# Patient Record
Sex: MALE | Race: WHITE | NOT HISPANIC OR LATINO | Employment: OTHER | URBAN - METROPOLITAN AREA
[De-identification: names, ages, dates, MRNs, and addresses within clinical notes are randomized per-mention and may not be internally consistent; named-entity substitution may affect disease eponyms.]

---

## 2017-02-27 ENCOUNTER — TRANSCRIBE ORDERS (OUTPATIENT)
Dept: LAB | Facility: CLINIC | Age: 77
End: 2017-02-27

## 2017-02-27 ENCOUNTER — LAB (OUTPATIENT)
Dept: LAB | Facility: CLINIC | Age: 77
End: 2017-02-27
Payer: MEDICARE

## 2017-02-27 DIAGNOSIS — N40.1 ENLARGED PROSTATE WITH URINARY OBSTRUCTION: ICD-10-CM

## 2017-02-27 DIAGNOSIS — N40.1 ENLARGED PROSTATE WITH URINARY OBSTRUCTION: Primary | ICD-10-CM

## 2017-02-27 DIAGNOSIS — N13.8 ENLARGED PROSTATE WITH URINARY OBSTRUCTION: ICD-10-CM

## 2017-02-27 DIAGNOSIS — N13.8 ENLARGED PROSTATE WITH URINARY OBSTRUCTION: Primary | ICD-10-CM

## 2017-02-27 LAB — PSA SERPL-MCNC: 3.2 NG/ML (ref 0–4)

## 2017-02-27 PROCEDURE — 84153 ASSAY OF PSA TOTAL: CPT

## 2017-03-07 ENCOUNTER — GENERIC CONVERSION - ENCOUNTER (OUTPATIENT)
Dept: OTHER | Facility: OTHER | Age: 77
End: 2017-03-07

## 2017-03-20 ENCOUNTER — TRANSCRIBE ORDERS (OUTPATIENT)
Dept: ADMINISTRATIVE | Facility: HOSPITAL | Age: 77
End: 2017-03-20

## 2017-03-20 DIAGNOSIS — I71.2 THORACIC ANEURYSM WITHOUT MENTION OF RUPTURE: Primary | ICD-10-CM

## 2017-05-01 ENCOUNTER — HOSPITAL ENCOUNTER (OUTPATIENT)
Dept: RADIOLOGY | Age: 77
Discharge: HOME | End: 2017-05-01
Payer: MEDICARE

## 2017-05-01 DIAGNOSIS — I71.2 ANEURYSM OF THORACIC AORTA (HCC): ICD-10-CM

## 2017-05-01 PROCEDURE — 71250 CT THORAX DX C-: CPT

## 2017-05-03 ENCOUNTER — LAB (OUTPATIENT)
Dept: LAB | Facility: CLINIC | Age: 77
End: 2017-05-03
Payer: MEDICARE

## 2017-05-03 DIAGNOSIS — R73.01 IMPAIRED FASTING GLUCOSE: ICD-10-CM

## 2017-05-03 DIAGNOSIS — I10 ESSENTIAL (PRIMARY) HYPERTENSION: ICD-10-CM

## 2017-05-03 DIAGNOSIS — E78.5 HYPERLIPIDEMIA: ICD-10-CM

## 2017-05-03 LAB
ALBUMIN SERPL BCP-MCNC: 3.6 G/DL (ref 3.5–5)
ALP SERPL-CCNC: 66 U/L (ref 46–116)
ALT SERPL W P-5'-P-CCNC: 28 U/L (ref 12–78)
ANION GAP SERPL CALCULATED.3IONS-SCNC: 5 MMOL/L (ref 4–13)
AST SERPL W P-5'-P-CCNC: 19 U/L (ref 5–45)
BACTERIA UR QL AUTO: ABNORMAL /HPF
BASOPHILS # BLD AUTO: 0.04 THOUSANDS/ΜL (ref 0–0.1)
BASOPHILS NFR BLD AUTO: 1 % (ref 0–1)
BILIRUB SERPL-MCNC: 0.63 MG/DL (ref 0.2–1)
BILIRUB UR QL STRIP: NEGATIVE
BUN SERPL-MCNC: 15 MG/DL (ref 5–25)
CALCIUM SERPL-MCNC: 8.8 MG/DL (ref 8.3–10.1)
CHLORIDE SERPL-SCNC: 106 MMOL/L (ref 100–108)
CHOLEST SERPL-MCNC: 133 MG/DL (ref 50–200)
CLARITY UR: ABNORMAL
CO2 SERPL-SCNC: 31 MMOL/L (ref 21–32)
COLOR UR: YELLOW
CREAT SERPL-MCNC: 1.09 MG/DL (ref 0.6–1.3)
EOSINOPHIL # BLD AUTO: 0.32 THOUSAND/ΜL (ref 0–0.61)
EOSINOPHIL NFR BLD AUTO: 6 % (ref 0–6)
ERYTHROCYTE [DISTWIDTH] IN BLOOD BY AUTOMATED COUNT: 12.9 % (ref 11.6–15.1)
EST. AVERAGE GLUCOSE BLD GHB EST-MCNC: 126 MG/DL
GFR SERPL CREATININE-BSD FRML MDRD: >60 ML/MIN/1.73SQ M
GLUCOSE P FAST SERPL-MCNC: 100 MG/DL (ref 65–99)
GLUCOSE UR STRIP-MCNC: NEGATIVE MG/DL
HBA1C MFR BLD: 6 % (ref 4.2–6.3)
HCT VFR BLD AUTO: 43.9 % (ref 36.5–49.3)
HDLC SERPL-MCNC: 58 MG/DL (ref 40–60)
HGB BLD-MCNC: 14.3 G/DL (ref 12–17)
HGB UR QL STRIP.AUTO: ABNORMAL
HYALINE CASTS #/AREA URNS LPF: ABNORMAL /LPF
KETONES UR STRIP-MCNC: NEGATIVE MG/DL
LDLC SERPL CALC-MCNC: 65 MG/DL (ref 0–100)
LEUKOCYTE ESTERASE UR QL STRIP: NEGATIVE
LYMPHOCYTES # BLD AUTO: 1.31 THOUSANDS/ΜL (ref 0.6–4.47)
LYMPHOCYTES NFR BLD AUTO: 24 % (ref 14–44)
MCH RBC QN AUTO: 32.6 PG (ref 26.8–34.3)
MCHC RBC AUTO-ENTMCNC: 32.6 G/DL (ref 31.4–37.4)
MCV RBC AUTO: 100 FL (ref 82–98)
MONOCYTES # BLD AUTO: 0.53 THOUSAND/ΜL (ref 0.17–1.22)
MONOCYTES NFR BLD AUTO: 10 % (ref 4–12)
NEUTROPHILS # BLD AUTO: 3.35 THOUSANDS/ΜL (ref 1.85–7.62)
NEUTS SEG NFR BLD AUTO: 59 % (ref 43–75)
NITRITE UR QL STRIP: NEGATIVE
NON-SQ EPI CELLS URNS QL MICRO: ABNORMAL /HPF
NRBC BLD AUTO-RTO: 0 /100 WBCS
PH UR STRIP.AUTO: 6 [PH] (ref 4.5–8)
PLATELET # BLD AUTO: 165 THOUSANDS/UL (ref 149–390)
PMV BLD AUTO: 10.1 FL (ref 8.9–12.7)
POTASSIUM SERPL-SCNC: 4.8 MMOL/L (ref 3.5–5.3)
PROT SERPL-MCNC: 7.1 G/DL (ref 6.4–8.2)
PROT UR STRIP-MCNC: NEGATIVE MG/DL
RBC # BLD AUTO: 4.39 MILLION/UL (ref 3.88–5.62)
RBC #/AREA URNS AUTO: ABNORMAL /HPF
SODIUM SERPL-SCNC: 142 MMOL/L (ref 136–145)
SP GR UR STRIP.AUTO: 1.03 (ref 1–1.03)
TRIGL SERPL-MCNC: 51 MG/DL
TSH SERPL DL<=0.05 MIU/L-ACNC: 2.49 UIU/ML (ref 0.36–3.74)
UROBILINOGEN UR QL STRIP.AUTO: 0.2 E.U./DL
WBC # BLD AUTO: 5.56 THOUSAND/UL (ref 4.31–10.16)
WBC #/AREA URNS AUTO: ABNORMAL /HPF

## 2017-05-03 PROCEDURE — 85025 COMPLETE CBC W/AUTO DIFF WBC: CPT

## 2017-05-03 PROCEDURE — 87086 URINE CULTURE/COLONY COUNT: CPT

## 2017-05-03 PROCEDURE — 81001 URINALYSIS AUTO W/SCOPE: CPT

## 2017-05-03 PROCEDURE — 80061 LIPID PANEL: CPT

## 2017-05-03 PROCEDURE — 80053 COMPREHEN METABOLIC PANEL: CPT

## 2017-05-03 PROCEDURE — 84443 ASSAY THYROID STIM HORMONE: CPT

## 2017-05-03 PROCEDURE — 83036 HEMOGLOBIN GLYCOSYLATED A1C: CPT

## 2017-05-03 PROCEDURE — 36415 COLL VENOUS BLD VENIPUNCTURE: CPT

## 2017-05-04 LAB — BACTERIA UR CULT: NORMAL

## 2017-05-16 ENCOUNTER — HOSPITAL ENCOUNTER (OUTPATIENT)
Dept: NON INVASIVE DIAGNOSTICS | Facility: CLINIC | Age: 77
Discharge: HOME | End: 2017-05-16
Payer: MEDICARE

## 2017-05-16 VITALS — SYSTOLIC BLOOD PRESSURE: 134 MMHG | DIASTOLIC BLOOD PRESSURE: 80 MMHG

## 2017-05-16 DIAGNOSIS — E78.5 HYPERLIPIDEMIA: ICD-10-CM

## 2017-05-16 LAB
CHEST PAIN STATEMENT: NORMAL
MAX DIASTOLIC BP: 82 MMHG
MAX HEART RATE: 142 BPM
MAX PREDICTED HEART RATE: 145 BPM
MAX. SYSTOLIC BP: 148 MMHG
PROTOCOL NAME: NORMAL
TARGET HR FORMULA: NORMAL
TEST INDICATION: NORMAL
TIME IN EXERCISE PHASE: 301 S

## 2017-05-16 PROCEDURE — 93350 STRESS TTE ONLY: CPT

## 2017-05-19 ENCOUNTER — ALLSCRIPTS OFFICE VISIT (OUTPATIENT)
Dept: OTHER | Facility: OTHER | Age: 77
End: 2017-05-19

## 2017-05-22 ENCOUNTER — ALLSCRIPTS OFFICE VISIT (OUTPATIENT)
Dept: OTHER | Facility: OTHER | Age: 77
End: 2017-05-22

## 2017-06-30 ENCOUNTER — ALLSCRIPTS OFFICE VISIT (OUTPATIENT)
Dept: OTHER | Facility: OTHER | Age: 77
End: 2017-06-30

## 2017-09-25 ENCOUNTER — GENERIC CONVERSION - ENCOUNTER (OUTPATIENT)
Dept: OTHER | Facility: OTHER | Age: 77
End: 2017-09-25

## 2017-10-31 ENCOUNTER — GENERIC CONVERSION - ENCOUNTER (OUTPATIENT)
Dept: OTHER | Facility: OTHER | Age: 77
End: 2017-10-31

## 2017-11-01 DIAGNOSIS — I10 ESSENTIAL (PRIMARY) HYPERTENSION: ICD-10-CM

## 2017-11-01 DIAGNOSIS — E78.5 HYPERLIPIDEMIA: ICD-10-CM

## 2017-11-01 DIAGNOSIS — R73.01 IMPAIRED FASTING GLUCOSE: ICD-10-CM

## 2017-11-07 ENCOUNTER — LAB (OUTPATIENT)
Dept: LAB | Facility: CLINIC | Age: 77
End: 2017-11-07
Payer: MEDICARE

## 2017-11-07 DIAGNOSIS — E78.5 HYPERLIPIDEMIA: ICD-10-CM

## 2017-11-07 DIAGNOSIS — R73.01 IMPAIRED FASTING GLUCOSE: ICD-10-CM

## 2017-11-07 DIAGNOSIS — I10 ESSENTIAL (PRIMARY) HYPERTENSION: ICD-10-CM

## 2017-11-07 LAB
ALBUMIN SERPL BCP-MCNC: 4.1 G/DL (ref 3.5–5)
ALP SERPL-CCNC: 66 U/L (ref 46–116)
ALT SERPL W P-5'-P-CCNC: 21 U/L (ref 12–78)
ANION GAP SERPL CALCULATED.3IONS-SCNC: 5 MMOL/L (ref 4–13)
AST SERPL W P-5'-P-CCNC: 19 U/L (ref 5–45)
BILIRUB SERPL-MCNC: 0.95 MG/DL (ref 0.2–1)
BUN SERPL-MCNC: 13 MG/DL (ref 5–25)
CALCIUM SERPL-MCNC: 9 MG/DL (ref 8.3–10.1)
CHLORIDE SERPL-SCNC: 104 MMOL/L (ref 100–108)
CHOLEST SERPL-MCNC: 136 MG/DL (ref 50–200)
CO2 SERPL-SCNC: 29 MMOL/L (ref 21–32)
CREAT SERPL-MCNC: 0.96 MG/DL (ref 0.6–1.3)
EST. AVERAGE GLUCOSE BLD GHB EST-MCNC: 120 MG/DL
GFR SERPL CREATININE-BSD FRML MDRD: 76 ML/MIN/1.73SQ M
GLUCOSE P FAST SERPL-MCNC: 101 MG/DL (ref 65–99)
HBA1C MFR BLD: 5.8 % (ref 4.2–6.3)
HDLC SERPL-MCNC: 88 MG/DL (ref 40–60)
LDLC SERPL CALC-MCNC: 40 MG/DL (ref 0–100)
POTASSIUM SERPL-SCNC: 4.9 MMOL/L (ref 3.5–5.3)
PROT SERPL-MCNC: 7.3 G/DL (ref 6.4–8.2)
SODIUM SERPL-SCNC: 138 MMOL/L (ref 136–145)
TRIGL SERPL-MCNC: 40 MG/DL

## 2017-11-07 PROCEDURE — 80061 LIPID PANEL: CPT

## 2017-11-07 PROCEDURE — 36415 COLL VENOUS BLD VENIPUNCTURE: CPT

## 2017-11-07 PROCEDURE — 83036 HEMOGLOBIN GLYCOSYLATED A1C: CPT

## 2017-11-07 PROCEDURE — 80053 COMPREHEN METABOLIC PANEL: CPT

## 2017-11-27 ENCOUNTER — ALLSCRIPTS OFFICE VISIT (OUTPATIENT)
Dept: OTHER | Facility: OTHER | Age: 77
End: 2017-11-27

## 2018-01-12 VITALS
BODY MASS INDEX: 35.59 KG/M2 | HEIGHT: 69 IN | HEART RATE: 72 BPM | DIASTOLIC BLOOD PRESSURE: 72 MMHG | SYSTOLIC BLOOD PRESSURE: 138 MMHG | WEIGHT: 240.25 LBS | OXYGEN SATURATION: 95 %

## 2018-01-13 VITALS
OXYGEN SATURATION: 98 % | WEIGHT: 242.19 LBS | SYSTOLIC BLOOD PRESSURE: 124 MMHG | DIASTOLIC BLOOD PRESSURE: 90 MMHG | BODY MASS INDEX: 35.87 KG/M2 | HEART RATE: 59 BPM | HEIGHT: 69 IN

## 2018-01-14 VITALS
BODY MASS INDEX: 35.55 KG/M2 | SYSTOLIC BLOOD PRESSURE: 130 MMHG | HEART RATE: 68 BPM | HEIGHT: 69 IN | OXYGEN SATURATION: 94 % | DIASTOLIC BLOOD PRESSURE: 82 MMHG | RESPIRATION RATE: 14 BRPM | WEIGHT: 240 LBS

## 2018-01-14 VITALS
RESPIRATION RATE: 16 BRPM | SYSTOLIC BLOOD PRESSURE: 124 MMHG | TEMPERATURE: 97.8 F | WEIGHT: 241.05 LBS | BODY MASS INDEX: 35.7 KG/M2 | DIASTOLIC BLOOD PRESSURE: 70 MMHG | HEIGHT: 69 IN | HEART RATE: 65 BPM | OXYGEN SATURATION: 97 %

## 2018-01-15 NOTE — RESULT NOTES
Message   #1  Please call the patient with the results of his laboratory testing  #2  His blood sugar is a little high, but stable  #3  His other laboratory test results looks well  #4  I recommend that he continue with his current medications, until his office visit later this month  #5  You may leave a message, if his communication consent allows for it  Verified Results  (1) COMPREHENSIVE METABOLIC PANEL [id number] 96:46MX Auryrhina MichelClaude Order Number: [id number]     Test Name Result Flag Reference   GLUCOSE,RANDM 104 mg/dL     If the patient is fasting, the ADA then defines impaired fasting glucose as > 100 mg/dL and diabetes as > or equal to 123 mg/dL  SODIUM 139 mmol/L  136-145   POTASSIUM 4 5 mmol/L  3 5-5 3   CHLORIDE 103 mmol/L  100-108   CARBON DIOXIDE 30 mmol/L  21-32   ANION GAP (CALC) 6 mmol/L  4-13   BLOOD UREA NITROGEN 13 mg/dL  5-25   CREATININE 1 00 mg/dL  0 60-1 30   Standardized to IDMS reference method   CALCIUM 8 8 mg/dL  8 3-10 1   BILI, TOTAL 0 82 mg/dL  0 20-1 00   ALK PHOSPHATAS 65 U/L     ALT (SGPT) 27 U/L  12-78   AST(SGOT) 13 U/L  5-45   ALBUMIN 3 8 g/dL  3 5-5 0   TOTAL PROTEIN 7 1 g/dL  6 4-8 2   eGFR Non-African American      >60 0 ml/min/1 73sq m   Performing Comments: Please mail a copy of the test results to   [facility]  - Patient Instructions: Please fast for 8-10 hours prior to doing the blood test     Performing Comments: Please mail a copy of the test results to   [facility]  - Patient Instructions: Please fast for 8-10 hours prior to doing the blood test   National Kidney Disease Education Program recommendations are as follows:  GFR calculation is accurate only with a steady state creatinine  Chronic Kidney disease less than 60 ml/min/1 73 sq  meters  Kidney failure less than 15 ml/min/1 73 sq  meters       (1) HEMOGLOBIN A1C [id number] 07:35AM Aury Arce Order Number: [id number]     Test Name Result Flag Reference HEMOGLOBIN A1C 5 8 %  4 2-6 3   EST  AVG  GLUCOSE 120 mg/dl       (1) LIPID PANEL FASTING W DIRECT LDL REFLEX [id number] 07:35AM Dayo Newsomes Order Number: [id number]     Test Name Result Flag Reference   CHOLESTEROL 133 mg/dL     LDL CHOLESTEROL CALCULATED 59 mg/dL  0-100   Performing Comments: Please mail a copy of the test results to Dr Ambrosio Camara  - Patient Instructions: Please fast for 8-10 hours prior to doing the blood test   Triglyceride:         Normal              <150 mg/dl       Borderline High    150-199 mg/dl       High               200-499 mg/dl       Very High          >499 mg/dl  Cholesterol:         Desirable        <200 mg/dl      Borderline High  200-239 mg/dl      High             >239 mg/dl  HDL Cholesterol:        High    >59 mg/dL      Low     <41 mg/dL  LDL Cholesterol:        Optimal          <100 mg/dl        Near Optimal     100-129 mg/dl        Above Optimal          Borderline High   130-159 mg/dl          High              160-189 mg/dl          Very High        >189 mg/dl  LDL CALCULATED:    This screening LDL is a calculated result  It does not have the accuracy of the Direct Measured LDL in the monitoring of patients with hyperlipidemia and/or statin therapy  Direct Measure LDL (KQG147) must be ordered separately in these patients  TRIGLYCERIDES 42 mg/dL  <=150   Specimen collection should occur prior to N-Acetylcysteine or Metamizole administration due to the potential for falsely depressed results  HDL,DIRECT 66 mg/dL H 40-60   Specimen collection should occur prior to Metamizole administration due to the potential for falsely depressed results  (1) TSH WITH FT4 REFLEX [id number] 07:35AM [employer] Order Number: [id number]     Test Name Result Flag Reference   TSH 2 920 uIU/mL  0 358-3 740   Performing Comments: Please mail a copy of the test results to Dr Ambrosio Camara     - Patient Instructions: Please fast for 8-10 hours prior to doing the blood test   Patients undergoing fluorescein dye angiography may retain small amounts of fluorescein in the body for 48-72 hours post procedure  Samples containing fluorescein can produce falsely depressed TSH values  If the patient had this procedure,a specimen should be resubmitted post fluorescein clearance

## 2018-01-15 NOTE — PROGRESS NOTES
Assessment    1  Encounter for preventive health examination (V70 0) (Z00 00)   2  Screen for colon cancer (V76 51) (Z12 11)    Plan  Benign essential hypertension    · Labetalol HCl - 100 MG Oral Tablet; TAKE 1 TABLET TWICE DAILY  Screen for colon cancer    · (1) COLOGUARD; Status:Active; Requested for:27Nov2017;   cont  : I authorize [address] to obtain reimbursement for      Cologuard and to directly contact and collect a second sample from the paient      if reportable results are not obtained from the initial sample   cont  : I accept responsibility for maintaining the privacy of test results and      related information as required by HIPAA   : By ordering Cologuard, I certify that I am a licensed medical professional      authorized to order Cologuard  I acknowledge that the test is medically necessary and      that the patient is eligible to use Cologuard  Discussion/Summary  Impression: Subsequent Annual Wellness Visit, with preventive exam as well as age and risk appropriate counseling completed  Cardiovascular screening and counseling: screening is current  Diabetes screening and counseling: screening is current  Colorectal cancer screening and counseling: screening is current  Prostate cancer screening and counseling: screening is current  Osteoporosis screening and counseling: screening not indicated  Abdominal aortic aneurysm screening and counseling: screening is current  Glaucoma screening and counseling: screening is current and Dr Gordo Maldonado  Immunizations: influenza vaccine is up to date this year, the lifetime pneumococcal vaccine has been completed, the risks and benefits of the Zostavax vaccine were discussed with the patient and Consider Shingrix, already got Zostavax  Advance Directive Planning: paperwork and instructions were given to the patient  Patient Discussion: plan discussed with the patient, follow-up visit needed in 6months     Possible side effects of new medications were reviewed with the patient/guardian today  The treatment plan was reviewed with the patient/guardian  The patient/guardian understands and agrees with the treatment plan      Chief Complaint  Patient presents to office today for a wellness visit  History of Present Illness  Welcome to Medicare and Wellness Visits: The patient is being seen for the subsequent annual wellness visit  Medicare Screening and Risk Factors   Hospitalizations: no previous hospitalizations  Once per lifetime medicare screening tests: ECG has not been done  Medicare Screening Tests Risk Questions   Abdominal aortic aneurysm risk assessment: tobacco use, over 72years of age and family history of AAA  Osteoporosis risk assessment:  and over 48years of age  HIV risk assessment: none indicated  Drug and Alcohol Use: The patient is a former cigarette smoker  The patient reports frequent alcohol use and drinking 3-4 drinks per day  Alcohol concern:   The patient has no concerns about alcohol abuse  He has never used illicit drugs  Diet and Physical Activity: Current diet includes well balanced meals and 3 cups of coffee per day  The patient does not exercise  Mood Disorder and Cognitive Impairment Screening:   Depression screening  negative for symptoms  He denies feeling down, depressed, or hopeless over the past two weeks  He denies feeling little interest or pleasure in doing things over the past two weeks  Cognitive impairment screening: denies difficulty learning/retaining new information, denies difficulty handling complex tasks, denies difficulty with reasoning, denies difficulty with spatial ability and orientation, denies difficulty with language and denies difficulty with behavior  Functional Ability/Level of Safety: Hearing is normal bilaterally  He denies hearing difficulties   The patient is currently able to do activities of daily living without limitations, able to do instrumental activities of daily living without limitations, able to participate in social activities without limitations and able to drive without limitations  Activities of daily living details: does not need help using the phone, no transportation help needed, does not need help shopping, no meal preparation help needed, does not need help doing housework, does not need help doing laundry, does not need help managing medications and does not need help managing money  Fall risk factors: The patient fell 0 times in the past 12 months  Injury History: no polypharmacy, alcohol use, no mobility impairment, no antidepressant use, no deconditioning, no postural hypotension, no sedative use, no visual impairment, no urinary incontinence, antihypertensive use, no cognitive impairment, up and go test was normal and no previous fall  Home safety risk factors:  no grab bars in the bathroom, but no unfamiliar surroundings, no loose rugs, no poor household lighting, no uneven floors, no household clutter and handrails on the stairs  Advance Directives: Advance directives: no living will, no durable power of  for health care directives and no advance directives  Co-Managers and Medical Equipment/Suppliers: See Patient Care Team   Preventive Quality Program 65 and Older: Falls Risk: The patient fell times in the past 12 months  The patient currently has no urinary incontinence symptoms  Patient Care Team    Care Team Member Role Specialty Office Number   Loreta Winter MD Specialist Gastroenterology Adult [phone]   Naren Olivares MD Specialist Urology [phone]   Yola Liriano MD Specialist Cardiology [phone]   Orion FUNEZ  Specialist Cardiac Surgery [phone]   Villa Cowden MD Specialist Pain Management [phone]   Aide Marlow MD  Dermatology [phone]     Review of Systems    Constitutional: no fever and no chills     Cardiovascular: no chest pain and no palpitations  Respiratory: no shortness of breath and no cough  Gastrointestinal: no abdominal pain, no diarrhea, no constipation, no bright red blood per rectum and no melena  Genitourinary: nocturia  Psychiatric: no anxiety and no depression  Active Problems    1  3-vessel CAD (414 00) (I25 10)   2  Advance directive discussed with patient (V65 49) (Z71 89)   3  Allergic rhinitis (477 9) (J30 9)   4  Aneurysm of abdominal aorta (441 4) (I71 4)   5  Aneurysm of ascending aorta (441 2) (I71 2)   6  Aortic valve disorder (424 1) (I35 9)   7  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 2,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 9) (I25 10)   8  Benign essential hypertension (401 1) (I10)   9  Disc degeneration, lumbar (722 52) (M51 36)   10  Edema (782 3) (R60 9)   11  Herniated lumbar intervertebral disc (722 10) (M51 26)   12  Hyperlipidemia (272 4) (E78 5)   13  Impaired fasting glucose (790 21) (R73 01)   14  Left inguinal hernia (550 90) (K40 90)   15  Limb pain (729 5) (M79 609)   16  Long term use of drug (V58 69) (Z79 899)   17  Lumbar radiculopathy (724 4) (M54 16)   18  Microscopic hematuria (599 72) (R31 29)   19  Need for prophylactic vaccination against single diseases (V05 9) (Z23)   20  Need for prophylactic vaccination and inoculation against influenza (V04 81) (Z23)   21  Obesity (278 00) (E66 9)   22  Obstructive sleep apnea (327 23) (G47 33)   23  Pain in joint of left hip (719 45) (M25 552)   24  Screening for genitourinary condition (V81 6) (Z13 89)   25  Subclinical hypothyroidism (244 8) (E03 9)    Past Medical History    · History of Coronary Artery Disease (V12 59)   · History of abdominal aortic aneurysm (V12 59) (Z86 79)   · History of hyperlipidemia (V12 29) (Z86 39)   · History of hypertension (V12 59) (Z86 79)   · History of Subconjunctival hemorrhage, unspecified laterality (372 72) (H11 30)    The active problems and past medical history were reviewed and updated today        Surgical History    · History of Abd Aorta Aneurysm Repair 2 Dock Limbs W/ Visc Extens Prosth   · History of CABG   · History of Surgery For Abdominal Aortic Aneurysm    The surgical history was reviewed and updated today  Family History  Mother    · Family history of Heart Disease (V17 49)  Father    · Family history of Stroke Syndrome (V17 1)  Son    · Family history of Glioblastoma multiforme  Brother    · Family history of Abdominal aneurysm   · Family history of Ascending aortic aneurysm  Family History    · Family history of Coronary Artery Disease (V17 49)   · Family history of Hypertension (V17 49)    The family history was reviewed and updated today  Social History    · Denied: History of Drug Use   · Former smoker (V15 82) (S54 193)   · pt stated smoked 1 pack per day for 40 yrs   · Marital History - Currently    · Occupation: Retired   · Regular alcohol consumption (V69 8) (Z78 9)  The social history was reviewed and updated today  Current Meds   1  Aspirin EC 81 MG Oral Tablet Delayed Release; TAKE 1 TABLET DAILY; Therapy: 46XJO8725 to Recorded   2  Atorvastatin Calcium 40 MG Oral Tablet; Take 1 tablet daily; Therapy: 09BIV8978 to (Evaluate:70Elf8574)  Requested for: 60VCB2798; Last   Rx:25Nxi7820 Ordered   3  Labetalol HCl - 100 MG Oral Tablet; TAKE 1 TABLET TWICE DAILY; Therapy: 55WJB1555 to (Evaluate:99Mhk8198)  Requested for: 88Msj0619; Last   Rx:20Xrq1563 Ordered   4  Lisinopril 5 MG Oral Tablet; Take 1 tablet daily; Therapy: 38VNX1988 to (Evaluate:28Yxp3538)  Requested for: 17Gpe4820; Last   AV:11PDM1372 Ordered   5  Tamsulosin HCl - 0 4 MG Oral Capsule; take 1 capsule daily; Therapy: (Recorded:31Zta0478) to Recorded    The medication list was reviewed and updated today  Allergies    1  meloxicam    2  No Known Environmental Allergies   3  No Known Food Allergies    Immunizations   ** Printed in Appendix #1 below       Vitals  Signs    Heart Rate: 72  Systolic: 033  Diastolic: 72  Height: 5 ft 9 in  Weight: 240 lb 4 oz  BMI Calculated: 35 48  BSA Calculated: 2 23  O2 Saturation: 95    Physical Exam    Constitutional   General appearance: No acute distress, well appearing and well nourished  obese  Head and Face   Head and face: Normal     Eyes   Conjunctiva and lids: No erythema, swelling or discharge  Ears, Nose, Mouth, and Throat   Otoscopic examination: Tympanic membranes translucent with normal light reflex  Canals patent without erythema  Oropharynx: Normal with no erythema, edema, exudate or lesions  Neck   Neck: Supple, symmetric, trachea midline, no masses  Thyroid: Normal, no thyromegaly  Pulmonary   Respiratory effort: No increased work of breathing or signs of respiratory distress  Auscultation of lungs: Clear to auscultation  Cardiovascular   Auscultation of heart: Normal rate and rhythm, normal S1 and S2, no murmurs  Examination of extremities for edema and/or varicosities: Normal     Abdomen soft movable nontender mass subcutaneous right side of abdomen  Lymphatic   Palpation of lymph nodes in neck: No lymphadenopathy  Musculoskeletal   Gait and station: Normal     Psychiatric   Orientation to person, place and time: Normal     Mood and affect: Normal        Results/Data  PHQ-2 Adult Depression Screening 27Nov2017 09:34AM User, [employer]     Test Name Result Flag Reference   PHQ-2 Adult Depression Score 0     Over the last two weeks, how often have you been bothered by any of the following problems?   Little interest or pleasure in doing things: Not at all - 0  Feeling down, depressed, or hopeless: Not at all - 0   PHQ-2 Adult Depression Screening Negative       Falls Risk Assessment (Dx Z13 89 Screen for Neurologic Disorder) 27QSC3137 09:34AM User, [employer]     Test Name Result Flag Reference   Falls Risk      No falls in the past year       Signatures   Electronically signed by : HANS Yoon ; Nov 27 2017 10:08AM EST (Author)    Appendix #1     Patient: Rene Haider ; : 1940; MRN: [id number]      1 2 3 4 5 6    Influenza  20-Oct-2012  (72y) 02-Oct-2013  (73y) 02-Oct-2013  (73y) 22-Oct-2014  (74y) 27-Oct-2015  (75y) 08-Oct-2016  (76y)    PCV  2015  (74y)         PPSV  07-Sep-2012  (72y)

## 2018-02-28 DIAGNOSIS — E78.5 HYPERLIPIDEMIA: ICD-10-CM

## 2018-03-01 ENCOUNTER — TRANSCRIBE ORDERS (OUTPATIENT)
Dept: LAB | Facility: CLINIC | Age: 78
End: 2018-03-01
Payer: MEDICARE

## 2018-03-01 ENCOUNTER — LAB (OUTPATIENT)
Dept: LAB | Facility: CLINIC | Age: 78
End: 2018-03-01
Payer: MEDICARE

## 2018-03-01 DIAGNOSIS — R31.9 HEMATURIA SYNDROME: ICD-10-CM

## 2018-03-01 DIAGNOSIS — N40.1 ENLARGED PROSTATE WITH URINARY OBSTRUCTION: Primary | ICD-10-CM

## 2018-03-01 DIAGNOSIS — E78.5 HYPERLIPIDEMIA: ICD-10-CM

## 2018-03-01 DIAGNOSIS — N13.8 ENLARGED PROSTATE WITH URINARY OBSTRUCTION: Primary | ICD-10-CM

## 2018-03-01 LAB
ALBUMIN SERPL BCP-MCNC: 3.7 G/DL (ref 3.5–5)
ALP SERPL-CCNC: 65 U/L (ref 46–116)
ALT SERPL W P-5'-P-CCNC: 24 U/L (ref 12–78)
ANION GAP SERPL CALCULATED.3IONS-SCNC: 6 MMOL/L (ref 4–13)
AST SERPL W P-5'-P-CCNC: 18 U/L (ref 5–45)
BILIRUB SERPL-MCNC: 0.94 MG/DL (ref 0.2–1)
BUN SERPL-MCNC: 12 MG/DL (ref 5–25)
CALCIUM SERPL-MCNC: 8.7 MG/DL (ref 8.3–10.1)
CHLORIDE SERPL-SCNC: 106 MMOL/L (ref 100–108)
CO2 SERPL-SCNC: 29 MMOL/L (ref 21–32)
CREAT SERPL-MCNC: 1.07 MG/DL (ref 0.6–1.3)
ERYTHROCYTE [DISTWIDTH] IN BLOOD BY AUTOMATED COUNT: 13 % (ref 11.6–15.1)
EST. AVERAGE GLUCOSE BLD GHB EST-MCNC: 120 MG/DL
GFR SERPL CREATININE-BSD FRML MDRD: 67 ML/MIN/1.73SQ M
GLUCOSE P FAST SERPL-MCNC: 95 MG/DL (ref 65–99)
HBA1C MFR BLD: 5.8 % (ref 4.2–6.3)
HCT VFR BLD AUTO: 42.3 % (ref 36.5–49.3)
HGB BLD-MCNC: 14.1 G/DL (ref 12–17)
LDLC SERPL DIRECT ASSAY-MCNC: 55 MG/DL (ref 0–100)
MCH RBC QN AUTO: 33 PG (ref 26.8–34.3)
MCHC RBC AUTO-ENTMCNC: 33.3 G/DL (ref 31.4–37.4)
MCV RBC AUTO: 99 FL (ref 82–98)
PLATELET # BLD AUTO: 159 THOUSANDS/UL (ref 149–390)
PMV BLD AUTO: 10.6 FL (ref 8.9–12.7)
POTASSIUM SERPL-SCNC: 4.8 MMOL/L (ref 3.5–5.3)
PROT SERPL-MCNC: 7 G/DL (ref 6.4–8.2)
PSA SERPL-MCNC: 2.8 NG/ML (ref 0–4)
RBC # BLD AUTO: 4.27 MILLION/UL (ref 3.88–5.62)
SODIUM SERPL-SCNC: 141 MMOL/L (ref 136–145)
WBC # BLD AUTO: 6.05 THOUSAND/UL (ref 4.31–10.16)

## 2018-03-01 PROCEDURE — 83721 ASSAY OF BLOOD LIPOPROTEIN: CPT

## 2018-03-01 PROCEDURE — 88112 CYTOPATH CELL ENHANCE TECH: CPT

## 2018-03-01 PROCEDURE — 83036 HEMOGLOBIN GLYCOSYLATED A1C: CPT

## 2018-03-01 PROCEDURE — 85027 COMPLETE CBC AUTOMATED: CPT

## 2018-03-01 PROCEDURE — 88112 CYTOPATH CELL ENHANCE TECH: CPT | Performed by: PATHOLOGY

## 2018-03-01 PROCEDURE — 36415 COLL VENOUS BLD VENIPUNCTURE: CPT

## 2018-03-01 PROCEDURE — 80053 COMPREHEN METABOLIC PANEL: CPT

## 2018-03-01 PROCEDURE — G0103 PSA SCREENING: HCPCS

## 2018-03-02 ENCOUNTER — TELEPHONE (OUTPATIENT)
Dept: CARDIOLOGY CLINIC | Facility: CLINIC | Age: 78
End: 2018-03-02

## 2018-03-02 NOTE — TELEPHONE ENCOUNTER
----- Message from Rojelio Stewart MD sent at 3/2/2018 10:58 AM EST -----  Please tell him blood tests are good

## 2018-03-19 ENCOUNTER — TRANSCRIBE ORDERS (OUTPATIENT)
Dept: ADMINISTRATIVE | Facility: HOSPITAL | Age: 78
End: 2018-03-19

## 2018-03-19 DIAGNOSIS — N40.1 ENLARGED PROSTATE WITH URINARY OBSTRUCTION: Primary | ICD-10-CM

## 2018-03-19 DIAGNOSIS — N13.8 ENLARGED PROSTATE WITH URINARY OBSTRUCTION: Primary | ICD-10-CM

## 2018-03-26 ENCOUNTER — HOSPITAL ENCOUNTER (OUTPATIENT)
Dept: RADIOLOGY | Age: 78
Discharge: HOME | End: 2018-03-26
Payer: MEDICARE

## 2018-03-26 DIAGNOSIS — N40.1 ENLARGED PROSTATE WITH URINARY OBSTRUCTION: ICD-10-CM

## 2018-03-26 DIAGNOSIS — R31.9 HEMATURIA SYNDROME: ICD-10-CM

## 2018-03-26 DIAGNOSIS — N13.8 ENLARGED PROSTATE WITH URINARY OBSTRUCTION: ICD-10-CM

## 2018-03-26 PROCEDURE — 51798 US URINE CAPACITY MEASURE: CPT

## 2018-04-04 DIAGNOSIS — R19.5 POSITIVE COLORECTAL CANCER SCREENING USING COLOGUARD TEST: Primary | ICD-10-CM

## 2018-04-09 PROBLEM — R19.5 POSITIVE COLORECTAL CANCER SCREENING USING COLOGUARD TEST: Status: ACTIVE | Noted: 2018-04-09

## 2018-05-14 ENCOUNTER — OFFICE VISIT (OUTPATIENT)
Dept: INTERNAL MEDICINE CLINIC | Facility: CLINIC | Age: 78
End: 2018-05-14
Payer: MEDICARE

## 2018-05-14 VITALS
DIASTOLIC BLOOD PRESSURE: 82 MMHG | WEIGHT: 240.4 LBS | HEIGHT: 71 IN | SYSTOLIC BLOOD PRESSURE: 136 MMHG | BODY MASS INDEX: 33.65 KG/M2 | HEART RATE: 66 BPM

## 2018-05-14 DIAGNOSIS — R73.01 IMPAIRED FASTING GLUCOSE: ICD-10-CM

## 2018-05-14 DIAGNOSIS — I10 BENIGN ESSENTIAL HYPERTENSION: Primary | ICD-10-CM

## 2018-05-14 DIAGNOSIS — E78.2 MIXED HYPERLIPIDEMIA: ICD-10-CM

## 2018-05-14 DIAGNOSIS — I25.10 ARTERIOSCLEROTIC CARDIOVASCULAR DISEASE: ICD-10-CM

## 2018-05-14 DIAGNOSIS — K40.90 LEFT INGUINAL HERNIA: ICD-10-CM

## 2018-05-14 DIAGNOSIS — E03.8 SUBCLINICAL HYPOTHYROIDISM: ICD-10-CM

## 2018-05-14 DIAGNOSIS — G47.33 OBSTRUCTIVE SLEEP APNEA: ICD-10-CM

## 2018-05-14 DIAGNOSIS — R19.5 POSITIVE COLORECTAL CANCER SCREENING USING COLOGUARD TEST: ICD-10-CM

## 2018-05-14 DIAGNOSIS — E66.09 CLASS 1 OBESITY DUE TO EXCESS CALORIES WITH SERIOUS COMORBIDITY AND BODY MASS INDEX (BMI) OF 30.0 TO 30.9 IN ADULT: ICD-10-CM

## 2018-05-14 DIAGNOSIS — R31.29 MICROSCOPIC HEMATURIA: ICD-10-CM

## 2018-05-14 PROCEDURE — 99214 OFFICE O/P EST MOD 30 MIN: CPT | Performed by: INTERNAL MEDICINE

## 2018-05-14 RX ORDER — ASPIRIN 81 MG/1
1 TABLET ORAL DAILY
COMMUNITY
Start: 2012-09-07

## 2018-05-14 RX ORDER — LISINOPRIL 5 MG/1
1 TABLET ORAL DAILY
COMMUNITY
Start: 2016-12-02 | End: 2018-08-21 | Stop reason: SDUPTHER

## 2018-05-14 RX ORDER — LABETALOL 100 MG/1
1 TABLET, FILM COATED ORAL 2 TIMES DAILY
COMMUNITY
Start: 2012-09-07 | End: 2018-08-16 | Stop reason: SDUPTHER

## 2018-05-14 RX ORDER — ATORVASTATIN CALCIUM 40 MG/1
1 TABLET, FILM COATED ORAL DAILY
COMMUNITY
Start: 2012-10-23 | End: 2018-05-14 | Stop reason: SDUPTHER

## 2018-05-14 RX ORDER — ATORVASTATIN CALCIUM 40 MG/1
40 TABLET, FILM COATED ORAL DAILY
Qty: 90 TABLET | Refills: 3 | Status: SHIPPED | OUTPATIENT
Start: 2018-05-14 | End: 2019-07-24 | Stop reason: SDUPTHER

## 2018-05-14 RX ORDER — FINASTERIDE 5 MG/1
5 TABLET, FILM COATED ORAL DAILY
COMMUNITY
Start: 2018-03-30 | End: 2019-07-19 | Stop reason: ALTCHOICE

## 2018-05-14 RX ORDER — TAMSULOSIN HYDROCHLORIDE 0.4 MG/1
1 CAPSULE ORAL DAILY
COMMUNITY

## 2018-05-14 NOTE — PROGRESS NOTES
Assessment/Plan:       Problem List Items Addressed This Visit     Positive colorectal cancer screening using Cologuard test     Appt with Dr Mya De La Cruz coming up         Benign essential hypertension - Primary     Acceptable  readings         Relevant Medications    lisinopril (ZESTRIL) 5 mg tablet    labetalol (NORMODYNE) 100 mg tablet    Hyperlipidemia     Controlled         Relevant Medications    atorvastatin (LIPITOR) 40 mg tablet    Impaired fasting glucose    Microscopic hematuria    Obesity     Lose weight         Obstructive sleep apnea     He was unable to tolerate the CPAP in the past  Declines to see Dr Nancy Najera again at this time         Subclinical hypothyroidism    Relevant Medications    labetalol (NORMODYNE) 100 mg tablet    Arteriosclerotic cardiovascular disease    Left inguinal hernia     Declines to see surgery at this time                 Subjective:      Patient ID: Yasmani Walker is a 69 y o  male  HPI  +Cologuard  Appt with Dr Mya De La Cruz in a few weeks  Cystoscopy a few months ago bec of blood in the urine  [facility] Proscar [facility]  Bps at home have been 140s/70-80s  Son  a few months ago after a long jacinto with GBM      The following portions of the patient's history were reviewed and updated as appropriate: allergies, current medications, past family history, past medical history, past social history, past surgical history and problem list     Review of Systems   Constitutional: Negative for fatigue, fever and unexpected weight change  HENT: Negative for ear pain, hearing loss, sinus pain, sinus pressure and sore throat  Respiratory: Negative for cough, shortness of breath and wheezing  Cardiovascular: Negative for chest pain, palpitations and leg swelling  Gastrointestinal: Positive for abdominal pain (known L inguinal hernia , occ discomfort)  Negative for constipation, diarrhea, nausea and vomiting     Musculoskeletal: Positive for back pain (Known bulging discs and saw pain mgt in the past; aggravated by walking  to the left hip relieved by Aleve)  Negative for arthralgias and myalgias  Neurological: Negative for dizziness and headaches  Psychiatric/Behavioral:        Depression over son's recent death         Objective:      /82   Pulse 66   Ht 5' 11" (1 803 m)   Wt 109 kg (240 lb 6 4 oz)   BMI 33 53 kg/m²          Physical Exam   Constitutional: He is oriented to person, place, and time  He appears well-developed and well-nourished  HENT:   Head: Normocephalic and atraumatic  Right Ear: External ear normal    Left Ear: External ear normal    Mouth/Throat: Oropharynx is clear and moist    Eyes: Conjunctivae are normal    Neck: Neck supple  Cardiovascular: Normal rate, regular rhythm and normal heart sounds  No murmur heard  Pulmonary/Chest: Effort normal and breath sounds normal  No respiratory distress  He has no wheezes  He has no rales  Abdominal: Soft  Bowel sounds are normal  He exhibits no distension and no mass  There is no tenderness  There is no rebound and no guarding  Musculoskeletal: Normal range of motion  Neurological: He is alert and oriented to person, place, and time  Skin: Skin is warm and dry  Psychiatric: He has a normal mood and affect   His behavior is normal  Judgment and thought content normal

## 2018-05-14 NOTE — ASSESSMENT & PLAN NOTE
He was unable to tolerate the CPAP in the past  Declines to see Dr Stoddard Bound again at this time

## 2018-06-28 ENCOUNTER — OFFICE VISIT (OUTPATIENT)
Dept: CARDIOLOGY CLINIC | Facility: CLINIC | Age: 78
End: 2018-06-28
Payer: MEDICARE

## 2018-06-28 VITALS
WEIGHT: 242 LBS | HEIGHT: 71 IN | HEART RATE: 63 BPM | SYSTOLIC BLOOD PRESSURE: 120 MMHG | DIASTOLIC BLOOD PRESSURE: 60 MMHG | BODY MASS INDEX: 33.88 KG/M2

## 2018-06-28 DIAGNOSIS — I35.9 AORTIC VALVE DISORDER: ICD-10-CM

## 2018-06-28 DIAGNOSIS — I25.10 3-VESSEL CAD: Primary | ICD-10-CM

## 2018-06-28 PROCEDURE — 99214 OFFICE O/P EST MOD 30 MIN: CPT | Performed by: INTERNAL MEDICINE

## 2018-06-28 PROCEDURE — 93000 ELECTROCARDIOGRAM COMPLETE: CPT | Performed by: INTERNAL MEDICINE

## 2018-06-28 NOTE — PROGRESS NOTES
Cardiology Follow Up    Louann Tucker  1940  [id number]  HEART & VASCULAR Aparna Staley  [facility] [address]    One year follow-up of coronary disease  He is depressed, his son  of glioblastoma earlier this year  Some days he drinks 4-6 beers  He is not having angina or dyspnea  He is able to mow his lawn with a self-propelled mower  Half an hour he gets low back pain  No cardiopulmonary symptoms  Coronary disease, he had 4 vessel coronary bypass   GAB to the LAD  Vein graft to the OM 1, OM 2, sequential , separate vein to the PDA  SPECT  revealed EF 58% from possible small inferior scar normal wall motion  Stress echo , exercise for 5 minutes achieved a heart rate 90 7% of age predicted max  EKGs were negative for ischemia  Echo at baseline revealed hypokinesis of the inferior base peak stress this was unchanged    Lipids, he is compliant with atorvastatin 40 a day well-tolerated  21472, total cholesterol 136 triglycerides 40 HDL 88    In his 2 brothers all have aneurysm disease  He had a AAA surgical resection   CT angio of the aorta is checked every several years by CT surgery  He was a heavy smoker till , did very well with the Chantix  He developed the pigmented edema with meloxicam that still bothers him in his right leg  EKG takes Aleve without adverse event      1  3-vessel CAD  POCT ECG    ECG 12 lead   2  Aortic valve disorder  POCT ECG       Interval History:  No events    He is depressed    Patient Active Problem List   Diagnosis    Positive colorectal cancer screening using Cologuard test    Benign essential hypertension    Hyperlipidemia    Impaired fasting glucose    Microscopic hematuria    Obesity    Obstructive sleep apnea    Subclinical hypothyroidism    3-vessel CAD    Aneurysm of abdominal aorta (HCC)    Aneurysm of ascending aorta ([address] )    Aortic valve disorder    Arteriosclerotic cardiovascular disease    Disc degeneration, lumbar    Left inguinal hernia     Past Medical History:   Diagnosis Date    Abdominal aortic aneurysm (HCC)     Coronary artery disease     Hyperlipidemia     Hypertension     Subconjunctival hemorrhage      Social History     Social History    Marital status: /Civil Union     Spouse name: N/A    Number of children: N/A    Years of education: N/A     Occupational History    retired      Social History Main Topics    Smoking status: Former Smoker     Packs/day: 1 00     Years: 40 00     Quit date: 08/2012    Smokeless tobacco: Never Used    Alcohol use Yes      Comment: rare    Drug use: No    Sexual activity: Not on file     Other Topics Concern    Not on file     Social History Narrative    No narrative on file      Family History   Problem Relation Age of Onset    Heart disease Mother     Stroke Father         stroke syndrome    Aneurysm Brother         abdominal    Aortic aneurysm Brother         ascending    Coronary artery disease Family     Hypertension Family     Other Son         gioblastoma multiforme     Past Surgical History:   Procedure Laterality Date    ABDOMINAL AORTIC ANEURYSM REPAIR  08/09/2007    2 dock limbs with visc extens prosth    CORONARY ARTERY BYPASS GRAFT      GAB-LAD, sequential vein graft to OM1 and OM2, single VG-posterior descending   Onset: 2003       Current Outpatient Prescriptions:     aspirin (ECOTRIN LOW STRENGTH) 81 mg EC tablet, Take 1 tablet by mouth daily, Disp: , Rfl:     atorvastatin (LIPITOR) 40 mg tablet, Take 1 tablet (40 mg total) by mouth daily, Disp: 90 tablet, Rfl: 3    finasteride (PROSCAR) 5 mg tablet, , Disp: , Rfl:     labetalol (NORMODYNE) 100 mg tablet, Take 1 tablet by mouth 2 (two) times a day, Disp: , Rfl:     lisinopril (ZESTRIL) 5 mg tablet, Take 1 tablet by mouth daily, Disp: , Rfl:     tamsulosin (FLOMAX) 0 4 mg, Take 1 capsule by mouth daily, Disp: , Rfl:   Allergies   Allergen Reactions    Meloxicam Edema       Labs:  No visits with results within 2 Month(s) from this visit  Latest known visit with results is:   Transcribe Orders on 03/01/2018   Component Date Value    WBC 03/01/2018 6 05     RBC 03/01/2018 4 27     Hemoglobin 03/01/2018 14 1     Hematocrit 03/01/2018 42 3     MCV 03/01/2018 99*    MCH 03/01/2018 33 0     MCHC 03/01/2018 33 3     RDW 03/01/2018 13 0     Platelets 90/09/9864 159     MPV 03/01/2018 10 6     Sodium 03/01/2018 141     Potassium 03/01/2018 4 8     Chloride 03/01/2018 106     CO2 03/01/2018 29     Anion Gap 03/01/2018 6     BUN 03/01/2018 12     Creatinine 03/01/2018 1 07     Glucose, Fasting 03/01/2018 95     Calcium 03/01/2018 8 7     AST 03/01/2018 18     ALT 03/01/2018 24     Alkaline Phosphatase 03/01/2018 65     Total Protein 03/01/2018 7 0     Albumin 03/01/2018 3 7     Total Bilirubin 03/01/2018 0 94     eGFR 03/01/2018 67     LDL Direct 03/01/2018 55     Hemoglobin A1C 03/01/2018 5 8     EAG 03/01/2018 120     Case Report 03/01/2018                      Value:Non-gynecologic Cytology                          Case: [id number]                                  Authorizing Provider:  Jose Ann MD        Collected:           03/01/2018 0959              Ordering Location:     [place] [address] [facility]      Received:            03/01/2018 [address] [facility]                                                                             [address]                                                                      Pathologist:           John Cihn MD                                                        Specimen:    Urine, Voided                                                                              Final Diagnosis 03/01/2018                      Value: This result contains rich text formatting which cannot be displayed here  Tisha Keyes Gross Description 03/01/2018                      Value: This result contains rich text formatting which cannot be displayed here   Additional Information 03/01/2018                      Value: This result contains rich text formatting which cannot be displayed here   PSA 03/01/2018 2 8      Imaging: No results found  Review of Systems:  Review of Systems   Respiratory: Negative  Cardiovascular: Negative  Psychiatric/Behavioral: Negative for suicidal ideas  Physical Exam:  Physical Exam   Constitutional: He appears well-nourished  Neck: Normal carotid pulses, no hepatojugular reflux and no JVD present  Carotid bruit is not present  No thyromegaly present  Cardiovascular: Normal rate and regular rhythm  No murmur heard  Pulses:       Dorsalis pedis pulses are 2+ on the right side, and 3+ on the left side  Posterior tibial pulses are 1+ on the right side, and 3+ on the left side  Pulmonary/Chest: No respiratory distress  He has no wheezes  He has no rales  He exhibits no tenderness  Abdominal: Soft  He exhibits no distension  There is no hepatosplenomegaly  Musculoskeletal: He exhibits edema (Trace right the almost not on the left  Pigmentation changes noted)         Discussion/Summary:  He was asked to have routine cardiology follow-up in 1 year

## 2018-06-28 NOTE — PATIENT INSTRUCTIONS
If you find that her depression is not clearing please talk your doctor there are antidepressant medications which can be very helpful    I asked that she return in 1 year

## 2018-07-13 ENCOUNTER — TELEPHONE (OUTPATIENT)
Dept: PAIN MEDICINE | Facility: MEDICAL CENTER | Age: 78
End: 2018-07-13

## 2018-07-13 NOTE — TELEPHONE ENCOUNTER
Explained to pt's wife that since we haven't seen him in > 4yrs an ov is required  Pt having LB and left leg pain again  Pt given ov for 7/16 at 8:45 w/ NM

## 2018-07-13 NOTE — TELEPHONE ENCOUNTER
Pt's wife called stating that her  was last seen in the office in 2014  He had a series of injections and at that point, Dr Kelle Michelle had nothing else to offer him  States that he has been doing well since that time, but the pain has come back in the past few weeks and he would like to know if Dr Kelle Michelle can repeat the injections or if there would be any other options available to him now  Marilee Up states that the pain is in his lower back and radiating down into his L leg  Pt can be reached at [phone]

## 2018-07-16 ENCOUNTER — OFFICE VISIT (OUTPATIENT)
Dept: PAIN MEDICINE | Facility: CLINIC | Age: 78
End: 2018-07-16
Payer: MEDICARE

## 2018-07-16 VITALS
SYSTOLIC BLOOD PRESSURE: 132 MMHG | HEART RATE: 66 BPM | HEIGHT: 71 IN | BODY MASS INDEX: 33.6 KG/M2 | TEMPERATURE: 97.8 F | WEIGHT: 240 LBS | DIASTOLIC BLOOD PRESSURE: 72 MMHG

## 2018-07-16 DIAGNOSIS — M54.16 LUMBAR RADICULOPATHY: ICD-10-CM

## 2018-07-16 DIAGNOSIS — G89.4 CHRONIC PAIN SYNDROME: Primary | ICD-10-CM

## 2018-07-16 DIAGNOSIS — M51.26 LUMBAR DISC HERNIATION: ICD-10-CM

## 2018-07-16 PROCEDURE — 99214 OFFICE O/P EST MOD 30 MIN: CPT | Performed by: NURSE PRACTITIONER

## 2018-07-16 NOTE — PROGRESS NOTES
Assessment:  1  Chronic pain syndrome    2  Lumbar disc herniation    3  Lumbar radiculopathy        Plan:  The patient is a 69 y o  male last seen on 2014 who presents for a follow up office visit in regards to chronic pain secondary to lumbar disc herniation and lumbar radiculopathy  The patient is experiencing increased low back pain from a disc herniation that is causing moderate spinal canal stenosis at L4-L5  To help decrease inflammation and nerve irritation, a left L4-L5 transforaminal epidural steroid injection can be performed  The patient was made aware he does not need to stop his aspirin for this procedure due to the transforaminal approach that will be done  Complete risks and benefits including bleeding, infection, tissue reaction, nerve injury and allergic reaction were discussed  The approach was demonstrated using models and literature was provided  Verbal and written consent was obtained  The patient was advised to contact the office should their symptoms worsen in the interim  The patient was agreeable and verbalized an understanding  History of Present Illness: The patient is a 69 y o  male last seen on 2014 who presents for a follow up office visit in regards to chronic pain secondary to lumbar disc herniation and lumbar radiculopathy  The patient currently reports increased low back pain, which started about 3 weeks ago  He denies trauma or precipitating event which caused the pain to occur  The pain is located in the low back, and radiates into the left hip and down the anterior lateral aspect of the left thigh stopping just below his knee  He states the pain had been tolerable since having epidural steroid injections in 2014  He feels this is the same pain, and has been gradually worsening over the past 3 weeks  The pain is now constant and described as sharp and shooting    He is currently rating his pain a 9/10 on the numeric rating scale    I have personally reviewed and/or updated the patient's past medical history, past surgical history, family history, social history, current medications, allergies, and vital signs today  Review of Systems:    Review of Systems   Musculoskeletal: Positive for gait problem  Past Medical History:   Diagnosis Date    Abdominal aortic aneurysm ([address] )     Coronary artery disease     Hyperlipidemia     Hypertension     Subconjunctival hemorrhage        Past Surgical History:   Procedure Laterality Date    ABDOMINAL AORTIC ANEURYSM REPAIR  08/09/2007    2 dock limbs with visc extens prosth    CORONARY ARTERY BYPASS GRAFT      GAB-LAD, sequential vein graft to OM1 and OM2, single VG-posterior descending   Onset: 2003       Family History   Problem Relation Age of Onset    Heart disease Mother     Stroke Father         stroke syndrome    Aneurysm Brother         abdominal    Aortic aneurysm Brother         ascending    Coronary artery disease Family     Hypertension Family     Other Son         gioblastoma multiforme       Social History     Occupational History    retired      Social History Main Topics    Smoking status: Former Smoker     Packs/day: 1 00     Years: 40 00     Quit date: 08/2012    Smokeless tobacco: Never Used    Alcohol use Yes      Comment: rare    Drug use: No    Sexual activity: Not on file         Current Outpatient Prescriptions:     aspirin (ECOTRIN LOW STRENGTH) 81 mg EC tablet, Take 1 tablet by mouth daily, Disp: , Rfl:     atorvastatin (LIPITOR) 40 mg tablet, Take 1 tablet (40 mg total) by mouth daily, Disp: 90 tablet, Rfl: 3    finasteride (PROSCAR) 5 mg tablet, , Disp: , Rfl:     labetalol (NORMODYNE) 100 mg tablet, Take 1 tablet by mouth 2 (two) times a day, Disp: , Rfl:     lisinopril (ZESTRIL) 5 mg tablet, Take 1 tablet by mouth daily, Disp: , Rfl:     tamsulosin (FLOMAX) 0 4 mg, Take 1 capsule by mouth daily, Disp: , Rfl:     Allergies   Allergen Reactions    Meloxicam Edema Physical Exam:    /72   Pulse 66   Temp 97 8 °F (36 6 °C) (Oral)   Ht 5' 11" (1 803 m)   Wt 109 kg (240 lb)   BMI 33 47 kg/m²     Constitutional:normal, well developed, well nourished, alert, in no distress and non-toxic and no overt pain behavior  Eyes:anicteric  HEENT:grossly intact  Neck:supple, symmetric, trachea midline and no masses   Pulmonary:even and unlabored  Cardiovascular:No edema or pitting edema present  Skin:Normal without rashes or lesions and well hydrated  Psychiatric:Mood and affect appropriate  Neurologic:Cranial Nerves II-XII grossly intact  Musculoskeletal:normal     Lumbar Spine Exam    Appearance:  Normal lordosis  Palpation/Tenderness:  left lumbar paraspinal tenderness    Range of Motion:  Flexion:  Minimally limited  without pain  Extension:  Minimally limited  without pain  Motor Strength:  Left hip flexion:  5/5  Right hip flexion:  5/5  Right hip extension:  5/5  Left knee flexion:  5/5  Left knee extension:  5/5  Right knee flexion:  5/5  Right knee extension:  5/5  Left foot dorsiflexion:  5/5  Left foot plantar flexion:  5/5  Right foot dorsiflexion:  5/5  Right foot plantar flexion:  5/5      Imaging  MRI LUMBAR SPINE WITHOUT CONTRAST 9/9/13     INDICATION-  Low back pain with left leg radiculopathy      COMPARISON-  X-rays dated 8/9/2013      TECHNIQUE-  Sagittal T1, sagittal T2, sagittal inversion recovery, axial T1 and  axial T2, coronal T2        IMAGE QUALITY-    Diagnostic     FINDINGS-     ALIGNMENT-  Appropriate alignment with the exception of minimal  anterolisthesis of L2 upon L3  No compression fracture  No scoliosis      MARROW SIGNAL-  Endplate marrow degenerative changes noted within the  lower lumbar spine, L4-L5 and L5-S1, Modic type I and Type II  Moderate  sized hemangioma within the L3 vertebral body      DISTAL CORD AND CONUS-  Normal signal within the distal cord and conus    The conus ends at the L1-L2 level      PARASPINAL SOFT TISSUES- Multiple left renal cysts are noted, largest  of which is located within the lower pole measuring proximally 6 3 cm      SACRUM-  Normal signal within the sacrum  No evidence of insufficiency  or stress fracture      LOWER THORACIC DISC SPACES-  Normal disc height and signal   No disc  herniation, canal stenosis or foraminal narrowing      LUMBAR DISC SPACES-       L1-L2-  Normal      L2-L3-  Disc desiccation  Mild diffuse annular bulging  Mild endplate  and facet degenerative change  Mild to moderate canal stenosis  Mild  left foraminal narrowing      L3-L4-  Diffuse annular bulging  Small central annular tear with a tiny  central disc protrusion  Small right foraminal disc protrusion  Mild  facet hypertrophic degenerative change  Mild canal stenosis  Mild right  greater than left foraminal narrowing      L4-L5-  Diffuse annular bulging  Small central and left paracentral  disc protrusion extending into the left neural foramen  Mild facet  hypertrophic degenerative change  Moderate canal stenosis especially  within the anterolateral aspect of the canal with distortion of the  thecal sac in this region  Moderate left and mild right foraminal  narrowing      L5-S1-  Disc desiccation and loss of disc height  Diffuse annular  bulging with endplate osteophyte formation  Small central disc  protrusion  Mild facet arthropathy  Mild canal stenosis  Moderate  bilateral foraminal narrowing      IMPRESSION-     1  L3-L4 degenerative disc disease with right foraminal disc extrusion  resulting in right greater than left foraminal narrowing  2  Diffuse annular bulging with small central/ left paracentral disc  protrusion extending into the neural foramen resulting in moderate left  foraminal narrowing  Moderate canal stenosis with distortion of the  anterolateral aspect of the thecal sac  3  Mild L5-S1 degenerative disc disease with facet hypertrophic  degenerative change  Moderate bilateral foraminal narrowing    4  Multiple right renal cysts        FL spine and pain procedure    (Results Pending)         Orders Placed This Encounter   Procedures    FL spine and pain procedure

## 2018-07-17 ENCOUNTER — TELEPHONE (OUTPATIENT)
Dept: PAIN MEDICINE | Facility: CLINIC | Age: 78
End: 2018-07-17

## 2018-07-17 ENCOUNTER — TELEPHONE (OUTPATIENT)
Dept: INTERNAL MEDICINE CLINIC | Facility: CLINIC | Age: 78
End: 2018-07-17

## 2018-07-17 DIAGNOSIS — K40.90 INGUINAL HERNIA WITHOUT OBSTRUCTION OR GANGRENE, RECURRENCE NOT SPECIFIED, UNSPECIFIED LATERALITY: Primary | ICD-10-CM

## 2018-07-17 NOTE — TELEPHONE ENCOUNTER
Patients wife, Zee Cheung, called, states she was supposed to receive a call yesterday in regard to scheduling the injection for pt  She did not want to leave a voice message  Requests a call back before 2pm      She is aware that the return call could be later today or tomorrow, pt's spouse hung up phone

## 2018-07-19 ENCOUNTER — HOSPITAL ENCOUNTER (OUTPATIENT)
Dept: RADIOLOGY | Facility: CLINIC | Age: 78
Discharge: HOME | End: 2018-07-19
Payer: MEDICARE

## 2018-07-19 VITALS
RESPIRATION RATE: 20 BRPM | TEMPERATURE: 98.1 F | DIASTOLIC BLOOD PRESSURE: 92 MMHG | HEART RATE: 64 BPM | SYSTOLIC BLOOD PRESSURE: 153 MMHG | OXYGEN SATURATION: 96 %

## 2018-07-19 DIAGNOSIS — M51.26 LUMBAR DISC HERNIATION: ICD-10-CM

## 2018-07-19 DIAGNOSIS — M54.16 LUMBAR RADICULOPATHY: ICD-10-CM

## 2018-07-19 PROCEDURE — 64484 NJX AA&/STRD TFRM EPI L/S EA: CPT | Performed by: ANESTHESIOLOGY

## 2018-07-19 PROCEDURE — 64483 NJX AA&/STRD TFRM EPI L/S 1: CPT | Performed by: ANESTHESIOLOGY

## 2018-07-19 RX ORDER — BUPIVACAINE HCL/PF 2.5 MG/ML
10 VIAL (ML) INJECTION ONCE
Status: COMPLETED | OUTPATIENT
Start: 2018-07-19 | End: 2018-07-19

## 2018-07-19 RX ORDER — LIDOCAINE HYDROCHLORIDE 10 MG/ML
10 INJECTION, SOLUTION EPIDURAL; INFILTRATION; INTRACAUDAL; PERINEURAL ONCE
Status: COMPLETED | OUTPATIENT
Start: 2018-07-19 | End: 2018-07-19

## 2018-07-19 RX ORDER — METHYLPREDNISOLONE ACETATE 80 MG/ML
80 INJECTION, SUSPENSION INTRA-ARTICULAR; INTRALESIONAL; INTRAMUSCULAR; PARENTERAL; SOFT TISSUE ONCE
Status: COMPLETED | OUTPATIENT
Start: 2018-07-19 | End: 2018-07-19

## 2018-07-19 RX ADMIN — IOHEXOL 1 ML: 300 INJECTION, SOLUTION INTRAVENOUS at 10:43

## 2018-07-19 RX ADMIN — LIDOCAINE HYDROCHLORIDE 10 ML: 10 INJECTION, SOLUTION EPIDURAL; INFILTRATION; INTRACAUDAL; PERINEURAL at 10:41

## 2018-07-19 RX ADMIN — Medication 2 ML: at 10:44

## 2018-07-19 RX ADMIN — METHYLPREDNISOLONE ACETATE 80 MG: 80 INJECTION, SUSPENSION INTRA-ARTICULAR; INTRALESIONAL; INTRAMUSCULAR; PARENTERAL; SOFT TISSUE at 10:44

## 2018-07-19 NOTE — H&P
History of Present Illness: The patient is a 69 y o  male who presents with complaints of left lower back and leg pain secondary to lumbar stenosis and is here today for left L4-5 transforaminal epidural steroid injection  Patient Active Problem List   Diagnosis    Positive colorectal cancer screening using Cologuard test    Benign essential hypertension    Hyperlipidemia    Impaired fasting glucose    Microscopic hematuria    Obesity    Obstructive sleep apnea    Subclinical hypothyroidism    3-vessel CAD    Aneurysm of abdominal aorta (HCC)    Aneurysm of ascending aorta (HCC)    Aortic valve disorder    Arteriosclerotic cardiovascular disease    Disc degeneration, lumbar    Left inguinal hernia    Herniated lumbar intervertebral disc    Lumbar radiculopathy       Past Medical History:   Diagnosis Date    Abdominal aortic aneurysm ([address] )     Coronary artery disease     Hyperlipidemia     Hypertension     Subconjunctival hemorrhage        Past Surgical History:   Procedure Laterality Date    ABDOMINAL AORTIC ANEURYSM REPAIR  08/09/2007    2 dock limbs with visc extens prosth    CORONARY ARTERY BYPASS GRAFT      GAB-LAD, sequential vein graft to OM1 and OM2, single VG-posterior descending   Onset: 2003         Current Outpatient Prescriptions:     aspirin (ECOTRIN LOW STRENGTH) 81 mg EC tablet, Take 1 tablet by mouth daily, Disp: , Rfl:     atorvastatin (LIPITOR) 40 mg tablet, Take 1 tablet (40 mg total) by mouth daily, Disp: 90 tablet, Rfl: 3    finasteride (PROSCAR) 5 mg tablet, , Disp: , Rfl:     labetalol (NORMODYNE) 100 mg tablet, Take 1 tablet by mouth 2 (two) times a day, Disp: , Rfl:     lisinopril (ZESTRIL) 5 mg tablet, Take 1 tablet by mouth daily, Disp: , Rfl:     tamsulosin (FLOMAX) 0 4 mg, Take 1 capsule by mouth daily, Disp: , Rfl:     Allergies   Allergen Reactions    Meloxicam Edema       Physical Exam:   Vitals:    07/19/18 1021   BP: 156/94   Pulse: 70   Resp: 20 Temp: 98 1 °F (36 7 °C)   SpO2: 96%     General: Awake, Alert, Oriented x 3, Mood and affect appropriate  Respiratory: Respirations even and unlabored  Cardiovascular: Peripheral pulses intact; no edema  Musculoskeletal Exam:  Left lower back tenderness    ASA Score: 2    Patient/Chart Verification  Patient ID Verified: Verbal  ID Band Applied: No  Consents Confirmed: Procedural, To be obtained in the Pre-Procedure area  H&P( within 30 days) Verified: To be obtained in the Pre-Procedure area  Interval H&P(within 24 hr) Complete (required for Outpatients and Surgery Admit only): To be obtained in the Pre-Procedure area  Allergies Reviewed: Yes  Anticoag/NSAID held?: NA  Currently on antibiotics?: No    Assessment:   1  Lumbar disc herniation    2   Lumbar radiculopathy        Plan: left L4-L5 TFESI

## 2018-07-19 NOTE — DISCHARGE INSTR - LAB
Epidural Steroid Injection   WHAT YOU NEED TO KNOW:   An epidural steroid injection (KEARA) is a procedure to inject steroid medicine into the epidural space  The epidural space is between your spinal cord and vertebrae  Steroids reduce inflammation and fluid buildup in your spine that may be causing pain  You may be given pain medicine along with the steroids  ACTIVITY  · Do not drive or operate machinery today  · No strenuous activity today - bending, lifting, etc   · You may resume normal activites starting tomorrow - start slowly and as tolerated  · You may shower today, but no tub baths or hot tubs  · You may have numbness for several hours from the local anesthetic  Please use caution and common sense, especially with weight-bearing activities  CARE OF THE INJECTION SITE  · If you have soreness or pain, apply ice to the area today (20 minutes on/20 minutes off)  · Starting tomorrow, you may use warm, moist heat or ice if needed  · You may have an increase or change in your discomfort for 36-48 hours after your treatment  · Apply ice and continue with any pain medication you have been prescribed  · Notify [facility] if you have any of the following: redness, drainage, swelling, headache, stiff neck or fever above 100°F     SPECIAL INSTRUCTIONS  · Our office will contact you in approximately 7 days for a progress report  MEDICATIONS  · Continue to take all routine medications  · Our office may have instructed you to hold some medications  If you have a problem specifically related to your procedure, please call our office at [phone]  Problems not related to your procedure should be directed to your primary care physician

## 2018-08-09 ENCOUNTER — TELEPHONE (OUTPATIENT)
Dept: PAIN MEDICINE | Facility: CLINIC | Age: 78
End: 2018-08-09

## 2018-08-09 NOTE — TELEPHONE ENCOUNTER
S/w and his wife, apologized that the patient never received a post procedure call from our office  Pt said that he only got about 10% relief the second week after his L L4-5 TFESI on 7/19 and now the pain has returned and worsened  The pain is in his L leg from his thigh to the ankle  Pt said he has been taking Aleve but has had no relief  Pt describes the pain as sharp that worsens with walking and bending  Any suggestions?

## 2018-08-09 NOTE — TELEPHONE ENCOUNTER
Call from Marilee Up, Wife   Call back # [phone]  Darryle Penna would like to speak to a nurse to discuss patients current pain  Lilyascencion Jeovanny stated no one attempted to reach patient after his injection on 07/19/18  Patient had a 10% relief during the 2nd week, current pain level from hip to foot is at a 9  Please contact patient at [phone]

## 2018-08-14 ENCOUNTER — APPOINTMENT (OUTPATIENT)
Dept: RADIOLOGY | Facility: CLINIC | Age: 78
End: 2018-08-14
Payer: MEDICARE

## 2018-08-14 ENCOUNTER — OFFICE VISIT (OUTPATIENT)
Dept: PAIN MEDICINE | Facility: CLINIC | Age: 78
End: 2018-08-14
Payer: MEDICARE

## 2018-08-14 VITALS
SYSTOLIC BLOOD PRESSURE: 120 MMHG | RESPIRATION RATE: 20 BRPM | DIASTOLIC BLOOD PRESSURE: 84 MMHG | BODY MASS INDEX: 33.6 KG/M2 | HEIGHT: 71 IN | WEIGHT: 240 LBS | HEART RATE: 61 BPM | TEMPERATURE: 98.4 F

## 2018-08-14 DIAGNOSIS — G89.4 CHRONIC PAIN SYNDROME: ICD-10-CM

## 2018-08-14 DIAGNOSIS — M54.16 LUMBAR RADICULOPATHY: ICD-10-CM

## 2018-08-14 DIAGNOSIS — M25.552 LEFT HIP PAIN: ICD-10-CM

## 2018-08-14 DIAGNOSIS — K40.90 LEFT INGUINAL HERNIA: Primary | ICD-10-CM

## 2018-08-14 DIAGNOSIS — M51.26 LUMBAR DISC HERNIATION: ICD-10-CM

## 2018-08-14 PROCEDURE — 99214 OFFICE O/P EST MOD 30 MIN: CPT | Performed by: NURSE PRACTITIONER

## 2018-08-14 PROCEDURE — 73502 X-RAY EXAM HIP UNI 2-3 VIEWS: CPT

## 2018-08-14 RX ORDER — TRAMADOL HYDROCHLORIDE 50 MG/1
50 TABLET ORAL DAILY PRN
Qty: 14 TABLET | Refills: 0 | Status: CANCELLED | OUTPATIENT
Start: 2018-08-14 | End: 2018-08-28

## 2018-08-14 RX ORDER — GABAPENTIN 100 MG/1
100 CAPSULE ORAL
Qty: 30 CAPSULE | Refills: 0 | Status: SHIPPED | OUTPATIENT
Start: 2018-08-14 | End: 2018-08-21

## 2018-08-14 NOTE — PROGRESS NOTES
Pt c/o lower back pain that radiates to the left leg    Assessment:  1  Left inguinal hernia    2  Left hip pain    3  Chronic pain syndrome    4  Lumbar disc herniation    5  Lumbar radiculopathy        Plan:  Lashonda Mcgregor is a 69 y o  male with a history of chronic pain syndrome secondary to lumbar disc herniation, lumbar radiculopathy  The patient presents today with left sided groin pain and left hip pain  The patient was noted to have pain with eversion and inversion of his left hip joint  Therefore at this time I have ordered the patient an x-ray of his left hip for further evaluation  The patient also has a history of a left inguinal hernia and reports that his pain starts in the crease of his groin  Therefore, I have also ordered the patient an ultrasound of his left groin for evaluation of his left inguinal hernia  The patient was educated on red flag signs of possible obstruction which include increased pain, warmth/ redness and color change of skin in inguinal area  The patient was instructed to report to the emergency room with any of these symptoms  The patient was instructed that our office with call with the results of the studies once they are completed  In the meantime to help with the neuropathic component of the patient's pain the patient was started on gabapentin 100 mg at bedtime  The patient was educated on the medications most common side effects which include dizziness and drowsiness  He was instructed to call the office in 1 week to update the office on the effectiveness of this medication or sooner with adverse medication side effects  The patient will follow up after the completion of his left hip x-ray and left inguinal Ultrasound, or sooner with adverse medication side effects  My impressions and treatment recommendations were discussed in detail with the patient who verbalized understanding and had no further questions  Discharge instructions were provided   I personally saw and examined the patient and I agree with the above discussed plan of care  Orders Placed This Encounter   Procedures    XR hip/pelv 2-3 vws left if performed     Standing Status:   Future     Number of Occurrences:   1     Standing Expiration Date:   8/14/2022     Scheduling Instructions:      Bring along any outside films relating to this procedure  Order Specific Question:   Reason for Exam:     Answer:   Left hip pain    US groin/inguinal area     Standing Status:   Future     Standing Expiration Date:   8/14/2022     Scheduling Instructions:      No prep required  Please bring your insurance cards, a form of photo ID and a list of your medications with you  Arrive 15 minutes prior to your appointment time in order to register  To schedule this appointment, please contact Central Scheduling at [phone]  Order Specific Question:   Reason for Exam:     Answer:   history of inguinal hernia     New Medications Ordered This Visit   Medications    gabapentin (NEURONTIN) 100 mg capsule     Sig: Take 1 capsule (100 mg total) by mouth daily at bedtime     Dispense:  30 capsule     Refill:  0       History of Present Illness:  Xin Pennington is a 69 y o  male with a history of chronic pain syndrome secondary to lumbar disc herniation, lumbar radiculopathy  The patient was last seen office on 7/19/2018 where he underwent a left L4-L5 transforaminal epidural steroid injection  The patient reports that this injection provided him no relief of symptoms  The patient  presents for a follow up office visit in regards to groin pain (radiates to the left leg to the ankle) and Leg Pain  The patients current symptoms include left-groin pain that radiates down the medial aspect of his left leg to his ankle  He denies any back pain  He reports that he has difficulty sitting on his left side, due to pain  He denies bowel or bladder issues    He reports increased pain in his left hip with flexion of this left hip  He describes his pain as sharp constant pain that occurs throughout the day  The patient reports that his pain is symptoms have worsened since last visit  The patient currently rates his pain a 10/10 numeric pain scale  I have personally reviewed and/or updated the patient's past medical history, past surgical history, family history, social history, current medications, allergies, and vital signs today  Review of Systems   Respiratory: Negative for shortness of breath  Cardiovascular: Negative for chest pain  Gastrointestinal: Negative for constipation, diarrhea, nausea and vomiting  Musculoskeletal: Positive for gait problem  Negative for arthralgias, joint swelling and myalgias  Decreased ROM   Skin: Negative for rash  Neurological: Negative for dizziness, seizures and weakness  All other systems reviewed and are negative  Patient Active Problem List   Diagnosis    Positive colorectal cancer screening using Cologuard test    Benign essential hypertension    Hyperlipidemia    Impaired fasting glucose    Microscopic hematuria    Obesity    Obstructive sleep apnea    Subclinical hypothyroidism    3-vessel CAD    Aneurysm of abdominal aorta (HCC)    Aneurysm of ascending aorta (HCC)    Aortic valve disorder    Arteriosclerotic cardiovascular disease    Disc degeneration, lumbar    Left inguinal hernia    Herniated lumbar intervertebral disc    Lumbar radiculopathy       Past Medical History:   Diagnosis Date    Abdominal aortic aneurysm ([address] )     Coronary artery disease     Hyperlipidemia     Hypertension     Subconjunctival hemorrhage        Past Surgical History:   Procedure Laterality Date    ABDOMINAL AORTIC ANEURYSM REPAIR  08/09/2007    2 dock limbs with visc extens prosth    CORONARY ARTERY BYPASS GRAFT      GAB-LAD, sequential vein graft to OM1 and OM2, single VG-posterior descending   Onset: 2003       Family History Problem Relation Age of Onset    Heart disease Mother     Stroke Father         stroke syndrome    Aneurysm Brother         abdominal    Aortic aneurysm Brother         ascending    Coronary artery disease Family     Hypertension Family     Other Son         gioblastoma multiforme       Social History     Occupational History    retired      Social History Main Topics    Smoking status: Former Smoker     Packs/day: 1 00     Years: 40 00     Quit date: 08/2012    Smokeless tobacco: Never Used    Alcohol use Yes      Comment: rare    Drug use: No    Sexual activity: Not on file       Current Outpatient Prescriptions on File Prior to Visit   Medication Sig    aspirin (ECOTRIN LOW STRENGTH) 81 mg EC tablet Take 1 tablet by mouth daily    atorvastatin (LIPITOR) 40 mg tablet Take 1 tablet (40 mg total) by mouth daily    finasteride (PROSCAR) 5 mg tablet     labetalol (NORMODYNE) 100 mg tablet Take 1 tablet by mouth 2 (two) times a day    lisinopril (ZESTRIL) 5 mg tablet Take 1 tablet by mouth daily    tamsulosin (FLOMAX) 0 4 mg Take 1 capsule by mouth daily     No current facility-administered medications on file prior to visit  Allergies   Allergen Reactions    Meloxicam Edema       Physical Exam:    /84   Pulse 61   Temp 98 4 °F (36 9 °C)   Resp 20   Ht 5' 11" (1 803 m)   Wt 109 kg (240 lb)   BMI 33 47 kg/m²     Constitutional:normal, well developed, well nourished, alert, in no distress and non-toxic and no overt pain behavior   and obese  Eyes:anicteric  HEENT:grossly intact  Neck:supple, symmetric, trachea midline and no masses   Pulmonary:even and unlabored  Cardiovascular:No edema or pitting edema present  Skin:Normal without rashes or lesions and well hydrated  Psychiatric:Mood and affect appropriate  Neurologic:Cranial Nerves II-XII grossly intact  Musculoskeletal:antalgic     Lumbar Spine Exam    Appearance:  Normal lordosis  Palpation/Tenderness:  Left hip joint tenderness    Left groin tenderness   Sensory:  no sensory deficits noted  Range of Motion:  Flexion:  No limitation  without pain  Extension:  Minimally limited  with pain  Lateral Flexion - Left:  Minimally limited  with pain  Lateral Flexion - Right:  Minimally limited  with pain  Rotation - Left:  Minimally limited  with pain  Rotation - Right:  Minimally limited  with pain  Motor Strength:  Left hip flexion:  3/5  Right hip flexion:  5/5  Left knee extension:  3/5  Right knee extension:  5/5  Left foot dorsiflexion:  5/5  Left foot plantar flexion:  5/5  Right foot dorsiflexion:  5/5  Right foot plantar flexion:  5/5  Special Tests:  Left Straight Leg Test:  negative  Right Straight Leg Test:  negative  Left Bryn's Maneuver:  negative  Right Bryn's Maneuver:  negative  Pain noted with eversion and inversion of the left hip  Imaging  MRI LUMBAR SPINE WITHOUT CONTRAST     INDICATION-  Low back pain with left leg radiculopathy      COMPARISON-  X-rays dated 8/9/2013      TECHNIQUE-  Sagittal T1, sagittal T2, sagittal inversion recovery, axial T1 and  axial T2, coronal T2        IMAGE QUALITY-    Diagnostic     FINDINGS-     ALIGNMENT-  Appropriate alignment with the exception of minimal  anterolisthesis of L2 upon L3  No compression fracture  No scoliosis      MARROW SIGNAL-  Endplate marrow degenerative changes noted within the  lower lumbar spine, L4-L5 and L5-S1, Modic type I and Type II  Moderate  sized hemangioma within the L3 vertebral body      DISTAL CORD AND CONUS-  Normal signal within the distal cord and conus  The conus ends at the L1-L2 level      PARASPINAL SOFT TISSUES-  Multiple left renal cysts are noted, largest  of which is located within the lower pole measuring proximally 6 3 cm      SACRUM-  Normal signal within the sacrum   No evidence of insufficiency  or stress fracture      LOWER THORACIC DISC SPACES-  Normal disc height and signal   No disc  herniation, canal stenosis or foraminal narrowing      LUMBAR DISC SPACES-       L1-L2-  Normal      L2-L3-  Disc desiccation  Mild diffuse annular bulging  Mild endplate  and facet degenerative change  Mild to moderate canal stenosis  Mild  left foraminal narrowing      L3-L4-  Diffuse annular bulging  Small central annular tear with a tiny  central disc protrusion  Small right foraminal disc protrusion  Mild  facet hypertrophic degenerative change  Mild canal stenosis  Mild right  greater than left foraminal narrowing      L4-L5-  Diffuse annular bulging  Small central and left paracentral  disc protrusion extending into the left neural foramen  Mild facet  hypertrophic degenerative change  Moderate canal stenosis especially  within the anterolateral aspect of the canal with distortion of the  thecal sac in this region  Moderate left and mild right foraminal  narrowing      L5-S1-  Disc desiccation and loss of disc height  Diffuse annular  bulging with endplate osteophyte formation  Small central disc  protrusion  Mild facet arthropathy  Mild canal stenosis  Moderate  bilateral foraminal narrowing      IMPRESSION-     1  L3-L4 degenerative disc disease with right foraminal disc extrusion  resulting in right greater than left foraminal narrowing  2  Diffuse annular bulging with small central/ left paracentral disc  protrusion extending into the neural foramen resulting in moderate left  foraminal narrowing  Moderate canal stenosis with distortion of the  anterolateral aspect of the thecal sac  3  Mild L5-S1 degenerative disc disease with facet hypertrophic  degenerative change  Moderate bilateral foraminal narrowing    4  Multiple right renal cysts

## 2018-08-15 ENCOUNTER — TELEPHONE (OUTPATIENT)
Dept: PAIN MEDICINE | Facility: CLINIC | Age: 78
End: 2018-08-15

## 2018-08-15 NOTE — TELEPHONE ENCOUNTER
S/w wife Tiffany Toney who reports he took the gabapentin last night and then took another one this morning b/c of the pain  She said he has had this pain for 3 wks, and he took Aleve but didn't help  Pt is the same as yest ov w/ HA  I asked if they were any change in the skin color, redness or warmth and she asked Oscar and told her no  I explained that gabapentin takes time to build up in the body and can take 1-2 wks to see significant relief  Wife said he can tolerate this pain and he's afrain he won't even be able to lay for the ultrasound that is this Friday  Told Tiffany Toney I would make Tomer Murrieta aware and we will c/b with her rec what to take for the pain

## 2018-08-15 NOTE — TELEPHONE ENCOUNTER
Pts wife chad is calling stating that he is not getting any relief from the gabapentin that he is taking   He is scheduled for an us on Friday and shes sating she doesn't know how he will lay on table being that he is in so much pain  Cb# [phone]   She would like to know what he can do for the pain

## 2018-08-16 DIAGNOSIS — I10 ESSENTIAL HYPERTENSION: Primary | ICD-10-CM

## 2018-08-16 RX ORDER — LABETALOL 100 MG/1
TABLET, FILM COATED ORAL
Qty: 180 TABLET | Refills: 0 | Status: SHIPPED | OUTPATIENT
Start: 2018-08-16 | End: 2019-01-31 | Stop reason: SDUPTHER

## 2018-08-16 RX ORDER — LISINOPRIL 5 MG/1
1 TABLET ORAL DAILY
Qty: 90 TABLET | Status: CANCELLED | OUTPATIENT
Start: 2018-08-16

## 2018-08-16 NOTE — TELEPHONE ENCOUNTER
I called this patient back last night at 7 pm  He was on gabapentin 300 mg at bedtime in the past without side effects  Therefore, I increased his gabapentin to 300 mg at bedtime  The patient also reported that he was not taking any Tylenol to help his pain  I encouraged him to try Tylenol,but not to exceed 3000 mgs in a 24 hours  He was instructed to call back if his pain was not relieved  He was instructed that increasing his dose of gabapentin can increase his risks of medication side effects such as dizziness and drowsiness  He was instructed call the office with any adverse medication side effects

## 2018-08-17 ENCOUNTER — HOSPITAL ENCOUNTER (OUTPATIENT)
Dept: RADIOLOGY | Age: 78
Discharge: HOME | End: 2018-08-17
Payer: MEDICARE

## 2018-08-17 DIAGNOSIS — K40.90 LEFT INGUINAL HERNIA: ICD-10-CM

## 2018-08-17 PROCEDURE — 76705 ECHO EXAM OF ABDOMEN: CPT

## 2018-08-21 ENCOUNTER — HOSPITAL ENCOUNTER (EMERGENCY)
Facility: HOSPITAL | Age: 78
Discharge: HOME | End: 2018-08-21
Attending: EMERGENCY MEDICINE | Admitting: EMERGENCY MEDICINE
Payer: MEDICARE

## 2018-08-21 ENCOUNTER — OFFICE VISIT (OUTPATIENT)
Dept: PAIN MEDICINE | Facility: CLINIC | Age: 78
End: 2018-08-21
Payer: MEDICARE

## 2018-08-21 ENCOUNTER — TELEPHONE (OUTPATIENT)
Dept: PAIN MEDICINE | Facility: CLINIC | Age: 78
End: 2018-08-21

## 2018-08-21 VITALS
HEIGHT: 71 IN | TEMPERATURE: 97.7 F | SYSTOLIC BLOOD PRESSURE: 130 MMHG | BODY MASS INDEX: 33.6 KG/M2 | WEIGHT: 240 LBS | DIASTOLIC BLOOD PRESSURE: 80 MMHG | HEART RATE: 70 BPM

## 2018-08-21 VITALS
RESPIRATION RATE: 18 BRPM | SYSTOLIC BLOOD PRESSURE: 129 MMHG | HEART RATE: 70 BPM | OXYGEN SATURATION: 97 % | DIASTOLIC BLOOD PRESSURE: 73 MMHG | TEMPERATURE: 97.5 F

## 2018-08-21 DIAGNOSIS — I10 HYPERTENSION, UNSPECIFIED TYPE: Primary | ICD-10-CM

## 2018-08-21 DIAGNOSIS — G89.4 CHRONIC PAIN SYNDROME: ICD-10-CM

## 2018-08-21 DIAGNOSIS — M54.16 LUMBAR RADICULOPATHY: Primary | ICD-10-CM

## 2018-08-21 DIAGNOSIS — M51.26 LUMBAR DISC HERNIATION: ICD-10-CM

## 2018-08-21 DIAGNOSIS — R55 NEAR SYNCOPE: Primary | ICD-10-CM

## 2018-08-21 DIAGNOSIS — I95.9 HYPOTENSION, UNSPECIFIED HYPOTENSION TYPE: ICD-10-CM

## 2018-08-21 LAB
ATRIAL RATE: 69 BPM
GLUCOSE SERPL-MCNC: 118 MG/DL (ref 65–140)
P AXIS: 26 DEGREES
PR INTERVAL: 140 MS
QRS AXIS: 44 DEGREES
QRSD INTERVAL: 84 MS
QT INTERVAL: 390 MS
QTC INTERVAL: 409 MS
T WAVE AXIS: -26 DEGREES
VENTRICULAR RATE: 66 BPM

## 2018-08-21 PROCEDURE — 93010 ELECTROCARDIOGRAM REPORT: CPT | Performed by: INTERNAL MEDICINE

## 2018-08-21 PROCEDURE — 99284 EMERGENCY DEPT VISIT MOD MDM: CPT

## 2018-08-21 PROCEDURE — 82948 REAGENT STRIP/BLOOD GLUCOSE: CPT

## 2018-08-21 PROCEDURE — 93005 ELECTROCARDIOGRAM TRACING: CPT

## 2018-08-21 PROCEDURE — 99214 OFFICE O/P EST MOD 30 MIN: CPT | Performed by: NURSE PRACTITIONER

## 2018-08-21 RX ORDER — LISINOPRIL 5 MG/1
5 TABLET ORAL DAILY
Qty: 90 TABLET | Refills: 3 | Status: SHIPPED | OUTPATIENT
Start: 2018-08-21 | End: 2019-07-24 | Stop reason: SDUPTHER

## 2018-08-21 RX ORDER — GABAPENTIN 300 MG/1
300 CAPSULE ORAL
Qty: 30 CAPSULE | Refills: 0 | Status: SHIPPED | OUTPATIENT
Start: 2018-08-21 | End: 2018-09-14 | Stop reason: SDUPTHER

## 2018-08-21 NOTE — TELEPHONE ENCOUNTER
Patient and wife presents to the waiting room regarding there visit  Patient's wife was concerned that her  medication of Gabapentin and Hydrocodone needs a refill and that he will run out of them  Patient also asking about injection that wife wants scheduled soon  Informed them that you will call   Patient wife asking that you call the home first

## 2018-08-21 NOTE — ED PROVIDER NOTES
History  Chief Complaint   Patient presents with    Syncope     Pt  was at Pain management appt  Pt  sitting in chair and pt  became very diaphoretic and almost passed out  Pt  has back pain and was discussing injections with the NP  BP on arrival to medical emergency was 70 58       History provided by:  Patient and spouse (Nursing staff will accompanied the patient down from medical office building as he was a in hospital medical emergency)  Syncope   Episode history:  Single  Most recent episode: Today  Duration: Patient never fully syncopized  , states he felt weak, tired, dizzy, diaphoretic these symptoms lasted around 2 minutes  But never fully lost consciousness/syncopized  Timing:  Constant  Progression:  Resolved  Chronicity:  New  Context comment:  Patient with increasing lower back pain, at his pain management appointment this morning was discussing epidural injections, shortly after this discussion developed weakness dizziness diaphoresis nausea and near syncope  Witnessed: yes    Relieved by:  None tried  Worsened by:  Nothing  Ineffective treatments:  None tried  Associated symptoms: diaphoresis and dizziness    Associated symptoms: no chest pain, no fever, no headaches, no nausea, no palpitations, no seizures, no shortness of breath and no vomiting    Associated symptoms comment:  Last night and this morning prior to this event patient states he was in his complete normal state of health  Prior to Admission Medications   Prescriptions Last Dose Informant Patient Reported?  Taking?   aspirin (ECOTRIN LOW STRENGTH) 81 mg EC tablet  Self Yes No   Sig: Take 1 tablet by mouth daily   atorvastatin (LIPITOR) 40 mg tablet  Self No No   Sig: Take 1 tablet (40 mg total) by mouth daily   finasteride (PROSCAR) 5 mg tablet  Self Yes No   gabapentin (NEURONTIN) 100 mg capsule   No No   Sig: Take 1 capsule (100 mg total) by mouth daily at bedtime   labetalol (NORMODYNE) 100 mg tablet   No No   Sig: TAKE 1 TABLET BY MOUTH  TWICE A DAY   lisinopril (ZESTRIL) 5 mg tablet  Self Yes No   Sig: Take 1 tablet by mouth daily   tamsulosin (FLOMAX) 0 4 mg  Self Yes No   Sig: Take 1 capsule by mouth daily      Facility-Administered Medications: None       Past Medical History:   Diagnosis Date    Abdominal aortic aneurysm (HCC)     Chronic pain     back since July    Coronary artery disease     Hyperlipidemia     Hypertension     Subconjunctival hemorrhage        Past Surgical History:   Procedure Laterality Date    ABDOMINAL AORTIC ANEURYSM REPAIR  08/09/2007    2 dock limbs with visc extens prosth    CORONARY ARTERY BYPASS GRAFT      GAB-LAD, sequential vein graft to OM1 and OM2, single VG-posterior descending  Onset: 2003       Family History   Problem Relation Age of Onset    Heart disease Mother     Stroke Father         stroke syndrome    Aneurysm Brother         abdominal    Aortic aneurysm Brother         ascending    Coronary artery disease Family     Hypertension Family     Other Son         gioblastoma multiforme     I have reviewed and agree with the history as documented  Social History   Substance Use Topics    Smoking status: Former Smoker     Packs/day: 1 00     Years: 40 00     Quit date: 08/2012    Smokeless tobacco: Never Used    Alcohol use Yes      Comment: rare        Review of Systems   Constitutional: Positive for diaphoresis  Negative for activity change, chills and fever  HENT: Negative for congestion, sinus pressure and sore throat  Eyes: Negative for pain and visual disturbance  Respiratory: Negative for cough, chest tightness, shortness of breath, wheezing and stridor  Cardiovascular: Positive for syncope  Negative for chest pain and palpitations  Gastrointestinal: Negative for abdominal distention, abdominal pain, constipation, diarrhea, nausea and vomiting  Genitourinary: Negative for dysuria and frequency     Musculoskeletal: Negative for neck pain and neck stiffness  Skin: Negative for rash  Neurological: Positive for dizziness and light-headedness  Negative for seizures, syncope, speech difficulty, numbness and headaches  Physical Exam  Physical Exam   Constitutional: He is oriented to person, place, and time  He appears well-developed  No distress  At my time of examination Ed patient completely alert awake in no distress, resting comfortably in stretcher, laughing about his near syncopal episode and feeling embarrassed  HENT:   Head: Normocephalic and atraumatic  Eyes: Pupils are equal, round, and reactive to light  Neck: Normal range of motion  Neck supple  No tracheal deviation present  Cardiovascular: Normal rate, regular rhythm, normal heart sounds and intact distal pulses  No murmur heard  Pulmonary/Chest: Effort normal and breath sounds normal  No stridor  No respiratory distress  Abdominal: Soft  He exhibits no distension  There is no tenderness  There is no rebound and no guarding  Musculoskeletal: Normal range of motion  Neurological: He is alert and oriented to person, place, and time  He has normal strength  No cranial nerve deficit or sensory deficit  He displays a negative Romberg sign  GCS eye subscore is 4  GCS verbal subscore is 5  GCS motor subscore is 6  Skin: Skin is warm and dry  He is not diaphoretic  No erythema  No pallor  Psychiatric: He has a normal mood and affect  Vitals reviewed        Vital Signs  ED Triage Vitals   Temperature Pulse Respirations Blood Pressure SpO2   08/21/18 0922 08/21/18 0920 08/21/18 0920 08/21/18 0920 08/21/18 0920   97 5 °F (36 4 °C) 67 18 137/73 98 %      Temp Source Heart Rate Source Patient Position - Orthostatic VS BP Location FiO2 (%)   08/21/18 0922 08/21/18 0920 08/21/18 0920 08/21/18 0920 --   Oral Monitor Lying Right arm       Pain Score       08/21/18 1000       No Pain           Vitals:    08/21/18 0920 08/21/18 1000   BP: 137/73 129/73   Pulse: 67 66   Patient Position - Orthostatic VS: Lying Lying       Visual Acuity      ED Medications  Medications - No data to display    Diagnostic Studies  Results Reviewed     None                 No orders to display              Procedures  ECG 12 Lead Documentation  Date/Time: 8/21/2018 9:39 AM  Performed by: Juan Morris  Authorized by: Juan Morris     ECG reviewed by me, the ED Provider: yes    Patient location:  ED  Previous ECG:     Previous ECG:  Compared to current    Comparison ECG info:  6 14 2012  Interpretation:     Interpretation: non-specific    Rate:     ECG rate:  66    ECG rate assessment: normal    Rhythm:     Rhythm: sinus rhythm      Rhythm comment:  Sinus arrhythmia  Ectopy:     Ectopy: none    QRS:     QRS axis:  Normal    QRS intervals:  Normal  Conduction:     Conduction: normal    ST segments:     ST segments:  Non-specific  T waves:     T waves: non-specific             Phone Contacts  ED Phone Contact    ED Course  ED Course as of Aug 21 1004   Tue Aug 21, 2018   1001 A repeat evaluation  unchanged, patient still feels back to baseline and states he has felt perfectly fine since he has been here  Patient with vasovagal near syncopal episode  Will discharge                                MDM  Number of Diagnoses or Management Options  Near syncope: new and requires workup  Diagnosis management comments: 16-year-old male presents after having a near syncopal episode while discussing epidural injections  Patient currently states he is pain free, completely back to his normal self , pt more concerned about finishing conversation with the pain management doctors about his epidural injections due to his chronic back pain  , EKG showing some T-wave inversions inferolaterally but this is unchanged from old EKG from 6 years ago  Does have a sinus arrhythmia    Will continue to monitor on cardiac monitor if unremarkable will discharge       Amount and/or Complexity of Data Reviewed  Decide to obtain previous medical records or to obtain history from someone other than the patient: yes  Obtain history from someone other than the patient: yes  Review and summarize past medical records: yes  Independent visualization of images, tracings, or specimens: yes      CritCare Time    Disposition  Final diagnoses:   Near syncope     Time reflects when diagnosis was documented in both MDM as applicable and the Disposition within this note     Time User Action Codes Description Comment    8/21/2018  9:40 AM Ermelinda Simon Add [R55] Near syncope       ED Disposition     ED Disposition Condition Comment    Discharge  Diann Pond discharge to home/self care  Condition at discharge: Good        Follow-up Information    None         Patient's Medications   Discharge Prescriptions    No medications on file     No discharge procedures on file      ED Provider  Electronically Signed by           Carlyle Hernandez DO  08/21/18 9610

## 2018-08-21 NOTE — DISCHARGE INSTRUCTIONS
Syncope   WHAT YOU NEED TO KNOW:   Syncope is also called fainting or passing out  Syncope is a sudden, temporary loss of consciousness, followed by a fall from a standing or sitting position  Syncope ranges from not serious to a sign of a more serious condition that needs to be treated  You can control some health conditions that cause syncope  Your healthcare providers can help you create a plan to manage syncope and prevent episodes  DISCHARGE INSTRUCTIONS:   Seek care immediately if:   · You are bleeding because you hit your head when you fainted  · You suddenly have double vision, difficulty speaking, numbness, and cannot move your arms or legs  · You have chest pain and trouble breathing  · You vomit blood or material that looks like coffee grounds  · You see blood in your bowel movement  Contact your healthcare provider if:   · You have new or worsening symptoms  · You have another syncope episode  · You have a headache, fast heartbeat, or feel too dizzy to stand up  · You have questions or concerns about your condition or care  Follow up with your healthcare provider as directed:  Write down your questions so you remember to ask them during your visits  Manage syncope:   · Keep a record of your syncope episodes  Include your symptoms and your activity before and after the episode  The record can help your healthcare provider find the cause of your syncope and help you manage episodes  · Sit or lie down when needed  This includes when you feel dizzy, your throat is getting tight, and your vision changes  Raise your legs above the level of your heart  · Take slow, deep breaths if you start to breathe faster with anxiety or fear  This can help decrease dizziness and the feeling that you might faint  · Check your blood pressure often  This is important if you take medicine to lower your blood pressure   Check your blood pressure when you are lying down and when you are standing  Ask how often to check during the day  Keep a record of your blood pressure numbers  Your healthcare provider may use the record to help plan your treatment  Prevent a syncope episode:   · Move slowly and let yourself get used to one position before you move to another position  This is very important when you change from a lying or sitting position to a standing position  Take some deep breaths before you stand up from a lying position  Stand up slowly  Sudden movements may cause a fainting spell  Sit on the side of the bed or couch for a few minutes before you stand up  If you are on bedrest, try to be upright for about 2 hours each day, or as directed  Do not lock your legs if you are standing for a long period of time  Move your legs and bend your knees to keep blood flowing  · Follow your healthcare provider's recommendations  Your provider may  recommend that you drink more liquids to prevent dehydration  You may also need to have more salt to keep your blood pressure from dropping too low and causing syncope  Your provider will tell you how much liquid and sodium to have each day  · Watch for signs of low blood sugar  These include hunger, nervousness, sweating, and fast or fluttery heartbeats  Talk with your healthcare provider about ways to keep your blood sugar level steady  · Do not strain if you are constipated  You may faint if you strain to have a bowel movement  Walking is the best way to get your bowels moving  Eat foods high in fiber to make it easier to have a bowel movement  Good examples are high-fiber cereals, beans, vegetables, and whole-grain breads  Prune juice may help make bowel movements softer  · Be careful in hot weather  Heat can cause a syncope episode  Limit activity done outside on hot days  Physical activity in hot weather can lead to dehydration  This can cause an episode    © 2017 [employer] Information is for End User's use only and may not be sold, redistributed or otherwise used for commercial purposes  All illustrations and images included in CareNotes® are the copyrighted property of A D A M , Inc  or [employer]  The above information is an  only  It is not intended as medical advice for individual conditions or treatments  Talk to your doctor, nurse or pharmacist before following any medical regimen to see if it is safe and effective for you

## 2018-08-21 NOTE — TELEPHONE ENCOUNTER
I called the patient's wife back  She reports that her  is doing well at home,and reports that his pain is relieved when he lays flat in his reclining chair  His wife contributes his episode today in the office due to the patient not eating any breakfast prior to coming to the office visit  He denies side effects from gabapentin  He was increased to 300 mg at bedtime on the 16th  His wife would like a prescription for hydrocodone/acetaminophen 5/325 mg sent the patient's pharmacy on file  However, I discussed with her that I would advise waiting on adding Hydrocodone at this time due to the patient's episode in the office  Hydrocodone can cause dizziness and drowsiness and can place the patient at an increased risk for a fall  I encouraged that the patient should give the gabapentin a little more time to be effective  I discussed with her additional interventions to relieve pain such as using Tylenol, not exceeding more than 3000 mg in a 24 hour  I also discussed the use of heat in 20 minutes increments, and also the use of over-the-counter creams such as lidocaine and lidocaine patches  The patient would like to move forward with a bilateral L3 TFESI as he had in the past when he experienced the same pain  Is it possible to fit this patient in for a bilateral L3 TFESI soon?

## 2018-08-21 NOTE — PROGRESS NOTES
Assessment:  1  Lumbar radiculopathy    2  Chronic pain syndrome    3  Lumbar disc herniation    4  Hypotension, unspecified hypotension type        Plan:  The patient is a 69 y o  male with a history of chronic pain syndrome secondary to lumbar disc herniation, lumbar radiculopathy  The patient continues with ongoing low back pain, left groin pain and left hip pain  He reports ongoing pain in his low back, left hip and left groin pain  Results of his most recent left hip and left groin ultrasound were reviewed with the patient this office visit  The patient reports that he is scheduled to see a surgeon on 08/22/2018 for further evaluation of his left small hernia  He reports that his back pain is the same back pain that he felt in 2014 when he underwent a bilateral L3 transforaminal epidural steroid injection  He is requesting to move forward with a Bilateral L3 transforaminal epidural steroid injection at this time  The patient is currently taking gabapentin 300 mg at bedtime, without medication side effects  The patient was increase to 4-5 days ago  I discussed with the patient that it can take some time for gabapentin to help with the neuropathic component of his pain  I briefly discussed starting low dose opioid therapy to help with the patient's pain  However,   during the patient's office visit the patient became diaphoretic, pale and reported that he felt weak and did not feel well  A medical emergency was called  The patient was hypotensive with a blood pressure of 70/50 with a HR of 60  The patient's feet where elevated, Fio2 via nasal cannula was started  A IV was inserted by Dr Chandler and NSS IV fluids were administered prior to rapid response team arriving  Rapid response team arrived and transported the patient to emergency room for further evaluation  We will have the patient follow up after evaluation and treatment for his hypotensive event  History of Present Illness:     The patient is a 69 y o  male with a history of chronic pain syndrome secondary to lumbar disc herniation, lumbar radiculopathy  The patient was last seen office on 8/14/2018 where he was ordered an x-ray of his left hip, and an ultrasound of his left groin  He presents for a follow up office visit in regards to chronic pain secondary to low back pain  The patient currently reports low back pain that radiates down the lateral aspect of his left leg to his ankle  He denies He describes his pain as burning, sharp, constant pain that occurs throughout the day  He denies bilateral leg weakness or bowel or bladder issues  The patient reports that his pain is symptoms have worsened since last visit  He currently rates his pain at 10 out 10 numeric pain scale  Current pain medications includes: Gabapentin 300 mg at bedtime  The patient reports that this regimen is providing minimal pain relief  The patient is reporting no side effects from this pain medication regimen  I have personally reviewed and/or updated the patient's past medical history, past surgical history, family history, social history, current medications, allergies, and vital signs today  Review of Systems:    Review of Systems   Respiratory: Negative for shortness of breath  Cardiovascular: Negative for chest pain  Gastrointestinal: Negative for constipation, diarrhea, nausea and vomiting  Musculoskeletal: Negative for arthralgias, gait problem, joint swelling and myalgias  Skin: Negative for rash  Neurological: Negative for dizziness, seizures and weakness  All other systems reviewed and are negative          Past Medical History:   Diagnosis Date    Abdominal aortic aneurysm (HCC)     Chronic pain     back since July    Coronary artery disease     Hyperlipidemia     Hypertension     Subconjunctival hemorrhage        Past Surgical History:   Procedure Laterality Date    ABDOMINAL AORTIC ANEURYSM REPAIR  08/09/2007    2 dock limbs with visc extens prosth    CORONARY ARTERY BYPASS GRAFT      GAB-LAD, sequential vein graft to OM1 and OM2, single VG-posterior descending  Onset: 2003       Family History   Problem Relation Age of Onset    Heart disease Mother     Stroke Father         stroke syndrome    Aneurysm Brother         abdominal    Aortic aneurysm Brother         ascending    Coronary artery disease Family     Hypertension Family     Other Son         gioblastoma multiforme       Social History     Occupational History    retired      Social History Main Topics    Smoking status: Former Smoker     Packs/day: 1 00     Years: 40 00     Quit date: 08/2012    Smokeless tobacco: Never Used    Alcohol use Yes      Comment: rare    Drug use: No    Sexual activity: Not on file         Current Outpatient Prescriptions:     aspirin (ECOTRIN LOW STRENGTH) 81 mg EC tablet, Take 1 tablet by mouth daily, Disp: , Rfl:     atorvastatin (LIPITOR) 40 mg tablet, Take 1 tablet (40 mg total) by mouth daily, Disp: 90 tablet, Rfl: 3    finasteride (PROSCAR) 5 mg tablet, , Disp: , Rfl:     gabapentin (NEURONTIN) 300 mg capsule, Take 1 capsule (300 mg total) by mouth daily at bedtime, Disp: 30 capsule, Rfl: 0    labetalol (NORMODYNE) 100 mg tablet, TAKE 1 TABLET BY MOUTH  TWICE A DAY, Disp: 180 tablet, Rfl: 0    lisinopril (ZESTRIL) 5 mg tablet, Take 1 tablet (5 mg total) by mouth daily, Disp: 90 tablet, Rfl: 3    tamsulosin (FLOMAX) 0 4 mg, Take 1 capsule by mouth daily, Disp: , Rfl:     Allergies   Allergen Reactions    Meloxicam Edema       Physical Exam:    /80   Pulse 70   Temp 97 7 °F (36 5 °C) (Oral)   Ht 5' 11" (1 803 m)   Wt 109 kg (240 lb)   BMI 33 47 kg/m²     Constitutional:normal, well developed, well nourished, alert, in no distress and non-toxic and no overt pain behavior   and obese  Eyes:anicteric  HEENT:grossly intact  Neck:supple, symmetric, trachea midline and no masses   Pulmonary:even and unlabored  Cardiovascular:No edema or pitting edema present  Skin:Normal without rashes or lesions and well hydrated  Psychiatric:Mood and affect appropriate  Neurologic:Cranial Nerves II-XII grossly intact  Musculoskeletal:antalgic             Imaging  No orders to display         No orders of the defined types were placed in this encounter

## 2018-08-22 ENCOUNTER — TELEPHONE (OUTPATIENT)
Dept: RADIOLOGY | Facility: CLINIC | Age: 78
End: 2018-08-22

## 2018-08-22 NOTE — TELEPHONE ENCOUNTER
----- Message from Mario Noyola sent at 8/22/2018  8:05 AM EDT -----  Regarding: Medication   I talked to the patient's wife yesterday and told her that I would send in a prescription for gabapentin  The patient was taking gabapentin 100 mg 3 capsules at bedtime  Therefore, yesterday I sent in a prescription for gabapentin 300 mg 1 capsule at bedtime, so the patient does not have to (3) 100 mg tablets at bedtime  Can you please make the patient aware

## 2018-08-22 NOTE — TELEPHONE ENCOUNTER
Pt's wife informed that the new Rx for gabapentin that Emilia sent to their [place] is now written as a 300 mg capsule  So he will only need to take 1 capsule at bedtime  She said she will use of his remaining 100 mg capsules first      Wife asking if Madison Drake was going to be calling her back today, OLIVA told her yest she would be discussing things with FQ about a possible inj  Told wife I would forward her question to Madison Drake

## 2018-08-22 NOTE — TELEPHONE ENCOUNTER
I discussed this patient's case with Dr Blanca Leon and he ordered the patient to have Bilateral L3 TFESI that is scheduled for Tuesday  Kailee called and scheduled the patient for this procedure

## 2018-08-23 ENCOUNTER — TELEPHONE (OUTPATIENT)
Dept: PAIN MEDICINE | Facility: CLINIC | Age: 78
End: 2018-08-23

## 2018-08-23 DIAGNOSIS — N43.3 HYDROCELE IN ADULT: Primary | ICD-10-CM

## 2018-08-23 NOTE — TELEPHONE ENCOUNTER
S/W patient, made aware of results  Patient ask for referral to be mailed to him  Referral sent  Patient appreciative of call back  ----- Message from Kristal Cosme sent at 8/23/2018 10:29 AM EDT -----  Please call the patient and let him know that I would like him to follow up with urology for right testicular hydrocele( which is a fluid filled sac around the testicle) I have placed an order in [facility] for referral to Urology  He can either  the referral at our office or we can mail the referral to his home

## 2018-08-28 ENCOUNTER — TELEPHONE (OUTPATIENT)
Dept: RADIOLOGY | Facility: CLINIC | Age: 78
End: 2018-08-28

## 2018-08-28 DIAGNOSIS — M51.16 INTERVERTEBRAL DISC DISORDERS WITH RADICULOPATHY, LUMBAR REGION: Primary | ICD-10-CM

## 2018-08-28 RX ORDER — HYDROCODONE BITARTRATE AND ACETAMINOPHEN 5; 325 MG/1; MG/1
1 TABLET ORAL EVERY 12 HOURS PRN
Qty: 34 TABLET | Refills: 0 | Status: SHIPPED | OUTPATIENT
Start: 2018-08-28 | End: 2018-09-14 | Stop reason: SDUPTHER

## 2018-08-28 NOTE — TELEPHONE ENCOUNTER
Patient was scheduled for a B/L L3 TFESI with FQ  Procedure was cancelled due to the C-arm not working  Patient states he needs something for pain  Advised will make HA aware

## 2018-08-28 NOTE — TELEPHONE ENCOUNTER
I called the patient and talked to his wife  A prescription for  Hydrocodone/acetaminophen 5/325 mg 1 tablet q 12 hours was sent to the patient's pharmacy on file, for 17 days, 34 pills, no refills  The patient is scheduled to undergo a bilateral L3 transforaminal epidural steroid injection on 9/13/2018  His wife was instructed that the patient is not to take his opioid medications concurrently with alcohol or other sedating medications due to increased risk for respiratory depression possible death  She was instructed that the patient should only take the medication as prescribed  She was instructed that the patient should call the office in 1 week to update the office on the effectiveness of this medication, or sooner with adverse medication side effects  [place] drug monitoring report was appropriate

## 2018-09-04 ENCOUNTER — TELEPHONE (OUTPATIENT)
Dept: PAIN MEDICINE | Facility: CLINIC | Age: 78
End: 2018-09-04

## 2018-09-04 NOTE — TELEPHONE ENCOUNTER
S/W patient, states that he was suppose to have a procedure and our C-arm broke and HA prescribed him Hydrocodone/Acetaminophen 5/325 mg 1 tablet every 12 hrs  Patient said the medication is not helping at all  Patient is having pain 10/10 in his lower back, groin area, left hip and leg  Patient is scheduled on 9/13/18 for a B/L L3 TFESI  Patient asking if there is something else he can take or do until he has the procedure  Patient said he is basically sitting in a chair all day because of the pain  Advised will make OLIVA aware and c/b with recommendations

## 2018-09-04 NOTE — TELEPHONE ENCOUNTER
Patient was put on [place] on 08/28  He was told to call and let the office know how it is working for him  He wanted to let the doctor know that the Miguel Alvarado is not working at all  He is not having any relief  His pain level is still a 10/10

## 2018-09-04 NOTE — TELEPHONE ENCOUNTER
Called pt, spoke to him and his wife- confirmed appt is moved to this Thurs 09/06 at 13:00  Went over pre procedure instructions, NPO 1 hr prior, if sick or on abx needs to call to rs, wear loose, comf clothing- no buttons/zippers, needs   Pt verbalized understanding

## 2018-09-05 ENCOUNTER — TELEPHONE (OUTPATIENT)
Dept: RADIOLOGY | Facility: CLINIC | Age: 78
End: 2018-09-05

## 2018-09-05 NOTE — TELEPHONE ENCOUNTER
----- Message from Mario Noyola sent at 9/5/2018  1:21 PM EDT -----  Regarding: Patient call back   The patient called yesterday and I was returning his call  I left a message on his voicemail to call the office back

## 2018-09-06 ENCOUNTER — TRANSCRIBE ORDERS (OUTPATIENT)
Dept: LAB | Facility: CLINIC | Age: 78
End: 2018-09-06

## 2018-09-06 ENCOUNTER — LAB (OUTPATIENT)
Dept: LAB | Facility: CLINIC | Age: 78
End: 2018-09-06
Payer: MEDICARE

## 2018-09-06 ENCOUNTER — HOSPITAL ENCOUNTER (OUTPATIENT)
Dept: RADIOLOGY | Facility: CLINIC | Age: 78
Discharge: HOME | End: 2018-09-06
Attending: ANESTHESIOLOGY | Admitting: ANESTHESIOLOGY
Payer: MEDICARE

## 2018-09-06 VITALS
OXYGEN SATURATION: 97 % | HEART RATE: 70 BPM | DIASTOLIC BLOOD PRESSURE: 86 MMHG | TEMPERATURE: 98.2 F | SYSTOLIC BLOOD PRESSURE: 133 MMHG | RESPIRATION RATE: 18 BRPM

## 2018-09-06 DIAGNOSIS — N13.8 ENLARGED PROSTATE WITH URINARY OBSTRUCTION: Primary | ICD-10-CM

## 2018-09-06 DIAGNOSIS — M51.16 INTERVERTEBRAL DISC DISORDER WITH RADICULOPATHY OF LUMBAR REGION: ICD-10-CM

## 2018-09-06 DIAGNOSIS — N40.1 ENLARGED PROSTATE WITH URINARY OBSTRUCTION: Primary | ICD-10-CM

## 2018-09-06 DIAGNOSIS — R31.9 HEMATURIA SYNDROME: ICD-10-CM

## 2018-09-06 DIAGNOSIS — N40.1 ENLARGED PROSTATE WITH URINARY OBSTRUCTION: ICD-10-CM

## 2018-09-06 DIAGNOSIS — N13.8 ENLARGED PROSTATE WITH URINARY OBSTRUCTION: ICD-10-CM

## 2018-09-06 PROCEDURE — 88112 CYTOPATH CELL ENHANCE TECH: CPT | Performed by: PATHOLOGY

## 2018-09-06 PROCEDURE — 64483 NJX AA&/STRD TFRM EPI L/S 1: CPT | Performed by: ANESTHESIOLOGY

## 2018-09-06 RX ORDER — LIDOCAINE HYDROCHLORIDE 10 MG/ML
10 INJECTION, SOLUTION EPIDURAL; INFILTRATION; INTRACAUDAL; PERINEURAL ONCE
Status: COMPLETED | OUTPATIENT
Start: 2018-09-06 | End: 2018-09-06

## 2018-09-06 RX ORDER — BUPIVACAINE HCL/PF 2.5 MG/ML
10 VIAL (ML) INJECTION ONCE
Status: COMPLETED | OUTPATIENT
Start: 2018-09-06 | End: 2018-09-06

## 2018-09-06 RX ORDER — METHYLPREDNISOLONE ACETATE 80 MG/ML
80 INJECTION, SUSPENSION INTRA-ARTICULAR; INTRALESIONAL; INTRAMUSCULAR; PARENTERAL; SOFT TISSUE ONCE
Status: COMPLETED | OUTPATIENT
Start: 2018-09-06 | End: 2018-09-06

## 2018-09-06 RX ADMIN — LIDOCAINE HYDROCHLORIDE 7 ML: 10 INJECTION, SOLUTION EPIDURAL; INFILTRATION; INTRACAUDAL; PERINEURAL at 13:17

## 2018-09-06 RX ADMIN — Medication 2 ML: at 13:18

## 2018-09-06 RX ADMIN — IOHEXOL 1 ML: 300 INJECTION, SOLUTION INTRAVENOUS at 13:18

## 2018-09-06 RX ADMIN — METHYLPREDNISOLONE ACETATE 80 MG: 80 INJECTION, SUSPENSION INTRA-ARTICULAR; INTRALESIONAL; INTRAMUSCULAR; PARENTERAL; SOFT TISSUE at 13:18

## 2018-09-06 NOTE — H&P
History of Present Illness: The patient is a 69 y o  male who presents with complaints of lower back and leg pain secondary to lumbar spinal stenosis and is here today for bilateral L3 transforaminal epidural steroid injection  Patient Active Problem List   Diagnosis    Positive colorectal cancer screening using Cologuard test    Benign essential hypertension    Hyperlipidemia    Impaired fasting glucose    Microscopic hematuria    Obesity    Obstructive sleep apnea    Subclinical hypothyroidism    3-vessel CAD    Aneurysm of abdominal aorta (HCC)    Aneurysm of ascending aorta (HCC)    Aortic valve disorder    Arteriosclerotic cardiovascular disease    Disc degeneration, lumbar    Left inguinal hernia    Herniated lumbar intervertebral disc    Lumbar radiculopathy       Past Medical History:   Diagnosis Date    Abdominal aortic aneurysm (HCC)     Chronic pain     back since July    Coronary artery disease     Hyperlipidemia     Hypertension     Subconjunctival hemorrhage        Past Surgical History:   Procedure Laterality Date    ABDOMINAL AORTIC ANEURYSM REPAIR  08/09/2007    2 dock limbs with visc extens prosth    CORONARY ARTERY BYPASS GRAFT      GAB-LAD, sequential vein graft to OM1 and OM2, single VG-posterior descending   Onset: 2003         Current Outpatient Prescriptions:     aspirin (ECOTRIN LOW STRENGTH) 81 mg EC tablet, Take 1 tablet by mouth daily, Disp: , Rfl:     atorvastatin (LIPITOR) 40 mg tablet, Take 1 tablet (40 mg total) by mouth daily, Disp: 90 tablet, Rfl: 3    finasteride (PROSCAR) 5 mg tablet, , Disp: , Rfl:     gabapentin (NEURONTIN) 300 mg capsule, Take 1 capsule (300 mg total) by mouth daily at bedtime, Disp: 30 capsule, Rfl: 0    HYDROcodone-acetaminophen (NORCO) 5-325 mg per tablet, Take 1 tablet by mouth every 12 (twelve) hours as needed for pain for up to 17 days Max Daily Amount: 2 tablets, Disp: 34 tablet, Rfl: 0    labetalol (NORMODYNE) 100 mg tablet, TAKE 1 TABLET BY MOUTH  TWICE A DAY, Disp: 180 tablet, Rfl: 0    lisinopril (ZESTRIL) 5 mg tablet, Take 1 tablet (5 mg total) by mouth daily, Disp: 90 tablet, Rfl: 3    tamsulosin (FLOMAX) 0 4 mg, Take 1 capsule by mouth daily, Disp: , Rfl:     Current Facility-Administered Medications:     bupivacaine (PF) (MARCAINE) 0 25 % injection 10 mL, 10 mL, Epidural, Once, Su Pepper MD    iohexol (OMNIPAQUE) 300 mg/mL injection 50 mL, 50 mL, Epidural, Once, Su Pepper MD    lidocaine (PF) (XYLOCAINE-MPF) 1 % injection 10 mL, 10 mL, Infiltration, Once, Su Pepper MD    methylPREDNISolone acetate (DEPO-MEDROL) injection 80 mg, 80 mg, Epidural, Once, Su Pepper MD    Allergies   Allergen Reactions    Meloxicam Edema       Physical Exam:   Vitals:    09/06/18 1307   BP: 112/75   Pulse: 65   Resp: 18   Temp: 98 2 °F (36 8 °C)   SpO2: 95%     General: Awake, Alert, Oriented x 3, Mood and affect appropriate  Respiratory: Respirations even and unlabored  Cardiovascular: Peripheral pulses intact; no edema  Musculoskeletal Exam:   Lower back tenderness    ASA Score: 3    Patient/Chart Verification  Patient ID Verified: Verbal  Consents Confirmed: Procedural, To be obtained in the Pre-Procedure area  H&P( within 30 days) Verified: To be obtained in the Pre-Procedure area  Interval H&P(within 24 hr) Complete (required for Outpatients and Surgery Admit only): To be obtained in the Pre-Procedure area  Allergies Reviewed: Yes  Anticoag/NSAID held?: NA  Currently on antibiotics?: No    Assessment:   1   Intervertebral disc disorder with radiculopathy of lumbar region        Plan: B/L L3 TFESI

## 2018-09-06 NOTE — DISCHARGE INSTR - LAB
Epidural Steroid Injection   WHAT YOU NEED TO KNOW:   An epidural steroid injection (KEARA) is a procedure to inject steroid medicine into the epidural space  The epidural space is between your spinal cord and vertebrae  Steroids reduce inflammation and fluid buildup in your spine that may be causing pain  You may be given pain medicine along with the steroids  ACTIVITY  · Do not drive or operate machinery today  · No strenuous activity today - bending, lifting, etc   · You may resume normal activites starting tomorrow - start slowly and as tolerated  · You may shower today, but no tub baths or hot tubs  · You may have numbness for several hours from the local anesthetic  Please use caution and common sense, especially with weight-bearing activities  CARE OF THE INJECTION SITE  · If you have soreness or pain, apply ice to the area today (20 minutes on/20 minutes off)  · Starting tomorrow, you may use warm, moist heat or ice if needed  · You may have an increase or change in your discomfort for 36-48 hours after your treatment  · Apply ice and continue with any pain medication you have been prescribed  · Notify the [facility] if you have any of the following: redness, drainage, swelling, headache, stiff neck or fever above 100°F     SPECIAL INSTRUCTIONS  · Our office will contact you in approximately 7 days for a progress report  MEDICATIONS  · Continue to take all routine medications  · Our office may have instructed you to hold some medications  If you have a problem specifically related to your procedure, please call our office at [phone]  Problems not related to your procedure should be directed to your primary care physician

## 2018-09-14 ENCOUNTER — TELEPHONE (OUTPATIENT)
Dept: PAIN MEDICINE | Facility: CLINIC | Age: 78
End: 2018-09-14

## 2018-09-14 ENCOUNTER — TELEPHONE (OUTPATIENT)
Dept: RADIOLOGY | Facility: CLINIC | Age: 78
End: 2018-09-14

## 2018-09-14 DIAGNOSIS — M54.16 LUMBAR RADICULOPATHY: ICD-10-CM

## 2018-09-14 DIAGNOSIS — M51.16 INTERVERTEBRAL DISC DISORDERS WITH RADICULOPATHY, LUMBAR REGION: ICD-10-CM

## 2018-09-14 RX ORDER — GABAPENTIN 300 MG/1
300 CAPSULE ORAL
Qty: 30 CAPSULE | Refills: 0 | Status: SHIPPED | OUTPATIENT
Start: 2018-09-14 | End: 2018-10-19 | Stop reason: SDUPTHER

## 2018-09-14 RX ORDER — HYDROCODONE BITARTRATE AND ACETAMINOPHEN 5; 325 MG/1; MG/1
1 TABLET ORAL EVERY 12 HOURS PRN
Qty: 14 TABLET | Refills: 0 | Status: SHIPPED | OUTPATIENT
Start: 2018-09-14 | End: 2018-09-21 | Stop reason: SDUPTHER

## 2018-09-14 NOTE — TELEPHONE ENCOUNTER
Patient is calling to request a refill on the Norco 5-325mg and Gabapentin 300mg  He has one more Norco left and nine more Gabapentin  He uses the [facility] on file

## 2018-09-14 NOTE — TELEPHONE ENCOUNTER
Prescription sent for 1 week supply of Norco 5/325 mg 1 tablet q 12 hours, 14 tablets, no refills  The patient recently had a bilateral L3 transforaminal epidural steroid injection on 09/06/2018  If he does not have improvement in his symptoms by next week  I want to see him in the office for further evaluation  Please let him know that it can take up to 2 weeks to see improvement in pain and symptoms after his injection  Please ask how much improvement he has had so far? Refill for gabapentin 300 mg at bedtime was sent to the patient's pharmacy on file, 30 day supply, no refills  [place] prescription monitoring drug program report was reviewed and was appropriate

## 2018-09-14 NOTE — TELEPHONE ENCOUNTER
Pt reports he is able to sit for longer periods now but once he's on his feet more than 12-15 minutes the pain in the left hip and leg returns  Pain goes up to 8/10  Pt is taking 2 norco per day and has 1 left  Pt had his B/L L3 TFESI on 9/6/18  Norco 5/325 Q 12 Hrs # 34 was LP on 8/28/18  Gabapentin 300 mg QHS # 30 was LP on 8/21/18  Pt asking for RF for the Norco and gabapentin 300 mg QHSBriana on file  Pt has no pending ov

## 2018-09-14 NOTE — TELEPHONE ENCOUNTER
Pt and wife informed of the same as stated in Emilia's previous task  Pt reports 15% improvement since the inj, better ROM, able to sit longer but get pain if he stands longer than 12-15 mins  Pain with standing 8/10  Pt & wife understand if pain is better by Wed 9/19 then he should c/b and schedule ov w/ HA

## 2018-09-21 ENCOUNTER — OFFICE VISIT (OUTPATIENT)
Dept: PAIN MEDICINE | Facility: CLINIC | Age: 78
End: 2018-09-21
Payer: MEDICARE

## 2018-09-21 VITALS
RESPIRATION RATE: 18 BRPM | HEIGHT: 71 IN | SYSTOLIC BLOOD PRESSURE: 100 MMHG | WEIGHT: 236 LBS | BODY MASS INDEX: 33.04 KG/M2 | TEMPERATURE: 98.1 F | DIASTOLIC BLOOD PRESSURE: 66 MMHG | HEART RATE: 67 BPM

## 2018-09-21 DIAGNOSIS — G89.4 CHRONIC PAIN SYNDROME: ICD-10-CM

## 2018-09-21 DIAGNOSIS — Z79.891 LONG-TERM CURRENT USE OF OPIATE ANALGESIC: ICD-10-CM

## 2018-09-21 DIAGNOSIS — M51.36 DEGENERATIVE DISC DISEASE, LUMBAR: ICD-10-CM

## 2018-09-21 DIAGNOSIS — F11.20 UNCOMPLICATED OPIOID DEPENDENCE (HCC): ICD-10-CM

## 2018-09-21 DIAGNOSIS — M51.16 INTERVERTEBRAL DISC DISORDERS WITH RADICULOPATHY, LUMBAR REGION: Primary | ICD-10-CM

## 2018-09-21 PROCEDURE — 99214 OFFICE O/P EST MOD 30 MIN: CPT | Performed by: NURSE PRACTITIONER

## 2018-09-21 PROCEDURE — 80305 DRUG TEST PRSMV DIR OPT OBS: CPT | Performed by: NURSE PRACTITIONER

## 2018-09-21 RX ORDER — HYDROCODONE BITARTRATE AND ACETAMINOPHEN 5; 325 MG/1; MG/1
1 TABLET ORAL EVERY 12 HOURS PRN
Qty: 60 TABLET | Refills: 0 | Status: SHIPPED | OUTPATIENT
Start: 2018-09-21 | End: 2018-10-19 | Stop reason: SDUPTHER

## 2018-09-21 RX ORDER — GABAPENTIN 100 MG/1
CAPSULE ORAL
Qty: 30 CAPSULE | Refills: 0 | Status: SHIPPED | OUTPATIENT
Start: 2018-09-21 | End: 2018-10-19 | Stop reason: SDUPTHER

## 2018-09-21 NOTE — PROGRESS NOTES
Assessment:  1  Intervertebral disc disorders with radiculopathy, lumbar region    2  Chronic pain syndrome    3  Uncomplicated opioid dependence ([address] )    4  Long-term current use of opiate analgesic    5  Degenerative disc disease, lumbar        Plan:  Luz Maria Corona is a 69 y o  male with a history of chronic pain syndrome secondary to lumbar disc herniation, lumbar radiculopathy  The patient presents with ongoing low back pain, which slightly improved after undergoing a bilateral L3 transforaminal epidural steroid injection on 9/6/2018  Since the patient reported only a small amount of improvement after his injection  I will order the patient an updated MRI of his lumbar spine for further evaluation  The patient was instructed that our office will call with results of this study once is completed  The patient will be continued on hydrocodone/acetaminophen 5/325 milligrams 1 tablet 2 times daily until he can have his MRI completed and possibly undergo an additional injection in his lumbar spine  Therefore, I will obtain a baseline urine drug screen at this office visit and have the patient sign an opioid contract  I have also sent a 1 month prescription for hydrocodone/acetaminophen 5/325 milligrams 1 tablet 2 times daily to the patient's pharmacy on file  He will follow up in 4 weeks for further refills  The patient will also continue on gabapentin to help with the neuropathic component of his pain  However, I will increase the patient's gabapentin to gabapentin 300 milligrams at bedtime, and I will add gabapentin 100 milligrams 1 tablet in the a m  as well  The patient was instructed that increasing his gabapentin dose can increase his risk for medication side effects such as dizziness and drowsiness  The patient was instructed to call the office in 1-2 weeks to update office on the effectiveness of increasing this medication, or sooner with adverse medication side effects      There are risks associated with opioid medications, including dependence, addiction and tolerance  The patient understands and agrees to use these medications only as prescribed  Potential side effects of the medications include, but are not limited to, constipation, drowsiness, addiction, impaired judgment and risk of fatal overdose if not taken as prescribed  The patient was warned against driving while taking sedation medications  Sharing medications is a felony  At this point in time, the patient is showing no signs of addiction, abuse, diversion or suicidal ideation  A urine drug screen was collected at today's office visit as part of our medication management protocol  The point of care testing results were appropriate for what was being prescribed  The specimen will be sent for confirmatory testing  The drug screen is medically necessary because the patient is either dependent on opioid medication or is being considered for opioid medication therapy and the results could impact ongoing or future treatment  The drug screen is to evaluate for the presences or absence of prescribed, non-prescribed, and/or illicit drugs/substances  An opioid contract was reviewed with the patient  The patient was made aware they are only to receive opioid medication from our office, and must take the medication as prescribed  If the medication is lost or stolen, it will not be replaced  We also do not condone the use of illegal substances as well as the use of alcohol with opioid medication  Random urine drug screens will also be performed at office visits  Lastly, he was informed that office visits are needed for refills  Patient was agreeable and signed the contract  [place] Prescription Drug Monitoring Program report was reviewed and was appropriate     The patient will follow up in 4 weeks or sooner with the worsening of symptoms       My impressions and treatment recommendations were discussed in detail with the patient who verbalized understanding and had no further questions  Discharge instructions were provided  I personally saw and examined the patient and I agree with the above discussed plan of care  Orders Placed This Encounter   Procedures    MRI lumbar spine wo contrast     Standing Status:   Future     Standing Expiration Date:   9/21/2022     Scheduling Instructions: There is no preparation for this test  Please leave your jewelry and valuables at home, wedding rings are the exception  Please bring your insurance cards, a form of photo ID and a list of your medications with you  Arrive 15 minutes prior to your appointment time in order to register  Please bring any prior CT or MRI studies of this area that were not performed at a [facility]  To schedule this appointment, please contact Central Scheduling at [phone]  Order Specific Question:   What is the patient's sedation requirement? Answer:   No Sedation     New Medications Ordered This Visit   Medications    gabapentin (NEURONTIN) 100 mg capsule     Sig: Take 1 capsule in the am      Dispense:  30 capsule     Refill:  0    HYDROcodone-acetaminophen (NORCO) 5-325 mg per tablet     Sig: Take 1 tablet by mouth every 12 (twelve) hours as needed for pain for up to 30 days Max Daily Amount: 2 tablets     Dispense:  60 tablet     Refill:  0       History of Present Illness:  Jalen Sinclair is a 69 y o  male with a history of chronic pain syndrome secondary to lumbar disc herniation, lumbar radiculopathy  The patient was last seen office on 9/6/2018 where he underwent a bilateral L3 transforaminal epidural steroid injection  The patient reports 20% ongoing relief of symptoms after the injection  He presents for a follow up office visit in regards to Hip Pain and Leg Pain     The patients current symptoms include left-sided back pain that starts in his back and radiates into his groin and down the anterior and lateral aspect of his left leg to his calf  He denies bilateral leg weakness, or bowel or bladder issues  He describes his pain as sharp, constant pain  The patient reports that his pain is symptoms have improved since last office visit  He currently rates his pain an 8/10 numeric pain scale  Current pain medications includes:  Hydrocodone/acetaminophen 5/325 mg 1 tablet 2 times daily   The patient reports that this regimen is providing minimal to moderate % pain relief  The patient is reporting no side effects from this pain medication regimen  Pain Contract Signed: 09/21/2018, Last Urine Drug Screen: 09/21/2018    I have personally reviewed and/or updated the patient's past medical history, past surgical history, family history, social history, current medications, allergies, and vital signs today  Review of Systems   Respiratory: Negative for shortness of breath  Cardiovascular: Negative for chest pain  Gastrointestinal: Negative for constipation, diarrhea, nausea and vomiting  Musculoskeletal: Positive for gait problem (difficulty walking/ decreased range of motion)  Negative for arthralgias, joint swelling and myalgias  Skin: Negative for rash  Neurological: Negative for dizziness, seizures and weakness  All other systems reviewed and are negative        Patient Active Problem List   Diagnosis    Positive colorectal cancer screening using Cologuard test    Benign essential hypertension    Hyperlipidemia    Impaired fasting glucose    Microscopic hematuria    Obesity    Obstructive sleep apnea    Subclinical hypothyroidism    3-vessel CAD    Aneurysm of abdominal aorta (HCC)    Aneurysm of ascending aorta (HCC)    Aortic valve disorder    Arteriosclerotic cardiovascular disease    Disc degeneration, lumbar    Left inguinal hernia    Herniated lumbar intervertebral disc    Lumbar radiculopathy    Intervertebral disc disorder with radiculopathy of lumbar region       Past Medical History:   Diagnosis Date    Abdominal aortic aneurysm (HCC)     Chronic pain     back since July    Coronary artery disease     Hyperlipidemia     Hypertension     Subconjunctival hemorrhage        Past Surgical History:   Procedure Laterality Date    ABDOMINAL AORTIC ANEURYSM REPAIR  08/09/2007    2 dock limbs with visc extens prosth    CORONARY ARTERY BYPASS GRAFT      GAB-LAD, sequential vein graft to OM1 and OM2, single VG-posterior descending  Onset: 2003       Family History   Problem Relation Age of Onset    Heart disease Mother     Stroke Father         stroke syndrome    Aneurysm Brother         abdominal    Aortic aneurysm Brother         ascending    Coronary artery disease Family     Hypertension Family     Other Son         gioblastoma multiforme       Social History     Occupational History    retired      Social History Main Topics    Smoking status: Former Smoker     Packs/day: 1 00     Years: 40 00     Quit date: 08/2012    Smokeless tobacco: Never Used    Alcohol use Yes      Comment: rare    Drug use: No    Sexual activity: Not on file       Current Outpatient Prescriptions on File Prior to Visit   Medication Sig    aspirin (ECOTRIN LOW STRENGTH) 81 mg EC tablet Take 1 tablet by mouth daily    atorvastatin (LIPITOR) 40 mg tablet Take 1 tablet (40 mg total) by mouth daily    finasteride (PROSCAR) 5 mg tablet     gabapentin (NEURONTIN) 300 mg capsule Take 1 capsule (300 mg total) by mouth daily at bedtime    labetalol (NORMODYNE) 100 mg tablet TAKE 1 TABLET BY MOUTH  TWICE A DAY    lisinopril (ZESTRIL) 5 mg tablet Take 1 tablet (5 mg total) by mouth daily    tamsulosin (FLOMAX) 0 4 mg Take 1 capsule by mouth daily     No current facility-administered medications on file prior to visit          Allergies   Allergen Reactions    Meloxicam Edema       Physical Exam:    /66 (BP Location: Left arm, Patient Position: Sitting, Cuff Size: Standard)   Pulse 67 Temp 98 1 °F (36 7 °C) (Oral)   Resp 18   Ht 5' 11" (1 803 m)   Wt 107 kg (236 lb)   BMI 32 92 kg/m²     Constitutional:Pain Contract Signed: 09/21/2018, Last Urine Drug Screen: 09/21/2018  Eyes:anicteric  HEENT:grossly intact  Neck:supple, symmetric, trachea midline and no masses   Pulmonary:even and unlabored  Cardiovascular:No edema or pitting edema present  Skin:Normal without rashes or lesions and well hydrated  Psychiatric:Mood and affect appropriate  Neurologic:Cranial Nerves II-XII grossly intact  Musculoskeletal:normal     Lumbar Spine Exam    Appearance:  Normal lordosis  Palpation/Tenderness:  left lumbar paraspinal tenderness  Sensory:  no sensory deficits noted  Range of Motion:  Flexion:  Minimally limited  without pain  Extension:  Minimally limited  with pain  Lateral Flexion - Left:  Minimally limited  with pain  Lateral Flexion - Right:  Minimally limited  with pain  Rotation - Left:  Minimally limited  with pain  Rotation - Right:  Minimally limited  with pain  Motor Strength:  Left hip flexion:  5/5  Right hip flexion:  5/5  Left knee extension:  5/5  Right knee extension:  5/5  Left foot dorsiflexion:  5/5  Left foot plantar flexion:  5/5  Right foot dorsiflexion:  5/5  Right foot plantar flexion:  5/5       Imaging  MRI LUMBAR SPINE WITHOUT CONTRAST     INDICATION-  Low back pain with left leg radiculopathy      COMPARISON-  X-rays dated 8/9/2013      TECHNIQUE-  Sagittal T1, sagittal T2, sagittal inversion recovery, axial T1 and  axial T2, coronal T2        IMAGE QUALITY-    Diagnostic     FINDINGS-     ALIGNMENT-  Appropriate alignment with the exception of minimal  anterolisthesis of L2 upon L3  No compression fracture  No scoliosis      MARROW SIGNAL-  Endplate marrow degenerative changes noted within the  lower lumbar spine, L4-L5 and L5-S1, Modic type I and Type II   Moderate  sized hemangioma within the L3 vertebral body      DISTAL CORD AND CONUS-  Normal signal within the distal cord and conus  The conus ends at the L1-L2 level      PARASPINAL SOFT TISSUES-  Multiple left renal cysts are noted, largest  of which is located within the lower pole measuring proximally 6 3 cm      SACRUM-  Normal signal within the sacrum  No evidence of insufficiency  or stress fracture      LOWER THORACIC DISC SPACES-  Normal disc height and signal   No disc  herniation, canal stenosis or foraminal narrowing      LUMBAR DISC SPACES-       L1-L2-  Normal      L2-L3-  Disc desiccation  Mild diffuse annular bulging  Mild endplate  and facet degenerative change  Mild to moderate canal stenosis  Mild  left foraminal narrowing      L3-L4-  Diffuse annular bulging  Small central annular tear with a tiny  central disc protrusion  Small right foraminal disc protrusion  Mild  facet hypertrophic degenerative change  Mild canal stenosis  Mild right  greater than left foraminal narrowing      L4-L5-  Diffuse annular bulging  Small central and left paracentral  disc protrusion extending into the left neural foramen  Mild facet  hypertrophic degenerative change  Moderate canal stenosis especially  within the anterolateral aspect of the canal with distortion of the  thecal sac in this region  Moderate left and mild right foraminal  narrowing      L5-S1-  Disc desiccation and loss of disc height  Diffuse annular  bulging with endplate osteophyte formation  Small central disc  protrusion  Mild facet arthropathy  Mild canal stenosis  Moderate  bilateral foraminal narrowing      IMPRESSION-     1  L3-L4 degenerative disc disease with right foraminal disc extrusion  resulting in right greater than left foraminal narrowing  2  Diffuse annular bulging with small central/ left paracentral disc  protrusion extending into the neural foramen resulting in moderate left  foraminal narrowing  Moderate canal stenosis with distortion of the  anterolateral aspect of the thecal sac    3  Mild L5-S1 degenerative disc disease with facet hypertrophic  degenerative change  Moderate bilateral foraminal narrowing    4  Multiple right renal cysts

## 2018-09-28 ENCOUNTER — HOSPITAL ENCOUNTER (OUTPATIENT)
Dept: RADIOLOGY | Age: 78
Discharge: HOME | End: 2018-09-28
Payer: MEDICARE

## 2018-09-28 DIAGNOSIS — M51.16 INTERVERTEBRAL DISC DISORDERS WITH RADICULOPATHY, LUMBAR REGION: ICD-10-CM

## 2018-09-28 PROCEDURE — 72148 MRI LUMBAR SPINE W/O DYE: CPT

## 2018-10-02 ENCOUNTER — TELEPHONE (OUTPATIENT)
Dept: PAIN MEDICINE | Facility: MEDICAL CENTER | Age: 78
End: 2018-10-02

## 2018-10-05 ENCOUNTER — TRANSCRIBE ORDERS (OUTPATIENT)
Dept: ADMINISTRATIVE | Age: 78
End: 2018-10-05

## 2018-10-05 ENCOUNTER — APPOINTMENT (OUTPATIENT)
Dept: RADIOLOGY | Age: 78
End: 2018-10-05
Payer: MEDICARE

## 2018-10-05 ENCOUNTER — OFFICE VISIT (OUTPATIENT)
Dept: LAB | Age: 78
End: 2018-10-05
Payer: MEDICARE

## 2018-10-05 ENCOUNTER — APPOINTMENT (OUTPATIENT)
Dept: LAB | Age: 78
End: 2018-10-05
Payer: MEDICARE

## 2018-10-05 DIAGNOSIS — Z01.818 PRE-OP EVALUATION: ICD-10-CM

## 2018-10-05 DIAGNOSIS — Z01.818 PRE-OP EVALUATION: Primary | ICD-10-CM

## 2018-10-05 LAB
ALBUMIN SERPL BCP-MCNC: 3.5 G/DL (ref 3.5–5)
ALP SERPL-CCNC: 65 U/L (ref 46–116)
ALT SERPL W P-5'-P-CCNC: 27 U/L (ref 12–78)
ANION GAP SERPL CALCULATED.3IONS-SCNC: 6 MMOL/L (ref 4–13)
APTT PPP: 28 SECONDS (ref 24–36)
AST SERPL W P-5'-P-CCNC: 15 U/L (ref 5–45)
ATRIAL RATE: 70 BPM
BASOPHILS # BLD AUTO: 0.05 THOUSANDS/ΜL (ref 0–0.1)
BASOPHILS NFR BLD AUTO: 1 % (ref 0–1)
BILIRUB SERPL-MCNC: 0.73 MG/DL (ref 0.2–1)
BUN SERPL-MCNC: 13 MG/DL (ref 5–25)
CALCIUM SERPL-MCNC: 8.9 MG/DL (ref 8.3–10.1)
CHLORIDE SERPL-SCNC: 107 MMOL/L (ref 100–108)
CO2 SERPL-SCNC: 29 MMOL/L (ref 21–32)
CREAT SERPL-MCNC: 0.9 MG/DL (ref 0.6–1.3)
EOSINOPHIL # BLD AUTO: 0.26 THOUSAND/ΜL (ref 0–0.61)
EOSINOPHIL NFR BLD AUTO: 4 % (ref 0–6)
ERYTHROCYTE [DISTWIDTH] IN BLOOD BY AUTOMATED COUNT: 12.9 % (ref 11.6–15.1)
GFR SERPL CREATININE-BSD FRML MDRD: 82 ML/MIN/1.73SQ M
GLUCOSE P FAST SERPL-MCNC: 98 MG/DL (ref 65–99)
HCT VFR BLD AUTO: 39.7 % (ref 36.5–49.3)
HGB BLD-MCNC: 12.9 G/DL (ref 12–17)
IMM GRANULOCYTES # BLD AUTO: 0.02 THOUSAND/UL (ref 0–0.2)
IMM GRANULOCYTES NFR BLD AUTO: 0 % (ref 0–2)
INR PPP: 0.97 (ref 0.86–1.17)
LYMPHOCYTES # BLD AUTO: 1.33 THOUSANDS/ΜL (ref 0.6–4.47)
LYMPHOCYTES NFR BLD AUTO: 21 % (ref 14–44)
MCH RBC QN AUTO: 33.5 PG (ref 26.8–34.3)
MCHC RBC AUTO-ENTMCNC: 32.5 G/DL (ref 31.4–37.4)
MCV RBC AUTO: 103 FL (ref 82–98)
MONOCYTES # BLD AUTO: 0.55 THOUSAND/ΜL (ref 0.17–1.22)
MONOCYTES NFR BLD AUTO: 9 % (ref 4–12)
NEUTROPHILS # BLD AUTO: 4.01 THOUSANDS/ΜL (ref 1.85–7.62)
NEUTS SEG NFR BLD AUTO: 65 % (ref 43–75)
NRBC BLD AUTO-RTO: 0 /100 WBCS
PLATELET # BLD AUTO: 92 THOUSANDS/UL (ref 149–390)
PMV BLD AUTO: 10.6 FL (ref 8.9–12.7)
POTASSIUM SERPL-SCNC: 4.1 MMOL/L (ref 3.5–5.3)
PR INTERVAL: 152 MS
PROT SERPL-MCNC: 7 G/DL (ref 6.4–8.2)
PROTHROMBIN TIME: 13 SECONDS (ref 11.8–14.2)
QRS AXIS: 11 DEGREES
QRSD INTERVAL: 90 MS
QT INTERVAL: 416 MS
QTC INTERVAL: 449 MS
RBC # BLD AUTO: 3.85 MILLION/UL (ref 3.88–5.62)
SODIUM SERPL-SCNC: 142 MMOL/L (ref 136–145)
T WAVE AXIS: 1 DEGREES
VENTRICULAR RATE: 70 BPM
WBC # BLD AUTO: 6.22 THOUSAND/UL (ref 4.31–10.16)

## 2018-10-05 PROCEDURE — 85610 PROTHROMBIN TIME: CPT

## 2018-10-05 PROCEDURE — 71046 X-RAY EXAM CHEST 2 VIEWS: CPT

## 2018-10-05 PROCEDURE — 93005 ELECTROCARDIOGRAM TRACING: CPT

## 2018-10-05 PROCEDURE — 36415 COLL VENOUS BLD VENIPUNCTURE: CPT

## 2018-10-05 PROCEDURE — 93010 ELECTROCARDIOGRAM REPORT: CPT | Performed by: INTERNAL MEDICINE

## 2018-10-05 PROCEDURE — 80053 COMPREHEN METABOLIC PANEL: CPT

## 2018-10-05 PROCEDURE — 85025 COMPLETE CBC W/AUTO DIFF WBC: CPT

## 2018-10-05 PROCEDURE — 85730 THROMBOPLASTIN TIME PARTIAL: CPT

## 2018-10-08 DIAGNOSIS — M51.16 INTERVERTEBRAL DISC DISORDERS WITH RADICULOPATHY, LUMBAR REGION: Primary | ICD-10-CM

## 2018-10-12 ENCOUNTER — TELEPHONE (OUTPATIENT)
Dept: INTERNAL MEDICINE CLINIC | Facility: CLINIC | Age: 78
End: 2018-10-12

## 2018-10-14 DIAGNOSIS — D69.6 THROMBOCYTOPENIA (HCC): Primary | ICD-10-CM

## 2018-10-16 ENCOUNTER — LAB (OUTPATIENT)
Dept: LAB | Facility: CLINIC | Age: 78
End: 2018-10-16
Payer: MEDICARE

## 2018-10-16 LAB
ERYTHROCYTE [DISTWIDTH] IN BLOOD BY AUTOMATED COUNT: 12.7 % (ref 11.6–15.1)
HCT VFR BLD AUTO: 39 % (ref 36.5–49.3)
HGB BLD-MCNC: 13 G/DL (ref 12–17)
MCH RBC QN AUTO: 33.3 PG (ref 26.8–34.3)
MCHC RBC AUTO-ENTMCNC: 33.3 G/DL (ref 31.4–37.4)
MCV RBC AUTO: 100 FL (ref 82–98)
PLATELET # BLD AUTO: 146 THOUSANDS/UL (ref 149–390)
PMV BLD AUTO: 10.4 FL (ref 8.9–12.7)
RBC # BLD AUTO: 3.9 MILLION/UL (ref 3.88–5.62)
WBC # BLD AUTO: 7.2 THOUSAND/UL (ref 4.31–10.16)

## 2018-10-16 PROCEDURE — 85027 COMPLETE CBC AUTOMATED: CPT | Performed by: INTERNAL MEDICINE

## 2018-10-16 PROCEDURE — 36415 COLL VENOUS BLD VENIPUNCTURE: CPT | Performed by: INTERNAL MEDICINE

## 2018-10-19 ENCOUNTER — OFFICE VISIT (OUTPATIENT)
Dept: PAIN MEDICINE | Facility: CLINIC | Age: 78
End: 2018-10-19
Payer: MEDICARE

## 2018-10-19 VITALS
DIASTOLIC BLOOD PRESSURE: 82 MMHG | WEIGHT: 242 LBS | TEMPERATURE: 97.4 F | HEART RATE: 67 BPM | SYSTOLIC BLOOD PRESSURE: 124 MMHG | RESPIRATION RATE: 18 BRPM | HEIGHT: 71 IN | BODY MASS INDEX: 33.88 KG/M2

## 2018-10-19 DIAGNOSIS — M51.16 INTERVERTEBRAL DISC DISORDERS WITH RADICULOPATHY, LUMBAR REGION: ICD-10-CM

## 2018-10-19 DIAGNOSIS — G89.4 CHRONIC PAIN SYNDROME: Primary | ICD-10-CM

## 2018-10-19 DIAGNOSIS — M54.16 LUMBAR RADICULOPATHY: ICD-10-CM

## 2018-10-19 PROCEDURE — 99214 OFFICE O/P EST MOD 30 MIN: CPT | Performed by: NURSE PRACTITIONER

## 2018-10-19 RX ORDER — GABAPENTIN 100 MG/1
CAPSULE ORAL
Qty: 30 CAPSULE | Refills: 2 | Status: SHIPPED | OUTPATIENT
Start: 2018-10-19 | End: 2019-01-17 | Stop reason: SDUPTHER

## 2018-10-19 RX ORDER — HYDROCODONE BITARTRATE AND ACETAMINOPHEN 5; 325 MG/1; MG/1
1 TABLET ORAL EVERY 12 HOURS PRN
Qty: 60 TABLET | Refills: 0 | Status: SHIPPED | OUTPATIENT
Start: 2018-10-19 | End: 2018-10-19 | Stop reason: SDUPTHER

## 2018-10-19 RX ORDER — GABAPENTIN 300 MG/1
300 CAPSULE ORAL
Qty: 30 CAPSULE | Refills: 2 | Status: SHIPPED | OUTPATIENT
Start: 2018-10-19 | End: 2019-01-17 | Stop reason: SDUPTHER

## 2018-10-19 RX ORDER — HYDROCODONE BITARTRATE AND ACETAMINOPHEN 5; 325 MG/1; MG/1
1 TABLET ORAL EVERY 12 HOURS PRN
Qty: 60 TABLET | Refills: 0 | Status: SHIPPED | OUTPATIENT
Start: 2018-11-17 | End: 2018-12-06 | Stop reason: SDUPTHER

## 2018-10-19 NOTE — PROGRESS NOTES
Pt c/o lower back pain that radiates to the left hip and leg    Assessment:  1  Chronic pain syndrome    2  Intervertebral disc disorders with radiculopathy, lumbar region    3  Lumbar radiculopathy        Plan:  Uriel Turk is a 69 y o  male  with a history of chronic pain syndrome secondary to lumbar disc herniation, lumbar radiculopathy  The patient continues with ongoing low back pain, which is stable since last office visit  The patient is scheduled to undergo a left L3-L4 transforaminal epidural steroid injection on 10/30/2018  Per Dr Reyes and Adria Lucas it is ok for the patient to move forward with this injection, despite the patient recently having issues with a low platelet count  The patient reports he recently had a 55-year-old family member who underwent back surgery and is pain free  Therefore at this time the patient would like to proceed with his upcoming injection and also be referred to Neurosurgery for surgical consultation  Therefore referral to Dr Sara Curiel was placed today for surgical consultation  The patient reports he is currently well managed on his current medication regimen for his ongoing pain  He reports that his pain medication allows him to function on a daily basis with decreased pain  Therefore the patient be continued on hydrocodone/acetaminophen 5/325 mg 1 tablet 2 times daily  A prescription for this medication was sent electronically to the patient's pharmacy on file for this month and a prescription for the following month with do not fill date of 11/17/2018  The patient will continue on gabapentin as previously prescribed  Refills for Gabapentin were sent to the patient's pharmacy on file  There are risks associated with opioid medications, including dependence, addiction and tolerance  The patient understands and agrees to use these medications only as prescribed   Potential side effects of the medications include, but are not limited to, constipation, drowsiness, addiction, impaired judgment and risk of fatal overdose if not taken as prescribed  The patient was warned against driving while taking sedation medications  Sharing medications is a felony  At this point in time, the patient is showing no signs of addiction, abuse, diversion or suicidal ideation  [place] Prescription Drug Monitoring Program report was reviewed and was appropriate     Complete risks and benefits including bleeding, infection, tissue reaction, nerve injury and allergic reaction were discussed  The approach was demonstrated using models and literature was provided  The patient will follow up in 8 weeks, or sooner with the worsening of symptoms  My impressions and treatment recommendations were discussed in detail with the patient who verbalized understanding and had no further questions  Discharge instructions were provided  I personally saw and examined the patient and I agree with the above discussed plan of care      Orders Placed This Encounter   Procedures    Ambulatory referral to Neurosurgery     Standing Status:   Future     Standing Expiration Date:   4/19/2019     Referral Priority:   Routine     Referral Type:   Consult - AMB     Referral Reason:   Specialty Services Required     Referred to Provider:   Karis Saldivar MD     Requested Specialty:   Neurosurgery     Number of Visits Requested:   1     Expiration Date:   10/19/2019     New Medications Ordered This Visit   Medications    gabapentin (NEURONTIN) 300 mg capsule     Sig: Take 1 capsule (300 mg total) by mouth daily at bedtime     Dispense:  30 capsule     Refill:  2    gabapentin (NEURONTIN) 100 mg capsule     Sig: Take 1 capsule in the am      Dispense:  30 capsule     Refill:  2    HYDROcodone-acetaminophen (NORCO) 5-325 mg per tablet     Sig: Take 1 tablet by mouth every 12 (twelve) hours as needed for pain for up to 30 days Max Daily Amount: 2 tablets     Dispense:  60 tablet     Refill:  0     Do Not Fill Until 11/17/2018       History of Present Illness:  Rah Issa is a 69 y o  male  with a history of chronic pain syndrome secondary to lumbar disc herniation, lumbar radiculopathy  He was last seen in the office on 09/21/2018, where he was ordered an MRI of his lumbar spine for further evaluation  Since last office visit the patient was to undergo a bladder biopsy by Dr Sabina May  However, He was found to have low platelets and his procedure was cancelled  He  presents for a follow up office visit in regards to Back Pain (radiates to the left hip and leg); Hip Pain; and Leg Pain  The patients current symptoms includes left sided low back pain that radiates down the left side of his thigh to his calf  He denies bilateral leg weakness, or bowel or bladder issues  He describes his pain as sharp, pressure-like, constant pain  He reports that his pain is symptoms are unchanged since last office visit  Current pain medications includes:  Hydrocodone/acetaminophen 5/325 mg 1 tablet 2 times daily     The patient reports that this regimen is providing 20% pain relief  The patient is reporting no side effects from this pain medication regimen  Pain Contract Signed: 9/21/2018, Last Urine Drug Screen: 9/21/2018    I have personally reviewed and/or updated the patient's past medical history, past surgical history, family history, social history, current medications, allergies, and vital signs today  Review of Systems   Respiratory: Negative for shortness of breath  Cardiovascular: Negative for chest pain  Gastrointestinal: Positive for constipation  Negative for diarrhea, nausea and vomiting  Musculoskeletal: Positive for gait problem  Negative for arthralgias, joint swelling and myalgias  Skin: Negative for rash  Neurological: Negative for dizziness, seizures and weakness  All other systems reviewed and are negative        Patient Active Problem List   Diagnosis    Positive colorectal cancer screening using Cologuard test    Benign essential hypertension    Hyperlipidemia    Impaired fasting glucose    Microscopic hematuria    Obesity    Obstructive sleep apnea    Subclinical hypothyroidism    3-vessel CAD    Aneurysm of abdominal aorta (HCC)    Aneurysm of ascending aorta (HCC)    Aortic valve disorder    Arteriosclerotic cardiovascular disease    Disc degeneration, lumbar    Left inguinal hernia    Herniated lumbar intervertebral disc    Lumbar radiculopathy    Intervertebral disc disorder with radiculopathy of lumbar region       Past Medical History:   Diagnosis Date    Abdominal aortic aneurysm (HCC)     Chronic pain     back since July    Chronic pain disorder     lumbar    Coronary artery disease     History of transfusion     Hyperlipidemia     Hypertension     Subconjunctival hemorrhage        Past Surgical History:   Procedure Laterality Date    ABDOMINAL AORTIC ANEURYSM REPAIR  08/09/2007    2 dock limbs with visc extens prosth    COLONOSCOPY      CORONARY ARTERY BYPASS GRAFT      GAB-LAD, sequential vein graft to OM1 and OM2, single VG-posterior descending   Onset: 2003       Family History   Problem Relation Age of Onset    Heart disease Mother     Stroke Father         stroke syndrome    Aneurysm Brother         abdominal    Aortic aneurysm Brother         ascending    Coronary artery disease Family     Hypertension Family     Other Son         gioblastoma multiforme       Social History     Occupational History    retired      Social History Main Topics    Smoking status: Former Smoker     Packs/day: 1 00     Years: 40 00     Quit date: 08/2012    Smokeless tobacco: Never Used    Alcohol use Yes      Comment: rare    Drug use: No    Sexual activity: No       Current Outpatient Prescriptions on File Prior to Visit   Medication Sig    aspirin (ECOTRIN LOW STRENGTH) 81 mg EC tablet Take 1 tablet by mouth daily    atorvastatin (LIPITOR) 40 mg tablet Take 1 tablet (40 mg total) by mouth daily    finasteride (PROSCAR) 5 mg tablet     labetalol (NORMODYNE) 100 mg tablet TAKE 1 TABLET BY MOUTH  TWICE A DAY    lisinopril (ZESTRIL) 5 mg tablet Take 1 tablet (5 mg total) by mouth daily    senna (SENOKOT) 8 6 MG tablet Take 1 tablet by mouth daily    tamsulosin (FLOMAX) 0 4 mg Take 1 capsule by mouth daily    [DISCONTINUED] gabapentin (NEURONTIN) 100 mg capsule Take 1 capsule in the am     [DISCONTINUED] gabapentin (NEURONTIN) 300 mg capsule Take 1 capsule (300 mg total) by mouth daily at bedtime    [DISCONTINUED] HYDROcodone-acetaminophen (NORCO) 5-325 mg per tablet Take 1 tablet by mouth every 12 (twelve) hours as needed for pain for up to 30 days Max Daily Amount: 2 tablets     No current facility-administered medications on file prior to visit  Allergies   Allergen Reactions    Meloxicam Edema       Physical Exam:    /82   Pulse 67   Temp (!) 97 4 °F (36 3 °C)   Resp 18   Ht 5' 11" (1 803 m)   Wt 110 kg (242 lb)   BMI 33 75 kg/m²      Constitutional:normal, well developed, well nourished, alert, in no distress and non-toxic and no overt pain behavior  and obese  Eyes:anicteric  HEENT:grossly intact  Neck:supple, symmetric, trachea midline and no masses   Pulmonary:even and unlabored  Cardiovascular:No edema or pitting edema present  Skin:Normal without rashes or lesions and well hydrated  Psychiatric:Mood and affect appropriate  Neurologic:Cranial Nerves II-XII grossly intact  Musculoskeletal:antalgic    Imaging    MRI LUMBAR SPINE WITHOUT CONTRAST     INDICATION: [id number]: Intervertebral disc disorders with radiculopathy, lumbar region      COMPARISON:  None      TECHNIQUE:  Sagittal T1, sagittal T2, sagittal inversion recovery, axial T1 and axial T2, coronal T2        IMAGE QUALITY:  Diagnostic     FINDINGS:     ALIGNMENT:  Left convex L3-4 apex scoliosis with minor rightward translation of L2    Degenerative grade 1 L2 anterolisthesis  Degenerative grade 1 L4-5 and 66  These have progressed minimally since prior study      MARROW SIGNAL:  Normal marrow signal is identified within the visualized bony structures  No discrete marrow lesion      DISTAL CORD AND CONUS:  Normal size and signal within the distal cord and conus  The conus ends at the L1 level      PARASPINAL SOFT TISSUES:  Paraspinal soft tissues are unremarkable  Multiple right renal masses consistent with cysts      SACRUM:  Normal signal within the sacrum  No evidence of insufficiency or stress fracture      LOWER THORACIC DISC SPACES:  Normal disc height and signal   No disc herniation, canal stenosis or foraminal narrowing  Minor loss of disc height at T11-T12 and T12-L1  Minor bulge T12-L1        LUMBAR DISC SPACES:     L1-L2:  Minor facet arthrosis     L2-L3,:  Right greater than left facet arthrosis, grade 1 anterolisthesis, potentially significant narrowing of the right lateral recess, correlate for right 3 radiculitis  This has progressed slightly  Sac reduced to 7-8 mm      L3-L4: Circumferential bulge, moderate facet arthrosis  Mild lateral recess stenosis      L4-L5:  Degenerative grade 1 anterolisthesis  Progressive facet arthrosis, trefoil shape canal reduced to approximately 4 mm  Severe bilateral lateral recess stenosis  Distortion of the foramen with mild and progressive stenosis  This is more severe   to the left  Correlate for left L4 radiculitis      L5-S1:  Decreased disc height, advanced bilateral facet arthrosis, circumferential bulge tiny central protrusion, marginal osteophytes without critical stenosis      IMPRESSION:     Progression of degenerative changes of lumbar spine with potentially significant stenosis of the right lateral recess at L2-3, and left foramen and bilateral lateral recesses at L4-5    Correlate for right L3, left L4, and bilateral L5 radiculitis   respectively         Workstation performed: [id number]

## 2018-10-30 ENCOUNTER — HOSPITAL ENCOUNTER (OUTPATIENT)
Dept: RADIOLOGY | Facility: CLINIC | Age: 78
Discharge: HOME | End: 2018-10-30
Attending: ANESTHESIOLOGY | Admitting: ANESTHESIOLOGY
Payer: MEDICARE

## 2018-10-30 VITALS
TEMPERATURE: 98 F | DIASTOLIC BLOOD PRESSURE: 77 MMHG | SYSTOLIC BLOOD PRESSURE: 138 MMHG | OXYGEN SATURATION: 97 % | HEART RATE: 70 BPM | RESPIRATION RATE: 20 BRPM

## 2018-10-30 DIAGNOSIS — M51.16 INTERVERTEBRAL DISC DISORDERS WITH RADICULOPATHY, LUMBAR REGION: ICD-10-CM

## 2018-10-30 PROCEDURE — 64484 NJX AA&/STRD TFRM EPI L/S EA: CPT | Performed by: ANESTHESIOLOGY

## 2018-10-30 PROCEDURE — 64483 NJX AA&/STRD TFRM EPI L/S 1: CPT | Performed by: ANESTHESIOLOGY

## 2018-10-30 RX ORDER — METHYLPREDNISOLONE ACETATE 80 MG/ML
80 INJECTION, SUSPENSION INTRA-ARTICULAR; INTRALESIONAL; INTRAMUSCULAR; PARENTERAL; SOFT TISSUE ONCE
Status: COMPLETED | OUTPATIENT
Start: 2018-10-30 | End: 2018-10-30

## 2018-10-30 RX ORDER — BUPIVACAINE HCL/PF 2.5 MG/ML
30 VIAL (ML) INJECTION ONCE
Status: COMPLETED | OUTPATIENT
Start: 2018-10-30 | End: 2018-10-30

## 2018-10-30 RX ORDER — 0.9 % SODIUM CHLORIDE 0.9 %
10 VIAL (ML) INJECTION ONCE
Status: COMPLETED | OUTPATIENT
Start: 2018-10-30 | End: 2018-10-30

## 2018-10-30 RX ADMIN — Medication 2 ML: at 10:46

## 2018-10-30 RX ADMIN — IOHEXOL 1 ML: 300 INJECTION, SOLUTION INTRAVENOUS at 10:46

## 2018-10-30 RX ADMIN — SODIUM CHLORIDE 4 ML: 9 INJECTION, SOLUTION INTRAMUSCULAR; INTRAVENOUS; SUBCUTANEOUS at 10:40

## 2018-10-30 RX ADMIN — METHYLPREDNISOLONE ACETATE 80 MG: 80 INJECTION, SUSPENSION INTRA-ARTICULAR; INTRALESIONAL; INTRAMUSCULAR; PARENTERAL; SOFT TISSUE at 10:46

## 2018-10-30 RX ADMIN — Medication 4 ML: at 10:40

## 2018-10-30 NOTE — DISCHARGE INSTR - LAB
Epidural Steroid Injection   WHAT YOU NEED TO KNOW:   An epidural steroid injection (KEARA) is a procedure to inject steroid medicine into the epidural space  The epidural space is between your spinal cord and vertebrae  Steroids reduce inflammation and fluid buildup in your spine that may be causing pain  You may be given pain medicine along with the steroids  ACTIVITY  · Do not drive or operate machinery today  · No strenuous activity today - bending, lifting, etc   · You may resume normal activites starting tomorrow - start slowly and as tolerated  · You may shower today, but no tub baths or hot tubs  · You may have numbness for several hours from the local anesthetic  Please use caution and common sense, especially with weight-bearing activities  CARE OF THE INJECTION SITE  · If you have soreness or pain, apply ice to the area today (20 minutes on/20 minutes off)  · Starting tomorrow, you may use warm, moist heat or ice if needed  · You may have an increase or change in your discomfort for 36-48 hours after your treatment  · Apply ice and continue with any pain medication you have been prescribed  · Notify the [facility] if you have any of the following: redness, drainage, swelling, headache, stiff neck or fever above 100°F     SPECIAL INSTRUCTIONS  · Our office will contact you in approximately 7 days for a progress report  MEDICATIONS  · Continue to take all routine medications  · Our office may have instructed you to hold some medications  If you have a problem specifically related to your procedure, please call our office at [phone]  Problems not related to your procedure should be directed to your primary care physician

## 2018-10-30 NOTE — H&P
History of Present Illness: The patient is a 69 y o  male who presents with complaints of left low back and leg pain secondary to spinal stenosis and is here today for left L3-4 transforaminal epidural steroid injection  Patient Active Problem List   Diagnosis    Positive colorectal cancer screening using Cologuard test    Benign essential hypertension    Hyperlipidemia    Impaired fasting glucose    Microscopic hematuria    Obesity    Obstructive sleep apnea    Subclinical hypothyroidism    3-vessel CAD    Aneurysm of abdominal aorta (HCC)    Aneurysm of ascending aorta (HCC)    Aortic valve disorder    Arteriosclerotic cardiovascular disease    Disc degeneration, lumbar    Left inguinal hernia    Herniated lumbar intervertebral disc    Lumbar radiculopathy    Intervertebral disc disorder with radiculopathy of lumbar region       Past Medical History:   Diagnosis Date    Abdominal aortic aneurysm (HCC)     Chronic pain     back since July    Chronic pain disorder     lumbar    Coronary artery disease     History of transfusion     Hyperlipidemia     Hypertension     Subconjunctival hemorrhage        Past Surgical History:   Procedure Laterality Date    ABDOMINAL AORTIC ANEURYSM REPAIR  08/09/2007    2 dock limbs with visc extens prosth    COLONOSCOPY      CORONARY ARTERY BYPASS GRAFT      GAB-LAD, sequential vein graft to OM1 and OM2, single VG-posterior descending   Onset: 2003         Current Outpatient Prescriptions:     aspirin (ECOTRIN LOW STRENGTH) 81 mg EC tablet, Take 1 tablet by mouth daily, Disp: , Rfl:     atorvastatin (LIPITOR) 40 mg tablet, Take 1 tablet (40 mg total) by mouth daily, Disp: 90 tablet, Rfl: 3    finasteride (PROSCAR) 5 mg tablet, , Disp: , Rfl:     gabapentin (NEURONTIN) 100 mg capsule, Take 1 capsule in the am , Disp: 30 capsule, Rfl: 2    gabapentin (NEURONTIN) 300 mg capsule, Take 1 capsule (300 mg total) by mouth daily at bedtime, Disp: 30 capsule, Rfl: 2    [START ON 11/17/2018] HYDROcodone-acetaminophen (NORCO) 5-325 mg per tablet, Take 1 tablet by mouth every 12 (twelve) hours as needed for pain for up to 30 days Max Daily Amount: 2 tablets, Disp: 60 tablet, Rfl: 0    labetalol (NORMODYNE) 100 mg tablet, TAKE 1 TABLET BY MOUTH  TWICE A DAY, Disp: 180 tablet, Rfl: 0    lisinopril (ZESTRIL) 5 mg tablet, Take 1 tablet (5 mg total) by mouth daily, Disp: 90 tablet, Rfl: 3    senna (SENOKOT) 8 6 MG tablet, Take 1 tablet by mouth daily, Disp: , Rfl:     tamsulosin (FLOMAX) 0 4 mg, Take 1 capsule by mouth daily, Disp: , Rfl:     Allergies   Allergen Reactions    Meloxicam Edema       Physical Exam:   Vitals:    10/30/18 1025   BP: 122/76   Pulse: 62   Resp: 20   Temp: 98 °F (36 7 °C)   SpO2: 94%     General: Awake, Alert, Oriented x 3, Mood and affect appropriate  Respiratory: Respirations even and unlabored  Cardiovascular: Peripheral pulses intact; no edema  Musculoskeletal Exam:   Left lower back tenderness    ASA Score: 2    Patient/Chart Verification  Patient ID Verified: Verbal  Consents Confirmed: Procedural, To be obtained in the Pre-Procedure area  H&P( within 30 days) Verified: To be obtained in the Pre-Procedure area  Allergies Reviewed: Yes  Anticoag/NSAID held?: NA  Currently on antibiotics?: No    Assessment:   1   Intervertebral disc disorders with radiculopathy, lumbar region        Plan: Left L3-L4 TFESI

## 2018-11-07 ENCOUNTER — TELEPHONE (OUTPATIENT)
Dept: RADIOLOGY | Facility: CLINIC | Age: 78
End: 2018-11-07

## 2018-11-13 ENCOUNTER — OFFICE VISIT (OUTPATIENT)
Dept: NEUROSURGERY | Facility: CLINIC | Age: 78
End: 2018-11-13
Payer: MEDICARE

## 2018-11-13 VITALS
BODY MASS INDEX: 34.06 KG/M2 | TEMPERATURE: 97.8 F | WEIGHT: 243.3 LBS | HEART RATE: 68 BPM | SYSTOLIC BLOOD PRESSURE: 122 MMHG | DIASTOLIC BLOOD PRESSURE: 65 MMHG | HEIGHT: 71 IN | RESPIRATION RATE: 18 BRPM

## 2018-11-13 DIAGNOSIS — M51.16 INTERVERTEBRAL DISC DISORDERS WITH RADICULOPATHY, LUMBAR REGION: ICD-10-CM

## 2018-11-13 DIAGNOSIS — M43.16 SPONDYLOLISTHESIS AT L4-L5 LEVEL: ICD-10-CM

## 2018-11-13 DIAGNOSIS — M51.16 INTERVERTEBRAL DISC DISORDER WITH RADICULOPATHY OF LUMBAR REGION: Primary | ICD-10-CM

## 2018-11-13 PROCEDURE — 99204 OFFICE O/P NEW MOD 45 MIN: CPT | Performed by: NEUROLOGICAL SURGERY

## 2018-11-13 NOTE — PROGRESS NOTES
Office Note - Neurosurgery   Amna Levin 69 y o  male MRN: [id number]      Assessment:    Patient is stable  78-year-old gentleman with lower back pain and intermittent left lumbar radiculopathy  While MRI does demonstrate some stenosis, I explained the results of surgery for back pain are generally less predictable  With respect to surgical intervention 1 option would be lumbar decompression with possible fusion  Flexion-extension films lumbar spine will be undertaken  However, he may wish to consider neuromodulation in the form of spinal cord stimulator trial given his lower back and intermittent left leg pain in the absence of numbness or weakness  He will follow up with his pain specialist to discuss this further  I will see him again in 6 weeks time to assess his progress after he attempts a course of physical therapy under the supervision of his pain specialist and PCP  History, physical examination and diagnostic tests were reviewed and questions answered  Diagnosis, care plan and treatment options were discussed  The patient and spouse/SO understand instructions and will follow up as directed  Plan:    Follow-up: in 6 week(s)    Problem List Items Addressed This Visit        Musculoskeletal and Integument    Intervertebral disc disorder with radiculopathy of lumbar region - Primary    Relevant Orders    X-ray lumbar spine flexion and extension only 4+ views    Spondylolisthesis at L4-L5 level      Other Visit Diagnoses     Intervertebral disc disorders with radiculopathy, lumbar region        Relevant Orders    X-ray lumbar spine flexion and extension only 4+ views          Subjective/Objective     Chief Complaint    Low back pain  HPI    Pleasant 78-year-old gentleman accompanied by his wife today  He developed lower back and left-sided leg pain approximately 5 years ago which improved with physical therapy  His symptoms recurred in July of 2018    He underwent epidural steroid injection and has no pain, weakness or numbness in his legs or difficulties with bowel bladder function or changing perineal sensation  However if he stands and walks for more than 30 minutes he will developed pressure sensation across his lower back  He denies any clumsiness in his legs  If he leans forward his back pain improved  Current pain medications are listed below  Epidural steroid injection has been quite helpful with the symptoms in his legs  He has not tried any physical therapy  He presents today to discuss the surgical options  ROS    Review of Systems   Constitutional: Negative  HENT: Negative  Eyes: Negative  Respiratory: Negative  Cardiovascular: Negative  Gastrointestinal: Positive for constipation  Endocrine: Negative  Genitourinary: Negative  Musculoskeletal: Positive for back pain (centered of lower back radiates to the left side radiates into left hip, left groin, down left leg )  Skin: Negative  Allergic/Immunologic: Negative  Neurological: Negative  Hematological: Does not bruise/bleed easily (patient on ASA 81)  Psychiatric/Behavioral: Negative          Family History    Family History   Problem Relation Age of Onset    Heart disease Mother     Stroke Father         stroke syndrome    Aneurysm Brother         abdominal    Aortic aneurysm Brother         ascending    Coronary artery disease Family     Hypertension Family     Other Son         gioblastoma multiforme       Social History    Social History     Social History    Marital status: /Civil Union     Spouse name: N/A    Number of children: N/A    Years of education: N/A     Occupational History    retired      Social History Main Topics    Smoking status: Former Smoker     Packs/day: 1 00     Years: 40 00     Quit date: 08/2012    Smokeless tobacco: Never Used    Alcohol use Yes      Comment: rare    Drug use: No    Sexual activity: No Other Topics Concern    Not on file     Social History Narrative    No narrative on file       Past Medical History    Past Medical History:   Diagnosis Date    Abdominal aortic aneurysm ([address] )     Chronic pain     back since July    Chronic pain disorder     lumbar    Coronary artery disease     History of transfusion     Hyperlipidemia     Hypertension     Sleep apnea     Subconjunctival hemorrhage        Surgical History    Past Surgical History:   Procedure Laterality Date    ABDOMINAL AORTIC ANEURYSM REPAIR  08/09/2007    2 dock limbs with visc extens prosth    COLONOSCOPY      CORONARY ARTERY BYPASS GRAFT      GAB-LAD, sequential vein graft to OM1 and OM2, single VG-posterior descending   Onset: 2003       Medications      Current Outpatient Prescriptions:     aspirin (ECOTRIN LOW STRENGTH) 81 mg EC tablet, Take 1 tablet by mouth daily, Disp: , Rfl:     atorvastatin (LIPITOR) 40 mg tablet, Take 1 tablet (40 mg total) by mouth daily, Disp: 90 tablet, Rfl: 3    finasteride (PROSCAR) 5 mg tablet, , Disp: , Rfl:     gabapentin (NEURONTIN) 100 mg capsule, Take 1 capsule in the am , Disp: 30 capsule, Rfl: 2    gabapentin (NEURONTIN) 300 mg capsule, Take 1 capsule (300 mg total) by mouth daily at bedtime, Disp: 30 capsule, Rfl: 2    [START ON 11/17/2018] HYDROcodone-acetaminophen (NORCO) 5-325 mg per tablet, Take 1 tablet by mouth every 12 (twelve) hours as needed for pain for up to 30 days Max Daily Amount: 2 tablets, Disp: 60 tablet, Rfl: 0    labetalol (NORMODYNE) 100 mg tablet, TAKE 1 TABLET BY MOUTH  TWICE A DAY, Disp: 180 tablet, Rfl: 0    lisinopril (ZESTRIL) 5 mg tablet, Take 1 tablet (5 mg total) by mouth daily, Disp: 90 tablet, Rfl: 3    senna (SENOKOT) 8 6 MG tablet, Take 1 tablet by mouth daily, Disp: , Rfl:     tamsulosin (FLOMAX) 0 4 mg, Take 1 capsule by mouth daily, Disp: , Rfl:     Allergies    Allergies   Allergen Reactions    Meloxicam Edema       The following portions of the patient's history were reviewed and updated as appropriate: allergies, current medications, past family history, past medical history, past social history, past surgical history and problem list     Investigations    I personally reviewed the MRI results with the patient:    MRI of the lumbar spine without contrast dated September 21st, 2018  Grade 1 degenerative anterolisthesis at L4-5  Multiple levels of degenerative disc disease and facet arthropathy  Right foraminal stenosis at L2-3 secondary to degenerative changes  No significant stenosis at L3-4  At L4-5 there is moderate central and bilateral lateral recess stenosis with left greater than right foraminal stenosis secondary to degenerative changes and anterolisthesis  At L5-S1 there is no significant stenosis  Intrathecal contents unremarkable  Physical Exam    Vitals:  Blood pressure 122/65, pulse 68, temperature 97 8 °F (36 6 °C), resp  rate 18, height 5' 11" (1 803 m), weight 110 kg (243 lb 4 8 oz)  ,Body mass index is 33 93 kg/m²  Physical Exam   Constitutional: He is oriented to person, place, and time  He appears well-developed and well-nourished  No distress  Musculoskeletal:   Some restriction range of motion on flexion more so than extension secondary to stiffness  Neurological: He is alert and oriented to person, place, and time  5/5 power in lower extremities  Walks with a normal gait but stooped forward posture  Normal light touch and pinprick sensation lower extremities  Deep tendon reflexes rated 2+ at both patella and Achilles  No clonus  Skin: Skin is warm and dry  Psychiatric: He has a normal mood and affect  His behavior is normal    Vitals reviewed  Neurologic Exam     Mental Status   Oriented to person, place, and time

## 2018-11-13 NOTE — LETTER
November 13, 2018     [place] Moya, [address]  [address]  [facility]    Patient: Michael Reyes   YOB: 1940   Date of Visit: 11/13/2018       Dear Dr Mario Benavides: Thank you for referring Margaret Puente to me for evaluation  Below are my notes for this consultation  If you have questions, please do not hesitate to call me  I look forward to following your patient along with you  Sincerely,        Becca Lizarraga MD        CC: MD Becca Dailey MD  11/13/2018  4:58 PM  Sign at close encounter  Office Note - Neurosurgery   Michael Reyes 69 y o  male MRN: [id number]      Assessment:    Patient is stable  81-year-old gentleman with lower back pain and intermittent left lumbar radiculopathy  While MRI does demonstrate some stenosis, I explained the results of surgery for back pain are generally less predictable  With respect to surgical intervention 1 option would be lumbar decompression with possible fusion  Flexion-extension films lumbar spine will be undertaken  However, he may wish to consider neuromodulation in the form of spinal cord stimulator trial given his lower back and intermittent left leg pain in the absence of numbness or weakness  He will follow up with his pain specialist to discuss this further  I will see him again in 6 weeks time to assess his progress after he attempts a course of physical therapy under the supervision of his pain specialist and PCP  History, physical examination and diagnostic tests were reviewed and questions answered  Diagnosis, care plan and treatment options were discussed  The patient and spouse/SO understand instructions and will follow up as directed      Plan:    Follow-up: in 6 week(s)    Problem List Items Addressed This Visit        Musculoskeletal and Integument    Intervertebral disc disorder with radiculopathy of lumbar region - Primary    Relevant Orders X-ray lumbar spine flexion and extension only 4+ views    Spondylolisthesis at L4-L5 level      Other Visit Diagnoses     Intervertebral disc disorders with radiculopathy, lumbar region        Relevant Orders    X-ray lumbar spine flexion and extension only 4+ views          Subjective/Objective     Chief Complaint    Low back pain  HPI    Pleasant 67-year-old gentleman accompanied by his wife today  He developed lower back and left-sided leg pain approximately 5 years ago which improved with physical therapy  His symptoms recurred in July of 2018  He underwent epidural steroid injection and has no pain, weakness or numbness in his legs or difficulties with bowel bladder function or changing perineal sensation  However if he stands and walks for more than 30 minutes he will developed pressure sensation across his lower back  He denies any clumsiness in his legs  If he leans forward his back pain improved  Current pain medications are listed below  Epidural steroid injection has been quite helpful with the symptoms in his legs  He has not tried any physical therapy  He presents today to discuss the surgical options  ROS    Review of Systems   Constitutional: Negative  HENT: Negative  Eyes: Negative  Respiratory: Negative  Cardiovascular: Negative  Gastrointestinal: Positive for constipation  Endocrine: Negative  Genitourinary: Negative  Musculoskeletal: Positive for back pain (centered of lower back radiates to the left side radiates into left hip, left groin, down left leg )  Skin: Negative  Allergic/Immunologic: Negative  Neurological: Negative  Hematological: Does not bruise/bleed easily (patient on ASA 81)  Psychiatric/Behavioral: Negative          Family History    Family History   Problem Relation Age of Onset    Heart disease Mother     Stroke Father         stroke syndrome    Aneurysm Brother         abdominal    Aortic aneurysm Brother ascending    Coronary artery disease Family     Hypertension Family     Other Son         gioblastoma multiforme       Social History    Social History     Social History    Marital status: /Civil Union     Spouse name: N/A    Number of children: N/A    Years of education: N/A     Occupational History    retired      Social History Main Topics    Smoking status: Former Smoker     Packs/day: 1 00     Years: 40 00     Quit date: 08/2012    Smokeless tobacco: Never Used    Alcohol use Yes      Comment: rare    Drug use: No    Sexual activity: No     Other Topics Concern    Not on file     Social History Narrative    No narrative on file       Past Medical History    Past Medical History:   Diagnosis Date    Abdominal aortic aneurysm ([address] )     Chronic pain     back since July    Chronic pain disorder     lumbar    Coronary artery disease     History of transfusion     Hyperlipidemia     Hypertension     Sleep apnea     Subconjunctival hemorrhage        Surgical History    Past Surgical History:   Procedure Laterality Date    ABDOMINAL AORTIC ANEURYSM REPAIR  08/09/2007    2 dock limbs with visc extens prosth    COLONOSCOPY      CORONARY ARTERY BYPASS GRAFT      GAB-LAD, sequential vein graft to OM1 and OM2, single VG-posterior descending   Onset: 2003       Medications      Current Outpatient Prescriptions:     aspirin (ECOTRIN LOW STRENGTH) 81 mg EC tablet, Take 1 tablet by mouth daily, Disp: , Rfl:     atorvastatin (LIPITOR) 40 mg tablet, Take 1 tablet (40 mg total) by mouth daily, Disp: 90 tablet, Rfl: 3    finasteride (PROSCAR) 5 mg tablet, , Disp: , Rfl:     gabapentin (NEURONTIN) 100 mg capsule, Take 1 capsule in the am , Disp: 30 capsule, Rfl: 2    gabapentin (NEURONTIN) 300 mg capsule, Take 1 capsule (300 mg total) by mouth daily at bedtime, Disp: 30 capsule, Rfl: 2    [START ON 11/17/2018] HYDROcodone-acetaminophen (NORCO) 5-325 mg per tablet, Take 1 tablet by mouth every 12 (twelve) hours as needed for pain for up to 30 days Max Daily Amount: 2 tablets, Disp: 60 tablet, Rfl: 0    labetalol (NORMODYNE) 100 mg tablet, TAKE 1 TABLET BY MOUTH  TWICE A DAY, Disp: 180 tablet, Rfl: 0    lisinopril (ZESTRIL) 5 mg tablet, Take 1 tablet (5 mg total) by mouth daily, Disp: 90 tablet, Rfl: 3    senna (SENOKOT) 8 6 MG tablet, Take 1 tablet by mouth daily, Disp: , Rfl:     tamsulosin (FLOMAX) 0 4 mg, Take 1 capsule by mouth daily, Disp: , Rfl:     Allergies    Allergies   Allergen Reactions    Meloxicam Edema       The following portions of the patient's history were reviewed and updated as appropriate: allergies, current medications, past family history, past medical history, past social history, past surgical history and problem list     Investigations    I personally reviewed the MRI results with the patient:    MRI of the lumbar spine without contrast dated September 21st, 2018  Grade 1 degenerative anterolisthesis at L4-5  Multiple levels of degenerative disc disease and facet arthropathy  Right foraminal stenosis at L2-3 secondary to degenerative changes  No significant stenosis at L3-4  At L4-5 there is moderate central and bilateral lateral recess stenosis with left greater than right foraminal stenosis secondary to degenerative changes and anterolisthesis  At L5-S1 there is no significant stenosis  Intrathecal contents unremarkable  Physical Exam    Vitals:  Blood pressure 122/65, pulse 68, temperature 97 8 °F (36 6 °C), resp  rate 18, height 5' 11" (1 803 m), weight 110 kg (243 lb 4 8 oz)  ,Body mass index is 33 93 kg/m²  Physical Exam   Constitutional: He is oriented to person, place, and time  He appears well-developed and well-nourished  No distress  Musculoskeletal:   Some restriction range of motion on flexion more so than extension secondary to stiffness  Neurological: He is alert and oriented to person, place, and time  5/5 power in lower extremities    Walks with a normal gait but stooped forward posture  Normal light touch and pinprick sensation lower extremities  Deep tendon reflexes rated 2+ at both patella and Achilles  No clonus  Skin: Skin is warm and dry  Psychiatric: He has a normal mood and affect  His behavior is normal    Vitals reviewed  Neurologic Exam     Mental Status   Oriented to person, place, and time

## 2018-11-19 ENCOUNTER — TELEPHONE (OUTPATIENT)
Dept: PAIN MEDICINE | Facility: MEDICAL CENTER | Age: 78
End: 2018-11-19

## 2018-11-19 NOTE — TELEPHONE ENCOUNTER
Pt's wife called stating that he saw Dr Eladia Levy on 11/13  He wants him to have an xray before his next appt on 01/04, but they wanted him to discuss with Dr Arelis Galaviz about starting PT prior to his next appt as well  Also wanted pt to ask if he would be a candidate for the stim trial  Also asking if he would be able to get another injection this year, as he already had three  Pt would like a call back to advise  Please call Vivien Gavin at [phone]

## 2018-11-19 NOTE — TELEPHONE ENCOUNTER
S/W pt and wife and she confirmed the same questions: can we order PT per Dr Jabari Nguyen rec, is Irma Christiansen a candidate for SCS Trial and when may he have another inj? Pt currently only has pain of the center of the LB, he denies any leg pain  Pt has next ov w/ HA on 12/20 and with Dr Sara Curiel on 1/4/19  Told pt and wife I will forward message to [facility] and will c/b with his rec  INJ this year: 7/19 left L4-L5 TFESI, 9/6 B/L L3 TFESI and 10/30 Left L3-L4 TFESI

## 2018-12-04 ENCOUNTER — OFFICE VISIT (OUTPATIENT)
Dept: INTERNAL MEDICINE CLINIC | Facility: CLINIC | Age: 78
End: 2018-12-04
Payer: MEDICARE

## 2018-12-04 VITALS
HEIGHT: 71 IN | HEART RATE: 69 BPM | WEIGHT: 242.6 LBS | OXYGEN SATURATION: 96 % | BODY MASS INDEX: 33.96 KG/M2 | DIASTOLIC BLOOD PRESSURE: 76 MMHG | SYSTOLIC BLOOD PRESSURE: 128 MMHG

## 2018-12-04 DIAGNOSIS — I10 BENIGN ESSENTIAL HYPERTENSION: ICD-10-CM

## 2018-12-04 DIAGNOSIS — I25.10 3-VESSEL CAD: ICD-10-CM

## 2018-12-04 DIAGNOSIS — R73.01 IMPAIRED FASTING GLUCOSE: ICD-10-CM

## 2018-12-04 DIAGNOSIS — E03.8 SUBCLINICAL HYPOTHYROIDISM: ICD-10-CM

## 2018-12-04 DIAGNOSIS — R31.29 MICROSCOPIC HEMATURIA: ICD-10-CM

## 2018-12-04 DIAGNOSIS — E78.2 MIXED HYPERLIPIDEMIA: ICD-10-CM

## 2018-12-04 DIAGNOSIS — R19.5 POSITIVE COLORECTAL CANCER SCREENING USING COLOGUARD TEST: ICD-10-CM

## 2018-12-04 DIAGNOSIS — Z00.00 MEDICARE ANNUAL WELLNESS VISIT, SUBSEQUENT: ICD-10-CM

## 2018-12-04 DIAGNOSIS — E66.09 CLASS 1 OBESITY DUE TO EXCESS CALORIES WITH SERIOUS COMORBIDITY AND BODY MASS INDEX (BMI) OF 30.0 TO 30.9 IN ADULT: ICD-10-CM

## 2018-12-04 DIAGNOSIS — I71.4 ABDOMINAL AORTIC ANEURYSM (AAA) WITHOUT RUPTURE (HCC): ICD-10-CM

## 2018-12-04 DIAGNOSIS — M51.36 DISC DEGENERATION, LUMBAR: ICD-10-CM

## 2018-12-04 DIAGNOSIS — Z23 NEED FOR INFLUENZA VACCINATION: Primary | ICD-10-CM

## 2018-12-04 DIAGNOSIS — K40.90 LEFT INGUINAL HERNIA: ICD-10-CM

## 2018-12-04 PROCEDURE — G0439 PPPS, SUBSEQ VISIT: HCPCS | Performed by: INTERNAL MEDICINE

## 2018-12-04 PROCEDURE — 99214 OFFICE O/P EST MOD 30 MIN: CPT | Performed by: INTERNAL MEDICINE

## 2018-12-04 NOTE — PROGRESS NOTES
Assessment/Plan:    Continue current meds  Labs before next visit       Problem List Items Addressed This Visit        Endocrine    Impaired fasting glucose    Subclinical hypothyroidism    Relevant Orders    TSH, 3rd generation with Free T4 reflex       Cardiovascular and Mediastinum    3-vessel CAD    Benign essential hypertension     controlled         Relevant Orders    CBC and differential    Comprehensive metabolic panel    Aneurysm of abdominal aorta (HCC)     Repair in 2007            Musculoskeletal and Integument    Disc degeneration, lumbar     F/u with pain mgt  I recommend he start PT            Genitourinary    Microscopic hematuria       Other    Hyperlipidemia    Relevant Orders    Lipid panel    Obesity    Positive colorectal cancer screening using Cologuard test     Colonoscopy this year  +tubular adenoma, repeat in 27 year         Left inguinal hernia     Saw Dr Galvin Friends hernia, no surgery recommended           Other Visit Diagnoses     Need for influenza vaccination    -  Primary    Medicare annual wellness visit, subsequent                Subjective:      Patient ID: Uriel Turk is a 69 y o  male  HPI  Ongoing low back pain, initially traveling down the left leg and received KEARA x 3  Currently, pain is localized in the  low back  Dmitri after standing and walking for 30 mins  Nervous about getting the stimulator bec of what he has read online  Pain at 8/10, tight sensation  He takes Norco BID  Also taking Senna for the constipation  +Cologuard this year and he underwent a colonoscopy by Dr Radha Garcia- +tubular adenoma  , 3 year repeat planned  His cystoscopy was cancelled bec of a low platelet count   This was close to normal once repeated but procedure was not rescheduled    The following portions of the patient's history were reviewed and updated as appropriate: allergies, current medications, past family history, past medical history, past surgical history and problem list     Review of Systems   Constitutional: Negative for fatigue, fever and unexpected weight change  HENT: Negative for ear pain, hearing loss, sinus pain, sinus pressure and sore throat  Respiratory: Negative for cough, shortness of breath and wheezing  Cardiovascular: Negative for chest pain, palpitations and leg swelling  Gastrointestinal: Negative for abdominal pain, bowel incontinence, constipation, diarrhea, nausea and vomiting  Musculoskeletal: Positive for back pain  Negative for arthralgias and myalgias  Skin:        ?precancer lesion on the nose, sees Dr Jamaica Perez   Neurological: Negative for dizziness and headaches  Psychiatric/Behavioral: The patient is not nervous/anxious            Objective:      /76   Pulse 69   Ht 5' 11" (1 803 m)   Wt 110 kg (242 lb 9 6 oz)   SpO2 96%   BMI 33 84 kg/m²          Physical Exam    Assessment and Plan:    Problem List Items Addressed This Visit        Endocrine    Impaired fasting glucose    Subclinical hypothyroidism    Relevant Orders    TSH, 3rd generation with Free T4 reflex       Cardiovascular and Mediastinum    3-vessel CAD    Benign essential hypertension     controlled         Relevant Orders    CBC and differential    Comprehensive metabolic panel    Aneurysm of abdominal aorta (HCC)     Repair in 2007            Musculoskeletal and Integument    Disc degeneration, lumbar     F/u with pain mgt  I recommend he start PT            Genitourinary    Microscopic hematuria       Other    Hyperlipidemia    Relevant Orders    Lipid panel    Obesity    Positive colorectal cancer screening using Cologuard test     Colonoscopy this year  +tubular adenoma, repeat in 27 year         Left inguinal hernia     Saw Dr Taylor Milks hernia, no surgery recommended           Other Visit Diagnoses     Need for influenza vaccination    -  Primary    Medicare annual wellness visit, subsequent            Health Maintenance Due   Topic Date Due    DTaP,Tdap,and Td Vaccines (1 - Tdap) 08/06/1961    Lung Cancer Screening  05/01/2018    INFLUENZA VACCINE  07/01/2018         HPI:  Kellee Helms is a 69 y o  male here for his Subsequent Wellness Visit  Patient Active Problem List   Diagnosis    Positive colorectal cancer screening using Cologuard test    Benign essential hypertension    Hyperlipidemia    Impaired fasting glucose    Microscopic hematuria    Obesity    Obstructive sleep apnea    Subclinical hypothyroidism    3-vessel CAD    Aneurysm of abdominal aorta (HCC)    Aneurysm of ascending aorta (HCC)    Aortic valve disorder    Arteriosclerotic cardiovascular disease    Disc degeneration, lumbar    Left inguinal hernia    Herniated lumbar intervertebral disc    Lumbar radiculopathy    Intervertebral disc disorder with radiculopathy of lumbar region    Spondylolisthesis at L4-L5 level     Past Medical History:   Diagnosis Date    Abdominal aortic aneurysm (HCC)     Chronic pain     back since July    Chronic pain disorder     lumbar    Coronary artery disease     History of transfusion     Hyperlipidemia     Hypertension     Sleep apnea     Subconjunctival hemorrhage      Past Surgical History:   Procedure Laterality Date    ABDOMINAL AORTIC ANEURYSM REPAIR  08/09/2007    2 dock limbs with visc extens prosth    COLONOSCOPY      CORONARY ARTERY BYPASS GRAFT      GAB-LAD, sequential vein graft to OM1 and OM2, single VG-posterior descending   Onset: 2003     Family History   Problem Relation Age of Onset    Heart disease Mother     Stroke Father         stroke syndrome    Aneurysm Brother         abdominal    Aortic aneurysm Brother         ascending    Coronary artery disease Family     Hypertension Family     Other Son         gioblastoma multiforme     History   Smoking Status    Former Smoker    Packs/day: 1 00    Years: 40 00    Quit date: 08/2012   Smokeless Tobacco    Never Used     History   Alcohol Use    Yes     Comment: rare History   Drug Use No       Current Outpatient Prescriptions   Medication Sig Dispense Refill    aspirin (ECOTRIN LOW STRENGTH) 81 mg EC tablet Take 1 tablet by mouth daily      atorvastatin (LIPITOR) 40 mg tablet Take 1 tablet (40 mg total) by mouth daily 90 tablet 3    finasteride (PROSCAR) 5 mg tablet       gabapentin (NEURONTIN) 100 mg capsule Take 1 capsule in the am  30 capsule 2    gabapentin (NEURONTIN) 300 mg capsule Take 1 capsule (300 mg total) by mouth daily at bedtime 30 capsule 2    HYDROcodone-acetaminophen (NORCO) 5-325 mg per tablet Take 1 tablet by mouth every 12 (twelve) hours as needed for pain for up to 30 days Max Daily Amount: 2 tablets 60 tablet 0    labetalol (NORMODYNE) 100 mg tablet TAKE 1 TABLET BY MOUTH  TWICE A  tablet 0    lisinopril (ZESTRIL) 5 mg tablet Take 1 tablet (5 mg total) by mouth daily 90 tablet 3    senna (SENOKOT) 8 6 MG tablet Take 1 tablet by mouth daily      tamsulosin (FLOMAX) 0 4 mg Take 1 capsule by mouth daily       No current facility-administered medications for this visit  Allergies   Allergen Reactions    Meloxicam Edema     Immunization History   Administered Date(s) Administered    Influenza 10/23/2007, 10/02/2013, 10/08/2016, 10/25/2017    Influenza Split High Dose Preservative Free IM 10/02/2013, 10/02/2013, 10/22/2014, 10/27/2015, 10/08/2016    Influenza TIV (IM) 10/20/2012    Pneumococcal Conjugate 13-Valent 04/24/2015    Pneumococcal Polysaccharide PPV23 09/07/2012       Patient Care Team:  Lissy Genao MD as PCP - General  MD Carmina Khan MD Izell Abbey, MD    Medicare Screening Tests and Risk Assessments:  Sada Raines is here for his Subsequent Wellness visit  Health Risk Assessment:  Patient rates overall health as good  Patient feels that their physical health rating is Slightly worse  Eyesight was rated as Same  Hearing was rated as Same   Patient feels that their emotional and mental health rating is Same  Pain experienced by patient in the last 7 days has been A lot  Patient's pain rating has been 8/10  Patient states that he has experienced no weight loss or gain in last 6 months  Emotional/Mental Health:  Patient has been feeling nervous/anxious  PHQ-9 Depression Screening:    Frequency of the following problems over the past two weeks:      1  Little interest or pleasure in doing things: 0 - not at all      2  Feeling down, depressed, or hopeless: 1 - several days      3  Trouble falling or staying asleep, or sleeping too much: 0 - not at all      4  Feeling tired or having little energy: 1 - several days      5  Poor appetite or overeatin - not at all      6  Feeling bad about yourself - or that you are a failure or have let yourself or your family down: 0 - not at all      7  Trouble concentrating on things, such as reading the newspaper or watching television: 0 - not at all      8  Moving or speaking so slowly that other people could have noticed  Or the opposite - being so fidgety or restless that you have been moving around a lot more than usual: 0 - not at all      9  Thoughts that you would be better off dead, or of hurting yourself in some way: 0 - not at all  PHQ-2 Score: 1  PHQ-9 Score: 2    Broken Bones/Falls: Fall Risk Assessment:    In the past year, patient has experienced: No history of falling in past year          Bladder/Bowel:  Patient has not leaked urine accidently in the last six months  Patient reports no loss of bowel control  Immunizations:  Patient has had a flu vaccination within the last year  Patient has received a pneumonia shot  Patient has not received a shingles shot  Home Safety:  Patient does not have trouble with stairs inside or outside of their home     Patient currently reports that there are safety hazards present in home ,     Preventative Screenings:   prostate cancer screen performed, colon cancer screen completed, cholesterol screen completed, glaucoma eye exam completed,     Nutrition:  Current diet: Regular with servings of the following:    Medications:  Patient is currently taking over-the-counter supplements  List of OTC medications includes: mvi  Patient is able to manage medications  Lifestyle Choices:  Patient reports no tobacco use  Patient has smoked or used tobacco in the past   Patient reports no alcohol use  Patient drives a vehicle  Activities of Daily Living:  Can get out of bed by his or her self, able to dress self, able to make own meals, able to do own shopping, able to bathe self, can do own laundry/housekeeping, can manage own money, pay bills and track expenses    Previous Hospitalizations:  Hospitalization or ED visit in past 12 months  Number of hospitalizations within the last year: 1-2  Additional Comments: Presyncope in August prior to getting KEARA    Advanced Directives:  Patient has decided on a power of   Patient has completed advanced directive          Preventative Screening/Counseling:      Cardiovascular:      General: Screening Current          Diabetes:      General: Screening Current          Colorectal Cancer:      General: Screening Current      Comments: 2018 Dr Ilsa Jacobson, repeat in 3 years        Prostate Cancer:      General: Screening Current          Osteoporosis:      General: Screening Not Indicated          AAA:      General: Screening Current          Glaucoma:      General: Screening Current          HIV:      General: Screening Not Indicated          Hepatitis C:      General: Screening Not Indicated        Advanced Directives:   Patient has living will for healthcare,     Immunizations:      Influenza: Influenza UTD This Year      Pneumococcal: Lifetime Vaccine Completed      Shingrix: Risks & Benefits Discussed

## 2018-12-06 ENCOUNTER — OFFICE VISIT (OUTPATIENT)
Dept: PAIN MEDICINE | Facility: CLINIC | Age: 78
End: 2018-12-06
Payer: MEDICARE

## 2018-12-06 VITALS
DIASTOLIC BLOOD PRESSURE: 78 MMHG | BODY MASS INDEX: 34.44 KG/M2 | SYSTOLIC BLOOD PRESSURE: 120 MMHG | HEIGHT: 71 IN | WEIGHT: 246 LBS | TEMPERATURE: 97.7 F

## 2018-12-06 DIAGNOSIS — F11.20 UNCOMPLICATED OPIOID DEPENDENCE (HCC): ICD-10-CM

## 2018-12-06 DIAGNOSIS — G89.4 CHRONIC PAIN SYNDROME: Primary | ICD-10-CM

## 2018-12-06 DIAGNOSIS — M51.26 HERNIATED LUMBAR INTERVERTEBRAL DISC: ICD-10-CM

## 2018-12-06 DIAGNOSIS — M51.16 INTERVERTEBRAL DISC DISORDERS WITH RADICULOPATHY, LUMBAR REGION: ICD-10-CM

## 2018-12-06 DIAGNOSIS — Z79.891 LONG-TERM CURRENT USE OF OPIATE ANALGESIC: ICD-10-CM

## 2018-12-06 PROCEDURE — 99214 OFFICE O/P EST MOD 30 MIN: CPT | Performed by: NURSE PRACTITIONER

## 2018-12-06 PROCEDURE — 80305 DRUG TEST PRSMV DIR OPT OBS: CPT | Performed by: NURSE PRACTITIONER

## 2018-12-06 RX ORDER — HYDROCODONE BITARTRATE AND ACETAMINOPHEN 5; 325 MG/1; MG/1
1 TABLET ORAL EVERY 12 HOURS PRN
Qty: 60 TABLET | Refills: 0 | Status: SHIPPED | OUTPATIENT
Start: 2018-12-17 | End: 2019-01-16

## 2018-12-06 NOTE — PROGRESS NOTES
Pt c/o lower back pain    Last dose hydrocodone 12/06 AM  Assessment:  1  Chronic pain syndrome    2  Intervertebral disc disorders with radiculopathy, lumbar region    3  Herniated lumbar intervertebral disc    4  Uncomplicated opioid dependence ([address] )    5  Long-term current use of opiate analgesic        Plan:  Yao Bennett is a 69 y o  male with a history of chronic pain syndrome secondary to lumbar disc herniation, lumbar radiculopathy  The patient continues with ongoing mild residual low back pain, after undergoing a left L3-L4 transforaminal epidural steroid injection where he has 60-70 % relief of symptoms  However, the patient stated that he would like to move forward with a spinal cord stimulator trial   He was educated on this procedure in the office today with the use of models, verbal education and brochures  He verbalized understanding would like to proceed with the procedure  Therefore, he was given a referral for a required psychological evaluation  He was also ordered thoracic MRI to evaluate for lead placement  He was instructed that our office will call with results of the thoracic MRI once is completed  He was given a brochure for a Abbott spinal cord stimulator trial and will be provided information on attending an informational class on the procedure  The patient is currently taking hydrocodone/acetaminophen 5/325 mg 1 tablet 2 times daily  He reports that he would eventually like to wean off this medication  He stated that he would try to cut down to 1 tablet daily and eventually maybe stop taking this medication  Therefore, I will only sent in 1 prescription for this month for this medication with do not fill date of 12/17/2018  He will follow up in 8 weeks for re-evaluation  He also continue on gabapentin as prescribed  There are risks associated with opioid medications, including dependence, addiction and tolerance   The patient understands and agrees to use these medications only as prescribed  Potential side effects of the medications include, but are not limited to, constipation, drowsiness, addiction, impaired judgment and risk of fatal overdose if not taken as prescribed  The patient was warned against driving while taking sedation medications  Sharing medications is a felony  At this point in time, the patient is showing no signs of addiction, abuse, diversion or suicidal ideation  A urine drug screen was collected at today's office visit as part of our medication management protocol  The point of care testing results were appropriate for what was being prescribed  The specimen will be sent for confirmatory testing  The drug screen is medically necessary because the patient is either dependent on opioid medication or is being considered for opioid medication therapy and the results could impact ongoing or future treatment  The drug screen is to evaluate for the presences or absence of prescribed, non-prescribed, and/or illicit drugs/substances  [place] Prescription Drug Monitoring Program report was reviewed and was appropriate     The patient will follow up in 8 weeks or sooner with the worsening or symptoms  My impressions and treatment recommendations were discussed in detail with the patient who verbalized understanding and had no further questions  Discharge instructions were provided  I personally saw and examined the patient and I agree with the above discussed plan of care  Orders Placed This Encounter   Procedures    MRI thoracic spine wo contrast     Standing Status:   Future     Standing Expiration Date:   12/6/2022     Scheduling Instructions: There is no preparation for this test  Please leave your jewelry and valuables at home, wedding rings are the exception  Please bring your physician order, insurance cards, a form of photo ID and a list of your medications with you   Arrive 15 minutes prior to your appointment time in order to register  Please bring any prior CT or MRI studies of this area that were not performed at a [facility] facility  Order Specific Question:   What is the patient's sedation requirement? Answer:   No Sedation    Ambulatory referral to Psychiatry     Standing Status:   Future     Standing Expiration Date:   6/6/2019     Referral Priority:   Routine     Referral Type:   Consult - AMB     Referral Reason:   Specialty Services Required     Requested Specialty:   Psychiatry     Number of Visits Requested:   1     Expiration Date:   12/6/2019     New Medications Ordered This Visit   Medications    HYDROcodone-acetaminophen (NORCO) 5-325 mg per tablet     Sig: Take 1 tablet by mouth every 12 (twelve) hours as needed for pain for up to 30 days Max Daily Amount: 2 tablets     Dispense:  60 tablet     Refill:  0     Do Not Fill Until 12/17/2018       History of Present Illness:  Diann Collado is a 69 y o  male with a history of chronic pain syndrome secondary to lumbar disc herniation, lumbar radiculopathy  The patient was last seen office on 10/30/2018 where he underwent a left L3-L4 transforaminal epidural steroid injection  He reports 60-70 % ongoing relief of symptoms after this procedure  He presents for a follow up office visit in regards to Back Pain  The patients current symptoms include localized  low back pain  He reports that the radiation of symptoms down his legs result after his last injection  He denies bilateral leg weakness, or bowel or bladder issues  She describes her pain as pressure-like intermittent pain  He reports that his pain is symptoms have improved since last office visit  Current pain medications includes:  Hydrocodone/acetaminophen 5/325 mg 1 tablet 2 times daily     The patient reports that this regimen is providing 60-70% pain relief  The patient is reporting no side effects from this pain medication regimen      Pain Contract Signed: 9/21/2018, Last Urine Drug Screen: 9/21/2018    I have personally reviewed and/or updated the patient's past medical history, past surgical history, family history, social history, current medications, allergies, and vital signs today  Review of Systems   Respiratory: Negative for shortness of breath  Cardiovascular: Negative for chest pain  Gastrointestinal: Positive for constipation  Negative for diarrhea, nausea and vomiting  Musculoskeletal: Positive for gait problem  Negative for arthralgias, joint swelling and myalgias  Skin: Negative for rash  Neurological: Negative for dizziness, seizures and weakness  All other systems reviewed and are negative  Patient Active Problem List   Diagnosis    Positive colorectal cancer screening using Cologuard test    Benign essential hypertension    Hyperlipidemia    Impaired fasting glucose    Microscopic hematuria    Obesity    Obstructive sleep apnea    Subclinical hypothyroidism    3-vessel CAD    Aneurysm of abdominal aorta (HCC)    Aneurysm of ascending aorta (HCC)    Aortic valve disorder    Arteriosclerotic cardiovascular disease    Disc degeneration, lumbar    Left inguinal hernia    Herniated lumbar intervertebral disc    Lumbar radiculopathy    Intervertebral disc disorder with radiculopathy of lumbar region    Spondylolisthesis at L4-L5 level       Past Medical History:   Diagnosis Date    Abdominal aortic aneurysm (HCC)     Chronic pain     back since July    Chronic pain disorder     lumbar    Coronary artery disease     History of transfusion     Hyperlipidemia     Hypertension     Sleep apnea     Subconjunctival hemorrhage        Past Surgical History:   Procedure Laterality Date    ABDOMINAL AORTIC ANEURYSM REPAIR  08/09/2007    2 dock limbs with visc extens prosth    COLONOSCOPY      CORONARY ARTERY BYPASS GRAFT      GAB-LAD, sequential vein graft to OM1 and OM2, single VG-posterior descending   Onset: 2003       Family History Problem Relation Age of Onset    Heart disease Mother     Stroke Father         stroke syndrome    Aneurysm Brother         abdominal    Aortic aneurysm Brother         ascending    Coronary artery disease Family     Hypertension Family     Other Son         gioblastoma multiforme       Social History     Occupational History    retired      Social History Main Topics    Smoking status: Former Smoker     Packs/day: 1 00     Years: 40 00     Quit date: 08/2012    Smokeless tobacco: Never Used    Alcohol use Yes      Comment: rare    Drug use: No    Sexual activity: No       Current Outpatient Prescriptions on File Prior to Visit   Medication Sig    aspirin (ECOTRIN LOW STRENGTH) 81 mg EC tablet Take 1 tablet by mouth daily    atorvastatin (LIPITOR) 40 mg tablet Take 1 tablet (40 mg total) by mouth daily    finasteride (PROSCAR) 5 mg tablet     gabapentin (NEURONTIN) 100 mg capsule Take 1 capsule in the am     gabapentin (NEURONTIN) 300 mg capsule Take 1 capsule (300 mg total) by mouth daily at bedtime    labetalol (NORMODYNE) 100 mg tablet TAKE 1 TABLET BY MOUTH  TWICE A DAY    lisinopril (ZESTRIL) 5 mg tablet Take 1 tablet (5 mg total) by mouth daily    senna (SENOKOT) 8 6 MG tablet Take 1 tablet by mouth daily    tamsulosin (FLOMAX) 0 4 mg Take 1 capsule by mouth daily    [DISCONTINUED] HYDROcodone-acetaminophen (NORCO) 5-325 mg per tablet Take 1 tablet by mouth every 12 (twelve) hours as needed for pain for up to 30 days Max Daily Amount: 2 tablets     No current facility-administered medications on file prior to visit  Allergies   Allergen Reactions    Meloxicam Edema       Physical Exam:    /78   Temp 97 7 °F (36 5 °C)   Ht 5' 11" (1 803 m)   Wt 112 kg (246 lb)   BMI 34 31 kg/m²     Constitutional:normal, well developed, well nourished, alert, in no distress and non-toxic and no overt pain behavior   and obese  Eyes:anicteric  HEENT:grossly intact  Neck:supple, symmetric, trachea midline and no masses   Pulmonary:even and unlabored  Cardiovascular:No edema or pitting edema present  Skin:Normal without rashes or lesions and well hydrated  Psychiatric:Mood and affect appropriate  Neurologic:Cranial Nerves II-XII grossly intact  Musculoskeletal:normal    Imaging  MRI LUMBAR SPINE WITHOUT CONTRAST     INDICATION: [id number]: Intervertebral disc disorders with radiculopathy, lumbar region      COMPARISON:  None      TECHNIQUE:  Sagittal T1, sagittal T2, sagittal inversion recovery, axial T1 and axial T2, coronal T2        IMAGE QUALITY:  Diagnostic     FINDINGS:     ALIGNMENT:  Left convex L3-4 apex scoliosis with minor rightward translation of L2  Degenerative grade 1 L2 anterolisthesis  Degenerative grade 1 L4-5 and 66  These have progressed minimally since prior study      MARROW SIGNAL:  Normal marrow signal is identified within the visualized bony structures  No discrete marrow lesion      DISTAL CORD AND CONUS:  Normal size and signal within the distal cord and conus  The conus ends at the L1 level      PARASPINAL SOFT TISSUES:  Paraspinal soft tissues are unremarkable  Multiple right renal masses consistent with cysts      SACRUM:  Normal signal within the sacrum  No evidence of insufficiency or stress fracture      LOWER THORACIC DISC SPACES:  Normal disc height and signal   No disc herniation, canal stenosis or foraminal narrowing  Minor loss of disc height at T11-T12 and T12-L1  Minor bulge T12-L1        LUMBAR DISC SPACES:     L1-L2:  Minor facet arthrosis     L2-L3,:  Right greater than left facet arthrosis, grade 1 anterolisthesis, potentially significant narrowing of the right lateral recess, correlate for right 3 radiculitis  This has progressed slightly  Sac reduced to 7-8 mm      L3-L4: Circumferential bulge, moderate facet arthrosis  Mild lateral recess stenosis      L4-L5:  Degenerative grade 1 anterolisthesis    Progressive facet arthrosis, trefoil shape canal reduced to approximately 4 mm  Severe bilateral lateral recess stenosis  Distortion of the foramen with mild and progressive stenosis  This is more severe   to the left  Correlate for left L4 radiculitis      L5-S1:  Decreased disc height, advanced bilateral facet arthrosis, circumferential bulge tiny central protrusion, marginal osteophytes without critical stenosis      IMPRESSION:     Progression of degenerative changes of lumbar spine with potentially significant stenosis of the right lateral recess at L2-3, and left foramen and bilateral lateral recesses at L4-5    Correlate for right L3, left L4, and bilateral L5 radiculitis   respectively

## 2018-12-06 NOTE — PATIENT INSTRUCTIONS
Spinal Cord and Peripheral Nerve Stimulation      What is this procedure and why is it helpful? Neurostimulation works by intercepting pain signals before they reach the brain  To do this, a small system is implanted within the body  A trial is performed before permenant implantation to determine if this is the right system for the patient  This system, similar to a cardiac pacemaker, is used to replace pain with a different feeling  Some people describe this feeling as a gentle massaging sensation or, in some cases, simply the absence of pain  What steps are needed for the implanted device? Prior to the permanent placement of this device, there is a trial period during which you assess your response to the stimulator leads which are placed through a needle into the epidural space in the spine or along the nerves lying just below the skin  If you obtain significant pain relief during the trial with improved function, the next step would involve permanent implantation of the lead, a minor outpatient surgical procedure

## 2018-12-19 ENCOUNTER — HOSPITAL ENCOUNTER (OUTPATIENT)
Dept: MRI IMAGING | Facility: HOSPITAL | Age: 78
Discharge: HOME | End: 2018-12-19
Payer: MEDICARE

## 2018-12-19 DIAGNOSIS — M51.16 INTERVERTEBRAL DISC DISORDERS WITH RADICULOPATHY, LUMBAR REGION: ICD-10-CM

## 2018-12-19 PROCEDURE — 72146 MRI CHEST SPINE W/O DYE: CPT

## 2018-12-27 ENCOUNTER — APPOINTMENT (OUTPATIENT)
Dept: RADIOLOGY | Age: 78
End: 2018-12-27
Payer: MEDICARE

## 2018-12-27 DIAGNOSIS — M51.16 INTERVERTEBRAL DISC DISORDER WITH RADICULOPATHY OF LUMBAR REGION: ICD-10-CM

## 2018-12-27 DIAGNOSIS — M51.16 INTERVERTEBRAL DISC DISORDERS WITH RADICULOPATHY, LUMBAR REGION: ICD-10-CM

## 2018-12-27 PROCEDURE — 72120 X-RAY BEND ONLY L-S SPINE: CPT

## 2019-01-03 ENCOUNTER — TELEPHONE (OUTPATIENT)
Dept: PAIN MEDICINE | Facility: CLINIC | Age: 79
End: 2019-01-03

## 2019-01-03 NOTE — TELEPHONE ENCOUNTER
Pt to be an [employer] SCS Trial with Dr Mauro Stafford  Pt signed release of info  Pt received script for psych eval and thoracic MRI  Pt scheduled for education on 1/9/19, 1500 at [facility] office  Clinical info excluding psych eval faxed to [facility]

## 2019-01-04 ENCOUNTER — OFFICE VISIT (OUTPATIENT)
Dept: NEUROSURGERY | Facility: CLINIC | Age: 79
End: 2019-01-04
Payer: MEDICARE

## 2019-01-04 VITALS
SYSTOLIC BLOOD PRESSURE: 130 MMHG | RESPIRATION RATE: 20 BRPM | HEIGHT: 71 IN | BODY MASS INDEX: 34.3 KG/M2 | TEMPERATURE: 99.1 F | DIASTOLIC BLOOD PRESSURE: 79 MMHG | WEIGHT: 245 LBS | HEART RATE: 73 BPM

## 2019-01-04 DIAGNOSIS — M51.16 INTERVERTEBRAL DISC DISORDER WITH RADICULOPATHY OF LUMBAR REGION: Primary | ICD-10-CM

## 2019-01-04 PROCEDURE — 99212 OFFICE O/P EST SF 10 MIN: CPT | Performed by: NEUROLOGICAL SURGERY

## 2019-01-04 NOTE — LETTER
January 4, 2019     Ellis Freeman MD  [address]  [address] [place] [address]    Patient: Rodo Nunez   YOB: 1940   Date of Visit: 1/4/2019       Dear Dr Diann Bello: Thank you for referring Ramya Andrews to me for evaluation  Below are my notes for this consultation  If you have questions, please do not hesitate to call me  I look forward to following your patient along with you           Sincerely,        Amina Abad MD        CC: Theda Cheadle, MD

## 2019-01-04 NOTE — PROGRESS NOTES
Office Note - Neurosurgery   Regina Galicia 69 y o  male MRN: [id number]      Assessment:    Patient is gradually improving  67-year-old gentleman with lower back pain and intermittent left lumbar radiculopathy  Since his last epidural steroid injection his back pain is improved significantly  He can stand and walk for longer and is quite pleased with his level of function quality of life  While flexion-extension films of the lumbar spine do not demonstrate any gross instability at L4-5 there is an anterolisthesis  We briefly discussed a minimally invasive decompression, the results for back pain would be very variable  On the balance, proceeding with a spinal cord stimulator trial should his lower back pain recur may be his best option  If he develops more clear radicular symptoms into his legs or symptoms of neurogenic claudication than surgical decompression would be an option  At present he is doing quite well and will follow up through this office on a p r n  basis  History, physical examination and diagnostic tests were reviewed and questions answered  Diagnosis, care plan and treatment options were discussed  The patient and spouse/SO understand instructions and will follow up as directed  Plan:    Follow-up: prn    Problem List Items Addressed This Visit        Musculoskeletal and Integument    Intervertebral disc disorder with radiculopathy of lumbar region - Primary          Subjective/Objective     Chief Complaint    None  HPI    Reports that since his last visit he underwent a further epidural steroid injection is had significant improvement in his lower back pain  He can now stand and walk for over 2 hr relatively comfortably  He continues to deny any pain, weakness or numbness in his legs or difficulties with bowel bladder function or changing perineal sensation  TRUE BISWAS personally reviewed      Review of Systems   Constitutional: Negative  HENT: Negative  Eyes: Negative  Respiratory: Negative  Cardiovascular: Negative  Gastrointestinal: Positive for constipation  Endocrine: Negative  Genitourinary: Negative  Musculoskeletal: Positive for back pain (centered of lower back )  Skin: Negative  Allergic/Immunologic: Negative  Neurological: Negative  Hematological: Bruises/bleeds easily (patient on ASA 81)  Psychiatric/Behavioral: Negative          Family History    Family History   Problem Relation Age of Onset    Heart disease Mother     Stroke Father         stroke syndrome    Aneurysm Brother         abdominal    Aortic aneurysm Brother         ascending    Coronary artery disease Family     Hypertension Family     Other Son         gioblastoma multiforme       Social History    Social History     Social History    Marital status: /Civil Union     Spouse name: N/A    Number of children: N/A    Years of education: N/A     Occupational History    retired      Social History Main Topics    Smoking status: Former Smoker     Packs/day: 1 00     Years: 40 00     Quit date: 08/2012    Smokeless tobacco: Never Used    Alcohol use Yes      Comment: rare    Drug use: No    Sexual activity: No     Other Topics Concern    Not on file     Social History Narrative    No narrative on file       Past Medical History    Past Medical History:   Diagnosis Date    Abdominal aortic aneurysm ([address] )     Chronic pain     back since July    Chronic pain disorder     lumbar    Coronary artery disease     History of transfusion     Hyperlipidemia     Hypertension     Sleep apnea     Subconjunctival hemorrhage        Surgical History    Past Surgical History:   Procedure Laterality Date    ABDOMINAL AORTIC ANEURYSM REPAIR  08/09/2007    2 dock limbs with visc extens prosth    COLONOSCOPY      CORONARY ARTERY BYPASS GRAFT      GAB-LAD, sequential vein graft to OM1 and OM2, single VG-posterior descending  Onset: 2003       Medications      Current Outpatient Prescriptions:     aspirin (ECOTRIN LOW STRENGTH) 81 mg EC tablet, Take 1 tablet by mouth daily, Disp: , Rfl:     atorvastatin (LIPITOR) 40 mg tablet, Take 1 tablet (40 mg total) by mouth daily, Disp: 90 tablet, Rfl: 3    finasteride (PROSCAR) 5 mg tablet, , Disp: , Rfl:     gabapentin (NEURONTIN) 100 mg capsule, Take 1 capsule in the am , Disp: 30 capsule, Rfl: 2    gabapentin (NEURONTIN) 300 mg capsule, Take 1 capsule (300 mg total) by mouth daily at bedtime, Disp: 30 capsule, Rfl: 2    HYDROcodone-acetaminophen (NORCO) 5-325 mg per tablet, Take 1 tablet by mouth every 12 (twelve) hours as needed for pain for up to 30 days Max Daily Amount: 2 tablets, Disp: 60 tablet, Rfl: 0    labetalol (NORMODYNE) 100 mg tablet, TAKE 1 TABLET BY MOUTH  TWICE A DAY, Disp: 180 tablet, Rfl: 0    lisinopril (ZESTRIL) 5 mg tablet, Take 1 tablet (5 mg total) by mouth daily, Disp: 90 tablet, Rfl: 3    senna (SENOKOT) 8 6 MG tablet, Take 1 tablet by mouth as needed  , Disp: , Rfl:     tamsulosin (FLOMAX) 0 4 mg, Take 1 capsule by mouth daily, Disp: , Rfl:     Allergies    Allergies   Allergen Reactions    Meloxicam Edema       The following portions of the patient's history were reviewed and updated as appropriate: allergies, current medications, past family history, past medical history, past social history, past surgical history and problem list     Investigations    I personally reviewed the XRAY results with the patient:    Upright flexion-extension film lumbar spine dated December 27th, 2018  Grade 1 anterolisthesis at L4-5  No gross instability on flexion-extension  Physical Exam    Vitals:  Blood pressure 130/79, pulse 73, temperature 99 1 °F (37 3 °C), resp  rate 20, height 5' 11" (1 803 m), weight 111 kg (245 lb)  ,Body mass index is 34 17 kg/m²  Physical Exam   Constitutional: He appears well-developed and well-nourished  No distress  Neurological:   Walks with a normal gait  Skin: Skin is warm and dry  Psychiatric: He has a normal mood and affect   His behavior is normal      Neurologic Exam

## 2019-01-17 ENCOUNTER — OFFICE VISIT (OUTPATIENT)
Dept: PAIN MEDICINE | Facility: CLINIC | Age: 79
End: 2019-01-17
Payer: MEDICARE

## 2019-01-17 VITALS
WEIGHT: 245 LBS | DIASTOLIC BLOOD PRESSURE: 64 MMHG | HEIGHT: 71 IN | TEMPERATURE: 98.4 F | RESPIRATION RATE: 18 BRPM | BODY MASS INDEX: 34.3 KG/M2 | SYSTOLIC BLOOD PRESSURE: 120 MMHG

## 2019-01-17 DIAGNOSIS — M51.16 INTERVERTEBRAL DISC DISORDERS WITH RADICULOPATHY, LUMBAR REGION: ICD-10-CM

## 2019-01-17 DIAGNOSIS — M54.16 LUMBAR RADICULOPATHY: ICD-10-CM

## 2019-01-17 DIAGNOSIS — G89.4 CHRONIC PAIN SYNDROME: Primary | ICD-10-CM

## 2019-01-17 PROCEDURE — 99213 OFFICE O/P EST LOW 20 MIN: CPT | Performed by: NURSE PRACTITIONER

## 2019-01-17 RX ORDER — GABAPENTIN 100 MG/1
CAPSULE ORAL
Qty: 90 CAPSULE | Refills: 0 | Status: SHIPPED | OUTPATIENT
Start: 2019-01-17 | End: 2019-03-20

## 2019-01-17 RX ORDER — GABAPENTIN 300 MG/1
300 CAPSULE ORAL
Qty: 90 CAPSULE | Refills: 0 | Status: SHIPPED | OUTPATIENT
Start: 2019-01-17 | End: 2019-03-20 | Stop reason: SDUPTHER

## 2019-01-17 NOTE — PROGRESS NOTES
P c/o back pain    Assessment:  1  Chronic pain syndrome    2  Lumbar radiculopathy    3  Intervertebral disc disorders with radiculopathy, lumbar region        Plan:  Rochelle Lopez is a 69 y o  male with a history of chronic pain syndrome secondary to lumbar disc herniation and  lumbar radiculopathy  The patient continues with ongoing mild low back pain, which is approved since last office visit  He reports that his symptoms have greatly improved and he has stopped taking hydrocodone/acetaminophen  He reports that he would eventually like to wean down on his gabapentin  However, would like to continue with gabapentin in the meantime  He also stated that he would still like to continue to move forward with the spinal cord stimulator trial as recommended by Wilmar Elaine  However, I dicussed with the patient that it would be hard to tell if the stimulator would be effective,due to he currently has resolution of his radiating leg pain after his last injection  He verbalized understanding and reported that we would like to still proceed with the stimulator, but if he continues with ongoing pain relief he will cancel the procedure  I instructed him that the spinal cord stimulator coordinator will contact him with the next step  He verbalized understanding  The patient would not like to continue on hydrocodone/acetaminophen  Therefore he was not given any additional prescriptions this office visit  However he will be continued on gabapentin as prescribed refills for these medications were sent electronically to the patient's pharmacy on file  The patient will follow up on as-needed basis, or sooner with the worsening of symptoms  My impressions and treatment recommendations were discussed in detail with the patient who verbalized understanding and had no further questions  Discharge instructions were provided   I personally saw and examined the patient and I agree with the above discussed plan of care     No orders of the defined types were placed in this encounter  New Medications Ordered This Visit   Medications    gabapentin (NEURONTIN) 300 mg capsule     Sig: Take 1 capsule (300 mg total) by mouth daily at bedtime     Dispense:  90 capsule     Refill:  0    gabapentin (NEURONTIN) 100 mg capsule     Sig: Take 1 capsule in the am      Dispense:  90 capsule     Refill:  0       History of Present Illness:  Chester Gayle is a 69 y o  male with a history of chronic pain syndrome secondary to lumbar disc herniation and  lumbar radiculopathy  Was last seen office on 12/6/2018 where he was continued on hydrocodone/acetaminophen 5/325 mg who presents for a follow up office visit in regards to Back Pain  The patient was last seen in the office on 12/06/2018 where he was continued on hydrocodone/acetaminophen 5/325 mg 1 tablet 2 times daily     The patients current symptoms include low back pain that is only present with bending  He report no radiation of symptoms into his bilateral legs after undergoing a left L3-L4 transforaminal epidural steroid injection on 10/30/2018  He denies bilateral leg weakness, or bowel or bladder issues  He describes pain as dull aching intermittent pain  He reports that his pain is symptoms have improved since last office visit  He currently rates his pain as 0/10 numeric pain scale  Current pain medications includes:  Gabapentin 100 mg in the a m  And 300 mg at bedtime   The patient reports that this regimen is providing 50% pain relief  The patient is reporting no side effects from this pain medication regimen  I have personally reviewed and/or updated the patient's past medical history, past surgical history, family history, social history, current medications, allergies, and vital signs today  Review of Systems   Respiratory: Negative for shortness of breath  Cardiovascular: Negative for chest pain     Gastrointestinal: Negative for constipation, diarrhea, nausea and vomiting  Musculoskeletal: Positive for gait problem  Negative for arthralgias, joint swelling and myalgias  Skin: Negative for rash  Neurological: Negative for dizziness, seizures and weakness  All other systems reviewed and are negative  Patient Active Problem List   Diagnosis    Positive colorectal cancer screening using Cologuard test    Benign essential hypertension    Hyperlipidemia    Impaired fasting glucose    Microscopic hematuria    Obesity    Obstructive sleep apnea    Subclinical hypothyroidism    3-vessel CAD    Aneurysm of abdominal aorta (HCC)    Aneurysm of ascending aorta (HCC)    Aortic valve disorder    Arteriosclerotic cardiovascular disease    Disc degeneration, lumbar    Left inguinal hernia    Herniated lumbar intervertebral disc    Lumbar radiculopathy    Intervertebral disc disorder with radiculopathy of lumbar region    Spondylolisthesis at L4-L5 level       Past Medical History:   Diagnosis Date    Abdominal aortic aneurysm (HCC)     Chronic pain     back since July    Chronic pain disorder     lumbar    Coronary artery disease     History of transfusion     Hyperlipidemia     Hypertension     Sleep apnea     Subconjunctival hemorrhage        Past Surgical History:   Procedure Laterality Date    ABDOMINAL AORTIC ANEURYSM REPAIR  08/09/2007    2 dock limbs with visc extens prosth    COLONOSCOPY      CORONARY ARTERY BYPASS GRAFT      GAB-LAD, sequential vein graft to OM1 and OM2, single VG-posterior descending   Onset: 2003       Family History   Problem Relation Age of Onset    Heart disease Mother     Stroke Father         stroke syndrome    Aneurysm Brother         abdominal    Aortic aneurysm Brother         ascending    Coronary artery disease Family     Hypertension Family     Other Son         gioblastoma multiforme       Social History     Occupational History    retired      Social History Main Topics    Smoking status: Former Smoker     Packs/day: 1 00     Years: 40 00     Quit date: 2012    Smokeless tobacco: Never Used    Alcohol use Yes      Comment: rare    Drug use: No    Sexual activity: No       Current Outpatient Prescriptions on File Prior to Visit   Medication Sig    aspirin (ECOTRIN LOW STRENGTH) 81 mg EC tablet Take 1 tablet by mouth daily    atorvastatin (LIPITOR) 40 mg tablet Take 1 tablet (40 mg total) by mouth daily    finasteride (PROSCAR) 5 mg tablet     labetalol (NORMODYNE) 100 mg tablet TAKE 1 TABLET BY MOUTH  TWICE A DAY    lisinopril (ZESTRIL) 5 mg tablet Take 1 tablet (5 mg total) by mouth daily    senna (SENOKOT) 8 6 MG tablet Take 1 tablet by mouth as needed      tamsulosin (FLOMAX) 0 4 mg Take 1 capsule by mouth daily    [DISCONTINUED] gabapentin (NEURONTIN) 100 mg capsule Take 1 capsule in the am     [DISCONTINUED] gabapentin (NEURONTIN) 300 mg capsule Take 1 capsule (300 mg total) by mouth daily at bedtime    [] HYDROcodone-acetaminophen (NORCO) 5-325 mg per tablet Take 1 tablet by mouth every 12 (twelve) hours as needed for pain for up to 30 days Max Daily Amount: 2 tablets     No current facility-administered medications on file prior to visit  Allergies   Allergen Reactions    Meloxicam Edema       Physical Exam:    /64   Temp 98 4 °F (36 9 °C)   Resp 18   Ht 5' 11" (1 803 m)   Wt 111 kg (245 lb)   BMI 34 17 kg/m²     Constitutional:normal, well developed, well nourished, alert, in no distress and non-toxic and no overt pain behavior   and obese  Eyes:anicteric  HEENT:grossly intact  Neck:supple, symmetric, trachea midline and no masses   Pulmonary:even and unlabored  Cardiovascular:No edema or pitting edema present  Skin:Normal without rashes or lesions and well hydrated  Psychiatric:Mood and affect appropriate  Neurologic:Cranial Nerves II-XII grossly intact  Musculoskeletal:normal       Imaging    MRI THORACIC SPINE WITHOUT CONTRAST     INDICATION: 60-year-old male, progressive back pain     COMPARISON:  None      TECHNIQUE:  Sagittal T1, sagittal T2, sagittal inversion recovery, axial T2,  axial 2D MERGE      IMAGE QUALITY: Diagnostic      FINDINGS:     ALIGNMENT:    No compression fracture  Grade 1 degenerative anterolisthesis T1-T2, T2-3   No scoliosis      MARROW SIGNAL:  Normal marrow signal is identified within the visualized bony structures  No discrete marrow lesion      THORACIC CORD: Normal signal within the thoracic cord      PARAVERTEBRAL SOFT TISSUES: Partially visualized bilateral renal cysts, consistent with 3/26/2018 ultrasound      THORACIC DEGENERATIVE CHANGE: Degenerative spondylosis is present throughout the thoracic segments  Small central disc protrusion T6-7  Small to moderate left-sided disc protrusion T8-9 with possible left T9 nerve root encroachment  Tiny central disc   protrusion T11-12      IMPRESSION:  Multilevel degenerative thoracic spondylosis     Grade 1 subluxations T1-T2, T2-3     Small central disc protrusion T6-7  Small to moderate left-sided disc protrusion T8-9    Tiny central disc protrusion T11-12            Workstation performed: [id number]

## 2019-01-17 NOTE — TELEPHONE ENCOUNTER
Spoke with patient and he had his psych eval done on 1/10/19 at [address] , [address]  Advised patient it generally takes 10 business for them to fax eval to me and that once I received eval I would reach out to pt  Pt is going to be away for on vacation for approx one month and would like me to reach out to them on cell ([phone])

## 2019-01-24 NOTE — TELEPHONE ENCOUNTER
Received benefit confirmation, no PA required     LM asking pt to return my call to get him scheduled for trial

## 2019-01-28 RX ORDER — CEFAZOLIN SODIUM 2 G/50ML
2000 SOLUTION INTRAVENOUS ONCE
Status: CANCELLED | OUTPATIENT
Start: 2019-01-28 | End: 2019-01-28

## 2019-01-28 NOTE — TELEPHONE ENCOUNTER
Spoke with patient's wife and he is scheduled as follows:  3/4/19 @ 1300 w/ Lalo Capone for H&P  3/14/19 arriving @ 1000 for 1120 trial  3/20/19 @ 1330 w/ Butch Amador for lead removal  No PCN allergy  Pt takes 81mg by PCP and PRN Aleve   Email sent to [employer] to inform them of trial dates

## 2019-01-31 DIAGNOSIS — I10 ESSENTIAL HYPERTENSION: ICD-10-CM

## 2019-02-01 RX ORDER — LABETALOL 100 MG/1
TABLET, FILM COATED ORAL
Qty: 180 TABLET | Refills: 0 | Status: SHIPPED | OUTPATIENT
Start: 2019-02-01 | End: 2019-07-08 | Stop reason: SDUPTHER

## 2019-02-13 DIAGNOSIS — Z79.01 CHRONIC ANTICOAGULATION: ICD-10-CM

## 2019-02-13 DIAGNOSIS — G89.4 CHRONIC PAIN SYNDROME: Primary | ICD-10-CM

## 2019-02-13 DIAGNOSIS — M51.16 INTERVERTEBRAL DISC DISORDER WITH RADICULOPATHY OF LUMBAR REGION: ICD-10-CM

## 2019-02-18 ENCOUNTER — TELEPHONE (OUTPATIENT)
Dept: PAIN MEDICINE | Facility: CLINIC | Age: 79
End: 2019-02-18

## 2019-02-18 NOTE — TELEPHONE ENCOUNTER
Dr Harjeet Salguero,  Patient is scheduled for a spinal cord stimulator trial with Dr Celine Louis and I am requesting to hold ASA as follows:  6 days prior and 7 days during trial for total of 13 days  Please reply giving permission or not     Thank you,  Haydee Lozada

## 2019-03-04 ENCOUNTER — OFFICE VISIT (OUTPATIENT)
Dept: PAIN MEDICINE | Facility: CLINIC | Age: 79
End: 2019-03-04
Payer: MEDICARE

## 2019-03-04 VITALS
TEMPERATURE: 98.6 F | RESPIRATION RATE: 16 BRPM | BODY MASS INDEX: 34.86 KG/M2 | DIASTOLIC BLOOD PRESSURE: 84 MMHG | SYSTOLIC BLOOD PRESSURE: 126 MMHG | HEIGHT: 71 IN | HEART RATE: 66 BPM | WEIGHT: 249 LBS

## 2019-03-04 DIAGNOSIS — M43.16 SPONDYLOLISTHESIS AT L4-L5 LEVEL: ICD-10-CM

## 2019-03-04 DIAGNOSIS — M54.16 LUMBAR RADICULOPATHY: ICD-10-CM

## 2019-03-04 DIAGNOSIS — M51.26 HERNIATED LUMBAR INTERVERTEBRAL DISC: ICD-10-CM

## 2019-03-04 DIAGNOSIS — G89.4 CHRONIC PAIN SYNDROME: Primary | ICD-10-CM

## 2019-03-04 DIAGNOSIS — M51.36 DISC DEGENERATION, LUMBAR: ICD-10-CM

## 2019-03-04 PROCEDURE — 99214 OFFICE O/P EST MOD 30 MIN: CPT | Performed by: NURSE PRACTITIONER

## 2019-03-04 NOTE — PROGRESS NOTES
Pt c/o lower back pain    Assessment:  1  Chronic pain syndrome     2  Disc degeneration, lumbar     3  Herniated lumbar intervertebral disc     4  Lumbar radiculopathy     5  Spondylolisthesis at L4-L5 level         Plan:  The spinal cord stimulator trial was discussed in depth with the patient  The patient was advised that a stimulator lead will be placed percutaneously through an epidural needle, with intra-op stimulation done to confirm appropriate lead placement  The lead will then be connected to an external generator and secured to the skin  The device representative will then program the stimulator and explain how to use it  During the trial, the patient was instructed to perform their activities of daily living, but limit twisting and bending motions, and no lifting greater than 10 pounds  The patient was advised to refrain from tub baths, showers, swimming, and hot tubs during the trial  He was instructed to try and walk over 45 minutes to see if the pain is controlled, as this tends to aggravate his symptoms  The patient will return to the office for trial evaluation and lead removal on 3/20/19  At that time, if the trial is successful, the patient will be referred to Dr Juan Stein for permanent spinal cord stimulator placement  Pre-procedure instructions were reviewed with the patient  The patient was given a prescription for blood work to be done by 3/7/19  Complete risks and benefits including bleeding, infection, headache, tissue reaction, nerve injury and allergic reaction were discussed  The approach was demonstrated using models and literature was provided  Verbal and written consent was obtained  The patient was advised to hold aspirin starting on 3/8/19 and the aleve on 3/10/19 and for the duration of the trial    The patient was given a written schedule on how to wean the gabapentin    He was instructed to stop the 100 mg during the day, and take 1 300 mg tablet at bedtime for 2 weeks, then stop and take a 100 mg tablet at bedtime for 2 weeks then stop  If his symptoms would increase, he was instructed to resume the previous dosing  No refill was sent to the pharmacy today because of the patient potentially stopping the medication  If a refill will be needed, he will contact the office before running out of the medication  Patient and his wife verbalized understanding  History of Present Illness: The patient is a 69 y o  male scheduled for an Abbott spinal cord stimulator trial on 3/14/19 to treat chronic pain from lumbar intervertebral disc disorder with radiculopathy, lumbar degenerative disc disease, and lumbar spondylolisthesis  He presents today with ongoing low back pain which occurs on occasion  He states the pain is worse when walking over 45 minutes, or with repetitive bending  He states the left leg pain has significantly improved since undergoing a left L3-L4 transforaminal epidural steroid injection  He describes the pain as pressure-like, and is rating it a 2/10 on the numeric rating scale  He is taking gabapentin 100 mg in the morning, and 300 mg at night  He is asking to be weaned off this medication    The patient is currently on aspirin; an anti-coagulation hold was approved by Dr Ita Askew    I have personally reviewed and/or updated the patient's past medical history, past surgical history, family history, social history, current medications, allergies, and vital signs today       Review of Systems  Review of Systems       Past Medical History:   Diagnosis Date    Abdominal aortic aneurysm (HCC)     Chronic pain     back since July    Chronic pain disorder     lumbar    Coronary artery disease     History of transfusion     Hyperlipidemia     Hypertension     Sleep apnea     Subconjunctival hemorrhage        Past Surgical History:   Procedure Laterality Date    ABDOMINAL AORTIC ANEURYSM REPAIR  08/09/2007    2 dock limbs with visc extens prosth    COLONOSCOPY      CORONARY ARTERY BYPASS GRAFT      GAB-LAD, sequential vein graft to OM1 and OM2, single VG-posterior descending   Onset:        Family History   Problem Relation Age of Onset    Heart disease Mother     Stroke Father         stroke syndrome    Aneurysm Brother         abdominal    Aortic aneurysm Brother         ascending    Coronary artery disease Family     Hypertension Family     Other Son         gioblastoma multiforme       Social History     Occupational History    Occupation: retired   Tobacco Use    Smoking status: Former Smoker     Packs/day: 1 00     Years: 40 00     Pack years: 40 00     Last attempt to quit: 2012     Years since quittin 5    Smokeless tobacco: Never Used   Substance and Sexual Activity    Alcohol use: Yes     Comment: rare    Drug use: No    Sexual activity: Never         Current Outpatient Medications:     aspirin (ECOTRIN LOW STRENGTH) 81 mg EC tablet, Take 1 tablet by mouth daily, Disp: , Rfl:     atorvastatin (LIPITOR) 40 mg tablet, Take 1 tablet (40 mg total) by mouth daily, Disp: 90 tablet, Rfl: 3    finasteride (PROSCAR) 5 mg tablet, , Disp: , Rfl:     gabapentin (NEURONTIN) 100 mg capsule, Take 1 capsule in the am , Disp: 90 capsule, Rfl: 0    gabapentin (NEURONTIN) 300 mg capsule, Take 1 capsule (300 mg total) by mouth daily at bedtime, Disp: 90 capsule, Rfl: 0    labetalol (NORMODYNE) 100 mg tablet, TAKE 1 TABLET BY MOUTH  TWICE A DAY, Disp: 180 tablet, Rfl: 0    lisinopril (ZESTRIL) 5 mg tablet, Take 1 tablet (5 mg total) by mouth daily, Disp: 90 tablet, Rfl: 3    senna (SENOKOT) 8 6 MG tablet, Take 1 tablet by mouth as needed  , Disp: , Rfl:     tamsulosin (FLOMAX) 0 4 mg, Take 1 capsule by mouth daily, Disp: , Rfl:     Allergies   Allergen Reactions    Meloxicam Edema       Physical Exam:    /84 (BP Location: Right arm, Patient Position: Sitting, Cuff Size: Standard)   Pulse 66   Temp 98 6 °F (37 °C)   Resp 16   Ht 5' 11" (1 803 m)   Wt 113 kg (249 lb)   BMI 34 73 kg/m²     Constitutional:normal, well developed, well nourished, alert, in no distress and non-toxic and no overt pain behavior  Eyes:anicteric  HEENT:grossly intact  Neck:supple, symmetric, trachea midline and no masses   Pulmonary:even and unlabored and lungs clear to auscultation  Cardiovascular:No edema or pitting edema present and regular rate and rhythm  Skin:Normal without rashes or lesions and well hydrated  Psychiatric:Mood and affect appropriate  Neurologic:Cranial Nerves II-XII grossly intact  Musculoskeletal:normal   Abdomen: bowel sounds in 4 quadrants on auscultation    Lumbar Spine Exam    Appearance:  Normal lordosis  Palpation/Tenderness:  left lumbar paraspinal tenderness  right lumbar paraspinal tenderness  Motor Strength:  Left hip flexion:  5/5  Right hip flexion:  5/5  Left knee flexion:  5/5  Left knee extension:  5/5  Right knee flexion:  5/5  Right knee extension:  5/5  Left foot dorsiflexion:  5/5  Left foot plantar flexion:  5/5  Right foot dorsiflexion:  5/5  Right foot plantar flexion:  5/5    Imaging  MRI THORACIC SPINE WITHOUT CONTRAST 12/20/18     INDICATION: 78-year-old male, progressive back pain     COMPARISON:  None      TECHNIQUE:  Sagittal T1, sagittal T2, sagittal inversion recovery, axial T2,  axial 2D MERGE      IMAGE QUALITY: Diagnostic      FINDINGS:     ALIGNMENT:    No compression fracture  Grade 1 degenerative anterolisthesis T1-T2, T2-3   No scoliosis      MARROW SIGNAL:  Normal marrow signal is identified within the visualized bony structures  No discrete marrow lesion      THORACIC CORD: Normal signal within the thoracic cord      PARAVERTEBRAL SOFT TISSUES: Partially visualized bilateral renal cysts, consistent with 3/26/2018 ultrasound      THORACIC DEGENERATIVE CHANGE: Degenerative spondylosis is present throughout the thoracic segments  Small central disc protrusion T6-7    Small to moderate left-sided disc protrusion T8-9 with possible left T9 nerve root encroachment  Tiny central disc   protrusion T11-12      IMPRESSION:  Multilevel degenerative thoracic spondylosis     Grade 1 subluxations T1-T2, T2-3     Small central disc protrusion T6-7  Small to moderate left-sided disc protrusion T8-9  Tiny central disc protrusion T11-12              MRI LUMBAR SPINE WITHOUT CONTRAST 9/28/18     INDICATION: M51 16: Intervertebral disc disorders with radiculopathy, lumbar region      COMPARISON:  None      TECHNIQUE:  Sagittal T1, sagittal T2, sagittal inversion recovery, axial T1 and axial T2, coronal T2        IMAGE QUALITY:  Diagnostic     FINDINGS:     ALIGNMENT:  Left convex L3-4 apex scoliosis with minor rightward translation of L2  Degenerative grade 1 L2 anterolisthesis  Degenerative grade 1 L4-5 and 66  These have progressed minimally since prior study      MARROW SIGNAL:  Normal marrow signal is identified within the visualized bony structures  No discrete marrow lesion      DISTAL CORD AND CONUS:  Normal size and signal within the distal cord and conus  The conus ends at the L1 level      PARASPINAL SOFT TISSUES:  Paraspinal soft tissues are unremarkable  Multiple right renal masses consistent with cysts      SACRUM:  Normal signal within the sacrum  No evidence of insufficiency or stress fracture      LOWER THORACIC DISC SPACES:  Normal disc height and signal   No disc herniation, canal stenosis or foraminal narrowing  Minor loss of disc height at T11-T12 and T12-L1  Minor bulge T12-L1        LUMBAR DISC SPACES:     L1-L2:  Minor facet arthrosis     L2-L3,:  Right greater than left facet arthrosis, grade 1 anterolisthesis, potentially significant narrowing of the right lateral recess, correlate for right 3 radiculitis  This has progressed slightly  Sac reduced to 7-8 mm      L3-L4: Circumferential bulge, moderate facet arthrosis    Mild lateral recess stenosis      L4-L5:  Degenerative grade 1 anterolisthesis  Progressive facet arthrosis, trefoil shape canal reduced to approximately 4 mm  Severe bilateral lateral recess stenosis  Distortion of the foramen with mild and progressive stenosis  This is more severe   to the left  Correlate for left L4 radiculitis      L5-S1:  Decreased disc height, advanced bilateral facet arthrosis, circumferential bulge tiny central protrusion, marginal osteophytes without critical stenosis      IMPRESSION:     Progression of degenerative changes of lumbar spine with potentially significant stenosis of the right lateral recess at L2-3, and left foramen and bilateral lateral recesses at L4-5    Correlate for right L3, left L4, and bilateral L5 radiculitis   respectively

## 2019-03-04 NOTE — H&P (VIEW-ONLY)
Pt c/o lower back pain    Assessment:  1  Chronic pain syndrome     2  Disc degeneration, lumbar     3  Herniated lumbar intervertebral disc     4  Lumbar radiculopathy     5  Spondylolisthesis at L4-L5 level         Plan:  The spinal cord stimulator trial was discussed in depth with the patient  The patient was advised that a stimulator lead will be placed percutaneously through an epidural needle, with intra-op stimulation done to confirm appropriate lead placement  The lead will then be connected to an external generator and secured to the skin  The device representative will then program the stimulator and explain how to use it  During the trial, the patient was instructed to perform their activities of daily living, but limit twisting and bending motions, and no lifting greater than 10 pounds  The patient was advised to refrain from tub baths, showers, swimming, and hot tubs during the trial  He was instructed to try and walk over 45 minutes to see if the pain is controlled, as this tends to aggravate his symptoms  The patient will return to the office for trial evaluation and lead removal on 3/20/19  At that time, if the trial is successful, the patient will be referred to Dr Enrique Chacko for permanent spinal cord stimulator placement  Pre-procedure instructions were reviewed with the patient  The patient was given a prescription for blood work to be done by 3/7/19  Complete risks and benefits including bleeding, infection, headache, tissue reaction, nerve injury and allergic reaction were discussed  The approach was demonstrated using models and literature was provided  Verbal and written consent was obtained  The patient was advised to hold aspirin starting on 3/8/19 and the aleve on 3/10/19 and for the duration of the trial    The patient was given a written schedule on how to wean the gabapentin    He was instructed to stop the 100 mg during the day, and take 1 300 mg tablet at bedtime for 2 weeks, then stop and take a 100 mg tablet at bedtime for 2 weeks then stop  If his symptoms would increase, he was instructed to resume the previous dosing  No refill was sent to the pharmacy today because of the patient potentially stopping the medication  If a refill will be needed, he will contact the office before running out of the medication  Patient and his wife verbalized understanding  History of Present Illness: The patient is a 69 y o  male scheduled for an Abbott spinal cord stimulator trial on 3/14/19 to treat chronic pain from lumbar intervertebral disc disorder with radiculopathy, lumbar degenerative disc disease, and lumbar spondylolisthesis  He presents today with ongoing low back pain which occurs on occasion  He states the pain is worse when walking over 45 minutes, or with repetitive bending  He states the left leg pain has significantly improved since undergoing a left L3-L4 transforaminal epidural steroid injection  He describes the pain as pressure-like, and is rating it a 2/10 on the numeric rating scale  He is taking gabapentin 100 mg in the morning, and 300 mg at night  He is asking to be weaned off this medication    The patient is currently on aspirin; an anti-coagulation hold was approved by Dr Vee Dean    I have personally reviewed and/or updated the patient's past medical history, past surgical history, family history, social history, current medications, allergies, and vital signs today       Review of Systems  Review of Systems       Past Medical History:   Diagnosis Date    Abdominal aortic aneurysm (HCC)     Chronic pain     back since July    Chronic pain disorder     lumbar    Coronary artery disease     History of transfusion     Hyperlipidemia     Hypertension     Sleep apnea     Subconjunctival hemorrhage        Past Surgical History:   Procedure Laterality Date    ABDOMINAL AORTIC ANEURYSM REPAIR  08/09/2007    2 dock limbs with visc extens prosth    COLONOSCOPY      CORONARY ARTERY BYPASS GRAFT      GAB-LAD, sequential vein graft to OM1 and OM2, single VG-posterior descending   Onset:        Family History   Problem Relation Age of Onset    Heart disease Mother     Stroke Father         stroke syndrome    Aneurysm Brother         abdominal    Aortic aneurysm Brother         ascending    Coronary artery disease Family     Hypertension Family     Other Son         gioblastoma multiforme       Social History     Occupational History    Occupation: retired   Tobacco Use    Smoking status: Former Smoker     Packs/day: 1 00     Years: 40 00     Pack years: 40 00     Last attempt to quit: 2012     Years since quittin 5    Smokeless tobacco: Never Used   Substance and Sexual Activity    Alcohol use: Yes     Comment: rare    Drug use: No    Sexual activity: Never         Current Outpatient Medications:     aspirin (ECOTRIN LOW STRENGTH) 81 mg EC tablet, Take 1 tablet by mouth daily, Disp: , Rfl:     atorvastatin (LIPITOR) 40 mg tablet, Take 1 tablet (40 mg total) by mouth daily, Disp: 90 tablet, Rfl: 3    finasteride (PROSCAR) 5 mg tablet, , Disp: , Rfl:     gabapentin (NEURONTIN) 100 mg capsule, Take 1 capsule in the am , Disp: 90 capsule, Rfl: 0    gabapentin (NEURONTIN) 300 mg capsule, Take 1 capsule (300 mg total) by mouth daily at bedtime, Disp: 90 capsule, Rfl: 0    labetalol (NORMODYNE) 100 mg tablet, TAKE 1 TABLET BY MOUTH  TWICE A DAY, Disp: 180 tablet, Rfl: 0    lisinopril (ZESTRIL) 5 mg tablet, Take 1 tablet (5 mg total) by mouth daily, Disp: 90 tablet, Rfl: 3    senna (SENOKOT) 8 6 MG tablet, Take 1 tablet by mouth as needed  , Disp: , Rfl:     tamsulosin (FLOMAX) 0 4 mg, Take 1 capsule by mouth daily, Disp: , Rfl:     Allergies   Allergen Reactions    Meloxicam Edema       Physical Exam:    /84 (BP Location: Right arm, Patient Position: Sitting, Cuff Size: Standard)   Pulse 66   Temp 98 6 °F (37 °C)   Resp 16   Ht 5' 11" (1 803 m)   Wt 113 kg (249 lb)   BMI 34 73 kg/m²     Constitutional:normal, well developed, well nourished, alert, in no distress and non-toxic and no overt pain behavior  Eyes:anicteric  HEENT:grossly intact  Neck:supple, symmetric, trachea midline and no masses   Pulmonary:even and unlabored and lungs clear to auscultation  Cardiovascular:No edema or pitting edema present and regular rate and rhythm  Skin:Normal without rashes or lesions and well hydrated  Psychiatric:Mood and affect appropriate  Neurologic:Cranial Nerves II-XII grossly intact  Musculoskeletal:normal   Abdomen: bowel sounds in 4 quadrants on auscultation    Lumbar Spine Exam    Appearance:  Normal lordosis  Palpation/Tenderness:  left lumbar paraspinal tenderness  right lumbar paraspinal tenderness  Motor Strength:  Left hip flexion:  5/5  Right hip flexion:  5/5  Left knee flexion:  5/5  Left knee extension:  5/5  Right knee flexion:  5/5  Right knee extension:  5/5  Left foot dorsiflexion:  5/5  Left foot plantar flexion:  5/5  Right foot dorsiflexion:  5/5  Right foot plantar flexion:  5/5    Imaging  MRI THORACIC SPINE WITHOUT CONTRAST 12/20/18     INDICATION: 52-year-old male, progressive back pain     COMPARISON:  None      TECHNIQUE:  Sagittal T1, sagittal T2, sagittal inversion recovery, axial T2,  axial 2D MERGE      IMAGE QUALITY: Diagnostic      FINDINGS:     ALIGNMENT:    No compression fracture  Grade 1 degenerative anterolisthesis T1-T2, T2-3   No scoliosis      MARROW SIGNAL:  Normal marrow signal is identified within the visualized bony structures  No discrete marrow lesion      THORACIC CORD: Normal signal within the thoracic cord      PARAVERTEBRAL SOFT TISSUES: Partially visualized bilateral renal cysts, consistent with 3/26/2018 ultrasound      THORACIC DEGENERATIVE CHANGE: Degenerative spondylosis is present throughout the thoracic segments  Small central disc protrusion T6-7    Small to moderate left-sided disc protrusion T8-9 with possible left T9 nerve root encroachment  Tiny central disc   protrusion T11-12      IMPRESSION:  Multilevel degenerative thoracic spondylosis     Grade 1 subluxations T1-T2, T2-3     Small central disc protrusion T6-7  Small to moderate left-sided disc protrusion T8-9  Tiny central disc protrusion T11-12              MRI LUMBAR SPINE WITHOUT CONTRAST 9/28/18     INDICATION: M51 16: Intervertebral disc disorders with radiculopathy, lumbar region      COMPARISON:  None      TECHNIQUE:  Sagittal T1, sagittal T2, sagittal inversion recovery, axial T1 and axial T2, coronal T2        IMAGE QUALITY:  Diagnostic     FINDINGS:     ALIGNMENT:  Left convex L3-4 apex scoliosis with minor rightward translation of L2  Degenerative grade 1 L2 anterolisthesis  Degenerative grade 1 L4-5 and 66  These have progressed minimally since prior study      MARROW SIGNAL:  Normal marrow signal is identified within the visualized bony structures  No discrete marrow lesion      DISTAL CORD AND CONUS:  Normal size and signal within the distal cord and conus  The conus ends at the L1 level      PARASPINAL SOFT TISSUES:  Paraspinal soft tissues are unremarkable  Multiple right renal masses consistent with cysts      SACRUM:  Normal signal within the sacrum  No evidence of insufficiency or stress fracture      LOWER THORACIC DISC SPACES:  Normal disc height and signal   No disc herniation, canal stenosis or foraminal narrowing  Minor loss of disc height at T11-T12 and T12-L1  Minor bulge T12-L1        LUMBAR DISC SPACES:     L1-L2:  Minor facet arthrosis     L2-L3,:  Right greater than left facet arthrosis, grade 1 anterolisthesis, potentially significant narrowing of the right lateral recess, correlate for right 3 radiculitis  This has progressed slightly  Sac reduced to 7-8 mm      L3-L4: Circumferential bulge, moderate facet arthrosis    Mild lateral recess stenosis      L4-L5:  Degenerative grade 1 anterolisthesis  Progressive facet arthrosis, trefoil shape canal reduced to approximately 4 mm  Severe bilateral lateral recess stenosis  Distortion of the foramen with mild and progressive stenosis  This is more severe   to the left  Correlate for left L4 radiculitis      L5-S1:  Decreased disc height, advanced bilateral facet arthrosis, circumferential bulge tiny central protrusion, marginal osteophytes without critical stenosis      IMPRESSION:     Progression of degenerative changes of lumbar spine with potentially significant stenosis of the right lateral recess at L2-3, and left foramen and bilateral lateral recesses at L4-5    Correlate for right L3, left L4, and bilateral L5 radiculitis   respectively

## 2019-03-05 ENCOUNTER — LAB (OUTPATIENT)
Dept: LAB | Facility: CLINIC | Age: 79
End: 2019-03-05
Payer: MEDICARE

## 2019-03-05 DIAGNOSIS — Z79.01 CHRONIC ANTICOAGULATION: ICD-10-CM

## 2019-03-05 DIAGNOSIS — G89.4 CHRONIC PAIN SYNDROME: ICD-10-CM

## 2019-03-05 DIAGNOSIS — M51.16 INTERVERTEBRAL DISC DISORDER WITH RADICULOPATHY OF LUMBAR REGION: ICD-10-CM

## 2019-03-05 LAB
ALBUMIN SERPL BCP-MCNC: 3.6 G/DL (ref 3.5–5)
ALP SERPL-CCNC: 69 U/L (ref 46–116)
ALT SERPL W P-5'-P-CCNC: 19 U/L (ref 12–78)
ANION GAP SERPL CALCULATED.3IONS-SCNC: 4 MMOL/L (ref 4–13)
APTT PPP: 28 SECONDS (ref 26–38)
AST SERPL W P-5'-P-CCNC: 20 U/L (ref 5–45)
BILIRUB SERPL-MCNC: 0.64 MG/DL (ref 0.2–1)
BUN SERPL-MCNC: 15 MG/DL (ref 5–25)
CALCIUM SERPL-MCNC: 8.7 MG/DL (ref 8.3–10.1)
CHLORIDE SERPL-SCNC: 105 MMOL/L (ref 100–108)
CO2 SERPL-SCNC: 29 MMOL/L (ref 21–32)
CREAT SERPL-MCNC: 1.05 MG/DL (ref 0.6–1.3)
ERYTHROCYTE [DISTWIDTH] IN BLOOD BY AUTOMATED COUNT: 13.2 % (ref 11.6–15.1)
GFR SERPL CREATININE-BSD FRML MDRD: 68 ML/MIN/1.73SQ M
GLUCOSE SERPL-MCNC: 95 MG/DL (ref 65–140)
HCT VFR BLD AUTO: 42.5 % (ref 36.5–49.3)
HGB BLD-MCNC: 13.3 G/DL (ref 12–17)
INR PPP: 0.99 (ref 0.86–1.17)
MCH RBC QN AUTO: 32.1 PG (ref 26.8–34.3)
MCHC RBC AUTO-ENTMCNC: 31.3 G/DL (ref 31.4–37.4)
MCV RBC AUTO: 103 FL (ref 82–98)
PLATELET # BLD AUTO: 163 THOUSANDS/UL (ref 149–390)
PMV BLD AUTO: 10.9 FL (ref 8.9–12.7)
POTASSIUM SERPL-SCNC: 4.6 MMOL/L (ref 3.5–5.3)
PROT SERPL-MCNC: 7 G/DL (ref 6.4–8.2)
PROTHROMBIN TIME: 13.2 SECONDS (ref 11.8–14.2)
RBC # BLD AUTO: 4.14 MILLION/UL (ref 3.88–5.62)
SODIUM SERPL-SCNC: 138 MMOL/L (ref 136–145)
WBC # BLD AUTO: 6.62 THOUSAND/UL (ref 4.31–10.16)

## 2019-03-05 PROCEDURE — 80053 COMPREHEN METABOLIC PANEL: CPT

## 2019-03-05 PROCEDURE — 85730 THROMBOPLASTIN TIME PARTIAL: CPT

## 2019-03-05 PROCEDURE — 85610 PROTHROMBIN TIME: CPT

## 2019-03-05 PROCEDURE — 85027 COMPLETE CBC AUTOMATED: CPT

## 2019-03-05 PROCEDURE — 36415 COLL VENOUS BLD VENIPUNCTURE: CPT

## 2019-03-14 ENCOUNTER — HOSPITAL ENCOUNTER (OUTPATIENT)
Dept: RADIOLOGY | Facility: CLINIC | Age: 79
Discharge: HOME | End: 2019-03-14
Admitting: ANESTHESIOLOGY
Payer: MEDICARE

## 2019-03-14 ENCOUNTER — TELEPHONE (OUTPATIENT)
Dept: RADIOLOGY | Facility: CLINIC | Age: 79
End: 2019-03-14

## 2019-03-14 VITALS
HEART RATE: 67 BPM | SYSTOLIC BLOOD PRESSURE: 136 MMHG | TEMPERATURE: 97.8 F | RESPIRATION RATE: 20 BRPM | DIASTOLIC BLOOD PRESSURE: 84 MMHG | OXYGEN SATURATION: 95 %

## 2019-03-14 DIAGNOSIS — M51.16 LUMBAR DISC HERNIATION WITH RADICULOPATHY: ICD-10-CM

## 2019-03-14 DIAGNOSIS — G89.4 CHRONIC PAIN SYNDROME: ICD-10-CM

## 2019-03-14 PROCEDURE — C1897 LEAD, NEUROSTIM TEST KIT: HCPCS

## 2019-03-14 PROCEDURE — 95972 ALYS CPLX SP/PN NPGT W/PRGRM: CPT | Performed by: ANESTHESIOLOGY

## 2019-03-14 PROCEDURE — 63650 IMPLANT NEUROELECTRODES: CPT | Performed by: ANESTHESIOLOGY

## 2019-03-14 PROCEDURE — C1787 PATIENT PROGR, NEUROSTIM: HCPCS

## 2019-03-14 PROCEDURE — 96374 THER/PROPH/DIAG INJ IV PUSH: CPT

## 2019-03-14 RX ORDER — CEPHALEXIN 500 MG/1
500 CAPSULE ORAL EVERY 6 HOURS SCHEDULED
Qty: 28 CAPSULE | Refills: 0 | Status: SHIPPED | OUTPATIENT
Start: 2019-03-14 | End: 2019-03-21

## 2019-03-14 RX ORDER — CEFAZOLIN SODIUM 2 G/50ML
2000 SOLUTION INTRAVENOUS ONCE
Status: COMPLETED | OUTPATIENT
Start: 2019-03-14 | End: 2019-03-14

## 2019-03-14 RX ADMIN — CEFAZOLIN SODIUM 2000 MG: 2 SOLUTION INTRAVENOUS at 10:49

## 2019-03-14 NOTE — INTERVAL H&P NOTE
Update:    H&P reviewed  After examining the patient, I find no changed to the H&P since it had been written  Patient re-evaluated   Accept as history and physical     Desmond Gonzales MD/March 14, 2019/11:09 AM

## 2019-03-14 NOTE — DISCHARGE INSTR - LAB
[facility]: SPINAL CORD STIMULATION/PERIPHERAL NERVE STIMULATION TRIAL/ PERMANENT IMPLANT DISCHARGE INSTRUCTIONS    ACTIVITY RESTRICTIONS DURING TRIAL PERIOD    · Do not bend or twist at the waist   · Do not lift anything that weighs over 5 pounds  · When you lie down, "log roll" (keep spine aligned) to change positions  Do not sleep on your stomach  · Limit stair climbing and strenuous activity  CARE OF THE INJECTION SITE  · CHECK THE DRESSING DAILY  It should intact  · Notify [facility] if you have any of the following: redness, drainage, swelling, chills or fever over 100°F   · Do not change dressing, only reinforce edges if necessary  · Keep the dressing dry  Do not shower, (sponge baths only) until evaluated in office  SPECIAL INSTRUCTIONS  · Take your temperature daily  · Follow-up for lead removal scheduled for ***  · The  box is expensive  DO NOT drop or get wet  · Do not pull on the cable or leads  Do not disconnect the cable and lead  · Do activities that normally cause you to have pain and try different  settings  MEDICATIONS  · Continue to take all routine medications  · Our office may have instructed you to hold some medications  Do not take any aspirin, aleve, advil, motrin or Ibuprofen during trial   · Take antiobiotic as prescribed:_Keflex 4 times a day with food for 7 days  ____________________________________  If you have any problems specifically related to your procedure, please call our office at [phone]  Problems not related to your procedure should be directed at your primary care physician  Contact the company representative with any questions regarding settings or operation of the external  box

## 2019-03-20 ENCOUNTER — OFFICE VISIT (OUTPATIENT)
Dept: PAIN MEDICINE | Facility: CLINIC | Age: 79
End: 2019-03-20

## 2019-03-20 VITALS
HEART RATE: 63 BPM | DIASTOLIC BLOOD PRESSURE: 84 MMHG | RESPIRATION RATE: 18 BRPM | BODY MASS INDEX: 34.86 KG/M2 | SYSTOLIC BLOOD PRESSURE: 132 MMHG | TEMPERATURE: 97.2 F | HEIGHT: 71 IN | WEIGHT: 249 LBS

## 2019-03-20 DIAGNOSIS — M54.16 LUMBAR RADICULOPATHY: ICD-10-CM

## 2019-03-20 DIAGNOSIS — M51.16 INTERVERTEBRAL DISC DISORDER WITH RADICULOPATHY OF LUMBAR REGION: ICD-10-CM

## 2019-03-20 DIAGNOSIS — M43.16 SPONDYLOLISTHESIS AT L4-L5 LEVEL: ICD-10-CM

## 2019-03-20 DIAGNOSIS — G89.4 CHRONIC PAIN SYNDROME: Primary | ICD-10-CM

## 2019-03-20 PROCEDURE — 99024 POSTOP FOLLOW-UP VISIT: CPT | Performed by: NURSE PRACTITIONER

## 2019-03-20 RX ORDER — GABAPENTIN 300 MG/1
300 CAPSULE ORAL
Qty: 90 CAPSULE | Refills: 5 | Status: SHIPPED | OUTPATIENT
Start: 2019-03-20 | End: 2020-06-09 | Stop reason: SDUPTHER

## 2019-03-20 NOTE — PROGRESS NOTES
Pt c/o back pain that radiates to the hips and legs    Assessment    1  Chronic pain syndrome     2  Lumbar radiculopathy  gabapentin (NEURONTIN) 300 mg capsule   3  Intervertebral disc disorder with radiculopathy of lumbar region     4  Spondylolisthesis at L4-L5 level          Plan    The patient had a unsuccessful Abbott spinal cord stimulator trial      Patient was instructed to continue with his antibiotics as prescribed  he was instructed to call the office with any signs of infection such as fever, chills or fatigue  Patient was also instructed to monitor the insertion site for any signs infection such as drainage, redness, or warmth and pain at the site  Patient was instructed to call the office with any of these findings  Patient was instructed that he may shower  he was instructed to keep a band aid over the insertion site during showering, and replace with clean, dry band aid after showering  Patient was instructed to avoid submerging in any hot tubs, bath tubs or swimming pools until insertion site is fully healed  The patient reports that he is well managed for his pain with the use gabapentin  Therefore he will be continued on gabapentin  But we will discontinued the am dose of 100 mg gabapentin  The patient will follow up in 6 weeks or sooner with the worsening of symptoms  No orders of the defined types were placed in this encounter  History of Present Illness    The patient is a 69 y o  male with a history of chronic pain syndrome secondary to lumbar degenerative disc disease, lumbar intervertebral disc disorder with radiculopathy and spondylolisthesis at L4-L5 He is  status post Abbott spinal cord stimulator percutaneous lead placement on 03/14/2019    The patient's reported an overall reduction in pain of 0 % during the trial   The patient reported no functional improvement with the stimulator   He continues with ongoing localized low back pain that is only present with walking  He describes his pain as pressure-like  He reports that his pain is unchanged since last office visit  He rates his pain a 4 out of 10 on a numeric pain scale  Patient reports that he is not experiencing any signs of infection such as fever, chills or fatigue  Patient reports that he is taking her antibiotics as prescribed  he denies any drainage, redness, or increased pain at the insertion site  Review of Systems    Patient Active Problem List   Diagnosis    Positive colorectal cancer screening using Cologuard test    Benign essential hypertension    Hyperlipidemia    Impaired fasting glucose    Microscopic hematuria    Obesity    Obstructive sleep apnea    Subclinical hypothyroidism    3-vessel CAD    Aneurysm of abdominal aorta (HCC)    Aneurysm of ascending aorta (HCC)    Aortic valve disorder    Arteriosclerotic cardiovascular disease    Disc degeneration, lumbar    Left inguinal hernia    Herniated lumbar intervertebral disc    Lumbar radiculopathy    Intervertebral disc disorder with radiculopathy of lumbar region    Spondylolisthesis at L4-L5 level    Chronic pain syndrome        Past Medical History:   Diagnosis Date    Abdominal aortic aneurysm (HCC)     Chronic pain     back since July    Chronic pain disorder     lumbar    Coronary artery disease     History of transfusion     Hyperlipidemia     Hypertension     Sleep apnea     Subconjunctival hemorrhage        Past Surgical History:   Procedure Laterality Date    ABDOMINAL AORTIC ANEURYSM REPAIR  08/09/2007    2 dock limbs with visc extens prosth    COLONOSCOPY      CORONARY ARTERY BYPASS GRAFT      GAB-LAD, sequential vein graft to OM1 and OM2, single VG-posterior descending   Onset: 2003       Family History   Problem Relation Age of Onset    Heart disease Mother     Stroke Father         stroke syndrome    Aneurysm Brother         abdominal    Aortic aneurysm Brother         ascending    Coronary artery disease Family     Hypertension Family     Other Son         gioblastoma multiforme       Social History     Occupational History    Occupation: retired   Tobacco Use    Smoking status: Former Smoker     Packs/day: 1 00     Years: 40 00     Pack years: 40 00     Last attempt to quit: 2012     Years since quittin 6    Smokeless tobacco: Never Used   Substance and Sexual Activity    Alcohol use: Yes     Comment: rare    Drug use: No    Sexual activity: Never         Current Outpatient Medications:     aspirin (ECOTRIN LOW STRENGTH) 81 mg EC tablet, Take 1 tablet by mouth daily, Disp: , Rfl:     atorvastatin (LIPITOR) 40 mg tablet, Take 1 tablet (40 mg total) by mouth daily, Disp: 90 tablet, Rfl: 3    cephalexin (KEFLEX) 500 mg capsule, Take 1 capsule (500 mg total) by mouth every 6 (six) hours for 7 days, Disp: 28 capsule, Rfl: 0    finasteride (PROSCAR) 5 mg tablet, , Disp: , Rfl:     gabapentin (NEURONTIN) 300 mg capsule, Take 1 capsule (300 mg total) by mouth daily at bedtime, Disp: 90 capsule, Rfl: 5    labetalol (NORMODYNE) 100 mg tablet, TAKE 1 TABLET BY MOUTH  TWICE A DAY, Disp: 180 tablet, Rfl: 0    lisinopril (ZESTRIL) 5 mg tablet, Take 1 tablet (5 mg total) by mouth daily, Disp: 90 tablet, Rfl: 3    senna (SENOKOT) 8 6 MG tablet, Take 1 tablet by mouth as needed  , Disp: , Rfl:     tamsulosin (FLOMAX) 0 4 mg, Take 1 capsule by mouth daily, Disp: , Rfl:     Allergies   Allergen Reactions    Meloxicam Edema         Physical Exam    /84 (BP Location: Right arm, Patient Position: Sitting, Cuff Size: Standard)   Pulse 63   Temp (!) 97 2 °F (36 2 °C) (Oral)   Resp 18   Ht 5' 11" (1 803 m)   Wt 113 kg (249 lb)   BMI 34 73 kg/m²     Thoracic Spine:  Spinal cord stimulator lead removed with tip intact; no erythema, tenderness or signs of infection; area cleansed with alcohol and bandage placed

## 2019-05-06 ENCOUNTER — HOSPITAL ENCOUNTER (OUTPATIENT)
Dept: RADIOLOGY | Age: 79
Discharge: HOME | End: 2019-05-06
Payer: MEDICARE

## 2019-05-06 DIAGNOSIS — I71.2 THORACIC AORTIC ANEURYSM WITHOUT RUPTURE (HCC): ICD-10-CM

## 2019-05-06 PROCEDURE — 71250 CT THORAX DX C-: CPT

## 2019-05-17 ENCOUNTER — OFFICE VISIT (OUTPATIENT)
Dept: CARDIAC SURGERY | Facility: CLINIC | Age: 79
End: 2019-05-17
Payer: MEDICARE

## 2019-05-17 VITALS
HEART RATE: 75 BPM | OXYGEN SATURATION: 94 % | TEMPERATURE: 97.7 F | HEIGHT: 71 IN | WEIGHT: 250 LBS | RESPIRATION RATE: 14 BRPM | BODY MASS INDEX: 35 KG/M2 | SYSTOLIC BLOOD PRESSURE: 138 MMHG | DIASTOLIC BLOOD PRESSURE: 62 MMHG

## 2019-05-17 DIAGNOSIS — I71.2 ANEURYSM OF ASCENDING AORTA (HCC): Primary | ICD-10-CM

## 2019-05-17 PROCEDURE — 99213 OFFICE O/P EST LOW 20 MIN: CPT | Performed by: NURSE PRACTITIONER

## 2019-05-20 DIAGNOSIS — I71.2 THORACIC AORTIC ANEURYSM WITHOUT RUPTURE (HCC): ICD-10-CM

## 2019-07-08 ENCOUNTER — LAB (OUTPATIENT)
Dept: LAB | Facility: CLINIC | Age: 79
End: 2019-07-08
Payer: MEDICARE

## 2019-07-08 ENCOUNTER — TELEPHONE (OUTPATIENT)
Dept: CARDIOLOGY CLINIC | Facility: CLINIC | Age: 79
End: 2019-07-08

## 2019-07-08 DIAGNOSIS — I10 ESSENTIAL HYPERTENSION: ICD-10-CM

## 2019-07-08 DIAGNOSIS — I10 BENIGN ESSENTIAL HYPERTENSION: ICD-10-CM

## 2019-07-08 DIAGNOSIS — E78.2 MIXED HYPERLIPIDEMIA: ICD-10-CM

## 2019-07-08 DIAGNOSIS — E03.8 SUBCLINICAL HYPOTHYROIDISM: ICD-10-CM

## 2019-07-08 LAB
ALBUMIN SERPL BCP-MCNC: 3.9 G/DL (ref 3.5–5)
ALP SERPL-CCNC: 73 U/L (ref 46–116)
ALT SERPL W P-5'-P-CCNC: 25 U/L (ref 12–78)
ANION GAP SERPL CALCULATED.3IONS-SCNC: 4 MMOL/L (ref 4–13)
AST SERPL W P-5'-P-CCNC: 22 U/L (ref 5–45)
BASOPHILS # BLD AUTO: 0.07 THOUSANDS/ΜL (ref 0–0.1)
BASOPHILS NFR BLD AUTO: 1 % (ref 0–1)
BILIRUB SERPL-MCNC: 1.16 MG/DL (ref 0.2–1)
BUN SERPL-MCNC: 12 MG/DL (ref 5–25)
CALCIUM SERPL-MCNC: 9.5 MG/DL (ref 8.3–10.1)
CHLORIDE SERPL-SCNC: 101 MMOL/L (ref 100–108)
CHOLEST SERPL-MCNC: 152 MG/DL (ref 50–200)
CO2 SERPL-SCNC: 31 MMOL/L (ref 21–32)
CREAT SERPL-MCNC: 1.2 MG/DL (ref 0.6–1.3)
EOSINOPHIL # BLD AUTO: 0.23 THOUSAND/ΜL (ref 0–0.61)
EOSINOPHIL NFR BLD AUTO: 4 % (ref 0–6)
ERYTHROCYTE [DISTWIDTH] IN BLOOD BY AUTOMATED COUNT: 13.2 % (ref 11.6–15.1)
GFR SERPL CREATININE-BSD FRML MDRD: 58 ML/MIN/1.73SQ M
GLUCOSE P FAST SERPL-MCNC: 93 MG/DL (ref 65–99)
HCT VFR BLD AUTO: 46.7 % (ref 36.5–49.3)
HDLC SERPL-MCNC: 73 MG/DL (ref 40–60)
HGB BLD-MCNC: 15.4 G/DL (ref 12–17)
IMM GRANULOCYTES # BLD AUTO: 0.02 THOUSAND/UL (ref 0–0.2)
IMM GRANULOCYTES NFR BLD AUTO: 0 % (ref 0–2)
LDLC SERPL CALC-MCNC: 69 MG/DL (ref 0–100)
LYMPHOCYTES # BLD AUTO: 1.38 THOUSANDS/ΜL (ref 0.6–4.47)
LYMPHOCYTES NFR BLD AUTO: 23 % (ref 14–44)
MCH RBC QN AUTO: 33.2 PG (ref 26.8–34.3)
MCHC RBC AUTO-ENTMCNC: 33 G/DL (ref 31.4–37.4)
MCV RBC AUTO: 101 FL (ref 82–98)
MONOCYTES # BLD AUTO: 0.65 THOUSAND/ΜL (ref 0.17–1.22)
MONOCYTES NFR BLD AUTO: 11 % (ref 4–12)
NEUTROPHILS # BLD AUTO: 3.64 THOUSANDS/ΜL (ref 1.85–7.62)
NEUTS SEG NFR BLD AUTO: 61 % (ref 43–75)
NONHDLC SERPL-MCNC: 79 MG/DL
NRBC BLD AUTO-RTO: 0 /100 WBCS
PLATELET # BLD AUTO: 146 THOUSANDS/UL (ref 149–390)
PMV BLD AUTO: 10.2 FL (ref 8.9–12.7)
POTASSIUM SERPL-SCNC: 5.7 MMOL/L (ref 3.5–5.3)
PROT SERPL-MCNC: 7.5 G/DL (ref 6.4–8.2)
RBC # BLD AUTO: 4.64 MILLION/UL (ref 3.88–5.62)
SODIUM SERPL-SCNC: 136 MMOL/L (ref 136–145)
TRIGL SERPL-MCNC: 50 MG/DL
TSH SERPL DL<=0.05 MIU/L-ACNC: 3.65 UIU/ML (ref 0.36–3.74)
WBC # BLD AUTO: 5.99 THOUSAND/UL (ref 4.31–10.16)

## 2019-07-08 PROCEDURE — 36415 COLL VENOUS BLD VENIPUNCTURE: CPT

## 2019-07-08 PROCEDURE — 80053 COMPREHEN METABOLIC PANEL: CPT

## 2019-07-08 PROCEDURE — 85025 COMPLETE CBC W/AUTO DIFF WBC: CPT

## 2019-07-08 PROCEDURE — 84443 ASSAY THYROID STIM HORMONE: CPT

## 2019-07-08 PROCEDURE — 80061 LIPID PANEL: CPT

## 2019-07-08 RX ORDER — LABETALOL 100 MG/1
100 TABLET, FILM COATED ORAL 2 TIMES DAILY
Qty: 180 TABLET | Refills: 0 | Status: SHIPPED | OUTPATIENT
Start: 2019-07-08 | End: 2019-07-10 | Stop reason: SDUPTHER

## 2019-07-08 NOTE — TELEPHONE ENCOUNTER
I believe this message was sent to me by mistake  Livan Enriquez Please re-assign to appropriate inbox    Thank you

## 2019-07-10 ENCOUNTER — HOSPITAL ENCOUNTER (OUTPATIENT)
Facility: HOSPITAL | Age: 79
Setting detail: OBSERVATION
Discharge: HOME | End: 2019-07-11
Attending: EMERGENCY MEDICINE | Admitting: INTERNAL MEDICINE
Payer: MEDICARE

## 2019-07-10 ENCOUNTER — APPOINTMENT (EMERGENCY)
Dept: CT IMAGING | Facility: HOSPITAL | Age: 79
End: 2019-07-10
Payer: MEDICARE

## 2019-07-10 ENCOUNTER — APPOINTMENT (EMERGENCY)
Dept: RADIOLOGY | Facility: HOSPITAL | Age: 79
End: 2019-07-10
Payer: MEDICARE

## 2019-07-10 DIAGNOSIS — I10 ESSENTIAL HYPERTENSION: ICD-10-CM

## 2019-07-10 DIAGNOSIS — R06.02 SOB (SHORTNESS OF BREATH): Primary | ICD-10-CM

## 2019-07-10 DIAGNOSIS — R06.00 DOE (DYSPNEA ON EXERTION): ICD-10-CM

## 2019-07-10 LAB
ALBUMIN SERPL BCP-MCNC: 3.8 G/DL (ref 3.5–5)
ALP SERPL-CCNC: 79 U/L (ref 46–116)
ALT SERPL W P-5'-P-CCNC: 26 U/L (ref 12–78)
ANION GAP SERPL CALCULATED.3IONS-SCNC: 7 MMOL/L (ref 4–13)
AST SERPL W P-5'-P-CCNC: 25 U/L (ref 5–45)
ATRIAL RATE: 64 BPM
BASOPHILS # BLD AUTO: 0.04 THOUSANDS/ΜL (ref 0–0.1)
BASOPHILS NFR BLD AUTO: 1 % (ref 0–1)
BILIRUB SERPL-MCNC: 1 MG/DL (ref 0.2–1)
BUN SERPL-MCNC: 11 MG/DL (ref 5–25)
CALCIUM SERPL-MCNC: 8.8 MG/DL (ref 8.3–10.1)
CHLORIDE SERPL-SCNC: 100 MMOL/L (ref 100–108)
CO2 SERPL-SCNC: 28 MMOL/L (ref 21–32)
CREAT SERPL-MCNC: 1 MG/DL (ref 0.6–1.3)
DEPRECATED D DIMER PPP: 1378 NG/ML (FEU)
EOSINOPHIL # BLD AUTO: 0.14 THOUSAND/ΜL (ref 0–0.61)
EOSINOPHIL NFR BLD AUTO: 3 % (ref 0–6)
ERYTHROCYTE [DISTWIDTH] IN BLOOD BY AUTOMATED COUNT: 13 % (ref 11.6–15.1)
GFR SERPL CREATININE-BSD FRML MDRD: 72 ML/MIN/1.73SQ M
GLUCOSE SERPL-MCNC: 108 MG/DL (ref 65–140)
HCT VFR BLD AUTO: 42.8 % (ref 36.5–49.3)
HGB BLD-MCNC: 14.3 G/DL (ref 12–17)
IMM GRANULOCYTES # BLD AUTO: 0.02 THOUSAND/UL (ref 0–0.2)
IMM GRANULOCYTES NFR BLD AUTO: 0 % (ref 0–2)
LYMPHOCYTES # BLD AUTO: 0.66 THOUSANDS/ΜL (ref 0.6–4.47)
LYMPHOCYTES NFR BLD AUTO: 13 % (ref 14–44)
MCH RBC QN AUTO: 33.3 PG (ref 26.8–34.3)
MCHC RBC AUTO-ENTMCNC: 33.4 G/DL (ref 31.4–37.4)
MCV RBC AUTO: 100 FL (ref 82–98)
MONOCYTES # BLD AUTO: 0.55 THOUSAND/ΜL (ref 0.17–1.22)
MONOCYTES NFR BLD AUTO: 11 % (ref 4–12)
NEUTROPHILS # BLD AUTO: 3.61 THOUSANDS/ΜL (ref 1.85–7.62)
NEUTS SEG NFR BLD AUTO: 72 % (ref 43–75)
NRBC BLD AUTO-RTO: 0 /100 WBCS
NT-PROBNP SERPL-MCNC: 141 PG/ML
P AXIS: 84 DEGREES
PLATELET # BLD AUTO: 146 THOUSANDS/UL (ref 149–390)
PMV BLD AUTO: 9.7 FL (ref 8.9–12.7)
POTASSIUM SERPL-SCNC: 5.3 MMOL/L (ref 3.5–5.3)
PR INTERVAL: 208 MS
PROT SERPL-MCNC: 7.1 G/DL (ref 6.4–8.2)
QRS AXIS: 26 DEGREES
QRSD INTERVAL: 88 MS
QT INTERVAL: 410 MS
QTC INTERVAL: 417 MS
RBC # BLD AUTO: 4.3 MILLION/UL (ref 3.88–5.62)
SODIUM SERPL-SCNC: 135 MMOL/L (ref 136–145)
T WAVE AXIS: 30 DEGREES
TROPONIN I SERPL-MCNC: <0.02 NG/ML
VENTRICULAR RATE: 62 BPM
WBC # BLD AUTO: 5.02 THOUSAND/UL (ref 4.31–10.16)

## 2019-07-10 PROCEDURE — 83880 ASSAY OF NATRIURETIC PEPTIDE: CPT | Performed by: EMERGENCY MEDICINE

## 2019-07-10 PROCEDURE — 99285 EMERGENCY DEPT VISIT HI MDM: CPT

## 2019-07-10 PROCEDURE — 99284 EMERGENCY DEPT VISIT MOD MDM: CPT | Performed by: EMERGENCY MEDICINE

## 2019-07-10 PROCEDURE — 84484 ASSAY OF TROPONIN QUANT: CPT | Performed by: EMERGENCY MEDICINE

## 2019-07-10 PROCEDURE — 93005 ELECTROCARDIOGRAM TRACING: CPT

## 2019-07-10 PROCEDURE — 36415 COLL VENOUS BLD VENIPUNCTURE: CPT | Performed by: EMERGENCY MEDICINE

## 2019-07-10 PROCEDURE — 71045 X-RAY EXAM CHEST 1 VIEW: CPT

## 2019-07-10 PROCEDURE — 85025 COMPLETE CBC W/AUTO DIFF WBC: CPT | Performed by: EMERGENCY MEDICINE

## 2019-07-10 PROCEDURE — 93010 ELECTROCARDIOGRAM REPORT: CPT | Performed by: INTERNAL MEDICINE

## 2019-07-10 PROCEDURE — 99220 PR INITIAL OBSERVATION CARE/DAY 70 MINUTES: CPT | Performed by: INTERNAL MEDICINE

## 2019-07-10 PROCEDURE — 85379 FIBRIN DEGRADATION QUANT: CPT | Performed by: EMERGENCY MEDICINE

## 2019-07-10 PROCEDURE — 84484 ASSAY OF TROPONIN QUANT: CPT | Performed by: INTERNAL MEDICINE

## 2019-07-10 PROCEDURE — 96360 HYDRATION IV INFUSION INIT: CPT

## 2019-07-10 PROCEDURE — 80053 COMPREHEN METABOLIC PANEL: CPT | Performed by: EMERGENCY MEDICINE

## 2019-07-10 PROCEDURE — 96361 HYDRATE IV INFUSION ADD-ON: CPT

## 2019-07-10 PROCEDURE — 71275 CT ANGIOGRAPHY CHEST: CPT

## 2019-07-10 RX ORDER — TAMSULOSIN HYDROCHLORIDE 0.4 MG/1
0.4 CAPSULE ORAL DAILY
Status: DISCONTINUED | OUTPATIENT
Start: 2019-07-11 | End: 2019-07-11 | Stop reason: HOSPADM

## 2019-07-10 RX ORDER — LISINOPRIL 5 MG/1
5 TABLET ORAL DAILY
Status: DISCONTINUED | OUTPATIENT
Start: 2019-07-11 | End: 2019-07-11 | Stop reason: HOSPADM

## 2019-07-10 RX ORDER — LABETALOL 100 MG/1
100 TABLET, FILM COATED ORAL 2 TIMES DAILY
Qty: 180 TABLET | Refills: 0 | Status: SHIPPED | OUTPATIENT
Start: 2019-07-10 | End: 2019-07-24 | Stop reason: SDUPTHER

## 2019-07-10 RX ORDER — GABAPENTIN 300 MG/1
300 CAPSULE ORAL
Status: DISCONTINUED | OUTPATIENT
Start: 2019-07-10 | End: 2019-07-11 | Stop reason: HOSPADM

## 2019-07-10 RX ORDER — ACETAMINOPHEN 325 MG/1
650 TABLET ORAL EVERY 4 HOURS PRN
Status: DISCONTINUED | OUTPATIENT
Start: 2019-07-10 | End: 2019-07-11 | Stop reason: HOSPADM

## 2019-07-10 RX ORDER — SIMETHICONE 80 MG
80 TABLET,CHEWABLE ORAL 4 TIMES DAILY PRN
Status: DISCONTINUED | OUTPATIENT
Start: 2019-07-10 | End: 2019-07-11 | Stop reason: HOSPADM

## 2019-07-10 RX ORDER — LABETALOL 100 MG/1
100 TABLET, FILM COATED ORAL 2 TIMES DAILY
Status: DISCONTINUED | OUTPATIENT
Start: 2019-07-10 | End: 2019-07-11 | Stop reason: HOSPADM

## 2019-07-10 RX ORDER — FUROSEMIDE 20 MG/1
20 TABLET ORAL
Status: DISCONTINUED | OUTPATIENT
Start: 2019-07-10 | End: 2019-07-11 | Stop reason: HOSPADM

## 2019-07-10 RX ORDER — ASPIRIN 81 MG/1
81 TABLET ORAL DAILY
Status: DISCONTINUED | OUTPATIENT
Start: 2019-07-11 | End: 2019-07-11 | Stop reason: HOSPADM

## 2019-07-10 RX ORDER — SODIUM CHLORIDE, SODIUM LACTATE, POTASSIUM CHLORIDE, CALCIUM CHLORIDE 600; 310; 30; 20 MG/100ML; MG/100ML; MG/100ML; MG/100ML
500 INJECTION, SOLUTION INTRAVENOUS CONTINUOUS
Status: DISCONTINUED | OUTPATIENT
Start: 2019-07-10 | End: 2019-07-10

## 2019-07-10 RX ORDER — SENNOSIDES 8.6 MG
2 TABLET ORAL
Status: DISCONTINUED | OUTPATIENT
Start: 2019-07-10 | End: 2019-07-11 | Stop reason: HOSPADM

## 2019-07-10 RX ORDER — ATORVASTATIN CALCIUM 40 MG/1
40 TABLET, FILM COATED ORAL DAILY
Status: DISCONTINUED | OUTPATIENT
Start: 2019-07-11 | End: 2019-07-11 | Stop reason: HOSPADM

## 2019-07-10 RX ADMIN — LABETALOL HYDROCHLORIDE 100 MG: 100 TABLET, FILM COATED ORAL at 17:40

## 2019-07-10 RX ADMIN — GABAPENTIN 300 MG: 300 CAPSULE ORAL at 21:57

## 2019-07-10 RX ADMIN — IOHEXOL 85 ML: 350 INJECTION, SOLUTION INTRAVENOUS at 13:24

## 2019-07-10 RX ADMIN — FUROSEMIDE 20 MG: 20 TABLET ORAL at 17:40

## 2019-07-10 RX ADMIN — SODIUM CHLORIDE, SODIUM LACTATE, POTASSIUM CHLORIDE, AND CALCIUM CHLORIDE 500 ML: .6; .31; .03; .02 INJECTION, SOLUTION INTRAVENOUS at 13:11

## 2019-07-10 RX ADMIN — SENNOSIDES 17.2 MG: 8.6 TABLET, FILM COATED ORAL at 21:57

## 2019-07-10 NOTE — ED NOTES
Pt seen at bedside, no distress seen   Will continue to monitor       Ramon Howell RN  07/10/19 7446

## 2019-07-10 NOTE — H&P
H&P- Yasmani Walker 1940, 69 y o  male MRN: [id number]    Unit/Bed#: -01 Encounter: [id number]    Primary Care Provider: Milagro Kendall MD   Date and time admitted to hospital: 7/10/2019 11:21 AM      * SOB (shortness of breath)  Assessment & Plan  · Patient presents with 3-4 days of increasing shortness of breath and dyspnea on exertion  He also noted mild lower extremity edema  BNP normal   EKG shows no acute findings  Chest x-ray shows no acute cardiopulmonary disease  CTA negative for PE  · Unclear etiology at this time  · Check stress test  · Check troponins x3  · Trial of Lasix 20 mg b i d   · Monitor I&Os  · If stress test is negative need to consider, pulmonary testing and outpatient referral for spirometry  Likely also need sleep study     3-vessel CAD  Assessment & Plan  · Status post CABG  · Continue medical management including aspirin, BB and statin    Aneurysm of ascending aorta (HCC)  Assessment & Plan  · Follows with CT surgery    Benign essential hypertension  Assessment & Plan  · On lisinopril/BB  · Monitor for now    Intervertebral disc disorder with radiculopathy of lumbar region  Assessment & Plan  · Follows with pain management  Getting KEARA intermittently  VTE Prophylaxis: Enoxaparin (Lovenox)   Code Status: full code  POLST: There is no POLST form on file for this patient (pre-hospital)  Discussion with family: wife    Anticipated Length of Stay:  Patient will be admitted on an Observation basis with an anticipated length of stay of  > 2 midnights  Justification for Hospital Stay: IV lasix    Total Time for Visit, including Counseling / Coordination of Care: 70 min  Greater than 50% of this total time spent on direct patient counseling and coordination of care      Chief Complaint:   Shortness of breath    History of Present Illness:    Yasmani Walker is a 69 y o  male with past medical significant for coronary artery disease status post CABG, AAA repair, hypertension, hyperlipidemia, obstructive sleep apnea and benign prostatic hypertrophy who presents with shortness of breath  Patient has been experiencing shortness of breath even at rest, and worse with ambulation  Denies any palpitations cough or fevers  Reports some swelling of the lower extremities as well as his abdomen  Reports weight gain of 30 lb  Recent travel to [place]  Last stress test was in 2017, was reportedly normal   He does follow with Cardiology out patient's  Review of Systems:    Review of Systems  Twelve point review systems negative except noted above  Past Medical and Surgical History:     Past Medical History:   Diagnosis Date    Abdominal aortic aneurysm (HCC)     Chronic pain     back since July    Chronic pain disorder     lumbar    Coronary artery disease     History of transfusion     Hyperlipidemia     Hypertension     Sleep apnea     Subconjunctival hemorrhage        Past Surgical History:   Procedure Laterality Date    ABDOMINAL AORTIC ANEURYSM REPAIR  08/09/2007    2 dock limbs with visc extens prosth    COLONOSCOPY      CORONARY ARTERY BYPASS GRAFT      GAB-LAD, sequential vein graft to OM1 and OM2, single VG-posterior descending  Onset: 2003       Meds/Allergies:    Prior to Admission medications    Medication Sig Start Date End Date Taking?  Authorizing Provider   aspirin (ECOTRIN LOW STRENGTH) 81 mg EC tablet Take 1 tablet by mouth daily 9/7/12   Historical Provider, MD   atorvastatin (LIPITOR) 40 mg tablet Take 1 tablet (40 mg total) by mouth daily 5/14/18   Shantal Dupree MD   finasteride (PROSCAR) 5 mg tablet Take 5 mg by mouth daily  3/30/18   Historical Provider, MD   gabapentin (NEURONTIN) 300 mg capsule Take 1 capsule (300 mg total) by mouth daily at bedtime 3/20/19   SOSA Lau   labetalol (NORMODYNE) 100 mg tablet Take 1 tablet (100 mg total) by mouth 2 (two) times a day 7/10/19   Latha Van MD   lisinopril (ZESTRIL) 5 mg tablet Take 1 tablet (5 mg total) by mouth daily 18   Joleen Candelaria MD   senna (SENOKOT) 8 6 MG tablet Take 1 tablet by mouth as needed      Historical Provider, MD   tamsulosin (FLOMAX) 0 4 mg Take 1 capsule by mouth daily    Historical Provider, MD   labetalol (NORMODYNE) 100 mg tablet Take 1 tablet (100 mg total) by mouth 2 (two) times a day 7/8/19 7/10/19  Mariposa Vera DO     I have reviewed home medications with patient personally  Allergies: Allergies   Allergen Reactions    Meloxicam Edema       Social History:     Marital Status: /Civil Union   Occupation:  Retired  Patient Pre-hospital none Living Situation:  Lives with wife  Patient Pre-hospital Level of Mobility:  Limited mobility  Patient Pre-hospital Diet Restrictions: none  Substance Use History:   Social History     Substance and Sexual Activity   Alcohol Use Yes    Comment: rare     Social History     Tobacco Use   Smoking Status Former Smoker    Packs/day: 1 00    Years: 40 00    Pack years: 40 00    Last attempt to quit: 2012    Years since quittin 9   Smokeless Tobacco Never Used     Social History     Substance and Sexual Activity   Drug Use No       Family History:    non-contributory    Physical Exam:     Vitals:   Blood Pressure: 165/86 (07/10/19 1534)  Pulse: 66 (07/10/19 1534)  Temperature: 98 °F (36 7 °C) (07/10/19 1125)  Temp Source: Oral (07/10/19 1125)  Respirations: 16 (07/10/19 1534)  Weight - Scale: 113 kg (249 lb 1 9 oz) (07/10/19 1123)  SpO2: 96 % (07/10/19 1534)    Physical Exam     Gen -Patient comfortable at rest  Neck- Supple  No thyromegaly or lymphadenopathy  Lungs-Clear bilaterally without any wheeze or rales   Heart S1-S2, regular rate and rhythm, no murmurs  Abdomen-soft nontender, no organomegaly  Bowel sounds present  Extremities-no cyanosi,  clubbing ; 1+ pitting edema of extremities  Skin- no rash  Neuro-nonfocal     Additional Data:     Lab Results: I have personally reviewed pertinent reports  Results from last 7 days   Lab Units 07/10/19  1154   WBC Thousand/uL 5 02   HEMOGLOBIN g/dL 14 3   HEMATOCRIT % 42 8   PLATELETS Thousands/uL 146*   NEUTROS PCT % 72   LYMPHS PCT % 13*   MONOS PCT % 11   EOS PCT % 3     Results from last 7 days   Lab Units 07/10/19  1154   SODIUM mmol/L 135*   POTASSIUM mmol/L 5 3   CHLORIDE mmol/L 100   CO2 mmol/L 28   BUN mg/dL 11   CREATININE mg/dL 1 00   ANION GAP mmol/L 7   CALCIUM mg/dL 8 8   ALBUMIN g/dL 3 8   TOTAL BILIRUBIN mg/dL 1 00   ALK PHOS U/L 79   ALT U/L 26   AST U/L 25   GLUCOSE RANDOM mg/dL 108                       Imaging: I have personally reviewed pertinent reports  CTA ED chest PE study   Final Result by Hay Ennis MD (07/10 1403)      No evidence of pulmonary embolus  4 7 cm ascending thoracic aneurysm  Workstation performed: [id number]         XR chest 1 view portable   Final Result by Cici Ramos MD (07/10 1257)      No acute cardiopulmonary disease  Workstation performed: [id number]             EKG, Pathology, and Other Studies Reviewed on Admission:   · EKG:  Normal sinus rhythm    Allscripts / Epic Records Reviewed: Yes     ** Please Note: This note has been constructed using a voice recognition system   **

## 2019-07-10 NOTE — ASSESSMENT & PLAN NOTE
· Patient presents with 3-4 days of increasing shortness of breath and dyspnea on exertion  He also noted mild lower extremity edema  BNP normal   EKG shows no acute findings  Chest x-ray shows no acute cardiopulmonary disease  CTA negative for PE  · Unclear etiology at this time  · Check stress test  · Check troponins x3  · Trial of Lasix 20 mg b i d   · Monitor I&Os  · If stress test is negative need to consider, pulmonary testing and outpatient referral for spirometry    Likely also need sleep study

## 2019-07-10 NOTE — ED PROCEDURE NOTE
PROCEDURE  ECG 12 Lead Documentation Only  Date/Time: 7/10/2019 1:19 PM  Performed by: Carlita Napoles MD  Authorized by: Carlita Napoles MD     Indications / Diagnosis:  SOB  ECG reviewed by me, the ED Provider: yes    Patient location:  ED and bedside  Previous ECG:     Previous ECG:  Compared to current    Comparison ECG info:  10/5/18    Similarity:  No change    Comparison to cardiac monitor: Yes    Interpretation:     Interpretation: non-specific    Rate:     ECG rate:  62    ECG rate assessment: normal    Rhythm:     Rhythm: sinus rhythm    Ectopy:     Ectopy: none    QRS:     QRS axis:  Normal    QRS intervals:  Normal  Conduction:     Conduction: abnormal      Abnormal conduction: 1st degree    ST segments:     ST segments:  Normal  T waves:     T waves: flattening      Flattening:  III, aVF and V1  Other findings:     Other findings: poor R wave progression and U wave    Comments:      NO ECG SIGNS OF ISCHEMIA/ INJURY / [address] / Sofi Wang MD  07/10/19 0217

## 2019-07-11 ENCOUNTER — APPOINTMENT (OUTPATIENT)
Dept: NUCLEAR MEDICINE | Facility: HOSPITAL | Age: 79
End: 2019-07-11
Payer: MEDICARE

## 2019-07-11 ENCOUNTER — TRANSITIONAL CARE MANAGEMENT (OUTPATIENT)
Dept: INTERNAL MEDICINE CLINIC | Facility: CLINIC | Age: 79
End: 2019-07-11

## 2019-07-11 VITALS
OXYGEN SATURATION: 95 % | HEART RATE: 70 BPM | SYSTOLIC BLOOD PRESSURE: 132 MMHG | BODY MASS INDEX: 34.23 KG/M2 | TEMPERATURE: 97.5 F | HEIGHT: 71 IN | WEIGHT: 244.49 LBS | RESPIRATION RATE: 18 BRPM | DIASTOLIC BLOOD PRESSURE: 84 MMHG

## 2019-07-11 LAB
ALBUMIN SERPL BCP-MCNC: 3.6 G/DL (ref 3.5–5)
ALP SERPL-CCNC: 66 U/L (ref 46–116)
ALT SERPL W P-5'-P-CCNC: 23 U/L (ref 12–78)
ANION GAP SERPL CALCULATED.3IONS-SCNC: 6 MMOL/L (ref 4–13)
AST SERPL W P-5'-P-CCNC: 22 U/L (ref 5–45)
BILIRUB SERPL-MCNC: 1.1 MG/DL (ref 0.2–1)
BUN SERPL-MCNC: 11 MG/DL (ref 5–25)
CALCIUM SERPL-MCNC: 9.1 MG/DL (ref 8.3–10.1)
CHEST PAIN STATEMENT: NORMAL
CHLORIDE SERPL-SCNC: 102 MMOL/L (ref 100–108)
CO2 SERPL-SCNC: 30 MMOL/L (ref 21–32)
CREAT SERPL-MCNC: 1.04 MG/DL (ref 0.6–1.3)
GFR SERPL CREATININE-BSD FRML MDRD: 69 ML/MIN/1.73SQ M
GLUCOSE P FAST SERPL-MCNC: 95 MG/DL (ref 65–99)
GLUCOSE SERPL-MCNC: 95 MG/DL (ref 65–140)
MAGNESIUM SERPL-MCNC: 2 MG/DL (ref 1.6–2.6)
MAX DIASTOLIC BP: 88 MMHG
MAX HEART RATE: 105 BPM
MAX PREDICTED HEART RATE: 143 BPM
MAX. SYSTOLIC BP: 156 MMHG
PLATELET # BLD AUTO: 144 THOUSANDS/UL (ref 149–390)
PMV BLD AUTO: 10 FL (ref 8.9–12.7)
POTASSIUM SERPL-SCNC: 4.3 MMOL/L (ref 3.5–5.3)
PROT SERPL-MCNC: 6.9 G/DL (ref 6.4–8.2)
PROTOCOL NAME: NORMAL
REASON FOR TERMINATION: NORMAL
SODIUM SERPL-SCNC: 138 MMOL/L (ref 136–145)
TARGET HR FORMULA: NORMAL
TEST INDICATION: NORMAL
TIME IN EXERCISE PHASE: NORMAL

## 2019-07-11 PROCEDURE — 83735 ASSAY OF MAGNESIUM: CPT | Performed by: INTERNAL MEDICINE

## 2019-07-11 PROCEDURE — 78452 HT MUSCLE IMAGE SPECT MULT: CPT | Performed by: INTERNAL MEDICINE

## 2019-07-11 PROCEDURE — 85049 AUTOMATED PLATELET COUNT: CPT | Performed by: INTERNAL MEDICINE

## 2019-07-11 PROCEDURE — 93016 CV STRESS TEST SUPVJ ONLY: CPT | Performed by: INTERNAL MEDICINE

## 2019-07-11 PROCEDURE — 99217 PR OBSERVATION CARE DISCHARGE MANAGEMENT: CPT | Performed by: INTERNAL MEDICINE

## 2019-07-11 PROCEDURE — 93018 CV STRESS TEST I&R ONLY: CPT | Performed by: INTERNAL MEDICINE

## 2019-07-11 PROCEDURE — 93017 CV STRESS TEST TRACING ONLY: CPT

## 2019-07-11 PROCEDURE — A9502 TC99M TETROFOSMIN: HCPCS

## 2019-07-11 PROCEDURE — 78452 HT MUSCLE IMAGE SPECT MULT: CPT

## 2019-07-11 PROCEDURE — 80053 COMPREHEN METABOLIC PANEL: CPT | Performed by: INTERNAL MEDICINE

## 2019-07-11 RX ORDER — FUROSEMIDE 20 MG/1
20 TABLET ORAL AS NEEDED
Qty: 10 TABLET | Refills: 0 | Status: SHIPPED | OUTPATIENT
Start: 2019-07-11 | End: 2019-09-11 | Stop reason: SDUPTHER

## 2019-07-11 RX ADMIN — LISINOPRIL 5 MG: 5 TABLET ORAL at 08:10

## 2019-07-11 RX ADMIN — LABETALOL HYDROCHLORIDE 100 MG: 100 TABLET, FILM COATED ORAL at 08:10

## 2019-07-11 RX ADMIN — ENOXAPARIN SODIUM 40 MG: 40 INJECTION SUBCUTANEOUS at 08:10

## 2019-07-11 RX ADMIN — ASPIRIN 81 MG: 81 TABLET, COATED ORAL at 08:10

## 2019-07-11 RX ADMIN — FUROSEMIDE 20 MG: 20 TABLET ORAL at 08:10

## 2019-07-11 RX ADMIN — ATORVASTATIN CALCIUM 40 MG: 40 TABLET, FILM COATED ORAL at 08:10

## 2019-07-11 RX ADMIN — REGADENOSON 0.4 MG: 0.08 INJECTION, SOLUTION INTRAVENOUS at 10:01

## 2019-07-11 RX ADMIN — TAMSULOSIN HYDROCHLORIDE 0.4 MG: 0.4 CAPSULE ORAL at 08:10

## 2019-07-11 NOTE — PLAN OF CARE
Problem: Nutrition/Hydration-ADULT  Goal: Nutrient/Hydration intake appropriate for improving, restoring or maintaining nutritional needs  Description  Monitor and assess patient's nutrition/hydration status for malnutrition (ex- brittle hair, bruises, dry skin, pale skin and conjunctiva, muscle wasting, smooth red tongue, and disorientation)  Collaborate with interdisciplinary team and initiate plan and interventions as ordered  Monitor patient's weight and dietary intake as ordered or per policy  Utilize nutrition screening tool and intervene per policy  Determine patient's food preferences and provide high-protein, high-caloric foods as appropriate  INTERVENTIONS:  - Monitor oral intake, urinary output, labs, and treatment plans  - Assess nutrition and hydration status and recommend course of action  - Evaluate amount of meals eaten  - Assist patient with eating if necessary   - Allow adequate time for meals  - Recommend/ encourage appropriate diets, oral nutritional supplements, and vitamin/mineral supplements  - Order, calculate, and assess calorie counts as needed  - Recommend, monitor, and adjust tube feedings and TPN/PPN based on assessed needs  - Assess need for intravenous fluids  - Provide specific nutrition/hydration education as appropriate  - Include patient/family/caregiver in decisions related to nutrition  Outcome: Progressing     Problem: CARDIOVASCULAR - ADULT  Goal: Maintains optimal cardiac output and hemodynamic stability  Description  INTERVENTIONS:  - Monitor I/O, vital signs and rhythm  - Monitor for S/S and trends of decreased cardiac output i e  bleeding, hypotension  - Administer and titrate ordered vasoactive medications to optimize hemodynamic stability  - Assess quality of pulses, skin color and temperature  - Assess for signs of decreased coronary artery perfusion - ex   Angina  - Instruct patient to report change in severity of symptoms  Outcome: Progressing  Goal: Absence of cardiac dysrhythmias or at baseline rhythm  Description  INTERVENTIONS:  - Continuous cardiac monitoring, monitor vital signs, obtain 12 lead EKG if indicated  - Administer antiarrhythmic and heart rate control medications as ordered  - Monitor electrolytes and administer replacement therapy as ordered  Outcome: Progressing     Problem: RESPIRATORY - ADULT  Goal: Achieves optimal ventilation and oxygenation  Description  INTERVENTIONS:  - Assess for changes in respiratory status  - Assess for changes in mentation and behavior  - Position to facilitate oxygenation and minimize respiratory effort  - Oxygen administration by appropriate delivery method based on oxygen saturation (per order) or ABGs  - Initiate smoking cessation education as indicated  - Encourage broncho-pulmonary hygiene including cough, deep breathe, Incentive Spirometry  - Assess the need for suctioning and aspirate as needed  - Assess and instruct to report SOB or any respiratory difficulty  - Respiratory Therapy support as indicated  Outcome: Progressing     Problem: METABOLIC, FLUID AND ELECTROLYTES - ADULT  Goal: Fluid balance maintained  Description  INTERVENTIONS:  - Monitor labs and assess for signs and symptoms of volume excess or deficit  - Monitor I/O and WT  - Instruct patient on fluid and nutrition as appropriate  Outcome: Progressing

## 2019-07-11 NOTE — PHYSICIAN ADVISOR
Current patient class: Observation  The patient is currently on Hospital Day: 2 at [facility]        The patient was admitted to the hospital  on N/A at N/A for the following diagnosis:  SOB (shortness of breath) [R06 02]     After review of the relevant documentation, labs, vital signs and test results, the patient is most appropriate for OBSERVATION STATUS  Rationale is as follows: The patient is hospitalized under observation status for dyspnea/intermittent shortness of breath of unclear etiology  He had an episode of shortness of breath earlier this morning which resolved  He had some initial hypertension but now his vital signs are normal and he is not hypoxic  Stress test today was performed  The stress ECG was negative for ischemia how ever there was a small severe partially reversible myocardial perfusion defect of the basal inferior and inferolateral wall  Calculated ejection fraction was 46%  This was an abnormal study due to the small amount of ischemia how ever compared to reports of prior stress test there appears to be no interval change  For now I would maintain observation class  I will follow up later today to see if the patient requires ongoing hospitalization for further evaluation and management      The patients vitals on arrival were ED Triage Vitals   Temperature Pulse Respirations Blood Pressure SpO2   07/10/19 1125 07/10/19 1123 07/10/19 1123 07/10/19 1123 07/10/19 1123   98 °F (36 7 °C) 70 20 (!) 180/88 95 %      Temp Source Heart Rate Source Patient Position - Orthostatic VS BP Location FiO2 (%)   07/10/19 1125 07/10/19 1123 07/10/19 1123 07/10/19 1123 --   Oral Monitor Sitting Right arm       Pain Score       07/10/19 1534       No Pain           Past Medical History:   Diagnosis Date    Abdominal aortic aneurysm (HCC)     Chronic pain     back since July    Chronic pain disorder     lumbar    Coronary artery disease     History of transfusion     Hyperlipidemia     Hypertension     Sleep apnea     Subconjunctival hemorrhage      Past Surgical History:   Procedure Laterality Date    ABDOMINAL AORTIC ANEURYSM REPAIR  08/09/2007    2 dock limbs with visc extens prosth    COLONOSCOPY      CORONARY ARTERY BYPASS GRAFT      GAB-LAD, sequential vein graft to OM1 and OM2, single VG-posterior descending  Onset: 2003           Consults have been placed to:   None    Vitals:    07/10/19 1642 07/10/19 2157 07/10/19 2200 07/11/19 0700   BP: (!) 176/89  118/75 132/84   BP Location: Right arm  Right arm Right arm   Pulse: 68  65 70   Resp: 18  18 18   Temp: 97 8 °F (36 6 °C)  97 5 °F (36 4 °C) 97 5 °F (36 4 °C)   TempSrc: Oral  Oral Oral   SpO2: 95% 96% 94% 95%   Weight: 111 kg (244 lb 7 8 oz)      Height: 5' 11" (1 803 m)          Most recent labs:    Recent Labs     07/10/19  1154  07/10/19  2320 07/11/19  0501   WBC 5 02  --   --   --    HGB 14 3  --   --   --    HCT 42 8  --   --   --    *  --   --  144*   K 5 3  --   --  4 3   CALCIUM 8 8  --   --  9 1   BUN 11  --   --  11   CREATININE 1 00  --   --  1 04   TROPONINI <0 02   < > <0 02  --    AST 25  --   --  22   ALT 26  --   --  23   ALKPHOS 79  --   --  66    < > = values in this interval not displayed         Scheduled Meds:  Current Facility-Administered Medications:  acetaminophen 650 mg Oral Q4H PRN Yady Gale MD   aspirin 81 mg Oral Daily Yady Gale MD   atorvastatin 40 mg Oral Daily Yady Gale MD   enoxaparin 40 mg Subcutaneous Daily Yady Gale MD   furosemide 20 mg Oral BID (diuretic) Yady Gale MD   gabapentin 300 mg Oral HS Yady Gale MD   labetalol 100 mg Oral BID Yady Gale MD   lisinopril 5 mg Oral Daily Yady Gale MD   senna 2 tablet Oral HS Yady Gale MD   simethicone 80 mg Oral 4x Daily PRN Yady Gale MD   tamsulosin 0 4 mg Oral Daily Yady Gale MD     Continuous Infusions:   PRN Meds:   acetaminophen    simethicone

## 2019-07-11 NOTE — UTILIZATION REVIEW
Initial Clinical Review    Admission: Date/Time/Statement: OBS   7/10  1510    Orders Placed This Encounter   Procedures    Place in Observation     Standing Status:   Standing     Number of Occurrences:   1     Order Specific Question:   Admitting Physician     Answer:   García Gonzalez     Order Specific Question:   Level of Care     Answer:   Med Surg [16]     ED Arrival Information     Expected Arrival Acuity Means of Arrival Escorted By Service Admission Type    - 7/10/2019 11:10 Urgent Walk-In Spouse General Medicine Urgent    Arrival Complaint    -        Chief Complaint   Patient presents with    Shortness of Breath     pt c/o intermit  increased PANDA 5-7days  pt also states at rest he is becoming sob  Assessment/Plan: 69 y o  male with past medical significant for coronary artery disease status post CABG, AAA repair, hypertension, hyperlipidemia, obstructive sleep apnea and benign prostatic hypertrophy who presents with shortness of breath  Patient has been experiencing shortness of breath even at rest, and worse with ambulation  Denies any palpitations cough or fevers  Reports some swelling of the lower extremities as well as his abdomen  Reports weight gain of 30 lb  Recent travel to [place]  Last stress test was in 2017, was reportedly normal   He does follow with Cardiology out patient's         SOB (shortness of breath)  Assessment & Plan  · Patient presents with 3-4 days of increasing shortness of breath and dyspnea on exertion  He also noted mild lower extremity edema  BNP normal   EKG shows no acute findings  Chest x-ray shows no acute cardiopulmonary disease  CTA negative for PE  ? Unclear etiology at this time  ? Check stress test  ? Check troponins x3  ? Trial of Lasix 20 mg b i d   ? Monitor I&Os  ? If stress test is negative need to consider, pulmonary testing and outpatient referral for spirometry    Likely also need sleep study      3-vessel CAD  Assessment & Plan  · Status post CABG  ? Continue medical management including aspirin, BB and statin     Aneurysm of ascending aorta (HCC)  Assessment & Plan  · Follows with CT surgery     Benign essential hypertension  Assessment & Plan  · On lisinopril/BB  · Monitor for now     Intervertebral disc disorder with radiculopathy of lumbar region  Assessment & Plan  · Follows with pain management  Getting KEARA intermittently       VTE Prophylaxis: Enoxaparin (Lovenox)   Code Status: full code  POLST: There is no POLST form on file for this patient (pre-hospital)  Discussion with family: wife     Anticipated Length of Stay:  Patient will be admitted on an Observation basis with an anticipated length of stay of  > 2 midnights  Justification for Hospital Stay: IV lasix    IM note   7/11  * SOB (shortness of breath)  Assessment & Plan  · Initial patient complaints of shortness of breath and dyspnea on exertion w/ an onset 3-4 prior to admission  Also, he noted mild lower extremity edema  BNP unremarkable  No acute findings on EKG  XR Chest demonstrated no acute cardiopulmonary disease  CTA negative for PE   ? Unclear etiology presently  ? Stress Test this AM    ? Negative troponin x3    ? Monitor vitals, ST segment elevation and I&Os  ? Consider pulmonary testing and outpatient referral for spirometry if stress test is negative  ? Likely referral for sleep study     3-vessel CAD  Assessment & Plan  · S/P CABG (2013)  ? Continue medical management w/ Aspirin, Labetalol and Atorvastatin     Aneurysm of ascending aorta (HCC)  Assessment & Plan  · S/P AAA repair (2007)  ? Follows w/ Dr Marilou Harris (CT surgery)      Benign essential hypertension  Assessment & Plan  · Continue Lisinopril and Labetalol  · Continue monitoring vitals     Intervertebral disc disorder with radiculopathy of lumbar region  Assessment & Plan  · Follows w/ pain management  · Receives KEARA intermittently     · Continue Neurontin     VTE Pharmacologic Prophylaxis:   Pharmacologic: Enoxaparin (Lovenox)  Mechanical VTE Prophylaxis in Place: Yes   Patient Centered Rounds: I have performed bedside rounds with nursing staff today    Discussions with Specialists or Other Care Team Provider: Case Management    Education and Discussions with Family / Patient: Patent  Time Spent for Care: 30 minutes    More than 50% of total time spent on counseling and coordination of care as described above    Current Length of Stay: 0 day(s)   Current Patient Status: Observation   Certification Statement: The patient will continue to require additional inpatient hospital stay due to observation for intermittent SOB - unclear etiology   Discharge Plan: Possible patient discharge in the next 24-48 hours       ED Triage Vitals   Temperature Pulse Respirations Blood Pressure SpO2   07/10/19 1125 07/10/19 1123 07/10/19 1123 07/10/19 1123 07/10/19 1123   98 °F (36 7 °C) 70 20 (!) 180/88 95 %      Temp Source Heart Rate Source Patient Position - Orthostatic VS BP Location FiO2 (%)   07/10/19 1125 07/10/19 1123 07/10/19 1123 07/10/19 1123 --   Oral Monitor Sitting Right arm       Pain Score       07/10/19 1534       No Pain        Wt Readings from Last 1 Encounters:   07/10/19 111 kg (244 lb 7 8 oz)     Additional Vital Signs:   07/11/19 0700  97 5 °F (36 4 °C)  70  18  132/84  103  95 %  None (Room air)  Sitting   07/10/19 2200  97 5 °F (36 4 °C)  65  18  118/75  90  94 %  None (Room air)  Sitting   07/10/19 2157            96 %  None (Room air)     07/10/19 1645              None (Room air)     07/10/19 1642  97 8 °F (36 6 °C)  68  18  176/89Abnormal   124  95 %  None (Room air)  Sitting   07/10/19 1534    66  16  165/86    96 %  None (Room air)  Lying   07/10/19 1238    65  17  148/93    95 %       07/10/19 1136              None (Room air)         Pertinent Labs/Diagnostic Test Results:   7/10 CXR - wnl   CTA chest - wnl   7/11 EKG NSR   Results from last 7 days Lab Units 07/11/19  0501 07/10/19  1154 07/08/19  0743   WBC Thousand/uL  --  5 02 5 99   HEMOGLOBIN g/dL  --  14 3 15 4   HEMATOCRIT %  --  42 8 46 7   PLATELETS Thousands/uL 144* 146* 146*   NEUTROS ABS Thousands/µL  --  3 61 3 64       Results from last 7 days   Lab Units 07/11/19  0501 07/10/19  1154 07/08/19  0743   SODIUM mmol/L 138 135* 136   POTASSIUM mmol/L 4 3 5 3 5 7*   CHLORIDE mmol/L 102 100 101   CO2 mmol/L 30 28 31   ANION GAP mmol/L 6 7 4   BUN mg/dL 11 11 12   CREATININE mg/dL 1 04 1 00 1 20   EGFR ml/min/1 73sq m 69 72 58   CALCIUM mg/dL 9 1 8 8 9 5   MAGNESIUM mg/dL 2 0  --   --      Results from last 7 days   Lab Units 07/11/19  0501 07/10/19  1154 07/08/19  0743   AST U/L 22 25 22   ALT U/L 23 26 25   ALK PHOS U/L 66 79 73   TOTAL PROTEIN g/dL 6 9 7 1 7 5   ALBUMIN g/dL 3 6 3 8 3 9   TOTAL BILIRUBIN mg/dL 1 10* 1 00 1 16*     Results from last 7 days   Lab Units 07/11/19  0501 07/10/19  1154   GLUCOSE RANDOM mg/dL 95 108     Results from last 7 days   Lab Units 07/10/19  2320 07/10/19  2017 07/10/19  1658 07/10/19  1154   TROPONIN I ng/mL <0 02 <0 02 <0 02 <0 02     Results from last 7 days   Lab Units 07/10/19  1228   D DIMER QUANT ng/ml (FEU) 1,378*     Results from last 7 days   Lab Units 07/10/19  1154   NT-PRO BNP pg/mL 141       ED Treatment:   Medication Administration from 07/10/2019 1110 to 07/10/2019 1554       Date/Time Order Dose Route Action Action by Comments     07/10/2019 1311 lactated ringers infusion 500 mL 500 mL Intravenous New Bag Gabrielle Garibay, RN         Past Medical History:   Diagnosis Date    Abdominal aortic aneurysm ([address] )     Chronic pain     back since July    Chronic pain disorder     lumbar    Coronary artery disease     History of transfusion     Hyperlipidemia     Hypertension     Sleep apnea     Subconjunctival hemorrhage      Present on Admission:   3-vessel CAD   Aneurysm of ascending aorta (HCC)   Benign essential hypertension   Intervertebral disc disorder with radiculopathy of lumbar region      Admitting Diagnosis: SOB (shortness of breath) [R06 02]  Age/Sex: 69 y o  male  Admission Orders:    Current Facility-Administered Medications:  acetaminophen 650 mg Oral Q4H PRN    aspirin 81 mg Oral Daily    atorvastatin 40 mg Oral Daily    enoxaparin 40 mg Subcutaneous Daily    furosemide 20 mg Oral BID (diuretic)    gabapentin 300 mg Oral HS    labetalol 100 mg Oral BID    lisinopril 5 mg Oral Daily    senna 2 tablet Oral HS    simethicone 80 mg Oral 4x Daily PRN    tamsulosin 0 4 mg Oral Daily      amb pt   ekg prn cp   Cardiac diet   Stress test       Network Utilization Review Department  Phone: [phone]; Fax [phone]  [email or web address]  ATTENTION: Please call with any questions or concerns to [phone]  and carefully listen to the prompts so that you are directed to the right person  Send all requests for admission clinical reviews, approved or denied determinations and any other requests to fax [phone]   All voicemails are confidential

## 2019-07-11 NOTE — PROGRESS NOTES
Progress Note - Wai Woo 1940, 69 y o  male MRN: [id number]    Unit/Bed#: -01 Encounter: [id number]    Primary Care Provider: Ryan Muro MD   Date and time admitted to hospital: 7/10/2019 11:21 AM    * SOB (shortness of breath)  Assessment & Plan  · Initial patient complaints of shortness of breath and dyspnea on exertion w/ an onset 3-4 prior to admission  Also, he noted mild lower extremity edema  BNP unremarkable  No acute findings on EKG  XR Chest demonstrated no acute cardiopulmonary disease  CTA negative for PE   · Unclear etiology presently  · Stress Test this AM    · Negative troponin x3    · Monitor vitals, ST segment elevation and I&Os  · Consider pulmonary testing and outpatient referral for spirometry if stress test is negative  · Likely referral for sleep study  3-vessel CAD  Assessment & Plan  · S/P CABG (2013)  · Continue medical management w/ Aspirin, Labetalol and Atorvastatin  Aneurysm of ascending aorta [facility])  Assessment & Plan  · S/P AAA repair (2007)  · Follows w/ Dr Danielle Echevarria (CT surgery)  Benign essential hypertension  Assessment & Plan  · Continue Lisinopril and Labetalol  · Continue monitoring vitals  Intervertebral disc disorder with radiculopathy of lumbar region  Assessment & Plan  · Follows w/ pain management  · Receives KEARA intermittently  · Continue Neurontin  VTE Pharmacologic Prophylaxis:   Pharmacologic: Enoxaparin (Lovenox)  Mechanical VTE Prophylaxis in Place: Yes    Patient Centered Rounds: I have performed bedside rounds with nursing staff today  Discussions with Specialists or Other Care Team Provider: Case Management  Education and Discussions with Family / Patient: Patent  Time Spent for Care: 30 minutes  More than 50% of total time spent on counseling and coordination of care as described above  Current Length of Stay: 0 day(s)    Current Patient Status: Observation   Certification Statement:  The patient will continue to require additional inpatient hospital stay due to observation for intermittent SOB - unclear etiology    Discharge Plan: Possible patient discharge in the next 24-48 hours  Code Status: Level 1 - Full Code      Subjective:   No complaints stated by patient at time of evaluation  He stated he encountered an episode of SOB earlier this morning that eventually resolved  He denied SOB and chest pain/discomfort  Discussed w/ pt stress test later this AM      Objective:     Vitals:   Temp (24hrs), Av 7 °F (36 5 °C), Min:97 5 °F (36 4 °C), Max:98 °F (36 7 °C)    Temp:  [97 5 °F (36 4 °C)-98 °F (36 7 °C)] 97 5 °F (36 4 °C)  HR:  [65-70] 70  Resp:  [16-20] 18  BP: (118-180)/(75-93) 132/84  SpO2:  [94 %-96 %] 95 %  Body mass index is 34 1 kg/m²  Input and Output Summary (last 24 hours): Intake/Output Summary (Last 24 hours) at 2019 0854  Last data filed at 2019 0700  Gross per 24 hour   Intake 480 ml   Output 1150 ml   Net -670 ml       Physical Exam:     Physical Exam   Constitutional: He is oriented to person, place, and time  Vital signs are normal  He appears well-developed and well-nourished  Non-toxic appearance  HENT:   Head: Normocephalic and atraumatic  Eyes: Pupils are equal, round, and reactive to light  EOM are normal    Neck: Normal range of motion  Neck supple  Cardiovascular: Normal rate, regular rhythm, normal heart sounds and intact distal pulses  Exam reveals no friction rub  No murmur heard  Pulmonary/Chest: Effort normal and breath sounds normal  No stridor  No respiratory distress  He has no wheezes  He has no rales  Abdominal: Soft  Bowel sounds are normal  He exhibits no distension  There is no tenderness  There is no guarding  Musculoskeletal: Normal range of motion  He exhibits no edema  Neurological: He is alert and oriented to person, place, and time  Skin: Skin is warm and dry  Capillary refill takes less than 2 seconds   He is not diaphoretic  Psychiatric: He has a normal mood and affect  His behavior is normal  Judgment and thought content normal        Additional Data:     Labs:    Results from last 7 days   Lab Units 07/11/19  0501 07/10/19  1154   WBC Thousand/uL  --  5 02   HEMOGLOBIN g/dL  --  14 3   HEMATOCRIT %  --  42 8   PLATELETS Thousands/uL 144* 146*   NEUTROS PCT %  --  72   LYMPHS PCT %  --  13*   MONOS PCT %  --  11   EOS PCT %  --  3     Results from last 7 days   Lab Units 07/11/19  0501   SODIUM mmol/L 138   POTASSIUM mmol/L 4 3   CHLORIDE mmol/L 102   CO2 mmol/L 30   BUN mg/dL 11   CREATININE mg/dL 1 04   ANION GAP mmol/L 6   CALCIUM mg/dL 9 1   ALBUMIN g/dL 3 6   TOTAL BILIRUBIN mg/dL 1 10*   ALK PHOS U/L 66   ALT U/L 23   AST U/L 22   GLUCOSE RANDOM mg/dL 95                           * I Have Reviewed All Lab Data Listed Above  * Additional Pertinent Lab Tests Reviewed: All Labs For Current Hospital Admission Reviewed    Imaging:    Imaging Reports Reviewed Today Include: None  Imaging Personally Reviewed by Myself Includes: None  Recent Cultures (last 7 days):           Last 24 Hours Medication List:     Current Facility-Administered Medications:  acetaminophen 650 mg Oral Q4H PRN Yady Gale MD   aspirin 81 mg Oral Daily Yady Gale MD   atorvastatin 40 mg Oral Daily Yady Gale MD   enoxaparin 40 mg Subcutaneous Daily Yady Gale MD   furosemide 20 mg Oral BID (diuretic) Yady Gale MD   gabapentin 300 mg Oral HS Yady Gale MD   labetalol 100 mg Oral BID Yady Gale MD   lisinopril 5 mg Oral Daily Yady Gale MD   senna 2 tablet Oral HS Yady Gale MD   simethicone 80 mg Oral 4x Daily PRN Yady Gale MD   tamsulosin 0 4 mg Oral Daily Yady Gale MD        Today, Patient Was Seen By: Selena Clunes Holter, DO    ** Please Note: Dictation voice to text software may have been used in the creation of this document  **

## 2019-07-11 NOTE — ASSESSMENT & PLAN NOTE
· Initial patient complaints of shortness of breath and dyspnea on exertion w/ an onset 3-4 prior to admission  Also, he noted mild lower extremity edema  BNP unremarkable  No acute findings on EKG  XR Chest demonstrated no acute cardiopulmonary disease  CTA negative for PE   · Unclear etiology presently  · Stress Test results pending-last stress test (2017) unremarkable  · Negative troponin x3    · Monitor vitals, ST segment elevation and I&Os  · Monitor ambulatory pulse oximetry  · Consider pulmonary testing and outpatient referral for spirometry if stress test is negative  · Consider outpatient referral for echocardiogram   · Likely referral for sleep study

## 2019-07-11 NOTE — DISCHARGE SUMMARY
Discharge- Louann Tucker 1940, 69 y o  male MRN: [id number]    Unit/Bed#: -01 Encounter: [id number]    Primary Care Provider: Elisha Cristobal MD   Date and time admitted to hospital: 7/10/2019 11:21 AM        * SOB (shortness of breath)  Assessment & Plan  · Initial patient complaints of shortness of breath and dyspnea on exertion w/ an onset 3-4 prior to admission  Also, he noted mild lower extremity edema  BNP unremarkable  No acute findings on EKG  XR Chest demonstrated no acute cardiopulmonary disease  CTA negative for PE   · Unclear etiology presently  · Stress Test results pending-last stress test (2017) unremarkable  · Negative troponin x3    · Monitor vitals, ST segment elevation and I&Os  · Monitor ambulatory pulse oximetry  · Referral to outpatient pulmonology for spirometry and sleep study for obstructive sleep apnea  · Discussed with the patient follow up with outpatient cardiologist (Dr Nael Bates)  3-vessel CAD  Assessment & Plan  · S/P CABG (2013)  · Continue medical management w/ Aspirin, Labetalol and Atorvastatin  Aneurysm of ascending aorta [facility])  Assessment & Plan  · S/P AAA repair (2007)  · Follows w/ Dr Hanna Murray (CT surgery)  Benign essential hypertension  Assessment & Plan  · Continue Lisinopril and Labetalol  · Continue monitoring vitals  Intervertebral disc disorder with radiculopathy of lumbar region  Assessment & Plan  · Follows w/ pain management  · Receives KEARA intermittently  · Continue Neurontin  Discharging Physician / Practitioner: Ruben Delgado DO  PCP: Elisha Cristobal MD  Admission Date:   Admission Orders (From admission, onward)    Ordered        07/10/19 1510  Place in Observation  Once             Discharge Date: 07/11/19    Resolved Problems  Date Reviewed: 7/11/2019    None          Consultations During Hospital Stay:  · Cardiology    Procedures Performed:   · None      Significant Findings / Test Results:   · CTA with IV contrast:  See below  · XR chest 1 view:  See below  · NM myocardial perfusion SPECT:  Abnormal study after pharmacologic stress  Small amount of ischemia in the inferior lateral region appreciated  This study was compared to previous reports of stress testing  There is no interval change of myocardial perfusion in comparison to prior stress studies  Incidental Findings:   · None  Test Results Pending at Discharge (will require follow up): · None  Outpatient Tests Requested:  · Referral to outpatient pulmonology for pulmonary function testing and testing for obstructive sleep apnea  Complications:  None  Reason for Admission:  Shortness of breath  Hospital Course:     Ck Akers is a 69 y o  male patient who originally presented [facility] on 7/10/2019 due to shortness of breath and dyspnea on exertion beginning 3-4 days prior to admission  Additionally, patient complained of mild bilateral lower extremity edema  EKG demonstrated no signs of ischemia/injury/right heart strain  Troponins were negative x3  A trial of Lasix 20 mg b i d  was ordered  XR chest demonstrated no acute cardiopulmonary disease  CT AA with IV contrast demonstrated no evidence of pulmonary emboli and a 4 7 cm ascending thoracic aneurysm (patient followed by Dr David Merchant)  Patient was admitted to [address] [place] [address]  Patient was status post CABG and was medically managed using aspirin, atorvastatin and labetalol  Additionally, patient's hypertension was managed using lisinopril and labetalol  Patient's radicular pain was managed using gabapentin  Cardiology was consulted and a pharmacologic stress test was administered  Stress test demonstrated an abnormal study with a small amount of ischemia in the inferior lateral region  However, there is no interval change of myocardial perfusion comparison to previous stress study in 2017   Inpatient medicine discussed with patient and his wife recommended follow-up with outpatient cardiologist-patient has appointment within the next 10 days  Also, outpatient pulmonology referral ordered for pulmonary function testing and evaluation of obstructive sleep apnea  The patient, initially admitted to the hospital as inpatient, was discharged earlier than expected given the following: No change of myocardial perfusion in comparison to previous stress study       Please see above list of diagnoses and related plan for additional information  Condition at Discharge: good     Discharge Day Visit / Exam:     Subjective:  No complaints stated by patient at time of evaluation  Patient denies shortness of breath and chest pain/discomfort  Vitals: Blood Pressure: 132/84 (07/11/19 0700)  Pulse: 70 (07/11/19 0700)  Temperature: 97 5 °F (36 4 °C) (07/11/19 0700)  Temp Source: Oral (07/11/19 0700)  Respirations: 18 (07/11/19 0700)  Height: 5' 11" (180 3 cm) (07/10/19 1642)  Weight - Scale: 111 kg (244 lb 7 8 oz) (07/10/19 1642)  SpO2: 95 % (07/11/19 0700)  Exam:   Physical Exam   Constitutional: He is oriented to person, place, and time  He appears well-developed and well-nourished  No distress  HENT:   Head: Normocephalic and atraumatic  Eyes: Pupils are equal, round, and reactive to light  EOM are normal    Neck: Normal range of motion  Cardiovascular: Normal rate, regular rhythm, normal heart sounds and intact distal pulses  Exam reveals no friction rub  No murmur heard  Pulmonary/Chest: Effort normal and breath sounds normal  No stridor  No respiratory distress  He has no wheezes  He has no rales  Abdominal: Soft  Bowel sounds are normal  He exhibits no distension  There is no tenderness  There is no guarding  Musculoskeletal: Normal range of motion  He exhibits edema  Trace edema noted bilaterally  Neurological: He is alert and oriented to person, place, and time  Skin: Skin is warm and dry   Capillary refill takes less than 2 seconds  He is not diaphoretic  Psychiatric: He has a normal mood and affect  His behavior is normal  Judgment and thought content normal        Discussion with Family:  Discussed with patient's wife discharge instructions and outpatient follow-up  Discharge instructions/Information to patient and family:   See after visit summary for information provided to patient and family  Provisions for Follow-Up Care:  See after visit summary for information related to follow-up care and any pertinent home health orders  Disposition:     Home    For Discharges to [facility] SNF:   · Not Applicable to this Patient - Not Applicable to this Patient    Planned Readmission:  None  Discharge Statement:  I spent 45 minutes discharging the patient  This time was spent on the day of discharge  I had direct contact with the patient on the day of discharge  Greater than 50% of the total time was spent examining patient, answering all patient questions, arranging and discussing plan of care with patient as well as directly providing post-discharge instructions  Additional time then spent on discharge activities  Discharge Medications:  See after visit summary for reconciled discharge medications provided to patient and family        ** Please Note: This note has been constructed using a voice recognition system **

## 2019-07-11 NOTE — ASSESSMENT & PLAN NOTE
· Initial patient complaints of shortness of breath and dyspnea on exertion w/ an onset 3-4 prior to admission  Also, he noted mild lower extremity edema  BNP unremarkable  No acute findings on EKG  XR Chest demonstrated no acute cardiopulmonary disease  CTA negative for PE   · Unclear etiology presently  · Stress Test results pending-last stress test (2017) unremarkable  · Negative troponin x3    · Monitor vitals, ST segment elevation and I&Os  · Monitor ambulatory pulse oximetry  · Referral to outpatient pulmonology for spirometry and sleep study for obstructive sleep apnea  · Discussed with the patient follow up with outpatient cardiologist (Dr Rica Oliva)

## 2019-07-19 ENCOUNTER — OFFICE VISIT (OUTPATIENT)
Dept: INTERNAL MEDICINE CLINIC | Facility: CLINIC | Age: 79
End: 2019-07-19
Payer: MEDICARE

## 2019-07-19 VITALS
OXYGEN SATURATION: 97 % | SYSTOLIC BLOOD PRESSURE: 130 MMHG | WEIGHT: 244 LBS | HEIGHT: 71 IN | RESPIRATION RATE: 16 BRPM | BODY MASS INDEX: 34.16 KG/M2 | DIASTOLIC BLOOD PRESSURE: 82 MMHG | HEART RATE: 61 BPM

## 2019-07-19 DIAGNOSIS — R06.02 SOB (SHORTNESS OF BREATH): Primary | ICD-10-CM

## 2019-07-19 DIAGNOSIS — I71.2 ANEURYSM OF ASCENDING AORTA (HCC): ICD-10-CM

## 2019-07-19 DIAGNOSIS — G47.33 OBSTRUCTIVE SLEEP APNEA: ICD-10-CM

## 2019-07-19 DIAGNOSIS — I10 BENIGN ESSENTIAL HYPERTENSION: ICD-10-CM

## 2019-07-19 DIAGNOSIS — I25.10 ARTERIOSCLEROTIC CARDIOVASCULAR DISEASE: ICD-10-CM

## 2019-07-19 PROCEDURE — 1111F DSCHRG MED/CURRENT MED MERGE: CPT | Performed by: INTERNAL MEDICINE

## 2019-07-19 PROCEDURE — 99495 TRANSJ CARE MGMT MOD F2F 14D: CPT | Performed by: INTERNAL MEDICINE

## 2019-07-19 RX ORDER — ATORVASTATIN CALCIUM 40 MG/1
40 TABLET, FILM COATED ORAL DAILY
Qty: 90 TABLET | Refills: 3 | Status: CANCELLED | OUTPATIENT
Start: 2019-07-19

## 2019-07-19 NOTE — PROGRESS NOTES
Assessment/Plan:     Obstructive sleep apnea  He has been diagnosed with this  He cannot tolerate the CPAP    Benign essential hypertension  Controlled    Aneurysm of ascending aorta (HCC)  Stable    SOB (shortness of breath)  PFTs and pulmonology consultation         Problem List Items Addressed This Visit        Respiratory    Obstructive sleep apnea     He has been diagnosed with this  He cannot tolerate the CPAP            Cardiovascular and Mediastinum    Arteriosclerotic cardiovascular disease    Benign essential hypertension     Controlled         Aneurysm of ascending aorta (HCC)     Stable            Other    SOB (shortness of breath) - Primary     PFTs and pulmonology consultation         Relevant Orders    Complete pulmonary function test    Ambulatory referral to Pulmonology           Subjective:     Patient ID: Luz Maria Corona is a 69 y o  male  HPI  Admitted for SOB of unclear etiology  Quit smoking at 81 y/o and smoked 1 ppd since his 25s  3 weeks ago, started with random episodes of SOB at rest  Sensation of not getting a deep enough breath and resolves on its own after half an hour or so  CTA of chest -no PE  Nuclear stress test abnormal with a small amount of ischemia in the inferolateral region  This appears stable, no interval change compared to previous tests  He did have some LE edema which improved with diuresis  BNP was normal  SOB did not change  He has not needed to take the diuretic since discharge  PFTs and pulm consultation recommended    Few episodes since he has been home that resolves on its own  Review of Systems   Constitutional: Negative for fatigue, fever and unexpected weight change  HENT: Negative for ear pain, hearing loss, sinus pressure, sinus pain and sore throat  Respiratory: Positive for shortness of breath  Negative for cough and wheezing  Cardiovascular: Negative for chest pain, palpitations and leg swelling     Gastrointestinal: Negative for abdominal pain, constipation, diarrhea, nausea and vomiting  Musculoskeletal: Negative for arthralgias and myalgias  Neurological: Negative for dizziness and headaches  Objective:     Physical Exam   Constitutional: He is oriented to person, place, and time  He appears well-developed and well-nourished  HENT:   Head: Normocephalic and atraumatic  Right Ear: External ear normal    Left Ear: External ear normal    Mouth/Throat: Oropharynx is clear and moist    Eyes: Conjunctivae are normal    Neck: Neck supple  Cardiovascular: Normal rate, regular rhythm and normal heart sounds  No murmur heard  Pulmonary/Chest: Effort normal and breath sounds normal  No respiratory distress  He has no wheezes  He has no rales  Abdominal: Soft  Bowel sounds are normal  He exhibits no distension and no mass  There is no tenderness  There is no rebound and no guarding  Musculoskeletal: Normal range of motion  Neurological: He is alert and oriented to person, place, and time  Skin: Skin is warm and dry  Psychiatric: He has a normal mood and affect  His behavior is normal  Judgment and thought content normal          Vitals:    07/19/19 1032   BP: 130/82   Pulse: 61   Resp: 16   SpO2: 97%   Weight: 111 kg (244 lb)   Height: 5' 11" (1 803 m)       Transitional Care Management Review:  Wai Woo is a 69 y o  male here for TCM follow up  During the TCM phone call patient stated:    TCM Call (since 6/18/2019)     Date and time call was made  7/11/2019  3:30 PM    Hospital care reviewed  Records reviewed    Patient was hospitialized at  [address] [place] [address]    Date of Admission  07/10/19    Date of discharge  07/11/19    Diagnosis  SOB    Disposition  Home    Were the patients medications reviewed and updated  No    Current Symptoms  None      TCM Call (since 6/18/2019)     Post hospital issues  None    Should patient be enrolled in anticoag monitoring? No    Scheduled for follow up?   Yes    Did you obtain your prescribed medications  Yes    Do you need help managing your prescriptions or medications  No    I have advised the patient to call PCP with any new or worsening symptoms  MR Dawood    Are you recieving any outpatient services  No    Are you recieving home care services  No    Are you using any community resources  No    Have you fallen in the last 12 months  No    Interperter language line needed  No    Counseling  Patient          Cici Hightower MD

## 2019-07-19 NOTE — ED PROVIDER NOTES
History  Chief Complaint   Patient presents with    Shortness of Breath     pt c/o intermit  increased PANDA 5-7days  pt also states at rest he is becoming sob  69 yr male with remote hx fo cabg-- c/o 1 week of panda-- no cp upon exertion -- no fevers-uri/cough -- no orthopnea/pnd- normal urien output- no melena/brpbr      History provided by:  Patient and spouse   used: No    Shortness of Breath   Associated symptoms: no cough and no wheezing        Prior to Admission Medications   Prescriptions Last Dose Informant Patient Reported? Taking?   aspirin (ECOTRIN LOW STRENGTH) 81 mg EC tablet  Self Yes No   Sig: Take 1 tablet by mouth daily   atorvastatin (LIPITOR) 40 mg tablet  Self No No   Sig: Take 1 tablet (40 mg total) by mouth daily   gabapentin (NEURONTIN) 300 mg capsule  Self No No   Sig: Take 1 capsule (300 mg total) by mouth daily at bedtime   labetalol (NORMODYNE) 100 mg tablet   No No   Sig: Take 1 tablet (100 mg total) by mouth 2 (two) times a day   lisinopril (ZESTRIL) 5 mg tablet  Self No No   Sig: Take 1 tablet (5 mg total) by mouth daily   senna (SENOKOT) 8 6 MG tablet  Self Yes No   Sig: Take 1 tablet by mouth as needed     tamsulosin (FLOMAX) 0 4 mg  Self Yes No   Sig: Take 1 capsule by mouth daily      Facility-Administered Medications: None       Past Medical History:   Diagnosis Date    Abdominal aortic aneurysm (HCC)     Chronic pain     back since July    Chronic pain disorder     lumbar    Coronary artery disease     History of transfusion     Hyperlipidemia     Hypertension     Sleep apnea     Subconjunctival hemorrhage        Past Surgical History:   Procedure Laterality Date    ABDOMINAL AORTIC ANEURYSM REPAIR  08/09/2007    2 dock limbs with visc extens prosth    COLONOSCOPY      CORONARY ARTERY BYPASS GRAFT      GAB-LAD, sequential vein graft to OM1 and OM2, single VG-posterior descending   Onset: 2003       Family History   Problem Relation Age of Onset    Heart disease Mother     Stroke Father         stroke syndrome    Aneurysm Brother         abdominal    Aortic aneurysm Brother         ascending    Coronary artery disease Family     Hypertension Family     Other Son         gioblastoma multiforme     I have reviewed and agree with the history as documented  Social History     Tobacco Use    Smoking status: Former Smoker     Packs/day: 1 00     Years: 40 00     Pack years: 40 00     Last attempt to quit: 2012     Years since quittin 9    Smokeless tobacco: Never Used   Substance Use Topics    Alcohol use: Yes     Comment: rare    Drug use: No        Review of Systems   Constitutional: Negative  HENT: Negative  Eyes: Negative  Respiratory: Positive for shortness of breath  Negative for apnea, cough, choking, chest tightness, wheezing and stridor  Wang   Cardiovascular: Negative  Gastrointestinal: Negative  Endocrine: Negative  Genitourinary: Negative  Musculoskeletal: Negative  Skin: Negative  Allergic/Immunologic: Negative  Neurological: Negative  Hematological: Negative  Psychiatric/Behavioral: Negative  Physical Exam  Physical Exam   Constitutional: He is oriented to person, place, and time  He appears well-developed and well-nourished  Non-toxic appearance  He does not appear ill  No distress  He is not intubated  avss--pulse ox 97 % on ra- interpretation is normal- no intervention- well appearing- in nad   HENT:   Head: Normocephalic and atraumatic  Mouth/Throat: Oropharynx is clear and moist  No oropharyngeal exudate or posterior oropharyngeal edema  Eyes: Pupils are equal, round, and reactive to light  EOM are normal    Mm pink   Neck: Normal range of motion  Neck supple  No hepatojugular reflux and no JVD present  No tracheal deviation present  No thyromegaly present  Cardiovascular: Normal rate, regular rhythm, normal heart sounds and intact distal pulses    No extrasystoles are present  Exam reveals no gallop, no friction rub and no decreased pulses  No murmur heard  Pulmonary/Chest: Effort normal and breath sounds normal  No accessory muscle usage or stridor  No apnea, no tachypnea and no bradypnea  He is not intubated  No respiratory distress  He has no decreased breath sounds  He has no wheezes  He has no rhonchi  He has no rales  Chest wall is not dull to percussion  He exhibits no mass, no tenderness, no bony tenderness, no laceration, no crepitus, no edema, no deformity, no swelling and no retraction  Abdominal: Soft  He exhibits no distension, no ascites and no mass  There is no splenomegaly or hepatomegaly  There is no tenderness  There is no rebound and no guarding  Musculoskeletal: Normal range of motion  Right lower leg: Normal  He exhibits edema  He exhibits no tenderness  Left lower leg: Normal  He exhibits edema  He exhibits no tenderness  Trace ble pretibial edema- nt- no asym/ erythema- equal bilateral radial/dp pulses   Lymphadenopathy:     He has no cervical adenopathy  Neurological: He is alert and oriented to person, place, and time  He is not disoriented  No cranial nerve deficit  Skin: Skin is warm and dry  Capillary refill takes less than 2 seconds  No ecchymosis and no rash noted  He is not diaphoretic  No cyanosis or erythema  No pallor  Nails show no clubbing  Psychiatric: He has a normal mood and affect  His behavior is normal  His mood appears not anxious  He is not agitated  Nursing note and vitals reviewed        Vital Signs  ED Triage Vitals   Temperature Pulse Respirations Blood Pressure SpO2   07/10/19 1125 07/10/19 1123 07/10/19 1123 07/10/19 1123 07/10/19 1123   98 °F (36 7 °C) 70 20 (!) 180/88 95 %      Temp Source Heart Rate Source Patient Position - Orthostatic VS BP Location FiO2 (%)   07/10/19 1125 07/10/19 1123 07/10/19 1123 07/10/19 1123 --   Oral Monitor Sitting Right arm       Pain Score       07/10/19 1534       No Pain           Vitals:    07/10/19 1534 07/10/19 1642 07/10/19 2200 07/11/19 0700   BP: 165/86 (!) 176/89 118/75 132/84   Pulse: 66 68 65 70   Patient Position - Orthostatic VS: Lying Sitting Sitting Sitting         Visual Acuity      ED Medications  Medications   iohexol (OMNIPAQUE) 350 MG/ML injection (MULTI-DOSE) 85 mL (85 mL Intravenous Given 7/10/19 1324)   regadenoson (LEXISCAN) injection 0 4 mg (0 4 mg Intravenous Given 7/11/19 1001)       Diagnostic Studies  Results Reviewed     Procedure Component Value Units Date/Time    NT-BNP PRO [[id number]]  (Normal) Collected:  07/10/19 1154    Lab Status:  Final result Specimen:  Blood from Arm, Right Updated:  07/10/19 1245     NT-proBNP 141 pg/mL     Troponin I [[id number]]  (Normal) Collected:  07/10/19 1154    Lab Status:  Final result Specimen:  Blood from Arm, Right Updated:  07/10/19 1241     Troponin I <0 02 ng/mL     D-Dimer [[id number]]  (Abnormal) Resulted:  07/10/19 1228    Lab Status:  Final result Specimen:  Blood Updated:  07/10/19 1228     D-Dimer, Quant 1,378 ng/ml (FEU)     Comprehensive metabolic panel [[id number]]  (Abnormal) Collected:  07/10/19 1154    Lab Status:  Final result Specimen:  Blood from Arm, Right Updated:  07/10/19 1225     Sodium 135 mmol/L      Potassium 5 3 mmol/L      Chloride 100 mmol/L      CO2 28 mmol/L      ANION GAP 7 mmol/L      BUN 11 mg/dL      Creatinine 1 00 mg/dL      Glucose 108 mg/dL      Calcium 8 8 mg/dL      AST 25 U/L      ALT 26 U/L      Alkaline Phosphatase 79 U/L      Total Protein 7 1 g/dL      Albumin 3 8 g/dL      Total Bilirubin 1 00 mg/dL      eGFR 72 ml/min/1 73sq m     Narrative:       Taylor guidelines for Chronic Kidney Disease (CKD):     Stage 1 with normal or high GFR (GFR > 90 mL/min/1 73 square meters)    Stage 2 Mild CKD (GFR = 60-89 mL/min/1 73 square meters)    Stage 3A Moderate CKD (GFR = 45-59 mL/min/1 73 square meters)    Stage 3B Moderate CKD (GFR = 30-44 mL/min/1 73 square meters)    Stage 4 Severe CKD (GFR = 15-29 mL/min/1 73 square meters)    Stage 5 End Stage CKD (GFR <15 mL/min/1 73 square meters)  Note: GFR calculation is accurate only with a steady state creatinine    CBC and differential [[id number]]  (Abnormal) Collected:  07/10/19 1154    Lab Status:  Final result Specimen:  Blood from Arm, Right Updated:  07/10/19 1201     WBC 5 02 Thousand/uL      RBC 4 30 Million/uL      Hemoglobin 14 3 g/dL      Hematocrit 42 8 %       fL      MCH 33 3 pg      MCHC 33 4 g/dL      RDW 13 0 %      MPV 9 7 fL      Platelets 639 Thousands/uL      nRBC 0 /100 WBCs      Neutrophils Relative 72 %      Immat GRANS % 0 %      Lymphocytes Relative 13 %      Monocytes Relative 11 %      Eosinophils Relative 3 %      Basophils Relative 1 %      Neutrophils Absolute 3 61 Thousands/µL      Immature Grans Absolute 0 02 Thousand/uL      Lymphocytes Absolute 0 66 Thousands/µL      Monocytes Absolute 0 55 Thousand/µL      Eosinophils Absolute 0 14 Thousand/µL      Basophils Absolute 0 04 Thousands/µL                  CTA ED chest PE study   Final Result by Lila Moss MD (07/10 1403)      No evidence of pulmonary embolus  4 7 cm ascending thoracic aneurysm  Workstation performed: [id number]         XR chest 1 view portable   Final Result by Blanche Wallace MD (07/10 1257)      No acute cardiopulmonary disease              Workstation performed: [id number]                    Procedures  Procedures       ED Course  ED Course as of Jul 19 1256   Wed Jul 10, 2019   1159 Er md note- pt took all of his medications that he should have up into now      1202 Er md note--  2014 echo- 2017 echo stress test- 5//19 qand 2017 ct of chest  alll reviewed by er md     - labs from 7/8/19 reviewed by er med      1234 Er md note- note- cbc/ cmp first nursed      26 Cxr portable compared to previous from  10/5/18-- sternotomy- - cardiomegaly is new-- no free/sq air/ no infiltrate/ ptx/ pulm edema/ PLEURAL EFFUSIONS      1345 ER MD MEDICAL DECISION MAKING NOTE- PT IS LOW RISK FOR  BY WELLS SCORE - SCORE OF 0- FAILS PERC BY AGE- WILL CHECK D DIMER AND USE 10 X AGE AS CUTOFF      1439 Er md note- disposition d/w pt and wife- pt offered hosp admit for progressive zapata over 1 month to now at rest- for possible cardiac cause-- pt wants to discussed this with wife  - will check back                                  MDM    Disposition  Final diagnoses:   SOB (shortness of breath)     Time reflects when diagnosis was documented in both MDM as applicable and the Disposition within this note     Time User Action Codes Description Comment    7/11/2019  1:52 PM Holter, [place] C Add [R06 02] SOB (shortness of breath)     7/11/2019  1:52 PM Holter, [place] C Modify [R06 02] SOB (shortness of breath)       ED Disposition     ED Disposition Condition Date/Time Comment    Admit Stable Wed Jul 10, 2019  3:09 PM Case was discussed withdr manjarrez and the patient's admission status was agreed to be Admission Status: observation status to the service of Dr Marshel Frankel           Follow-up Information    None         Discharge Medication List as of 7/11/2019  2:25 PM      START taking these medications    Details   furosemide (LASIX) 20 mg tablet Take 1 tablet (20 mg total) by mouth as needed (SOB, LE edema), Starting Thu 7/11/2019, Normal         CONTINUE these medications which have NOT CHANGED    Details   aspirin (ECOTRIN LOW STRENGTH) 81 mg EC tablet Take 1 tablet by mouth daily, Starting Fri 9/7/2012, Historical Med      atorvastatin (LIPITOR) 40 mg tablet Take 1 tablet (40 mg total) by mouth daily, Starting Mon 5/14/2018, Normal      gabapentin (NEURONTIN) 300 mg capsule Take 1 capsule (300 mg total) by mouth daily at bedtime, Starting Wed 3/20/2019, Normal      labetalol (NORMODYNE) 100 mg tablet Take 1 tablet (100 mg total) by mouth 2 (two) times a day, Starting Wed 7/10/2019, Normal lisinopril (ZESTRIL) 5 mg tablet Take 1 tablet (5 mg total) by mouth daily, Starting Tue 8/21/2018, Normal      senna (SENOKOT) 8 6 MG tablet Take 1 tablet by mouth as needed  , Historical Med      tamsulosin (FLOMAX) 0 4 mg Take 1 capsule by mouth daily, Historical Med      finasteride (PROSCAR) 5 mg tablet Take 5 mg by mouth daily , Starting Fri 3/30/2018, Historical Med           Outpatient Discharge Orders   Ambulatory referral to Pulmonology   Standing Status: Future Standing Exp   Date: 01/11/20      Discharge Diet     Activity as tolerated       ED Provider  Electronically Signed by           Andie Larios MD  07/19/19 8816

## 2019-07-24 ENCOUNTER — OFFICE VISIT (OUTPATIENT)
Dept: CARDIOLOGY CLINIC | Facility: CLINIC | Age: 79
End: 2019-07-24
Payer: MEDICARE

## 2019-07-24 ENCOUNTER — OFFICE VISIT (OUTPATIENT)
Dept: PULMONOLOGY | Facility: CLINIC | Age: 79
End: 2019-07-24
Payer: MEDICARE

## 2019-07-24 VITALS
DIASTOLIC BLOOD PRESSURE: 80 MMHG | HEIGHT: 71 IN | HEART RATE: 63 BPM | OXYGEN SATURATION: 97 % | SYSTOLIC BLOOD PRESSURE: 124 MMHG | BODY MASS INDEX: 33.88 KG/M2 | WEIGHT: 242 LBS

## 2019-07-24 VITALS
RESPIRATION RATE: 14 BRPM | OXYGEN SATURATION: 94 % | BODY MASS INDEX: 33.88 KG/M2 | HEIGHT: 71 IN | WEIGHT: 242 LBS | SYSTOLIC BLOOD PRESSURE: 118 MMHG | HEART RATE: 66 BPM | TEMPERATURE: 97.8 F | DIASTOLIC BLOOD PRESSURE: 70 MMHG

## 2019-07-24 DIAGNOSIS — R06.02 SOB (SHORTNESS OF BREATH): Primary | ICD-10-CM

## 2019-07-24 DIAGNOSIS — I25.10 3-VESSEL CAD: Primary | ICD-10-CM

## 2019-07-24 DIAGNOSIS — I10 ESSENTIAL HYPERTENSION: ICD-10-CM

## 2019-07-24 DIAGNOSIS — E78.2 MIXED HYPERLIPIDEMIA: ICD-10-CM

## 2019-07-24 DIAGNOSIS — I10 HYPERTENSION, UNSPECIFIED TYPE: ICD-10-CM

## 2019-07-24 PROCEDURE — 99214 OFFICE O/P EST MOD 30 MIN: CPT | Performed by: INTERNAL MEDICINE

## 2019-07-24 PROCEDURE — 94618 PULMONARY STRESS TESTING: CPT | Performed by: INTERNAL MEDICINE

## 2019-07-24 PROCEDURE — 99204 OFFICE O/P NEW MOD 45 MIN: CPT | Performed by: INTERNAL MEDICINE

## 2019-07-24 RX ORDER — LISINOPRIL 5 MG/1
5 TABLET ORAL DAILY
Qty: 90 TABLET | Refills: 3 | Status: SHIPPED | OUTPATIENT
Start: 2019-07-24 | End: 2020-12-07 | Stop reason: SDUPTHER

## 2019-07-24 RX ORDER — ATORVASTATIN CALCIUM 40 MG/1
40 TABLET, FILM COATED ORAL DAILY
Qty: 90 TABLET | Refills: 3 | Status: SHIPPED | OUTPATIENT
Start: 2019-07-24 | End: 2020-07-31

## 2019-07-24 RX ORDER — ATORVASTATIN CALCIUM 40 MG/1
40 TABLET, FILM COATED ORAL DAILY
Qty: 90 TABLET | Refills: 3 | Status: SHIPPED | OUTPATIENT
Start: 2019-07-24 | End: 2019-07-24

## 2019-07-24 RX ORDER — LABETALOL 100 MG/1
100 TABLET, FILM COATED ORAL 2 TIMES DAILY
Qty: 180 TABLET | Refills: 0 | Status: SHIPPED | OUTPATIENT
Start: 2019-07-24 | End: 2019-07-24

## 2019-07-24 RX ORDER — LISINOPRIL 5 MG/1
5 TABLET ORAL DAILY
Qty: 90 TABLET | Refills: 3 | Status: SHIPPED | OUTPATIENT
Start: 2019-07-24 | End: 2019-07-24

## 2019-07-24 RX ORDER — LABETALOL 100 MG/1
100 TABLET, FILM COATED ORAL 2 TIMES DAILY
Qty: 180 TABLET | Refills: 0 | Status: SHIPPED | OUTPATIENT
Start: 2019-07-24 | End: 2019-09-25 | Stop reason: SDUPTHER

## 2019-07-24 NOTE — PROGRESS NOTES
Cardiology Follow Up    Analisa Low  1940  [id number]  [facility] [address] [facility] [facility] [address]  [address] [facility] [phone]    This 69-year-old gentleman is seen in 1 year follow-up of his coronary disease, recent hospitalization and reorder of current medications, regimen not changed  He has noted worsening dyspnea, central chest tension which is relieved with deep inspiration  He feels that he cannot get enough air and his lungs  He was seen in [address] [place] [address] emergency room and hospitalized overnight 2 weeks ago  Cardiac testing revealed stable abnormality, old scar mildly reduced EF in the 46% range and no ischemia  These results of been present ever since coronary bypass surgery    He has not developed heart failure  He has no symptoms to suggest a arrhythmia  He probably has severe sleep apnea but was unable to be compliant with CPAP after testing 6 years ago  He was a heavy smoker but quit about 10 years ago  He is still drinking some beer on occasion    He has aneurysm disease, CT surgery said that his thoracic aneurysm is very stable and he could be discharged  He had AAA resection 2017  Recently he has noted edema more on the right than left  He has moderately severe varicose veins on the right, mild on the left he has p r n  Furosemide which he was encouraged to use only on an as-needed basis      1  3-vessel CAD     2  Essential hypertension  labetalol (NORMODYNE) 100 mg tablet    DISCONTINUED: labetalol (NORMODYNE) 100 mg tablet   3  Mixed hyperlipidemia  atorvastatin (LIPITOR) 40 mg tablet    DISCONTINUED: atorvastatin (LIPITOR) 40 mg tablet   4   Hypertension, unspecified type  lisinopril (ZESTRIL) 5 mg tablet    DISCONTINUED: lisinopril (ZESTRIL) 5 mg tablet       Interval History:  Recent hospitalization, atypical chest pain    Patient Active Problem List   Diagnosis    Positive colorectal cancer screening using Cologuard test    Benign essential hypertension    Hyperlipidemia    Impaired fasting glucose    Microscopic hematuria    Obesity    Sleep apnea    Subclinical hypothyroidism    3-vessel CAD    Aneurysm of abdominal aorta (HCC)    Aneurysm of ascending aorta (HCC)    Aortic valve disorder    Arteriosclerotic cardiovascular disease    Disc degeneration, lumbar    Herniated lumbar intervertebral disc    Lumbar radiculopathy    Intervertebral disc disorder with radiculopathy of lumbar region    Spondylolisthesis at L4-L5 level    Chronic pain syndrome    SOB (shortness of breath)    Hypertension     Past Medical History:   Diagnosis Date    Abdominal aortic aneurysm (HCC)     Chronic pain     back since July    Chronic pain disorder     lumbar    Coronary artery disease     History of transfusion     Hyperlipidemia     Hypertension     Sleep apnea     Subconjunctival hemorrhage      Social History     Socioeconomic History    Marital status: /Civil Union     Spouse name: Not on file    Number of children: Not on file    Years of education: Not on file    Highest education level: Not on file   Occupational History    Occupation: retired   Social Needs    Financial resource strain: Not on file    Food insecurity:     Worry: Not on file     Inability: Not on file   tic needs:     Medical: Not on file     Non-medical: Not on file   Tobacco Use    Smoking status: Former Smoker     Packs/day: 1 00     Years: 40 00     Pack years: 40 00     Last attempt to quit: 2012     Years since quittin 9    Smokeless tobacco: Never Used   Substance and Sexual Activity    Alcohol use: Yes     Comment: rare    Drug use: No    Sexual activity: Never   Lifestyle    Physical activity:     Days per week: Not on file     Minutes per session: Not on file    Stress: Not on file   Relationships    Social connections:     Talks on phone: Not on file     Gets together: Not on file     Attends Sabianist service: Not on file     Active member of club or organization: Not on file     Attends meetings of clubs or organizations: Not on file     Relationship status: Not on file    Intimate partner violence:     Fear of current or ex partner: Not on file     Emotionally abused: Not on file     Physically abused: Not on file     Forced sexual activity: Not on file   Other Topics Concern    Not on file   Social History Narrative    Not on file      Family History   Problem Relation Age of Onset    Heart disease Mother     Stroke Father         stroke syndrome    Aneurysm Brother         abdominal    Aortic aneurysm Brother         ascending    Coronary artery disease Family     Hypertension Family     Other Son         gioblastoma multiforme     Past Surgical History:   Procedure Laterality Date    ABDOMINAL AORTIC ANEURYSM REPAIR  08/09/2007    2 dock limbs with visc extens prosth    COLONOSCOPY      CORONARY ARTERY BYPASS GRAFT      GAB-LAD, sequential vein graft to OM1 and OM2, single VG-posterior descending   Onset: 2003       Current Outpatient Medications:     aspirin (ECOTRIN LOW STRENGTH) 81 mg EC tablet, Take 1 tablet by mouth daily, Disp: , Rfl:     atorvastatin (LIPITOR) 40 mg tablet, Take 1 tablet (40 mg total) by mouth daily, Disp: 90 tablet, Rfl: 3    furosemide (LASIX) 20 mg tablet, Take 1 tablet (20 mg total) by mouth as needed (SOB, LE edema), Disp: 10 tablet, Rfl: 0    gabapentin (NEURONTIN) 300 mg capsule, Take 1 capsule (300 mg total) by mouth daily at bedtime, Disp: 90 capsule, Rfl: 5    labetalol (NORMODYNE) 100 mg tablet, Take 1 tablet (100 mg total) by mouth 2 (two) times a day, Disp: 180 tablet, Rfl: 0    lisinopril (ZESTRIL) 5 mg tablet, Take 1 tablet (5 mg total) by mouth daily, Disp: 90 tablet, Rfl: 3    senna (SENOKOT) 8 6 MG tablet, Take 1 tablet by mouth as needed  , Disp: , Rfl:     tamsulosin (FLOMAX) 0 4 mg, Take 1 capsule by mouth daily, Disp: , Rfl:   Allergies   Allergen Reactions    Meloxicam Edema       Labs:  Admission on 07/10/2019, Discharged on 07/11/2019   Component Date Value    WBC 07/10/2019 5 02     RBC 07/10/2019 4 30     Hemoglobin 07/10/2019 14 3     Hematocrit 07/10/2019 42 8     MCV 07/10/2019 100*    MCH 07/10/2019 33 3     MCHC 07/10/2019 33 4     RDW 07/10/2019 13 0     MPV 07/10/2019 9 7     Platelets 15/82/6947 146*    nRBC 07/10/2019 0     Neutrophils Relative 07/10/2019 72     Immat GRANS % 07/10/2019 0     Lymphocytes Relative 07/10/2019 13*    Monocytes Relative 07/10/2019 11     Eosinophils Relative 07/10/2019 3     Basophils Relative 07/10/2019 1     Neutrophils Absolute 07/10/2019 3 61     Immature Grans Absolute 07/10/2019 0 02     Lymphocytes Absolute 07/10/2019 0 66     Monocytes Absolute 07/10/2019 0 55     Eosinophils Absolute 07/10/2019 0 14     Basophils Absolute 07/10/2019 0 04     Sodium 07/10/2019 135*    Potassium 07/10/2019 5 3     Chloride 07/10/2019 100     CO2 07/10/2019 28     ANION GAP 07/10/2019 7     BUN 07/10/2019 11     Creatinine 07/10/2019 1 00     Glucose 07/10/2019 108     Calcium 07/10/2019 8 8     AST 07/10/2019 25     ALT 07/10/2019 26     Alkaline Phosphatase 07/10/2019 79     Total Protein 07/10/2019 7 1     Albumin 07/10/2019 3 8     Total Bilirubin 07/10/2019 1 00     eGFR 07/10/2019 72     D-Dimer, Quant 07/10/2019 1,378*    Troponin I 07/10/2019 <0 02     NT-proBNP 07/10/2019 141     Ventricular Rate 07/10/2019 62     Atrial Rate 07/10/2019 64     AK Interval 07/10/2019 208     QRSD Interval 07/10/2019 88     QT Interval 07/10/2019 410     QTC Interval 07/10/2019 417     P Axis 07/10/2019 84     QRS Axis 07/10/2019 26     T Wave Axis 07/10/2019 30     Troponin I 07/10/2019 <0 02     Troponin I 07/10/2019 <0 02     Platelets 14/03/2843 144*    MPV 07/11/2019 10 0     Troponin I 07/10/2019 <0 02     Sodium 07/11/2019 138     Potassium 07/11/2019 4 3     Chloride 07/11/2019 102     CO2 07/11/2019 30     ANION GAP 07/11/2019 6     BUN 07/11/2019 11     Creatinine 07/11/2019 1 04     Glucose 07/11/2019 95     Glucose, Fasting 07/11/2019 95     Calcium 07/11/2019 9 1     AST 07/11/2019 22     ALT 07/11/2019 23     Alkaline Phosphatase 07/11/2019 66     Total Protein 07/11/2019 6 9     Albumin 07/11/2019 3 6     Total Bilirubin 07/11/2019 1 10*    eGFR 07/11/2019 69     Magnesium 07/11/2019 2 0     Protocol Name 07/11/2019 IVANA WALK     Time In Exercise Phase 07/11/2019 00:03:00     MAX   SYSTOLIC BP 18/83/4045 119     Max Diastolic Bp 34/05/3809 88     Max Heart Rate 07/11/2019 105     Max Predicted Heart Rate 07/11/2019 143     Reason for Termination 07/11/2019 complete     Test Indication 07/11/2019 SOB     Target Hr Formular 07/11/2019 (220 - Age)*100%     Chest Pain Statement 07/11/2019 none    Lab on 07/08/2019   Component Date Value    Cholesterol 07/08/2019 152     Triglycerides 07/08/2019 50     HDL, Direct 07/08/2019 73*    LDL Calculated 07/08/2019 69     Non-HDL-Chol (CHOL-HDL) 07/08/2019 79     WBC 07/08/2019 5 99     RBC 07/08/2019 4 64     Hemoglobin 07/08/2019 15 4     Hematocrit 07/08/2019 46 7     MCV 07/08/2019 101*    MCH 07/08/2019 33 2     MCHC 07/08/2019 33 0     RDW 07/08/2019 13 2     MPV 07/08/2019 10 2     Platelets 30/41/1814 146*    nRBC 07/08/2019 0     Neutrophils Relative 07/08/2019 61     Immat GRANS % 07/08/2019 0     Lymphocytes Relative 07/08/2019 23     Monocytes Relative 07/08/2019 11     Eosinophils Relative 07/08/2019 4     Basophils Relative 07/08/2019 1     Neutrophils Absolute 07/08/2019 3 64     Immature Grans Absolute 07/08/2019 0 02     Lymphocytes Absolute 07/08/2019 1 38     Monocytes Absolute 07/08/2019 0 65     Eosinophils Absolute 07/08/2019 0 23     Basophils Absolute 07/08/2019 0 07     Sodium 07/08/2019 136     Potassium 07/08/2019 5 7*    Chloride 07/08/2019 101     CO2 07/08/2019 31     ANION GAP 07/08/2019 4     BUN 07/08/2019 12     Creatinine 07/08/2019 1 20     Glucose, Fasting 07/08/2019 93     Calcium 07/08/2019 9 5     AST 07/08/2019 22     ALT 07/08/2019 25     Alkaline Phosphatase 07/08/2019 73     Total Protein 07/08/2019 7 5     Albumin 07/08/2019 3 9     Total Bilirubin 07/08/2019 1 16*    eGFR 07/08/2019 58     TSH 3RD GENERATON 07/08/2019 3 650      Imaging: Xr Chest 1 View Portable    Result Date: 7/10/2019  Narrative: CHEST INDICATION:   sob  COMPARISON:  10/5/2018  EXAM PERFORMED/VIEWS:  XR CHEST PORTABLE FINDINGS:  There are median sternotomy wires indicating prior cardiac surgery  Cardial mediastinal silhouette is stable  The lungs are clear  No pneumothorax or pleural effusion  Osseous structures appear within normal limits for patient age  Impression: No acute cardiopulmonary disease  Workstation performed: [id number]     Cta Ed Chest Pe Study    Result Date: 7/10/2019  Narrative: CTA - CHEST WITH IV CONTRAST - PULMONARY ANGIOGRAM INDICATION:   Chest pain, acute, PE suspected, intermed prob, positive D-dimer  COMPARISON: None  TECHNIQUE: CTA examination of the chest was performed using angiographic technique according to a protocol specifically tailored to evaluate for pulmonary embolism  Axial, sagittal, and coronal 2D reformatted images were created from the source data and  submitted for interpretation  In addition, coronal 3D MIP postprocessing was performed on the acquisition scanner  Radiation dose length product (DLP) for this visit:  659 mGy-cm   This examination, like all CT scans performed in the [facility], was performed utilizing techniques to minimize radiation dose exposure, including the use of iterative reconstruction and automated exposure control   IV Contrast:  85 mL of iohexol (OMNIPAQUE)  FINDINGS: PULMONARY ARTERIAL TREE:  No pulmonary embolus is seen  LUNGS:  Lungs are clear  There is no tracheal or endobronchial lesion  PLEURA:  Unremarkable  HEART/GREAT VESSELS:  Stable 4 7 cm ascending thoracic aortic aneurysm is present  No evidence of aortic dissection is noted  Patient is status post coronary artery bypass grafting  MEDIASTINUM AND MYLES:  Unremarkable  CHEST WALL AND LOWER NECK:   Unremarkable  VISUALIZED STRUCTURES IN THE UPPER ABDOMEN:  Right renal cysts are present  Stable hepatic hypodensities noted  OSSEOUS STRUCTURES:  No acute fracture or destructive osseous lesion  Impression: No evidence of pulmonary embolus  4 7 cm ascending thoracic aneurysm  Workstation performed: [id number]       Review of Systems:  Review of Systems   Respiratory: Positive for apnea (His wife complains that he snores  He may have rare awakenings ) and choking  Negative for cough, chest tightness and shortness of breath  Cardiovascular: Positive for leg swelling (Edema noted when he went to the hospital but has subsequently resolved and no further need for frozen might)  Negative for chest pain and palpitations  Neurological: Negative for dizziness, syncope, weakness and light-headedness  Physical Exam:  Physical Exam   Constitutional: He appears well-nourished  Neck: Normal carotid pulses, no hepatojugular reflux and no JVD present  Carotid bruit is not present  No thyromegaly present  Cardiovascular: Normal rate and regular rhythm  No murmur heard  Pulmonary/Chest: No respiratory distress  He has no wheezes  He has no rales  Musculoskeletal: He exhibits no edema or tenderness  Skin:            Discussion/Summary:  1  Coronary disease, ischemic cardiomyopathy, stable     2  Sleep apnea, may be severe and he was encouraged to get proper follow-up    3  Lipids, good results    4  Blood pressure good control meds well-tolerated    5   A long list of questions were all addressed and he will report to his wife

## 2019-07-24 NOTE — PROGRESS NOTES
Pulmonary Consultation   Gertrudis Callejas 69 y o  male MRN: [id number]  7/24/2019      Assessment:  1  SOB- unclear etiology, recent CT does show a ground glass patchy opacification in the RLL not present on prior imaging however he has no cough or symptoms of pneumonia  Differential here includes COPD from chronic smoking, idiopathic restrictive lung disease  2  Severe YEVGENIY- PSG in 2007 with AHI but patient refuses CPAP due to discomfort  3  Several subcentimeter nodules- largest measuring 3mm  No intervention needed at this time  4  Thoracic aortic aneurysm- measuring 4 7cm, follows up with Vascular    Plan:  1  PFTs scheduled for 07/30/2019  Follow up testing will be based on results of PFT  2  If PFT shows restriction or low DLCO, will need a HRCT  3  May need a cardiac echo in order to check PA pressure depending on PFT    History of Present Illness   HPI:  Gertrudis Callejas is a 69 y o  male with YEVGENIY diagnosed with PSG in 2007 that cannot tolerate CPAP, stable ascending aortic aneurysm with repair in 2007, CAD s/p quadruple vessel bypass in 2003, and a stable 4 7cm thoracic aortic aneurysm that presents with complaint of intermittent SOB  The SOB began about 1-2 weeks prior to his recent hospitalization that was two weeks ago  The SOB lasts about 30-45 min and occurs about 1-2x per day  It occurs at rest, and according to him is absent when he exerts himself  He does get slightly short of breath from going up and down his steps at home  He was recently hospitalized two weeks ago for SOB without associated chest pain and underwent cardiac work up including trops that were negative and a stress on 7/11/2019 w/ stable chronic CAD showing known slight inferolateral wall ischemia with an EF 46%  NT proBNP on 7/10/2019 was normal at 141 but patient is obese   He notes bilateral lower extremity edema that began the day prior to his hospitalization and during his hospitalization he had a CT/PE study that was negative for an embolism  The patient reports that he smoked since the age of 22, 1PPD and quit at the age of 80  He consumes 3-4 beers per day on most days for the past 6 years  He did work at [employer] for 35 years where he reports being exposed to asbestos without wearing any masks/protective equipment  Review of Systems   Constitutional: Negative for activity change, chills, fatigue, fever and unexpected weight change  HENT: Negative for congestion, postnasal drip, rhinorrhea, sinus pressure, sinus pain, sneezing, sore throat and trouble swallowing  Eyes: Negative for discharge and itching  Respiratory: Positive for shortness of breath  Negative for apnea, cough, chest tightness, wheezing and stridor  Cardiovascular: Positive for leg swelling  Negative for chest pain and palpitations  Gastrointestinal: Negative for abdominal distention, abdominal pain, blood in stool, constipation and diarrhea  Genitourinary: Negative for difficulty urinating, flank pain and hematuria  Musculoskeletal: Negative for arthralgias, joint swelling and myalgias  Skin: Negative  Neurological: Negative for dizziness, weakness, light-headedness and headaches  Hematological: Negative for adenopathy  Psychiatric/Behavioral: Negative  Historical Information   Past Medical History:   Diagnosis Date    Abdominal aortic aneurysm (HCC)     Chronic pain     back since July    Chronic pain disorder     lumbar    Coronary artery disease     History of transfusion     Hyperlipidemia     Hypertension     Sleep apnea     Subconjunctival hemorrhage      Past Surgical History:   Procedure Laterality Date    ABDOMINAL AORTIC ANEURYSM REPAIR  08/09/2007    2 dock limbs with visc extens prosth    COLONOSCOPY      CORONARY ARTERY BYPASS GRAFT      GAB-LAD, sequential vein graft to OM1 and OM2, single VG-posterior descending   Onset: 2003     Family History   Problem Relation Age of Onset    Heart disease Mother     Stroke Father         stroke syndrome    Aneurysm Brother         abdominal    Aortic aneurysm Brother         ascending    Coronary artery disease Family     Hypertension Family     Other Son         gioblastoma multiforme       Occupational History: Worked in [employer] for 35 years  Exposure to asbestos  Social History: smoked 1PPD for 50 years, 3-4 beers daily for 6 years, denies drug use, lives at home with wife, no pets  Meds/Allergies     Current Outpatient Medications:     aspirin (ECOTRIN LOW STRENGTH) 81 mg EC tablet, Take 1 tablet by mouth daily, Disp: , Rfl:     atorvastatin (LIPITOR) 40 mg tablet, Take 1 tablet (40 mg total) by mouth daily, Disp: 90 tablet, Rfl: 3    furosemide (LASIX) 20 mg tablet, Take 1 tablet (20 mg total) by mouth as needed (SOB, LE edema), Disp: 10 tablet, Rfl: 0    gabapentin (NEURONTIN) 300 mg capsule, Take 1 capsule (300 mg total) by mouth daily at bedtime, Disp: 90 capsule, Rfl: 5    labetalol (NORMODYNE) 100 mg tablet, Take 1 tablet (100 mg total) by mouth 2 (two) times a day, Disp: 180 tablet, Rfl: 0    lisinopril (ZESTRIL) 5 mg tablet, Take 1 tablet (5 mg total) by mouth daily, Disp: 90 tablet, Rfl: 3    senna (SENOKOT) 8 6 MG tablet, Take 1 tablet by mouth as needed  , Disp: , Rfl:     tamsulosin (FLOMAX) 0 4 mg, Take 1 capsule by mouth daily, Disp: , Rfl:   Allergies   Allergen Reactions    Meloxicam Edema       Vitals: Blood pressure 118/70, pulse 66, temperature 97 8 °F (36 6 °C), resp  rate 14, height 5' 11" (1 803 m), weight 110 kg (242 lb), SpO2 94 %  , Body mass index is 33 75 kg/m²  Oxygen Therapy  SpO2: 94 %    Physical Exam  Physical Exam   Constitutional: He is oriented to person, place, and time  He appears well-developed  No distress  HENT:   Head: Normocephalic and atraumatic  Nose: Nose normal    Mouth/Throat: Oropharynx is clear and moist  No oropharyngeal exudate     Eyes: Pupils are equal, round, and reactive to light  Conjunctivae and EOM are normal    Neck: No JVD present  No tracheal deviation present  Cardiovascular: Normal rate, regular rhythm and normal heart sounds  Exam reveals no gallop and no friction rub  No murmur heard  Pulmonary/Chest: Effort normal and breath sounds normal  No stridor  No respiratory distress  He has no wheezes  He has no rales  Abdominal: Soft  Bowel sounds are normal  He exhibits no distension and no mass  There is no tenderness  There is no guarding  Musculoskeletal: Normal range of motion  He exhibits edema  He exhibits no tenderness  Lymphadenopathy:     He has no cervical adenopathy  Neurological: He is alert and oriented to person, place, and time  No cranial nerve deficit  Skin: Skin is warm and dry  No rash noted  He is not diaphoretic  No erythema  Psychiatric: He has a normal mood and affect  His behavior is normal  Thought content normal          Lab Results   Component Value Date    WBC 5 02 07/10/2019    HGB 14 3 07/10/2019    HCT 42 8 07/10/2019     (H) 07/10/2019     (L) 07/11/2019     Lab Results   Component Value Date    GLUCOSE 98 10/15/2015    CALCIUM 9 1 07/11/2019     (L) 10/15/2015    K 4 3 07/11/2019    CO2 30 07/11/2019     07/11/2019    BUN 11 07/11/2019    CREATININE 1 04 07/11/2019     No results found for: IGE  Lab Results   Component Value Date    ALT 23 07/11/2019    AST 22 07/11/2019    ALKPHOS 66 07/11/2019    BILITOT 0 79 10/15/2015           Imaging and other studies:   - CT chest 5/2019- multiple subcentimeter nodules largest being 3mm  PFTs- ordered on 7/19/2019 and scheduled for the 30th    Pulmonary function testing: Scheduled for 07/30/2019    6 minute walk test: Patient was able to ambulate 378 meters with resting and ambulatory pulse ox both at 94% and HR 69 at rest with a max of 88  EKG, Pathology, and Other Studies: I have personally reviewed pertinent reports          Onur Barragan MD  Pulmonary and Critical Care Fellow- PGY 4  [facility]

## 2019-07-24 NOTE — PATIENT INSTRUCTIONS
The chest pain which you have is probably just stress and tension  The type of sighing respiration is not uncommon    Your heart tests in the hospital were satisfactory, scar from old heart damage, no new problems    Blood test results are good stay on current medications, blood pressure is under good control, [facility]    You do not need to return to this office for another year, they will contact you 1-2 months before visit to confirm date and time with new cardiologist    You probably have fairly severe sleep apnea I suggest to contact Dr Irene Barr again    New systems may be very helpful

## 2019-07-30 ENCOUNTER — HOSPITAL ENCOUNTER (OUTPATIENT)
Dept: PULMONOLOGY | Facility: HOSPITAL | Age: 79
Discharge: HOME | End: 2019-07-30
Payer: MEDICARE

## 2019-07-30 DIAGNOSIS — R06.02 SOB (SHORTNESS OF BREATH): ICD-10-CM

## 2019-07-30 PROCEDURE — 94729 DIFFUSING CAPACITY: CPT

## 2019-07-30 PROCEDURE — 94060 EVALUATION OF WHEEZING: CPT | Performed by: INTERNAL MEDICINE

## 2019-07-30 PROCEDURE — 94726 PLETHYSMOGRAPHY LUNG VOLUMES: CPT

## 2019-07-30 PROCEDURE — 94729 DIFFUSING CAPACITY: CPT | Performed by: INTERNAL MEDICINE

## 2019-07-30 PROCEDURE — 94060 EVALUATION OF WHEEZING: CPT

## 2019-07-30 PROCEDURE — 94760 N-INVAS EAR/PLS OXIMETRY 1: CPT

## 2019-07-30 PROCEDURE — 94726 PLETHYSMOGRAPHY LUNG VOLUMES: CPT | Performed by: INTERNAL MEDICINE

## 2019-07-30 RX ORDER — ALBUTEROL SULFATE 2.5 MG/3ML
2.5 SOLUTION RESPIRATORY (INHALATION) ONCE
Status: COMPLETED | OUTPATIENT
Start: 2019-07-30 | End: 2019-07-30

## 2019-07-30 RX ADMIN — ALBUTEROL SULFATE 2.5 MG: 2.5 SOLUTION RESPIRATORY (INHALATION) at 13:48

## 2019-08-15 DIAGNOSIS — J44.9 CHRONIC OBSTRUCTIVE PULMONARY DISEASE, UNSPECIFIED COPD TYPE (HCC): Primary | ICD-10-CM

## 2019-08-15 NOTE — PROGRESS NOTES
Called patient and discussed PFT results  He feels relatively well, baseline SOB  Discussed having him try spiriva and provided instructions for patient  He verbalized understanding and will  the inhaler from his pharmacy  I script was sent over  Instructed patient that if he has any questions or difficulty with the inhaler to call back and we will schedule him to come in for teaching of proper inhaler usage

## 2019-09-10 PROBLEM — R91.8 MULTIPLE PULMONARY NODULES: Status: ACTIVE | Noted: 2019-09-10

## 2019-09-11 ENCOUNTER — OFFICE VISIT (OUTPATIENT)
Dept: PULMONOLOGY | Facility: CLINIC | Age: 79
End: 2019-09-11
Payer: MEDICARE

## 2019-09-11 VITALS
OXYGEN SATURATION: 95 % | BODY MASS INDEX: 34.83 KG/M2 | WEIGHT: 248.8 LBS | HEIGHT: 71 IN | HEART RATE: 62 BPM | DIASTOLIC BLOOD PRESSURE: 82 MMHG | TEMPERATURE: 97.9 F | SYSTOLIC BLOOD PRESSURE: 120 MMHG

## 2019-09-11 DIAGNOSIS — R06.02 SOB (SHORTNESS OF BREATH): Primary | ICD-10-CM

## 2019-09-11 DIAGNOSIS — R91.8 MULTIPLE PULMONARY NODULES: ICD-10-CM

## 2019-09-11 DIAGNOSIS — I71.2 ANEURYSM OF ASCENDING AORTA (HCC): ICD-10-CM

## 2019-09-11 DIAGNOSIS — G47.33 OBSTRUCTIVE SLEEP APNEA: ICD-10-CM

## 2019-09-11 DIAGNOSIS — E66.09 CLASS 1 OBESITY DUE TO EXCESS CALORIES WITH SERIOUS COMORBIDITY AND BODY MASS INDEX (BMI) OF 30.0 TO 30.9 IN ADULT: ICD-10-CM

## 2019-09-11 DIAGNOSIS — J44.9 COPD, MILD (HCC): ICD-10-CM

## 2019-09-11 DIAGNOSIS — I50.22 CHRONIC SYSTOLIC HEART FAILURE (HCC): ICD-10-CM

## 2019-09-11 DIAGNOSIS — J44.9 CHRONIC OBSTRUCTIVE PULMONARY DISEASE, UNSPECIFIED COPD TYPE (HCC): ICD-10-CM

## 2019-09-11 PROBLEM — I50.20 HEART FAILURE, SYSTOLIC (HCC): Status: ACTIVE | Noted: 2019-09-11

## 2019-09-11 PROCEDURE — 99215 OFFICE O/P EST HI 40 MIN: CPT | Performed by: INTERNAL MEDICINE

## 2019-09-11 PROCEDURE — 1124F ACP DISCUSS-NO DSCNMKR DOCD: CPT | Performed by: INTERNAL MEDICINE

## 2019-09-11 RX ORDER — FUROSEMIDE 20 MG/1
20 TABLET ORAL AS NEEDED
Qty: 10 TABLET | Refills: 0 | Status: SHIPPED | OUTPATIENT
Start: 2019-09-11 | End: 2019-10-29 | Stop reason: SDUPTHER

## 2019-09-11 RX ORDER — ALBUTEROL SULFATE 90 UG/1
2 AEROSOL, METERED RESPIRATORY (INHALATION) EVERY 6 HOURS PRN
Status: SHIPPED | OUTPATIENT
Start: 2019-09-11

## 2019-09-11 NOTE — ASSESSMENT & PLAN NOTE
- FEV1/FVC of 69% although this is 95% of predicted  Does have evidence of air trapping with scooping of the expiratory phase  He has a 50 pack year smoking history  Has felt much better on spiriva   Is requiring a 90 day Rx since they use [facility]  Plan:  - Will give script for spiriva for 90 days  - Will give rescue inhaler with albuterol 2puffs q6hrs PRN  - continue to abstain from smoking

## 2019-09-11 NOTE — PROGRESS NOTES
Pulmonary Follow Up Note   Mary Puente 67 y o  male MRN: [id number]  9/11/2019      Assessment:    SOB (shortness of breath)  Assessment & Plan  - Multifactorial in the setting of mild COPD, mild systolic HF and obesity  Much improved from last appointment after starting spiriva  Significant peripheral edema today  Last appointment he had a 6 min walk test that was normal  Most recent CT with only several small subcentimeter pulm nodules  Ischemic workup in July was negative for significant ischemia but shows mild HF  Plan:  - Continue spiriva, will give script  - Patient instructed to take his lasix if gaining weight and instructed him to take one dose today  - Patient counseled on lifestyle modifications, in particular, for weight loss  COPD, mild ([address] )  Assessment & Plan  - FEV1/FVC of 69% although this is 95% of predicted  Does have evidence of air trapping with scooping of the expiratory phase  He has a 50 pack year smoking history  Has felt much better on spiriva  Is requiring a 90 day Rx since they use [facility]  Plan:  - Will give script for spiriva for 90 days  - Will give rescue inhaler with albuterol 2puffs q6hrs PRN  - continue to abstain from smoking    Heart failure, systolic (HCC)  Assessment & Plan  Wt Readings from Last 3 Encounters:   09/11/19 113 kg (248 lb 12 8 oz)   07/24/19 110 kg (242 lb)   07/24/19 110 kg (242 lb)         - EF of 46% during last NM stress test in 07/2019  Patient is on Lasix PRN  Today he has significant pitting edema bilaterally which is likely contributing to his SOB  He was previously on lisinopril/HCTZ combo but the HCTZ was stopped  Follows with cardiology  Needs a refill on lasix     Plan:  - Instructed him to take lasix today  - Will refill med  - Continue to follow with cardiology  - Patient instructed to weigh himself daily at the same time and take the lasix if there is an increase    Multiple pulmonary nodules  Assessment & Plan  - multiple subcentimeter pulmonary nodules greatest being 3 mm  Does not require any surveillance at this time  On my next no     Aneurysm of ascending aorta (HCC)  Assessment & Plan  -4 7 cm currently under surveillance    Obstructive sleep apnea  Assessment & Plan  - not currently using CPAP, reports he was unable to tolerate it in the past      Plan:    Diagnoses and all orders for this visit:    SOB (shortness of breath)  -     furosemide (LASIX) 20 mg tablet; Take 1 tablet (20 mg total) by mouth as needed (SOB, LE edema)  -     albuterol (PROVENTIL HFA,VENTOLIN HFA) inhaler 2 puff    COPD, mild (HCC)    Chronic systolic heart failure (HCC)    Multiple pulmonary nodules    Obstructive sleep apnea    Aneurysm of ascending aorta (HCC)    Class 1 obesity due to excess calories with serious comorbidity and body mass index (BMI) of 30 0 to 30 9 in adult    Chronic obstructive pulmonary disease, unspecified COPD type (HCC)  -     tiotropium (SPIRIVA RESPIMAT) 1 25 MCG/ACT AERS inhaler; Inhale 2 puffs daily        Return in about 6 months (around 3/11/2020)  History of Present Illness   HPI:  Joaquin Nixon is a 67 y o  male with a Hx of YEVGENIY diagnosed in 2007 unable to tolerate CPAP, stable ascending aortic aneurysm with repair in 2007, CAD w/ bypass x4 in 2003 that initially presented to me on July 24, 2019 with complaint of shortness of breaths per several weeks  The etiology of shortness of breath was unclear at that time  Prior hospitalization for shortness of breath resulted in cardiac workup including a negative stress test with chronic stable coronary artery disease and an ejection fraction of 46% with inferior wall hypokinesis  During that hospitalization he also had a CT 8 rule out PE which was negative for embolism  He has a 50 pack-year smoking history and worked at [employer] her 35 years and reports being exposed to asbestos  Since I last saw him, he reports no SOB except for one time yesterday   This occurred early in the morning after awakening  He suddenly felt as though breathing required more effort  He denies any cough, pleuritic chest pain, recent fevers or chills  This was his only episode  Review of Systems   Constitutional: Negative for chills, diaphoresis, fatigue and fever  HENT: Negative for congestion, ear pain, postnasal drip, rhinorrhea, sneezing, trouble swallowing and voice change  Eyes: Negative for redness and itching  Respiratory: Negative for apnea, cough, choking, chest tightness, shortness of breath, wheezing and stridor  Cardiovascular: Negative for chest pain, palpitations and leg swelling  Gastrointestinal: Negative for abdominal distention, abdominal pain, blood in stool, constipation, diarrhea, nausea and vomiting  Endocrine: Negative for polydipsia and polyuria  Genitourinary: Negative for dysuria and flank pain  Musculoskeletal: Negative for arthralgias, back pain and joint swelling  Skin: Negative for pallor and rash  Neurological: Negative for dizziness, syncope, weakness, light-headedness, numbness and headaches  Hematological: Negative for adenopathy  Psychiatric/Behavioral: Negative for agitation  Historical Information   Past Medical History:   Diagnosis Date    Abdominal aortic aneurysm (HCC)     Chronic pain     back since July    Chronic pain disorder     lumbar    Coronary artery disease     History of transfusion     Hyperlipidemia     Hypertension     Sleep apnea     Subconjunctival hemorrhage      Past Surgical History:   Procedure Laterality Date    ABDOMINAL AORTIC ANEURYSM REPAIR  08/09/2007    2 dock limbs with visc extens prosth    COLONOSCOPY      CORONARY ARTERY BYPASS GRAFT      GAB-LAD, sequential vein graft to OM1 and OM2, single VG-posterior descending   Onset: 2003     Family History   Problem Relation Age of Onset    Heart disease Mother     Stroke Father         stroke syndrome    Aneurysm Brother         abdominal  Aortic aneurysm Brother         ascending    Coronary artery disease Family     Hypertension Family     Other Son         gioblastoma multiforme         Meds/Allergies     Current Outpatient Medications:     aspirin (ECOTRIN LOW STRENGTH) 81 mg EC tablet, Take 1 tablet by mouth daily, Disp: , Rfl:     atorvastatin (LIPITOR) 40 mg tablet, Take 1 tablet (40 mg total) by mouth daily, Disp: 90 tablet, Rfl: 3    furosemide (LASIX) 20 mg tablet, Take 1 tablet (20 mg total) by mouth as needed (SOB, LE edema), Disp: 10 tablet, Rfl: 0    gabapentin (NEURONTIN) 300 mg capsule, Take 1 capsule (300 mg total) by mouth daily at bedtime, Disp: 90 capsule, Rfl: 5    labetalol (NORMODYNE) 100 mg tablet, Take 1 tablet (100 mg total) by mouth 2 (two) times a day, Disp: 180 tablet, Rfl: 0    lisinopril (ZESTRIL) 5 mg tablet, Take 1 tablet (5 mg total) by mouth daily, Disp: 90 tablet, Rfl: 3    senna (SENOKOT) 8 6 MG tablet, Take 1 tablet by mouth as needed  , Disp: , Rfl:     tamsulosin (FLOMAX) 0 4 mg, Take 1 capsule by mouth daily, Disp: , Rfl:     tiotropium (SPIRIVA RESPIMAT) 1 25 MCG/ACT AERS inhaler, Inhale 2 puffs daily, Disp: 3 Inhaler, Rfl: 3    Current Facility-Administered Medications:     albuterol (PROVENTIL HFA,VENTOLIN HFA) inhaler 2 puff, 2 puff, Inhalation, Q6H PRN, Katy Álvarez MD  Allergies   Allergen Reactions    Meloxicam Edema       Vitals: Blood pressure 120/82, pulse 62, temperature 97 9 °F (36 6 °C), temperature source Tympanic, height 5' 11" (1 803 m), weight 113 kg (248 lb 12 8 oz), SpO2 95 %  Body mass index is 34 7 kg/m²  Oxygen Therapy  SpO2: 95 %  Oxygen Therapy: None (Room air)      Physical Exam  Physical Exam   Constitutional: He is oriented to person, place, and time  No distress  HENT:   Head: Normocephalic and atraumatic  Nose: Nose normal    Mouth/Throat: Oropharynx is clear and moist  No oropharyngeal exudate  Eyes: Pupils are equal, round, and reactive to light   Conjunctivae and EOM are normal  No scleral icterus  Neck: Normal range of motion  Neck supple  No JVD present  No tracheal deviation present  No thyromegaly present  Cardiovascular: Normal rate, regular rhythm, normal heart sounds and intact distal pulses  Exam reveals no gallop and no friction rub  No murmur heard  Pulmonary/Chest: Effort normal and breath sounds normal  No stridor  No respiratory distress  He has no wheezes  He has no rales  Abdominal: Soft  Bowel sounds are normal  He exhibits no distension  There is no tenderness  There is no rebound and no guarding  Musculoskeletal: Normal range of motion  He exhibits edema and deformity  Lymphadenopathy:     He has no cervical adenopathy  Neurological: He is alert and oriented to person, place, and time  No cranial nerve deficit or sensory deficit  Skin: Skin is warm  No rash noted  He is not diaphoretic  No erythema  Psychiatric: He has a normal mood and affect  Labs: I have personally reviewed pertinent lab results  , ABG: No results found for: PHART, FIT0DYI, PO2ART, KJQ7STL, I0FBKGJC, BEART, SOURCE, BNP: No results found for: BNP, CBC: No results found for: WBC, HGB, HCT, MCV, PLT, ADJUSTEDWBC, MCH, MCHC, RDW, MPV, NRBC, CMP: No results found for: SODIUM, K, CL, CO2, ANIONGAP, BUN, CREATININE, GLUCOSE, CALCIUM, AST, ALT, ALKPHOS, PROT, BILITOT, EGFR, PT/INR: No results found for: PT, INR, Troponin: No results found for: TROPONINI  Lab Results   Component Value Date    WBC 5 02 07/10/2019    HGB 14 3 07/10/2019    HCT 42 8 07/10/2019     (H) 07/10/2019     (L) 07/11/2019     Lab Results   Component Value Date    GLUCOSE 98 10/15/2015    CALCIUM 9 1 07/11/2019     (L) 10/15/2015    K 4 3 07/11/2019    CO2 30 07/11/2019     07/11/2019    BUN 11 07/11/2019    CREATININE 1 04 07/11/2019     No results found for: IGE  Lab Results   Component Value Date    ALT 23 07/11/2019    AST 22 07/11/2019    ALKPHOS 66 07/11/2019 CHENITOT 0 79 10/15/2015       Imaging and other studies: I have personally reviewed pertinent reports  and I have personally reviewed pertinent films in PACS  CT chest May 2019-multiple subcentimeter pulmonary nodules largest being 3 mm    Pulmonary function testing:  2019  FEV1:  2 46 L-78% predicted  FVC:  3 55 L-82% predicted  FEV1/FVC:  69%  T% predicted   RV: 134% predicted  DLCO:  60%  Mild obstructive airflow limitation with mild decreased diffusion capacity  No significant response to bronchodilator    6 minutes walk test:  Performed on 2019  Patient was able to ambulate 370 m with resting and ambulatory pulse ox both at 94% and a heart rate of 69 at rest with a max of 80    EKG, Pathology, and Other Studies: I have personally reviewed pertinent reports          Dom Hall MD  Pulmonary and Critical Care Fellow- PGY 4  [facility]

## 2019-09-11 NOTE — ASSESSMENT & PLAN NOTE
- multiple subcentimeter pulmonary nodules greatest being 3 mm  Does not require any surveillance at this time    On my next no

## 2019-09-11 NOTE — ASSESSMENT & PLAN NOTE
Wt Readings from Last 3 Encounters:   09/11/19 113 kg (248 lb 12 8 oz)   07/24/19 110 kg (242 lb)   07/24/19 110 kg (242 lb)         - EF of 46% during last NM stress test in 07/2019  Patient is on Lasix PRN  Today he has significant pitting edema bilaterally which is likely contributing to his SOB  He was previously on lisinopril/HCTZ combo but the HCTZ was stopped  Follows with cardiology  Needs a refill on lasix     Plan:  - Instructed him to take lasix today  - Will refill med  - Continue to follow with cardiology  - Patient instructed to weigh himself daily at the same time and take the lasix if there is an increase

## 2019-09-11 NOTE — ASSESSMENT & PLAN NOTE
- Multifactorial in the setting of mild COPD, mild systolic HF and obesity  Much improved from last appointment after starting spiriva  Significant peripheral edema today  Last appointment he had a 6 min walk test that was normal  Most recent CT with only several small subcentimeter pulm nodules  Ischemic workup in July was negative for significant ischemia but shows mild HF  Plan:  - Continue spiriva, will give script  - Patient instructed to take his lasix if gaining weight and instructed him to take one dose today  - Patient counseled on lifestyle modifications, in particular, for weight loss

## 2019-09-13 DIAGNOSIS — J44.9 CHRONIC OBSTRUCTIVE PULMONARY DISEASE, UNSPECIFIED COPD TYPE (HCC): Primary | ICD-10-CM

## 2019-09-13 RX ORDER — ALBUTEROL SULFATE 90 UG/1
2 AEROSOL, METERED RESPIRATORY (INHALATION) EVERY 6 HOURS PRN
Qty: 18 G | Refills: 5 | Status: SHIPPED | OUTPATIENT
Start: 2019-09-13 | End: 2021-03-03 | Stop reason: SDUPTHER

## 2019-09-17 ENCOUNTER — TELEPHONE (OUTPATIENT)
Dept: INTERNAL MEDICINE CLINIC | Facility: CLINIC | Age: 79
End: 2019-09-17

## 2019-09-17 DIAGNOSIS — I10 BENIGN ESSENTIAL HYPERTENSION: ICD-10-CM

## 2019-09-17 DIAGNOSIS — E78.2 MIXED HYPERLIPIDEMIA: ICD-10-CM

## 2019-09-17 DIAGNOSIS — R73.01 IMPAIRED FASTING GLUCOSE: Primary | ICD-10-CM

## 2019-09-17 NOTE — TELEPHONE ENCOUNTER
Patient is requesting lab orders so he can have this done prior to his appointment 1/20/20? Please advise - patient would like orders mailed to his home once completed

## 2019-09-23 ENCOUNTER — TELEPHONE (OUTPATIENT)
Dept: CARDIOLOGY CLINIC | Facility: CLINIC | Age: 79
End: 2019-09-23

## 2019-09-23 NOTE — TELEPHONE ENCOUNTER
Patient  Labetalol 100 mg BID In your last note it says discontinue?   He is still taking this ok too refill

## 2019-09-25 ENCOUNTER — TRANSCRIBE ORDERS (OUTPATIENT)
Dept: LAB | Facility: CLINIC | Age: 79
End: 2019-09-25

## 2019-09-25 ENCOUNTER — LAB (OUTPATIENT)
Dept: LAB | Facility: CLINIC | Age: 79
End: 2019-09-25
Payer: MEDICARE

## 2019-09-25 DIAGNOSIS — N40.0 BENIGN PROSTATIC HYPERPLASIA WITHOUT LOWER URINARY TRACT SYMPTOMS: Primary | ICD-10-CM

## 2019-09-25 DIAGNOSIS — I10 ESSENTIAL HYPERTENSION: ICD-10-CM

## 2019-09-25 DIAGNOSIS — N40.0 BENIGN PROSTATIC HYPERPLASIA WITHOUT LOWER URINARY TRACT SYMPTOMS: ICD-10-CM

## 2019-09-25 LAB
BUN SERPL-MCNC: 12 MG/DL (ref 5–25)
CREAT SERPL-MCNC: 1.11 MG/DL (ref 0.6–1.3)
GFR SERPL CREATININE-BSD FRML MDRD: 63 ML/MIN/1.73SQ M
PSA SERPL-MCNC: 2.7 NG/ML (ref 0–4)

## 2019-09-25 PROCEDURE — 82565 ASSAY OF CREATININE: CPT

## 2019-09-25 PROCEDURE — 84520 ASSAY OF UREA NITROGEN: CPT

## 2019-09-25 PROCEDURE — 36415 COLL VENOUS BLD VENIPUNCTURE: CPT

## 2019-09-25 PROCEDURE — 84153 ASSAY OF PSA TOTAL: CPT

## 2019-09-26 RX ORDER — LABETALOL 100 MG/1
100 TABLET, FILM COATED ORAL 2 TIMES DAILY
Qty: 180 TABLET | Refills: 3 | Status: SHIPPED | OUTPATIENT
Start: 2019-09-26 | End: 2020-09-16

## 2019-09-30 ENCOUNTER — LAB REQUISITION (OUTPATIENT)
Dept: LAB | Facility: HOSPITAL | Age: 79
End: 2019-09-30
Payer: MEDICARE

## 2019-09-30 DIAGNOSIS — R31.9 HEMATURIA: ICD-10-CM

## 2019-09-30 DIAGNOSIS — N40.1 ENLARGED PROSTATE WITH LOWER URINARY TRACT SYMPTOMS (LUTS): ICD-10-CM

## 2019-09-30 PROCEDURE — 88112 CYTOPATH CELL ENHANCE TECH: CPT | Performed by: PATHOLOGY

## 2019-10-08 ENCOUNTER — TRANSCRIBE ORDERS (OUTPATIENT)
Dept: ADMINISTRATIVE | Facility: HOSPITAL | Age: 79
End: 2019-10-08

## 2019-10-08 DIAGNOSIS — R31.9 HEMATURIA SYNDROME: Primary | ICD-10-CM

## 2019-10-10 ENCOUNTER — HOSPITAL ENCOUNTER (OUTPATIENT)
Dept: RADIOLOGY | Age: 79
Discharge: HOME | End: 2019-10-10
Payer: MEDICARE

## 2019-10-10 DIAGNOSIS — R31.9 HEMATURIA SYNDROME: ICD-10-CM

## 2019-10-10 PROCEDURE — 51798 US URINE CAPACITY MEASURE: CPT

## 2019-10-29 DIAGNOSIS — R06.02 SOB (SHORTNESS OF BREATH): ICD-10-CM

## 2019-10-29 RX ORDER — FUROSEMIDE 20 MG/1
20 TABLET ORAL AS NEEDED
Qty: 10 TABLET | Refills: 0 | Status: SHIPPED | OUTPATIENT
Start: 2019-10-29 | End: 2020-01-30 | Stop reason: SDUPTHER

## 2020-01-06 ENCOUNTER — LAB (OUTPATIENT)
Dept: LAB | Facility: CLINIC | Age: 80
End: 2020-01-06
Payer: MEDICARE

## 2020-01-06 DIAGNOSIS — E78.2 MIXED HYPERLIPIDEMIA: ICD-10-CM

## 2020-01-06 DIAGNOSIS — I10 BENIGN ESSENTIAL HYPERTENSION: ICD-10-CM

## 2020-01-06 LAB
ALBUMIN SERPL BCP-MCNC: 3.8 G/DL (ref 3.5–5)
ALP SERPL-CCNC: 66 U/L (ref 46–116)
ALT SERPL W P-5'-P-CCNC: 28 U/L (ref 12–78)
ANION GAP SERPL CALCULATED.3IONS-SCNC: 0 MMOL/L (ref 4–13)
AST SERPL W P-5'-P-CCNC: 17 U/L (ref 5–45)
BASOPHILS # BLD AUTO: 0.07 THOUSANDS/ΜL (ref 0–0.1)
BASOPHILS NFR BLD AUTO: 1 % (ref 0–1)
BILIRUB SERPL-MCNC: 1.01 MG/DL (ref 0.2–1)
BUN SERPL-MCNC: 15 MG/DL (ref 5–25)
CALCIUM SERPL-MCNC: 9.4 MG/DL (ref 8.3–10.1)
CHLORIDE SERPL-SCNC: 108 MMOL/L (ref 100–108)
CHOLEST SERPL-MCNC: 149 MG/DL (ref 50–200)
CO2 SERPL-SCNC: 32 MMOL/L (ref 21–32)
CREAT SERPL-MCNC: 1.12 MG/DL (ref 0.6–1.3)
EOSINOPHIL # BLD AUTO: 0.28 THOUSAND/ΜL (ref 0–0.61)
EOSINOPHIL NFR BLD AUTO: 5 % (ref 0–6)
ERYTHROCYTE [DISTWIDTH] IN BLOOD BY AUTOMATED COUNT: 12.7 % (ref 11.6–15.1)
GFR SERPL CREATININE-BSD FRML MDRD: 63 ML/MIN/1.73SQ M
GLUCOSE P FAST SERPL-MCNC: 103 MG/DL (ref 65–99)
HCT VFR BLD AUTO: 46.4 % (ref 36.5–49.3)
HDLC SERPL-MCNC: 67 MG/DL
HGB BLD-MCNC: 15.2 G/DL (ref 12–17)
IMM GRANULOCYTES # BLD AUTO: 0.01 THOUSAND/UL (ref 0–0.2)
IMM GRANULOCYTES NFR BLD AUTO: 0 % (ref 0–2)
LDLC SERPL CALC-MCNC: 72 MG/DL (ref 0–100)
LYMPHOCYTES # BLD AUTO: 1.27 THOUSANDS/ΜL (ref 0.6–4.47)
LYMPHOCYTES NFR BLD AUTO: 21 % (ref 14–44)
MCH RBC QN AUTO: 33.6 PG (ref 26.8–34.3)
MCHC RBC AUTO-ENTMCNC: 32.8 G/DL (ref 31.4–37.4)
MCV RBC AUTO: 102 FL (ref 82–98)
MONOCYTES # BLD AUTO: 0.86 THOUSAND/ΜL (ref 0.17–1.22)
MONOCYTES NFR BLD AUTO: 14 % (ref 4–12)
NEUTROPHILS # BLD AUTO: 3.7 THOUSANDS/ΜL (ref 1.85–7.62)
NEUTS SEG NFR BLD AUTO: 59 % (ref 43–75)
NONHDLC SERPL-MCNC: 82 MG/DL
NRBC BLD AUTO-RTO: 0 /100 WBCS
PLATELET # BLD AUTO: 151 THOUSANDS/UL (ref 149–390)
PMV BLD AUTO: 9.9 FL (ref 8.9–12.7)
POTASSIUM SERPL-SCNC: 5 MMOL/L (ref 3.5–5.3)
PROT SERPL-MCNC: 7.6 G/DL (ref 6.4–8.2)
RBC # BLD AUTO: 4.53 MILLION/UL (ref 3.88–5.62)
SODIUM SERPL-SCNC: 140 MMOL/L (ref 136–145)
TRIGL SERPL-MCNC: 50 MG/DL
WBC # BLD AUTO: 6.19 THOUSAND/UL (ref 4.31–10.16)

## 2020-01-06 PROCEDURE — 36415 COLL VENOUS BLD VENIPUNCTURE: CPT

## 2020-01-06 PROCEDURE — 80061 LIPID PANEL: CPT

## 2020-01-06 PROCEDURE — 85025 COMPLETE CBC W/AUTO DIFF WBC: CPT

## 2020-01-06 PROCEDURE — 80053 COMPREHEN METABOLIC PANEL: CPT

## 2020-01-20 ENCOUNTER — OFFICE VISIT (OUTPATIENT)
Dept: INTERNAL MEDICINE CLINIC | Facility: CLINIC | Age: 80
End: 2020-01-20
Payer: MEDICARE

## 2020-01-20 VITALS
SYSTOLIC BLOOD PRESSURE: 124 MMHG | HEART RATE: 72 BPM | BODY MASS INDEX: 36.51 KG/M2 | TEMPERATURE: 97.5 F | RESPIRATION RATE: 16 BRPM | WEIGHT: 255 LBS | HEIGHT: 70 IN | DIASTOLIC BLOOD PRESSURE: 80 MMHG

## 2020-01-20 DIAGNOSIS — R19.5 POSITIVE COLORECTAL CANCER SCREENING USING COLOGUARD TEST: ICD-10-CM

## 2020-01-20 DIAGNOSIS — I71.2 ANEURYSM OF ASCENDING AORTA (HCC): ICD-10-CM

## 2020-01-20 DIAGNOSIS — I25.10 3-VESSEL CAD: ICD-10-CM

## 2020-01-20 DIAGNOSIS — I10 BENIGN ESSENTIAL HYPERTENSION: ICD-10-CM

## 2020-01-20 DIAGNOSIS — J44.9 COPD, MILD (HCC): ICD-10-CM

## 2020-01-20 DIAGNOSIS — I50.22 CHRONIC SYSTOLIC HEART FAILURE (HCC): ICD-10-CM

## 2020-01-20 DIAGNOSIS — I10 ESSENTIAL HYPERTENSION: ICD-10-CM

## 2020-01-20 DIAGNOSIS — Z00.00 MEDICARE ANNUAL WELLNESS VISIT, SUBSEQUENT: ICD-10-CM

## 2020-01-20 DIAGNOSIS — R73.01 IMPAIRED FASTING GLUCOSE: Primary | ICD-10-CM

## 2020-01-20 DIAGNOSIS — G47.33 OBSTRUCTIVE SLEEP APNEA: ICD-10-CM

## 2020-01-20 DIAGNOSIS — E78.2 MIXED HYPERLIPIDEMIA: ICD-10-CM

## 2020-01-20 PROCEDURE — 99214 OFFICE O/P EST MOD 30 MIN: CPT | Performed by: INTERNAL MEDICINE

## 2020-01-20 PROCEDURE — G0439 PPPS, SUBSEQ VISIT: HCPCS | Performed by: INTERNAL MEDICINE

## 2020-01-20 PROCEDURE — 1123F ACP DISCUSS/DSCN MKR DOCD: CPT | Performed by: INTERNAL MEDICINE

## 2020-01-20 NOTE — ASSESSMENT & PLAN NOTE
Wt Readings from Last 3 Encounters:   01/20/20 116 kg (255 lb)   09/11/19 113 kg (248 lb 12 8 oz)   07/24/19 110 kg (242 lb)     Weight gain, edema in both LE and rales on RLL  He has not taken the furosemide in a long time  I advised that he take it today and monitor the LE edema closely  He must cut down on his alcohol use

## 2020-01-20 NOTE — PROGRESS NOTES
Assessment/Plan:    Impaired fasting glucose  BMI Counseling: Body mass index is 36 38 kg/m²  The BMI is above normal  Nutrition recommendations include decreasing overall calorie intake and moderation in carbohydrate intake ]      Obstructive sleep apnea  Cannot tolerate CPAP    COPD, mild (HCC)  SOB less since taking Spiriva    Benign essential hypertension  Controlled on lisinopril , labetalol    3-vessel CAD  On ASA statin beta blocker ACE    Aneurysm of ascending aorta (HCC)  4 7 cm, stable on CT in 2019  He will see CT surgery next year    Heart failure, systolic ([address] )  Wt Readings from Last 3 Encounters:   01/20/20 116 kg (255 lb)   09/11/19 113 kg (248 lb 12 8 oz)   07/24/19 110 kg (242 lb)     Weight gain, edema in both LE and rales on RLL  He has not taken the furosemide in a long time  I advised that he take it today and monitor the LE edema closely  He must cut down on his alcohol use      Hyperlipidemia  Controlled    Positive colorectal cancer screening using Cologuard test  Colonoscopy due next year 2021         Problem List Items Addressed This Visit        Endocrine    Impaired fasting glucose - Primary     BMI Counseling: Body mass index is 36 38 kg/m²   The BMI is above normal  Nutrition recommendations include decreasing overall calorie intake and moderation in carbohydrate intake ]           Relevant Orders    Hemoglobin A1C       Respiratory    Obstructive sleep apnea     Cannot tolerate CPAP         COPD, mild (HCC)     SOB less since taking Spiriva            Cardiovascular and Mediastinum    Hypertension    Benign essential hypertension     Controlled on lisinopril , labetalol         Relevant Orders    CBC    Comprehensive metabolic panel    3-vessel CAD     On ASA statin beta blocker ACE         Aneurysm of ascending aorta (HCC)     4 7 cm, stable on CT in 2019  He will see CT surgery next year         Heart failure, systolic ([address] )     Wt Readings from Last 3 Encounters:   01/20/20 116 kg (255 lb)   09/11/19 113 kg (248 lb 12 8 oz)   07/24/19 110 kg (242 lb)     Weight gain, edema in both LE and rales on RLL  He has not taken the furosemide in a long time  I advised that he take it today and monitor the LE edema closely  He must cut down on his alcohol use              Other    Positive colorectal cancer screening using Cologuard test     Colonoscopy due next year 2021         Hyperlipidemia     Controlled         Relevant Orders    Lipid panel      Other Visit Diagnoses     Medicare annual wellness visit, subsequent                Subjective:      Patient ID: Mariia Vargas is a 67 y o  male  HPI  Her for a follow up  Recent labs reviewed-FBS slightly elevated 103  Lipids controlled  MCV chronically elevated  He has gained weight over the past few months  His diet has been poor  He drinks 3-4 beers a day  Chronic LE edema, left > right  SOB better since using Spiriva for mild COPD    The following portions of the patient's history were reviewed and updated as appropriate: allergies, current medications, past family history, past social history, past surgical history and problem list     Review of Systems   Constitutional: Positive for unexpected weight change (weight gain over the past few months)  Negative for fatigue and fever  HENT: Negative for congestion, sinus pressure, sinus pain and sore throat  Respiratory: Positive for shortness of breath (better with Spiriva)  Negative for cough and wheezing  Cardiovascular: Positive for leg swelling  Negative for chest pain and palpitations  Gastrointestinal: Positive for constipation (relieved by Senna S)  Negative for abdominal pain, diarrhea, nausea and vomiting  Genitourinary: Negative for difficulty urinating  Musculoskeletal: Positive for back pain (had a stimulator trial last year without relief,  currently with 4 weeks of acute on chronic low back pain, tightness when he bends down)  Negative for arthralgias and myalgias  Neurological: Negative for dizziness and headaches  Objective:      /80   Pulse 72   Temp 97 5 °F (36 4 °C)   Resp 16   Ht 5' 10 2" (1 783 m)   Wt 116 kg (255 lb)   BMI 36 38 kg/m²          Physical Exam   Constitutional: He is oriented to person, place, and time  He appears well-developed and well-nourished  HENT:   Head: Normocephalic and atraumatic  Right Ear: External ear normal    Left Ear: External ear normal    Mouth/Throat: Oropharynx is clear and moist    Eyes: Conjunctivae are normal    Neck: Neck supple  Cardiovascular: Normal rate, regular rhythm and normal heart sounds  No murmur heard  Varicose veins bilaterally right > left  Grade 1 pitting edema on the right, trace on the left   Pulmonary/Chest: Effort normal  No respiratory distress  He has no wheezes  He has rales in the right lower field  Abdominal: Soft  He exhibits no distension and no mass  There is no tenderness  There is no rebound and no guarding  Neurological: He is alert and oriented to person, place, and time  Skin: Skin is warm and dry  Psychiatric: He has a normal mood and affect   His behavior is normal  Judgment and thought content normal

## 2020-01-20 NOTE — ASSESSMENT & PLAN NOTE
BMI Counseling: Body mass index is 36 38 kg/m²   The BMI is above normal  Nutrition recommendations include decreasing overall calorie intake and moderation in carbohydrate intake ]

## 2020-01-20 NOTE — PROGRESS NOTES
Assessment and Plan:     Problem List Items Addressed This Visit        Endocrine    Impaired fasting glucose - Primary     BMI Counseling: Body mass index is 36 38 kg/m²  The BMI is above normal  Nutrition recommendations include decreasing overall calorie intake and moderation in carbohydrate intake ]           Relevant Orders    Hemoglobin A1C       Respiratory    Obstructive sleep apnea     Cannot tolerate CPAP         COPD, mild (HCC)     SOB less since taking Spiriva            Cardiovascular and Mediastinum    Hypertension    Benign essential hypertension     Controlled on lisinopril , labetalol         Relevant Orders    CBC    Comprehensive metabolic panel    3-vessel CAD     On ASA statin beta blocker ACE         Aneurysm of ascending aorta (HCC)     4 7 cm, stable on CT in 2019  He will see CT surgery next year         Heart failure, systolic ([facility] [address] )     Wt Readings from Last 3 Encounters:   01/20/20 116 kg (255 lb)   09/11/19 113 kg (248 lb 12 8 oz)   07/24/19 110 kg (242 lb)     Weight gain, edema in both LE and rales on RLL  He has not taken the furosemide in a long time  I advised that he take it today and monitor the LE edema closely  He must cut down on his alcohol use              Other    Positive colorectal cancer screening using Cologuard test     Colonoscopy due next year 2021         Hyperlipidemia     Controlled         Relevant Orders    Lipid panel      Other Visit Diagnoses     Medicare annual wellness visit, subsequent            BMI Counseling: Body mass index is 36 38 kg/m²  The BMI is above normal  Nutrition recommendations include moderation in carbohydrate intake  Preventive health issues were discussed with patient, and age appropriate screening tests were ordered as noted in patient's After Visit Summary  Personalized health advice and appropriate referrals for health education or preventive services given if needed, as noted in patient's After Visit Summary       History of Present Illness:     Patient presents for Medicare Annual Wellness visit    Patient Care Team:  Yasmany Kelley MD as PCP - MD Ever Littlejohn MD Arabella Cherry, MD     Problem List:     Patient Active Problem List   Diagnosis    Positive colorectal cancer screening using Cologuard test    Benign essential hypertension    Hyperlipidemia    Impaired fasting glucose    Microscopic hematuria    Obesity    Obstructive sleep apnea    Subclinical hypothyroidism    3-vessel CAD    Aneurysm of abdominal aorta ([address] )    Aneurysm of ascending aorta (HCC)    Aortic valve disorder    Arteriosclerotic cardiovascular disease    Disc degeneration, lumbar    Herniated lumbar intervertebral disc    Lumbar radiculopathy    Intervertebral disc disorder with radiculopathy of lumbar region    Spondylolisthesis at L4-L5 level    Chronic pain syndrome    SOB (shortness of breath)    Hypertension    Multiple pulmonary nodules    [facility] failure[facility])    COPD, mild ([address] )      Past Medical and Surgical History:     Past Medical History:   Diagnosis Date    Abdominal aortic aneurysm ([address] )     Chronic pain     back since July    Chronic pain disorder     lumbar    Coronary artery disease     History of transfusion     Hyperlipidemia     Hypertension     Sleep apnea     Subconjunctival hemorrhage      Past Surgical History:   Procedure Laterality Date    ABDOMINAL AORTIC ANEURYSM REPAIR  08/09/2007    2 dock limbs with visc extens prosth    COLONOSCOPY      CORONARY ARTERY BYPASS GRAFT      GAB-LAD, sequential vein graft to OM1 and OM2, single VG-posterior descending   Onset: 2003      Family History:     Family History   Problem Relation Age of Onset    Heart disease Mother     Stroke Father         stroke syndrome    Aneurysm Brother         abdominal    Aortic aneurysm Brother         ascending    Coronary artery disease Family     Hypertension Family     Other Son         gioblastoma multiforme      Social History:     Social History     Socioeconomic History    Marital status: /Civil Union     Spouse name: None    Number of children: None    Years of education: None    Highest education level: None   Occupational History    Occupation: retired   Social Needs    Financial resource strain: None    Food insecurity:     Worry: None     Inability: None    Transportation needs:     Medical: None     Non-medical: None   Tobacco Use    Smoking status: Former Smoker     Packs/day: 1 00     Years: 40 00     Pack years: 40 00     Types: Cigarettes     Last attempt to quit: 2012     Years since quittin 4    Smokeless tobacco: Never Used   Substance and Sexual Activity    Alcohol use: Yes     Frequency: 4 or more times a week     Drinks per session: 1 or 2     Binge frequency: Never     Comment: rare    Drug use: No    Sexual activity: Never   Lifestyle    Physical activity:     Days per week: None     Minutes per session: None    Stress: None   Relationships    Social connections:     Talks on phone: None     Gets together: None     Attends Restorationism service: None     Active member of club or organization: None     Attends meetings of clubs or organizations: None     Relationship status: None    Intimate partner violence:     Fear of current or ex partner: None     Emotionally abused: None     Physically abused: None     Forced sexual activity: None   Other Topics Concern    None   Social History Narrative    None       Medications and Allergies:     Current Outpatient Medications   Medication Sig Dispense Refill    albuterol (VENTOLIN HFA) 90 mcg/act inhaler Inhale 2 puffs every 6 (six) hours as needed for wheezing 18 g 5    aspirin (ECOTRIN LOW STRENGTH) 81 mg EC tablet Take 1 tablet by mouth daily      atorvastatin (LIPITOR) 40 mg tablet Take 1 tablet (40 mg total) by mouth daily 90 tablet 3    furosemide (LASIX) 20 mg tablet Take 1 tablet (20 mg total) by mouth as needed (SOB, LE edema) 10 tablet 0    gabapentin (NEURONTIN) 300 mg capsule Take 1 capsule (300 mg total) by mouth daily at bedtime 90 capsule 5    labetalol (NORMODYNE) 100 mg tablet Take 1 tablet (100 mg total) by mouth 2 (two) times a day 180 tablet 3    lisinopril (ZESTRIL) 5 mg tablet Take 1 tablet (5 mg total) by mouth daily 90 tablet 3    senna (SENOKOT) 8 6 MG tablet Take 1 tablet by mouth as needed        tamsulosin (FLOMAX) 0 4 mg Take 1 capsule by mouth daily      tiotropium (SPIRIVA RESPIMAT) 1 25 MCG/ACT AERS inhaler Inhale 2 puffs daily 3 Inhaler 3     Current Facility-Administered Medications   Medication Dose Route Frequency Provider Last Rate Last Dose    albuterol (PROVENTIL HFA,VENTOLIN HFA) inhaler 2 puff  2 puff Inhalation Q6H PRN Felicia Allen MD         Allergies   Allergen Reactions    Meloxicam Edema      Immunizations:     Immunization History   Administered Date(s) Administered    INFLUENZA 10/23/2007, 10/02/2013, 10/08/2016, 10/25/2017    Influenza Split High Dose Preservative Free IM 10/02/2013, 10/02/2013, 10/22/2014, 10/27/2015, 10/08/2016, 10/03/2019    Influenza TIV (IM) 10/20/2012, 10/30/2018    Pneumococcal Conjugate 13-Valent 04/24/2015    Pneumococcal Polysaccharide PPV23 09/07/2012    Zoster Vaccine Recombinant 10/03/2019, 12/11/2019      Health Maintenance:         Topic Date Due    CRC Screening: Colonoscopy  06/04/2021     There are no preventive care reminders to display for this patient  Medicare Health Risk Assessment:     /80   Pulse 72   Temp 97 5 °F (36 4 °C)   Resp 16   Ht 5' 10 2" (1 783 m)   Wt 116 kg (255 lb)   BMI 36 38 kg/m²      Lashonda Mathews is here for his Subsequent Wellness visit  Last Medicare Wellness visit information reviewed, patient interviewed and updates made to the record today  Health Risk Assessment:   Patient rates overall health as good   Patient feels that their physical health rating is same  Hearing was rated as same  Patient feels that their emotional and mental health rating is same  Pain experienced in the last 7 days has been none  Patient states that he has experienced no weight loss or gain in last 6 months  Depression Screening:   PHQ-2 Score: 0      Fall Risk Screening: In the past year, patient has experienced: no history of falling in past year      Home Safety:  Patient does not have trouble with stairs inside or outside of their home  Patient has working smoke alarms and has working carbon monoxide detector  Home safety hazards include: none  Nutrition:   Current diet is Regular, Low Cholesterol, Low Saturated Fat, Low Carb and Limited junk food  Medications:   Patient is currently taking over-the-counter supplements  OTC medications include: see medication list  Patient is able to manage medications  Activities of Daily Living (ADLs)/Instrumental Activities of Daily Living (IADLs):   Walk and transfer into and out of bed and chair?: Yes  Dress and groom yourself?: Yes    Bathe or shower yourself?: Yes    Feed yourself?  Yes  Do your laundry/housekeeping?: Yes  Manage your money, pay your bills and track your expenses?: Yes  Make your own meals?: Yes    Do your own shopping?: Yes    Previous Hospitalizations:   Any hospitalizations or ED visits within the last 12 months?: No      Advance Care Planning:   Living will: No      PREVENTIVE SCREENINGS      Cardiovascular Screening:    General: Screening Not Indicated and History Lipid Disorder      Diabetes Screening:     General: Screening Current      Colorectal Cancer Screening:     General: Screening Current      Prostate Cancer Screening:    General: Screening Not Indicated      Osteoporosis Screening:    General: Screening Not Indicated      Abdominal Aortic Aneurysm (AAA) Screening:    Risk factors include: tobacco use        General: Screening Not Indicated and History AAA      Lung Cancer Screening:     General: Screening Current      Hepatitis C Screening:    General: Screening Not Indicated      Janice Mckinley MD

## 2020-01-20 NOTE — PATIENT INSTRUCTIONS

## 2020-01-30 ENCOUNTER — TELEPHONE (OUTPATIENT)
Dept: INTERNAL MEDICINE CLINIC | Facility: CLINIC | Age: 80
End: 2020-01-30

## 2020-01-30 DIAGNOSIS — R06.02 SOB (SHORTNESS OF BREATH): ICD-10-CM

## 2020-01-30 RX ORDER — FUROSEMIDE 20 MG/1
TABLET ORAL
Qty: 90 TABLET | Refills: 0 | Status: SHIPPED | OUTPATIENT
Start: 2020-01-30 | End: 2021-01-04 | Stop reason: SDUPTHER

## 2020-01-30 RX ORDER — FUROSEMIDE 20 MG/1
20 TABLET ORAL DAILY PRN
Qty: 30 TABLET | Refills: 0 | Status: SHIPPED | OUTPATIENT
Start: 2020-01-30 | End: 2020-01-30

## 2020-01-30 NOTE — TELEPHONE ENCOUNTER
I left a message for the patient to call back and let us know if he is taking the medication daily or prn

## 2020-01-30 NOTE — TELEPHONE ENCOUNTER
Pt's wife called and said at the last visit you told them that if he needed a refill on his water pill to let you know  They are now requesting the Furosemide 20 mg  Please send a 30 day supply to [facility]  Please advise

## 2020-03-11 ENCOUNTER — OFFICE VISIT (OUTPATIENT)
Dept: PULMONOLOGY | Facility: CLINIC | Age: 80
End: 2020-03-11
Payer: MEDICARE

## 2020-03-11 VITALS
SYSTOLIC BLOOD PRESSURE: 112 MMHG | HEART RATE: 69 BPM | WEIGHT: 250 LBS | OXYGEN SATURATION: 94 % | BODY MASS INDEX: 35 KG/M2 | DIASTOLIC BLOOD PRESSURE: 70 MMHG | TEMPERATURE: 98.7 F | HEIGHT: 71 IN

## 2020-03-11 DIAGNOSIS — R91.8 MULTIPLE PULMONARY NODULES: Primary | ICD-10-CM

## 2020-03-11 DIAGNOSIS — E66.09 CLASS 1 OBESITY DUE TO EXCESS CALORIES WITH SERIOUS COMORBIDITY AND BODY MASS INDEX (BMI) OF 30.0 TO 30.9 IN ADULT: ICD-10-CM

## 2020-03-11 DIAGNOSIS — J44.9 COPD, MILD (HCC): ICD-10-CM

## 2020-03-11 DIAGNOSIS — G47.33 OBSTRUCTIVE SLEEP APNEA: ICD-10-CM

## 2020-03-11 PROCEDURE — 1036F TOBACCO NON-USER: CPT | Performed by: INTERNAL MEDICINE

## 2020-03-11 PROCEDURE — 3078F DIAST BP <80 MM HG: CPT | Performed by: INTERNAL MEDICINE

## 2020-03-11 PROCEDURE — 3008F BODY MASS INDEX DOCD: CPT | Performed by: INTERNAL MEDICINE

## 2020-03-11 PROCEDURE — 3074F SYST BP LT 130 MM HG: CPT | Performed by: INTERNAL MEDICINE

## 2020-03-11 PROCEDURE — 99215 OFFICE O/P EST HI 40 MIN: CPT | Performed by: INTERNAL MEDICINE

## 2020-03-11 PROCEDURE — 1160F RVW MEDS BY RX/DR IN RCRD: CPT | Performed by: INTERNAL MEDICINE

## 2020-03-11 PROCEDURE — 4040F PNEUMOC VAC/ADMIN/RCVD: CPT | Performed by: INTERNAL MEDICINE

## 2020-03-11 RX ORDER — DIPHENOXYLATE HYDROCHLORIDE AND ATROPINE SULFATE 2.5; .025 MG/1; MG/1
1 TABLET ORAL DAILY
COMMUNITY

## 2020-03-11 NOTE — PROGRESS NOTES
Pulmonary Follow Up Note   Murtaza Deal 67 y o  male MRN: [id number]  3/11/2020      Assessment:    Mild COPD- class A, no recent exacerbations  Well controlled on spiriva and PRN albuterol  Plan:  - Continue spiriva and PRN albuterol  Did not require refills today    Multiple bilateral subcentimeter pulm nodules- largest is 3mm  Stable for 2 years now  No longer requires follow up    HFrEF- 46% without valvular heart disease  On diuretics  Slightly volume overload    Ascending aortic aneurysm- 4 7cm  Stable  Follows with CT surgery  Will have repeat imaging next year  Obesity- counseled extensively on improving diet, increasing exercise and decreasing alcohol intake  YEVGENIY- declined CPAP at this time  Plan:    Diagnoses and all orders for this visit:    Multiple pulmonary nodules  -     Cancel: CT chest without contrast; Future    Other orders  -     FIBER ADULT GUMMIES PO; Take by mouth  -     multivitamin (THERAGRAN) TABS; Take 1 tablet by mouth daily        No follow-ups on file  History of Present Illness   HPI:  Murtaza Deal is a 67 y o  male w/ HFrEF of 46%, mild COPD, ascending aortic aneurysm of 4 7cm and multiple bilateral subcentimeter nodules and YEVGENIY not on CPAP that presents for follow up  Since his last visit, he has not required hospitalization or outpatient antibiotics/steroids  No sick contacts  Asymptomatic  Continues to use spiriva and has only required his albuterol once every few months  He continues to avoid smoking  He is attempting to lose weight  Continues to drink 3-4 beers daily  Review of Systems   Constitutional: Negative for chills, diaphoresis, fatigue and fever  HENT: Negative for congestion, ear pain, postnasal drip, rhinorrhea, sneezing, trouble swallowing and voice change  Eyes: Negative for redness and itching  Respiratory: Negative for apnea, cough, choking, chest tightness, shortness of breath, wheezing and stridor      Cardiovascular: Negative for chest pain, palpitations and leg swelling  Gastrointestinal: Negative for abdominal distention, abdominal pain, blood in stool, constipation, diarrhea, nausea and vomiting  Endocrine: Negative for polydipsia and polyuria  Genitourinary: Negative for dysuria and flank pain  Musculoskeletal: Negative for arthralgias, back pain and joint swelling  Skin: Negative for pallor and rash  Neurological: Negative for dizziness, syncope, weakness, light-headedness, numbness and headaches  Hematological: Negative for adenopathy  Psychiatric/Behavioral: Negative for agitation  Historical Information   Past Medical History:   Diagnosis Date    Abdominal aortic aneurysm (HCC)     Chronic pain     back since July    Chronic pain disorder     lumbar    Coronary artery disease     History of transfusion     Hyperlipidemia     Hypertension     Sleep apnea     Subconjunctival hemorrhage      Past Surgical History:   Procedure Laterality Date    ABDOMINAL AORTIC ANEURYSM REPAIR  08/09/2007    2 dock limbs with visc extens prosth    COLONOSCOPY      CORONARY ARTERY BYPASS GRAFT      GAB-LAD, sequential vein graft to OM1 and OM2, single VG-posterior descending   Onset: 2003     Family History   Problem Relation Age of Onset    Heart disease Mother     Stroke Father         stroke syndrome    Aneurysm Brother         abdominal    Aortic aneurysm Brother         ascending    Coronary artery disease Family     Hypertension Family     Other Son         gioblastoma multiforme         Meds/Allergies     Current Outpatient Medications:     albuterol (VENTOLIN HFA) 90 mcg/act inhaler, Inhale 2 puffs every 6 (six) hours as needed for wheezing, Disp: 18 g, Rfl: 5    atorvastatin (LIPITOR) 40 mg tablet, Take 1 tablet (40 mg total) by mouth daily, Disp: 90 tablet, Rfl: 3    FIBER ADULT GUMMIES PO, Take by mouth, Disp: , Rfl:     furosemide (LASIX) 20 mg tablet, TAKE 1 TABLET(20 MG) BY MOUTH DAILY AS NEEDED FOR SHORTNESS OF BREATH OR SWELLING, Disp: 90 tablet, Rfl: 0    gabapentin (NEURONTIN) 300 mg capsule, Take 1 capsule (300 mg total) by mouth daily at bedtime, Disp: 90 capsule, Rfl: 5    labetalol (NORMODYNE) 100 mg tablet, Take 1 tablet (100 mg total) by mouth 2 (two) times a day, Disp: 180 tablet, Rfl: 3    lisinopril (ZESTRIL) 5 mg tablet, Take 1 tablet (5 mg total) by mouth daily, Disp: 90 tablet, Rfl: 3    multivitamin (THERAGRAN) TABS, Take 1 tablet by mouth daily, Disp: , Rfl:     senna (SENOKOT) 8 6 MG tablet, Take 1 tablet by mouth as needed  , Disp: , Rfl:     tamsulosin (FLOMAX) 0 4 mg, Take 1 capsule by mouth daily, Disp: , Rfl:     tiotropium (SPIRIVA RESPIMAT) 1 25 MCG/ACT AERS inhaler, Inhale 2 puffs daily, Disp: 3 Inhaler, Rfl: 3    aspirin (ECOTRIN LOW STRENGTH) 81 mg EC tablet, Take 1 tablet by mouth daily, Disp: , Rfl:     Current Facility-Administered Medications:     albuterol (PROVENTIL HFA,VENTOLIN HFA) inhaler 2 puff, 2 puff, Inhalation, Q6H PRN, Lul Rutherford MD  Allergies   Allergen Reactions    Meloxicam Edema       Vitals: Blood pressure 112/70, pulse 69, temperature 98 7 °F (37 1 °C), height 5' 11" (1 803 m), weight 113 kg (250 lb), SpO2 94 %  Body mass index is 34 87 kg/m²  Oxygen Therapy  SpO2: 94 %  Oxygen Therapy: None (Room air)      Physical Exam  Physical Exam   Constitutional: He is oriented to person, place, and time  No distress  HENT:   Head: Normocephalic and atraumatic  Nose: Nose normal    Mouth/Throat: Oropharynx is clear and moist  No oropharyngeal exudate  Eyes: Pupils are equal, round, and reactive to light  Conjunctivae and EOM are normal  No scleral icterus  Neck: Normal range of motion  Neck supple  No JVD present  No tracheal deviation present  No thyromegaly present  Cardiovascular: Normal rate, regular rhythm, normal heart sounds and intact distal pulses  Exam reveals no gallop and no friction rub  No murmur heard    Pulmonary/Chest: Effort normal and breath sounds normal  No stridor  No respiratory distress  He has no wheezes  He has no rales  Abdominal: Soft  Bowel sounds are normal  He exhibits no distension  There is no tenderness  There is no rebound and no guarding  Musculoskeletal: Normal range of motion  He exhibits edema  He exhibits no deformity  1+ bilateral lower extremity pitting edema   Lymphadenopathy:     He has no cervical adenopathy  Neurological: He is alert and oriented to person, place, and time  No cranial nerve deficit or sensory deficit  Skin: Skin is warm  No rash noted  He is not diaphoretic  No erythema  Psychiatric: He has a normal mood and affect  Labs: I have personally reviewed pertinent lab results  Lab Results   Component Value Date    WBC 6 19 2020    HGB 15 2 2020    HCT 46 4 2020     (H) 2020     2020     Lab Results   Component Value Date    GLUCOSE 98 10/15/2015    CALCIUM 9 4 2020     (L) 10/15/2015    K 5 0 2020    CO2 32 2020     2020    BUN 15 2020    CREATININE 1 12 2020     No results found for: IGE  Lab Results   Component Value Date    ALT 28 2020    AST 17 2020    ALKPHOS 66 2020    BILITOT 0 79 10/15/2015       Imaging and other studies: I have personally reviewed pertinent reports  and I have personally reviewed pertinent films in PACS  CT chest- multiple subcentimeter pulmonary nodules largest being 3 mm    Pulmonary function testing:   2019  FEV1:  2 46 L-78% predicted  FVC:  3 55 L-82% predicted  FEV1/FVC:  69%  T% predicted   RV: 134% predicted  DLCO:  60%  Mild obstructive airflow limitation with mild decreased diffusion capacity  No significant response to bronchodilator    EKG, Pathology, and Other Studies: I have personally reviewed pertinent reports  6 minutes walk test:  Performed on 2019    Patient was able to ambulate 370 m with resting and ambulatory pulse ox both at 94% and a heart rate of 69 at rest with a max of 80    Sourav Johnson MD  Pulmonary and Critical Care Fellow- PGY 4  [facility]

## 2020-06-09 ENCOUNTER — OFFICE VISIT (OUTPATIENT)
Dept: PAIN MEDICINE | Facility: CLINIC | Age: 80
End: 2020-06-09
Payer: MEDICARE

## 2020-06-09 ENCOUNTER — TELEPHONE (OUTPATIENT)
Dept: PAIN MEDICINE | Facility: CLINIC | Age: 80
End: 2020-06-09

## 2020-06-09 VITALS
HEART RATE: 67 BPM | TEMPERATURE: 97.7 F | DIASTOLIC BLOOD PRESSURE: 77 MMHG | WEIGHT: 252 LBS | SYSTOLIC BLOOD PRESSURE: 131 MMHG | BODY MASS INDEX: 35.15 KG/M2

## 2020-06-09 DIAGNOSIS — G89.4 CHRONIC PAIN SYNDROME: Primary | ICD-10-CM

## 2020-06-09 DIAGNOSIS — M54.16 LUMBAR RADICULOPATHY: ICD-10-CM

## 2020-06-09 PROCEDURE — 3078F DIAST BP <80 MM HG: CPT | Performed by: NURSE PRACTITIONER

## 2020-06-09 PROCEDURE — 3075F SYST BP GE 130 - 139MM HG: CPT | Performed by: NURSE PRACTITIONER

## 2020-06-09 PROCEDURE — 1036F TOBACCO NON-USER: CPT | Performed by: NURSE PRACTITIONER

## 2020-06-09 PROCEDURE — 4040F PNEUMOC VAC/ADMIN/RCVD: CPT | Performed by: NURSE PRACTITIONER

## 2020-06-09 PROCEDURE — 1160F RVW MEDS BY RX/DR IN RCRD: CPT | Performed by: NURSE PRACTITIONER

## 2020-06-09 PROCEDURE — 99214 OFFICE O/P EST MOD 30 MIN: CPT | Performed by: NURSE PRACTITIONER

## 2020-06-09 RX ORDER — GABAPENTIN 300 MG/1
300 CAPSULE ORAL
Qty: 90 CAPSULE | Refills: 5 | Status: SHIPPED | OUTPATIENT
Start: 2020-06-09 | End: 2021-07-20 | Stop reason: SDUPTHER

## 2020-06-23 ENCOUNTER — HOSPITAL ENCOUNTER (OUTPATIENT)
Dept: RADIOLOGY | Facility: CLINIC | Age: 80
Discharge: HOME | End: 2020-06-23
Attending: ANESTHESIOLOGY | Admitting: ANESTHESIOLOGY
Payer: MEDICARE

## 2020-06-23 VITALS
RESPIRATION RATE: 18 BRPM | OXYGEN SATURATION: 93 % | SYSTOLIC BLOOD PRESSURE: 128 MMHG | DIASTOLIC BLOOD PRESSURE: 78 MMHG | HEART RATE: 66 BPM | TEMPERATURE: 98.7 F

## 2020-06-23 DIAGNOSIS — M54.16 LUMBAR RADICULOPATHY: ICD-10-CM

## 2020-06-23 PROCEDURE — 64483 NJX AA&/STRD TFRM EPI L/S 1: CPT | Performed by: ANESTHESIOLOGY

## 2020-06-23 PROCEDURE — 64484 NJX AA&/STRD TFRM EPI L/S EA: CPT | Performed by: ANESTHESIOLOGY

## 2020-06-23 RX ORDER — BUPIVACAINE HCL/PF 2.5 MG/ML
10 VIAL (ML) INJECTION ONCE
Status: COMPLETED | OUTPATIENT
Start: 2020-06-23 | End: 2020-06-23

## 2020-06-23 RX ORDER — METHYLPREDNISOLONE ACETATE 80 MG/ML
80 INJECTION, SUSPENSION INTRA-ARTICULAR; INTRALESIONAL; INTRAMUSCULAR; PARENTERAL; SOFT TISSUE ONCE
Status: COMPLETED | OUTPATIENT
Start: 2020-06-23 | End: 2020-06-23

## 2020-06-23 RX ORDER — 0.9 % SODIUM CHLORIDE 0.9 %
10 VIAL (ML) INJECTION ONCE
Status: COMPLETED | OUTPATIENT
Start: 2020-06-23 | End: 2020-06-23

## 2020-06-23 RX ADMIN — Medication 4 ML: at 10:58

## 2020-06-23 RX ADMIN — SODIUM CHLORIDE 4 ML: 9 INJECTION, SOLUTION INTRAMUSCULAR; INTRAVENOUS; SUBCUTANEOUS at 10:58

## 2020-06-23 RX ADMIN — METHYLPREDNISOLONE ACETATE 80 MG: 80 INJECTION, SUSPENSION INTRA-ARTICULAR; INTRALESIONAL; INTRAMUSCULAR; PARENTERAL; SOFT TISSUE at 11:01

## 2020-06-23 RX ADMIN — IOHEXOL 1 ML: 300 INJECTION, SOLUTION INTRAVENOUS at 11:00

## 2020-06-23 RX ADMIN — BUPIVACAINE HYDROCHLORIDE 2 ML: 2.5 INJECTION, SOLUTION EPIDURAL; INFILTRATION; INTRACAUDAL at 11:01

## 2020-06-30 ENCOUNTER — TELEPHONE (OUTPATIENT)
Dept: PAIN MEDICINE | Facility: CLINIC | Age: 80
End: 2020-06-30

## 2020-07-06 ENCOUNTER — APPOINTMENT (OUTPATIENT)
Dept: LAB | Facility: CLINIC | Age: 80
End: 2020-07-06
Payer: MEDICARE

## 2020-07-06 DIAGNOSIS — I10 BENIGN ESSENTIAL HYPERTENSION: ICD-10-CM

## 2020-07-06 DIAGNOSIS — E78.2 MIXED HYPERLIPIDEMIA: ICD-10-CM

## 2020-07-06 DIAGNOSIS — R73.01 IMPAIRED FASTING GLUCOSE: ICD-10-CM

## 2020-07-06 LAB
ALBUMIN SERPL BCP-MCNC: 3.8 G/DL (ref 3.5–5)
ALP SERPL-CCNC: 67 U/L (ref 46–116)
ALT SERPL W P-5'-P-CCNC: 29 U/L (ref 12–78)
ANION GAP SERPL CALCULATED.3IONS-SCNC: 5 MMOL/L (ref 4–13)
AST SERPL W P-5'-P-CCNC: 21 U/L (ref 5–45)
BILIRUB SERPL-MCNC: 0.89 MG/DL (ref 0.2–1)
BUN SERPL-MCNC: 15 MG/DL (ref 5–25)
CALCIUM SERPL-MCNC: 8.9 MG/DL (ref 8.3–10.1)
CHLORIDE SERPL-SCNC: 104 MMOL/L (ref 100–108)
CHOLEST SERPL-MCNC: 133 MG/DL (ref 50–200)
CO2 SERPL-SCNC: 28 MMOL/L (ref 21–32)
CREAT SERPL-MCNC: 0.93 MG/DL (ref 0.6–1.3)
ERYTHROCYTE [DISTWIDTH] IN BLOOD BY AUTOMATED COUNT: 13.1 % (ref 11.6–15.1)
EST. AVERAGE GLUCOSE BLD GHB EST-MCNC: 114 MG/DL
GFR SERPL CREATININE-BSD FRML MDRD: 78 ML/MIN/1.73SQ M
GLUCOSE P FAST SERPL-MCNC: 105 MG/DL (ref 65–99)
HBA1C MFR BLD: 5.6 %
HCT VFR BLD AUTO: 43.3 % (ref 36.5–49.3)
HDLC SERPL-MCNC: 70 MG/DL
HGB BLD-MCNC: 14.3 G/DL (ref 12–17)
LDLC SERPL CALC-MCNC: 56 MG/DL (ref 0–100)
MCH RBC QN AUTO: 33.9 PG (ref 26.8–34.3)
MCHC RBC AUTO-ENTMCNC: 33 G/DL (ref 31.4–37.4)
MCV RBC AUTO: 103 FL (ref 82–98)
NONHDLC SERPL-MCNC: 63 MG/DL
PLATELET # BLD AUTO: 148 THOUSANDS/UL (ref 149–390)
PMV BLD AUTO: 10.6 FL (ref 8.9–12.7)
POTASSIUM SERPL-SCNC: 4.6 MMOL/L (ref 3.5–5.3)
PROT SERPL-MCNC: 7.1 G/DL (ref 6.4–8.2)
RBC # BLD AUTO: 4.22 MILLION/UL (ref 3.88–5.62)
SODIUM SERPL-SCNC: 137 MMOL/L (ref 136–145)
TRIGL SERPL-MCNC: 37 MG/DL
WBC # BLD AUTO: 7.22 THOUSAND/UL (ref 4.31–10.16)

## 2020-07-06 PROCEDURE — 36415 COLL VENOUS BLD VENIPUNCTURE: CPT

## 2020-07-06 PROCEDURE — 85027 COMPLETE CBC AUTOMATED: CPT

## 2020-07-06 PROCEDURE — 83036 HEMOGLOBIN GLYCOSYLATED A1C: CPT

## 2020-07-06 PROCEDURE — 80061 LIPID PANEL: CPT

## 2020-07-06 PROCEDURE — 80053 COMPREHEN METABOLIC PANEL: CPT

## 2020-07-20 ENCOUNTER — OFFICE VISIT (OUTPATIENT)
Dept: INTERNAL MEDICINE CLINIC | Facility: CLINIC | Age: 80
End: 2020-07-20
Payer: MEDICARE

## 2020-07-20 VITALS
SYSTOLIC BLOOD PRESSURE: 142 MMHG | HEART RATE: 64 BPM | DIASTOLIC BLOOD PRESSURE: 80 MMHG | HEIGHT: 71 IN | OXYGEN SATURATION: 97 % | TEMPERATURE: 97.3 F | BODY MASS INDEX: 34.89 KG/M2 | WEIGHT: 249.2 LBS

## 2020-07-20 DIAGNOSIS — I71.2 ANEURYSM OF ASCENDING AORTA (HCC): ICD-10-CM

## 2020-07-20 DIAGNOSIS — I10 BENIGN ESSENTIAL HYPERTENSION: Primary | ICD-10-CM

## 2020-07-20 DIAGNOSIS — E66.09 CLASS 1 OBESITY DUE TO EXCESS CALORIES WITH SERIOUS COMORBIDITY AND BODY MASS INDEX (BMI) OF 30.0 TO 30.9 IN ADULT: ICD-10-CM

## 2020-07-20 DIAGNOSIS — R73.01 IMPAIRED FASTING GLUCOSE: ICD-10-CM

## 2020-07-20 DIAGNOSIS — I50.22 CHRONIC SYSTOLIC HEART FAILURE (HCC): ICD-10-CM

## 2020-07-20 DIAGNOSIS — E78.2 MIXED HYPERLIPIDEMIA: ICD-10-CM

## 2020-07-20 DIAGNOSIS — G47.33 OBSTRUCTIVE SLEEP APNEA: ICD-10-CM

## 2020-07-20 DIAGNOSIS — M54.16 LUMBAR RADICULOPATHY: ICD-10-CM

## 2020-07-20 PROCEDURE — 99214 OFFICE O/P EST MOD 30 MIN: CPT | Performed by: INTERNAL MEDICINE

## 2020-07-20 PROCEDURE — 3079F DIAST BP 80-89 MM HG: CPT | Performed by: INTERNAL MEDICINE

## 2020-07-20 PROCEDURE — 4040F PNEUMOC VAC/ADMIN/RCVD: CPT | Performed by: INTERNAL MEDICINE

## 2020-07-20 PROCEDURE — 1160F RVW MEDS BY RX/DR IN RCRD: CPT | Performed by: INTERNAL MEDICINE

## 2020-07-20 PROCEDURE — 1036F TOBACCO NON-USER: CPT | Performed by: INTERNAL MEDICINE

## 2020-07-20 PROCEDURE — 3008F BODY MASS INDEX DOCD: CPT | Performed by: INTERNAL MEDICINE

## 2020-07-20 PROCEDURE — 3077F SYST BP >= 140 MM HG: CPT | Performed by: INTERNAL MEDICINE

## 2020-07-20 NOTE — ASSESSMENT & PLAN NOTE
Stable A1C  BMI Counseling: Body mass index is 34 76 kg/m²  The BMI is above normal  Nutrition recommendations include decreasing overall calorie intake

## 2020-07-20 NOTE — ASSESSMENT & PLAN NOTE
Wt Readings from Last 3 Encounters:   07/20/20 113 kg (249 lb 3 2 oz)   06/09/20 114 kg (252 lb)   03/11/20 113 kg (250 lb)     Taking furosemide PRN  + ankle edema

## 2020-07-20 NOTE — PROGRESS NOTES
Assessment/Plan:    Impaired fasting glucose  Stable A1C  BMI Counseling: Body mass index is 34 76 kg/m²  The BMI is above normal  Nutrition recommendations include decreasing overall calorie intake  Obstructive sleep apnea  Non compliant with CPAP, cannot tolerate    Benign essential hypertension  High today  Continue current meds  Discussed limiting alcohol intake    Aneurysm of ascending aorta (HCC)  F/U with CT surgery next year (2 years)    Heart failure, systolic (HCC)  Wt Readings from Last 3 Encounters:   07/20/20 113 kg (249 lb 3 2 oz)   06/09/20 114 kg (252 lb)   03/11/20 113 kg (250 lb)     Taking furosemide PRN  + ankle edema          Lumbar radiculopathy  F/U with pain mgt    Hyperlipidemia  Continue atorvastatin         Problem List Items Addressed This Visit        Endocrine    Impaired fasting glucose     Stable A1C  BMI Counseling: Body mass index is 34 76 kg/m²  The BMI is above normal  Nutrition recommendations include decreasing overall calorie intake             Relevant Orders    Hemoglobin A1C    Comprehensive metabolic panel       Respiratory    Obstructive sleep apnea     Non compliant with CPAP, cannot tolerate            Cardiovascular and Mediastinum    Benign essential hypertension - Primary     High today  Continue current meds  Discussed limiting alcohol intake         Relevant Orders    CBC and differential    Comprehensive metabolic panel    Aneurysm of ascending aorta (HCC)     F/U with CT surgery next year (2 years)         Heart failure, systolic (HCC)     Wt Readings from Last 3 Encounters:   07/20/20 113 kg (249 lb 3 2 oz)   06/09/20 114 kg (252 lb)   03/11/20 113 kg (250 lb)     Taking furosemide PRN  + ankle edema                  Nervous and Auditory    Lumbar radiculopathy     F/U with pain mgt            Other    Obesity    Hyperlipidemia     Continue atorvastatin         Relevant Orders    Lipid panel    Comprehensive metabolic panel            Subjective:      Patient ID: Barby Martinez is a 67 y o  male  HPI  Here for a follow up  Recent labs reviewed- A1C 5 6, lipids controlled  No new issues  Known COPD, YEVGENIY, CAD  SOB with exertion, chronic mild cough  Compliant with Spiriva and uses albuterol infrequently  Ankle edema and takes furosemide PRN  Poor diet and no exercise   Drinking 3-4 alcohol daily  Chronic low back pain without relief from recent KEARA  He is on gabapentin and not taking any PRN meds  Constipation recently- due for colonoscopy in a year    The following portions of the patient's history were reviewed and updated as appropriate: allergies, current medications, past family history, past medical history, past social history, past surgical history and problem list     Review of Systems   Constitutional: Negative for chills and fever  HENT: Positive for postnasal drip  Negative for congestion  Respiratory: Positive for cough (morning) and shortness of breath  Cardiovascular: Positive for leg swelling  Negative for chest pain and palpitations  Gastrointestinal: Positive for constipation  Negative for blood in stool and diarrhea  Genitourinary: Negative for difficulty urinating  Musculoskeletal: Positive for back pain  Neurological: Negative for dizziness and headaches  Objective:      /80   Pulse 64   Temp (!) 97 3 °F (36 3 °C)   Ht 5' 11" (1 803 m)   Wt 113 kg (249 lb 3 2 oz)   SpO2 97%   BMI 34 76 kg/m²          Physical Exam   Constitutional: He is oriented to person, place, and time  He appears well-developed and well-nourished  HENT:   Head: Normocephalic and atraumatic  Right Ear: External ear normal    Left Ear: External ear normal    Eyes: Conjunctivae are normal    Neck: Neck supple  Cardiovascular: Normal rate, regular rhythm and normal heart sounds  No murmur heard  Pulmonary/Chest: Effort normal  No respiratory distress  He has no wheezes  He has no rales  Abdominal: Soft   He exhibits no distension and no mass  There is no tenderness  There is no rebound and no guarding  Musculoskeletal: He exhibits edema (trace around ankles)  Neurological: He is alert and oriented to person, place, and time  Skin: Skin is warm and dry  Psychiatric: He has a normal mood and affect  His behavior is normal  Judgment and thought content normal    Vitals reviewed

## 2020-07-27 ENCOUNTER — OFFICE VISIT (OUTPATIENT)
Dept: CARDIOLOGY CLINIC | Facility: CLINIC | Age: 80
End: 2020-07-27
Payer: MEDICARE

## 2020-07-27 VITALS
HEIGHT: 71 IN | TEMPERATURE: 98 F | BODY MASS INDEX: 35.6 KG/M2 | HEART RATE: 63 BPM | SYSTOLIC BLOOD PRESSURE: 134 MMHG | DIASTOLIC BLOOD PRESSURE: 76 MMHG | WEIGHT: 254.3 LBS

## 2020-07-27 DIAGNOSIS — I25.10 3-VESSEL CAD: ICD-10-CM

## 2020-07-27 DIAGNOSIS — I10 ESSENTIAL HYPERTENSION: Primary | ICD-10-CM

## 2020-07-27 DIAGNOSIS — I25.10 ARTERIOSCLEROTIC CARDIOVASCULAR DISEASE: ICD-10-CM

## 2020-07-27 DIAGNOSIS — I71.4 ABDOMINAL AORTIC ANEURYSM (AAA) WITHOUT RUPTURE (HCC): ICD-10-CM

## 2020-07-27 DIAGNOSIS — I50.22 CHRONIC SYSTOLIC HEART FAILURE (HCC): ICD-10-CM

## 2020-07-27 DIAGNOSIS — G47.33 OBSTRUCTIVE SLEEP APNEA: ICD-10-CM

## 2020-07-27 DIAGNOSIS — I10 BENIGN ESSENTIAL HYPERTENSION: ICD-10-CM

## 2020-07-27 DIAGNOSIS — I71.2 ANEURYSM OF ASCENDING AORTA (HCC): ICD-10-CM

## 2020-07-27 DIAGNOSIS — E78.2 MIXED HYPERLIPIDEMIA: ICD-10-CM

## 2020-07-27 PROCEDURE — 3078F DIAST BP <80 MM HG: CPT | Performed by: INTERNAL MEDICINE

## 2020-07-27 PROCEDURE — 99214 OFFICE O/P EST MOD 30 MIN: CPT | Performed by: INTERNAL MEDICINE

## 2020-07-27 PROCEDURE — 1160F RVW MEDS BY RX/DR IN RCRD: CPT | Performed by: INTERNAL MEDICINE

## 2020-07-27 PROCEDURE — 3008F BODY MASS INDEX DOCD: CPT | Performed by: INTERNAL MEDICINE

## 2020-07-27 PROCEDURE — 93000 ELECTROCARDIOGRAM COMPLETE: CPT | Performed by: INTERNAL MEDICINE

## 2020-07-27 PROCEDURE — 4040F PNEUMOC VAC/ADMIN/RCVD: CPT | Performed by: INTERNAL MEDICINE

## 2020-07-27 PROCEDURE — 1036F TOBACCO NON-USER: CPT | Performed by: INTERNAL MEDICINE

## 2020-07-27 PROCEDURE — 3075F SYST BP GE 130 - 139MM HG: CPT | Performed by: INTERNAL MEDICINE

## 2020-07-27 NOTE — PROGRESS NOTES
Cardiology Follow Up    Can Francis  1940  [id number]  [facility] [place]  [address] [address]  [address] [facility]  [phone]    1  Essential hypertension  POCT ECG   2  3-vessel CAD  POCT ECG    Echo complete with contrast if indicated   3  Aneurysm of ascending aorta (HCC)     4  Obstructive sleep apnea     5  Arteriosclerotic cardiovascular disease  Echo complete with contrast if indicated   6  Benign essential hypertension  Echo complete with contrast if indicated   7  Chronic systolic heart failure (HCC)  Echo complete with contrast if indicated   8  Mixed hyperlipidemia     9  Abdominal aortic aneurysm (AAA) without rupture (HCC)  VAS abdominal aorta/iliacs; complete study       Discussion/Summary:  Overall he has been doing well  He is transitioning to me from 1 of my partners  He is due for an echocardiogram   He also has some complaints of low back pain and history of abdominal aortic aneurysm repair I have ordered a Doppler to make sure there is no endoleaks or recurrence stenosis  Blood pressures been well controlled lipids have been doing well  Continue treatment for sleep apnea  No further testing I will see him back in 6-8 months  Interval History:  80-year-old gentleman with multivessel coronary disease, history of CABG, hypertension, hyperlipidemia, ascending aortic aneurysm, abdominal aorta aneurysm status post repair, sleep apnea presents to establish care with me in the office  He has been seeing 1 of my partners for several years  Functionally he has been doing great  Denies any exertional chest pain, shortness of breath, palpitations, lightheadedness, dizziness, or syncope  He has put on some weight during the pandemic  He has been trying to make some dietary changes start some low-level exercise  There has been no lower extremity edema, PND, orthopnea    He does get some cramping low back pain with radiation into the hips  This does not sound classic for claudication but concerning  Problem List     Positive colorectal cancer screening using Cologuard test    Benign essential hypertension    Hyperlipidemia    Impaired fasting glucose    Microscopic hematuria    Overview Signed 5/14/2018  9:17 AM by Gerri Kaur MD     Annotation - [id number]: Dr Susan Coy         Obesity    Obstructive sleep apnea    Overview Signed 5/14/2018  9:17 AM by Gerri Kaur MD     Annotation - [id number]: Dr Rajinder Hernandeshal           Subclinical hypothyroidism    Overview Signed 5/14/2018  9:17 AM by Gerri Kaur MD     Transitioned From: Hypothyroidism         3-vessel CAD    Aneurysm of abdominal aorta (HCC)    Aneurysm of ascending aorta (HCC)    Aortic valve disorder    Arteriosclerotic cardiovascular disease    Disc degeneration, lumbar    Herniated lumbar intervertebral disc    Lumbar radiculopathy    Intervertebral disc disorder with radiculopathy of lumbar region    Spondylolisthesis at L4-L5 level    Chronic pain syndrome    SOB (shortness of breath)    Multiple pulmonary nodules    Heart failure, systolic (HCC)    Wt Readings from Last 3 Encounters:   07/27/20 115 kg (254 lb 4 8 oz)   07/20/20 113 kg (249 lb 3 2 oz)   06/09/20 114 kg (252 lb)                 COPD, mild ([address] )    Overview Deleted 9/11/2019  3:22 PM by Jodi Zhou MD                Past Medical History:   Diagnosis Date    Abdominal aortic aneurysm ([address] )     Chronic pain     back since July    Chronic pain disorder     lumbar    Coronary artery disease     History of transfusion     Hyperlipidemia     Hypertension     Hypertension 7/24/2019    Sleep apnea     Subconjunctival hemorrhage      Social History     Socioeconomic History    Marital status: /Civil Union     Spouse name: Not on file    Number of children: Not on file    Years of education: Not on file    Highest education level: Not on file   Occupational History    Occupation: retired   Social Needs    Financial resource strain: Not on file    Food insecurity:     Worry: Not on file     Inability: Not on file   Hexago needs:     Medical: Not on file     Non-medical: Not on file   Tobacco Use    Smoking status: Former Smoker     Packs/day: 1 00     Years: 40 00     Pack years: 40 00     Types: Cigarettes     Start date:      Last attempt to quit: 2012     Years since quittin 9    Smokeless tobacco: Never Used   Substance and Sexual Activity    Alcohol use: Yes     Frequency: 4 or more times a week     Drinks per session: 1 or 2     Binge frequency: Never     Comment: rare    Drug use: No    Sexual activity: Never   Lifestyle    Physical activity:     Days per week: Not on file     Minutes per session: Not on file    Stress: Not on file   Relationships    Social connections:     Talks on phone: Not on file     Gets together: Not on file     Attends Sikh service: Not on file     Active member of club or organization: Not on file     Attends meetings of clubs or organizations: Not on file     Relationship status: Not on file    Intimate partner violence:     Fear of current or ex partner: Not on file     Emotionally abused: Not on file     Physically abused: Not on file     Forced sexual activity: Not on file   Other Topics Concern    Not on file   Social History Narrative    Not on file      Family History   Problem Relation Age of Onset    Heart disease Mother     Stroke Father         stroke syndrome    Aneurysm Brother         abdominal    Aortic aneurysm Brother         ascending    Coronary artery disease Family     Hypertension Family     Other Son         gioblastoma multiforme     Past Surgical History:   Procedure Laterality Date    ABDOMINAL AORTIC ANEURYSM REPAIR  2007    2 dock limbs with visc extens prosth    COLONOSCOPY      CORONARY ARTERY BYPASS GRAFT      GAB-LAD, sequential vein graft to OM1 and OM2, single VG-posterior descending   Onset: 2003       Current Outpatient Medications:     albuterol (VENTOLIN HFA) 90 mcg/act inhaler, Inhale 2 puffs every 6 (six) hours as needed for wheezing, Disp: 18 g, Rfl: 5    aspirin (ECOTRIN LOW STRENGTH) 81 mg EC tablet, Take 1 tablet by mouth daily, Disp: , Rfl:     atorvastatin (LIPITOR) 40 mg tablet, Take 1 tablet (40 mg total) by mouth daily, Disp: 90 tablet, Rfl: 3    FIBER ADULT GUMMIES PO, Take 1 caplet by mouth daily , Disp: , Rfl:     furosemide (LASIX) 20 mg tablet, TAKE 1 TABLET(20 MG) BY MOUTH DAILY AS NEEDED FOR SHORTNESS OF BREATH OR SWELLING, Disp: 90 tablet, Rfl: 0    gabapentin (NEURONTIN) 300 mg capsule, Take 1 capsule (300 mg total) by mouth daily at bedtime, Disp: 90 capsule, Rfl: 5    labetalol (NORMODYNE) 100 mg tablet, Take 1 tablet (100 mg total) by mouth 2 (two) times a day, Disp: 180 tablet, Rfl: 3    lisinopril (ZESTRIL) 5 mg tablet, Take 1 tablet (5 mg total) by mouth daily, Disp: 90 tablet, Rfl: 3    multivitamin (THERAGRAN) TABS, Take 1 tablet by mouth daily, Disp: , Rfl:     senna (SENOKOT) 8 6 MG tablet, Take 1 tablet by mouth as needed  , Disp: , Rfl:     tamsulosin (FLOMAX) 0 4 mg, Take 1 capsule by mouth daily, Disp: , Rfl:     tiotropium (SPIRIVA RESPIMAT) 1 25 MCG/ACT AERS inhaler, Inhale 2 puffs daily, Disp: 3 Inhaler, Rfl: 3    Current Facility-Administered Medications:     albuterol (PROVENTIL HFA,VENTOLIN HFA) inhaler 2 puff, 2 puff, Inhalation, Q6H PRN, [place], MD  Allergies   Allergen Reactions    Meloxicam Edema       Labs:     Chemistry        Component Value Date/Time     (L) 10/15/2015 1042    K 4 6 07/06/2020 0937    K 4 7 10/15/2015 1042     07/06/2020 0937     10/15/2015 1042    CO2 28 07/06/2020 0937    CO2 30 10/15/2015 1042    BUN 15 07/06/2020 0937    BUN 15 10/15/2015 1042    CREATININE 0 93 07/06/2020 0937    CREATININE 1 03 10/15/2015 1042        Component Value Date/Time    CALCIUM 8 9 07/06/2020 0937 CALCIUM 9 1 10/15/2015 1042    ALKPHOS 67 07/06/2020 0937    ALKPHOS 71 10/15/2015 1042    AST 21 07/06/2020 0937    AST 14 10/15/2015 1042    ALT 29 07/06/2020 0937    ALT 23 10/15/2015 1042    BILITOT 0 79 10/15/2015 1042            Lab Results   Component Value Date    CHOL 124 10/15/2015    CHOL 159 04/01/2015    CHOL 155 09/30/2013     Lab Results   Component Value Date    HDL 70 07/06/2020    HDL 67 01/06/2020    HDL 73 (H) 07/08/2019     Lab Results   Component Value Date    LDLCALC 56 07/06/2020    LDLCALC 72 01/06/2020    LDLCALC 69 07/08/2019     Lab Results   Component Value Date    TRIG 37 07/06/2020    TRIG 50 01/06/2020    TRIG 50 07/08/2019     No results found for: CHOLHDL    Imaging: No results found  ECG:  Sinus rhythm with PACs      Review of Systems   Constitution: Negative  HENT: Negative  Eyes: Negative  Cardiovascular: Negative  Respiratory: Negative  Endocrine: Negative  Hematologic/Lymphatic: Negative  Skin: Negative  Musculoskeletal: Negative  Gastrointestinal: Negative  Genitourinary: Negative  Neurological: Negative  Psychiatric/Behavioral: Negative  All other systems reviewed and are negative  Vitals:    07/27/20 0931   BP: 134/76   Pulse: 63   Temp: 98 °F (36 7 °C)     Vitals:    07/27/20 0931   Weight: 115 kg (254 lb 4 8 oz)     Height: 5' 11" (180 3 cm)   Body mass index is 35 47 kg/m²  Physical Exam:  Vital signs reviewed  General:  Alert and cooperative, appears stated age, no acute distress  HEENT:  PERRLA, EOMI, no scleral icterus, no conjunctival pallor  Neck:  No lymphadenopathy, no thyromegaly, no carotid bruits, no elevated JVP  Heart:  Regular rate and rhythm, normal S1/S2, no S3/S4, no murmur, rubs or gallops    PMI nondisplaced  Lungs:  Clear to auscultation bilaterally, no wheezes rales or rhonchi  Abdomen:  Soft, non-tender, positive bowel sounds, no rebound or guarding,   no organomegaly   Extremities:  Normal range of motion    No clubbing, cyanosis or edema   Vascular:  2+ pedal pulses  Skin:  No rashes or lesions on exposed skin  Neurologic:  Cranial nerves II-XII grossly intact without focal deficits

## 2020-07-31 DIAGNOSIS — E78.2 MIXED HYPERLIPIDEMIA: ICD-10-CM

## 2020-07-31 RX ORDER — ATORVASTATIN CALCIUM 40 MG/1
40 TABLET, FILM COATED ORAL DAILY
Qty: 90 TABLET | Refills: 3 | Status: SHIPPED | OUTPATIENT
Start: 2020-07-31 | End: 2021-04-13

## 2020-08-05 DIAGNOSIS — M51.16 INTERVERTEBRAL DISC DISORDER WITH RADICULOPATHY OF LUMBAR REGION: Primary | ICD-10-CM

## 2020-08-05 NOTE — TELEPHONE ENCOUNTER
Kendell [place] I placed order for bilateral L4 TF KEARA after reviewing MRI, but if symptoms are unilateral I will change it

## 2020-08-28 ENCOUNTER — HOSPITAL ENCOUNTER (OUTPATIENT)
Dept: RADIOLOGY | Facility: CLINIC | Age: 80
Discharge: HOME | End: 2020-08-28
Attending: ANESTHESIOLOGY | Admitting: ANESTHESIOLOGY
Payer: MEDICARE

## 2020-08-28 VITALS
HEART RATE: 64 BPM | TEMPERATURE: 98.6 F | RESPIRATION RATE: 18 BRPM | DIASTOLIC BLOOD PRESSURE: 70 MMHG | OXYGEN SATURATION: 94 % | SYSTOLIC BLOOD PRESSURE: 151 MMHG

## 2020-08-28 DIAGNOSIS — M51.16 INTERVERTEBRAL DISC DISORDER WITH RADICULOPATHY OF LUMBAR REGION: ICD-10-CM

## 2020-08-28 PROCEDURE — 64483 NJX AA&/STRD TFRM EPI L/S 1: CPT | Performed by: ANESTHESIOLOGY

## 2020-08-28 RX ORDER — BUPIVACAINE HCL/PF 2.5 MG/ML
10 VIAL (ML) INJECTION ONCE
Status: COMPLETED | OUTPATIENT
Start: 2020-08-28 | End: 2020-08-28

## 2020-08-28 RX ORDER — 0.9 % SODIUM CHLORIDE 0.9 %
10 VIAL (ML) INJECTION ONCE
Status: COMPLETED | OUTPATIENT
Start: 2020-08-28 | End: 2020-08-28

## 2020-08-28 RX ORDER — METHYLPREDNISOLONE ACETATE 80 MG/ML
80 INJECTION, SUSPENSION INTRA-ARTICULAR; INTRALESIONAL; INTRAMUSCULAR; PARENTERAL; SOFT TISSUE ONCE
Status: COMPLETED | OUTPATIENT
Start: 2020-08-28 | End: 2020-08-28

## 2020-08-28 RX ADMIN — IOHEXOL 1 ML: 300 INJECTION, SOLUTION INTRAVENOUS at 11:19

## 2020-08-28 RX ADMIN — SODIUM CHLORIDE 5 ML: 9 INJECTION, SOLUTION INTRAMUSCULAR; INTRAVENOUS; SUBCUTANEOUS at 11:18

## 2020-08-28 RX ADMIN — METHYLPREDNISOLONE ACETATE 80 MG: 80 INJECTION, SUSPENSION INTRA-ARTICULAR; INTRALESIONAL; INTRAMUSCULAR; PARENTERAL; SOFT TISSUE at 11:20

## 2020-08-28 RX ADMIN — Medication 5 ML: at 11:18

## 2020-08-28 RX ADMIN — BUPIVACAINE HYDROCHLORIDE 2 ML: 2.5 INJECTION, SOLUTION EPIDURAL; INFILTRATION; INTRACAUDAL at 11:20

## 2020-08-28 NOTE — DISCHARGE INSTR - LAB
Epidural Steroid Injection   WHAT YOU NEED TO KNOW:   An epidural steroid injection (KEARA) is a procedure to inject steroid medicine into the epidural space  The epidural space is between your spinal cord and vertebrae  Steroids reduce inflammation and fluid buildup in your spine that may be causing pain  You may be given pain medicine along with the steroids  ACTIVITY  · Do not drive or operate machinery today  · No strenuous activity today  bending, lifting, etc   · You may resume normal activites starting tomorrow  start slowly and as tolerated  · You may shower today, but no tub baths or hot tubs  · You may have numbness for several hours from the local anesthetic  Please use caution and common sense, especially with weight-bearing activities  CARE OF THE INJECTION SITE  · If you have soreness or pain, apply ice to the area today (20 minutes on/20 minutes off)  · Starting tomorrow, you may use warm, moist heat or ice if needed  · You may have an increase or change in your discomfort for 36-48 hours after your treatment  · Apply ice and continue with any pain medication you have been prescribed  · Notify the [facility] if you have any of the following: redness, drainage, swelling, headache, stiff neck or fever above 100°F     SPECIAL INSTRUCTIONS  · Our office will contact you in approximately 7 days for a progress report  MEDICATIONS  · Continue to take all routine medications  · Our office may have instructed you to hold some medications  If you have a problem specifically related to your procedure, please call our office at [phone]  Problems not related to your procedure should be directed to your primary care physician

## 2020-09-04 ENCOUNTER — HOSPITAL ENCOUNTER (OUTPATIENT)
Dept: NON INVASIVE DIAGNOSTICS | Facility: CLINIC | Age: 80
Discharge: HOME | End: 2020-09-04
Payer: MEDICARE

## 2020-09-04 ENCOUNTER — TELEPHONE (OUTPATIENT)
Dept: PAIN MEDICINE | Facility: CLINIC | Age: 80
End: 2020-09-04

## 2020-09-04 DIAGNOSIS — I10 BENIGN ESSENTIAL HYPERTENSION: ICD-10-CM

## 2020-09-04 DIAGNOSIS — I25.10 3-VESSEL CAD: ICD-10-CM

## 2020-09-04 DIAGNOSIS — I50.22 CHRONIC SYSTOLIC HEART FAILURE (HCC): ICD-10-CM

## 2020-09-04 DIAGNOSIS — I71.4 ABDOMINAL AORTIC ANEURYSM (AAA) WITHOUT RUPTURE (HCC): ICD-10-CM

## 2020-09-04 DIAGNOSIS — I25.10 ARTERIOSCLEROTIC CARDIOVASCULAR DISEASE: ICD-10-CM

## 2020-09-04 PROCEDURE — 93978 VASCULAR STUDY: CPT

## 2020-09-04 PROCEDURE — 93306 TTE W/DOPPLER COMPLETE: CPT

## 2020-09-04 PROCEDURE — 93306 TTE W/DOPPLER COMPLETE: CPT | Performed by: INTERNAL MEDICINE

## 2020-09-05 PROCEDURE — 93978 VASCULAR STUDY: CPT | Performed by: SURGERY

## 2020-09-16 DIAGNOSIS — I10 ESSENTIAL HYPERTENSION: ICD-10-CM

## 2020-09-16 RX ORDER — LABETALOL 100 MG/1
TABLET, FILM COATED ORAL
Qty: 180 TABLET | Refills: 3 | Status: SHIPPED | OUTPATIENT
Start: 2020-09-16 | End: 2020-09-29 | Stop reason: SDUPTHER

## 2020-09-29 DIAGNOSIS — I10 ESSENTIAL HYPERTENSION: ICD-10-CM

## 2020-09-29 RX ORDER — LABETALOL 100 MG/1
100 TABLET, FILM COATED ORAL 2 TIMES DAILY
Qty: 180 TABLET | Refills: 3 | Status: SHIPPED | OUTPATIENT
Start: 2020-09-29 | End: 2021-06-15

## 2020-10-06 ENCOUNTER — LAB (OUTPATIENT)
Dept: LAB | Facility: CLINIC | Age: 80
End: 2020-10-06
Payer: MEDICARE

## 2020-10-06 ENCOUNTER — TRANSCRIBE ORDERS (OUTPATIENT)
Dept: LAB | Facility: CLINIC | Age: 80
End: 2020-10-06

## 2020-10-06 ENCOUNTER — TELEPHONE (OUTPATIENT)
Dept: INTERNAL MEDICINE CLINIC | Facility: CLINIC | Age: 80
End: 2020-10-06

## 2020-10-06 DIAGNOSIS — E78.2 MIXED HYPERLIPIDEMIA: ICD-10-CM

## 2020-10-06 DIAGNOSIS — R31.9 HEMATURIA SYNDROME: Primary | ICD-10-CM

## 2020-10-06 DIAGNOSIS — N40.1 ENLARGED PROSTATE WITH URINARY OBSTRUCTION: ICD-10-CM

## 2020-10-06 DIAGNOSIS — R31.9 HEMATURIA SYNDROME: ICD-10-CM

## 2020-10-06 DIAGNOSIS — N13.8 ENLARGED PROSTATE WITH URINARY OBSTRUCTION: ICD-10-CM

## 2020-10-06 DIAGNOSIS — I10 BENIGN ESSENTIAL HYPERTENSION: ICD-10-CM

## 2020-10-06 DIAGNOSIS — R73.01 IMPAIRED FASTING GLUCOSE: ICD-10-CM

## 2020-10-06 LAB
ALBUMIN SERPL BCP-MCNC: 3.8 G/DL (ref 3.5–5)
ALP SERPL-CCNC: 71 U/L (ref 46–116)
ALT SERPL W P-5'-P-CCNC: 27 U/L (ref 12–78)
ANION GAP SERPL CALCULATED.3IONS-SCNC: 3 MMOL/L (ref 4–13)
AST SERPL W P-5'-P-CCNC: 19 U/L (ref 5–45)
BASOPHILS # BLD AUTO: 0.06 THOUSANDS/ΜL (ref 0–0.1)
BASOPHILS NFR BLD AUTO: 1 % (ref 0–1)
BILIRUB SERPL-MCNC: 0.77 MG/DL (ref 0.2–1)
BUN SERPL-MCNC: 13 MG/DL (ref 5–25)
CALCIUM SERPL-MCNC: 9.2 MG/DL (ref 8.3–10.1)
CHLORIDE SERPL-SCNC: 102 MMOL/L (ref 100–108)
CHOLEST SERPL-MCNC: 141 MG/DL (ref 50–200)
CO2 SERPL-SCNC: 32 MMOL/L (ref 21–32)
CREAT SERPL-MCNC: 0.88 MG/DL (ref 0.6–1.3)
EOSINOPHIL # BLD AUTO: 0.24 THOUSAND/ΜL (ref 0–0.61)
EOSINOPHIL NFR BLD AUTO: 4 % (ref 0–6)
ERYTHROCYTE [DISTWIDTH] IN BLOOD BY AUTOMATED COUNT: 12.9 % (ref 11.6–15.1)
EST. AVERAGE GLUCOSE BLD GHB EST-MCNC: 120 MG/DL
GFR SERPL CREATININE-BSD FRML MDRD: 82 ML/MIN/1.73SQ M
GLUCOSE P FAST SERPL-MCNC: 106 MG/DL (ref 65–99)
HBA1C MFR BLD: 5.8 %
HCT VFR BLD AUTO: 42.6 % (ref 36.5–49.3)
HDLC SERPL-MCNC: 77 MG/DL
HGB BLD-MCNC: 14.3 G/DL (ref 12–17)
IMM GRANULOCYTES # BLD AUTO: 0.02 THOUSAND/UL (ref 0–0.2)
IMM GRANULOCYTES NFR BLD AUTO: 0 % (ref 0–2)
LDLC SERPL CALC-MCNC: 56 MG/DL (ref 0–100)
LYMPHOCYTES # BLD AUTO: 1.23 THOUSANDS/ΜL (ref 0.6–4.47)
LYMPHOCYTES NFR BLD AUTO: 20 % (ref 14–44)
MCH RBC QN AUTO: 34.7 PG (ref 26.8–34.3)
MCHC RBC AUTO-ENTMCNC: 33.6 G/DL (ref 31.4–37.4)
MCV RBC AUTO: 103 FL (ref 82–98)
MONOCYTES # BLD AUTO: 0.67 THOUSAND/ΜL (ref 0.17–1.22)
MONOCYTES NFR BLD AUTO: 11 % (ref 4–12)
NEUTROPHILS # BLD AUTO: 3.92 THOUSANDS/ΜL (ref 1.85–7.62)
NEUTS SEG NFR BLD AUTO: 64 % (ref 43–75)
NONHDLC SERPL-MCNC: 64 MG/DL
NRBC BLD AUTO-RTO: 0 /100 WBCS
PLATELET # BLD AUTO: 157 THOUSANDS/UL (ref 149–390)
PMV BLD AUTO: 10.3 FL (ref 8.9–12.7)
POTASSIUM SERPL-SCNC: 4.4 MMOL/L (ref 3.5–5.3)
PROT SERPL-MCNC: 7.4 G/DL (ref 6.4–8.2)
PSA SERPL-MCNC: 2.7 NG/ML (ref 0–4)
RBC # BLD AUTO: 4.12 MILLION/UL (ref 3.88–5.62)
SODIUM SERPL-SCNC: 137 MMOL/L (ref 136–145)
TRIGL SERPL-MCNC: 42 MG/DL
WBC # BLD AUTO: 6.14 THOUSAND/UL (ref 4.31–10.16)

## 2020-10-06 PROCEDURE — 83036 HEMOGLOBIN GLYCOSYLATED A1C: CPT

## 2020-10-06 PROCEDURE — 84153 ASSAY OF PSA TOTAL: CPT

## 2020-10-06 PROCEDURE — 80053 COMPREHEN METABOLIC PANEL: CPT

## 2020-10-06 PROCEDURE — 85025 COMPLETE CBC W/AUTO DIFF WBC: CPT

## 2020-10-06 PROCEDURE — 36415 COLL VENOUS BLD VENIPUNCTURE: CPT

## 2020-10-06 PROCEDURE — 80061 LIPID PANEL: CPT

## 2020-10-18 DIAGNOSIS — J44.9 CHRONIC OBSTRUCTIVE PULMONARY DISEASE, UNSPECIFIED COPD TYPE (HCC): ICD-10-CM

## 2020-10-18 RX ORDER — TIOTROPIUM BROMIDE INHALATION SPRAY 1.56 UG/1
SPRAY, METERED RESPIRATORY (INHALATION)
Qty: 12 G | Refills: 3 | Status: SHIPPED | OUTPATIENT
Start: 2020-10-18 | End: 2021-01-20 | Stop reason: ALTCHOICE

## 2020-10-21 ENCOUNTER — OFFICE VISIT (OUTPATIENT)
Dept: PAIN MEDICINE | Facility: CLINIC | Age: 80
End: 2020-10-21
Payer: MEDICARE

## 2020-10-21 ENCOUNTER — TELEPHONE (OUTPATIENT)
Dept: PAIN MEDICINE | Facility: MEDICAL CENTER | Age: 80
End: 2020-10-21

## 2020-10-21 VITALS
BODY MASS INDEX: 35.43 KG/M2 | SYSTOLIC BLOOD PRESSURE: 116 MMHG | WEIGHT: 254 LBS | DIASTOLIC BLOOD PRESSURE: 62 MMHG | HEART RATE: 76 BPM | TEMPERATURE: 97.6 F

## 2020-10-21 DIAGNOSIS — M43.16 SPONDYLOLISTHESIS OF LUMBAR REGION: ICD-10-CM

## 2020-10-21 DIAGNOSIS — M79.18 MYOFASCIAL PAIN SYNDROME: Primary | ICD-10-CM

## 2020-10-21 DIAGNOSIS — M48.062 LUMBAR STENOSIS WITH NEUROGENIC CLAUDICATION: Primary | ICD-10-CM

## 2020-10-21 DIAGNOSIS — M79.18 MYOFASCIAL PAIN SYNDROME: ICD-10-CM

## 2020-10-21 DIAGNOSIS — M47.816 LUMBAR SPONDYLOSIS: ICD-10-CM

## 2020-10-21 PROCEDURE — 99214 OFFICE O/P EST MOD 30 MIN: CPT | Performed by: ANESTHESIOLOGY

## 2020-10-21 RX ORDER — TIZANIDINE HYDROCHLORIDE 2 MG/1
2 CAPSULE, GELATIN COATED ORAL 2 TIMES DAILY PRN
Qty: 60 CAPSULE | Refills: 0 | Status: SHIPPED | OUTPATIENT
Start: 2020-10-21 | End: 2020-10-21

## 2020-10-21 RX ORDER — TIZANIDINE 2 MG/1
2 TABLET ORAL 2 TIMES DAILY PRN
Qty: 60 TABLET | Refills: 1 | Status: SHIPPED | OUTPATIENT
Start: 2020-10-21 | End: 2022-01-20

## 2020-11-06 ENCOUNTER — HOSPITAL ENCOUNTER (OUTPATIENT)
Dept: MRI IMAGING | Facility: HOSPITAL | Age: 80
Discharge: HOME | End: 2020-11-06
Attending: ANESTHESIOLOGY
Payer: MEDICARE

## 2020-11-06 DIAGNOSIS — M48.062 LUMBAR STENOSIS WITH NEUROGENIC CLAUDICATION: ICD-10-CM

## 2020-11-06 PROCEDURE — 72148 MRI LUMBAR SPINE W/O DYE: CPT

## 2020-11-06 PROCEDURE — G1004 CDSM NDSC: HCPCS

## 2020-11-13 NOTE — TELEPHONE ENCOUNTER
I called the patient on 10/2/2018 with the results of his lumbar spine  He would like to move forward with a left L3-L4 TFESI 
I discussed the results of the patient's lumbar MRI with the patient he would like to move forward with a Left L3-L4 TFESI  He is not interested in moving forward with a surgical consultation at this time  He is scheduled to have a procedure on 10/15/2018 with urology  Can you please schedule him for 2 weeks after his procedure on 10/15/2018  I have sent a message to [facility] urologist to make sure he is ok with the patient to move forward with the injection 2 weeks after his procedure  Can you please call and schedule him 
Pt's spouse returning your call about MRI results 
Spoke w/ spouse of patient, Biju Garzon, states she received a call from  yesterday to review results of MRI which was done 9/28/18 (no notes in EHR from [facility])   Please call patient on [phone]
58 yo M with back pain x numerous years worsening. Pain radiates down b/l legs. Presents for elective L5-S1 ALIF, L5-S1 PSF.

## 2020-11-16 ENCOUNTER — TELEPHONE (OUTPATIENT)
Dept: PAIN MEDICINE | Facility: CLINIC | Age: 80
End: 2020-11-16

## 2020-12-07 ENCOUNTER — TELEPHONE (OUTPATIENT)
Dept: PAIN MEDICINE | Facility: CLINIC | Age: 80
End: 2020-12-07

## 2020-12-07 DIAGNOSIS — I10 HYPERTENSION, UNSPECIFIED TYPE: ICD-10-CM

## 2020-12-07 DIAGNOSIS — M51.16 INTERVERTEBRAL DISC DISORDER WITH RADICULOPATHY OF LUMBAR REGION: Primary | ICD-10-CM

## 2020-12-07 RX ORDER — LISINOPRIL 5 MG/1
5 TABLET ORAL DAILY
Qty: 90 TABLET | Refills: 3 | Status: SHIPPED | OUTPATIENT
Start: 2020-12-07 | End: 2021-08-17

## 2020-12-22 ENCOUNTER — HOSPITAL ENCOUNTER (OUTPATIENT)
Dept: RADIOLOGY | Facility: CLINIC | Age: 80
Discharge: HOME | End: 2020-12-22
Attending: ANESTHESIOLOGY | Admitting: ANESTHESIOLOGY
Payer: MEDICARE

## 2020-12-22 VITALS
HEART RATE: 66 BPM | SYSTOLIC BLOOD PRESSURE: 123 MMHG | TEMPERATURE: 97.3 F | OXYGEN SATURATION: 91 % | RESPIRATION RATE: 20 BRPM | DIASTOLIC BLOOD PRESSURE: 78 MMHG

## 2020-12-22 DIAGNOSIS — M51.16 INTERVERTEBRAL DISC DISORDER WITH RADICULOPATHY OF LUMBAR REGION: ICD-10-CM

## 2020-12-22 PROCEDURE — 62323 NJX INTERLAMINAR LMBR/SAC: CPT | Performed by: ANESTHESIOLOGY

## 2020-12-22 RX ORDER — LIDOCAINE HYDROCHLORIDE 10 MG/ML
5 INJECTION, SOLUTION EPIDURAL; INFILTRATION; INTRACAUDAL; PERINEURAL ONCE
Status: COMPLETED | OUTPATIENT
Start: 2020-12-22 | End: 2020-12-22

## 2020-12-22 RX ORDER — BUPIVACAINE HCL/PF 2.5 MG/ML
10 VIAL (ML) INJECTION ONCE
Status: COMPLETED | OUTPATIENT
Start: 2020-12-22 | End: 2020-12-22

## 2020-12-22 RX ORDER — METHYLPREDNISOLONE ACETATE 80 MG/ML
80 INJECTION, SUSPENSION INTRA-ARTICULAR; INTRALESIONAL; INTRAMUSCULAR; PARENTERAL; SOFT TISSUE ONCE
Status: COMPLETED | OUTPATIENT
Start: 2020-12-22 | End: 2020-12-22

## 2020-12-22 RX ADMIN — METHYLPREDNISOLONE ACETATE 80 MG: 80 INJECTION, SUSPENSION INTRA-ARTICULAR; INTRALESIONAL; INTRAMUSCULAR; PARENTERAL; SOFT TISSUE at 09:04

## 2020-12-22 RX ADMIN — IOHEXOL 1 ML: 300 INJECTION, SOLUTION INTRAVENOUS at 09:04

## 2020-12-22 RX ADMIN — LIDOCAINE HYDROCHLORIDE 4 ML: 10 INJECTION, SOLUTION EPIDURAL; INFILTRATION; INTRACAUDAL; PERINEURAL at 09:01

## 2020-12-22 RX ADMIN — BUPIVACAINE HYDROCHLORIDE 2 ML: 2.5 INJECTION, SOLUTION EPIDURAL; INFILTRATION; INTRACAUDAL at 09:04

## 2020-12-29 ENCOUNTER — TELEPHONE (OUTPATIENT)
Dept: PAIN MEDICINE | Facility: CLINIC | Age: 80
End: 2020-12-29

## 2020-12-29 NOTE — TELEPHONE ENCOUNTER
Pt reports no improvement post inj   Pain level 5-6/10   Pt aware I will call next week for an update

## 2021-01-04 DIAGNOSIS — R06.02 SOB (SHORTNESS OF BREATH): ICD-10-CM

## 2021-01-04 RX ORDER — FUROSEMIDE 20 MG/1
20 TABLET ORAL DAILY
Qty: 90 TABLET | Refills: 3 | Status: SHIPPED | OUTPATIENT
Start: 2021-01-04 | End: 2021-07-20

## 2021-01-05 NOTE — TELEPHONE ENCOUNTER
ALEXA  S/w pt, agreeable to referral back to neurosurgery  Pt states he will reach out to Dr Abraham Shafer  Pt appreciative of call

## 2021-01-05 NOTE — TELEPHONE ENCOUNTER
Spoke with patient he stated that this did not help him at all 0% improvement and when he is moving and bending 6-7/10 when he is standing still or sitting he has no pain

## 2021-01-06 ENCOUNTER — OFFICE VISIT (OUTPATIENT)
Dept: PULMONOLOGY | Facility: CLINIC | Age: 81
End: 2021-01-06
Payer: MEDICARE

## 2021-01-06 VITALS
OXYGEN SATURATION: 94 % | WEIGHT: 259 LBS | HEIGHT: 71 IN | SYSTOLIC BLOOD PRESSURE: 140 MMHG | DIASTOLIC BLOOD PRESSURE: 80 MMHG | HEART RATE: 74 BPM | TEMPERATURE: 96.8 F | BODY MASS INDEX: 36.26 KG/M2

## 2021-01-06 DIAGNOSIS — I27.20 MODERATE TO SEVERE PULMONARY HYPERTENSION (HCC): ICD-10-CM

## 2021-01-06 DIAGNOSIS — J44.9 COPD, MILD (HCC): ICD-10-CM

## 2021-01-06 DIAGNOSIS — R06.02 SOB (SHORTNESS OF BREATH): Primary | ICD-10-CM

## 2021-01-06 DIAGNOSIS — G47.33 OBSTRUCTIVE SLEEP APNEA: ICD-10-CM

## 2021-01-06 PROCEDURE — 99215 OFFICE O/P EST HI 40 MIN: CPT | Performed by: INTERNAL MEDICINE

## 2021-01-06 NOTE — PROGRESS NOTES
Pulmonary Follow Up Note   Alejandrina Armstrong 79 y o  male MRN: [id number]  1/6/2021      Assessment:    1  Shortness of breath on exertion-likely related to mild COPD, severe pulmonary hypertension and underlying obstructive sleep apnea in addition to obesity and deconditioning    2  Mild COPD- without acute exacerbation  Parameters on cardiac echo showing degree of pulmonary hypertension out of proportion to mild reduction in diffusion capacity on PFT captured in 2019  Plan:  -will send for repeat complete pulmonary function testing with a 6 minutes walk  -patient given samples of Stiolto to replace Spiriva, continue rescue inhaler    3  Moderate to severe pulmonary hypertension-PASP on echo obtained on September 2020 of 50 mmHg  Plan:  -will send for repeat complete pulmonary function testing  -if PFTs are consistent with prior testing, will re-evaluate obstructive sleep apnea and cardiac function    4  Severe obstructive sleep apnea-tried CPAP in the past but could not tolerate  Does have significant pulmonary hypertension  Plan:  -will revisit at his next appointment in addition to repeat pulmonary function testing    1  SOB (shortness of breath)    2  COPD, mild ([address] )  -     Complete PFT with post Bronchodilator and Six Minute walk; Future    3  Obstructive sleep apnea    4  Moderate to severe pulmonary hypertension ([address] )      Plan:    Diagnoses and all orders for this visit:    SOB (shortness of breath)    COPD, mild (HCC)  -     Complete PFT with post Bronchodilator and Six Minute walk; Future    Obstructive sleep apnea    Moderate to severe pulmonary hypertension (HCC)        No follow-ups on file  History of Present Illness   HPI:  Alejandrina Armstrong is a 79 y o  male who presents for follow up  The patient reports that his wheezing is well controlled with spiriva and he requires using his rescue inhaler once every 3-4 days    He does feel as though his physical activity is limited significantly by shortness of breath however he also mentions that his limitation is also due to his severe back pain  He does have a history of lumbar radiculopathy and recently had an injection without improvement with plans to follow-up with Neurosurgery  He denies cough, wheezing, fevers, chills  Review of Systems   Constitutional: Negative for chills and fever  HENT: Negative for congestion, postnasal drip and rhinorrhea  Eyes: Negative for itching  Respiratory: Positive for shortness of breath  Negative for cough, wheezing and stridor  Cardiovascular: Negative for chest pain, palpitations and leg swelling  Gastrointestinal: Negative for abdominal distention, abdominal pain, nausea and vomiting  Genitourinary: Negative for dysuria and flank pain  Musculoskeletal: Positive for back pain  Negative for arthralgias and myalgias  Skin: Negative for color change  Neurological: Negative for dizziness, light-headedness and headaches  Psychiatric/Behavioral: Negative  Historical Information   Past Medical History:   Diagnosis Date    Abdominal aortic aneurysm (HCC)     Chronic pain     back since July    Chronic pain disorder     lumbar    Coronary artery disease     History of transfusion     Hyperlipidemia     Hypertension     Hypertension 7/24/2019    Sleep apnea     Subconjunctival hemorrhage      Past Surgical History:   Procedure Laterality Date    ABDOMINAL AORTIC ANEURYSM REPAIR  08/09/2007    2 dock limbs with visc extens prosth    COLONOSCOPY      CORONARY ARTERY BYPASS GRAFT      GAB-LAD, sequential vein graft to OM1 and OM2, single VG-posterior descending   Onset: 2003     Family History   Problem Relation Age of Onset    Heart disease Mother     Stroke Father         stroke syndrome    Aneurysm Brother         abdominal    Aortic aneurysm Brother         ascending    Coronary artery disease Family     Hypertension Family     Other Son         gioblastoma multiforme Meds/Allergies     Current Outpatient Medications:     albuterol (VENTOLIN HFA) 90 mcg/act inhaler, Inhale 2 puffs every 6 (six) hours as needed for wheezing, Disp: 18 g, Rfl: 5    aspirin (ECOTRIN LOW STRENGTH) 81 mg EC tablet, Take 1 tablet by mouth daily, Disp: , Rfl:     atorvastatin (LIPITOR) 40 mg tablet, Take 1 tablet (40 mg total) by mouth daily, Disp: 90 tablet, Rfl: 3    FIBER ADULT GUMMIES PO, Take 1 caplet by mouth daily , Disp: , Rfl:     furosemide (LASIX) 20 mg tablet, Take 1 tablet (20 mg total) by mouth daily, Disp: 90 tablet, Rfl: 3    gabapentin (NEURONTIN) 300 mg capsule, Take 1 capsule (300 mg total) by mouth daily at bedtime, Disp: 90 capsule, Rfl: 5    labetalol (NORMODYNE) 100 mg tablet, Take 1 tablet (100 mg total) by mouth 2 (two) times a day, Disp: 180 tablet, Rfl: 3    lisinopril (ZESTRIL) 5 mg tablet, Take 1 tablet (5 mg total) by mouth daily, Disp: 90 tablet, Rfl: 3    multivitamin (THERAGRAN) TABS, Take 1 tablet by mouth daily, Disp: , Rfl:     senna (SENOKOT) 8 6 MG tablet, Take 1 tablet by mouth as needed  , Disp: , Rfl:     Spiriva Respimat 1 25 MCG/ACT AERS inhaler, INHALE 2 SPRAYS BY MOUTH  DAILY, Disp: 12 g, Rfl: 3    tamsulosin (FLOMAX) 0 4 mg, Take 1 capsule by mouth daily, Disp: , Rfl:     tiZANidine (ZANAFLEX) 2 mg tablet, Take 1 tablet (2 mg total) by mouth 2 (two) times a day as needed for muscle spasms, Disp: 60 tablet, Rfl: 1    Current Facility-Administered Medications:     albuterol (PROVENTIL HFA,VENTOLIN HFA) inhaler 2 puff, 2 puff, Inhalation, Q6H PRN, Sourav Johnson MD  Allergies   Allergen Reactions    Meloxicam Edema       Vitals: Blood pressure 140/80, pulse 74, temperature (!) 96 8 °F (36 °C), height 5' 11" (1 803 m), weight 117 kg (259 lb), SpO2 94 %  Body mass index is 36 12 kg/m²  Oxygen Therapy  SpO2: 94 %  Oxygen Therapy: None (Room air)      Physical Exam  Physical Exam  Constitutional:       General: He is not in acute distress  Appearance: He is obese  He is not diaphoretic  HENT:      Head: Normocephalic and atraumatic  Nose: Nose normal       Mouth/Throat:      Pharynx: No oropharyngeal exudate  Eyes:      General: No scleral icterus  Conjunctiva/sclera: Conjunctivae normal       Pupils: Pupils are equal, round, and reactive to light  Neck:      Musculoskeletal: Normal range of motion and neck supple  Thyroid: No thyromegaly  Vascular: No JVD  Trachea: No tracheal deviation  Cardiovascular:      Rate and Rhythm: Normal rate and regular rhythm  Heart sounds: Normal heart sounds  No murmur  No friction rub  No gallop  Pulmonary:      Effort: Pulmonary effort is normal  No respiratory distress  Breath sounds: Normal breath sounds  No stridor  No wheezing or rales  Abdominal:      General: Bowel sounds are normal  There is no distension  Palpations: Abdomen is soft  Tenderness: There is no abdominal tenderness  There is no guarding or rebound  Musculoskeletal: Normal range of motion  General: No deformity  Right lower leg: Edema present  Left lower leg: Edema present  Comments: 2+   Lymphadenopathy:      Cervical: No cervical adenopathy  Skin:     General: Skin is warm  Findings: No erythema or rash  Neurological:      Mental Status: He is alert and oriented to person, place, and time  Cranial Nerves: No cranial nerve deficit  Sensory: No sensory deficit  Labs: I have personally reviewed pertinent lab results    Lab Results   Component Value Date    WBC 6 14 10/06/2020    HGB 14 3 10/06/2020    HCT 42 6 10/06/2020     (H) 10/06/2020     10/06/2020     Lab Results   Component Value Date    GLUCOSE 98 10/15/2015    CALCIUM 9 2 10/06/2020     (L) 10/15/2015    K 4 4 10/06/2020    CO2 32 10/06/2020     10/06/2020    BUN 13 10/06/2020    CREATININE 0 88 10/06/2020     No results found for: IGE  Lab Results Component Value Date    ALT 27 10/06/2020    AST 19 10/06/2020    ALKPHOS 71 10/06/2020    BILITOT 0 79 10/15/2015       Imaging and other studies: I have personally reviewed pertinent reports  and I have personally reviewed pertinent films in PACS  CT chest- multiple subcentimeter pulmonary nodules largest being 3 mm    Pulmonary function testing:   2019  FEV1:  2 46 L-78% predicted  FVC:  3 55 L-82% predicted  FEV1/FVC:  69%  T% predicted   RV: 134% predicted  DLCO:  60%  Mild obstructive airflow limitation with mild decreased diffusion capacity   No significant response to bronchodilator    EKG, Pathology, and Other Studies: I have personally reviewed pertinent reports     and I have personally reviewed pertinent films in PACS  Echo 2020- EF 79%, grade 1 diastolic dysfunction, biatrial dilation, PASP 50mmHg    Ana Corado MD  Pulmonary and Critical Care Fellow- PGY 5  [facility]

## 2021-01-20 ENCOUNTER — OFFICE VISIT (OUTPATIENT)
Dept: INTERNAL MEDICINE CLINIC | Facility: CLINIC | Age: 81
End: 2021-01-20
Payer: MEDICARE

## 2021-01-20 VITALS
BODY MASS INDEX: 35.76 KG/M2 | OXYGEN SATURATION: 93 % | WEIGHT: 255.4 LBS | SYSTOLIC BLOOD PRESSURE: 134 MMHG | DIASTOLIC BLOOD PRESSURE: 80 MMHG | TEMPERATURE: 97.4 F | HEART RATE: 68 BPM | HEIGHT: 71 IN

## 2021-01-20 DIAGNOSIS — R73.01 IMPAIRED FASTING GLUCOSE: ICD-10-CM

## 2021-01-20 DIAGNOSIS — M51.16 INTERVERTEBRAL DISC DISORDER WITH RADICULOPATHY OF LUMBAR REGION: ICD-10-CM

## 2021-01-20 DIAGNOSIS — I71.4 ABDOMINAL AORTIC ANEURYSM, WITHOUT RUPTURE (HCC): ICD-10-CM

## 2021-01-20 DIAGNOSIS — G47.33 OBSTRUCTIVE SLEEP APNEA: ICD-10-CM

## 2021-01-20 DIAGNOSIS — I27.20 MODERATE TO SEVERE PULMONARY HYPERTENSION (HCC): Primary | ICD-10-CM

## 2021-01-20 DIAGNOSIS — E78.2 MIXED HYPERLIPIDEMIA: ICD-10-CM

## 2021-01-20 DIAGNOSIS — I10 BENIGN ESSENTIAL HYPERTENSION: ICD-10-CM

## 2021-01-20 DIAGNOSIS — Z00.00 MEDICARE ANNUAL WELLNESS VISIT, SUBSEQUENT: ICD-10-CM

## 2021-01-20 PROBLEM — R19.5 POSITIVE COLORECTAL CANCER SCREENING USING COLOGUARD TEST: Status: RESOLVED | Noted: 2018-04-09 | Resolved: 2021-01-20

## 2021-01-20 PROCEDURE — 99214 OFFICE O/P EST MOD 30 MIN: CPT | Performed by: INTERNAL MEDICINE

## 2021-01-20 PROCEDURE — G0438 PPPS, INITIAL VISIT: HCPCS | Performed by: INTERNAL MEDICINE

## 2021-01-20 PROCEDURE — 1123F ACP DISCUSS/DSCN MKR DOCD: CPT | Performed by: INTERNAL MEDICINE

## 2021-01-20 RX ORDER — TIOTROPIUM BROMIDE AND OLODATEROL 3.124; 2.736 UG/1; UG/1
2 SPRAY, METERED RESPIRATORY (INHALATION) DAILY
COMMUNITY
End: 2021-06-30

## 2021-01-20 RX ORDER — MELATONIN
2000 DAILY
COMMUNITY

## 2021-01-20 NOTE — PROGRESS NOTES
Assessment/Plan:    Abdominal aortic aneurysm, without rupture ([facility] [address] )  S/p repair 2007    Obstructive sleep apnea  Did not tolerate CPAP  Needs this addressed again since it has been so long since his sleep study  He would like to defer at this time    Benign essential hypertension  Controlled on lisinopril labetalol    Moderate to severe pulmonary hypertension ([address] )  F/u with pulm and cardiology    Intervertebral disc disorder with radiculopathy of lumbar region  F/U with pain mgt/neurosurgery    Hyperlipidemia  Controlled on atorvastatin    Due for colonoscopy this year  F/U with [facility]       Problem List Items Addressed This Visit        Endocrine    Impaired fasting glucose    Relevant Orders    Comprehensive metabolic panel    HEMOGLOBIN A1C W/ EAG ESTIMATION       Respiratory    Obstructive sleep apnea     Did not tolerate CPAP  Needs this addressed again since it has been so long since his sleep study  He would like to defer at this time            Cardiovascular and Mediastinum    Benign essential hypertension     Controlled on lisinopril labetalol         Relevant Orders    CBC and differential    Comprehensive metabolic panel    Abdominal aortic aneurysm, without rupture ([address] )     S/p repair 2007         Moderate to severe pulmonary hypertension ([facility] [address] ) - Primary     F/u with pulm and cardiology            Nervous and Auditory    Intervertebral disc disorder with radiculopathy of lumbar region     F/U with pain mgt/neurosurgery            Other    Hyperlipidemia     Controlled on atorvastatin         Relevant Orders    Comprehensive metabolic panel    Lipid Panel with Direct LDL reflex      Other Visit Diagnoses     Medicare annual wellness visit, subsequent                Subjective:      Patient ID: Maria C Anderson is a 79 y o  male      HPI  Here for a follow up  Recent labs A1C 5 8, lipids well controlled LDL 56  +macrocytosis  Continues to drink alcohol and diet is poor  +weight gain in the past 6months  Wife is on Nutrisystem which helped him lose weight before  COPD  And scheduled for PFTs in Feb  Pulmonary HTN on echo  Switched to [id number] from [address] recently  Chronic low back pain without relief from [facility] in December  Considering seeing the surgeon again    The following portions of the patient's history were reviewed and updated as appropriate: allergies, current medications, past family history, past medical history, past social history, past surgical history and problem list     Review of Systems   Constitutional: Positive for unexpected weight change (weight gain)  Negative for fever  HENT: Positive for postnasal drip and rhinorrhea  Respiratory: Positive for cough and shortness of breath (with bending down and exertion)  Cardiovascular: Positive for leg swelling  Negative for chest pain and palpitations  Gastrointestinal: Positive for constipation  Genitourinary: Positive for difficulty urinating (slow stream)  Musculoskeletal: Positive for back pain  Neurological: Negative for dizziness and headaches  Objective:      /80   Pulse 68   Temp (!) 97 4 °F (36 3 °C)   Ht 5' 11" (1 803 m)   Wt 116 kg (255 lb 6 4 oz)   SpO2 93%   BMI 35 62 kg/m²          Physical Exam  Constitutional:       General: He is not in acute distress  Appearance: He is well-developed  He is obese  He is not ill-appearing, toxic-appearing or diaphoretic  HENT:      Head: Normocephalic and atraumatic  Eyes:      Conjunctiva/sclera: Conjunctivae normal    Neck:      Musculoskeletal: Neck supple  Cardiovascular:      Rate and Rhythm: Normal rate and regular rhythm  Heart sounds: Normal heart sounds  No murmur  Pulmonary:      Effort: Pulmonary effort is normal  No respiratory distress  Breath sounds: Normal breath sounds  No wheezing or rales  Abdominal:      General: There is distension  Palpations: Abdomen is soft  There is no mass  Tenderness:  There is no abdominal tenderness  There is no guarding or rebound  Musculoskeletal:      Right lower leg: Edema (pitting pretibial, ankle) present  Left lower leg: Edema (trace pretibial and ankle) present  Skin:     General: Skin is warm and dry  Neurological:      Mental Status: He is alert and oriented to person, place, and time  Psychiatric:         Mood and Affect: Mood normal          Behavior: Behavior normal          Thought Content:  Thought content normal          Judgment: Judgment normal

## 2021-01-20 NOTE — PROGRESS NOTES
Assessment and Plan:     Problem List Items Addressed This Visit     None        BMI Counseling: Body mass index is 35 62 kg/m²  The BMI is above normal  Nutrition recommendations include decreasing portion sizes, consuming healthier snacks and moderation in carbohydrate intake  Preventive health issues were discussed with patient, and age appropriate screening tests were ordered as noted in patient's After Visit Summary  Personalized health advice and appropriate referrals for health education or preventive services given if needed, as noted in patient's After Visit Summary  History of Present Illness:     [facility]       Patient Care Team:  Lai Wallace MD as PCP - MD Iva Major MD Weber Parson, MD     Review of Systems:     Review of Systems   Problem List:     Patient Active Problem List   Diagnosis    Positive colorectal cancer screening using Cologuard test    Benign essential hypertension    Hyperlipidemia    Impaired fasting glucose    Microscopic hematuria    Obesity    Obstructive sleep apnea    Subclinical hypothyroidism    3-vessel CAD    Aneurysm of abdominal aorta ([address] )    Aneurysm of ascending aorta (HCC)    Aortic valve disorder    Arteriosclerotic cardiovascular disease    Disc degeneration, lumbar    Herniated lumbar intervertebral disc    Lumbar radiculopathy    Intervertebral disc disorder with radiculopathy of lumbar region    Spondylolisthesis at L4-L5 level    Chronic pain syndrome    SOB (shortness of breath)    Multiple pulmonary nodules    Heart failure, systolic (HCC)    COPD, mild (HCC)    Moderate to severe pulmonary hypertension ([address] )      Past Medical and Surgical History:     Past Medical History:   Diagnosis Date    Abdominal aortic aneurysm ([address] )     Chronic pain     back since July    Chronic pain disorder     lumbar    Coronary artery disease     History of transfusion     Hyperlipidemia     Hypertension     Hypertension 2019    Sleep apnea     Subconjunctival hemorrhage      Past Surgical History:   Procedure Laterality Date    ABDOMINAL AORTIC ANEURYSM REPAIR  2007    2 dock limbs with visc extens prosth    COLONOSCOPY      CORONARY ARTERY BYPASS GRAFT      GAB-LAD, sequential vein graft to OM1 and OM2, single VG-posterior descending   Onset:       Family History:     Family History   Problem Relation Age of Onset    Heart disease Mother     Stroke Father         stroke syndrome    Aneurysm Brother         abdominal    Aortic aneurysm Brother         ascending    Coronary artery disease Family     Hypertension Family     Other Son         gioblastoma multiforme      Social History:        Social History     Socioeconomic History    Marital status: /Civil Union     Spouse name: Not on file    Number of children: Not on file    Years of education: Not on file    Highest education level: Not on file   Occupational History    Occupation: retired   Social Needs    Financial resource strain: Not on file    Food insecurity     Worry: Not on file     Inability: Not on file    Transportation needs     Medical: Not on file     Non-medical: Not on file   Tobacco Use    Smoking status: Former Smoker     Packs/day: 1 00     Years: 40 00     Pack years: 40 00     Types: Cigarettes     Start date: 37     Quit date: 2012     Years since quittin 4    Smokeless tobacco: Never Used   Substance and Sexual Activity    Alcohol use: Yes     Frequency: 4 or more times a week     Drinks per session: 1 or 2     Binge frequency: Never     Comment: rare    Drug use: No    Sexual activity: Never   Lifestyle    Physical activity     Days per week: Not on file     Minutes per session: Not on file    Stress: Not on file   Relationships    Social connections     Talks on phone: Not on file     Gets together: Not on file     Attends [employer] service: Not on file     Active member of club or organization: Not on file     Attends meetings of clubs or organizations: Not on file     Relationship status: Not on file    Intimate partner violence     Fear of current or ex partner: Not on file     Emotionally abused: Not on file     Physically abused: Not on file     Forced sexual activity: Not on file   Other Topics Concern    Not on file   Social History Narrative    Not on file      Medications and Allergies:     Current Outpatient Medications   Medication Sig Dispense Refill    albuterol (VENTOLIN HFA) 90 mcg/act inhaler Inhale 2 puffs every 6 (six) hours as needed for wheezing 18 g 5    aspirin (ECOTRIN LOW STRENGTH) 81 mg EC tablet Take 1 tablet by mouth daily      atorvastatin (LIPITOR) 40 mg tablet Take 1 tablet (40 mg total) by mouth daily 90 tablet 3    FIBER ADULT GUMMIES PO Take 1 caplet by mouth daily       furosemide (LASIX) 20 mg tablet Take 1 tablet (20 mg total) by mouth daily 90 tablet 3    gabapentin (NEURONTIN) 300 mg capsule Take 1 capsule (300 mg total) by mouth daily at bedtime 90 capsule 5    labetalol (NORMODYNE) 100 mg tablet Take 1 tablet (100 mg total) by mouth 2 (two) times a day 180 tablet 3    lisinopril (ZESTRIL) 5 mg tablet Take 1 tablet (5 mg total) by mouth daily 90 tablet 3    multivitamin (THERAGRAN) TABS Take 1 tablet by mouth daily      senna (SENOKOT) 8 6 MG tablet Take 1 tablet by mouth as needed        tamsulosin (FLOMAX) 0 4 mg Take 1 capsule by mouth daily      tiotropium-olodaterol (Stiolto Respimat) 2 5-2 5 MCG/ACT inhaler Inhale 2 puffs daily      tiZANidine (ZANAFLEX) 2 mg tablet Take 1 tablet (2 mg total) by mouth 2 (two) times a day as needed for muscle spasms 60 tablet 1    Spiriva Respimat 1 25 MCG/ACT AERS inhaler INHALE 2 SPRAYS BY MOUTH  DAILY (Patient not taking: Reported on 1/20/2021) 12 g 3     Current Facility-Administered Medications   Medication Dose Route Frequency Provider Last Rate Last Admin    albuterol (PROVENTIL HFA,VENTOLIN HFA) inhaler 2 puff  2 puff Inhalation Q6H PRN Leeanna Myers MD         Allergies   Allergen Reactions    Meloxicam Edema      Immunizations:     Immunization History   Administered Date(s) Administered    INFLUENZA 10/23/2007, 10/02/2013, 10/08/2016, 10/25/2017, 10/30/2020    Influenza Split High Dose Preservative Free IM 10/02/2013, 10/02/2013, 10/22/2014, 10/27/2015, 10/08/2016, 10/03/2019    Influenza, seasonal, injectable 10/20/2012, 10/30/2018    Pneumococcal Conjugate 13-Valent 04/24/2015    Pneumococcal Polysaccharide PPV23 09/07/2012    Zoster Vaccine Recombinant 10/03/2019, 12/11/2019      Health Maintenance:         Topic Date Due    Lung Cancer Screening  08/06/1996    Colonoscopy Surveillance  06/04/2021     There are no preventive care reminders to display for this patient  Medicare Screening Tests and Risk Assessments:     Jo Ann Wilson is here for his Subsequent Wellness visit  Health Risk Assessment:   Patient rates overall health as fair  Patient feels that their physical health rating is slightly worse  Eyesight was rated as same  Hearing was rated as same  Patient feels that their emotional and mental health rating is same  Pain experienced in the last 7 days has been some  Patient's pain rating has been 7/10  Patient states that he has experienced weight loss or gain in last 6 months  Fall Risk Screening: In the past year, patient has experienced: no history of falling in past year      Home Safety:  Patient does not have trouble with stairs inside or outside of their home  Patient has working smoke alarms and has working carbon monoxide detector  Home safety hazards include: none  Nutrition:   Current diet is Regular  Medications:   Patient is currently taking over-the-counter supplements  OTC medications include: see medication list  Patient is able to manage medications       Activities of Daily Living (ADLs)/Instrumental Activities of Daily Living (IADLs):   Walk and transfer into and out of bed and chair?: Yes  Dress and groom yourself?: Yes    Bathe or shower yourself?: Yes    Feed yourself?  Yes  Do your laundry/housekeeping?: Yes  Manage your money, pay your bills and track your expenses?: Yes  Make your own meals?: Yes    Do your own shopping?: Yes    Previous Hospitalizations:   Any hospitalizations or ED visits within the last 12 months?: No      Advance Care Planning:   Living will: No    Durable POA for healthcare: No    Advanced directive: Yes      Cognitive Screening:   Provider or family/friend/caregiver concerned regarding cognition?: No    PREVENTIVE SCREENINGS      Cardiovascular Screening:    General: Screening Not Indicated and History Lipid Disorder      Diabetes Screening:     General: Screening Current      Prostate Cancer Screening:    General: Screening Not Indicated      Osteoporosis Screening:    General: Screening Not Indicated      Abdominal Aortic Aneurysm (AAA) Screening:    Risk factors include: tobacco use        General: Screening Not Indicated and History AAA      Lung Cancer Screening:     General: Screening Not Indicated      Hepatitis C Screening:    General: Screening Not Indicated    No exam data present     Physical Exam:     /80   Pulse 68   Temp (!) 97 4 °F (36 3 °C)   Ht 5' 11" (1 803 m)   Wt 116 kg (255 lb 6 4 oz)   SpO2 93%   BMI 35 62 kg/m²     Physical Exam     Sixto Sevilla MD

## 2021-01-20 NOTE — ASSESSMENT & PLAN NOTE
Did not tolerate CPAP  Needs this addressed again since it has been so long since his sleep study  He would like to defer at this time

## 2021-02-24 ENCOUNTER — HOSPITAL ENCOUNTER (OUTPATIENT)
Dept: PULMONOLOGY | Facility: HOSPITAL | Age: 81
Discharge: HOME | End: 2021-02-24
Attending: INTERNAL MEDICINE
Payer: MEDICARE

## 2021-02-24 DIAGNOSIS — J44.9 COPD, MILD (HCC): ICD-10-CM

## 2021-02-24 PROCEDURE — 94010 BREATHING CAPACITY TEST: CPT | Performed by: INTERNAL MEDICINE

## 2021-02-24 PROCEDURE — 94729 DIFFUSING CAPACITY: CPT | Performed by: INTERNAL MEDICINE

## 2021-02-24 PROCEDURE — 94726 PLETHYSMOGRAPHY LUNG VOLUMES: CPT

## 2021-02-24 PROCEDURE — 94729 DIFFUSING CAPACITY: CPT

## 2021-02-24 PROCEDURE — 94010 BREATHING CAPACITY TEST: CPT

## 2021-02-24 PROCEDURE — 94761 N-INVAS EAR/PLS OXIMETRY MLT: CPT

## 2021-02-24 PROCEDURE — 94618 PULMONARY STRESS TESTING: CPT | Performed by: INTERNAL MEDICINE

## 2021-02-24 PROCEDURE — 94726 PLETHYSMOGRAPHY LUNG VOLUMES: CPT | Performed by: INTERNAL MEDICINE

## 2021-03-03 ENCOUNTER — OFFICE VISIT (OUTPATIENT)
Dept: PULMONOLOGY | Facility: CLINIC | Age: 81
End: 2021-03-03
Payer: MEDICARE

## 2021-03-03 VITALS
HEIGHT: 71 IN | TEMPERATURE: 97.7 F | HEART RATE: 72 BPM | DIASTOLIC BLOOD PRESSURE: 70 MMHG | WEIGHT: 255 LBS | BODY MASS INDEX: 35.7 KG/M2 | OXYGEN SATURATION: 94 % | SYSTOLIC BLOOD PRESSURE: 100 MMHG

## 2021-03-03 DIAGNOSIS — R06.02 SOB (SHORTNESS OF BREATH): Primary | ICD-10-CM

## 2021-03-03 DIAGNOSIS — I27.20 MODERATE TO SEVERE PULMONARY HYPERTENSION (HCC): ICD-10-CM

## 2021-03-03 DIAGNOSIS — J44.9 COPD, MILD (HCC): ICD-10-CM

## 2021-03-03 DIAGNOSIS — G47.19 EXCESSIVE DAYTIME SLEEPINESS: ICD-10-CM

## 2021-03-03 DIAGNOSIS — G47.33 OBSTRUCTIVE SLEEP APNEA: ICD-10-CM

## 2021-03-03 DIAGNOSIS — J44.9 CHRONIC OBSTRUCTIVE PULMONARY DISEASE, UNSPECIFIED COPD TYPE (HCC): ICD-10-CM

## 2021-03-03 DIAGNOSIS — E66.09 CLASS 1 OBESITY DUE TO EXCESS CALORIES WITH SERIOUS COMORBIDITY AND BODY MASS INDEX (BMI) OF 30.0 TO 30.9 IN ADULT: ICD-10-CM

## 2021-03-03 PROCEDURE — 99214 OFFICE O/P EST MOD 30 MIN: CPT | Performed by: INTERNAL MEDICINE

## 2021-03-03 RX ORDER — ALBUTEROL SULFATE 90 UG/1
2 AEROSOL, METERED RESPIRATORY (INHALATION) EVERY 6 HOURS PRN
Qty: 18 G | Refills: 5 | Status: SHIPPED | OUTPATIENT
Start: 2021-03-03 | End: 2022-07-27 | Stop reason: SDUPTHER

## 2021-03-03 NOTE — PROGRESS NOTES
Pulmonary Follow Up Note   Trisha Gonsalves 79 y o  male MRN: [id number]  3/3/2021      Assessment:    1  Shortness of breath-  Suspect this may be due to untreated obstructive sleep apnea that has resulted in significant pulmonary hypertension  Recent PFTs shows mild air trapping and he did not have desaturation on 6 minutes walk test that would require supplemental oxygen  ·  will repeat sleep study given high pretest probability for obstructive sleep apnea will pursue home sleep study  ·  if positive will initiate on APAP  ·  discussed weight loss as well  ·  albuterol 2 puffs q 6 hours p r n  ·  since patient is not benefiting from SH[facility] [address] patient to hold off on using inhaler  He can continue to use albuterol as needed  If he has increased shortness of breath he can resume his Spiriva  2  moderate pulmonary hypertension-   PASP of 50 mmHg on cardiac echo on September 4, 2020  Suspect secondary to obstructive sleep apnea  ·  will focus on diagnosis and management of obstructive sleep apnea for now  ·  if symptoms of shortness of breath denied improve with treatment of obstructive sleep apnea, will repeat cardiac echo and may require a V/Q scan and/or right heart catheterization in the future    3  obstructive sleep apnea-   Last sleep study was in 2007 patient used CPAP for only a very short period time but stopped due to discomfort  He is still having excessive daytime sleepiness and snoring  ·   Repeat sleep study with home sleep study  ·  once diagnostic will initiate on APAP    4  Obesity-  BMI of 35 57  ·  discussed weight loss in order to improve his breathing overall and possibly improve his obstructive sleep apnea    5  history of tobacco abuse-  40 pack year smoking history and quit 8 years ago  ·  no pulmonary nodules on his last CTA  ·  does not need serial yearly CT chest for lung cancer screening at this time    1  SOB (shortness of breath)    2   Chronic obstructive pulmonary disease, unspecified COPD type (HCC)  -     albuterol (Ventolin HFA) 90 mcg/act inhaler; Inhale 2 puffs every 6 (six) hours as needed for wheezing    3  Obstructive sleep apnea  -     Home Study; Future    4  Excessive daytime sleepiness  -     Home Study; Future    5  COPD, mild ([address] )    6  Moderate to severe pulmonary hypertension (HCC)    7  Class 1 obesity due to excess calories with serious comorbidity and body mass index (BMI) of 30 0 to 30 9 in adult      Plan:        Diagnoses and all orders for this visit:    SOB (shortness of breath)    Chronic obstructive pulmonary disease, unspecified COPD type (HCC)  -     albuterol (Ventolin HFA) 90 mcg/act inhaler; Inhale 2 puffs every 6 (six) hours as needed for wheezing    Obstructive sleep apnea  -     Home Study; Future    Excessive daytime sleepiness  -     Home Study; Future    COPD, mild (HCC)    Moderate to severe pulmonary hypertension (HCC)    Class 1 obesity due to excess calories with serious comorbidity and body mass index (BMI) of 30 0 to 30 9 in adult        Return in about 3 months (around 6/3/2021)  History of Present Illness   HPI:  Brian Pacheco is a 79 y o  male who  Presents for follow-up of shortness of breath  Patient reports that every now and then he has episodes of shortness of breath primarily with exertion  These episodes are usually resolved with albuterol which he only requires to use maybe once per week  He denies any wheezing, cough, chest pain  He does not think that the Stiolto or Spiriva have provided much benefit  He does continue to have excessive daytime sleepiness, active snoring and is sleeping in a recliner  He recalls undergoing a sleep study many years ago and only used CPAP for short duration due to discomfort  Review of Systems   Constitutional: Positive for fatigue  Negative for chills and fever  HENT: Negative for congestion, postnasal drip and rhinorrhea  Eyes: Negative for itching     Respiratory: Positive for shortness of breath  Negative for cough, wheezing and stridor  Cardiovascular: Negative for chest pain, palpitations and leg swelling  Gastrointestinal: Negative for abdominal distention, abdominal pain, nausea and vomiting  Genitourinary: Negative for dysuria and flank pain  Musculoskeletal: Negative for arthralgias and myalgias  Skin: Negative for color change  Neurological: Negative for dizziness, light-headedness and headaches  Psychiatric/Behavioral: Negative  Historical Information   Past Medical History:   Diagnosis Date    Abdominal aortic aneurysm ([facility] )     Aneurysm of abdominal aorta ([facility] ) 4/14/2014    Aneurysm of ascending aorta ([facility] ) 9/7/2012    Chronic pain     back since July    Chronic pain disorder     lumbar    Coronary artery disease     History of transfusion     Hyperlipidemia     Hypertension     Hypertension 7/24/2019    Positive colorectal cancer screening using Cologuard test 4/9/2018    Sleep apnea     Subconjunctival hemorrhage      Past Surgical History:   Procedure Laterality Date    ABDOMINAL AORTIC ANEURYSM REPAIR  08/09/2007    2 dock limbs with visc extens prosth    COLONOSCOPY      CORONARY ARTERY BYPASS GRAFT      GAB-LAD, sequential vein graft to OM1 and OM2, single VG-posterior descending   Onset: 2003     Family History   Problem Relation Age of Onset    Heart disease Mother     Stroke Father         stroke syndrome    Aneurysm Brother         abdominal    Aortic aneurysm Brother         ascending    Coronary artery disease Family     Hypertension Family     Other Son         gioblastoma multiforme         Meds/Allergies     Current Outpatient Medications:     albuterol (Ventolin HFA) 90 mcg/act inhaler, Inhale 2 puffs every 6 (six) hours as needed for wheezing, Disp: 18 g, Rfl: 5    aspirin (ECOTRIN LOW STRENGTH) 81 mg EC tablet, Take 1 tablet by mouth daily, Disp: , Rfl:     atorvastatin (LIPITOR) 40 mg tablet, Take 1 tablet (40 mg total) by mouth daily, Disp: 90 tablet, Rfl: 3    cholecalciferol (VITAMIN D3) 1,000 units tablet, Take 2,000 Units by mouth daily, Disp: , Rfl:     FIBER ADULT GUMMIES PO, Take 1 caplet by mouth daily , Disp: , Rfl:     furosemide (LASIX) 20 mg tablet, Take 1 tablet (20 mg total) by mouth daily, Disp: 90 tablet, Rfl: 3    gabapentin (NEURONTIN) 300 mg capsule, Take 1 capsule (300 mg total) by mouth daily at bedtime, Disp: 90 capsule, Rfl: 5    labetalol (NORMODYNE) 100 mg tablet, Take 1 tablet (100 mg total) by mouth 2 (two) times a day, Disp: 180 tablet, Rfl: 3    lisinopril (ZESTRIL) 5 mg tablet, Take 1 tablet (5 mg total) by mouth daily, Disp: 90 tablet, Rfl: 3    multivitamin (THERAGRAN) TABS, Take 1 tablet by mouth daily, Disp: , Rfl:     senna (SENOKOT) 8 6 MG tablet, Take 1 tablet by mouth as needed  , Disp: , Rfl:     tamsulosin (FLOMAX) 0 4 mg, Take 1 capsule by mouth daily, Disp: , Rfl:     tiotropium-olodaterol (Stiolto Respimat) 2 5-2 5 MCG/ACT inhaler, Inhale 2 puffs daily, Disp: , Rfl:     tiZANidine (ZANAFLEX) 2 mg tablet, Take 1 tablet (2 mg total) by mouth 2 (two) times a day as needed for muscle spasms, Disp: 60 tablet, Rfl: 1    Current Facility-Administered Medications:     albuterol (PROVENTIL HFA,VENTOLIN HFA) inhaler 2 puff, 2 puff, Inhalation, Q6H PRN, Margo Bass MD  Allergies   Allergen Reactions    Meloxicam Edema       Vitals: Blood pressure 100/70, pulse 72, temperature 97 7 °F (36 5 °C), height 5' 11" (1 803 m), weight 116 kg (255 lb), SpO2 94 %  Body mass index is 35 57 kg/m²  Oxygen Therapy  SpO2: 94 %  Oxygen Therapy: None (Room air)      Physical Exam  Physical Exam  Constitutional:       General: He is not in acute distress  Appearance: He is obese  He is not diaphoretic  HENT:      Head: Normocephalic and atraumatic  Nose: Nose normal       Mouth/Throat:      Pharynx: No oropharyngeal exudate  Eyes:      General: No scleral icterus  Conjunctiva/sclera: Conjunctivae normal       Pupils: Pupils are equal, round, and reactive to light  Neck:      Musculoskeletal: Normal range of motion and neck supple  Thyroid: No thyromegaly  Vascular: No JVD  Trachea: No tracheal deviation  Cardiovascular:      Rate and Rhythm: Normal rate and regular rhythm  Heart sounds: Normal heart sounds  No murmur  No friction rub  No gallop  Pulmonary:      Effort: Pulmonary effort is normal  No respiratory distress  Breath sounds: Normal breath sounds  No stridor  No wheezing or rales  Abdominal:      General: Bowel sounds are normal  There is no distension  Palpations: Abdomen is soft  Tenderness: There is no abdominal tenderness  There is no guarding or rebound  Musculoskeletal: Normal range of motion  General: No deformity  Lymphadenopathy:      Cervical: No cervical adenopathy  Skin:     General: Skin is warm  Findings: No erythema or rash  Neurological:      Mental Status: He is alert and oriented to person, place, and time  Cranial Nerves: No cranial nerve deficit  Sensory: No sensory deficit  Labs: I have personally reviewed pertinent lab results  Lab Results   Component Value Date    WBC 6 14 10/06/2020    HGB 14 3 10/06/2020    HCT 42 6 10/06/2020     (H) 10/06/2020     10/06/2020     Lab Results   Component Value Date    GLUCOSE 98 10/15/2015    CALCIUM 9 2 10/06/2020     (L) 10/15/2015    K 4 4 10/06/2020    CO2 32 10/06/2020     10/06/2020    BUN 13 10/06/2020    CREATININE 0 88 10/06/2020     No results found for: IGE  Lab Results   Component Value Date    ALT 27 10/06/2020    AST 19 10/06/2020    ALKPHOS 71 10/06/2020    BILITOT 0 79 10/15/2015         Imaging and other studies: I have personally reviewed pertinent reports     and I have personally reviewed pertinent films in PACS    CT a chest on July 10, 2019- normal appearing lung parenchyma, no PE, no pneumothorax or pleural effusions  4 7 cm ascending thoracic aortic aneurysm noted    Pulmonary function testin2021   FEV1/FVC 71 %   FEV1 74% predicted   FVC 74% predicted   % predicted   % predicted   DLCO 54% predicted   normal spirometry with air trapping and moderate diffusion impairment     6 minutes walk test  2021  performed in conjunction with PFT  resting O2 sat 94% with heart rate of 70 and a Raul dyspnea score of 0/10  Patient ambulated 306 m without stopping and pulse ox at completion was 92% on room air with a heart rate of 92  Raul dyspnea Scale at completion was 3/10  Patient does not require supplemental oxygen based on 6 minutes walk test      E/KG, Pathology, and Other Studies: I have personally reviewed pertinent reports  and I have personally reviewed pertinent films in PACS   echo-  2020: Left ventricular ejection fraction 60% with grade 1 diastolic dysfunction    PASP of 50 mmHg    Dennise Corrales MD  Pulmonary and Critical Care Fellow- PGY 5  [facility]

## 2021-03-04 ENCOUNTER — TELEPHONE (OUTPATIENT)
Dept: SLEEP CENTER | Facility: CLINIC | Age: 81
End: 2021-03-04

## 2021-03-04 NOTE — TELEPHONE ENCOUNTER
----- Message from Taiwo Hogan MD sent at 3/4/2021  3:44 PM EST -----   approved  ----- Message -----  From: Yoni Islas  Sent: 3/4/2021   9:25 AM EST  To: Sleep Medicine Dl Provider    This sleep study needs approval      If approved please sign and return to clerical pool  If denied please include reasons why  Also provide alternative testing if warranted  Please sign and return to clerical pool

## 2021-03-17 DIAGNOSIS — I71.2 THORACIC AORTIC ANEURYSM WITHOUT RUPTURE (HCC): Primary | ICD-10-CM

## 2021-04-02 ENCOUNTER — TELEPHONE (OUTPATIENT)
Dept: PAIN MEDICINE | Facility: CLINIC | Age: 81
End: 2021-04-02

## 2021-04-02 NOTE — TELEPHONE ENCOUNTER
RN s/w wife Eric Nuñez  She said that basically FQ told them that he can't do anything else for him, all injections haven't helped and surgery would be his next option  They are planning to hold off on the surgery for as long as he can  They weren't sure since FQ couldn't do anything further for him if Rufina Beards should continue on the gabapentin or come off of it  Eric Nuñez read that gabapentin should be weaned and not stopped abruptly  RN explained that he should stay on it to help with his pain if he's not going for sx any time soon  Pt could stop taking it but his pain may increase being off of it  If pt wants to stop taking he could just stop since he is only on 300 mg at HS, no weaning needed at this low dose  Wife asked about refills when he needed them if he is no longer seeing FQ? RN explained that they can ask the PCP to take over prescribing but if PCP unwilling then to contact our office for RF 2 days before needing  Last Rx from 6/9/20 was for #90 with 5 RF  Wife confirmed RF not needed at present  Eric Nuñez said Rufina Tses was concerned the pain would inc if he came off of it, so he prob will continue to take it  Eric Nuñez appreciated having all her questions answered

## 2021-04-02 NOTE — TELEPHONE ENCOUNTER
Patient's wife Moses stating if patient is to continue taking Gabapentin medication or wean off of it   Please advise, sheldon    Call back# [phone]

## 2021-04-09 ENCOUNTER — APPOINTMENT (OUTPATIENT)
Dept: LAB | Facility: CLINIC | Age: 81
End: 2021-04-09
Payer: MEDICARE

## 2021-04-09 DIAGNOSIS — R73.01 IMPAIRED FASTING GLUCOSE: ICD-10-CM

## 2021-04-09 DIAGNOSIS — E78.2 MIXED HYPERLIPIDEMIA: ICD-10-CM

## 2021-04-09 DIAGNOSIS — I10 BENIGN ESSENTIAL HYPERTENSION: ICD-10-CM

## 2021-04-09 LAB
ALBUMIN SERPL BCP-MCNC: 3.8 G/DL (ref 3.5–5)
ALP SERPL-CCNC: 62 U/L (ref 46–116)
ALT SERPL W P-5'-P-CCNC: 25 U/L (ref 12–78)
ANION GAP SERPL CALCULATED.3IONS-SCNC: 6 MMOL/L (ref 4–13)
AST SERPL W P-5'-P-CCNC: 27 U/L (ref 5–45)
BASOPHILS # BLD AUTO: 0.06 THOUSANDS/ΜL (ref 0–0.1)
BASOPHILS NFR BLD AUTO: 1 % (ref 0–1)
BILIRUB SERPL-MCNC: 0.98 MG/DL (ref 0.2–1)
BUN SERPL-MCNC: 9 MG/DL (ref 5–25)
CALCIUM SERPL-MCNC: 9.1 MG/DL (ref 8.3–10.1)
CHLORIDE SERPL-SCNC: 100 MMOL/L (ref 100–108)
CHOLEST SERPL-MCNC: 120 MG/DL (ref 50–200)
CO2 SERPL-SCNC: 28 MMOL/L (ref 21–32)
CREAT SERPL-MCNC: 1.14 MG/DL (ref 0.6–1.3)
EOSINOPHIL # BLD AUTO: 0.24 THOUSAND/ΜL (ref 0–0.61)
EOSINOPHIL NFR BLD AUTO: 4 % (ref 0–6)
ERYTHROCYTE [DISTWIDTH] IN BLOOD BY AUTOMATED COUNT: 13.1 % (ref 11.6–15.1)
EST. AVERAGE GLUCOSE BLD GHB EST-MCNC: 117 MG/DL
GFR SERPL CREATININE-BSD FRML MDRD: 61 ML/MIN/1.73SQ M
GLUCOSE P FAST SERPL-MCNC: 113 MG/DL (ref 65–99)
HBA1C MFR BLD: 5.7 %
HCT VFR BLD AUTO: 42.4 % (ref 36.5–49.3)
HDLC SERPL-MCNC: 67 MG/DL
HGB BLD-MCNC: 14.1 G/DL (ref 12–17)
IMM GRANULOCYTES # BLD AUTO: 0.02 THOUSAND/UL (ref 0–0.2)
IMM GRANULOCYTES NFR BLD AUTO: 0 % (ref 0–2)
LDLC SERPL CALC-MCNC: 47 MG/DL (ref 0–100)
LYMPHOCYTES # BLD AUTO: 1 THOUSANDS/ΜL (ref 0.6–4.47)
LYMPHOCYTES NFR BLD AUTO: 16 % (ref 14–44)
MCH RBC QN AUTO: 33.4 PG (ref 26.8–34.3)
MCHC RBC AUTO-ENTMCNC: 33.3 G/DL (ref 31.4–37.4)
MCV RBC AUTO: 101 FL (ref 82–98)
MONOCYTES # BLD AUTO: 0.75 THOUSAND/ΜL (ref 0.17–1.22)
MONOCYTES NFR BLD AUTO: 12 % (ref 4–12)
NEUTROPHILS # BLD AUTO: 4.36 THOUSANDS/ΜL (ref 1.85–7.62)
NEUTS SEG NFR BLD AUTO: 67 % (ref 43–75)
NRBC BLD AUTO-RTO: 0 /100 WBCS
PLATELET # BLD AUTO: 160 THOUSANDS/UL (ref 149–390)
PMV BLD AUTO: 10.5 FL (ref 8.9–12.7)
POTASSIUM SERPL-SCNC: 4.9 MMOL/L (ref 3.5–5.3)
PROT SERPL-MCNC: 7.2 G/DL (ref 6.4–8.2)
RBC # BLD AUTO: 4.22 MILLION/UL (ref 3.88–5.62)
SODIUM SERPL-SCNC: 134 MMOL/L (ref 136–145)
TRIGL SERPL-MCNC: 32 MG/DL
WBC # BLD AUTO: 6.43 THOUSAND/UL (ref 4.31–10.16)

## 2021-04-09 PROCEDURE — 85025 COMPLETE CBC W/AUTO DIFF WBC: CPT

## 2021-04-09 PROCEDURE — 36415 COLL VENOUS BLD VENIPUNCTURE: CPT

## 2021-04-09 PROCEDURE — 80053 COMPREHEN METABOLIC PANEL: CPT

## 2021-04-09 PROCEDURE — 80061 LIPID PANEL: CPT

## 2021-04-09 PROCEDURE — 83036 HEMOGLOBIN GLYCOSYLATED A1C: CPT

## 2021-04-13 DIAGNOSIS — E78.2 MIXED HYPERLIPIDEMIA: ICD-10-CM

## 2021-04-13 RX ORDER — ATORVASTATIN CALCIUM 40 MG/1
TABLET, FILM COATED ORAL
Qty: 90 TABLET | Refills: 3 | Status: SHIPPED | OUTPATIENT
Start: 2021-04-13 | End: 2021-07-13 | Stop reason: SDUPTHER

## 2021-04-28 ENCOUNTER — HOSPITAL ENCOUNTER (OUTPATIENT)
Dept: SLEEP CENTER | Facility: CLINIC | Age: 81
Discharge: HOME | End: 2021-04-28
Payer: MEDICARE

## 2021-04-28 DIAGNOSIS — G47.33 OBSTRUCTIVE SLEEP APNEA: ICD-10-CM

## 2021-04-28 DIAGNOSIS — G47.19 EXCESSIVE DAYTIME SLEEPINESS: ICD-10-CM

## 2021-04-28 PROCEDURE — G0399 HOME SLEEP TEST/TYPE 3 PORTA: HCPCS | Performed by: INTERNAL MEDICINE

## 2021-04-28 PROCEDURE — G0399 HOME SLEEP TEST/TYPE 3 PORTA: HCPCS

## 2021-04-28 NOTE — PROGRESS NOTES
Home Sleep Study Documentation    Pre-Sleep Home Study:    Set-up and instructions performed by: Vanessa Oscar    Technician performed demonstration for Patient: yes    Return demonstration performed by Patient: yes    Written instructions provided to Patient: yes    Patient signed consent form: yes        Post-Sleep Home Study:    Additional comments by Patient: none    Home Sleep Study Failed:no:    Failure reason: N/A    Reported or Detected: N/A    Scored by: LUC Pedroza RPSGT

## 2021-04-29 DIAGNOSIS — J44.9 COPD, MILD (HCC): ICD-10-CM

## 2021-04-29 DIAGNOSIS — G47.33 OBSTRUCTIVE SLEEP APNEA: Primary | ICD-10-CM

## 2021-04-29 DIAGNOSIS — G47.34 NOCTURNAL HYPOXEMIA: ICD-10-CM

## 2021-04-30 ENCOUNTER — TELEPHONE (OUTPATIENT)
Dept: PULMONOLOGY | Facility: CLINIC | Age: 81
End: 2021-04-30

## 2021-05-05 DIAGNOSIS — Z20.822 ENCOUNTER FOR LABORATORY TESTING FOR COVID-19 VIRUS: Primary | ICD-10-CM

## 2021-05-06 ENCOUNTER — TELEPHONE (OUTPATIENT)
Dept: SLEEP CENTER | Facility: CLINIC | Age: 81
End: 2021-05-06

## 2021-05-06 DIAGNOSIS — Z20.822 ENCOUNTER FOR PREPROCEDURE SCREENING LABORATORY TESTING FOR COVID-19: Primary | ICD-10-CM

## 2021-05-06 DIAGNOSIS — Z01.812 ENCOUNTER FOR PREPROCEDURE SCREENING LABORATORY TESTING FOR COVID-19: Primary | ICD-10-CM

## 2021-05-10 ENCOUNTER — HOSPITAL ENCOUNTER (OUTPATIENT)
Dept: RADIOLOGY | Age: 81
Discharge: HOME | End: 2021-05-10
Payer: MEDICARE

## 2021-05-10 DIAGNOSIS — I71.2 THORACIC AORTIC ANEURYSM WITHOUT RUPTURE (HCC): ICD-10-CM

## 2021-05-10 PROCEDURE — G1004 CDSM NDSC: HCPCS

## 2021-05-10 PROCEDURE — 71250 CT THORAX DX C-: CPT

## 2021-05-21 ENCOUNTER — OFFICE VISIT (OUTPATIENT)
Dept: CARDIAC SURGERY | Facility: CLINIC | Age: 81
End: 2021-05-21
Payer: MEDICARE

## 2021-05-21 VITALS
BODY MASS INDEX: 35.4 KG/M2 | TEMPERATURE: 97.8 F | RESPIRATION RATE: 20 BRPM | HEART RATE: 67 BPM | DIASTOLIC BLOOD PRESSURE: 80 MMHG | WEIGHT: 252.9 LBS | HEIGHT: 71 IN | SYSTOLIC BLOOD PRESSURE: 144 MMHG | OXYGEN SATURATION: 98 %

## 2021-05-21 DIAGNOSIS — I71.2 ASCENDING AORTIC ANEURYSM (HCC): Primary | ICD-10-CM

## 2021-05-21 DIAGNOSIS — Z95.1 S/P CABG (CORONARY ARTERY BYPASS GRAFT): ICD-10-CM

## 2021-05-21 PROBLEM — I71.21 ASCENDING AORTIC ANEURYSM: Status: ACTIVE | Noted: 2021-05-21

## 2021-05-21 PROCEDURE — 99213 OFFICE O/P EST LOW 20 MIN: CPT | Performed by: PHYSICIAN ASSISTANT

## 2021-05-21 NOTE — LETTER
May 21, 2021     [place]  [address] [address]  [address] [place] [address]    Patient: Chloe Alberts   YOB: 1940   Date of Visit: 5/21/2021       Dear Dr Mau Coyne: Thank you for referring Can Solis to me for evaluation  Below are my notes for this consultation  If you have questions, please do not hesitate to call me  I look forward to following your patient along with you  Sincerely,        Inocencia Loyd MD        CC: No Recipients  Leon Jean Baptiste PA-C  5/21/2021 10:27 AM  Attested  Aortic Surveillance - Cardiothoracic Surgery   Chloe Alberts 79 y o  male MRN: [id number]    History of Present Illness: Chloe Alberts is a 79y o  year old male who presents to the aortic clinic for a 2 year follow-up with scan  His last visit was on 5/17/2019 which the aneurysm was found to be 4 7-4 8mm & stable  Upon radiologic examination today, the aneurysm is found to be 4 7mm & stable with a trileaflet valve seen on TTE on 9/4/2020  TTE at that time revealed dilated aortic root & ascending aorta  He admits to abiding by his lifting & exertion restrictions since his last visit  He is active & reports walking daily  He denies chest pain, palpitations, SOB, PANDA, LE edema b/l, back pain, arm pain, numbness/tingling/paresthesias in UE b/l, HA, lightheadedness, dizziness, presyncopal symptoms, or syncopal events today or since his last visit  Changes to his medical history since his last visit consist of hospitalization & w/u for SOB (saw pulmonology, started on Spiriva, has had PFTs & sleep study), epidural injections x2 for LBP to which he still follows w/ pain services -- being w/u for possible spinal fusion  (+) FH of aortic aneurysms (brother & father in abdomen & thoracic)  He sees Dr Anne Ricks (Dr Dante Perez in the past) as his cardiologist who manages his cardiac medications including Lisinopril 5mg QDay  Denies tobacco use, ETOH use, or drug use of any kind      Past Medical History:  Past Medical History:   Diagnosis Date    Abdominal aortic aneurysm (HCC)     Aneurysm of abdominal aorta ([facility] [address] ) 2014    Aneurysm of ascending aorta (HCC) 2012    Chronic pain     back since July    Chronic pain disorder     lumbar    Coronary artery disease     History of transfusion     Hyperlipidemia     Hypertension     Hypertension 2019    Positive colorectal cancer screening using Cologuard test 2018    Sleep apnea     Subconjunctival hemorrhage      Past Surgical History:   Past Surgical History:   Procedure Laterality Date    ABDOMINAL AORTIC ANEURYSM REPAIR  2007    2 dock limbs with visc extens prosth    COLONOSCOPY      CORONARY ARTERY BYPASS GRAFT      GAB-LAD, sequential vein graft to OM1 and OM2, single VG-posterior descending  Onset:      Family History:  Family History   Problem Relation Age of Onset    Heart disease Mother     Stroke Father         stroke syndrome    Aneurysm Brother         abdominal    Aortic aneurysm Brother         ascending    Coronary artery disease Family     Hypertension Family     Other Son         gioblastoma multiforme     Social History:  Social History     Substance and Sexual Activity   Alcohol Use Yes    Frequency: 4 or more times a week    Drinks per session: 1 or 2    Binge frequency: Never    Comment: rare     Social History     Substance and Sexual Activity   Drug Use No     Social History     Tobacco Use   Smoking Status Former Smoker    Packs/day: 1 00    Years: 40 00    Pack years: 40 00    Types: Cigarettes    Start date: 36    Quit date: 2012    Years since quittin 8   Smokeless Tobacco Never Used       Home Medications:   Prior to Admission medications    Medication Sig Start Date End Date Taking?  Authorizing Provider   albuterol (Ventolin HFA) 90 mcg/act inhaler Inhale 2 puffs every 6 (six) hours as needed for wheezing 3/3/21   Rona Monroe MD   aspirin (ECOTRIN LOW STRENGTH) 81 mg EC tablet Take 1 tablet by mouth daily 9/7/12   Historical Provider, MD   atorvastatin (LIPITOR) 40 mg tablet TAKE 1 TABLET BY MOUTH  DAILY 4/13/21   Jose Luis Stevesn,    cholecalciferol (VITAMIN D3) 1,000 units tablet Take 2,000 Units by mouth daily    Historical Provider, MD   FIBER ADULT GUMMIES PO Take 1 caplet by mouth daily     Historical Provider, MD   furosemide (LASIX) 20 mg tablet Take 1 tablet (20 mg total) by mouth daily 1/4/21   Refugio Sanchez MD   gabapentin (NEURONTIN) 300 mg capsule Take 1 capsule (300 mg total) by mouth daily at bedtime 6/9/20   SOSA Aponte   labetalol (NORMODYNE) 100 mg tablet Take 1 tablet (100 mg total) by mouth 2 (two) times a day 9/29/20   Raza Alan DO   lisinopril (ZESTRIL) 5 mg tablet Take 1 tablet (5 mg total) by mouth daily 12/7/20   Jose Luis Stevens DO   multivitamin (THERAGRAN) TABS Take 1 tablet by mouth daily    Historical Provider, MD   senna (SENOKOT) 8 6 MG tablet Take 1 tablet by mouth as needed      Historical Provider, MD   tamsulosin (FLOMAX) 0 4 mg Take 1 capsule by mouth daily    Historical Provider, MD   tiotropium-olodaterol (Stiolto Respimat) 2 5-2 5 MCG/ACT inhaler Inhale 2 puffs daily    Historical Provider, MD   tiZANidine (ZANAFLEX) 2 mg tablet Take 1 tablet (2 mg total) by mouth 2 (two) times a day as needed for muscle spasms 10/21/20   Renetta Hernandez MD       Allergies: Allergies   Allergen Reactions    Meloxicam Edema       Review of Systems:     Review of Systems   Constitutional: Negative  Negative for activity change, diaphoresis, fatigue and unexpected weight change  Respiratory: Negative  Negative for chest tightness, shortness of breath and wheezing  Cardiovascular: Negative  Negative for chest pain, palpitations and leg swelling  Gastrointestinal: Negative  Negative for blood in stool, constipation, diarrhea, nausea and vomiting  Genitourinary: Negative  Musculoskeletal: Positive for back pain   Negative for arthralgias, gait problem and myalgias  Skin: Negative  Neurological: Negative  Negative for dizziness, syncope, weakness, light-headedness, numbness and headaches  Hematological: Negative  Does not bruise/bleed easily  All other systems reviewed and are negative  Vital Signs:     Vitals:    05/21/21 0951 05/21/21 0954   BP: 150/80 144/80   BP Location: Left arm Right arm   Patient Position: Sitting Sitting   Cuff Size: Standard    Pulse: 67    Resp: 20    Temp: 97 8 °F (36 6 °C)    TempSrc: Tympanic    SpO2: 98%    Weight: 115 kg (252 lb 14 4 oz)    Height: 5' 11" (1 803 m)        Physical Exam:     Physical Exam  Constitutional:       General: He is not in acute distress  Appearance: Normal appearance  He is well-developed  He is not ill-appearing or toxic-appearing  Comments: Laying in bed in NAD   HENT:      Head: Normocephalic and atraumatic  Mouth/Throat:      Dentition: Normal dentition  Does not have dentures  Pharynx: Uvula midline  Neck:      Musculoskeletal: Normal range of motion and neck supple  Vascular: No carotid bruit or JVD  Trachea: Trachea normal    Cardiovascular:      Rate and Rhythm: Normal rate and regular rhythm  Chest Wall: PMI is not displaced  Heart sounds: S1 normal and S2 normal  No murmur  No friction rub  No gallop  No S3 or S4 sounds  Comments: No heaves/lifts on palpation  Pulmonary:      Effort: Pulmonary effort is normal  No accessory muscle usage or respiratory distress  Breath sounds: Normal breath sounds  No wheezing, rhonchi or rales  Abdominal:      General: Bowel sounds are normal  There is no distension  Palpations: Abdomen is not rigid  There is no mass  Tenderness: There is no abdominal tenderness  There is no guarding or rebound  Skin:     General: Skin is warm and dry  Findings: No bruising, petechiae or rash  Nails: There is no clubbing          Comments: 1+ non-pitting RLE edema w/ CVS & gross varicosities appreciated, trace LLE edema noted; sternum stable w/ deep inspiration/coughing; sternal & GSV harvest incisions healed   Neurological:      Mental Status: He is alert and oriented to person, place, and time  Cranial Nerves: No cranial nerve deficit  Sensory: No sensory deficit  Comments: No focal deficit appreciated         Lab Results:               Invalid input(s): LABGLOM      Lab Results   Component Value Date    HGBA1C 5 7 (H) 04/09/2021     Lab Results   Component Value Date    TROPONINI <0 02 07/10/2019       Imaging Studies:     CT Chest w/o 5/10/2021: 47mm ascending aorta    Transthoracic Echocardiogram 9/4/2020: EF 60%, no RWMA, grade 1 DD, RV size ULN, LA/RA midly dilated, mild MR, trace AI (probable trileaflet -- seen as trileaflet in past on TTE), mild TR, PAP 50    I have personally reviewed pertinent reports     and I have personally reviewed pertinent films in PACS    Assessment:  Patient Active Problem List    Diagnosis Date Noted    Ascending aortic aneurysm ([address] ) 05/21/2021    Nocturnal hypoxemia     Moderate to severe pulmonary hypertension ([address] ) 01/06/2021    Heart failure, systolic ([address] ) 68/79/7550    COPD, mild ([address] ) 09/11/2019    Multiple pulmonary nodules 09/10/2019    SOB (shortness of breath) 07/10/2019    Chronic pain syndrome     Spondylolisthesis at L4-L5 level 11/13/2018    Intervertebral disc disorder with radiculopathy of lumbar region     Subclinical hypothyroidism 10/22/2014    Abdominal aortic aneurysm, without rupture ([address] ) 04/14/2014    Aortic valve disorder 04/14/2014    3-vessel CAD 02/28/2014    Herniated lumbar intervertebral disc 12/04/2013    Disc degeneration, lumbar 09/03/2013    Lumbar radiculopathy 09/03/2013    Benign essential hypertension 04/10/2013    Impaired fasting glucose 04/10/2013    Obesity 04/10/2013    Hyperlipidemia 09/07/2012    Microscopic hematuria 09/07/2012    Obstructive sleep apnea 09/07/2012    Arteriosclerotic cardiovascular disease 09/07/2012     Ascending aortic aneurysm; Ongoing ascending aortic surveillance  S/p [id number]    Plan:     CT chest, without contrast performed prior to the visit today was reviewed  Ascending aorta was measured at 47 mm at it's greatest diameter  These findings were confirmed and shared with the patient today  As they has been no interval enlargement since 2019,  follow-up monitoring is the treatment plan  Arrangements have been made for future surveillance to be completed with CT chest, without contrast in 2 Years  Juan Fluis alberto Mayra was comfortable with our recommendations, and their questions were answered to their satisfaction  Thank you for allowing us to participate in the care of this patient  Aortic Aneurysm Instructions were provided to the patient as follows:    1  No lifting more than 50 pounds  2  Maintain a controlled blood pressure with a goal of 120/80  3  Follow up in Aortic Clinic as recommended with radiology follow up as instructed  4  Report to the ER or call 911 immediately with the following signs / symptoms: sudden onset of back pain, chest pain or shortness of breath  Routine referral to gastroenterology for colonoscopy screening was not indicated, as the patient is over 77years old    SIGNATURE: Melecio King PA-C  DATE: May 21, 2021  TIME: 10:18 AM  Attestation signed by Maame Betancur MD at 5/21/2021 10:43 AM:  Pt seen and examined with PA  I agree with the above assessment and plan  Oscar's aorta remains stable at 4 7 cm  He has had no problems related to his heart in the last 2 years  He would like to continue with imaging surveillance so I'll see him again in 2 years  He is fully vaccinated against COVID        Sheri Renteria MD  DATE: May 21, 2021  TIME: 10:42 AM

## 2021-05-21 NOTE — PROGRESS NOTES
Aortic Surveillance - Cardiothoracic Surgery   Rochelle Lopez 79 y o  male MRN: [id number]    History of Present Illness: Rochelle Lopez is a 79y o  year old male who presents to the aortic clinic for a 2 year follow-up with scan  His last visit was on 5/17/2019 which the aneurysm was found to be 4 7-4 8mm & stable  Upon radiologic examination today, the aneurysm is found to be 4 7mm & stable with a trileaflet valve seen on TTE on 9/4/2020  TTE at that time revealed dilated aortic root & ascending aorta  He admits to abiding by his lifting & exertion restrictions since his last visit  He is active & reports walking daily  He denies chest pain, palpitations, SOB, PANDA, LE edema b/l, back pain, arm pain, numbness/tingling/paresthesias in UE b/l, HA, lightheadedness, dizziness, presyncopal symptoms, or syncopal events today or since his last visit  Changes to his medical history since his last visit consist of hospitalization & w/u for SOB (saw pulmonology, started on Spiriva, has had PFTs & sleep study), epidural injections x2 for LBP to which he still follows w/ pain services -- being w/u for possible spinal fusion  (+) FH of aortic aneurysms (brother & father in abdomen & thoracic)  He sees Dr Carmina Ward (  [facility] in the past) as his cardiologist who manages his cardiac medications including Lisinopril 5mg QDay  Denies tobacco use, ETOH use, or drug use of any kind      Past Medical History:  Past Medical History:   Diagnosis Date    Abdominal aortic aneurysm ([address] )     Aneurysm of abdominal aorta ([address] ) 4/14/2014    Aneurysm of ascending aorta ([address] ) 9/7/2012    Chronic pain     back since July    Chronic pain disorder     lumbar    Coronary artery disease     History of transfusion     Hyperlipidemia     Hypertension     Hypertension 7/24/2019    Positive colorectal cancer screening using Cologuard test 4/9/2018    Sleep apnea     Subconjunctival hemorrhage      Past Surgical History:   Past Surgical History:   Procedure Laterality Date    ABDOMINAL AORTIC ANEURYSM REPAIR  2007    2 dock limbs with visc extens prosth    COLONOSCOPY      CORONARY ARTERY BYPASS GRAFT      GAB-LAD, sequential vein graft to OM1 and OM2, single VG-posterior descending  Onset:      Family History:  Family History   Problem Relation Age of Onset    Heart disease Mother     Stroke Father         stroke syndrome    Aneurysm Brother         abdominal    Aortic aneurysm Brother         ascending    Coronary artery disease Family     Hypertension Family     Other Son         gioblastoma multiforme     Social History:  Social History     Substance and Sexual Activity   Alcohol Use Yes    Frequency: 4 or more times a week    Drinks per session: 1 or 2    Binge frequency: Never    Comment: rare     Social History     Substance and Sexual Activity   Drug Use No     Social History     Tobacco Use   Smoking Status Former Smoker    Packs/day: 1 00    Years: 40 00    Pack years: 40 00    Types: Cigarettes    Start date:     Quit date: 2012    Years since quittin 8   Smokeless Tobacco Never Used       Home Medications:   Prior to Admission medications    Medication Sig Start Date End Date Taking?  Authorizing Provider   albuterol (Ventolin HFA) 90 mcg/act inhaler Inhale 2 puffs every 6 (six) hours as needed for wheezing 3/3/21   Oly MD Obed   aspirin (ECOTRIN LOW STRENGTH) 81 mg EC tablet Take 1 tablet by mouth daily 12   Historical Provider, MD   atorvastatin (LIPITOR) 40 mg tablet TAKE 1 TABLET BY MOUTH  DAILY 21   Mayur Waddell DO   cholecalciferol (VITAMIN D3) 1,000 units tablet Take 2,000 Units by mouth daily    Historical Provider, MD   FIBER ADULT GUMMIES PO Take 1 caplet by mouth daily     Historical Provider, MD   furosemide (LASIX) 20 mg tablet Take 1 tablet (20 mg total) by mouth daily 21   Naveed Hull MD   gabapentin (NEURONTIN) 300 mg capsule Take 1 capsule (300 mg total) by mouth daily at bedtime 6/9/20   SOSA Marques   labetalol (NORMODYNE) 100 mg tablet Take 1 tablet (100 mg total) by mouth 2 (two) times a day 9/29/20   Tori Robles DO   lisinopril (ZESTRIL) 5 mg tablet Take 1 tablet (5 mg total) by mouth daily 12/7/20   Tori Robles DO   multivitamin (THERAGRAN) TABS Take 1 tablet by mouth daily    Historical Provider, MD   senna (SENOKOT) 8 6 MG tablet Take 1 tablet by mouth as needed      Historical Provider, MD   tamsulosin (FLOMAX) 0 4 mg Take 1 capsule by mouth daily    Historical Provider, MD   tiotropium-olodaterol (Stiolto Respimat) 2 5-2 5 MCG/ACT inhaler Inhale 2 puffs daily    Historical Provider, MD   tiZANidine (ZANAFLEX) 2 mg tablet Take 1 tablet (2 mg total) by mouth 2 (two) times a day as needed for muscle spasms 10/21/20   Izabel Boogie MD       Allergies: Allergies   Allergen Reactions    Meloxicam Edema       Review of Systems:     Review of Systems   Constitutional: Negative  Negative for activity change, diaphoresis, fatigue and unexpected weight change  Respiratory: Negative  Negative for chest tightness, shortness of breath and wheezing  Cardiovascular: Negative  Negative for chest pain, palpitations and leg swelling  Gastrointestinal: Negative  Negative for blood in stool, constipation, diarrhea, nausea and vomiting  Genitourinary: Negative  Musculoskeletal: Positive for back pain  Negative for arthralgias, gait problem and myalgias  Skin: Negative  Neurological: Negative  Negative for dizziness, syncope, weakness, light-headedness, numbness and headaches  Hematological: Negative  Does not bruise/bleed easily  All other systems reviewed and are negative        Vital Signs:     Vitals:    05/21/21 0951 05/21/21 0954   BP: 150/80 144/80   BP Location: Left arm Right arm   Patient Position: Sitting Sitting   Cuff Size: Standard    Pulse: 67    Resp: 20    Temp: 97 8 °F (36 6 °C)    TempSrc: Tympanic    SpO2: 98%    Weight: 115 kg (252 lb 14 4 oz)    Height: 5' 11" (1 803 m)        Physical Exam:     Physical Exam  Constitutional:       General: He is not in acute distress  Appearance: Normal appearance  He is well-developed  He is not ill-appearing or toxic-appearing  Comments: Laying in bed in NAD   HENT:      Head: Normocephalic and atraumatic  Mouth/Throat:      Dentition: Normal dentition  Does not have dentures  Pharynx: Uvula midline  Neck:      Musculoskeletal: Normal range of motion and neck supple  Vascular: No carotid bruit or JVD  Trachea: Trachea normal    Cardiovascular:      Rate and Rhythm: Normal rate and regular rhythm  Chest Wall: PMI is not displaced  Heart sounds: S1 normal and S2 normal  No murmur  No friction rub  No gallop  No S3 or S4 sounds  Comments: No heaves/lifts on palpation  Pulmonary:      Effort: Pulmonary effort is normal  No accessory muscle usage or respiratory distress  Breath sounds: Normal breath sounds  No wheezing, rhonchi or rales  Abdominal:      General: Bowel sounds are normal  There is no distension  Palpations: Abdomen is not rigid  There is no mass  Tenderness: There is no abdominal tenderness  There is no guarding or rebound  Skin:     General: Skin is warm and dry  Findings: No bruising, petechiae or rash  Nails: There is no clubbing  Comments: 1+ non-pitting RLE edema w/ CVS & gross varicosities appreciated, trace LLE edema noted; sternum stable w/ deep inspiration/coughing; sternal & GSV harvest incisions healed   Neurological:      Mental Status: He is alert and oriented to person, place, and time  Cranial Nerves: No cranial nerve deficit  Sensory: No sensory deficit        Comments: No focal deficit appreciated         Lab Results:               Invalid input(s): LABGLOM      Lab Results   Component Value Date    HGBA1C 5 7 (H) 04/09/2021     Lab Results   Component Value Date TROPONINI <0 02 07/10/2019       Imaging Studies:     CT Chest w/o 5/10/2021: 47mm ascending aorta    Transthoracic Echocardiogram 9/4/2020: EF 60%, no RWMA, grade 1 DD, RV size ULN, LA/RA midly dilated, mild MR, trace AI (probable trileaflet -- seen as trileaflet in past on TTE), mild TR, PAP 50    I have personally reviewed pertinent reports  and I have personally reviewed pertinent films in PACS    Assessment:  Patient Active Problem List    Diagnosis Date Noted    Ascending aortic aneurysm ([facility] [address] ) 05/21/2021    Nocturnal hypoxemia     Moderate to severe pulmonary hypertension ([address] ) 01/06/2021    Heart failure, systolic ([address] ) 38/83/3228    COPD, mild ([address] ) 09/11/2019    Multiple pulmonary nodules 09/10/2019    SOB (shortness of breath) 07/10/2019    Chronic pain syndrome     Spondylolisthesis at L4-L5 level 11/13/2018    Intervertebral disc disorder with radiculopathy of lumbar region     Subclinical hypothyroidism 10/22/2014    Abdominal aortic aneurysm, without rupture ([facility] [address] ) 04/14/2014    Aortic valve disorder 04/14/2014    3-vessel CAD 02/28/2014    Herniated lumbar intervertebral disc 12/04/2013    Disc degeneration, lumbar 09/03/2013    Lumbar radiculopathy 09/03/2013    Benign essential hypertension 04/10/2013    Impaired fasting glucose 04/10/2013    Obesity 04/10/2013    Hyperlipidemia 09/07/2012    Microscopic hematuria 09/07/2012    Obstructive sleep apnea 09/07/2012    Arteriosclerotic cardiovascular disease 09/07/2012     Ascending aortic aneurysm; Ongoing ascending aortic surveillance  S/p CABGx4    Plan:     CT chest, without contrast performed prior to the visit today was reviewed  Ascending aorta was measured at 47 mm at it's greatest diameter  These findings were confirmed and shared with the patient today  As they has been no interval enlargement since 2019,  follow-up monitoring is the treatment plan    Arrangements have been made for future surveillance to be completed with CT chest, without contrast in 2 Years  Dalila Silviojono was comfortable with our recommendations, and their questions were answered to their satisfaction  Thank you for allowing us to participate in the care of this patient  Aortic Aneurysm Instructions were provided to the patient as follows:    1  No lifting more than 50 pounds  2  Maintain a controlled blood pressure with a goal of 120/80  3  Follow up in Aortic Clinic as recommended with radiology follow up as instructed  4  Report to the ER or call 911 immediately with the following signs / symptoms: sudden onset of back pain, chest pain or shortness of breath      Routine referral to gastroenterology for colonoscopy screening was not indicated, as the patient is over 77years old    SIGNATURE: Sofiya Callaway PA-C  DATE: May 21, 2021  TIME: 10:18 AM

## 2021-06-15 DIAGNOSIS — I10 ESSENTIAL HYPERTENSION: ICD-10-CM

## 2021-06-15 RX ORDER — LABETALOL 100 MG/1
TABLET, FILM COATED ORAL
Qty: 180 TABLET | Refills: 3 | Status: SHIPPED | OUTPATIENT
Start: 2021-06-15 | End: 2022-06-29

## 2021-06-28 ENCOUNTER — TELEPHONE (OUTPATIENT)
Dept: CARDIOLOGY CLINIC | Facility: CLINIC | Age: 81
End: 2021-06-28

## 2021-06-28 NOTE — TELEPHONE ENCOUNTER
Spouse called upset she was not called for an annual f/u visit and upset he cannot be seen until September because pt is experiencing LE edema     Scheduled with Nicolas Mayes for Wednesday, and scheduled with Dr Lobito Tapia for July 13 because she would like to discuss test results no

## 2021-06-30 ENCOUNTER — OFFICE VISIT (OUTPATIENT)
Dept: CARDIOLOGY CLINIC | Facility: CLINIC | Age: 81
End: 2021-06-30
Payer: MEDICARE

## 2021-06-30 VITALS
SYSTOLIC BLOOD PRESSURE: 130 MMHG | BODY MASS INDEX: 35.25 KG/M2 | WEIGHT: 251.8 LBS | HEIGHT: 71 IN | DIASTOLIC BLOOD PRESSURE: 78 MMHG | HEART RATE: 71 BPM | OXYGEN SATURATION: 96 %

## 2021-06-30 DIAGNOSIS — I10 BENIGN ESSENTIAL HYPERTENSION: ICD-10-CM

## 2021-06-30 DIAGNOSIS — G47.33 OBSTRUCTIVE SLEEP APNEA: ICD-10-CM

## 2021-06-30 DIAGNOSIS — I71.2 ASCENDING AORTIC ANEURYSM (HCC): ICD-10-CM

## 2021-06-30 DIAGNOSIS — I50.32 CHRONIC DIASTOLIC HEART FAILURE (HCC): Primary | ICD-10-CM

## 2021-06-30 PROCEDURE — 99215 OFFICE O/P EST HI 40 MIN: CPT | Performed by: INTERNAL MEDICINE

## 2021-06-30 NOTE — PATIENT INSTRUCTIONS
2gm sodium low fat low cholesterol diet, eating fresh is best, fresh fruit, fresh vegetables, lean protein    Take and extra Lasix 20mg this evening, eat a banana today     Jaret LE compression stockings daily, remove at bedtime

## 2021-06-30 NOTE — PROGRESS NOTES
Cardiology Follow Up    Regina Galicia  1940  [id number]  [facility] [place] [facility]  [address]  [phone]    1  Chronic diastolic heart failure (HCC)  Compression Stocking   2  Benign essential hypertension     3  Obstructive sleep apnea     4  Ascending aortic aneurysm [facility])         Interval History:   On 6/28/21 Mr Smiley Manley called our office for a follow up visit  He has been experiencing LE edema  He was scheduled to be seen in our office  Mr Siddhartha Hull presents to our office for a follow up visit  He admits to slightly worsening shortness of breath with exertion, keith LE edema  He denies chest pain palpitations lightheadedness or dizziness Oscar admits to eating foods higher in sodium in adding salt to food       HPI:  CAD  CABG x 3 in 2013  AAA repair 2007 followed by Dr Ana Walker imaging every 2 years   5/10/21 CT of chest -ascending thoracic aorta 4 7 cm  Chronic HFpEF LVEF 60%  Hypertension  Mixed Hyperlipidemia  YEVGENIY not able to use CPAP, sleeps in recliner , follow up sleep test at the end of July 9/04/20 LVEF 60%, grade 1 DD, Mod MR, mild TR, aortic root 44mm   Patient Active Problem List   Diagnosis    Benign essential hypertension    Hyperlipidemia    Impaired fasting glucose    Microscopic hematuria    Obesity    Obstructive sleep apnea    Subclinical hypothyroidism    3-vessel CAD    Abdominal aortic aneurysm, without rupture (HCC)    Aortic valve disorder    Arteriosclerotic cardiovascular disease    Disc degeneration, lumbar    Herniated lumbar intervertebral disc    Lumbar radiculopathy    Intervertebral disc disorder with radiculopathy of lumbar region    Spondylolisthesis at L4-L5 level    Chronic pain syndrome    SOB (shortness of breath)    Multiple pulmonary nodules    Heart failure, systolic (HCC)    COPD, mild (HCC)    Moderate to severe pulmonary hypertension (HCC)    Nocturnal hypoxemia    Ascending aortic aneurysm [facility])     Past Medical History:   Diagnosis Date    Abdominal aortic aneurysm (HCC)     Aneurysm of abdominal aorta ([facility] [address] ) 2014    Aneurysm of ascending aorta (HCC) 2012    Chronic pain     back since July    Chronic pain disorder     lumbar    Coronary artery disease     History of transfusion     Hyperlipidemia     Hypertension     Hypertension 2019    Positive colorectal cancer screening using Cologuard test 2018    Sleep apnea     Subconjunctival hemorrhage      Social History     Socioeconomic History    Marital status: /Civil Union     Spouse name: Not on file    Number of children: Not on file    Years of education: Not on file    Highest education level: Not on file   Occupational History    Occupation: retired   Tobacco Use    Smoking status: Former Smoker     Packs/day: 1 00     Years: 40 00     Pack years: 40 00     Types: Cigarettes     Start date:      Quit date: 2012     Years since quittin 9    Smokeless tobacco: Never Used   Vaping Use    Vaping Use: Never used   Substance and Sexual Activity    Alcohol use: Yes     Comment: rare    Drug use: No    Sexual activity: Never   Other Topics Concern    Not on file   Social History Narrative    Not on file     Social Determinants of Health     Financial Resource Strain:     Difficulty of Paying Living Expenses:    Food Insecurity:     Worried About Running Out of Food in the Last Year:     [address] in the Last Year:    Transportation Needs:     Lack of Transportation (Medical):      Lack of Transportation (Non-Medical):    Physical Activity:     Days of Exercise per Week:     Minutes of Exercise per Session:    Stress:     Feeling of Stress :    Social Connections:     Frequency of Communication with Friends and Family:     Frequency of Social Gatherings with Friends and Family:     Attends Anabaptist Services:     Active Member of Clubs or Organizations:     Attends Club or Organization Meetings:     Marital Status:    Intimate Partner Violence:     Fear of Current or Ex-Partner:     Emotionally Abused:     Physically Abused:     Sexually Abused:       Family History   Problem Relation Age of Onset    Heart disease Mother     Stroke Father         stroke syndrome    Aneurysm Brother         abdominal    Aortic aneurysm Brother         ascending    Coronary artery disease Family     Hypertension Family     Other Son         gioblastoma multiforme     Past Surgical History:   Procedure Laterality Date    ABDOMINAL AORTIC ANEURYSM REPAIR  08/09/2007    2 dock limbs with visc extens prosth    COLONOSCOPY      CORONARY ARTERY BYPASS GRAFT      GAB-LAD, sequential vein graft to OM1 and OM2, single VG-posterior descending   Onset: 2003       Current Outpatient Medications:     albuterol (Ventolin HFA) 90 mcg/act inhaler, Inhale 2 puffs every 6 (six) hours as needed for wheezing, Disp: 18 g, Rfl: 5    aspirin (ECOTRIN LOW STRENGTH) 81 mg EC tablet, Take 1 tablet by mouth daily, Disp: , Rfl:     atorvastatin (LIPITOR) 40 mg tablet, TAKE 1 TABLET BY MOUTH  DAILY, Disp: 90 tablet, Rfl: 3    cholecalciferol (VITAMIN D3) 1,000 units tablet, Take 2,000 Units by mouth daily, Disp: , Rfl:     FIBER ADULT GUMMIES PO, Take 1 caplet by mouth daily , Disp: , Rfl:     furosemide (LASIX) 20 mg tablet, Take 1 tablet (20 mg total) by mouth daily, Disp: 90 tablet, Rfl: 3    gabapentin (NEURONTIN) 300 mg capsule, Take 1 capsule (300 mg total) by mouth daily at bedtime, Disp: 90 capsule, Rfl: 5    labetalol (NORMODYNE) 100 mg tablet, TAKE 1 TABLET BY MOUTH  TWICE DAILY, Disp: 180 tablet, Rfl: 3    lisinopril (ZESTRIL) 5 mg tablet, Take 1 tablet (5 mg total) by mouth daily, Disp: 90 tablet, Rfl: 3    multivitamin (THERAGRAN) TABS, Take 1 tablet by mouth daily, Disp: , Rfl:     senna (SENOKOT) 8 6 MG tablet, Take 1 tablet by mouth as needed  , Disp: , Rfl:     tamsulosin (FLOMAX) 0 4 mg, Take 1 capsule by mouth daily, Disp: , Rfl:     tiotropium-olodaterol (Stiolto Respimat) 2 5-2 5 MCG/ACT inhaler, Inhale 2 puffs daily, Disp: , Rfl:     tiZANidine (ZANAFLEX) 2 mg tablet, Take 1 tablet (2 mg total) by mouth 2 (two) times a day as needed for muscle spasms, Disp: 60 tablet, Rfl: 1    Current Facility-Administered Medications:     albuterol (PROVENTIL HFA,VENTOLIN HFA) inhaler 2 puff, 2 puff, Inhalation, Q6H PRN, Simi Amos MD  Allergies   Allergen Reactions    Meloxicam Edema       Labs:  No visits with results within 2 Month(s) from this visit     Latest known visit with results is:   Appointment on 04/09/2021   Component Date Value    WBC 04/09/2021 6 43     RBC 04/09/2021 4 22     Hemoglobin 04/09/2021 14 1     Hematocrit 04/09/2021 42 4     MCV 04/09/2021 101*    MCH 04/09/2021 33 4     MCHC 04/09/2021 33 3     RDW 04/09/2021 13 1     MPV 04/09/2021 10 5     Platelets 26/28/6519 160     nRBC 04/09/2021 0     Neutrophils Relative 04/09/2021 67     Immat GRANS % 04/09/2021 0     Lymphocytes Relative 04/09/2021 16     Monocytes Relative 04/09/2021 12     Eosinophils Relative 04/09/2021 4     Basophils Relative 04/09/2021 1     Neutrophils Absolute 04/09/2021 4 36     Immature Grans Absolute 04/09/2021 0 02     Lymphocytes Absolute 04/09/2021 1 00     Monocytes Absolute 04/09/2021 0 75     Eosinophils Absolute 04/09/2021 0 24     Basophils Absolute 04/09/2021 0 06     Sodium 04/09/2021 134*    Potassium 04/09/2021 4 9     Chloride 04/09/2021 100     CO2 04/09/2021 28     ANION GAP 04/09/2021 6     BUN 04/09/2021 9     Creatinine 04/09/2021 1 14     Glucose, Fasting 04/09/2021 113*    Calcium 04/09/2021 9 1     AST 04/09/2021 27     ALT 04/09/2021 25     Alkaline Phosphatase 04/09/2021 62     Total Protein 04/09/2021 7 2     Albumin 04/09/2021 3 8     Total Bilirubin 04/09/2021 0 98     eGFR 04/09/2021 61     Hemoglobin A1C 04/09/2021 5 7*    EAG 04/09/2021 117     Cholesterol 04/09/2021 120     Triglycerides 04/09/2021 32     HDL, Direct 04/09/2021 67     LDL Calculated 04/09/2021 47      Imaging: No results found  Review of Systems:  Review of Systems   Respiratory: Positive for shortness of breath  Cardiovascular: Positive for leg swelling  Musculoskeletal: Positive for arthralgias, back pain and myalgias  All other systems reviewed and are negative  Physical Exam:  Physical Exam  Vitals reviewed  Constitutional:       Appearance: He is obese  HENT:      Head: Normocephalic  Eyes:      Pupils: Pupils are equal, round, and reactive to light  Neck:      Comments: No JVD  Cardiovascular:      Rate and Rhythm: Normal rate and regular rhythm  Pulses: Normal pulses  Heart sounds: Normal heart sounds  Pulmonary:      Effort: Pulmonary effort is normal       Breath sounds: Normal breath sounds  Abdominal:      General: Bowel sounds are normal       Palpations: Abdomen is soft  Musculoskeletal:         General: Normal range of motion  Cervical back: Normal range of motion  Right lower leg: Edema present  Left lower leg: Edema present  Comments: Trace to 1+ RLE edema and trace LLE edema   Skin:     General: Skin is warm and dry  Capillary Refill: Capillary refill takes less than 2 seconds  Comments: RLE varicose veins    Neurological:      General: No focal deficit present  Mental Status: He is alert and oriented to person, place, and time  Psychiatric:         Mood and Affect: Mood normal          Behavior: Behavior normal          Discussion/Summary:  1  Chronic HFpEF LVEF 60%, grade 1DD-  On PE with slight keith LE edema due to dietary indiscretion with increased sodium intake    Continues on Lasix 20 mg daily, Instructed to take Lasix 20mg extra dose today, eat a banana,  HR and BP controlled, continue on Lisinopril 5 mg daily, labetalol 100 mg Q12 hours  Handout information provided on 2 g sodium diet reading food labels consuming foods with low sodium not adding salt to diet  Instructed to wear keith LE compression stockings daily   2  Hypertension RUE sitting 130/70, continue on Lisinopril 5 mg daily, labetalol 100 mg Q12, 2gm sodium diet   3  Mixed Hyperlipidemia- 4/09/21 , TG 32, HDL 67, LDL 47, controlled on  Lipitor 40 mg daily  4  ascending thoracic aorta 4 7 cm by CT of chest done 5/10/21 followed by Dr Lucho Cesar, imaging every 2 years  Continue with blood pressure control  5   YEVGENIY not able to use CPAP, sleeps in recliner , follow up sleep study at the end of July       RUE sitting 130/70

## 2021-07-01 ENCOUNTER — TELEPHONE (OUTPATIENT)
Dept: SLEEP CENTER | Facility: CLINIC | Age: 81
End: 2021-07-01

## 2021-07-01 DIAGNOSIS — G47.33 OBSTRUCTIVE SLEEP APNEA: Primary | ICD-10-CM

## 2021-07-01 NOTE — TELEPHONE ENCOUNTER
We had to cancel the patient's CPAP study due to the recall  Please advise how you would like to proceed  Would you like the patient to wait to have CPAP study, or will you be ordering auto PAP?   Thank you

## 2021-07-06 NOTE — TELEPHONE ENCOUNTER
Patient would like status on cpap order  Thomas Baxter not sure if you have started this, but wife chad would like a call  Scheduled date to be seen was rescheduled to September for compliance

## 2021-07-13 ENCOUNTER — OFFICE VISIT (OUTPATIENT)
Dept: CARDIOLOGY CLINIC | Facility: CLINIC | Age: 81
End: 2021-07-13
Payer: MEDICARE

## 2021-07-13 VITALS
HEIGHT: 71 IN | SYSTOLIC BLOOD PRESSURE: 110 MMHG | HEART RATE: 68 BPM | OXYGEN SATURATION: 94 % | BODY MASS INDEX: 35.21 KG/M2 | WEIGHT: 251.5 LBS | DIASTOLIC BLOOD PRESSURE: 66 MMHG

## 2021-07-13 DIAGNOSIS — G47.33 OBSTRUCTIVE SLEEP APNEA: ICD-10-CM

## 2021-07-13 DIAGNOSIS — I25.10 ARTERIOSCLEROTIC CARDIOVASCULAR DISEASE: ICD-10-CM

## 2021-07-13 DIAGNOSIS — I27.20 MODERATE TO SEVERE PULMONARY HYPERTENSION (HCC): ICD-10-CM

## 2021-07-13 DIAGNOSIS — I71.4 ABDOMINAL AORTIC ANEURYSM, WITHOUT RUPTURE (HCC): ICD-10-CM

## 2021-07-13 DIAGNOSIS — I71.2 ASCENDING AORTIC ANEURYSM (HCC): ICD-10-CM

## 2021-07-13 DIAGNOSIS — R06.02 SOB (SHORTNESS OF BREATH): ICD-10-CM

## 2021-07-13 DIAGNOSIS — I10 BENIGN ESSENTIAL HYPERTENSION: ICD-10-CM

## 2021-07-13 DIAGNOSIS — I25.10 3-VESSEL CAD: Primary | ICD-10-CM

## 2021-07-13 DIAGNOSIS — E78.2 MIXED HYPERLIPIDEMIA: ICD-10-CM

## 2021-07-13 PROCEDURE — 93000 ELECTROCARDIOGRAM COMPLETE: CPT | Performed by: INTERNAL MEDICINE

## 2021-07-13 PROCEDURE — 99214 OFFICE O/P EST MOD 30 MIN: CPT | Performed by: INTERNAL MEDICINE

## 2021-07-13 RX ORDER — ATORVASTATIN CALCIUM 40 MG/1
40 TABLET, FILM COATED ORAL DAILY
Qty: 90 TABLET | Refills: 3 | Status: SHIPPED | OUTPATIENT
Start: 2021-07-13 | End: 2022-04-18

## 2021-07-13 NOTE — PROGRESS NOTES
Cardiology Follow Up    Sihtal Diez  1940  [id number]  [facility]  [address] [phone]    1  3-vessel CAD     2  Abdominal aortic aneurysm, without rupture ([address] )     3  Arteriosclerotic cardiovascular disease  POCT ECG   4  Ascending aortic aneurysm ([facility] [address] )     5  Benign essential hypertension     6  Moderate to severe pulmonary hypertension ([facility] [address] )     7  Mixed hyperlipidemia  atorvastatin (LIPITOR) 40 mg tablet   8  SOB (shortness of breath)     9  Obstructive sleep apnea         Discussion/Summary:  Overall from a cardiac standpoint he has been stable  Coronary disease stable no complaints of angina functional capacity has been good  Abdominal aortic aneurysm was imaged but there was a lot of bowel gas no obvious endoleaks  Blood pressures been well controlled lipids have been stable  He does have moderate to severe pulmonary hypertension sleep study revealed significant apnea he has a CPAP study coming up shortly  He does have some mild lower extremity edema I have recommended an extra Lasix a few days a week  Also needs to elevate and use compression stockings  Interval History:  78-year-old gentleman with multivessel coronary disease, history of CABG, hypertension, hyperlipidemia, ascending aortic aneurysm, abdominal aorta aneurysm status post repair, sleep apnea presents to establish care with me in the office  He has been seeing 1 of my partners for several years  Functionally he has been doing great  Denies any exertional chest pain, shortness of breath, palpitations, lightheadedness, dizziness, or syncope  He has put on some weight during the pandemic  He has been trying to make some dietary changes start some low-level exercise  Following a last visit he has overall been well he has had some issues with  Lower extremity edema    This has been mild in /[employer]     Spouse name: Not on file    Number of children: Not on file    Years of education: Not on file    Highest education level: Not on file   Occupational History    Occupation: retired   Tobacco Use    Smoking status: Former Smoker     Packs/day: 1 00     Years: 40 00     Pack years: 40 00     Types: Cigarettes     Start date: 37     Quit date: 2012     Years since quittin 9    Smokeless tobacco: Never Used   Vaping Use    Vaping Use: Never used   Substance and Sexual Activity    Alcohol use: Yes     Comment: rare    Drug use: No    Sexual activity: Never   Other Topics Concern    Not on file   Social History Narrative    Not on file     Social Determinants of Health     Financial Resource Strain:     Difficulty of Paying Living Expenses:    Food Insecurity:     Worried About [address] in the Last Year:     [address] in the Last Year:    Transportation Needs:     Lack of Transportation (Medical):      Lack of Transportation (Non-Medical):    Physical Activity:     Days of Exercise per Week:     Minutes of Exercise per Session:    Stress:     Feeling of Stress :    Social Connections:     Frequency of Communication with Friends and Family:     Frequency of Social Gatherings with Friends and Family:     Attends Judaism Services:     Active Member of Clubs or Organizations:     Attends Club or Organization Meetings:     Marital Status:    Intimate Partner Violence:     Fear of Current or Ex-Partner:     Emotionally Abused:     Physically Abused:     Sexually Abused:       Family History   Problem Relation Age of Onset    Heart disease Mother     Stroke Father         stroke syndrome    Aneurysm Brother         abdominal    Aortic aneurysm Brother         ascending    Coronary artery disease Family     Hypertension Family     Other Son         gioblastoma multiforme     Past Surgical History:   Procedure Laterality Date    ABDOMINAL AORTIC ANEURYSM REPAIR  08/09/2007    2 dock limbs with visc extens prosth    COLONOSCOPY      CORONARY ARTERY BYPASS GRAFT      GAB-LAD, sequential vein graft to OM1 and OM2, single VG-posterior descending   Onset: 2003       Current Outpatient Medications:     albuterol (Ventolin HFA) 90 mcg/act inhaler, Inhale 2 puffs every 6 (six) hours as needed for wheezing, Disp: 18 g, Rfl: 5    aspirin (ECOTRIN LOW STRENGTH) 81 mg EC tablet, Take 1 tablet by mouth daily, Disp: , Rfl:     atorvastatin (LIPITOR) 40 mg tablet, Take 1 tablet (40 mg total) by mouth daily, Disp: 90 tablet, Rfl: 3    cholecalciferol (VITAMIN D3) 1,000 units tablet, Take 2,000 Units by mouth daily, Disp: , Rfl:     FIBER ADULT GUMMIES PO, Take 1 caplet by mouth daily , Disp: , Rfl:     furosemide (LASIX) 20 mg tablet, Take 1 tablet (20 mg total) by mouth daily (Patient taking differently: Take 40 mg by mouth daily with dinner ), Disp: 90 tablet, Rfl: 3    gabapentin (NEURONTIN) 300 mg capsule, Take 1 capsule (300 mg total) by mouth daily at bedtime, Disp: 90 capsule, Rfl: 5    labetalol (NORMODYNE) 100 mg tablet, TAKE 1 TABLET BY MOUTH  TWICE DAILY, Disp: 180 tablet, Rfl: 3    lisinopril (ZESTRIL) 5 mg tablet, Take 1 tablet (5 mg total) by mouth daily, Disp: 90 tablet, Rfl: 3    multivitamin (THERAGRAN) TABS, Take 1 tablet by mouth daily, Disp: , Rfl:     senna (SENOKOT) 8 6 MG tablet, Take 1 tablet by mouth as needed  , Disp: , Rfl:     tamsulosin (FLOMAX) 0 4 mg, Take 1 capsule by mouth daily, Disp: , Rfl:     tiZANidine (ZANAFLEX) 2 mg tablet, Take 1 tablet (2 mg total) by mouth 2 (two) times a day as needed for muscle spasms (Patient not taking: Reported on 6/30/2021), Disp: 60 tablet, Rfl: 1    Current Facility-Administered Medications:     albuterol (PROVENTIL HFA,VENTOLIN HFA) inhaler 2 puff, 2 puff, Inhalation, Q6H PRN, Jaylan Akins MD  Allergies   Allergen Reactions    Meloxicam Edema       Labs:     Chemistry        Component Value Date/Time     (L) 10/15/2015 1042    K 4 9 04/09/2021 1011    K 4 7 10/15/2015 1042     04/09/2021 1011     10/15/2015 1042    CO2 28 04/09/2021 1011    CO2 30 10/15/2015 1042    BUN 9 04/09/2021 1011    BUN 15 10/15/2015 1042    CREATININE 1 14 04/09/2021 1011    CREATININE 1 03 10/15/2015 1042        Component Value Date/Time    CALCIUM 9 1 04/09/2021 1011    CALCIUM 9 1 10/15/2015 1042    ALKPHOS 62 04/09/2021 1011    ALKPHOS 71 10/15/2015 1042    AST 27 04/09/2021 1011    AST 14 10/15/2015 1042    ALT 25 04/09/2021 1011    ALT 23 10/15/2015 1042    BILITOT 0 79 10/15/2015 1042            Lab Results   Component Value Date    CHOL 124 10/15/2015    CHOL 159 04/01/2015    CHOL 155 09/30/2013     Lab Results   Component Value Date    HDL 67 04/09/2021    HDL 77 10/06/2020    HDL 70 07/06/2020     Lab Results   Component Value Date    LDLCALC 47 04/09/2021    LDLCALC 56 10/06/2020    LDLCALC 56 07/06/2020     Lab Results   Component Value Date    TRIG 32 04/09/2021    TRIG 42 10/06/2020    TRIG 37 07/06/2020     No results found for: CHOLHDL    Imaging: No results found  ECG:  Sinus rhythm with PACs      Review of Systems   Constitutional: Negative  HENT: Negative  Eyes: Negative  Cardiovascular: Negative  Respiratory: Negative  Endocrine: Negative  Hematologic/Lymphatic: Negative  Skin: Negative  Musculoskeletal: Negative  Gastrointestinal: Negative  Genitourinary: Negative  Neurological: Negative  Psychiatric/Behavioral: Negative  All other systems reviewed and are negative  Vitals:    07/13/21 1256   BP: 110/66   Pulse: 68   SpO2: 94%     Vitals:    07/13/21 1256   Weight: 114 kg (251 lb 8 oz)     Height: 5' 11" (180 3 cm)   Body mass index is 35 08 kg/m²      Physical Exam:  Vital signs reviewed  General:  Alert and cooperative, appears stated age, no acute distress  HEENT:  PERRLA, EOMI, no scleral icterus, no conjunctival pallor  Neck:  No lymphadenopathy, no thyromegaly, no carotid bruits, no elevated JVP  Heart:  Regular rate and rhythm, normal S1/S2, no S3/S4, no murmur, rubs or gallops  PMI nondisplaced  Lungs:  Clear to auscultation bilaterally, no wheezes rales or rhonchi  Abdomen:  Soft, non-tender, positive bowel sounds, no rebound or guarding,   no organomegaly   Extremities:  Normal range of motion    No clubbing, cyanosis or edema   Vascular:  2+ pedal pulses  Skin:  No rashes or lesions on exposed skin  Neurologic:  Cranial nerves II-XII grossly intact without focal deficits

## 2021-07-20 ENCOUNTER — OFFICE VISIT (OUTPATIENT)
Dept: INTERNAL MEDICINE CLINIC | Facility: CLINIC | Age: 81
End: 2021-07-20
Payer: MEDICARE

## 2021-07-20 VITALS
SYSTOLIC BLOOD PRESSURE: 115 MMHG | WEIGHT: 249.8 LBS | OXYGEN SATURATION: 96 % | DIASTOLIC BLOOD PRESSURE: 70 MMHG | BODY MASS INDEX: 34.97 KG/M2 | HEART RATE: 73 BPM | HEIGHT: 71 IN | TEMPERATURE: 97.2 F

## 2021-07-20 DIAGNOSIS — R06.02 SOB (SHORTNESS OF BREATH): ICD-10-CM

## 2021-07-20 DIAGNOSIS — I10 BENIGN ESSENTIAL HYPERTENSION: ICD-10-CM

## 2021-07-20 DIAGNOSIS — M51.16 INTERVERTEBRAL DISC DISORDER WITH RADICULOPATHY OF LUMBAR REGION: ICD-10-CM

## 2021-07-20 DIAGNOSIS — E78.2 MIXED HYPERLIPIDEMIA: ICD-10-CM

## 2021-07-20 DIAGNOSIS — M54.16 LUMBAR RADICULOPATHY: Primary | ICD-10-CM

## 2021-07-20 DIAGNOSIS — R73.01 IMPAIRED FASTING GLUCOSE: ICD-10-CM

## 2021-07-20 PROCEDURE — 99214 OFFICE O/P EST MOD 30 MIN: CPT | Performed by: INTERNAL MEDICINE

## 2021-07-20 RX ORDER — FUROSEMIDE 20 MG/1
TABLET ORAL
Qty: 90 TABLET | Refills: 3
Start: 2021-07-20 | End: 2021-08-10 | Stop reason: SDUPTHER

## 2021-07-20 RX ORDER — GABAPENTIN 300 MG/1
600 CAPSULE ORAL
Qty: 180 CAPSULE | Refills: 3 | Status: SHIPPED | OUTPATIENT
Start: 2021-07-20 | End: 2022-01-20 | Stop reason: SDUPTHER

## 2021-07-20 NOTE — PROGRESS NOTES
Assessment/Plan:  Try gabapentin 600mg at bedtime  He can also try 300mg BID if the 600mg is too much at once  Obtain labs in 2-3 weeks  Continue rest of meds  F/U with neurosurgery  He is scheduled for a colonoscopy in September     Problem List Items Addressed This Visit        Endocrine    Impaired fasting glucose    Relevant Orders    HEMOGLOBIN A1C W/ EAG ESTIMATION       Cardiovascular and Mediastinum    Benign essential hypertension    Relevant Medications    furosemide (LASIX) 20 mg tablet    Other Relevant Orders    CBC and Platelet    Comprehensive metabolic panel       Nervous and Auditory    Lumbar radiculopathy - Primary    Relevant Medications    gabapentin (NEURONTIN) 300 mg capsule    Intervertebral disc disorder with radiculopathy of lumbar region    Relevant Medications    gabapentin (NEURONTIN) 300 mg capsule       Other    SOB (shortness of breath)    Relevant Medications    furosemide (LASIX) 20 mg tablet    Hyperlipidemia    Relevant Orders    Comprehensive metabolic panel    Lipid Panel with Direct LDL reflex            Subjective:      Patient ID: Fuentes Davidson is a 79 y o  male  HPI  Chronic lower back pain due to intervertebral disc disorder on gabapentin 300mg at night  He is unsure if it is helping  He tried taking 100mg in the morning which did not help  He has been seeing pain mgt and will now follow up with neurosurgery    Leg swelling and cardiology increased his diuretic to 40mg in the morning MWF  He always takes 40mg in the evening  The swelling is down as of today  He has chronic SOB with exertion and cannot tell if this has improved    CPAP study rescheduled due to recall of CPAP    The following portions of the patient's history were reviewed and updated as appropriate: allergies, current medications, past family history, past medical history, past social history, past surgical history and problem list     Review of Systems   Constitutional: Negative for chills and fever  HENT: Positive for postnasal drip  Respiratory: Positive for shortness of breath  Negative for cough  Cardiovascular: Positive for leg swelling  Negative for chest pain and palpitations  Gastrointestinal: Positive for constipation  Negative for abdominal pain and diarrhea  Genitourinary: Negative for difficulty urinating  Nocturia   Musculoskeletal: Positive for back pain  Objective:      /70   Pulse 73   Temp (!) 97 2 °F (36 2 °C) (Temporal)   Ht 5' 11" (1 803 m)   Wt 113 kg (249 lb 12 8 oz)   SpO2 96%   BMI 34 84 kg/m²          Physical Exam  Constitutional:       General: He is not in acute distress  Appearance: He is well-developed  He is obese  He is not ill-appearing, toxic-appearing or diaphoretic  HENT:      Head: Normocephalic and atraumatic  Eyes:      Conjunctiva/sclera: Conjunctivae normal    Cardiovascular:      Rate and Rhythm: Normal rate and regular rhythm  Heart sounds: Normal heart sounds  No murmur heard  Pulmonary:      Effort: Pulmonary effort is normal  No respiratory distress  Breath sounds: Normal breath sounds  No wheezing or rales  Abdominal:      General: There is no distension  Palpations: Abdomen is soft  There is no mass  Tenderness: There is no abdominal tenderness  There is no guarding or rebound  Musculoskeletal:      Cervical back: Neck supple  Right lower leg: Edema (trace pretibial) present  Left lower leg: Edema (trace pretibial) present  Skin:     General: Skin is warm and dry  Neurological:      Mental Status: He is alert and oriented to person, place, and time  Psychiatric:         Mood and Affect: Mood normal          Behavior: Behavior normal          Thought Content:  Thought content normal          Judgment: Judgment normal

## 2021-08-02 ENCOUNTER — APPOINTMENT (OUTPATIENT)
Dept: LAB | Facility: CLINIC | Age: 81
End: 2021-08-02
Payer: MEDICARE

## 2021-08-02 DIAGNOSIS — R73.01 IMPAIRED FASTING GLUCOSE: ICD-10-CM

## 2021-08-02 DIAGNOSIS — I10 BENIGN ESSENTIAL HYPERTENSION: ICD-10-CM

## 2021-08-02 DIAGNOSIS — E78.2 MIXED HYPERLIPIDEMIA: ICD-10-CM

## 2021-08-02 LAB
ALBUMIN SERPL BCP-MCNC: 3.8 G/DL (ref 3.5–5)
ALP SERPL-CCNC: 73 U/L (ref 46–116)
ALT SERPL W P-5'-P-CCNC: 30 U/L (ref 12–78)
ANION GAP SERPL CALCULATED.3IONS-SCNC: 4 MMOL/L (ref 4–13)
AST SERPL W P-5'-P-CCNC: 27 U/L (ref 5–45)
BILIRUB SERPL-MCNC: 0.74 MG/DL (ref 0.2–1)
BUN SERPL-MCNC: 12 MG/DL (ref 5–25)
CALCIUM SERPL-MCNC: 9.3 MG/DL (ref 8.3–10.1)
CHLORIDE SERPL-SCNC: 101 MMOL/L (ref 100–108)
CHOLEST SERPL-MCNC: 128 MG/DL (ref 50–200)
CO2 SERPL-SCNC: 30 MMOL/L (ref 21–32)
CREAT SERPL-MCNC: 0.93 MG/DL (ref 0.6–1.3)
ERYTHROCYTE [DISTWIDTH] IN BLOOD BY AUTOMATED COUNT: 12.9 % (ref 11.6–15.1)
EST. AVERAGE GLUCOSE BLD GHB EST-MCNC: 120 MG/DL
GFR SERPL CREATININE-BSD FRML MDRD: 78 ML/MIN/1.73SQ M
GLUCOSE P FAST SERPL-MCNC: 113 MG/DL (ref 65–99)
HBA1C MFR BLD: 5.8 %
HCT VFR BLD AUTO: 43.6 % (ref 36.5–49.3)
HDLC SERPL-MCNC: 72 MG/DL
HGB BLD-MCNC: 14.4 G/DL (ref 12–17)
LDLC SERPL CALC-MCNC: 48 MG/DL (ref 0–100)
MCH RBC QN AUTO: 33.8 PG (ref 26.8–34.3)
MCHC RBC AUTO-ENTMCNC: 33 G/DL (ref 31.4–37.4)
MCV RBC AUTO: 102 FL (ref 82–98)
PLATELET # BLD AUTO: 159 THOUSANDS/UL (ref 149–390)
PMV BLD AUTO: 10.1 FL (ref 8.9–12.7)
POTASSIUM SERPL-SCNC: 4.9 MMOL/L (ref 3.5–5.3)
PROT SERPL-MCNC: 7.2 G/DL (ref 6.4–8.2)
RBC # BLD AUTO: 4.26 MILLION/UL (ref 3.88–5.62)
SODIUM SERPL-SCNC: 135 MMOL/L (ref 136–145)
TRIGL SERPL-MCNC: 38 MG/DL
WBC # BLD AUTO: 5.57 THOUSAND/UL (ref 4.31–10.16)

## 2021-08-02 PROCEDURE — 85027 COMPLETE CBC AUTOMATED: CPT

## 2021-08-02 PROCEDURE — 80061 LIPID PANEL: CPT

## 2021-08-02 PROCEDURE — 36415 COLL VENOUS BLD VENIPUNCTURE: CPT

## 2021-08-02 PROCEDURE — 80053 COMPREHEN METABOLIC PANEL: CPT

## 2021-08-02 PROCEDURE — 83036 HEMOGLOBIN GLYCOSYLATED A1C: CPT

## 2021-08-10 DIAGNOSIS — R06.02 SOB (SHORTNESS OF BREATH): ICD-10-CM

## 2021-08-10 RX ORDER — FUROSEMIDE 20 MG/1
TABLET ORAL
Qty: 120 TABLET | Refills: 3 | Status: SHIPPED | OUTPATIENT
Start: 2021-08-10 | End: 2021-08-10

## 2021-08-10 RX ORDER — FUROSEMIDE 20 MG/1
TABLET ORAL
Qty: 257 TABLET | Refills: 3 | Status: SHIPPED | OUTPATIENT
Start: 2021-08-10

## 2021-08-17 DIAGNOSIS — I10 HYPERTENSION, UNSPECIFIED TYPE: ICD-10-CM

## 2021-08-17 RX ORDER — LISINOPRIL 5 MG/1
TABLET ORAL
Qty: 90 TABLET | Refills: 3 | Status: SHIPPED | OUTPATIENT
Start: 2021-08-17 | End: 2021-11-10 | Stop reason: SDUPTHER

## 2021-09-07 NOTE — PROGRESS NOTES
Pulmonary Follow Up Note   Luz Maria Corona [de-identified] y o  male MRN: [id number]  9/8/2021      Assessment/Plan:    1  Shortness of breath  - suspect secondary to pulmonary hypertension and untreated sleep apnea  - sleep study revealing severe sleep apnea with nocturnal desaturation /hypoxia  - PFTs revealed mild air trapping and diminished diffusion capacity  - failed CPAP  - ordered nocturnal oxygen; if insurance denies coverage will need overnight pulse ox study and reorder of nocturnal oxygen  - provide information for inspire surgery for sleep apnea; patient will need to be at a BMI of  Less than 32 (advised patient to lose approximately 30-35 lb)    2  Moderate pulmonary hypertension  - estimated peak PA pressure 50 mmHg on echocardiogram 9/4/20  - suspect secondary to sleep apnea  - plan as above and below  - consider V/Q scan  And /or right heart cardiac catheterization and/or repeat echocardiogram if shortness of breath continues to persist despite treatment of sleep apnea    3  Obstructive sleep apnea /nocturnal hypoxia  - AHI 40 2 with desaturation to 79% 56% of night  - plan as above    4  Obesity -  BMI 35 48  - advised patient to exercise minimum 150 minutes per week and watch caloric intake  - advised patient to set a goal to lose 30-35 lb to be eligible for inspire device    5  History of tobacco use  - 40 pack year smoking history and quit approximately 8 years ago  - no pulmonary nodules on last CTA  - does not need serial yearly CT lung cancer screening at this time as patient is age [de-identified]    No follow-ups on file  History of Present Illness   HPI:  Luz Maria Corona is a [de-identified] y o  male with obesity, history of nicotine dependence, essential hypertension, severe  Obstructive sleep apnea presenting for follow-up  Patient reports that he is noncompliant with CPAP  He could not tolerate the mask and the positive pressure  He tried it multiple times even during the day for adjustment    He is currently not interested in CPAP therapy anymore  He reports that his respiratory status is at baseline and somewhat improved  He has mild shortness of breath and dyspnea on exertion  He uses albuterol seldom which helps  Denies fever, chills, headaches, lightheadedness, dizziness, visual disturbances, sore throat, chest pain, palpitations, abdominal pain, nausea, vomiting, or trouble urinating/ defecating  Inhalers: Albuterol    Review of systems:  12 point review of systems was completed and was otherwise negative except as listed in HPI  Historical Information   Past Medical History:   Diagnosis Date    Abdominal aortic aneurysm ([facility] )     Aneurysm of abdominal aorta ([facility] ) 4/14/2014    Aneurysm of ascending aorta ([facility] ) 9/7/2012    Chronic pain     back since July    Chronic pain disorder     lumbar    Coronary artery disease     History of transfusion     Hyperlipidemia     Hypertension     Hypertension 7/24/2019    Positive colorectal cancer screening using Cologuard test 4/9/2018    Sleep apnea     Subconjunctival hemorrhage      Past Surgical History:   Procedure Laterality Date    ABDOMINAL AORTIC ANEURYSM REPAIR  08/09/2007    2 dock limbs with visc extens prosth    COLONOSCOPY      CORONARY ARTERY BYPASS GRAFT      GAB-LAD, sequential vein graft to OM1 and OM2, single VG-posterior descending   Onset: 2003     Family History   Problem Relation Age of Onset    Heart disease Mother     Stroke Father         stroke syndrome    Aneurysm Brother         abdominal    Aortic aneurysm Brother         ascending    Coronary artery disease Family     Hypertension Family     Other Son         gioblastoma multiforme         Meds/Allergies     Current Outpatient Medications:     albuterol (Ventolin HFA) 90 mcg/act inhaler, Inhale 2 puffs every 6 (six) hours as needed for wheezing, Disp: 18 g, Rfl: 5    aspirin (ECOTRIN LOW STRENGTH) 81 mg EC tablet, Take 1 tablet by mouth daily, Disp: , Rfl:    atorvastatin (LIPITOR) 40 mg tablet, Take 1 tablet (40 mg total) by mouth daily, Disp: 90 tablet, Rfl: 3    cholecalciferol (VITAMIN D3) 1,000 units tablet, Take 2,000 Units by mouth daily, Disp: , Rfl:     FIBER ADULT GUMMIES PO, Take 1 caplet by mouth daily , Disp: , Rfl:     furosemide (LASIX) 20 mg tablet, TAKE 2 TABLET(40 MG) BY MOUTH EVERY MORNING ON MONDAY, WEDNESDAY, FRIDAY  2 TABLET IN EVENING EVERY DAY, Disp: 257 tablet, Rfl: 3    gabapentin (NEURONTIN) 300 mg capsule, Take 2 capsules (600 mg total) by mouth daily at bedtime, Disp: 180 capsule, Rfl: 3    labetalol (NORMODYNE) 100 mg tablet, TAKE 1 TABLET BY MOUTH  TWICE DAILY, Disp: 180 tablet, Rfl: 3    lisinopril (ZESTRIL) 5 mg tablet, TAKE 1 TABLET BY MOUTH  DAILY, Disp: 90 tablet, Rfl: 3    multivitamin (THERAGRAN) TABS, Take 1 tablet by mouth daily, Disp: , Rfl:     senna (SENOKOT) 8 6 MG tablet, Take 1 tablet by mouth as needed  , Disp: , Rfl:     tamsulosin (FLOMAX) 0 4 mg, Take 1 capsule by mouth daily, Disp: , Rfl:     tiZANidine (ZANAFLEX) 2 mg tablet, Take 1 tablet (2 mg total) by mouth 2 (two) times a day as needed for muscle spasms, Disp: 60 tablet, Rfl: 1    Current Facility-Administered Medications:     albuterol (PROVENTIL HFA,VENTOLIN HFA) inhaler 2 puff, 2 puff, Inhalation, Q6H PRN, Loreta Borrego MD  Allergies   Allergen Reactions    Meloxicam Edema       Vitals: Blood pressure 118/76, pulse 65, temperature 98 9 °F (37 2 °C), temperature source Tympanic, resp  rate 18, height 5' 11" (1 803 m), weight 115 kg (254 lb 6 4 oz), SpO2 93 %  Body mass index is 35 48 kg/m²  Oxygen Therapy  SpO2: 93 %  Oxygen Therapy: None (Room air)      Physical Exam  Physical Exam  Vitals and nursing note reviewed  Constitutional:       General: He is not in acute distress  Appearance: He is obese  He is not ill-appearing, toxic-appearing or diaphoretic  HENT:      Head: Normocephalic and atraumatic        Right Ear: External ear normal  Left Ear: External ear normal       Nose: Nose normal    Eyes:      General: No scleral icterus  Right eye: No discharge  Left eye: No discharge  Extraocular Movements: Extraocular movements intact  Conjunctiva/sclera: Conjunctivae normal    Cardiovascular:      Rate and Rhythm: Normal rate and regular rhythm  Pulses: Normal pulses  Heart sounds: Normal heart sounds  No murmur heard  No friction rub  No gallop  Pulmonary:      Effort: Pulmonary effort is normal  No respiratory distress  Breath sounds: Normal breath sounds  No stridor  No wheezing, rhonchi or rales  Chest:      Chest wall: No tenderness  Abdominal:      General: Bowel sounds are normal  There is no distension  Palpations: Abdomen is soft  Tenderness: There is no abdominal tenderness  There is no guarding or rebound  Musculoskeletal:      Right lower leg: No edema  Left lower leg: No edema  Skin:     General: Skin is warm and dry  Neurological:      Mental Status: He is alert and oriented to person, place, and time  Labs: I have personally reviewed pertinent lab results  Lab Results   Component Value Date    WBC 5 57 08/02/2021    HGB 14 4 08/02/2021    HCT 43 6 08/02/2021     (H) 08/02/2021     08/02/2021     Lab Results   Component Value Date    GLUCOSE 98 10/15/2015    CALCIUM 9 3 08/02/2021     (L) 10/15/2015    K 4 9 08/02/2021    CO2 30 08/02/2021     08/02/2021    BUN 12 08/02/2021    CREATININE 0 93 08/02/2021     No results found for: IGE  Lab Results   Component Value Date    ALT 30 08/02/2021    AST 27 08/02/2021    ALKPHOS 73 08/02/2021    BILITOT 0 79 10/15/2015       Imaging and other studies: I have personally reviewed pertinent reports  and I have personally reviewed pertinent films in PACS    CT a chest on July 10, 2019- normal appearing lung parenchyma, no PE, no pneumothorax or pleural effusions    4 7 cm ascending thoracic aortic aneurysm noted     Pulmonary function testin2021   FEV1/FVC 71 %   FEV1 74% predicted   FVC 74% predicted   % predicted   % predicted   DLCO 54% predicted   normal spirometry with air trapping and moderate diffusion impairment      6 minutes walk test  2021  performed in conjunction with PFT  resting O2 sat 94% with heart rate of 70 and a Raul dyspnea score of 0/10  Patient ambulated 306 m without stopping and pulse ox at completion was 92% on room air with a heart rate of 92  Raul dyspnea Scale at completion was 3/10  Patient does not require supplemental oxygen based on 6 minutes walk test        E/KG, Pathology, and Other Studies: I have personally reviewed pertinent reports  and I have personally reviewed pertinent films in PACS    Echo-  2020: Left ventricular ejection fraction 60% with grade 1 diastolic dysfunction  PASP of 50 mmHg    Home sleep study 21:  The patient had increased number of sleep disorder breathing events with an average apnea-hypopnea index of 40 2 events per hour, these events were associated with significant oxygen desaturations with an oxygen bird 79% and 56% of total sleep time with an oxygen saturation below 90%  Thus, the patient meets the criteria for diagnosis of severe obstructive sleep apnea associated with possible nocturnal hypoxemia and/or alveolar hypoventilation      Lynsey Dong DO - PGY5  [facility]

## 2021-09-08 ENCOUNTER — OFFICE VISIT (OUTPATIENT)
Dept: PULMONOLOGY | Facility: CLINIC | Age: 81
End: 2021-09-08
Payer: MEDICARE

## 2021-09-08 VITALS
HEIGHT: 71 IN | DIASTOLIC BLOOD PRESSURE: 76 MMHG | HEART RATE: 65 BPM | RESPIRATION RATE: 18 BRPM | BODY MASS INDEX: 35.61 KG/M2 | TEMPERATURE: 98.9 F | SYSTOLIC BLOOD PRESSURE: 118 MMHG | OXYGEN SATURATION: 93 % | WEIGHT: 254.4 LBS

## 2021-09-08 DIAGNOSIS — G47.33 OBSTRUCTIVE SLEEP APNEA: ICD-10-CM

## 2021-09-08 DIAGNOSIS — E66.01 CLASS 2 SEVERE OBESITY DUE TO EXCESS CALORIES WITH SERIOUS COMORBIDITY AND BODY MASS INDEX (BMI) OF 35.0 TO 35.9 IN ADULT (HCC): ICD-10-CM

## 2021-09-08 DIAGNOSIS — Z87.891 HISTORY OF NICOTINE DEPENDENCE: ICD-10-CM

## 2021-09-08 DIAGNOSIS — R06.02 SOB (SHORTNESS OF BREATH): Primary | ICD-10-CM

## 2021-09-08 DIAGNOSIS — I27.20 MODERATE TO SEVERE PULMONARY HYPERTENSION (HCC): ICD-10-CM

## 2021-09-08 DIAGNOSIS — G47.34 NOCTURNAL HYPOXEMIA: ICD-10-CM

## 2021-09-08 PROCEDURE — 99214 OFFICE O/P EST MOD 30 MIN: CPT | Performed by: INTERNAL MEDICINE

## 2021-10-13 ENCOUNTER — TRANSCRIBE ORDERS (OUTPATIENT)
Dept: LAB | Facility: CLINIC | Age: 81
End: 2021-10-13

## 2021-10-13 ENCOUNTER — APPOINTMENT (OUTPATIENT)
Dept: LAB | Facility: CLINIC | Age: 81
End: 2021-10-13
Payer: MEDICARE

## 2021-10-13 DIAGNOSIS — N40.1 ENLARGED PROSTATE WITH URINARY OBSTRUCTION: ICD-10-CM

## 2021-10-13 DIAGNOSIS — R31.9 HEMATURIA SYNDROME: ICD-10-CM

## 2021-10-13 DIAGNOSIS — N13.8 ENLARGED PROSTATE WITH URINARY OBSTRUCTION: ICD-10-CM

## 2021-10-13 DIAGNOSIS — N13.8 ENLARGED PROSTATE WITH URINARY OBSTRUCTION: Primary | ICD-10-CM

## 2021-10-13 DIAGNOSIS — N40.1 ENLARGED PROSTATE WITH URINARY OBSTRUCTION: Primary | ICD-10-CM

## 2021-10-13 LAB
BUN SERPL-MCNC: 13 MG/DL (ref 5–25)
CREAT SERPL-MCNC: 1.08 MG/DL (ref 0.6–1.3)
GFR SERPL CREATININE-BSD FRML MDRD: 64 ML/MIN/1.73SQ M
PSA SERPL-MCNC: 2.9 NG/ML (ref 0–4)

## 2021-10-13 PROCEDURE — 84153 ASSAY OF PSA TOTAL: CPT

## 2021-10-13 PROCEDURE — 84520 ASSAY OF UREA NITROGEN: CPT

## 2021-10-13 PROCEDURE — 36415 COLL VENOUS BLD VENIPUNCTURE: CPT

## 2021-10-13 PROCEDURE — 82565 ASSAY OF CREATININE: CPT

## 2021-11-10 DIAGNOSIS — I10 HYPERTENSION, UNSPECIFIED TYPE: ICD-10-CM

## 2021-11-10 RX ORDER — LISINOPRIL 5 MG/1
5 TABLET ORAL DAILY
Qty: 90 TABLET | Refills: 3 | Status: SHIPPED | OUTPATIENT
Start: 2021-11-10 | End: 2021-11-15 | Stop reason: SDUPTHER

## 2021-11-15 DIAGNOSIS — I10 HYPERTENSION, UNSPECIFIED TYPE: ICD-10-CM

## 2021-11-16 RX ORDER — LISINOPRIL 5 MG/1
5 TABLET ORAL DAILY
Qty: 90 TABLET | Refills: 3 | Status: SHIPPED | OUTPATIENT
Start: 2021-11-16

## 2021-12-29 ENCOUNTER — OFFICE VISIT (OUTPATIENT)
Dept: PULMONOLOGY | Facility: CLINIC | Age: 81
End: 2021-12-29
Payer: MEDICARE

## 2021-12-29 VITALS
OXYGEN SATURATION: 94 % | TEMPERATURE: 97.8 F | BODY MASS INDEX: 35.98 KG/M2 | DIASTOLIC BLOOD PRESSURE: 74 MMHG | SYSTOLIC BLOOD PRESSURE: 124 MMHG | HEART RATE: 65 BPM | HEIGHT: 71 IN | WEIGHT: 257 LBS

## 2021-12-29 DIAGNOSIS — J44.9 MODERATE COPD (CHRONIC OBSTRUCTIVE PULMONARY DISEASE) (HCC): ICD-10-CM

## 2021-12-29 DIAGNOSIS — R91.8 MULTIPLE PULMONARY NODULES: ICD-10-CM

## 2021-12-29 DIAGNOSIS — R06.02 SOB (SHORTNESS OF BREATH): Primary | ICD-10-CM

## 2021-12-29 DIAGNOSIS — E66.01 CLASS 2 SEVERE OBESITY DUE TO EXCESS CALORIES WITH SERIOUS COMORBIDITY AND BODY MASS INDEX (BMI) OF 35.0 TO 35.9 IN ADULT (HCC): ICD-10-CM

## 2021-12-29 DIAGNOSIS — G47.33 SEVERE OBSTRUCTIVE SLEEP APNEA: ICD-10-CM

## 2021-12-29 DIAGNOSIS — I27.20 MODERATE TO SEVERE PULMONARY HYPERTENSION (HCC): ICD-10-CM

## 2021-12-29 PROCEDURE — 99214 OFFICE O/P EST MOD 30 MIN: CPT | Performed by: INTERNAL MEDICINE

## 2022-01-13 ENCOUNTER — RA CDI HCC (OUTPATIENT)
Dept: OTHER | Facility: HOSPITAL | Age: 82
End: 2022-01-13

## 2022-01-13 NOTE — PROGRESS NOTES
[address]  coding opportunities          Number of diagnosis code(s) already on the problem list added to FYI flag: 3     Chart Reviewed * (Number of) Inbasket suggestions sent to Provider: 2     Problem listed updated  Provider Accepted, (number of) suggestions accepted: 2         Number of suggestions used: 4      Number of suggestions NOT actually used: 1     Patients insurance company: Medicare     Visit status: Patient arrived for their scheduled appointment        [address]  coding opportunities          Number of diagnosis code(s) already on the problem list added to FYI flag: 3     Chart Reviewed * (Number of) Inbasket suggestions sent to Provider: 2     Problem listed updated   Provider Accepted, (number of) suggestions accepted: 2               Patients insurance company: Medicare           [address]  coding opportunities          Number of diagnosis code(s) already on the problem list added to FYI flag: 3   This is a reminder for the new year to please address all [address]  (risk adjustment) codes for 2022 as patient scores reset to zero SALVATORE      J44 9 COPD, mild ([address] )     I71 2 Ascending aortic aneurysm (HCC)     I27 20 Moderate to severe pulmonary hypertension ([address] )     Chart Reviewed * (Number of) Inbasket suggestions sent to Provider: 2     I11 0 I50 32 Hypertensive heart disease with heart failure   *HTN with CHF     E66 01 Z68 35 Class 2 severe obesity due to excess calories with serious comorbidity and body mass index (BMI) of 35 0 to 35 9 in [facility])   *noted 12/29/2021     If this is correct, please document and assess at your next visit,1/20/2022               Patients insurance company: [employer]

## 2022-01-14 PROBLEM — I50.32 CHRONIC DIASTOLIC CONGESTIVE HEART FAILURE (HCC): Status: ACTIVE | Noted: 2019-09-11

## 2022-01-14 PROBLEM — I11.0 HYPERTENSIVE HEART DISEASE WITH HEART FAILURE (HCC): Status: ACTIVE | Noted: 2022-01-14

## 2022-01-20 ENCOUNTER — TELEPHONE (OUTPATIENT)
Dept: ADMINISTRATIVE | Facility: OTHER | Age: 82
End: 2022-01-20

## 2022-01-20 ENCOUNTER — OFFICE VISIT (OUTPATIENT)
Dept: INTERNAL MEDICINE CLINIC | Facility: CLINIC | Age: 82
End: 2022-01-20
Payer: MEDICARE

## 2022-01-20 VITALS
DIASTOLIC BLOOD PRESSURE: 76 MMHG | BODY MASS INDEX: 36.2 KG/M2 | WEIGHT: 258.6 LBS | HEART RATE: 90 BPM | SYSTOLIC BLOOD PRESSURE: 128 MMHG | RESPIRATION RATE: 18 BRPM | HEIGHT: 71 IN | TEMPERATURE: 97.3 F | OXYGEN SATURATION: 96 %

## 2022-01-20 DIAGNOSIS — M51.16 INTERVERTEBRAL DISC DISORDER WITH RADICULOPATHY OF LUMBAR REGION: ICD-10-CM

## 2022-01-20 DIAGNOSIS — I71.2 ASCENDING AORTIC ANEURYSM (HCC): ICD-10-CM

## 2022-01-20 DIAGNOSIS — G47.33 SEVERE OBSTRUCTIVE SLEEP APNEA: ICD-10-CM

## 2022-01-20 DIAGNOSIS — I10 BENIGN ESSENTIAL HYPERTENSION: ICD-10-CM

## 2022-01-20 DIAGNOSIS — J44.9 MODERATE COPD (CHRONIC OBSTRUCTIVE PULMONARY DISEASE) (HCC): ICD-10-CM

## 2022-01-20 DIAGNOSIS — E66.01 CLASS 2 SEVERE OBESITY DUE TO EXCESS CALORIES WITH SERIOUS COMORBIDITY AND BODY MASS INDEX (BMI) OF 35.0 TO 35.9 IN ADULT (HCC): ICD-10-CM

## 2022-01-20 DIAGNOSIS — Z00.00 MEDICARE ANNUAL WELLNESS VISIT, SUBSEQUENT: Primary | ICD-10-CM

## 2022-01-20 DIAGNOSIS — E78.2 MIXED HYPERLIPIDEMIA: ICD-10-CM

## 2022-01-20 DIAGNOSIS — M54.16 LUMBAR RADICULOPATHY: ICD-10-CM

## 2022-01-20 DIAGNOSIS — R73.01 IMPAIRED FASTING GLUCOSE: ICD-10-CM

## 2022-01-20 DIAGNOSIS — I27.20 MODERATE TO SEVERE PULMONARY HYPERTENSION (HCC): ICD-10-CM

## 2022-01-20 PROCEDURE — 99214 OFFICE O/P EST MOD 30 MIN: CPT | Performed by: INTERNAL MEDICINE

## 2022-01-20 PROCEDURE — G0439 PPPS, SUBSEQ VISIT: HCPCS | Performed by: INTERNAL MEDICINE

## 2022-01-20 RX ORDER — GABAPENTIN 300 MG/1
CAPSULE ORAL
Qty: 270 CAPSULE | Refills: 3
Start: 2022-01-20 | End: 2022-02-15 | Stop reason: SDUPTHER

## 2022-01-20 NOTE — PATIENT INSTRUCTIONS

## 2022-01-20 NOTE — TELEPHONE ENCOUNTER
----- Message from Lendia Cheadle, MD sent at 1/20/2022 10:23 AM EST -----  01/20/22 10:24 AM    Hello, our patient Amrit Denney has had CRC: Colonoscopy completed/performed  Please assist in updating the patient chart by pulling the Care Everywhere (CE) document  The date of service is Sept 2021       Thank you,  Lendia Cheadle, MD  [facility]

## 2022-01-20 NOTE — PROGRESS NOTES
Assessment and Plan:     Problem List Items Addressed This Visit        Endocrine    Impaired fasting glucose    Relevant Orders    Comprehensive metabolic panel    HEMOGLOBIN A1C W/ EAG ESTIMATION    Comprehensive metabolic panel    HEMOGLOBIN A1C W/ EAG ESTIMATION       Respiratory    Severe obstructive sleep apnea     Unfortunately, he cannot tolerate the CPAP and is not interested in [employer]sp[employer]            Cardiovascular and Mediastinum    Moderate to severe pulmonary hypertension (HCC)    Benign essential hypertension     Controlled on labetalol lisinopril         Relevant Orders    CBC and differential    Comprehensive metabolic panel    CBC and differential    Comprehensive metabolic panel    Ascending aortic aneurysm (HCC)     4 7cm, stable and monitored by CT surgery            Nervous and Auditory    Intervertebral disc disorder with radiculopathy of lumbar region    Relevant Medications    gabapentin (NEURONTIN) 300 mg capsule    Lumbar radiculopathy     Increase gabapentin to 300mg in the morning, 600mg at night         Relevant Medications    gabapentin (NEURONTIN) 300 mg capsule       Other    Hyperlipidemia    Relevant Orders    Comprehensive metabolic panel    Lipid Panel with Direct LDL reflex    Comprehensive metabolic panel    Lipid Panel with Direct LDL reflex      Other Visit Diagnoses     Medicare annual wellness visit, subsequent    -  Primary    Moderate COPD (chronic obstructive pulmonary disease) (HCC)        Class 2 severe obesity due to excess calories with serious comorbidity and body mass index (BMI) of 35 0 to 35 9 in adult [facility])              Depression Screening and Follow-up Plan: Patient was screened for depression during today's encounter  They screened negative with a PHQ-2 score of 0  Preventive health issues were discussed with patient, and age appropriate screening tests were ordered as noted in patient's After Visit Summary    Personalized health advice and appropriate referrals for health education or preventive services given if needed, as noted in patient's After Visit Summary       History of Present Illness:     Patient presents for Medicare Annual Wellness visit    Patient Care Team:  Jacki Marrero MD as PCP - MD Pramod Hansen MD Refugia Scarpa, MD     Problem List:     Patient Active Problem List   Diagnosis    Benign essential hypertension    Hyperlipidemia    Impaired fasting glucose    Microscopic hematuria    Severe obesity (BMI 35 0-35 9 with comorbidity) [facility]    Severe obstructive sleep apnea    Subclinical hypothyroidism    3-vessel CAD    Abdominal aortic aneurysm, without rupture ([facility] )    Aortic valve disorder    Arteriosclerotic cardiovascular disease    Disc degeneration, lumbar    Herniated lumbar intervertebral disc    Lumbar radiculopathy    Intervertebral disc disorder with radiculopathy of lumbar region    Spondylolisthesis at L4-L5 level    Chronic pain syndrome    SOB (shortness of breath)    Multiple pulmonary nodules    Chronic diastolic congestive heart failure (HCC)    COPD, mild (HCC)    Moderate to severe pulmonary hypertension ([facility] )    Nocturnal hypoxemia    [facility]    Hypertensive heart disease with heart failure [facility])      Past Medical and Surgical History:     Past Medical History:   Diagnosis Date    [facility]     Aneurysm of abdominal aorta ([facility] ) 4/14/2014    Aneurysm of ascending aorta ([facility] ) 9/7/2012    Chronic pain     back since July    Chronic pain disorder     lumbar    Coronary artery disease     History of transfusion     Hyperlipidemia     Hypertension     Hypertension 7/24/2019    Positive colorectal cancer screening using Cologuard test 4/9/2018    Sleep apnea     Subconjunctival hemorrhage      Past Surgical History:   Procedure Laterality Date    ABDOMINAL AORTIC ANEURYSM REPAIR  08/09/2007    2 dock limbs with visc extens prosth    COLONOSCOPY      CORONARY ARTERY BYPASS GRAFT      GAB-LAD, sequential vein graft to OM1 and OM2, single VG-posterior descending   Onset:       Family History:     Family History   Problem Relation Age of Onset    Heart disease Mother     Stroke Father         stroke syndrome    Aneurysm Brother         abdominal    Aortic aneurysm Brother         ascending    Coronary artery disease Family     Hypertension Family     Other Son         gioblastoma multiforme      Social History:     Social History     Socioeconomic History    Marital status: /Civil Union     Spouse name: None    Number of children: None    Years of education: None    Highest education level: None   Occupational History    Occupation: retired   Tobacco Use    Smoking status: Former Smoker     Packs/day: 1 00     Years: 40 00     Pack years: 40 00     Types: Cigarettes     Start date: 37     Quit date: 2012     Years since quittin 4    Smokeless tobacco: Never Used   Vaping Use    Vaping Use: Never used   Substance and Sexual Activity    Alcohol use: Yes     Comment: rare    Drug use: No    Sexual activity: Never   Other Topics Concern    None   Social History Narrative    None     Social Determinants of Health     Financial Resource Strain: Not on file   Food Insecurity: Not on file   Transportation Needs: Not on file   Physical Activity: Not on file   Stress: Not on file   Social Connections: Not on file   Intimate Partner Violence: Not on file   Housing Stability: Not on file      Medications and Allergies:     Current Outpatient Medications   Medication Sig Dispense Refill    albuterol (Ventolin HFA) 90 mcg/act inhaler Inhale 2 puffs every 6 (six) hours as needed for wheezing 18 g 5    aspirin (ECOTRIN LOW STRENGTH) 81 mg EC tablet Take 1 tablet by mouth daily      atorvastatin (LIPITOR) 40 mg tablet Take 1 tablet (40 mg total) by mouth daily 90 tablet 3    cholecalciferol (VITAMIN D3) 1,000 units tablet Take 2,000 Units by mouth daily      FIBER ADULT GUMMIES PO Take 1 caplet by mouth daily       furosemide (LASIX) 20 mg tablet TAKE 2 TABLET(40 MG) BY MOUTH EVERY MORNING ON MONDAY, WEDNESDAY, FRIDAY  2 TABLET IN EVENING EVERY  tablet 3    gabapentin (NEURONTIN) 300 mg capsule 1 cap in the morning and 2 at night 270 capsule 3    labetalol (NORMODYNE) 100 mg tablet TAKE 1 TABLET BY MOUTH  TWICE DAILY 180 tablet 3    lisinopril (ZESTRIL) 5 mg tablet Take 1 tablet (5 mg total) by mouth daily 90 tablet 3    multivitamin (THERAGRAN) TABS Take 1 tablet by mouth daily      senna (SENOKOT) 8 6 MG tablet Take 1 tablet by mouth as needed        tamsulosin (FLOMAX) 0 4 mg Take 1 capsule by mouth daily       Current Facility-Administered Medications   Medication Dose Route Frequency Provider Last Rate Last Admin    albuterol (PROVENTIL HFA,VENTOLIN HFA) inhaler 2 puff  2 puff Inhalation Q6H PRN Juan Up MD         Allergies   Allergen Reactions    Meloxicam Edema      Immunizations:     Immunization History   Administered Date(s) Administered    COVID-19 MODERNA VACC 0 5 ML IM 01/26/2021, 03/05/2021, 11/28/2021    INFLUENZA 10/23/2007, 10/02/2013, 10/08/2016, 10/25/2017, 10/30/2020, 10/13/2021    Influenza Split High Dose Preservative Free IM 10/02/2013, 10/02/2013, 10/22/2014, 10/27/2015, 10/08/2016, 10/03/2019    Influenza, seasonal, injectable 10/20/2012, 10/30/2018    Pneumococcal Conjugate 13-Valent 04/24/2015    Pneumococcal Polysaccharide PPV23 09/07/2012    Zoster Vaccine Recombinant 10/03/2019, 12/11/2019      Health Maintenance:         Topic Date Due    Colorectal Cancer Screening  06/04/2021    Lung Cancer Screening  05/10/2022     There are no preventive care reminders to display for this patient     Medicare Health Risk Assessment:     /76   Pulse 90   Temp (!) 97 3 °F (36 3 °C)   Resp 18   Ht 5' 11" (1 803 m)   Wt 117 kg (258 lb 9 6 oz)   SpO2 96% BMI 36 07 kg/m²      Gwendolyn Iyer is here for his Subsequent Wellness visit  Health Risk Assessment:   Patient rates overall health as good  Patient feels that their physical health rating is same  Patient is satisfied with their life  Eyesight was rated as same  Hearing was rated as same  Patient feels that their emotional and mental health rating is same  Patients states they are sometimes angry  Patient states they are sometimes unusually tired/fatigued  Pain experienced in the last 7 days has been none  Patient states that he has experienced weight loss or gain in last 6 months  Depression Screening:   PHQ-2 Score: 0      Fall Risk Screening: In the past year, patient has experienced: no history of falling in past year      Home Safety:  Patient does not have trouble with stairs inside or outside of their home  Patient has working smoke alarms and has working carbon monoxide detector  Home safety hazards include: none  Nutrition:   Current diet is Regular  Medications:   Patient is currently taking over-the-counter supplements  OTC medications include: see medication list  Patient is able to manage medications  Activities of Daily Living (ADLs)/Instrumental Activities of Daily Living (IADLs):   Walk and transfer into and out of bed and chair?: Yes  Dress and groom yourself?: Yes    Bathe or shower yourself?: Yes    Feed yourself?  Yes  Do your laundry/housekeeping?: Yes  Manage your money, pay your bills and track your expenses?: Yes  Make your own meals?: Yes    Do your own shopping?: Yes    Durable Medical Equipment Suppliers  n/a    Previous Hospitalizations:   Any hospitalizations or ED visits within the last 12 months?: No      Advance Care Planning:   Living will: No    Durable POA for healthcare: No    Advanced directive: No      Cognitive Screening:   Provider or family/friend/caregiver concerned regarding cognition?: No    PREVENTIVE SCREENINGS      Cardiovascular Screening:    General: Screening Not Indicated and History Lipid Disorder      Diabetes Screening:     General: Screening Current      Colorectal Cancer Screening:     General: Screening Current      Prostate Cancer Screening:    General: Screening Not Indicated      Osteoporosis Screening:    General: Screening Not Indicated      Abdominal Aortic Aneurysm (AAA) Screening:    Risk factors include: tobacco use        General: Screening Not Indicated and History AAA      Lung Cancer Screening:     General: Screening Not Indicated      Hepatitis C Screening:    General: Screening Not Indicated    Screening, Brief Intervention, and Referral to Treatment (SBIRT)    Screening  Typical number of drinks in a day: 3  Typical number of drinks in a week: 21  Interpretation: Low risk drinking behavior      Single Item Drug Screening:  How often have you used an illegal drug (including marijuana) or a prescription medication for non-medical reasons in the past year? never    Single Item Drug Screen Score: 0  Interpretation: Negative screen for possible drug use disorder      Marnie Perkins MD

## 2022-01-20 NOTE — PROGRESS NOTES
Assessment/Plan:  Main goal is to lose weight  He declined weight mgt referral  BMI Counseling: Body mass index is 36 07 kg/m²  The BMI is above normal  Nutrition recommendations include reducing portion sizes and decreasing overall calorie intake     Reduce alcohol intake    Severe obstructive sleep apnea  Unfortunately, he cannot tolerate the CPAP and is not interested in [employer]    Benign essential hypertension  Controlled on labetalol lisinopril    Ascending aortic aneurysm (HCC)  4 7cm, stable and monitored by CT surgery    Lumbar radiculopathy  Increase gabapentin to 300mg in the morning, 600mg at night         Problem List Items Addressed This Visit        Endocrine    Impaired fasting glucose    Relevant Orders    Comprehensive metabolic panel    HEMOGLOBIN A1C W/ EAG ESTIMATION    Comprehensive metabolic panel    HEMOGLOBIN A1C W/ EAG ESTIMATION       Respiratory    Severe obstructive sleep apnea     Unfortunately, he cannot tolerate the CPAP and is not interested in [employer]            Cardiovascular and Mediastinum    Moderate to severe pulmonary hypertension (HCC)    Benign essential hypertension     Controlled on labetalol lisinopril         Relevant Orders    CBC and differential    Comprehensive metabolic panel    CBC and differential    Comprehensive metabolic panel    Ascending aortic aneurysm (HCC)     4 7cm, stable and monitored by CT surgery            Nervous and Auditory    Intervertebral disc disorder with radiculopathy of lumbar region    Relevant Medications    gabapentin (NEURONTIN) 300 mg capsule    Lumbar radiculopathy     Increase gabapentin to 300mg in the morning, 600mg at night         Relevant Medications    gabapentin (NEURONTIN) 300 mg capsule       Other    Hyperlipidemia    Relevant Orders    Comprehensive metabolic panel    Lipid Panel with Direct LDL reflex    Comprehensive metabolic panel    Lipid Panel with Direct LDL reflex      Other Visit Diagnoses     Medicare annual wellness visit, subsequent    -  Primary    Moderate COPD (chronic obstructive pulmonary disease) (HCC)        Class 2 severe obesity due to excess calories with serious comorbidity and body mass index (BMI) of 35 0 to 35 9 in adult [facility])                Subjective:      Patient ID: Kenzie Franco is a [de-identified] y o  male  HPI  Here for a follow up  Since his last visit, he has gained weight from a poor diet  CHF on furosemide but only takes 40mg a day, not the 40mg BID MWF and 40mg daily the rest of the week  YEVGENIY but cannot tolerate CPAP  SOB with chronic cough  Mild LE edema is stable  Chronic low back pain with some relief from gabapentin 600mg at [facility]  It helps his sleep better  Last KEARA was >1 year ago and did not help  He had a spinal cord stimulator trial which failed    The following portions of the patient's history were reviewed and updated as appropriate: allergies, current medications, past family history, past medical history, past social history, past surgical history and problem list     Review of Systems   Constitutional: Positive for unexpected weight change  Negative for chills and fever  Respiratory: Positive for cough and shortness of breath  Cardiovascular: Positive for leg swelling  Negative for chest pain and palpitations  Gastrointestinal: Negative for abdominal pain, constipation and diarrhea  Genitourinary: Negative for difficulty urinating  Musculoskeletal: Positive for back pain  Objective:      /76   Pulse 90   Temp (!) 97 3 °F (36 3 °C)   Resp 18   Ht 5' 11" (1 803 m)   Wt 117 kg (258 lb 9 6 oz)   SpO2 96%   BMI 36 07 kg/m²          Physical Exam  Constitutional:       General: He is not in acute distress  Appearance: He is well-developed  He is obese  He is not ill-appearing, toxic-appearing or diaphoretic  HENT:      Head: Normocephalic and atraumatic     Eyes:      Conjunctiva/sclera: Conjunctivae normal    Cardiovascular:      Rate and Rhythm: Normal rate and regular rhythm  Heart sounds: Normal heart sounds  No murmur heard  Pulmonary:      Effort: Pulmonary effort is normal  No respiratory distress  Breath sounds: Normal breath sounds  No wheezing or rales  Abdominal:      General: There is no distension  Palpations: Abdomen is soft  There is no mass  Tenderness: There is no abdominal tenderness  There is no guarding or rebound  Musculoskeletal:      Cervical back: Neck supple  Right lower leg: Edema (gr 1) present  Left lower leg: Edema (gr 1) present  Skin:     General: Skin is warm and dry  Neurological:      Mental Status: He is alert and oriented to person, place, and time  Psychiatric:         Mood and Affect: Mood normal          Behavior: Behavior normal          Thought Content:  Thought content normal          Judgment: Judgment normal

## 2022-01-21 ENCOUNTER — APPOINTMENT (OUTPATIENT)
Dept: LAB | Facility: CLINIC | Age: 82
End: 2022-01-21
Payer: MEDICARE

## 2022-01-21 LAB
ALBUMIN SERPL BCP-MCNC: 3.8 G/DL (ref 3.5–5)
ALP SERPL-CCNC: 68 U/L (ref 46–116)
ALT SERPL W P-5'-P-CCNC: 26 U/L (ref 12–78)
ANION GAP SERPL CALCULATED.3IONS-SCNC: 3 MMOL/L (ref 4–13)
AST SERPL W P-5'-P-CCNC: 20 U/L (ref 5–45)
BASOPHILS # BLD AUTO: 0.05 THOUSANDS/ΜL (ref 0–0.1)
BASOPHILS NFR BLD AUTO: 1 % (ref 0–1)
BILIRUB SERPL-MCNC: 1.25 MG/DL (ref 0.2–1)
BUN SERPL-MCNC: 11 MG/DL (ref 5–25)
CALCIUM SERPL-MCNC: 9.5 MG/DL (ref 8.3–10.1)
CHLORIDE SERPL-SCNC: 99 MMOL/L (ref 100–108)
CHOLEST SERPL-MCNC: 146 MG/DL
CO2 SERPL-SCNC: 32 MMOL/L (ref 21–32)
CREAT SERPL-MCNC: 1.06 MG/DL (ref 0.6–1.3)
EOSINOPHIL # BLD AUTO: 0.25 THOUSAND/ΜL (ref 0–0.61)
EOSINOPHIL NFR BLD AUTO: 4 % (ref 0–6)
ERYTHROCYTE [DISTWIDTH] IN BLOOD BY AUTOMATED COUNT: 12.7 % (ref 11.6–15.1)
EST. AVERAGE GLUCOSE BLD GHB EST-MCNC: 123 MG/DL
GFR SERPL CREATININE-BSD FRML MDRD: 65 ML/MIN/1.73SQ M
GLUCOSE P FAST SERPL-MCNC: 102 MG/DL (ref 65–99)
HBA1C MFR BLD: 5.9 %
HCT VFR BLD AUTO: 42.6 % (ref 36.5–49.3)
HDLC SERPL-MCNC: 58 MG/DL
HGB BLD-MCNC: 14.1 G/DL (ref 12–17)
IMM GRANULOCYTES # BLD AUTO: 0.01 THOUSAND/UL (ref 0–0.2)
IMM GRANULOCYTES NFR BLD AUTO: 0 % (ref 0–2)
LDLC SERPL CALC-MCNC: 80 MG/DL (ref 0–100)
LYMPHOCYTES # BLD AUTO: 1.15 THOUSANDS/ΜL (ref 0.6–4.47)
LYMPHOCYTES NFR BLD AUTO: 18 % (ref 14–44)
MCH RBC QN AUTO: 32.9 PG (ref 26.8–34.3)
MCHC RBC AUTO-ENTMCNC: 33.1 G/DL (ref 31.4–37.4)
MCV RBC AUTO: 100 FL (ref 82–98)
MONOCYTES # BLD AUTO: 0.62 THOUSAND/ΜL (ref 0.17–1.22)
MONOCYTES NFR BLD AUTO: 10 % (ref 4–12)
NEUTROPHILS # BLD AUTO: 4.17 THOUSANDS/ΜL (ref 1.85–7.62)
NEUTS SEG NFR BLD AUTO: 67 % (ref 43–75)
NRBC BLD AUTO-RTO: 0 /100 WBCS
PLATELET # BLD AUTO: 146 THOUSANDS/UL (ref 149–390)
PMV BLD AUTO: 9.9 FL (ref 8.9–12.7)
POTASSIUM SERPL-SCNC: 3.9 MMOL/L (ref 3.5–5.3)
PROT SERPL-MCNC: 7.7 G/DL (ref 6.4–8.2)
RBC # BLD AUTO: 4.28 MILLION/UL (ref 3.88–5.62)
SODIUM SERPL-SCNC: 134 MMOL/L (ref 136–145)
TRIGL SERPL-MCNC: 42 MG/DL
WBC # BLD AUTO: 6.25 THOUSAND/UL (ref 4.31–10.16)

## 2022-01-21 PROCEDURE — 36415 COLL VENOUS BLD VENIPUNCTURE: CPT | Performed by: INTERNAL MEDICINE

## 2022-01-21 PROCEDURE — 85025 COMPLETE CBC W/AUTO DIFF WBC: CPT | Performed by: INTERNAL MEDICINE

## 2022-01-21 PROCEDURE — 83036 HEMOGLOBIN GLYCOSYLATED A1C: CPT | Performed by: INTERNAL MEDICINE

## 2022-01-21 PROCEDURE — 80061 LIPID PANEL: CPT | Performed by: INTERNAL MEDICINE

## 2022-01-21 PROCEDURE — 80053 COMPREHEN METABOLIC PANEL: CPT | Performed by: INTERNAL MEDICINE

## 2022-01-24 NOTE — TELEPHONE ENCOUNTER
Upon review of the In Basket request we were able to locate, review, and update the patient chart as requested for CRC: Colonoscopy  Any additional questions or concerns should be emailed to the Practice Liaisons via [email or web address] email, please do not reply via In Basket      Thank you  Sohail Lopes MA

## 2022-02-15 DIAGNOSIS — M54.16 LUMBAR RADICULOPATHY: ICD-10-CM

## 2022-02-15 DIAGNOSIS — M51.16 INTERVERTEBRAL DISC DISORDER WITH RADICULOPATHY OF LUMBAR REGION: ICD-10-CM

## 2022-02-15 RX ORDER — GABAPENTIN 300 MG/1
CAPSULE ORAL
Qty: 270 CAPSULE | Refills: 0
Start: 2022-02-15 | End: 2022-02-24 | Stop reason: SDUPTHER

## 2022-02-24 DIAGNOSIS — M54.16 LUMBAR RADICULOPATHY: ICD-10-CM

## 2022-02-24 DIAGNOSIS — M51.16 INTERVERTEBRAL DISC DISORDER WITH RADICULOPATHY OF LUMBAR REGION: ICD-10-CM

## 2022-02-24 RX ORDER — GABAPENTIN 300 MG/1
CAPSULE ORAL
Qty: 270 CAPSULE | Refills: 1 | Status: SHIPPED | OUTPATIENT
Start: 2022-02-24 | End: 2022-06-29

## 2022-04-04 ENCOUNTER — TELEPHONE (OUTPATIENT)
Dept: PULMONOLOGY | Facility: CLINIC | Age: 82
End: 2022-04-04

## 2022-04-13 ENCOUNTER — OFFICE VISIT (OUTPATIENT)
Dept: CARDIOLOGY CLINIC | Facility: CLINIC | Age: 82
End: 2022-04-13
Payer: MEDICARE

## 2022-04-13 VITALS
HEIGHT: 71 IN | OXYGEN SATURATION: 95 % | SYSTOLIC BLOOD PRESSURE: 100 MMHG | WEIGHT: 255 LBS | HEART RATE: 82 BPM | BODY MASS INDEX: 35.7 KG/M2 | DIASTOLIC BLOOD PRESSURE: 64 MMHG

## 2022-04-13 DIAGNOSIS — I25.10 3-VESSEL CAD: Primary | ICD-10-CM

## 2022-04-13 DIAGNOSIS — I11.0 HYPERTENSIVE HEART DISEASE WITH HEART FAILURE (HCC): ICD-10-CM

## 2022-04-13 DIAGNOSIS — E78.2 MIXED HYPERLIPIDEMIA: ICD-10-CM

## 2022-04-13 DIAGNOSIS — J44.9 COPD, MILD (HCC): ICD-10-CM

## 2022-04-13 DIAGNOSIS — I25.10 ARTERIOSCLEROTIC CARDIOVASCULAR DISEASE: ICD-10-CM

## 2022-04-13 DIAGNOSIS — I50.32 CHRONIC DIASTOLIC CONGESTIVE HEART FAILURE (HCC): ICD-10-CM

## 2022-04-13 DIAGNOSIS — G47.33 SEVERE OBSTRUCTIVE SLEEP APNEA: ICD-10-CM

## 2022-04-13 DIAGNOSIS — I27.20 MODERATE TO SEVERE PULMONARY HYPERTENSION (HCC): ICD-10-CM

## 2022-04-13 DIAGNOSIS — I71.2 ASCENDING AORTIC ANEURYSM (HCC): ICD-10-CM

## 2022-04-13 DIAGNOSIS — R06.02 SOB (SHORTNESS OF BREATH): ICD-10-CM

## 2022-04-13 DIAGNOSIS — I10 BENIGN ESSENTIAL HYPERTENSION: ICD-10-CM

## 2022-04-13 PROCEDURE — 99214 OFFICE O/P EST MOD 30 MIN: CPT | Performed by: INTERNAL MEDICINE

## 2022-04-13 NOTE — PROGRESS NOTES
Cardiology Follow Up    Kvng Boyce  1940  [id number]  [facility] [address]  [address] [facility] [address] [phone]    1  3-vessel CAD  VAS carotid complete study   2  Arteriosclerotic cardiovascular disease  VAS carotid complete study   3  Ascending aortic aneurysm ([address] )     4  Benign essential hypertension     5  Chronic diastolic congestive heart failure ([address] )     6  Hypertensive heart disease with heart failure ([address] )     7  SOB (shortness of breath)     8  Mixed hyperlipidemia  VAS carotid complete study   9  COPD, mild ([address] )     10  Severe obstructive sleep apnea  Echo complete w/ contrast if indicated   11  Moderate to severe pulmonary hypertension (HCC)  Echo complete w/ contrast if indicated       Discussion/Summary:  Overall he has been doing well coronary disease stable no complaints of angina  Blood pressure is well controlled  Lipids overall have been doing well on his current dose of statin  He does have moderate to severe pulmonary hypertension and is not on treatment for sleep apnea  We did talk about the inspire device he will think about this  I have recommended an echocardiogram to follow-up on PA pressures  Volume status is euvolemic  He does take extra Lasix 3 times a week  He is due for carotid Dopplers to rule out stenosis  I will follow up with him in 6 months  Interval History:  41-year-old gentleman with multivessel coronary disease, history of CABG, hypertension, hyperlipidemia, ascending aortic aneurysm, abdominal aorta aneurysm status post repair, sleep apnea presents to establish care with me in the office  He has been seeing 1 of my partners for several years  Functionally he has been doing great  Denies any exertional chest pain, shortness of breath, palpitations, lightheadedness, dizziness, or syncope  He has put on some weight during the pandemic    He has been trying to make some dietary changes start some low-level exercise  He continues to remain stable since her last visit  Unfortunately he cannot tolerate CPAP and has not been on treatment for his severe sleep apnea  Denies any chest pain, shortness of breath, palpitations  He is mainly limited by arthritis and lower extremity joint issues  There has been no PND orthopnea  Lower extremity edema has been improving  Problem List     Positive colorectal cancer screening using Cologuard test    Benign essential hypertension    Hyperlipidemia    Impaired fasting glucose    Microscopic hematuria    Overview Signed 5/14/2018  9:17 AM by Ciara Ge MD     Annotation - [id number]: Dr Beba Brewer         Obesity    Obstructive sleep apnea    Overview Signed 5/14/2018  9:17 AM by Ciara Ge MD     Annotation - [id number]: Dr Lisa Ortiz           Subclinical hypothyroidism    Overview Signed 5/14/2018  9:17 AM by Ciara Ge MD     Transitioned From: Hypothyroidism         3-vessel CAD    Aneurysm of abdominal aorta (HCC)    Aneurysm of ascending aorta (HCC)    Aortic valve disorder    Arteriosclerotic cardiovascular disease    Disc degeneration, lumbar    Herniated lumbar intervertebral disc    Lumbar radiculopathy    Intervertebral disc disorder with radiculopathy of lumbar region    Spondylolisthesis at L4-L5 level    Chronic pain syndrome    SOB (shortness of breath)    Multiple pulmonary nodules    Heart failure, systolic (HCC)    Wt Readings from Last 3 Encounters:   04/13/22 116 kg (255 lb)   01/20/22 117 kg (258 lb 9 6 oz)   12/29/21 117 kg (257 lb)                 COPD, mild ([address] )    Overview Deleted 9/11/2019  3:22 PM by Huy Mitchell MD                Past Medical History:   Diagnosis Date    Abdominal aortic aneurysm ([address] )     Aneurysm of abdominal aorta ([address] ) 4/14/2014    Aneurysm of ascending aorta ([address] ) 9/7/2012    Chronic pain     back since July    Chronic pain disorder     lumbar    Coronary artery disease  History of transfusion     Hyperlipidemia     Hypertension     Hypertension 2019    Positive colorectal cancer screening using Cologuard test 2018    Sleep apnea     Subconjunctival hemorrhage      Social History     Socioeconomic History    Marital status: /Civil Union     Spouse name: Not on file    Number of children: Not on file    Years of education: Not on file    Highest education level: Not on file   Occupational History    Occupation: retired   Tobacco Use    Smoking status: Former Smoker     Packs/day: 1 00     Years: 40 00     Pack years: 40 00     Types: Cigarettes     Start date:      Quit date: 2012     Years since quittin 7    Smokeless tobacco: Never Used   Vaping Use    Vaping Use: Never used   Substance and Sexual Activity    Alcohol use: Yes     Comment: rare    Drug use: No    Sexual activity: Never   Other Topics Concern    Not on file   Social History Narrative    Not on file     Social Determinants of Health     Financial Resource Strain: Not on file   Food Insecurity: Not on file   Transportation Needs: Not on file   Physical Activity: Not on file   Stress: Not on file   Social Connections: Not on file   Intimate Partner Violence: Not on file   Housing Stability: Not on file      Family History   Problem Relation Age of Onset    Heart disease Mother     Stroke Father         stroke syndrome    Aneurysm Brother         abdominal    Aortic aneurysm Brother         ascending    Coronary artery disease Family     Hypertension Family     Other Son         gioblastoma multiforme     Past Surgical History:   Procedure Laterality Date    ABDOMINAL AORTIC ANEURYSM REPAIR  2007    2 dock limbs with visc extens prosth    COLONOSCOPY      CORONARY ARTERY BYPASS GRAFT      GAB-LAD, sequential vein graft to OM1 and OM2, single VG-posterior descending   Onset:        Current Outpatient Medications:     albuterol (Ventolin HFA) 90 mcg/act inhaler, Inhale 2 puffs every 6 (six) hours as needed for wheezing, Disp: 18 g, Rfl: 5    aspirin (ECOTRIN LOW STRENGTH) 81 mg EC tablet, Take 1 tablet by mouth daily, Disp: , Rfl:     atorvastatin (LIPITOR) 40 mg tablet, Take 1 tablet (40 mg total) by mouth daily, Disp: 90 tablet, Rfl: 3    cholecalciferol (VITAMIN D3) 1,000 units tablet, Take 2,000 Units by mouth daily, Disp: , Rfl:     FIBER ADULT GUMMIES PO, Take 1 caplet by mouth daily , Disp: , Rfl:     furosemide (LASIX) 20 mg tablet, TAKE 2 TABLET(40 MG) BY MOUTH EVERY MORNING ON MONDAY, WEDNESDAY, FRIDAY   2 TABLET IN EVENING EVERY DAY, Disp: 257 tablet, Rfl: 3    gabapentin (NEURONTIN) 300 mg capsule, 1 cap in the morning and 2 at night, Disp: 270 capsule, Rfl: 1    labetalol (NORMODYNE) 100 mg tablet, TAKE 1 TABLET BY MOUTH  TWICE DAILY, Disp: 180 tablet, Rfl: 3    lisinopril (ZESTRIL) 5 mg tablet, Take 1 tablet (5 mg total) by mouth daily, Disp: 90 tablet, Rfl: 3    multivitamin (THERAGRAN) TABS, Take 1 tablet by mouth daily, Disp: , Rfl:     senna (SENOKOT) 8 6 MG tablet, Take 1 tablet by mouth as needed  , Disp: , Rfl:     tamsulosin (FLOMAX) 0 4 mg, Take 1 capsule by mouth daily, Disp: , Rfl:     Current Facility-Administered Medications:     albuterol (PROVENTIL HFA,VENTOLIN HFA) inhaler 2 puff, 2 puff, Inhalation, Q6H PRN, Leon Fan MD  Allergies   Allergen Reactions    Meloxicam Edema       Labs:     Chemistry        Component Value Date/Time     (L) 10/15/2015 1042    K 3 9 01/21/2022 0924    K 4 7 10/15/2015 1042    CL 99 (L) 01/21/2022 0924     10/15/2015 1042    CO2 32 01/21/2022 0924    CO2 30 10/15/2015 1042    BUN 11 01/21/2022 0924    BUN 15 10/15/2015 1042    CREATININE 1 06 01/21/2022 0924    CREATININE 1 03 10/15/2015 1042        Component Value Date/Time    CALCIUM 9 5 01/21/2022 0924    CALCIUM 9 1 10/15/2015 1042    ALKPHOS 68 01/21/2022 0924    ALKPHOS 71 10/15/2015 1042    AST 20 01/21/2022 0924    AST 14 10/15/2015 1042    ALT 26 01/21/2022 0924    ALT 23 10/15/2015 1042    BILITOT 0 79 10/15/2015 1042            Lab Results   Component Value Date    CHOL 124 10/15/2015    CHOL 159 04/01/2015    CHOL 155 09/30/2013     Lab Results   Component Value Date    HDL 58 01/21/2022    HDL 72 08/02/2021    HDL 67 04/09/2021     Lab Results   Component Value Date    LDLCALC 80 01/21/2022    LDLCALC 48 08/02/2021    LDLCALC 47 04/09/2021     Lab Results   Component Value Date    TRIG 42 01/21/2022    TRIG 38 08/02/2021    TRIG 32 04/09/2021     No results found for: CHOLHDL    Imaging: No results found  ECG:  Sinus rhythm with PACs      Review of Systems   Constitutional: Negative  HENT: Negative  Eyes: Negative  Cardiovascular: Negative  Respiratory: Negative  Endocrine: Negative  Hematologic/Lymphatic: Negative  Skin: Negative  Musculoskeletal: Negative  Gastrointestinal: Negative  Genitourinary: Negative  Neurological: Negative  Psychiatric/Behavioral: Negative  All other systems reviewed and are negative  Vitals:    04/13/22 0949   BP: 100/64   Pulse: 82   SpO2: 95%     Vitals:    04/13/22 0949   Weight: 116 kg (255 lb)     Height: 5' 11" (180 3 cm)   Body mass index is 35 57 kg/m²  Physical Exam:  Vital signs reviewed  General:  Alert and cooperative, appears stated age, no acute distress  HEENT:  PERRLA, EOMI, no scleral icterus, no conjunctival pallor  Neck:  No lymphadenopathy, no thyromegaly, no carotid bruits, no elevated JVP  Heart:  Regular rate and rhythm, normal S1/S2, no S3/S4, no murmur, rubs or gallops  PMI nondisplaced  Lungs:  Clear to auscultation bilaterally, no wheezes rales or rhonchi  Abdomen:  Soft, non-tender, positive bowel sounds, no rebound or guarding,   no organomegaly   Extremities:  Normal range of motion    No clubbing, cyanosis or edema   Vascular:  2+ pedal pulses  Skin:  No rashes or lesions on exposed skin  Neurologic:  Cranial nerves II-XII grossly intact without focal deficits  Psych:  Normal mood and affect

## 2022-04-18 DIAGNOSIS — E78.2 MIXED HYPERLIPIDEMIA: ICD-10-CM

## 2022-04-18 RX ORDER — ATORVASTATIN CALCIUM 40 MG/1
TABLET, FILM COATED ORAL
Qty: 90 TABLET | Refills: 3 | Status: SHIPPED | OUTPATIENT
Start: 2022-04-18

## 2022-05-13 ENCOUNTER — HOSPITAL ENCOUNTER (OUTPATIENT)
Dept: NON INVASIVE DIAGNOSTICS | Facility: CLINIC | Age: 82
Discharge: HOME | End: 2022-05-13
Payer: MEDICARE

## 2022-05-13 VITALS
BODY MASS INDEX: 35.7 KG/M2 | HEIGHT: 71 IN | HEART RATE: 70 BPM | WEIGHT: 255 LBS | SYSTOLIC BLOOD PRESSURE: 100 MMHG | DIASTOLIC BLOOD PRESSURE: 64 MMHG

## 2022-05-13 DIAGNOSIS — G47.33 SEVERE OBSTRUCTIVE SLEEP APNEA: ICD-10-CM

## 2022-05-13 DIAGNOSIS — I27.20 MODERATE TO SEVERE PULMONARY HYPERTENSION (HCC): ICD-10-CM

## 2022-05-13 DIAGNOSIS — I25.10 3-VESSEL CAD: ICD-10-CM

## 2022-05-13 DIAGNOSIS — I25.10 ARTERIOSCLEROTIC CARDIOVASCULAR DISEASE: ICD-10-CM

## 2022-05-13 DIAGNOSIS — E78.2 MIXED HYPERLIPIDEMIA: ICD-10-CM

## 2022-05-13 LAB
AORTIC ROOT: 4 CM
APICAL FOUR CHAMBER EJECTION FRACTION: 54 %
ASCENDING AORTA: 4.5 CM
FRACTIONAL SHORTENING: 33 % (ref 28–44)
INTERVENTRICULAR SEPTUM IN DIASTOLE (PARASTERNAL SHORT AXIS VIEW): 1.1 CM
INTERVENTRICULAR SEPTUM: 1.1 CM (ref 0.6–1.1)
LAAS-AP2: 27.7 CM2
LAAS-AP4: 20.2 CM2
LEFT ATRIUM AREA SYSTOLE SINGLE PLANE A4C: 19.8 CM2
LEFT ATRIUM SIZE: 5.3 CM
LEFT INTERNAL DIMENSION IN SYSTOLE: 3.8 CM (ref 2.1–4)
LEFT VENTRICLE DIASTOLIC VOLUME (MOD BIPLANE): 125 ML
LEFT VENTRICLE SYSTOLIC VOLUME (MOD BIPLANE): 53 ML
LEFT VENTRICULAR INTERNAL DIMENSION IN DIASTOLE: 5.7 CM (ref 3.5–6)
LEFT VENTRICULAR POSTERIOR WALL IN END DIASTOLE: 1.1 CM
LEFT VENTRICULAR STROKE VOLUME: 97 ML
LV EF: 58 %
LVSV (TEICH): 97 ML
RA PRESSURE ESTIMATED: 15 MMHG
RIGHT ATRIUM AREA SYSTOLE A4C: 17.8 CM2
RIGHT VENTRICLE ID DIMENSION: 3.9 CM
RV PSP: 52 MMHG
SL CV LEFT ATRIUM LENGTH A2C: 5.9 CM
SL CV LV EF: 55
SL CV PED ECHO LEFT VENTRICLE DIASTOLIC VOLUME (MOD BIPLANE) 2D: 159 ML
SL CV PED ECHO LEFT VENTRICLE SYSTOLIC VOLUME (MOD BIPLANE) 2D: 62 ML
TR MAX PG: 37 MMHG
TR PEAK VELOCITY: 3.1 M/S
TRICUSPID VALVE PEAK REGURGITATION VELOCITY: 3.06 M/S

## 2022-05-13 PROCEDURE — 93880 EXTRACRANIAL BILAT STUDY: CPT | Performed by: SURGERY

## 2022-05-13 PROCEDURE — 93880 EXTRACRANIAL BILAT STUDY: CPT

## 2022-05-13 PROCEDURE — 93306 TTE W/DOPPLER COMPLETE: CPT | Performed by: INTERNAL MEDICINE

## 2022-05-13 PROCEDURE — 93306 TTE W/DOPPLER COMPLETE: CPT

## 2022-06-29 DIAGNOSIS — M51.16 INTERVERTEBRAL DISC DISORDER WITH RADICULOPATHY OF LUMBAR REGION: ICD-10-CM

## 2022-06-29 DIAGNOSIS — M54.16 LUMBAR RADICULOPATHY: ICD-10-CM

## 2022-06-29 DIAGNOSIS — I10 ESSENTIAL HYPERTENSION: ICD-10-CM

## 2022-06-29 RX ORDER — GABAPENTIN 300 MG/1
CAPSULE ORAL
Qty: 270 CAPSULE | Refills: 3 | Status: SHIPPED | OUTPATIENT
Start: 2022-06-29

## 2022-06-29 RX ORDER — LABETALOL 100 MG/1
TABLET, FILM COATED ORAL
Qty: 180 TABLET | Refills: 3 | Status: SHIPPED | OUTPATIENT
Start: 2022-06-29

## 2022-07-22 ENCOUNTER — APPOINTMENT (OUTPATIENT)
Dept: LAB | Facility: CLINIC | Age: 82
End: 2022-07-22
Payer: MEDICARE

## 2022-07-22 DIAGNOSIS — I10 BENIGN ESSENTIAL HYPERTENSION: ICD-10-CM

## 2022-07-22 DIAGNOSIS — R73.01 IMPAIRED FASTING GLUCOSE: ICD-10-CM

## 2022-07-22 DIAGNOSIS — E78.2 MIXED HYPERLIPIDEMIA: ICD-10-CM

## 2022-07-22 LAB
ALBUMIN SERPL BCP-MCNC: 3.5 G/DL (ref 3.5–5)
ALP SERPL-CCNC: 65 U/L (ref 46–116)
ALT SERPL W P-5'-P-CCNC: 37 U/L (ref 12–78)
ANION GAP SERPL CALCULATED.3IONS-SCNC: 4 MMOL/L (ref 4–13)
AST SERPL W P-5'-P-CCNC: 30 U/L (ref 5–45)
BASOPHILS # BLD AUTO: 0.05 THOUSANDS/ΜL (ref 0–0.1)
BASOPHILS NFR BLD AUTO: 1 % (ref 0–1)
BILIRUB SERPL-MCNC: 1.45 MG/DL (ref 0.2–1)
BUN SERPL-MCNC: 14 MG/DL (ref 5–25)
CALCIUM SERPL-MCNC: 9.3 MG/DL (ref 8.3–10.1)
CHLORIDE SERPL-SCNC: 103 MMOL/L (ref 96–108)
CHOLEST SERPL-MCNC: 114 MG/DL
CO2 SERPL-SCNC: 30 MMOL/L (ref 21–32)
CREAT SERPL-MCNC: 1.12 MG/DL (ref 0.6–1.3)
EOSINOPHIL # BLD AUTO: 0.18 THOUSAND/ΜL (ref 0–0.61)
EOSINOPHIL NFR BLD AUTO: 3 % (ref 0–6)
ERYTHROCYTE [DISTWIDTH] IN BLOOD BY AUTOMATED COUNT: 13.2 % (ref 11.6–15.1)
EST. AVERAGE GLUCOSE BLD GHB EST-MCNC: 126 MG/DL
GFR SERPL CREATININE-BSD FRML MDRD: 61 ML/MIN/1.73SQ M
GLUCOSE P FAST SERPL-MCNC: 105 MG/DL (ref 65–99)
HBA1C MFR BLD: 6 %
HCT VFR BLD AUTO: 45.3 % (ref 36.5–49.3)
HDLC SERPL-MCNC: 53 MG/DL
HGB BLD-MCNC: 14.8 G/DL (ref 12–17)
IMM GRANULOCYTES # BLD AUTO: 0.02 THOUSAND/UL (ref 0–0.2)
IMM GRANULOCYTES NFR BLD AUTO: 0 % (ref 0–2)
LDLC SERPL CALC-MCNC: 51 MG/DL (ref 0–100)
LYMPHOCYTES # BLD AUTO: 1.24 THOUSANDS/ΜL (ref 0.6–4.47)
LYMPHOCYTES NFR BLD AUTO: 22 % (ref 14–44)
MCH RBC QN AUTO: 33.7 PG (ref 26.8–34.3)
MCHC RBC AUTO-ENTMCNC: 32.7 G/DL (ref 31.4–37.4)
MCV RBC AUTO: 103 FL (ref 82–98)
MONOCYTES # BLD AUTO: 0.78 THOUSAND/ΜL (ref 0.17–1.22)
MONOCYTES NFR BLD AUTO: 14 % (ref 4–12)
NEUTROPHILS # BLD AUTO: 3.35 THOUSANDS/ΜL (ref 1.85–7.62)
NEUTS SEG NFR BLD AUTO: 60 % (ref 43–75)
NRBC BLD AUTO-RTO: 0 /100 WBCS
PLATELET # BLD AUTO: 126 THOUSANDS/UL (ref 149–390)
PMV BLD AUTO: 10 FL (ref 8.9–12.7)
POTASSIUM SERPL-SCNC: 5.1 MMOL/L (ref 3.5–5.3)
PROT SERPL-MCNC: 7.2 G/DL (ref 6.4–8.4)
RBC # BLD AUTO: 4.39 MILLION/UL (ref 3.88–5.62)
SODIUM SERPL-SCNC: 137 MMOL/L (ref 135–147)
TRIGL SERPL-MCNC: 49 MG/DL
WBC # BLD AUTO: 5.62 THOUSAND/UL (ref 4.31–10.16)

## 2022-07-22 PROCEDURE — 85025 COMPLETE CBC W/AUTO DIFF WBC: CPT

## 2022-07-22 PROCEDURE — 83036 HEMOGLOBIN GLYCOSYLATED A1C: CPT

## 2022-07-22 PROCEDURE — 80053 COMPREHEN METABOLIC PANEL: CPT

## 2022-07-22 PROCEDURE — 80061 LIPID PANEL: CPT

## 2022-07-22 PROCEDURE — 36415 COLL VENOUS BLD VENIPUNCTURE: CPT

## 2022-07-27 ENCOUNTER — APPOINTMENT (OUTPATIENT)
Dept: RADIOLOGY | Facility: HOSPITAL | Age: 82
End: 2022-07-27
Payer: MEDICARE

## 2022-07-27 ENCOUNTER — APPOINTMENT (EMERGENCY)
Dept: RADIOLOGY | Facility: HOSPITAL | Age: 82
End: 2022-07-27
Payer: MEDICARE

## 2022-07-27 ENCOUNTER — HOSPITAL ENCOUNTER (OUTPATIENT)
Facility: HOSPITAL | Age: 82
Setting detail: OBSERVATION
Discharge: HOME | End: 2022-07-28
Attending: EMERGENCY MEDICINE | Admitting: INTERNAL MEDICINE
Payer: MEDICARE

## 2022-07-27 ENCOUNTER — OFFICE VISIT (OUTPATIENT)
Dept: INTERNAL MEDICINE CLINIC | Facility: CLINIC | Age: 82
End: 2022-07-27
Payer: MEDICARE

## 2022-07-27 VITALS
TEMPERATURE: 97.6 F | SYSTOLIC BLOOD PRESSURE: 106 MMHG | DIASTOLIC BLOOD PRESSURE: 68 MMHG | RESPIRATION RATE: 18 BRPM | BODY MASS INDEX: 34.3 KG/M2 | HEIGHT: 71 IN | HEART RATE: 81 BPM | WEIGHT: 245 LBS | OXYGEN SATURATION: 96 %

## 2022-07-27 DIAGNOSIS — M51.16 INTERVERTEBRAL DISC DISORDER WITH RADICULOPATHY OF LUMBAR REGION: ICD-10-CM

## 2022-07-27 DIAGNOSIS — M79.18 MYOFASCIAL PAIN SYNDROME: ICD-10-CM

## 2022-07-27 DIAGNOSIS — I48.91 NEW ONSET ATRIAL FIBRILLATION (HCC): Primary | ICD-10-CM

## 2022-07-27 DIAGNOSIS — E78.2 MIXED HYPERLIPIDEMIA: ICD-10-CM

## 2022-07-27 DIAGNOSIS — E66.01 SEVERE OBESITY (BMI 35.0-35.9 WITH COMORBIDITY) (HCC): ICD-10-CM

## 2022-07-27 DIAGNOSIS — D69.6 PLATELETS DECREASED (HCC): ICD-10-CM

## 2022-07-27 DIAGNOSIS — R07.9 CHEST PAIN: ICD-10-CM

## 2022-07-27 DIAGNOSIS — R06.02 SOB (SHORTNESS OF BREATH): ICD-10-CM

## 2022-07-27 DIAGNOSIS — J44.9 COPD, MILD (HCC): ICD-10-CM

## 2022-07-27 DIAGNOSIS — G47.33 SEVERE OBSTRUCTIVE SLEEP APNEA: ICD-10-CM

## 2022-07-27 DIAGNOSIS — I48.91 ATRIAL FIBRILLATION, UNSPECIFIED TYPE (HCC): ICD-10-CM

## 2022-07-27 DIAGNOSIS — I11.0 HYPERTENSIVE HEART DISEASE WITH HEART FAILURE (HCC): ICD-10-CM

## 2022-07-27 DIAGNOSIS — M43.16 SPONDYLOLISTHESIS AT L4-L5 LEVEL: ICD-10-CM

## 2022-07-27 DIAGNOSIS — R73.01 IMPAIRED FASTING GLUCOSE: ICD-10-CM

## 2022-07-27 DIAGNOSIS — I50.32 CHRONIC DIASTOLIC CONGESTIVE HEART FAILURE (HCC): Primary | ICD-10-CM

## 2022-07-27 DIAGNOSIS — I10 BENIGN ESSENTIAL HYPERTENSION: ICD-10-CM

## 2022-07-27 LAB
2HR DELTA HS TROPONIN: -4 NG/L
ALBUMIN SERPL BCP-MCNC: 3.6 G/DL (ref 3.5–5)
ALP SERPL-CCNC: 59 U/L (ref 46–116)
ALT SERPL W P-5'-P-CCNC: 33 U/L (ref 12–78)
ANION GAP SERPL CALCULATED.3IONS-SCNC: 5 MMOL/L (ref 4–13)
AST SERPL W P-5'-P-CCNC: 30 U/L (ref 5–45)
BASOPHILS # BLD AUTO: 0.05 THOUSANDS/ΜL (ref 0–0.1)
BASOPHILS NFR BLD AUTO: 1 % (ref 0–1)
BILIRUB SERPL-MCNC: 1.17 MG/DL (ref 0.2–1)
BUN SERPL-MCNC: 13 MG/DL (ref 5–25)
CALCIUM SERPL-MCNC: 9.1 MG/DL (ref 8.3–10.1)
CARDIAC TROPONIN I PNL SERPL HS: 19 NG/L
CARDIAC TROPONIN I PNL SERPL HS: 23 NG/L
CHLORIDE SERPL-SCNC: 103 MMOL/L (ref 96–108)
CO2 SERPL-SCNC: 27 MMOL/L (ref 21–32)
CREAT SERPL-MCNC: 1.06 MG/DL (ref 0.6–1.3)
D DIMER PPP FEU-MCNC: 2.5 UG/ML FEU
EOSINOPHIL # BLD AUTO: 0.17 THOUSAND/ΜL (ref 0–0.61)
EOSINOPHIL NFR BLD AUTO: 3 % (ref 0–6)
ERYTHROCYTE [DISTWIDTH] IN BLOOD BY AUTOMATED COUNT: 13.2 % (ref 11.6–15.1)
GFR SERPL CREATININE-BSD FRML MDRD: 65 ML/MIN/1.73SQ M
GLUCOSE SERPL-MCNC: 84 MG/DL (ref 65–140)
HCT VFR BLD AUTO: 42.5 % (ref 36.5–49.3)
HGB BLD-MCNC: 13.9 G/DL (ref 12–17)
IMM GRANULOCYTES # BLD AUTO: 0.03 THOUSAND/UL (ref 0–0.2)
IMM GRANULOCYTES NFR BLD AUTO: 1 % (ref 0–2)
LYMPHOCYTES # BLD AUTO: 1.16 THOUSANDS/ΜL (ref 0.6–4.47)
LYMPHOCYTES NFR BLD AUTO: 18 % (ref 14–44)
MAGNESIUM SERPL-MCNC: 2 MG/DL (ref 1.6–2.6)
MCH RBC QN AUTO: 33.3 PG (ref 26.8–34.3)
MCHC RBC AUTO-ENTMCNC: 32.7 G/DL (ref 31.4–37.4)
MCV RBC AUTO: 102 FL (ref 82–98)
MONOCYTES # BLD AUTO: 0.86 THOUSAND/ΜL (ref 0.17–1.22)
MONOCYTES NFR BLD AUTO: 14 % (ref 4–12)
NEUTROPHILS # BLD AUTO: 4.1 THOUSANDS/ΜL (ref 1.85–7.62)
NEUTS SEG NFR BLD AUTO: 63 % (ref 43–75)
NRBC BLD AUTO-RTO: 0 /100 WBCS
PLATELET # BLD AUTO: 135 THOUSANDS/UL (ref 149–390)
PMV BLD AUTO: 9.6 FL (ref 8.9–12.7)
POTASSIUM SERPL-SCNC: 5.1 MMOL/L (ref 3.5–5.3)
PROT SERPL-MCNC: 7.2 G/DL (ref 6.4–8.4)
RBC # BLD AUTO: 4.18 MILLION/UL (ref 3.88–5.62)
SODIUM SERPL-SCNC: 135 MMOL/L (ref 135–147)
TSH SERPL DL<=0.05 MIU/L-ACNC: 3.85 UIU/ML (ref 0.45–4.5)
WBC # BLD AUTO: 6.37 THOUSAND/UL (ref 4.31–10.16)

## 2022-07-27 PROCEDURE — 71275 CT ANGIOGRAPHY CHEST: CPT

## 2022-07-27 PROCEDURE — 93005 ELECTROCARDIOGRAM TRACING: CPT

## 2022-07-27 PROCEDURE — 99214 OFFICE O/P EST MOD 30 MIN: CPT | Performed by: INTERNAL MEDICINE

## 2022-07-27 PROCEDURE — 80053 COMPREHEN METABOLIC PANEL: CPT | Performed by: EMERGENCY MEDICINE

## 2022-07-27 PROCEDURE — 99285 EMERGENCY DEPT VISIT HI MDM: CPT | Performed by: EMERGENCY MEDICINE

## 2022-07-27 PROCEDURE — 99285 EMERGENCY DEPT VISIT HI MDM: CPT

## 2022-07-27 PROCEDURE — 99220 PR INITIAL OBSERVATION CARE/DAY 70 MINUTES: CPT | Performed by: INTERNAL MEDICINE

## 2022-07-27 PROCEDURE — 83735 ASSAY OF MAGNESIUM: CPT

## 2022-07-27 PROCEDURE — 84484 ASSAY OF TROPONIN QUANT: CPT | Performed by: EMERGENCY MEDICINE

## 2022-07-27 PROCEDURE — 71045 X-RAY EXAM CHEST 1 VIEW: CPT

## 2022-07-27 PROCEDURE — 74177 CT ABD & PELVIS W/CONTRAST: CPT

## 2022-07-27 PROCEDURE — 85025 COMPLETE CBC W/AUTO DIFF WBC: CPT | Performed by: EMERGENCY MEDICINE

## 2022-07-27 PROCEDURE — 85379 FIBRIN DEGRADATION QUANT: CPT | Performed by: INTERNAL MEDICINE

## 2022-07-27 PROCEDURE — 84443 ASSAY THYROID STIM HORMONE: CPT

## 2022-07-27 PROCEDURE — 36415 COLL VENOUS BLD VENIPUNCTURE: CPT

## 2022-07-27 RX ORDER — ALBUTEROL SULFATE 90 UG/1
2 AEROSOL, METERED RESPIRATORY (INHALATION) EVERY 6 HOURS PRN
Qty: 18 G | Refills: 5 | Status: SHIPPED | OUTPATIENT
Start: 2022-07-27 | End: 2022-07-28 | Stop reason: SDUPTHER

## 2022-07-27 RX ORDER — HEPARIN SODIUM 5000 [USP'U]/ML
5000 INJECTION, SOLUTION INTRAVENOUS; SUBCUTANEOUS EVERY 8 HOURS SCHEDULED
Status: DISCONTINUED | OUTPATIENT
Start: 2022-07-27 | End: 2022-07-27

## 2022-07-27 RX ORDER — DEXTROSE AND SODIUM CHLORIDE 5; .9 G/100ML; G/100ML
50 INJECTION, SOLUTION INTRAVENOUS CONTINUOUS
Status: DISCONTINUED | OUTPATIENT
Start: 2022-07-27 | End: 2022-07-28

## 2022-07-27 RX ORDER — TIZANIDINE 2 MG/1
4 TABLET ORAL 2 TIMES DAILY PRN
Start: 2022-07-27 | End: 2022-08-26 | Stop reason: ALTCHOICE

## 2022-07-27 RX ORDER — TIZANIDINE 4 MG/1
4 TABLET ORAL 2 TIMES DAILY PRN
Status: DISCONTINUED | OUTPATIENT
Start: 2022-07-27 | End: 2022-07-28 | Stop reason: HOSPADM

## 2022-07-27 RX ORDER — ATORVASTATIN CALCIUM 40 MG/1
40 TABLET, FILM COATED ORAL
Status: DISCONTINUED | OUTPATIENT
Start: 2022-07-28 | End: 2022-07-28 | Stop reason: HOSPADM

## 2022-07-27 RX ORDER — ASPIRIN 81 MG/1
81 TABLET ORAL DAILY
Status: DISCONTINUED | OUTPATIENT
Start: 2022-07-28 | End: 2022-07-28 | Stop reason: HOSPADM

## 2022-07-27 RX ORDER — ALBUTEROL SULFATE 90 UG/1
2 AEROSOL, METERED RESPIRATORY (INHALATION) EVERY 6 HOURS PRN
Status: DISCONTINUED | OUTPATIENT
Start: 2022-07-27 | End: 2022-07-28 | Stop reason: HOSPADM

## 2022-07-27 RX ORDER — LABETALOL 100 MG/1
100 TABLET, FILM COATED ORAL 2 TIMES DAILY
Status: DISCONTINUED | OUTPATIENT
Start: 2022-07-27 | End: 2022-07-28 | Stop reason: HOSPADM

## 2022-07-27 RX ORDER — GABAPENTIN 300 MG/1
600 CAPSULE ORAL
Status: DISCONTINUED | OUTPATIENT
Start: 2022-07-27 | End: 2022-07-28 | Stop reason: HOSPADM

## 2022-07-27 RX ORDER — MELATONIN
2000 DAILY
Status: DISCONTINUED | OUTPATIENT
Start: 2022-07-28 | End: 2022-07-28 | Stop reason: HOSPADM

## 2022-07-27 RX ORDER — FUROSEMIDE 40 MG/1
40 TABLET ORAL
Status: DISCONTINUED | OUTPATIENT
Start: 2022-07-28 | End: 2022-07-28 | Stop reason: HOSPADM

## 2022-07-27 RX ORDER — TAMSULOSIN HYDROCHLORIDE 0.4 MG/1
0.4 CAPSULE ORAL
Status: DISCONTINUED | OUTPATIENT
Start: 2022-07-28 | End: 2022-07-28 | Stop reason: HOSPADM

## 2022-07-27 RX ADMIN — IOHEXOL 70 ML: 350 INJECTION, SOLUTION INTRAVENOUS at 23:49

## 2022-07-27 RX ADMIN — DEXTROSE AND SODIUM CHLORIDE 50 ML/HR: 5; .9 INJECTION, SOLUTION INTRAVENOUS at 22:18

## 2022-07-27 RX ADMIN — LABETALOL HYDROCHLORIDE 100 MG: 100 TABLET, FILM COATED ORAL at 22:18

## 2022-07-27 NOTE — ASSESSMENT & PLAN NOTE
BMI Counseling: Body mass index is 34 17 kg/m²  The BMI is above normal  Nutrition recommendations include decreasing overall calorie intake, consuming healthier snacks, moderation in carbohydrate intake and reducing intake of saturated fat and trans fat

## 2022-07-27 NOTE — ASSESSMENT & PLAN NOTE
He cannot tolerate the CPAP  [facility] discussed with pulmonology but he is not interested in getting it

## 2022-07-27 NOTE — ED PROVIDER NOTES
History  Chief Complaint   Patient presents with    Chest Pain     Pt started with lightheadedness on Sunday, today started with sharp chest pain and Jaret arm weakness x 3-4min  Pt now reports sob with intermittent sharp chest pain radiating into Jaret arms  26-year-old male with past medical history of CAD, hypertension, hyperlipidemia, ascending aorta aneurysm, abdominal aortic aneurysm status post repair, COPD, obstructive sleep apnea presents to the ED for evaluation of chest pain that started today  He has had 2 episodes today, one at 2:30 p m  and again at 4:30 p m  Pain came on at rest  Describes the pain as sharp, lasting 2-3 minutes, nonradiating, with associated bilateral arm heaviness  Denies any chest pain or shortness of breath currently  States his last heart catheterization was in 2003  Per chart review, transthoracic echo completed in May of 2022 showed normal systolic function and EF of 55%  Stress test in July of 2019 showed a small amount of ischemia in the inferolateral region, unchanged from prior stress studies  Of note, he was lightheaded 3 days ago, took his BP at that day and it was 120/70, resolved yesterday when he skipped his lisinopril  History provided by:  Patient   used: No    Chest Pain  Associated symptoms: no abdominal pain, no back pain, no cough, no dizziness, no fever, no headache, no nausea, no palpitations, no shortness of breath and not vomiting        Prior to Admission Medications   Prescriptions Last Dose Informant Patient Reported? Taking?    FIBER ADULT GUMMIES PO Past Week at evening Self Yes Yes   Sig: Take 1 caplet by mouth daily    aspirin (ECOTRIN LOW STRENGTH) 81 mg EC tablet Past Week at evening Self Yes Yes   Sig: Take 1 tablet by mouth daily   atorvastatin (LIPITOR) 40 mg tablet Past Week at evening  No Yes   Sig: TAKE 1 TABLET BY MOUTH  DAILY   cholecalciferol (VITAMIN D3) 1,000 units tablet Past Week at morning Self Yes Yes Sig: Take 2,000 Units by mouth daily   furosemide (LASIX) 20 mg tablet Past Week at evening Self No Yes   Sig: TAKE 2 TABLET(40 MG) BY MOUTH EVERY MORNING ON MONDAY, WEDNESDAY, FRIDAY   2 TABLET IN EVENING EVERY DAY   gabapentin (NEURONTIN) 300 mg capsule Past Week at bedtime  No Yes   Sig: TAKE 1 CAPSULE BY MOUTH IN  THE MORNING AND 2 CAPSULES  BY MOUTH AT NIGHT   Patient taking differently: 2 CAPSULES  BY MOUTH AT NIGHT   labetalol (NORMODYNE) 100 mg tablet Past Week at morning/evening  No Yes   Sig: TAKE 1 TABLET BY MOUTH  TWICE DAILY   lisinopril (ZESTRIL) 5 mg tablet Past Week at morning Self No Yes   Sig: Take 1 tablet (5 mg total) by mouth daily   multivitamin (THERAGRAN) TABS Past Week at evening Self Yes Yes   Sig: Take 1 tablet by mouth daily   senna (SENOKOT) 8 6 MG tablet Past Month at evening Self Yes Yes   Sig: Take 1 tablet by mouth as needed     tamsulosin (FLOMAX) 0 4 mg Past Week at evening Self Yes Yes   Sig: Take 1 capsule by mouth daily   tiZANidine (ZANAFLEX) 2 mg tablet Past Month at PRN  No Yes   Sig: Take 2 tablets (4 mg total) by mouth 2 (two) times a day as needed for muscle spasms      Facility-Administered Medications Last Administration Doses Remaining   albuterol (PROVENTIL HFA,VENTOLIN HFA) inhaler 2 puff None recorded           Past Medical History:   Diagnosis Date    Abdominal aortic aneurysm (HCC)     Aneurysm of abdominal aorta (HCC) 4/14/2014    Aneurysm of ascending aorta (HCC) 9/7/2012    Chronic pain     back since July    Chronic pain disorder     lumbar    Coronary artery disease     History of transfusion     Hyperlipidemia     Hypertension     Hypertension 7/24/2019    Positive colorectal cancer screening using Cologuard test 4/9/2018    Sleep apnea     SOB (shortness of breath) 7/10/2019    Subconjunctival hemorrhage        Past Surgical History:   Procedure Laterality Date    ABDOMINAL AORTIC ANEURYSM REPAIR  08/09/2007    2 dock limbs with visc extens prosth    COLONOSCOPY      CORONARY ARTERY BYPASS GRAFT      GAB-LAD, sequential vein graft to OM1 and OM2, single VG-posterior descending  Onset: 2003       Family History   Problem Relation Age of Onset    Heart disease Mother     Stroke Father         stroke syndrome    Aneurysm Brother         abdominal    Aortic aneurysm Brother         ascending    Coronary artery disease Family     Hypertension Family     Other Son         gioblastoma multiforme     I have reviewed and agree with the history as documented  E-Cigarette/Vaping    E-Cigarette Use Never User      E-Cigarette/Vaping Substances    Nicotine No     THC No     CBD No     Flavoring No     Other No     Unknown No      Social History     Tobacco Use    Smoking status: Former Smoker     Packs/day: 1 00     Years: 40 00     Pack years: 40 00     Types: Cigarettes     Start date: 37     Quit date: 08/2012     Years since quitting: 10 0    Smokeless tobacco: Never Used   Vaping Use    Vaping Use: Never used   Substance Use Topics    Alcohol use: Yes     Alcohol/week: 21 0 standard drinks     Types: 21 Cans of beer per week    Drug use: No        Review of Systems   Constitutional: Negative for chills and fever  HENT: Negative for congestion, rhinorrhea and sore throat  Respiratory: Negative for cough and shortness of breath  Cardiovascular: Positive for chest pain  Negative for palpitations  Gastrointestinal: Negative for abdominal pain, diarrhea, nausea and vomiting  Genitourinary: Negative for dysuria, hematuria and urgency  Musculoskeletal: Negative for back pain  Neurological: Negative for dizziness, light-headedness and headaches  All other systems reviewed and are negative        Physical Exam  ED Triage Vitals [07/27/22 1753]   Temperature Pulse Respirations Blood Pressure SpO2   97 8 °F (36 6 °C) 83 20 152/76 95 %      Temp Source Heart Rate Source Patient Position - Orthostatic VS BP Location FiO2 (%) Tympanic Monitor Sitting Right arm --      Pain Score       No Pain             Orthostatic Vital Signs  Vitals:    07/28/22 0700 07/28/22 0745 07/28/22 1048 07/28/22 1541   BP: 128/69 157/83 153/85 114/78   Pulse: 71 81 78 77   Patient Position - Orthostatic VS:           Physical Exam  Vitals and nursing note reviewed  Constitutional:       General: He is not in acute distress  Appearance: He is well-developed  He is not ill-appearing  HENT:      Head: Normocephalic and atraumatic  Mouth/Throat:      Mouth: Mucous membranes are moist    Eyes:      Conjunctiva/sclera: Conjunctivae normal    Cardiovascular:      Rate and Rhythm: Normal rate and regular rhythm  Pulses: Normal pulses  Heart sounds: Normal heart sounds  No murmur heard  Comments: 1+ pitting edema to bilateral LE  Pulmonary:      Effort: Pulmonary effort is normal  No tachypnea or respiratory distress  Breath sounds: Normal breath sounds  No wheezing  Abdominal:      Palpations: Abdomen is soft  Tenderness: There is no abdominal tenderness  There is no guarding or rebound  Musculoskeletal:         General: Normal range of motion  Cervical back: Normal range of motion  Skin:     General: Skin is warm and dry  Capillary Refill: Capillary refill takes less than 2 seconds  Comments: Venous stasis changes to bilateral LE   Neurological:      General: No focal deficit present  Mental Status: He is alert and oriented to person, place, and time           ED Medications  Medications   iohexol (OMNIPAQUE) 350 MG/ML injection (MULTI-DOSE) 70 mL (70 mL Intravenous Given 7/27/22 3339)       Diagnostic Studies  Results Reviewed     Procedure Component Value Units Date/Time    Comprehensive metabolic panel [[id number]]  (Abnormal) Collected: 07/28/22 0411    Lab Status: Final result Specimen: Blood from Arm, Left Updated: 07/28/22 0501     Sodium 136 mmol/L      Potassium 4 2 mmol/L      Chloride 103 mmol/L      CO2 30 mmol/L      ANION GAP 3 mmol/L      BUN 12 mg/dL      Creatinine 1 04 mg/dL      Glucose 92 mg/dL      Calcium 9 3 mg/dL      AST 23 U/L      ALT 28 U/L      Alkaline Phosphatase 56 U/L      Total Protein 6 7 g/dL      Albumin 3 8 g/dL      Total Bilirubin 1 87 mg/dL      eGFR 67 ml/min/1 73sq m     Narrative:      [facility] guidelines for Chronic Kidney Disease (CKD):     Stage 1 with normal or high GFR (GFR > 90 mL/min/1 73 square meters)    Stage 2 Mild CKD (GFR = 60-89 mL/min/1 73 square meters)    Stage 3A Moderate CKD (GFR = 45-59 mL/min/1 73 square meters)    Stage 3B Moderate CKD (GFR = 30-44 mL/min/1 73 square meters)    Stage 4 Severe CKD (GFR = 15-29 mL/min/1 73 square meters)    Stage 5 End Stage CKD (GFR <15 mL/min/1 73 square meters)  Note: GFR calculation is accurate only with a steady state creatinine    TSH, 3rd generation with Free T4 reflex [[id number]]  (Normal) Collected: 07/28/22 0411    Lab Status: Final result Specimen: Blood from Arm, Left Updated: 07/28/22 0501     TSH 3RD GENERATON 2 890 uIU/mL     Narrative:      Patients undergoing fluorescein dye angiography may retain small amounts of fluorescein in the body for 48-72 hours post procedure  Samples containing fluorescein can produce falsely depressed TSH values  If the patient had this procedure,a specimen should be resubmitted post fluorescein clearance        Magnesium [[id number]]  (Normal) Collected: 07/28/22 0411    Lab Status: Final result Specimen: Blood from Arm, Left Updated: 07/28/22 0455     Magnesium 2 2 mg/dL     Lipid Panel with Direct LDL reflex [[id number]]  (Normal) Collected: 07/28/22 0411    Lab Status: Final result Specimen: Blood from Arm, Left Updated: 07/28/22 0455     Cholesterol 114 mg/dL      Triglycerides 46 mg/dL      HDL, Direct 56 mg/dL      LDL Calculated 49 mg/dL     Protime-INR [[id number]]  (Normal) Collected: 07/28/22 0411    Lab Status: Final result Specimen: Blood from Arm, Left Updated: 07/28/22 0446     Protime 14 3 seconds      INR 1 09    APTT [[id number]]  (Normal) Collected: 07/28/22 0411    Lab Status: Final result Specimen: Blood from Arm, Left Updated: 07/28/22 0446     PTT 28 seconds     CBC and differential [[id number]]  (Abnormal) Collected: 07/28/22 0411    Lab Status: Final result Specimen: Blood from Arm, Left Updated: 07/28/22 0426     WBC 6 05 Thousand/uL      RBC 4 04 Million/uL      Hemoglobin 13 6 g/dL      Hematocrit 41 5 %       fL      MCH 33 7 pg      MCHC 32 8 g/dL      RDW 13 1 %      MPV 10 2 fL      Platelets 115 Thousands/uL      nRBC 0 /100 WBCs      Neutrophils Relative 60 %      Immat GRANS % 0 %      Lymphocytes Relative 22 %      Monocytes Relative 14 %      Eosinophils Relative 3 %      Basophils Relative 1 %      Neutrophils Absolute 3 61 Thousands/µL      Immature Grans Absolute 0 02 Thousand/uL      Lymphocytes Absolute 1 34 Thousands/µL      Monocytes Absolute 0 84 Thousand/µL      Eosinophils Absolute 0 19 Thousand/µL      Basophils Absolute 0 05 Thousands/µL     Fingerstick Glucose (POCT) [[id number]]  (Normal) Collected: 07/28/22 0410    Lab Status: Final result Updated: 07/28/22 0417     POC Glucose 91 mg/dl     D-dimer, quantitative [[id number]]  (Abnormal) Collected: 07/27/22 2123    Lab Status: Final result Specimen: Blood from Arm, Right Updated: 07/27/22 2228     D-Dimer, Quant 2 50 ug/ml FEU     Narrative: In the evaluation for possible pulmonary embolism, in the appropriate (Well's Score of 4 or less) patient, the age adjusted d-dimer cutoff for this patient can be calculated as:    Age x 0 01 (in ug/mL) for Age-adjusted D-dimer exclusion threshold for a patient over 50 years      HS Troponin I 2hr [[id number]]  (Normal) Collected: 07/27/22 2002    Lab Status: Final result Specimen: Blood from Arm, Right Updated: 07/27/22 2036     hs TnI 2hr 19 ng/L      Delta 2hr hsTnI -4 ng/L     TSH, 3rd generation with Free T4 reflex [[id number]]  (Normal) Collected: 07/27/22 1808    Lab Status: Final result Specimen: Blood from Arm, Right Updated: 07/27/22 2025     TSH 3RD GENERATON 3 850 uIU/mL     Narrative:      Patients undergoing fluorescein dye angiography may retain small amounts of fluorescein in the body for 48-72 hours post procedure  Samples containing fluorescein can produce falsely depressed TSH values  If the patient had this procedure,a specimen should be resubmitted post fluorescein clearance        Magnesium [[id number]]  (Normal) Collected: 07/27/22 1808    Lab Status: Final result Specimen: Blood from Arm, Right Updated: 07/27/22 2025     Magnesium 2 0 mg/dL     HS Troponin 0hr (reflex protocol) [[id number]]  (Normal) Collected: 07/27/22 1808    Lab Status: Final result Specimen: Blood from Arm, Right Updated: 07/27/22 1855     hs TnI 0hr 23 ng/L     Comprehensive metabolic panel [[id number]]  (Abnormal) Collected: 07/27/22 1808    Lab Status: Final result Specimen: Blood from Arm, Right Updated: 07/27/22 1837     Sodium 135 mmol/L      Potassium 5 1 mmol/L      Chloride 103 mmol/L      CO2 27 mmol/L      ANION GAP 5 mmol/L      BUN 13 mg/dL      Creatinine 1 06 mg/dL      Glucose 84 mg/dL      Calcium 9 1 mg/dL      AST 30 U/L      ALT 33 U/L      Alkaline Phosphatase 59 U/L      Total Protein 7 2 g/dL      Albumin 3 6 g/dL      Total Bilirubin 1 17 mg/dL      eGFR 65 ml/min/1 73sq m     Narrative:      [facility] guidelines for Chronic Kidney Disease (CKD):     Stage 1 with normal or high GFR (GFR > 90 mL/min/1 73 square meters)    Stage 2 Mild CKD (GFR = 60-89 mL/min/1 73 square meters)    Stage 3A Moderate CKD (GFR = 45-59 mL/min/1 73 square meters)    Stage 3B Moderate CKD (GFR = 30-44 mL/min/1 73 square meters)    Stage 4 Severe CKD (GFR = 15-29 mL/min/1 73 square meters)    Stage 5 End Stage CKD (GFR <15 mL/min/1 73 square meters)  Note: GFR calculation is accurate only with a steady state creatinine    CBC and differential [[id number]]  (Abnormal) Collected: 07/27/22 1808    Lab Status: Final result Specimen: Blood from Arm, Right Updated: 07/27/22 1825     WBC 6 37 Thousand/uL      RBC 4 18 Million/uL      Hemoglobin 13 9 g/dL      Hematocrit 42 5 %       fL      MCH 33 3 pg      MCHC 32 7 g/dL      RDW 13 2 %      MPV 9 6 fL      Platelets 708 Thousands/uL      nRBC 0 /100 WBCs      Neutrophils Relative 63 %      Immat GRANS % 1 %      Lymphocytes Relative 18 %      Monocytes Relative 14 %      Eosinophils Relative 3 %      Basophils Relative 1 %      Neutrophils Absolute 4 10 Thousands/µL      Immature Grans Absolute 0 03 Thousand/uL      Lymphocytes Absolute 1 16 Thousands/µL      Monocytes Absolute 0 86 Thousand/µL      Eosinophils Absolute 0 17 Thousand/µL      Basophils Absolute 0 05 Thousands/µL                  CT pe study w abdomen pelvis w contrast   Final Result by Deb Weeks MD (07/28 0135)      No evidence of acute pulmonary embolus  No consolidation or effusion  Stable pulmonary nodules as above  4 9 cm ascending thoracic aortic aneurysm slightly increased in size compared to prior study dated 7/10/2019  No acute findings in the abdomen or pelvis  Workstation performed: [id number]         XR chest 1 view portable   Final Result by Deandre Dean MD (07/28 0496)      No focal consolidation, pleural effusion, or pneumothorax  Workstation performed: [id number]               Procedures  ECG 12 Lead Documentation Only    Date/Time: 7/27/2022 9:44 PM  Performed by: Juan Crews MD  Authorized by:  Juan Crews MD     Indications / Diagnosis:  Chest pain  ECG reviewed by me, the ED Provider: yes    Patient location:  ED  Previous ECG:     Previous ECG:  Compared to current    Comparison ECG info:  7/10/2019    Similarity:  Changes noted  Interpretation:     Interpretation: abnormal    Rate:     ECG rate:  75    ECG rate assessment: normal    Rhythm:     Rhythm: atrial fibrillation            ED Course             HEART Risk Score    Flowsheet Row Most Recent Value   Heart Score Risk Calculator    History 1 Filed at: 07/27/2022 1800   ECG 1 Filed at: 07/27/2022 1800   Age 2 Filed at: 07/27/2022 1800   Risk Factors 2 Filed at: 07/27/2022 1800   Troponin 1 Filed at: 07/27/2022 1800   HEART Score 7 Filed at: 07/27/2022 1800                                [facility]  Number of Diagnoses or Management Options  New onset atrial fibrillation (HCC)  Diagnosis management comments: 76-year-old male with past medical history of CAD, hypertension, hyperlipidemia, COPD, ascending aortic aneurysm, abdominal aortic aneurysm status post repair presents with 2 episodes of sharp chest pain with bilateral arm heaviness today  Differential diagnosis:   Included but not limited to ACS, pneumonia, aortic dissection     CBC, CMP, Mg, TSH within normal limits  Delta trop -4  EKG shows atrial fibrillation, new when compared to previous EKG  Discussed findings with patient and admission for further workup of new onset afib, patient agreeable  Admitted to [address] [place] [address]         Disposition  Final diagnoses:   New onset atrial fibrillation ([facility] )     Time reflects when diagnosis was documented in both MDM as applicable and the Disposition within this note     Time User Action Codes Description Comment    7/27/2022  8:39 PM Debalfredo Mon T Add [I48 91] New onset atrial fibrillation ([facility] )     7/27/2022  9:23 PM Estuardo Dimas Add [R06 02] SOB (shortness of breath)     7/27/2022  9:26 PM Estuardo Dimas Add [R07 9] Chest pain     7/28/2022  4:11 PM Doneen [place] Add [I48 91] Atrial fibrillation, unspecified type ([facility] )     7/28/2022  4:11 PM Doneen [place] Add [I11 0] Hypertensive heart disease with heart failure ([facility] )     7/28/2022  4:11 PM Doneen [place] Add [M43 16] Spondylolisthesis at L4-L5 level       ED Disposition     ED Disposition   Admit    Condition   Stable    Date/Time Wed Jul 27, 2022  8:39 PM    Comment   Case was discussed with Dr Randal Hutton and the patient's admission status was agreed to be Admission Status: observation status to the service of Dr Randal Hutton  Follow-up Information     Follow up With Specialties Details Why Reina Rea MD Internal Medicine Follow up in 1 week(s)  [facility] [address] Yeny Rizvi MD Cardiothoracic Surgery Follow up in 1 month(s)  [address]  [address] [place],9D, DO Cardiology, Multidisciplinary Follow up in 1 month(s)  [address]  [phone]            Discharge Medication List as of 7/28/2022  4:25 PM      START taking these medications    Details   apixaban (ELIQUIS) 5 mg Take 1 tablet (5 mg total) by mouth 2 (two) times a day, Starting Thu 7/28/2022, Normal         CONTINUE these medications which have NOT CHANGED    Details   aspirin (ECOTRIN LOW STRENGTH) 81 mg EC tablet Take 1 tablet by mouth daily, Starting Fri 9/7/2012, Historical Med      atorvastatin (LIPITOR) 40 mg tablet TAKE 1 TABLET BY MOUTH  DAILY, Normal      cholecalciferol (VITAMIN D3) 1,000 units tablet Take 2,000 Units by mouth daily, Historical Med      FIBER ADULT GUMMIES PO Take 1 caplet by mouth daily , Historical Med      furosemide (LASIX) 20 mg tablet TAKE 2 TABLET(40 MG) BY MOUTH EVERY MORNING ON MONDAY, WEDNESDAY, FRIDAY   2 TABLET IN EVENING EVERY DAY, Normal      gabapentin (NEURONTIN) 300 mg capsule TAKE 1 CAPSULE BY MOUTH IN  THE MORNING AND 2 CAPSULES  BY MOUTH AT NIGHT, Normal      labetalol (NORMODYNE) 100 mg tablet TAKE 1 TABLET BY MOUTH  TWICE DAILY, Normal      lisinopril (ZESTRIL) 5 mg tablet Take 1 tablet (5 mg total) by mouth daily, Starting Tue 11/16/2021, Normal      multivitamin (THERAGRAN) TABS Take 1 tablet by mouth daily, Historical Med      senna (SENOKOT) 8 6 MG tablet Take 1 tablet by mouth as needed  , Historical Med      tamsulosin (FLOMAX) 0 4 mg Take 1 capsule by mouth daily, Historical Med      tiZANidine (ZANAFLEX) 2 mg tablet Take 2 tablets (4 mg total) by mouth 2 (two) times a day as needed for muscle spasms, Starting Wed 7/27/2022, No Print      albuterol (Ventolin HFA) 90 mcg/act inhaler Inhale 2 puffs every 6 (six) hours as needed for wheezing, Starting Wed 7/27/2022, Normal           Outpatient Discharge Orders   Ambulatory referral to Physical Therapy   Standing Status: Future Standing Exp  Date: 07/28/23      Ambulatory  Referral to Cardiac Rehabilitation   Standing Status: Future Standing Exp  Date: 07/28/23      Discharge Diet     Activity as tolerated       PDMP Review     None           ED Provider  Attending physically available and evaluated Ml Saldaña  MARCO managed the patient along with the ED Attending      Electronically Signed by         Jef Elizabeth MD  07/28/22 020 Morgan Garcia MD  08/08/22 3874

## 2022-07-27 NOTE — PROGRESS NOTES
Assessment/Plan:    Severe obstructive sleep apnea  He cannot tolerate the CPAP  Inspire discussed with pulmonology but he is not interested in getting it    Chronic diastolic congestive heart failure ([facility] )  Wt Readings from Last 3 Encounters:   07/27/22 111 kg (245 lb)   05/13/22 116 kg (255 lb)   04/13/22 116 kg (255 lb)     Appears euvolemic  He has been feeling lightheaded for a few days and BP is low normal today  He has not taken the furosemide this morning yet  I asked that he skip it today but take his usual evening dose  If lightheadedness continues, he must call        Severe obesity (BMI 35 0-35 9 with comorbidity) ([facility] )  BMI Counseling: Body mass index is 34 17 kg/m²  The BMI is above normal  Nutrition recommendations include decreasing overall calorie intake, consuming healthier snacks, moderation in carbohydrate intake and reducing intake of saturated fat and trans fat  Problem List Items Addressed This Visit        Endocrine    Impaired fasting glucose    Relevant Orders    HEMOGLOBIN A1C W/ EAG ESTIMATION    TSH, 3rd generation with Free T4 reflex       Respiratory    Severe obstructive sleep apnea     He cannot tolerate the CPAP  Inspire discussed with pulmonology but he is not interested in getting it         Chronic obstructive pulmonary disease (HCC)    Relevant Medications    albuterol (Ventolin HFA) 90 mcg/act inhaler       Cardiovascular and Mediastinum    Benign essential hypertension    Relevant Orders    Comprehensive metabolic panel    CBC and Platelet    TSH, 3rd generation with Free T4 reflex    Chronic diastolic congestive heart failure ([place] [address] ) - Primary     Wt Readings from Last 3 Encounters:   07/27/22 111 kg (245 lb)   05/13/22 116 kg (255 lb)   04/13/22 116 kg (255 lb)     Appears euvolemic  He has been feeling lightheaded for a few days and BP is low normal today  He has not taken the furosemide this morning yet   I asked that he skip it today but take his usual evening dose  If lightheadedness continues, he must call                Nervous and Auditory    Intervertebral disc disorder with radiculopathy of lumbar region       Musculoskeletal and Integument    Myofascial pain syndrome    Relevant Medications    tiZANidine (ZANAFLEX) 2 mg tablet       Other    Hyperlipidemia    Relevant Orders    CBC and Platelet    Lipid Panel with Direct LDL reflex    TSH, 3rd generation with Free T4 reflex    Severe obesity (BMI 35 0-35 9 with comorbidity) ([facility])     BMI Counseling: Body mass index is 34 17 kg/m²  The BMI is above normal  Nutrition recommendations include decreasing overall calorie intake, consuming healthier snacks, moderation in carbohydrate intake and reducing intake of saturated fat and trans fat  Platelets decreased ([address] )    Relevant Orders    CBC and Platelet            Subjective:      Patient ID: Rah Issa is a [de-identified] y o  male  HPI  Lightheaded for a few days, not with getting up from a chair or out of bed  BP at home 120/80s  Skipped lisinopril yesterday  He has been eating better in the past month  He made changes bec his weight was up to 258lbs  He has a few beers a day  Recent labs reviewed- Lipids normal, A1C higher at 6, platelet count low at 126    The following portions of the patient's history were reviewed and updated as appropriate: allergies, current medications, past family history, past medical history, past social history, past surgical history and problem list     Review of Systems   Constitutional: Negative for chills, fever and unexpected weight change  Eyes: Negative for visual disturbance  Respiratory: Negative for shortness of breath  Cardiovascular: Positive for leg swelling (chronic on right)  Negative for chest pain and palpitations  Gastrointestinal: Negative for abdominal pain, constipation and diarrhea  Genitourinary: Negative for difficulty urinating  Musculoskeletal: Positive for back pain (not radiating)  Neurological: Positive for light-headedness  Negative for headaches  Objective:      /68   Pulse 81   Temp 97 6 °F (36 4 °C)   Resp 18   Ht 5' 11" (1 803 m)   Wt 111 kg (245 lb)   SpO2 96%   BMI 34 17 kg/m²          Physical Exam  Constitutional:       General: He is not in acute distress  Appearance: He is well-developed  He is obese  He is not ill-appearing, toxic-appearing or diaphoretic  HENT:      Head: Normocephalic and atraumatic  Right Ear: External ear normal  There is no impacted cerumen  Left Ear: External ear normal  There is no impacted cerumen  Eyes:      Conjunctiva/sclera: Conjunctivae normal    Cardiovascular:      Rate and Rhythm: Normal rate and regular rhythm  Heart sounds: Normal heart sounds  No murmur heard  Pulmonary:      Effort: Pulmonary effort is normal  No respiratory distress  Breath sounds: Normal breath sounds  No wheezing or rales  Abdominal:      General: There is no distension  Palpations: Abdomen is soft  There is no mass  Tenderness: There is no abdominal tenderness  There is no guarding or rebound  Musculoskeletal:      Cervical back: Neck supple  Right lower leg: Edema (gr 1) present  Left lower leg: No edema  Skin:     General: Skin is warm and dry  Neurological:      Mental Status: He is alert and oriented to person, place, and time  Psychiatric:         Mood and Affect: Mood normal          Behavior: Behavior normal          Thought Content:  Thought content normal          Judgment: Judgment normal

## 2022-07-27 NOTE — ASSESSMENT & PLAN NOTE
Wt Readings from Last 3 Encounters:   07/27/22 111 kg (245 lb)   05/13/22 116 kg (255 lb)   04/13/22 116 kg (255 lb)     Appears euvolemic  He has been feeling lightheaded for a few days and BP is low normal today  He has not taken the furosemide this morning yet   I asked that he skip it today but take his usual evening dose  If lightheadedness continues, he must call

## 2022-07-28 ENCOUNTER — APPOINTMENT (OUTPATIENT)
Dept: NON INVASIVE DIAGNOSTICS | Facility: HOSPITAL | Age: 82
End: 2022-07-28
Payer: MEDICARE

## 2022-07-28 VITALS
HEART RATE: 77 BPM | BODY MASS INDEX: 34.3 KG/M2 | HEIGHT: 71 IN | RESPIRATION RATE: 18 BRPM | SYSTOLIC BLOOD PRESSURE: 114 MMHG | DIASTOLIC BLOOD PRESSURE: 78 MMHG | TEMPERATURE: 97.4 F | OXYGEN SATURATION: 93 % | WEIGHT: 245 LBS

## 2022-07-28 DIAGNOSIS — J44.9 COPD, MILD (HCC): ICD-10-CM

## 2022-07-28 LAB
ALBUMIN SERPL BCP-MCNC: 3.8 G/DL (ref 3.5–5)
ALP SERPL-CCNC: 56 U/L (ref 46–116)
ALT SERPL W P-5'-P-CCNC: 28 U/L (ref 12–78)
ANION GAP SERPL CALCULATED.3IONS-SCNC: 3 MMOL/L (ref 4–13)
APTT PPP: 28 SECONDS (ref 23–37)
AST SERPL W P-5'-P-CCNC: 23 U/L (ref 5–45)
ATRIAL RATE: 129 BPM
BASOPHILS # BLD AUTO: 0.05 THOUSANDS/ΜL (ref 0–0.1)
BASOPHILS NFR BLD AUTO: 1 % (ref 0–1)
BILIRUB SERPL-MCNC: 1.87 MG/DL (ref 0.2–1)
BUN SERPL-MCNC: 12 MG/DL (ref 5–25)
CALCIUM SERPL-MCNC: 9.3 MG/DL (ref 8.3–10.1)
CHLORIDE SERPL-SCNC: 103 MMOL/L (ref 96–108)
CHOLEST SERPL-MCNC: 114 MG/DL
CO2 SERPL-SCNC: 30 MMOL/L (ref 21–32)
CREAT SERPL-MCNC: 1.04 MG/DL (ref 0.6–1.3)
EOSINOPHIL # BLD AUTO: 0.19 THOUSAND/ΜL (ref 0–0.61)
EOSINOPHIL NFR BLD AUTO: 3 % (ref 0–6)
ERYTHROCYTE [DISTWIDTH] IN BLOOD BY AUTOMATED COUNT: 13.1 % (ref 11.6–15.1)
GFR SERPL CREATININE-BSD FRML MDRD: 67 ML/MIN/1.73SQ M
GLUCOSE SERPL-MCNC: 105 MG/DL (ref 65–140)
GLUCOSE SERPL-MCNC: 91 MG/DL (ref 65–140)
GLUCOSE SERPL-MCNC: 92 MG/DL (ref 65–140)
GLUCOSE SERPL-MCNC: 97 MG/DL (ref 65–140)
HCT VFR BLD AUTO: 41.5 % (ref 36.5–49.3)
HDLC SERPL-MCNC: 56 MG/DL
HGB BLD-MCNC: 13.6 G/DL (ref 12–17)
IMM GRANULOCYTES # BLD AUTO: 0.02 THOUSAND/UL (ref 0–0.2)
IMM GRANULOCYTES NFR BLD AUTO: 0 % (ref 0–2)
INR PPP: 1.09 (ref 0.84–1.19)
LDLC SERPL CALC-MCNC: 49 MG/DL (ref 0–100)
LYMPHOCYTES # BLD AUTO: 1.34 THOUSANDS/ΜL (ref 0.6–4.47)
LYMPHOCYTES NFR BLD AUTO: 22 % (ref 14–44)
MAGNESIUM SERPL-MCNC: 2.2 MG/DL (ref 1.6–2.6)
MCH RBC QN AUTO: 33.7 PG (ref 26.8–34.3)
MCHC RBC AUTO-ENTMCNC: 32.8 G/DL (ref 31.4–37.4)
MCV RBC AUTO: 103 FL (ref 82–98)
MONOCYTES # BLD AUTO: 0.84 THOUSAND/ΜL (ref 0.17–1.22)
MONOCYTES NFR BLD AUTO: 14 % (ref 4–12)
NEUTROPHILS # BLD AUTO: 3.61 THOUSANDS/ΜL (ref 1.85–7.62)
NEUTS SEG NFR BLD AUTO: 60 % (ref 43–75)
NRBC BLD AUTO-RTO: 0 /100 WBCS
PLATELET # BLD AUTO: 135 THOUSANDS/UL (ref 149–390)
PMV BLD AUTO: 10.2 FL (ref 8.9–12.7)
POTASSIUM SERPL-SCNC: 4.2 MMOL/L (ref 3.5–5.3)
PROT SERPL-MCNC: 6.7 G/DL (ref 6.4–8.4)
PROTHROMBIN TIME: 14.3 SECONDS (ref 11.6–14.5)
QRS AXIS: 58 DEGREES
QRSD INTERVAL: 86 MS
QT INTERVAL: 368 MS
QTC INTERVAL: 410 MS
RBC # BLD AUTO: 4.04 MILLION/UL (ref 3.88–5.62)
SODIUM SERPL-SCNC: 136 MMOL/L (ref 135–147)
T WAVE AXIS: -17 DEGREES
TRIGL SERPL-MCNC: 46 MG/DL
TSH SERPL DL<=0.05 MIU/L-ACNC: 2.89 UIU/ML (ref 0.45–4.5)
VENTRICULAR RATE: 75 BPM
WBC # BLD AUTO: 6.05 THOUSAND/UL (ref 4.31–10.16)

## 2022-07-28 PROCEDURE — 97163 PT EVAL HIGH COMPLEX 45 MIN: CPT

## 2022-07-28 PROCEDURE — 85610 PROTHROMBIN TIME: CPT | Performed by: INTERNAL MEDICINE

## 2022-07-28 PROCEDURE — 85730 THROMBOPLASTIN TIME PARTIAL: CPT | Performed by: INTERNAL MEDICINE

## 2022-07-28 PROCEDURE — 93010 ELECTROCARDIOGRAM REPORT: CPT | Performed by: INTERNAL MEDICINE

## 2022-07-28 PROCEDURE — 36415 COLL VENOUS BLD VENIPUNCTURE: CPT | Performed by: INTERNAL MEDICINE

## 2022-07-28 PROCEDURE — 85025 COMPLETE CBC W/AUTO DIFF WBC: CPT | Performed by: INTERNAL MEDICINE

## 2022-07-28 PROCEDURE — 83735 ASSAY OF MAGNESIUM: CPT | Performed by: INTERNAL MEDICINE

## 2022-07-28 PROCEDURE — 99214 OFFICE O/P EST MOD 30 MIN: CPT | Performed by: INTERNAL MEDICINE

## 2022-07-28 PROCEDURE — 80061 LIPID PANEL: CPT | Performed by: INTERNAL MEDICINE

## 2022-07-28 PROCEDURE — 82948 REAGENT STRIP/BLOOD GLUCOSE: CPT

## 2022-07-28 PROCEDURE — 97166 OT EVAL MOD COMPLEX 45 MIN: CPT

## 2022-07-28 PROCEDURE — 84443 ASSAY THYROID STIM HORMONE: CPT | Performed by: INTERNAL MEDICINE

## 2022-07-28 PROCEDURE — 99233 SBSQ HOSP IP/OBS HIGH 50: CPT | Performed by: INTERNAL MEDICINE

## 2022-07-28 PROCEDURE — 80053 COMPREHEN METABOLIC PANEL: CPT | Performed by: INTERNAL MEDICINE

## 2022-07-28 RX ORDER — ALBUTEROL SULFATE 90 UG/1
2 AEROSOL, METERED RESPIRATORY (INHALATION) EVERY 6 HOURS PRN
Qty: 54 G | Refills: 1 | Status: SHIPPED | OUTPATIENT
Start: 2022-07-28

## 2022-07-28 RX ADMIN — LABETALOL HYDROCHLORIDE 100 MG: 100 TABLET, FILM COATED ORAL at 08:08

## 2022-07-28 RX ADMIN — Medication 2000 UNITS: at 08:08

## 2022-07-28 RX ADMIN — APIXABAN 5 MG: 5 TABLET, FILM COATED ORAL at 11:31

## 2022-07-28 NOTE — ASSESSMENT & PLAN NOTE
Wt Readings from Last 3 Encounters:   07/27/22 111 kg (245 lb)   05/13/22 116 kg (255 lb)   04/13/22 116 kg (255 lb)       As above in AFib section

## 2022-07-28 NOTE — ASSESSMENT & PLAN NOTE
No acute issues; CT chest abdomen pelvis revealed the following:  4 9 cm ascending thoracic aortic aneurysm slightly increased in size compared to prior study dated 7/10/2019    Discussed with Cardiothoracic surgery; no need for inpatient evaluation  Refer to cardiothoracic surgeon outpatient setting  No problem with starting Eliquis

## 2022-07-28 NOTE — ED NOTES
Patients RN aware patient arrived to the floor and telemetry box placed on patient        Natividad Figures  07/28/22 2249

## 2022-07-28 NOTE — CONSULTS
Reason for Consult / Principal Problem: Atrial fibrillation    Physician Requesting Consult:  Preet Singleton MD    Cardiologist: Mathieu Coy MD         Assessment and Plan      Current Problem List   Principal Problem:    Atrial fibrillation [facility])  Active Problems:    Severe obstructive sleep apnea    3-vessel CAD    Abdominal aortic aneurysm, without rupture (HCC)    Chronic diastolic congestive heart failure ([facility] )    Assessment/Plan:    1  Atrial fibrillation   ·  The patient experienced bilateral chest pain radiating into the arms that was not associated with exertion  He does not endorse any palpitations  A-fib is rate controlled at 75 bpm  TSH normal, patient consumes 3 alcoholic drinks per day on average  · Patient should follow up with outpatient cardiologist for stress test in case of potential ischemic cause  · Begin eliquis in addition to at home regimen for stroke prevention - bleeding risk following prior CABG has been addressed  2  Abdominal aortic aneurysm without rupture    - CTA done on 7/27 due to elevated d-dimers and  coincidentally revealed increased size in thoracic aortic  aneurysm from 4 7 cm in 2019 to 4 9 cm now  - Follow up with outpatient cardiology for recomm                Subjective     CC: Bilateral lower chest pain and weakness radiating into both arms  HPI: Zuly Kelsey [de-identified]y o  year old male PMH significant for heart failure with severe pulmonary hypertension, ascending aortic aneurysm, abdominal aortic aneurysm status post repair, severe sleep apnea, coronary artery bypass graft > 15 years ago who presented to B yesterday, 7/27, with Bilateral lower chest pain and weakness radiating into both arms  The patient reports these symptoms occurred after doing some gardening outside and sitting on his porch  He has never experienced symptoms of chest pain before or after his CABG 15 years ago  Upon arrival to the ED the patient was found to be in Afib   CTA PE workup is negative despite elevated d-dimer 2 50  Patient was seen today comfortably sitting in his chair with none of the symptoms reported yesterday  He denies having any palpitations or shortness of breath since he arrived to the hospital            Remainder of ROS done and negative    Telemetry: No acute events     Weight: 245 lbs/111 kg    EKG: Done 7/27 revealing atrial fibrillation, HR 75 bpm    ECHO: Last echo done 9/2020 revealed normal systolic function, grade 1 diastolic function, LVEF 78%    Cardiac Catheterization: Patient had CABG 15 years ago  CHEST X-RAY: Negative for acute cardiopulmonary findings     CT scan: CT PE study done revealing no PE or acute findings  Family History:   Family History   Problem Relation Age of Onset    Heart disease Mother     Stroke Father         stroke syndrome    Aneurysm Brother         abdominal    Aortic aneurysm Brother         ascending    Coronary artery disease Family     Hypertension Family     Other Son         gioblastoma multiforme     Historical Information   Past Medical History:   Diagnosis Date    Abdominal aortic aneurysm ([address] )     Aneur[facility]m [facility] abdominal aorta [facility] ) 4/14/2014    Aneurysm of ascending aorta ([facility] ) 9/7/2012    Chronic pain     back since July    Chronic pain disorder     lumbar    Coronary artery disease     History of transfusion     Hyperlipidemia     Hypertension     Hypertension 7/24/2019    Positive colorectal cancer screening using Cologuard test 4/9/2018    Sleep apnea     SOB (shortness of breath) 7/10/2019    Subconjunctival hemorrhage      Past Surgical History:   Procedure Laterality Date    ABDOMINAL AORTIC ANEURYSM REPAIR  08/09/2007    2 dock limbs with visc extens prosth    COLONOSCOPY      CORONARY ARTERY BYPASS GRAFT      GAB-LAD, sequential vein graft to OM1 and OM2, single VG-posterior descending   Onset: 2003     Social History   Social History     Substance and Sexual Activity Alcohol Use Yes    Alcohol/week: 21 0 standard drinks    Types: 21 Cans of beer per week     Social History     Substance and Sexual Activity   Drug Use No     Social History     Tobacco Use   Smoking Status Former Smoker    Packs/day: 1 00    Years: 40 00    Pack years: 40 00    Types: Cigarettes    Start date: 37    Quit date: 2012    Years since quittin 9   Smokeless Tobacco Never Used     Family History:   Family History   Problem Relation Age of Onset    Heart disease Mother     Stroke Father         stroke syndrome    Aneurysm Brother         abdominal    Aortic aneurysm Brother         ascending    Coronary artery disease Family     Hypertension Family     Other Son         gioblastoma multiforme       Review of Systems:  Review of Systems   All other systems reviewed and are negative            Scheduled Meds:  Current Facility-Administered Medications   Medication Dose Route Frequency Provider Last Rate    albuterol  2 puff Inhalation Q6H PRN Yamilet Binet, DO      aspirin  81 mg Oral Daily Yamilet Binet, DO      atorvastatin  40 mg Oral Daily With Dinner Yamilet Binet, DO      cholecalciferol  2,000 Units Oral Daily Yamilet Binet, DO      dextrose 5 % and sodium chloride 0 9 %  50 mL/hr Intravenous Continuous Yamilet Binet, DO 50 mL/hr (22)    furosemide  40 mg Oral HS Yamilet Binet, DO      gabapentin  600 mg Oral HS PRN Yamilet Binet, DO      labetalol  100 mg Oral BID Yamilet Binet, DO      tamsulosin  0 4 mg Oral Daily With Dinner Yamilet Binet, DO      tiZANidine  4 mg Oral BID PRN Yamilet Binet, DO       Continuous Infusions:dextrose 5 % and sodium chloride 0 9 %, 50 mL/hr, Last Rate: 50 mL/hr (22)      PRN Meds:   albuterol    gabapentin    tiZANidine  all current active meds have been reviewed    Allergies   Allergen Reactions    Meloxicam Edema       Objective   Vitals: Temp (24hrs), Av 8 °F (36 6 °C), Min:97 8 °F (36 6 °C), Max:97 8 °F (36 6 °C)  Current: Temperature: 97 8 °F (36 6 °C)  Patient Vitals for the past 24 hrs:   BP Temp Temp src Pulse Resp SpO2 Height Weight   07/28/22 0745 157/83 -- -- 81 18 96 % -- --   07/28/22 0743 -- -- -- -- -- -- 5' 11" (1 803 m) 111 kg (245 lb)   07/28/22 0700 128/69 -- -- 71 18 94 % -- --   07/28/22 0600 135/72 -- -- 92 18 94 % -- --   07/28/22 0415 131/72 -- -- 84 18 96 % -- --   07/28/22 0300 136/82 -- -- 78 18 96 % -- --   07/28/22 0100 140/80 -- -- 78 18 94 % -- --   07/28/22 0000 142/65 -- -- 82 18 95 % -- --   07/27/22 2300 132/70 -- -- 80 18 93 % -- --   07/27/22 2218 142/78 -- -- 81 -- -- -- --   07/27/22 2215 142/78 -- -- 84 18 95 % -- --   07/27/22 2123 136/67 -- -- 87 18 96 % -- --   07/27/22 1900 148/78 -- -- 74 18 94 % -- --   07/27/22 1753 152/76 97 8 °F (36 6 °C) Tympanic 83 20 95 % -- --    Body mass index is 34 17 kg/m²  Orthostatic Blood Pressures    Flowsheet Row Most Recent Value   Blood Pressure 157/83 filed at 07/28/2022 0745   Patient Position - Orthostatic VS Sitting filed at 07/27/2022 1753              Invasive Devices  Report    Peripheral Intravenous Line  Duration           Peripheral IV 07/27/22 Left Antecubital <1 day    Peripheral IV 07/27/22 Right Antecubital <1 day                Physical Exam:  Physical Exam  Constitutional:       Appearance: Normal appearance  He is obese  Cardiovascular:      Rate and Rhythm: Normal rate  Rhythm irregular  Pulses: Normal pulses  Heart sounds: Normal heart sounds  Pulmonary:      Effort: Pulmonary effort is normal       Breath sounds: Normal breath sounds  Musculoskeletal:      Right lower leg: Edema present  Left lower leg: Edema present  Skin:     General: Skin is warm and dry  Neurological:      Mental Status: He is alert               Lab Results:   Results from last 7 days   Lab Units 07/28/22  0411 07/27/22  1808 07/22/22  0759   WBC Thousand/uL 6 05 6 37 5 62   HEMOGLOBIN g/dL 13 6 13 9 14 8 HEMATOCRIT % 41 5 42 5 45 3   PLATELETS Thousands/uL 135* 135* 126*   NEUTROS PCT % 60 63 60   MONOS PCT % 14* 14* 14*      Results from last 7 days   Lab Units 07/28/22  0411 07/27/22  1808 07/22/22  0759   SODIUM mmol/L 136 135 137   POTASSIUM mmol/L 4 2 5 1 5 1   CHLORIDE mmol/L 103 103 103   CO2 mmol/L 30 27 30   BUN mg/dL 12 13 14   CREATININE mg/dL 1 04 1 06 1 12   CALCIUM mg/dL 9 3 9 1 9 3   ALK PHOS U/L 56 59 65   ALT U/L 28 33 37   AST U/L 23 30 30   MAGNESIUM mg/dL 2 2 2 0  --    INR  1 09  --   --    PTT seconds 28  --   --    EGFR ml/min/1 73sq m 67 65 61     Results from last 7 days   Lab Units 07/28/22 0411   INR  1 09   PTT seconds 28             No results found for: PHART, JIY4ONY, PO2ART, TLL7LDX, G8KKOZVR, BEART, SOURCE  No components found for: HIV1X2  No results found for: HAV, HEPAIGM, HEPBIGM, HEPBCAB, HBEAG, HEPCAB  No results found for: SPEP, UPEP   Lab Results   Component Value Date    HGBA1C 6 0 (H) 07/22/2022    HGBA1C 5 9 (H) 01/21/2022    HGBA1C 5 8 (H) 08/02/2021     Lab Results   Component Value Date    CHOL 124 10/15/2015    CHOL 159 04/01/2015    CHOL 155 09/30/2013      Lab Results   Component Value Date    HDL 56 07/28/2022    HDL 53 07/22/2022    HDL 58 01/21/2022      Lab Results   Component Value Date    LDLCALC 49 07/28/2022    LDLCALC 51 07/22/2022    [address] 01/21/2022      Lab Results   Component Value Date    TRIG 46 07/28/2022    TRIG 49 07/22/2022    TRIG 42 01/21/2022     No components found for: PROCAL          Imaging: I have personally reviewed pertinent reports          [facility]

## 2022-07-28 NOTE — ASSESSMENT & PLAN NOTE
Patient follows with pulmonology; previously on CPAP but discontinued secondary to discomfort  Continue following up with pulmonology; 2021 sleep study noted

## 2022-07-28 NOTE — OCCUPATIONAL THERAPY NOTE
Occupational Therapy Evaluation     Patient Name: Deonte Wolff  HBTPL'P Date: 7/28/2022  Problem List  Principal Problem:    Atrial fibrillation [facility])  Active Problems:    Severe obstructive sleep apnea    3-vessel CAD    Abdominal aortic aneurysm, without rupture (HCC)    Chronic diastolic congestive heart failure [facility])    Past Medical History  Past Medical History:   Diagnosis Date    Abdominal aortic aneurysm ([facility] )     Aneurysm of abdominal aorta ([facility] ) 4/14/2014    Aneurysm of ascending aorta ([facility] ) 9/7/2012    Chronic pain     back since July    Chronic pain disorder     lumbar    Coronary artery disease     History of transfusion     Hyperlipidemia     Hypertension     Hypertension 7/24/2019    Positive colorectal cancer screening using Cologuard test 4/9/2018    Sleep apnea     SOB (shortness of breath) 7/10/2019    Subconjunctival hemorrhage      Past Surgical History  Past Surgical History:   Procedure Laterality Date    ABDOMINAL AORTIC ANEURYSM REPAIR  08/09/2007    2 dock limbs with visc extens prosth    COLONOSCOPY      CORONARY ARTERY BYPASS GRAFT      GAB-LAD, sequential vein graft to OM1 and OM2, single VG-posterior descending   Onset: 2003 07/28/22 0830   OT Last Visit   OT Visit Date 07/28/22   Note Type   Note type Evaluation   Restrictions/Precautions   Weight Bearing Precautions Per Order No   Other Precautions Multiple lines;Telemetry   Pain Assessment   Pain Assessment Tool 0-10   Pain Score No Pain   Home Living   Type of [address] [place] [address] One level;Stairs to enter with rails  (3-4 BHUPENDRA)   Bathroom Shower/Tub Walk-in shower   Bathroom Toilet Raised   Bathroom Equipment Shower chair   [address]   (denies)   Prior Function   Level of [place] Independent with ADLs and functional mobility   Lives With Spouse   Receives Help From \[facility] [place] [facility] [address] in the last 6 months 0   Vocational Retired   Lifestyle   Autonomy pta, pt was I W ADL/IADL, no aD Mobility  +    Reciprocal Relationships supportive spouse who can A prn   Service to Others retired    Intrinsic Gratification keeping active in the home   Karla Fallon 19 (WDL) [address] "I feel pretty good"   ADL   Where Assessed Edge of bed   Eating Assistance 6  Modified independent   Grooming Assistance 6  [address] 6  Modified Independent   [address]  [address] [facility] [address]  Modified independent   [address] [facility] 5  [phone]  5  Supervision/Setup   Functional Assistance 5  Supervision/Setup   Bed Mobility   Additional Comments pt found and left in chair w all needs in reach   Transfers   Sit to Stand 6  Modified independent   Stand to Sit 6  Modified independent   Additional Comments no AD, G safety awareness and insight to condition t/o session  Functional Mobility   Functional Mobility 5  Supervision   Additional Comments no AD   Balance   Static Sitting Good   Dynamic Sitting Fair +   Static Standing Fair   Dynamic Standing Fair   Ambulatory Fair -   Activity Tolerance   Activity Tolerance Patient tolerated treatment well   Nurse Made Aware ok Shayla   RUE Assessment   RUE Assessment WFL   LUE Assessment   LUE Assessment WFL   Hand Function   Gross Motor Coordination Functional   Fine Motor Coordination Functional   Cognition   Overall Cognitive Status WFL   Arousal/Participation Alert; Responsive; Cooperative   Attention Within functional limits   Orientation Level Oriented X4   Memory Within functional limits   Following Commands Follows one step commands without difficulty   Comments pt pleasant and cooperative, G safety awareness and insight to condition t/o session  Assessment   Prognosis Good   Assessment Pt is a [de-identified] y o  male admitted 7/27/22 with atrial fibrillitation   PE study with no evidence of acute pulmonary embolus, no effusion, does have a known AAA  Chest XR with no focial consolidation, PE or pneumothorax  Pt w active OT eval and treat orders  PMH includes  has a past medical history of Abdominal aortic aneurysm ([address] ), Aneurysm of abdominal aorta (HCC), Aneurysm of ascending aorta (HCC), Chronic pain, Chronic pain disorder, Coronary artery disease, History of transfusion, Hyperlipidemia, Hypertension, Hypertension, Positive colorectal cancer screening using Cologuard test, Sleep apnea, SOB (shortness of breath), and Subconjunctival hemorrhage  Pt [facility] w [facility] a [facility] with 3-4 BHUPENDRA, reports walk in shower with shower chair and raised toilet  Pta, pt was independent w/ ADL/IADL and functional mobility, was driving and was not using any DME at baseline  Currently, pt is Mod I for UB ADL, S for LB ADL and completed transfers w mod I, fxnl mob w S without the use of AD  From OT standpoint, pt is functioning at baseline level and does not appear to require additional acute OT at this time  Discussed pt's comfort with d/c from OT and any concerns with returning to previous environment; pt does not report any at this time  The patient's raw score on the AM-PAC Daily Activity inpatient short form is 24, standardized score is 57 54, greater than 39 4  Patients at this level are likely to benefit from discharge to home  Please refer to the recommendation of the Occupational Therapist for safe discharge planning  From OT perspective, pt should D/C HOME when medically stable  Acute OT no longer rq at this time, D/C OT     Goals   Patient Goals to go home   Recommendation   OT Discharge Recommendation No rehabilitation needs   AM-PAC Daily Activity Inpatient   Lower Body Dressing 4   Bathing 4   Toileting 4   Upper Body Dressing 4   Grooming 4   Eating 4   Daily Activity Raw Score 24   Daily Activity Standardized Score (Calc for Raw Score >=11) 57 54   AM-PAC Applied Cognition Inpatient Following a Speech/Presentation 4   Understanding Ordinary Conversation 4   Taking Medications 4   Remembering Where Things Are Placed or Put Away 4   Remembering List of 4-5 Errands 4   Taking Care of Complicated Tasks 4   Applied Cognition Raw Score 24   Applied Cognition Standardized Score 62 21       Izabella Montes, MOT, OTR/L

## 2022-07-28 NOTE — ASSESSMENT & PLAN NOTE
-Chest pain of the bilateral lower chest that radiates to both arms that occurred twice earlier in the day after patient had been exerting himself  Never had this pain before  -on EKG noted to have atrial fibrillation with rate 75  Does not appear patient has previously had a history of AFib   -troponin negative times 2  -chest x-ray without evidence of widened mediastinum, noted cardiomegaly, mild blunting left costophrenic angle, no large infiltrate my read awaiting official read  -History of coronary artery bypass greater than 15 years ago; reports history of bleeding after procedure that was attributed to Plavix use and for which patient was intubated then  -History of diastolic heart failure with moderate to severe pulmonary hypertension; last echo in September 2020 with normal systolic function and LVEF 15%, grade 1 diastolic dysfunction  Noted pulmonary artery pressure had increased then  Noted Aortic dilatation    -History of ascending aortic aneurysm as well as abdominal aortic aneurysm status post repair  -History of severe sleep apnea and reports not wearing CPAP due to inability to breath in the sinuses  Plan  > admit to medicine on telemetry  Will order a D-dimer  If D-dimer positive, will perform CTA PE protocol with abdominal imaging  If D-dimer negative, will order CT chest/abdomen/pelvis without contrast   Trend troponin to ensure no rise  Repeat EKG as per protocol  Consult Cardiology for potential ischemic workup  Keep NPO overnight except for medication  Gentle IV fluids overnight with D5 NS at 50 cc/hour for a total of 500 cc    > hold PTA Lasix overnight and will plan to restart tomorrow  Continue PTA labetalol and aspirin  > with respect to patient's atrial fibrillation, his chads 2 Vasc score would recommend starting anticoagulation, but patient reports and unclear history of severe bleeding after he underwent his CABG after he was given Plavix    As a result, will hold off starting anticoagulation overnight and patient and wife aware of stroke risk and understanding  Appreciate cardiology's recommendations  If patient does have a PE overnight, will reengage patient and likely need to start systemic anticoagulation knowing the risks  > order TSH, consider pulmonary consultation in the morning regarding patient's YEVGENIY and not on CPAP as this could be another potential cause

## 2022-07-28 NOTE — ASSESSMENT & PLAN NOTE
-Chest pain of the bilateral lower chest that radiates to both arms that occurred twice earlier in the day after patient had been exerting himself  Never had this pain before  -on EKG noted to have atrial fibrillation with rate 75  Does not appear patient has previously had a history of AFib   -troponin negative x 2  -History of coronary artery bypass greater than 15 years ago; reports history of bleeding after procedure that was attributed to Plavix use and for which patient was intubated then  -History of diastolic heart failure with moderate to severe pulmonary hypertension; last echo in September 2020 with normal systolic function and LVEF 68%, grade 1 diastolic dysfunction  Noted pulmonary artery pressure had increased then  Noted Aortic dilatation    -History of ascending aortic aneurysm as well as abdominal aortic aneurysm status post repair  -History of severe sleep apnea and reports not wearing CPAP due to inability to breath in the sinuses  Plan  > admit to medicine on telemetry  Will order a D-dimer  If D-dimer positive, will perform CTA PE protocol with abdominal imaging  If D-dimer negative, will order CT chest/abdomen/pelvis without contrast   Trend troponin to ensure no rise  Repeat EKG as per protocol  Consult Cardiology for potential ischemic workup  Keep NPO overnight except for medication  Gentle IV fluids overnight with D5 NS at 50 cc/hour for a total of 500 cc    > hold PTA Lasix overnight and will plan to restart tomorrow  Continue PTA labetalol and aspirin  > with respect to patient's atrial fibrillation, his chads 2 Vasc score would recommend starting anticoagulation, but patient reports and unclear history of severe bleeding after he underwent his CABG after he was given Plavix  As a result, will hold off starting anticoagulation overnight and patient and wife aware of stroke risk and understanding  Appreciate cardiology's recommendations    If patient does have a PE overnight, will reengage patient and likely need to start systemic anticoagulation knowing the risks  > order TSH, consider pulmonary consultation in the morning regarding patient's YEVGENIY and not on CPAP as this could be another potential cause  7/28-patient seen by Cardiology, recommend starting Eliquis  Currently rate controlled atrial fibrillation on exam; denies any chest pain and troponins negative  Awaiting echocardiogram; discussed with Cardiology  May be stable for discharge today

## 2022-07-28 NOTE — ASSESSMENT & PLAN NOTE
Previous quadruple bypass; continue aspirin and statin  Troponins negative; echocardiogram pending  Await further Cardiology recommendations

## 2022-07-28 NOTE — PROGRESS NOTES
[address] [facility]  Progress Note - Deonte New 1940, [de-identified] y o  male MRN: [id number]  Unit/Bed#: -Anju Encounter: [id number]  Primary Care Provider: Skylar Wang MD   Date and time admitted to hospital: 7/27/2022  5:48 PM    * Atrial fibrillation [facility])  Assessment & Plan  -Chest pain of the bilateral lower chest that radiates to both arms that occurred twice earlier in the day after patient had been exerting himself  Never had this pain before  -on EKG noted to have atrial fibrillation with rate 75  Does not appear patient has previously had a history of AFib   -troponin negative x 2  -History of coronary artery bypass greater than 15 years ago; reports history of bleeding after procedure that was attributed to Plavix use and for which patient was intubated then  -History of diastolic heart failure with moderate to severe pulmonary hypertension; last echo in September 2020 with normal systolic function and LVEF 07%, grade 1 diastolic dysfunction  Noted pulmonary artery pressure had increased then  Noted Aortic dilatation    -History of ascending aortic aneurysm as well as abdominal aortic aneurysm status post repair  -History of severe sleep apnea and reports not wearing CPAP due to inability to breath in the sinuses  Plan  > admit to medicine on telemetry  Will order a D-dimer  If D-dimer positive, will perform CTA PE protocol with abdominal imaging  If D-dimer negative, will order CT chest/abdomen/pelvis without contrast   Trend troponin to ensure no rise  Repeat EKG as per protocol  Consult Cardiology for potential ischemic workup  Keep NPO overnight except for medication  Gentle IV fluids overnight with D5 NS at 50 cc/hour for a total of 500 cc    > hold PTA Lasix overnight and will plan to restart tomorrow    Continue PTA labetalol and aspirin  > with respect to patient's atrial fibrillation, his chads 2 Vasc score would recommend starting anticoagulation, but patient reports and unclear history of severe bleeding after he underwent his CABG after he was given Plavix  As a result, will hold off starting anticoagulation overnight and patient and wife aware of stroke risk and understanding  Appreciate cardiology's recommendations  If patient does have a PE overnight, will reengage patient and likely need to start systemic anticoagulation knowing the risks  > order TSH, consider pulmonary consultation in the morning regarding patient's YEVGENIY and not on CPAP as this could be another potential cause  7/28-patient seen by Cardiology, recommend starting Eliquis  Currently rate controlled atrial fibrillation on exam; denies any chest pain and troponins negative  Awaiting echocardiogram; discussed with Cardiology  May be stable for discharge today  Chronic diastolic congestive heart failure (HCC)  Assessment & Plan  Wt Readings from Last 3 Encounters:   07/28/22 111 kg (245 lb)   07/27/22 111 kg (245 lb)   05/13/22 116 kg (255 lb)       As above in AFib section      Abdominal aortic aneurysm, without rupture (HCC)  Assessment & Plan  No acute issues; CT chest abdomen pelvis revealed the following:  4 9 cm ascending thoracic aortic aneurysm slightly increased in size compared to prior study dated 7/10/2019    Will need follow-up in the outpatient setting; consider vascular surgery referral      3-vessel CAD  Assessment & Plan  Previous quadruple bypass; continue aspirin and statin  Troponins negative; echocardiogram pending  Await further Cardiology recommendations    Severe obstructive sleep apnea  Assessment & Plan  Patient follows with pulmonology; previously on CPAP but discontinued secondary to discomfort  Continue following up with pulmonology; 2021 sleep study noted      VTE Pharmacologic Prophylaxis:   Pharmacologic: Apixaban (Eliquis)  Mechanical VTE Prophylaxis in Place: Yes    Patient Centered Rounds: I have performed bedside rounds with nursing staff today     Discussions with Specialists or Other Care Team Provider:  Cardiology, vascular surgery    Education and Discussions with Family / Patient: Discussed treatment plan with family and patient who agree with current plan; encouraged to ask questions and participate  Time Spent for Care: 45 minutes  More than 50% of total time spent on counseling and coordination of care as described above  Current Length of Stay: 0 day(s)    Current Patient Status: Observation   Certification Statement: The patient will continue to require additional inpatient hospital stay due to AFib and chest pain    Discharge Plan: To be determined, possibly today    Code Status: Level 3 - DNAR and DNI      Subjective:   Patient seen examined bedside, no acute distress or discomfort noted  Patient states he is currently chest pain-free with no shortness of breath or any other sequela  Troponins negative; seen by Cardiology today and started on Eliquis for AFib  Awaiting echo and will discuss with Cardiology further  Concerning regarding enlarged thoracic aortic aneurysm; will discuss with vascular surgery as well  May be stable for discharge today pending Cardiology recommendations  Objective:     Vitals:   Temp (24hrs), Av 8 °F (36 6 °C), Min:97 8 °F (36 6 °C), Max:97 8 °F (36 6 °C)    Temp:  [97 8 °F (36 6 °C)] 97 8 °F (36 6 °C)  HR:  [71-92] 78  Resp:  [17-20] 17  BP: (128-157)/(65-85) 153/85  SpO2:  [93 %-96 %] 95 %  Body mass index is 34 17 kg/m²  Input and Output Summary (last 24 hours):     No intake or output data in the 24 hours ending 22 1445    Physical Exam:     Physical Exam  Vitals and nursing note reviewed  Constitutional:       Appearance: He is well-developed  HENT:      Head: Normocephalic and atraumatic  Eyes:      Conjunctiva/sclera: Conjunctivae normal    Cardiovascular:      Rate and Rhythm: Normal rate  Rhythm irregular  Heart sounds: No murmur heard    Pulmonary: Effort: Pulmonary effort is normal  No respiratory distress  Breath sounds: Normal breath sounds  Abdominal:      Palpations: Abdomen is soft  Tenderness: There is no abdominal tenderness  Musculoskeletal:      Cervical back: Neck supple  Skin:     General: Skin is warm and dry  Neurological:      Mental Status: He is alert and oriented to person, place, and time  Psychiatric:         Behavior: Behavior normal            Additional Data:     Labs:    Results from last 7 days   Lab Units 07/28/22  0411   WBC Thousand/uL 6 05   HEMOGLOBIN g/dL 13 6   HEMATOCRIT % 41 5   PLATELETS Thousands/uL 135*   NEUTROS PCT % 60   LYMPHS PCT % 22   MONOS PCT % 14*   EOS PCT % 3     Results from last 7 days   Lab Units 07/28/22  0411   SODIUM mmol/L 136   POTASSIUM mmol/L 4 2   CHLORIDE mmol/L 103   CO2 mmol/L 30   BUN mg/dL 12   CREATININE mg/dL 1 04   ANION GAP mmol/L 3*   CALCIUM mg/dL 9 3   ALBUMIN g/dL 3 8   TOTAL BILIRUBIN mg/dL 1 87*   ALK PHOS U/L 56   ALT U/L 28   AST U/L 23   GLUCOSE RANDOM mg/dL 92     Results from last 7 days   Lab Units 07/28/22  0411   INR  1 09     Results from last 7 days   Lab Units 07/28/22  1045 07/28/22  0410   POC GLUCOSE mg/dl 105 91     Results from last 7 days   Lab Units 07/22/22  0759   HEMOGLOBIN A1C % 6 0*               * I Have Reviewed All Lab Data Listed Above  * Additional Pertinent Lab Tests Reviewed:  All Labs Within Last 24 Hours Reviewed    Imaging:    Imaging Reports Reviewed Today Include:  CT chest abdomen pelvis  Imaging Personally Reviewed by Myself Includes:      Recent Cultures (last 7 days):           Last 24 Hours Medication List:   Current Facility-Administered Medications   Medication Dose Route Frequency Provider Last Rate    albuterol  2 puff Inhalation Q6H PRN Guido Domingo DO      apixaban  5 mg Oral BID Pam Allen MD      aspirin  81 mg Oral Daily Guido Domingo DO      atorvastatin  40 mg Oral Daily With [id number], [facility]      cholecalciferol  2,000 Units Oral Daily Ernesta Roof, DO      furosemide  40 mg Oral HS Ernesta Roof, DO      gabapentin  600 mg Oral HS PRN Ernesta Roof, DO      labetalol  100 mg Oral BID Ernesta Roof, DO      tamsulosin  0 4 mg Oral Daily With Dinner Ernesta Roof, DO      tiZANidine  4 mg Oral BID PRN Ernesta Roof, DO          Today, Patient Was Seen By: Lord Tara MD    ** Please Note: Dictation voice to text software may have been used in the creation of this document   **

## 2022-07-28 NOTE — ASSESSMENT & PLAN NOTE
No acute issues; CT chest abdomen pelvis revealed the following:  4 9 cm ascending thoracic aortic aneurysm slightly increased in size compared to prior study dated 7/10/2019    Will need follow-up in the outpatient setting; consider vascular surgery referral

## 2022-07-28 NOTE — ASSESSMENT & PLAN NOTE
Wt Readings from Last 3 Encounters:   07/28/22 111 kg (245 lb)   07/27/22 111 kg (245 lb)   05/13/22 116 kg (255 lb)       As above in AFib section

## 2022-07-28 NOTE — PLAN OF CARE
Problem: PHYSICAL THERAPY ADULT  Goal: Performs mobility at highest level of function for planned discharge setting  See evaluation for individualized goals  Description: Treatment/Interventions: LE strengthening/ROM, Elevations, Therapeutic exercise, Endurance training, Bed mobility, Gait training, Spoke to nursing, OT          See flowsheet documentation for full assessment, interventions and recommendations  Note: Prognosis: Good  Problem List: Decreased strength, Decreased endurance, Impaired balance, Decreased mobility  Assessment: Pt is [de-identified] y o  male admitted with hx of CP and Dx of Atrial fibrillation  Pt 's comorbidities affecting POC include: Abdominal aortic aneurysm (HCC), Chronic pain, Coronary artery disease, Hypertension, Hypertension, Sleep apnea, and Subconjunctival hemorrhage and personal factors of: advanced age and BHUPENDRA  Pt's clinical presentation is currently unstable/unpredictable which is evident in ongoing telem monitoring, abn lab values and need for stand by assist w/ amb when usually mobilizing independently  Pt presents w/ min overall weakness, decreased endurance and inconsistent amb balance and gait patterns but w/ overall mobility status on level surfaces and elevations appearing to be near premorbid level  Will cont to follow pt in PT for progressive mobilization to max level of (I), endurance, and safety  Otherwise, anticipate pt will return home w/ available family support upon D/C when medically cleared; an OP PT follow up may need to be considered for general reconditioning; will follow  PT Discharge Recommendation: Home with outpatient rehabilitation (cardiac rehab when medically cleared)    See flowsheet documentation for full assessment

## 2022-07-28 NOTE — DISCHARGE INSTRUCTIONS
Please remember to take all medications as directed on your medication list and follow-up with your primary care provider in 1-2 weeks  You are encouraged to keep your med list on your person (in your wallet or purse) and please take your medication list provided in this After Visit Summary to your PCP visit following discharge  Please ask your nurse to show you the medication list and explain when to take your medications  Additionally, we encourage all patient's to take their actual medications with them to their primary care visit! This is to verify you have the proper medications and the proper dosages  Remember, while medications are often listed in your computer record; that may not always be right as mistakes can occur at the pharmacy or in the computer and sometimes old medication bottles can be mistaken for newer medications      (Note to nursing: please place patient's medication list on top of the AVS )

## 2022-07-28 NOTE — PHYSICAL THERAPY NOTE
Physical Therapy Evaluation     Patient's Name: Xin Waters    Admitting Diagnosis  Chest pain [R07 9]  SOB (shortness of breath) [R06 02]  New onset atrial fibrillation (HCC) [I48 91]    Problem List  Patient Active Problem List   Diagnosis    Benign essential hypertension    Hyperlipidemia    Impaired fasting glucose    Microscopic hematuria    Severe obesity (BMI 35 0-35 9 with comorbidity) (HCC)    Severe obstructive sleep apnea    Subclinical hypothyroidism    3-vessel CAD    Abdominal aortic aneurysm, without rupture (HCC)    Aortic valve disorder    Arteriosclerotic cardiovascular disease    Disc degeneration, lumbar    Herniated lumbar intervertebral disc    Lumbar radiculopathy    Intervertebral disc disorder with radiculopathy of lumbar region    Spondylolisthesis at L4-L5 level    Chronic pain syndrome    Multiple pulmonary nodules    Chronic diastolic congestive heart failure (HCC)    Chronic obstructive pulmonary disease (HCC)    Moderate to severe pulmonary hypertension (HCC)    Nocturnal hypoxemia    Ascending aortic aneurysm (HCC)    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)    Platelets decreased (HCC)    Myofascial pain syndrome    Atrial fibrillation [facility])       Past Medical History  Past Medical History:   Diagnosis Date    Abdominal aortic aneurysm ([facility] [address] )     Aneurysm of abdominal aorta ([facility] [address] ) 4/14/2014    Aneurysm of ascending aorta ([facility] [address] ) 9/7/2012    Chronic pain     back since July    Chronic pain disorder     lumbar    Coronary artery disease     History of transfusion     Hyperlipidemia     Hypertension     Hypertension 7/24/2019    Positive colorectal cancer screening using Cologuard test 4/9/2018    Sleep apnea     SOB (shortness of breath) 7/10/2019    Subconjunctival hemorrhage        Past Surgical History  Past Surgical History:   Procedure Laterality Date    ABDOMINAL AORTIC ANEURYSM REPAIR  08/09/2007    2 dock limbs with visc extens prosth    COLONOSCOPY      CORONARY ARTERY BYPASS GRAFT      GAB-LAD, sequential vein graft to OM1 and OM2, single VG-posterior descending  Onset: 2003 07/28/22 0831   PT Last Visit   PT Visit Date 07/28/22   Note Type   Note type Evaluation   Pain Assessment   Pain Assessment Tool 0-10   Pain Score No Pain   Restrictions/Precautions   Braces or Orthoses   (denies)   Other Precautions Cardiac/sternal;Multiple lines;Telemetry   Home Living   Type of [address] One level  (3 BHUPENDRA w/ hand rail (garage))   Prior Function   Level of [place] Independent with ADLs and functional mobility  (amb w/o AD)   Lives With Spouse   General   Additional Pertinent History cleared for assessment (spoke to nsg)   Cognition   Overall Cognitive Status WFL   Arousal/Participation Alert   Orientation Level Oriented to person;Oriented to place;Oriented to situation   Memory Within functional limits   Following Commands Follows all commands and directions without difficulty   Subjective   Subjective Alert; in the chair; denies CP or palpitations; agreeable to demonstrate mobility skills   RUE Assessment   RUE Assessment WFL  (AROM)   LUE Assessment   LUE Assessment WFL  (AROM)   RLE Assessment   RLE Assessment WFL  (AROM)   Strength RLE   RLE Overall Strength   (good - (grossly))   LLE Assessment   LLE Assessment WFL  (AROM)   Strength LLE   LLE Overall Strength   (good - (grossly))   Transfers   Sit to Stand 6  Modified independent   Stand to Sit 6  Modified independent   Ambulation/Elevation   Gait pattern Short stride; Inconsistent lamont   Gait Assistance 5  Supervision   Additional items Assist x 1;Verbal cues   Assistive Device None   Distance 100 ft   Balance   Static Sitting Good   Dynamic Sitting Fair +   Static Standing Fair   Dynamic Standing Fair   Ambulatory Fair -   Activity Tolerance   Activity Tolerance Patient tolerated treatment well  (HR in the 80s bpm)   Nurse Made Aware spoke to RN   Assessment   Prognosis Good   Problem List Decreased strength;Decreased endurance; Impaired balance;Decreased mobility   Assessment Pt is [de-identified] y o  male admitted with hx of CP and Dx of Atrial fibrillation  Pt 's comorbidities affecting POC include: Abdominal aortic aneurysm (HCC), Chronic pain, Coronary artery disease, Hypertension, Hypertension, Sleep apnea, and Subconjunctival hemorrhage and personal factors of: advanced age and BHUPENDRA  Pt's clinical presentation is currently unstable/unpredictable which is evident in ongoing telem monitoring, abn lab values and need for stand by assist w/ amb when usually mobilizing independently  Pt presents w/ min overall weakness, decreased endurance and inconsistent amb balance and gait patterns but w/ overall mobility status on level surfaces and elevations appearing to be near premorbid level  Will cont to follow pt in PT for progressive mobilization to max level of (I), endurance, and safety  Otherwise, anticipate pt will return home w/ available family support upon D/C when medically cleared; an OP PT follow up may need to be considered for general reconditioning; will follow  Goals   Patient Goals to go home   STG Expiration Date 08/07/22   Short Term Goal #1 7-10 days  Pt will amb 400 ft w/o assistive device, (I) in order to facilitate safe return to premorbid environment and community amb status  Pt will negotiate 3 steps w/ hand rail, (S)x1 in order to assure safe navigation in and out of the premorbid living environment  Pt will achieve (I) level w/ bed mob in order to facilitate safety with OOB and back to bed transitions in own living environment  Pt will participate in LE therex and balance activities to max progression w/ mobility skills  PT Treatment Day 0   Plan   Treatment/Interventions LE strengthening/ROM; Elevations; Therapeutic exercise; Endurance training;Bed mobility;Gait training;Spoke to nursing;OT   PT Frequency 3-5x/wk   Recommendation   PT Discharge Recommendation Home with outpatient rehabilitation  (cardiac rehab when medically cleared)   Sam Baut[address]ashrey[address]   Turning in Bed Without Bedrails 4   Lying on Back to Sitting on Edge of Flat Bed 4   Moving Bed to Chair 4   Standing Up From Chair 4   Walk in Room 3   Climb 3-5 Stairs 3   Basic Mobility Inpatient Raw Score 22   Basic Mobility Standardized Score 47 4   Highest Level Of Mobility   -HLM Goal 7: Walk 25 feet or more   JH-HLM Achieved 7: Walk 25 feet or more   Modified [place] Scale   Modified [place] Scale 3   End of Consult   Patient Position at End of Consult Bedside chair; All needs within reach           [facility], PT

## 2022-07-28 NOTE — H&P
[facility]  H&P- Mata Bowden 1940, [de-identified] y o  male MRN: [id number]  Unit/Bed#: ED 11 Encounter: [id number]  Primary Care Provider: Mame Beck MD   Date and time admitted to hospital: 7/27/2022  5:48 PM    * Atrial fibrillation [facility])  Assessment & Plan  -Chest pain of the bilateral lower chest that radiates to both arms that occurred twice earlier in the day after patient had been exerting himself  Never had this pain before  -on EKG noted to have atrial fibrillation with rate 75  Does not appear patient has previously had a history of AFib   -troponin negative times 2  -chest x-ray without evidence of widened mediastinum, noted cardiomegaly, mild blunting left costophrenic angle, no large infiltrate my read awaiting official read  -History of coronary artery bypass greater than 15 years ago; reports history of bleeding after procedure that was attributed to Plavix use and for which patient was intubated then  -History of diastolic heart failure with moderate to severe pulmonary hypertension; last echo in September 2020 with normal systolic function and LVEF 15%, grade 1 diastolic dysfunction  Noted pulmonary artery pressure had increased then  Noted Aortic dilatation    -History of ascending aortic aneurysm as well as abdominal aortic aneurysm status post repair  -History of severe sleep apnea and reports not wearing CPAP due to inability to breath in the sinuses  Plan  > admit to medicine on telemetry  Will order a D-dimer  If D-dimer positive, will perform CTA PE protocol with abdominal imaging  If D-dimer negative, will order CT chest/abdomen/pelvis without contrast   Trend troponin to ensure no rise  Repeat EKG as per protocol  Consult Cardiology for potential ischemic workup  Keep NPO overnight except for medication  Gentle IV fluids overnight with D5 NS at 50 cc/hour for a total of 500 cc    > hold PTA Lasix overnight and will plan to restart tomorrow  Continue PTA labetalol and aspirin  > with respect to patient's atrial fibrillation, his chads 2 Vasc score would recommend starting anticoagulation, but patient reports and unclear history of severe bleeding after he underwent his CABG after he was given Plavix  As a result, will hold off starting anticoagulation overnight and patient and wife aware of stroke risk and understanding  Appreciate cardiology's recommendations  If patient does have a PE overnight, will reengage patient and likely need to start systemic anticoagulation knowing the risks  > order TSH, consider pulmonary consultation in the morning regarding patient's YEVGENIY and not on CPAP as this could be another potential cause  Chronic diastolic congestive heart failure (HCC)  Assessment & Plan  Wt Readings from Last 3 Encounters:   07/27/22 111 kg (245 lb)   05/13/22 116 kg (255 lb)   04/13/22 116 kg (255 lb)       As above in AFib section      Abdominal aortic aneurysm, without rupture ([facility] [address] )  Assessment & Plan  As above in AFib section    3-vessel CAD  Assessment & Plan  As above in AFib section    Severe obstructive sleep apnea  Assessment & Plan  As above in AFib section          VTE Prophylaxis:  sequential compression device   Code Status: Level 3 - DNAR and DNI     Anticipated Length of Stay:  Patient will be admitted on an Observation basis with an anticipated length of stay of  < 2 midnights  Justification for Hospital Stay: Please see detailed plans noted above      Chief Complaint:     Chest pain, new onset AFib  History of Present Illness:  Uriel Turk is a [de-identified] y o  male who has past medical history significant for heart failure with severe pulmonary hypertension, ascending aortic aneurysm, abdominal aortic aneurysm status post repair, severe sleep apnea, coronary artery bypass graft > 15 years ago who presented to [facility] ER on the evening of 7/27 with symptoms of bilateral lower chest pain that radiated to both arms which occurred twice earlier in the day after patient had been exerting himself  Patient reports that he has never had this pain before and in fact reports that he has not had any chest pain since his coronary artery bypass graft greater than 15 years ago  Patient reports that when he underwent his CABG in the past he was having symptoms of severe shortness of breath  Patient reports when he underwent his CABG in the past though he had severe bleeding after the surgery that was attributed to [address]  Patient reports having to be intubated then  Patient does endorse shortness of breath with exertion on my questioning  Patient denies any history of atrial fibrillation  Patient does report that he has a history of severe sleep apnea but that he is unable to wear a CPAP as he reports he is not able to breathe due to sinus issues  Patient was asked about any recent GI bleeding which she denied        Review of Systems:    Constitutional:  Denies fever or chills   Eyes:  Denies change in visual acuity   HENT:  Denies nasal congestion or sore throat   Respiratory:  Denies cough   Cardiovascular:  Positive for chest pain   GI:  Denies abdominal pain or bloody stools  :  Denies dysuria   Musculoskeletal:  Denies back pain or joint pain   Integument:  Denies rash   Neurologic:  Denies headache or sensory changes   Endocrine:  Denies polyuria or polydipsia   Lymphatic:  Denies swollen glands   Psychiatric:  Denies depression or anxiety     Past Medical and Surgical History:   Past Medical History:   Diagnosis Date    Abdominal aortic aneurysm (HCC)     Aneurysm of abdominal aorta ([facility] [address] ) 4/14/2014    Aneurysm of ascending aorta ([facility] [address] ) 9/7/2012    Chronic pain     back since July    Chronic pain disorder     lumbar    Coronary artery disease     History of transfusion     Hyperlipidemia     Hypertension     Hypertension 7/24/2019    Positive colorectal cancer screening using Cologuard test 4/9/2018    Sleep apnea     SOB (shortness of breath) 7/10/2019    Subconjunctival hemorrhage      Past Surgical History:   Procedure Laterality Date    ABDOMINAL AORTIC ANEURYSM REPAIR  2007    2 dock limbs with visc extens prosth    COLONOSCOPY      CORONARY ARTERY BYPASS GRAFT      GAB-LAD, sequential vein graft to OM1 and OM2, single VG-posterior descending  Onset:        Meds/Allergies:  (Not in a hospital admission)      Allergies:    Allergies   Allergen Reactions    Meloxicam Edema     History:  Marital Status: /Civil Union     Substance Use History:   Social History     Substance and Sexual Activity   Alcohol Use Yes    Alcohol/week: 21 0 standard drinks    Types: 21 Cans of beer per week     Social History     Tobacco Use   Smoking Status Former Smoker    Packs/day: 1 00    Years: 40 00    Pack years: 40 00    Types: Cigarettes    Start date:     Quit date: 2012    Years since quittin 9   Smokeless Tobacco Never Used     Social History     Substance and Sexual Activity   Drug Use No       Family History:  Family History   Problem Relation Age of Onset    Heart disease Mother     Stroke Father         stroke syndrome    Aneurysm Brother         abdominal    Aortic aneurysm Brother         ascending    Coronary artery disease Family     Hypertension Family     Other Son         gioblastoma multiforme       Physical Exam:     Vitals:   Blood Pressure: 136/67 (22)  Pulse: 87 (22)  Temperature: 97 8 °F (36 6 °C) (22)  Temp Source: Tympanic (22)  Respirations: 18 (22)  SpO2: 96 % (22)    Constitutional:  A&O x4, normal conversation  Eyes:  EOMI, No scleral icterus   HENT:   oropharynx moist, external ears normal, external nose normal   Respiratory:  No respiratory distress, no wheezing   Cardiovascular:  Irregularly irregular rate, no murmurs   GI:  Soft, nondistended, no guarding   :  No costovertebral angle tenderness   Musculoskeletal:  Bilateral lower extremity edema  Back- no tenderness  Integument:  no jaundice, no rash   Neurologic:  Alert &awake, communicative, CN 2-12 normal,  no focal deficits noted   Psychiatric:  Speech and behavior appropriate       Lab Results: I have personally reviewed pertinent reports  Results from last 7 days   Lab Units 07/27/22  1808   WBC Thousand/uL 6 37   HEMOGLOBIN g/dL 13 9   HEMATOCRIT % 42 5   PLATELETS Thousands/uL 135*   NEUTROS PCT % 63   LYMPHS PCT % 18   MONOS PCT % 14*   EOS PCT % 3     Results from last 7 days   Lab Units 07/27/22  1808   POTASSIUM mmol/L 5 1   CHLORIDE mmol/L 103   CO2 mmol/L 27   BUN mg/dL 13   CREATININE mg/dL 1 06   CALCIUM mg/dL 9 1   ALK PHOS U/L 59   ALT U/L 33   AST U/L 30             Imaging: I have personally reviewed pertinent reports  No results found  ** Please Note: Dragon 360 Dictation voice to text software was used in the creation of this document   **

## 2022-07-28 NOTE — ASSESSMENT & PLAN NOTE
-Chest pain of the bilateral lower chest that radiates to both arms that occurred twice earlier in the day after patient had been exerting himself  Never had this pain before  -on EKG noted to have atrial fibrillation with rate 75  Does not appear patient has previously had a history of AFib   -troponin negative x 2  -History of coronary artery bypass greater than 15 years ago; reports history of bleeding after procedure that was attributed to Plavix use and for which patient was intubated then  -History of diastolic heart failure with moderate to severe pulmonary hypertension; last echo in September 2020 with normal systolic function and LVEF 10%, grade 1 diastolic dysfunction  Noted pulmonary artery pressure had increased then  Noted Aortic dilatation    -History of ascending aortic aneurysm as well as abdominal aortic aneurysm status post repair  -History of severe sleep apnea and reports not wearing CPAP due to inability to breath in the sinuses  Plan  > admit to medicine on telemetry  Will order a D-dimer  If D-dimer positive, will perform CTA PE protocol with abdominal imaging  If D-dimer negative, will order CT chest/abdomen/pelvis without contrast   Trend troponin to ensure no rise  Repeat EKG as per protocol  Consult Cardiology for potential ischemic workup  Keep NPO overnight except for medication  Gentle IV fluids overnight with D5 NS at 50 cc/hour for a total of 500 cc    > hold PTA Lasix overnight and will plan to restart tomorrow  Continue PTA labetalol and aspirin  > with respect to patient's atrial fibrillation, his chads 2 Vasc score would recommend starting anticoagulation, but patient reports and unclear history of severe bleeding after he underwent his CABG after he was given Plavix  As a result, will hold off starting anticoagulation overnight and patient and wife aware of stroke risk and understanding  Appreciate cardiology's recommendations    If patient does have a PE overnight, will reengage patient and likely need to start systemic anticoagulation knowing the risks  > order TSH, consider pulmonary consultation in the morning regarding patient's YEVGENIY and not on CPAP as this could be another potential cause  7/28-patient seen by Cardiology, recommend starting Eliquis  Currently rate controlled atrial fibrillation on exam; denies any chest pain and troponins negative  Awaiting echocardiogram; discussed with Cardiology  May be stable for discharge today

## 2022-08-04 ENCOUNTER — RA CDI HCC (OUTPATIENT)
Dept: OTHER | Facility: HOSPITAL | Age: 82
End: 2022-08-04

## 2022-08-04 NOTE — PROGRESS NOTES
[facility]  coding opportunities          Chart Reviewed number of suggestions sent to Provider: 1   I11 [id number]    Patients Insurance     Medicare Insurance: [employer]

## 2022-08-08 NOTE — ED ATTENDING ATTESTATION
7/27/2022  ISantino MD, saw and evaluated the patient  I have discussed the patient with the resident/non-physician practitioner and agree with the resident's/non-physician practitioner's findings, Plan of Care, and MDM as documented in the resident's/non-physician practitioner's note, except where noted  All available labs and Radiology studies were reviewed  I was present for key portions of any procedure(s) performed by the resident/non-physician practitioner and I was immediately available to provide assistance  At this point I agree with the current assessment done in the Emergency Department  I have conducted an independent evaluation of this patient a history and physical is as follows:    ED Course     Patient presents for evaluation after having 2 isolated episodes today while at rest   Patient states that he was a passenger in the car when he suddenly developed chest pain and bilateral arm heaviness that he described as a straight line across the middle of his chest   Symptoms lasts several minutes and resolved spontaneously  Patient had a 2nd episode while at home sitting on his patio  Episode was similar with arm heaviness and the pain traveling across his chest   Patient denies any palpitations, lightheadedness, or shortness of breath  No current complaints  Exam: AAOx3, NAD, IRRR, CTA, S/NT/ND, no motor/sensory deficits  A/P:  Chest pain  Will talk with patient, I noticed that his rhythm on the monitor was AFib  Patient denies any history of AFib will check cardiac labs and will plan to admit for new onset AFib      Critical Care Time  Procedures

## 2022-08-11 ENCOUNTER — OFFICE VISIT (OUTPATIENT)
Dept: INTERNAL MEDICINE CLINIC | Facility: CLINIC | Age: 82
End: 2022-08-11
Payer: MEDICARE

## 2022-08-11 VITALS
HEART RATE: 87 BPM | DIASTOLIC BLOOD PRESSURE: 64 MMHG | OXYGEN SATURATION: 94 % | BODY MASS INDEX: 34.61 KG/M2 | WEIGHT: 247.2 LBS | TEMPERATURE: 97 F | RESPIRATION RATE: 16 BRPM | SYSTOLIC BLOOD PRESSURE: 100 MMHG | HEIGHT: 71 IN

## 2022-08-11 DIAGNOSIS — I25.10 ARTERIOSCLEROTIC CARDIOVASCULAR DISEASE: ICD-10-CM

## 2022-08-11 DIAGNOSIS — I10 BENIGN ESSENTIAL HYPERTENSION: ICD-10-CM

## 2022-08-11 DIAGNOSIS — I48.91 ATRIAL FIBRILLATION, UNSPECIFIED TYPE (HCC): Primary | ICD-10-CM

## 2022-08-11 DIAGNOSIS — I71.40 ABDOMINAL AORTIC ANEURYSM, WITHOUT RUPTURE: ICD-10-CM

## 2022-08-11 PROCEDURE — 99214 OFFICE O/P EST MOD 30 MIN: CPT | Performed by: INTERNAL MEDICINE

## 2022-08-11 RX ORDER — LISINOPRIL 2.5 MG/1
5 TABLET ORAL DAILY
Qty: 90 TABLET | Refills: 3 | Status: SHIPPED | OUTPATIENT
Start: 2022-08-11 | End: 2022-08-26

## 2022-08-11 NOTE — PROGRESS NOTES
Assessment/Plan:    Benign essential hypertension  BP low normal  Advised to reduce lisinopril to 2 5mg and continue checking BP  F/U with cardiology as scheduled    Atrial fibrillation ([facility] [address] )  New dx, rate is normal  Continue labetalol  Now on ELiquis         Problem List Items Addressed This Visit        Cardiovascular and Mediastinum    Arteriosclerotic cardiovascular disease    Abdominal aortic aneurysm, without rupture ([facility] [address] )    Benign essential hypertension     BP low normal  Advised to reduce lisinopril to 2 5mg and continue checking BP  F/U with cardiology as scheduled         Relevant Medications    lisinopril (ZESTRIL) 2 5 mg tablet    Atrial fibrillation (HCC) - Primary     New dx, rate is normal  Continue labetalol  Now on ELiquis         Relevant Medications    apixaban (ELIQUIS) 5 mg            Subjective:      Patient ID: Rochelle Lopez is a 81 y o  male  HPI  Here with his wife  He was experiencing dizziness for a few days and was seen for a routine f/u on 7/27  His BP was low normal and was encouraged to improve hydration  A few hours later, he had a pain across his chest that recurred after a few hours  He went to the ER and was dx with afib HR 75 on EKG  He was placed on Eliquis  He is feeling better although home BP running low normal    Also with chronic LE edema and just today noticed a blister on the right leg    The following portions of the patient's history were reviewed and updated as appropriate: allergies, current medications, past family history, past medical history, past social history, past surgical history and problem list     Review of Systems   Constitutional: Negative for chills, fatigue, fever and unexpected weight change  Respiratory: Negative for cough and shortness of breath  Cardiovascular: Positive for leg swelling  Negative for chest pain and palpitations  Gastrointestinal: Negative for abdominal pain, constipation, diarrhea, nausea and vomiting     Genitourinary: Negative for difficulty urinating  Neurological: Negative for dizziness and headaches  Objective:      /64   Pulse 87   Temp (!) 97 °F (36 1 °C)   Resp 16   Ht 5' 11" (1 803 m)   Wt 112 kg (247 lb 3 2 oz)   SpO2 94%   BMI 34 48 kg/m²          Physical Exam  Constitutional:       General: He is not in acute distress  Appearance: He is well-developed  He is obese  He is not ill-appearing, toxic-appearing or diaphoretic  HENT:      Head: Normocephalic and atraumatic  Eyes:      Conjunctiva/sclera: Conjunctivae normal    Cardiovascular:      Rate and Rhythm: Normal rate and regular rhythm  Heart sounds: Normal heart sounds  No murmur heard  Pulmonary:      Effort: Pulmonary effort is normal  No respiratory distress  Breath sounds: Normal breath sounds  No wheezing or rales  Abdominal:      General: There is no distension  Palpations: Abdomen is soft  There is no mass  Tenderness: There is no abdominal tenderness  There is no guarding or rebound  Musculoskeletal:      Cervical back: Neck supple  Right lower leg: Edema (blister on the lower shin filled with clear fluid) present  Left lower leg: Edema present  Neurological:      Mental Status: He is alert and oriented to person, place, and time  Psychiatric:         Mood and Affect: Mood normal          Behavior: Behavior normal          Thought Content:  Thought content normal          Judgment: Judgment normal

## 2022-08-14 NOTE — ASSESSMENT & PLAN NOTE
BP low normal  Advised to reduce lisinopril to 2 5mg and continue checking BP   F/U with cardiology as scheduled

## 2022-08-26 ENCOUNTER — OFFICE VISIT (OUTPATIENT)
Dept: CARDIAC SURGERY | Facility: CLINIC | Age: 82
End: 2022-08-26
Payer: MEDICARE

## 2022-08-26 VITALS
DIASTOLIC BLOOD PRESSURE: 68 MMHG | HEIGHT: 71 IN | HEART RATE: 80 BPM | OXYGEN SATURATION: 94 % | SYSTOLIC BLOOD PRESSURE: 123 MMHG | BODY MASS INDEX: 34.51 KG/M2 | WEIGHT: 246.5 LBS | TEMPERATURE: 98.3 F

## 2022-08-26 DIAGNOSIS — I71.21 ASCENDING AORTIC ANEURYSM: Primary | ICD-10-CM

## 2022-08-26 DIAGNOSIS — M51.16 INTERVERTEBRAL DISC DISORDER WITH RADICULOPATHY OF LUMBAR REGION: ICD-10-CM

## 2022-08-26 DIAGNOSIS — I10 BENIGN ESSENTIAL HYPERTENSION: ICD-10-CM

## 2022-08-26 DIAGNOSIS — M54.16 LUMBAR RADICULOPATHY: ICD-10-CM

## 2022-08-26 PROCEDURE — 99214 OFFICE O/P EST MOD 30 MIN: CPT | Performed by: PHYSICIAN ASSISTANT

## 2022-08-26 RX ORDER — LISINOPRIL 2.5 MG/1
2.5 TABLET ORAL DAILY
Start: 2022-08-26 | End: 2022-09-14

## 2022-08-26 RX ORDER — GABAPENTIN 300 MG/1
CAPSULE ORAL
Qty: 270 CAPSULE | Refills: 3
Start: 2022-08-26

## 2022-08-26 RX ORDER — GENTAMICIN SULFATE 1 MG/G
CREAM TOPICAL
COMMUNITY
Start: 2022-08-16

## 2022-08-26 RX ORDER — CLOBETASOL PROPIONATE 0.5 MG/G
CREAM TOPICAL
COMMUNITY
Start: 2022-08-16

## 2022-08-26 NOTE — PROGRESS NOTES
Aortic Surveillance - Cardiothoracic Surgery   Can Francis 81 y o  male MRN: [id number]    History of Present Illness: Can Francis is a 81y o  year old male who presents to the aortic clinic for a 1 year follow-up with scan  His last visit was on 5/21/2021 which the aneurysm was found to be 4 7cm  Was instructed for 2 year f/u however had ED visit w/ scan showing 4 9cm therefore was recommended to see us sooner by cardiology  Upon radiologic examination today, the aneurysm is found to be 4 9cm & with a trileaflet valve seen on TTE on 5/12/2022  TTE at that time revealed dilated ascending aorta  He admits to abiding by his lifting & exertion restrictions since his last visit  He is active & reports walking daily  He denies chest pain, palpitations, SOB, PANDA, LE edema b/l, back pain, arm pain, numbness/tingling/paresthesias in UE b/l, HA, lightheadedness, dizziness, presyncopal symptoms, or syncopal events today or since his last visit  Changes to his medical history since his last visit consist of colonoscopy complete, continues w/ lumbar injections & states will need lumbar fusion, increase in Gabapentin for lumbar issues, continues to fail CPAP study due to intolerance & not candidate for Inspire valve due to BMI, increase in Lasix for LE edema, seeing dermatology for RLE open wound/bullae  He denies any family history of aortic aneurysms or sudden cardiac death  He sees Dr Lyla Ramirez as his cardiologist who manages his cardiac medications including Lisinopril 2 5mg  Denies tobacco use, ETOH use, or drug use of any kind      Past Medical History:  Past Medical History:   Diagnosis Date    Abdominal aortic aneurysm ([facility] [address] )     s/p repair    Aneurysm of abdominal aorta (HCC)     49mm, trileaflet AV    Atrial fibrillation (HCC)     Eliquis    BPH (benign prostatic hyperplasia)     CAD (coronary artery disease)     s/p CABGx3    CHF (congestive heart failure) (HCC)     Chronic pain     Gabapentin    Chronic pain disorder     lumbar    COPD (chronic obstructive pulmonary disease) (HCC)     Coronary artery disease     Former tobacco use     Hyperlipidemia     Hypertension     Hypothyroidism     Lumbar radiculopathy     Lumbar stenosis     s/p injections    Neuropathy     Obesity (BMI 30-39  9)     YEVGENIY (obstructive sleep apnea)     unable to tolerate CPAP    Pulmonary hypertension ([facility])     moderate to severe    Spondylolisthesis of lumbar region     Vitamin D deficiency      Past Surgical History:   Past Surgical History:   Procedure Laterality Date    ABDOMINAL AORTIC ANEURYSM REPAIR  08/09/2007    2 dock limbs with visc extens prosth    CARDIAC CATHETERIZATION      COLONOSCOPY      CORONARY ARTERY BYPASS GRAFT  2003    LIMA to LAD, sequential SVG to OM1 & OM2, SVG to RDA    LUMBAR EPIDURAL INJECTION       Family History:  Family History   Problem Relation Age of Onset    Heart disease Mother     Stroke Father         stroke syndrome    Aneurysm Brother         abdominal    Aortic aneurysm Brother         ascending    Coronary artery disease Family     Hypertension Family     Other Son         gioblastoma multiforme     Social History:  Social History     Substance and Sexual Activity   Alcohol Use Yes    Alcohol/week: 21 0 standard drinks    Types: 21 Cans of beer per week     Social History     Substance and Sexual Activity   Drug Use No     Social History     Tobacco Use   Smoking Status Former Smoker    Packs/day: 1 00    Years: 40 00    Pack years: 40 00    Types: Cigarettes    Start date: 37    Quit date: 08/2012    Years since quitting: 10 0   Smokeless Tobacco Former User    Quit date: 2012       Home Medications:   Prior to Admission medications    Medication Sig Start Date End Date Taking?  Authorizing Provider   albuterol (Ventolin HFA) 90 mcg/act inhaler Inhale 2 puffs every 6 (six) hours as needed for wheezing 7/28/22  Yes Skyler Diaz MD   apixaban (ELIQUIS) 5 mg Take 1 tablet (5 mg total) by mouth 2 (two) times a day 8/11/22  Yes Angelia Nieto MD   aspirin (ECOTRIN LOW STRENGTH) 81 mg EC tablet Take 1 tablet by mouth daily 9/7/12  Yes Historical Provider, MD   atorvastatin (LIPITOR) 40 mg tablet TAKE 1 TABLET BY MOUTH  DAILY 4/18/22  Yes Raza Alan DO   cholecalciferol (VITAMIN D3) 1,000 units tablet Take 2,000 Units by mouth daily   Yes Historical Provider, MD   clobetasol (TEMOVATE) 0 05 % cream APPLY TOPICALLY TO RASH ON LEG DAILY FOR UP TO 2 WEEKS 8/16/22  Yes Historical Provider, MD   FIBER ADULT GUMMIES PO Take 1 caplet by mouth daily    Yes Historical Provider, MD   furosemide (LASIX) 20 mg tablet TAKE 2 TABLET(40 MG) BY MOUTH EVERY MORNING ON MONDAY, [address], FRIDAY   2 TABLET IN EVENING EVERY DAY 8/10/21  Yes Angelia Nieto MD   gabapentin (NEURONTIN) 300 mg capsule 2 CAPSULES  BY MOUTH AT NIGHT 8/26/22  Yes Ashley Kauffman PA-C   gentamicin (GARAMYCIN) 0 1 % cream APPLY TOPICALLY TO LEG EVERY DAY 8/16/22  Yes Historical Provider, MD   labetalol (NORMODYNE) 100 mg tablet TAKE 1 TABLET BY MOUTH  TWICE DAILY 6/29/22  Yes Shayy Regan MD   lisinopril (ZESTRIL) 2 5 mg tablet Take 1 tablet (2 5 mg total) by mouth daily ONCE DAILY per pcp  8/26/22  Yes Ashley Kauffman PA-C   multivitamin (THERAGRAN) TABS Take 1 tablet by mouth daily   Yes Historical Provider, MD   senna (SENOKOT) 8 6 MG tablet Take 1 tablet by mouth as needed     Yes Historical Provider, MD   tamsulosin (FLOMAX) 0 4 mg Take 1 capsule by mouth daily   Yes Historical Provider, MD   gabapentin (NEURONTIN) 300 mg capsule TAKE 1 CAPSULE BY MOUTH IN  THE MORNING AND 2 CAPSULES  BY MOUTH AT NIGHT  Patient taking differently: 2 CAPSULES  BY MOUTH AT NIGHT 6/29/22 8/26/22 Yes Angelia Nieto MD   lisinopril (ZESTRIL) 2 5 mg tablet Take 2 tablets (5 mg total) by mouth daily  Patient taking differently: Take 2 5 mg by mouth daily ONCE DAILY per pcp  8/11/22 8/26/22 Yes MD James King (ZANAFLEX) 2 mg tablet Take 2 tablets (4 mg total) by mouth 2 (two) times a day as needed for muscle spasms  Patient not taking: No sig reported 7/27/22 8/26/22  Sharyn Neville MD       Allergies: Allergies   Allergen Reactions    Meloxicam Edema       Review of Systems:     Review of Systems   Constitutional: Negative  Negative for activity change, diaphoresis, fatigue and unexpected weight change  HENT: Negative  Negative for dental problem  Respiratory: Negative  Negative for chest tightness, shortness of breath and wheezing  Cardiovascular: Negative  Negative for chest pain, palpitations and leg swelling  Gastrointestinal: Negative  Negative for blood in stool, constipation, diarrhea, nausea and vomiting  Genitourinary: Negative  Musculoskeletal: Negative  Negative for arthralgias, back pain, gait problem and myalgias  Skin: Negative  Neurological: Negative  Negative for dizziness, syncope, weakness, light-headedness, numbness and headaches  Hematological: Negative  Does not bruise/bleed easily  All other systems reviewed and are negative  Vital Signs:     Vitals:    08/26/22 1340 08/26/22 1343   BP: 122/70 123/68   BP Location: Left arm Right arm   Patient Position: Sitting Sitting   Cuff Size: Large Large   Pulse: 80    Temp: 98 3 °F (36 8 °C)    SpO2: 94%    Weight: 112 kg (246 lb 8 oz)    Height: 5' 11" (1 803 m)        Physical Exam:     Physical Exam  Constitutional:       General: He is not in acute distress  Appearance: Normal appearance  He is well-developed  He is obese  He is not ill-appearing or toxic-appearing  Comments: Sitting on exam table in NAD   HENT:      Head: Normocephalic and atraumatic  Mouth/Throat:      Dentition: Normal dentition  Does not have dentures  Pharynx: Uvula midline  Neck:      Vascular: No carotid bruit or JVD  Trachea: Trachea normal    Cardiovascular:      Rate and Rhythm: Normal rate and regular rhythm  Chest Wall: PMI is not displaced  Heart sounds: S1 normal and S2 normal  No murmur heard  No friction rub  No S3 or S4 sounds  Comments: No heaves/lifts on palpation  Pulmonary:      Effort: Pulmonary effort is normal  No accessory muscle usage or respiratory distress  Breath sounds: Normal breath sounds  No wheezing, rhonchi or rales  Abdominal:      General: Bowel sounds are normal  There is no distension  Palpations: Abdomen is not rigid  There is no mass  Tenderness: There is no abdominal tenderness  There is no guarding or rebound  Comments: Obese abdomen, linear AAA incision   Musculoskeletal:      Cervical back: Normal range of motion and neck supple  Skin:     General: Skin is warm and dry  Findings: No bruising, petechiae or rash  Nails: There is no clubbing  Comments: 1+ non-pitting edema R>L LE, RLE wound dressed, extensive CVS/VV to b/l LE, sternum stable w/ deep inspiration/coughing; sternal incision & EVH incision cleanaed, found to be C/D/I w/o evidence erythema or infx   Neurological:      Mental Status: He is alert and oriented to person, place, and time  Cranial Nerves: No cranial nerve deficit  Sensory: No sensory deficit  Comments: No focal deficit appreciated         Lab Results:               Invalid input(s): LABGLOM      Lab Results   Component Value Date    HGBA1C 6 0 (H) 07/22/2022     Lab Results   Component Value Date    TROPONINI <0 02 07/10/2019       Imaging Studies:     CTA PE study 7/27/2022: no PE, 4 9cm TAA    Transthoracic Echocardiogram 5/13/2022: EF 55%, grade 1 DD, LA mildly dilated, trileaflet AV, mild AI, mod MR, mod TR, ascending aorta 45mm    I have personally reviewed pertinent reports     and I have personally reviewed pertinent films in PACS    Assessment:  Patient Active Problem List    Diagnosis Date Noted    Platelets decreased ([address] ) 07/27/2022    Myofascial pain syndrome 07/27/2022    Atrial fibrillation ([place] [id number] ) 07/27/2022    Hypertensive heart disease with heart failure ([place] [id number] ) 01/14/2022    Ascending aortic aneurysm (HCC) 05/21/2021    Nocturnal hypoxemia     Moderate to severe pulmonary hypertension (HCC) 01/06/2021    Chronic diastolic congestive heart failure ([place] [id number] ) 09/11/2019    Chronic obstructive pulmonary disease ([place] [id number] ) 09/11/2019    Multiple pulmonary nodules 09/10/2019    Chronic pain syndrome     Spondylolisthesis at L4-L5 level 11/13/2018    Intervertebral disc disorder with radiculopathy of lumbar region     Subclinical hypothyroidism 10/22/2014    Abdominal aortic aneurysm, without rupture ([place] [id number] ) 04/14/2014    Aortic valve disorder 04/14/2014    3-vessel CAD 02/28/2014    Herniated lumbar intervertebral disc 12/04/2013    Disc degeneration, lumbar 09/03/2013    Lumbar radiculopathy 09/03/2013    Benign essential hypertension 04/10/2013    Impaired fasting glucose 04/10/2013    Severe obesity (BMI 35 0-35 9 with comorbidity) ([place] [id number] ) 04/10/2013    Hyperlipidemia 09/07/2012    Microscopic hematuria 09/07/2012    Severe obstructive sleep apnea 09/07/2012    Arteriosclerotic cardiovascular disease 09/07/2012     Ascending aortic aneurysm; Ongoing ascending aortic surveillance    Plan:     CT chest, without contrast performed prior to the visit today was reviewed  Ascending aorta was measured at 49 mm at it's greatest diameter  These findings were confirmed and shared with the patient today  Follow-up monitoring is still the treatment plan  Arrangements have been made for future surveillance to be completed with CT chest, without contrast in 2 Years  Ancil Cancer was comfortable with our recommendations, and their questions were answered to their satisfaction  Thank you for allowing us to participate in the care of this patient  Aortic Aneurysm Instructions were provided to the patient as follows:    1  No lifting more than 50 pounds  2   Maintain a controlled blood pressure with a goal of 120/80  3  Follow up in Aortic Clinic as recommended with radiology follow up as instructed  4  Report to the ER or call 911 immediately with the following signs / symptoms: sudden onset of back pain, chest pain or shortness of breath  The patient recently had a screening colonoscopy in 9/1/2021  Therefore GI referral is not indicated at this time       John Meza PA-C  DATE: August 26, 2022  TIME: 2:19 PM

## 2022-09-01 DIAGNOSIS — R06.02 SOB (SHORTNESS OF BREATH): ICD-10-CM

## 2022-09-01 RX ORDER — FUROSEMIDE 20 MG/1
TABLET ORAL
Qty: 257 TABLET | Refills: 3 | Status: SHIPPED | OUTPATIENT
Start: 2022-09-01

## 2022-09-14 ENCOUNTER — OFFICE VISIT (OUTPATIENT)
Dept: CARDIOLOGY CLINIC | Facility: CLINIC | Age: 82
End: 2022-09-14
Payer: MEDICARE

## 2022-09-14 VITALS
HEART RATE: 67 BPM | SYSTOLIC BLOOD PRESSURE: 114 MMHG | DIASTOLIC BLOOD PRESSURE: 76 MMHG | OXYGEN SATURATION: 94 % | BODY MASS INDEX: 34.44 KG/M2 | WEIGHT: 246 LBS | HEIGHT: 71 IN

## 2022-09-14 DIAGNOSIS — I11.0 HYPERTENSIVE HEART DISEASE WITH CHRONIC DIASTOLIC CONGESTIVE HEART FAILURE (HCC): ICD-10-CM

## 2022-09-14 DIAGNOSIS — I50.32 CHRONIC DIASTOLIC CONGESTIVE HEART FAILURE (HCC): Primary | ICD-10-CM

## 2022-09-14 DIAGNOSIS — I25.10 3-VESSEL CAD: ICD-10-CM

## 2022-09-14 DIAGNOSIS — I48.91 ATRIAL FIBRILLATION, UNSPECIFIED TYPE (HCC): ICD-10-CM

## 2022-09-14 DIAGNOSIS — I27.20 MODERATE TO SEVERE PULMONARY HYPERTENSION (HCC): ICD-10-CM

## 2022-09-14 DIAGNOSIS — I50.32 HYPERTENSIVE HEART DISEASE WITH CHRONIC DIASTOLIC CONGESTIVE HEART FAILURE (HCC): ICD-10-CM

## 2022-09-14 DIAGNOSIS — I10 BENIGN ESSENTIAL HYPERTENSION: ICD-10-CM

## 2022-09-14 DIAGNOSIS — I11.0 HYPERTENSIVE HEART DISEASE WITH HEART FAILURE (HCC): ICD-10-CM

## 2022-09-14 DIAGNOSIS — E66.01 SEVERE OBESITY (BMI 35.0-35.9 WITH COMORBIDITY) (HCC): ICD-10-CM

## 2022-09-14 DIAGNOSIS — G47.33 SEVERE OBSTRUCTIVE SLEEP APNEA: ICD-10-CM

## 2022-09-14 DIAGNOSIS — I71.21 ASCENDING AORTIC ANEURYSM: ICD-10-CM

## 2022-09-14 PROCEDURE — 99214 OFFICE O/P EST MOD 30 MIN: CPT | Performed by: INTERNAL MEDICINE

## 2022-09-15 PROCEDURE — 93000 ELECTROCARDIOGRAM COMPLETE: CPT | Performed by: INTERNAL MEDICINE

## 2022-09-15 NOTE — PROGRESS NOTES
[facility]    [address]    [facility] [address]    Phone:  [phone]       Thank You for choosing us for your health care visit. We are glad to serve you and happy to provide you with this summary of your visit. Please help us to ensure we have accurate records. If you find anything that needs to be changed, please let our staff know as soon as possible.          Your Demographic Information     Patient Name Sex     Arlene Brown Female 1962       Ethnic Group Patient Race    / Origin White      Your Visit Details     Date & Time Provider Department    2017 3:30 PM Ivory Hernandez MD; [facility]      Your Upcoming Appointment*(Max 10)     2017  4:00 PM CST   Worker's Comp Follow Up Visit with Ivory Hernandez MD, [facility]   [facility] ([facility] [place] [place] [address])    [address]  [place] [address]   [phone]              Conditions Discussed Today or Order-Related Diagnoses        Comments    Contusion of lower back, subsequent encounter    -  Primary       Your Vitals Were     BP Pulse Temp Height Weight BMI    120/70 64 98 °F (36.7 °C) (Oral) 5' 2.5\" (1.588 m) 158 lb (71.7 kg) 28.44 kg/m2    Smoking Status                   Never Smoker           Medications Prescribed or Re-Ordered Today     HYDROcodone-acetaminophen (NORCO) 5-325 MG per tablet    Sig - Route: Take 1 tablet by mouth every 6 hours as needed for Pain. - Oral    Class: Normal      Your Current Medications Are        Disp Refills Start End    HYDROcodone-acetaminophen (NORCO) 5-325 MG per tablet 24 tablet 0 2017     Sig - Route: Take 1 tablet by mouth every 6 hours as needed for Pain. - Oral    cyclobenzaprine (FLEXERIL) 10 MG tablet 15 tablet 0 2017     Sig - Route: Take 1 tablet by mouth 2 times daily as needed  Cardiology Follow Up    Dalila Ramirez  1940  [id number]  [facility]  [address] [place] [id number] [facility]  [phone]    1  Chronic diastolic congestive heart failure ([facility] [address] )     2  Hypertensive heart disease with heart failure ([facility] )     3  Moderate to severe pulmonary hypertension ([facility] )     4  Benign essential hypertension     5  Atrial fibrillation, unspecified type ([facility] )  POCT ECG    Holter monitor    Basic metabolic panel   6  Ascending aortic aneurysm (HCC)  POCT ECG    Holter monitor    Basic metabolic panel   7  3-vessel CAD  POCT ECG    Holter monitor    Basic metabolic panel   8  Severe obesity (BMI 35 0-35 9 with comorbidity) ([place] [id number] )     9  Severe obstructive sleep apnea     10  Hypertensive heart disease with chronic diastolic congestive heart failure ([facility] )         Discussion/Summary:  He has a new onset atrial fibrillation agree with rate control strategy with anticoagulation due to untreated severe sleep apnea  Blood pressure is well controlled he has had some lightheadedness check a Holter monitor to evaluate rate control as well as rule out any bradycardia  He is due for a basic metabolic profile to check his renal function there has been some low blood pressures I have asked him to stop his ACE-inhibitor  I will bring him back in a few months after reviewing the above data  Interval History:  60-year-old gentleman with multivessel coronary disease, history of CABG, hypertension, hyperlipidemia, ascending aortic aneurysm, abdominal aorta aneurysm status post repair, sleep apnea presents to establish care with me in the office  He has been seeing 1 of my partners for several years  Functionally he has been doing great  Denies any exertional chest pain, shortness of breath, palpitations, lightheadedness, dizziness, or syncope  He has put on some weight during the pandemic    He has been trying for Muscle spasms (use at night only). - Oral    CYCLOBENZAPRINE HCL PO        Class: Historical Med    Route: Oral    ibuprofen (MOTRIN) 600 MG tablet 30 tablet 0 2017     Sig - Route: Take 1 tablet by mouth every 6 hours as needed for Pain. - Oral    naproxen (NAPROSYN) 500 MG tablet 40 tablet 1 2016     Sig - Route: Take 1 tablet by mouth 2 times daily. - Oral    HYDROcodone-acetaminophen (NORCO) 5-325 MG per tablet 12 tablet 0 2015     Sig - Route: Take 1 tablet by mouth every 6 hours as needed for Pain. - Oral    ondansetron (ZOFRAN) 4 MG disintegrating tablet 20 tablet 0 2015     Si-2 every 8 hrs prn nausea      Allergies     No Known Allergies            Patient Instructions     None       to make some dietary changes start some low-level exercise  Since her last visit he was admitted with atrial fibrillation  Unfortunately he has not been able to find a treatment for sleep apnea that he is able to tolerate  It was discussed with him regarding the inspire device but he is uncertain at this time  Currently feels well has some occasional lightheadedness denies any chest pain, syncope, shortness of breath, palpitations  He was started on Eliquis for anticoagulation no adverse bleeding  Problem List     Positive colorectal cancer screening using Cologuard test    Benign essential hypertension    Hyperlipidemia    Impaired fasting glucose    Microscopic hematuria    Overview Signed 5/14/2018  9:17 AM by Guillermina Watts MD     Annotation - [id number]: Dr Todd Noguera         Obesity    Obstructive sleep apnea    Overview Signed 5/14/2018  9:17 AM by Guillermina Watts MD     Annotation - [id number]: Dr Toure [place]           Subclinical hypothyroidism    Overview Signed 5/14/2018  9:17 AM by Guillermina Watts MD     Transitioned From: Hypothyroidism         3-vessel CAD    Aneurysm of abdominal aorta (HCC)    Aneurysm of ascending aorta (HCC)    Aortic valve disorder    Arteriosclerotic cardiovascular disease    Disc degeneration, lumbar    Herniated lumbar intervertebral disc    Lumbar radiculopathy    Intervertebral disc disorder with radiculopathy of lumbar region    Spondylolisthesis at L4-L5 level    Chronic pain syndrome    SOB (shortness of breath)    Multiple pulmonary nodules    Heart failure, systolic (HCC)    Wt Readings from Last 3 Encounters:   09/14/22 112 kg (246 lb)   08/26/22 112 kg (246 lb 8 oz)   08/11/22 112 kg (247 lb 3 2 oz)                 COPD, mild ([address] )    Overview Deleted 9/11/2019  3:22 PM by Jacob Vazquez MD                Past Medical History:   Diagnosis Date    Abdominal aortic aneurysm ([address] )     s/p repair    Aneurysm of abdominal aorta (HCC)     49mm, trileaflet AV    Atrial fibrillation ([address] ) Eliquis    BPH (benign prostatic hyperplasia)     CAD (coronary artery disease)     s/p CABGx3    CHF (congestive heart failure) ([facility])     Chronic pain     Gabapentin    Chronic pain disorder     lumbar    COPD (chronic obstructive pulmonary disease) ([facility])     Coronary artery disease     Former tobacco use     Hyperlipidemia     Hypertension     Hypothyroidism     Lumbar radiculopathy     Lumbar stenosis     s/p injections    Neuropathy     Obesity (BMI 30-39  9)     YEVGENIY (obstructive sleep apnea)     unable to tolerate CPAP    Pulmonary hypertension ([facility])     moderate to severe    Spondylolisthesis of lumbar region     Vitamin D deficiency      Social History     Socioeconomic History    Marital status: /Civil Union     Spouse name: Not on file    Number of children: Not on file    Years of education: Not on file    Highest education level: Not on file   Occupational History    Occupation: retired   Tobacco Use    Smoking status: Former Smoker     Packs/day: 1 00     Years: 40 00     Pack years: 40 00     Types: Cigarettes     Start date: 37     Quit date: 08/2012     Years since quitting: 10 1    Smokeless tobacco: Former User     Quit date: 2012   Vaping Use    Vaping Use: Never used   Substance and Sexual Activity    Alcohol use:  Yes     Alcohol/week: 21 0 standard drinks     Types: 21 Cans of beer per week    Drug use: No    Sexual activity: Never   Other Topics Concern    Not on file   Social History Narrative    Not on file     Social Determinants of Health     Financial Resource Strain: Not on file   Food Insecurity: Not on file   Transportation Needs: Not on file   Physical Activity: Not on file   Stress: Not on file   Social Connections: Not on file   Intimate Partner Violence: Not on file   Housing Stability: Not on file      Family History   Problem Relation Age of Onset    Heart disease Mother     Stroke Father         stroke syndrome    Aneurysm Brother abdominal    Aortic aneurysm Brother         ascending    Coronary artery disease Family     Hypertension Family     Other Son         gioblastoma multiforme     Past Surgical History:   Procedure Laterality Date    ABDOMINAL AORTIC ANEURYSM REPAIR  08/09/2007    2 dock limbs with visc extens prosth    CARDIAC CATHETERIZATION      COLONOSCOPY      CORONARY ARTERY BYPASS GRAFT  2003    LIMA to LAD, sequential SVG to OM1 & OM2, SVG to RDA    LUMBAR EPIDURAL INJECTION         Current Outpatient Medications:     albuterol (Ventolin HFA) 90 mcg/act inhaler, Inhale 2 puffs every 6 (six) hours as needed for wheezing, Disp: 54 g, Rfl: 1    apixaban (ELIQUIS) 5 mg, Take 1 tablet (5 mg total) by mouth 2 (two) times a day, Disp: 180 tablet, Rfl: 3    aspirin (ECOTRIN LOW STRENGTH) 81 mg EC tablet, Take 1 tablet by mouth daily, Disp: , Rfl:     atorvastatin (LIPITOR) 40 mg tablet, TAKE 1 TABLET BY MOUTH  DAILY, Disp: 90 tablet, Rfl: 3    cholecalciferol (VITAMIN D3) 1,000 units tablet, Take 2,000 Units by mouth daily, Disp: , Rfl:     FIBER ADULT GUMMIES PO, Take 1 caplet by mouth daily , Disp: , Rfl:     furosemide (LASIX) 20 mg tablet, TAKE 2 TABLET(40 MG) BY MOUTH EVERY MORNING ON MONDAY, WEDNESDAY, FRIDAY   2 TABLET IN EVENING EVERY DAY, Disp: 257 tablet, Rfl: 3    gabapentin (NEURONTIN) 300 mg capsule, 2 CAPSULES  BY MOUTH AT NIGHT, Disp: 270 capsule, Rfl: 3    labetalol (NORMODYNE) 100 mg tablet, TAKE 1 TABLET BY MOUTH  TWICE DAILY, Disp: 180 tablet, Rfl: 3    multivitamin (THERAGRAN) TABS, Take 1 tablet by mouth daily, Disp: , Rfl:     senna (SENOKOT) 8 6 MG tablet, Take 1 tablet by mouth as needed  , Disp: , Rfl:     tamsulosin (FLOMAX) 0 4 mg, Take 1 capsule by mouth daily, Disp: , Rfl:     clobetasol (TEMOVATE) 0 05 % cream, APPLY TOPICALLY TO RASH ON LEG DAILY FOR UP TO 2 WEEKS, Disp: , Rfl:     gentamicin (GARAMYCIN) 0 1 % cream, APPLY TOPICALLY TO LEG EVERY DAY, Disp: , Rfl:     Current Facility-Administered Medications:     albuterol (PROVENTIL HFA,VENTOLIN HFA) inhaler 2 puff, 2 puff, Inhalation, Q6H PRN, Sharyle Night, MD  Allergies   Allergen Reactions    Meloxicam Edema       Labs:     Chemistry        Component Value Date/Time     (L) 10/15/2015 1042    K 4 2 07/28/2022 0411    K 4 7 10/15/2015 1042     07/28/2022 0411     10/15/2015 1042    CO2 30 07/28/2022 0411    CO2 30 10/15/2015 1042    BUN 12 07/28/2022 0411    BUN 15 10/15/2015 1042    CREATININE 1 04 07/28/2022 0411    CREATININE 1 03 10/15/2015 1042        Component Value Date/Time    CALCIUM 9 3 07/28/2022 0411    CALCIUM 9 1 10/15/2015 1042    ALKPHOS 56 07/28/2022 0411    ALKPHOS 71 10/15/2015 1042    AST 23 07/28/2022 0411    AST 14 10/15/2015 1042    ALT 28 07/28/2022 0411    ALT 23 10/15/2015 1042    BILITOT 0 79 10/15/2015 1042            Lab Results   Component Value Date    CHOL 124 10/15/2015    CHOL 159 04/01/2015    CHOL 155 09/30/2013     Lab Results   Component Value Date    HDL 56 07/28/2022    HDL 53 07/22/2022    HDL 58 01/21/2022     Lab Results   Component Value Date    LDLCALC 49 07/28/2022    LDLCALC 51 07/22/2022    LDLCALC 80 01/21/2022     Lab Results   Component Value Date    TRIG 46 07/28/2022    TRIG 49 07/22/2022    TRIG 42 01/21/2022     No results found for: CHOLHDL    Imaging: No results found  ECG:  Sinus rhythm with PACs      Review of Systems   Constitutional: Negative  HENT: Negative  Eyes: Negative  Cardiovascular: Negative  Respiratory: Negative  Endocrine: Negative  Hematologic/Lymphatic: Negative  Skin: Negative  Musculoskeletal: Negative  Gastrointestinal: Negative  Genitourinary: Negative  Neurological: Negative  Psychiatric/Behavioral: Negative  All other systems reviewed and are negative        Vitals:    09/14/22 1628   BP: 114/76   Pulse: 67   SpO2: 94%     Vitals:    09/14/22 1628   Weight: 112 kg (246 lb)     Height: 5' 11" (180 3 cm)   Body mass index is 34 31 kg/m²  Physical Exam:  Vital signs reviewed  General:  Alert and cooperative, appears stated age, no acute distress  HEENT:  PERRLA, EOMI, no scleral icterus, no conjunctival pallor  Neck:  No lymphadenopathy, no thyromegaly, no carotid bruits, no elevated JVP  Heart:  Regular rate and rhythm, normal S1/S2, no S3/S4, no murmur, rubs or gallops  PMI nondisplaced  Lungs:  Clear to auscultation bilaterally, no wheezes rales or rhonchi  Abdomen:  Soft, non-tender, positive bowel sounds, no rebound or guarding,   no organomegaly   Extremities:  Normal range of motion    No clubbing, cyanosis or edema   Vascular:  2+ pedal pulses  Skin:  No rashes or lesions on exposed skin  Neurologic:  Cranial nerves II-XII grossly intact without focal deficits  Psych:  Normal mood and affect

## 2022-09-21 ENCOUNTER — APPOINTMENT (OUTPATIENT)
Dept: LAB | Facility: CLINIC | Age: 82
End: 2022-09-21
Payer: MEDICARE

## 2022-09-21 LAB
ANION GAP SERPL CALCULATED.3IONS-SCNC: 3 MMOL/L (ref 4–13)
BUN SERPL-MCNC: 12 MG/DL (ref 5–25)
CALCIUM SERPL-MCNC: 9 MG/DL (ref 8.3–10.1)
CHLORIDE SERPL-SCNC: 102 MMOL/L (ref 96–108)
CO2 SERPL-SCNC: 31 MMOL/L (ref 21–32)
CREAT SERPL-MCNC: 1.08 MG/DL (ref 0.6–1.3)
GFR SERPL CREATININE-BSD FRML MDRD: 64 ML/MIN/1.73SQ M
GLUCOSE P FAST SERPL-MCNC: 113 MG/DL (ref 65–99)
POTASSIUM SERPL-SCNC: 4.9 MMOL/L (ref 3.5–5.3)
SODIUM SERPL-SCNC: 136 MMOL/L (ref 135–147)

## 2022-09-21 PROCEDURE — 36415 COLL VENOUS BLD VENIPUNCTURE: CPT | Performed by: INTERNAL MEDICINE

## 2022-09-21 PROCEDURE — 80048 BASIC METABOLIC PNL TOTAL CA: CPT | Performed by: INTERNAL MEDICINE

## 2022-09-22 ENCOUNTER — HOSPITAL ENCOUNTER (OUTPATIENT)
Dept: NON INVASIVE DIAGNOSTICS | Facility: CLINIC | Age: 82
Discharge: HOME | End: 2022-09-22
Payer: MEDICARE

## 2022-09-22 DIAGNOSIS — I25.10 3-VESSEL CAD: ICD-10-CM

## 2022-09-22 DIAGNOSIS — I71.21 ASCENDING AORTIC ANEURYSM: ICD-10-CM

## 2022-09-22 DIAGNOSIS — I48.91 ATRIAL FIBRILLATION, UNSPECIFIED TYPE (HCC): ICD-10-CM

## 2022-09-22 PROCEDURE — 93225 XTRNL ECG REC<48 HRS REC: CPT

## 2022-09-22 PROCEDURE — 93226 XTRNL ECG REC<48 HR SCAN A/R: CPT

## 2022-09-30 PROCEDURE — 93227 XTRNL ECG REC<48 HR R&I: CPT | Performed by: INTERNAL MEDICINE

## 2022-10-14 ENCOUNTER — TRANSCRIBE ORDERS (OUTPATIENT)
Dept: LAB | Facility: CLINIC | Age: 82
End: 2022-10-14

## 2022-10-14 ENCOUNTER — APPOINTMENT (OUTPATIENT)
Dept: LAB | Facility: CLINIC | Age: 82
End: 2022-10-14
Payer: MEDICARE

## 2022-10-14 DIAGNOSIS — N40.1 BENIGN PROSTATIC HYPERPLASIA WITH LOWER URINARY TRACT SYMPTOMS, SYMPTOM DETAILS UNSPECIFIED: Primary | ICD-10-CM

## 2022-10-14 DIAGNOSIS — N40.1 BENIGN PROSTATIC HYPERPLASIA WITH LOWER URINARY TRACT SYMPTOMS, SYMPTOM DETAILS UNSPECIFIED: ICD-10-CM

## 2022-10-14 LAB
BUN SERPL-MCNC: 14 MG/DL (ref 5–25)
CREAT SERPL-MCNC: 1.14 MG/DL (ref 0.6–1.3)
GFR SERPL CREATININE-BSD FRML MDRD: 59 ML/MIN/1.73SQ M
PSA SERPL-MCNC: 2.6 NG/ML (ref 0–4)

## 2022-10-14 PROCEDURE — 84520 ASSAY OF UREA NITROGEN: CPT

## 2022-10-14 PROCEDURE — G0103 PSA SCREENING: HCPCS

## 2022-10-14 PROCEDURE — 82565 ASSAY OF CREATININE: CPT

## 2022-10-14 PROCEDURE — 36415 COLL VENOUS BLD VENIPUNCTURE: CPT

## 2022-10-21 ENCOUNTER — TELEPHONE (OUTPATIENT)
Dept: CARDIOLOGY CLINIC | Facility: CLINIC | Age: 82
End: 2022-10-21

## 2022-10-21 NOTE — TELEPHONE ENCOUNTER
Holter showed the Afib with a well controlled heart rate  Add him on for a pull over with me next week at [facility] [place] Thursday at 1 pm   If worse ER

## 2022-10-21 NOTE — TELEPHONE ENCOUNTER
Wife called, states pt felt fine the 2 days he wore the Holter  Now for the last 3 days he does not feel well  He has sob at times, lightheaded, and having difficulty focusing  Bp 135/78 HR 80    Wt is stable- 247lbs  Leg edema - has some occasionally and denies abd bloating  They would like the results of the Holter      10/14/22  CR-1 14    9/21/2  k-4 9    Taking: lasix 40 mg M/W/F and 40 mg every afternoon               Labetalol 100 mg bid      Please advise

## 2022-10-27 ENCOUNTER — OFFICE VISIT (OUTPATIENT)
Dept: CARDIOLOGY CLINIC | Facility: CLINIC | Age: 82
End: 2022-10-27
Payer: MEDICARE

## 2022-10-27 VITALS
SYSTOLIC BLOOD PRESSURE: 114 MMHG | DIASTOLIC BLOOD PRESSURE: 62 MMHG | WEIGHT: 250 LBS | RESPIRATION RATE: 18 BRPM | BODY MASS INDEX: 35 KG/M2 | HEIGHT: 71 IN | HEART RATE: 85 BPM | OXYGEN SATURATION: 95 %

## 2022-10-27 DIAGNOSIS — I11.0 HYPERTENSIVE HEART DISEASE WITH OTHER CONGESTIVE HEART FAILURE (HCC): ICD-10-CM

## 2022-10-27 DIAGNOSIS — I11.0 HYPERTENSIVE HEART DISEASE WITH HEART FAILURE (HCC): ICD-10-CM

## 2022-10-27 DIAGNOSIS — I10 BENIGN ESSENTIAL HYPERTENSION: ICD-10-CM

## 2022-10-27 DIAGNOSIS — I25.10 3-VESSEL CAD: ICD-10-CM

## 2022-10-27 DIAGNOSIS — I50.32 CHRONIC DIASTOLIC CONGESTIVE HEART FAILURE (HCC): Primary | ICD-10-CM

## 2022-10-27 DIAGNOSIS — I50.89 HYPERTENSIVE HEART DISEASE WITH OTHER CONGESTIVE HEART FAILURE (HCC): ICD-10-CM

## 2022-10-27 DIAGNOSIS — G47.33 SEVERE OBSTRUCTIVE SLEEP APNEA: ICD-10-CM

## 2022-10-27 DIAGNOSIS — N18.31 STAGE 3A CHRONIC KIDNEY DISEASE (HCC): ICD-10-CM

## 2022-10-27 DIAGNOSIS — I27.20 MODERATE TO SEVERE PULMONARY HYPERTENSION (HCC): ICD-10-CM

## 2022-10-27 DIAGNOSIS — I34.0 MODERATE MITRAL REGURGITATION: ICD-10-CM

## 2022-10-27 DIAGNOSIS — I25.10 ARTERIOSCLEROTIC CARDIOVASCULAR DISEASE: ICD-10-CM

## 2022-10-27 DIAGNOSIS — I48.91 ATRIAL FIBRILLATION, UNSPECIFIED TYPE (HCC): ICD-10-CM

## 2022-10-27 PROCEDURE — 99214 OFFICE O/P EST MOD 30 MIN: CPT | Performed by: INTERNAL MEDICINE

## 2022-10-27 PROCEDURE — 93000 ELECTROCARDIOGRAM COMPLETE: CPT | Performed by: INTERNAL MEDICINE

## 2022-10-27 NOTE — PROGRESS NOTES
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)  CBC and differential    Basic metabolic panel    Magnesium   8  Atrial fibrillation, unspecified type ([address] )  FORD    Cardioversion   9  Stage 3a chronic kidney disease ([facility] )     10  Hypertensive heart disease with other congestive heart failure ([facility] )     11  Moderate mitral regurgitation  FORD       Discussion/Summary:  He has been symptomatic from his atrial fibrillation recommend FORD guided cardioversion next week  Will evaluate the moderate mitral and tricuspid regurgitation at that time  He is aware given untreated sleep apnea that he will have a hard time maintaining sinus rhythm  He needs to decrease alcohol intake  Can consider amiodarone post FORD guided cardioversion to help maintain sinus rhythm  Interval History:  28-year-old gentleman with multivessel coronary disease, history of CABG, hypertension, hyperlipidemia, ascending aortic aneurysm, abdominal aorta aneurysm status post repair, sleep apnea presents to establish care with me in the office  He has been seeing 1 of my partners for several years  Functionally he has been doing great  Denies any exertional chest pain, shortness of breath, palpitations, lightheadedness, dizziness, or syncope  He has put on some weight during the pandemic  He has been trying to make some dietary changes start some low-level exercise  Last office visit he was found to be in new onset atrial fibrillation    We started anticoagulation rate control  Holter monitor shows average rate to 75  He has had some lightheadedness and dizziness which did not correlate to any bradycardia  He does does not feel himself since going into atrial fibrillation  He drinks about 2-3 beers a day  He has untreated sleep apnea as the biggest contributing factors to AFib  Denies any anginal sounding chest pain, lower extremity edema, PND, orthopnea  Problem List     Positive colorectal cancer screening using Cologuard test    Benign essential hypertension    Hyperlipidemia    Impaired fasting glucose    Microscopic hematuria    Overview Signed 5/14/2018  9:17 AM by Linda Pollock MD     Annotation - [id number]: Dr Carolee Caballero         Obesity    Obstructive sleep apnea    Overview Signed 5/14/2018  9:17 AM by Linda Pollock MD     Annotation - [id number]: Dr Chuckie Escalante           Subclinical hypothyroidism    Overview Signed 5/14/2018  9:17 AM by Linda Pollock MD     Transitioned From: Hypothyroidism         3-vessel CAD    Aneurysm of abdominal aorta (HCC)    Aneurysm of ascending aorta (HCC)    Aortic valve disorder    Arteriosclerotic cardiovascular disease    Disc degeneration, lumbar    Herniated lumbar intervertebral disc    Lumbar radiculopathy    Intervertebral disc disorder with radiculopathy of lumbar region    Spondylolisthesis at L4-L5 level    Chronic pain syndrome    SOB (shortness of breath)    Multiple pulmonary nodules    Heart failure, systolic (HCC)    Wt Readings from Last 3 Encounters:   10/27/22 113 kg (250 lb)   09/14/22 112 kg (246 lb)   08/26/22 112 kg (246 lb 8 oz)                 COPD, mild ([address] )    Overview Deleted 9/11/2019  3:22 PM by Josh Kingsley MD                Past Medical History:   Diagnosis Date   • Abdominal aortic aneurysm     s/p repair   • Aneurysm of abdominal aorta     49mm, trileaflet AV   • Atrial fibrillation (HCC)     Eliquis   • BPH (benign prostatic hyperplasia)    • CAD (coronary artery disease)     s/p CABGx3   • CHF (congestive heart failure) ([facility])    • Chronic pain     Gabapentin   • Chronic pain disorder     lumbar   • COPD (chronic obstructive pulmonary disease) ([facility])    • Coronary artery disease    • Former tobacco use    • Hyperlipidemia    • Hypertension    • Hypothyroidism    • Lumbar radiculopathy    • Lumbar stenosis     s/p injections   • Neuropathy    • Obesity (BMI 30-39  9)    • YEVGENIY (obstructive sleep apnea)     unable to tolerate CPAP   • Pulmonary hypertension ([facility])     moderate to severe   • Spondylolisthesis of lumbar region    • Vitamin D deficiency      Social History     Socioeconomic History   • Marital status: /Civil Union     Spouse name: Not on file   • Number of children: Not on file   • Years of education: Not on file   • Highest education level: Not on file   Occupational History   • Occupation: retired   Tobacco Use   • Smoking status: Former Smoker     Packs/day: 1 00     Years: 40 00     Pack years: 40 00     Types: Cigarettes     Start date: 37     Quit date: 08/2012     Years since quitting: 10 2   • Smokeless tobacco: Former User     Quit date: 2012   Vaping Use   • Vaping Use: Never used   Substance and Sexual Activity   • Alcohol use:  Yes     Alcohol/week: 21 0 standard drinks     Types: 21 Cans of beer per week   • Drug use: No   • Sexual activity: Never   Other Topics Concern   • Not on file   Social History Narrative   • Not on file     Social Determinants of Health     Financial Resource Strain: Not on file   Food Insecurity: Not on file   Transportation Needs: Not on file   Physical Activity: Not on file   Stress: Not on file   Social Connections: Not on file   Intimate Partner Violence: Not on file   Housing Stability: Not on file      Family History   Problem Relation Age of Onset   • Heart disease Mother    • Stroke Father         stroke syndrome   • Aneurysm Brother         abdominal   • Aortic aneurysm Brother         ascending   • Coronary artery disease Family    • Hypertension Family    • Other Son         gioblastoma multiforme     Past Surgical History:   Procedure Laterality Date   • ABDOMINAL AORTIC ANEURYSM REPAIR  08/09/2007    2 dock limbs with visc extens prosth   • CARDIAC CATHETERIZATION     • COLONOSCOPY     • CORONARY ARTERY BYPASS GRAFT  2003    LIMA to LAD, sequential SVG to OM1 & OM2, SVG to RDA   • LUMBAR EPIDURAL INJECTION         Current Outpatient Medications:   •  albuterol (Ventolin HFA) 90 mcg/act inhaler, Inhale 2 puffs every 6 (six) hours as needed for wheezing, Disp: 54 g, Rfl: 1  •  apixaban (ELIQUIS) 5 mg, Take 1 tablet (5 mg total) by mouth 2 (two) times a day, Disp: 180 tablet, Rfl: 3  •  aspirin (ECOTRIN LOW STRENGTH) 81 mg EC tablet, Take 1 tablet by mouth daily, Disp: , Rfl:   •  atorvastatin (LIPITOR) 40 mg tablet, TAKE 1 TABLET BY MOUTH  DAILY, Disp: 90 tablet, Rfl: 3  •  cholecalciferol (VITAMIN D3) 1,000 units tablet, Take 2,000 Units by mouth daily, Disp: , Rfl:   •  FIBER ADULT GUMMIES PO, Take 1 caplet by mouth daily , Disp: , Rfl:   •  furosemide (LASIX) 20 mg tablet, TAKE 2 TABLET(40 MG) BY MOUTH EVERY MORNING ON MONDAY, WEDNESDAY, FRIDAY   2 TABLET IN EVENING EVERY DAY, Disp: 257 tablet, Rfl: 3  •  gabapentin (NEURONTIN) 300 mg capsule, 2 CAPSULES  BY MOUTH AT NIGHT, Disp: 270 capsule, Rfl: 3  •  labetalol (NORMODYNE) 100 mg tablet, TAKE 1 TABLET BY MOUTH  TWICE DAILY, Disp: 180 tablet, Rfl: 3  •  multivitamin (THERAGRAN) TABS, Take 1 tablet by mouth daily, Disp: , Rfl:   •  senna (SENOKOT) 8 6 MG tablet, Take 1 tablet by mouth as needed  , Disp: , Rfl:   •  tamsulosin (FLOMAX) 0 4 mg, Take 1 capsule by mouth daily, Disp: , Rfl:   •  clobetasol (TEMOVATE) 0 05 % cream, APPLY TOPICALLY TO RASH ON LEG DAILY FOR UP TO 2 WEEKS (Patient not taking: Reported on 10/27/2022), Disp: , Rfl:   •  gentamicin (GARAMYCIN) 0 1 % cream, APPLY TOPICALLY TO LEG EVERY DAY (Patient not taking: Reported on 10/27/2022), Disp: , Rfl:     Current Facility-Administered Medications:   •  albuterol (PROVENTIL HFA,VENTOLIN HFA) inhaler 2 puff, 2 puff, Inhalation, Q6H PRN, Edith Farris MD  Allergies   Allergen Reactions   • Meloxicam Edema       Labs:     Chemistry        Component Value Date/Time     (L) 10/15/2015 1042    K 4 9 09/21/2022 0856    K 4 7 10/15/2015 1042     09/21/2022 0856     10/15/2015 1042    CO2 31 09/21/2022 0856    CO2 30 10/15/2015 1042    BUN 14 10/14/2022 1010    BUN 15 10/15/2015 1042    CREATININE 1 14 10/14/2022 1010    CREATININE 1 03 10/15/2015 1042        Component Value Date/Time    CALCIUM 9 0 09/21/2022 0856    CALCIUM 9 1 10/15/2015 1042    ALKPHOS 56 07/28/2022 0411    ALKPHOS 71 10/15/2015 1042    AST 23 07/28/2022 0411    AST 14 10/15/2015 1042    ALT 28 07/28/2022 0411    ALT 23 10/15/2015 1042    BILITOT 0 79 10/15/2015 1042            Lab Results   Component Value Date    CHOL 124 10/15/2015    CHOL 159 04/01/2015    CHOL 155 09/30/2013     Lab Results   Component Value Date    HDL 56 07/28/2022    HDL 53 07/22/2022    HDL 58 01/21/2022     Lab Results   Component Value Date    LDLCALC 49 07/28/2022    LDLCALC 51 07/22/2022    LDLCALC 80 01/21/2022     Lab Results   Component Value Date    TRIG 46 07/28/2022    TRIG 49 07/22/2022    TRIG 42 01/21/2022     No results found for: CHOLHDL    Imaging: No results found  ECG:  AFib      Review of Systems   Constitutional: Negative  HENT: Negative  Eyes: Negative  Cardiovascular: Negative  Respiratory: Negative  Endocrine: Negative  Hematologic/Lymphatic: Negative  Skin: Negative  Musculoskeletal: Negative  Gastrointestinal: Negative  Genitourinary: Negative  Neurological: Negative  Psychiatric/Behavioral: Negative  All other systems reviewed and are negative        Vitals:    10/27/22 1253   BP: 114/62   Pulse: 85   Resp: 18   SpO2: 95%     Vitals:    10/27/22 1253   Weight: 113 kg (250 lb)     Height: 5' 11" (180 3 cm) Body mass index is 34 87 kg/m²  Physical Exam:  Vital signs reviewed  General:  Alert and cooperative, appears stated age, no acute distress  HEENT:  PERRLA, EOMI, no scleral icterus, no conjunctival pallor  Neck:  No lymphadenopathy, no thyromegaly, no carotid bruits, no elevated JVP  Heart:  irregular rate and rhythm, normal S1/S2, no S3/S4, no murmur, rubs or gallops  PMI nondisplaced  Lungs:  Clear to auscultation bilaterally, no wheezes rales or rhonchi  Abdomen:  Soft, non-tender, positive bowel sounds, no rebound or guarding,   no organomegaly   Extremities:  Normal range of motion    No clubbing, cyanosis or edema   Vascular:  2+ pedal pulses  Skin:  No rashes or lesions on exposed skin  Neurologic:  Cranial nerves II-XII grossly intact without focal deficits  Psych:  Normal mood and affect

## 2022-11-02 ENCOUNTER — APPOINTMENT (OUTPATIENT)
Dept: LAB | Facility: CLINIC | Age: 82
End: 2022-11-02

## 2022-11-02 LAB
ANION GAP SERPL CALCULATED.3IONS-SCNC: 3 MMOL/L (ref 4–13)
BASOPHILS # BLD AUTO: 0.06 THOUSANDS/ÂΜL (ref 0–0.1)
BASOPHILS NFR BLD AUTO: 1 % (ref 0–1)
BUN SERPL-MCNC: 13 MG/DL (ref 5–25)
CALCIUM SERPL-MCNC: 9.1 MG/DL (ref 8.3–10.1)
CHLORIDE SERPL-SCNC: 104 MMOL/L (ref 96–108)
CO2 SERPL-SCNC: 31 MMOL/L (ref 21–32)
CREAT SERPL-MCNC: 1.1 MG/DL (ref 0.6–1.3)
EOSINOPHIL # BLD AUTO: 0.26 THOUSAND/ÂΜL (ref 0–0.61)
EOSINOPHIL NFR BLD AUTO: 4 % (ref 0–6)
ERYTHROCYTE [DISTWIDTH] IN BLOOD BY AUTOMATED COUNT: 13.4 % (ref 11.6–15.1)
GFR SERPL CREATININE-BSD FRML MDRD: 62 ML/MIN/1.73SQ M
GLUCOSE P FAST SERPL-MCNC: 112 MG/DL (ref 65–99)
HCT VFR BLD AUTO: 43.9 % (ref 36.5–49.3)
HGB BLD-MCNC: 14 G/DL (ref 12–17)
IMM GRANULOCYTES # BLD AUTO: 0.02 THOUSAND/UL (ref 0–0.2)
IMM GRANULOCYTES NFR BLD AUTO: 0 % (ref 0–2)
LYMPHOCYTES # BLD AUTO: 1.09 THOUSANDS/ÂΜL (ref 0.6–4.47)
LYMPHOCYTES NFR BLD AUTO: 18 % (ref 14–44)
MAGNESIUM SERPL-MCNC: 2.2 MG/DL (ref 1.6–2.6)
MCH RBC QN AUTO: 33.4 PG (ref 26.8–34.3)
MCHC RBC AUTO-ENTMCNC: 31.9 G/DL (ref 31.4–37.4)
MCV RBC AUTO: 105 FL (ref 82–98)
MONOCYTES # BLD AUTO: 0.71 THOUSAND/ÂΜL (ref 0.17–1.22)
MONOCYTES NFR BLD AUTO: 12 % (ref 4–12)
NEUTROPHILS # BLD AUTO: 3.83 THOUSANDS/ÂΜL (ref 1.85–7.62)
NEUTS SEG NFR BLD AUTO: 65 % (ref 43–75)
NRBC BLD AUTO-RTO: 0 /100 WBCS
PLATELET # BLD AUTO: 117 THOUSANDS/UL (ref 149–390)
PMV BLD AUTO: 10 FL (ref 8.9–12.7)
POTASSIUM SERPL-SCNC: 4.4 MMOL/L (ref 3.5–5.3)
RBC # BLD AUTO: 4.19 MILLION/UL (ref 3.88–5.62)
SODIUM SERPL-SCNC: 138 MMOL/L (ref 135–147)
WBC # BLD AUTO: 5.97 THOUSAND/UL (ref 4.31–10.16)

## 2022-11-04 ENCOUNTER — HOSPITAL ENCOUNTER (OUTPATIENT)
Dept: NON INVASIVE DIAGNOSTICS | Facility: HOSPITAL | Age: 82
Discharge: HOME | End: 2022-11-04

## 2022-11-04 ENCOUNTER — ANESTHESIA (OUTPATIENT)
Dept: NON INVASIVE DIAGNOSTICS | Facility: HOSPITAL | Age: 82
End: 2022-11-04

## 2022-11-04 ENCOUNTER — ANESTHESIA EVENT (OUTPATIENT)
Dept: NON INVASIVE DIAGNOSTICS | Facility: HOSPITAL | Age: 82
End: 2022-11-04

## 2022-11-04 VITALS
HEIGHT: 71 IN | RESPIRATION RATE: 18 BRPM | TEMPERATURE: 98 F | BODY MASS INDEX: 35 KG/M2 | WEIGHT: 250 LBS | DIASTOLIC BLOOD PRESSURE: 81 MMHG | SYSTOLIC BLOOD PRESSURE: 117 MMHG | HEART RATE: 77 BPM | OXYGEN SATURATION: 93 %

## 2022-11-04 VITALS
BODY MASS INDEX: 35 KG/M2 | OXYGEN SATURATION: 94 % | SYSTOLIC BLOOD PRESSURE: 126 MMHG | WEIGHT: 250 LBS | DIASTOLIC BLOOD PRESSURE: 80 MMHG | HEART RATE: 75 BPM | HEIGHT: 71 IN

## 2022-11-04 DIAGNOSIS — I48.91 ATRIAL FIBRILLATION, UNSPECIFIED TYPE (HCC): Primary | ICD-10-CM

## 2022-11-04 DIAGNOSIS — I48.91 ATRIAL FIBRILLATION, UNSPECIFIED TYPE (HCC): ICD-10-CM

## 2022-11-04 DIAGNOSIS — I27.20 MODERATE TO SEVERE PULMONARY HYPERTENSION (HCC): ICD-10-CM

## 2022-11-04 DIAGNOSIS — I34.0 MODERATE MITRAL REGURGITATION: ICD-10-CM

## 2022-11-04 LAB
ATRIAL RATE: 357 BPM
ATRIAL RATE: 68 BPM
MITRAL VALVE PEAK REGURGITANT INSTANTANEOUS FLOW RATE PISA: 50 ML/S
MR PISA EROA: 0.32 CM2
MV EROA: 0.33 CM2
P AXIS: 84 DEGREES
PISA MRMAX VEL: 4.7 M/S
PISA RADIUS: 0.9 CM
PISA VNYQUIST: 0.3 M/S
PR INTERVAL: 266 MS
QRS AXIS: 46 DEGREES
QRS AXIS: 60 DEGREES
QRSD INTERVAL: 88 MS
QRSD INTERVAL: 96 MS
QT INTERVAL: 422 MS
QT INTERVAL: 442 MS
QTC INTERVAL: 469 MS
QTC INTERVAL: 471 MS
SL CV LV EF: 55
T WAVE AXIS: -32 DEGREES
T WAVE AXIS: -8 DEGREES
VENTRICULAR RATE: 68 BPM
VENTRICULAR RATE: 75 BPM

## 2022-11-04 RX ORDER — AMIODARONE HYDROCHLORIDE 200 MG/1
200 TABLET ORAL DAILY
Qty: 90 TABLET | Refills: 3 | Status: SHIPPED | OUTPATIENT
Start: 2022-11-04 | End: 2023-09-13 | Stop reason: SDUPTHER

## 2022-11-04 RX ORDER — PROPOFOL 10 MG/ML
INJECTION, EMULSION INTRAVENOUS AS NEEDED
Status: DISCONTINUED | OUTPATIENT
Start: 2022-11-04 | End: 2022-11-04

## 2022-11-04 RX ORDER — SODIUM CHLORIDE 9 MG/ML
INJECTION, SOLUTION INTRAVENOUS CONTINUOUS PRN
Status: DISCONTINUED | OUTPATIENT
Start: 2022-11-04 | End: 2022-11-04

## 2022-11-04 RX ADMIN — PROPOFOL 30 MG: 10 INJECTION, EMULSION INTRAVENOUS at 09:17

## 2022-11-04 RX ADMIN — PROPOFOL 30 MG: 10 INJECTION, EMULSION INTRAVENOUS at 09:01

## 2022-11-04 RX ADMIN — PROPOFOL 30 MG: 10 INJECTION, EMULSION INTRAVENOUS at 09:08

## 2022-11-04 RX ADMIN — PROPOFOL 30 MG: 10 INJECTION, EMULSION INTRAVENOUS at 09:19

## 2022-11-04 RX ADMIN — PROPOFOL 30 MG: 10 INJECTION, EMULSION INTRAVENOUS at 08:52

## 2022-11-04 RX ADMIN — PROPOFOL 30 MG: 10 INJECTION, EMULSION INTRAVENOUS at 09:14

## 2022-11-04 RX ADMIN — PROPOFOL 50 MG: 10 INJECTION, EMULSION INTRAVENOUS at 08:51

## 2022-11-04 RX ADMIN — PROPOFOL 30 MG: 10 INJECTION, EMULSION INTRAVENOUS at 09:04

## 2022-11-04 RX ADMIN — PROPOFOL 30 MG: 10 INJECTION, EMULSION INTRAVENOUS at 09:06

## 2022-11-04 RX ADMIN — PROPOFOL 30 MG: 10 INJECTION, EMULSION INTRAVENOUS at 08:57

## 2022-11-04 RX ADMIN — PROPOFOL 30 MG: 10 INJECTION, EMULSION INTRAVENOUS at 09:11

## 2022-11-04 RX ADMIN — PROPOFOL 20 MG: 10 INJECTION, EMULSION INTRAVENOUS at 08:54

## 2022-11-04 RX ADMIN — SODIUM CHLORIDE: 0.9 INJECTION, SOLUTION INTRAVENOUS at 08:43

## 2022-11-04 RX ADMIN — PROPOFOL 30 MG: 10 INJECTION, EMULSION INTRAVENOUS at 09:23

## 2022-11-04 NOTE — ANESTHESIA PREPROCEDURE EVALUATION
Procedure:  FORD    Relevant Problems   CARDIO   (+) 3-vessel CAD   (+) Abdominal aortic aneurysm without rupture   (+) Aortic valve disorder   (+) Ascending aortic aneurysm   (+) Atrial fibrillation (HCC)   (+) Benign essential hypertension   (+) Chronic diastolic congestive heart failure (HCC)   (+) Hyperlipidemia   (+) Moderate to severe pulmonary hypertension (HCC)      ENDO   (+) Subclinical hypothyroidism      /RENAL   (+) Stage 3a chronic kidney disease (HCC)      MUSCULOSKELETAL   (+) Disc degeneration, lumbar   (+) Myofascial pain syndrome      NEURO/PSYCH   (+) Chronic pain syndrome   (+) Myofascial pain syndrome      PULMONARY   (+) Chronic obstructive pulmonary disease (HCC)   (+) Severe obstructive sleep apnea      Left Ventricle: Left ventricular cavity size is normal (adjusted for BSA)  Wall thickness is mildly increased  The left ventricular ejection fraction is 55%  Systolic function is normal  Wall motion is normal  Diastolic function is abnormal   •  Left Atrium: The atrium is mildly dilated  •  Aortic Valve: The aortic valve is trileaflet  The leaflets are moderately thickened  The leaflets are mildly calcified  The leaflets exhibit normal mobility  There is mild regurgitation  •  Mitral Valve: There is moderate regurgitation  •  Tricuspid Valve: There is moderate regurgitation  The right ventricular systolic pressure is mildly elevated  The estimated right ventricular systolic pressure is 30 42 mmHg  •  Aorta: The aortic root is normal in size  The ascending aorta is mildly dilated  The ascending aorta is 4 5 cm  Physical Exam    Airway    Mallampati score: III  TM Distance: >3 FB  Neck ROM: full     Dental       Cardiovascular  Cardiovascular exam normal    Pulmonary  Pulmonary exam normal     Other Findings        Anesthesia Plan  ASA Score- 3     Anesthesia Type- IV sedation with anesthesia with ASA Monitors           Additional Monitors:   Airway Plan:           Plan Factors-Exercise tolerance (METS): >4 METS  Chart reviewed  EKG reviewed  Imaging results reviewed  Existing labs reviewed  Patient summary reviewed  Induction- intravenous  Postoperative Plan-     Informed Consent- Anesthetic plan and risks discussed with patient  I personally reviewed this patient with the CRNA  Discussed and agreed on the Anesthesia Plan with the CRNA  Nikolay Jaramillo

## 2022-11-04 NOTE — ANESTHESIA POSTPROCEDURE EVALUATION
Post-Op Assessment Note    CV Status:  Stable  Pain Score: 0    Pain management: adequate     Mental Status:  Sleepy   Hydration Status:  Euvolemic and stable   PONV Controlled:  Controlled   Airway Patency:  Patent and adequate      Post Op Vitals Reviewed: Yes      Staff: CRNA         No complications documented      BP  105/65    Temp      Pulse 64   Resp 16   SpO2 96%

## 2022-12-12 DIAGNOSIS — M54.16 LUMBAR RADICULOPATHY: ICD-10-CM

## 2022-12-12 DIAGNOSIS — M51.16 INTERVERTEBRAL DISC DISORDER WITH RADICULOPATHY OF LUMBAR REGION: ICD-10-CM

## 2022-12-13 NOTE — TELEPHONE ENCOUNTER
Please ask how he is taking the gabapentin  The request dose not match the chart  Is it 2 caps twice a day or 2 at night?

## 2022-12-14 RX ORDER — GABAPENTIN 300 MG/1
CAPSULE ORAL
Qty: 270 CAPSULE | Refills: 3 | Status: SHIPPED | OUTPATIENT
Start: 2022-12-14

## 2023-01-09 ENCOUNTER — OFFICE VISIT (OUTPATIENT)
Dept: CARDIOLOGY CLINIC | Facility: CLINIC | Age: 83
End: 2023-01-09

## 2023-01-09 VITALS
BODY MASS INDEX: 34.86 KG/M2 | SYSTOLIC BLOOD PRESSURE: 124 MMHG | HEART RATE: 59 BPM | DIASTOLIC BLOOD PRESSURE: 66 MMHG | WEIGHT: 249 LBS | OXYGEN SATURATION: 93 % | HEIGHT: 71 IN

## 2023-01-09 DIAGNOSIS — I25.10 3-VESSEL CAD: ICD-10-CM

## 2023-01-09 DIAGNOSIS — I71.40 ABDOMINAL AORTIC ANEURYSM (AAA) WITHOUT RUPTURE, UNSPECIFIED PART: ICD-10-CM

## 2023-01-09 DIAGNOSIS — G47.33 SEVERE OBSTRUCTIVE SLEEP APNEA: ICD-10-CM

## 2023-01-09 DIAGNOSIS — I50.32 CHRONIC DIASTOLIC CONGESTIVE HEART FAILURE (HCC): ICD-10-CM

## 2023-01-09 DIAGNOSIS — I10 BENIGN ESSENTIAL HYPERTENSION: ICD-10-CM

## 2023-01-09 DIAGNOSIS — I27.20 MODERATE TO SEVERE PULMONARY HYPERTENSION (HCC): ICD-10-CM

## 2023-01-09 DIAGNOSIS — I48.91 ATRIAL FIBRILLATION, UNSPECIFIED TYPE (HCC): Primary | ICD-10-CM

## 2023-01-09 DIAGNOSIS — I35.9 AORTIC VALVE DISORDER: ICD-10-CM

## 2023-01-09 DIAGNOSIS — J44.9 CHRONIC OBSTRUCTIVE PULMONARY DISEASE, UNSPECIFIED COPD TYPE (HCC): ICD-10-CM

## 2023-01-09 NOTE — PROGRESS NOTES
Cardiology Follow Up    Kenzie Franco  1940  [id number]  [facility] [place]  [address]  [phone]    1  Atrial fibrillation, unspecified type ([facility] [address] )  POCT ECG      2  Severe obstructive sleep apnea        3  Chronic obstructive pulmonary disease, unspecified COPD type ([facility] [address] )        4  3-vessel CAD        5  Benign essential hypertension        6  Aortic valve disorder        7  Abdominal aortic aneurysm (AAA) without rupture, unspecified part        8  Chronic diastolic congestive heart failure ([facility] [address] )        9  Moderate to severe pulmonary hypertension (HCC)            Discussion/Summary: Overall he has been doing better following his FORD guided cardioversion he is maintaining sinus rhythm on amiodarone  Continue Eliquis  He is euvolemic on exam   We talked about different strategies for his sleep apnea he is going to continue to think about the inspire device  He did not tolerate traditional sleep apnea treatments with CPAP  Blood pressure is well controlled  I will follow-up with him in 6 months  Interval History:  62-year-old gentleman with multivessel coronary disease, history of CABG, hypertension, hyperlipidemia, ascending aortic aneurysm, abdominal aorta aneurysm status post repair, sleep apnea presents to establish care with me in the office  He has been seeing 1 of my partners for several years  Functionally he has been doing great  Denies any exertional chest pain, shortness of breath, palpitations, lightheadedness, dizziness, or syncope  He has put on some weight during the pandemic  He has been trying to make some dietary changes start some low-level exercise  Last office visit he was found to be in new onset atrial fibrillation  We started anticoagulation rate control  Holter monitor shows average rate to 75    He has had some lightheadedness and dizziness which did not correlate to any bradycardia  Following the last visit we did a FORD guided cardioversion and started amiodarone  Overall he has been doing well maintaining sinus rhythm  Functional capacity is stable  He is not sure this made a drastic difference in his stamina however this might be doing a better job keeping him out of congestive heart failure  Denies any exertional chest pain, palpitations, lightheadedness, dizziness, or syncope  He is tolerating anticoagulation  Problem List     Positive colorectal cancer screening using Cologuard test    Benign essential hypertension    Hyperlipidemia    Impaired fasting glucose    Microscopic hematuria    Overview Signed 5/14/2018  9:17 AM by Mame Beck MD     Annotation - [id number]: Dr Diego Zamarripa         Obesity    Obstructive sleep apnea    Overview Signed 5/14/2018  9:17 AM by Mame Beck MD     Annotation - [id number]: Dr Carolina Tomas           Subclinical hypothyroidism    Overview Signed 5/14/2018  9:17 AM by Mame Beck MD     Transitioned From: Hypothyroidism         3-vessel CAD    Aneurysm of abdominal aorta (HCC)    Aneurysm of ascending aorta (HCC)    Aortic valve disorder    Arteriosclerotic cardiovascular disease    Disc degeneration, lumbar    Herniated lumbar intervertebral disc    Lumbar radiculopathy    Intervertebral disc disorder with radiculopathy of lumbar region    Spondylolisthesis at L4-L5 level    Chronic pain syndrome    SOB (shortness of breath)    Multiple pulmonary nodules    Heart failure, systolic (HCC)    Wt Readings from Last 3 Encounters:   01/09/23 113 kg (249 lb)   11/04/22 113 kg (250 lb)   11/04/22 113 kg (250 lb)                 COPD, mild ([address] )    Overview Deleted 9/11/2019  3:22 PM by Chacorta Larios MD                Past Medical History:   Diagnosis Date   • Abdominal aortic aneurysm     s/p repair   • Aneurysm of abdominal aorta     49mm, trileaflet AV   • Atrial fibrillation (HCC)     Eliquis   • BPH (benign prostatic hyperplasia)    • CAD (coronary artery disease)     s/p CABGx3   • CHF (congestive heart failure) ([facility])    • Chronic pain     Gabapentin   • Chronic pain disorder     lumbar   • COPD (chronic obstructive pulmonary disease) ([facility])    • Coronary artery disease    • Former tobacco use    • Hyperlipidemia    • Hypertension    • Hypothyroidism    • Lumbar radiculopathy    • Lumbar stenosis     s/p injections   • Neuropathy    • Obesity (BMI 30-39  9)    • YEVGENIY (obstructive sleep apnea)     unable to tolerate CPAP   • Pulmonary hypertension ([facility])     moderate to severe   • Spondylolisthesis of lumbar region    • Vitamin D deficiency      Social History     Socioeconomic History   • Marital status: /Civil Union     Spouse name: Not on file   • Number of children: Not on file   • Years of education: Not on file   • Highest education level: Not on file   Occupational History   • Occupation: retired   Tobacco Use   • Smoking status: Former     Packs/day: 1 00     Years: 40 00     Pack years: 40 00     Types: Cigarettes     Start date: 37     Quit date: 08/2012     Years since quitting: 10 4   • Smokeless tobacco: Former     Quit date: 2012   Vaping Use   • Vaping Use: Never used   Substance and Sexual Activity   • Alcohol use:  Yes     Alcohol/week: 21 0 standard drinks     Types: 21 Cans of beer per week   • Drug use: No   • Sexual activity: Never   Other Topics Concern   • Not on file   Social History Narrative   • Not on file     Social Determinants of Health     Financial Resource Strain: Not on file   Food Insecurity: Not on file   Transportation Needs: Not on file   Physical Activity: Not on file   Stress: Not on file   Social Connections: Not on file   Intimate Partner Violence: Not on file   Housing Stability: Not on file      Family History   Problem Relation Age of Onset   • Heart disease Mother    • Stroke Father         stroke syndrome   • Aneurysm Brother         abdominal   • Aortic aneurysm Brother ascending   • Coronary artery disease Family    • Hypertension Family    • Other Son         gioblastoma multiforme     Past Surgical History:   Procedure Laterality Date   • ABDOMINAL AORTIC ANEURYSM REPAIR  08/09/2007    2 dock limbs with visc extens prosth   • CARDIAC CATHETERIZATION     • COLONOSCOPY     • CORONARY ARTERY BYPASS GRAFT  2003    LIMA to LAD, sequential SVG to OM1 & OM2, SVG to RDA   • LUMBAR EPIDURAL INJECTION         Current Outpatient Medications:   •  albuterol (Ventolin HFA) 90 mcg/act inhaler, Inhale 2 puffs every 6 (six) hours as needed for wheezing, Disp: 54 g, Rfl: 1  •  amiodarone 200 mg tablet, Take 1 tablet (200 mg total) by mouth daily TAKE ONE TABLET TWICE DAILY FOR 14 DAYS, THEN REDUCE TO ONCE DAILY  , Disp: 90 tablet, Rfl: 3  •  apixaban (ELIQUIS) 5 mg, Take 1 tablet (5 mg total) by mouth 2 (two) times a day, Disp: 180 tablet, Rfl: 3  •  aspirin (ECOTRIN LOW STRENGTH) 81 mg EC tablet, Take 1 tablet by mouth daily, Disp: , Rfl:   •  atorvastatin (LIPITOR) 40 mg tablet, TAKE 1 TABLET BY MOUTH  DAILY, Disp: 90 tablet, Rfl: 3  •  cholecalciferol (VITAMIN D3) 1,000 units tablet, Take 2,000 Units by mouth daily, Disp: , Rfl:   •  FIBER ADULT GUMMIES PO, Take 1 caplet by mouth daily , Disp: , Rfl:   •  furosemide (LASIX) 20 mg tablet, TAKE 2 TABLET(40 MG) BY MOUTH EVERY MORNING ON MONDAY, WEDNESDAY, FRIDAY  2 TABLET IN EVENING EVERY DAY, Disp: 257 tablet, Rfl: 3  •  gabapentin (NEURONTIN) 300 mg capsule, Take 1 capsule (300 mg total) by mouth every morning AND 2 capsules (600 mg total) at bedtime  , Disp: 270 capsule, Rfl: 3  •  labetalol (NORMODYNE) 100 mg tablet, TAKE 1 TABLET BY MOUTH  TWICE DAILY, Disp: 180 tablet, Rfl: 3  •  multivitamin (THERAGRAN) TABS, Take 1 tablet by mouth daily, Disp: , Rfl:   •  senna (SENOKOT) 8 6 MG tablet, Take 1 tablet by mouth as needed  , Disp: , Rfl:   •  tamsulosin (FLOMAX) 0 4 mg, Take 1 capsule by mouth daily, Disp: , Rfl:   •  clobetasol (Bet[place]a [place]) 0 05 % cream, APPLY TOPICALLY TO RASH ON LEG DAILY FOR UP TO 2 WEEKS (Patient not taking: Reported on 10/27/2022), Disp: , Rfl:   •  gentamicin (GARAMYCIN) 0 1 % cream, APPLY TOPICALLY TO LEG EVERY DAY (Patient not taking: Reported on 10/27/2022), Disp: , Rfl:     Current Facility-Administered Medications:   •  albuterol (PROVENTIL HFA,VENTOLIN HFA) inhaler 2 puff, 2 puff, Inhalation, Q6H PRN, Elenita Kruger MD  Allergies   Allergen Reactions   • Meloxicam Edema       Labs:     Chemistry        Component Value Date/Time     (L) 10/15/2015 1042    K 4 4 11/02/2022 0848    K 4 7 10/15/2015 1042     11/02/2022 0848     10/15/2015 1042    CO2 31 11/02/2022 0848    CO2 30 10/15/2015 1042    BUN 13 11/02/2022 0848    BUN 15 10/15/2015 1042    CREATININE 1 10 11/02/2022 0848    CREATININE 1 03 10/15/2015 1042        Component Value Date/Time    CALCIUM 9 1 11/02/2022 0848    CALCIUM 9 1 10/15/2015 1042    ALKPHOS 56 07/28/2022 0411    ALKPHOS 71 10/15/2015 1042    AST 23 07/28/2022 0411    AST 14 10/15/2015 1042    ALT 28 07/28/2022 0411    ALT 23 10/15/2015 1042    BILITOT 0 79 10/15/2015 1042            Lab Results   Component Value Date    CHOL 124 10/15/2015    CHOL 159 04/01/2015    CHOL 155 09/30/2013     Lab Results   Component Value Date    HDL 56 07/28/2022    HDL 53 07/22/2022    HDL 58 01/21/2022     Lab Results   Component Value Date    LDLCALC 49 07/28/2022    LDLCALC 51 07/22/2022    LDLCALC 80 01/21/2022     Lab Results   Component Value Date    TRIG 46 07/28/2022    TRIG 49 07/22/2022    TRIG 42 01/21/2022     No results found for: CHOLHDL    Imaging: No results found  ECG:  AFib      Review of Systems   Constitutional: Negative  HENT: Negative  Eyes: Negative  Cardiovascular: Negative  Respiratory: Negative  Endocrine: Negative  Hematologic/Lymphatic: Negative  Skin: Negative  Musculoskeletal: Negative  Gastrointestinal: Negative  Genitourinary: Negative  Neurological: Negative  Psychiatric/Behavioral: Negative  All other systems reviewed and are negative  Vitals:    01/09/23 1442   BP: 124/66   Pulse: 59   SpO2: 93%     Vitals:    01/09/23 1442   Weight: 113 kg (249 lb)     Height: 5' 11" (180 3 cm)   Body mass index is 34 73 kg/m²  Physical Exam:  Vital signs reviewed  General:  Alert and cooperative, appears stated age, no acute distress  HEENT:  PERRLA, EOMI, no scleral icterus, no conjunctival pallor  Neck:  No lymphadenopathy, no thyromegaly, no carotid bruits, no elevated JVP  Heart:  irregular rate and rhythm, normal S1/S2, no S3/S4, no murmur, rubs or gallops  PMI nondisplaced  Lungs:  Clear to auscultation bilaterally, no wheezes rales or rhonchi  Abdomen:  Soft, non-tender, positive bowel sounds, no rebound or guarding,   no organomegaly   Extremities:  Normal range of motion    No clubbing, cyanosis or edema   Vascular:  2+ pedal pulses  Skin:  No rashes or lesions on exposed skin  Neurologic:  Cranial nerves II-XII grossly intact without focal deficits  Psych:  Normal mood and affect

## 2023-01-27 ENCOUNTER — APPOINTMENT (OUTPATIENT)
Dept: LAB | Facility: CLINIC | Age: 83
End: 2023-01-27

## 2023-01-27 DIAGNOSIS — R73.01 IMPAIRED FASTING GLUCOSE: ICD-10-CM

## 2023-01-27 DIAGNOSIS — D69.6 PLATELETS DECREASED (HCC): ICD-10-CM

## 2023-01-27 DIAGNOSIS — E78.2 MIXED HYPERLIPIDEMIA: ICD-10-CM

## 2023-01-27 DIAGNOSIS — I10 BENIGN ESSENTIAL HYPERTENSION: ICD-10-CM

## 2023-01-27 LAB
ALBUMIN SERPL BCP-MCNC: 3.9 G/DL (ref 3.5–5)
ALP SERPL-CCNC: 70 U/L (ref 46–116)
ALT SERPL W P-5'-P-CCNC: 28 U/L (ref 12–78)
ANION GAP SERPL CALCULATED.3IONS-SCNC: 3 MMOL/L (ref 4–13)
AST SERPL W P-5'-P-CCNC: 22 U/L (ref 5–45)
BILIRUB SERPL-MCNC: 1.07 MG/DL (ref 0.2–1)
BUN SERPL-MCNC: 18 MG/DL (ref 5–25)
CALCIUM SERPL-MCNC: 9.5 MG/DL (ref 8.3–10.1)
CHLORIDE SERPL-SCNC: 102 MMOL/L (ref 96–108)
CHOLEST SERPL-MCNC: 142 MG/DL
CO2 SERPL-SCNC: 31 MMOL/L (ref 21–32)
CREAT SERPL-MCNC: 1.24 MG/DL (ref 0.6–1.3)
ERYTHROCYTE [DISTWIDTH] IN BLOOD BY AUTOMATED COUNT: 13.5 % (ref 11.6–15.1)
EST. AVERAGE GLUCOSE BLD GHB EST-MCNC: 117 MG/DL
GFR SERPL CREATININE-BSD FRML MDRD: 54 ML/MIN/1.73SQ M
GLUCOSE P FAST SERPL-MCNC: 109 MG/DL (ref 65–99)
HBA1C MFR BLD: 5.7 %
HCT VFR BLD AUTO: 45.4 % (ref 36.5–49.3)
HDLC SERPL-MCNC: 76 MG/DL
HGB BLD-MCNC: 14.9 G/DL (ref 12–17)
LDLC SERPL CALC-MCNC: 58 MG/DL (ref 0–100)
MCH RBC QN AUTO: 33.3 PG (ref 26.8–34.3)
MCHC RBC AUTO-ENTMCNC: 32.8 G/DL (ref 31.4–37.4)
MCV RBC AUTO: 102 FL (ref 82–98)
PLATELET # BLD AUTO: 133 THOUSANDS/UL (ref 149–390)
PMV BLD AUTO: 9.8 FL (ref 8.9–12.7)
POTASSIUM SERPL-SCNC: 4.8 MMOL/L (ref 3.5–5.3)
PROT SERPL-MCNC: 7.5 G/DL (ref 6.4–8.4)
RBC # BLD AUTO: 4.47 MILLION/UL (ref 3.88–5.62)
SODIUM SERPL-SCNC: 136 MMOL/L (ref 135–147)
T4 FREE SERPL-MCNC: 0.92 NG/DL (ref 0.76–1.46)
TRIGL SERPL-MCNC: 41 MG/DL
TSH SERPL DL<=0.05 MIU/L-ACNC: 5.81 UIU/ML (ref 0.45–4.5)
WBC # BLD AUTO: 5.64 THOUSAND/UL (ref 4.31–10.16)

## 2023-02-01 ENCOUNTER — RA CDI HCC (OUTPATIENT)
Dept: OTHER | Facility: HOSPITAL | Age: 83
End: 2023-02-01

## 2023-02-01 NOTE — PROGRESS NOTES
[address]  coding opportunities       Chart reviewed, no opportunity found: CHART REVIEWED, NO OPPORTUNITY FOUND        Patients Insurance     Medicare Insurance: Medicare

## 2023-02-08 ENCOUNTER — OFFICE VISIT (OUTPATIENT)
Dept: INTERNAL MEDICINE CLINIC | Facility: CLINIC | Age: 83
End: 2023-02-08

## 2023-02-08 VITALS
SYSTOLIC BLOOD PRESSURE: 116 MMHG | BODY MASS INDEX: 34.83 KG/M2 | HEIGHT: 71 IN | WEIGHT: 248.8 LBS | RESPIRATION RATE: 12 BRPM | HEART RATE: 60 BPM | DIASTOLIC BLOOD PRESSURE: 68 MMHG | OXYGEN SATURATION: 95 %

## 2023-02-08 DIAGNOSIS — K59.00 CONSTIPATION, UNSPECIFIED CONSTIPATION TYPE: ICD-10-CM

## 2023-02-08 DIAGNOSIS — E03.8 SUBCLINICAL HYPOTHYROIDISM: ICD-10-CM

## 2023-02-08 DIAGNOSIS — G47.33 SEVERE OBSTRUCTIVE SLEEP APNEA: ICD-10-CM

## 2023-02-08 DIAGNOSIS — I48.21 PERMANENT ATRIAL FIBRILLATION (HCC): ICD-10-CM

## 2023-02-08 DIAGNOSIS — R73.01 IMPAIRED FASTING GLUCOSE: ICD-10-CM

## 2023-02-08 DIAGNOSIS — E66.01 SEVERE OBESITY (BMI 35.0-35.9 WITH COMORBIDITY) (HCC): ICD-10-CM

## 2023-02-08 DIAGNOSIS — E78.2 MIXED HYPERLIPIDEMIA: ICD-10-CM

## 2023-02-08 DIAGNOSIS — N18.31 STAGE 3A CHRONIC KIDNEY DISEASE (HCC): ICD-10-CM

## 2023-02-08 DIAGNOSIS — Z00.00 MEDICARE ANNUAL WELLNESS VISIT, SUBSEQUENT: Primary | ICD-10-CM

## 2023-02-08 DIAGNOSIS — D69.6 PLATELETS DECREASED (HCC): ICD-10-CM

## 2023-02-08 NOTE — PROGRESS NOTES
Assessment and Plan:   Take 2 a day of the stool softener laxative combination 2 a day and/or Miralax daily for constipation  Return in 2 days for cataract preop    Problem List Items Addressed This Visit        Endocrine    Impaired fasting glucose    Relevant Orders    Comprehensive metabolic panel    HEMOGLOBIN A1C W/ EAG ESTIMATION    Subclinical hypothyroidism     TSH sl elevated with normal T4  Continue to monitor         Relevant Orders    CBC and differential    Comprehensive metabolic panel    TSH, 3rd generation with Free T4 reflex       Respiratory    Severe obstructive sleep apnea     Intolerant of CPAP  Considering eval for Inspire             Cardiovascular and Mediastinum    Atrial fibrillation (HCC)    Relevant Orders    CBC and differential    Comprehensive metabolic panel       Genitourinary    Stage 3a chronic kidney disease ([facility] [address] )    Relevant Orders    CBC and differential    Comprehensive metabolic panel       Other    Hyperlipidemia    Relevant Orders    Lipid Panel with Direct LDL reflex    Severe obesity (BMI 35 0-35 9 with comorbidity) (HCC)    Relevant Orders    HEMOGLOBIN A1C W/ EAG ESTIMATION    Lipid Panel with Direct LDL reflex    TSH, 3rd generation with Free T4 reflex    Platelets decreased (HCC)    Relevant Orders    CBC and differential   Other Visit Diagnoses     Medicare annual wellness visit, subsequent    -  Primary    Constipation, unspecified constipation type               Preventive health issues were discussed with patient, and age appropriate screening tests were ordered as noted in patient's After Visit Summary  Personalized health advice and appropriate referrals for health education or preventive services given if needed, as noted in patient's After Visit Summary       History of Present Illness:     Patient presents for a Medicare Wellness Visit    HPI   Patient Care Team:  Mercy Avina MD as PCP - General  MD Jael Morin MD [place] [facility] Maria Luz Graham MD     Review of Systems:     Review of Systems   Constitutional: Negative for chills, fever and unexpected weight change  Eyes: Positive for visual disturbance (cataract surgery coming up)  Respiratory: Negative for shortness of breath  Cardiovascular: Positive for leg swelling  Negative for chest pain and palpitations  Gastrointestinal: Positive for constipation (1month w/o relief from OTC stool softener laxative combo, Miralax)  Negative for abdominal pain, blood in stool and diarrhea  Neurological: Negative for dizziness and headaches  Hematological: Bruises/bleeds easily          Problem List:     Patient Active Problem List   Diagnosis   • Benign essential hypertension   • Hyperlipidemia   • Impaired fasting glucose   • Microscopic hematuria   • Severe obesity (BMI 35 0-35 9 with comorbidity) ([facility])   • Severe obstructive sleep apnea   • Subclinical hypothyroidism   • 3-vessel CAD   • Abdominal aortic aneurysm without rupture   • Aortic valve disorder   • Arteriosclerotic cardiovascular disease   • Disc degeneration, lumbar   • Herniated lumbar intervertebral disc   • Lumbar radiculopathy   • Intervertebral disc disorder with radiculopathy of lumbar region   • Spondylolisthesis at L4-L5 level   • Chronic pain syndrome   • Multiple pulmonary nodules   • Chronic diastolic congestive heart failure (HCC)   • Chronic obstructive pulmonary disease (HCC)   • Moderate to severe pulmonary hypertension (HCC)   • Nocturnal hypoxemia   • Ascending aortic aneurysm   • 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)   • Platelets decreased (HCC)   • Myofascial pain syndrome   • Atrial fibrillation ([address] )   • Stage 3a chronic kidney disease ([facility] [address] )      Past Medical and Surgical History:     Past Medical History:   Diagnosis Date   • Abdominal aortic aneurysm     s/p repair   • Aneurysm of abdominal aorta     49mm, trileaflet AV   • Atrial fibrillation (HCC)     Eliquis   • BPH (benign prostatic hyperplasia)    • CAD (coronary artery disease)     s/p CABGx3   • CHF (congestive heart failure) ([facility])    • Chronic pain     Gabapentin   • Chronic pain disorder     lumbar   • COPD (chronic obstructive pulmonary disease) ([facility])    • Coronary artery disease    • Former tobacco use    • Hyperlipidemia    • Hypertension    • Hypothyroidism    • Lumbar radiculopathy    • Lumbar stenosis     s/p injections   • Neuropathy    • Obesity (BMI 30-39  9)    • YEVGENIY (obstructive sleep apnea)     unable to tolerate CPAP   • Pulmonary hypertension ([facility])     moderate to severe   • Spondylolisthesis of lumbar region    • Visual impairment 2022   • Vitamin D deficiency      Past Surgical History:   Procedure Laterality Date   • ABDOMINAL AORTIC ANEURYSM REPAIR  08/09/2007    2 dock limbs with visc extens prosth   • CARDIAC CATHETERIZATION     • COLONOSCOPY     • CORONARY ARTERY BYPASS GRAFT  2003    LIMA to LAD, sequential SVG to OM1 & OM2, SVG to RDA   • LUMBAR EPIDURAL INJECTION        Family History:     Family History   Problem Relation Age of Onset   • Heart disease Mother    • Stroke Father         stroke syndrome   • Aneurysm Brother         abdominal   • Aortic aneurysm Brother         ascending   • Coronary artery disease Family    • Hypertension Family    • Other Son         gioblastoma multiforme      Social History:     Social History     Socioeconomic History   • Marital status: /Civil Union     Spouse name: None   • Number of children: None   • Years of education: None   • Highest education level: None   Occupational History   • Occupation: retired   Tobacco Use   • Smoking status: Former     Packs/day: 1 00     Years: 40 00     Pack years: 40 00     Types: Cigarettes     Start date: 1/1/1957     Quit date: 8/1/2012     Years since quitting: 10 5   • Smokeless tobacco: Never   Vaping Use   • Vaping Use: Never used   Substance and Sexual Activity   • Alcohol use:  Yes     Alcohol/week: 21 0 standard drinks Types: 21 Cans of beer per week   • Drug use: No   • Sexual activity: Never   Other Topics Concern   • None   Social History Narrative   • None     Social Determinants of Health     Financial Resource Strain: Low Risk    • Difficulty of Paying Living Expenses: Not hard at all   Food Insecurity: Not on file   Transportation Needs: No Transportation Needs   • Lack of Transportation (Medical): No   • Lack of Transportation (Non-Medical): No   Physical Activity: Not on file   Stress: Not on file   Social Connections: Not on file   Intimate Partner Violence: Not on file   Housing Stability: Not on file      Medications and Allergies:     Current Outpatient Medications   Medication Sig Dispense Refill   • albuterol (Ventolin HFA) 90 mcg/act inhaler Inhale 2 puffs every 6 (six) hours as needed for wheezing 54 g 1   • amiodarone 200 mg tablet Take 1 tablet (200 mg total) by mouth daily TAKE ONE TABLET TWICE DAILY FOR 14 DAYS, THEN REDUCE TO ONCE DAILY  90 tablet 3   • apixaban (ELIQUIS) 5 mg Take 1 tablet (5 mg total) by mouth 2 (two) times a day 180 tablet 3   • aspirin (ECOTRIN LOW STRENGTH) 81 mg EC tablet Take 1 tablet by mouth daily     • atorvastatin (LIPITOR) 40 mg tablet TAKE 1 TABLET BY MOUTH  DAILY 90 tablet 3   • cholecalciferol (VITAMIN D3) 1,000 units tablet Take 2,000 Units by mouth daily     • FIBER ADULT GUMMIES PO Take 1 caplet by mouth daily      • furosemide (LASIX) 20 mg tablet TAKE 2 TABLET(40 MG) BY MOUTH EVERY MORNING ON MONDAY, WEDNESDAY, FRIDAY  2 TABLET IN EVENING EVERY  tablet 3   • gabapentin (NEURONTIN) 300 mg capsule Take 1 capsule (300 mg total) by mouth every morning AND 2 capsules (600 mg total) at bedtime   270 capsule 3   • labetalol (NORMODYNE) 100 mg tablet TAKE 1 TABLET BY MOUTH  TWICE DAILY 180 tablet 3   • multivitamin (THERAGRAN) TABS Take 1 tablet by mouth daily     • senna (SENOKOT) 8 6 MG tablet Take 1 tablet by mouth as needed       • tamsulosin (FLOMAX) 0 4 mg Take 1 capsule by mouth daily       Current Facility-Administered Medications   Medication Dose Route Frequency Provider Last Rate Last Admin   • albuterol (PROVENTIL HFA,VENTOLIN HFA) inhaler 2 puff  2 puff Inhalation Q6H PRN Janice Escalante MD         Allergies   Allergen Reactions   • Meloxicam Edema      Immunizations:     Immunization History   Administered Date(s) Administered   • COVID-19 MODERNA VACC 0 5 ML IM 01/26/2021, 03/05/2021, 11/28/2021   • COVID-19 Moderna Vac BIVALENT 12 Yr+ IM (BOOSTER ONLY) 0 5 ML 10/12/2022   • INFLUENZA 10/23/2007, 10/02/2013, 10/08/2016, 10/25/2017, 10/30/2020, 10/13/2021, 10/06/2022   • Influenza Split High Dose Preservative Free IM 10/02/2013, 10/02/2013, 10/22/2014, 10/27/2015, 10/08/2016, 10/03/2019   • Influenza, seasonal, injectable 10/20/2012, 10/30/2018   • Pneumococcal Conjugate 13-Valent 04/24/2015   • Pneumococcal Polysaccharide PPV23 09/07/2012   • Zoster Vaccine Recombinant 10/03/2019, 12/11/2019      Health Maintenance:         Topic Date Due   • Colorectal Cancer Screening  09/01/2024   • Lung Cancer Screening  Discontinued         Topic Date Due   • COVID-19 Vaccine (4 - Booster for Jonathan Pinta series) 12/07/2022      Medicare Screening Tests and Risk Assessments:     Rexene Ormond is here for his Subsequent Wellness visit  Health Risk Assessment:   Patient rates overall health as fair  Patient feels that their physical health rating is same  Patient is satisfied with their life  Eyesight was rated as much worse  Hearing was rated as same  Patient feels that their emotional and mental health rating is same  Patients states they are never, rarely angry  Patient states they are sometimes unusually tired/fatigued  Pain experienced in the last 7 days has been some  Patient's pain rating has been 6/10  Patient states that he has experienced no weight loss or gain in last 6 months  Depression Screening:   PHQ-2 Score: 0      Fall Risk Screening:    In the past year, patient has experienced: no history of falling in past year      Home Safety:  Patient does not have trouble with stairs inside or outside of their home  Patient has working smoke alarms and has working carbon monoxide detector  Home safety hazards include: none  Medications:   Patient is currently taking over-the-counter supplements  OTC medications include: see medication list  Patient is able to manage medications  Activities of Daily Living (ADLs)/Instrumental Activities of Daily Living (IADLs):   Walk and transfer into and out of bed and chair?: Yes  Dress and groom yourself?: Yes    Bathe or shower yourself?: Yes    Feed yourself?  Yes  Do your laundry/housekeeping?: Yes  Manage your money, pay your bills and track your expenses?: Yes  Make your own meals?: Yes    Do your own shopping?: Yes    Previous Hospitalizations:   Any hospitalizations or ED visits within the last 12 months?: Yes    How many hospitalizations have you had in the last year?: 1-2    Advance Care Planning:   Living will: No    Durable POA for healthcare: No      Cognitive Screening:   Provider or family/friend/caregiver concerned regarding cognition?: No    PREVENTIVE SCREENINGS      Cardiovascular Screening:    General: Screening Not Indicated and History Lipid Disorder      Diabetes Screening:     General: Screening Current      Colorectal Cancer Screening:     General: Screening Current      Prostate Cancer Screening:    General: Screening Not Indicated      Osteoporosis Screening:    General: Screening Not Indicated      Abdominal Aortic Aneurysm (AAA) Screening:    Risk factors include: tobacco use        General: Screening Not Indicated and History AAA      Lung Cancer Screening:     General: Screening Not Indicated      Hepatitis C Screening:    General: Screening Not Indicated    Screening, Brief Intervention, and Referral to Treatment (SBIRT)    Screening  Typical number of drinks in a day: 3    Single Item Drug Screening:  How often have you used an illegal drug (including marijuana) or a prescription medication for non-medical reasons in the past year? never    Single Item Drug Screen Score: 0  Interpretation: Negative screen for possible drug use disorder    No results found  Physical Exam:     /68 (BP Location: Left arm, Patient Position: Sitting, Cuff Size: Large)   Pulse 60   Resp 12   Ht 5' 11" (1 803 m)   Wt 113 kg (248 lb 12 8 oz)   SpO2 95%   BMI 34 70 kg/m²     Physical Exam  Vitals and nursing note reviewed  Constitutional:       General: He is not in acute distress  Appearance: He is well-developed  He is not ill-appearing, toxic-appearing or diaphoretic  Eyes:      Conjunctiva/sclera: Conjunctivae normal    Cardiovascular:      Rate and Rhythm: Normal rate and regular rhythm  Heart sounds: No murmur heard  Pulmonary:      Effort: Pulmonary effort is normal  No respiratory distress  Breath sounds: Normal breath sounds  Abdominal:      Palpations: Abdomen is soft  Tenderness: There is no abdominal tenderness  Musculoskeletal:      Cervical back: Neck supple  Right lower leg: Edema present  Left lower leg: Edema present  Skin:     General: Skin is warm and dry  Capillary Refill: Capillary refill takes less than 2 seconds  Neurological:      Mental Status: He is alert     Psychiatric:         Mood and Affect: Mood normal          Behavior: Behavior normal           Catherine Rhodes MD

## 2023-02-08 NOTE — PATIENT INSTRUCTIONS
Medicare Preventive Visit Patient Instructions  Thank you for completing your Welcome to Medicare Visit or Medicare Annual Wellness Visit today  Your next wellness visit will be due in one year (2/9/2024)  The screening/preventive services that you may require over the next 5-10 years are detailed below  Some tests may not apply to you based off risk factors and/or age  Screening tests ordered at today's visit but not completed yet may show as past due  Also, please note that scanned in results may not display below  Preventive Screenings:  Service Recommendations Previous Testing/Comments   Colorectal Cancer Screening  · Colonoscopy    · Fecal Occult Blood Test (FOBT)/Fecal Immunochemical Test (FIT)  · Fecal DNA/Cologuard Test  · Flexible Sigmoidoscopy Age: 39-70 years old   Colonoscopy: every 10 years (May be performed more frequently if at higher risk)  OR  FOBT/FIT: every 1 year  OR  Cologuard: every 3 years  OR  Sigmoidoscopy: every 5 years  Screening may be recommended earlier than age 39 if at higher risk for colorectal cancer  Also, an individualized decision between you and your healthcare provider will decide whether screening between the ages of 74-80 would be appropriate  Colonoscopy: 09/01/2021  FOBT/FIT: Not on file  Cologuard: Not on file  Sigmoidoscopy: Not on file          Prostate Cancer Screening Individualized decision between patient and health care provider in men between ages of 53-78   Medicare will cover every 12 months beginning on the day after your 50th birthday PSA: 2 6 ng/mL           Hepatitis C Screening Once for adults born between 1945 and 1965  More frequently in patients at high risk for Hepatitis C Hep C Antibody: Not on file        Diabetes Screening 1-2 times per year if you're at risk for diabetes or have pre-diabetes Fasting glucose: 109 mg/dL (1/27/2023)  A1C: 5 7 % (1/27/2023)      Cholesterol Screening Once every 5 years if you don't have a lipid disorder   May order more often based on risk factors  Lipid panel: 01/27/2023         Other Preventive Screenings Covered by Medicare:  1  Abdominal Aortic Aneurysm (AAA) Screening: covered once if your at risk  You're considered to be at risk if you have a family history of AAA or a male between the age of 73-68 who smoking at least 100 cigarettes in your lifetime  2  Lung Cancer Screening: covers low dose CT scan once per year if you meet all of the following conditions: (1) Age 50-69; (2) No signs or symptoms of lung cancer; (3) Current smoker or have quit smoking within the last 15 years; (4) You have a tobacco smoking history of at least 20 pack years (packs per day x number of years you smoked); (5) You get a written order from a healthcare provider  3  Glaucoma Screening: covered annually if you're considered high risk: (1) You have diabetes OR (2) Family history of glaucoma OR (3)  aged 48 and older OR (3)  American aged 72 and older  3  Osteoporosis Screening: covered every 2 years if you meet one of the following conditions: (1) Have a vertebral abnormality; (2) On glucocorticoid therapy for more than 3 months; (3) Have primary hyperparathyroidism; (4) On osteoporosis medications and need to assess response to drug therapy  5  HIV Screening: covered annually if you're between the age of 12-76  Also covered annually if you are younger than 13 and older than 72 with risk factors for HIV infection  For pregnant patients, it is covered up to 3 times per pregnancy      Immunizations:  Immunization Recommendations   Influenza Vaccine Annual influenza vaccination during flu season is recommended for all persons aged >= 6 months who do not have contraindications   Pneumococcal Vaccine   * Pneumococcal conjugate vaccine = PCV13 (Prevnar 13), PCV15 (Vaxneuvance), PCV20 (Prevnar 20)  * Pneumococcal polysaccharide vaccine = PPSV23 (Pneumovax) Adults 25-60 years old: 1-3 doses may be recommended based on certain risk factors  Adults 72 years old: 1-2 doses may be recommended based off what pneumonia vaccine you previously received   Hepatitis B Vaccine 3 dose series if at intermediate or high risk (ex: diabetes, end stage renal disease, liver disease)   Tetanus (Td) Vaccine - COST NOT COVERED BY MEDICARE PART B Following completion of primary series, a booster dose should be given every 10 years to maintain immunity against tetanus  Td may also be given as tetanus wound prophylaxis  Tdap Vaccine - COST NOT COVERED BY MEDICARE PART B Recommended at least once for all adults  For pregnant patients, recommended with each pregnancy  Shingles Vaccine (Shingrix) - COST NOT COVERED BY MEDICARE PART B  2 shot series recommended in those aged 48 and above     Health Maintenance Due:      Topic Date Due   • Colorectal Cancer Screening  09/01/2024   • Lung Cancer Screening  Discontinued     Immunizations Due:      Topic Date Due   • COVID-19 Vaccine (4 - Booster for Angela Mom series) 01/23/2022     Advance Directives   What are advance directives? Advance directives are legal documents that state your wishes and plans for medical care  These plans are made ahead of time in case you lose your ability to make decisions for yourself  Advance directives can apply to any medical decision, such as the treatments you want, and if you want to donate organs  What are the types of advance directives? There are many types of advance directives, and each state has rules about how to use them  You may choose a combination of any of the following:  · Living will: This is a written record of the treatment you want  You can also choose which treatments you do not want, which to limit, and which to stop at a certain time  This includes surgery, medicine, IV fluid, and tube feedings  · Durable power of  for healthcare [place] [facility]):   This is a written record that states who you want to make healthcare choices for you when you are unable to make them for yourself  This person, called a proxy, is usually a family member or a friend  You may choose more than 1 proxy  · Do not resuscitate (DNR) order:  A DNR order is used in case your heart stops beating or you stop breathing  It is a request not to have certain forms of treatment, such as CPR  A DNR order may be included in other types of advance directives  · Medical directive: This covers the care that you want if you are in a coma, near death, or unable to make decisions for yourself  You can list the treatments you want for each condition  Treatment may include pain medicine, surgery, blood transfusions, dialysis, IV or tube feedings, and a ventilator (breathing machine)  · Values history: This document has questions about your views, beliefs, and how you feel and think about life  This information can help others choose the care that you would choose  Why are advance directives important? An advance directive helps you control your care  Although spoken wishes may be used, it is better to have your wishes written down  Spoken wishes can be misunderstood, or not followed  Treatments may be given even if you do not want them  An advance directive may make it easier for your family to make difficult choices about your care  Weight Management   Why it is important to manage your weight:  Being overweight increases your risk of health conditions such as heart disease, high blood pressure, type 2 diabetes, and certain types of cancer  It can also increase your risk for osteoarthritis, sleep apnea, and other respiratory problems  Aim for a slow, steady weight loss  Even a small amount of weight loss can lower your risk of health problems  How to lose weight safely:  A safe and healthy way to lose weight is to eat fewer calories and get regular exercise  You can lose up about 1 pound a week by decreasing the number of calories you eat by 500 calories each day     Healthy meal plan for weight management:  A healthy meal plan includes a variety of foods, contains fewer calories, and helps you stay healthy  A healthy meal plan includes the following:  · Eat whole-grain foods more often  A healthy meal plan should contain fiber  Fiber is the part of grains, fruits, and vegetables that is not broken down by your body  Whole-grain foods are healthy and provide extra fiber in your diet  Some examples of whole-grain foods are whole-wheat breads and pastas, oatmeal, brown rice, and bulgur  · Eat a variety of vegetables every day  Include dark, leafy greens such as spinach, kale, leon greens, and mustard greens  Eat yellow and orange vegetables such as carrots, sweet potatoes, and winter squash  · Eat a variety of fruits every day  Choose fresh or canned fruit (canned in its own juice or light syrup) instead of juice  Fruit juice has very little or no fiber  · Eat low-fat dairy foods  Drink fat-free (skim) milk or 1% milk  Eat fat-free yogurt and low-fat cottage cheese  Try low-fat cheeses such as mozzarella and other reduced-fat cheeses  · Choose meat and other protein foods that are low in fat  Choose beans or other legumes such as split peas or lentils  Choose fish, skinless poultry (chicken or turkey), or lean cuts of red meat (beef or pork)  Before you cook meat or poultry, cut off any visible fat  · Use less fat and oil  Try baking foods instead of frying them  Add less fat, such as margarine, sour cream, regular salad dressing and mayonnaise to foods  Eat fewer high-fat foods  Some examples of high-fat foods include french fries, doughnuts, ice cream, and cakes  · Eat fewer sweets  Limit foods and drinks that are high in sugar  This includes candy, cookies, regular soda, and sweetened drinks  Exercise:  Exercise at least 30 minutes per day on most days of the week  Some examples of exercise include walking, biking, dancing, and swimming   You can also fit in more physical activity by taking the stairs instead of the elevator or parking farther away from stores  Ask your healthcare provider about the best exercise plan for you  © Copyright [employer] 2018 Information is for End User's use only and may not be sold, redistributed or otherwise used for commercial purposes   All illustrations and images included in CareNotes® are the copyrighted property of [employer]  or [address] [place] [address] [employer]

## 2023-02-10 ENCOUNTER — CONSULT (OUTPATIENT)
Dept: INTERNAL MEDICINE CLINIC | Facility: CLINIC | Age: 83
End: 2023-02-10

## 2023-02-10 VITALS
BODY MASS INDEX: 34.72 KG/M2 | WEIGHT: 248 LBS | RESPIRATION RATE: 18 BRPM | SYSTOLIC BLOOD PRESSURE: 130 MMHG | HEIGHT: 71 IN | HEART RATE: 75 BPM | DIASTOLIC BLOOD PRESSURE: 80 MMHG | OXYGEN SATURATION: 91 %

## 2023-02-10 DIAGNOSIS — H25.9 AGE-RELATED CATARACT OF BOTH EYES, UNSPECIFIED AGE-RELATED CATARACT TYPE: ICD-10-CM

## 2023-02-10 DIAGNOSIS — Z01.818 PREOP EXAM FOR INTERNAL MEDICINE: Primary | ICD-10-CM

## 2023-02-10 NOTE — PROGRESS NOTES
Assessment/Plan:  Medically stable for planned cataract surgeries  Continue current medications         Problem List Items Addressed This Visit    None  Visit Diagnoses     Preop exam for internal medicine    -  Primary    Age-related cataract of both eyes, unspecified age-related cataract type                Subjective:      Patient ID: Lakshmi Galicia is a 81 y o  male  HPI  Scheduled for cataract surgery OS 2/23 OD 3/9 Dr Hilaria Venegas  He was just seen 2 days ago for his annual physical/follow up  Known CAD, CHF , Afib diagnosed in July 2022 Underwent FORD cardioversion  He is on amiodarone and Eliquis  BP controlled  Recent labs showed A1C 5 7, normal lipids, slightly elevated TSH with normal T4, slightly low but stable platelet count at 853E    The following portions of the patient's history were reviewed and updated as appropriate: allergies, current medications, past family history, past medical history, past social history, past surgical history and problem list     Review of Systems   Constitutional: Negative for fever  Eyes: Positive for visual disturbance  Respiratory: Negative for cough and shortness of breath  Cardiovascular: Positive for leg swelling  Negative for chest pain and palpitations  Gastrointestinal: Positive for constipation  Negative for abdominal pain  Neurological: Negative for dizziness and headaches  Objective:      /80 (BP Location: Left arm, Patient Position: Sitting, Cuff Size: Large)   Pulse 75   Resp 18   Ht 5' 11" (1 803 m)   Wt 112 kg (248 lb)   SpO2 91%   BMI 34 59 kg/m²          Physical Exam  Constitutional:       Appearance: He is well-developed  Eyes:      Conjunctiva/sclera: Conjunctivae normal    Cardiovascular:      Rate and Rhythm: Normal rate and regular rhythm  Heart sounds: Normal heart sounds  No murmur heard  Pulmonary:      Effort: Pulmonary effort is normal  No respiratory distress        Breath sounds: Normal breath sounds  No wheezing or rales  Abdominal:      General: There is no distension  Palpations: Abdomen is soft  There is no mass  Tenderness: There is no abdominal tenderness  There is no guarding or rebound  Musculoskeletal:      Cervical back: Neck supple  Right lower leg: Edema present  Left lower leg: Edema present  Neurological:      Mental Status: He is alert and oriented to person, place, and time  Psychiatric:         Mood and Affect: Mood normal          Behavior: Behavior normal          Thought Content:  Thought content normal          Judgment: Judgment normal

## 2023-04-06 DIAGNOSIS — E78.2 MIXED HYPERLIPIDEMIA: ICD-10-CM

## 2023-04-06 DIAGNOSIS — I10 ESSENTIAL HYPERTENSION: ICD-10-CM

## 2023-04-06 DIAGNOSIS — I48.91 ATRIAL FIBRILLATION, UNSPECIFIED TYPE (HCC): ICD-10-CM

## 2023-04-06 RX ORDER — ATORVASTATIN CALCIUM 40 MG/1
TABLET, FILM COATED ORAL
Qty: 90 TABLET | Refills: 3 | Status: SHIPPED | OUTPATIENT
Start: 2023-04-06

## 2023-04-06 RX ORDER — LABETALOL 100 MG/1
TABLET, FILM COATED ORAL
Qty: 180 TABLET | Refills: 3 | Status: SHIPPED | OUTPATIENT
Start: 2023-04-06

## 2023-04-06 RX ORDER — APIXABAN 5 MG/1
TABLET, FILM COATED ORAL
Qty: 180 TABLET | Refills: 3 | Status: SHIPPED | OUTPATIENT
Start: 2023-04-06

## 2023-08-02 ENCOUNTER — TELEPHONE (OUTPATIENT)
Dept: CARDIOLOGY CLINIC | Facility: CLINIC | Age: 83
End: 2023-08-02

## 2023-08-02 NOTE — TELEPHONE ENCOUNTER
Pt is asking to go off Eliquis -pt is in the coverage gap -I offered assistance with Cuauhtemoc Rubin do not qualify-the other anticoagulant are the same $300    Requesting coumadin

## 2023-08-10 ENCOUNTER — ANTICOAG VISIT (OUTPATIENT)
Dept: CARDIOLOGY CLINIC | Facility: CLINIC | Age: 83
End: 2023-08-10

## 2023-08-10 DIAGNOSIS — I48.21 PERMANENT ATRIAL FIBRILLATION (HCC): Primary | ICD-10-CM

## 2023-08-10 DIAGNOSIS — I48.91 ATRIAL FIBRILLATION, UNSPECIFIED TYPE (HCC): ICD-10-CM

## 2023-08-10 RX ORDER — WARFARIN SODIUM 5 MG/1
TABLET ORAL
Qty: 60 TABLET | Refills: 11 | Status: SHIPPED | OUTPATIENT
Start: 2023-08-10

## 2023-08-14 ENCOUNTER — ANTICOAG VISIT (OUTPATIENT)
Dept: CARDIOLOGY CLINIC | Facility: CLINIC | Age: 83
End: 2023-08-14

## 2023-08-14 ENCOUNTER — APPOINTMENT (OUTPATIENT)
Dept: LAB | Facility: CLINIC | Age: 83
End: 2023-08-14
Payer: MEDICARE

## 2023-08-14 DIAGNOSIS — I48.21 PERMANENT ATRIAL FIBRILLATION (HCC): Primary | ICD-10-CM

## 2023-08-14 LAB
INR PPP: 1.37 (ref 0.84–1.19)
PROTHROMBIN TIME: 17.1 SECONDS (ref 11.6–14.5)

## 2023-08-14 PROCEDURE — 36415 COLL VENOUS BLD VENIPUNCTURE: CPT

## 2023-08-14 PROCEDURE — 85610 PROTHROMBIN TIME: CPT

## 2023-08-17 ENCOUNTER — APPOINTMENT (OUTPATIENT)
Dept: LAB | Facility: CLINIC | Age: 83
End: 2023-08-17
Payer: MEDICARE

## 2023-08-17 ENCOUNTER — ANTICOAG VISIT (OUTPATIENT)
Dept: CARDIOLOGY CLINIC | Facility: CLINIC | Age: 83
End: 2023-08-17

## 2023-08-17 DIAGNOSIS — E03.8 SUBCLINICAL HYPOTHYROIDISM: ICD-10-CM

## 2023-08-17 DIAGNOSIS — E66.01 SEVERE OBESITY (BMI 35.0-35.9 WITH COMORBIDITY) (HCC): ICD-10-CM

## 2023-08-17 DIAGNOSIS — N18.31 STAGE 3A CHRONIC KIDNEY DISEASE (HCC): ICD-10-CM

## 2023-08-17 DIAGNOSIS — E78.2 MIXED HYPERLIPIDEMIA: ICD-10-CM

## 2023-08-17 DIAGNOSIS — D69.6 PLATELETS DECREASED (HCC): ICD-10-CM

## 2023-08-17 DIAGNOSIS — I48.21 PERMANENT ATRIAL FIBRILLATION (HCC): Primary | ICD-10-CM

## 2023-08-17 DIAGNOSIS — R73.01 IMPAIRED FASTING GLUCOSE: ICD-10-CM

## 2023-08-17 DIAGNOSIS — I48.21 PERMANENT ATRIAL FIBRILLATION (HCC): ICD-10-CM

## 2023-08-17 LAB
ALBUMIN SERPL BCP-MCNC: 3.6 G/DL (ref 3.5–5)
ALP SERPL-CCNC: 93 U/L (ref 46–116)
ALT SERPL W P-5'-P-CCNC: 31 U/L (ref 12–78)
ANION GAP SERPL CALCULATED.3IONS-SCNC: 0 MMOL/L
AST SERPL W P-5'-P-CCNC: 22 U/L (ref 5–45)
BASOPHILS # BLD AUTO: 0.06 THOUSANDS/ÂΜL (ref 0–0.1)
BASOPHILS NFR BLD AUTO: 1 % (ref 0–1)
BILIRUB SERPL-MCNC: 0.51 MG/DL (ref 0.2–1)
BUN SERPL-MCNC: 16 MG/DL (ref 5–25)
CALCIUM SERPL-MCNC: 9.1 MG/DL (ref 8.3–10.1)
CHLORIDE SERPL-SCNC: 105 MMOL/L (ref 96–108)
CHOLEST SERPL-MCNC: 132 MG/DL
CO2 SERPL-SCNC: 32 MMOL/L (ref 21–32)
CREAT SERPL-MCNC: 1.3 MG/DL (ref 0.6–1.3)
EOSINOPHIL # BLD AUTO: 0.19 THOUSAND/ÂΜL (ref 0–0.61)
EOSINOPHIL NFR BLD AUTO: 4 % (ref 0–6)
ERYTHROCYTE [DISTWIDTH] IN BLOOD BY AUTOMATED COUNT: 13.8 % (ref 11.6–15.1)
EST. AVERAGE GLUCOSE BLD GHB EST-MCNC: 128 MG/DL
GFR SERPL CREATININE-BSD FRML MDRD: 50 ML/MIN/1.73SQ M
GLUCOSE P FAST SERPL-MCNC: 115 MG/DL (ref 65–99)
HBA1C MFR BLD: 6.1 %
HCT VFR BLD AUTO: 41.7 % (ref 36.5–49.3)
HDLC SERPL-MCNC: 68 MG/DL
HGB BLD-MCNC: 13.3 G/DL (ref 12–17)
IMM GRANULOCYTES # BLD AUTO: 0.02 THOUSAND/UL (ref 0–0.2)
IMM GRANULOCYTES NFR BLD AUTO: 0 % (ref 0–2)
INR PPP: 1.7 (ref 0.84–1.19)
LDLC SERPL CALC-MCNC: 53 MG/DL (ref 0–100)
LYMPHOCYTES # BLD AUTO: 0.92 THOUSANDS/ÂΜL (ref 0.6–4.47)
LYMPHOCYTES NFR BLD AUTO: 18 % (ref 14–44)
MCH RBC QN AUTO: 34.4 PG (ref 26.8–34.3)
MCHC RBC AUTO-ENTMCNC: 31.9 G/DL (ref 31.4–37.4)
MCV RBC AUTO: 108 FL (ref 82–98)
MONOCYTES # BLD AUTO: 0.64 THOUSAND/ÂΜL (ref 0.17–1.22)
MONOCYTES NFR BLD AUTO: 13 % (ref 4–12)
NEUTROPHILS # BLD AUTO: 3.25 THOUSANDS/ÂΜL (ref 1.85–7.62)
NEUTS SEG NFR BLD AUTO: 64 % (ref 43–75)
NRBC BLD AUTO-RTO: 0 /100 WBCS
PLATELET # BLD AUTO: 150 THOUSANDS/UL (ref 149–390)
PMV BLD AUTO: 10.1 FL (ref 8.9–12.7)
POTASSIUM SERPL-SCNC: 4.9 MMOL/L (ref 3.5–5.3)
PROT SERPL-MCNC: 7.2 G/DL (ref 6.4–8.4)
PROTHROMBIN TIME: 20.2 SECONDS (ref 11.6–14.5)
RBC # BLD AUTO: 3.87 MILLION/UL (ref 3.88–5.62)
SODIUM SERPL-SCNC: 137 MMOL/L (ref 135–147)
T4 FREE SERPL-MCNC: 0.87 NG/DL (ref 0.61–1.12)
TRIGL SERPL-MCNC: 54 MG/DL
TSH SERPL DL<=0.05 MIU/L-ACNC: 5.92 UIU/ML (ref 0.45–4.5)
WBC # BLD AUTO: 5.08 THOUSAND/UL (ref 4.31–10.16)

## 2023-08-17 PROCEDURE — 85025 COMPLETE CBC W/AUTO DIFF WBC: CPT

## 2023-08-17 PROCEDURE — 84443 ASSAY THYROID STIM HORMONE: CPT

## 2023-08-17 PROCEDURE — 83036 HEMOGLOBIN GLYCOSYLATED A1C: CPT

## 2023-08-17 PROCEDURE — 80053 COMPREHEN METABOLIC PANEL: CPT

## 2023-08-17 PROCEDURE — 80061 LIPID PANEL: CPT

## 2023-08-17 PROCEDURE — 84439 ASSAY OF FREE THYROXINE: CPT

## 2023-08-17 PROCEDURE — 85610 PROTHROMBIN TIME: CPT

## 2023-08-17 PROCEDURE — 36415 COLL VENOUS BLD VENIPUNCTURE: CPT

## 2023-08-21 ENCOUNTER — APPOINTMENT (OUTPATIENT)
Dept: LAB | Facility: CLINIC | Age: 83
End: 2023-08-21
Payer: MEDICARE

## 2023-08-21 DIAGNOSIS — I48.21 PERMANENT ATRIAL FIBRILLATION (HCC): ICD-10-CM

## 2023-08-21 LAB
INR PPP: 3.62 (ref 0.84–1.19)
PROTHROMBIN TIME: 36.3 SECONDS (ref 11.6–14.5)

## 2023-08-21 PROCEDURE — 36415 COLL VENOUS BLD VENIPUNCTURE: CPT

## 2023-08-21 PROCEDURE — 85610 PROTHROMBIN TIME: CPT

## 2023-08-22 ENCOUNTER — RA CDI HCC (OUTPATIENT)
Dept: OTHER | Facility: HOSPITAL | Age: 83
End: 2023-08-22

## 2023-08-22 ENCOUNTER — ANTICOAG VISIT (OUTPATIENT)
Dept: CARDIOLOGY CLINIC | Facility: CLINIC | Age: 83
End: 2023-08-22

## 2023-08-22 DIAGNOSIS — I48.21 PERMANENT ATRIAL FIBRILLATION (HCC): Primary | ICD-10-CM

## 2023-08-22 NOTE — PROGRESS NOTES
[address] [facility] coding opportunities          Chart Reviewed number of suggestions sent to Provider: 1   I13.0    Patients Insurance     Medicare Insurance: [employer]

## 2023-08-24 ENCOUNTER — APPOINTMENT (OUTPATIENT)
Dept: LAB | Facility: CLINIC | Age: 83
End: 2023-08-24
Payer: MEDICARE

## 2023-08-24 ENCOUNTER — ANTICOAG VISIT (OUTPATIENT)
Dept: CARDIOLOGY CLINIC | Facility: CLINIC | Age: 83
End: 2023-08-24

## 2023-08-24 DIAGNOSIS — I48.21 PERMANENT ATRIAL FIBRILLATION (HCC): Primary | ICD-10-CM

## 2023-08-24 DIAGNOSIS — I48.21 PERMANENT ATRIAL FIBRILLATION (HCC): ICD-10-CM

## 2023-08-24 LAB
INR PPP: 3.1 (ref 0.84–1.19)
PROTHROMBIN TIME: 32.2 SECONDS (ref 11.6–14.5)

## 2023-08-24 PROCEDURE — 36415 COLL VENOUS BLD VENIPUNCTURE: CPT

## 2023-08-24 PROCEDURE — 85610 PROTHROMBIN TIME: CPT

## 2023-08-28 ENCOUNTER — ANTICOAG VISIT (OUTPATIENT)
Dept: CARDIOLOGY CLINIC | Facility: CLINIC | Age: 83
End: 2023-08-28

## 2023-08-28 ENCOUNTER — APPOINTMENT (OUTPATIENT)
Dept: LAB | Facility: CLINIC | Age: 83
End: 2023-08-28
Payer: MEDICARE

## 2023-08-28 DIAGNOSIS — I48.21 PERMANENT ATRIAL FIBRILLATION (HCC): Primary | ICD-10-CM

## 2023-08-28 DIAGNOSIS — I48.21 PERMANENT ATRIAL FIBRILLATION (HCC): ICD-10-CM

## 2023-08-28 LAB
INR PPP: 3.58 (ref 0.84–1.19)
PROTHROMBIN TIME: 36 SECONDS (ref 11.6–14.5)

## 2023-08-28 PROCEDURE — 36415 COLL VENOUS BLD VENIPUNCTURE: CPT

## 2023-08-28 PROCEDURE — 85610 PROTHROMBIN TIME: CPT

## 2023-08-31 ENCOUNTER — OFFICE VISIT (OUTPATIENT)
Dept: INTERNAL MEDICINE CLINIC | Facility: CLINIC | Age: 83
End: 2023-08-31
Payer: MEDICARE

## 2023-08-31 VITALS
DIASTOLIC BLOOD PRESSURE: 74 MMHG | OXYGEN SATURATION: 95 % | WEIGHT: 251.4 LBS | HEIGHT: 71 IN | SYSTOLIC BLOOD PRESSURE: 122 MMHG | HEART RATE: 59 BPM | BODY MASS INDEX: 35.19 KG/M2

## 2023-08-31 DIAGNOSIS — N18.31 STAGE 3A CHRONIC KIDNEY DISEASE (HCC): ICD-10-CM

## 2023-08-31 DIAGNOSIS — I11.0 HYPERTENSIVE HEART DISEASE WITH HEART FAILURE (HCC): ICD-10-CM

## 2023-08-31 DIAGNOSIS — R73.01 IMPAIRED FASTING GLUCOSE: ICD-10-CM

## 2023-08-31 DIAGNOSIS — I87.2 VENOUS INSUFFICIENCY OF BOTH LOWER EXTREMITIES: Primary | ICD-10-CM

## 2023-08-31 DIAGNOSIS — E53.8 DEFICIENCY OF OTHER SPECIFIED B GROUP VITAMINS: ICD-10-CM

## 2023-08-31 DIAGNOSIS — I10 BENIGN ESSENTIAL HYPERTENSION: ICD-10-CM

## 2023-08-31 DIAGNOSIS — E03.8 SUBCLINICAL HYPOTHYROIDISM: ICD-10-CM

## 2023-08-31 DIAGNOSIS — E78.2 MIXED HYPERLIPIDEMIA: ICD-10-CM

## 2023-08-31 DIAGNOSIS — D75.89 MACROCYTOSIS WITHOUT ANEMIA: ICD-10-CM

## 2023-08-31 PROCEDURE — 99214 OFFICE O/P EST MOD 30 MIN: CPT | Performed by: INTERNAL MEDICINE

## 2023-08-31 RX ORDER — FUROSEMIDE 20 MG/1
60 TABLET ORAL 2 TIMES DAILY
Qty: 540 TABLET | Refills: 3 | Status: SHIPPED | OUTPATIENT
Start: 2023-08-31 | End: 2023-09-11 | Stop reason: SDUPTHER

## 2023-08-31 NOTE — PROGRESS NOTES
Assessment/Plan:  Increase in LLE edema likely from venous insufficiency. He is unable to place compression socks/stockings but has a sleeve. Stressed importance of leg elevation, reducing salt intake and avoiding alcohol. He can increase Lasix to 60mg BID until the BMP in a week  BMI Counseling: Body mass index is 35.06 kg/m². The BMI is above normal. Nutrition recommendations include decreasing overall calorie intake, consuming healthier snacks, moderation in carbohydrate intake and reducing intake of saturated fat and trans fat. Problem List Items Addressed This Visit        Endocrine    Impaired fasting glucose    Relevant Orders    HEMOGLOBIN A1C W/ EAG ESTIMATION    Subclinical hypothyroidism    Relevant Orders    TSH, 3rd generation with Free T4 reflex       Cardiovascular and Mediastinum    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)    Relevant Medications    furosemide (LASIX) 20 mg tablet    Benign essential hypertension    Relevant Medications    furosemide (LASIX) 20 mg tablet    Other Relevant Orders    CBC and differential    Comprehensive metabolic panel    Venous insufficiency of both lower extremities - Primary       Genitourinary    Stage 3a chronic kidney disease (HCC)    Relevant Medications    furosemide (LASIX) 20 mg tablet    Other Relevant Orders    Basic metabolic panel    Comprehensive metabolic panel       Other    Hyperlipidemia    Relevant Orders    Lipid panel   Other Visit Diagnoses     Macrocytosis without anemia        Relevant Orders    Vitamin B12    Deficiency of other specified B group vitamins        Relevant Orders    Vitamin B12            Subjective:      Patient ID: Jacquelin Patterson is a 81 y.o. male. HPI  Here for a follow up  C/o swelling in the left leg with blisters  This has been for 1-2 months.  He has venous insufficiency  He is on Lasix 40mg BID and asking to raise the dose  Diet has been poor and he drinks alcohol daily  He is using a compression sleeve; He cannot put on the socks/stockings  Switched to warfarin from Eliquis a few weeks ago bec of cost now that he is in the donut hole. INR has been erratic  Labs reviewed and A1C is slightly higher at 6.1, lipids controlled, creatinine 1.3, MCV higher 108, normal Hgb    The following portions of the patient's history were reviewed and updated as appropriate: allergies, current medications, past family history, past medical history, past social history, past surgical history and problem list.    Review of Systems   Constitutional: Positive for unexpected weight change (weight gain). Respiratory: Negative for shortness of breath. Cardiovascular: Positive for leg swelling. Negative for chest pain and palpitations. Gastrointestinal: Negative for abdominal pain, constipation and diarrhea. Genitourinary: Negative for difficulty urinating. Neurological: Negative for dizziness and headaches. Objective:      /74 (BP Location: Left arm, Patient Position: Sitting, Cuff Size: Large)   Pulse 59   Ht 5' 11" (1.803 m)   Wt 114 kg (251 lb 6.4 oz)   SpO2 95%   BMI 35.06 kg/m²          Physical Exam  Constitutional:       Appearance: He is obese. He is not ill-appearing. Cardiovascular:      Rate and Rhythm: Regular rhythm. Heart sounds: Normal heart sounds. Pulmonary:      Breath sounds: Normal breath sounds. Abdominal:      Palpations: Abdomen is soft. Tenderness: There is no abdominal tenderness. Musculoskeletal:      Right lower leg: No edema. Left lower leg: Edema (gr 2) present.    Psychiatric:         Mood and Affect: Mood normal.         Behavior: Behavior normal.

## 2023-08-31 NOTE — PATIENT INSTRUCTIONS
Increase Lasix to 3 tablets (60mg ) twice a day for a week  Get the basic metabolic panel (no need to fast)  with your next INR next week  Labs in 6months before your visit  Lose weight !!!

## 2023-09-04 DIAGNOSIS — M54.16 LUMBAR RADICULOPATHY: ICD-10-CM

## 2023-09-04 DIAGNOSIS — M51.16 INTERVERTEBRAL DISC DISORDER WITH RADICULOPATHY OF LUMBAR REGION: ICD-10-CM

## 2023-09-04 RX ORDER — GABAPENTIN 300 MG/1
CAPSULE ORAL
Qty: 270 CAPSULE | Refills: 3 | Status: SHIPPED | OUTPATIENT
Start: 2023-09-04

## 2023-09-05 ENCOUNTER — ANTICOAG VISIT (OUTPATIENT)
Dept: CARDIOLOGY CLINIC | Facility: CLINIC | Age: 83
End: 2023-09-05

## 2023-09-05 ENCOUNTER — APPOINTMENT (OUTPATIENT)
Dept: LAB | Facility: CLINIC | Age: 83
End: 2023-09-05
Payer: MEDICARE

## 2023-09-05 DIAGNOSIS — I48.21 PERMANENT ATRIAL FIBRILLATION (HCC): Primary | ICD-10-CM

## 2023-09-05 DIAGNOSIS — I48.21 PERMANENT ATRIAL FIBRILLATION (HCC): ICD-10-CM

## 2023-09-05 LAB
ANION GAP SERPL CALCULATED.3IONS-SCNC: 3 MMOL/L
BUN SERPL-MCNC: 18 MG/DL (ref 5–25)
CALCIUM SERPL-MCNC: 9.1 MG/DL (ref 8.4–10.2)
CHLORIDE SERPL-SCNC: 97 MMOL/L (ref 96–108)
CO2 SERPL-SCNC: 37 MMOL/L (ref 21–32)
CREAT SERPL-MCNC: 1.36 MG/DL (ref 0.6–1.3)
GFR SERPL CREATININE-BSD FRML MDRD: 48 ML/MIN/1.73SQ M
GLUCOSE P FAST SERPL-MCNC: 103 MG/DL (ref 65–99)
INR PPP: 3.04 (ref 0.84–1.19)
POTASSIUM SERPL-SCNC: 4.5 MMOL/L (ref 3.5–5.3)
PROTHROMBIN TIME: 31.7 SECONDS (ref 11.6–14.5)
SODIUM SERPL-SCNC: 137 MMOL/L (ref 135–147)

## 2023-09-05 PROCEDURE — 85610 PROTHROMBIN TIME: CPT

## 2023-09-05 PROCEDURE — 36415 COLL VENOUS BLD VENIPUNCTURE: CPT

## 2023-09-05 PROCEDURE — 80048 BASIC METABOLIC PNL TOTAL CA: CPT | Performed by: INTERNAL MEDICINE

## 2023-09-11 DIAGNOSIS — I11.0 HYPERTENSIVE HEART DISEASE WITH HEART FAILURE (HCC): ICD-10-CM

## 2023-09-11 RX ORDER — FUROSEMIDE 20 MG/1
40 TABLET ORAL 2 TIMES DAILY
Qty: 540 TABLET | Refills: 3
Start: 2023-09-11

## 2023-09-12 ENCOUNTER — APPOINTMENT (OUTPATIENT)
Dept: LAB | Facility: CLINIC | Age: 83
End: 2023-09-12
Payer: MEDICARE

## 2023-09-12 ENCOUNTER — ANTICOAG VISIT (OUTPATIENT)
Dept: CARDIOLOGY CLINIC | Facility: CLINIC | Age: 83
End: 2023-09-12

## 2023-09-12 DIAGNOSIS — I48.21 PERMANENT ATRIAL FIBRILLATION (HCC): ICD-10-CM

## 2023-09-12 DIAGNOSIS — I48.21 PERMANENT ATRIAL FIBRILLATION (HCC): Primary | ICD-10-CM

## 2023-09-12 LAB
INR PPP: 2.62 (ref 0.84–1.19)
PROTHROMBIN TIME: 28.3 SECONDS (ref 11.6–14.5)

## 2023-09-12 PROCEDURE — 85610 PROTHROMBIN TIME: CPT

## 2023-09-12 PROCEDURE — 36415 COLL VENOUS BLD VENIPUNCTURE: CPT

## 2023-09-13 DIAGNOSIS — E78.2 MIXED HYPERLIPIDEMIA: ICD-10-CM

## 2023-09-13 DIAGNOSIS — I48.91 ATRIAL FIBRILLATION, UNSPECIFIED TYPE (HCC): ICD-10-CM

## 2023-09-13 DIAGNOSIS — I10 ESSENTIAL HYPERTENSION: ICD-10-CM

## 2023-09-13 RX ORDER — AMIODARONE HYDROCHLORIDE 200 MG/1
200 TABLET ORAL DAILY
Qty: 90 TABLET | Refills: 3 | Status: SHIPPED | OUTPATIENT
Start: 2023-09-13 | End: 2023-09-21 | Stop reason: SDUPTHER

## 2023-09-13 RX ORDER — LABETALOL 100 MG/1
100 TABLET, FILM COATED ORAL 2 TIMES DAILY
Qty: 180 TABLET | Refills: 3 | Status: SHIPPED | OUTPATIENT
Start: 2023-09-13 | End: 2023-09-21 | Stop reason: SDUPTHER

## 2023-09-13 RX ORDER — ATORVASTATIN CALCIUM 40 MG/1
40 TABLET, FILM COATED ORAL DAILY
Qty: 90 TABLET | Refills: 3 | Status: SHIPPED | OUTPATIENT
Start: 2023-09-13

## 2023-09-19 ENCOUNTER — ANTICOAG VISIT (OUTPATIENT)
Dept: CARDIOLOGY CLINIC | Facility: CLINIC | Age: 83
End: 2023-09-19

## 2023-09-19 ENCOUNTER — APPOINTMENT (OUTPATIENT)
Dept: LAB | Facility: CLINIC | Age: 83
End: 2023-09-19
Payer: MEDICARE

## 2023-09-19 DIAGNOSIS — I48.21 PERMANENT ATRIAL FIBRILLATION (HCC): Primary | ICD-10-CM

## 2023-09-19 DIAGNOSIS — I48.21 PERMANENT ATRIAL FIBRILLATION (HCC): ICD-10-CM

## 2023-09-19 LAB
INR PPP: 2.7 (ref 0.84–1.19)
PROTHROMBIN TIME: 28.9 SECONDS (ref 11.6–14.5)

## 2023-09-19 PROCEDURE — 85610 PROTHROMBIN TIME: CPT

## 2023-09-19 PROCEDURE — 36415 COLL VENOUS BLD VENIPUNCTURE: CPT

## 2023-09-21 DIAGNOSIS — I48.91 ATRIAL FIBRILLATION, UNSPECIFIED TYPE (HCC): ICD-10-CM

## 2023-09-21 DIAGNOSIS — I10 ESSENTIAL HYPERTENSION: ICD-10-CM

## 2023-09-21 RX ORDER — LABETALOL 100 MG/1
100 TABLET, FILM COATED ORAL 2 TIMES DAILY
Qty: 180 TABLET | Refills: 3 | Status: SHIPPED | OUTPATIENT
Start: 2023-09-21

## 2023-09-21 RX ORDER — AMIODARONE HYDROCHLORIDE 200 MG/1
200 TABLET ORAL DAILY
Qty: 90 TABLET | Refills: 3 | Status: SHIPPED | OUTPATIENT
Start: 2023-09-21

## 2023-10-03 ENCOUNTER — ANTICOAG VISIT (OUTPATIENT)
Dept: CARDIOLOGY CLINIC | Facility: CLINIC | Age: 83
End: 2023-10-03

## 2023-10-03 ENCOUNTER — APPOINTMENT (OUTPATIENT)
Dept: LAB | Facility: CLINIC | Age: 83
End: 2023-10-03
Payer: MEDICARE

## 2023-10-03 DIAGNOSIS — I48.21 PERMANENT ATRIAL FIBRILLATION (HCC): ICD-10-CM

## 2023-10-03 DIAGNOSIS — I48.21 PERMANENT ATRIAL FIBRILLATION (HCC): Primary | ICD-10-CM

## 2023-10-03 LAB
INR PPP: 2.3 (ref 0.84–1.19)
PROTHROMBIN TIME: 24.9 SECONDS (ref 11.6–14.5)

## 2023-10-03 PROCEDURE — 36415 COLL VENOUS BLD VENIPUNCTURE: CPT

## 2023-10-03 PROCEDURE — 85610 PROTHROMBIN TIME: CPT

## 2023-10-17 ENCOUNTER — ANTICOAG VISIT (OUTPATIENT)
Dept: CARDIOLOGY CLINIC | Facility: CLINIC | Age: 83
End: 2023-10-17

## 2023-10-17 ENCOUNTER — APPOINTMENT (OUTPATIENT)
Dept: LAB | Facility: CLINIC | Age: 83
End: 2023-10-17
Payer: MEDICARE

## 2023-10-17 DIAGNOSIS — I48.21 PERMANENT ATRIAL FIBRILLATION (HCC): Primary | ICD-10-CM

## 2023-10-18 ENCOUNTER — OFFICE VISIT (OUTPATIENT)
Dept: CARDIOLOGY CLINIC | Facility: CLINIC | Age: 83
End: 2023-10-18
Payer: MEDICARE

## 2023-10-18 VITALS
SYSTOLIC BLOOD PRESSURE: 134 MMHG | HEART RATE: 57 BPM | BODY MASS INDEX: 35.14 KG/M2 | DIASTOLIC BLOOD PRESSURE: 68 MMHG | HEIGHT: 71 IN | WEIGHT: 251 LBS | OXYGEN SATURATION: 95 %

## 2023-10-18 DIAGNOSIS — I48.91 ATRIAL FIBRILLATION, UNSPECIFIED TYPE (HCC): Primary | ICD-10-CM

## 2023-10-18 DIAGNOSIS — I50.32 CHRONIC DIASTOLIC CONGESTIVE HEART FAILURE (HCC): ICD-10-CM

## 2023-10-18 DIAGNOSIS — I25.10 3-VESSEL CAD: ICD-10-CM

## 2023-10-18 DIAGNOSIS — G47.33 SEVERE OBSTRUCTIVE SLEEP APNEA: ICD-10-CM

## 2023-10-18 DIAGNOSIS — E78.2 MIXED HYPERLIPIDEMIA: ICD-10-CM

## 2023-10-18 DIAGNOSIS — I71.21 ANEURYSM OF ASCENDING AORTA WITHOUT RUPTURE (HCC): ICD-10-CM

## 2023-10-18 DIAGNOSIS — I10 ESSENTIAL HYPERTENSION: ICD-10-CM

## 2023-10-18 DIAGNOSIS — I11.0 HYPERTENSIVE HEART DISEASE WITH HEART FAILURE (HCC): ICD-10-CM

## 2023-10-18 DIAGNOSIS — I71.40 ABDOMINAL AORTIC ANEURYSM (AAA) WITHOUT RUPTURE, UNSPECIFIED PART (HCC): ICD-10-CM

## 2023-10-18 PROCEDURE — 93000 ELECTROCARDIOGRAM COMPLETE: CPT | Performed by: INTERNAL MEDICINE

## 2023-10-18 PROCEDURE — 99214 OFFICE O/P EST MOD 30 MIN: CPT | Performed by: INTERNAL MEDICINE

## 2023-10-18 NOTE — PROGRESS NOTES
Cardiology Follow Up    Earl Quiñones  1940  [id number]  [facility]  [address]  [address]  [address] [facility]  [phone]    1. Atrial fibrillation, unspecified type (HCC)  POCT ECG    VAS carotid complete study    XR chest pa & lateral    CANCELED: TSH, 3rd generation with Free T4 reflex      2. Essential hypertension  POCT ECG    VAS carotid complete study    XR chest pa & lateral    CANCELED: TSH, 3rd generation with Free T4 reflex      3. 3-vessel CAD  VAS carotid complete study    XR chest pa & lateral    CANCELED: TSH, 3rd generation with Free T4 reflex      4. Severe obstructive sleep apnea  VAS carotid complete study    XR chest pa & lateral    CANCELED: TSH, 3rd generation with Free T4 reflex      5. Abdominal aortic aneurysm (AAA) without rupture, unspecified part ([address])  VAS carotid complete study    XR chest pa & lateral    CANCELED: TSH, 3rd generation with Free T4 reflex      6. Aneurysm of ascending aorta without rupture (HCC)  VAS carotid complete study    XR chest pa & lateral    CANCELED: TSH, 3rd generation with Free T4 reflex      7. Chronic diastolic congestive heart failure (HCC)  VAS carotid complete study    XR chest pa & lateral    CANCELED: TSH, 3rd generation with Free T4 reflex      8. 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)  VAS carotid complete study    XR chest pa & lateral    CANCELED: TSH, 3rd generation with Free T4 reflex      9. Mixed hyperlipidemia  VAS carotid complete study    XR chest pa & lateral    CANCELED: TSH, 3rd generation with Free T4 reflex          Discussion/Summary: Coronary disease is stable blood pressures been well controlled he remains in sinus rhythm following cardioversion. He is on amiodarone and needs a yearly eye exam, TSH is within normal limits. I ordered a chest x-ray for follow-up on amiodarone usage.   He is not due for any further testing at this point in time. I will see him back in 6 months. I will discuss with his primary physician fluid management being that he was just changed recently and renal function declined. Interval History:  81year-old gentleman with multivessel coronary disease, history of CABG, hypertension, hyperlipidemia, ascending aortic aneurysm, abdominal aorta aneurysm status post repair, sleep apnea presents to establish care with me in the office. He has been seeing 1 of my partners for several years. Functionally he has been doing great. Denies any exertional chest pain, shortness of breath, palpitations, lightheadedness, dizziness, or syncope. He has put on some weight during the pandemic. He has been trying to make some dietary changes start some low-level exercise. Last office visit he was found to be in new onset atrial fibrillation. We started anticoagulation rate control. Holter monitor shows average rate to 75. He has had some lightheadedness and dizziness which did not correlate to any bradycardia. Overall has been feeling well since her last appointment. Last time I saw him we did a FORD guided cardioversion for atrial fibrillation. He had moderate mitral regurgitation. Since then he has been doing well. He does have ongoing issues with lower extremity edema. He was increased with Lasix but creatinine bumped. Denies any anginal sounding chest pain or discomfort. Is been no palpitations or fluttering. Problem List       Positive colorectal cancer screening using Cologuard test    Benign essential hypertension    Hyperlipidemia    Impaired fasting glucose    Microscopic hematuria    Overview Signed 5/14/2018  9:17 AM by Sushma Smith MD     Annotation - [id number]: Dr Michelle Wolff         Obesity    Obstructive sleep apnea    Overview Signed 5/14/2018  9:17 AM by Sushma Smith MD     Annotation - [id number]: Dr Tomas Chávez.          Subclinical hypothyroidism    Overview Signed 5/14/2018  9:17 AM by Jessa Lipscomb Juan M Cheung MD     Transitioned From: Hypothyroidism         3-vessel CAD    Aneurysm of abdominal aorta (HCC)    Aneurysm of ascending aorta (HCC)    Aortic valve disorder    Arteriosclerotic cardiovascular disease    Disc degeneration, lumbar    Herniated lumbar intervertebral disc    Lumbar radiculopathy    Intervertebral disc disorder with radiculopathy of lumbar region    Spondylolisthesis at L4-L5 level    Chronic pain syndrome    SOB (shortness of breath)    Multiple pulmonary nodules    Heart failure, systolic ([address])    Wt Readings from Last 3 Encounters:   10/18/23 114 kg (251 lb)   08/31/23 114 kg (251 lb 6.4 oz)   02/10/23 112 kg (248 lb)                 COPD, mild ([address])    Overview Deleted 9/11/2019  3:22 PM by Vin Venegas MD                  Past Medical History:   Diagnosis Date    Abdominal aortic aneurysm ([address])     s/p repair    Aneurysm of abdominal aorta ([facility])     49mm, trileaflet AV    Atrial fibrillation ([facility])     Eliquis    BPH (benign prostatic hyperplasia)     CAD (coronary artery disease)     s/p CABGx3    CHF (congestive heart failure) ([facility])     Chronic pain     Gabapentin    Chronic pain disorder     lumbar    COPD (chronic obstructive pulmonary disease) ([address])     Coronary artery disease     Former tobacco use     Hyperlipidemia     Hypertension     Hypothyroidism     Lumbar radiculopathy     Lumbar stenosis     s/p injections    Neuropathy     Obesity (BMI 30-39.9)     YEVGENIY (obstructive sleep apnea)     unable to tolerate CPAP    Pulmonary hypertension ([facility])     moderate to severe    Spondylolisthesis of lumbar region     Visual impairment 2022    Vitamin D deficiency      Social History     Socioeconomic History    Marital status: /Civil Union     Spouse name: Not on file    Number of children: Not on file    Years of education: Not on file    Highest education level: Not on file   Occupational History    Occupation: retired   Tobacco Use    Smoking status: Former     Packs/day: 1.00 Years: 40.00     Total pack years: 40.00     Types: Cigarettes     Start date: 1957     Quit date: 2012     Years since quittin.2    Smokeless tobacco: Never   Vaping Use    Vaping Use: Never used   Substance and Sexual Activity    Alcohol use: Yes     Alcohol/week: 21.0 standard drinks of alcohol     Types: 21 Cans of beer per week    Drug use: No    Sexual activity: Never   Other Topics Concern    Not on file   Social History Narrative    Not on file     Social Determinants of Health     Financial Resource Strain: Low Risk  (2023)    Overall Financial Resource Strain (CARDIA)     Difficulty of Paying Living Expenses: Not hard at all   Food Insecurity: Not on file   Transportation Needs: No Transportation Needs (2023)    PRAPARE - Transportation     Lack of Transportation (Medical): No     Lack of Transportation (Non-Medical):  No   Physical Activity: Not on file   Stress: Not on file   Social Connections: Not on file   Intimate Partner Violence: Not on file   Housing Stability: Not on file      Family History   Problem Relation Age of Onset    Heart disease Mother     Stroke Father         stroke syndrome    Aneurysm Brother         abdominal    Aortic aneurysm Brother         ascending    Coronary artery disease Family     Hypertension Family     Other Son         gioblastoma multiforme     Past Surgical History:   Procedure Laterality Date    ABDOMINAL AORTIC ANEURYSM REPAIR  2007    2 dock limbs with visc extens prosth    CARDIAC CATHETERIZATION      COLONOSCOPY      CORONARY ARTERY BYPASS GRAFT      LIMA to LAD, sequential SVG to OM1 & OM2, SVG to RDA    LUMBAR EPIDURAL INJECTION         Current Outpatient Medications:     albuterol (Ventolin HFA) 90 mcg/act inhaler, Inhale 2 puffs every 6 (six) hours as needed for wheezing, Disp: 54 g, Rfl: 1    amiodarone 200 mg tablet, Take 1 tablet (200 mg total) by mouth daily, Disp: 90 tablet, Rfl: 3    aspirin (ECOTRIN LOW STRENGTH) 81 mg EC tablet, Take 1 tablet by mouth daily, Disp: , Rfl:     atorvastatin (LIPITOR) 40 mg tablet, Take 1 tablet (40 mg total) by mouth daily, Disp: 90 tablet, Rfl: 3    cholecalciferol (VITAMIN D3) 1,000 units tablet, Take 2,000 Units by mouth daily, Disp: , Rfl:     FIBER ADULT GUMMIES PO, Take 1 caplet by mouth daily , Disp: , Rfl:     furosemide (LASIX) 20 mg tablet, Take 2 tablets (40 mg total) by mouth 2 (two) times a day, Disp: 540 tablet, Rfl: 3    gabapentin (NEURONTIN) 300 mg capsule, TAKE 1 CAPSULE BY MOUTH IN THE  MORNING AND 2 CAPSULES BY MOUTH  AT BEDTIME, Disp: 270 capsule, Rfl: 3    labetalol (NORMODYNE) 100 mg tablet, Take 1 tablet (100 mg total) by mouth 2 (two) times a day, Disp: 180 tablet, Rfl: 3    multivitamin (THERAGRAN) TABS, Take 1 tablet by mouth daily, Disp: , Rfl:     senna (SENOKOT) 8.6 MG tablet, Take 1 tablet by mouth as needed  , Disp: , Rfl:     tamsulosin (FLOMAX) 0.4 mg, Take 1 capsule by mouth daily, Disp: , Rfl:     warfarin (Coumadin) 5 mg tablet, TAKE 1 TO 2 TABS BY MOUTH DAILY OR AS DIRECTED BY PHYSICIAN, Disp: 60 tablet, Rfl: 11    Current Facility-Administered Medications:     albuterol (PROVENTIL HFA,VENTOLIN HFA) inhaler 2 puff, 2 puff, Inhalation, Q6H PRN, Vin Venegas MD  Allergies   Allergen Reactions    Meloxicam Edema       Labs:     Chemistry        Component Value Date/Time     (L) 10/15/2015 1042    K 4.5 09/05/2023 0726    K 4.7 10/15/2015 1042    CL 97 09/05/2023 0726     10/15/2015 1042    CO2 37 (H) 09/05/2023 0726    CO2 30 10/15/2015 1042    BUN 18 09/05/2023 0726    BUN 15 10/15/2015 1042    CREATININE 1.36 (H) 09/05/2023 0726    CREATININE 1.03 10/15/2015 1042        Component Value Date/Time    CALCIUM 9.1 09/05/2023 0726    CALCIUM 9.1 10/15/2015 1042    ALKPHOS 93 08/17/2023 0800    ALKPHOS 71 10/15/2015 1042    AST 22 08/17/2023 0800    AST 14 10/15/2015 1042    ALT 31 08/17/2023 0800    ALT 23 10/15/2015 1042    BILITOT 0.79 10/15/2015 1042 Lab Results   Component Value Date    CHOL 124 10/15/2015    CHOL 159 04/01/2015    CHOL 155 09/30/2013     Lab Results   Component Value Date    HDL 68 08/17/2023    HDL 76 01/27/2023    HDL 56 07/28/2022     Lab Results   Component Value Date    LDLCALC 53 08/17/2023    LDLCALC 58 01/27/2023    LDLCALC 49 07/28/2022     Lab Results   Component Value Date    TRIG 54 08/17/2023    TRIG 41 01/27/2023    TRIG 46 07/28/2022     No results found for: "CHOLHDL"    Imaging: No results found. ECG:  AFib      Review of Systems   Constitutional: Negative. HENT: Negative. Eyes: Negative. Cardiovascular: Negative. Respiratory: Negative. Endocrine: Negative. Hematologic/Lymphatic: Negative. Skin: Negative. Musculoskeletal: Negative. Gastrointestinal: Negative. Genitourinary: Negative. Neurological: Negative. Psychiatric/Behavioral: Negative. All other systems reviewed and are negative. Vitals:    10/18/23 1009   BP: 134/68   Pulse: 57   SpO2: 95%     Vitals:    10/18/23 1009   Weight: 114 kg (251 lb)     Height: 5' 11" (180.3 cm)   Body mass index is 35.01 kg/m². Physical Exam:  Vital signs reviewed  General:  Alert and cooperative, appears stated age, no acute distress  HEENT:  PERRLA, EOMI, no scleral icterus, no conjunctival pallor  Neck:  No lymphadenopathy, no thyromegaly, no carotid bruits, no elevated JVP  Heart:  Regular rate and rhythm, normal S1/S2, no S3/S4, no murmur, rubs or gallops. PMI nondisplaced  Lungs:  Clear to auscultation bilaterally, no wheezes rales or rhonchi  Abdomen:  Soft, non-tender, positive bowel sounds, no rebound or guarding,   no organomegaly   Extremities:  Normal range of motion.   No clubbing, cyanosis or edema   Vascular:  2+ pedal pulses  Skin:  No rashes or lesions on exposed skin  Neurologic:  Cranial nerves II-XII grossly intact without focal deficits  Psych:  Normal mood and affect

## 2023-10-19 ENCOUNTER — HOSPITAL ENCOUNTER (OUTPATIENT)
Dept: NON INVASIVE DIAGNOSTICS | Facility: CLINIC | Age: 83
Discharge: HOME | End: 2023-10-19
Payer: MEDICARE

## 2023-10-19 DIAGNOSIS — E78.2 MIXED HYPERLIPIDEMIA: ICD-10-CM

## 2023-10-19 DIAGNOSIS — I48.91 ATRIAL FIBRILLATION, UNSPECIFIED TYPE (HCC): ICD-10-CM

## 2023-10-19 DIAGNOSIS — I11.0 HYPERTENSIVE HEART DISEASE WITH HEART FAILURE (HCC): ICD-10-CM

## 2023-10-19 DIAGNOSIS — I50.32 CHRONIC DIASTOLIC CONGESTIVE HEART FAILURE (HCC): ICD-10-CM

## 2023-10-19 DIAGNOSIS — I25.10 3-VESSEL CAD: ICD-10-CM

## 2023-10-19 DIAGNOSIS — I10 ESSENTIAL HYPERTENSION: ICD-10-CM

## 2023-10-19 DIAGNOSIS — I71.40 ABDOMINAL AORTIC ANEURYSM (AAA) WITHOUT RUPTURE, UNSPECIFIED PART (HCC): ICD-10-CM

## 2023-10-19 DIAGNOSIS — G47.33 SEVERE OBSTRUCTIVE SLEEP APNEA: ICD-10-CM

## 2023-10-19 DIAGNOSIS — I71.21 ANEURYSM OF ASCENDING AORTA WITHOUT RUPTURE (HCC): ICD-10-CM

## 2023-10-19 PROCEDURE — 93880 EXTRACRANIAL BILAT STUDY: CPT

## 2023-10-26 ENCOUNTER — TELEPHONE (OUTPATIENT)
Dept: CARDIOLOGY CLINIC | Facility: CLINIC | Age: 83
End: 2023-10-26

## 2023-10-26 DIAGNOSIS — N18.31 STAGE 3A CHRONIC KIDNEY DISEASE (HCC): Primary | ICD-10-CM

## 2023-10-26 NOTE — TELEPHONE ENCOUNTER
Pt is currently not taking any Potassium. Jan[address] for him to eat a banana those three days?     9/5- Potassium was 4.5

## 2023-10-26 NOTE — TELEPHONE ENCOUNTER
Weight is stable at 250 lbs  LE edema continues L>R.  Stated they feel like bowling pins and concerned he may develop blisters like in the past.     Pt completed carotid study and will complete the xray in a few days

## 2023-10-26 NOTE — TELEPHONE ENCOUNTER
Spouse called asking if you reached out to the PCP in regards to changing the diuretic?  [phone]

## 2023-10-27 ENCOUNTER — APPOINTMENT (OUTPATIENT)
Dept: LAB | Facility: CLINIC | Age: 83
End: 2023-10-27
Payer: MEDICARE

## 2023-10-27 ENCOUNTER — APPOINTMENT (OUTPATIENT)
Dept: RADIOLOGY | Age: 83
End: 2023-10-27
Payer: MEDICARE

## 2023-10-27 DIAGNOSIS — G47.33 SEVERE OBSTRUCTIVE SLEEP APNEA: ICD-10-CM

## 2023-10-27 DIAGNOSIS — N40.1 ENLARGED PROSTATE WITH URINARY OBSTRUCTION: ICD-10-CM

## 2023-10-27 DIAGNOSIS — I71.21 ANEURYSM OF ASCENDING AORTA WITHOUT RUPTURE (HCC): ICD-10-CM

## 2023-10-27 DIAGNOSIS — N13.8 ENLARGED PROSTATE WITH URINARY OBSTRUCTION: ICD-10-CM

## 2023-10-27 DIAGNOSIS — I48.91 ATRIAL FIBRILLATION, UNSPECIFIED TYPE (HCC): ICD-10-CM

## 2023-10-27 DIAGNOSIS — E78.2 MIXED HYPERLIPIDEMIA: ICD-10-CM

## 2023-10-27 DIAGNOSIS — I10 ESSENTIAL HYPERTENSION: ICD-10-CM

## 2023-10-27 DIAGNOSIS — I71.40 ABDOMINAL AORTIC ANEURYSM (AAA) WITHOUT RUPTURE, UNSPECIFIED PART (HCC): ICD-10-CM

## 2023-10-27 DIAGNOSIS — I11.0 HYPERTENSIVE HEART DISEASE WITH HEART FAILURE (HCC): ICD-10-CM

## 2023-10-27 DIAGNOSIS — I50.32 CHRONIC DIASTOLIC CONGESTIVE HEART FAILURE (HCC): ICD-10-CM

## 2023-10-27 DIAGNOSIS — I25.10 3-VESSEL CAD: ICD-10-CM

## 2023-10-27 LAB
BUN SERPL-MCNC: 14 MG/DL (ref 5–25)
CREAT SERPL-MCNC: 1.17 MG/DL (ref 0.6–1.3)
GFR SERPL CREATININE-BSD FRML MDRD: 57 ML/MIN/1.73SQ M
PSA SERPL-MCNC: 4.08 NG/ML (ref 0–4)

## 2023-10-27 PROCEDURE — 84520 ASSAY OF UREA NITROGEN: CPT

## 2023-10-27 PROCEDURE — 36415 COLL VENOUS BLD VENIPUNCTURE: CPT

## 2023-10-27 PROCEDURE — 71046 X-RAY EXAM CHEST 2 VIEWS: CPT

## 2023-10-27 PROCEDURE — 84153 ASSAY OF PSA TOTAL: CPT

## 2023-10-27 PROCEDURE — 82565 ASSAY OF CREATININE: CPT

## 2023-10-30 ENCOUNTER — APPOINTMENT (OUTPATIENT)
Dept: LAB | Facility: CLINIC | Age: 83
End: 2023-10-30
Payer: MEDICARE

## 2023-10-30 DIAGNOSIS — R31.29 MICROSCOPIC HEMATURIA: ICD-10-CM

## 2023-10-30 LAB
ANION GAP SERPL CALCULATED.3IONS-SCNC: 3 MMOL/L
BUN SERPL-MCNC: 16 MG/DL (ref 5–25)
CALCIUM SERPL-MCNC: 9.1 MG/DL (ref 8.4–10.2)
CHLORIDE SERPL-SCNC: 99 MMOL/L (ref 96–108)
CO2 SERPL-SCNC: 37 MMOL/L (ref 21–32)
CREAT SERPL-MCNC: 1.35 MG/DL (ref 0.6–1.3)
GFR SERPL CREATININE-BSD FRML MDRD: 48 ML/MIN/1.73SQ M
GLUCOSE P FAST SERPL-MCNC: 97 MG/DL (ref 65–99)
POTASSIUM SERPL-SCNC: 4.9 MMOL/L (ref 3.5–5.3)
SODIUM SERPL-SCNC: 139 MMOL/L (ref 135–147)

## 2023-10-30 PROCEDURE — 80048 BASIC METABOLIC PNL TOTAL CA: CPT

## 2023-10-30 PROCEDURE — 36415 COLL VENOUS BLD VENIPUNCTURE: CPT

## 2023-10-30 NOTE — TELEPHONE ENCOUNTER
Wt last Friday 248.5 and today 245    Edema continues from feet to knees, but skin sightly softer/not as taught at the base of the knee.      Pt did not experience an increased output when urinating on the higher dose

## 2023-11-07 ENCOUNTER — APPOINTMENT (OUTPATIENT)
Dept: LAB | Facility: HOSPITAL | Age: 83
End: 2023-11-07
Payer: MEDICARE

## 2023-11-07 PROCEDURE — 88112 CYTOPATH CELL ENHANCE TECH: CPT | Performed by: PATHOLOGY

## 2023-11-09 ENCOUNTER — OFFICE VISIT (OUTPATIENT)
Dept: CARDIOLOGY CLINIC | Facility: CLINIC | Age: 83
End: 2023-11-09
Payer: MEDICARE

## 2023-11-09 VITALS
DIASTOLIC BLOOD PRESSURE: 70 MMHG | HEIGHT: 71 IN | BODY MASS INDEX: 34.69 KG/M2 | HEART RATE: 51 BPM | SYSTOLIC BLOOD PRESSURE: 120 MMHG | WEIGHT: 247.8 LBS | OXYGEN SATURATION: 96 %

## 2023-11-09 DIAGNOSIS — R60.0 BILATERAL LEG EDEMA: Primary | ICD-10-CM

## 2023-11-09 DIAGNOSIS — N18.31 STAGE 3A CHRONIC KIDNEY DISEASE (HCC): ICD-10-CM

## 2023-11-09 DIAGNOSIS — I48.0 PAF (PAROXYSMAL ATRIAL FIBRILLATION) (HCC): ICD-10-CM

## 2023-11-09 DIAGNOSIS — I10 BENIGN ESSENTIAL HYPERTENSION: ICD-10-CM

## 2023-11-09 DIAGNOSIS — E78.2 MIXED HYPERLIPIDEMIA: ICD-10-CM

## 2023-11-09 DIAGNOSIS — I50.32 CHRONIC DIASTOLIC CONGESTIVE HEART FAILURE (HCC): ICD-10-CM

## 2023-11-09 PROCEDURE — 99215 OFFICE O/P EST HI 40 MIN: CPT | Performed by: NURSE PRACTITIONER

## 2023-11-09 RX ORDER — TORSEMIDE 20 MG/1
20 TABLET ORAL DAILY
Qty: 120 TABLET | Refills: 3 | Status: SHIPPED | OUTPATIENT
Start: 2023-11-09 | End: 2023-11-09 | Stop reason: SDUPTHER

## 2023-11-09 RX ORDER — TORSEMIDE 20 MG/1
20 TABLET ORAL DAILY
Qty: 120 TABLET | Refills: 3 | Status: SHIPPED | OUTPATIENT
Start: 2023-11-09

## 2023-11-09 NOTE — PATIENT INSTRUCTIONS
Maintain a 2 gram daily sodium diet and 1800 ml daily fluid restriction. Check daily weights. If you gained 3 pounds in one day, 5 pounds in one week, or experience worsening shortness of breath or increasing lower leg swelling. Please call the heart failure office at [phone].   Please bring a  list of your current medications and daily weights to the office visit     Wear keith LE compression daily  Walk daily   Stop Lasix and start Torsemide 20mg BID

## 2023-11-09 NOTE — PROGRESS NOTES
Cardiology Follow Up    Lina Álvarez  1940  [id number]  [address]  [phone]    1. Bilateral leg edema        2. Chronic diastolic congestive heart failure (HCC)  torsemide (DEMADEX) 20 mg tablet    Basic metabolic panel    DISCONTINUED: torsemide (DEMADEX) 20 mg tablet      3. PAF (paroxysmal atrial fibrillation) ([address])        4. Benign essential hypertension        5. Mixed hyperlipidemia        6. Stage 3a chronic kidney disease ([address])  Basic metabolic panel          Interval History:   10/26/23 weight 250 pounds, R>L LE edema . On 10/30/23 our office was called with LE Edema L>R. Lasix was increased to 80mg BID on Friday, Saturday and Sunday. BMP to be done that Monday. 10/30/23 sodium 139 potassium 4.9 BUN 16 creatinine 1.35 GFR 48     Mr Gladis Horton presents to our office for a follow up visit. He is accompanied by his wife. I have reviewed the results of the CXR and Carotid doppler   Oscar denies dyspnea with minimal on exertion chest pain palpitation lightheadedness or dizziness. He sleeps in the recliner. His weight is down from 250 pounds to 247 pounds. He did not feel like he urinated more on the increased dose of Lasix 80 mg twice daily for 4 days. He denies dietary indiscretion may be drinking more fluids. He presents with right lower extremity edema greater than left lower extremity edema. According to Donna Sensing lower extremity edema occurring more than a year. He has not been using his bilateral lower extremity with alcohol wraps. He has been sedentary due to back pain. According to Oscar parker LE edema is improved in am after they have been elevated all night.       Medical History   Primary Cardiologist Dr Laurence Davis  Paroxysmal atrial fibrillation 11/04/22 sp FORD DCCV  10/17/23 INR 2.39    Hypertension  Hyperlipidemia  Chronic HFpEF  CKD IIIb baseline creat 1.10 - 1.30  CAD   CABG  YEVGENIY  Cannot tolerate CPAP   AAA  Patient Active Problem List   Diagnosis    Benign essential hypertension    Hyperlipidemia    Impaired fasting glucose    Microscopic hematuria    Severe obesity (BMI 35.0-35.9 with comorbidity)     Severe obstructive sleep apnea    Subclinical hypothyroidism    3-vessel CAD    Abdominal aortic aneurysm without rupture (HCC)    Aortic valve disorder    Arteriosclerotic cardiovascular disease    Disc degeneration, lumbar    Herniated lumbar intervertebral disc    Lumbar radiculopathy    Intervertebral disc disorder with radiculopathy of lumbar region    Spondylolisthesis at L4-L5 level    Chronic pain syndrome    Multiple pulmonary nodules    Chronic diastolic congestive heart failure (HCC)    Chronic obstructive pulmonary disease (HCC)    Moderate to severe pulmonary hypertension (HCC)    Nocturnal hypoxemia    Ascending aortic aneurysm (HCC)    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)    Platelets decreased (HCC)    Myofascial pain syndrome    Atrial fibrillation (HCC)    Stage 3a chronic kidney disease ([address])    Venous insufficiency of both lower extremities     Past Medical History:   Diagnosis Date    Abdominal aortic aneurysm (HCC)     s/p repair    Aneurysm of abdominal aorta ([facility])     49mm, trileaflet AV    Atrial fibrillation ([facility])     Eliquis    BPH (benign prostatic hyperplasia)     CAD (coronary artery disease)     s/p CABGx3    CHF (congestive heart failure) (HCC)     Chronic pain     Gabapentin    Chronic pain disorder     lumbar    COPD (chronic obstructive pulmonary disease) (HCC)     Coronary artery disease     Former tobacco use     Hyperlipidemia     Hypertension     Hypothyroidism     Lumbar radiculopathy     Lumbar stenosis     s/p injections    Neuropathy     Obesity (BMI 30-39.9)     YEVGENIY (obstructive sleep apnea)     unable to tolerate CPAP    Pulmonary hypertension (HCC)     moderate to severe Spondylolisthesis of lumbar region     Visual impairment     Vitamin D deficiency      Social History     Socioeconomic History    Marital status: /Civil Union     Spouse name: Not on file    Number of children: Not on file    Years of education: Not on file    Highest education level: Not on file   Occupational History    Occupation: retired   Tobacco Use    Smoking status: Former     Packs/day: 1.00     Years: 40.00     Total pack years: 40.00     Types: Cigarettes     Start date: 1957     Quit date: 2012     Years since quittin.2    Smokeless tobacco: Never   Vaping Use    Vaping Use: Never used   Substance and Sexual Activity    Alcohol use: Yes     Alcohol/week: 21.0 standard drinks of alcohol     Types: 21 Cans of beer per week    Drug use: No    Sexual activity: Never   Other Topics Concern    Not on file   Social History Narrative    Not on file     Social Determinants of Health     Financial Resource Strain: Low Risk  (2023)    Overall Financial Resource Strain (CARDIA)     Difficulty of Paying Living Expenses: Not hard at all   Food Insecurity: Not on file   Transportation Needs: No Transportation Needs (2023)    PRAPARE - Transportation     Lack of Transportation (Medical): No     Lack of Transportation (Non-Medical):  No   Physical Activity: Not on file   Stress: Not on file   Social Connections: Not on file   Intimate Partner Violence: Not on file   Housing Stability: Not on file      Family History   Problem Relation Age of Onset    Heart disease Mother     Stroke Father         stroke syndrome    Aneurysm Brother         abdominal    Aortic aneurysm Brother         ascending    Coronary artery disease Family     Hypertension Family     Other Son         gioblastoma multiforme     Past Surgical History:   Procedure Laterality Date    ABDOMINAL AORTIC ANEURYSM REPAIR  2007    2 dock limbs with visc extens prosth    CARDIAC CATHETERIZATION      COLONOSCOPY CORONARY ARTERY BYPASS GRAFT  2003    LIMA to LAD, sequential SVG to OM1 & OM2, SVG to RDA    LUMBAR EPIDURAL INJECTION         Current Outpatient Medications:     albuterol (Ventolin HFA) 90 mcg/act inhaler, Inhale 2 puffs every 6 (six) hours as needed for wheezing, Disp: 54 g, Rfl: 1    amiodarone 200 mg tablet, Take 1 tablet (200 mg total) by mouth daily, Disp: 90 tablet, Rfl: 3    aspirin (ECOTRIN LOW STRENGTH) 81 mg EC tablet, Take 1 tablet by mouth daily, Disp: , Rfl:     atorvastatin (LIPITOR) 40 mg tablet, Take 1 tablet (40 mg total) by mouth daily, Disp: 90 tablet, Rfl: 3    cholecalciferol (VITAMIN D3) 1,000 units tablet, Take 2,000 Units by mouth daily, Disp: , Rfl:     FIBER ADULT GUMMIES PO, Take 1 caplet by mouth daily , Disp: , Rfl:     furosemide (LASIX) 20 mg tablet, Take 2 tablets (40 mg total) by mouth 2 (two) times a day, Disp: 540 tablet, Rfl: 3    gabapentin (NEURONTIN) 300 mg capsule, TAKE 1 CAPSULE BY MOUTH IN THE  MORNING AND 2 CAPSULES BY MOUTH  AT BEDTIME, Disp: 270 capsule, Rfl: 3    labetalol (NORMODYNE) 100 mg tablet, Take 1 tablet (100 mg total) by mouth 2 (two) times a day, Disp: 180 tablet, Rfl: 3    multivitamin (THERAGRAN) TABS, Take 1 tablet by mouth daily, Disp: , Rfl:     senna (SENOKOT) 8.6 MG tablet, Take 1 tablet by mouth as needed  , Disp: , Rfl:     tamsulosin (FLOMAX) 0.4 mg, Take 1 capsule by mouth daily, Disp: , Rfl:     warfarin (Coumadin) 5 mg tablet, TAKE 1 TO 2 TABS BY MOUTH DAILY OR AS DIRECTED BY PHYSICIAN, Disp: 60 tablet, Rfl: 11    Current Facility-Administered Medications:     albuterol (PROVENTIL HFA,VENTOLIN HFA) inhaler 2 puff, 2 puff, Inhalation, Q6H PRN, Rafal Leblanc MD  Allergies   Allergen Reactions    Meloxicam Edema       Labs:  Appointment on 10/27/2023   Component Date Value    PSA, Diagnostic 10/27/2023 4.08 (H)     BUN 10/27/2023 14     Creatinine 10/27/2023 1.17     eGFR 10/27/2023 57    Telephone on 10/26/2023   Component Date Value    Sodium 10/30/2023 139     Potassium 10/30/2023 4.9     Chloride 10/30/2023 99     CO2 10/30/2023 37 (H)     ANION GAP 10/30/2023 3     BUN 10/30/2023 16     Creatinine 10/30/2023 1.35 (H)     Glucose, Fasting 10/30/2023 97     Calcium 10/30/2023 9.1     eGFR 10/30/2023 48    Ancillary Orders on 10/13/2023   Component Date Value    Protime 10/17/2023 25.6 (H)     INR 10/17/2023 2.39 (H)    Appointment on 10/03/2023   Component Date Value    Protime 10/03/2023 24.9 (H)     INR 10/03/2023 2.30 (H)    Appointment on 09/19/2023   Component Date Value    Protime 09/19/2023 28.9 (H)     INR 09/19/2023 2.70 (H)    Appointment on 09/12/2023   Component Date Value    Protime 09/12/2023 28.3 (H)     INR 09/12/2023 2.62 (H)      Imaging: XR chest pa & lateral    Result Date: 10/28/2023  Narrative: CHEST INDICATION:   I48.91: Unspecified atrial fibrillation I10: Essential (primary) hypertension I25.10: Atherosclerotic heart disease of native coronary artery without angina pectoris G47.33: Obstructive sleep apnea (adult) (pediatric) I71.40: Abdominal aortic aneurysm, without rupture, unspecified I71.21: Aneurysm of the ascending aorta, without rupture K45.60: Chronic diastolic (congestive) heart failure I. COMPARISON: Chest x-ray July 27, 2022, CT thorax July 27, 2022 EXAM PERFORMED/VIEWS:  XR CHEST PA & LATERAL The frontal view was performed utilizing dual energy radiographic technique. FINDINGS: There is no change in cardiomegaly and aneurysmal dilatation of the thoracic aorta better seen on prior CT. Posterior blunting of the costophrenic angles is demonstrated more on the right which could be related to pleural fluid or pleural thickening. No pneumothorax . Osseous structures appear within normal limits for patient age. Impression: Costophrenic angle blunting which could be related to pleural fluid or pleural thickening, more on the right.  Workstation performed: [id number]     VAS carotid complete study    Result Date: 10/19/2023  Narrative:  [facility] REPORT CLINICAL: Indications:  Surveillance of carotid artery disease. Patient has a history of cataracts; he is otherwise asymptomatic from a cerebral vascular standpoint. Operative History: 2007-08-09 AAA repair 2003-01-01 CABG x 3 Risk Factors The patient has history of Obesity, HTN, Hyperlipidemia, CKD, CAD, CHF, AAA, COPD, A-Fib and previous smoking (quit >10 yrs ago). He is currently on a blood thinner. Clinical Right Pressure:  142/84 mm Hg Left Pressure:  134/80 mm Hg  FINDINGS:  Right        Impression  PSV  EDV (cm/s)  Direction of Flow  Ratio  Dist. ICA                 52          14                      0.82  Mid. ICA                  58          17                      0.91  Prox. ICA    1 - 49%      63          18                      1.00  Dist CCA                  57          10                            Mid CCA                   63          11                      0.90  Prox CCA                  70          10                            Ext Carotid               79           0                      1.25  Prox Vert                 23           9  Antegrade                 Subclavian               124           0                             Left         Impression        PSV  EDV (cm/s)  Direction of Flow  Ratio  Dist. ICA                       50          17                      0.60  Mid. ICA                        56          13                      0.67  Prox.  ICA    1 - 49%            73           9                      0.88  Dist CCA                        65          14                            Mid CCA                         83          17                      0.97  Prox CCA                        86          17                            Ext Carotid  Minimal stenosis   63          11                      0.75  Prox Vert                       41           9  Antegrade                 Subclavian                     134           5 CONCLUSION:  Impression RIGHT: There is <50% stenosis noted in the internal carotid artery. Plaque is homogenous and irregular. Vertebral artery flow is antegrade; there is an early systolic deceleration, suggestive of more proximal disease. There is no significant subclavian artery disease. LEFT: There is <50% stenosis noted in the internal carotid artery. Plaque is homogenous and irregular. Vertebral artery flow is antegrade. There is no significant subclavian artery disease. Compared to previous study on 5/13/22, there is a new finding in the right vertebral artery. SIGNATURE: Electronically Signed by: Angelia Jackman MD on 2023-10-19 04:14:30 PM      Review of Systems:  Review of Systems   Cardiovascular:  Positive for leg swelling. Musculoskeletal:  Positive for arthralgias, back pain and myalgias. All other systems reviewed and are negative. Physical Exam:  Physical Exam  Vitals reviewed. Constitutional:       Appearance: He is obese. Neck:      Comments: No JVD noted   Cardiovascular:      Rate and Rhythm: Normal rate and regular rhythm. Pulses: Normal pulses. Heart sounds: Normal heart sounds. Pulmonary:      Effort: Pulmonary effort is normal.      Breath sounds: Normal breath sounds. Abdominal:      Comments: obese   Musculoskeletal:      Cervical back: Normal range of motion and neck supple. Right lower leg: Edema present. Left lower leg: Edema present. Comments: 2+ - 3+ RLE edema  2+ LLE edema    Skin:     General: Skin is warm and dry. Capillary Refill: Capillary refill takes less than 2 seconds. Neurological:      General: No focal deficit present. Mental Status: He is alert and oriented to person, place, and time. Psychiatric:         Mood and Affect: Mood normal.         Behavior: Behavior normal.         Discussion/Summary:  # Jaret LE edema L>R multifactorial, sedentary, PVD, and drinking a lot of fluids.   I have instructed Oscar to wear keith LE wraps daily, Walk daily and limit his fluids 1800- 2 liters daily. # Chronic HFpEF LVEF 55% NYHA Class III stage C- weight in the office 257 pounds, down from 250 pounds. Ira Bean does not feel he is urinating as much on Lasix. I have stopped Lasix 40mg BID and started Torsmide 20mg BID, BMP in one week. HR and BP controlled, he denies dietary indiscretion and admits to drinking fluids. I have asked him to limit fluids 1800-2 liters a day,  continue on labetalol 100 mg twice daily 2 g sodium diet  # Paroxysmal atrial fibrillation 11/04/22 sp FORD DCCV  10/17/23 INR 2.39 continue on Coumadin followed by [address]. Goal INR 2.0-3.0.   Continue on amiodarone 200 mg daily, labetalol 100 mg every 12 hours  # Hypertension LUE sitting 120/70 controlled on labetalol 100 mg every 12 hours  # Hyperlipidemia 8/17/23 , TG 54, LDL 53, continue on David 40 mg daily  # CKD IIIb baseline creat 1.10 - 1.30, 10/30/23 BUN/Creat 16/1.35,  BMP in a week

## 2023-11-10 PROCEDURE — 88112 CYTOPATH CELL ENHANCE TECH: CPT | Performed by: PATHOLOGY

## 2023-11-14 ENCOUNTER — ANTICOAG VISIT (OUTPATIENT)
Dept: CARDIOLOGY CLINIC | Facility: CLINIC | Age: 83
End: 2023-11-14

## 2023-11-14 ENCOUNTER — APPOINTMENT (OUTPATIENT)
Dept: LAB | Facility: CLINIC | Age: 83
End: 2023-11-14
Payer: MEDICARE

## 2023-11-14 DIAGNOSIS — I50.32 CHRONIC DIASTOLIC CONGESTIVE HEART FAILURE (HCC): ICD-10-CM

## 2023-11-14 DIAGNOSIS — N18.31 STAGE 3A CHRONIC KIDNEY DISEASE (HCC): ICD-10-CM

## 2023-11-14 DIAGNOSIS — I48.21 PERMANENT ATRIAL FIBRILLATION (HCC): Primary | ICD-10-CM

## 2023-11-14 LAB
ANION GAP SERPL CALCULATED.3IONS-SCNC: 3 MMOL/L
BUN SERPL-MCNC: 18 MG/DL (ref 5–25)
CALCIUM SERPL-MCNC: 9.1 MG/DL (ref 8.4–10.2)
CHLORIDE SERPL-SCNC: 102 MMOL/L (ref 96–108)
CO2 SERPL-SCNC: 36 MMOL/L (ref 21–32)
CREAT SERPL-MCNC: 1.21 MG/DL (ref 0.6–1.3)
GFR SERPL CREATININE-BSD FRML MDRD: 55 ML/MIN/1.73SQ M
GLUCOSE P FAST SERPL-MCNC: 101 MG/DL (ref 65–99)
POTASSIUM SERPL-SCNC: 5 MMOL/L (ref 3.5–5.3)
SODIUM SERPL-SCNC: 141 MMOL/L (ref 135–147)

## 2023-11-14 PROCEDURE — 80048 BASIC METABOLIC PNL TOTAL CA: CPT

## 2023-11-28 ENCOUNTER — ANTICOAG VISIT (OUTPATIENT)
Dept: CARDIOLOGY CLINIC | Facility: CLINIC | Age: 83
End: 2023-11-28

## 2023-11-28 ENCOUNTER — APPOINTMENT (OUTPATIENT)
Dept: LAB | Facility: CLINIC | Age: 83
End: 2023-11-28
Payer: MEDICARE

## 2023-11-28 DIAGNOSIS — I48.21 PERMANENT ATRIAL FIBRILLATION (HCC): Primary | ICD-10-CM

## 2023-11-30 ENCOUNTER — TELEPHONE (OUTPATIENT)
Age: 83
End: 2023-11-30

## 2023-11-30 NOTE — TELEPHONE ENCOUNTER
Pt. Called in and wants to know if Dr. Reyes thinks that she should get the RSV Vaccine?    Please just leave her a vm msg.   Received Fax from [phone] asking for clarification    Message: 2 Active prescriptions for Glipizide 10 mg tablet   Rx dated 3/21/2022 has directions 1 qd before breakfast and rx dated 2/15/2022 has directions 1 BID shipped. Please clarify which is the current therapy.      Per result note from 2/15/2022    Maria R Arellano MD   2/17/2022  7:49 PM CST         Can start insulin lantus 20 units nightly  Decrease glipizide to 10 mg in am only      [facility] pharmacy has been called and notified of Dr. Arellano response above.    Priscila Morris, [facility]   3/19/2022  1:58 PM CDT Back to Top        Patient notified at ov dated 03/18/2022

## 2023-12-13 ENCOUNTER — APPOINTMENT (OUTPATIENT)
Dept: LAB | Facility: CLINIC | Age: 83
End: 2023-12-13
Payer: MEDICARE

## 2023-12-13 ENCOUNTER — ANTICOAG VISIT (OUTPATIENT)
Dept: CARDIOLOGY CLINIC | Facility: CLINIC | Age: 83
End: 2023-12-13

## 2023-12-13 DIAGNOSIS — I48.21 PERMANENT ATRIAL FIBRILLATION (HCC): Primary | ICD-10-CM

## 2023-12-26 ENCOUNTER — APPOINTMENT (OUTPATIENT)
Dept: LAB | Facility: CLINIC | Age: 83
End: 2023-12-26
Payer: MEDICARE

## 2023-12-26 ENCOUNTER — ANTICOAG VISIT (OUTPATIENT)
Dept: CARDIOLOGY CLINIC | Facility: CLINIC | Age: 83
End: 2023-12-26

## 2023-12-26 DIAGNOSIS — I48.21 PERMANENT ATRIAL FIBRILLATION (HCC): Primary | ICD-10-CM

## 2023-12-28 ENCOUNTER — TELEPHONE (OUTPATIENT)
Dept: CARDIOLOGY CLINIC | Facility: CLINIC | Age: 83
End: 2023-12-28

## 2023-12-28 NOTE — TELEPHONE ENCOUNTER
Patient is scheduled to see Dr Alan on 2/21.  He was seen by you on 11/9.      At ov on 11/9, lasix was changed to Torsemide 20 mg daily due to edema in lower extremities, more in right leg than left.      Today, spouse called to report edema in right leg is much worse. The leg is hard, with blisters and fluid weeping  from right lower leg.    Patient said his weight has been stable within a few lbs up or down. He is not more short of breath.    Last BMP in chart was on 11/14   Cr 1.21  K 5 ( not on potassium)    Please advise.

## 2023-12-28 NOTE — TELEPHONE ENCOUNTER
Increase Torsemide to 40mg BID for 2-3 days only.  Call our office on Monday to evaluate response.  Thank you

## 2023-12-28 NOTE — TELEPHONE ENCOUNTER
I spoke with spouse and advised of instructions. I will talk with her again on 1/2 to evaluate effectiveness of increase.

## 2024-01-02 ENCOUNTER — HOSPITAL ENCOUNTER (OUTPATIENT)
Dept: NON INVASIVE DIAGNOSTICS | Facility: CLINIC | Age: 84
Discharge: HOME | End: 2024-01-02
Payer: MEDICARE

## 2024-01-02 DIAGNOSIS — R60.0 EDEMA OF RIGHT LOWER LEG: Primary | ICD-10-CM

## 2024-01-02 DIAGNOSIS — R60.0 EDEMA OF RIGHT LOWER LEG: ICD-10-CM

## 2024-01-02 PROCEDURE — 93971 EXTREMITY STUDY: CPT

## 2024-01-02 PROCEDURE — 93971 EXTREMITY STUDY: CPT | Performed by: SURGERY

## 2024-01-02 NOTE — TELEPHONE ENCOUNTER
Mrs Espinosa called with an update today on Oscar.  He increased the Torsemide to 40 mg BID for 3 days and lost 2 lbs each day for a total of 6 lbs.  When he returned to his normal dose of 20 mg daily, he gained 2 lbs back.  Spouse said the LE edema did not change with the Torsemide increase for 3 days and the weight loss,  and the right leg is still hard, and weeping fluid from the blisters.  She described the right lower leg as huge.    Please advise.

## 2024-01-02 NOTE — TELEPHONE ENCOUNTER
Doppler RLE R/O DVT, follow up with PCP and me next week unless another provider has an opening.  Thank you Julieth

## 2024-01-04 ENCOUNTER — TELEPHONE (OUTPATIENT)
Dept: CARDIOLOGY CLINIC | Facility: CLINIC | Age: 84
End: 2024-01-04

## 2024-01-04 NOTE — TELEPHONE ENCOUNTER
----- Message from SOSA Miguel sent at 1/3/2024  8:38 AM EST -----  Please call Oscar and inform him no evidence of blood clot in his leg.  Thank you    Spoke with pts wife re: Gilda. Study. No blood clot in leg.

## 2024-01-04 NOTE — TELEPHONE ENCOUNTER
Please call Oscar and inform him no evidence of blood clot in his leg.  Thank you     I spoke with Mr Espinosa and advised him of the result .  He will keep appt with Julieth scheduled on 1/11.

## 2024-01-09 ENCOUNTER — APPOINTMENT (OUTPATIENT)
Dept: LAB | Facility: CLINIC | Age: 84
End: 2024-01-09
Payer: MEDICARE

## 2024-01-09 ENCOUNTER — ANTICOAG VISIT (OUTPATIENT)
Dept: CARDIOLOGY CLINIC | Facility: CLINIC | Age: 84
End: 2024-01-09

## 2024-01-09 DIAGNOSIS — I48.21 PERMANENT ATRIAL FIBRILLATION (HCC): Primary | ICD-10-CM

## 2024-01-11 ENCOUNTER — OFFICE VISIT (OUTPATIENT)
Dept: CARDIOLOGY CLINIC | Facility: CLINIC | Age: 84
End: 2024-01-11
Payer: MEDICARE

## 2024-01-11 VITALS
WEIGHT: 252.2 LBS | BODY MASS INDEX: 35.31 KG/M2 | SYSTOLIC BLOOD PRESSURE: 116 MMHG | HEIGHT: 71 IN | HEART RATE: 61 BPM | OXYGEN SATURATION: 94 % | DIASTOLIC BLOOD PRESSURE: 70 MMHG

## 2024-01-11 DIAGNOSIS — R60.0 BILATERAL LEG EDEMA: Primary | ICD-10-CM

## 2024-01-11 DIAGNOSIS — I50.32 CHRONIC DIASTOLIC CONGESTIVE HEART FAILURE (HCC): ICD-10-CM

## 2024-01-11 DIAGNOSIS — I48.0 PAF (PAROXYSMAL ATRIAL FIBRILLATION) (HCC): ICD-10-CM

## 2024-01-11 DIAGNOSIS — I10 HYPERTENSION, UNSPECIFIED TYPE: ICD-10-CM

## 2024-01-11 DIAGNOSIS — I73.9 PVD (PERIPHERAL VASCULAR DISEASE) (HCC): ICD-10-CM

## 2024-01-11 DIAGNOSIS — E66.01 SEVERE OBESITY (BMI 35.0-35.9 WITH COMORBIDITY): ICD-10-CM

## 2024-01-11 DIAGNOSIS — E78.2 MIXED HYPERLIPIDEMIA: ICD-10-CM

## 2024-01-11 DIAGNOSIS — G47.33 OSA (OBSTRUCTIVE SLEEP APNEA): ICD-10-CM

## 2024-01-11 PROCEDURE — 99215 OFFICE O/P EST HI 40 MIN: CPT | Performed by: NURSE PRACTITIONER

## 2024-01-23 ENCOUNTER — ANTICOAG VISIT (OUTPATIENT)
Dept: CARDIOLOGY CLINIC | Facility: CLINIC | Age: 84
End: 2024-01-23

## 2024-01-23 ENCOUNTER — APPOINTMENT (OUTPATIENT)
Dept: LAB | Facility: CLINIC | Age: 84
End: 2024-01-23
Payer: MEDICARE

## 2024-01-23 DIAGNOSIS — I48.21 PERMANENT ATRIAL FIBRILLATION (HCC): Primary | ICD-10-CM

## 2024-01-23 DIAGNOSIS — I50.32 CHRONIC DIASTOLIC CONGESTIVE HEART FAILURE (HCC): ICD-10-CM

## 2024-01-23 PROBLEM — I65.23 BILATERAL CAROTID ARTERY STENOSIS: Status: ACTIVE | Noted: 2024-01-23

## 2024-01-23 LAB
ANION GAP SERPL CALCULATED.3IONS-SCNC: 6 MMOL/L
BUN SERPL-MCNC: 17 MG/DL (ref 5–25)
CALCIUM SERPL-MCNC: 9.2 MG/DL (ref 8.4–10.2)
CHLORIDE SERPL-SCNC: 101 MMOL/L (ref 96–108)
CO2 SERPL-SCNC: 34 MMOL/L (ref 21–32)
CREAT SERPL-MCNC: 1.34 MG/DL (ref 0.6–1.3)
GFR SERPL CREATININE-BSD FRML MDRD: 48 ML/MIN/1.73SQ M
GLUCOSE P FAST SERPL-MCNC: 102 MG/DL (ref 65–99)
POTASSIUM SERPL-SCNC: 4.4 MMOL/L (ref 3.5–5.3)
SODIUM SERPL-SCNC: 141 MMOL/L (ref 135–147)

## 2024-01-23 PROCEDURE — 80048 BASIC METABOLIC PNL TOTAL CA: CPT

## 2024-01-23 PROCEDURE — 36415 COLL VENOUS BLD VENIPUNCTURE: CPT

## 2024-01-24 ENCOUNTER — CONSULT (OUTPATIENT)
Dept: VASCULAR SURGERY | Facility: CLINIC | Age: 84
End: 2024-01-24
Payer: MEDICARE

## 2024-01-24 VITALS
HEIGHT: 71 IN | OXYGEN SATURATION: 96 % | HEART RATE: 58 BPM | DIASTOLIC BLOOD PRESSURE: 72 MMHG | BODY MASS INDEX: 35.14 KG/M2 | RESPIRATION RATE: 18 BRPM | WEIGHT: 251 LBS | SYSTOLIC BLOOD PRESSURE: 116 MMHG

## 2024-01-24 DIAGNOSIS — I65.23 BILATERAL CAROTID ARTERY STENOSIS: ICD-10-CM

## 2024-01-24 DIAGNOSIS — I71.40 ABDOMINAL AORTIC ANEURYSM (AAA) WITHOUT RUPTURE, UNSPECIFIED PART (HCC): ICD-10-CM

## 2024-01-24 DIAGNOSIS — I27.20 MODERATE TO SEVERE PULMONARY HYPERTENSION (HCC): ICD-10-CM

## 2024-01-24 DIAGNOSIS — I87.2 VENOUS INSUFFICIENCY OF BOTH LOWER EXTREMITIES: Primary | ICD-10-CM

## 2024-01-24 DIAGNOSIS — I11.0 HYPERTENSIVE HEART DISEASE WITH HEART FAILURE (HCC): ICD-10-CM

## 2024-01-24 DIAGNOSIS — I73.9 PVD (PERIPHERAL VASCULAR DISEASE) (HCC): ICD-10-CM

## 2024-01-24 DIAGNOSIS — I50.32 CHRONIC DIASTOLIC CONGESTIVE HEART FAILURE (HCC): ICD-10-CM

## 2024-01-24 DIAGNOSIS — G47.33 SEVERE OBSTRUCTIVE SLEEP APNEA: ICD-10-CM

## 2024-01-24 DIAGNOSIS — R60.0 BILATERAL LEG EDEMA: ICD-10-CM

## 2024-01-24 DIAGNOSIS — N18.31 STAGE 3A CHRONIC KIDNEY DISEASE (HCC): ICD-10-CM

## 2024-01-24 PROCEDURE — 99204 OFFICE O/P NEW MOD 45 MIN: CPT | Performed by: PHYSICIAN ASSISTANT

## 2024-01-24 NOTE — PROGRESS NOTES
Assessment/Plan:    Venous insufficiency of both lower extremities  Bilateral leg edema  -     Compression Stocking  -     Ambulatory Referral to PT/OT Lymphedema Therapy; Future  -Chronic B LE edema x 10 years, recently worsening with clear fluid draining; no wounds    -R leg LEV 1/2/24:    No DVT or SVT.     R GSV reflux throughout the thigh and calf (mid thigh 11.3 mm, reflux 3.04; knee 11.7, 2.09; prox calf 8.9, reflux 2.79). Triphasic waveforms.      Exam: marked 3+ B LE edema, mild weeping, chronic stasis changes; no open wounds    Discussion: We had a detailed discussion regarding pathophysiology and treatment of venous insufficiency.  We discussed that treatment centers upon good compressive measures.  Recommend trial of medical compression to be worn daily and removed at night.  Given the significant edema, will refer him to lymphedema clinic to help with management and to get him compensated.  He may benefit from lymphedema pumps to be considered in the future.  He would also benefit from weight loss.  He likely has both deep and superficial incompetence which surgical intervention would not be best course in this patient.     Order for prescription graded compression stockings of 20-30 mm Hg  Tubigrip in the office  Refer to lymphedema clinic in order to get more compensated and to help with garment selection  May do better with CircAid type compression to be obtained once he is more compensated  Standard compression measures alone are unlikely to be adequate in this patient and he will likely benefit from lymphedema pump therapy; will evaluate for lymph pumps in the future depending on how he does with current recommendations; measured in the office today  Compression, periodic elevation, low sodium diet; regular exercise for weight loss  Patient education for venous insufficiency.  Follow up in 1-2 month for reevaluation and additional rec's      Bilateral carotid artery stenosis  -     VAS carotid  complete study; Future  -CV duplex 10/19/23: Mild 1-49% bilateral carotid stenosis (R 63/18, L 73/9)       R vertebral antegrade; early systolic deceleration, suggestive of more proximal disease.    -Mild asymptomatic carotid artery stenosis  -Patient education regarding pathophysiology and treatment of carotid disease was provided  -Recommend optimal medical therapy and modification of risk factors  -Continue with medical therapy aspirin, atorvastatin and warfarin (AF)  -Follow-up carotid duplex study in 1 year (October 2024) with office visit    Additional diagnoses:  Chronic diastolic congestive heart failure (HCC)    Stage 3a chronic kidney disease (HCC)    Severe obstructive sleep apnea    Abdominal aortic aneurysm (AAA) without rupture, unspecified part (HCC)    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)    Moderate to severe pulmonary hypertension (HCC)    PVD (peripheral vascular disease) ([facility])  -     Ambulatory referral to Vascular Surgery    Subjective:      Patient ID: Oscar Espinosa is a 83 y.o. male.    Patient is new to our practice and was referred by Dr. Reyes. Pt states that he has had BLE edema for about 1 year, Rt > Lt leg. Pt states that he has blisters for over a year. Pt uses compression wraps on occasion.     HPI   Mr. Espinosa 83 yoM former smoker, obesity, severe YEVGENIY- intolerant CPAP, COPD, DDD with lumbar radiculopathy, pulmo Htn, Htn, HLD, CKD IIIa, AF, CAD, chronic dCHF, ascending Aortic aneurysm, AAA s/p open repair ([place], ?'17), venous insufficiency with LE edema who is referred to vascular surgery for leg swelling.  Of note, he has had no AAA for years.  A duplex was attempted in 2021, but could essentially not be completed due to poor quality/obese.     1/24/24:  Patient is new to vascular with extensive cardiovascular history and chronic B LE edema on diuretics which have been managed by cardiology.  The patient has a longstanding history of leg edema.  In the distant  "past he tried wearing compression but they were too difficult to get on.  He had difficulty bending over the abdomen to don the stockings and they were too tight.  He does have OTC compression sleeves for the calves but I explained that this is not optimal treatment.  He complains that recently the legs have been worsening with more swelling and now weeping.  He has been having more problems despite reporting no sodium added diet and adjustments in diuretics.  He underwent a right leg venous duplex which was negative for DVT, but did show significant reflux in the GSV and suggest deep venous insufficiency.  He has no fevers or chills.  No history of DVT.     Medical therapy includes: Torsemide 20, amiodarone, warfarin, atorvastatin 40.      The following portions of the patient's history were reviewed and updated as appropriate: allergies, current medications, past family history, past medical history, past social history, past surgical history, and problem list.    Review of Systems   Constitutional: Negative.    HENT: Negative.     Eyes: Negative.    Respiratory: Negative.     Cardiovascular:  Positive for leg swelling.   Gastrointestinal: Negative.    Endocrine: Negative.    Genitourinary: Negative.    Musculoskeletal:  Positive for arthralgias.   Skin:  Positive for color change and wound.   Allergic/Immunologic: Negative.    Neurological: Negative.    Hematological: Negative.    Psychiatric/Behavioral: Negative.           Objective:    /72 (BP Location: Left arm, Patient Position: Sitting)   Pulse 58   Resp 18   Ht 5' 11\" (1.803 m)   Wt 114 kg (251 lb)   SpO2 96%   BMI 35.01 kg/m²      Physical Exam  Vitals and nursing note reviewed.   Constitutional:       Appearance: He is well-developed. He is obese.   HENT:      Head: Normocephalic and atraumatic.   Eyes:      Pupils: Pupils are equal, round, and reactive to light.   Neck:      Thyroid: No thyromegaly.      Vascular: No JVD.      Trachea: " Trachea normal.   Cardiovascular:      Rate and Rhythm: Normal rate and regular rhythm.      Pulses:           Carotid pulses are 2+ on the right side and 2+ on the left side.       Radial pulses are 2+ on the right side and 2+ on the left side.        Dorsalis pedis pulses are detected w/ Doppler on the right side and detected w/ Doppler on the left side.      Heart sounds: Normal heart sounds, S1 normal and S2 normal. No murmur heard.     No friction rub. No gallop.   Pulmonary:      Effort: Pulmonary effort is normal. No accessory muscle usage or respiratory distress.      Breath sounds: Normal breath sounds. No wheezing or rales.   Abdominal:      General: Bowel sounds are normal. There is no distension.      Palpations: Abdomen is soft.      Tenderness: There is no abdominal tenderness.   Musculoskeletal:         General: No deformity. Normal range of motion.      Cervical back: Neck supple.      Right lower leg: 3+ Pitting Edema present.      Left lower leg: 3+ Pitting Edema present.   Skin:     General: Skin is warm and dry.      Findings: No lesion or rash.      Nails: There is no clubbing.   Neurological:      Mental Status: He is alert and oriented to person, place, and time.      Comments: Grossly normal    Psychiatric:         Behavior: Behavior is cooperative.           [place]   Torey Harvey   Phone: [phone]  Fax: [phone]   E-mail: [email or web address]    Ordering Provider:  Candace Mac (NPI: [id number])  [facility]  [address] [place] [address]   [address] [place] [address]  Phone: [phone]  Fax: [phone]      Patient Information  MRN: [id number]   Oscar THURSTON Francisca  1940  [address] [place] [address] [phone]     Insurance Information  Payor: MEDICARE / Plan: MEDICARE A AND B / Product Type: Medicare A & B Fee for Service /      [id number] - (Medicare )     Patient Height and Weight      Wt Readings from  Last 1 Encounters:   01/11/24 114 kg (252 lb 3.2 oz)       Compression Lymphedema Pump Prescription Form      [x] Patient utilized Compression Garment ? 30 mmHg distally OR Gradient Wrap System    Appliance:     Legs:    [x] Right    [x] Left   Arms:    [] Right    [] Left     []Chest garment    []Abdominal garment     Length of need: 99 months    Protocol:  [x] Std: 40 mmHg, TID/ BID,  60 minutes  [] Other:   Pressures: __ mmHg    Frequency: __ / Day   Minutes/ Session: __ minutes    Patient History and Prognosis:  [x] Patient was diagnosed for the chronic disorder with reported on-set __  [x] Attempts at elevation and compression have not improved patients' condition and is now at risk of lymphatic disorder progressing to the next stage/ grade  [x] Patient's physical condition/ range of motion limited for exercise  [x] Patient/ Caregiver experienced difficulty applying 30 mmHg distal compression garments  [x] Patient compression stocking/ wrap tolerance limited due to pain/ reduced circulation  [x] Patient advised to reduce salt intake and adhere to a daily regiment of elevation, compression, and lymphatic exercises  [x] Patient's severe condition will not improve without further treatment interventions  [x] Patient has noted skin changes such as marked hyperkeratosis with hyperplasia and hyperpigmentation, papillomatosis cutis lymphostatica, elephantiasis, and/ or skin breakdown with persisting lymphorrhea    Symptoms/ Observation/ Evaluation/ Plan of Care for Lymphedema / Venous Compression Pumps     Conservative Care ? 4 weeks for severe lymphedema (check all that apply):  Patient instructions for DAILY use of conservative therapies;  [x] Elevate extremities daily and nightly to reduce swelling  [x] Exercise and ambulate / range of motion (ROM) daily to increase fluid flow and reduce swelling  [x] Wear 30-mmHg distal compression garments / wraps daily to reduce / control swelling  [x] Manual lymph drainage  (MLD)  [x] On-set date of lymphedema / ulcers: about 1 year      Initial Measurements      Body Part Right Left   Ankle / Forearm 30 cm 29 cm   Calf / Elbow  45 cm 42 cm   Knee / Bicep  Not Applicable (N/A) Not Applicable (N/A)   Mid-Thigh / Axilla  52 cm 50 cm            LEV 1/2/24:  CLINICAL:  Indications:  Patient presents with right lower extremity edema and blisters  x several  months.  Operative History:  2007-08-09 AAA repair  2003-01-01 CABG x 3  Risk Factors  The patient has history of Obesity, HTN, Hyperlipidemia, CKD, CAD and previous  smoking (quit >10yrs ago).     FINDINGS:     Right           AP (mm)  Reflux  Valve Closure Time    GSV Inguinal             Reflux                2.21    GSV Prox Thigh     12.8                                GSV Mid Thigh      11.3  Reflux                3.04    GSV Knee           13.7  Reflux                2.09    GSV Prox Calf       8.9  Reflux                2.79             CONCLUSION:     Impression:  RIGHT LOWER LIMB  No evidence of acute or chronic deep vein thrombosis .  No evidence of superficial thrombophlebitis noted.  Doppler evaluation shows reflux in the great saphenous vein throughout the  thigh and calf.  Popliteal, posterior tibial and anterior tibial arterial Doppler waveforms are  triphasic/hyperemic.     LEFT LOWER LIMB LIMITED  Evaluation shows no evidence of thrombus in the common femoral vein.  Doppler evaluation shows a normal response to augmentation maneuvers.         CT PE with abdomen/pelvis 7/22/22  IMPRESSION:     No evidence of acute pulmonary embolus.     No consolidation or effusion.  Stable pulmonary nodules as above.     4.9 cm ascending thoracic aortic aneurysm slightly increased in size compared to prior study dated 7/10/2019.     No acute findings in the abdomen or pelvis.        I have reviewed and made appropriate changes to the review of systems input by the medical assistant.    Vitals:    01/24/24 0914   BP: 116/72   BP  "Location: Left arm   Patient Position: Sitting   Pulse: 58   Resp: 18   SpO2: 96%   Weight: 114 kg (251 lb)   Height: 5' 11\" (1.803 m)       Patient Active Problem List   Diagnosis    Benign essential hypertension    Hyperlipidemia    Impaired fasting glucose    Microscopic hematuria    Severe obesity (BMI 35.0-35.9 with comorbidity)     Severe obstructive sleep apnea    Subclinical hypothyroidism    3-vessel CAD    Abdominal aortic aneurysm without rupture (HCC)    Aortic valve disorder    Arteriosclerotic cardiovascular disease    Disc degeneration, lumbar    Herniated lumbar intervertebral disc    Lumbar radiculopathy    Intervertebral disc disorder with radiculopathy of lumbar region    Spondylolisthesis at L4-L5 level    Chronic pain syndrome    Multiple pulmonary nodules    Chronic diastolic congestive heart failure (HCC)    Chronic obstructive pulmonary disease (HCC)    Moderate to severe pulmonary hypertension (HCC)    Nocturnal hypoxemia    Ascending aortic aneurysm (HCC)    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)    Platelets decreased (HCC)    Myofascial pain syndrome    Atrial fibrillation (HCC)    Stage 3a chronic kidney disease (HCC)    Venous insufficiency of both lower extremities    Bilateral carotid artery stenosis       Past Surgical History:   Procedure Laterality Date    ABDOMINAL AORTIC ANEURYSM REPAIR  08/09/2007    2 dock limbs with visc extens prosth    CARDIAC CATHETERIZATION      COLONOSCOPY      CORONARY ARTERY BYPASS GRAFT  2003    LIMA to LAD, sequential SVG to OM1 & OM2, SVG to RDA    LUMBAR EPIDURAL INJECTION         Family History   Problem Relation Age of Onset    Heart disease Mother     Stroke Father         stroke syndrome    Aneurysm Brother         abdominal    Aortic aneurysm Brother         ascending    Coronary artery disease Family     Hypertension Family     Other Son         gioblastoma multiforme       Social History     Socioeconomic History    Marital " status: /Civil Union     Spouse name: Not on file    Number of children: Not on file    Years of education: Not on file    Highest education level: Not on file   Occupational History    Occupation: retired   Tobacco Use    Smoking status: Former     Current packs/day: 0.00     Average packs/day: 1 pack/day for 55.6 years (55.6 ttl pk-yrs)     Types: Cigarettes     Start date: 1957     Quit date: 2012     Years since quittin.4    Smokeless tobacco: Never   Vaping Use    Vaping status: Never Used   Substance and Sexual Activity    Alcohol use: Yes     Alcohol/week: 21.0 standard drinks of alcohol     Types: 21 Cans of beer per week    Drug use: No    Sexual activity: Never   Other Topics Concern    Not on file   Social History Narrative    Not on file     Social Determinants of Health     Financial Resource Strain: Low Risk  (2023)    Overall Financial Resource Strain (CARDIA)     Difficulty of Paying Living Expenses: Not hard at all   Food Insecurity: Not on file   Transportation Needs: No Transportation Needs (2023)    PRAPARE - Transportation     Lack of Transportation (Medical): No     Lack of Transportation (Non-Medical): No   Physical Activity: Not on file   Stress: Not on file   Social Connections: Not on file   Intimate Partner Violence: Not on file   Housing Stability: Not on file       Allergies   Allergen Reactions    Meloxicam Edema         Current Outpatient Medications:     albuterol (Ventolin HFA) 90 mcg/act inhaler, Inhale 2 puffs every 6 (six) hours as needed for wheezing, Disp: 54 g, Rfl: 1    amiodarone 200 mg tablet, Take 1 tablet (200 mg total) by mouth daily, Disp: 90 tablet, Rfl: 3    aspirin (ECOTRIN LOW STRENGTH) 81 mg EC tablet, Take 1 tablet by mouth daily, Disp: , Rfl:     atorvastatin (LIPITOR) 40 mg tablet, Take 1 tablet (40 mg total) by mouth daily, Disp: 90 tablet, Rfl: 3    cholecalciferol (VITAMIN D3) 1,000 units tablet, Take 2,000 Units by mouth daily,  Disp: , Rfl:     FIBER ADULT GUMMIES PO, Take 1 caplet by mouth daily , Disp: , Rfl:     gabapentin (NEURONTIN) 300 mg capsule, TAKE 1 CAPSULE BY MOUTH IN THE  MORNING AND 2 CAPSULES BY MOUTH  AT BEDTIME (Patient taking differently: 900 mg daily at bedtime), Disp: 270 capsule, Rfl: 3    labetalol (NORMODYNE) 100 mg tablet, Take 1 tablet (100 mg total) by mouth 2 (two) times a day, Disp: 180 tablet, Rfl: 3    multivitamin (THERAGRAN) TABS, Take 1 tablet by mouth daily, Disp: , Rfl:     senna (SENOKOT) 8.6 MG tablet, Take 1 tablet by mouth as needed  , Disp: , Rfl:     tamsulosin (FLOMAX) 0.4 mg, Take 1 capsule by mouth daily, Disp: , Rfl:     torsemide (DEMADEX) 20 mg tablet, Take 1 tablet (20 mg total) by mouth daily, Disp: 120 tablet, Rfl: 3    warfarin (Coumadin) 5 mg tablet, TAKE 1 TO 2 TABS BY MOUTH DAILY OR AS DIRECTED BY PHYSICIAN, Disp: 60 tablet, Rfl: 11    Current Facility-Administered Medications:     albuterol (PROVENTIL HFA,VENTOLIN HFA) inhaler 2 puff, 2 puff, Inhalation, Q6H PRN, Gerson Terrell MD

## 2024-01-24 NOTE — PATIENT INSTRUCTIONS
Venous Insufficiency    We had a detailed discussion regarding varicose veins and the treatment of venous disease. We discussed the role of compression and other conservative measures for relief of symptoms related to varicose veins.     Order for prescription graded compression stockings of 20-30 mm Hg  Wear stocking EVERY day to help control swelling in legs and remove at night  Elevate legs while at rest  Continue healthy life-style changes; heart-healthy, low sodium diet; regular exercise for weight loss  Patient education for venous insufficiency.  Refer  to lymph clinic  Follow up in 1 month      Tubigrip G    ABD pads

## 2024-02-06 ENCOUNTER — ANTICOAG VISIT (OUTPATIENT)
Dept: CARDIOLOGY CLINIC | Facility: CLINIC | Age: 84
End: 2024-02-06

## 2024-02-06 ENCOUNTER — APPOINTMENT (OUTPATIENT)
Dept: LAB | Facility: CLINIC | Age: 84
End: 2024-02-06
Payer: MEDICARE

## 2024-02-06 DIAGNOSIS — I48.21 PERMANENT ATRIAL FIBRILLATION (HCC): Primary | ICD-10-CM

## 2024-02-13 ENCOUNTER — APPOINTMENT (OUTPATIENT)
Dept: PHYSICAL THERAPY | Facility: REHABILITATION | Age: 84
End: 2024-02-13
Payer: MEDICARE

## 2024-02-19 ENCOUNTER — EVALUATION (OUTPATIENT)
Dept: PHYSICAL THERAPY | Facility: REHABILITATION | Age: 84
End: 2024-02-19
Payer: MEDICARE

## 2024-02-19 DIAGNOSIS — L97.319 VENOUS ULCER OF ANKLE, RIGHT (HCC): ICD-10-CM

## 2024-02-19 DIAGNOSIS — I83.013 VENOUS ULCER OF ANKLE, RIGHT (HCC): ICD-10-CM

## 2024-02-19 DIAGNOSIS — I89.0 LYMPHEDEMA: Primary | ICD-10-CM

## 2024-02-19 PROCEDURE — 97162 PT EVAL MOD COMPLEX 30 MIN: CPT | Performed by: PHYSICAL THERAPIST

## 2024-02-19 PROCEDURE — 97530 THERAPEUTIC ACTIVITIES: CPT | Performed by: PHYSICAL THERAPIST

## 2024-02-19 NOTE — PROGRESS NOTES
Lymphedema Evaluation    Today's Date: 2024  Patient name: Oscar Espinosa  : 1940  MRN: [id number]  Referring provider: Candace Louise PA-C  Dx:  Encounter Diagnosis     ICD-10-CM    1. Lymphedema  I89.0       2. Venous ulcer of ankle, right (HCC)  I83.013     L97.319           Assessment  Assessment details: Oscar Espinosa is a 83 y.o. year old male with complaints of chronic lower leg edema.  Patient's presentation is consistent with stage 3 lymphedema secondary to chronic venous stasis, chronic kidney disease, and chronic CHF with the following impairments found on evaluation: RLE wound, moderately severe fibrotic tissue changes of BLE toes to knee, decreased LE mobility and functional mobility including gait. Relevant medical history includes extensive cardiovascular compromise. The listed physical impairments contribute to the following functional limitations: decreased tolerance to standing and walking; and the following disability: increased risk of infection and hospitalization from integumentary compromise, lymphatic compromise, and open wound.  Oscar Espinosa is a good candidate for physical therapy and would benefit from skilled physical therapy to address the above impairments in order to allow the patient to achieve the goals listed and return to prior level of function. Physical therapy plan of care to include compression therapy including velcro wrap, pneumatic pumps, and wound care; remedial exercise; manual therapy for wound care and/or lymphatic drainage as appropriate.   During initial evaluation, education was provided on lymphatic anatomy and physiology, edema differential diagnosis, edema risk reduction behaviors, and healthy habits; decongestion vs maintenance treatment options. Recommendations were made for skin care, elevation of the edematous limb, and muscle pump exercises to address edema and patient consented to these recommendations. Patient also educated on diagnosis,  plan of care and prognosis.  He is in agreement with recommended plan of care and goals for therapy, and demonstrates motivation for active participation in proposed plan of care.  Understanding of Dx/Px/POC: good   Prognosis: fair    Goals  1. Patient will demonstrate compliance with elevation of legs in 2 weeks.  2. Patient will demonstrate compliance and adherence with day and night compression garments in 30 days.  3. LE edema will soften from firm/nonpitting to spongy and pitting after 8 weeks in compression.  4. LE wound will heal after 6 weeks in compression.  5. Patient will demonstrate compliance and adherence with pneumatic pump use 1x/day     Plan  Frequency: 2x week  Duration in weeks: 12  Plan of Care beginning date: 2/20/2024  Plan of Care expiration date: 5/14/2024  Treatment plan discussed with: patient and referring physician        Subjective/History:  Chief Complaint: chronic, constant BLE edema with R>L lymphorrhea and ulcers- R lateral leg x3-4 months with burning pain; + stiffness and skin discomfort when tries to bend ankles and knees.  HPI: insidious onset many years ago, R>L; cardiologic trialed diuretic 2 years ago without improvement  Prior Edema Treatments: saw vascular recently who recommended pumps, OTC stockings, elevation, low sodium diet. Uses ABD pads on legs for weeping legs  Imaging:   R leg LEV 1/2/24:  No DVT or SVT.  R GSV reflux throughout the thigh and calf (mid thigh 11.3 mm, reflux 3.04; knee 11.7, 2.09; prox calf 8.9, reflux 2.79). Triphasic waveforms.  CV duplex 10/19/23: Mild 1-49% bilateral carotid stenosis (R 63/18, L 73/9);  R vertebral antegrade; early systolic deceleration, suggestive of more proximal disease.  Relevant PMHx: CHF with chronic A-fib, on Warfarin (previously Eliqious but d/c due to finances), aspirin, and atorvastatin; bilateral carotid artery stenosis; CABG x4 2003; AAA repaired 2007; HTN; moderate pulm HTN; HLD; stage 3a kidney disease; severe YEVGENIY-  cannot tolerate CPAP; chronic LBP L3-4; fungal infection  Personal history of Cancer? No    Lymphedema special questions  Feeling of heaviness, fullness, pressure? yes  Resolves with elevation: No  Sleeping location? Recliner with legs up or down- due to YEVGENIY - since 2005  Change in fit of shoes/clothes/jewelry?yes  History of Cellulitis?  no  History of S/DVT? no  History of Leg wounds? Yes- diffuse lymphorrhea BLE R>L  Sees a podiatrist regularly for foot care? no  Presence of trunk/abdominal/genital edema? no  Latex Allergy? no    Pain: discomfort 3/10 bilateral ankles  Function: limited walking due to back pain; lives with wife  Working Status: retired  Exercise status: none  Patient goals: reduce discomfort and weeping of legs.    Objective  Lymphedema Exam: Lower Extremities  Abdomen/Genitals: abdominal pannus; no edema  Lower extremity left  Toes: firm, pitting, brawny  Dorsum of foot: firm, pitting, brawny  Ankle: firm, nonpitting, hemosideran staining, fibrotic tissue changes,   Calf: distal 2/3 is firm, nonpitting, hemosideran staining, fibrotic tissue changes; proximal 1/3 is spongy pitting  Knee: spongy pitting medially  Thigh: spongy, pitting   Buttock: WNL  Lower extremity right  Toes: firm, pitting, brawny  Dorsum of foot: firm, pitting, brawny  Ankle: firm, nonpitting, hemosideran staining, fibrotic tissue changes,   Calf: distal 2/3 is firm, nonpitting, hemosideran staining, fibrotic tissue changes; proximal 1/3 is spongy pitting  Knee: spongy pitting medially  Thigh: spongy, pitting   Buttock: WNL  Lymphedema classification (0 latent, 1 reverse, 2 non-rev, 3 elephantiasis): 3  >> REFER TO FLOW SHEET FOR GIRTH MEASUREMENTS <<    Skin Assessment:  Capillary refill: 2 sec  Stemmer's sign: yes  Fibrosis (0 normal - 4 >severe): 3  Erythema scale (1 very faint, 2 faint, 3 bright, 4 very bright): 2  Hemosiderin staining present: yes  Blister present: yes  Wound present: yes- R lateral distal calf- 6.0 x  4.6 x 0.1 cm with mild serous exudate, mild slough, periwound edema and maceration and fibrosis  Scar present: no    Functional Capacity:  Gait assessment: slow gait speed  Transfer status: indep  Ability to don/doff clothing/garments: mod I; shoe horn for donning shoe  Lower quarter Sensation: Normal  Lower quarter Range of Motion: Abnormal, decreased ankle DF (25% limited) and knee flexion (10% limited) due to edema bilaterally  Lower Quarter Strength: Not tested- functionally WNL  TUG and 5TSTS to be tested next visit.       Precautions: multiple comorbidities including cardiac    POC expires Auth Status? (BOMN, approved, pending) Unit limit (Daily) Auth Start date Expiration date PT/OT + Visit Limit?    BOMN         Date of Service 2/19        Visits Used 1        Visits Remaining 99          Compression Rx 2/19        Compression garment Velcro wraps vs circular knit TBD **       Compression pumps Order placed through [facility]        Manual Ther         volumetrics completed        MLD         Wound Care Order placed for Foam through [facility]                 Ther Ex         Remedial Exercise  **                                  Ther Activity & Self Care         Skin care 5'        Garment Fit/Train         Sleeping position Supine discussed; patient unable; so discussed elevating legs in Recliner                 Pt Education         Edema differential dx 10'        Lymphatic A&P 5'        Compression options 10'

## 2024-02-20 ENCOUNTER — APPOINTMENT (OUTPATIENT)
Dept: LAB | Facility: CLINIC | Age: 84
End: 2024-02-20
Payer: MEDICARE

## 2024-02-20 ENCOUNTER — ANTICOAG VISIT (OUTPATIENT)
Dept: CARDIOLOGY CLINIC | Facility: CLINIC | Age: 84
End: 2024-02-20

## 2024-02-20 DIAGNOSIS — I10 BENIGN ESSENTIAL HYPERTENSION: ICD-10-CM

## 2024-02-20 DIAGNOSIS — N18.31 STAGE 3A CHRONIC KIDNEY DISEASE (HCC): ICD-10-CM

## 2024-02-20 DIAGNOSIS — I48.21 PERMANENT ATRIAL FIBRILLATION (HCC): Primary | ICD-10-CM

## 2024-02-20 DIAGNOSIS — E53.8 DEFICIENCY OF OTHER SPECIFIED B GROUP VITAMINS: ICD-10-CM

## 2024-02-20 DIAGNOSIS — R73.01 IMPAIRED FASTING GLUCOSE: ICD-10-CM

## 2024-02-20 DIAGNOSIS — E78.2 MIXED HYPERLIPIDEMIA: ICD-10-CM

## 2024-02-20 DIAGNOSIS — E03.8 SUBCLINICAL HYPOTHYROIDISM: ICD-10-CM

## 2024-02-20 DIAGNOSIS — D75.89 MACROCYTOSIS WITHOUT ANEMIA: ICD-10-CM

## 2024-02-20 LAB
ALBUMIN SERPL BCP-MCNC: 3.7 G/DL (ref 3.5–5)
ALP SERPL-CCNC: 84 U/L (ref 34–104)
ALT SERPL W P-5'-P-CCNC: 20 U/L (ref 7–52)
ANION GAP SERPL CALCULATED.3IONS-SCNC: 7 MMOL/L
AST SERPL W P-5'-P-CCNC: 31 U/L (ref 13–39)
BASOPHILS # BLD AUTO: 0.05 THOUSANDS/ÂΜL (ref 0–0.1)
BASOPHILS NFR BLD AUTO: 1 % (ref 0–1)
BILIRUB SERPL-MCNC: 0.77 MG/DL (ref 0.2–1)
BUN SERPL-MCNC: 16 MG/DL (ref 5–25)
CALCIUM SERPL-MCNC: 8.8 MG/DL (ref 8.4–10.2)
CHLORIDE SERPL-SCNC: 100 MMOL/L (ref 96–108)
CHOLEST SERPL-MCNC: 105 MG/DL
CO2 SERPL-SCNC: 34 MMOL/L (ref 21–32)
CREAT SERPL-MCNC: 1.47 MG/DL (ref 0.6–1.3)
EOSINOPHIL # BLD AUTO: 0.18 THOUSAND/ÂΜL (ref 0–0.61)
EOSINOPHIL NFR BLD AUTO: 4 % (ref 0–6)
ERYTHROCYTE [DISTWIDTH] IN BLOOD BY AUTOMATED COUNT: 15.4 % (ref 11.6–15.1)
EST. AVERAGE GLUCOSE BLD GHB EST-MCNC: 131 MG/DL
GFR SERPL CREATININE-BSD FRML MDRD: 43 ML/MIN/1.73SQ M
GLUCOSE P FAST SERPL-MCNC: 84 MG/DL (ref 65–99)
HBA1C MFR BLD: 6.2 %
HCT VFR BLD AUTO: 36.3 % (ref 36.5–49.3)
HDLC SERPL-MCNC: 50 MG/DL
HGB BLD-MCNC: 11.2 G/DL (ref 12–17)
IMM GRANULOCYTES # BLD AUTO: 0.01 THOUSAND/UL (ref 0–0.2)
IMM GRANULOCYTES NFR BLD AUTO: 0 % (ref 0–2)
LDLC SERPL CALC-MCNC: 48 MG/DL (ref 0–100)
LYMPHOCYTES # BLD AUTO: 0.63 THOUSANDS/ÂΜL (ref 0.6–4.47)
LYMPHOCYTES NFR BLD AUTO: 14 % (ref 14–44)
MCH RBC QN AUTO: 33 PG (ref 26.8–34.3)
MCHC RBC AUTO-ENTMCNC: 30.9 G/DL (ref 31.4–37.4)
MCV RBC AUTO: 107 FL (ref 82–98)
MONOCYTES # BLD AUTO: 0.6 THOUSAND/ÂΜL (ref 0.17–1.22)
MONOCYTES NFR BLD AUTO: 13 % (ref 4–12)
NEUTROPHILS # BLD AUTO: 3.18 THOUSANDS/ÂΜL (ref 1.85–7.62)
NEUTS SEG NFR BLD AUTO: 68 % (ref 43–75)
NONHDLC SERPL-MCNC: 55 MG/DL
NRBC BLD AUTO-RTO: 0 /100 WBCS
PLATELET # BLD AUTO: 140 THOUSANDS/UL (ref 149–390)
PMV BLD AUTO: 10.3 FL (ref 8.9–12.7)
POTASSIUM SERPL-SCNC: 4 MMOL/L (ref 3.5–5.3)
PROT SERPL-MCNC: 6.9 G/DL (ref 6.4–8.4)
RBC # BLD AUTO: 3.39 MILLION/UL (ref 3.88–5.62)
SODIUM SERPL-SCNC: 141 MMOL/L (ref 135–147)
T4 FREE SERPL-MCNC: 1.27 NG/DL (ref 0.61–1.12)
TRIGL SERPL-MCNC: 37 MG/DL
TSH SERPL DL<=0.05 MIU/L-ACNC: 7.33 UIU/ML (ref 0.45–4.5)
VIT B12 SERPL-MCNC: 890 PG/ML (ref 180–914)
WBC # BLD AUTO: 4.65 THOUSAND/UL (ref 4.31–10.16)

## 2024-02-20 PROCEDURE — 84443 ASSAY THYROID STIM HORMONE: CPT

## 2024-02-20 PROCEDURE — 80061 LIPID PANEL: CPT

## 2024-02-20 PROCEDURE — 83036 HEMOGLOBIN GLYCOSYLATED A1C: CPT

## 2024-02-20 PROCEDURE — 80053 COMPREHEN METABOLIC PANEL: CPT

## 2024-02-20 PROCEDURE — 82607 VITAMIN B-12: CPT

## 2024-02-20 PROCEDURE — 85025 COMPLETE CBC W/AUTO DIFF WBC: CPT

## 2024-02-20 PROCEDURE — 84439 ASSAY OF FREE THYROXINE: CPT

## 2024-02-21 ENCOUNTER — OFFICE VISIT (OUTPATIENT)
Dept: CARDIOLOGY CLINIC | Facility: CLINIC | Age: 84
End: 2024-02-21
Payer: MEDICARE

## 2024-02-21 VITALS
OXYGEN SATURATION: 91 % | SYSTOLIC BLOOD PRESSURE: 122 MMHG | HEART RATE: 64 BPM | BODY MASS INDEX: 35.56 KG/M2 | DIASTOLIC BLOOD PRESSURE: 80 MMHG | HEIGHT: 71 IN | WEIGHT: 254 LBS

## 2024-02-21 DIAGNOSIS — N18.31 STAGE 3A CHRONIC KIDNEY DISEASE (HCC): ICD-10-CM

## 2024-02-21 DIAGNOSIS — I27.20 MODERATE TO SEVERE PULMONARY HYPERTENSION (HCC): ICD-10-CM

## 2024-02-21 DIAGNOSIS — I48.21 PERMANENT ATRIAL FIBRILLATION (HCC): Primary | ICD-10-CM

## 2024-02-21 DIAGNOSIS — I10 BENIGN ESSENTIAL HYPERTENSION: ICD-10-CM

## 2024-02-21 DIAGNOSIS — D69.6 PLATELETS DECREASED (HCC): ICD-10-CM

## 2024-02-21 DIAGNOSIS — I25.10 3-VESSEL CAD: ICD-10-CM

## 2024-02-21 DIAGNOSIS — I50.32 CHRONIC DIASTOLIC CONGESTIVE HEART FAILURE (HCC): ICD-10-CM

## 2024-02-21 DIAGNOSIS — E78.2 MIXED HYPERLIPIDEMIA: ICD-10-CM

## 2024-02-21 DIAGNOSIS — I87.2 VENOUS INSUFFICIENCY OF BOTH LOWER EXTREMITIES: ICD-10-CM

## 2024-02-21 DIAGNOSIS — I11.0 HYPERTENSIVE HEART DISEASE WITH HEART FAILURE (HCC): ICD-10-CM

## 2024-02-21 PROCEDURE — 93000 ELECTROCARDIOGRAM COMPLETE: CPT | Performed by: INTERNAL MEDICINE

## 2024-02-21 PROCEDURE — 99214 OFFICE O/P EST MOD 30 MIN: CPT | Performed by: INTERNAL MEDICINE

## 2024-02-21 NOTE — PROGRESS NOTES
Cardiology Follow Up    Oscar THURSTON Francisca  1940  [id number]  [facility] [place]  [address]  [address]  [place] [id number] [phone]    1. Permanent atrial fibrillation (HCC)  POCT ECG      2. 3-vessel CAD        3. Benign essential hypertension        4. Chronic diastolic congestive heart failure (HCC)  Echo complete w/ contrast if indicated    Basic metabolic panel    B-Type Natriuretic Peptide(BNP)      5. Moderate to severe pulmonary hypertension (HCC)  Echo complete w/ contrast if indicated      6. 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)        7. Mixed hyperlipidemia        8. Platelets decreased (HCC)        9. Venous insufficiency of both lower extremities  Echo complete w/ contrast if indicated      10. Stage 3a chronic kidney disease (HCC)            Discussion/Summary: He has had some bilateral lower extremity edema.  He has been working with vascular for venous insufficiency and lymphedema.  Continue current medical treatment for that.  I recommended an echocardiogram to evaluate LV filling pressures and right atrial pressure.  I think we need to push his diuretics have increased his torsemide I will get a BMP and BNP in a week I will follow-up with him in 2 weeks.      Interval History:  83 year-old gentleman with multivessel coronary disease, history of CABG, hypertension, hyperlipidemia, ascending aortic aneurysm, abdominal aorta aneurysm status post repair, sleep apnea presents to establish care with me in the office.  He has been seeing 1 of my partners for several years.  Functionally he has been doing great.  Denies any exertional chest pain, shortness of breath, palpitations, lightheadedness, dizziness, or syncope.  He has put on some weight during the pandemic.  He has been trying to make some dietary changes start some low-level exercise.        Last office visit he was found to be in new onset  atrial fibrillation.  We started anticoagulation rate control.  Holter monitor shows average rate to 75.  He has had some lightheadedness and dizziness which did not correlate to any bradycardia.      Since her last visit he has had some worsening lower extremity edema.  Weights have been about the same from a year ago.  He does have chronic venous stasis and lymphedema.  He had been tried on diuretics but renal function went up slightly.  Denies any chest pain, palpitations.  Salt intake is low to moderate.  Problem List       Positive colorectal cancer screening using Cologuard test    Benign essential hypertension    Hyperlipidemia    Impaired fasting glucose    Microscopic hematuria    Overview Signed 5/14/2018  9:17 AM by Lea Reyes, MD     Annotation - [id number]: Dr Dee         Obesity    Obstructive sleep apnea    Overview Signed 5/14/2018  9:17 AM by Lea Reyes, MD     Annotation - [id number]: Dr Ellington.         Subclinical hypothyroidism    Overview Signed 5/14/2018  9:17 AM by Lea Reyes, MD     Transitioned From: Hypothyroidism         3-vessel CAD    Aneurysm of abdominal aorta (HCC)    Aneurysm of ascending aorta (HCC)    Aortic valve disorder    Arteriosclerotic cardiovascular disease    Disc degeneration, lumbar    Herniated lumbar intervertebral disc    Lumbar radiculopathy    Intervertebral disc disorder with radiculopathy of lumbar region    Spondylolisthesis at L4-L5 level    Chronic pain syndrome    SOB (shortness of breath)    Multiple pulmonary nodules    Heart failure, systolic (HCC)    Wt Readings from Last 3 Encounters:   02/21/24 115 kg (254 lb)   01/24/24 114 kg (251 lb)   01/11/24 114 kg (252 lb 3.2 oz)                 COPD, mild (HCC)    Overview Deleted 9/11/2019  3:22 PM by Gerson Terrell MD                  Past Medical History:   Diagnosis Date    Abdominal aortic aneurysm (HCC)     s/p repair    Abnormal ECG july 2022    Aneurysm (HCC) 2007    Aneurysm of abdominal aorta (HCC)     49mm,  trileaflet AV    Atrial fibrillation (HCC)     Eliquis    BPH (benign prostatic hyperplasia)     CAD (coronary artery disease)     s/p CABGx3    CHF (congestive heart failure) (HCC)     Chronic pain     Gabapentin    Chronic pain disorder     lumbar    COPD (chronic obstructive pulmonary disease) (HCC)     Coronary artery disease     Former tobacco use     Hyperlipidemia     Hypertension     Hypothyroidism     Lumbar radiculopathy     Lumbar stenosis     s/p injections    Neuropathy     Obesity (BMI 30-39.9)     YEVGEINY (obstructive sleep apnea)     unable to tolerate CPAP    Pulmonary hypertension ([facility])     moderate to severe    Spondylolisthesis of lumbar region     Visual impairment     Vitamin D deficiency      Social History     Socioeconomic History    Marital status: /Civil Union     Spouse name: Not on file    Number of children: Not on file    Years of education: Not on file    Highest education level: Not on file   Occupational History    Occupation: retired   Tobacco Use    Smoking status: Former     Current packs/day: 0.00     Average packs/day: 1 pack/day for 55.6 years (55.6 ttl pk-yrs)     Types: Cigarettes     Start date: 1957     Quit date: 2012     Years since quittin.5    Smokeless tobacco: Never   Vaping Use    Vaping status: Never Used   Substance and Sexual Activity    Alcohol use: Yes     Alcohol/week: 21.0 standard drinks of alcohol     Types: 21 Cans of beer per week    Drug use: No    Sexual activity: Never   Other Topics Concern    Not on file   Social History Narrative    Not on file     Social Determinants of Health     Financial Resource Strain: Low Risk  (2023)    Overall Financial Resource Strain (CARDIA)     Difficulty of Paying Living Expenses: Not hard at all   Food Insecurity: Not on file   Transportation Needs: No Transportation Needs (2023)    PRAPARE - Transportation     Lack of Transportation (Medical): No     Lack of Transportation (Non-Medical):  No   Physical Activity: Not on file   Stress: Not on file   Social Connections: Not on file   Intimate Partner Violence: Not on file   Housing Stability: Not on file      Family History   Problem Relation Age of Onset    Heart disease Mother     Stroke Father         stroke syndrome    Aneurysm Brother         abdominal    Aortic aneurysm Brother         ascending    Coronary artery disease Family     Hypertension Family     Other Son         gioblastoma multiforme     Past Surgical History:   Procedure Laterality Date    ABDOMINAL AORTIC ANEURYSM REPAIR  08/09/2007    2 dock limbs with visc extens prosth    CARDIAC CATHETERIZATION      COLONOSCOPY      CORONARY ARTERY BYPASS GRAFT  2003    LIMA to LAD, sequential SVG to OM1 & OM2, SVG to RDA    LUMBAR EPIDURAL INJECTION         Current Outpatient Medications:     albuterol (Ventolin HFA) 90 mcg/act inhaler, Inhale 2 puffs every 6 (six) hours as needed for wheezing, Disp: 54 g, Rfl: 1    amiodarone 200 mg tablet, Take 1 tablet (200 mg total) by mouth daily, Disp: 90 tablet, Rfl: 3    aspirin (ECOTRIN LOW STRENGTH) 81 mg EC tablet, Take 1 tablet by mouth daily, Disp: , Rfl:     atorvastatin (LIPITOR) 40 mg tablet, Take 1 tablet (40 mg total) by mouth daily, Disp: 90 tablet, Rfl: 3    cholecalciferol (VITAMIN D3) 1,000 units tablet, Take 2,000 Units by mouth daily, Disp: , Rfl:     FIBER ADULT GUMMIES PO, Take 1 caplet by mouth daily , Disp: , Rfl:     gabapentin (NEURONTIN) 300 mg capsule, TAKE 1 CAPSULE BY MOUTH IN THE  MORNING AND 2 CAPSULES BY MOUTH  AT BEDTIME (Patient taking differently: 900 mg daily at bedtime), Disp: 270 capsule, Rfl: 3    labetalol (NORMODYNE) 100 mg tablet, Take 1 tablet (100 mg total) by mouth 2 (two) times a day, Disp: 180 tablet, Rfl: 3    multivitamin (THERAGRAN) TABS, Take 1 tablet by mouth daily, Disp: , Rfl:     senna (SENOKOT) 8.6 MG tablet, Take 1 tablet by mouth as needed  , Disp: , Rfl:     tamsulosin (FLOMAX) 0.4 mg, Take 1  "capsule by mouth daily, Disp: , Rfl:     torsemide (DEMADEX) 20 mg tablet, Take 1 tablet (20 mg total) by mouth daily, Disp: 120 tablet, Rfl: 3    warfarin (Coumadin) 5 mg tablet, TAKE 1 TO 2 TABS BY MOUTH DAILY OR AS DIRECTED BY PHYSICIAN, Disp: 60 tablet, Rfl: 11    Current Facility-Administered Medications:     albuterol (PROVENTIL HFA,VENTOLIN HFA) inhaler 2 puff, 2 puff, Inhalation, Q6H PRN, Gerson Terrell MD  Allergies   Allergen Reactions    Meloxicam Edema       Labs:     Chemistry        Component Value Date/Time     (L) 10/15/2015 1042    K 4.0 02/20/2024 0914    K 4.7 10/15/2015 1042     02/20/2024 0914     10/15/2015 1042    CO2 34 (H) 02/20/2024 0914    CO2 30 10/15/2015 1042    BUN 16 02/20/2024 0914    BUN 15 10/15/2015 1042    CREATININE 1.47 (H) 02/20/2024 0914    CREATININE 1.03 10/15/2015 1042        Component Value Date/Time    CALCIUM 8.8 02/20/2024 0914    CALCIUM 9.1 10/15/2015 1042    ALKPHOS 84 02/20/2024 0914    ALKPHOS 71 10/15/2015 1042    AST 31 02/20/2024 0914    AST 14 10/15/2015 1042    ALT 20 02/20/2024 0914    ALT 23 10/15/2015 1042    BILITOT 0.79 10/15/2015 1042            Lab Results   Component Value Date    CHOL 124 10/15/2015    CHOL 159 04/01/2015    CHOL 155 09/30/2013     Lab Results   Component Value Date    HDL 50 02/20/2024    HDL 68 08/17/2023    HDL 76 01/27/2023     Lab Results   Component Value Date    LDLCALC 48 02/20/2024    LDLCALC 53 08/17/2023    LDLCALC 58 01/27/2023     Lab Results   Component Value Date    TRIG 37 02/20/2024    TRIG 54 08/17/2023    TRIG 41 01/27/2023     No results found for: \"CHOLHDL\"    Imaging: No results found.    ECG:  AFib      Review of Systems   Constitutional: Negative.   HENT: Negative.     Eyes: Negative.    Cardiovascular: Negative.    Respiratory: Negative.     Endocrine: Negative.    Hematologic/Lymphatic: Negative.    Skin: Negative.    Musculoskeletal: Negative.    Gastrointestinal: Negative.    Genitourinary: " "Negative.    Neurological: Negative.    Psychiatric/Behavioral: Negative.     All other systems reviewed and are negative.      Vitals:    02/21/24 1132   BP: 122/80   Pulse: 64   SpO2: 91%     Vitals:    02/21/24 1132   Weight: 115 kg (254 lb)     Height: 5' 11\" (180.3 cm)   Body mass index is 35.43 kg/m².    Physical Exam:  Vital signs reviewed  General:  Alert and cooperative, appears stated age, no acute distress  HEENT:  PERRLA, EOMI, no scleral icterus, no conjunctival pallor  Neck:  No lymphadenopathy, no thyromegaly, no carotid bruits, no elevated JVP  Heart:  Regular rate and rhythm, normal S1/S2, no S3/S4, no murmur, rubs or gallops.  PMI nondisplaced  Lungs:  Clear to auscultation bilaterally, no wheezes rales or rhonchi  Abdomen:  Soft, non-tender, positive bowel sounds, no rebound or guarding,   no organomegaly   Extremities:  Normal range of motion.  No clubbing, cyanosis or edema   Vascular:  2+ pedal pulses  Skin:  No rashes or lesions on exposed skin  Neurologic:  Cranial nerves II-XII grossly intact without focal deficits  Psych:  Normal mood and affect            "

## 2024-02-26 ENCOUNTER — OFFICE VISIT (OUTPATIENT)
Dept: VASCULAR SURGERY | Facility: CLINIC | Age: 84
End: 2024-02-26
Payer: MEDICARE

## 2024-02-26 VITALS
SYSTOLIC BLOOD PRESSURE: 110 MMHG | RESPIRATION RATE: 20 BRPM | HEART RATE: 62 BPM | DIASTOLIC BLOOD PRESSURE: 72 MMHG | WEIGHT: 251 LBS | HEIGHT: 71 IN | BODY MASS INDEX: 35.14 KG/M2 | OXYGEN SATURATION: 98 %

## 2024-02-26 DIAGNOSIS — R60.0 BILATERAL LEG EDEMA: ICD-10-CM

## 2024-02-26 DIAGNOSIS — I71.40 ABDOMINAL AORTIC ANEURYSM (AAA) WITHOUT RUPTURE, UNSPECIFIED PART (HCC): ICD-10-CM

## 2024-02-26 DIAGNOSIS — I87.2 VENOUS INSUFFICIENCY OF BOTH LOWER EXTREMITIES: Primary | ICD-10-CM

## 2024-02-26 DIAGNOSIS — I10 BENIGN ESSENTIAL HYPERTENSION: ICD-10-CM

## 2024-02-26 DIAGNOSIS — I65.23 BILATERAL CAROTID ARTERY STENOSIS: ICD-10-CM

## 2024-02-26 DIAGNOSIS — E78.2 MIXED HYPERLIPIDEMIA: ICD-10-CM

## 2024-02-26 DIAGNOSIS — E66.01 SEVERE OBESITY (BMI 35.0-35.9 WITH COMORBIDITY): ICD-10-CM

## 2024-02-26 PROCEDURE — 99214 OFFICE O/P EST MOD 30 MIN: CPT | Performed by: PHYSICIAN ASSISTANT

## 2024-02-26 NOTE — PROGRESS NOTES
Assessment/Plan:    Venous insufficiency of both lower extremities  Bilateral leg edema  -     Compression Stocking  -     Ambulatory Referral to PT/OT Lymphedema Therapy; Future  -Chronic B LE edema x 10 years, recently worsening with clear fluid draining; no wounds     -R leg LEV 1/2/24:    No DVT or SVT.     R GSV reflux throughout the thigh and calf (mid thigh 11.3 mm, reflux 3.04; knee 11.7, 2.09; prox calf 8.9, reflux 2.79). Triphasic waveforms.     Exam: marked 3+ B R>L LE edema; mild weeping, lichenification; no open wounds     Discussion: Patient with very severe bilateral leg edema, multifactorial including venous insufficiency with secondary lymphedema.  We discussed in detail the pathophysiology and treatment of venous insufficiency. He is wearing compression and has wraps on order. He is arranged to start treatment with the lymphedema clinic.  Due to the severity of his leg swelling and high risk for wound infections, he will likely need additional compressive measures.  He may benefit from lymphedema pumps. He would also benefit from weight loss.  He likely has both deep and superficial incompetence which surgical intervention would not be best course in this patient.      Continue with compression to be worn daily and removed at night; special garments ordered by lymphedema clinic   Started lymphedema clinic in order to get more compensated and to help with garment selection  Standard compression measures alone are unlikely to be adequate in this patient  Recommend lymph pumps with treatment 60 minutes twice daily  Compression, periodic elevation, low sodium diet; regular exercise for weight loss  Patient education for venous insufficiency.  Follow up in 4 months for reevaluation      Bilateral carotid artery stenosis  -     VAS carotid complete study; Future  -CV duplex 10/19/23: Mild 1-49% bilateral carotid stenosis (R 63/18, L 73/9)       R vertebral antegrade; early systolic deceleration,  suggestive of more proximal disease.     -Mild asymptomatic carotid artery stenosis  -Patient education regarding pathophysiology and treatment of carotid disease was provided  -Recommend optimal medical therapy and modification of risk factors  -Continue with medical therapy aspirin, atorvastatin and warfarin (AF)  -Follow-up carotid duplex study in 1 year (October 2024) with office visit       Abdominal aortic aneurysm (AAA) without rupture, unspecified part (HCC)  -     VAS abdominal aorta/iliac duplex; Future  -No abdominal or back pain  -S/P oAAA repair 2017  -Patient would like us to due his follow up and surveillance of AAA  -Continue with medical therapy aspirin, atorvastatin and warfarin (AF)  -Check AOIL and will follow up with rec's      Additional diagnoses:  Benign essential hypertension  Mixed hyperlipidemia  Severe obesity (BMI 35.0-35.9 with comorbidity)       Subjective:    Patient ID: Oscar Espinosa is a 83 y.o. male.  Patient presents to the office for lymphedema measurements. Pt has RLE blisters that have been draining.Wearing Tubi .  Pt has increased torsemide per cardiology.      HPI   Mr. Espinosa 83 yoM former smoker, obesity, severe YEVGENIY- intolerant CPAP, COPD, DDD with lumbar radiculopathy, pulmo Htn, Htn, HLD, CKD IIIa, AF, CAD, chronic dCHF, ascending Aortic aneurysm, AAA s/p open repair ([place], ?'17), venous insufficiency with LE edema who is referred to vascular surgery for leg swelling.  Of note, he has had no AAA for years.  A duplex was attempted in 2021, but could essentially not be completed due to poor quality/obese.      1/24/24:  Patient is new to vascular with extensive cardiovascular history and chronic B LE edema on diuretics which have been managed by cardiology.  The patient has a longstanding history of leg edema.  In the distant past he tried wearing compression but they were too difficult to get on.  He had difficulty bending over the abdomen to don the stockings  and they were too tight.  He does have OTC compression sleeves for the calves but I explained that this is not optimal treatment.  He complains that recently the legs have been worsening with more swelling and now weeping.  He has been having more problems despite reporting no sodium added diet and adjustments in diuretics.  He underwent a right leg venous duplex which was negative for DVT, but did show significant reflux in the GSV and suggest deep venous insufficiency.  He has no fevers or chills.  No history of DVT.      Medical therapy includes: Torsemide 20, amiodarone, warfarin, atorvastatin 40.    2/26/24: Patient returns for follow up of venous insufficiency.  He had baseline PT/ lymphedema assessment last week with recommendation for lymphedema therapy twice weekly x 6 weeks.  They ordered special wraps at the lymphedema clinic for him.  He will be going for his first treatment on 2/28 since he had cancellations due to weather events.  He is also seeing cardiology because he does have history of moderate MR/TR.  He is having a repeat echocardiogram.    He is wearing compression, he continues to have very severe bilateral leg swelling.  He is becoming frustrated.  We discussed the importance of continued daily management of lymphedema.  He will be starting treatment with the lymphedema clinic and with compliance they expect it will be 6 to 12 weeks before he is compensated.  I do not expect that he will be manageable with standard compression only.  Due to the severity of secondary lymphedema due to venous insufficiency and likely other factors such as cardiac and metabolic, he would benefit from lymphedema pump therapy to help with maintain condition of legs and prevent wounds and infections.    Patient also would like us to follow his repaired AAA.  He is asymptomatic from AAA standpoint.  So I will order AOIL since he has not had evaluation in several years.     The following portions of the patient's  "history were reviewed and updated as appropriate: allergies, current medications, past family history, past medical history, past social history, past surgical history, and problem list.    Review of Systems   Constitutional: Negative.    HENT: Negative.     Eyes: Negative.    Respiratory: Negative.     Cardiovascular:  Positive for leg swelling.   Endocrine: Negative.    Genitourinary:  Positive for frequency.   Musculoskeletal:  Positive for gait problem.   Skin: Negative.    Allergic/Immunologic: Negative.    Neurological:  Negative for light-headedness and numbness.   Hematological: Negative.    Psychiatric/Behavioral: Negative.         Objective:  /72 (BP Location: Left arm, Patient Position: Sitting)   Pulse 62   Resp 20   Ht 5' 11\" (1.803 m)   Wt 114 kg (251 lb)   SpO2 98%   BMI 35.01 kg/m²   Marked R > L LE and pedal edema with chronic dry skin, lichenification.      Physical Exam  Vitals and nursing note reviewed.   Constitutional:       Appearance: He is well-developed. He is obese.   HENT:      Head: Normocephalic and atraumatic.   Eyes:      Pupils: Pupils are equal, round, and reactive to light.   Neck:      Vascular: No JVD.   Cardiovascular:      Rate and Rhythm: Normal rate and regular rhythm.      Pulses:           Carotid pulses are 2+ on the right side and 2+ on the left side.       Radial pulses are 2+ on the right side and 2+ on the left side.        Dorsalis pedis pulses are detected w/ Doppler on the right side and detected w/ Doppler on the left side.      Heart sounds: Normal heart sounds, S1 normal and S2 normal. No murmur heard.     No friction rub. No gallop.   Pulmonary:      Effort: Pulmonary effort is normal. No accessory muscle usage or respiratory distress.      Breath sounds: Wheezing present. No rales.   Abdominal:      General: There is no distension.      Palpations: Abdomen is soft.      Tenderness: There is no abdominal tenderness.      Comments: Obese " "  Musculoskeletal:         General: No deformity. Normal range of motion.      Cervical back: Neck supple.      Right lower le+ Pitting Edema present.      Left lower le+ Pitting Edema present.   Skin:     General: Skin is warm and dry.      Capillary Refill: Capillary refill takes less than 2 seconds.      Findings: No lesion or rash.      Nails: There is no clubbing.   Neurological:      Mental Status: He is alert and oriented to person, place, and time.      Comments: Grossly normal    Psychiatric:         Behavior: Behavior is cooperative.           [email or web address]   Torey Harvey   Phone: [phone]  Fax: [phone]   E-mail: [email or web address]    Ordering Provider:  Candace Mac (NPI: [id number])  [facility]  [address] [place] [address]  Phone: [phone]  Fax: [phone]      Patient Information  MRN: [id number]   Oscar THURSTON Francisca  1940  [facility] [place] [id number] [phone]     Insurance Information  Payor: MEDICARE / Plan: MEDICARE A AND B / Product Type: Medicare A & B Fee for Service /      [id number] - (Medicare )     Patient Height and Weight      Wt Readings from Last 1 Encounters:   24 115 kg (254 lb)         Post 4-week Measurements      Body Part Right Left   Ankle / Forearm 30 cm 26 cm   Calf / Elbow  Not Applicable (N/A) Not Applicable (N/A)   Knee / Bicep  44 cm 40 cm   Mid-Thigh / Axilla  54 cm 52 cm     Patient outcome after 4-weeks of conservative therapy:   [x] Patient's condition has NOT improved         I have reviewed and made appropriate changes to the review of systems input by the medical assistant.    Vitals:    24 1109   BP: 110/72   BP Location: Left arm   Patient Position: Sitting   Pulse: 62   Resp: 20   SpO2: 98%   Weight: 114 kg (251 lb)   Height: 5' 11\" (1.803 m)       Patient Active Problem List   Diagnosis    Benign essential hypertension    " Hyperlipidemia    Impaired fasting glucose    Microscopic hematuria    Severe obesity (BMI 35.0-35.9 with comorbidity)     Severe obstructive sleep apnea    Subclinical hypothyroidism    3-vessel CAD    Abdominal aortic aneurysm without rupture (HCC)    Aortic valve disorder    Arteriosclerotic cardiovascular disease    Disc degeneration, lumbar    Herniated lumbar intervertebral disc    Lumbar radiculopathy    Intervertebral disc disorder with radiculopathy of lumbar region    Spondylolisthesis at L4-L5 level    Chronic pain syndrome    Multiple pulmonary nodules    Chronic diastolic congestive heart failure (HCC)    Chronic obstructive pulmonary disease (HCC)    Moderate to severe pulmonary hypertension (HCC)    Nocturnal hypoxemia    Ascending aortic aneurysm (HCC)    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)    Platelets decreased (HCC)    Myofascial pain syndrome    Atrial fibrillation (HCC)    Stage 3a chronic kidney disease (HCC)    Venous insufficiency of both lower extremities    Bilateral carotid artery stenosis    Bilateral leg edema       Past Surgical History:   Procedure Laterality Date    ABDOMINAL AORTIC ANEURYSM REPAIR  08/09/2007    2 dock limbs with visc extens prosth    CARDIAC CATHETERIZATION      COLONOSCOPY      CORONARY ARTERY BYPASS GRAFT  2003    LIMA to LAD, sequential SVG to OM1 & OM2, SVG to RDA    LUMBAR EPIDURAL INJECTION         Family History   Problem Relation Age of Onset    Heart disease Mother     Stroke Father         stroke syndrome    Aneurysm Brother         abdominal    Aortic aneurysm Brother         ascending    Coronary artery disease Family     Hypertension Family     Other Son         gioblastoma multiforme       Social History     Socioeconomic History    Marital status: /Civil Union     Spouse name: Not on file    Number of children: Not on file    Years of education: Not on file    Highest education level: Not on file   Occupational History     Occupation: retired   Tobacco Use    Smoking status: Former     Current packs/day: 0.00     Average packs/day: 1 pack/day for 55.6 years (55.6 ttl pk-yrs)     Types: Cigarettes     Start date: 1957     Quit date: 2012     Years since quittin.5    Smokeless tobacco: Never   Vaping Use    Vaping status: Never Used   Substance and Sexual Activity    Alcohol use: Yes     Alcohol/week: 21.0 standard drinks of alcohol     Types: 21 Cans of beer per week    Drug use: No    Sexual activity: Never   Other Topics Concern    Not on file   Social History Narrative    Not on file     Social Determinants of Health     Financial Resource Strain: Low Risk  (2023)    Overall Financial Resource Strain (CARDIA)     Difficulty of Paying Living Expenses: Not hard at all   Food Insecurity: Not on file   Transportation Needs: No Transportation Needs (2023)    PRAPARE - Transportation     Lack of Transportation (Medical): No     Lack of Transportation (Non-Medical): No   Physical Activity: Not on file   Stress: Not on file   Social Connections: Not on file   Intimate Partner Violence: Not on file   Housing Stability: Not on file       Allergies   Allergen Reactions    Meloxicam Edema         Current Outpatient Medications:     albuterol (Ventolin HFA) 90 mcg/act inhaler, Inhale 2 puffs every 6 (six) hours as needed for wheezing, Disp: 54 g, Rfl: 1    amiodarone 200 mg tablet, Take 1 tablet (200 mg total) by mouth daily, Disp: 90 tablet, Rfl: 3    aspirin (ECOTRIN LOW STRENGTH) 81 mg EC tablet, Take 1 tablet by mouth daily, Disp: , Rfl:     atorvastatin (LIPITOR) 40 mg tablet, Take 1 tablet (40 mg total) by mouth daily, Disp: 90 tablet, Rfl: 3    cholecalciferol (VITAMIN D3) 1,000 units tablet, Take 2,000 Units by mouth daily, Disp: , Rfl:     FIBER ADULT GUMMIES PO, Take 1 caplet by mouth daily , Disp: , Rfl:     gabapentin (NEURONTIN) 300 mg capsule, TAKE 1 CAPSULE BY MOUTH IN THE  MORNING AND 2 CAPSULES BY MOUTH  AT  BEDTIME (Patient taking differently: 900 mg daily at bedtime), Disp: 270 capsule, Rfl: 3    labetalol (NORMODYNE) 100 mg tablet, Take 1 tablet (100 mg total) by mouth 2 (two) times a day, Disp: 180 tablet, Rfl: 3    multivitamin (THERAGRAN) TABS, Take 1 tablet by mouth daily, Disp: , Rfl:     senna (SENOKOT) 8.6 MG tablet, Take 1 tablet by mouth as needed  , Disp: , Rfl:     tamsulosin (FLOMAX) 0.4 mg, Take 1 capsule by mouth daily, Disp: , Rfl:     torsemide (DEMADEX) 20 mg tablet, Take 1 tablet (20 mg total) by mouth daily (Patient taking differently: Take 20 mg by mouth daily Pt is taking an extra tab MWF), Disp: 120 tablet, Rfl: 3    warfarin (Coumadin) 5 mg tablet, TAKE 1 TO 2 TABS BY MOUTH DAILY OR AS DIRECTED BY PHYSICIAN, Disp: 60 tablet, Rfl: 11    Current Facility-Administered Medications:     albuterol (PROVENTIL HFA,VENTOLIN HFA) inhaler 2 puff, 2 puff, Inhalation, Q6H PRN, Gerson Terrell MD

## 2024-02-26 NOTE — LETTER
February 26, 2024     Lea Reyes, MD  [address] [facility] [address]    Patient: Oscar Espinosa   YOB: 1940   Date of Visit: 2/26/2024       Dear Dr. Reyes:    Thank you for referring Oscar Espinosa to me for evaluation. Below are my notes for this consultation.    If you have questions, please do not hesitate to call me. I look forward to following your patient along with you.         Sincerely,        Candace Louise PA-C        CC: [facility] Lymphodema    Candace Louise PA-C  2/26/2024 12:08 PM  Sign when Signing Visit  Assessment/Plan:    Venous insufficiency of both lower extremities  Bilateral leg edema  -     Compression Stocking  -     Ambulatory Referral to PT/OT Lymphedema Therapy; Future  -Chronic B LE edema x 10 years, recently worsening with clear fluid draining; no wounds     -R leg LEV 1/2/24:    No DVT or SVT.     R GSV reflux throughout the thigh and calf (mid thigh 11.3 mm, reflux 3.04; knee 11.7, 2.09; prox calf 8.9, reflux 2.79). Triphasic waveforms.     Exam: marked 3+ B R>L LE edema; mild weeping, lichenification; no open wounds     Discussion: Patient with very severe bilateral leg edema, multifactorial including venous insufficiency with secondary lymphedema.  We discussed in detail the pathophysiology and treatment of venous insufficiency. He is wearing compression and has wraps on order. He is arranged to start treatment with the lymphedema clinic.  Due to the severity of his leg swelling and high risk for wound infections, he will likely need additional compressive measures.  He may benefit from lymphedema pumps. He would also benefit from weight loss.  He likely has both deep and superficial incompetence which surgical intervention would not be best course in this patient.      Continue with compression to be worn daily and removed at night; special garments ordered by lymphedema clinic   Started lymphedema clinic in order to get more compensated and  to help with garment selection  Standard compression measures alone are unlikely to be adequate in this patient  Recommend lymph pumps with treatment 60 minutes twice daily  Compression, periodic elevation, low sodium diet; regular exercise for weight loss  Patient education for venous insufficiency.  Follow up in 4 months for reevaluation      Bilateral carotid artery stenosis  -     VAS carotid complete study; Future  -CV duplex 10/19/23: Mild 1-49% bilateral carotid stenosis (R 63/18, L 73/9)       R vertebral antegrade; early systolic deceleration, suggestive of more proximal disease.     -Mild asymptomatic carotid artery stenosis  -Patient education regarding pathophysiology and treatment of carotid disease was provided  -Recommend optimal medical therapy and modification of risk factors  -Continue with medical therapy aspirin, atorvastatin and warfarin (AF)  -Follow-up carotid duplex study in 1 year (October 2024) with office visit       Abdominal aortic aneurysm (AAA) without rupture, unspecified part (HCC)  -     VAS abdominal aorta/iliac duplex; Future  -No abdominal or back pain  -S/P oAAA repair 2017  -Patient would like us to due his follow up and surveillance of AAA  -Continue with medical therapy aspirin, atorvastatin and warfarin (AF)  -Check AOIL and will follow up with rec's      Additional diagnoses:  Benign essential hypertension  Mixed hyperlipidemia  Severe obesity (BMI 35.0-35.9 with comorbidity)       Subjective:    Patient ID: Oscar Espinosa is a 83 y.o. male.  Patient presents to the office for lymphedema measurements. Pt has RLE blisters that have been draining.Wearing Tubi .  Pt has increased torsemide per cardiology.      HPI   Mr. Espinosa 83 yoM former smoker, obesity, severe YEVGENIY- intolerant CPAP, COPD, DDD with lumbar radiculopathy, pulmo Htn, Htn, HLD, CKD IIIa, AF, CAD, chronic dCHF, ascending Aortic aneurysm, AAA s/p open repair ([place], ?'17), venous insufficiency with LE  edema who is referred to vascular surgery for leg swelling.  Of note, he has had no AAA for years.  A duplex was attempted in 2021, but could essentially not be completed due to poor quality/obese.      1/24/24:  Patient is new to vascular with extensive cardiovascular history and chronic B LE edema on diuretics which have been managed by cardiology.  The patient has a longstanding history of leg edema.  In the distant past he tried wearing compression but they were too difficult to get on.  He had difficulty bending over the abdomen to don the stockings and they were too tight.  He does have OTC compression sleeves for the calves but I explained that this is not optimal treatment.  He complains that recently the legs have been worsening with more swelling and now weeping.  He has been having more problems despite reporting no sodium added diet and adjustments in diuretics.  He underwent a right leg venous duplex which was negative for DVT, but did show significant reflux in the GSV and suggest deep venous insufficiency.  He has no fevers or chills.  No history of DVT.      Medical therapy includes: Torsemide 20, amiodarone, warfarin, atorvastatin 40.    2/26/24: Patient returns for follow up of venous insufficiency.  He had baseline PT/ lymphedema assessment last week with recommendation for lymphedema therapy twice weekly x 6 weeks.  They ordered special wraps at the lymphedema clinic for him.  He will be going for his first treatment on 2/28 since he had cancellations due to weather events.  He is also seeing cardiology because he does have history of moderate MR/TR.  He is having a repeat echocardiogram.    He is wearing compression, he continues to have very severe bilateral leg swelling.  He is becoming frustrated.  We discussed the importance of continued daily management of lymphedema.  He will be starting treatment with the lymphedema clinic and with compliance they expect it will be 6 to 12 weeks before he  "is compensated.  I do not expect that he will be manageable with standard compression only.  Due to the severity of secondary lymphedema due to venous insufficiency and likely other factors such as cardiac and metabolic, he would benefit from lymphedema pump therapy to help with maintain condition of legs and prevent wounds and infections.    Patient also would like us to follow his repaired AAA.  He is asymptomatic from AAA standpoint.  So I will order AOIL since he has not had evaluation in several years.     The following portions of the patient's history were reviewed and updated as appropriate: allergies, current medications, past family history, past medical history, past social history, past surgical history, and problem list.    Review of Systems   Constitutional: Negative.    HENT: Negative.     Eyes: Negative.    Respiratory: Negative.     Cardiovascular:  Positive for leg swelling.   Endocrine: Negative.    Genitourinary:  Positive for frequency.   Musculoskeletal:  Positive for gait problem.   Skin: Negative.    Allergic/Immunologic: Negative.    Neurological:  Negative for light-headedness and numbness.   Hematological: Negative.    Psychiatric/Behavioral: Negative.         Objective:  /72 (BP Location: Left arm, Patient Position: Sitting)   Pulse 62   Resp 20   Ht 5' 11\" (1.803 m)   Wt 114 kg (251 lb)   SpO2 98%   BMI 35.01 kg/m²   Marked R > L LE and pedal edema with chronic dry skin, lichenification.      Physical Exam  Vitals and nursing note reviewed.   Constitutional:       Appearance: He is well-developed. He is obese.   HENT:      Head: Normocephalic and atraumatic.   Eyes:      Pupils: Pupils are equal, round, and reactive to light.   Neck:      Vascular: No JVD.   Cardiovascular:      Rate and Rhythm: Normal rate and regular rhythm.      Pulses:           Carotid pulses are 2+ on the right side and 2+ on the left side.       Radial pulses are 2+ on the right side and 2+ on the left " side.        Dorsalis pedis pulses are detected w/ Doppler on the right side and detected w/ Doppler on the left side.      Heart sounds: Normal heart sounds, S1 normal and S2 normal. No murmur heard.     No friction rub. No gallop.   Pulmonary:      Effort: Pulmonary effort is normal. No accessory muscle usage or respiratory distress.      Breath sounds: Wheezing present. No rales.   Abdominal:      General: There is no distension.      Palpations: Abdomen is soft.      Tenderness: There is no abdominal tenderness.      Comments: Obese   Musculoskeletal:         General: No deformity. Normal range of motion.      Cervical back: Neck supple.      Right lower le+ Pitting Edema present.      Left lower le+ Pitting Edema present.   Skin:     General: Skin is warm and dry.      Capillary Refill: Capillary refill takes less than 2 seconds.      Findings: No lesion or rash.      Nails: There is no clubbing.   Neurological:      Mental Status: He is alert and oriented to person, place, and time.      Comments: Grossly normal    Psychiatric:         Behavior: Behavior is cooperative.           [employer]   Torey Harvey   Phone: [phone]  Fax: [phone]   E-mail: [email or web address]    Ordering Provider:  Candace Mac (NPI: [id number])  [facility]  [address] [place] [address]  Phone: [phone]  Fax: [phone]      Patient Information  MRN: [id number]   Oscar Espinosa  1940  [address] [place] [address] [place] [id number] [phone]     Insurance Information  Payor: MEDICARE / Plan: MEDICARE A AND B / Product Type: Medicare A & B Fee for Service /      [id number] - (Medicare )     Patient Height and Weight      Wt Readings from Last 1 Encounters:   24 115 kg (254 lb)         Post 4-week Measurements      Body Part Right Left   Ankle / Forearm 30 cm 26 cm   Calf / Elbow  Not Applicable (N/A) Not Applicable (N/A)  "  Knee / Bicep  44 cm 40 cm   Mid-Thigh / Axilla  54 cm 52 cm     Patient outcome after 4-weeks of conservative therapy:   [x] Patient's condition has NOT improved         I have reviewed and made appropriate changes to the review of systems input by the medical assistant.    Vitals:    02/26/24 1109   BP: 110/72   BP Location: Left arm   Patient Position: Sitting   Pulse: 62   Resp: 20   SpO2: 98%   Weight: 114 kg (251 lb)   Height: 5' 11\" (1.803 m)       Patient Active Problem List   Diagnosis   • Benign essential hypertension   • Hyperlipidemia   • Impaired fasting glucose   • Microscopic hematuria   • Severe obesity (BMI 35.0-35.9 with comorbidity)    • Severe obstructive sleep apnea   • Subclinical hypothyroidism   • 3-vessel CAD   • Abdominal aortic aneurysm without rupture (HCC)   • Aortic valve disorder   • Arteriosclerotic cardiovascular disease   • Disc degeneration, lumbar   • Herniated lumbar intervertebral disc   • Lumbar radiculopathy   • Intervertebral disc disorder with radiculopathy of lumbar region   • Spondylolisthesis at L4-L5 level   • Chronic pain syndrome   • Multiple pulmonary nodules   • Chronic diastolic congestive heart failure (HCC)   • Chronic obstructive pulmonary disease (HCC)   • Moderate to severe pulmonary hypertension (HCC)   • Nocturnal hypoxemia   • Ascending aortic aneurysm (HCC)   • 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)   • Platelets decreased (HCC)   • Myofascial pain syndrome   • Atrial fibrillation (HCC)   • Stage 3a chronic kidney disease (HCC)   • Venous insufficiency of both lower extremities   • Bilateral carotid artery stenosis   • Bilateral leg edema       Past Surgical History:   Procedure Laterality Date   • ABDOMINAL AORTIC ANEURYSM REPAIR  08/09/2007    2 dock limbs with visc extens prosth   • CARDIAC CATHETERIZATION     • COLONOSCOPY     • CORONARY ARTERY BYPASS GRAFT  2003    LIMA to LAD, sequential SVG to OM1 & OM2, SVG to RDA   • LUMBAR EPIDURAL " INJECTION         Family History   Problem Relation Age of Onset   • Heart disease Mother    • Stroke Father         stroke syndrome   • Aneurysm Brother         abdominal   • Aortic aneurysm Brother         ascending   • Coronary artery disease Family    • Hypertension Family    • Other Son         gioblastoma multiforme       Social History     Socioeconomic History   • Marital status: /Civil Union     Spouse name: Not on file   • Number of children: Not on file   • Years of education: Not on file   • Highest education level: Not on file   Occupational History   • Occupation: retired   Tobacco Use   • Smoking status: Former     Current packs/day: 0.00     Average packs/day: 1 pack/day for 55.6 years (55.6 ttl pk-yrs)     Types: Cigarettes     Start date: 1957     Quit date: 2012     Years since quittin.5   • Smokeless tobacco: Never   Vaping Use   • Vaping status: Never Used   Substance and Sexual Activity   • Alcohol use: Yes     Alcohol/week: 21.0 standard drinks of alcohol     Types: 21 Cans of beer per week   • Drug use: No   • Sexual activity: Never   Other Topics Concern   • Not on file   Social History Narrative   • Not on file     Social Determinants of Health     Financial Resource Strain: Low Risk  (2023)    Overall Financial Resource Strain (CARDIA)    • Difficulty of Paying Living Expenses: Not hard at all   Food Insecurity: Not on file   Transportation Needs: No Transportation Needs (2023)    PRAPARE - Transportation    • Lack of Transportation (Medical): No    • Lack of Transportation (Non-Medical): No   Physical Activity: Not on file   Stress: Not on file   Social Connections: Not on file   Intimate Partner Violence: Not on file   Housing Stability: Not on file       Allergies   Allergen Reactions   • Meloxicam Edema         Current Outpatient Medications:   •  albuterol (Ventolin HFA) 90 mcg/act inhaler, Inhale 2 puffs every 6 (six) hours as needed for wheezing, Disp: 54  g, Rfl: 1  •  amiodarone 200 mg tablet, Take 1 tablet (200 mg total) by mouth daily, Disp: 90 tablet, Rfl: 3  •  aspirin (ECOTRIN LOW STRENGTH) 81 mg EC tablet, Take 1 tablet by mouth daily, Disp: , Rfl:   •  atorvastatin (LIPITOR) 40 mg tablet, Take 1 tablet (40 mg total) by mouth daily, Disp: 90 tablet, Rfl: 3  •  cholecalciferol (VITAMIN D3) 1,000 units tablet, Take 2,000 Units by mouth daily, Disp: , Rfl:   •  FIBER ADULT GUMMIES PO, Take 1 caplet by mouth daily , Disp: , Rfl:   •  gabapentin (NEURONTIN) 300 mg capsule, TAKE 1 CAPSULE BY MOUTH IN THE  MORNING AND 2 CAPSULES BY MOUTH  AT BEDTIME (Patient taking differently: 900 mg daily at bedtime), Disp: 270 capsule, Rfl: 3  •  labetalol (NORMODYNE) 100 mg tablet, Take 1 tablet (100 mg total) by mouth 2 (two) times a day, Disp: 180 tablet, Rfl: 3  •  multivitamin (THERAGRAN) TABS, Take 1 tablet by mouth daily, Disp: , Rfl:   •  senna (SENOKOT) 8.6 MG tablet, Take 1 tablet by mouth as needed  , Disp: , Rfl:   •  tamsulosin (FLOMAX) 0.4 mg, Take 1 capsule by mouth daily, Disp: , Rfl:   •  torsemide (DEMADEX) 20 mg tablet, Take 1 tablet (20 mg total) by mouth daily (Patient taking differently: Take 20 mg by mouth daily Pt is taking an extra tab MWF), Disp: 120 tablet, Rfl: 3  •  warfarin (Coumadin) 5 mg tablet, TAKE 1 TO 2 TABS BY MOUTH DAILY OR AS DIRECTED BY PHYSICIAN, Disp: 60 tablet, Rfl: 11    Current Facility-Administered Medications:   •  albuterol (PROVENTIL HFA,VENTOLIN HFA) inhaler 2 puff, 2 puff, Inhalation, Q6H PRN, Gerson Terrell MD

## 2024-02-26 NOTE — LETTER
February 26, 2024     Lea Reyes, MD  [address] [facility] [address]    Patient: Oscar Espinosa   YOB: 1940   Date of Visit: 2/26/2024       Dear Dr. Reyes:    Thank you for referring Oscar Espinosa to me for evaluation. Below are my notes for this consultation.    If you have questions, please do not hesitate to call me. I look forward to following your patient along with you.         Sincerely,        Candace Louise PA-C        CC: [facility]    Candace Louise PA-C  2/26/2024 12:04 PM  Incomplete  Assessment/Plan:    Venous insufficiency of both lower extremities  Bilateral leg edema  -     Compression Stocking  -     Ambulatory Referral to PT/OT Lymphedema Therapy; Future  -Chronic B LE edema x 10 years, recently worsening with clear fluid draining; no wounds     -R leg LEV 1/2/24:    No DVT or SVT.     R GSV reflux throughout the thigh and calf (mid thigh 11.3 mm, reflux 3.04; knee 11.7, 2.09; prox calf 8.9, reflux 2.79). Triphasic waveforms.     Exam: marked 3+ B R>L LE edema; mild weeping, lichenification; no open wounds     Discussion: We had a detailed discussion regarding pathophysiology and treatment of venous insufficiency with secondary lymphedema.  Also w patient with severe bilateral leg swelling multiple e discussed that treatment centers upon good compressive measures.  He is arranged to start treatment with the lymphedema clinic. He may benefit from lymphedema pumps. He would also benefit from weight loss.  He likely has both deep and superficial incompetence which surgical intervention would not be best course in this patient.      Order for prescription graded compression stockings of 20-30 mm Hg  Tubigrip in the office  Refer to lymphedema clinic in order to get more compensated and to help with garment selection  May do better with CircAid type compression to be obtained once he is more compensated  Standard compression measures alone are unlikely  to be adequate in this patient and he will likely benefit from lymphedema pump therapy; will evaluate for lymph pumps in the future depending on how he does with current recommendations; measured in the office today  Compression, periodic elevation, low sodium diet; regular exercise for weight loss  Patient education for venous insufficiency.  Follow up in 1-2 month for reevaluation and additional rec's         Bilateral carotid artery stenosis  -     VAS carotid complete study; Future  -CV duplex 10/19/23: Mild 1-49% bilateral carotid stenosis (R 63/18, L 73/9)       R vertebral antegrade; early systolic deceleration, suggestive of more proximal disease.     -Mild asymptomatic carotid artery stenosis  -Patient education regarding pathophysiology and treatment of carotid disease was provided  -Recommend optimal medical therapy and modification of risk factors  -Continue with medical therapy aspirin, atorvastatin and warfarin (AF)  -Follow-up carotid duplex study in 1 year (October 2024) with office visit       Abdominal aortic aneurysm (AAA) without rupture, unspecified part (HCC)  -     VAS abdominal aorta/iliac duplex; Future  -No abdominal or back pain  -S/P oAAA repair 2017  -Patient would like us to due his follow up and surveillance of AAA  -Continue with medical therapy aspirin, atorvastatin and warfarin (AF)  -Check AOIL and will follow up with rec's      Additional diagnoses:  Benign essential hypertension  Mixed hyperlipidemia  Severe obesity (BMI 35.0-35.9 with comorbidity)       Subjective:    Patient ID: Oscar Espinosa is a 83 y.o. male.  Patient presents to the office for lymphedema measurements. Pt has RLE blisters that have been draining.Wearing Tubi .  Pt has increased torsemide per cardiology.      HPI   Mr. Espinosa 83 yoM former smoker, obesity, severe YEVGENIY- intolerant CPAP, COPD, DDD with lumbar radiculopathy, pulmo Htn, Htn, HLD, CKD IIIa, AF, CAD, chronic dCHF, ascending Aortic aneurysm,  AAA s/p open repair (Aden, ?'17), venous insufficiency with LE edema who is referred to vascular surgery for leg swelling.  Of note, he has had no AAA for years.  A duplex was attempted in 2021, but could essentially not be completed due to poor quality/obese.      1/24/24:  Patient is new to vascular with extensive cardiovascular history and chronic B LE edema on diuretics which have been managed by cardiology.  The patient has a longstanding history of leg edema.  In the distant past he tried wearing compression but they were too difficult to get on.  He had difficulty bending over the abdomen to don the stockings and they were too tight.  He does have OTC compression sleeves for the calves but I explained that this is not optimal treatment.  He complains that recently the legs have been worsening with more swelling and now weeping.  He has been having more problems despite reporting no sodium added diet and adjustments in diuretics.  He underwent a right leg venous duplex which was negative for DVT, but did show significant reflux in the GSV and suggest deep venous insufficiency.  He has no fevers or chills.  No history of DVT.      Medical therapy includes: Torsemide 20, amiodarone, warfarin, atorvastatin 40.    2/26/24: Patient returns for follow up of venous insufficiency.  He had baseline PT/ lymphedema assessment last week with recommendation for lymphedema therapy twice weekly x 6 weeks.  They ordered special wraps at the lymphedema clinic for him.  He will be going for his first treatment on 2/28 since he had cancellations due to weather events.  He is also seeing cardiology because he does have history of moderate MR/TR.  He is having a repeat echocardiogram.    He is wearing compression, he continues to have very severe bilateral leg swelling.  He is becoming frustrated.  We discussed the importance of continued daily management of lymphedema.  He will be starting treatment with the lymphedema clinic  "and with compliance they expect it will be 6 to 12 weeks before he is compensated.  I do not expect that he will be manageable with standard compression only.  Due to the severity of secondary lymphedema due to venous insufficiency and likely other factors such as cardiac and metabolic, he would benefit from lymphedema pump therapy to help with maintain condition of legs and prevent wounds and infections.    Patient also would like us to follow his repaired AAA.  He is asymptomatic from AAA standpoint.  So I will order AOIL since he has not had evaluation in several years.     The following portions of the patient's history were reviewed and updated as appropriate: allergies, current medications, past family history, past medical history, past social history, past surgical history, and problem list.    Review of Systems   Constitutional: Negative.    HENT: Negative.     Eyes: Negative.    Respiratory: Negative.     Cardiovascular:  Positive for leg swelling.   Endocrine: Negative.    Genitourinary:  Positive for frequency.   Musculoskeletal:  Positive for gait problem.   Skin: Negative.    Allergic/Immunologic: Negative.    Neurological:  Negative for light-headedness and numbness.   Hematological: Negative.    Psychiatric/Behavioral: Negative.         Objective:  /72 (BP Location: Left arm, Patient Position: Sitting)   Pulse 62   Resp 20   Ht 5' 11\" (1.803 m)   Wt 114 kg (251 lb)   SpO2 98%   BMI 35.01 kg/m²   Marked R > L LE and pedal edema with chronic dry skin, lichenification.      Physical Exam  Vitals and nursing note reviewed.   Constitutional:       Appearance: He is well-developed. He is obese.   HENT:      Head: Normocephalic and atraumatic.   Eyes:      Pupils: Pupils are equal, round, and reactive to light.   Neck:      Vascular: No JVD.   Cardiovascular:      Rate and Rhythm: Normal rate and regular rhythm.      Pulses:           Carotid pulses are 2+ on the right side and 2+ on the left " side.       Radial pulses are 2+ on the right side and 2+ on the left side.        Dorsalis pedis pulses are detected w/ Doppler on the right side and detected w/ Doppler on the left side.      Heart sounds: Normal heart sounds, S1 normal and S2 normal. No murmur heard.     No friction rub. No gallop.   Pulmonary:      Effort: Pulmonary effort is normal. No accessory muscle usage or respiratory distress.      Breath sounds: Wheezing present. No rales.   Abdominal:      General: There is no distension.      Palpations: Abdomen is soft.      Tenderness: There is no abdominal tenderness.      Comments: Obese   Musculoskeletal:         General: No deformity. Normal range of motion.      Cervical back: Neck supple.      Right lower le+ Pitting Edema present.      Left lower le+ Pitting Edema present.   Skin:     General: Skin is warm and dry.      Capillary Refill: Capillary refill takes less than 2 seconds.      Findings: No lesion or rash.      Nails: There is no clubbing.   Neurological:      Mental Status: He is alert and oriented to person, place, and time.      Comments: Grossly normal    Psychiatric:         Behavior: Behavior is cooperative.           CURRENT   Torey Harvey   Phone: [phone]  Fax: [phone]   E-mail: [email or web address]    Ordering Provider:  Candace Mac (NPI: [id number])  [facility]  [address] [place] [address]  Phone: [phone]  Fax: [phone]      Patient Information  MRN: [id number]   Oscar THURSTON Francisca  1940  [address] [place] [address] Carli TRIMBLE [id number] [phone]     Insurance Information  Payor: MEDICARE / Plan: MEDICARE A AND B / Product Type: Medicare A & B Fee for Service /      [id number] - (Medicare )     Patient Height and Weight      Wt Readings from Last 1 Encounters:   24 115 kg (254 lb)         Post 4-week Measurements      Body Part Right Left   Ankle / Forearm  "30 cm 26 cm   Calf / Elbow  Not Applicable (N/A) Not Applicable (N/A)   Knee / Bicep  44 cm 40 cm   Mid-Thigh / Axilla  54 cm 52 cm     Patient outcome after 4-weeks of conservative therapy:   [x] Patient's condition has NOT improved         I have reviewed and made appropriate changes to the review of systems input by the medical assistant.    Vitals:    02/26/24 1109   BP: 110/72   BP Location: Left arm   Patient Position: Sitting   Pulse: 62   Resp: 20   SpO2: 98%   Weight: 114 kg (251 lb)   Height: 5' 11\" (1.803 m)       Patient Active Problem List   Diagnosis    Benign essential hypertension    Hyperlipidemia    Impaired fasting glucose    Microscopic hematuria    Severe obesity (BMI 35.0-35.9 with comorbidity)     Severe obstructive sleep apnea    Subclinical hypothyroidism    3-vessel CAD    Abdominal aortic aneurysm without rupture (HCC)    Aortic valve disorder    Arteriosclerotic cardiovascular disease    Disc degeneration, lumbar    Herniated lumbar intervertebral disc    Lumbar radiculopathy    Intervertebral disc disorder with radiculopathy of lumbar region    Spondylolisthesis at L4-L5 level    Chronic pain syndrome    Multiple pulmonary nodules    Chronic diastolic congestive heart failure (HCC)    Chronic obstructive pulmonary disease (HCC)    Moderate to severe pulmonary hypertension (HCC)    Nocturnal hypoxemia    Ascending aortic aneurysm (HCC)    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)    Platelets decreased (HCC)    Myofascial pain syndrome    Atrial fibrillation (HCC)    Stage 3a chronic kidney disease (HCC)    Venous insufficiency of both lower extremities    Bilateral carotid artery stenosis    Bilateral leg edema       Past Surgical History:   Procedure Laterality Date    ABDOMINAL AORTIC ANEURYSM REPAIR  08/09/2007    2 dock limbs with visc extens prosth    CARDIAC CATHETERIZATION      COLONOSCOPY      CORONARY ARTERY BYPASS GRAFT  2003    LIMA to LAD, sequential SVG to OM1 & " OM2, SVG to RDA    LUMBAR EPIDURAL INJECTION         Family History   Problem Relation Age of Onset    Heart disease Mother     Stroke Father         stroke syndrome    Aneurysm Brother         abdominal    Aortic aneurysm Brother         ascending    Coronary artery disease Family     Hypertension Family     Other Son         gioblastoma multiforme       Social History     Socioeconomic History    Marital status: /Civil Union     Spouse name: Not on file    Number of children: Not on file    Years of education: Not on file    Highest education level: Not on file   Occupational History    Occupation: retired   Tobacco Use    Smoking status: Former     Current packs/day: 0.00     Average packs/day: 1 pack/day for 55.6 years (55.6 ttl pk-yrs)     Types: Cigarettes     Start date: 1957     Quit date: 2012     Years since quittin.5    Smokeless tobacco: Never   Vaping Use    Vaping status: Never Used   Substance and Sexual Activity    Alcohol use: Yes     Alcohol/week: 21.0 standard drinks of alcohol     Types: 21 Cans of beer per week    Drug use: No    Sexual activity: Never   Other Topics Concern    Not on file   Social History Narrative    Not on file     Social Determinants of Health     Financial Resource Strain: Low Risk  (2023)    Overall Financial Resource Strain (CARDIA)     Difficulty of Paying Living Expenses: Not hard at all   Food Insecurity: Not on file   Transportation Needs: No Transportation Needs (2023)    PRAPARE - Transportation     Lack of Transportation (Medical): No     Lack of Transportation (Non-Medical): No   Physical Activity: Not on file   Stress: Not on file   Social Connections: Not on file   Intimate Partner Violence: Not on file   Housing Stability: Not on file       Allergies   Allergen Reactions    Meloxicam Edema         Current Outpatient Medications:     albuterol (Ventolin HFA) 90 mcg/act inhaler, Inhale 2 puffs every 6 (six) hours as needed for  wheezing, Disp: 54 g, Rfl: 1    amiodarone 200 mg tablet, Take 1 tablet (200 mg total) by mouth daily, Disp: 90 tablet, Rfl: 3    aspirin (ECOTRIN LOW STRENGTH) 81 mg EC tablet, Take 1 tablet by mouth daily, Disp: , Rfl:     atorvastatin (LIPITOR) 40 mg tablet, Take 1 tablet (40 mg total) by mouth daily, Disp: 90 tablet, Rfl: 3    cholecalciferol (VITAMIN D3) 1,000 units tablet, Take 2,000 Units by mouth daily, Disp: , Rfl:     FIBER ADULT GUMMIES PO, Take 1 caplet by mouth daily , Disp: , Rfl:     gabapentin (NEURONTIN) 300 mg capsule, TAKE 1 CAPSULE BY MOUTH IN THE  MORNING AND 2 CAPSULES BY MOUTH  AT BEDTIME (Patient taking differently: 900 mg daily at bedtime), Disp: 270 capsule, Rfl: 3    labetalol (NORMODYNE) 100 mg tablet, Take 1 tablet (100 mg total) by mouth 2 (two) times a day, Disp: 180 tablet, Rfl: 3    multivitamin (THERAGRAN) TABS, Take 1 tablet by mouth daily, Disp: , Rfl:     senna (SENOKOT) 8.6 MG tablet, Take 1 tablet by mouth as needed  , Disp: , Rfl:     tamsulosin (FLOMAX) 0.4 mg, Take 1 capsule by mouth daily, Disp: , Rfl:     torsemide (DEMADEX) 20 mg tablet, Take 1 tablet (20 mg total) by mouth daily (Patient taking differently: Take 20 mg by mouth daily Pt is taking an extra tab MWF), Disp: 120 tablet, Rfl: 3    warfarin (Coumadin) 5 mg tablet, TAKE 1 TO 2 TABS BY MOUTH DAILY OR AS DIRECTED BY PHYSICIAN, Disp: 60 tablet, Rfl: 11    Current Facility-Administered Medications:     albuterol (PROVENTIL HFA,VENTOLIN HFA) inhaler 2 puff, 2 puff, Inhalation, Q6H PRN, MD Candace Eason PA-C  2/26/2024 11:39 AM  Sign when Signing Visit  Assessment/Plan:      ***  pumps      Venous insufficiency of both lower extremities  Bilateral leg edema  -     Compression Stocking  -     Ambulatory Referral to PT/OT Lymphedema Therapy; Future  -Chronic B LE edema x 10 years, recently worsening with clear fluid draining; no wounds     -R leg LEV 1/2/24:    No DVT or SVT.     R GSV reflux  throughout the thigh and calf (mid thigh 11.3 mm, reflux 3.04; knee 11.7, 2.09; prox calf 8.9, reflux 2.79). Triphasic waveforms.       Exam: marked 3+ B LE edema, mild weeping, chronic stasis changes; no open wounds     Discussion: We had a detailed discussion regarding pathophysiology and treatment of venous insufficiency.  We discussed that treatment centers upon good compressive measures.  Recommend trial of medical compression to be worn daily and removed at night.  Given the significant edema, will refer him to lymphedema clinic to help with management and to get him compensated.  He may benefit from lymphedema pumps to be considered in the future.  He would also benefit from weight loss.  He likely has both deep and superficial incompetence which surgical intervention would not be best course in this patient.      Order for prescription graded compression stockings of 20-30 mm Hg  Tubigrip in the office  Refer to lymphedema clinic in order to get more compensated and to help with garment selection  May do better with CircAid type compression to be obtained once he is more compensated  Standard compression measures alone are unlikely to be adequate in this patient and he will likely benefit from lymphedema pump therapy; will evaluate for lymph pumps in the future depending on how he does with current recommendations; measured in the office today  Compression, periodic elevation, low sodium diet; regular exercise for weight loss  Patient education for venous insufficiency.  Follow up in 1-2 month for reevaluation and additional rec's         Bilateral carotid artery stenosis  -     VAS carotid complete study; Future  -CV duplex 10/19/23: Mild 1-49% bilateral carotid stenosis (R 63/18, L 73/9)       R vertebral antegrade; early systolic deceleration, suggestive of more proximal disease.     -Mild asymptomatic carotid artery stenosis  -Patient education regarding pathophysiology and treatment of carotid disease  was provided  -Recommend optimal medical therapy and modification of risk factors  -Continue with medical therapy aspirin, atorvastatin and warfarin (AF)  -Follow-up carotid duplex study in 1 year (October 2024) with office visit        Diagnoses and all orders for this visit:    Venous insufficiency of both lower extremities    Abdominal aortic aneurysm (AAA) without rupture, unspecified part (HCC)  -     VAS abdominal aorta/iliac duplex; Future    Benign essential hypertension    Bilateral carotid artery stenosis    Bilateral leg edema    Mixed hyperlipidemia    Severe obesity (BMI 35.0-35.9 with comorbidity)       Subjective:      Patient ID: Oscar Espinosa is a 83 y.o. male.  Patient presents to the office for lymphedema measurements. Pt has RLE blisters that have been draining. Pt does wear tubi .  Pt has increased torsemide per cardiology.      HPI   Mr. Espinosa 83 yoM former smoker, obesity, severe YEVGENIY- intolerant CPAP, COPD, DDD with lumbar radiculopathy, pulmo Htn, Htn, HLD, CKD IIIa, AF, CAD, chronic dCHF, ascending Aortic aneurysm, AAA s/p open repair (Aden, ?'17), venous insufficiency with LE edema who is referred to vascular surgery for leg swelling.  Of note, he has had no AAA for years.  A duplex was attempted in 2021, but could essentially not be completed due to poor quality/obese.      1/24/24:  Patient is new to vascular with extensive cardiovascular history and chronic B LE edema on diuretics which have been managed by cardiology.  The patient has a longstanding history of leg edema.  In the distant past he tried wearing compression but they were too difficult to get on.  He had difficulty bending over the abdomen to don the stockings and they were too tight.  He does have OTC compression sleeves for the calves but I explained that this is not optimal treatment.  He complains that recently the legs have been worsening with more swelling and now weeping.  He has been having more problems  "despite reporting no sodium added diet and adjustments in diuretics.  He underwent a right leg venous duplex which was negative for DVT, but did show significant reflux in the GSV and suggest deep venous insufficiency.  He has no fevers or chills.  No history of DVT.      Medical therapy includes: Torsemide 20, amiodarone, warfarin, atorvastatin 40.    2/26/24: Patient returns for follow up of venous insufficiency.      2/26/24:    He had baseline PT/ lymphedema assessment last week with recommendation for lymphedema therapy twice weekly x 6 weeks.         The following portions of the patient's history were reviewed and updated as appropriate: allergies, current medications, past family history, past medical history, past social history, past surgical history, and problem list.    Review of Systems   Constitutional: Negative.    HENT: Negative.     Eyes: Negative.    Respiratory: Negative.     Cardiovascular:  Positive for leg swelling.   Endocrine: Negative.    Genitourinary:  Positive for frequency.   Musculoskeletal:  Positive for gait problem.   Skin: Negative.    Allergic/Immunologic: Negative.    Neurological:  Negative for light-headedness and numbness.   Hematological: Negative.    Psychiatric/Behavioral: Negative.           Objective:    /72 (BP Location: Left arm, Patient Position: Sitting)   Pulse 62   Resp 20   Ht 5' 11\" (1.803 m)   Wt 114 kg (251 lb)   SpO2 98%   BMI 35.01 kg/m²       Marked R LE and pedal edema with chronic dry skin. No active weeping     Physical Exam  Vitals and nursing note reviewed.   Constitutional:       Appearance: He is well-developed. He is obese.   HENT:      Head: Normocephalic and atraumatic.   Eyes:      Pupils: Pupils are equal, round, and reactive to light.   Neck:      Vascular: No JVD.   Cardiovascular:      Rate and Rhythm: Normal rate and regular rhythm.      Pulses:           Carotid pulses are 2+ on the right side and 2+ on the left side.       Radial " pulses are 2+ on the right side and 2+ on the left side.        Dorsalis pedis pulses are detected w/ Doppler on the right side and detected w/ Doppler on the left side.      Heart sounds: Normal heart sounds, S1 normal and S2 normal. No murmur heard.     No friction rub. No gallop.   Pulmonary:      Effort: Pulmonary effort is normal. No accessory muscle usage or respiratory distress.      Breath sounds: Wheezing present. No rales.   Abdominal:      General: There is no distension.      Palpations: Abdomen is soft.      Tenderness: There is no abdominal tenderness.      Comments: Obese   Musculoskeletal:         General: No deformity. Normal range of motion.      Cervical back: Neck supple.      Right lower le+ Pitting Edema present.      Left lower le+ Pitting Edema present.   Skin:     General: Skin is warm and dry.      Capillary Refill: Capillary refill takes less than 2 seconds.      Findings: No lesion or rash.      Nails: There is no clubbing.   Neurological:      Mental Status: He is alert and oriented to person, place, and time.      Comments: Grossly normal    Psychiatric:         Behavior: Behavior is cooperative.             Flinja   Torey Harvey   Phone: [phone]  Fax: [phone]   E-mail: [email or web address]    Ordering Provider:  Candace Mac (NPI: [id number])  [facility]  [address] [place] [address]   [address] [place] [address]  Phone: [phone]  Fax: [phone]      Patient Information  MRN: [id number]   Oscar Lizamavacs  1940  [address] [place] [address] [place] [address] [phone]     Insurance Information  Payor: MEDICARE / Plan: MEDICARE A AND B / Product Type: Medicare A & B Fee for Service /      [id number] - (Medicare )     Patient Height and Weight      Wt Readings from Last 1 Encounters:   24 115 kg (254 lb)         Post 4-week Measurements      Body Part Right Left   Ankle / Forearm 30 cm 26 cm  "  Calf / Elbow  Not Applicable (N/A) Not Applicable (N/A)   Knee / Bicep  44 cm 40 cm   Mid-Thigh / Axilla  54 cm 52 cm     Patient outcome after 4-weeks of conservative therapy:   [x] Patient's condition has NOT improved         I have reviewed and made appropriate changes to the review of systems input by the medical assistant.    Vitals:    02/26/24 1109   BP: 110/72   BP Location: Left arm   Patient Position: Sitting   Pulse: 62   Resp: 20   SpO2: 98%   Weight: 114 kg (251 lb)   Height: 5' 11\" (1.803 m)       Patient Active Problem List   Diagnosis    Benign essential hypertension    Hyperlipidemia    Impaired fasting glucose    Microscopic hematuria    Severe obesity (BMI 35.0-35.9 with comorbidity)     Severe obstructive sleep apnea    Subclinical hypothyroidism    3-vessel CAD    Abdominal aortic aneurysm without rupture (HCC)    Aortic valve disorder    Arteriosclerotic cardiovascular disease    Disc degeneration, lumbar    Herniated lumbar intervertebral disc    Lumbar radiculopathy    Intervertebral disc disorder with radiculopathy of lumbar region    Spondylolisthesis at L4-L5 level    Chronic pain syndrome    Multiple pulmonary nodules    Chronic diastolic congestive heart failure (HCC)    Chronic obstructive pulmonary disease (HCC)    Moderate to severe pulmonary hypertension (HCC)    Nocturnal hypoxemia    Ascending aortic aneurysm (HCC)    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)    Platelets decreased (HCC)    Myofascial pain syndrome    Atrial fibrillation (HCC)    Stage 3a chronic kidney disease (HCC)    Venous insufficiency of both lower extremities    Bilateral carotid artery stenosis    Bilateral leg edema       Past Surgical History:   Procedure Laterality Date    ABDOMINAL AORTIC ANEURYSM REPAIR  08/09/2007    2 dock limbs with visc extens prosth    CARDIAC CATHETERIZATION      COLONOSCOPY      CORONARY ARTERY BYPASS GRAFT  2003    LIMA to LAD, sequential SVG to OM1 & OM2, SVG to " RDA    LUMBAR EPIDURAL INJECTION         Family History   Problem Relation Age of Onset    Heart disease Mother     Stroke Father         stroke syndrome    Aneurysm Brother         abdominal    Aortic aneurysm Brother         ascending    Coronary artery disease Family     Hypertension Family     Other Son         gioblastoma multiforme       Social History     Socioeconomic History    Marital status: /Civil Union     Spouse name: Not on file    Number of children: Not on file    Years of education: Not on file    Highest education level: Not on file   Occupational History    Occupation: retired   Tobacco Use    Smoking status: Former     Current packs/day: 0.00     Average packs/day: 1 pack/day for 55.6 years (55.6 ttl pk-yrs)     Types: Cigarettes     Start date: 1957     Quit date: 2012     Years since quittin.5    Smokeless tobacco: Never   Vaping Use    Vaping status: Never Used   Substance and Sexual Activity    Alcohol use: Yes     Alcohol/week: 21.0 standard drinks of alcohol     Types: 21 Cans of beer per week    Drug use: No    Sexual activity: Never   Other Topics Concern    Not on file   Social History Narrative    Not on file     Social Determinants of Health     Financial Resource Strain: Low Risk  (2023)    Overall Financial Resource Strain (CARDIA)     Difficulty of Paying Living Expenses: Not hard at all   Food Insecurity: Not on file   Transportation Needs: No Transportation Needs (2023)    PRAPARE - Transportation     Lack of Transportation (Medical): No     Lack of Transportation (Non-Medical): No   Physical Activity: Not on file   Stress: Not on file   Social Connections: Not on file   Intimate Partner Violence: Not on file   Housing Stability: Not on file       Allergies   Allergen Reactions    Meloxicam Edema         Current Outpatient Medications:     albuterol (Ventolin HFA) 90 mcg/act inhaler, Inhale 2 puffs every 6 (six) hours as needed for wheezing, Disp: 54  g, Rfl: 1    amiodarone 200 mg tablet, Take 1 tablet (200 mg total) by mouth daily, Disp: 90 tablet, Rfl: 3    aspirin (ECOTRIN LOW STRENGTH) 81 mg EC tablet, Take 1 tablet by mouth daily, Disp: , Rfl:     atorvastatin (LIPITOR) 40 mg tablet, Take 1 tablet (40 mg total) by mouth daily, Disp: 90 tablet, Rfl: 3    cholecalciferol (VITAMIN D3) 1,000 units tablet, Take 2,000 Units by mouth daily, Disp: , Rfl:     FIBER ADULT GUMMIES PO, Take 1 caplet by mouth daily , Disp: , Rfl:     gabapentin (NEURONTIN) 300 mg capsule, TAKE 1 CAPSULE BY MOUTH IN THE  MORNING AND 2 CAPSULES BY MOUTH  AT BEDTIME (Patient taking differently: 900 mg daily at bedtime), Disp: 270 capsule, Rfl: 3    labetalol (NORMODYNE) 100 mg tablet, Take 1 tablet (100 mg total) by mouth 2 (two) times a day, Disp: 180 tablet, Rfl: 3    multivitamin (THERAGRAN) TABS, Take 1 tablet by mouth daily, Disp: , Rfl:     senna (SENOKOT) 8.6 MG tablet, Take 1 tablet by mouth as needed  , Disp: , Rfl:     tamsulosin (FLOMAX) 0.4 mg, Take 1 capsule by mouth daily, Disp: , Rfl:     torsemide (DEMADEX) 20 mg tablet, Take 1 tablet (20 mg total) by mouth daily (Patient taking differently: Take 20 mg by mouth daily Pt is taking an extra tab MWF), Disp: 120 tablet, Rfl: 3    warfarin (Coumadin) 5 mg tablet, TAKE 1 TO 2 TABS BY MOUTH DAILY OR AS DIRECTED BY PHYSICIAN, Disp: 60 tablet, Rfl: 11    Current Facility-Administered Medications:     albuterol (PROVENTIL HFA,VENTOLIN HFA) inhaler 2 puff, 2 puff, Inhalation, Q6H PRN, Gerson Terrlel MD

## 2024-02-28 ENCOUNTER — APPOINTMENT (OUTPATIENT)
Dept: LAB | Facility: CLINIC | Age: 84
End: 2024-02-28
Payer: MEDICARE

## 2024-02-28 ENCOUNTER — OFFICE VISIT (OUTPATIENT)
Dept: PHYSICAL THERAPY | Facility: REHABILITATION | Age: 84
End: 2024-02-28
Payer: MEDICARE

## 2024-02-28 DIAGNOSIS — I50.32 CHRONIC DIASTOLIC CONGESTIVE HEART FAILURE (HCC): ICD-10-CM

## 2024-02-28 DIAGNOSIS — L97.319 VENOUS ULCER OF ANKLE, RIGHT (HCC): ICD-10-CM

## 2024-02-28 DIAGNOSIS — I89.0 LYMPHEDEMA: Primary | ICD-10-CM

## 2024-02-28 DIAGNOSIS — I83.013 VENOUS ULCER OF ANKLE, RIGHT (HCC): ICD-10-CM

## 2024-02-28 LAB
ANION GAP SERPL CALCULATED.3IONS-SCNC: 5 MMOL/L
BNP SERPL-MCNC: 282 PG/ML (ref 0–100)
BUN SERPL-MCNC: 19 MG/DL (ref 5–25)
CALCIUM SERPL-MCNC: 8.8 MG/DL (ref 8.4–10.2)
CHLORIDE SERPL-SCNC: 101 MMOL/L (ref 96–108)
CO2 SERPL-SCNC: 34 MMOL/L (ref 21–32)
CREAT SERPL-MCNC: 1.29 MG/DL (ref 0.6–1.3)
GFR SERPL CREATININE-BSD FRML MDRD: 51 ML/MIN/1.73SQ M
GLUCOSE P FAST SERPL-MCNC: 102 MG/DL (ref 65–99)
POTASSIUM SERPL-SCNC: 3.7 MMOL/L (ref 3.5–5.3)
SODIUM SERPL-SCNC: 140 MMOL/L (ref 135–147)

## 2024-02-28 PROCEDURE — 97535 SELF CARE MNGMENT TRAINING: CPT | Performed by: PHYSICAL THERAPIST

## 2024-02-28 PROCEDURE — 97530 THERAPEUTIC ACTIVITIES: CPT | Performed by: PHYSICAL THERAPIST

## 2024-02-28 PROCEDURE — 80048 BASIC METABOLIC PNL TOTAL CA: CPT

## 2024-02-28 PROCEDURE — 36415 COLL VENOUS BLD VENIPUNCTURE: CPT

## 2024-02-28 PROCEDURE — 83880 ASSAY OF NATRIURETIC PEPTIDE: CPT

## 2024-02-28 NOTE — PROGRESS NOTES
Daily Note     Today's date: 2024  Patient name: Oscar Espinosa  : 1940  MRN: [id number]  Referring provider: Candace Louise PA-C  Dx:   Encounter Diagnosis     ICD-10-CM    1. Lymphedema  I89.0       2. Venous ulcer of ankle, right (HCC)  I83.013     L97.319         Subjective: Echo scheduled for Monday 3/4 per cardiology.     Objective: See treatment diary below    Assessment: Discussion with patient and wife resulted in decision to initiate compression therapy via low-stretch velcro wraps for foot and calf for decongestion and wound healing. Will upgrade with 1/2 inch grey foam to address fibrosis PRN. Likely, velcro wraps will also be long-term edema maintenance compression plan. Will d/c [facility] [employer] pump order as [facility] has already ordered pumps.    Plan: Continue per plan of care.      Precautions: multiple comorbidities including cardiac    POC expires Auth Status? (BOMN, approved, pending) Unit limit (Daily) Auth Start date Expiration date PT/OT + Visit Limit?    BOMN         Date of Service         Visits Used 1        Visits Remaining 99          Compression Rx        Compression garment Velcro wraps vs circular knit TBD ReadyWrap calf size Large  ReadyWrap SL foot size Large       Compression pumps Order placed through [facility]        Manual Ther         volumetrics completed        MLD         Wound Care Order placed for Foam through [facility]  **               Ther Ex         Remedial Exercise                                    Ther Activity & Self Care         Skin care 5' 5'       Garment Fit/Train  15'       Sleeping position Supine discussed; patient unable; so discussed elevating legs in Recliner                 Pt Education         Edema differential dx 10' 5'       Lymphatic A&P 5' 5'       Compression options 10' 30'

## 2024-02-29 ENCOUNTER — RA CDI HCC (OUTPATIENT)
Dept: OTHER | Facility: HOSPITAL | Age: 84
End: 2024-02-29

## 2024-02-29 PROBLEM — I13.0 HYPERTENSIVE HEART AND CHRONIC KIDNEY DISEASE WITH HEART FAILURE AND STAGE 1 THROUGH STAGE 4 CHRONIC KIDNEY DISEASE, OR CHRONIC KIDNEY DISEASE (HCC): Status: ACTIVE | Noted: 2024-02-29

## 2024-03-04 ENCOUNTER — HOSPITAL ENCOUNTER (OUTPATIENT)
Dept: NON INVASIVE DIAGNOSTICS | Facility: CLINIC | Age: 84
Discharge: HOME | End: 2024-03-04
Payer: MEDICARE

## 2024-03-04 VITALS
SYSTOLIC BLOOD PRESSURE: 110 MMHG | BODY MASS INDEX: 35.14 KG/M2 | HEIGHT: 71 IN | HEART RATE: 62 BPM | WEIGHT: 251 LBS | DIASTOLIC BLOOD PRESSURE: 72 MMHG

## 2024-03-04 DIAGNOSIS — I50.32 CHRONIC DIASTOLIC CONGESTIVE HEART FAILURE (HCC): ICD-10-CM

## 2024-03-04 DIAGNOSIS — I27.20 MODERATE TO SEVERE PULMONARY HYPERTENSION (HCC): ICD-10-CM

## 2024-03-04 DIAGNOSIS — I87.2 VENOUS INSUFFICIENCY OF BOTH LOWER EXTREMITIES: ICD-10-CM

## 2024-03-04 LAB
AORTIC ROOT: 4.4 CM
AORTIC VALVE ANNULUS: 2.4 CM
AORTIC VALVE MEAN VELOCITY: 7 M/S
APICAL FOUR CHAMBER EJECTION FRACTION: 47 %
ASCENDING AORTA: 4.8 CM
AV AREA BY CONTINUOUS VTI: 2.5 CM2
AV AREA PEAK VELOCITY: 2.7 CM2
AV LVOT MEAN GRADIENT: 1 MMHG
AV LVOT PEAK GRADIENT: 2 MMHG
AV MEAN GRADIENT: 2 MMHG
AV PEAK GRADIENT: 4 MMHG
AV VALVE AREA: 2.45 CM2
AV VELOCITY RATIO: 0.65
BSA FOR ECHO PROCEDURE: 2.32 M2
DOP CALC AO PEAK VEL: 1.01 M/S
DOP CALC AO VTI: 24.33 CM
DOP CALC LVOT AREA: 4.15 CM2
DOP CALC LVOT CARDIAC INDEX: 1.65 L/MIN/M2
DOP CALC LVOT CARDIAC OUTPUT: 3.82 L/MIN
DOP CALC LVOT DIAMETER: 2.3 CM
DOP CALC LVOT PEAK VEL VTI: 14.36 CM
DOP CALC LVOT PEAK VEL: 0.66 M/S
DOP CALC LVOT STROKE INDEX: 25 ML/M2
DOP CALC LVOT STROKE VOLUME: 59.63
E WAVE DECELERATION TIME: 194 MS
E/A RATIO: 3.1
FRACTIONAL SHORTENING: 37 (ref 28–44)
INTERVENTRICULAR SEPTUM IN DIASTOLE (PARASTERNAL SHORT AXIS VIEW): 1.3 CM
INTERVENTRICULAR SEPTUM: 1.3 CM (ref 0.6–1.1)
LAAS-AP2: 27.6 CM2
LAAS-AP4: 29.7 CM2
LEFT ATRIUM SIZE: 5 CM
LEFT ATRIUM VOLUME (MOD BIPLANE): 101 ML
LEFT ATRIUM VOLUME INDEX (MOD BIPLANE): 43.5 ML/M2
LEFT INTERNAL DIMENSION IN SYSTOLE: 3.9 CM (ref 2.1–4)
LEFT VENTRICLE DIASTOLIC VOLUME (MOD BIPLANE): 155 ML
LEFT VENTRICLE DIASTOLIC VOLUME INDEX (MOD BIPLANE): 66.8 ML/M2
LEFT VENTRICLE SYSTOLIC VOLUME (MOD BIPLANE): 72 ML
LEFT VENTRICLE SYSTOLIC VOLUME INDEX (MOD BIPLANE): 31 ML/M2
LEFT VENTRICULAR INTERNAL DIMENSION IN DIASTOLE: 6.2 CM (ref 3.5–6)
LEFT VENTRICULAR POSTERIOR WALL IN END DIASTOLE: 1.3 CM
LEFT VENTRICULAR STROKE VOLUME: 127 ML
LV EF: 54 %
LVSV (TEICH): 127 ML
MV E'TISSUE VEL-LAT: 14 CM/S
MV E'TISSUE VEL-SEP: 11 CM/S
MV PEAK A VEL: 0.3 M/S
MV PEAK E VEL: 93 CM/S
MV STENOSIS PRESSURE HALF TIME: 56 MS
MV VALVE AREA P 1/2 METHOD: 3.93
RA PRESSURE ESTIMATED: 15 MMHG
RIGHT ATRIUM AREA SYSTOLE A4C: 28.1 CM2
RIGHT VENTRICLE ID DIMENSION: 4.2 CM
RV PSP: 56 MMHG
SINOTUBULAR JUNCTION: 3.1 CM
SL CV LEFT ATRIUM LENGTH A2C: 6.4 CM
SL CV LV EF: 47
SL CV PED ECHO LEFT VENTRICLE DIASTOLIC VOLUME (MOD BIPLANE) 2D: 193 ML
SL CV PED ECHO LEFT VENTRICLE SYSTOLIC VOLUME (MOD BIPLANE) 2D: 66 ML
SL CV SINUS OF VALSALVA 2D: 4.2 CM
STJ: 3.1 CM
TR MAX PG: 41 MMHG
TR PEAK VELOCITY: 3.2 M/S
TRICUSPID ANNULAR PLANE SYSTOLIC EXCURSION: 1.6 CM
TRICUSPID VALVE PEAK REGURGITATION VELOCITY: 3.69 M/S

## 2024-03-04 PROCEDURE — 93306 TTE W/DOPPLER COMPLETE: CPT | Performed by: INTERNAL MEDICINE

## 2024-03-04 PROCEDURE — 93306 TTE W/DOPPLER COMPLETE: CPT

## 2024-03-04 NOTE — PROGRESS NOTES
Cardiology Follow Up    Oscar Espinosa  1940  [id number]  [facility] [place]  [address] [facility]  [place] [id number] [phone]    1. Chronic diastolic congestive heart failure (HCC)  torsemide (DEMADEX) 20 mg tablet    Basic metabolic panel    potassium chloride (Klor-Con M20) 20 mEq tablet    B-Type Natriuretic Peptide(BNP)      2. Bilateral leg edema        3. Mitral valve insufficiency, unspecified etiology        4. Stage 3b chronic kidney disease (HCC)        5. PAF (paroxysmal atrial fibrillation) (HCC)        6. Hypertension, unspecified type        7. Mixed hyperlipidemia        8. YEVGENIY (obstructive sleep apnea)            Interval History:   10/26/23 weight 250 pounds, R>L LE edema .  On 10/30/23 our office was called with LE Edema L>R. Lasix was increased to 80mg BID on Friday, Saturday and Sunday.  BMP to be done that Monday.       10/30/23 sodium 139 potassium 4.9 BUN 16 creatinine 1.35 GFR 48     11/09/23 weight is down from 250 pounds to 247 pounds.      12/28/23  Oscar called our office due to lower extremity edema right greater than left.  Doppler lower extremity ordered negative for DVT.  Was instructed to increase torsemide 20 mg daily to 40 mg twice daily for 2 to 3 days.       On 1/11/24 Oscar was seen in our office a follow-up visit.  He is accompanied by his wife.  Cording to Oscar and his wife his weight decreased with the extra dose of torsemide his lower extremity edema did not improve.  He is following a low-sodium diet over drinking fluids.  His weight at home is 250 pounds his weight in the office 252 pounds.  Oscar denies worsening shortness of breath, palpitation, lightheadedness or dizziness.  He is not active dur to lower back pain.       Mr Oscar Espinosa was seen by Dr Alan on 2/21/24.  TTE ordered to evaluate LV filling pressures and right atrial pressure.  BP  and BNP ordered with  follow up in our office in 2 weeks.      2/28/24 sodium 143 potassium 3.7 BUN 19 creatinine 1.29 GFR 51     3/04/24 TTE: Left ventricular fraction 47% systolic function mildly reduced, mild global hypokinesis, diastolic function normal.  Right ventricle cavity size dilated systolic function low normal.  Left atrium mildly dilated, right atrium severely dilated.  Aortic valve trileaflet mildly thickened and moderately calcified.  Moderate to severe MR, moderate TR right ventricular systolic pressure moderately elevated 56 mmHg.    Oscar presents to our office for a follow up visit.  He is accompanied by his wife.  Oscar's weight at home is 250-252 pounds.  He is not able to go for walks due to low back pain.  Oscar denies dietary indiscretion but is not forthcoming to what he is eating.  Oscar admits to drinking 3-4 beer a day.        Medical History   Primary Cardiologist Dr Alan  Paroxysmal atrial fibrillation 11/04/22 sp FORD DCCV 1/09/24 INR 2.89 on coumadin   Hypertension  Hyperlipidemia 8/17/23 132 TG 54 HDL 68 LDL 53  Chronic HFpEF  CKD IIIb baseline creat 1.10 - 1.30  CAD   CABG  YEVGENIY  Cannot tolerate CPAP   AAA  Sedentary style due to back pain     5/13/22 TTE: Left ventricle cavity size normal wall thickness mild increased LVEF 55% systolic function normal diastolic function normal.  Left atrium mildly dilated, mild aortic valve regurgitation, moderate tricuspid valve regurgitation ascending aorta mildly dilated 4.5 cm       Patient Active Problem List   Diagnosis    Benign essential hypertension    Hyperlipidemia    Impaired fasting glucose    Microscopic hematuria    Severe obesity (BMI 35.0-35.9 with comorbidity)     Severe obstructive sleep apnea    Subclinical hypothyroidism    3-vessel CAD    Abdominal aortic aneurysm without rupture (HCC)    Aortic valve disorder    Arteriosclerotic cardiovascular disease    Disc degeneration, lumbar    Herniated lumbar intervertebral disc    Lumbar  radiculopathy    Intervertebral disc disorder with radiculopathy of lumbar region    Spondylolisthesis at L4-L5 level    Chronic pain syndrome    Multiple pulmonary nodules    Chronic diastolic congestive heart failure (HCC)    Chronic obstructive pulmonary disease (HCC)    Moderate to severe pulmonary hypertension (HCC)    Nocturnal hypoxemia    Ascending aortic aneurysm (HCC)    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)    Platelets decreased (HCC)    Myofascial pain syndrome    Atrial fibrillation (HCC)    Stage 3a chronic kidney disease (HCC)    Venous insufficiency of both lower extremities    Bilateral carotid artery stenosis    Bilateral leg edema    Hypertensive heart and chronic kidney disease with heart failure and stage 1 through stage 4 chronic kidney disease, or chronic kidney disease (HCC)     Past Medical History:   Diagnosis Date    Abdominal aortic aneurysm ([facility])     s/p repair    Abnormal ECG july 2022    Aneurysm ([facility]) 2007    Aneurysm of abdominal aorta ([facility])     49mm, trileaflet AV    Atrial fibrillation ([facility])     Eliquis    BPH (benign prostatic hyperplasia)     CAD (coronary artery disease)     s/p CABGx3    CHF (congestive heart failure) (HCC)     Chronic pain     Gabapentin    Chronic pain disorder     lumbar    COPD (chronic obstructive pulmonary disease) ([facility])     Coronary artery disease     Former tobacco use     Hyperlipidemia     Hypertension     Hypothyroidism     Lumbar radiculopathy     Lumbar stenosis     s/p injections    Neuropathy     Obesity (BMI 30-39.9)     YEVGENIY (obstructive sleep apnea)     unable to tolerate CPAP    Pulmonary hypertension (HCC)     moderate to severe    Spondylolisthesis of lumbar region     Visual impairment 2022    Vitamin D deficiency      Social History     Socioeconomic History    Marital status: /Civil Union     Spouse name: Not on file    Number of children: Not on file    Years of education: Not on file    Highest education level: Not  on file   Occupational History    Occupation: retired   Tobacco Use    Smoking status: Former     Current packs/day: 0.00     Average packs/day: 1 pack/day for 55.6 years (55.6 ttl pk-yrs)     Types: Cigarettes     Start date: 1957     Quit date: 2012     Years since quittin.5    Smokeless tobacco: Never   Vaping Use    Vaping status: Never Used   Substance and Sexual Activity    Alcohol use: Yes     Alcohol/week: 21.0 standard drinks of alcohol     Types: 21 Cans of beer per week    Drug use: No    Sexual activity: Never   Other Topics Concern    Not on file   Social History Narrative    Not on file     Social Determinants of Health     Financial Resource Strain: Low Risk  (2023)    Overall Financial Resource Strain (CARDIA)     Difficulty of Paying Living Expenses: Not hard at all   Food Insecurity: Not on file   Transportation Needs: No Transportation Needs (2023)    PRAPARE - Transportation     Lack of Transportation (Medical): No     Lack of Transportation (Non-Medical): No   Physical Activity: Not on file   Stress: Not on file   Social Connections: Not on file   Intimate Partner Violence: Not on file   Housing Stability: Not on file      Family History   Problem Relation Age of Onset    Heart disease Mother     Stroke Father         stroke syndrome    Aneurysm Brother         abdominal    Aortic aneurysm Brother         ascending    Coronary artery disease Family     Hypertension Family     Other Son         gioblastoma multiforme     Past Surgical History:   Procedure Laterality Date    ABDOMINAL AORTIC ANEURYSM REPAIR  2007    2 dock limbs with visc extens prosth    CARDIAC CATHETERIZATION      COLONOSCOPY      CORONARY ARTERY BYPASS GRAFT      LIMA to LAD, sequential SVG to OM1 & OM2, SVG to RDA    LUMBAR EPIDURAL INJECTION         Current Outpatient Medications:     albuterol (Ventolin HFA) 90 mcg/act inhaler, Inhale 2 puffs every 6 (six) hours as needed for wheezing, Disp:  54 g, Rfl: 1    amiodarone 200 mg tablet, Take 1 tablet (200 mg total) by mouth daily, Disp: 90 tablet, Rfl: 3    aspirin (ECOTRIN LOW STRENGTH) 81 mg EC tablet, Take 1 tablet by mouth daily, Disp: , Rfl:     atorvastatin (LIPITOR) 40 mg tablet, Take 1 tablet (40 mg total) by mouth daily, Disp: 90 tablet, Rfl: 3    cholecalciferol (VITAMIN D3) 1,000 units tablet, Take 2,000 Units by mouth daily, Disp: , Rfl:     FIBER ADULT GUMMIES PO, Take 1 caplet by mouth daily , Disp: , Rfl:     gabapentin (NEURONTIN) 300 mg capsule, TAKE 1 CAPSULE BY MOUTH IN THE  MORNING AND 2 CAPSULES BY MOUTH  AT BEDTIME (Patient taking differently: 900 mg daily at bedtime), Disp: 270 capsule, Rfl: 3    labetalol (NORMODYNE) 100 mg tablet, Take 1 tablet (100 mg total) by mouth 2 (two) times a day, Disp: 180 tablet, Rfl: 3    multivitamin (THERAGRAN) TABS, Take 1 tablet by mouth daily, Disp: , Rfl:     senna (SENOKOT) 8.6 MG tablet, Take 1 tablet by mouth as needed  , Disp: , Rfl:     tamsulosin (FLOMAX) 0.4 mg, Take 1 capsule by mouth daily, Disp: , Rfl:     torsemide (DEMADEX) 20 mg tablet, Take 1 tablet (20 mg total) by mouth daily (Patient taking differently: Take 20 mg by mouth daily Pt is taking an extra tab MWF), Disp: 120 tablet, Rfl: 3    warfarin (Coumadin) 5 mg tablet, TAKE 1 TO 2 TABS BY MOUTH DAILY OR AS DIRECTED BY PHYSICIAN, Disp: 60 tablet, Rfl: 11    Current Facility-Administered Medications:     albuterol (PROVENTIL HFA,VENTOLIN HFA) inhaler 2 puff, 2 puff, Inhalation, Q6H PRN, Gerson Terrell MD  Allergies   Allergen Reactions    Meloxicam Edema       Labs:  Hospital Outpatient Visit on 03/04/2024   Component Date Value    BSA 03/04/2024 2.32     A4C EF 03/04/2024 47     LVOT stroke volume 03/04/2024 59.63     LVOT stroke volume index 03/04/2024 25.00     LV Diastolic Volume (BP) 03/04/2024 155     LV Systolic Volume (BP) 03/04/2024 72     EF 03/04/2024 54     LVOT Cardiac Output 03/04/2024 3.82     LVOT Cardiac Index 03/04/2024  1.65     LVIDd 03/04/2024 6.20 (A)     LVIDS 03/04/2024 3.90     IVSd 03/04/2024 1.30     LVPWd 03/04/2024 1.30     LVOT diameter 03/04/2024 2.3     LVOT peak VTI 03/04/2024 14.36     FS 03/04/2024 37     MV E' Tissue Velocity Se* 03/04/2024 11     MV E' Tissue Velocity La* 03/04/2024 14     LA Volume Index (BP) 03/04/2024 43.5     E/A ratio 03/04/2024 3.10     E wave deceleration time 03/04/2024 194     MV Peak E Jerardo 03/04/2024 93     MV Peak A Jerardo 03/04/2024 0.3     AV LVOT peak gradient 03/04/2024 2     LVOT peak jerardo 03/04/2024 0.66     RVID d 03/04/2024 4.2     Tricuspid annular plane * 03/04/2024 1.60     LA size 03/04/2024 5     LA length (A2C) 03/04/2024 6.40     LA volume (BP) 03/04/2024 101     RAA A4C 03/04/2024 28.1     Aortic valve peak veloci* 03/04/2024 1.01     Ao VTI 03/04/2024 24.33     AV mean gradient 03/04/2024 2     LVOT mn grad 03/04/2024 1.0     AV peak gradient 03/04/2024 4     AV area by cont VTI 03/04/2024 2.5     AV area peak jerardo 03/04/2024 2.7     MV stenosis pressure 1/2* 03/04/2024 56     MV valve area p 1/2 meth* 03/04/2024 3.93     TR Peak Jerardo 03/04/2024 3.2     Triscuspid Valve Regurgi* 03/04/2024 41.0     Ao root 03/04/2024 4.40 (A)     STJ 03/04/2024 3.1     Asc Ao 03/04/2024 4.8 (A)     Sinus of Valsalva, 2D 03/04/2024 4.2     Aortic valve mean veloci* 03/04/2024 7.00     Tricuspid valve peak reg* 03/04/2024 3.69     Left ventricular stroke * 03/04/2024 127.00     Ao STJ 03/04/2024 3.10     Ao annulus 03/04/2024 2.40     IVS 03/04/2024 1.3     LEFT VENTRICLE SYSTOLIC * 03/04/2024 66     LV DIASTOLIC VOLUME (MOD* 03/04/2024 193     Left Atrium Area-systoli* 03/04/2024 29.7     Left Atrium Area-systoli* 03/04/2024 27.6     LVSV, 2D 03/04/2024 127     LVOT area 03/04/2024 4.15     DVI 03/04/2024 0.65     AV valve area 03/04/2024 2.45     LV Diastolic Volume Inde* 03/04/2024 66.8     LV Systolic Volume Index* 03/04/2024 31.0     LV EF 03/04/2024 47     Est. RA pres 03/04/2024 15.0 (A)      Right Ventricular Peak S* 03/04/2024 56.00    Appointment on 02/28/2024   Component Date Value    Sodium 02/28/2024 140     Potassium 02/28/2024 3.7     Chloride 02/28/2024 101     CO2 02/28/2024 34 (H)     ANION GAP 02/28/2024 5     BUN 02/28/2024 19     Creatinine 02/28/2024 1.29     Glucose, Fasting 02/28/2024 102 (H)     Calcium 02/28/2024 8.8     eGFR 02/28/2024 51     BNP 02/28/2024 282 (H)    Appointment on 02/20/2024   Component Date Value    WBC 02/20/2024 4.65     RBC 02/20/2024 3.39 (L)     Hemoglobin 02/20/2024 11.2 (L)     Hematocrit 02/20/2024 36.3 (L)     MCV 02/20/2024 107 (H)     MCH 02/20/2024 33.0     MCHC 02/20/2024 30.9 (L)     RDW 02/20/2024 15.4 (H)     MPV 02/20/2024 10.3     Platelets 02/20/2024 140 (L)     nRBC 02/20/2024 0     Neutrophils Relative 02/20/2024 68     Immat GRANS % 02/20/2024 0     Lymphocytes Relative 02/20/2024 14     Monocytes Relative 02/20/2024 13 (H)     Eosinophils Relative 02/20/2024 4     Basophils Relative 02/20/2024 1     Neutrophils Absolute 02/20/2024 3.18     Immature Grans Absolute 02/20/2024 0.01     Lymphocytes Absolute 02/20/2024 0.63     Monocytes Absolute 02/20/2024 0.60     Eosinophils Absolute 02/20/2024 0.18     Basophils Absolute 02/20/2024 0.05     Sodium 02/20/2024 141     Potassium 02/20/2024 4.0     Chloride 02/20/2024 100     CO2 02/20/2024 34 (H)     ANION GAP 02/20/2024 7     BUN 02/20/2024 16     Creatinine 02/20/2024 1.47 (H)     Glucose, Fasting 02/20/2024 84     Calcium 02/20/2024 8.8     AST 02/20/2024 31     ALT 02/20/2024 20     Alkaline Phosphatase 02/20/2024 84     Total Protein 02/20/2024 6.9     Albumin 02/20/2024 3.7     Total Bilirubin 02/20/2024 0.77     eGFR 02/20/2024 43     Hemoglobin A1C 02/20/2024 6.2 (H)     EAG 02/20/2024 131     Cholesterol 02/20/2024 105     Triglycerides 02/20/2024 37     HDL, Direct 02/20/2024 50     LDL Calculated 02/20/2024 48     Non-HDL-Chol (CHOL-HDL) 02/20/2024 55     Vitamin B-12 02/20/2024  890     TSH 3RD GENERATON 02/20/2024 7.329 (H)     Free T4 02/20/2024 1.27 (H)    Ancillary Orders on 02/09/2024   Component Date Value    Protime 02/20/2024 26.0 (H)     INR 02/20/2024 2.44 (H)    Ancillary Orders on 01/26/2024   Component Date Value    Protime 02/06/2024 24.6 (H)     INR 02/06/2024 2.27 (H)    Appointment on 01/23/2024   Component Date Value    Sodium 01/23/2024 141     Potassium 01/23/2024 4.4     Chloride 01/23/2024 101     CO2 01/23/2024 34 (H)     ANION GAP 01/23/2024 6     BUN 01/23/2024 17     Creatinine 01/23/2024 1.34 (H)     Glucose, Fasting 01/23/2024 102 (H)     Calcium 01/23/2024 9.2     eGFR 01/23/2024 48    Ancillary Orders on 01/12/2024   Component Date Value    Protime 01/23/2024 35.6 (H)     INR 01/23/2024 3.66 (H)      Imaging: Echo complete w/ contrast if indicated    Result Date: 3/4/2024  Narrative:   Left Ventricle: Left ventricular cavity size is mildly dilated. Wall thickness is mildly increased. There is eccentric hypertrophy. The left ventricular ejection fraction is 47% by biplane measurement. Systolic function is mildly reduced. There is mild global hypokinesis. Diastolic function is abnormal.   Right Ventricle: Right ventricular cavity size is dilated. Systolic function is low normal.   Left Atrium: The atrium is moderately dilated (42-48 mL/m2).   Right Atrium: The atrium is severely dilated.   Aortic Valve: The aortic valve is trileaflet. The leaflets are mildly thickened. The leaflets are moderately calcified. The leaflets exhibit normal mobility.   Mitral Valve: There is moderate to severe regurgitation with a centrally directed jet.   Tricuspid Valve: There is moderate regurgitation. The right ventricular systolic pressure is moderately elevated. The estimated right ventricular systolic pressure is 56.00 mmHg.   Aorta: The aortic root is mildly dilated. The ascending aorta is moderately dilated. The aortic root is 4.40 cm. The ascending aorta is 4.8 cm.   IVC/SVC:  The right atrial pressure is estimated at 15.0 mmHg. The inferior vena cava is dilated. Respirophasic changes were blunted (less than 50% variation).   Prior TTE study available for comparison. Prior study date: 5/13/2022. Changes noted when compared to prior study. Changes include: LV function is mildly decreased with mild dilation of LV.  Mitral regurgitation is now moderate to severe.  There is new development of moderate pulmonary hypertension.  Ascending aortic size is increased from 4.5 to 4.8 cm on report, but appears to be unchanged at 4.8 cm on my own assessment of prior images..       Review of Systems:  Review of Systems   Cardiovascular:  Positive for leg swelling.   Musculoskeletal:  Positive for arthralgias, back pain and myalgias.   All other systems reviewed and are negative.      Physical Exam:  Physical Exam  Vitals reviewed.   Constitutional:       Appearance: He is obese.   Neck:      Comments: + V Wave   Cardiovascular:      Rate and Rhythm: Normal rate and regular rhythm.      Pulses: Normal pulses.      Heart sounds: Normal heart sounds.   Pulmonary:      Effort: Pulmonary effort is normal.      Breath sounds: Rales present.      Comments: Fine bibasilar rales   Abdominal:      General: There is distension.   Musculoskeletal:         General: Normal range of motion.      Cervical back: Normal range of motion and neck supple.      Right lower leg: Edema present.      Left lower leg: Edema present.      Comments: Jaret LE edema to lower thigh, 2+ - 3+    Skin:     General: Skin is warm and dry.      Capillary Refill: Capillary refill takes less than 2 seconds.   Neurological:      General: No focal deficit present.      Mental Status: He is alert and oriented to person, place, and time.   Psychiatric:         Mood and Affect: Mood normal.         Behavior: Behavior normal.         Discussion/Summary:  # Chronic HFpEF,  LVEF 50% NYHA class III stage C - dry weight 247 pounds, weight running 250-252  pounds.  HR and BP controlled, RV filling pressures elevated, currently on torsemide 40 Robel on Monday Wednesday and Thursday 20 mg all other days.  Will increase torsemide to 40 mg Monday through Friday with 20 mg over the weekend.  Start K-Dur 20 milliequivalents daily Monday through Friday not over the weekend.  I have reinforced 2 g sodium diet decreasing fluid intake to 1500 cc daily BMP and BNP in 10 days follow-up with Dr. Alan in April  # Jaret LE edema multifactorial due to HFpEF and lymphedema.  He will undergo lymphedema therapy in the near future   # Mod to severe MR, mod TR, medical management   # CKD IIIb baseline creat 1.10 - 1.30. 2/28 BUN 19 creatinine 1.29 GFR 51  # Paroxysmal atrial fibrillation 11/04/22 sp FORD DCCV 2/20/24 INR 2.44 on coumadin continue on amiodarone 200 mg daily, labetalol 100 mg twice daily  # Hypertension RUE sitting 110/60 continue on all labetalol 100 mg twice daily  # Hyperlipidemia 8/17/23 132 TG 54 HDL 68 LDL 53 continue Lipitor 40 mg daily, heart healthy diet  # YEVGENIY  Cannot tolerate CPAP

## 2024-03-05 ENCOUNTER — OFFICE VISIT (OUTPATIENT)
Dept: CARDIOLOGY CLINIC | Facility: CLINIC | Age: 84
End: 2024-03-05
Payer: MEDICARE

## 2024-03-05 VITALS
WEIGHT: 252.1 LBS | OXYGEN SATURATION: 94 % | SYSTOLIC BLOOD PRESSURE: 106 MMHG | HEIGHT: 71 IN | HEART RATE: 70 BPM | DIASTOLIC BLOOD PRESSURE: 68 MMHG | BODY MASS INDEX: 35.29 KG/M2

## 2024-03-05 DIAGNOSIS — I10 HYPERTENSION, UNSPECIFIED TYPE: ICD-10-CM

## 2024-03-05 DIAGNOSIS — I34.0 MITRAL VALVE INSUFFICIENCY, UNSPECIFIED ETIOLOGY: ICD-10-CM

## 2024-03-05 DIAGNOSIS — I48.0 PAF (PAROXYSMAL ATRIAL FIBRILLATION) (HCC): ICD-10-CM

## 2024-03-05 DIAGNOSIS — R60.0 BILATERAL LEG EDEMA: ICD-10-CM

## 2024-03-05 DIAGNOSIS — N18.32 STAGE 3B CHRONIC KIDNEY DISEASE (HCC): ICD-10-CM

## 2024-03-05 DIAGNOSIS — G47.33 OSA (OBSTRUCTIVE SLEEP APNEA): ICD-10-CM

## 2024-03-05 DIAGNOSIS — I50.32 CHRONIC DIASTOLIC CONGESTIVE HEART FAILURE (HCC): Primary | ICD-10-CM

## 2024-03-05 DIAGNOSIS — E78.2 MIXED HYPERLIPIDEMIA: ICD-10-CM

## 2024-03-05 PROCEDURE — 99215 OFFICE O/P EST HI 40 MIN: CPT | Performed by: NURSE PRACTITIONER

## 2024-03-05 RX ORDER — POTASSIUM CHLORIDE 20 MEQ/1
TABLET, EXTENDED RELEASE ORAL
Qty: 75 TABLET | Refills: 0 | Status: SHIPPED | OUTPATIENT
Start: 2024-03-05

## 2024-03-05 RX ORDER — TORSEMIDE 20 MG/1
TABLET ORAL
Qty: 120 TABLET | Refills: 3 | Status: SHIPPED | OUTPATIENT
Start: 2024-03-05

## 2024-03-05 NOTE — PATIENT INSTRUCTIONS
Maintain a 2 gram daily sodium diet and 1500 ml daily fluid restriction.  Check daily weights.  If you gained 3 pounds in one day, 5 pounds in one week, or experience worsening shortness of breath or increasing lower leg swelling.  Please call the heart failure office at [phone].  Please bring a  list of your current medications and daily weights to the office visit     One beer a day   Torsemide 40mg daily M-F and 20mg on Saturday and Sunday  Potassium chloride 20meq daily M-F   BMP BNP in one week

## 2024-03-06 ENCOUNTER — APPOINTMENT (OUTPATIENT)
Dept: PHYSICAL THERAPY | Facility: REHABILITATION | Age: 84
End: 2024-03-06
Payer: MEDICARE

## 2024-03-07 ENCOUNTER — OFFICE VISIT (OUTPATIENT)
Dept: INTERNAL MEDICINE CLINIC | Facility: CLINIC | Age: 84
End: 2024-03-07
Payer: MEDICARE

## 2024-03-07 VITALS
SYSTOLIC BLOOD PRESSURE: 110 MMHG | HEIGHT: 71 IN | DIASTOLIC BLOOD PRESSURE: 60 MMHG | WEIGHT: 252.4 LBS | OXYGEN SATURATION: 94 % | BODY MASS INDEX: 35.34 KG/M2 | HEART RATE: 54 BPM

## 2024-03-07 DIAGNOSIS — R73.01 IMPAIRED FASTING GLUCOSE: ICD-10-CM

## 2024-03-07 DIAGNOSIS — M51.16 INTERVERTEBRAL DISC DISORDER WITH RADICULOPATHY OF LUMBAR REGION: ICD-10-CM

## 2024-03-07 DIAGNOSIS — I50.32 CHRONIC DIASTOLIC CONGESTIVE HEART FAILURE (HCC): ICD-10-CM

## 2024-03-07 DIAGNOSIS — E03.8 SUBCLINICAL HYPOTHYROIDISM: ICD-10-CM

## 2024-03-07 DIAGNOSIS — Z00.00 MEDICARE ANNUAL WELLNESS VISIT, SUBSEQUENT: ICD-10-CM

## 2024-03-07 DIAGNOSIS — I87.2 VENOUS INSUFFICIENCY OF BOTH LOWER EXTREMITIES: Primary | ICD-10-CM

## 2024-03-07 DIAGNOSIS — J44.9 CHRONIC OBSTRUCTIVE PULMONARY DISEASE, UNSPECIFIED COPD TYPE (HCC): ICD-10-CM

## 2024-03-07 PROCEDURE — 99214 OFFICE O/P EST MOD 30 MIN: CPT | Performed by: INTERNAL MEDICINE

## 2024-03-07 PROCEDURE — G0439 PPPS, SUBSEQ VISIT: HCPCS | Performed by: INTERNAL MEDICINE

## 2024-03-07 RX ORDER — GABAPENTIN 300 MG/1
900 CAPSULE ORAL
Start: 2024-03-07

## 2024-03-07 NOTE — ASSESSMENT & PLAN NOTE
Wt Readings from Last 3 Encounters:   03/07/24 114 kg (252 lb 6.4 oz)   03/05/24 114 kg (252 lb 1.6 oz)   03/04/24 114 kg (251 lb)         On torsemide 40mg M-F and 20mg S/S

## 2024-03-07 NOTE — PATIENT INSTRUCTIONS
Medicare Preventive Visit Patient Instructions  Thank you for completing your Welcome to Medicare Visit or Medicare Annual Wellness Visit today. Your next wellness visit will be due in one year (3/8/2025).  The screening/preventive services that you may require over the next 5-10 years are detailed below. Some tests may not apply to you based off risk factors and/or age. Screening tests ordered at today's visit but not completed yet may show as past due. Also, please note that scanned in results may not display below.  Preventive Screenings:  Service Recommendations Previous Testing/Comments   Colorectal Cancer Screening  Colonoscopy    Fecal Occult Blood Test (FOBT)/Fecal Immunochemical Test (FIT)  Fecal DNA/Cologuard Test  Flexible Sigmoidoscopy Age: 45-75 years old   Colonoscopy: every 10 years (May be performed more frequently if at higher risk)  OR  FOBT/FIT: every 1 year  OR  Cologuard: every 3 years  OR  Sigmoidoscopy: every 5 years  Screening may be recommended earlier than age 45 if at higher risk for colorectal cancer. Also, an individualized decision between you and your healthcare provider will decide whether screening between the ages of 76-85 would be appropriate. Colonoscopy: 09/01/2021  FOBT/FIT: Not on file  Cologuard: Not on file  Sigmoidoscopy: Not on file          Prostate Cancer Screening Individualized decision between patient and health care provider in men between ages of 55-69   Medicare will cover every 12 months beginning on the day after your 50th birthday PSA: 4.08 ng/mL           Hepatitis C Screening Once for adults born between 1945 and 1965  More frequently in patients at high risk for Hepatitis C Hep C Antibody: Not on file        Diabetes Screening 1-2 times per year if you're at risk for diabetes or have pre-diabetes Fasting glucose: 102 mg/dL (2/28/2024)  A1C: 6.2 % (2/20/2024)      Cholesterol Screening Once every 5 years if you don't have a lipid disorder. May order more often  based on risk factors. Lipid panel: 02/20/2024         Other Preventive Screenings Covered by Medicare:  Abdominal Aortic Aneurysm (AAA) Screening: covered once if your at risk. You're considered to be at risk if you have a family history of AAA or a male between the age of 65-75 who smoking at least 100 cigarettes in your lifetime.  Lung Cancer Screening: covers low dose CT scan once per year if you meet all of the following conditions: (1) Age 55-77; (2) No signs or symptoms of lung cancer; (3) Current smoker or have quit smoking within the last 15 years; (4) You have a tobacco smoking history of at least 20 pack years (packs per day x number of years you smoked); (5) You get a written order from a healthcare provider.  Glaucoma Screening: covered annually if you're considered high risk: (1) You have diabetes OR (2) Family history of glaucoma OR (3)  aged 50 and older OR (4)  American aged 65 and older  Osteoporosis Screening: covered every 2 years if you meet one of the following conditions: (1) Have a vertebral abnormality; (2) On glucocorticoid therapy for more than 3 months; (3) Have primary hyperparathyroidism; (4) On osteoporosis medications and need to assess response to drug therapy.  HIV Screening: covered annually if you're between the age of 15-65. Also covered annually if you are younger than 15 and older than 65 with risk factors for HIV infection. For pregnant patients, it is covered up to 3 times per pregnancy.    Immunizations:  Immunization Recommendations   Influenza Vaccine Annual influenza vaccination during flu season is recommended for all persons aged >= 6 months who do not have contraindications   Pneumococcal Vaccine   * Pneumococcal conjugate vaccine = PCV13 (Prevnar 13), PCV15 (Vaxneuvance), PCV20 (Prevnar 20)  * Pneumococcal polysaccharide vaccine = PPSV23 (Pneumovax) Adults 19-65 yo with certain risk factors or if 65+ yo  If never received any pneumonia vaccine:  recommend Prevnar 20 (PCV20)  Give PCV20 if previously received 1 dose of PCV13 or PPSV23   Hepatitis B Vaccine 3 dose series if at intermediate or high risk (ex: diabetes, end stage renal disease, liver disease)   Respiratory syncytial virus (RSV) Vaccine - COVERED BY MEDICARE PART D  * RSVPreF3 (Arexvy) CDC recommends that adults 60 years of age and older may receive a single dose of RSV vaccine using shared clinical decision-making (SCDM)   Tetanus (Td) Vaccine - COST NOT COVERED BY MEDICARE PART B Following completion of primary series, a booster dose should be given every 10 years to maintain immunity against tetanus. Td may also be given as tetanus wound prophylaxis.   Tdap Vaccine - COST NOT COVERED BY MEDICARE PART B Recommended at least once for all adults. For pregnant patients, recommended with each pregnancy.   Shingles Vaccine (Shingrix) - COST NOT COVERED BY MEDICARE PART B  2 shot series recommended in those 19 years and older who have or will have weakened immune systems or those 50 years and older     Health Maintenance Due:      Topic Date Due   • Colorectal Cancer Screening  09/01/2024   • Lung Cancer Screening  Discontinued     Immunizations Due:      Topic Date Due   • COVID-19 Vaccine (8 - 2023-24 season) 11/28/2023     Advance Directives   What are advance directives?  Advance directives are legal documents that state your wishes and plans for medical care. These plans are made ahead of time in case you lose your ability to make decisions for yourself. Advance directives can apply to any medical decision, such as the treatments you want, and if you want to donate organs.   What are the types of advance directives?  There are many types of advance directives, and each state has rules about how to use them. You may choose a combination of any of the following:  Living will:  This is a written record of the treatment you want. You can also choose which treatments you do not want, which to limit,  and which to stop at a certain time. This includes surgery, medicine, IV fluid, and tube feedings.   Durable power of  for healthcare (DPAHC):  This is a written record that states who you want to make healthcare choices for you when you are unable to make them for yourself. This person, called a proxy, is usually a family member or a friend. You may choose more than 1 proxy.  Do not resuscitate (DNR) order:  A DNR order is used in case your heart stops beating or you stop breathing. It is a request not to have certain forms of treatment, such as CPR. A DNR order may be included in other types of advance directives.  Medical directive:  This covers the care that you want if you are in a coma, near death, or unable to make decisions for yourself. You can list the treatments you want for each condition. Treatment may include pain medicine, surgery, blood transfusions, dialysis, IV or tube feedings, and a ventilator (breathing machine).  Values history:  This document has questions about your views, beliefs, and how you feel and think about life. This information can help others choose the care that you would choose.  Why are advance directives important?  An advance directive helps you control your care. Although spoken wishes may be used, it is better to have your wishes written down. Spoken wishes can be misunderstood, or not followed. Treatments may be given even if you do not want them. An advance directive may make it easier for your family to make difficult choices about your care.   Weight Management   Why it is important to manage your weight:  Being overweight increases your risk of health conditions such as heart disease, high blood pressure, type 2 diabetes, and certain types of cancer. It can also increase your risk for osteoarthritis, sleep apnea, and other respiratory problems. Aim for a slow, steady weight loss. Even a small amount of weight loss can lower your risk of health problems.  How to  lose weight safely:  A safe and healthy way to lose weight is to eat fewer calories and get regular exercise. You can lose up about 1 pound a week by decreasing the number of calories you eat by 500 calories each day.   Healthy meal plan for weight management:  A healthy meal plan includes a variety of foods, contains fewer calories, and helps you stay healthy. A healthy meal plan includes the following:  Eat whole-grain foods more often.  A healthy meal plan should contain fiber. Fiber is the part of grains, fruits, and vegetables that is not broken down by your body. Whole-grain foods are healthy and provide extra fiber in your diet. Some examples of whole-grain foods are whole-wheat breads and pastas, oatmeal, brown rice, and bulgur.  Eat a variety of vegetables every day.  Include dark, leafy greens such as spinach, kale, leon greens, and mustard greens. Eat yellow and orange vegetables such as carrots, sweet potatoes, and winter squash.   Eat a variety of fruits every day.  Choose fresh or canned fruit (canned in its own juice or light syrup) instead of juice. Fruit juice has very little or no fiber.  Eat low-fat dairy foods.  Drink fat-free (skim) milk or 1% milk. Eat fat-free yogurt and low-fat cottage cheese. Try low-fat cheeses such as mozzarella and other reduced-fat cheeses.  Choose meat and other protein foods that are low in fat.  Choose beans or other legumes such as split peas or lentils. Choose fish, skinless poultry (chicken or turkey), or lean cuts of red meat (beef or pork). Before you cook meat or poultry, cut off any visible fat.   Use less fat and oil.  Try baking foods instead of frying them. Add less fat, such as margarine, sour cream, regular salad dressing and mayonnaise to foods. Eat fewer high-fat foods. Some examples of high-fat foods include french fries, doughnuts, ice cream, and cakes.  Eat fewer sweets.  Limit foods and drinks that are high in sugar. This includes candy, cookies,  regular soda, and sweetened drinks.  Exercise:  Exercise at least 30 minutes per day on most days of the week. Some examples of exercise include walking, biking, dancing, and swimming. You can also fit in more physical activity by taking the stairs instead of the elevator or parking farther away from stores. Ask your healthcare provider about the best exercise plan for you.      © Copyright [employer] 2018 Information is for End User's use only and may not be sold, redistributed or otherwise used for commercial purposes. All illustrations and images included in CareNotes® are the copyrighted property of [employer] Inc[employer] or [employer]

## 2024-03-07 NOTE — PROGRESS NOTES
Assessment and Plan:     Problem List Items Addressed This Visit        Endocrine    Impaired fasting glucose     Dietary changed discussed  Consider SGLT 2 inh give CHF         Relevant Orders    Hemoglobin A1C    Basic metabolic panel    Subclinical hypothyroidism     T4 elevated this time  Will recheck         Relevant Orders    TSH, 3rd generation    T4, free    T3       Respiratory    Chronic obstructive pulmonary disease (HCC)     Appears stable  SOB from CHF            Cardiovascular and Mediastinum    Chronic diastolic congestive heart failure (HCC)     Wt Readings from Last 3 Encounters:   03/07/24 114 kg (252 lb 6.4 oz)   03/05/24 114 kg (252 lb 1.6 oz)   03/04/24 114 kg (251 lb)         On torsemide 40mg M-F and 20mg S/S           Relevant Orders    Basic metabolic panel    CBC    Venous insufficiency of both lower extremities - Primary     Waiting compression stockings/pumps            Nervous and Auditory    Intervertebral disc disorder with radiculopathy of lumbar region     Continue gabapentin 900mg hs         Relevant Medications    gabapentin (NEURONTIN) 300 mg capsule   Other Visit Diagnoses     Medicare annual wellness visit, subsequent                 Preventive health issues were discussed with patient, and age appropriate screening tests were ordered as noted in patient's After Visit Summary.  Personalized health advice and appropriate referrals for health education or preventive services given if needed, as noted in patient's After Visit Summary.     History of Present Illness:     Patient presents for a Medicare Wellness Visit    HPI   Patient Care Team:  Lea Reyes, MD as PCP - General  MD Tonny Greenfield MD J Raymond Fitzpatrick, MD     Review of Systems:     Review of Systems   Constitutional:  Negative for unexpected weight change.   Respiratory:  Positive for shortness of breath.    Cardiovascular:  Positive for leg swelling. Negative for chest pain and  palpitations.   Gastrointestinal:  Negative for abdominal pain.   Genitourinary:  Negative for difficulty urinating.   Musculoskeletal:  Positive for back pain.        Problem List:     Patient Active Problem List   Diagnosis   • Benign essential hypertension   • Hyperlipidemia   • Impaired fasting glucose   • Microscopic hematuria   • Severe obesity (BMI 35.0-35.9 with comorbidity)    • Severe obstructive sleep apnea   • Subclinical hypothyroidism   • 3-vessel CAD   • Abdominal aortic aneurysm without rupture (HCC)   • Aortic valve disorder   • Arteriosclerotic cardiovascular disease   • Disc degeneration, lumbar   • Herniated lumbar intervertebral disc   • Lumbar radiculopathy   • Intervertebral disc disorder with radiculopathy of lumbar region   • Spondylolisthesis at L4-L5 level   • Chronic pain syndrome   • Multiple pulmonary nodules   • Chronic diastolic congestive heart failure (HCC)   • Chronic obstructive pulmonary disease (HCC)   • Moderate to severe pulmonary hypertension (HCC)   • Nocturnal hypoxemia   • Ascending aortic aneurysm (HCC)   • 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)   • Platelets decreased (HCC)   • Myofascial pain syndrome   • Atrial fibrillation (HCC)   • Stage 3a chronic kidney disease (HCC)   • Venous insufficiency of both lower extremities   • Bilateral carotid artery stenosis   • Bilateral leg edema   • Hypertensive heart and chronic kidney disease with heart failure and stage 1 through stage 4 chronic kidney disease, or chronic kidney disease (HCC)      Past Medical and Surgical History:     Past Medical History:   Diagnosis Date   • Abdominal aortic aneurysm (HCC)     s/p repair   • Abnormal ECG july 2022   • Aneurysm (HCC) 2007   • Aneurysm of abdominal aorta (HCC)     49mm, trileaflet AV   • Atrial fibrillation (HCC)     Eliquis   • BPH (benign prostatic hyperplasia)    • CAD (coronary artery disease)     s/p CABGx3   • CHF (congestive heart failure) ([facility])    • Chronic  pain     Gabapentin   • Chronic pain disorder     lumbar   • COPD (chronic obstructive pulmonary disease) (HCC)    • Coronary artery disease    • Former tobacco use    • Hyperlipidemia    • Hypertension    • Hypothyroidism    • Lumbar radiculopathy    • Lumbar stenosis     s/p injections   • Neuropathy    • Obesity (BMI 30-39.9)    • YEVGENIY (obstructive sleep apnea)     unable to tolerate CPAP   • Pulmonary hypertension (HCC)     moderate to severe   • Spondylolisthesis of lumbar region    • Visual impairment    • Vitamin D deficiency      Past Surgical History:   Procedure Laterality Date   • ABDOMINAL AORTIC ANEURYSM REPAIR  2007    2 dock limbs with visc extens prosth   • CARDIAC CATHETERIZATION     • COLONOSCOPY     • CORONARY ARTERY BYPASS GRAFT      LIMA to LAD, sequential SVG to OM1 & OM2, SVG to RDA   • LUMBAR EPIDURAL INJECTION        Family History:     Family History   Problem Relation Age of Onset   • Heart disease Mother    • Stroke Father         stroke syndrome   • Aneurysm Brother         abdominal   • Aortic aneurysm Brother         ascending   • Coronary artery disease Family    • Hypertension Family    • Other Son         gioblastoma multiforme      Social History:     Social History     Socioeconomic History   • Marital status: /Civil Union     Spouse name: None   • Number of children: None   • Years of education: None   • Highest education level: None   Occupational History   • Occupation: retired   Tobacco Use   • Smoking status: Former     Current packs/day: 0.00     Average packs/day: 1 pack/day for 55.6 years (55.6 ttl pk-yrs)     Types: Cigarettes     Start date: 1957     Quit date: 2012     Years since quittin.6   • Smokeless tobacco: Never   Vaping Use   • Vaping status: Never Used   Substance and Sexual Activity   • Alcohol use: Yes     Alcohol/week: 21.0 standard drinks of alcohol     Types: 21 Cans of beer per week   • Drug use: No   • Sexual activity:  Never   Other Topics Concern   • None   Social History Narrative   • None     Social Determinants of Health     Financial Resource Strain: Low Risk  (3/7/2024)    Overall Financial Resource Strain (CARDIA)    • Difficulty of Paying Living Expenses: Not hard at all   Food Insecurity: Not on file   Transportation Needs: No Transportation Needs (3/7/2024)    PRAPARE - Transportation    • Lack of Transportation (Medical): No    • Lack of Transportation (Non-Medical): No   Physical Activity: Not on file   Stress: Not on file   Social Connections: Not on file   Intimate Partner Violence: Not on file   Housing Stability: Not on file      Medications and Allergies:     Current Outpatient Medications   Medication Sig Dispense Refill   • albuterol (Ventolin HFA) 90 mcg/act inhaler Inhale 2 puffs every 6 (six) hours as needed for wheezing 54 g 1   • amiodarone 200 mg tablet Take 1 tablet (200 mg total) by mouth daily 90 tablet 3   • aspirin (ECOTRIN LOW STRENGTH) 81 mg EC tablet Take 1 tablet by mouth daily     • atorvastatin (LIPITOR) 40 mg tablet Take 1 tablet (40 mg total) by mouth daily 90 tablet 3   • cholecalciferol (VITAMIN D3) 1,000 units tablet Take 2,000 Units by mouth daily     • FIBER ADULT GUMMIES PO Take 1 caplet by mouth daily      • gabapentin (NEURONTIN) 300 mg capsule Take 3 capsules (900 mg total) by mouth daily at bedtime     • labetalol (NORMODYNE) 100 mg tablet Take 1 tablet (100 mg total) by mouth 2 (two) times a day 180 tablet 3   • multivitamin (THERAGRAN) TABS Take 1 tablet by mouth daily     • potassium chloride (Klor-Con M20) 20 mEq tablet Take one tab daily M-F 75 tablet 0   • senna (SENOKOT) 8.6 MG tablet Take 1 tablet by mouth as needed       • tamsulosin (FLOMAX) 0.4 mg Take 1 capsule by mouth daily     • torsemide (DEMADEX) 20 mg tablet Torsemide 40mg daily M-F and 20mg on Saturday and Sunday 120 tablet 3   • warfarin (Coumadin) 5 mg tablet TAKE 1 TO 2 TABS BY MOUTH DAILY OR AS DIRECTED BY  PHYSICIAN 60 tablet 11     Current Facility-Administered Medications   Medication Dose Route Frequency Provider Last Rate Last Admin   • albuterol (PROVENTIL HFA,VENTOLIN HFA) inhaler 2 puff  2 puff Inhalation Q6H PRN Gerson Terrell MD         Allergies   Allergen Reactions   • Meloxicam Edema      Immunizations:     Immunization History   Administered Date(s) Administered   • COVID-19 MODERNA VACC 0.5 ML IM 01/26/2021, 03/05/2021, 10/28/2021, 11/28/2021   • COVID-19 Moderna Vac BIVALENT 12 Yr+ IM 0.5 ML 10/12/2022   • COVID-19 Moderna mRNA Vaccine 12 Yr+ 50 mcg/0.5 mL (Spikevax) 10/03/2023   • COVID-19 Moderna vac 6-11y or adult booster 50 mcg/0.5 mL 05/03/2022   • INFLUENZA 10/23/2007, 10/02/2013, 10/08/2016, 10/25/2017, 10/30/2020, 10/13/2021, 10/06/2022, 10/03/2023   • Influenza Split High Dose Preservative Free IM 10/02/2013, 10/02/2013, 10/22/2014, 10/27/2015, 10/08/2016, 10/03/2019   • Influenza, seasonal, injectable 10/20/2012, 10/30/2018   • Pneumococcal Conjugate 13-Valent 04/24/2015   • Pneumococcal Polysaccharide PPV23 09/07/2012   • Respiratory Syncytial Virus Vaccine (Recombinant, Adjuvanted) 12/11/2023   • Zoster Vaccine Recombinant 10/03/2019, 12/11/2019      Health Maintenance:         Topic Date Due   • Colorectal Cancer Screening  09/01/2024   • Lung Cancer Screening  Discontinued         Topic Date Due   • COVID-19 Vaccine (8 - 2023-24 season) 11/28/2023      Medicare Screening Tests and Risk Assessments:     Oscar is here for his Subsequent Wellness visit.     Health Risk Assessment:   Patient rates overall health as fair. Patient feels that their physical health rating is slightly worse. Patient is satisfied with their life. Eyesight was rated as same. Hearing was rated as same. Patient feels that their emotional and mental health rating is same. Patients states they are never, rarely angry. Patient states they are often unusually tired/fatigued. Pain experienced in the last 7 days has been some.  Patient's pain rating has been 4/10. Patient states that he has experienced no weight loss or gain in last 6 months.     Depression Screening:   PHQ-2 Score: 0      Fall Risk Screening:   In the past year, patient has experienced: no history of falling in past year      Home Safety:  Patient does not have trouble with stairs inside or outside of their home. Patient has working smoke alarms and has no working carbon monoxide detector. Home safety hazards include: loose rugs on the floor, poor household lighting and not having non-slip bath and/or shower mats.     Nutrition:   Current diet is No Added Salt.     Medications:   Patient is currently taking over-the-counter supplements. OTC medications include: see medication list. Patient is able to manage medications.     Activities of Daily Living (ADLs)/Instrumental Activities of Daily Living (IADLs):   Walk and transfer into and out of bed and chair?: Yes  Dress and groom yourself?: Yes    Bathe or shower yourself?: Yes    Feed yourself? Yes  Do your laundry/housekeeping?: Yes  Manage your money, pay your bills and track your expenses?: Yes  Make your own meals?: Yes    Do your own shopping?: Yes    Previous Hospitalizations:   Any hospitalizations or ED visits within the last 12 months?: No      Advance Care Planning:   Living will: No    Durable POA for healthcare: No    Advanced directive: No      PREVENTIVE SCREENINGS      Cardiovascular Screening:    General: Screening Not Indicated and History Lipid Disorder      Diabetes Screening:     General: Screening Current      Colorectal Cancer Screening:     General: Screening Current      Prostate Cancer Screening:    General: Screening Not Indicated      Abdominal Aortic Aneurysm (AAA) Screening:    Risk factors include: tobacco use        General: Screening Not Indicated and History AAA      Lung Cancer Screening:     General: Screening Not Indicated      Hepatitis C Screening:    General: Screening Not  "Indicated    Screening, Brief Intervention, and Referral to Treatment (SBIRT)    Screening      Single Item Drug Screening:  How often have you used an illegal drug (including marijuana) or a prescription medication for non-medical reasons in the past year? never    Single Item Drug Screen Score: 0  Interpretation: Negative screen for possible drug use disorder    No results found.     Physical Exam:     /60 (BP Location: Left arm, Patient Position: Sitting, Cuff Size: Large)   Pulse (!) 54   Ht 5' 11\" (1.803 m)   Wt 114 kg (252 lb 6.4 oz)   SpO2 94%   BMI 35.20 kg/m²     Physical Exam  Vitals and nursing note reviewed.   Constitutional:       General: He is not in acute distress.     Appearance: He is well-developed. He is ill-appearing.   Eyes:      Conjunctiva/sclera: Conjunctivae normal.   Cardiovascular:      Rate and Rhythm: Normal rate and regular rhythm.      Heart sounds: No murmur heard.  Pulmonary:      Effort: Pulmonary effort is normal. No respiratory distress.      Breath sounds: Examination of the right-lower field reveals rales. Examination of the left-lower field reveals rales. Rales present.   Abdominal:      Palpations: Abdomen is soft.      Tenderness: There is no abdominal tenderness.   Musculoskeletal:      Cervical back: Neck supple.      Right lower leg: Edema present.      Left lower leg: Edema present.   Skin:     Capillary Refill: Capillary refill takes less than 2 seconds.   Neurological:      Mental Status: He is alert.   Psychiatric:         Mood and Affect: Mood normal.          Lea Reyes, MD  "

## 2024-03-08 ENCOUNTER — OFFICE VISIT (OUTPATIENT)
Dept: PHYSICAL THERAPY | Facility: REHABILITATION | Age: 84
End: 2024-03-08
Payer: MEDICARE

## 2024-03-08 DIAGNOSIS — L97.319 VENOUS ULCER OF ANKLE, RIGHT (HCC): ICD-10-CM

## 2024-03-08 DIAGNOSIS — I83.013 VENOUS ULCER OF ANKLE, RIGHT (HCC): ICD-10-CM

## 2024-03-08 DIAGNOSIS — I89.0 LYMPHEDEMA: Primary | ICD-10-CM

## 2024-03-08 PROCEDURE — 97530 THERAPEUTIC ACTIVITIES: CPT | Performed by: PHYSICAL THERAPIST

## 2024-03-08 PROCEDURE — 97535 SELF CARE MNGMENT TRAINING: CPT | Performed by: PHYSICAL THERAPIST

## 2024-03-08 NOTE — PROGRESS NOTES
Daily Note     Today's date: 3/8/2024  Patient name: Oscar Espinosa  : 1940  MRN: [id number]  Referring provider: Candace Louise PA-C  Dx:   Encounter Diagnosis     ICD-10-CM    1. Lymphedema  I89.0       2. Venous ulcer of ankle, right (HCC)  I83.013     L97.319           Subjective: Echo shows 47% EF. Torsemide increased to double dose daily.    Objective: See treatment diary below    Assessment: Fit/trained patient and wife in wound dressing application, velcro wrap calf and foot application. Patient reports worsening BLE thigh edema. Given recent echo results of reduced EF, and thigh edema, and known comorbidities, I am concerned about fluid shift and/or CHF exacerbation with initiation of compression garments, but patient is now on higher dose of diuretic which should offset the fluid shift from peripheral to central circulatory system. Reviewed signs/symptoms of acute CHF and instructed patient to purchase pulse ox for monitoring, as well as when to d/c compression if symptoms arise. Will follow patient closely over the next 1-2 weeks to evaluate for tolerance to compression and effect of peripheral edema.    Plan: Continue per plan of care.      Precautions: multiple comorbidities including cardiac    POC expires Auth Status? (BOMN, approved, pending) Unit limit (Daily) Auth Start date Expiration date PT/OT + Visit Limit?    BOMN         Date of Service 2/19 2/28 3/8      Visits Used 1 2 3      Visits Remaining 99          Compression Rx  38      Compression garment Velcro wraps vs circular knit TBD ReadyWrap calf size Large  ReadyWrap SL foot size Large       Compression pumps Order placed through [facility]        Manual Ther         volumetrics completed        MLD         Wound Care Order placed for Foam through [employer]  Bordered gauze applied by patient to R lateral lower leg with cueing                Ther Ex         Remedial Exercise                                    Ther  Activity & Self Care         Skin care 5' 5'       Garment Fit/Train  15' 60'      Sleeping position Supine discussed; patient unable; so discussed elevating legs in Recliner                 Pt Education         Edema differential dx 10' 5'       Lymphatic A&P 5' 5'       Compression options 10' 30'

## 2024-03-08 NOTE — LETTER
2024    SOSA Miguel  [address]  [address]  [place] [address]    Patient: Oscar Espinosa   YOB: 1940   Date of Visit: 3/8/2024     Encounter Diagnosis     ICD-10-CM    1. Lymphedema  I89.0       2. Venous ulcer of ankle, right (HCC)  I83.013     L97.319           Dear Dr. Erickson:    Please review the attached evaluation summary from Oscar's recent visit. If you have any questions or concerns, please do not hesitate to call.     Sincerely,  Debi Mcelroy, PT        Daily Note     Today's date: 3/8/2024  Patient name: Oscar Espinosa  : 1940  MRN: [id number]  Referring provider: Candace Louise PA-C  Dx:   Encounter Diagnosis     ICD-10-CM    1. Lymphedema  I89.0       2. Venous ulcer of ankle, right (HCC)  I83.013     L97.319           Subjective: Echo shows 47% EF. Torsemide increased to double dose daily.    Objective: See treatment diary below    Assessment: Fit/trained patient and wife in wound dressing application, velcro wrap calf and foot application. Patient reports worsening BLE thigh edema. Given recent echo results of reduced EF, and thigh edema, and known comorbidities, I am concerned about fluid shift and/or CHF exacerbation with initiation of compression garments, but patient is now on higher dose of diuretic which should offset the fluid shift from peripheral to central circulatory system. Reviewed signs/symptoms of acute CHF and instructed patient to purchase pulse ox for monitoring, as well as when to d/c compression if symptoms arise. Will follow patient closely over the next 1-2 weeks to evaluate for tolerance to compression and effect of peripheral edema.    Plan: Continue per plan of care.      Precautions: multiple comorbidities including cardiac    POC expires Auth Status? (BOMN, approved, pending) Unit limit (Daily) Auth Start date Expiration date PT/OT + Visit Limit?    BOMN         Date of Service 2/19 2/28 3/8      Visits Used 1 2  3      Visits Remaining 99          Compression Rx 2/19 2/28 3/8      Compression garment Velcro wraps vs circular knit TBD ReadyWrap calf size Large  ReadyWrap SL foot size Large       Compression pumps Order placed through [facility]        Manual Ther         volumetrics completed        MLD         Wound Care Order placed for Foam through [employer]  Bordered gauze applied by patient to R lateral lower leg with cueing                Ther Ex         Remedial Exercise                                    Ther Activity & Self Care         Skin care 5' 5'       Garment Fit/Train  15' 60'      Sleeping position Supine discussed; patient unable; so discussed elevating legs in Recliner                 Pt Education         Edema differential dx 10' 5'       Lymphatic A&P 5' 5'       Compression options 10' 30'

## 2024-03-11 ENCOUNTER — OFFICE VISIT (OUTPATIENT)
Dept: PHYSICAL THERAPY | Facility: REHABILITATION | Age: 84
End: 2024-03-11
Payer: MEDICARE

## 2024-03-11 DIAGNOSIS — I89.0 LYMPHEDEMA: Primary | ICD-10-CM

## 2024-03-11 DIAGNOSIS — I83.013 VENOUS ULCER OF ANKLE, RIGHT (HCC): ICD-10-CM

## 2024-03-11 DIAGNOSIS — L97.319 VENOUS ULCER OF ANKLE, RIGHT (HCC): ICD-10-CM

## 2024-03-11 PROCEDURE — 97530 THERAPEUTIC ACTIVITIES: CPT | Performed by: PHYSICAL THERAPIST

## 2024-03-11 NOTE — LETTER
"2024    Lea Reyes, MD  [address] [facility] [address]    Patient: Oscar sEpinosa   YOB: 1940   Date of Visit: 3/11/2024     Encounter Diagnosis     ICD-10-CM    1. Lymphedema  I89.0       2. Venous ulcer of ankle, right (HCC)  I83.013     L97.319           Sending another report following initial compression trial. Will try one more trial today, and patient to follow up in 24-48 hours. Julieth, please let me know if you would prefer we d/c compression completely or are ok with us trying compression again with close monitoring of SpO2.      Daily Note     Today's date: 3/11/2024  Patient name: Oscar Espinosa  : 1940  MRN: [id number]  Referring provider: Candace Louise PA-C  Dx:   Encounter Diagnosis     ICD-10-CM    1. Lymphedema  I89.0       2. Venous ulcer of ankle, right (HCC)  I83.013     L97.319         Subjective: Wore velcro wraps for 8 hours, they were comfortable. But in the evening, patient experienced dyspnea when laying in recliner. SpO2 was 88%, /60. Removed compression (saw excellent leg decongestion) and took 1.5 hours to resole dyspnea. On Saturday he tried the \"old\" velcro wraps\" and did not experience dyspnea the rest of the weekend.  Thigh/buttock edema persists but is unchanged from last week. Reports that he sleeps in a recliner due to back pain, but his legs are lower than his heart because when he \"elevates\" them, his \"sinuses get congested\"    Reports dyspnea when walking 200 feet from car to office- reports that this is due to back pain.    Objective: See treatment diary below    Assessment: With abdominal distention, full leg pitting edema, and dyspnea with compression wear, I have concern for cardiac edema/ascites. Initial wear of compression resulted in acute dyspnea/orthopnea that resolved with removal of compression, however subsequent wear did not cause similar symptoms, suggesting there may be opportunity for accommodation to " "decongestion, in conjunction with the increased dosage of Torsomide. Patient and wife were adherent to wear and removal strategies when dyspnea presented. Wound presentation has improved with 3 days of intermittent compression wear-- wound is flatter, with epithelialization and minimal serous drainage. Today, we reviewed donning technique with less \"pull\" for less compression, and discussed limiting wear time to 8 hours max to allow for more gradual decongestion and hopefully less heart congestion symptoms. Will communicate initial response with cardiology incase they would like to assess or intervene.    Plan: Continue per plan of care.   Will continue to follow closely as I suspect there is cardiac edema contributing to venous edema/lymphedema and resulting venous ulcer.     Precautions: multiple comorbidities including cardiac    POC expires Auth Status? (BOMN, approved, pending) Unit limit (Daily) Auth Start date Expiration date PT/OT + Visit Limit?    BOMN         Date of Service 2/19 2/28 3/8      Visits Used 1 2 3      Visits Remaining 99          Compression Rx 2/19 2/28 3/8 3/11     Compression garment Velcro wraps vs circular knit TBD ReadyWrap calf size Large  ReadyWrap SL foot size Large       Compression pumps Order placed through [facility]        Manual Ther         volumetrics completed        MLD         Wound Care Order placed for Foam through [employer] [facility]  Bordered gauze applied by patient to R lateral lower leg with cueing  Cleaned wound and re-dressed              Ther Ex         Remedial Exercise                                    Ther Activity & Self Care         Skin care 5' 5'       Garment Fit/Train  15' 60' 45'     Sleeping position Supine discussed; patient unable; so discussed elevating legs in Recliner                 Pt Education         Edema differential dx 10' 5'  5'     Lymphatic A&P 5' 5'       Compression options 10' 30'                                              "

## 2024-03-11 NOTE — PROGRESS NOTES
"Daily Note     Today's date: 3/11/2024  Patient name: Oscar Espinosa  : 1940  MRN: [id number]  Referring provider: Candace Louise PA-C  Dx:   Encounter Diagnosis     ICD-10-CM    1. Lymphedema  I89.0       2. Venous ulcer of ankle, right (HCC)  I83.013     L97.319         Subjective: Wore velcro wraps for 8 hours, they were comfortable. But in the evening, patient experienced dyspnea when laying in recliner. SpO2 was 88%, /60. Removed compression (saw excellent leg decongestion) and took 1.5 hours to resole dyspnea. On Saturday he tried the \"old\" velcro wraps\" and did not experience dyspnea the rest of the weekend.  Thigh/buttock edema persists but is unchanged from last week. Reports that he sleeps in a recliner due to back pain, but his legs are lower than his heart because when he \"elevates\" them, his \"sinuses get congested\"    Reports dyspnea when walking 200 feet from car to office- reports that this is due to back pain.    Objective: See treatment diary below    Assessment: With abdominal distention, full leg pitting edema, and dyspnea with compression wear, I have concern for cardiac edema/ascites. Initial wear of compression resulted in acute dyspnea/orthopnea that resolved with removal of compression, however subsequent wear did not cause similar symptoms, suggesting there may be opportunity for accommodation to decongestion, in conjunction with the increased dosage of Torsomide. Patient and wife were adherent to wear and removal strategies when dyspnea presented. Wound presentation has improved with 3 days of intermittent compression wear-- wound is flatter, with epithelialization and minimal serous drainage. Today, we reviewed donning technique with less \"pull\" for less compression, and discussed limiting wear time to 8 hours max to allow for more gradual decongestion and hopefully less heart congestion symptoms. Will communicate initial response with cardiology incase they would like to " assess or intervene.    Plan: Continue per plan of care.   Will continue to follow closely as I suspect there is cardiac edema contributing to venous edema/lymphedema and resulting venous ulcer.     Precautions: multiple comorbidities including cardiac    POC expires Auth Status? (BOMN, approved, pending) Unit limit (Daily) Auth Start date Expiration date PT/OT + Visit Limit?    BOMN         Date of Service 2/19 2/28 3/8      Visits Used 1 2 3      Visits Remaining 99          Compression Rx 2/19 2/28 3/8 3/11     Compression garment Velcro wraps vs circular knit TBD ReadyWrap calf size Large  ReadyWrap SL foot size Large       Compression pumps Order placed through [facility]        Manual Ther         volumetrics completed        MLD         Wound Care Order placed for Foam through [employer] [facility]  Bordered gauze applied by patient to R lateral lower leg with cueing  Cleaned wound and re-dressed              Ther Ex         Remedial Exercise                                    Ther Activity & Self Care         Skin care 5' 5'       Garment Fit/Train  15' 60' 45'     Sleeping position Supine discussed; patient unable; so discussed elevating legs in Recliner                 Pt Education         Edema differential dx 10' 5'  5'     Lymphatic A&P 5' 5'       Compression options 10' 30'

## 2024-03-12 ENCOUNTER — APPOINTMENT (OUTPATIENT)
Dept: LAB | Facility: CLINIC | Age: 84
End: 2024-03-12
Payer: MEDICARE

## 2024-03-12 ENCOUNTER — ANTICOAG VISIT (OUTPATIENT)
Dept: CARDIOLOGY CLINIC | Facility: CLINIC | Age: 84
End: 2024-03-12

## 2024-03-12 DIAGNOSIS — I48.21 PERMANENT ATRIAL FIBRILLATION (HCC): Primary | ICD-10-CM

## 2024-03-12 NOTE — PROGRESS NOTES
Daily Note     Today's date: 3/13/2024  Patient name: Oscar Espinosa  : 1940  MRN: [id number]  Referring provider: Candace Louise PA-C  Dx:   Encounter Diagnosis     ICD-10-CM    1. Lymphedema  I89.0       2. Venous ulcer of ankle, right (HCC)  I83.013     L97.319           Subjective: No dyspnea at rest yesterday or Monday, despite wearing compression. Loosened compression overnight but kept it on continuously for the last 2 days, except for removing/replacing the dressing. Was able to do yardwork yesterday with compression in place without issue. Thigh edema persists.     Objective: See treatment diary below    Assessment: Volumetrics show a 2% and 4% reduction in LE edema of L and R legs respectively as a result of 2 days in compression garments. No adverse events in the last 48 hours but proximal pitting edema is still suspicious of cardiac edema. Will continue with compression but will follow closely. Pneumatic pump trial in 2-4 weeks.     Plan: Continue per plan of care.       Precautions: multiple comorbidities including cardiac    POC expires Auth Status? (BOMN, approved, pending) Unit limit (Daily) Auth Start date Expiration date PT/OT + Visit Limit?    BOMN         Date of Service 2/19 2/28 3/8 3/11 3/13    Visits Used 1 2 3 4 5    Visits Remaining 99          Compression Rx 2/19 2/28 3/8 3/11 3/13    Compression garment Velcro wraps vs circular knit TBD ReadyWrap calf size Large  ReadyWrap SL foot size Large       Compression pumps Order placed through [employer]        Manual Ther         volumetrics completed    completed    MLD         Wound Care Order placed for Foam through [employer]  Bordered gauze applied by patient to R lateral lower leg with cueing  Cleaned wound and re-dressed              Ther Ex         Remedial Exercise                                    Ther Activity & Self Care         Skin care 5' 5'   5' moisturizing    Garment Fit/Train  15' 60' 45' 45'    Sleeping  position Supine discussed; patient unable; so discussed elevating legs in Recliner                 Pt Education         Edema differential dx 10' 5'  5'     Lymphatic A&P 5' 5'       Compression options 10' 30'

## 2024-03-13 ENCOUNTER — OFFICE VISIT (OUTPATIENT)
Dept: PHYSICAL THERAPY | Facility: REHABILITATION | Age: 84
End: 2024-03-13
Payer: MEDICARE

## 2024-03-13 DIAGNOSIS — I83.013 VENOUS ULCER OF ANKLE, RIGHT (HCC): ICD-10-CM

## 2024-03-13 DIAGNOSIS — I89.0 LYMPHEDEMA: Primary | ICD-10-CM

## 2024-03-13 DIAGNOSIS — L97.319 VENOUS ULCER OF ANKLE, RIGHT (HCC): ICD-10-CM

## 2024-03-13 PROCEDURE — 97140 MANUAL THERAPY 1/> REGIONS: CPT | Performed by: PHYSICAL THERAPIST

## 2024-03-13 PROCEDURE — 97530 THERAPEUTIC ACTIVITIES: CPT | Performed by: PHYSICAL THERAPIST

## 2024-03-19 ENCOUNTER — APPOINTMENT (OUTPATIENT)
Dept: LAB | Facility: CLINIC | Age: 84
End: 2024-03-19
Payer: MEDICARE

## 2024-03-27 ENCOUNTER — HOSPITAL ENCOUNTER (OUTPATIENT)
Dept: NON INVASIVE DIAGNOSTICS | Facility: CLINIC | Age: 84
Discharge: HOME | End: 2024-03-27
Payer: MEDICARE

## 2024-03-27 DIAGNOSIS — I71.40 ABDOMINAL AORTIC ANEURYSM (AAA) WITHOUT RUPTURE, UNSPECIFIED PART (HCC): ICD-10-CM

## 2024-03-27 PROCEDURE — 93978 VASCULAR STUDY: CPT

## 2024-03-27 PROCEDURE — 93978 VASCULAR STUDY: CPT | Performed by: SURGERY

## 2024-03-27 PROCEDURE — NC001 PR NO CHARGE: Performed by: SURGERY

## 2024-04-04 ENCOUNTER — OFFICE VISIT (OUTPATIENT)
Dept: PHYSICAL THERAPY | Facility: REHABILITATION | Age: 84
End: 2024-04-04
Payer: MEDICARE

## 2024-04-04 DIAGNOSIS — L97.319 VENOUS ULCER OF ANKLE, RIGHT (HCC): ICD-10-CM

## 2024-04-04 DIAGNOSIS — I83.013 VENOUS ULCER OF ANKLE, RIGHT (HCC): ICD-10-CM

## 2024-04-04 DIAGNOSIS — I89.0 LYMPHEDEMA: Primary | ICD-10-CM

## 2024-04-04 PROCEDURE — 97530 THERAPEUTIC ACTIVITIES: CPT | Performed by: PHYSICAL THERAPIST

## 2024-04-04 PROCEDURE — 97750 PHYSICAL PERFORMANCE TEST: CPT | Performed by: PHYSICAL THERAPIST

## 2024-04-04 NOTE — PROGRESS NOTES
Daily Note     Today's date: 2024  Patient name: Oscar Espinosa  : 1940  MRN: [id number]  Referring provider: Candace Louise PA-C  Dx:   Encounter Diagnosis     ICD-10-CM    1. Lymphedema  I89.0       2. Venous ulcer of ankle, right (HCC)  I83.013     L97.319           Subjective: Continues with compression and tursomide and has lost 15lb in the last month. Wound has healed completely. Applied moisturizer then  then compression wraps to foot, ankle, and calf every day. Has not washed wraps, but washes sock liners.    Objective: See treatment diary below    Assessment: Volumetrics show a 12% and 14% reduction in LLE and RLE respectively, since start of compression garments 4 weeks ago. No adverse events, and excellent compliance with compression and skin care. Venous ulcer is 100% healed. He still presents with medial knee and medial thigh edema that is pitting and peau d'orange. Therefore, he would benefit from a basic thigh-high pneumatic compression pump to further reduce BLE edema and manage proximal edema. Will follow up in 3 weeks.    Patients wife chad present in today's session. Verbally requesting access to Oscar's [employer]. Oscar verbalized consent. Proxy access     Plan: Continue per plan of care.   Discharge next visit.     Precautions: multiple comorbidities including cardiac    POC expires Auth Status? (BOMN, approved, pending) Unit limit (Daily) Auth Start date Expiration date PT/OT + Visit Limit?    BOMN         Date of Service 2/19 2/28 3/8 3/11 3/13 4/4   Visits Used 1 2 3 4 5 6   Visits Remaining 99          Compression Rx 2/19 2/28 3/8 3/11 3/13 4/4   Compression garment Velcro wraps vs circular knit TBD ReadyWrap calf size Large  ReadyWrap SL foot size Large       Compression pumps Order placed through [employer]        Manual Ther         volumetrics completed    completed completed   MLD         Wound Care Order placed for Foam through [employer]  Bordered gauze  applied by patient to R lateral lower leg with cueing  Cleaned wound and re-dressed  D/c            Ther Ex         Remedial Exercise                                    Ther Activity & Self Care         Skin care 5' 5'   5' moisturizing    Garment Fit/Train  15' 60' 45' 45' 30'   Sleeping position Supine discussed; patient unable; so discussed elevating legs in Recliner                 Pt Education         Edema differential dx 10' 5'  5'     Lymphatic A&P 5' 5'       Compression options 10' 30'

## 2024-04-08 ENCOUNTER — APPOINTMENT (OUTPATIENT)
Dept: PHYSICAL THERAPY | Facility: REHABILITATION | Age: 84
End: 2024-04-08
Payer: MEDICARE

## 2024-04-09 ENCOUNTER — APPOINTMENT (OUTPATIENT)
Dept: LAB | Facility: CLINIC | Age: 84
End: 2024-04-09
Payer: MEDICARE

## 2024-04-09 ENCOUNTER — ANTICOAG VISIT (OUTPATIENT)
Dept: CARDIOLOGY CLINIC | Facility: CLINIC | Age: 84
End: 2024-04-09

## 2024-04-09 DIAGNOSIS — I48.21 PERMANENT ATRIAL FIBRILLATION (HCC): Primary | ICD-10-CM

## 2024-04-19 ENCOUNTER — APPOINTMENT (OUTPATIENT)
Dept: LAB | Facility: CLINIC | Age: 84
End: 2024-04-19
Payer: MEDICARE

## 2024-04-19 ENCOUNTER — TELEPHONE (OUTPATIENT)
Dept: CARDIOLOGY CLINIC | Facility: CLINIC | Age: 84
End: 2024-04-19

## 2024-04-19 ENCOUNTER — OFFICE VISIT (OUTPATIENT)
Dept: CARDIOLOGY CLINIC | Facility: CLINIC | Age: 84
End: 2024-04-19
Payer: MEDICARE

## 2024-04-19 VITALS
WEIGHT: 227.6 LBS | DIASTOLIC BLOOD PRESSURE: 74 MMHG | BODY MASS INDEX: 31.86 KG/M2 | SYSTOLIC BLOOD PRESSURE: 114 MMHG | HEART RATE: 89 BPM | HEIGHT: 71 IN | OXYGEN SATURATION: 92 %

## 2024-04-19 DIAGNOSIS — G47.33 SEVERE OBSTRUCTIVE SLEEP APNEA: ICD-10-CM

## 2024-04-19 DIAGNOSIS — I71.40 ABDOMINAL AORTIC ANEURYSM (AAA) WITHOUT RUPTURE, UNSPECIFIED PART (HCC): ICD-10-CM

## 2024-04-19 DIAGNOSIS — I25.10 ARTERIOSCLEROTIC CARDIOVASCULAR DISEASE: ICD-10-CM

## 2024-04-19 DIAGNOSIS — I65.23 BILATERAL CAROTID ARTERY STENOSIS: ICD-10-CM

## 2024-04-19 DIAGNOSIS — I48.21 PERMANENT ATRIAL FIBRILLATION (HCC): ICD-10-CM

## 2024-04-19 DIAGNOSIS — I50.32 CHRONIC DIASTOLIC CONGESTIVE HEART FAILURE (HCC): ICD-10-CM

## 2024-04-19 DIAGNOSIS — I71.21 ANEURYSM OF ASCENDING AORTA WITHOUT RUPTURE (HCC): ICD-10-CM

## 2024-04-19 DIAGNOSIS — I10 BENIGN ESSENTIAL HYPERTENSION: ICD-10-CM

## 2024-04-19 DIAGNOSIS — I13.0 HYPERTENSIVE HEART AND CHRONIC KIDNEY DISEASE WITH HEART FAILURE AND STAGE 1 THROUGH STAGE 4 CHRONIC KIDNEY DISEASE, OR CHRONIC KIDNEY DISEASE (HCC): ICD-10-CM

## 2024-04-19 DIAGNOSIS — I25.10 3-VESSEL CAD: ICD-10-CM

## 2024-04-19 DIAGNOSIS — I48.21 PERMANENT ATRIAL FIBRILLATION (HCC): Primary | ICD-10-CM

## 2024-04-19 LAB
ANION GAP SERPL CALCULATED.3IONS-SCNC: 7 MMOL/L (ref 4–13)
BUN SERPL-MCNC: 24 MG/DL (ref 5–25)
CALCIUM SERPL-MCNC: 9 MG/DL (ref 8.4–10.2)
CHLORIDE SERPL-SCNC: 96 MMOL/L (ref 96–108)
CO2 SERPL-SCNC: 32 MMOL/L (ref 21–32)
CREAT SERPL-MCNC: 1.31 MG/DL (ref 0.6–1.3)
GFR SERPL CREATININE-BSD FRML MDRD: 50 ML/MIN/1.73SQ M
GLUCOSE P FAST SERPL-MCNC: 107 MG/DL (ref 65–99)
POTASSIUM SERPL-SCNC: 5.2 MMOL/L (ref 3.5–5.3)
SODIUM SERPL-SCNC: 135 MMOL/L (ref 135–147)

## 2024-04-19 PROCEDURE — 36415 COLL VENOUS BLD VENIPUNCTURE: CPT

## 2024-04-19 PROCEDURE — 80048 BASIC METABOLIC PNL TOTAL CA: CPT

## 2024-04-19 PROCEDURE — 93000 ELECTROCARDIOGRAM COMPLETE: CPT | Performed by: INTERNAL MEDICINE

## 2024-04-19 PROCEDURE — 99214 OFFICE O/P EST MOD 30 MIN: CPT | Performed by: INTERNAL MEDICINE

## 2024-04-19 NOTE — PROGRESS NOTES
Cardiology Follow Up    Oscar Lizamavacs  1940  [id number]  [facility] [place]  [address]  [address]  [place] [address] [phone]    1. Permanent atrial fibrillation (HCC)  POCT ECG    Basic metabolic panel    AMB extended holter monitor      2. 3-vessel CAD        3. Abdominal aortic aneurysm (AAA) without rupture, unspecified part (HCC)        4. Arteriosclerotic cardiovascular disease        5. Aneurysm of ascending aorta without rupture (HCC)        6. Benign essential hypertension        7. Bilateral carotid artery stenosis        8. Chronic diastolic congestive heart failure (HCC)        9. Hypertensive heart and chronic kidney disease with heart failure and stage 1 through stage 4 chronic kidney disease, or chronic kidney disease (HCC)        10. Severe obstructive sleep apnea            Discussion/Summary: He is doing much better weight is down about 22 pounds.  Lower extremity edema continues to improve.  He is in atrial fibrillation again today.  I will check a 1 week ambulatory Holter monitor to see how much time he spends in A-fib versus sinus rhythm.  If he is persistently in A-fib we will continue to take off some more volume bring him back in 4 to 6 weeks if doing well we will plan a cardioversion at that point in time.  Echocardiogram was reviewed will continue to remove volume and decrease intracardiac pressures would plan repeat echocardiogram this summer.  Check renal function.    Interval History:  83 year-old gentleman with multivessel coronary disease, history of CABG, hypertension, hyperlipidemia, ascending aortic aneurysm, abdominal aorta aneurysm status post repair, sleep apnea presents to establish care with me in the office.  He has been seeing 1 of my partners for several years.  Functionally he has been doing great.  Denies any exertional chest pain, shortness of breath, palpitations,  lightheadedness, dizziness, or syncope.  He has put on some weight during the pandemic.  He has been trying to make some dietary changes start some low-level exercise.        Last office visit he was found to be in new onset atrial fibrillation.  We started anticoagulation rate control.  Holter monitor shows average rate to 75.  He has had some lightheadedness and dizziness which did not correlate to any bradycardia.      Overall he has been responding better to the torsemide.  Weights have gone down more than 20 pounds.  Functional capacity is slowly improving.  He still has some mild to moderate shortness of breath with activity.  Echocardiogram was personally reviewed evidence of valvular regurgitant leaks as well as pulmonary artery pressure this was obtained when the patient was significantly volume overloaded.  Blood pressure has been acceptable.  No signs or symptoms of ischemia.  Denies any lightheadedness dizziness or syncope.  He has been watching his sodium intake closely.  Problem List       Positive colorectal cancer screening using Cologuard test    Benign essential hypertension    Hyperlipidemia    Impaired fasting glucose    Microscopic hematuria    Overview Signed 5/14/2018  9:17 AM by Lea Reyes, MD     Annotation - [id number]: Dr Dee         Obesity    Obstructive sleep apnea    Overview Signed 5/14/2018  9:17 AM by Lea Reyes, MD     Annotation - [id number]: Dr Ellington.         Subclinical hypothyroidism    Overview Signed 5/14/2018  9:17 AM by Lea Reyes, MD     Transitioned From: Hypothyroidism         3-vessel CAD    Aneurysm of abdominal aorta (HCC)    Aneurysm of ascending aorta (HCC)    Aortic valve disorder    Arteriosclerotic cardiovascular disease    Disc degeneration, lumbar    Herniated lumbar intervertebral disc    Lumbar radiculopathy    Intervertebral disc disorder with radiculopathy of lumbar region    Spondylolisthesis at L4-L5 level    Chronic pain syndrome    SOB (shortness of  breath)    Multiple pulmonary nodules    Heart failure, systolic ([facility])    Wt Readings from Last 3 Encounters:   24 103 kg (227 lb 9.6 oz)   24 114 kg (252 lb 6.4 oz)   24 114 kg (252 lb 1.6 oz)                 COPD, mild (HCC)    Overview Deleted 2019  3:22 PM by Gerson Terrell MD                  Past Medical History:   Diagnosis Date    Abdominal aortic aneurysm ([facility])     s/p repair    Abnormal ECG 2022    Aneurysm ([facility])     Aneurysm of abdominal aorta ([facility])     49mm, trileaflet AV    Atrial fibrillation (HCC)     Eliquis    BPH (benign prostatic hyperplasia)     CAD (coronary artery disease)     s/p CABGx3    CHF (congestive heart failure) ([facility])     Chronic pain     Gabapentin    Chronic pain disorder     lumbar    COPD (chronic obstructive pulmonary disease) ([facility])     Coronary artery disease     Former tobacco use     Hyperlipidemia     Hypertension     Hypothyroidism     Lumbar radiculopathy     Lumbar stenosis     s/p injections    Neuropathy     Obesity (BMI 30-39.9)     YEVGENIY (obstructive sleep apnea)     unable to tolerate CPAP    Pulmonary hypertension ([facility])     moderate to severe    Spondylolisthesis of lumbar region     Visual impairment     Vitamin D deficiency      Social History     Socioeconomic History    Marital status: /Civil Union     Spouse name: Not on file    Number of children: Not on file    Years of education: Not on file    Highest education level: Not on file   Occupational History    Occupation: retired   Tobacco Use    Smoking status: Former     Current packs/day: 0.00     Average packs/day: 1 pack/day for 55.6 years (55.6 ttl pk-yrs)     Types: Cigarettes     Start date: 1957     Quit date: 2012     Years since quittin.7    Smokeless tobacco: Never   Vaping Use    Vaping status: Never Used   Substance and Sexual Activity    Alcohol use: Yes     Alcohol/week: 21.0 standard drinks of alcohol     Types: 21 Cans of beer per week    Drug  use: No    Sexual activity: Never   Other Topics Concern    Not on file   Social History Narrative    Not on file     Social Determinants of Health     Financial Resource Strain: Low Risk  (3/7/2024)    Overall Financial Resource Strain (CARDIA)     Difficulty of Paying Living Expenses: Not hard at all   Food Insecurity: Not on file   Transportation Needs: No Transportation Needs (3/7/2024)    PRAPARE - Transportation     Lack of Transportation (Medical): No     Lack of Transportation (Non-Medical): No   Physical Activity: Not on file   Stress: Not on file   Social Connections: Not on file   Intimate Partner Violence: Not on file   Housing Stability: Not on file      Family History   Problem Relation Age of Onset    Heart disease Mother     Stroke Father         stroke syndrome    Aneurysm Brother         abdominal    Aortic aneurysm Brother         ascending    Coronary artery disease Family     Hypertension Family     Other Son         gioblastoma multiforme     Past Surgical History:   Procedure Laterality Date    ABDOMINAL AORTIC ANEURYSM REPAIR  08/09/2007    2 dock limbs with visc extens prosth    CARDIAC CATHETERIZATION      COLONOSCOPY      CORONARY ARTERY BYPASS GRAFT  2003    LIMA to LAD, sequential SVG to OM1 & OM2, SVG to RDA    LUMBAR EPIDURAL INJECTION         Current Outpatient Medications:     albuterol (Ventolin HFA) 90 mcg/act inhaler, Inhale 2 puffs every 6 (six) hours as needed for wheezing, Disp: 54 g, Rfl: 1    amiodarone 200 mg tablet, Take 1 tablet (200 mg total) by mouth daily, Disp: 90 tablet, Rfl: 3    aspirin (ECOTRIN LOW STRENGTH) 81 mg EC tablet, Take 1 tablet by mouth daily, Disp: , Rfl:     atorvastatin (LIPITOR) 40 mg tablet, Take 1 tablet (40 mg total) by mouth daily, Disp: 90 tablet, Rfl: 3    cholecalciferol (VITAMIN D3) 1,000 units tablet, Take 2,000 Units by mouth daily, Disp: , Rfl:     FIBER ADULT GUMMIES PO, Take 1 caplet by mouth daily , Disp: , Rfl:     gabapentin  (NEURONTIN) 300 mg capsule, Take 3 capsules (900 mg total) by mouth daily at bedtime, Disp: , Rfl:     labetalol (NORMODYNE) 100 mg tablet, Take 1 tablet (100 mg total) by mouth 2 (two) times a day, Disp: 180 tablet, Rfl: 3    multivitamin (THERAGRAN) TABS, Take 1 tablet by mouth daily, Disp: , Rfl:     potassium chloride (Klor-Con M20) 20 mEq tablet, Take one tab daily M-F, Disp: 75 tablet, Rfl: 0    senna (SENOKOT) 8.6 MG tablet, Take 1 tablet by mouth as needed  , Disp: , Rfl:     tamsulosin (FLOMAX) 0.4 mg, Take 1 capsule by mouth daily, Disp: , Rfl:     torsemide (DEMADEX) 20 mg tablet, Torsemide 40mg daily M-F and 20mg on Saturday and Sunday, Disp: 120 tablet, Rfl: 3    warfarin (Coumadin) 5 mg tablet, TAKE 1 TO 2 TABS BY MOUTH DAILY OR AS DIRECTED BY PHYSICIAN, Disp: 60 tablet, Rfl: 11    Current Facility-Administered Medications:     albuterol (PROVENTIL HFA,VENTOLIN HFA) inhaler 2 puff, 2 puff, Inhalation, Q6H PRN, Gerson Terrell MD  Allergies   Allergen Reactions    Meloxicam Edema       Labs:     Chemistry        Component Value Date/Time     (L) 10/15/2015 1042    K 5.2 04/19/2024 1059    K 4.7 10/15/2015 1042    CL 96 04/19/2024 1059     10/15/2015 1042    CO2 32 04/19/2024 1059    CO2 30 10/15/2015 1042    BUN 24 04/19/2024 1059    BUN 15 10/15/2015 1042    CREATININE 1.31 (H) 04/19/2024 1059    CREATININE 1.03 10/15/2015 1042        Component Value Date/Time    CALCIUM 9.0 04/19/2024 1059    CALCIUM 9.1 10/15/2015 1042    ALKPHOS 84 02/20/2024 0914    ALKPHOS 71 10/15/2015 1042    AST 31 02/20/2024 0914    AST 14 10/15/2015 1042    ALT 20 02/20/2024 0914    ALT 23 10/15/2015 1042    BILITOT 0.79 10/15/2015 1042            Lab Results   Component Value Date    CHOL 124 10/15/2015    CHOL 159 04/01/2015    CHOL 155 09/30/2013     Lab Results   Component Value Date    HDL 50 02/20/2024    HDL 68 08/17/2023    HDL 76 01/27/2023     Lab Results   Component Value Date    LDLCALC 48 02/20/2024     "LDLCALC 53 08/17/2023    LDLCALC 58 01/27/2023     Lab Results   Component Value Date    TRIG 37 02/20/2024    TRIG 54 08/17/2023    TRIG 41 01/27/2023     No results found for: \"CHOLHDL\"    Imaging: No results found.    ECG:  AFib      Review of Systems   Constitutional: Negative.   HENT: Negative.     Eyes: Negative.    Cardiovascular: Negative.    Respiratory: Negative.     Endocrine: Negative.    Hematologic/Lymphatic: Negative.    Skin: Negative.    Musculoskeletal: Negative.    Gastrointestinal: Negative.    Genitourinary: Negative.    Neurological: Negative.    Psychiatric/Behavioral: Negative.     All other systems reviewed and are negative.      Vitals:    04/19/24 1002   BP:    Pulse:    SpO2: 92%     Vitals:    04/19/24 0957   Weight: 103 kg (227 lb 9.6 oz)     Height: 5' 11\" (180.3 cm)   Body mass index is 31.74 kg/m².    Physical Exam:  Vital signs reviewed  General:  Alert and cooperative, appears stated age, no acute distress  HEENT:  PERRLA, EOMI, no scleral icterus, no conjunctival pallor  Neck:  No lymphadenopathy, no thyromegaly, no carotid bruits, no elevated JVP  Heart:  Regular rate and rhythm, normal S1/S2, no S3/S4, no murmur, rubs or gallops.  PMI nondisplaced  Lungs:  Clear to auscultation bilaterally, no wheezes rales or rhonchi  Abdomen:  Soft, non-tender, positive bowel sounds, no rebound or guarding,   no organomegaly   Extremities:  Normal range of motion.  No clubbing, cyanosis or edema   Vascular:  2+ pedal pulses  Skin:  No rashes or lesions on exposed skin  Neurologic:  Cranial nerves II-XII grossly intact without focal deficits  Psych:  Normal mood and affect              "

## 2024-04-19 NOTE — TELEPHONE ENCOUNTER
Patient contacted and advised to reduce his use of salt substitute due to slightly elevated potassium level seen on recent lab work per Dr. Alan. Patient is amenable to plan.

## 2024-04-25 ENCOUNTER — OFFICE VISIT (OUTPATIENT)
Dept: PHYSICAL THERAPY | Facility: REHABILITATION | Age: 84
End: 2024-04-25
Payer: MEDICARE

## 2024-04-25 DIAGNOSIS — I89.0 LYMPHEDEMA: Primary | ICD-10-CM

## 2024-04-25 DIAGNOSIS — I83.013 VENOUS ULCER OF ANKLE, RIGHT (HCC): ICD-10-CM

## 2024-04-25 DIAGNOSIS — L97.319 VENOUS ULCER OF ANKLE, RIGHT (HCC): ICD-10-CM

## 2024-04-25 PROCEDURE — 97535 SELF CARE MNGMENT TRAINING: CPT | Performed by: PHYSICAL THERAPIST

## 2024-04-25 NOTE — PROGRESS NOTES
Daily Note     Today's date: 2024  Patient name: Oscar Espinosa  : 1940  MRN: [id number]  Referring provider: Candace Louise PA-C  Dx:   Encounter Diagnosis     ICD-10-CM    1. Lymphedema  I89.0       2. Venous ulcer of ankle, right (HCC)  I83.013     L97.319             Subjective: Continues with compression and tursomide and has lost 15lb in the last month. Wound has healed completely. Applied moisturizer then  then compression wraps to foot, ankle, and calf every day. Has not washed wraps, but washes sock liners.    Objective: See treatment diary below    Assessment: Patient has completed 8 weeks of compression, exercise, and leg elevation. Awaiting pneumatic pump, which is delayed due to Medicare coverage requirements, but these have all been fulfilled. Volumetrics show a 16% (1600mL) and 21% (2500mL) reduction in LLE and RLE respectively. No adverse events, and excellent compliance with compression and skin care. Venous ulcer is 100% healed. There is persisting skin changes including pitting, hemosideran staining, but kyperkeratosis and thigh peau d'orange have resolved. Will return in 6 months for re-evaluation and compression re-ordering.     Goals:  1. Patient will demonstrate compliance with elevation of legs in 2 weeks. MET  2. Patient will demonstrate compliance and adherence with day and night compression garments in 30 days. MET  3. LE edema will soften from firm/nonpitting to spongy and pitting after 8 weeks in compression. MET  4. LE wound will heal after 6 weeks in compression. MET  5. Patient will demonstrate compliance and adherence with pneumatic pump use 1x/day. ONGOING       Plan: Discharge today.     Precautions: multiple comorbidities including cardiac    POC expires Auth Status? (BOMN, approved, pending) Unit limit (Daily) Auth Start date Expiration date PT/OT + Visit Limit?    BOMN         Date of Service         Visits Used 7        Visits Remaining            Compression Rx 2/19 2/28 3/8 3/11 3/13 4/4 4/24   Compression garment Velcro wraps vs circular knit TBD ReadyWrap calf size Large  ReadyWrap SL foot size Large        Compression pumps Order placed through [facility]         Manual Ther          volumetrics completed    completed completed completed   MLD          Wound Care Order placed for Foam through [facility]  Bordered gauze applied by patient to R lateral lower leg with cueing  Cleaned wound and re-dressed  D/c              Ther Ex          Remedial Exercise                                        Ther Activity & Self Care          Skin care 5' 5'   5' moisturizing  5'   Garment Fit/Train  15' 60' 45' 45' 30' 5'   Sleeping position Supine discussed; patient unable; so discussed elevating legs in Recliner      5'             Pt Education          Edema differential dx 10' 5'  5'   5'   Lymphatic A&P 5' 5'     5'   Compression options 10' 30'

## 2024-04-26 ENCOUNTER — HOSPITAL ENCOUNTER (INPATIENT)
Facility: HOSPITAL | Age: 84
LOS: 6 days | Discharge: HOME | DRG: 186 | End: 2024-05-02
Attending: STUDENT IN AN ORGANIZED HEALTH CARE EDUCATION/TRAINING PROGRAM | Admitting: INTERNAL MEDICINE
Payer: MEDICARE

## 2024-04-26 ENCOUNTER — APPOINTMENT (EMERGENCY)
Dept: RADIOLOGY | Facility: HOSPITAL | Age: 84
DRG: 186 | End: 2024-04-26
Payer: MEDICARE

## 2024-04-26 DIAGNOSIS — I50.32 CHRONIC DIASTOLIC CONGESTIVE HEART FAILURE (HCC): ICD-10-CM

## 2024-04-26 DIAGNOSIS — N18.9 CKD (CHRONIC KIDNEY DISEASE): ICD-10-CM

## 2024-04-26 DIAGNOSIS — R74.01 TRANSAMINITIS: ICD-10-CM

## 2024-04-26 DIAGNOSIS — J18.9 PNEUMONIA: Primary | ICD-10-CM

## 2024-04-26 DIAGNOSIS — J90 PLEURAL EFFUSION: ICD-10-CM

## 2024-04-26 DIAGNOSIS — R06.02 SHORTNESS OF BREATH: ICD-10-CM

## 2024-04-26 DIAGNOSIS — R09.02 HYPOXIA: ICD-10-CM

## 2024-04-26 DIAGNOSIS — I11.0 HYPERTENSIVE HEART DISEASE WITH HEART FAILURE (HCC): ICD-10-CM

## 2024-04-26 DIAGNOSIS — I50.43 ACUTE ON CHRONIC COMBINED SYSTOLIC AND DIASTOLIC CONGESTIVE HEART FAILURE (HCC): ICD-10-CM

## 2024-04-26 DIAGNOSIS — D64.9 NORMOCYTIC ANEMIA: ICD-10-CM

## 2024-04-26 DIAGNOSIS — I48.21 PERMANENT ATRIAL FIBRILLATION (HCC): ICD-10-CM

## 2024-04-26 LAB
2HR DELTA HS TROPONIN: 0 NG/L
ALBUMIN SERPL BCP-MCNC: 2.8 G/DL (ref 3.5–5)
ALP SERPL-CCNC: 132 U/L (ref 34–104)
ALT SERPL W P-5'-P-CCNC: 62 U/L (ref 7–52)
ANION GAP SERPL CALCULATED.3IONS-SCNC: 5 MMOL/L (ref 4–13)
AST SERPL W P-5'-P-CCNC: 61 U/L (ref 13–39)
BASOPHILS # BLD AUTO: 0.05 THOUSANDS/ÂΜL (ref 0–0.1)
BASOPHILS NFR BLD AUTO: 1 % (ref 0–1)
BILIRUB SERPL-MCNC: 0.9 MG/DL (ref 0.2–1)
BNP SERPL-MCNC: 319 PG/ML (ref 0–100)
BUN SERPL-MCNC: 23 MG/DL (ref 5–25)
CALCIUM ALBUM COR SERPL-MCNC: 9.3 MG/DL (ref 8.3–10.1)
CALCIUM SERPL-MCNC: 8.3 MG/DL (ref 8.4–10.2)
CARDIAC TROPONIN I PNL SERPL HS: 9 NG/L
CARDIAC TROPONIN I PNL SERPL HS: 9 NG/L
CHLORIDE SERPL-SCNC: 95 MMOL/L (ref 96–108)
CO2 SERPL-SCNC: 33 MMOL/L (ref 21–32)
CREAT SERPL-MCNC: 1.31 MG/DL (ref 0.6–1.3)
EOSINOPHIL # BLD AUTO: 0.03 THOUSAND/ÂΜL (ref 0–0.61)
EOSINOPHIL NFR BLD AUTO: 0 % (ref 0–6)
ERYTHROCYTE [DISTWIDTH] IN BLOOD BY AUTOMATED COUNT: 15.9 % (ref 11.6–15.1)
GFR SERPL CREATININE-BSD FRML MDRD: 50 ML/MIN/1.73SQ M
GLUCOSE SERPL-MCNC: 112 MG/DL (ref 65–140)
HCT VFR BLD AUTO: 32.5 % (ref 36.5–49.3)
HGB BLD-MCNC: 10.3 G/DL (ref 12–17)
IMM GRANULOCYTES # BLD AUTO: 0.06 THOUSAND/UL (ref 0–0.2)
IMM GRANULOCYTES NFR BLD AUTO: 1 % (ref 0–2)
LYMPHOCYTES # BLD AUTO: 0.63 THOUSANDS/ÂΜL (ref 0.6–4.47)
LYMPHOCYTES NFR BLD AUTO: 6 % (ref 14–44)
MCH RBC QN AUTO: 30.2 PG (ref 26.8–34.3)
MCHC RBC AUTO-ENTMCNC: 31.7 G/DL (ref 31.4–37.4)
MCV RBC AUTO: 95 FL (ref 82–98)
MONOCYTES # BLD AUTO: 0.94 THOUSAND/ÂΜL (ref 0.17–1.22)
MONOCYTES NFR BLD AUTO: 10 % (ref 4–12)
NEUTROPHILS # BLD AUTO: 8.18 THOUSANDS/ÂΜL (ref 1.85–7.62)
NEUTS SEG NFR BLD AUTO: 82 % (ref 43–75)
NRBC BLD AUTO-RTO: 0 /100 WBCS
PLATELET # BLD AUTO: 259 THOUSANDS/UL (ref 149–390)
PMV BLD AUTO: 9.6 FL (ref 8.9–12.7)
POTASSIUM SERPL-SCNC: 4.3 MMOL/L (ref 3.5–5.3)
PROCALCITONIN SERPL-MCNC: 0.08 NG/ML
PROT SERPL-MCNC: 6.9 G/DL (ref 6.4–8.4)
RBC # BLD AUTO: 3.41 MILLION/UL (ref 3.88–5.62)
SODIUM SERPL-SCNC: 133 MMOL/L (ref 135–147)
WBC # BLD AUTO: 9.89 THOUSAND/UL (ref 4.31–10.16)

## 2024-04-26 PROCEDURE — 82728 ASSAY OF FERRITIN: CPT

## 2024-04-26 PROCEDURE — 99285 EMERGENCY DEPT VISIT HI MDM: CPT | Performed by: STUDENT IN AN ORGANIZED HEALTH CARE EDUCATION/TRAINING PROGRAM

## 2024-04-26 PROCEDURE — 93005 ELECTROCARDIOGRAM TRACING: CPT

## 2024-04-26 PROCEDURE — 83930 ASSAY OF BLOOD OSMOLALITY: CPT

## 2024-04-26 PROCEDURE — 85610 PROTHROMBIN TIME: CPT

## 2024-04-26 PROCEDURE — 85025 COMPLETE CBC W/AUTO DIFF WBC: CPT | Performed by: STUDENT IN AN ORGANIZED HEALTH CARE EDUCATION/TRAINING PROGRAM

## 2024-04-26 PROCEDURE — 83550 IRON BINDING TEST: CPT

## 2024-04-26 PROCEDURE — 96365 THER/PROPH/DIAG IV INF INIT: CPT

## 2024-04-26 PROCEDURE — 82607 VITAMIN B-12: CPT

## 2024-04-26 PROCEDURE — 84484 ASSAY OF TROPONIN QUANT: CPT | Performed by: STUDENT IN AN ORGANIZED HEALTH CARE EDUCATION/TRAINING PROGRAM

## 2024-04-26 PROCEDURE — 83880 ASSAY OF NATRIURETIC PEPTIDE: CPT | Performed by: STUDENT IN AN ORGANIZED HEALTH CARE EDUCATION/TRAINING PROGRAM

## 2024-04-26 PROCEDURE — 87040 BLOOD CULTURE FOR BACTERIA: CPT

## 2024-04-26 PROCEDURE — 99285 EMERGENCY DEPT VISIT HI MDM: CPT

## 2024-04-26 PROCEDURE — 84145 PROCALCITONIN (PCT): CPT

## 2024-04-26 PROCEDURE — NC001 PR NO CHARGE: Performed by: INTERNAL MEDICINE

## 2024-04-26 PROCEDURE — 71045 X-RAY EXAM CHEST 1 VIEW: CPT

## 2024-04-26 PROCEDURE — 0W9B3ZZ DRAINAGE OF LEFT PLEURAL CAVITY, PERCUTANEOUS APPROACH: ICD-10-PCS | Performed by: INTERNAL MEDICINE

## 2024-04-26 PROCEDURE — 85379 FIBRIN DEGRADATION QUANT: CPT

## 2024-04-26 PROCEDURE — 80053 COMPREHEN METABOLIC PANEL: CPT | Performed by: STUDENT IN AN ORGANIZED HEALTH CARE EDUCATION/TRAINING PROGRAM

## 2024-04-26 PROCEDURE — 82746 ASSAY OF FOLIC ACID SERUM: CPT

## 2024-04-26 PROCEDURE — 83540 ASSAY OF IRON: CPT

## 2024-04-26 PROCEDURE — 36415 COLL VENOUS BLD VENIPUNCTURE: CPT

## 2024-04-26 RX ORDER — TORSEMIDE 20 MG/1
40 TABLET ORAL DAILY
Status: DISCONTINUED | OUTPATIENT
Start: 2024-04-27 | End: 2024-04-27

## 2024-04-26 RX ORDER — GABAPENTIN 300 MG/1
900 CAPSULE ORAL
Status: DISCONTINUED | OUTPATIENT
Start: 2024-04-26 | End: 2024-05-02 | Stop reason: HOSPADM

## 2024-04-26 RX ORDER — GUAIFENESIN 600 MG/1
600 TABLET, EXTENDED RELEASE ORAL 2 TIMES DAILY
Status: DISCONTINUED | OUTPATIENT
Start: 2024-04-27 | End: 2024-05-02 | Stop reason: HOSPADM

## 2024-04-26 RX ORDER — AMIODARONE HYDROCHLORIDE 200 MG/1
200 TABLET ORAL DAILY
Status: DISCONTINUED | OUTPATIENT
Start: 2024-04-27 | End: 2024-05-02 | Stop reason: HOSPADM

## 2024-04-26 RX ORDER — TAMSULOSIN HYDROCHLORIDE 0.4 MG/1
0.4 CAPSULE ORAL DAILY
Status: DISCONTINUED | OUTPATIENT
Start: 2024-04-27 | End: 2024-05-02 | Stop reason: HOSPADM

## 2024-04-26 RX ORDER — WARFARIN SODIUM 2.5 MG/1
2.5 TABLET ORAL
Status: DISCONTINUED | OUTPATIENT
Start: 2024-04-27 | End: 2024-04-27

## 2024-04-26 RX ORDER — WARFARIN SODIUM 5 MG/1
5 TABLET ORAL
Status: DISCONTINUED | OUTPATIENT
Start: 2024-04-28 | End: 2024-04-27

## 2024-04-26 RX ORDER — LABETALOL 100 MG/1
100 TABLET, FILM COATED ORAL 2 TIMES DAILY
Status: DISCONTINUED | OUTPATIENT
Start: 2024-04-27 | End: 2024-05-02 | Stop reason: HOSPADM

## 2024-04-26 RX ORDER — ALBUTEROL SULFATE 2.5 MG/3ML
5 SOLUTION RESPIRATORY (INHALATION) EVERY 4 HOURS PRN
Status: DISCONTINUED | OUTPATIENT
Start: 2024-04-26 | End: 2024-05-02 | Stop reason: HOSPADM

## 2024-04-26 RX ORDER — SENNOSIDES 8.6 MG
1 TABLET ORAL DAILY
Status: DISCONTINUED | OUTPATIENT
Start: 2024-04-27 | End: 2024-05-02 | Stop reason: HOSPADM

## 2024-04-26 RX ORDER — POLYETHYLENE GLYCOL 3350 17 G/17G
17 POWDER, FOR SOLUTION ORAL DAILY
Status: DISCONTINUED | OUTPATIENT
Start: 2024-04-27 | End: 2024-05-02 | Stop reason: HOSPADM

## 2024-04-26 RX ORDER — AZITHROMYCIN 250 MG/1
500 TABLET, FILM COATED ORAL ONCE
Status: COMPLETED | OUTPATIENT
Start: 2024-04-26 | End: 2024-04-26

## 2024-04-26 RX ORDER — ATORVASTATIN CALCIUM 40 MG/1
40 TABLET, FILM COATED ORAL DAILY
Status: DISCONTINUED | OUTPATIENT
Start: 2024-04-27 | End: 2024-05-02 | Stop reason: HOSPADM

## 2024-04-26 RX ADMIN — CEFTRIAXONE SODIUM 1000 MG: 10 INJECTION, POWDER, FOR SOLUTION INTRAVENOUS at 20:40

## 2024-04-26 RX ADMIN — AZITHROMYCIN DIHYDRATE 500 MG: 250 TABLET, FILM COATED ORAL at 20:43

## 2024-04-27 ENCOUNTER — APPOINTMENT (INPATIENT)
Dept: NON INVASIVE DIAGNOSTICS | Facility: HOSPITAL | Age: 84
DRG: 186 | End: 2024-04-27
Payer: MEDICARE

## 2024-04-27 ENCOUNTER — APPOINTMENT (INPATIENT)
Dept: CT IMAGING | Facility: HOSPITAL | Age: 84
DRG: 186 | End: 2024-04-27
Payer: MEDICARE

## 2024-04-27 PROBLEM — J90 PLEURAL EFFUSION: Status: ACTIVE | Noted: 2024-04-27

## 2024-04-27 PROBLEM — R74.01 ELEVATED TRANSAMINASE LEVEL: Status: ACTIVE | Noted: 2024-04-27

## 2024-04-27 PROBLEM — E87.1 HYPONATREMIA: Status: ACTIVE | Noted: 2024-04-27

## 2024-04-27 PROBLEM — D64.9 ANEMIA: Status: ACTIVE | Noted: 2024-04-27

## 2024-04-27 LAB
ALBUMIN SERPL BCP-MCNC: 2.9 G/DL (ref 3.5–5)
ALP SERPL-CCNC: 120 U/L (ref 34–104)
ALT SERPL W P-5'-P-CCNC: 54 U/L (ref 7–52)
ANION GAP SERPL CALCULATED.3IONS-SCNC: 8 MMOL/L (ref 4–13)
AORTIC ROOT: 3.5 CM
AORTIC VALVE MEAN VELOCITY: 7.4 M/S
APICAL FOUR CHAMBER EJECTION FRACTION: 55 %
ASCENDING AORTA: 4.5 CM
AST SERPL W P-5'-P-CCNC: 53 U/L (ref 13–39)
AV AREA BY CONTINUOUS VTI: 2.8 CM2
AV AREA PEAK VELOCITY: 2.8 CM2
AV LVOT MEAN GRADIENT: 2 MMHG
AV LVOT PEAK GRADIENT: 3 MMHG
AV MEAN GRADIENT: 3 MMHG
AV PEAK GRADIENT: 5 MMHG
AV VALVE AREA: 2.75 CM2
AV VELOCITY RATIO: 0.8
BILIRUB SERPL-MCNC: 0.92 MG/DL (ref 0.2–1)
BSA FOR ECHO PROCEDURE: 2.23 M2
BUN SERPL-MCNC: 20 MG/DL (ref 5–25)
CALCIUM ALBUM COR SERPL-MCNC: 9.5 MG/DL (ref 8.3–10.1)
CALCIUM SERPL-MCNC: 8.6 MG/DL (ref 8.4–10.2)
CHLORIDE SERPL-SCNC: 97 MMOL/L (ref 96–108)
CO2 SERPL-SCNC: 29 MMOL/L (ref 21–32)
CREAT SERPL-MCNC: 1.17 MG/DL (ref 0.6–1.3)
D DIMER PPP FEU-MCNC: 2.14 UG/ML FEU
DOP CALC AO PEAK VEL: 1.14 M/S
DOP CALC AO VTI: 21.36 CM
DOP CALC LVOT AREA: 3.46 CM2
DOP CALC LVOT CARDIAC INDEX: 1.73 L/MIN/M2
DOP CALC LVOT CARDIAC OUTPUT: 3.86 L/MIN
DOP CALC LVOT DIAMETER: 2.1 CM
DOP CALC LVOT PEAK VEL VTI: 16.96 CM
DOP CALC LVOT PEAK VEL: 0.91 M/S
DOP CALC LVOT STROKE INDEX: 25.6 ML/M2
DOP CALC LVOT STROKE VOLUME: 58.71
DOP CALC MV VTI: 24.3 CM
E WAVE DECELERATION TIME: 159 MS
E/A RATIO: 2.91
ERYTHROCYTE [DISTWIDTH] IN BLOOD BY AUTOMATED COUNT: 16 % (ref 11.6–15.1)
FERRITIN SERPL-MCNC: 116 NG/ML (ref 24–336)
FLUAV RNA RESP QL NAA+PROBE: NEGATIVE
FLUBV RNA RESP QL NAA+PROBE: NEGATIVE
FOLATE SERPL-MCNC: 19.8 NG/ML
FRACTIONAL SHORTENING: 20 (ref 28–44)
GFR SERPL CREATININE-BSD FRML MDRD: 57 ML/MIN/1.73SQ M
GLUCOSE SERPL-MCNC: 101 MG/DL (ref 65–140)
HCT VFR BLD AUTO: 34.6 % (ref 36.5–49.3)
HGB BLD-MCNC: 10.7 G/DL (ref 12–17)
INR PPP: 3.76 (ref 0.84–1.19)
INR PPP: 4.12 (ref 0.84–1.19)
INTERVENTRICULAR SEPTUM IN DIASTOLE (PARASTERNAL SHORT AXIS VIEW): 1.4 CM
INTERVENTRICULAR SEPTUM: 1.4 CM (ref 0.6–1.1)
IRON SATN MFR SERPL: 9 % (ref 15–50)
IRON SERPL-MCNC: 19 UG/DL (ref 50–212)
L PNEUMO1 AG UR QL IA.RAPID: NEGATIVE
LAAS-AP2: 31.7 CM2
LAAS-AP4: 25.6 CM2
LEFT ATRIUM SIZE: 5.1 CM
LEFT ATRIUM VOLUME (MOD BIPLANE): 111 ML
LEFT ATRIUM VOLUME INDEX (MOD BIPLANE): 49.8 ML/M2
LEFT INTERNAL DIMENSION IN SYSTOLE: 3.9 CM (ref 2.1–4)
LEFT VENTRICULAR INTERNAL DIMENSION IN DIASTOLE: 4.9 CM (ref 3.5–6)
LEFT VENTRICULAR POSTERIOR WALL IN END DIASTOLE: 1.5 CM
LEFT VENTRICULAR STROKE VOLUME: 48 ML
LVSV (TEICH): 48 ML
MAGNESIUM SERPL-MCNC: 2.1 MG/DL (ref 1.9–2.7)
MCH RBC QN AUTO: 29.5 PG (ref 26.8–34.3)
MCHC RBC AUTO-ENTMCNC: 30.9 G/DL (ref 31.4–37.4)
MCV RBC AUTO: 95 FL (ref 82–98)
MV EROA: 0.4 CM2
MV MEAN GRADIENT: 2 MMHG
MV PEAK A VEL: 0.34 M/S
MV PEAK E VEL: 99 CM/S
MV PEAK GRADIENT: 5 MMHG
MV REGURGITANT VOLUME: 55 ML
MV STENOSIS PRESSURE HALF TIME: 46 MS
MV VALVE AREA BY CONTINUITY EQUATION: 2.42 CM2
MV VALVE AREA P 1/2 METHOD: 4.78
OSMOLALITY UR/SERPL-RTO: 302 MMOL/KG (ref 282–298)
OSMOLALITY UR: 214 MMOL/KG
PISA MRMAX VEL: 0.35 M/S
PISA RADIUS: 0.9 CM
PLATELET # BLD AUTO: 251 THOUSANDS/UL (ref 149–390)
PMV BLD AUTO: 9.7 FL (ref 8.9–12.7)
POTASSIUM SERPL-SCNC: 3.9 MMOL/L (ref 3.5–5.3)
PROCALCITONIN SERPL-MCNC: 0.08 NG/ML
PROT SERPL-MCNC: 6.6 G/DL (ref 6.4–8.4)
PROTHROMBIN TIME: 38.2 SECONDS (ref 11.6–14.5)
PROTHROMBIN TIME: 41 SECONDS (ref 11.6–14.5)
RA PRESSURE ESTIMATED: 15 MMHG
RBC # BLD AUTO: 3.63 MILLION/UL (ref 3.88–5.62)
RIGHT ATRIUM AREA SYSTOLE A4C: 24.7 CM2
RIGHT VENTRICLE ID DIMENSION: 4 CM
RSV RNA RESP QL NAA+PROBE: NEGATIVE
RV PSP: 55 MMHG
S PNEUM AG UR QL: NEGATIVE
SARS-COV-2 RNA RESP QL NAA+PROBE: NEGATIVE
SINOTUBULAR JUNCTION: 3.5 CM
SL CV LEFT ATRIUM LENGTH A2C: 6.1 CM
SL CV LV EF: 45
SL CV PED ECHO LEFT VENTRICLE DIASTOLIC VOLUME (MOD BIPLANE) 2D: 114 ML
SL CV PED ECHO LEFT VENTRICLE SYSTOLIC VOLUME (MOD BIPLANE) 2D: 66 ML
SL CV SINUS OF VALSALVA 2D: 4.2 CM
SODIUM 24H UR-SCNC: 41 MOL/L
SODIUM SERPL-SCNC: 134 MMOL/L (ref 135–147)
STJ: 3.5 CM
TIBC SERPL-MCNC: 216 UG/DL (ref 250–450)
TR MAX PG: 40 MMHG
TR PEAK VELOCITY: 3.2 M/S
TRICUSPID ANNULAR PLANE SYSTOLIC EXCURSION: 1.5 CM
TRICUSPID VALVE PEAK REGURGITATION VELOCITY: 3.18 M/S
UIBC SERPL-MCNC: 197 UG/DL (ref 155–355)
VIT B12 SERPL-MCNC: 1268 PG/ML (ref 180–914)
WBC # BLD AUTO: 7.92 THOUSAND/UL (ref 4.31–10.16)

## 2024-04-27 PROCEDURE — 87205 SMEAR GRAM STAIN: CPT

## 2024-04-27 PROCEDURE — 85027 COMPLETE CBC AUTOMATED: CPT

## 2024-04-27 PROCEDURE — 0241U HB NFCT DS VIR RESP RNA 4 TRGT: CPT

## 2024-04-27 PROCEDURE — 99223 1ST HOSP IP/OBS HIGH 75: CPT | Performed by: INTERNAL MEDICINE

## 2024-04-27 PROCEDURE — 80053 COMPREHEN METABOLIC PANEL: CPT

## 2024-04-27 PROCEDURE — 84300 ASSAY OF URINE SODIUM: CPT

## 2024-04-27 PROCEDURE — 83735 ASSAY OF MAGNESIUM: CPT

## 2024-04-27 PROCEDURE — 87449 NOS EACH ORGANISM AG IA: CPT

## 2024-04-27 PROCEDURE — 84145 PROCALCITONIN (PCT): CPT

## 2024-04-27 PROCEDURE — 93306 TTE W/DOPPLER COMPLETE: CPT | Performed by: INTERNAL MEDICINE

## 2024-04-27 PROCEDURE — 83935 ASSAY OF URINE OSMOLALITY: CPT

## 2024-04-27 PROCEDURE — 71250 CT THORAX DX C-: CPT

## 2024-04-27 PROCEDURE — 85610 PROTHROMBIN TIME: CPT

## 2024-04-27 PROCEDURE — 93306 TTE W/DOPPLER COMPLETE: CPT

## 2024-04-27 PROCEDURE — 94760 N-INVAS EAR/PLS OXIMETRY 1: CPT

## 2024-04-27 RX ORDER — FUROSEMIDE 10 MG/ML
40 INJECTION INTRAMUSCULAR; INTRAVENOUS ONCE
Status: COMPLETED | OUTPATIENT
Start: 2024-04-27 | End: 2024-04-27

## 2024-04-27 RX ORDER — FUROSEMIDE 10 MG/ML
80 INJECTION INTRAMUSCULAR; INTRAVENOUS
Status: DISCONTINUED | OUTPATIENT
Start: 2024-04-27 | End: 2024-04-29

## 2024-04-27 RX ORDER — WARFARIN SODIUM 2.5 MG/1
2.5 TABLET ORAL
Status: DISCONTINUED | OUTPATIENT
Start: 2024-04-29 | End: 2024-04-27

## 2024-04-27 RX ORDER — WARFARIN SODIUM 1 MG/1
1 TABLET ORAL
Status: DISCONTINUED | OUTPATIENT
Start: 2024-04-27 | End: 2024-04-27

## 2024-04-27 RX ORDER — AZITHROMYCIN 250 MG/1
500 TABLET, FILM COATED ORAL ONCE
Qty: 2 TABLET | Refills: 0 | Status: COMPLETED | OUTPATIENT
Start: 2024-04-27 | End: 2024-04-27

## 2024-04-27 RX ORDER — FUROSEMIDE 10 MG/ML
80 INJECTION INTRAMUSCULAR; INTRAVENOUS
Status: DISCONTINUED | OUTPATIENT
Start: 2024-04-27 | End: 2024-04-27

## 2024-04-27 RX ADMIN — GUAIFENESIN 600 MG: 600 TABLET ORAL at 10:45

## 2024-04-27 RX ADMIN — ASPIRIN 81 MG: 81 TABLET, COATED ORAL at 10:45

## 2024-04-27 RX ADMIN — LABETALOL HYDROCHLORIDE 100 MG: 100 TABLET, FILM COATED ORAL at 18:23

## 2024-04-27 RX ADMIN — SENNOSIDES 8.6 MG: 8.6 TABLET, FILM COATED ORAL at 10:45

## 2024-04-27 RX ADMIN — ATORVASTATIN CALCIUM 40 MG: 40 TABLET, FILM COATED ORAL at 10:45

## 2024-04-27 RX ADMIN — TAMSULOSIN HYDROCHLORIDE 0.4 MG: 0.4 CAPSULE ORAL at 10:45

## 2024-04-27 RX ADMIN — FUROSEMIDE 80 MG: 10 INJECTION, SOLUTION INTRAMUSCULAR; INTRAVENOUS at 10:45

## 2024-04-27 RX ADMIN — CEFTRIAXONE SODIUM 1000 MG: 10 INJECTION, POWDER, FOR SOLUTION INTRAVENOUS at 21:13

## 2024-04-27 RX ADMIN — GABAPENTIN 900 MG: 300 CAPSULE ORAL at 21:13

## 2024-04-27 RX ADMIN — PHYTONADIONE 10 MG: 10 INJECTION, EMULSION INTRAMUSCULAR; INTRAVENOUS; SUBCUTANEOUS at 13:13

## 2024-04-27 RX ADMIN — FUROSEMIDE 40 MG: 10 INJECTION, SOLUTION INTRAMUSCULAR; INTRAVENOUS at 01:17

## 2024-04-27 RX ADMIN — LABETALOL HYDROCHLORIDE 100 MG: 100 TABLET, FILM COATED ORAL at 10:45

## 2024-04-27 RX ADMIN — AZITHROMYCIN DIHYDRATE 500 MG: 250 TABLET ORAL at 21:13

## 2024-04-27 RX ADMIN — POLYETHYLENE GLYCOL 3350 17 G: 17 POWDER, FOR SOLUTION ORAL at 10:46

## 2024-04-27 RX ADMIN — AMIODARONE HYDROCHLORIDE 200 MG: 200 TABLET ORAL at 10:45

## 2024-04-27 RX ADMIN — GUAIFENESIN 600 MG: 600 TABLET ORAL at 18:23

## 2024-04-27 RX ADMIN — FUROSEMIDE 80 MG: 10 INJECTION, SOLUTION INTRAMUSCULAR; INTRAVENOUS at 21:14

## 2024-04-27 RX ADMIN — GABAPENTIN 900 MG: 300 CAPSULE ORAL at 00:03

## 2024-04-27 NOTE — ASSESSMENT & PLAN NOTE
Wt Readings from Last 3 Encounters:   04/19/24 103 kg (227 lb 9.6 oz)   03/07/24 114 kg (252 lb 6.4 oz)   03/05/24 114 kg (252 lb 1.6 oz)     Patient has history of chronic diastolic CHF in the setting of lower extremity lymphedema  Patient recently began lymphedema therapy with bilateral lower extremity binding, following this patient experienced roughly 20 pound weight loss in a month due to increased excretion from increased venous return  Patient reports adherence to outpatient diuretic therapy as well as to cardiac diet  Not in exacerbation    Plan:  Daily weights while inpatient  Fluid restriction 2000 mL  Sodium restriction  Continue home diuretic regimen  Monitor I's and O's  Trend CMP

## 2024-04-27 NOTE — ASSESSMENT & PLAN NOTE
Recent Labs     04/26/24  2347 04/27/24  0547   INR 4.12* 3.76*       Goal INR 2-3  Per patient: Home regimen is 5 mg every Sunday, 2.5 mg Monday through Saturday  Total weekly dose: 20mg  INR checked while in hospital, elevated 4.2  Recent Labs     04/26/24  2347 04/27/24  0547 04/28/24  0628   INR 4.12* 3.76* 1.66*        Plan:  Continue amiodarone 200 mg daily  Patient can resume warfarin with INR goal of 2-3 after completion of thoracentesis today

## 2024-04-27 NOTE — ASSESSMENT & PLAN NOTE
Currently undergoing lymphedema therapy  20 pound weight loss over the past month  Lower limb still examined with significant venous insufficiency and hemosiderin staining, much improved from prior as per patient  Very large varicosity on posterior surface of right calf, nontender to palpation

## 2024-04-27 NOTE — ASSESSMENT & PLAN NOTE
Lab Results   Component Value Date    EGFR 57 04/27/2024    EGFR 50 04/26/2024    EGFR 50 04/19/2024    CREATININE 1.17 04/27/2024    CREATININE 1.31 (H) 04/26/2024    CREATININE 1.31 (H) 04/19/2024   Kidney function at baseline on time of presentation     Plan:  Continue to monitor with daily labs and in the setting of diuresis

## 2024-04-27 NOTE — ASSESSMENT & PLAN NOTE
Patient has history of severe YEVGENIY, last sleep study in 4/9/2021 demonstrating apnea-hypopnea index of 40.1  Patient was historically trialed on CPAP which she was intolerant of, inspire implanted initially proposed to patient who refused    Plan:  Inpatient CPAP offered to patient, patient declined

## 2024-04-27 NOTE — ASSESSMENT & PLAN NOTE
Patient presented to ED due to chief complaint of progressive shortness of breath over past roughly 10 days.  Patient endorsed becoming more easily fatigued and breathless with everyday tasks.  Patient endorsed difficulty with simple tasks such as climbing stairs, but denied any significant orthopnea. Of note, patient recently started lymphedema therapy with daily leg wrapping and concomitant 20 pound weight loss following increased systemic return of fluid from leg wrapping.    In the ED, patient was found to be saturating 88% on room air and was placed on 3 L nasal cannula with improvement.  Chest x-ray demonstrated left-sided pleural effusion with possible volume overload component.  Following this, patient was given ceftriaxone and azithromycin for presumed pneumonia and admitted to [facility] service for further management    Once admitted, further workup indicated procalcitonin of 0.09, BNP of 319.  Exam demonstrated mildly increased work of breathing, but clinical picture including laboratory studies and vitals does not seem infectious at this point in time.    Plan:  Will not treat as infectious etiology at this point in time, no further antibiotics  Differential includes CHF exacerbation versus transient volume overload state from lymphedema versus other intrapulmonary process  Patient does have mildly elevated D-dimer in the setting of known lymphedema and varicose veins, maintained on warfarin in the outpatient setting with therapeutic INR and recent lower extremity DVT study negative for any findings, low suspicion for PE  CT chest noncontrast ordered for better characterization of effusion and lungs  Patient will be trialed on one-time Lasix 40 mg IV with assessment of work of breathing  Monitor renal function in the setting of diuresis

## 2024-04-27 NOTE — H&P
[facility]  H&P  Name: Oscar Espinosa 83 y.o. male I MRN: [id number]  Unit/Bed#: S -01 I Date of Admission: 4/26/2024   Date of Service: 4/27/2024 I Hospital Day: 1      Assessment/Plan   * Pleural effusion with shortness of breath  Assessment & Plan  Patient presented to ED due to chief complaint of progressive shortness of breath over past roughly 10 days.  Patient endorsed becoming more easily fatigued and breathless with everyday tasks.  Patient endorsed difficulty with simple tasks such as climbing stairs, but denied any significant orthopnea. Of note, patient recently started lymphedema therapy with daily leg wrapping and concomitant 20 pound weight loss following increased systemic return of fluid from leg wrapping.    In the ED, patient was found to be saturating 88% on room air and was placed on 3 L nasal cannula with improvement.  Chest x-ray demonstrated left-sided pleural effusion with possible volume overload component.  Following this, patient was given ceftriaxone and azithromycin for presumed pneumonia and admitted to [facility] service for further management    Once admitted, further workup indicated procalcitonin of 0.09, BNP of 319.  Exam demonstrated mildly increased work of breathing, but clinical picture including laboratory studies and vitals does not seem infectious at this point in time.    Plan:  Will not treat as infectious etiology at this point in time, no further antibiotics  Differential includes CHF exacerbation versus transient volume overload state from lymphedema versus other intrapulmonary process  Patient does have mildly elevated D-dimer in the setting of known lymphedema and varicose veins, maintained on warfarin in the outpatient setting with therapeutic INR and recent lower extremity DVT study negative for any findings, low suspicion for PE  CT chest noncontrast ordered for better characterization of effusion and lungs  Patient will be trialed on  one-time Lasix 40 mg IV with assessment of work of breathing  Monitor renal function in the setting of diuresis    Chronic diastolic congestive heart failure (HCC)  Assessment & Plan  Wt Readings from Last 3 Encounters:   04/19/24 103 kg (227 lb 9.6 oz)   03/07/24 114 kg (252 lb 6.4 oz)   03/05/24 114 kg (252 lb 1.6 oz)     Patient has history of chronic diastolic CHF in the setting of lower extremity lymphedema  Patient recently began lymphedema therapy with bilateral lower extremity binding, following this patient experienced roughly 20 pound weight loss in a month due to increased excretion from increased venous return  Patient reports adherence to outpatient diuretic therapy as well as to cardiac diet    Plan:  Daily weights while inpatient  Fluid restriction 2000 mL  Sodium restriction  Continue home diuretic regimen, 1 additional dose Lasix given  Monitor for effect on work of breathing  Can consider cardiology consult if current condition appears to be a CHF exacerbation  If proven CHF exacerbation, consider lymphedema therapy with increased venous return as possible source of transient fluid overload          Stage 3a chronic kidney disease (HCC)  Assessment & Plan  Lab Results   Component Value Date    EGFR 57 04/27/2024    EGFR 50 04/26/2024    EGFR 50 04/19/2024    CREATININE 1.17 04/27/2024    CREATININE 1.31 (H) 04/26/2024    CREATININE 1.31 (H) 04/19/2024   Kidney function at baseline on time of presentation     Plan:  Continue to monitor with daily labs and in the setting of diuresis     Anemia  Assessment & Plan  Recent Labs     04/26/24 1942 04/27/24  0547   HGB 10.3* 10.7*       Patient demonstrating new anemia, down from baseline of 13-14  Pt has also historically demonstrated mild macrocytosis, now demonstrating normocytosis     Plan:  Continue to monitor  Iron, folate, B12 ordered    Hyponatremia  Assessment & Plan  Recent Labs     04/26/24 1942 04/27/24  0547   SODIUM 133* 134*       Mild  hyponatremia on presentation to 133, possibly in the setting of volume overload  Urine studies sent, follow for result     Plan:  Continue to monitor in the setting of diuresis   Follow urine and serum studies for results    Venous insufficiency of both lower extremities  Assessment & Plan  Currently undergoing lymphedema therapy  20 pound weight loss over the past month  Lower limb still examined with significant venous insufficiency and hemosiderin staining, much improved from prior as per patient  Very large varicosity on posterior surface of right calf, nontender to palpation    Atrial fibrillation (HCC)  Assessment & Plan  Recent Labs     04/26/24  2347 04/27/24  0547   INR 4.12* 3.76*       Goal INR 2-3  Per patient: Home regimen is 5 mg every Sunday, 2.5 mg Monday through Saturday  Total weekly dose: 20mg  INR checked while in hospital, elevated 4.2    Plan:  Reduce Saturday AM dose from 2.5 to 1mg, 7.5% reduction   Resume regular scheduled dosing on Sunday  Continue to monitor daily    Chronic obstructive pulmonary disease (HCC)  Assessment & Plan  Patient has history of COPD, historically maintained on multiple medications but now only maintained on albuterol as needed in outpatient setting    Plan:  Albuterol as needed while inpatient  Continue to monitor work of breathing    Chronic pain syndrome  Assessment & Plan  Continue gabapentin 900 mg at bedtime    3-vessel CAD  Assessment & Plan  History of quadruple bypass  Continue ASA, statin, beta-blockade, diuretic  A.m. echo ordered to assess cardiac function in the setting of possible volume overload state    Severe obstructive sleep apnea  Assessment & Plan  Patient has history of severe YEVGENIY, last sleep study in 4/9/2021 demonstrating apnea-hypopnea index of 40.1  Patient was historically trialed on CPAP which she was intolerant of, inspire implanted initially proposed to patient who refused    Plan:  Inpatient CPAP offered to patient, patient  declined    Hyperlipidemia  Assessment & Plan  Continue home Lipitor    Benign essential hypertension  Assessment & Plan  Pressures appropriate while inpatient  Continue home regimen of labetalol and torsemide         VTE Pharmacologic Prophylaxis: VTE Score: 7 High Risk (Score >/= 5) - Pharmacological DVT Prophylaxis Ordered: warfarin (Coumadin). Sequential Compression Devices Ordered.  Code Status: Level 3 - DNAR and DNI   Discussion with family: Updated  (wife) at bedside.    Anticipated Length of Stay: Patient will be admitted on an inpatient basis with an anticipated length of stay of greater than 2 midnights secondary to CHF versus pneumonia.    Chief Complaint: Shortness of breath    History of Present Illness:  Oscar Espinosa is a 83 y.o. male with a PMH CAD s/p four-vessel CABG, COPD, YEVGENIY, A-fib, hypertension, dyslipidemia, CKD 3A, lymphedema of  who presents complaining of shortness of breath.  As per patient, for roughly last 10 days he has noticed progressive shortness of breath.  Patient endorses subjective difficulty in climbing stairs, attending to his ADLs, but denies any orthopnea.  Patient endorses occasional coughing during this time productive of yellow to tan sputum, but denies any chest pain, fevers, chills, nausea, vomiting, diarrhea, chest pain, or other similar signs or symptoms.  Of note, patient endorses recently finishing lymphedema therapy where he had his legs wrapped with elastic leading to roughly 20 pound weight loss over the course of 1 month.  Patient presented to ED for assessment of above    In ED, patient was found to be hemodynamically stable but mildly hypoxic, saturating 88% on room air.  Patient was placed on 2 to 3 L nasal cannula with improvement in saturations.  CBC and CMP taken in ED demonstrated no leukocytosis, did demonstrate mild anemia of 10.8, mild hyponatremia of 133.     Rest of workup on floor demonstrated Procalcitonin of 0.08, BNP of 319,  negative troponins.  D-dimer mildly elevated at 2.14, but in the setting of consistently therapeutic INR on warfarin with known lower extremity venous stasis with negative Doppler studies in January low suspicion at this time for PE.  CT chest demonstrating minimally loculated moderate size left pleural effusion with compressive atelectasis.  Patient given 1 dose IV Lasix 40 mg, monitored throughout night    Review of Systems:  Review of Systems   Constitutional:  Positive for unexpected weight change. Negative for chills and fever.   HENT:  Negative for ear pain and sore throat.    Eyes:  Negative for pain and visual disturbance.   Respiratory:  Positive for cough and shortness of breath. Negative for chest tightness and wheezing.    Cardiovascular:  Negative for chest pain and palpitations.   Gastrointestinal:  Negative for abdominal pain and vomiting.   Genitourinary:  Negative for dysuria and hematuria.   Musculoskeletal:  Negative for arthralgias and back pain.   Skin:  Negative for color change and rash.   Neurological:  Negative for seizures and syncope.   Psychiatric/Behavioral:  Negative for agitation.    All other systems reviewed and are negative.      Past Medical and Surgical History:   Past Medical History:   Diagnosis Date    Abdominal aortic aneurysm ([facility])     s/p repair    Abnormal ECG july 2022    Aneurysm ([facility]) 2007    Aneurysm of abdominal aorta ([facility])     49mm, trileaflet AV    Atrial fibrillation ([facility])     Eliquis    BPH (benign prostatic hyperplasia)     CAD (coronary artery disease)     s/p CABGx3    CHF (congestive heart failure) ([facility])     Chronic pain     Gabapentin    Chronic pain disorder     lumbar    COPD (chronic obstructive pulmonary disease) ([facility])     Coronary artery disease     Former tobacco use     Hyperlipidemia     Hypertension     Hypothyroidism     Lumbar radiculopathy     Lumbar stenosis     s/p injections    Neuropathy     Obesity (BMI 30-39.9)     YEVGENIY (obstructive sleep  apnea)     unable to tolerate CPAP    Pulmonary hypertension (HCC)     moderate to severe    Spondylolisthesis of lumbar region     Visual impairment 2022    Vitamin D deficiency        Past Surgical History:   Procedure Laterality Date    ABDOMINAL AORTIC ANEURYSM REPAIR  08/09/2007    2 dock limbs with visc extens prosth    CARDIAC CATHETERIZATION      COLONOSCOPY      CORONARY ARTERY BYPASS GRAFT  2003    LIMA to LAD, sequential SVG to OM1 & OM2, SVG to RDA    LUMBAR EPIDURAL INJECTION         Meds/Allergies:  Prior to Admission medications    Medication Sig Start Date End Date Taking? Authorizing Provider   albuterol (Ventolin HFA) 90 mcg/act inhaler Inhale 2 puffs every 6 (six) hours as needed for wheezing 7/28/22  Yes Lea Reyes, MD   amiodarone 200 mg tablet Take 1 tablet (200 mg total) by mouth daily 9/21/23  Yes Raza Alan DO   aspirin (ECOTRIN LOW STRENGTH) 81 mg EC tablet Take 1 tablet by mouth daily 9/7/12  Yes Historical Provider, MD   atorvastatin (LIPITOR) 40 mg tablet Take 1 tablet (40 mg total) by mouth daily 9/13/23  Yes Omar Kessler MD   cholecalciferol (VITAMIN D3) 1,000 units tablet Take 2,000 Units by mouth daily   Yes Historical Provider, MD   FIBER ADULT GUMMIES PO Take 1 caplet by mouth daily    Yes Historical Provider, MD   gabapentin (NEURONTIN) 300 mg capsule Take 3 capsules (900 mg total) by mouth daily at bedtime 3/7/24  Yes Lea Reyes, MD   labetalol (NORMODYNE) 100 mg tablet Take 1 tablet (100 mg total) by mouth 2 (two) times a day 9/21/23  Yes Raza Alan DO   multivitamin (THERAGRAN) TABS Take 1 tablet by mouth daily   Yes Historical Provider, MD   potassium chloride (Klor-Con M20) 20 mEq tablet Take one tab daily M-F 3/5/24  Yes SOSA Miguel   senna (SENOKOT) 8.6 MG tablet Take 1 tablet by mouth as needed     Yes Historical Provider, MD   tamsulosin (FLOMAX) 0.4 mg Take 1 capsule by mouth daily   Yes Historical Provider, MD   torsemide (DEMADEX) 20  "mg tablet Torsemide 40mg daily - and 20mg on Saturday and Florencio 3/5/24  Yes SOSA Miguel   warfarin (Coumadin) 5 mg tablet TAKE 1 TO 2 TABS BY MOUTH DAILY OR AS DIRECTED BY PHYSICIAN 8/10/23  Yes Raza Alan DO     I have reviewed home medications with patient personally.    Allergies:   Allergies   Allergen Reactions    Meloxicam Edema       Social History:  Marital Status: /Civil Union   Occupation: Retired   Patient Pre-hospital Living Situation: Home  Patient Pre-hospital Level of Mobility: walks  Patient Pre-hospital Diet Restrictions: Cardiac diet   Substance Use History:   Social History     Substance and Sexual Activity   Alcohol Use Yes    Alcohol/week: 21.0 standard drinks of alcohol    Types: 21 Cans of beer per week     Social History     Tobacco Use   Smoking Status Former    Current packs/day: 0.00    Average packs/day: 1 pack/day for 55.6 years (55.6 ttl pk-yrs)    Types: Cigarettes    Start date: 1957    Quit date: 2012    Years since quittin.7   Smokeless Tobacco Never     Social History     Substance and Sexual Activity   Drug Use No       Family History:  Family History   Problem Relation Age of Onset    Heart disease Mother     Stroke Father         stroke syndrome    Aneurysm Brother         abdominal    Aortic aneurysm Brother         ascending    Coronary artery disease Family     Hypertension Family     Other Son         gioblastoma multiforme       Physical Exam:     Vitals:   Blood Pressure: 122/73 (24 0115)  Pulse: 64 (24 0115)  Temperature: 97.6 °F (36.4 °C) (24)  Temp Source: Oral (24)  Respirations: 18 (24)  Height: 5' 11\" (180.3 cm) (24)  SpO2: 91 % (24 0558)    Physical Exam  Constitutional:       General: He is not in acute distress.     Appearance: Normal appearance. He is not ill-appearing.      Comments: On 2 L oxygen but overall well-appearing   HENT:      Head: Normocephalic " and atraumatic.      Right Ear: External ear normal.      Left Ear: External ear normal.      Nose: Nose normal.      Mouth/Throat:      Mouth: Mucous membranes are moist.      Pharynx: Oropharynx is clear.   Eyes:      General: No scleral icterus.     Extraocular Movements: Extraocular movements intact.      Conjunctiva/sclera: Conjunctivae normal.   Cardiovascular:      Rate and Rhythm: Normal rate. Rhythm irregular.      Pulses: Normal pulses.      Heart sounds: Normal heart sounds. No murmur heard.     No friction rub. No gallop.   Pulmonary:      Breath sounds: Rhonchi present. No wheezing or rales.      Comments: Reduced breath sounds at L base   Abdominal:      General: Abdomen is flat.      Palpations: Abdomen is soft. There is no mass.      Tenderness: There is no abdominal tenderness. There is no guarding.   Musculoskeletal:      Cervical back: Normal range of motion and neck supple.      Right lower leg: Edema present.      Left lower leg: Edema present.      Comments: Significant edema, but improved from prior as per patient    Lymphadenopathy:      Cervical: No cervical adenopathy.   Skin:     General: Skin is warm and dry.   Neurological:      Mental Status: He is alert. Mental status is at baseline.   Psychiatric:         Mood and Affect: Mood normal.          Additional Data:     Lab Results:  Results from last 7 days   Lab Units 04/27/24  0547 04/26/24  1942   WBC Thousand/uL 7.92 9.89   HEMOGLOBIN g/dL 10.7* 10.3*   HEMATOCRIT % 34.6* 32.5*   PLATELETS Thousands/uL 251 259   SEGS PCT %  --  82*   LYMPHO PCT %  --  6*   MONO PCT %  --  10   EOS PCT %  --  0     Results from last 7 days   Lab Units 04/27/24  0547   SODIUM mmol/L 134*   POTASSIUM mmol/L 3.9   CHLORIDE mmol/L 97   CO2 mmol/L 29   BUN mg/dL 20   CREATININE mg/dL 1.17   ANION GAP mmol/L 8   CALCIUM mg/dL 8.6   ALBUMIN g/dL 2.9*   TOTAL BILIRUBIN mg/dL 0.92   ALK PHOS U/L 120*   ALT U/L 54*   AST U/L 53*   GLUCOSE RANDOM mg/dL 101      Results from last 7 days   Lab Units 04/27/24  0547   INR  3.76*             Results from last 7 days   Lab Units 04/27/24  0547 04/26/24  1942   PROCALCITONIN ng/ml 0.08 0.08       Lines/Drains:  Invasive Devices       Peripheral Intravenous Line  Duration             Peripheral IV 04/26/24 Distal;Left;Upper;Ventral (anterior) Arm <1 day                        Imaging: Reviewed radiology reports from this admission including: chest xray and chest CT scan and Personally reviewed the following imaging: chest xray and chest CT scan  CT chest wo contrast   Final Result by Keon Verduzco MD (04/27 0158)      Minimally loculated moderate-sized left pleural effusion. Compressive atelectasis at the left lower lobe and lingula.      Stable fusiform aneurysmal enlargement of the ascending thoracic aorta measuring up to 49 mm comparing to 7/27/22.         Workstation performed: [id number]         XR chest 1 view portable   Final Result by Lisset Roland MD (04/27 0722)      Mild pulmonary venous congestion with moderate left effusion and left base atelectasis.            Workstation performed: [id number]             EKG and Other Studies Reviewed on Admission:   EKG: Atrial fibrillation. HR 74.    ** Please Note: This note has been constructed using a voice recognition system. **

## 2024-04-27 NOTE — ASSESSMENT & PLAN NOTE
Pressures appropriate while inpatient  Blood Pressure: 118/66   Continue home regimen of labetalol and torsemide

## 2024-04-27 NOTE — ASSESSMENT & PLAN NOTE
History of quadruple bypass  Echocardiogram on 4/27 shows mildly reduced ejection fraction of 45%  Patient appears euvolemic on exam  Continue ASA, statin, beta-blockade, diuretic

## 2024-04-27 NOTE — RESPIRATORY THERAPY NOTE
"RT Protocol Note  Oscar Espinosa 83 y.o. male MRN: [id number]  Unit/Bed#: S -01 Encounter: [id number]    Assessment        Home Pulmonary Medications:  Albuterol inhaler         Subjective         Objective    Physical Exam:   Assessment Type: Assess only  General Appearance: Alert, Awake  Respiratory Pattern: Normal, Dyspnea with exertion  Bilateral Breath Sounds: Diminished, Rhonchi  Cough: Strong, Productive  O2 Device: RA    Vitals:  Blood pressure 122/73, pulse 64, temperature 97.6 °F (36.4 °C), resp. rate 18, height 5' 11\" (1.803 m), SpO2 91%.          Imaging and other studies: I have personally reviewed pertinent reports.      O2 Device: RA     Plan    Respiratory Plan: Home Bronchodilator Patient pathway, No distress/Pulmonary history        Resp Comments: Pt states hx of copd and uses albuterol for maintenance at home. Patient was a previous smoker and has a 40 year pack history.   "

## 2024-04-27 NOTE — ASSESSMENT & PLAN NOTE
Lab Results   Component Value Date    EGFR 50 04/26/2024    EGFR 50 04/19/2024    EGFR 47 03/19/2024    CREATININE 1.31 (H) 04/26/2024    CREATININE 1.31 (H) 04/19/2024    CREATININE 1.38 (H) 03/19/2024   Kidney function at baseline on time of presentation     Plan:  Continue to monitor with daily labs and in the setting of diuresis

## 2024-04-27 NOTE — ED PROVIDER NOTES
History  Chief Complaint   Patient presents with    Shortness of Breath     Complaining of SOB that began today. Hx of CHF. Denies chest pain.     Patient is an 83-year-old male who presents with increased shortness of breath with exertion over the past few days.  He states that he has been having some shortness of breath for about 10 days to 2 weeks but over the past few days it has gotten significantly worse.  He is unable to do his activities of daily living without profound shortness of breath.  He also has had a cough productive of yellow phlegm and brown phlegm.  He is producing more sputum than usual.  He states that he has recently started getting his legs wrapped for lymphedema so he has actually been losing weight as his edema has been immobilized.  He has been taking his torsemide regularly.  He denies high fevers but does have general malaise and fatigue.      Shortness of Breath  Associated symptoms: cough    Associated symptoms: no chest pain, no fever, no headaches and no wheezing        Prior to Admission Medications   Prescriptions Last Dose Informant Patient Reported? Taking?   FIBER ADULT GUMMIES PO  Self Yes No   Sig: Take 1 caplet by mouth daily    albuterol (Ventolin HFA) 90 mcg/act inhaler  Self No No   Sig: Inhale 2 puffs every 6 (six) hours as needed for wheezing   amiodarone 200 mg tablet 4/26/2024 Self No Yes   Sig: Take 1 tablet (200 mg total) by mouth daily   aspirin (ECOTRIN LOW STRENGTH) 81 mg EC tablet 4/25/2024 Self Yes Yes   Sig: Take 1 tablet by mouth daily   atorvastatin (LIPITOR) 40 mg tablet 4/26/2024 Self No Yes   Sig: Take 1 tablet (40 mg total) by mouth daily   cholecalciferol (VITAMIN D3) 1,000 units tablet  Self Yes No   Sig: Take 2,000 Units by mouth daily   gabapentin (NEURONTIN) 300 mg capsule 4/25/2024 Self No Yes   Sig: Take 3 capsules (900 mg total) by mouth daily at bedtime   labetalol (NORMODYNE) 100 mg tablet 4/26/2024 Self No Yes   Sig: Take 1 tablet (100 mg  total) by mouth 2 (two) times a day   multivitamin (THERAGRAN) TABS  Self Yes No   Sig: Take 1 tablet by mouth daily   potassium chloride (Klor-Con M20) 20 mEq tablet  Self No No   Sig: Take one tab daily M-F   senna (SENOKOT) 8.6 MG tablet  Self Yes No   Sig: Take 1 tablet by mouth as needed     tamsulosin (FLOMAX) 0.4 mg  Self Yes No   Sig: Take 1 capsule by mouth daily   torsemide (DEMADEX) 20 mg tablet 4/26/2024 Self No Yes   Sig: Torsemide 40mg daily M-F and 20mg on Saturday and Sunday   warfarin (Coumadin) 5 mg tablet 4/26/2024 Self No Yes   Sig: TAKE 1 TO 2 TABS BY MOUTH DAILY OR AS DIRECTED BY PHYSICIAN      Facility-Administered Medications Last Administration Doses Remaining   albuterol (PROVENTIL HFA,VENTOLIN HFA) inhaler 2 puff None recorded           Past Medical History:   Diagnosis Date    Abdominal aortic aneurysm (HCC)     s/p repair    Abnormal ECG july 2022    Aneurysm (Formerly [facility]) 2007    Aneurysm of abdominal aorta (Formerly [facility])     49mm, trileaflet AV    Atrial fibrillation (Formerly [facility])     Eliquis    BPH (benign prostatic hyperplasia)     CAD (coronary artery disease)     s/p CABGx3    CHF (congestive heart failure) (Formerly [facility])     Chronic pain     Gabapentin    Chronic pain disorder     lumbar    COPD (chronic obstructive pulmonary disease) (Formerly [facility])     Coronary artery disease     Former tobacco use     Hyperlipidemia     Hypertension     Hypothyroidism     Lumbar radiculopathy     Lumbar stenosis     s/p injections    Neuropathy     Obesity (BMI 30-39.9)     YEVGENIY (obstructive sleep apnea)     unable to tolerate CPAP    Pulmonary hypertension (Formerly [facility])     moderate to severe    Spondylolisthesis of lumbar region     Visual impairment 2022    Vitamin D deficiency        Past Surgical History:   Procedure Laterality Date    ABDOMINAL AORTIC ANEURYSM REPAIR  08/09/2007    2 dock limbs with visc extens prosth    CARDIAC CATHETERIZATION      COLONOSCOPY      CORONARY ARTERY BYPASS GRAFT  2003    LIMA to LAD, sequential SVG to OM1 &  OM2, SVG to RDA    LUMBAR EPIDURAL INJECTION         Family History   Problem Relation Age of Onset    Heart disease Mother     Stroke Father         stroke syndrome    Aneurysm Brother         abdominal    Aortic aneurysm Brother         ascending    Coronary artery disease Family     Hypertension Family     Other Son         gioblastoma multiforme     I have reviewed and agree with the history as documented.    E-Cigarette/Vaping    E-Cigarette Use Never User      E-Cigarette/Vaping Substances    Nicotine No     THC No     CBD No     Flavoring No     Other No     Unknown No      Social History     Tobacco Use    Smoking status: Former     Current packs/day: 0.00     Average packs/day: 1 pack/day for 55.6 years (55.6 ttl pk-yrs)     Types: Cigarettes     Start date: 1957     Quit date: 2012     Years since quittin.7    Smokeless tobacco: Never   Vaping Use    Vaping status: Never Used   Substance Use Topics    Alcohol use: Yes     Alcohol/week: 21.0 standard drinks of alcohol     Types: 21 Cans of beer per week    Drug use: No       Review of Systems   Constitutional:  Positive for activity change, appetite change, chills and fatigue. Negative for fever.   HENT: Negative.     Eyes: Negative.    Respiratory:  Positive for cough and shortness of breath. Negative for wheezing.    Cardiovascular:  Positive for leg swelling. Negative for chest pain and palpitations.   Gastrointestinal: Negative.    Genitourinary: Negative.    Musculoskeletal: Negative.    Skin: Negative.    Neurological:  Positive for weakness. Negative for dizziness and headaches.   Hematological: Negative.    Psychiatric/Behavioral: Negative.         Physical Exam  Physical Exam  Vitals and nursing note reviewed.   Constitutional:       General: He is in acute distress.      Appearance: He is well-developed. He is obese.   HENT:      Head: Normocephalic and atraumatic.      Mouth/Throat:      Mouth: Mucous membranes are moist.       Pharynx: Oropharynx is clear.   Eyes:      Conjunctiva/sclera: Conjunctivae normal.      Pupils: Pupils are equal, round, and reactive to light.   Neck:      Vascular: No JVD.   Cardiovascular:      Rate and Rhythm: Normal rate. Rhythm irregular.      Pulses: Normal pulses.      Heart sounds: No murmur heard.  Pulmonary:      Effort: Respiratory distress present.      Breath sounds: Examination of the left-lower field reveals decreased breath sounds and rhonchi. Decreased breath sounds and rhonchi present.   Chest:      Chest wall: No tenderness, crepitus or edema.   Abdominal:      Palpations: Abdomen is soft.      Tenderness: There is no abdominal tenderness.   Musculoskeletal:         General: No swelling.      Cervical back: Neck supple.      Right lower leg: Edema present.      Left lower leg: Edema present.   Skin:     General: Skin is warm and dry.      Capillary Refill: Capillary refill takes less than 2 seconds.   Neurological:      General: No focal deficit present.      Mental Status: He is alert.   Psychiatric:         Mood and Affect: Mood normal.         Vital Signs  ED Triage Vitals   Temperature Pulse Respirations Blood Pressure SpO2   04/26/24 1936 04/26/24 1936 04/26/24 1936 04/26/24 1936 04/26/24 1936   98 °F (36.7 °C) 78 16 108/65 92 %      Temp Source Heart Rate Source Patient Position - Orthostatic VS BP Location FiO2 (%)   04/26/24 1936 04/26/24 2045 04/26/24 1936 04/26/24 1936 --   Oral Monitor Lying Right arm       Pain Score       04/26/24 1936       No Pain           Vitals:    04/26/24 1936 04/26/24 2045 04/26/24 2130 04/26/24 2145   BP: 108/65 99/58 114/64    Pulse: 78 68 68 68   Patient Position - Orthostatic VS: Lying            Visual Acuity      ED Medications  Medications   ceftriaxone (ROCEPHIN) 1 g/50 mL in dextrose IVPB (0 mg Intravenous Stopped 4/26/24 2140)   azithromycin (ZITHROMAX) tablet 500 mg (500 mg Oral Given 4/26/24 2043)       Diagnostic Studies  Results Reviewed        Procedure Component Value Units Date/Time    Blood culture [[id number]] Collected: 04/26/24 2230    Lab Status: No result Specimen: Blood from Arm, Left     Blood culture [[id number]]     Lab Status: No result Specimen: Blood     HS Troponin I 2hr [[id number]]  (Normal) Collected: 04/26/24 2142    Lab Status: Final result Specimen: Blood from Arm, Left Updated: 04/26/24 2218     hs TnI 2hr 9 ng/L      Delta 2hr hsTnI 0 ng/L     HS Troponin I 4hr [[id number]]     Lab Status: No result Specimen: Blood     HS Troponin 0hr (reflex protocol) [[id number]]  (Normal) Collected: 04/26/24 1942    Lab Status: Final result Specimen: Blood from Arm, Left Updated: 04/26/24 2023     hs TnI 0hr 9 ng/L     Comprehensive metabolic panel [[id number]]  (Abnormal) Collected: 04/26/24 1942    Lab Status: Final result Specimen: Blood from Arm, Left Updated: 04/26/24 2016     Sodium 133 mmol/L      Potassium 4.3 mmol/L      Chloride 95 mmol/L      CO2 33 mmol/L      ANION GAP 5 mmol/L      BUN 23 mg/dL      Creatinine 1.31 mg/dL      Glucose 112 mg/dL      Calcium 8.3 mg/dL      Corrected Calcium 9.3 mg/dL      AST 61 U/L      ALT 62 U/L      Alkaline Phosphatase 132 U/L      Total Protein 6.9 g/dL      Albumin 2.8 g/dL      Total Bilirubin 0.90 mg/dL      eGFR 50 ml/min/1.73sq m     Narrative:      National Kidney Disease Foundation guidelines for Chronic Kidney Disease (CKD):     Stage 1 with normal or high GFR (GFR > 90 mL/min/1.73 square meters)    Stage 2 Mild CKD (GFR = 60-89 mL/min/1.73 square meters)    Stage 3A Moderate CKD (GFR = 45-59 mL/min/1.73 square meters)    Stage 3B Moderate CKD (GFR = 30-44 mL/min/1.73 square meters)    Stage 4 Severe CKD (GFR = 15-29 mL/min/1.73 square meters)    Stage 5 End Stage CKD (GFR <15 mL/min/1.73 square meters)  Note: GFR calculation is accurate only with a steady state creatinine    B-Type Natriuretic Peptide(BNP) [[id number]]     Lab Status: No result Specimen: Blood     CBC and differential  [[id number]]  (Abnormal) Collected: 04/26/24 1942    Lab Status: Final result Specimen: Blood from Arm, Left Updated: 04/26/24 1952     WBC 9.89 Thousand/uL      RBC 3.41 Million/uL      Hemoglobin 10.3 g/dL      Hematocrit 32.5 %      MCV 95 fL      MCH 30.2 pg      MCHC 31.7 g/dL      RDW 15.9 %      MPV 9.6 fL      Platelets 259 Thousands/uL      nRBC 0 /100 WBCs      Segmented % 82 %      Immature Grans % 1 %      Lymphocytes % 6 %      Monocytes % 10 %      Eosinophils Relative 0 %      Basophils Relative 1 %      Absolute Neutrophils 8.18 Thousands/µL      Absolute Immature Grans 0.06 Thousand/uL      Absolute Lymphocytes 0.63 Thousands/µL      Absolute Monocytes 0.94 Thousand/µL      Eosinophils Absolute 0.03 Thousand/µL      Basophils Absolute 0.05 Thousands/µL                    XR chest 1 view portable    (Results Pending)              Procedures  Procedures         ED Course  ED Course as of 04/26/24 2234 Fri Apr 26, 2024 2025 Creatinine(!): 1.31                               SBIRT 22yo+      Flowsheet Row Most Recent Value   Initial Alcohol Screen: US AUDIT-C     1. How often do you have a drink containing alcohol? 0 Filed at: 04/26/2024 1936   2. How many drinks containing alcohol do you have on a typical day you are drinking?  0 Filed at: 04/26/2024 1936   3a. Male UNDER 65: How often do you have five or more drinks on one occasion? 0 Filed at: 04/26/2024 1936   3b. FEMALE Any Age, or MALE 65+: How often do you have 4 or more drinks on one occassion? 0 Filed at: 04/26/2024 1936   Audit-C Score 0 Filed at: 04/26/2024 1936   JOHN: How many times in the past year have you...    Used an illegal drug or used a prescription medication for non-medical reasons? Never Filed at: 04/26/2024 1936                      Medical Decision Making  Differential diagnoses include but are not limited to CHF exacerbation, COPD exacerbation, renal failure, community-acquired pneumonia, acute viral syndrome, pleural  effusion,    MDM: Patient is an 83-year-old male who presents with shortness of breath with exertion and productive cough.  He also has general malaise.  He has been urinating normally.  He feels as though his water pill has been working appropriately.  Patient appears generally weak and was mildly short of breath on my evaluation while he was resting in bed.  Pulse ox was 88% requiring 2 L nasal cannula oxygen while he was here.  He had rhonchi and decreased breath sounds in the left lower lobe and chest x-ray does reveal a moderately large effusion on the left side.  Due to his symptoms of clinical pneumonia including increased sputum production, quality of sputum, general malaise I did give him antibiotics here in the ER for treatment of his community-acquired pneumonia.  His BNP was normal supporting the fact that this is likely not a CHF exacerbation.  He does not have any wheezing.  He was found to have normocytic anemia as well and a very mild transaminitis likely related to his bacterial infection.  Patient was admitted to the hospitalist service for further evaluation and management.  He remained hemodynamically stable throughout his stay here in the ER.    Amount and/or Complexity of Data Reviewed  Labs: ordered. Decision-making details documented in ED Course.  Radiology: ordered.     Details: Chest x-ray was performed that reveals a moderately large left pleural effusion likely associated with an infiltrate.  No bony abnormalities were noted  ECG/medicine tests: ordered.     Details: Twelve-lead EKG was performed at 1937.  Patient is in atrial fibrillation with a rate of 74.  Axis is normal.  There is a left bundle branch block.  No STEMI    Risk  Prescription drug management.  Decision regarding hospitalization.             Disposition  Final diagnoses:   Pneumonia   Pleural effusion   Hypoxia   Shortness of breath   Normocytic anemia   CKD (chronic kidney disease)   Transaminitis     Time reflects when  diagnosis was documented in both MDM as applicable and the Disposition within this note       Time User Action Codes Description Comment    4/26/2024 10:24 PM Bhavana Rodrigez [J18.9] Pneumonia     4/26/2024 10:24 PM Bhavana Rodrigez [J90] Pleural effusion     4/26/2024 10:24 PM Bhavana Rodrigez [R09.02] Hypoxia     4/26/2024 10:24 PM Bhavana Rodrigez [R06.02] Shortness of breath     4/26/2024 10:30 PM Bhavana Rodrigez [D64.9] Normocytic anemia     4/26/2024 10:30 PM Bhavana Rodrigez [N18.9] CKD (chronic kidney disease)     4/26/2024 10:31 PM Bhavana Rodrigez [R74.01] Transaminitis           ED Disposition       ED Disposition   Admit    Condition   Stable    Date/Time   Fri Apr 26, 2024 2224    Comment   Case was discussed with resident team and the patient's admission status was agreed to be Admission Status: inpatient status to the service of Dr. Jcak .               Follow-up Information    None         Patient's Medications   Discharge Prescriptions    No medications on file       No discharge procedures on file.    PDMP Review       None            ED Provider  Electronically Signed by             Bhavana Rodrigez MD  04/26/24 2106

## 2024-04-27 NOTE — PLAN OF CARE
Problem: Potential for Falls  Goal: Patient will remain free of falls  Description: INTERVENTIONS:  - Educate patient/family on patient safety including physical limitations  - Instruct patient to call for assistance with activity   - Consult OT/PT to assist with strengthening/mobility   - Keep Call bell within reach  - Keep bed low and locked with side rails adjusted as appropriate  - Keep care items and personal belongings within reach  - Initiate and maintain comfort rounds  - Make Fall Risk Sign visible to staff  - Offer Toileting every 2 Hours, in advance of need  - Initiate/Maintain alarm  - Obtain necessary fall risk management equipment  - Apply yellow socks and bracelet for high fall risk patients  - Consider moving patient to room near nurses station  Outcome: Progressing     Problem: Prexisting or High Potential for Compromised Skin Integrity  Goal: Skin integrity is maintained or improved  Description: INTERVENTIONS:  - Identify patients at risk for skin breakdown  - Assess and monitor skin integrity  - Assess and monitor nutrition and hydration status  - Monitor labs   - Assess for incontinence   - Turn and reposition patient  - Assist with mobility/ambulation  - Relieve pressure over bony prominences  - Avoid friction and shearing  - Provide appropriate hygiene as needed including keeping skin clean and dry  - Evaluate need for skin moisturizer/barrier cream  - Collaborate with interdisciplinary team   - Patient/family teaching  - Consider wound care consult   Outcome: Progressing     Problem: PAIN - ADULT  Goal: Verbalizes/displays adequate comfort level or baseline comfort level  Description: Interventions:  - Encourage patient to monitor pain and request assistance  - Assess pain using appropriate pain scale  - Administer analgesics based on type and severity of pain and evaluate response  - Implement non-pharmacological measures as appropriate and evaluate response  - Consider cultural and social  influences on pain and pain management  - Notify physician/advanced practitioner if interventions unsuccessful or patient reports new pain  Outcome: Progressing     Problem: INFECTION - ADULT  Goal: Absence or prevention of progression during hospitalization  Description: INTERVENTIONS:  - Assess and monitor for signs and symptoms of infection  - Monitor lab/diagnostic results  - Monitor all insertion sites, i.e. indwelling lines, tubes, and drains  - Monitor endotracheal if appropriate and nasal secretions for changes in amount and color  - [place] appropriate cooling/warming therapies per order  - Administer medications as ordered  - Instruct and encourage patient and family to use good hand hygiene technique  - Identify and instruct in appropriate isolation precautions for identified infection/condition  Outcome: Progressing  Goal: Absence of fever/infection during neutropenic period  Description: INTERVENTIONS:  - Monitor WBC    Outcome: Progressing     Problem: SAFETY ADULT  Goal: Patient will remain free of falls  Description: INTERVENTIONS:  - Educate patient/family on patient safety including physical limitations  - Instruct patient to call for assistance with activity   - Consult OT/PT to assist with strengthening/mobility   - Keep Call bell within reach  - Keep bed low and locked with side rails adjusted as appropriate  - Keep care items and personal belongings within reach  - Initiate and maintain comfort rounds  - Make Fall Risk Sign visible to staff  - Offer Toileting every 3 Hours, in advance of need  - Initiate/Maintain 2alarm  - Obtain necessary fall risk management equipment: 3  - Apply yellow socks and bracelet for high fall risk patients  - Consider moving patient to room near nurses station  Outcome: Progressing  Goal: Maintain or return to baseline ADL function  Description: INTERVENTIONS:  -  Assess patient's ability to carry out ADLs; assess patient's baseline for ADL function and identify  physical deficits which impact ability to perform ADLs (bathing, care of mouth/teeth, toileting, grooming, dressing, etc.)  - Assess/evaluate cause of self-care deficits   - Assess range of motion  - Assess patient's mobility; develop plan if impaired  - Assess patient's need for assistive devices and provide as appropriate  - Encourage maximum independence but intervene and supervise when necessary  - Involve family in performance of ADLs  - Assess for home care needs following discharge   - Consider OT consult to assist with ADL evaluation and planning for discharge  - Provide patient education as appropriate  Outcome: Progressing  Goal: Maintains/Returns to pre admission functional level  Description: INTERVENTIONS:  - Perform AM-PAC 6 Click Basic Mobility/ Daily Activity assessment daily.  - Set and communicate daily mobility goal to care team and patient/family/caregiver.   - Collaborate with rehabilitation services on mobility goals if consulted  - Perform Range of Motion 3 times a day.  - Reposition patient every 2 hours.  - Dangle patient 3 times a day  - Stand patient 3 times a day  - Ambulate patient 3 times a day  - Out of bed to chair 3 times a day   - Out of bed for meals 3 times a day  - Out of bed for toileting  - Record patient progress and toleration of activity level   Outcome: Progressing     Problem: DISCHARGE PLANNING  Goal: Discharge to home or other facility with appropriate resources  Description: INTERVENTIONS:  - Identify barriers to discharge w/patient and caregiver  - Arrange for needed discharge resources and transportation as appropriate  - Identify discharge learning needs (meds, wound care, etc.)  - Arrange for interpretive services to assist at discharge as needed  - Refer to Case Management Department for coordinating discharge planning if the patient needs post-hospital services based on physician/advanced practitioner order or complex needs related to functional status, cognitive  ability, or social support system  Outcome: Progressing     Problem: Knowledge Deficit  Goal: Patient/family/caregiver demonstrates understanding of disease process, treatment plan, medications, and discharge instructions  Description: Complete learning assessment and assess knowledge base.  Interventions:  - Provide teaching at level of understanding  - Provide teaching via preferred learning methods  Outcome: Progressing

## 2024-04-27 NOTE — ASSESSMENT & PLAN NOTE
Recent Labs     04/26/24 1942   SODIUM 133*     Mild hyponatremia on presentation to 133, possibly in the setting of volume overload  Urine studies sent, follow for result     Plan:  Continue to monitor in the setting of diuresis   Follow urine and serum studies for results

## 2024-04-27 NOTE — ASSESSMENT & PLAN NOTE
Wt Readings from Last 3 Encounters:   04/19/24 103 kg (227 lb 9.6 oz)   03/07/24 114 kg (252 lb 6.4 oz)   03/05/24 114 kg (252 lb 1.6 oz)     Patient has history of chronic diastolic CHF in the setting of lower extremity lymphedema  Patient recently began lymphedema therapy with bilateral lower extremity binding, following this patient experienced roughly 20 pound weight loss in a month due to increased excretion from increased venous return  Patient reports adherence to outpatient diuretic therapy as well as to cardiac diet    Plan:  Daily weights while inpatient  Fluid restriction 2000 mL  Sodium restriction  Continue home diuretic regimen, 1 additional dose Lasix given  Monitor for effect on work of breathing  Can consider cardiology consult if current condition appears to be a CHF exacerbation  If proven CHF exacerbation, consider lymphedema therapy with increased venous return as possible source of transient fluid overload

## 2024-04-27 NOTE — ASSESSMENT & PLAN NOTE
Patient has history of COPD, historically maintained on multiple medications but now only maintained on albuterol as needed in outpatient setting    Plan:  Albuterol as needed while inpatient  Continue to monitor work of breathing

## 2024-04-27 NOTE — ASSESSMENT & PLAN NOTE
Patient has history of COPD, historically maintained on multiple medications but now only maintained on albuterol as needed in outpatient setting  Not in exacerbation    Plan:  Albuterol as needed while inpatient  Continue to monitor work of breathing

## 2024-04-27 NOTE — ASSESSMENT & PLAN NOTE
>>ASSESSMENT AND PLAN FOR STAGE 3A CHRONIC KIDNEY DISEASE (HCC) WRITTEN ON 4/27/2024  8:17 AM BY COOPER RODRIGUEZ MD    Lab Results   Component Value Date    EGFR 57 04/27/2024    EGFR 50 04/26/2024    EGFR 50 04/19/2024    CREATININE 1.17 04/27/2024    CREATININE 1.31 (H) 04/26/2024    CREATININE 1.31 (H) 04/19/2024   Kidney function at baseline on time of presentation     Plan:  Continue to monitor with daily labs and in the setting of diuresis

## 2024-04-27 NOTE — ASSESSMENT & PLAN NOTE
History of quadruple bypass  Continue ASA, statin, beta-blockade, diuretic  A.m. echo ordered to assess cardiac function in the setting of possible volume overload state

## 2024-04-27 NOTE — ASSESSMENT & PLAN NOTE
Continue home Lipitor   Star Wedge Flap Text: The defect edges were debeveled with a #15 scalpel blade.  Given the location of the defect, shape of the defect and the proximity to free margins a star wedge flap was deemed most appropriate.  Using a sterile surgical marker, an appropriate rotation flap was drawn incorporating the defect and placing the expected incisions within the relaxed skin tension lines where possible. The area thus outlined was incised deep to adipose tissue with a #15 scalpel blade.  The skin margins were undermined to an appropriate distance in all directions utilizing iris scissors.

## 2024-04-27 NOTE — ASSESSMENT & PLAN NOTE
Patient presents with worsening dyspnea on exertion  Hypoxic in the emergency department requiring 3 L nasal cannula oxygen  Recent Labs     04/26/24  1942 04/27/24  0547 04/28/24  0628   WBC 9.89 7.92 7.55     Recent Labs     04/26/24 1942 04/27/24  0547   PROCALCITONI 0.08 0.08      Recent Labs     04/26/24  2347 04/27/24  0547 04/28/24  0628   INR 4.12* 3.76* 1.66*       Imaging findings appear to be consistent with left-sided loculated parapneumonic effusion that may be amenable to thoracentesis, not on admission due to supratherapeutic INR.  Warfarin held overnight.    Plan:  INR now in the subtherapeutic range, pulmonology to proceed with thoracentesis this afternoon  Follow-up on body fluid studies  Continue supplementary oxygen to maintain O2 sats above 90%  Continue Rocephin 2 g every 24 hours  Trend CBC  Continue PTA diuretic regimen

## 2024-04-27 NOTE — ASSESSMENT & PLAN NOTE
>>ASSESSMENT AND PLAN FOR STAGE 3A CHRONIC KIDNEY DISEASE (HCC) WRITTEN ON 4/27/2024  2:02 AM BY COOPER RODRIGUEZ MD    Lab Results   Component Value Date    EGFR 50 04/26/2024    EGFR 50 04/19/2024    EGFR 47 03/19/2024    CREATININE 1.31 (H) 04/26/2024    CREATININE 1.31 (H) 04/19/2024    CREATININE 1.38 (H) 03/19/2024   Kidney function at baseline on time of presentation     Plan:  Continue to monitor with daily labs and in the setting of diuresis

## 2024-04-27 NOTE — ASSESSMENT & PLAN NOTE
Recent Labs     04/26/24  1942 04/27/24  0547   SODIUM 133* 134*       Likely in setting of mild volume overload    Plan:  Trend CMP  Continue PTA diuretic regimen

## 2024-04-27 NOTE — SEPSIS NOTE
Sepsis Note   Oscar Espinosa 83 y.o. male MRN: [id number]  Unit/Bed#: S -01 Encounter: [id number]       Initial Sepsis Screening       Row Name 04/27/24 0100                Is the patient's history suggestive of a new or worsening infection? No  -ED                  User Key  (r) = Recorded By, (t) = Taken By, (c) = Cosigned By      Initials Name Provider Type    ED Tin Contreras MD Resident                        Body mass index is 31.74 kg/m².  Wt Readings from Last 1 Encounters:   04/19/24 103 kg (227 lb 9.6 oz)     IBW (Ideal Body Weight): 75.3 kg    Ideal body weight: 75.3 kg (166 lb 0.1 oz)  Adjusted ideal body weight: 86.5 kg (190 lb 10.3 oz)

## 2024-04-27 NOTE — ASSESSMENT & PLAN NOTE
Recent Labs     04/26/24  1942 04/27/24  0547 04/28/24  0628   HGB 10.3* 10.7* 9.8*     Patient demonstrating new anemia, down from baseline of 13-14  Pt has also historically demonstrated mild macrocytosis, now demonstrating normocytosis     Plan:  Continue to monitor  Iron, folate, B12 ordered

## 2024-04-27 NOTE — ASSESSMENT & PLAN NOTE
Recent Labs     04/26/24 1942   HGB 10.3*     Patient demonstrating new anemia, down from baseline of 13-14  Pt has also historically demonstrated mild macrocytosis, now demonstrating normocytosis     Plan:  Continue to monitor  Iron, folate, B12 ordered

## 2024-04-27 NOTE — ASSESSMENT & PLAN NOTE
Recent Labs     04/26/24  2347   INR 4.12*     Goal INR 2-3  Per patient: Home regimen is 5 mg every Sunday, 2.5 mg Monday through Saturday  Total weekly dose: 20mg  INR checked while in hospital, elevated 4.2    Plan:  Reduce Saturday AM dose from 2.5 to 1mg, 7.5% reduction   Resume regular scheduled dosing on Sunday  Continue to monitor daily

## 2024-04-28 ENCOUNTER — APPOINTMENT (INPATIENT)
Dept: RADIOLOGY | Facility: HOSPITAL | Age: 84
DRG: 186 | End: 2024-04-28
Payer: MEDICARE

## 2024-04-28 ENCOUNTER — APPOINTMENT (INPATIENT)
Dept: ULTRASOUND IMAGING | Facility: HOSPITAL | Age: 84
DRG: 186 | End: 2024-04-28
Payer: MEDICARE

## 2024-04-28 LAB
ALBUMIN SERPL BCP-MCNC: 2.6 G/DL (ref 3.5–5)
ALP SERPL-CCNC: 118 U/L (ref 34–104)
ALT SERPL W P-5'-P-CCNC: 60 U/L (ref 7–52)
ANION GAP SERPL CALCULATED.3IONS-SCNC: 5 MMOL/L (ref 4–13)
APPEARANCE FLD: ABNORMAL
AST SERPL W P-5'-P-CCNC: 63 U/L (ref 13–39)
ATRIAL RATE: 197 BPM
BACTERIA SPT RESP CULT: ABNORMAL
BASOPHILS # BLD AUTO: 0.05 THOUSANDS/ÂΜL (ref 0–0.1)
BASOPHILS NFR BLD AUTO: 1 % (ref 0–1)
BILIRUB SERPL-MCNC: 0.91 MG/DL (ref 0.2–1)
BUN SERPL-MCNC: 19 MG/DL (ref 5–25)
CALCIUM ALBUM COR SERPL-MCNC: 9.6 MG/DL (ref 8.3–10.1)
CALCIUM SERPL-MCNC: 8.5 MG/DL (ref 8.4–10.2)
CHLORIDE SERPL-SCNC: 98 MMOL/L (ref 96–108)
CO2 SERPL-SCNC: 34 MMOL/L (ref 21–32)
COLOR FLD: ABNORMAL
CREAT SERPL-MCNC: 1.01 MG/DL (ref 0.6–1.3)
EOSINOPHIL # BLD AUTO: 0.08 THOUSAND/ÂΜL (ref 0–0.61)
EOSINOPHIL NFR BLD AUTO: 1 % (ref 0–6)
ERYTHROCYTE [DISTWIDTH] IN BLOOD BY AUTOMATED COUNT: 16.1 % (ref 11.6–15.1)
GFR SERPL CREATININE-BSD FRML MDRD: 68 ML/MIN/1.73SQ M
GLUCOSE FLD-MCNC: 116 MG/DL
GLUCOSE SERPL-MCNC: 111 MG/DL (ref 65–140)
GRAM STN SPEC: ABNORMAL
HCT VFR BLD AUTO: 31.8 % (ref 36.5–49.3)
HGB BLD-MCNC: 9.8 G/DL (ref 12–17)
HISTIOCYTES NFR FLD: 4 %
IMM GRANULOCYTES # BLD AUTO: 0.06 THOUSAND/UL (ref 0–0.2)
IMM GRANULOCYTES NFR BLD AUTO: 1 % (ref 0–2)
INR PPP: 1.66 (ref 0.84–1.19)
LDH FLD L TO P-CCNC: 207 U/L
LDH SERPL-CCNC: 180 U/L (ref 140–271)
LYMPHOCYTES # BLD AUTO: 0.75 THOUSANDS/ÂΜL (ref 0.6–4.47)
LYMPHOCYTES NFR BLD AUTO: 10 % (ref 14–44)
LYMPHOCYTES NFR BLD AUTO: 49 %
MCH RBC QN AUTO: 29.6 PG (ref 26.8–34.3)
MCHC RBC AUTO-ENTMCNC: 30.8 G/DL (ref 31.4–37.4)
MCV RBC AUTO: 96 FL (ref 82–98)
MONOCYTES # BLD AUTO: 0.86 THOUSAND/ÂΜL (ref 0.17–1.22)
MONOCYTES NFR BLD AUTO: 0 %
MONOCYTES NFR BLD AUTO: 11 % (ref 4–12)
NEUTROPHILS # BLD AUTO: 5.75 THOUSANDS/ÂΜL (ref 1.85–7.62)
NEUTS SEG NFR BLD AUTO: 47 %
NEUTS SEG NFR BLD AUTO: 76 % (ref 43–75)
NRBC BLD AUTO-RTO: 0 /100 WBCS
PH BODY FLUID: 7.8
PLATELET # BLD AUTO: 246 THOUSANDS/UL (ref 149–390)
PMV BLD AUTO: 9.5 FL (ref 8.9–12.7)
POTASSIUM SERPL-SCNC: 3.7 MMOL/L (ref 3.5–5.3)
PROT FLD-MCNC: 3.9 G/DL
PROT SERPL-MCNC: 6.5 G/DL (ref 6.4–8.4)
PROTHROMBIN TIME: 20.4 SECONDS (ref 11.6–14.5)
QRS AXIS: 78 DEGREES
QRSD INTERVAL: 108 MS
QT INTERVAL: 440 MS
QTC INTERVAL: 488 MS
RBC # BLD AUTO: 3.31 MILLION/UL (ref 3.88–5.62)
SITE: ABNORMAL
SODIUM SERPL-SCNC: 137 MMOL/L (ref 135–147)
T WAVE AXIS: 62 DEGREES
TOTAL CELLS COUNTED SPEC: 100
VENTRICULAR RATE: 74 BPM
WBC # BLD AUTO: 7.55 THOUSAND/UL (ref 4.31–10.16)
WBC # FLD MANUAL: 492 /UL

## 2024-04-28 PROCEDURE — 89051 BODY FLUID CELL COUNT: CPT | Performed by: INTERNAL MEDICINE

## 2024-04-28 PROCEDURE — 83615 LACTATE (LD) (LDH) ENZYME: CPT | Performed by: INTERNAL MEDICINE

## 2024-04-28 PROCEDURE — 85025 COMPLETE CBC W/AUTO DIFF WBC: CPT | Performed by: INTERNAL MEDICINE

## 2024-04-28 PROCEDURE — 71045 X-RAY EXAM CHEST 1 VIEW: CPT

## 2024-04-28 PROCEDURE — 80074 ACUTE HEPATITIS PANEL: CPT

## 2024-04-28 PROCEDURE — 32555 ASPIRATE PLEURA W/ IMAGING: CPT | Performed by: INTERNAL MEDICINE

## 2024-04-28 PROCEDURE — 99223 1ST HOSP IP/OBS HIGH 75: CPT | Performed by: INTERNAL MEDICINE

## 2024-04-28 PROCEDURE — 85610 PROTHROMBIN TIME: CPT | Performed by: PHYSICIAN ASSISTANT

## 2024-04-28 PROCEDURE — 87070 CULTURE OTHR SPECIMN AEROBIC: CPT | Performed by: INTERNAL MEDICINE

## 2024-04-28 PROCEDURE — 80053 COMPREHEN METABOLIC PANEL: CPT | Performed by: INTERNAL MEDICINE

## 2024-04-28 PROCEDURE — 88305 TISSUE EXAM BY PATHOLOGIST: CPT | Performed by: PATHOLOGY

## 2024-04-28 PROCEDURE — 82945 GLUCOSE OTHER FLUID: CPT | Performed by: INTERNAL MEDICINE

## 2024-04-28 PROCEDURE — 84157 ASSAY OF PROTEIN OTHER: CPT | Performed by: INTERNAL MEDICINE

## 2024-04-28 PROCEDURE — 99232 SBSQ HOSP IP/OBS MODERATE 35: CPT | Performed by: INTERNAL MEDICINE

## 2024-04-28 PROCEDURE — 88112 CYTOPATH CELL ENHANCE TECH: CPT | Performed by: PATHOLOGY

## 2024-04-28 PROCEDURE — 87205 SMEAR GRAM STAIN: CPT | Performed by: INTERNAL MEDICINE

## 2024-04-28 PROCEDURE — 83986 ASSAY PH BODY FLUID NOS: CPT | Performed by: INTERNAL MEDICINE

## 2024-04-28 PROCEDURE — 76705 ECHO EXAM OF ABDOMEN: CPT

## 2024-04-28 PROCEDURE — 93010 ELECTROCARDIOGRAM REPORT: CPT | Performed by: INTERNAL MEDICINE

## 2024-04-28 RX ORDER — WARFARIN SODIUM 2.5 MG/1
2.5 TABLET ORAL
Status: DISCONTINUED | OUTPATIENT
Start: 2024-04-28 | End: 2024-05-02 | Stop reason: HOSPADM

## 2024-04-28 RX ADMIN — GUAIFENESIN 600 MG: 600 TABLET ORAL at 09:04

## 2024-04-28 RX ADMIN — SENNOSIDES 8.6 MG: 8.6 TABLET, FILM COATED ORAL at 09:04

## 2024-04-28 RX ADMIN — ATORVASTATIN CALCIUM 40 MG: 40 TABLET, FILM COATED ORAL at 17:54

## 2024-04-28 RX ADMIN — TAMSULOSIN HYDROCHLORIDE 0.4 MG: 0.4 CAPSULE ORAL at 17:54

## 2024-04-28 RX ADMIN — LABETALOL HYDROCHLORIDE 100 MG: 100 TABLET, FILM COATED ORAL at 17:53

## 2024-04-28 RX ADMIN — GABAPENTIN 900 MG: 300 CAPSULE ORAL at 21:00

## 2024-04-28 RX ADMIN — FUROSEMIDE 80 MG: 10 INJECTION, SOLUTION INTRAMUSCULAR; INTRAVENOUS at 09:04

## 2024-04-28 RX ADMIN — AMIODARONE HYDROCHLORIDE 200 MG: 200 TABLET ORAL at 09:04

## 2024-04-28 RX ADMIN — WARFARIN SODIUM 2.5 MG: 2.5 TABLET ORAL at 17:52

## 2024-04-28 RX ADMIN — ASPIRIN 81 MG: 81 TABLET, COATED ORAL at 17:54

## 2024-04-28 RX ADMIN — LABETALOL HYDROCHLORIDE 100 MG: 100 TABLET, FILM COATED ORAL at 09:04

## 2024-04-28 RX ADMIN — POLYETHYLENE GLYCOL 3350 17 G: 17 POWDER, FOR SOLUTION ORAL at 09:03

## 2024-04-28 RX ADMIN — GUAIFENESIN 600 MG: 600 TABLET ORAL at 17:52

## 2024-04-28 NOTE — ASSESSMENT & PLAN NOTE
Recent Labs     04/26/24  1942 04/27/24  0547 04/28/24  0628   AST 61* 53* 63*   ALT 62* 54* 60*   ALKPHOS 132* 120* 118*   TBILI 0.90 0.92 0.91      RUQ ultasound ordered  Check acute/chronic hepatitis panel  Trend CMP

## 2024-04-28 NOTE — PROGRESS NOTES
[facility]  Progress Note  Name: Oscar Espinosa I  MRN: [id number]  Unit/Bed#: S -01 I Date of Admission: 4/26/2024   Date of Service: 4/28/2024 I Hospital Day: 2    Assessment/Plan   * Pleural effusion with shortness of breath  Assessment & Plan  Patient presents with worsening dyspnea on exertion  Hypoxic in the emergency department requiring 3 L nasal cannula oxygen  Recent Labs     04/26/24 1942 04/27/24  0547 04/28/24  0628   WBC 9.89 7.92 7.55     Recent Labs     04/26/24 1942 04/27/24  0547   PROCALCITONI 0.08 0.08      Recent Labs     04/26/24 2347 04/27/24  0547 04/28/24  0628   INR 4.12* 3.76* 1.66*       Imaging findings appear to be consistent with left-sided loculated parapneumonic effusion that may be amenable to thoracentesis, not on admission due to supratherapeutic INR.  Warfarin held overnight.    Plan:  INR now in the subtherapeutic range, pulmonology to proceed with thoracentesis this afternoon  Follow-up on body fluid studies  Continue supplementary oxygen to maintain O2 sats above 90%  Continue Rocephin 2 g every 24 hours  Trend CBC  Continue PTA diuretic regimen    Elevated transaminase level  Assessment & Plan  Recent Labs     04/26/24 1942 04/27/24  0547 04/28/24  0628   AST 61* 53* 63*   ALT 62* 54* 60*   ALKPHOS 132* 120* 118*   TBILI 0.90 0.92 0.91      RUQ ultasound ordered  Check acute/chronic hepatitis panel  Trend CMP    Anemia  Assessment & Plan  Recent Labs     04/26/24 1942 04/27/24  0547 04/28/24  0628   HGB 10.3* 10.7* 9.8*     Patient demonstrating new anemia, down from baseline of 13-14  Pt has also historically demonstrated mild macrocytosis, now demonstrating normocytosis     Plan:  Continue to monitor  Iron, folate, B12 ordered    Hyponatremia  Assessment & Plan  Recent Labs     04/26/24 1942 04/27/24  0547   SODIUM 133* 134*       Likely in setting of mild volume overload    Plan:  Trend CMP  Continue PTA diuretic regimen    Venous  insufficiency of both lower extremities  Assessment & Plan  Currently undergoing lymphedema therapy  20 pound weight loss over the past month  Lower limb still examined with significant venous insufficiency and hemosiderin staining, much improved from prior as per patient  Very large varicosity on posterior surface of right calf, nontender to palpation    Stage 3a chronic kidney disease (HCC)  Assessment & Plan  Lab Results   Component Value Date    EGFR 57 04/27/2024    EGFR 50 04/26/2024    EGFR 50 04/19/2024    CREATININE 1.17 04/27/2024    CREATININE 1.31 (H) 04/26/2024    CREATININE 1.31 (H) 04/19/2024   Kidney function at baseline on time of presentation     Plan:  Continue to monitor with daily labs and in the setting of diuresis     Atrial fibrillation (HCC)  Assessment & Plan  Recent Labs     04/26/24 2347 04/27/24  0547   INR 4.12* 3.76*       Goal INR 2-3  Per patient: Home regimen is 5 mg every Sunday, 2.5 mg Monday through Saturday  Total weekly dose: 20mg  INR checked while in hospital, elevated 4.2  Recent Labs     04/26/24  2347 04/27/24  0547 04/28/24  0628   INR 4.12* 3.76* 1.66*        Plan:  Continue amiodarone 200 mg daily  Patient can resume warfarin with INR goal of 2-3 after completion of thoracentesis today    Chronic obstructive pulmonary disease (HCC)  Assessment & Plan  Patient has history of COPD, historically maintained on multiple medications but now only maintained on albuterol as needed in outpatient setting  Not in exacerbation    Plan:  Albuterol as needed while inpatient  Continue to monitor work of breathing    Chronic diastolic congestive heart failure (HCC)  Assessment & Plan  Wt Readings from Last 3 Encounters:   04/19/24 103 kg (227 lb 9.6 oz)   03/07/24 114 kg (252 lb 6.4 oz)   03/05/24 114 kg (252 lb 1.6 oz)     Patient has history of chronic diastolic CHF in the setting of lower extremity lymphedema  Patient recently began lymphedema therapy with bilateral lower extremity  binding, following this patient experienced roughly 20 pound weight loss in a month due to increased excretion from increased venous return  Patient reports adherence to outpatient diuretic therapy as well as to cardiac diet  Not in exacerbation    Plan:  Daily weights while inpatient  Fluid restriction 2000 mL  Sodium restriction  Continue home diuretic regimen  Monitor I's and O's  Trend CMP      Chronic pain syndrome  Assessment & Plan  Continue gabapentin 900 mg at bedtime    3-vessel CAD  Assessment & Plan  History of quadruple bypass  Echocardiogram on 4/27 shows mildly reduced ejection fraction of 45%  Patient appears euvolemic on exam  Continue ASA, statin, beta-blockade, diuretic      Severe obstructive sleep apnea  Assessment & Plan  Patient has history of severe YEVGENIY, last sleep study in 4/9/2021 demonstrating apnea-hypopnea index of 40.1  Patient was historically trialed on CPAP which she was intolerant of, inspire implanted initially proposed to patient who refused    Plan:  Inpatient CPAP offered to patient, patient declined    Hyperlipidemia  Assessment & Plan  Continue home Lipitor    Benign essential hypertension  Assessment & Plan  Pressures appropriate while inpatient  Blood Pressure: 118/66   Continue home regimen of labetalol and torsemide               VTE Pharmacologic Prophylaxis: VTE Score: 7  on warfarin, holding for procedure, can resume afterwards    Mobility:   JH-HLM Achieved: 7: Walk 25 feet or more  JH-HLM Goal achieved. Continue to encourage appropriate mobility.    Patient Centered Rounds: I performed bedside rounds with nursing staff today.  Discussions with Specialists or Other Care Team Provider:     Education and Discussions with Family / Patient: Attempted to update  (wife) via phone. Unable to contact.    Current Length of Stay: 2 day(s)  Current Patient Status: Inpatient   Discharge Plan: Anticipate discharge in 24-48 hrs to home.    Code Status: Level 3 - DNAR  and DNI    Subjective:   No overnight events reported.  Patient denies fever, chills or shakes.  He is not having any chest pain and is not short of breath at rest.  He is unsure whether or not his breathing is proved because he has not been out of bed.  He denies any nausea, vomiting or diarrhea.  Patient denies any pain.    Objective:     Vitals:   Temp (24hrs), Av.9 °F (36.6 °C), Min:97.4 °F (36.3 °C), Max:98.5 °F (36.9 °C)    Temp:  [97.4 °F (36.3 °C)-98.5 °F (36.9 °C)] 97.4 °F (36.3 °C)  HR:  [68-76] 76  Resp:  [16-18] 17  BP: (108-126)/(54-67) 118/66  SpO2:  [91 %-93 %] 93 %  Body mass index is 31.67 kg/m².     Input and Output Summary (last 24 hours):     Intake/Output Summary (Last 24 hours) at 2024 1059  Last data filed at 2024 0811  Gross per 24 hour   Intake 940 ml   Output 2100 ml   Net -1160 ml       Physical Exam:   Physical Exam  Constitutional:       General: He is not in acute distress.     Appearance: Normal appearance. He is not ill-appearing.   HENT:      Head: Normocephalic and atraumatic.      Right Ear: External ear normal.      Left Ear: External ear normal.      Nose: Nose normal.      Mouth/Throat:      Mouth: Mucous membranes are moist.      Pharynx: Oropharynx is clear.   Eyes:      General: No scleral icterus.     Extraocular Movements: Extraocular movements intact.      Conjunctiva/sclera: Conjunctivae normal.   Cardiovascular:      Rate and Rhythm: Normal rate. Rhythm irregular.      Pulses: Normal pulses.      Heart sounds: Normal heart sounds. No murmur heard.     No friction rub. No gallop.   Pulmonary:      Breath sounds: Rhonchi present. No wheezing or rales.      Comments: Decreased breath sounds at left lung base  In no acute respiratory distress, breathing comfortably on room air  Abdominal:      General: Abdomen is flat.      Palpations: Abdomen is soft. There is no mass.      Tenderness: There is no abdominal tenderness. There is no guarding.   Musculoskeletal:       Cervical back: Normal range of motion and neck supple.      Right lower leg: Edema present.      Left lower leg: Edema present.   Lymphadenopathy:      Cervical: No cervical adenopathy.   Skin:     General: Skin is warm and dry.   Neurological:      General: No focal deficit present.      Mental Status: He is alert and oriented to person, place, and time. Mental status is at baseline.   Psychiatric:         Mood and Affect: Mood normal.          Additional Data:     Labs:  Results from last 7 days   Lab Units 04/28/24  0628   WBC Thousand/uL 7.55   HEMOGLOBIN g/dL 9.8*   HEMATOCRIT % 31.8*   PLATELETS Thousands/uL 246   SEGS PCT % 76*   LYMPHO PCT % 10*   MONO PCT % 11   EOS PCT % 1     Results from last 7 days   Lab Units 04/28/24  0628   SODIUM mmol/L 137   POTASSIUM mmol/L 3.7   CHLORIDE mmol/L 98   CO2 mmol/L 34*   BUN mg/dL 19   CREATININE mg/dL 1.01   ANION GAP mmol/L 5   CALCIUM mg/dL 8.5   ALBUMIN g/dL 2.6*   TOTAL BILIRUBIN mg/dL 0.91   ALK PHOS U/L 118*   ALT U/L 60*   AST U/L 63*   GLUCOSE RANDOM mg/dL 111     Results from last 7 days   Lab Units 04/28/24  0628   INR  1.66*             Results from last 7 days   Lab Units 04/27/24  0547 04/26/24  1942   PROCALCITONIN ng/ml 0.08 0.08       Lines/Drains:  Invasive Devices       Peripheral Intravenous Line  Duration             Peripheral IV 04/26/24 Distal;Left;Upper;Ventral (anterior) Arm 1 day                          Imaging: Reviewed radiology reports from this admission including: chest xray and chest CT scan    Recent Cultures (last 7 days):   Results from last 7 days   Lab Units 04/27/24  0250 04/27/24  0006 04/26/24  2239 04/26/24  2230   BLOOD CULTURE   --   --  No Growth at 24 hrs. No Growth at 24 hrs.   SPUTUM CULTURE   --  Test not performed. Suggest repeat specimen.  --   --    GRAM STAIN RESULT   --  4+ Epithelial Cells*  1+ Polys*  4+ Gram negative rods*  4+ Gram positive cocci in chains and clusters*  --   --    [facility]  ANTIGEN  Negative  --   --   --        Last 24 Hours Medication List:   Current Facility-Administered Medications   Medication Dose Route Frequency Provider Last Rate    albuterol  5 mg Nebulization Q4H PRN Tin Contreras MD      amiodarone  200 mg Oral Daily Tin Contreras MD      aspirin  81 mg Oral Daily Tin Contreras MD      atorvastatin  40 mg Oral Daily Tin Contreras MD      [START ON 4/29/2024] cefTRIAXone  2,000 mg Intravenous Q24H Roly Waite MD      furosemide  80 mg Intravenous BID (diuretic) Mi Estrada MD      gabapentin  900 mg Oral HS Tin Contreras MD      guaiFENesin  600 mg Oral BID Tin Contreras MD      labetalol  100 mg Oral BID Tin Contreras MD      polyethylene glycol  17 g Oral Daily Tin Contreras MD      senna  1 tablet Oral Daily Tin Contreras MD      tamsulosin  0.4 mg Oral Daily Tin Contreras MD          Today, Patient Was Seen By: Roly Waite MD    **Please Note: This note may have been constructed using a voice recognition system.**

## 2024-04-28 NOTE — PROCEDURES
Thoracentesis (Bedside)    Date/Time: 4/26/2024 7:26 PM    Performed by: Kandice Faustin DO  Authorized by: Kandice Faustin DO    Patient location:  Bedside  Consent:     Consent obtained:  Written    Consent given by:  Patient    Risks discussed:  Bleeding and pneumothorax    Alternatives discussed:  No treatment  Universal protocol:     Procedure explained and questions answered to patient or proxy's satisfaction: yes      Relevant documents present and verified: yes      Test results available and properly labeled: yes      Radiology Images displayed and confirmed.  If images not available, report reviewed: yes    Procedure details:     Preparation: Patient was prepped and draped in usual sterile fashion      Standard thoracentesis cath kit used: Yes      Patient position:  Sitting    Laterality:  Left    Location:  Midscapular line    Ultrasound guidance: yes      Reason for ultrasound: Identify fluid collection and guide catheter placement.      Ultrasound image availability:  Still images obtained    Number of attempts:  1    Drainage characteristics:  Bloody    Fluid removed amount:  600

## 2024-04-28 NOTE — CONSULTS
Consultation - Cardiology Team One  Oscar Espinosa 83 y.o. male MRN: [id number]  Unit/Bed#: S -01 Encounter: [id number]    Inpatient consult to Cardiology  Consult performed by: [facility]  Consult ordered by: Andreas Singleton MD          Physician Requesting Consult: Andreas Villarreal*  Reason for Consult / Principal Problem: CHF       Assessment/ Plan    Acute respiratory insufficiency likely multifactorial in the setting of parapneumonia effusion secondary to left lower lobe pneumonia and CHF   Pulmonary following and plan for diagnostic and therapeutic thoracentesis today. INR 1.66  today     2. Acute on chronic HFmrEF   Echocardiogram 4/27/2024 showed EF 45%, severe diastolic dysfunction, L and R atrium dilated, moderate MR and severe TR     Chest xray 4/26 showed mild pulmonary venous congestion with moderate L effusion  CT chest   On furosemide 80 mg IV BID  He takes torsemide 40 mg PO daily M-F and 20 mg PO Sat and Sun at home.   He reports compliance with medications   Monitor I/Os -1.14 L in 24 hours   Daily weights 227 lbs today. Last office weight was 227 lbs 4/19/2024  Continue 2 gram Na diet with 2 L fluid restriction     3. Paroxysmal atrial fibrillation   On labetalol 100 mg PO BID and amiodarone 200 mg PO daily   ECG reviewed showing atrial fibrillation HR controlled   On coumadin for OAC  Holding coumadin for thoracentesis today   INR 1.66  Goal INR 2-3  Ordered outpatient event monitor by primary cardiologist     4. Left lower lobe pneumonia   Followed by pulmonary   On ceftriaxone     5. CAD   With history of CABG x 4 in 2003 (LIMA to LAD, VG to OM1, OM2 and VG to PDA)  Continue aspirin, atorvastatin and labetolol   No complaint of chest pain   Nuclear stress test in 2019 showed:  Abnormal study after pharmacologic stress. There was a small amount of ischemia in the inferolateral region. This study was compaerd to reports of prior stress tests. There  appears to be no interval change in myocardial perfusion as this was reported on prior stress studies.    History of Present Illness   HPI: Oscar Espinosa is a 83 y.o. year old male who has a history of CAD s/p CABG x 4 in 2003 , chronic HF, paroxysmal atrial fibrillation on coumadin, AAA resection 2007, hypertension, hyperlipidemia, CKD stage III, lower extremity lymphedema and untreated YEVGENIY. He  follows with cardiologist Dr. Alan.              [email or web address] ER 4/26/2024 with complaint of SOB. Patient reports he has a baseline dyspnea with exertion but on Friday he noticed increased SOB with exertion. He denies chest pain, SOB or palpitations. He reports he walked from his garage to driveway and reports increased WOB. His wife brought him into the hospital. Pulmonary following due to imaging showing loculated pleural effusion in the setting of pneumonia vs HF. Plan for diagnostic and therapeutic thoracentesis today.     Cardiology consulted for CHF. Patient takes torsemide 40 mg PO M-F and torsemide 20 mg PO Saturday and Sunday. He reports compliance with medications. As noted above he does report increased SOB with WOB. He denies increased swelling in his legs, weight gain or abdominal bloating. He was started on furosemide 80 mg PO BID this admission and diuresing. Creatinine stable: 1.01. He denies palpitations, chest pain or dizziness.     Echocardiogram 4/27/2024 showed EF 45%, severe diastolic dysfunction, L and R atrium dilated, moderate MR and severe TR.     EKG reviewed personally:  Atrial fibrillation   Ventricular rate 74 bpm     Telemetry reviewed personally:  No telemetry     Review of Systems   Constitutional: Negative for chills and fever.   HENT:  Negative for congestion.    Cardiovascular:  Positive for dyspnea on exertion and leg swelling. Negative for chest pain and orthopnea.   Respiratory:  Positive for shortness of breath.    Musculoskeletal:  Negative for falls.   Gastrointestinal:   Negative for bloating, nausea and vomiting.   Neurological:  Negative for dizziness and light-headedness.   Psychiatric/Behavioral:  Negative for altered mental status.    All other systems reviewed and are negative.    Historical Information   Past Medical History:   Diagnosis Date    Abdominal aortic aneurysm (HCC)     s/p repair    Abnormal ECG 2022    Aneurysm (Former[facility])     Aneurysm of abdominal aorta (Formerly [facility])     49mm, trileaflet AV    Atrial fibrillation (Former[facility])     Eliquis    BPH (benign prostatic hyperplasia)     CAD (coronary artery disease)     s/p CABGx3    CHF (congestive heart failure) (Formerly [facility])     Chronic pain     Gabapentin    Chronic pain disorder     lumbar    COPD (chronic obstructive pulmonary disease) ([facility])     Coronary artery disease     Former tobacco use     Hyperlipidemia     Hypertension     Hypothyroidism     Lumbar radiculopathy     Lumbar stenosis     s/p injections    Neuropathy     Obesity (BMI 30-39.9)     YEVGENIY (obstructive sleep apnea)     unable to tolerate CPAP    Pulmonary hypertension ([facility])     moderate to severe    Spondylolisthesis of lumbar region     Visual impairment     Vitamin D deficiency      Past Surgical History:   Procedure Laterality Date    ABDOMINAL AORTIC ANEURYSM REPAIR  2007    2 dock limbs with visc extens prosth    CARDIAC CATHETERIZATION      COLONOSCOPY      CORONARY ARTERY BYPASS GRAFT      LIMA to LAD, sequential SVG to OM1 & OM2, SVG to RDA    LUMBAR EPIDURAL INJECTION       Social History     Substance and Sexual Activity   Alcohol Use Yes    Alcohol/week: 21.0 standard drinks of alcohol    Types: 21 Cans of beer per week     Social History     Substance and Sexual Activity   Drug Use No     Social History     Tobacco Use   Smoking Status Former    Current packs/day: 0.00    Average packs/day: 1 pack/day for 55.6 years (55.6 ttl pk-yrs)    Types: Cigarettes    Start date: 1957    Quit date: 2012    Years since quittin.7   Smokeless  "Tobacco Never     Family History:   Family History   Problem Relation Age of Onset    Heart disease Mother     Stroke Father         stroke syndrome    Aneurysm Brother         abdominal    Aortic aneurysm Brother         ascending    Coronary artery disease Family     Hypertension Family     Other Son         gioblastoma multiforme       Meds/Allergies   all current active meds have been reviewed and current meds:   Current Facility-Administered Medications   Medication Dose Route Frequency    albuterol inhalation solution 5 mg  5 mg Nebulization Q4H PRN    amiodarone tablet 200 mg  200 mg Oral Daily    aspirin (ECOTRIN LOW STRENGTH) EC tablet 81 mg  81 mg Oral Daily    atorvastatin (LIPITOR) tablet 40 mg  40 mg Oral Daily    [START ON 2024] cefTRIAXone (ROCEPHIN) 2,000 mg in dextrose 5 % 50 mL IVPB  2,000 mg Intravenous Q24H    furosemide (LASIX) injection 80 mg  80 mg Intravenous BID (diuretic)    gabapentin (NEURONTIN) capsule 900 mg  900 mg Oral HS    guaiFENesin (MUCINEX) 12 hr tablet 600 mg  600 mg Oral BID    labetalol (NORMODYNE) tablet 100 mg  100 mg Oral BID    polyethylene glycol (MIRALAX) packet 17 g  17 g Oral Daily    senna (SENOKOT) tablet 8.6 mg  1 tablet Oral Daily    tamsulosin (FLOMAX) capsule 0.4 mg  0.4 mg Oral Daily          Allergies   Allergen Reactions    Meloxicam Edema       Objective   Vitals: Blood pressure 118/66, pulse 76, temperature (!) 97.4 °F (36.3 °C), resp. rate 17, height 5' 11\" (1.803 m), weight 103 kg (227 lb 1.2 oz), SpO2 93%.,     Body mass index is 31.67 kg/m².,     Systolic (24hrs), Av , Min:108 , Max:126     Diastolic (24hrs), Av, Min:54, Max:67            Intake/Output Summary (Last 24 hours) at 2024 0949  Last data filed at 2024 0543  Gross per 24 hour   Intake 660 ml   Output 2100 ml   Net -1440 ml     Weight (last 2 days)       Date/Time Weight    24 0600 103 (227.07)    24 1330 103 (227)          Invasive Devices       Peripheral " Intravenous Line  Duration             Peripheral IV 04/26/24 Distal;Left;Upper;Ventral (anterior) Arm 1 day                      Physical Exam  Constitutional:       General: He is not in acute distress.  HENT:      Head: Normocephalic.      Mouth/Throat:      Mouth: Mucous membranes are moist.   Cardiovascular:      Rate and Rhythm: Normal rate. Rhythm irregular.      Pulses: Normal pulses.      Heart sounds: Murmur heard.   Pulmonary:      Effort: Pulmonary effort is normal. No respiratory distress.      Comments: Decreased in bases   1 L NC   Abdominal:      General: Bowel sounds are normal.      Palpations: Abdomen is soft.   Musculoskeletal:         General: No swelling. Normal range of motion.      Cervical back: Neck supple.   Skin:     General: Skin is warm and dry.      Capillary Refill: Capillary refill takes less than 2 seconds.   Neurological:      Mental Status: He is alert and oriented to person, place, and time.   Psychiatric:         Mood and Affect: Mood normal.           LABORATORY RESULTS:      CBC with diff:   Results from last 7 days   Lab Units 04/28/24  0628 04/27/24  0547 04/26/24 1942   WBC Thousand/uL 7.55 7.92 9.89   HEMOGLOBIN g/dL 9.8* 10.7* 10.3*   HEMATOCRIT % 31.8* 34.6* 32.5*   MCV fL 96 95 95   PLATELETS Thousands/uL 246 251 259   RBC Million/uL 3.31* 3.63* 3.41*   MCH pg 29.6 29.5 30.2   MCHC g/dL 30.8* 30.9* 31.7   RDW % 16.1* 16.0* 15.9*   MPV fL 9.5 9.7 9.6   NRBC AUTO /100 WBCs 0  --  0       CMP:  Results from last 7 days   Lab Units 04/28/24  0628 04/27/24  0547 04/26/24 1942   POTASSIUM mmol/L 3.7 3.9 4.3   CHLORIDE mmol/L 98 97 95*   CO2 mmol/L 34* 29 33*   BUN mg/dL 19 20 23   CREATININE mg/dL 1.01 1.17 1.31*   CALCIUM mg/dL 8.5 8.6 8.3*   AST U/L 63* 53* 61*   ALT U/L 60* 54* 62*   ALK PHOS U/L 118* 120* 132*   EGFR ml/min/1.73sq m 68 57 50       BMP:  Results from last 7 days   Lab Units 04/28/24  0628 04/27/24  0547 04/26/24  1942   POTASSIUM mmol/L 3.7 3.9 4.3    CHLORIDE mmol/L 98 97 95*   CO2 mmol/L 34* 29 33*   BUN mg/dL 19 20 23   CREATININE mg/dL 1.01 1.17 1.31*   CALCIUM mg/dL 8.5 8.6 8.3*          Lab Results   Component Value Date    NTBNP 141 07/10/2019            Results from last 7 days   Lab Units 24  0547   MAGNESIUM mg/dL 2.1                     Results from last 7 days   Lab Units 24  0628 24  0547 24  2347   INR  1.66* 3.76* 4.12*     Lipid Profile:   Lab Results   Component Value Date    CHOL 124 10/15/2015    CHOL 159 2015    CHOL 155 2013     Lab Results   Component Value Date    HDL 50 2024    HDL 68 2023    HDL 76 2023     Lab Results   Component Value Date    LDLCALC 48 2024    LDLCALC 53 2023    LDLCALC 58 2023     Lab Results   Component Value Date    TRIG 37 2024    TRIG 54 2023    TRIG 41 2023         Cardiac testing:   Results for orders placed during the hospital encounter of 20    Echo complete with contrast if indicated    [place] [address]  [phone]    Transthoracic Echocardiogram  2D, M-mode, Doppler, and Color Doppler    Study date:  04-Sep-2020    Patient: LIZ GARCIA  MR number: [id number]  Account number: [id number]  : 1940  Age: 80 years  Gender: Male  Status: Outpatient  Location: [address] [place] [address] [facility] [place]  Height: 71 in  Weight: 253.4 lb  BP: 134/ 76 mmHg    Indications: 3 vessel CAD;Arteriosclerotic CVD;Essential HTN.    Diagnoses: I11.9 - Hypertensive heart disease without heart failure, I25.83 - Coronary atherosclerosis due to lipid rich plaque    Sonographer:  GUNJAN Chávez  Primary Physician:  Lea Reyes, MD  Referring Physician:  Raza Alan DO  Group:  [facility]  Cardiology Fellow:  Karla Jaquez MD  Interpreting Physician:  RYAN Saleh MD    SUMMARY    LEFT VENTRICLE:  Size was normal.  Systolic  function was normal. Ejection fraction was estimated to be 60 %.  Although no diagnostic regional wall motion abnormality was identified, this possibility cannot be completely excluded on the basis of this study.  Wall thickness was mildly increased.  There was mild concentric hypertrophy.  Doppler parameters were consistent with abnormal left ventricular relaxation (grade 1 diastolic dysfunction).    RIGHT VENTRICLE:  The size was at the upper limits of normal.  Systolic function was normal.  Wall thickness was increased.    LEFT ATRIUM:  The atrium was mildly dilated.    RIGHT ATRIUM:  The atrium was mildly dilated.    MITRAL VALVE:  There was mild regurgitation.    AORTIC VALVE:  There was trace regurgitation.    TRICUSPID VALVE:  There was mild regurgitation.  Estimated peak PA pressure was 50 mmHg.  The findings suggest moderate pulmonary hypertension.    AORTA:  The root exhibited dilatation at 44 mm.  There was dilatation of the ascending aorta at 44 mm.    IVC, HEPATIC VEINS:  The respirophasic change in diameter was less than 50%.    PULMONARY VEINS:  S/D reversal consistent with elevated LAP/diastolic dysfunction.    COMPARISONS:  Comparison was made with the previous study of 23-May-2014. Ascending aortic and aortic root sizes have not significantly changed. Pulmonary artery pressure has increased.    HISTORY: PRIOR HISTORY: Chronic systolic HF;HTN;Aneurysm of ascending aorta;HLD;YEVGENIY;Abdominal aortic aneurysm without rupture;Former smoker.    PROCEDURE: The study was performed in the [address] [place] [address] [facility]. This was a routine study. The transthoracic approach was used. The study included complete 2D imaging, M-mode, complete spectral Doppler, and color Doppler. The  heart rate was 73 bpm, at the start of the study. Images were obtained from the parasternal, apical, subcostal, and suprasternal notch acoustic windows. This was a technically difficult study.    LEFT VENTRICLE: Size was normal.  Systolic function was normal. Ejection fraction was estimated to be 60 %. Although no diagnostic regional wall motion abnormality was identified, this possibility cannot be completely excluded on the basis  of this study. Wall thickness was mildly increased. There was mild concentric hypertrophy. DOPPLER: Doppler parameters were consistent with abnormal left ventricular relaxation (grade 1 diastolic dysfunction).    RIGHT VENTRICLE: The size was at the upper limits of normal. Systolic function was normal. Wall thickness was increased.    LEFT ATRIUM: The atrium was mildly dilated.    RIGHT ATRIUM: The atrium was mildly dilated.    MITRAL VALVE: There was mild annular calcification. Valve structure was normal. There was normal leaflet separation. DOPPLER: The transmitral velocity was within the normal range. There was no evidence for stenosis. There was mild  regurgitation.    AORTIC VALVE: The valve was probably trileaflet. Leaflets exhibited mild calcification, normal cuspal separation, and sclerosis. DOPPLER: Transaortic velocity was within the normal range. There was no evidence for stenosis. There was trace  regurgitation.    TRICUSPID VALVE: The valve structure was normal. There was normal leaflet separation. DOPPLER: The transtricuspid velocity was within the normal range. There was no evidence for stenosis. There was mild regurgitation. Estimated peak PA  pressure was 50 mmHg. The findings suggest moderate pulmonary hypertension.    PULMONIC VALVE: Not well visualized. DOPPLER: The transpulmonic velocity was within the normal range. There was trace regurgitation.    PERICARDIUM: There was no pericardial effusion.    AORTA: The root exhibited dilatation at 44 mm. There was dilatation of the ascending aorta at 44 mm.    SYSTEMIC VEINS: IVC: The inferior vena cava was normal in size. The respirophasic change in diameter was less than 50%.    PULMONARY VEINS: S/D reversal consistent with elevated LAP/diastolic  dysfunction.    SYSTEM MEASUREMENT TABLES    2D  %FS: 29.67 %  Ao Diam: 3.65 cm  EDV(Teich): 127.77 ml  EF(Cube): 65.21 %  EF(Teich): 56.37 %  ESV(Cube): 48.06 ml  ESV(Teich): 55.74 ml  IVSd: 1.44 cm  LA Area: 17.79 cm2  LA Diam: 5.34 cm  LVEDV MOD A4C: 116.48 ml  LVEF MOD A4C: 49.73 %  LVESV MOD A4C: 58.55 ml  LVIDd: 5.17 cm  LVIDs: 3.64 cm  LVLd A4C: 8.46 cm  LVLs A4C: 6.7 cm  LVPWd: 1.36 cm  RA Area: 21.82 cm2  RV Diam.: 4.22 cm  SI(Cube): 38.51 ml/m2  SI(Teich): 30.78 ml/m2  SV MOD A4C: 57.93 ml  SV(Cube): 90.1 ml  SV(Teich): 72.03 ml    CW  AR Dec Pine: 4.79 m/s2  AR Dec Time: 1282.69 ms  AR PHT: 371.98 ms  AR Vmax: 6.15 m/s  AR maxP.11 mmHg  TR Vmax: 2.65 m/s  TR maxP.17 mmHg    MM  TAPSE: 2.23 cm    PW  E': 0.08 m/s  E/E': 7.32  MV A Jerardo: 0.51 m/s  MV Dec Pine: 2.06 m/s2  MV DecT: 266.25 ms  MV E Jerardo: 0.55 m/s  MV E/A Ratio: 1.07    [employer] Accredited Echocardiography Laboratory    Prepared and electronically signed by    RYAN Saleh MD  Signed 04-Sep-2020 12:13:39    No results found for this or any previous visit.    No valid procedures specified.  No results found for this or any previous visit.    Imaging:   Echo complete w/ contrast if indicated    Result Date: 2024  Narrative:   Left Ventricle: Left ventricular cavity size is normal. Wall thickness is moderately increased. There is moderate concentric hypertrophy. The left ventricular ejection fraction is 45%. Systolic function is mildly reduced. Wall motion is normal. Diastolic function is severely abnormal, consistent with ungraded restrictive relaxation.   Right Ventricle: Right ventricular cavity size is normal. Systolic function is normal.   Left Atrium: The atrium is mildly dilated.   Right Atrium: The atrium is mildly dilated.   Aortic Valve: There is mild regurgitation.   Mitral Valve: There is moderate regurgitation.   Tricuspid Valve: There is severe regurgitation. The right ventricular systolic pressure  is moderately elevated. The estimated right ventricular systolic pressure is 55.00 mmHg.   Aorta: The aortic root is normal in size. The ascending aorta is moderately dilated. The aortic root is 3.50 cm. The ascending aorta is 4.5 cm.     XR chest 1 view portable    Result Date: 4/27/2024  Narrative: XR CHEST PORTABLE INDICATION: SOB. COMPARISON: Chest CT 4/27/2024, CXR 10/27/2023. FINDINGS: Mild pulmonary venous congestion. Moderate left effusion and left base atelectasis. No pneumothorax. Mild cardiomegaly, CABG. Bones are unremarkable for age. Normal upper abdomen.     Impression: Mild pulmonary venous congestion with moderate left effusion and left base atelectasis. Workstation performed: [id number]     CT chest wo contrast    Result Date: 4/27/2024  Narrative: CT CHEST WITHOUT IV CONTRAST INDICATION: new onset left sided pleural effusion on x-ray with increasing O2 requirements. Low concern for PE, scan ordered to assess for underlying pulmonary structural defects. COMPARISON: 7/27/2022. TECHNIQUE: CT examination of the chest was performed without intravenous contrast. Multiplanar 2D reformatted images were created from the source data. This examination, like all CT scans performed in the [facility], was performed utilizing techniques to minimize radiation dose exposure, including the use of iterative reconstruction and automated exposure control. Radiation dose length product (DLP) for this visit: 580 mGy-cm FINDINGS: LUNGS: Compressive atelectasis at the left lower lobe. There is no tracheal or endobronchial lesion. PLEURA: Minimally loculated moderate-sized left pleural effusion. HEART/GREAT VESSELS: Heavy atherosclerotic coronary artery calcification. Status post CABG Fusiform aneurysmal enlargement of the ascending thoracic aorta measuring up to 49 mm. Recommendation is for cardiothoracic surgery consultation.. MEDIASTINUM AND MYLES: Unremarkable. CHEST WALL AND LOWER NECK: Unremarkable.  VISUALIZED STRUCTURES IN THE UPPER ABDOMEN: Unremarkable. OSSEOUS STRUCTURES: No acute fracture or destructive osseous lesion.     Impression: Minimally loculated moderate-sized left pleural effusion. Compressive atelectasis at the left lower lobe and lingula. Stable fusiform aneurysmal enlargement of the ascending thoracic aorta measuring up to 49 mm comparing to 7/27/22. Workstation performed: [id number]       Thank you for allowing us to participate in this patient's care.   Counseling / Coordination of Care  Total floor / unit time spent today 45 minutes.  Greater than 50% of total time was spent with the patient and / or family counseling and / or coordination of care.  A description of the counseling / coordination of care: Review of history, current assessment, development of a plan.      Code Status: Level 3 - DNAR and DNI    ** Please Note: Dragon 360 Dictation voice to text software may have been used in the creation of this document. **

## 2024-04-28 NOTE — CONSULTS
Consultation - Pulmonary Medicine   Oscar Espinosa 83 y.o. male MRN: [id number]  Unit/Bed#: S -01 Encounter: [id number]      Assessment & Plan:   Acute respiratory insufficiency  Pleural effusion  Diastolic CHF  CKD  Lymphedema  A-fib on Coumadin  COPD without exacerbation  Severe YEVGENIY intolerant to therapy    Patient currently on 2 L with no baseline oxygen requirements.  Maintain pulse ox greater than 90%  Thoracentesis unable to be performed today due to patient's INR.  Vitamin K ordered and repeat INR tomorrow.  Hopefully able to perform procedure tomorrow by Dr. Faustin  Continue ceftriaxone and azithromycin for likely pneumonia.  Follow-up pleural fluid studies.  Diuretics per primary  Albuterol as needed    History of Present Illness   Physician Requesting Consult: Andreas Villarreal*  Reason for Consult / Principal Problem: Pleural effusion  Hx and PE limited by: None  HPI: Oscar Espinosa is a 83 y.o. year old male who presents with shortness of breath which started after lymphedema therapy.  His lymphedema is significantly improved now for the last week or so he has been having dyspnea and cough.  He is feeling better since he has been admitted.  He is not coughing as much.  He is not having any fevers.  He is laying in bed and does not have any significant shortness of breath at rest.    Previously followed with pulmonary associates for sleep apnea however stopped following up due to intolerance to therapy and lack of desire for inspire device.    Inpatient consult to Pulmonology  Consult performed by: Janet Mcintyre PA-C  Consult ordered by: Mi Estrada MD          Review of Systems   Respiratory:  Positive for cough and shortness of breath.    Cardiovascular:  Positive for leg swelling.   All other systems reviewed and are negative.      Historical Information   Past Medical History:   Diagnosis Date    Abdominal aortic aneurysm (HCC)     s/p repair    Abnormal ECG july 2022     No care due was identified.  [facility] Embedded Care Due Messages. Reference number: [id number].   4/28/2024 1:00:52 AM CDT   Aneurysm ([facility])     Aneurysm of abdominal aorta ([facility])     49mm, trileaflet AV    Atrial fibrillation ([facility])     Eliquis    BPH (benign prostatic hyperplasia)     CAD (coronary artery disease)     s/p CABGx3    CHF (congestive heart failure) ([facility])     Chronic pain     Gabapentin    Chronic pain disorder     lumbar    COPD (chronic obstructive pulmonary disease) ([facility])     Coronary artery disease     Former tobacco use     Hyperlipidemia     Hypertension     Hypothyroidism     Lumbar radiculopathy     Lumbar stenosis     s/p injections    Neuropathy     Obesity (BMI 30-39.9)     YEVGENIY (obstructive sleep apnea)     unable to tolerate CPAP    Pulmonary hypertension ([facility])     moderate to severe    Spondylolisthesis of lumbar region     Visual impairment     Vitamin D deficiency      Past Surgical History:   Procedure Laterality Date    ABDOMINAL AORTIC ANEURYSM REPAIR  2007    2 dock limbs with visc extens prosth    CARDIAC CATHETERIZATION      COLONOSCOPY      CORONARY ARTERY BYPASS GRAFT      LIMA to LAD, sequential SVG to OM1 & OM2, SVG to RDA    LUMBAR EPIDURAL INJECTION       Social History   Social History     Substance and Sexual Activity   Alcohol Use Yes    Alcohol/week: 21.0 standard drinks of alcohol    Types: 21 Cans of beer per week     Social History     Substance and Sexual Activity   Drug Use No     E-Cigarette/Vaping    E-Cigarette Use Never User      E-Cigarette/Vaping Substances    Nicotine No     THC No     CBD No     Flavoring No     Other No     Unknown No      Social History     Tobacco Use   Smoking Status Former    Current packs/day: 0.00    Average packs/day: 1 pack/day for 55.6 years (55.6 ttl pk-yrs)    Types: Cigarettes    Start date: 1957    Quit date: 2012    Years since quittin.7   Smokeless Tobacco Never       Family History:   Family History   Problem Relation Age of Onset    Heart disease Mother     Stroke Father         stroke syndrome    Aneurysm Brother       "   abdominal    Aortic aneurysm Brother         ascending    Coronary artery disease Family     Hypertension Family     Other Son         gioblastoma multiforme       Meds/Allergies   all current active meds have been reviewed    Allergies   Allergen Reactions    Meloxicam Edema       Objective   Vitals: Blood pressure 108/64, pulse 76, temperature 98.8 °F (37.1 °C), resp. rate 18, height 5' 11\" (1.803 m), SpO2 90%.,Body mass index is 31.74 kg/m².    Intake/Output Summary (Last 24 hours) at 4/27/2024 1319  Last data filed at 4/27/2024 0100  Gross per 24 hour   Intake 50 ml   Output 300 ml   Net -250 ml     Invasive Devices       Peripheral Intravenous Line  Duration             Peripheral IV 04/26/24 Distal;Left;Upper;Ventral (anterior) Arm <1 day                    Physical Exam  Vitals reviewed.   Constitutional:       General: He is not in acute distress.     Appearance: He is not toxic-appearing.   HENT:      Head: Normocephalic and atraumatic.   Eyes:      General: No scleral icterus.  Cardiovascular:      Rate and Rhythm: Normal rate.   Pulmonary:      Effort: Pulmonary effort is normal.      Comments: Decreased breath sounds    Musculoskeletal:         General: No tenderness or signs of injury.      Right lower leg: Edema present.      Left lower leg: Edema present.   Skin:     General: Skin is warm and dry.   Neurological:      General: No focal deficit present.      Mental Status: He is alert. Mental status is at baseline.   Psychiatric:         Mood and Affect: Mood normal.         Behavior: Behavior normal.         Lab Results: I have personally reviewed pertinent lab results.  Imaging Studies: I have personally reviewed pertinent reports.   and I have personally reviewed pertinent films in PACS  EKG, Pathology, and Other Studies: I have personally reviewed pertinent reports.    VTE Prophylaxis: Warfarin (Coumadin)    Code Status: Level 3 - DNAR and DNI  Advance Directive and Living Will:      Power of " :    POLST:

## 2024-04-28 NOTE — PROGRESS NOTES
"Progress Note - Pulmonary   Oscar Espinosa 83 y.o. male MRN: [id number]  Unit/Bed#: S -01 Encounter: [id number]    Assessment & Plan:  Acute hypoxic respiratory failure  Pneumonia  Pleural effusion  Diastolic CHF  CKD  Lymphedema  A-fib on Coumadin  COPD without exacerbation  Severe YEVGENIY intolerant to therapy    Patient remains on oxygen.  Was unable to wean off today.  Hopefully will have improvement in oxygen saturations after thoracentesis.  Procedure will be performed by Dr. Faustin today  Continue 7 days of antibiotics  Diuretics per primary  Albuterol as needed  I have sent a message to our office to arrange hospital follow-up visit    Subjective:   Oscar says he's a little better today.  Does not feel short of breath at rest.  Says that his cough has improved.    Objective:     Vitals: Blood pressure 118/66, pulse 76, temperature (!) 97.4 °F (36.3 °C), temperature source Oral, resp. rate 17, height 5' 11\" (1.803 m), weight 103 kg (227 lb 1.2 oz), SpO2 93%.,Body mass index is 31.67 kg/m².      Intake/Output Summary (Last 24 hours) at 4/28/2024 1026  Last data filed at 4/28/2024 0811  Gross per 24 hour   Intake 940 ml   Output 2100 ml   Net -1160 ml       Invasive Devices       Peripheral Intravenous Line  Duration             Peripheral IV 04/26/24 Distal;Left;Upper;Ventral (anterior) Arm 1 day                    Physical Exam:   General appearance: Alert and awake, in no acute distress  Head: Normocephalic, without obvious abnormality, atraumatic  Eyes: No scleral icterus   Lungs: Decreased breath sounds  Heart: Regular rate  Abdomen:  No appreciable distension or tenderness  Extremities: No deformity  Skin: Warm and dry  Neurologic: No acute focal deficits are noted      Labs: I have personally reviewed pertinent lab results.  Imaging and other studies: I have personally reviewed pertinent reports.      "

## 2024-04-29 PROBLEM — I50.9 ACUTE ON CHRONIC CONGESTIVE HEART FAILURE (HCC): Status: ACTIVE | Noted: 2019-09-11

## 2024-04-29 PROBLEM — I50.33 ACUTE ON CHRONIC HEART FAILURE WITH PRESERVED EJECTION FRACTION (HCC): Status: ACTIVE | Noted: 2019-09-11

## 2024-04-29 LAB
ALBUMIN SERPL BCP-MCNC: 2.7 G/DL (ref 3.5–5)
ALP SERPL-CCNC: 121 U/L (ref 34–104)
ALT SERPL W P-5'-P-CCNC: 67 U/L (ref 7–52)
ANION GAP SERPL CALCULATED.3IONS-SCNC: 6 MMOL/L (ref 4–13)
AST SERPL W P-5'-P-CCNC: 71 U/L (ref 13–39)
BILIRUB SERPL-MCNC: 1.01 MG/DL (ref 0.2–1)
BUN SERPL-MCNC: 19 MG/DL (ref 5–25)
CALCIUM ALBUM COR SERPL-MCNC: 9.3 MG/DL (ref 8.3–10.1)
CALCIUM SERPL-MCNC: 8.3 MG/DL (ref 8.4–10.2)
CHLORIDE SERPL-SCNC: 98 MMOL/L (ref 96–108)
CO2 SERPL-SCNC: 34 MMOL/L (ref 21–32)
CREAT SERPL-MCNC: 0.93 MG/DL (ref 0.6–1.3)
ERYTHROCYTE [DISTWIDTH] IN BLOOD BY AUTOMATED COUNT: 16.2 % (ref 11.6–15.1)
GFR SERPL CREATININE-BSD FRML MDRD: 76 ML/MIN/1.73SQ M
GLUCOSE SERPL-MCNC: 95 MG/DL (ref 65–140)
HAV IGM SER QL: NORMAL
HBV CORE IGM SER QL: NORMAL
HBV SURFACE AG SER QL: NORMAL
HCT VFR BLD AUTO: 32.5 % (ref 36.5–49.3)
HCV AB SER QL: NORMAL
HGB BLD-MCNC: 9.9 G/DL (ref 12–17)
MCH RBC QN AUTO: 29.4 PG (ref 26.8–34.3)
MCHC RBC AUTO-ENTMCNC: 30.5 G/DL (ref 31.4–37.4)
MCV RBC AUTO: 96 FL (ref 82–98)
PLATELET # BLD AUTO: 268 THOUSANDS/UL (ref 149–390)
PMV BLD AUTO: 9.7 FL (ref 8.9–12.7)
POTASSIUM SERPL-SCNC: 3.7 MMOL/L (ref 3.5–5.3)
PROT SERPL-MCNC: 6.4 G/DL (ref 6.4–8.4)
RBC # BLD AUTO: 3.37 MILLION/UL (ref 3.88–5.62)
SODIUM SERPL-SCNC: 138 MMOL/L (ref 135–147)
WBC # BLD AUTO: 7.46 THOUSAND/UL (ref 4.31–10.16)

## 2024-04-29 PROCEDURE — 85027 COMPLETE CBC AUTOMATED: CPT

## 2024-04-29 PROCEDURE — 99232 SBSQ HOSP IP/OBS MODERATE 35: CPT | Performed by: INTERNAL MEDICINE

## 2024-04-29 PROCEDURE — 80053 COMPREHEN METABOLIC PANEL: CPT

## 2024-04-29 RX ORDER — FUROSEMIDE 10 MG/ML
80 INJECTION INTRAMUSCULAR; INTRAVENOUS
Status: COMPLETED | OUTPATIENT
Start: 2024-04-29 | End: 2024-04-30

## 2024-04-29 RX ADMIN — GABAPENTIN 900 MG: 300 CAPSULE ORAL at 22:05

## 2024-04-29 RX ADMIN — SENNOSIDES 8.6 MG: 8.6 TABLET, FILM COATED ORAL at 08:40

## 2024-04-29 RX ADMIN — LABETALOL HYDROCHLORIDE 100 MG: 100 TABLET, FILM COATED ORAL at 17:37

## 2024-04-29 RX ADMIN — AMIODARONE HYDROCHLORIDE 200 MG: 200 TABLET ORAL at 08:40

## 2024-04-29 RX ADMIN — GUAIFENESIN 600 MG: 600 TABLET ORAL at 08:40

## 2024-04-29 RX ADMIN — LABETALOL HYDROCHLORIDE 100 MG: 100 TABLET, FILM COATED ORAL at 08:40

## 2024-04-29 RX ADMIN — POLYETHYLENE GLYCOL 3350 17 G: 17 POWDER, FOR SOLUTION ORAL at 08:40

## 2024-04-29 RX ADMIN — TAMSULOSIN HYDROCHLORIDE 0.4 MG: 0.4 CAPSULE ORAL at 17:37

## 2024-04-29 RX ADMIN — FUROSEMIDE 80 MG: 10 INJECTION, SOLUTION INTRAMUSCULAR; INTRAVENOUS at 17:37

## 2024-04-29 RX ADMIN — FUROSEMIDE 80 MG: 10 INJECTION, SOLUTION INTRAMUSCULAR; INTRAVENOUS at 08:40

## 2024-04-29 RX ADMIN — ATORVASTATIN CALCIUM 40 MG: 40 TABLET, FILM COATED ORAL at 17:37

## 2024-04-29 RX ADMIN — GUAIFENESIN 600 MG: 600 TABLET ORAL at 17:37

## 2024-04-29 RX ADMIN — WARFARIN SODIUM 2.5 MG: 2.5 TABLET ORAL at 17:37

## 2024-04-29 RX ADMIN — CEFTRIAXONE SODIUM 2000 MG: 10 INJECTION, POWDER, FOR SOLUTION INTRAVENOUS at 19:51

## 2024-04-29 RX ADMIN — ASPIRIN 81 MG: 81 TABLET, COATED ORAL at 17:37

## 2024-04-29 NOTE — ASSESSMENT & PLAN NOTE
History of quadruple bypass  Echocardiogram on 4/27 shows mildly reduced ejection fraction of 45%  Continue ASA, statin, beta-blockade, diuretic

## 2024-04-29 NOTE — PROGRESS NOTES
[facility]  Progress Note  Name: Oscar Espinosa I  MRN: [id number]  Unit/Bed#: S -01 I Date of Admission: 4/26/2024   Date of Service: 4/29/2024 I Hospital Day: 3    Assessment/Plan   * Pleural effusion with shortness of breath  Assessment & Plan  Patient presents with worsening dyspnea on exertion  Hypoxic in the emergency department requiring 3 L nasal cannula oxygen  Recent Labs     04/27/24  0547 04/28/24  0628 04/29/24  0458   WBC 7.92 7.55 7.46       Recent Labs     04/26/24  1942 04/27/24  0547   PROCALCITONI 0.08 0.08        Recent Labs     04/26/24  2347 04/27/24  0547 04/28/24  0628   INR 4.12* 3.76* 1.66*         Imaging findings appear to be consistent with left-sided loculated parapneumonic effusion that may be amenable to thoracentesis, not on admission due to supratherapeutic INR.   Thoracentesis labs consistent with exudative effusion    Plan:  Continue supplementary oxygen to maintain O2 sats above 90%  Continue Rocephin 2 g every 24 hours  Trend CBC  Continue IV Lasix 80 mg twice daily    Acute on chronic congestive heart failure (HCC)  Assessment & Plan  Wt Readings from Last 3 Encounters:   04/29/24 100 kg (221 lb 5.5 oz)   04/19/24 103 kg (227 lb 9.6 oz)   03/07/24 114 kg (252 lb 6.4 oz)     Patient has history of chronic diastolic CHF with mildly reduced ejection fraction 45% in the setting of lower extremity lymphedema  Patient recently began lymphedema therapy with bilateral lower extremity binding, following this patient experienced roughly 20 pound weight loss in a month due to increased excretion from increased venous return  Patient reports adherence to outpatient diuretic therapy as well as to cardiac diet.  Chest x-ray on admission shows large left-sided pleural effusion  Diuretic regimen is torsemide 40 mg on Monday and Friday and 20 mg on Saturday and Sunday    Plan:  Daily weights while inpatient  Fluid restriction 2000 mL  Sodium  restriction  Continue IV Lasix 80 mg twice daily  Monitor I's and O's  Trend CMP      Atrial fibrillation (HCC)  Assessment & Plan  Recent Labs     04/26/24  2347 04/27/24  0547 04/28/24  0628   INR 4.12* 3.76* 1.66*       Goal INR 2-3  Per patient: Home regimen is 5 mg every Sunday, 2.5 mg Monday through Saturday  Total weekly dose: 20mg  INR checked while in hospital, elevated 4.2  Recent Labs     04/26/24  2347 04/27/24  0547 04/28/24  0628   INR 4.12* 3.76* 1.66*          Plan:  Continue amiodarone 200 mg daily  Patient can resume warfarin with INR goal of 2-3     Elevated transaminase level  Assessment & Plan  Recent Labs     04/27/24  0547 04/28/24  0628 04/29/24  0458   AST 53* 63* 71*   ALT 54* 60* 67*   ALKPHOS 120* 118* 121*   TBILI 0.92 0.91 1.01*     Right upper quadrant ultrasound on 4/28 shows No acute abnormality or suspicious mass and mildly echogenic hepatic parenchyma suggesting steatosis.  Acute hepatitis panel normal  Trend CMP  Consider changing statin class    Anemia  Assessment & Plan  Recent Labs     04/27/24  0547 04/28/24  0628 04/29/24  0458   HGB 10.7* 9.8* 9.9*       Patient demonstrating new anemia, down from baseline of 13-14  Pt has also historically demonstrated mild macrocytosis, now demonstrating normocytosis     Plan:  Continue to monitor  Iron, folate, B12 ordered    Hyponatremia  Assessment & Plan  Recent Labs     04/27/24  0547 04/28/24  0628 04/29/24  0458   SODIUM 134* 137 138       Likely in setting of mild volume overload    Plan:  Trend CMP  Continue IV diuresis    3-vessel CAD  Assessment & Plan  History of quadruple bypass  Echocardiogram on 4/27 shows mildly reduced ejection fraction of 45%  Continue ASA, statin, beta-blockade, diuretic      Benign essential hypertension  Assessment & Plan  Pressures appropriate while inpatient  Blood Pressure: 110/55   Continue home regimen of labetalol and IV lasix 80 mg twice daily             VTE Pharmacologic Prophylaxis: VTE  Score: 7 High Risk (Score >/= 5) - Pharmacological DVT Prophylaxis Ordered: warfarin (Coumadin). Sequential Compression Devices Ordered.    Mobility:   Basic Mobility Inpatient Raw Score: 24  JH-HLM Goal: 8: Walk 250 feet or more  JH-HLM Achieved: 7: Walk 25 feet or more  JH-HLM Goal achieved. Continue to encourage appropriate mobility.    Patient Centered Rounds: I performed bedside rounds with nursing staff today.  Discussions with Specialists or Other Care Team Provider:     Education and Discussions with Family / Patient: Patient declined call to .     Current Length of Stay: 3 day(s)  Current Patient Status: Inpatient   Discharge Plan: Anticipate discharge in 24-48 hrs to home.    Code Status: Level 3 - DNAR and DNI    Subjective:   No overnight events.  Patient reports feeling better this morning and reports improvement in his breathing    Objective:     Vitals:   Temp (24hrs), Av °F (36.7 °C), Min:97.9 °F (36.6 °C), Max:98.1 °F (36.7 °C)    Temp:  [97.9 °F (36.6 °C)-98.1 °F (36.7 °C)] 98 °F (36.7 °C)  HR:  [64-79] 79  Resp:  [16-18] 16  BP: (103-119)/(55-60) 110/55  SpO2:  [91 %-92 %] 91 %  Body mass index is 30.87 kg/m².     Input and Output Summary (last 24 hours):     Intake/Output Summary (Last 24 hours) at 2024 1439  Last data filed at 2024 1024  Gross per 24 hour   Intake 1040 ml   Output 800 ml   Net 240 ml       Physical Exam:   Physical Exam  Vitals and nursing note reviewed.   Constitutional:       General: He is not in acute distress.     Appearance: He is well-developed.   HENT:      Head: Normocephalic and atraumatic.   Eyes:      Extraocular Movements: Extraocular movements intact.      Pupils: Pupils are equal, round, and reactive to light.   Cardiovascular:      Rate and Rhythm: Normal rate and regular rhythm.      Heart sounds: No murmur heard.  Pulmonary:      Effort: Pulmonary effort is normal. No respiratory distress.      Comments: Decreased breath sounds but  improved from yesterday  Abdominal:      Palpations: Abdomen is soft.      Tenderness: There is no abdominal tenderness.   Musculoskeletal:         General: No swelling.      Cervical back: Neck supple.      Right lower leg: Edema present.      Left lower leg: Edema present.   Skin:     General: Skin is warm and dry.      Capillary Refill: Capillary refill takes less than 2 seconds.   Neurological:      General: No focal deficit present.      Mental Status: He is alert and oriented to person, place, and time. Mental status is at baseline.   Psychiatric:         Mood and Affect: Mood normal.          Additional Data:     Labs:  Results from last 7 days   Lab Units 04/29/24  0458 04/28/24  0628   WBC Thousand/uL 7.46 7.55   HEMOGLOBIN g/dL 9.9* 9.8*   HEMATOCRIT % 32.5* 31.8*   PLATELETS Thousands/uL 268 246   SEGS PCT %  --  76*   LYMPHO PCT %  --  10*   MONO PCT %  --  11   EOS PCT %  --  1     Results from last 7 days   Lab Units 04/29/24  0458   SODIUM mmol/L 138   POTASSIUM mmol/L 3.7   CHLORIDE mmol/L 98   CO2 mmol/L 34*   BUN mg/dL 19   CREATININE mg/dL 0.93   ANION GAP mmol/L 6   CALCIUM mg/dL 8.3*   ALBUMIN g/dL 2.7*   TOTAL BILIRUBIN mg/dL 1.01*   ALK PHOS U/L 121*   ALT U/L 67*   AST U/L 71*   GLUCOSE RANDOM mg/dL 95     Results from last 7 days   Lab Units 04/28/24  0628   INR  1.66*             Results from last 7 days   Lab Units 04/27/24  0547 04/26/24  1942   PROCALCITONIN ng/ml 0.08 0.08       Lines/Drains:  Invasive Devices       Peripheral Intravenous Line  Duration             Peripheral IV 04/26/24 Distal;Left;Upper;Ventral (anterior) Arm 2 days                          Imaging: Reviewed radiology reports from this admission including: chest xray    Recent Cultures (last 7 days):   Results from last 7 days   Lab Units 04/28/24  1207 04/27/24  0250 04/27/24  0006 04/26/24  2239 04/26/24  2230   BLOOD CULTURE   --   --   --  No Growth at 48 hrs. No Growth at 48 hrs.   SPUTUM CULTURE   --   --  Test  not performed. Suggest repeat specimen.  --   --    GRAM STAIN RESULT  Rare Polys  No bacteria seen  --  4+ Epithelial Cells*  1+ Polys*  4+ Gram negative rods*  4+ Gram positive cocci in chains and clusters*  >10 squamous epithelial cells/lpf, indicating orophayngeal contamination.*  --   --    BODY FLUID CULTURE, STERILE  No growth  --   --   --   --    LEGIONELLA URINARY ANTIGEN   --  Negative  --   --   --        Last 24 Hours Medication List:   Current Facility-Administered Medications   Medication Dose Route Frequency Provider Last Rate    albuterol  5 mg Nebulization Q4H PRN Tin Contreras MD      amiodarone  200 mg Oral Daily Tin Contreras MD      aspirin  81 mg Oral Daily Tin Contreras MD      atorvastatin  40 mg Oral Daily Tin Contreras MD      cefTRIAXone  2,000 mg Intravenous Q24H Royl Waite MD      furosemide  80 mg Intravenous BID (diuretic) SOSA Benavidez      gabapentin  900 mg Oral HS Tin Contreras MD      guaiFENesin  600 mg Oral BID Tin Contreras MD      labetalol  100 mg Oral BID Tin Contreras MD      polyethylene glycol  17 g Oral Daily Tin Contreras MD      senna  1 tablet Oral Daily Tin Contreras MD      tamsulosin  0.4 mg Oral Daily Tin Contreras MD      warfarin  2.5 mg Oral Daily (warfarin) Jeri Carlson MD          Today, Patient Was Seen By: Roly Waite MD    **Please Note: This note may have been constructed using a voice recognition system.**

## 2024-04-29 NOTE — ASSESSMENT & PLAN NOTE
Recent Labs     04/27/24  0547 04/28/24  0628 04/29/24  0458   SODIUM 134* 137 138       Likely in setting of mild volume overload    Plan:  Trend CMP  Continue IV diuresis

## 2024-04-29 NOTE — CASE MANAGEMENT
Case Management Assessment & Discharge Planning Note    Patient name Oscar Espinosa  Location S /S -01 MRN [id number]  : 1940 Date 2024       Current Admission Date: 2024  Current Admission Diagnosis:Pleural effusion with shortness of breath   Patient Active Problem List    Diagnosis Date Noted    Pleural effusion with shortness of breath 2024    Hyponatremia 2024    Anemia 2024    Elevated transaminase level 2024    Hypertensive heart and chronic kidney disease with heart failure and stage 1 through stage 4 chronic kidney disease, or chronic kidney disease (HCC) 2024    Bilateral leg edema 2024    Bilateral carotid artery stenosis 2024    Venous insufficiency of both lower extremities 2023    Stage 3a chronic kidney disease (HCC) 10/27/2022    Platelets decreased ([facility]) 2022    Myofascial pain syndrome 2022    Atrial fibrillation ([facility]) 2022    Hypertensive heart disease with heart failure ([facility]) 2022    Ascending aortic aneurysm ([facility]) 2021    Nocturnal hypoxemia     Moderate to severe pulmonary hypertension ([facility]) 2021    Acute on chronic congestive heart failure ([facility]) 2019    Chronic obstructive pulmonary disease ([facility]) 2019    Multiple pulmonary nodules 09/10/2019    Chronic pain syndrome     Spondylolisthesis at L4-L5 level 2018    Intervertebral disc disorder with radiculopathy of lumbar region     Subclinical hypothyroidism 10/22/2014    Abdominal aortic aneurysm without rupture ([facility]) 2014    Aortic valve disorder 2014    3-vessel CAD 2014    Herniated lumbar intervertebral disc 2013    Disc degeneration, lumbar 2013    Lumbar radiculopathy 2013    Benign essential hypertension 04/10/2013    Impaired fasting glucose 04/10/2013    Severe obesity (BMI 35.0-35.9 with comorbidity) (HCC) 04/10/2013    Hyperlipidemia 2012    Microscopic  hematuria 09/07/2012    Severe obstructive sleep apnea 09/07/2012    Arteriosclerotic cardiovascular disease 09/07/2012      LOS (days): 3  Geometric Mean LOS (GMLOS) (days):   Days to GMLOS:     OBJECTIVE:    Risk of Unplanned Readmission Score: 18.57         Current admission status: Inpatient       Preferred Pharmacy:   [facility] - [place], [place] - [address]  [address]  [address]  [place] [address]  Phone: [phone] Fax: [phone]    [id number] - [place], [place] - [address]  [address]  [place] [address]  Phone: [phone] Fax: [phone]    Primary Care Provider: Lea Reyes, MD    Primary Insurance: MEDICARE  Secondary Insurance: AARP    ASSESSMENT:  Active Health Care Proxies    There are no active Health Care Proxies on file.                 Readmission Root Cause  30 Day Readmission: No    Patient Information  Admitted from:: Home  Mental Status: Alert  During Assessment patient was accompanied by: Spouse  Assessment information provided by:: Patient, Spouse  Primary Caregiver: Self  Support Systems: Spouse/significant other, Friends/neighbors, Self  County of Residence: [place]  What city do you live in?: [place]  Home entry access options. Select all that apply.: Stairs  Number of steps to enter home.: 3  Type of Current Residence: [facility]  Living Arrangements: Lives w/ Spouse/significant other  Is patient a ?: No    Activities of Daily Living Prior to Admission  Functional Status: Independent  Completes ADLs independently?: Yes  Ambulates independently?: Yes  Does patient use assisted devices?: No  Does patient currently own DME?: Yes  What DME does the patient currently own?: Bedside Commode, Straight Cane, Walker, Wheelchair  Does patient have a history of Outpatient Therapy (PT/OT)?: Yes  Does the patient have a history of Short-Term Rehab?: No  Does patient have a history of HHC?: No  Does patient  currently have [facility]?: No         Patient Information Continued  Income Source: Pension/long term  Does patient have prescription coverage?: No  Does patient receive dialysis treatments?: No  Does patient have a history of substance abuse?: No  Does patient have a history of Mental Health Diagnosis?: No    PHQ 2/9 Screening   Reviewed PHQ 2/9 Depression Screening Score?: No    Means of Transportation  Means of Transport to Appts:: Drives Self      Social Determinants of Health (SDOH)      Flowsheet Row Most Recent Value   Housing Stability    In the last 12 months, was there a time when you were not able to pay the mortgage or rent on time? N   In the last 12 months, how many places have you lived? 1   In the last 12 months, was there a time when you did not have a steady place to sleep or slept in a shelter (including now)? N   Transportation Needs    In the past 12 months, has lack of transportation kept you from medical appointments or from getting medications? no   In the past 12 months, has lack of transportation kept you from meetings, work, or from getting things needed for daily living? No   Food Insecurity    Within the past 12 months, you worried that your food would run out before you got the money to buy more. Never true   Within the past 12 months, the food you bought just didn't last and you didn't have money to get more. Never true   Utilities    In the past 12 months has the electric, gas, oil, or water company threatened to shut off services in your home? No            DISCHARGE DETAILS:    Discharge planning discussed with:: Patient, spouse  Freedom of Choice: Yes  Comments - Freedom of Choice: Return home - no CM needs noted  CM contacted family/caregiver?: Yes (Spouse present at bedside)  Were Treatment Team discharge recommendations reviewed with patient/caregiver?: Yes  Did patient/caregiver verbalize understanding of patient care needs?: Yes  Were patient/caregiver advised of the risks  associated with not following Treatment Team discharge recommendations?: Yes    Contacts  Patient Contacts: Dorcas Moore  Relationship to Patient:: Other (Comment) (Self, spouse)  Contact Method: In Person  Reason/Outcome: Continuity of Care, Emergency Contact, Discharge Planning    Requested Home Health Care         Is the patient interested in HHC at discharge?: No    DME Referral Provided  Referral made for DME?: No    Other Referral/Resources/Interventions Provided:  Interventions: Other (Specify)  Referral Comments: CM spoke with pt and spouse at bedside, introduced self and role with discharge planning. Pt reported he lives with his spouse in a ranch style home with 3 BHUPENDRA. Pt reported he was fully independent with ADLs and functional mobility without AD. Pt reported he has a BSC, RW, cane, and transport chair. Pt reported he is current with outpatient therapy. Pt reported he was driving himself to appointments and spouse assisted as needed. No further CM needs noted at this time. CM will continue to follow to assist with discharge coordination as needed.    Would you like to participate in our [facility] service program?  : No - Declined    Treatment Team Recommendation: Home  Discharge Destination Plan:: Home

## 2024-04-29 NOTE — ASSESSMENT & PLAN NOTE
Pressures appropriate while inpatient  Blood Pressure: 110/55   Continue home regimen of labetalol and IV lasix 80 mg twice daily

## 2024-04-29 NOTE — ASSESSMENT & PLAN NOTE
Recent Labs     04/27/24  0547 04/28/24  0628 04/29/24  0458   HGB 10.7* 9.8* 9.9*       Patient demonstrating new anemia, down from baseline of 13-14  Pt has also historically demonstrated mild macrocytosis, now demonstrating normocytosis     Plan:  Continue to monitor  Iron, folate, B12 ordered

## 2024-04-29 NOTE — ASSESSMENT & PLAN NOTE
Recent Labs     04/27/24  0547 04/28/24  0628 04/29/24  0458   AST 53* 63* 71*   ALT 54* 60* 67*   ALKPHOS 120* 118* 121*   TBILI 0.92 0.91 1.01*     Right upper quadrant ultrasound on 4/28 shows No acute abnormality or suspicious mass and mildly echogenic hepatic parenchyma suggesting steatosis.  Acute hepatitis panel normal  Trend CMP  Consider changing statin class

## 2024-04-29 NOTE — PROGRESS NOTES
"Progress Note - Pulmonary   Oscar Espinosa 83 y.o. male MRN: [id number]  Unit/Bed#: S -01 Encounter: [id number]    Assessment & Plan:    83M with hx of CABG, HFrEF, CKD, lymphedema, Af on AC, COPD, YEVGENIY, initially admitted on 4/27 due to dyspnea on excertion and found negative PCT but LLL consolidation and new loculated Lt pleura effusion.    Acute hypoxic respiratory failure, improving  2. LLL consolidation vs atelectasis  3. Pleural effusion, lymphocyte predominant exudation - loculated, s/p thoracentesis 4/28  4. Acute on Chronic HFrEF  5. CKD  6. Lymphedema  7. A-fib on Coumadin  8. COPD without exacerbation  9. Severe YEVGENIY intolerant to therapy    Preliminary workup showing lymphocyte predominant exudation (Lymph 49%, kbf645 ph7.8, TP 3.9, ). This might representing partially treated pseudo transudate given hx of diuretics use. He has negative PCT and no fever, and effusion seems not correlated with paraneumonic pleura effusion.  On room air Sat92% this morning. I will recommend ambulatory O2 check before DC to ensure he does not require further oxygen support  Can complete 7 days of abx and then stop. He should get a CXR as outpatient in 6-8 weeks to ensure resolution after achieving euvolemic. We can offer him pulm follow up as outopatient  Albuterol as needed    Subjective:   Oscar says he's a little better today.  Does not feel short of breath at rest.  No cough    Objective:     Vitals: Blood pressure 103/60, pulse 76, temperature 97.9 °F (36.6 °C), resp. rate 18, height 5' 11\" (1.803 m), weight 100 kg (221 lb 5.5 oz), SpO2 92%.,Body mass index is 30.87 kg/m².      Intake/Output Summary (Last 24 hours) at 4/29/2024 0632  Last data filed at 4/29/2024 0334  Gross per 24 hour   Intake 1500 ml   Output 950 ml   Net 550 ml       Invasive Devices       Peripheral Intravenous Line  Duration             Peripheral IV 04/26/24 Distal;Left;Upper;Ventral (anterior) Arm 2 days              "       Physical Exam:   General appearance: Alert and awake, in no acute distress  Head: Normocephalic, without obvious abnormality, atraumatic  Eyes: No scleral icterus   Lungs: Decreased breath sounds  Heart: Regular rate  Abdomen:  No appreciable distension or tenderness  Extremities: No deformity  Skin: Warm and dry  Neurologic: No acute focal deficits are noted      Labs: I have personally reviewed pertinent lab results.  Imaging and other studies: I have personally reviewed pertinent reports.      4/27 CT chest: loculated small Lt pleura effusion and small LLL consolidations

## 2024-04-29 NOTE — ASSESSMENT & PLAN NOTE
Patient presents with worsening dyspnea on exertion  Hypoxic in the emergency department requiring 3 L nasal cannula oxygen  Recent Labs     04/27/24  0547 04/28/24  0628 04/29/24  0458   WBC 7.92 7.55 7.46       Recent Labs     04/26/24  1942 04/27/24  0547   PROCALCITONI 0.08 0.08        Recent Labs     04/26/24  2347 04/27/24  0547 04/28/24  0628   INR 4.12* 3.76* 1.66*         Imaging findings appear to be consistent with left-sided loculated parapneumonic effusion that may be amenable to thoracentesis, not on admission due to supratherapeutic INR.   Thoracentesis labs consistent with exudative effusion    Plan:  Continue supplementary oxygen to maintain O2 sats above 90%  Continue Rocephin 2 g every 24 hours  Trend CBC  Continue IV Lasix 80 mg twice daily

## 2024-04-29 NOTE — PROGRESS NOTES
General Cardiology   Progress Note -  Team One   Oscar Espinosa 83 y.o. male MRN: [id number]  Unit/Bed#: S -01 Encounter: [id number]    Assessment    Acute hypoxic respiratory failure  On 2 L nasal cannula. O2 sats drop to 88% with ambulation    Acute on chronic HFmrEF    CXR showed mild pulmonary venous congestion with moderate left effusion  TTE 4/27/2024: LVEF 45% with severe diastolic dysfunction, dilated atria, moderate MR, and severe TR  Home diuretic: Torsemide 40 mg p.o. daily M-F and 20 mg p.o. daily on Saturday and Sunday.  He reports compliance with his medications  Hospital diuretic: Lasix 80 mg IV twice daily  I/Os: Limited documentation.  950 mL urine output.  +480 mL in 24 hours.  Did not receive second dose of IV Lasix yesterday due to BP hold parameter.  Weight: 221 pounds by standing scale, down from 227 pounds yesterday (bed scale)  Renal function stable    Paroxysmal atrial fibrillation  Currently in rate controlled atrial fibrillation on labetalol 100 mg twice daily and amiodarone 200 mg daily  Anticoagulated on warfarin-held over the weekend pre-thoracentesis as well as given vitamin K  INR 1.66 on 4/28    HTN-controlled, average /58 on Lasix 80 mg IV twice daily and labetalol 100    History of CAD s/p CABG  CABG x 4 in 2003 (LIMA to LAD, SVG to OM1, OM2, and SVG to PDA)  Denies any anginal complaints  Stress test in 2019 reviewed showing small amount of ischemia in the inferolateral region, no interval change in myocardial perfusion compared to prior stress test.  On ASA, atorvastatin, and labetalol    Left lower lobe pneumonia- On IV antibiotics    Plan  Remains overloaded on exam, continue Lasix 80 mg IV BID. Will adjust hold parameter to ensure he gets both doses today  Continue rate control of Afib for now with labetalol and amiodarone. Consider cardioversion once euvolemic and INR therapeutic  Follow strict I/Os, daily standing weights, am BMP  Treatment of pneumonia  "per primary team    Subjective  Review of Systems   Constitutional: Negative for chills, decreased appetite and malaise/fatigue.   Eyes:  Positive for visual disturbance.   Cardiovascular:  Positive for dyspnea on exertion. Negative for chest pain, leg swelling, orthopnea, palpitations and syncope.   Respiratory:  Negative for cough, shortness of breath, sleep disturbances due to breathing and sputum production.    Gastrointestinal:  Negative for bloating, nausea and vomiting.   Neurological:  Negative for dizziness, light-headedness and weakness.   Psychiatric/Behavioral:  Negative for altered mental status.    All other systems reviewed and are negative.    Objective:   Vitals: Blood pressure 110/55, pulse 79, temperature 98 °F (36.7 °C), resp. rate 16, height 5' 11\" (1.803 m), weight 100 kg (221 lb 5.5 oz), SpO2 91%., Body mass index is 30.87 kg/m².,     Systolic (24hrs), Av , Min:103 , Max:119     Diastolic (24hrs), Av, Min:55, Max:60      Intake/Output Summary (Last 24 hours) at 2024 0909  Last data filed at 2024 0334  Gross per 24 hour   Intake 1220 ml   Output 950 ml   Net 270 ml     Wt Readings from Last 3 Encounters:   24 100 kg (221 lb 5.5 oz)   24 103 kg (227 lb 9.6 oz)   24 114 kg (252 lb 6.4 oz)     Telemetry Review: N/a    Physical Exam  Vitals reviewed.   Constitutional:       General: He is not in acute distress.  Neck:      Vascular: No hepatojugular reflux or JVD.   Cardiovascular:      Rate and Rhythm: Normal rate. Rhythm irregular.      Heart sounds: Normal heart sounds. No murmur heard.     No friction rub. No gallop.   Pulmonary:      Effort: No respiratory distress.      Breath sounds: Rales present.      Comments: 91-92% on 2LNC  Abdominal:      General: Bowel sounds are normal. There is no distension.      Palpations: Abdomen is soft.      Tenderness: There is no abdominal tenderness.   Musculoskeletal:         General: No tenderness. Normal range of " motion.      Cervical back: Neck supple.      Right lower leg: Pitting Edema (trace) present.      Left lower leg: Pitting Edema (trace) present.   Skin:     General: Skin is warm and dry.      Capillary Refill: Capillary refill takes 2 to 3 seconds.      Findings: No erythema.   Neurological:      Mental Status: He is alert and oriented to person, place, and time.   Psychiatric:         Mood and Affect: Mood normal.       LABORATORY RESULTS      CBC with diff:   Results from last 7 days   Lab Units 04/29/24 0458 04/28/24 0628 04/27/24 0547 04/26/24 1942   WBC Thousand/uL 7.46 7.55 7.92 9.89   HEMOGLOBIN g/dL 9.9* 9.8* 10.7* 10.3*   HEMATOCRIT % 32.5* 31.8* 34.6* 32.5*   MCV fL 96 96 95 95   PLATELETS Thousands/uL 268 246 251 259   RBC Million/uL 3.37* 3.31* 3.63* 3.41*   MCH pg 29.4 29.6 29.5 30.2   MCHC g/dL 30.5* 30.8* 30.9* 31.7   RDW % 16.2* 16.1* 16.0* 15.9*   MPV fL 9.7 9.5 9.7 9.6   NRBC AUTO /100 WBCs  --  0  --  0     CMP:  Results from last 7 days   Lab Units 04/29/24 0458 04/28/24 0628 04/27/24 0547 04/26/24 1942   POTASSIUM mmol/L 3.7 3.7 3.9 4.3   CHLORIDE mmol/L 98 98 97 95*   CO2 mmol/L 34* 34* 29 33*   BUN mg/dL 19 19 20 23   CREATININE mg/dL 0.93 1.01 1.17 1.31*   CALCIUM mg/dL 8.3* 8.5 8.6 8.3*   AST U/L 71* 63* 53* 61*   ALT U/L 67* 60* 54* 62*   ALK PHOS U/L 121* 118* 120* 132*   EGFR ml/min/1.73sq m 76 68 57 50     BMP:  Results from last 7 days   Lab Units 04/29/24 0458 04/28/24 0628 04/27/24 0547 04/26/24 1942   POTASSIUM mmol/L 3.7 3.7 3.9 4.3   CHLORIDE mmol/L 98 98 97 95*   CO2 mmol/L 34* 34* 29 33*   BUN mg/dL 19 19 20 23   CREATININE mg/dL 0.93 1.01 1.17 1.31*   CALCIUM mg/dL 8.3* 8.5 8.6 8.3*     Lab Results   Component Value Date    CREATININE 0.93 04/29/2024    CREATININE 1.01 04/28/2024    CREATININE 1.17 04/27/2024     Lab Results   Component Value Date    NTBNP 141 07/10/2019      Results from last 7 days   Lab Units 04/27/24  0547   MAGNESIUM mg/dL 2.1     Results from  last 7 days   Lab Units 24  0628 24  0547 24  2347   INR  1.66* 3.76* 4.12*     Lipid Profile:   Lab Results   Component Value Date    CHOL 124 10/15/2015    CHOL 159 2015    CHOL 155 2013     Lab Results   Component Value Date    HDL 50 2024    HDL 68 2023    HDL 76 2023     Lab Results   Component Value Date    LDLCALC 48 2024    LDLCALC 53 2023    LDLCALC 58 2023     Lab Results   Component Value Date    TRIG 37 2024    TRIG 54 2023    TRIG 41 2023       Cardiac testing:   Results for orders placed during the hospital encounter of 20    Echo complete with contrast if indicated    Narrative  [address] [id number] [phone]    Transthoracic Echocardiogram  2D, M-mode, Doppler, and Color Doppler    Study date:  04-Sep-2020    Patient: LIZ GARCIA  MR number: [id number]  Account number: [id number]  : 1940  Age: 80 years  Gender: Male  Status: Outpatient  Location: [address] [place] [address] [facility] [place]  Height: 71 in  Weight: 253.4 lb  BP: 134/ 76 mmHg    Indications: 3 vessel CAD;Arteriosclerotic CVD;Essential HTN.    Diagnoses: I11.9 - Hypertensive heart disease without heart failure, I25.83 - Coronary atherosclerosis due to lipid rich plaque    Sonographer:  GUNJAN Chávez  Primary Physician:  Lea Reyes, MD  Referring Physician:  Raza Alan DO  Group:  [facility]  Cardiology Fellow:  Karla Jaquez MD  Interpreting Physician:  RYAN Saleh MD    SUMMARY    LEFT VENTRICLE:  Size was normal.  Systolic function was normal. Ejection fraction was estimated to be 60 %.  Although no diagnostic regional wall motion abnormality was identified, this possibility cannot be completely excluded on the basis of this study.  Wall thickness was mildly increased.  There was mild concentric hypertrophy.  Doppler parameters were consistent  with abnormal left ventricular relaxation (grade 1 diastolic dysfunction).    RIGHT VENTRICLE:  The size was at the upper limits of normal.  Systolic function was normal.  Wall thickness was increased.    LEFT ATRIUM:  The atrium was mildly dilated.    RIGHT ATRIUM:  The atrium was mildly dilated.    MITRAL VALVE:  There was mild regurgitation.    AORTIC VALVE:  There was trace regurgitation.    TRICUSPID VALVE:  There was mild regurgitation.  Estimated peak PA pressure was 50 mmHg.  The findings suggest moderate pulmonary hypertension.    AORTA:  The root exhibited dilatation at 44 mm.  There was dilatation of the ascending aorta at 44 mm.    IVC, HEPATIC VEINS:  The respirophasic change in diameter was less than 50%.    PULMONARY VEINS:  S/D reversal consistent with elevated LAP/diastolic dysfunction.    COMPARISONS:  Comparison was made with the previous study of 23-May-2014. Ascending aortic and aortic root sizes have not significantly changed. Pulmonary artery pressure has increased.    HISTORY: PRIOR HISTORY: Chronic systolic HF;HTN;Aneurysm of ascending aorta;HLD;YEVGENIY;Abdominal aortic aneurysm without rupture;Former smoker.    PROCEDURE: The study was performed in the [address] [place] [address] [facility] [place]. This was a routine study. The transthoracic approach was used. The study included complete 2D imaging, M-mode, complete spectral Doppler, and color Doppler. The  heart rate was 73 bpm, at the start of the study. Images were obtained from the parasternal, apical, subcostal, and suprasternal notch acoustic windows. This was a technically difficult study.    LEFT VENTRICLE: Size was normal. Systolic function was normal. Ejection fraction was estimated to be 60 %. Although no diagnostic regional wall motion abnormality was identified, this possibility cannot be completely excluded on the basis  of this study. Wall thickness was mildly increased. There was mild concentric hypertrophy. DOPPLER: Doppler parameters were  consistent with abnormal left ventricular relaxation (grade 1 diastolic dysfunction).    RIGHT VENTRICLE: The size was at the upper limits of normal. Systolic function was normal. Wall thickness was increased.    LEFT ATRIUM: The atrium was mildly dilated.    RIGHT ATRIUM: The atrium was mildly dilated.    MITRAL VALVE: There was mild annular calcification. Valve structure was normal. There was normal leaflet separation. DOPPLER: The transmitral velocity was within the normal range. There was no evidence for stenosis. There was mild  regurgitation.    AORTIC VALVE: The valve was probably trileaflet. Leaflets exhibited mild calcification, normal cuspal separation, and sclerosis. DOPPLER: Transaortic velocity was within the normal range. There was no evidence for stenosis. There was trace  regurgitation.    TRICUSPID VALVE: The valve structure was normal. There was normal leaflet separation. DOPPLER: The transtricuspid velocity was within the normal range. There was no evidence for stenosis. There was mild regurgitation. Estimated peak PA  pressure was 50 mmHg. The findings suggest moderate pulmonary hypertension.    PULMONIC VALVE: Not well visualized. DOPPLER: The transpulmonic velocity was within the normal range. There was trace regurgitation.    PERICARDIUM: There was no pericardial effusion.    AORTA: The root exhibited dilatation at 44 mm. There was dilatation of the ascending aorta at 44 mm.    SYSTEMIC VEINS: IVC: The inferior vena cava was normal in size. The respirophasic change in diameter was less than 50%.    PULMONARY VEINS: S/D reversal consistent with elevated LAP/diastolic dysfunction.    SYSTEM MEASUREMENT TABLES    2D  %FS: 29.67 %  Ao Diam: 3.65 cm  EDV(Teich): 127.77 ml  EF(Cube): 65.21 %  EF(Teich): 56.37 %  ESV(Cube): 48.06 ml  ESV(Teich): 55.74 ml  IVSd: 1.44 cm  LA Area: 17.79 cm2  LA Diam: 5.34 cm  LVEDV MOD A4C: 116.48 ml  LVEF MOD A4C: 49.73 %  LVESV MOD A4C: 58.55 ml  LVIDd: 5.17  cm  LVIDs: 3.64 cm  LVLd A4C: 8.46 cm  LVLs A4C: 6.7 cm  LVPWd: 1.36 cm  RA Area: 21.82 cm2  RV Diam.: 4.22 cm  SI(Cube): 38.51 ml/m2  SI(Teich): 30.78 ml/m2  SV MOD A4C: 57.93 ml  SV(Cube): 90.1 ml  SV(Teich): 72.03 ml    CW  AR Dec Casey: 4.79 m/s2  AR Dec Time: 1282.69 ms  AR PHT: 371.98 ms  AR Vmax: 6.15 m/s  AR maxP.11 mmHg  TR Vmax: 2.65 m/s  TR maxP.17 mmHg    MM  TAPSE: 2.23 cm    PW  E': 0.08 m/s  E/E': 7.32  MV A Jerardo: 0.51 m/s  MV Dec Casey: 2.06 m/s2  MV DecT: 266.25 ms  MV E Jerardo: 0.55 m/s  MV E/A Ratio: 1.07    [facility]    Prepared and electronically signed by    RYAN Saleh MD  Signed 04-Sep-2020 12:13:39    Meds/Allergies   all current active meds have been reviewed and current meds:   Current Facility-Administered Medications   Medication Dose Route Frequency    albuterol inhalation solution 5 mg  5 mg Nebulization Q4H PRN    amiodarone tablet 200 mg  200 mg Oral Daily    aspirin (ECOTRIN LOW STRENGTH) EC tablet 81 mg  81 mg Oral Daily    atorvastatin (LIPITOR) tablet 40 mg  40 mg Oral Daily    cefTRIAXone (ROCEPHIN) 2,000 mg in dextrose 5 % 50 mL IVPB  2,000 mg Intravenous Q24H    furosemide (LASIX) injection 80 mg  80 mg Intravenous BID (diuretic)    gabapentin (NEURONTIN) capsule 900 mg  900 mg Oral HS    guaiFENesin (MUCINEX) 12 hr tablet 600 mg  600 mg Oral BID    labetalol (NORMODYNE) tablet 100 mg  100 mg Oral BID    polyethylene glycol (MIRALAX) packet 17 g  17 g Oral Daily    senna (SENOKOT) tablet 8.6 mg  1 tablet Oral Daily    tamsulosin (FLOMAX) capsule 0.4 mg  0.4 mg Oral Daily    warfarin (COUMADIN) tablet 2.5 mg  2.5 mg Oral Daily (warfarin)     Facility-Administered Medications Prior to Admission   Medication    albuterol (PROVENTIL HFA,VENTOLIN HFA) inhaler 2 puff     Medications Prior to Admission   Medication    albuterol (Ventolin HFA) 90 mcg/act inhaler    amiodarone 200 mg tablet    aspirin (ECOTRIN LOW  STRENGTH) 81 mg EC tablet    atorvastatin (LIPITOR) 40 mg tablet    cholecalciferol (VITAMIN D3) 1,000 units tablet    FIBER ADULT GUMMIES PO    gabapentin (NEURONTIN) 300 mg capsule    labetalol (NORMODYNE) 100 mg tablet    multivitamin (THERAGRAN) TABS    potassium chloride (Klor-Con M20) 20 mEq tablet    senna (SENOKOT) 8.6 MG tablet    tamsulosin (FLOMAX) 0.4 mg    torsemide (DEMADEX) 20 mg tablet    warfarin (Coumadin) 5 mg tablet     Counseling / Coordination of Care  Total floor / unit time spent today 20 minutes.  Greater than 50% of total time was spent with the patient and / or family counseling and / or coordination of care.      ** Please Note: Dragon 360 Dictation voice to text software may have been used in the creation of this document. **

## 2024-04-29 NOTE — PLAN OF CARE
Problem: Potential for Falls  Goal: Patient will remain free of falls  Description: INTERVENTIONS:  - Educate patient/family on patient safety including physical limitations  - Instruct patient to call for assistance with activity   - Consult OT/PT to assist with strengthening/mobility   - Keep Call bell within reach  - Keep bed low and locked with side rails adjusted as appropriate  - Keep care items and personal belongings within reach  - Initiate and maintain comfort rounds  - Make Fall Risk Sign visible to staff  - Apply yellow socks and bracelet for high fall risk patients  - Consider moving patient to room near nurses station  Outcome: Progressing     Problem: Prexisting or High Potential for Compromised Skin Integrity  Goal: Skin integrity is maintained or improved  Description: INTERVENTIONS:  - Identify patients at risk for skin breakdown  - Assess and monitor skin integrity  - Assess and monitor nutrition and hydration status  - Monitor labs   - Assess for incontinence   - Turn and reposition patient  - Assist with mobility/ambulation  - Relieve pressure over bony prominences  - Avoid friction and shearing  - Provide appropriate hygiene as needed including keeping skin clean and dry  - Evaluate need for skin moisturizer/barrier cream  - Collaborate with interdisciplinary team   - Patient/family teaching  - Consider wound care consult   Outcome: Progressing     Problem: PAIN - ADULT  Goal: Verbalizes/displays adequate comfort level or baseline comfort level  Description: Interventions:  - Encourage patient to monitor pain and request assistance  - Assess pain using appropriate pain scale  - Administer analgesics based on type and severity of pain and evaluate response  - Implement non-pharmacological measures as appropriate and evaluate response  - Consider cultural and social influences on pain and pain management  - Notify physician/advanced practitioner if interventions unsuccessful or patient reports  new pain  Outcome: Progressing     Problem: INFECTION - ADULT  Goal: Absence or prevention of progression during hospitalization  Description: INTERVENTIONS:  - Assess and monitor for signs and symptoms of infection  - Monitor lab/diagnostic results  - Monitor all insertion sites, i.e. indwelling lines, tubes, and drains  - Monitor endotracheal if appropriate and nasal secretions for changes in amount and color  - [place] appropriate cooling/warming therapies per order  - Administer medications as ordered  - Instruct and encourage patient and family to use good hand hygiene technique  - Identify and instruct in appropriate isolation precautions for identified infection/condition  Outcome: Progressing  Goal: Absence of fever/infection during neutropenic period  Description: INTERVENTIONS:  - Monitor WBC    Outcome: Progressing     Problem: SAFETY ADULT  Goal: Patient will remain free of falls  Description: INTERVENTIONS:  - Educate patient/family on patient safety including physical limitations  - Instruct patient to call for assistance with activity   - Consult OT/PT to assist with strengthening/mobility   - Keep Call bell within reach  - Keep bed low and locked with side rails adjusted as appropriate  - Keep care items and personal belongings within reach  - Initiate and maintain comfort rounds  - Make Fall Risk Sign visible to staff  - Apply yellow socks and bracelet for high fall risk patients  - Consider moving patient to room near nurses station  Outcome: Progressing  Goal: Maintain or return to baseline ADL function  Description: INTERVENTIONS:  -  Assess patient's ability to carry out ADLs; assess patient's baseline for ADL function and identify physical deficits which impact ability to perform ADLs (bathing, care of mouth/teeth, toileting, grooming, dressing, etc.)  - Assess/evaluate cause of self-care deficits   - Assess range of motion  - Assess patient's mobility; develop plan if impaired  - Assess  patient's need for assistive devices and provide as appropriate  - Encourage maximum independence but intervene and supervise when necessary  - Involve family in performance of ADLs  - Assess for home care needs following discharge   - Consider OT consult to assist with ADL evaluation and planning for discharge  - Provide patient education as appropriate  Outcome: Progressing  Goal: Maintains/Returns to pre admission functional level  Description: INTERVENTIONS:  - Perform AM-PAC 6 Click Basic Mobility/ Daily Activity assessment daily.  - Set and communicate daily mobility goal to care team and patient/family/caregiver.   - Collaborate with rehabilitation services on mobility goals if consulted  - Out of bed for toileting  - Record patient progress and toleration of activity level   Outcome: Progressing     Problem: DISCHARGE PLANNING  Goal: Discharge to home or other facility with appropriate resources  Description: INTERVENTIONS:  - Identify barriers to discharge w/patient and caregiver  - Arrange for needed discharge resources and transportation as appropriate  - Identify discharge learning needs (meds, wound care, etc.)  - Arrange for interpretive services to assist at discharge as needed  - Refer to Case Management Department for coordinating discharge planning if the patient needs post-hospital services based on physician/advanced practitioner order or complex needs related to functional status, cognitive ability, or social support system  Outcome: Progressing     Problem: Knowledge Deficit  Goal: Patient/family/caregiver demonstrates understanding of disease process, treatment plan, medications, and discharge instructions  Description: Complete learning assessment and assess knowledge base.  Interventions:  - Provide teaching at level of understanding  - Provide teaching via preferred learning methods  Outcome: Progressing

## 2024-04-29 NOTE — ASSESSMENT & PLAN NOTE
Wt Readings from Last 3 Encounters:   04/29/24 100 kg (221 lb 5.5 oz)   04/19/24 103 kg (227 lb 9.6 oz)   03/07/24 114 kg (252 lb 6.4 oz)     Patient has history of chronic diastolic CHF with mildly reduced ejection fraction 45% in the setting of lower extremity lymphedema  Patient recently began lymphedema therapy with bilateral lower extremity binding, following this patient experienced roughly 20 pound weight loss in a month due to increased excretion from increased venous return  Patient reports adherence to outpatient diuretic therapy as well as to cardiac diet.  Chest x-ray on admission shows large left-sided pleural effusion  Diuretic regimen is torsemide 40 mg on Monday and Friday and 20 mg on Saturday and Sunday    Plan:  Daily weights while inpatient  Fluid restriction 2000 mL  Sodium restriction  Continue IV Lasix 80 mg twice daily  Monitor I's and O's  Trend CMP

## 2024-04-29 NOTE — ASSESSMENT & PLAN NOTE
Recent Labs     04/26/24  2347 04/27/24  0547 04/28/24  0628   INR 4.12* 3.76* 1.66*       Goal INR 2-3  Per patient: Home regimen is 5 mg every Sunday, 2.5 mg Monday through Saturday  Total weekly dose: 20mg  INR checked while in hospital, elevated 4.2  Recent Labs     04/26/24  2347 04/27/24  0547 04/28/24  0628   INR 4.12* 3.76* 1.66*          Plan:  Continue amiodarone 200 mg daily  Patient can resume warfarin with INR goal of 2-3

## 2024-04-30 ENCOUNTER — APPOINTMENT (INPATIENT)
Dept: RADIOLOGY | Facility: HOSPITAL | Age: 84
DRG: 186 | End: 2024-04-30
Payer: MEDICARE

## 2024-04-30 LAB
2HR DELTA HS TROPONIN: 0 NG/L
4HR DELTA HS TROPONIN: 0 NG/L
ALBUMIN SERPL BCP-MCNC: 2.6 G/DL (ref 3.5–5)
ALP SERPL-CCNC: 113 U/L (ref 34–104)
ALT SERPL W P-5'-P-CCNC: 71 U/L (ref 7–52)
ANION GAP SERPL CALCULATED.3IONS-SCNC: 7 MMOL/L (ref 4–13)
AST SERPL W P-5'-P-CCNC: 78 U/L (ref 13–39)
BILIRUB SERPL-MCNC: 0.82 MG/DL (ref 0.2–1)
BUN SERPL-MCNC: 16 MG/DL (ref 5–25)
CALCIUM ALBUM COR SERPL-MCNC: 9.3 MG/DL (ref 8.3–10.1)
CALCIUM SERPL-MCNC: 8.2 MG/DL (ref 8.4–10.2)
CARDIAC TROPONIN I PNL SERPL HS: 11 NG/L
CHLORIDE SERPL-SCNC: 98 MMOL/L (ref 96–108)
CO2 SERPL-SCNC: 32 MMOL/L (ref 21–32)
CREAT SERPL-MCNC: 0.89 MG/DL (ref 0.6–1.3)
ERYTHROCYTE [DISTWIDTH] IN BLOOD BY AUTOMATED COUNT: 16 % (ref 11.6–15.1)
GFR SERPL CREATININE-BSD FRML MDRD: 79 ML/MIN/1.73SQ M
GLUCOSE SERPL-MCNC: 97 MG/DL (ref 65–140)
HCT VFR BLD AUTO: 31.4 % (ref 36.5–49.3)
HGB BLD-MCNC: 9.7 G/DL (ref 12–17)
INR PPP: 1.68 (ref 0.84–1.19)
MAGNESIUM SERPL-MCNC: 2 MG/DL (ref 1.9–2.7)
MCH RBC QN AUTO: 29.3 PG (ref 26.8–34.3)
MCHC RBC AUTO-ENTMCNC: 30.9 G/DL (ref 31.4–37.4)
MCV RBC AUTO: 95 FL (ref 82–98)
PLATELET # BLD AUTO: 239 THOUSANDS/UL (ref 149–390)
PMV BLD AUTO: 9.1 FL (ref 8.9–12.7)
POTASSIUM SERPL-SCNC: 3.4 MMOL/L (ref 3.5–5.3)
PROT SERPL-MCNC: 6.1 G/DL (ref 6.4–8.4)
PROTHROMBIN TIME: 20.6 SECONDS (ref 11.6–14.5)
RBC # BLD AUTO: 3.31 MILLION/UL (ref 3.88–5.62)
SODIUM SERPL-SCNC: 137 MMOL/L (ref 135–147)
WBC # BLD AUTO: 6.72 THOUSAND/UL (ref 4.31–10.16)

## 2024-04-30 PROCEDURE — 83735 ASSAY OF MAGNESIUM: CPT

## 2024-04-30 PROCEDURE — 84484 ASSAY OF TROPONIN QUANT: CPT

## 2024-04-30 PROCEDURE — 71045 X-RAY EXAM CHEST 1 VIEW: CPT

## 2024-04-30 PROCEDURE — 80053 COMPREHEN METABOLIC PANEL: CPT

## 2024-04-30 PROCEDURE — 85610 PROTHROMBIN TIME: CPT

## 2024-04-30 PROCEDURE — 99232 SBSQ HOSP IP/OBS MODERATE 35: CPT | Performed by: INTERNAL MEDICINE

## 2024-04-30 PROCEDURE — 88305 TISSUE EXAM BY PATHOLOGIST: CPT | Performed by: PATHOLOGY

## 2024-04-30 PROCEDURE — 88112 CYTOPATH CELL ENHANCE TECH: CPT | Performed by: PATHOLOGY

## 2024-04-30 PROCEDURE — 94760 N-INVAS EAR/PLS OXIMETRY 1: CPT

## 2024-04-30 PROCEDURE — 85027 COMPLETE CBC AUTOMATED: CPT

## 2024-04-30 PROCEDURE — 94640 AIRWAY INHALATION TREATMENT: CPT

## 2024-04-30 PROCEDURE — 93005 ELECTROCARDIOGRAM TRACING: CPT

## 2024-04-30 RX ORDER — TORSEMIDE 20 MG/1
40 TABLET ORAL DAILY
Status: DISCONTINUED | OUTPATIENT
Start: 2024-05-01 | End: 2024-05-02 | Stop reason: HOSPADM

## 2024-04-30 RX ORDER — POTASSIUM CHLORIDE 20 MEQ/1
40 TABLET, EXTENDED RELEASE ORAL ONCE
Status: COMPLETED | OUTPATIENT
Start: 2024-04-30 | End: 2024-04-30

## 2024-04-30 RX ORDER — IPRATROPIUM BROMIDE AND ALBUTEROL SULFATE 2.5; .5 MG/3ML; MG/3ML
3 SOLUTION RESPIRATORY (INHALATION)
Status: DISCONTINUED | OUTPATIENT
Start: 2024-04-30 | End: 2024-04-30

## 2024-04-30 RX ADMIN — LABETALOL HYDROCHLORIDE 100 MG: 100 TABLET, FILM COATED ORAL at 09:24

## 2024-04-30 RX ADMIN — TAMSULOSIN HYDROCHLORIDE 0.4 MG: 0.4 CAPSULE ORAL at 15:59

## 2024-04-30 RX ADMIN — ATORVASTATIN CALCIUM 40 MG: 40 TABLET, FILM COATED ORAL at 15:59

## 2024-04-30 RX ADMIN — POLYETHYLENE GLYCOL 3350 17 G: 17 POWDER, FOR SOLUTION ORAL at 09:22

## 2024-04-30 RX ADMIN — POTASSIUM CHLORIDE 40 MEQ: 1500 TABLET, EXTENDED RELEASE ORAL at 12:24

## 2024-04-30 RX ADMIN — FUROSEMIDE 80 MG: 10 INJECTION, SOLUTION INTRAMUSCULAR; INTRAVENOUS at 09:29

## 2024-04-30 RX ADMIN — ASPIRIN 81 MG: 81 TABLET, COATED ORAL at 15:59

## 2024-04-30 RX ADMIN — GABAPENTIN 900 MG: 300 CAPSULE ORAL at 21:16

## 2024-04-30 RX ADMIN — IPRATROPIUM BROMIDE AND ALBUTEROL SULFATE 3 ML: .5; 3 SOLUTION RESPIRATORY (INHALATION) at 13:10

## 2024-04-30 RX ADMIN — CEFTRIAXONE SODIUM 2000 MG: 10 INJECTION, POWDER, FOR SOLUTION INTRAVENOUS at 21:16

## 2024-04-30 RX ADMIN — SENNOSIDES 8.6 MG: 8.6 TABLET, FILM COATED ORAL at 09:24

## 2024-04-30 RX ADMIN — POTASSIUM CHLORIDE 40 MEQ: 1500 TABLET, EXTENDED RELEASE ORAL at 09:23

## 2024-04-30 RX ADMIN — AMIODARONE HYDROCHLORIDE 200 MG: 200 TABLET ORAL at 09:23

## 2024-04-30 RX ADMIN — GUAIFENESIN 600 MG: 600 TABLET ORAL at 09:24

## 2024-04-30 RX ADMIN — FUROSEMIDE 80 MG: 10 INJECTION, SOLUTION INTRAMUSCULAR; INTRAVENOUS at 15:58

## 2024-04-30 RX ADMIN — WARFARIN SODIUM 2.5 MG: 2.5 TABLET ORAL at 17:23

## 2024-04-30 RX ADMIN — LABETALOL HYDROCHLORIDE 100 MG: 100 TABLET, FILM COATED ORAL at 17:23

## 2024-04-30 RX ADMIN — GUAIFENESIN 600 MG: 600 TABLET ORAL at 17:23

## 2024-04-30 NOTE — ASSESSMENT & PLAN NOTE
Goal INR 2-3  Per patient: Home regimen is 5 mg every Sunday, 2.5 mg Monday through Saturday  Total weekly dose: 20mg  INR checked while in hospital, elevated 4.2  Recent Labs     04/30/24  0504 05/01/24  0518   INR 1.68* 2.04*   Patient complaining of lower chest tightness, she was sudden onset and lasted 20 minutes.  EKG showed atrial fibrillation.  Chest x-ray was unchanged from yesterday.    Plan:  Continue amiodarone 200 mg daily  Continue taking warfarin with INR goal of 2-3  Follow-up with cardiology outpatient for possible cardioversion

## 2024-04-30 NOTE — RESPIRATORY THERAPY NOTE
RT Protocol Note  Oscar Espinosa 83 y.o. male MRN: [id number]  Unit/Bed#: S -01 Encounter: [id number]    Assessment    Principal Problem:    Pleural effusion with shortness of breath  Active Problems:    Benign essential hypertension    Severe obstructive sleep apnea    3-vessel CAD    Chronic pain syndrome    Acute on chronic congestive heart failure (HCC)    Chronic obstructive pulmonary disease (HCC)    Atrial fibrillation (HCC)    Anemia    Elevated transaminase level      Home Pulmonary Medications:  Albuterol MDI       Past Medical History:   Diagnosis Date    Abdominal aortic aneurysm (Formerly [facility])     s/p repair    Abnormal ECG 2022    Aneurysm (Formerly [facility])     Aneurysm of abdominal aorta (Formerly [facility])     49mm, trileaflet AV    Atrial fibrillation (Formerly [facility])     Eliquis    BPH (benign prostatic hyperplasia)     CAD (coronary artery disease)     s/p CABGx3    CHF (congestive heart failure) (Formerly [facility])     Chronic pain     Gabapentin    Chronic pain disorder     lumbar    COPD (chronic obstructive pulmonary disease) (Formerly [facility])     Coronary artery disease     Former tobacco use     Hyperlipidemia     Hypertension     Hypothyroidism     Lumbar radiculopathy     Lumbar stenosis     s/p injections    Neuropathy     Obesity (BMI 30-39.9)     YEVGENIY (obstructive sleep apnea)     unable to tolerate CPAP    Pulmonary hypertension (Formerly [facility])     moderate to severe    Spondylolisthesis of lumbar region     Visual impairment     Vitamin D deficiency      Social History     Socioeconomic History    Marital status: /Civil Union     Spouse name: None    Number of children: None    Years of education: None    Highest education level: None   Occupational History    Occupation: retired   Tobacco Use    Smoking status: Former     Current packs/day: 0.00     Average packs/day: 1 pack/day for 55.6 years (55.6 ttl pk-yrs)     Types: Cigarettes     Start date: 1957     Quit date: 2012     Years since quittin.7    Smokeless tobacco:  "Never   Vaping Use    Vaping status: Never Used   Substance and Sexual Activity    Alcohol use: Yes     Alcohol/week: 21.0 standard drinks of alcohol     Types: 21 Cans of beer per week    Drug use: No    Sexual activity: Never   Other Topics Concern    None   Social History Narrative    None     Social Determinants of Health     Financial Resource Strain: Low Risk  (3/7/2024)    Overall Financial Resource Strain (CARDIA)     Difficulty of Paying Living Expenses: Not hard at all   Food Insecurity: No Food Insecurity (4/29/2024)    Hunger Vital Sign     Worried About Running Out of Food in the Last Year: Never true     Ran Out of Food in the Last Year: Never true   Transportation Needs: No Transportation Needs (4/29/2024)    PRAPARE - Transportation     Lack of Transportation (Medical): No     Lack of Transportation (Non-Medical): No   Physical Activity: Not on file   Stress: Not on file   Social Connections: Not on file   Intimate Partner Violence: Not on file   Housing Stability: Low Risk  (4/29/2024)    Housing Stability Vital Sign     Unable to Pay for Housing in the Last Year: No     Number of Places Lived in the Last Year: 1     Unstable Housing in the Last Year: No       Subjective         Objective    Physical Exam:   Assessment Type: During-treatment  General Appearance: Alert, Awake  Respiratory Pattern: Normal, Dyspnea with exertion  Chest Assessment: Chest expansion symmetrical  Bilateral Breath Sounds: Diminished  L Breath Sounds: Diminished  Cough: Non-productive  O2 Device: 2lpm nc    Vitals:  Blood pressure 131/70, pulse 68, temperature 98 °F (36.7 °C), resp. rate 14, height 5' 11\" (1.803 m), weight 99.6 kg (219 lb 9.6 oz), SpO2 97%.          Imaging and other studies: I have personally reviewed pertinent reports.      O2 Device: 2lpm nc     Plan    Respiratory Plan: Home Bronchodilator Patient pathway        Resp Comments: hx of COPD, used MDI at home prn, no home 02, hx of YEVGENIY, can't use CPAP, no 02 " change to PRN

## 2024-04-30 NOTE — ASSESSMENT & PLAN NOTE
Recent Labs     04/30/24  0504 05/01/24  0518 05/02/24  0540   HGB 9.7* 9.4* 9.5*     Patient demonstrating new anemia, down from baseline of 13-14  Pt has also historically demonstrated mild macrocytosis, now demonstrating normocytosis   Vitamin B12 higher than normal.  Folate normal.  Ferritin normal, iron saturation, iron and TIBC low.    Likely anemia of chronic inflammation.    Plan:  Monitor CBC serially in the outpatient setting.

## 2024-04-30 NOTE — ASSESSMENT & PLAN NOTE
Wt Readings from Last 3 Encounters:   05/01/24 99.8 kg (220 lb)   04/19/24 103 kg (227 lb 9.6 oz)   03/07/24 114 kg (252 lb 6.4 oz)     Patient has history of chronic diastolic CHF with mildly reduced ejection fraction 45% in the setting of lower extremity lymphedema  Patient recently began lymphedema therapy with bilateral lower extremity binding, following this patient experienced roughly 20 pound weight loss in a month due to increased excretion from increased venous return  Patient reports adherence to outpatient diuretic therapy as well as to cardiac diet.  Chest x-ray on admission shows large left-sided pleural effusion  Diuretic regimen is torsemide 40 mg on Monday and Friday and 20 mg on Saturday and Sunday    Plan:  Torsemide 40 mg daily for 7 days a week, this will be the new regimen.  Spironolactone 12.5 mg daily  Fluid restriction 2000 mL  Sodium restriction

## 2024-04-30 NOTE — ASSESSMENT & PLAN NOTE
Patient presents with worsening dyspnea on exertion  Hypoxic in the emergency department requiring 3 L nasal cannula oxygen  Recent Labs     04/29/24  0458 04/30/24  0504 05/01/24  0518   WBC 7.46 6.72 6.64     Imaging findings appear to be consistent with left-sided loculated parapneumonic effusion that may be amenable to thoracentesis, not on admission due to supratherapeutic INR.   Thoracentesis labs consistent with exudative effusion  Blood cultures - no growth at 4 days.  Body fluid culture shows no growth.    Patient saturating above 90% on room air.  Repeat CT chest shows unchanged moderate left effusion with adjacent atelectasis.  Unchanged ascending thoracic aortic measuring 4 cm.  Interstitial lung disease changes suggesting bilateral atelectasis.    Plan:  Completed 7 days of Rocephin due to loculated pleural effusion.  Follow-up with pulmonology in the outpatient setting.

## 2024-04-30 NOTE — PLAN OF CARE
Problem: Potential for Falls  Goal: Patient will remain free of falls  Description: INTERVENTIONS:  - Educate patient/family on patient safety including physical limitations  - Instruct patient to call for assistance with activity   - Consult OT/PT to assist with strengthening/mobility   - Keep Call bell within reach  - Keep bed low and locked with side rails adjusted as appropriate  - Keep care items and personal belongings within reach  - Initiate and maintain comfort rounds  - Make Fall Risk Sign visible to staff  - Offer Toileting every 2 Hours, in advance of need as needed  - Initiate/Maintain bed/chair alarm as needed  - Obtain necessary fall risk management equipment: yellow socks  - Apply yellow socks and bracelet for high fall risk patients  - Consider moving patient to room near nurses station  Outcome: Progressing     Problem: Prexisting or High Potential for Compromised Skin Integrity  Goal: Skin integrity is maintained or improved  Description: INTERVENTIONS:  - Identify patients at risk for skin breakdown  - Assess and monitor skin integrity  - Assess and monitor nutrition and hydration status  - Monitor labs   - Assess for incontinence   - Turn and reposition patient  - Assist with mobility/ambulation  - Relieve pressure over bony prominences  - Avoid friction and shearing  - Provide appropriate hygiene as needed including keeping skin clean and dry  - Evaluate need for skin moisturizer/barrier cream  - Collaborate with interdisciplinary team   - Patient/family teaching  - Consider wound care consult   Outcome: Progressing     Problem: PAIN - ADULT  Goal: Verbalizes/displays adequate comfort level or baseline comfort level  Description: Interventions:  - Encourage patient to monitor pain and request assistance  - Assess pain using appropriate pain scale  - Administer analgesics based on type and severity of pain and evaluate response  - Implement non-pharmacological measures as appropriate and  evaluate response  - Consider cultural and social influences on pain and pain management  - Notify physician/advanced practitioner if interventions unsuccessful or patient reports new pain  Outcome: Progressing     Problem: INFECTION - ADULT  Goal: Absence or prevention of progression during hospitalization  Description: INTERVENTIONS:  - Assess and monitor for signs and symptoms of infection  - Monitor lab/diagnostic results  - Monitor all insertion sites, i.e. indwelling lines, tubes, and drains  - Monitor endotracheal if appropriate and nasal secretions for changes in amount and color  - [place] appropriate cooling/warming therapies per order  - Administer medications as ordered  - Instruct and encourage patient and family to use good hand hygiene technique  - Identify and instruct in appropriate isolation precautions for identified infection/condition  Outcome: Progressing  Goal: Absence of fever/infection during neutropenic period  Description: INTERVENTIONS:  - Monitor WBC    Outcome: Progressing     Problem: SAFETY ADULT  Goal: Patient will remain free of falls  Description: INTERVENTIONS:  - Educate patient/family on patient safety including physical limitations  - Instruct patient to call for assistance with activity   - Consult OT/PT to assist with strengthening/mobility   - Keep Call bell within reach  - Keep bed low and locked with side rails adjusted as appropriate  - Keep care items and personal belongings within reach  - Initiate and maintain comfort rounds  - Make Fall Risk Sign visible to staff  - Offer Toileting every 2 Hours, in advance of need as needed  - Initiate/Maintain bed/chair alarm as needed  - Obtain necessary fall risk management equipment: yellow socks  - Apply yellow socks and bracelet for high fall risk patients  - Consider moving patient to room near nurses station  Outcome: Progressing  Goal: Maintain or return to baseline ADL function  Description: INTERVENTIONS:  -  Assess  patient's ability to carry out ADLs; assess patient's baseline for ADL function and identify physical deficits which impact ability to perform ADLs (bathing, care of mouth/teeth, toileting, grooming, dressing, etc.)  - Assess/evaluate cause of self-care deficits   - Assess range of motion  - Assess patient's mobility; develop plan if impaired  - Assess patient's need for assistive devices and provide as appropriate  - Encourage maximum independence but intervene and supervise when necessary  - Involve family in performance of ADLs  - Assess for home care needs following discharge   - Consider OT consult to assist with ADL evaluation and planning for discharge  - Provide patient education as appropriate  Outcome: Progressing  Goal: Maintains/Returns to pre admission functional level  Description: INTERVENTIONS:  - Perform AM-PAC 6 Click Basic Mobility/ Daily Activity assessment daily.  - Set and communicate daily mobility goal to care team and patient/family/caregiver.   - Collaborate with rehabilitation services on mobility goals if consulted  - Perform Range of Motion   - Reposition patient every 2 hours.  - Ambulate patient 3 times a day  - Out of bed to chair 3 times a day   - Out of bed for meals 3 times a day  - Out of bed for toileting  - Record patient progress and toleration of activity level   Outcome: Progressing     Problem: DISCHARGE PLANNING  Goal: Discharge to home or other facility with appropriate resources  Description: INTERVENTIONS:  - Identify barriers to discharge w/patient and caregiver  - Arrange for needed discharge resources and transportation as appropriate  - Identify discharge learning needs (meds, wound care, etc.)  - Arrange for interpretive services to assist at discharge as needed  - Refer to Case Management Department for coordinating discharge planning if the patient needs post-hospital services based on physician/advanced practitioner order or complex needs related to functional  status, cognitive ability, or social support system  Outcome: Progressing     Problem: Knowledge Deficit  Goal: Patient/family/caregiver demonstrates understanding of disease process, treatment plan, medications, and discharge instructions  Description: Complete learning assessment and assess knowledge base.  Interventions:  - Provide teaching at level of understanding  - Provide teaching via preferred learning methods  Outcome: Progressing

## 2024-04-30 NOTE — ASSESSMENT & PLAN NOTE
Recent Labs     04/29/24  0458 04/30/24  0504 05/01/24  0518   AST 71* 78* 94*   ALT 67* 71* 87*   ALKPHOS 121* 113* 117*   TBILI 1.01* 0.82 0.60   Right upper quadrant ultrasound on 4/28 shows No acute abnormality or suspicious mass and mildly echogenic hepatic parenchyma suggesting steatosis.  Acute hepatitis panel normal  Consider changing statin class  Monitor CMP serially in the outpatient setting.

## 2024-04-30 NOTE — PROGRESS NOTES
[facility]  Progress Note  Name: Oscar Espinosa I  MRN: [id number]  Unit/Bed#: S -01 I Date of Admission: 4/26/2024   Date of Service: 4/30/2024 I Hospital Day: 4    Assessment/Plan   * Pleural effusion with shortness of breath  Assessment & Plan  Patient presents with worsening dyspnea on exertion  Hypoxic in the emergency department requiring 3 L nasal cannula oxygen  Recent Labs     04/28/24  0628 04/29/24  0458 04/30/24  0504   WBC 7.55 7.46 6.72       Imaging findings appear to be consistent with left-sided loculated parapneumonic effusion that may be amenable to thoracentesis, not on admission due to supratherapeutic INR.   Thoracentesis labs consistent with exudative effusion  Blood cultures - no growth at 22 hours.  Body fluid culture shows no growth.      Plan:  Continue supplementary oxygen to maintain O2 sats above 90%  Continue Rocephin, day 2 today. Complete for 7 days due to loculated pleural effusion.  Trend CBC  Continue diuresis.  Pulmonology consulted.    Acute on chronic congestive heart failure (HCC)  Assessment & Plan  Wt Readings from Last 3 Encounters:   04/30/24 99.6 kg (219 lb 9.6 oz)   04/19/24 103 kg (227 lb 9.6 oz)   03/07/24 114 kg (252 lb 6.4 oz)     Patient has history of chronic diastolic CHF with mildly reduced ejection fraction 45% in the setting of lower extremity lymphedema  Patient recently began lymphedema therapy with bilateral lower extremity binding, following this patient experienced roughly 20 pound weight loss in a month due to increased excretion from increased venous return  Patient reports adherence to outpatient diuretic therapy as well as to cardiac diet.  Chest x-ray on admission shows large left-sided pleural effusion  Diuretic regimen is torsemide 40 mg on Monday and Friday and 20 mg on Saturday and Sunday    Plan:  Continue IV Lasix 80 mg twice daily.  Switch to oral torsemide tomorrow.  Torsemide 40 mg Monday to Friday and 20 mg  Saturday, Sunday.  Daily weights while inpatient  Fluid restriction 2000 mL  Sodium restriction  Monitor I's and O's  Trend CMP    Atrial fibrillation (HCC)  Assessment & Plan  Goal INR 2-3  Per patient: Home regimen is 5 mg every Sunday, 2.5 mg Monday through Saturday  Total weekly dose: 20mg  INR checked while in hospital, elevated 4.2  Recent Labs     04/28/24  0628 04/30/24  0504   INR 1.66* 1.68*   Patient complaining of lower chest tightness, she was sudden onset and lasted 20 minutes.  EKG showed atrial fibrillation.  Chest x-ray was unchanged from yesterday.    Plan:  Continue amiodarone 200 mg daily  Patient can resume warfarin with INR goal of 2-3   Telemetry monitoring.    Elevated transaminase level  Assessment & Plan  Recent Labs     04/28/24  0628 04/29/24  0458 04/30/24  0504   AST 63* 71* 78*   ALT 60* 67* 71*   ALKPHOS 118* 121* 113*   TBILI 0.91 1.01* 0.82   Right upper quadrant ultrasound on 4/28 shows No acute abnormality or suspicious mass and mildly echogenic hepatic parenchyma suggesting steatosis.  Acute hepatitis panel normal  Trend CMP  Consider changing statin class    Anemia  Assessment & Plan  Recent Labs     04/28/24  0628 04/29/24  0458 04/30/24  0504   HGB 9.8* 9.9* 9.7*       Patient demonstrating new anemia, down from baseline of 13-14  Pt has also historically demonstrated mild macrocytosis, now demonstrating normocytosis   Vitamin B12 higher than normal.  Folate normal.  Ferritin normal, iron saturation, iron and TIBC low.    Likely anemia of chronic inflammation.    Plan:  Continue to monitor    Chronic obstructive pulmonary disease (HCC)  Assessment & Plan  Patient has history of COPD, historically maintained on multiple medications but now only maintained on albuterol as needed in outpatient setting  Not in exacerbation    Plan:  Albuterol as needed while inpatient  Continue to monitor work of breathing    Chronic pain syndrome  Assessment & Plan  Continue gabapentin 900 mg at  bedtime    3-vessel CAD  Assessment & Plan  History of quadruple bypass  Echocardiogram on  shows mildly reduced ejection fraction of 45%  Continue ASA, statin, beta-blockade, diuretic      Severe obstructive sleep apnea  Assessment & Plan  Patient has history of severe YEVGENIY, last sleep study in 2021 demonstrating apnea-hypopnea index of 40.1  Patient was historically trialed on CPAP which she was intolerant of, inspire implanted initially proposed to patient who refused    Plan:  Inpatient CPAP offered to patient, patient declined    Benign essential hypertension  Assessment & Plan  Pressures appropriate while inpatient  Blood Pressure: 131/70   Continue home regimen of labetalol and IV lasix 80 mg twice daily             VTE Pharmacologic Prophylaxis: VTE Score: 7 High Risk (Score >/= 5) - Pharmacological DVT Prophylaxis Ordered: warfarin (Coumadin). Sequential Compression Devices Ordered.    Mobility:   Basic Mobility Inpatient Raw Score: 24  JH-HLM Goal: 8: Walk 250 feet or more  JH-HLM Achieved: 7: Walk 25 feet or more  JH-HLM Goal NOT achieved. Continue with multidisciplinary rounding and encourage appropriate mobility to improve upon JH-HLM goals.    Patient Centered Rounds: I performed bedside rounds with nursing staff today.  Discussions with Specialists or Other Care Team Provider:     Education and Discussions with Family / Patient: Patient declined call to .     Current Length of Stay: 4 day(s)  Current Patient Status: Inpatient   Discharge Plan: Anticipate discharge in 24-48 hrs to home.    Code Status: Level 3 - DNAR and DNI    Subjective:   Patient was feeling better in the morning.  During rounds, patient complained of lower chest tightness which was not sudden onset and eventually relieved within 20 minutes.  He was lying in bed when he had pain.  EKG did show atrial fibrillation.    Objective:     Vitals:   Temp (24hrs), Av °F (36.7 °C), Min:97.9 °F (36.6 °C), Max:98.2 °F  (36.8 °C)    Temp:  [97.9 °F (36.6 °C)-98.2 °F (36.8 °C)] 97.9 °F (36.6 °C)  HR:  [60-72] 60  Resp:  [14-19] 16  BP: (108-131)/(63-72) 122/72  SpO2:  [89 %-97 %] 92 %  Body mass index is 30.63 kg/m².     Input and Output Summary (last 24 hours):     Intake/Output Summary (Last 24 hours) at 4/30/2024 1518  Last data filed at 4/30/2024 1438  Gross per 24 hour   Intake 1560 ml   Output 1475 ml   Net 85 ml       Physical Exam:   Physical Exam  Vitals and nursing note reviewed.   Constitutional:       General: He is not in acute distress.     Appearance: He is well-developed.   HENT:      Head: Normocephalic and atraumatic.   Eyes:      Extraocular Movements: Extraocular movements intact.      Pupils: Pupils are equal, round, and reactive to light.   Cardiovascular:      Rate and Rhythm: Normal rate and regular rhythm.      Heart sounds: No murmur heard.  Pulmonary:      Effort: Pulmonary effort is normal. No respiratory distress.   Abdominal:      Palpations: Abdomen is soft.      Tenderness: There is no abdominal tenderness.   Musculoskeletal:         General: No swelling.      Cervical back: Neck supple.      Right lower leg: No edema.      Left lower leg: No edema.   Skin:     General: Skin is warm and dry.      Capillary Refill: Capillary refill takes less than 2 seconds.   Neurological:      General: No focal deficit present.      Mental Status: He is alert and oriented to person, place, and time. Mental status is at baseline.   Psychiatric:         Mood and Affect: Mood normal.          Additional Data:     Labs:  Results from last 7 days   Lab Units 04/30/24  0504 04/29/24  0458 04/28/24  0628   WBC Thousand/uL 6.72   < > 7.55   HEMOGLOBIN g/dL 9.7*   < > 9.8*   HEMATOCRIT % 31.4*   < > 31.8*   PLATELETS Thousands/uL 239   < > 246   SEGS PCT %  --   --  76*   LYMPHO PCT %  --   --  10*   MONO PCT %  --   --  11   EOS PCT %  --   --  1    < > = values in this interval not displayed.     Results from last 7 days    Lab Units 04/30/24  0504   SODIUM mmol/L 137   POTASSIUM mmol/L 3.4*   CHLORIDE mmol/L 98   CO2 mmol/L 32   BUN mg/dL 16   CREATININE mg/dL 0.89   ANION GAP mmol/L 7   CALCIUM mg/dL 8.2*   ALBUMIN g/dL 2.6*   TOTAL BILIRUBIN mg/dL 0.82   ALK PHOS U/L 113*   ALT U/L 71*   AST U/L 78*   GLUCOSE RANDOM mg/dL 97     Results from last 7 days   Lab Units 04/30/24  0504   INR  1.68*             Results from last 7 days   Lab Units 04/27/24  0547 04/26/24  1942   PROCALCITONIN ng/ml 0.08 0.08       Lines/Drains:  Invasive Devices       Peripheral Intravenous Line  Duration             Peripheral IV 04/26/24 Distal;Left;Upper;Ventral (anterior) Arm 3 days                          Imaging: Reviewed radiology reports from this admission including: chest xray    Recent Cultures (last 7 days):   Results from last 7 days   Lab Units 04/28/24  1207 04/27/24  0250 04/27/24  0006 04/26/24  2239 04/26/24  2230   BLOOD CULTURE   --   --   --  No Growth at 72 hrs. No Growth at 72 hrs.   SPUTUM CULTURE   --   --  Test not performed. Suggest repeat specimen.  --   --    GRAM STAIN RESULT  Rare Polys  No bacteria seen  --  4+ Epithelial Cells*  1+ Polys*  4+ Gram negative rods*  4+ Gram positive cocci in chains and clusters*  >10 squamous epithelial cells/lpf, indicating orophayngeal contamination.*  --   --    BODY FLUID CULTURE, STERILE  No growth  --   --   --   --    LEGIONELLA URINARY ANTIGEN   --  Negative  --   --   --        Last 24 Hours Medication List:   Current Facility-Administered Medications   Medication Dose Route Frequency Provider Last Rate    albuterol  5 mg Nebulization Q4H PRN Tin Contreras MD      amiodarone  200 mg Oral Daily Tin Contreras MD      aspirin  81 mg Oral Daily Tin Contreras MD      atorvastatin  40 mg Oral Daily Tin Contreras MD      cefTRIAXone  2,000 mg Intravenous Q24H Roly Waite MD 2,000 mg (04/29/24 1951)    furosemide  80 mg Intravenous BID (diuretic) SOSA Benavidez       gabapentin  900 mg Oral HS Tin Contreras MD      guaiFENesin  600 mg Oral BID Tin Contreras MD      labetalol  100 mg Oral BID Tin Contreras MD      polyethylene glycol  17 g Oral Daily Tin Contreras MD      senna  1 tablet Oral Daily Tin Contreras MD      tamsulosin  0.4 mg Oral Daily Tin Contreras MD      [START ON 5/1/2024] torsemide  40 mg Oral Daily SOSA Benavidez      warfarin  2.5 mg Oral Daily (warfarin) Jeri Carlson MD          Today, Patient Was Seen By: Mi Estrada MD    **Please Note: This note may have been constructed using a voice recognition system.**

## 2024-04-30 NOTE — ASSESSMENT & PLAN NOTE
Pressures appropriate while inpatient  Blood Pressure: 105/56   Continue home regimen of labetalol and IV lasix 80 mg twice daily

## 2024-04-30 NOTE — PROGRESS NOTES
General Cardiology   Progress Note -  Team One   Oscar Espinosa 83 y.o. male MRN: [id number]  Unit/Bed#: S -01 Encounter: [id number]    Assessment     Acute hypoxic respiratory failure, resolved  Weaned to room air today     Acute on chronic HFmrEF    CXR showed mild pulmonary venous congestion with moderate left effusion  TTE 4/27/2024: LVEF 45% with severe diastolic dysfunction, dilated atria, moderate MR, and severe TR  Home diuretic: Torsemide 40 mg p.o. daily M-F and 20 mg p.o. daily on Saturday and Sunday.  He reports compliance with his medications  Hospital diuretic: Lasix 80 mg IV twice daily  I/Os: Limited documentation.  950 mL urine output.  +480 mL in 24 hours.  Did not receive second dose of IV Lasix yesterday due to BP hold parameter.  Weight: 221 pounds by standing scale, down from 227 pounds yesterday (bed scale)  Renal function stable     Paroxysmal atrial fibrillation  Currently in rate controlled atrial fibrillation on labetalol 100 mg twice daily and amiodarone 200 mg daily  Anticoagulated on warfarin-held over the weekend pre-thoracentesis as well as given vitamin K  INR 1.68 today     HTN-controlled, average /58 on Lasix 80 mg IV twice daily and labetalol 100     History of CAD s/p CABG  CABG x 4 in 2003 (LIMA to LAD, SVG to OM1, OM2, and SVG to PDA)  Denies any anginal complaints  Stress test in 2019 reviewed showing small amount of ischemia in the inferolateral region, no interval change in myocardial perfusion compared to prior stress test.  On ASA, atorvastatin, and labetalol     Left lower lobe pneumonia- On IV antibiotics, s/p thoracentesis 4/28 for parapneumonic effusion    Hypokalemia-K3.4     Plan  Volume status improved but complained of some orthopnea earlier today.  Continue IV Lasix through today with plan to transition back to oral diuretics tomorrow  Consider addition of spironolactone  Continue rate control of Afib for now with labetalol and amiodarone.  "Consider cardioversion as an outpatient.  Follow strict I/Os, daily standing weights, am BMP  Treatment of pneumonia per primary team    Subjective  Feeling better, less dyspneic.  Hoping to go home soon  Review of Systems   Constitutional: Negative for chills, decreased appetite, malaise/fatigue and weight gain.   Cardiovascular:  Positive for dyspnea on exertion (Improved) and orthopnea. Negative for chest pain, leg swelling, palpitations and syncope.   Respiratory:  Negative for cough, shortness of breath, sleep disturbances due to breathing and sputum production.    Endocrine: Negative.    Hematologic/Lymphatic: Negative.    Gastrointestinal:  Negative for bloating, nausea and vomiting.   Genitourinary: Negative.    Neurological:  Negative for dizziness, light-headedness and weakness.   Psychiatric/Behavioral:  Negative for altered mental status.    All other systems reviewed and are negative.    Objective:   Vitals: Blood pressure 131/70, pulse 68, temperature 98 °F (36.7 °C), resp. rate 14, height 5' 11\" (1.803 m), weight 99.6 kg (219 lb 9.6 oz), SpO2 90%., Body mass index is 30.63 kg/m².,     Systolic (24hrs), Av , Min:108 , Max:131     Diastolic (24hrs), Av, Min:63, Max:70      Intake/Output Summary (Last 24 hours) at 2024 1114  Last data filed at 2024 0501  Gross per 24 hour   Intake 930 ml   Output 1700 ml   Net -770 ml     Wt Readings from Last 3 Encounters:   24 99.6 kg (219 lb 9.6 oz)   24 103 kg (227 lb 9.6 oz)   24 114 kg (252 lb 6.4 oz)     Telemetry Review: N/a    Physical Exam  Vitals reviewed.   Constitutional:       General: He is not in acute distress.     Appearance: He is obese.   Neck:      Vascular: No hepatojugular reflux or JVD.   Cardiovascular:      Rate and Rhythm: Normal rate. Rhythm irregular.      Heart sounds: Normal heart sounds. No murmur heard.     No friction rub. No gallop.   Pulmonary:      Effort: Pulmonary effort is normal. No respiratory " distress.      Breath sounds: No rales.      Comments: Fine crackles LLL. Rest of lung fields clear. On room air  Abdominal:      General: Bowel sounds are normal. There is no distension.      Palpations: Abdomen is soft.      Tenderness: There is no abdominal tenderness.   Musculoskeletal:         General: No tenderness. Normal range of motion.      Cervical back: Neck supple.      Right lower leg: Edema (trace) present.      Left lower leg: Edema (trace) present.   Skin:     General: Skin is warm and dry.      Capillary Refill: Capillary refill takes 2 to 3 seconds.      Findings: No erythema.   Neurological:      Mental Status: He is alert and oriented to person, place, and time.   Psychiatric:         Mood and Affect: Mood normal.         LABORATORY RESULTS      CBC with diff:   Results from last 7 days   Lab Units 04/30/24  0504 04/29/24  0458 04/28/24 0628 04/27/24 0547 04/26/24 1942   WBC Thousand/uL 6.72 7.46 7.55 7.92 9.89   HEMOGLOBIN g/dL 9.7* 9.9* 9.8* 10.7* 10.3*   HEMATOCRIT % 31.4* 32.5* 31.8* 34.6* 32.5*   MCV fL 95 96 96 95 95   PLATELETS Thousands/uL 239 268 246 251 259   RBC Million/uL 3.31* 3.37* 3.31* 3.63* 3.41*   MCH pg 29.3 29.4 29.6 29.5 30.2   MCHC g/dL 30.9* 30.5* 30.8* 30.9* 31.7   RDW % 16.0* 16.2* 16.1* 16.0* 15.9*   MPV fL 9.1 9.7 9.5 9.7 9.6   NRBC AUTO /100 WBCs  --   --  0  --  0     CMP:  Results from last 7 days   Lab Units 04/30/24  0504 04/29/24  0458 04/28/24 0628 04/27/24 0547 04/26/24 1942   POTASSIUM mmol/L 3.4* 3.7 3.7 3.9 4.3   CHLORIDE mmol/L 98 98 98 97 95*   CO2 mmol/L 32 34* 34* 29 33*   BUN mg/dL 16 19 19 20 23   CREATININE mg/dL 0.89 0.93 1.01 1.17 1.31*   CALCIUM mg/dL 8.2* 8.3* 8.5 8.6 8.3*   AST U/L 78* 71* 63* 53* 61*   ALT U/L 71* 67* 60* 54* 62*   ALK PHOS U/L 113* 121* 118* 120* 132*   EGFR ml/min/1.73sq m 79 76 68 57 50     BMP:  Results from last 7 days   Lab Units 04/30/24  0504 04/29/24  0458 04/28/24  0628 04/27/24  0547 04/26/24  1942   POTASSIUM  mmol/L 3.4* 3.7 3.7 3.9 4.3   CHLORIDE mmol/L 98 98 98 97 95*   CO2 mmol/L 32 34* 34* 29 33*   BUN mg/dL 16 19 19 20 23   CREATININE mg/dL 0.89 0.93 1.01 1.17 1.31*   CALCIUM mg/dL 8.2* 8.3* 8.5 8.6 8.3*     Lab Results   Component Value Date    CREATININE 0.89 2024    CREATININE 0.93 2024    CREATININE 1.01 2024     Lab Results   Component Value Date    NTBNP 141 07/10/2019      Results from last 7 days   Lab Units 24  0547   MAGNESIUM mg/dL 2.1     Results from last 7 days   Lab Units 24  0504 24  0628 24  0547 24  2347   INR  1.68* 1.66* 3.76* 4.12*       Lipid Profile:   Lab Results   Component Value Date    CHOL 124 10/15/2015    CHOL 159 2015    CHOL 155 2013     Lab Results   Component Value Date    HDL 50 2024    HDL 68 2023    HDL 76 2023     Lab Results   Component Value Date    LDLCALC 48 2024    LDLCALC 53 2023    LDLCALC 58 2023     Lab Results   Component Value Date    TRIG 37 2024    TRIG 54 2023    TRIG 41 2023       Cardiac testing:   Results for orders placed during the hospital encounter of 20    Echo complete with contrast if indicated    Narrative  [address] [address] [phone]    Transthoracic Echocardiogram  2D, M-mode, Doppler, and Color Doppler    Study date:  04-Sep-2020    Patient: LIZ GARCIA  MR number: [id number]  Account number: [id number]  : 1940  Age: 80 years  Gender: Male  Status: Outpatient  Location: [address] [place] [address] [facility]  Height: 71 in  Weight: 253.4 lb  BP: 134/ 76 mmHg    Indications: 3 vessel CAD;Arteriosclerotic CVD;Essential HTN.    Diagnoses: I11.9 - Hypertensive heart disease without heart failure, I25.83 - Coronary atherosclerosis due to lipid rich plaque    Sonographer:  GUNJAN Chávez  Primary Physician:  Lea Reyes, MD  Referring Physician:  Raza  DO Waqas  Group:  [facility]  Cardiology Fellow:  Karla Jaquez MD  Interpreting Physician:  RYAN Saleh MD    SUMMARY    LEFT VENTRICLE:  Size was normal.  Systolic function was normal. Ejection fraction was estimated to be 60 %.  Although no diagnostic regional wall motion abnormality was identified, this possibility cannot be completely excluded on the basis of this study.  Wall thickness was mildly increased.  There was mild concentric hypertrophy.  Doppler parameters were consistent with abnormal left ventricular relaxation (grade 1 diastolic dysfunction).    RIGHT VENTRICLE:  The size was at the upper limits of normal.  Systolic function was normal.  Wall thickness was increased.    LEFT ATRIUM:  The atrium was mildly dilated.    RIGHT ATRIUM:  The atrium was mildly dilated.    MITRAL VALVE:  There was mild regurgitation.    AORTIC VALVE:  There was trace regurgitation.    TRICUSPID VALVE:  There was mild regurgitation.  Estimated peak PA pressure was 50 mmHg.  The findings suggest moderate pulmonary hypertension.    AORTA:  The root exhibited dilatation at 44 mm.  There was dilatation of the ascending aorta at 44 mm.    IVC, HEPATIC VEINS:  The respirophasic change in diameter was less than 50%.    PULMONARY VEINS:  S/D reversal consistent with elevated LAP/diastolic dysfunction.    COMPARISONS:  Comparison was made with the previous study of 23-May-2014. Ascending aortic and aortic root sizes have not significantly changed. Pulmonary artery pressure has increased.    HISTORY: PRIOR HISTORY: Chronic systolic HF;HTN;Aneurysm of ascending aorta;HLD;YEVGENIY;Abdominal aortic aneurysm without rupture;Former smoker.    PROCEDURE: The study was performed in the [address] [place] [address] [facility]. This was a routine study. The transthoracic approach was used. The study included complete 2D imaging, M-mode, complete spectral Doppler, and color Doppler. The  heart rate was 73 bpm, at the start  of the study. Images were obtained from the parasternal, apical, subcostal, and suprasternal notch acoustic windows. This was a technically difficult study.    LEFT VENTRICLE: Size was normal. Systolic function was normal. Ejection fraction was estimated to be 60 %. Although no diagnostic regional wall motion abnormality was identified, this possibility cannot be completely excluded on the basis  of this study. Wall thickness was mildly increased. There was mild concentric hypertrophy. DOPPLER: Doppler parameters were consistent with abnormal left ventricular relaxation (grade 1 diastolic dysfunction).    RIGHT VENTRICLE: The size was at the upper limits of normal. Systolic function was normal. Wall thickness was increased.    LEFT ATRIUM: The atrium was mildly dilated.    RIGHT ATRIUM: The atrium was mildly dilated.    MITRAL VALVE: There was mild annular calcification. Valve structure was normal. There was normal leaflet separation. DOPPLER: The transmitral velocity was within the normal range. There was no evidence for stenosis. There was mild  regurgitation.    AORTIC VALVE: The valve was probably trileaflet. Leaflets exhibited mild calcification, normal cuspal separation, and sclerosis. DOPPLER: Transaortic velocity was within the normal range. There was no evidence for stenosis. There was trace  regurgitation.    TRICUSPID VALVE: The valve structure was normal. There was normal leaflet separation. DOPPLER: The transtricuspid velocity was within the normal range. There was no evidence for stenosis. There was mild regurgitation. Estimated peak PA  pressure was 50 mmHg. The findings suggest moderate pulmonary hypertension.    PULMONIC VALVE: Not well visualized. DOPPLER: The transpulmonic velocity was within the normal range. There was trace regurgitation.    PERICARDIUM: There was no pericardial effusion.    AORTA: The root exhibited dilatation at 44 mm. There was dilatation of the ascending aorta at 44  mm.    SYSTEMIC VEINS: IVC: The inferior vena cava was normal in size. The respirophasic change in diameter was less than 50%.    PULMONARY VEINS: S/D reversal consistent with elevated LAP/diastolic dysfunction.    SYSTEM MEASUREMENT TABLES    2D  %FS: 29.67 %  Ao Diam: 3.65 cm  EDV(Teich): 127.77 ml  EF(Cube): 65.21 %  EF(Teich): 56.37 %  ESV(Cube): 48.06 ml  ESV(Teich): 55.74 ml  IVSd: 1.44 cm  LA Area: 17.79 cm2  LA Diam: 5.34 cm  LVEDV MOD A4C: 116.48 ml  LVEF MOD A4C: 49.73 %  LVESV MOD A4C: 58.55 ml  LVIDd: 5.17 cm  LVIDs: 3.64 cm  LVLd A4C: 8.46 cm  LVLs A4C: 6.7 cm  LVPWd: 1.36 cm  RA Area: 21.82 cm2  RV Diam.: 4.22 cm  SI(Cube): 38.51 ml/m2  SI(Teich): 30.78 ml/m2  SV MOD A4C: 57.93 ml  SV(Cube): 90.1 ml  SV(Teich): 72.03 ml    CW  AR Dec [place]: 4.79 m/s2  AR Dec Time: 1282.69 ms  AR PHT: 371.98 ms  AR Vmax: 6.15 m/s  AR maxP.11 mmHg  TR Vmax: 2.65 m/s  TR maxP.17 mmHg    MM  TAPSE: 2.23 cm    PW  E': 0.08 m/s  E/E': 7.32  MV A [place]: 0.51 m/s  MV Dec [place]: 2.06 m/s2  MV DecT: 266.25 ms  MV E Jerardo: 0.55 m/s  MV E/A Ratio: 1.07    [employer] Accredited Echocardiography Laboratory    Prepared and electronically signed by    RYAN Saleh MD  Signed 04-Sep-2020 12:13:39    Meds/Allergies   all current active meds have been reviewed and current meds:   Current Facility-Administered Medications   Medication Dose Route Frequency    albuterol inhalation solution 5 mg  5 mg Nebulization Q4H PRN    amiodarone tablet 200 mg  200 mg Oral Daily    aspirin (ECOTRIN LOW STRENGTH) EC tablet 81 mg  81 mg Oral Daily    atorvastatin (LIPITOR) tablet 40 mg  40 mg Oral Daily    cefTRIAXone (ROCEPHIN) 2,000 mg in dextrose 5 % 50 mL IVPB  2,000 mg Intravenous Q24H    furosemide (LASIX) injection 80 mg  80 mg Intravenous BID (diuretic)    gabapentin (NEURONTIN) capsule 900 mg  900 mg Oral HS    guaiFENesin (MUCINEX) 12 hr tablet 600 mg  600 mg Oral BID    labetalol (NORMODYNE) tablet 100 mg  100 mg Oral  BID    polyethylene glycol (MIRALAX) packet 17 g  17 g Oral Daily    potassium chloride (Klor-Con M20) CR tablet 40 mEq  40 mEq Oral Once    senna (SENOKOT) tablet 8.6 mg  1 tablet Oral Daily    tamsulosin (FLOMAX) capsule 0.4 mg  0.4 mg Oral Daily    warfarin (COUMADIN) tablet 2.5 mg  2.5 mg Oral Daily (warfarin)     Facility-Administered Medications Prior to Admission   Medication    albuterol (PROVENTIL HFA,VENTOLIN HFA) inhaler 2 puff     Medications Prior to Admission   Medication    albuterol (Ventolin HFA) 90 mcg/act inhaler    amiodarone 200 mg tablet    aspirin (ECOTRIN LOW STRENGTH) 81 mg EC tablet    atorvastatin (LIPITOR) 40 mg tablet    cholecalciferol (VITAMIN D3) 1,000 units tablet    FIBER ADULT GUMMIES PO    gabapentin (NEURONTIN) 300 mg capsule    labetalol (NORMODYNE) 100 mg tablet    multivitamin (THERAGRAN) TABS    potassium chloride (Klor-Con M20) 20 mEq tablet    senna (SENOKOT) 8.6 MG tablet    tamsulosin (FLOMAX) 0.4 mg    torsemide (DEMADEX) 20 mg tablet    warfarin (Coumadin) 5 mg tablet     Counseling / Coordination of Care  Total floor / unit time spent today 20 minutes.  Greater than 50% of total time was spent with the patient and / or family counseling and / or coordination of care.      ** Please Note: Dragon 360 Dictation voice to text software may have been used in the creation of this document. **

## 2024-05-01 ENCOUNTER — APPOINTMENT (INPATIENT)
Dept: CT IMAGING | Facility: HOSPITAL | Age: 84
DRG: 186 | End: 2024-05-01
Payer: MEDICARE

## 2024-05-01 LAB
BACTERIA SPEC BFLD CULT: NO GROWTH
ERYTHROCYTE [DISTWIDTH] IN BLOOD BY AUTOMATED COUNT: 16.1 % (ref 11.6–15.1)
GRAM STN SPEC: NORMAL
GRAM STN SPEC: NORMAL
HCT VFR BLD AUTO: 30.9 % (ref 36.5–49.3)
HGB BLD-MCNC: 9.4 G/DL (ref 12–17)
INR PPP: 2.04 (ref 0.84–1.19)
MCH RBC QN AUTO: 29.5 PG (ref 26.8–34.3)
MCHC RBC AUTO-ENTMCNC: 30.4 G/DL (ref 31.4–37.4)
MCV RBC AUTO: 97 FL (ref 82–98)
PLATELET # BLD AUTO: 241 THOUSANDS/UL (ref 149–390)
PMV BLD AUTO: 9.4 FL (ref 8.9–12.7)
PROTHROMBIN TIME: 23.8 SECONDS (ref 11.6–14.5)
RBC # BLD AUTO: 3.19 MILLION/UL (ref 3.88–5.62)
WBC # BLD AUTO: 6.64 THOUSAND/UL (ref 4.31–10.16)

## 2024-05-01 PROCEDURE — 99232 SBSQ HOSP IP/OBS MODERATE 35: CPT | Performed by: INTERNAL MEDICINE

## 2024-05-01 PROCEDURE — 71250 CT THORAX DX C-: CPT

## 2024-05-01 PROCEDURE — 85610 PROTHROMBIN TIME: CPT | Performed by: INTERNAL MEDICINE

## 2024-05-01 PROCEDURE — 80053 COMPREHEN METABOLIC PANEL: CPT

## 2024-05-01 PROCEDURE — 85027 COMPLETE CBC AUTOMATED: CPT

## 2024-05-01 RX ORDER — SPIRONOLACTONE 25 MG/1
12.5 TABLET ORAL DAILY
Status: DISCONTINUED | OUTPATIENT
Start: 2024-05-01 | End: 2024-05-02 | Stop reason: HOSPADM

## 2024-05-01 RX ADMIN — SPIRONOLACTONE 12.5 MG: 25 TABLET ORAL at 09:46

## 2024-05-01 RX ADMIN — SENNOSIDES 8.6 MG: 8.6 TABLET, FILM COATED ORAL at 08:21

## 2024-05-01 RX ADMIN — WARFARIN SODIUM 2.5 MG: 2.5 TABLET ORAL at 17:02

## 2024-05-01 RX ADMIN — LABETALOL HYDROCHLORIDE 100 MG: 100 TABLET, FILM COATED ORAL at 08:21

## 2024-05-01 RX ADMIN — ASPIRIN 81 MG: 81 TABLET, COATED ORAL at 15:57

## 2024-05-01 RX ADMIN — GUAIFENESIN 600 MG: 600 TABLET ORAL at 17:02

## 2024-05-01 RX ADMIN — AMIODARONE HYDROCHLORIDE 200 MG: 200 TABLET ORAL at 08:21

## 2024-05-01 RX ADMIN — GABAPENTIN 900 MG: 300 CAPSULE ORAL at 21:28

## 2024-05-01 RX ADMIN — CEFTRIAXONE SODIUM 2000 MG: 10 INJECTION, POWDER, FOR SOLUTION INTRAVENOUS at 20:47

## 2024-05-01 RX ADMIN — GUAIFENESIN 600 MG: 600 TABLET ORAL at 08:21

## 2024-05-01 RX ADMIN — POLYETHYLENE GLYCOL 3350 17 G: 17 POWDER, FOR SOLUTION ORAL at 08:21

## 2024-05-01 RX ADMIN — TORSEMIDE 40 MG: 20 TABLET ORAL at 08:21

## 2024-05-01 RX ADMIN — TAMSULOSIN HYDROCHLORIDE 0.4 MG: 0.4 CAPSULE ORAL at 15:56

## 2024-05-01 RX ADMIN — ATORVASTATIN CALCIUM 40 MG: 40 TABLET, FILM COATED ORAL at 15:56

## 2024-05-01 RX ADMIN — LABETALOL HYDROCHLORIDE 100 MG: 100 TABLET, FILM COATED ORAL at 17:02

## 2024-05-01 NOTE — PROGRESS NOTES
General Cardiology   Progress Note -  Team One   Oscar Espinosa 83 y.o. male MRN: [id number]  Unit/Bed#: S -01 Encounter: [id number]    Assessment     Acute hypoxic respiratory failure  Resolved yesterday but back on 2LNC today due to desaturations with ambulation  Multifactorial in setting of pneumonia with pleural effusion and CHF     Acute on chronic HFmrEF    CXR showed mild pulmonary venous congestion with moderate left effusion  TTE 4/27/2024: LVEF 45% with severe diastolic dysfunction, dilated atria, moderate MR, and severe TR  Home diuretic: Torsemide 40 mg p.o. daily M-F and 20 mg p.o. daily on Saturday and Sunday.  He reports compliance with his medications  Hospital diuretic: Lasix 80 mg IV twice daily, transitioned back to torsemide  I/Os: 1075 mL UO, net negative 1.7 L  Weight: 220 pounds by standing scale. Up 1 lb from yesterday but he forgot to remove cardiac monitors this morning  Renal function stable, appears euvolemic     Paroxysmal atrial fibrillation  Currently in rate controlled atrial fibrillation on labetalol 100 mg twice daily and amiodarone 200 mg daily  Anticoagulated on warfarin-INR 2.04 today     HTN-controlled, average /62 on diuretics and labetalol     History of CAD s/p CABG  CABG x 4 in 2003 (LIMA to LAD, SVG to OM1, OM2, and SVG to PDA)  Denies any anginal complaints  Stress test in 2019 reviewed showing small amount of ischemia in the inferolateral region, no interval change in myocardial perfusion compared to prior stress test.  On ASA, atorvastatin, and labetalol     Left lower lobe pneumonia- On IV antibiotics, s/p thoracentesis 4/28 for parapneumonic effusion     Plan  Volume status improved but still desats to upper 80s with ambulation. He feels his breathing is improved, however.    Continue torsemide 40 mg daily for now and follow up on CT chest results  Add spironolactone 12.5 mg daily  Continue rate control of Afib for now with labetalol and  "amiodarone. Consider cardioversion as an outpatient.  Follow strict I/Os, daily standing weights, am BMP  Treatment of pneumonia per primary team    Subjective  Review of Systems   Constitutional: Negative for chills, malaise/fatigue and weight gain.   Cardiovascular:  Negative for chest pain, dyspnea on exertion, leg swelling, orthopnea, palpitations and syncope.   Respiratory:  Negative for cough, shortness of breath, sleep disturbances due to breathing and sputum production.    Gastrointestinal:  Negative for bloating and nausea.   Neurological:  Negative for dizziness, light-headedness and weakness.   Psychiatric/Behavioral:  Negative for altered mental status.    All other systems reviewed and are negative.      Objective:   Vitals: Blood pressure 110/58, pulse 71, temperature 98.4 °F (36.9 °C), temperature source Oral, resp. rate 16, height 5' 11\" (1.803 m), weight 99.8 kg (220 lb), SpO2 97%., Body mass index is 30.68 kg/m².,     Systolic (24hrs), Av , Min:90 , Max:122     Diastolic (24hrs), Av, Min:58, Max:72        Intake/Output Summary (Last 24 hours) at 2024 1130  Last data filed at 2024 0820  Gross per 24 hour   Intake 720 ml   Output 1275 ml   Net -555 ml     Wt Readings from Last 3 Encounters:   24 99.8 kg (220 lb)   24 103 kg (227 lb 9.6 oz)   24 114 kg (252 lb 6.4 oz)     Telemetry Review: AFib, controlled ventricular response. Occasional PVCs, 5 beats NSVT    Physical Exam  Vitals reviewed.   Constitutional:       General: He is not in acute distress.     Appearance: He is obese.   Neck:      Vascular: No hepatojugular reflux or JVD.   Cardiovascular:      Rate and Rhythm: Normal rate. Rhythm irregularly irregular.      Heart sounds: Normal heart sounds. No murmur heard.     No friction rub. No gallop.   Pulmonary:      Effort: Pulmonary effort is normal. No respiratory distress.      Breath sounds: No rales.      Comments: Fine crackles LLL around thoracentesis " site. Rest of lung fields clear. 95% on 2L  Abdominal:      General: Bowel sounds are normal. There is no distension.      Palpations: Abdomen is soft.      Tenderness: There is no abdominal tenderness.   Musculoskeletal:         General: No tenderness. Normal range of motion.      Cervical back: Neck supple.      Right lower leg: Edema (trace) present.      Left lower leg: Edema (trace) present.   Skin:     General: Skin is warm and dry.      Capillary Refill: Capillary refill takes 2 to 3 seconds.      Findings: No erythema.   Neurological:      Mental Status: He is alert and oriented to person, place, and time.   Psychiatric:         Mood and Affect: Mood normal.         LABORATORY RESULTS      CBC with diff:   Results from last 7 days   Lab Units 05/01/24 0518 04/30/24  0504 04/29/24 0458 04/28/24 0628 04/27/24 0547 04/26/24 1942   WBC Thousand/uL 6.64 6.72 7.46 7.55 7.92 9.89   HEMOGLOBIN g/dL 9.4* 9.7* 9.9* 9.8* 10.7* 10.3*   HEMATOCRIT % 30.9* 31.4* 32.5* 31.8* 34.6* 32.5*   MCV fL 97 95 96 96 95 95   PLATELETS Thousands/uL 241 239 268 246 251 259   RBC Million/uL 3.19* 3.31* 3.37* 3.31* 3.63* 3.41*   MCH pg 29.5 29.3 29.4 29.6 29.5 30.2   MCHC g/dL 30.4* 30.9* 30.5* 30.8* 30.9* 31.7   RDW % 16.1* 16.0* 16.2* 16.1* 16.0* 15.9*   MPV fL 9.4 9.1 9.7 9.5 9.7 9.6   NRBC AUTO /100 WBCs  --   --   --  0  --  0     CMP:  Results from last 7 days   Lab Units 05/01/24  0518 04/30/24  0504 04/29/24 0458 04/28/24 0628 04/27/24 0547 04/26/24 1942   POTASSIUM mmol/L 3.8 3.4* 3.7 3.7 3.9 4.3   CHLORIDE mmol/L 99 98 98 98 97 95*   CO2 mmol/L 33* 32 34* 34* 29 33*   BUN mg/dL 16 16 19 19 20 23   CREATININE mg/dL 0.90 0.89 0.93 1.01 1.17 1.31*   CALCIUM mg/dL 8.2* 8.2* 8.3* 8.5 8.6 8.3*   AST U/L 94* 78* 71* 63* 53* 61*   ALT U/L 87* 71* 67* 60* 54* 62*   ALK PHOS U/L 117* 113* 121* 118* 120* 132*   EGFR ml/min/1.73sq m 79 79 76 68 57 50     BMP:  Results from last 7 days   Lab Units 05/01/24  0518 04/30/24  0504  24  0458 24  0628 24  0547 24  1942   POTASSIUM mmol/L 3.8 3.4* 3.7 3.7 3.9 4.3   CHLORIDE mmol/L 99 98 98 98 97 95*   CO2 mmol/L 33* 32 34* 34* 29 33*   BUN mg/dL 16 16 19 19 20 23   CREATININE mg/dL 0.90 0.89 0.93 1.01 1.17 1.31*   CALCIUM mg/dL 8.2* 8.2* 8.3* 8.5 8.6 8.3*     Lab Results   Component Value Date    CREATININE 0.90 2024    CREATININE 0.89 2024    CREATININE 0.93 2024     Lab Results   Component Value Date    NTBNP 141 07/10/2019      Results from last 7 days   Lab Units 24  0504 24  0547   MAGNESIUM mg/dL 2.0 2.1     Results from last 7 days   Lab Units 24  0518 24  0504 24  0628 24  0547 24  2347   INR  2.04* 1.68* 1.66* 3.76* 4.12*     Lipid Profile:   Lab Results   Component Value Date    CHOL 124 10/15/2015    CHOL 159 2015    CHOL 155 2013     Lab Results   Component Value Date    HDL 50 2024    HDL 68 2023    HDL 76 2023     Lab Results   Component Value Date    LDLCALC 48 2024    LDLCALC 53 2023    LDLCALC 58 2023     Lab Results   Component Value Date    TRIG 37 2024    TRIG 54 2023    TRIG 41 2023     Cardiac testing:   Results for orders placed during the hospital encounter of 20    Echo complete with contrast if indicated    Narrative  [address] [address] [phone]    Transthoracic Echocardiogram  2D, M-mode, Doppler, and Color Doppler    Study date:  04-Sep-2020    Patient: LIZ GARCIA  MR number: [id number]  Account number: [id number]  : 1940  Age: 80 years  Gender: Male  Status: Outpatient  Location: [address] [place] [address] [facility]  Height: 71 in  Weight: 253.4 lb  BP: 134/ 76 mmHg    Indications: 3 vessel CAD;Arteriosclerotic CVD;Essential HTN.    Diagnoses: I11.9 - Hypertensive heart disease without heart failure, I25.83 - Coronary atherosclerosis  due to lipid rich plaque    Sonographer:  GUNJAN Chávez  Primary Physician:  Lea Reyes, MD  Referring Physician:  Raza Alan DO  Group:  [facility]  Cardiology Fellow:  Karla Jaquez MD  Interpreting Physician:  RYAN Saleh MD    SUMMARY    LEFT VENTRICLE:  Size was normal.  Systolic function was normal. Ejection fraction was estimated to be 60 %.  Although no diagnostic regional wall motion abnormality was identified, this possibility cannot be completely excluded on the basis of this study.  Wall thickness was mildly increased.  There was mild concentric hypertrophy.  Doppler parameters were consistent with abnormal left ventricular relaxation (grade 1 diastolic dysfunction).    RIGHT VENTRICLE:  The size was at the upper limits of normal.  Systolic function was normal.  Wall thickness was increased.    LEFT ATRIUM:  The atrium was mildly dilated.    RIGHT ATRIUM:  The atrium was mildly dilated.    MITRAL VALVE:  There was mild regurgitation.    AORTIC VALVE:  There was trace regurgitation.    TRICUSPID VALVE:  There was mild regurgitation.  Estimated peak PA pressure was 50 mmHg.  The findings suggest moderate pulmonary hypertension.    AORTA:  The root exhibited dilatation at 44 mm.  There was dilatation of the ascending aorta at 44 mm.    IVC, HEPATIC VEINS:  The respirophasic change in diameter was less than 50%.    PULMONARY VEINS:  S/D reversal consistent with elevated LAP/diastolic dysfunction.    COMPARISONS:  Comparison was made with the previous study of 23-May-2014. Ascending aortic and aortic root sizes have not significantly changed. Pulmonary artery pressure has increased.    HISTORY: PRIOR HISTORY: Chronic systolic HF;HTN;Aneurysm of ascending aorta;HLD;YEVGENIY;Abdominal aortic aneurysm without rupture;Former smoker.    PROCEDURE: The study was performed in the [address] [place] [address] [facility]. This was a routine study. The transthoracic approach was used. The study  included complete 2D imaging, M-mode, complete spectral Doppler, and color Doppler. The  heart rate was 73 bpm, at the start of the study. Images were obtained from the parasternal, apical, subcostal, and suprasternal notch acoustic windows. This was a technically difficult study.    LEFT VENTRICLE: Size was normal. Systolic function was normal. Ejection fraction was estimated to be 60 %. Although no diagnostic regional wall motion abnormality was identified, this possibility cannot be completely excluded on the basis  of this study. Wall thickness was mildly increased. There was mild concentric hypertrophy. DOPPLER: Doppler parameters were consistent with abnormal left ventricular relaxation (grade 1 diastolic dysfunction).    RIGHT VENTRICLE: The size was at the upper limits of normal. Systolic function was normal. Wall thickness was increased.    LEFT ATRIUM: The atrium was mildly dilated.    RIGHT ATRIUM: The atrium was mildly dilated.    MITRAL VALVE: There was mild annular calcification. Valve structure was normal. There was normal leaflet separation. DOPPLER: The transmitral velocity was within the normal range. There was no evidence for stenosis. There was mild  regurgitation.    AORTIC VALVE: The valve was probably trileaflet. Leaflets exhibited mild calcification, normal cuspal separation, and sclerosis. DOPPLER: Transaortic velocity was within the normal range. There was no evidence for stenosis. There was trace  regurgitation.    TRICUSPID VALVE: The valve structure was normal. There was normal leaflet separation. DOPPLER: The transtricuspid velocity was within the normal range. There was no evidence for stenosis. There was mild regurgitation. Estimated peak PA  pressure was 50 mmHg. The findings suggest moderate pulmonary hypertension.    PULMONIC VALVE: Not well visualized. DOPPLER: The transpulmonic velocity was within the normal range. There was trace regurgitation.    PERICARDIUM: There was no  pericardial effusion.    AORTA: The root exhibited dilatation at 44 mm. There was dilatation of the ascending aorta at 44 mm.    SYSTEMIC VEINS: IVC: The inferior vena cava was normal in size. The respirophasic change in diameter was less than 50%.    PULMONARY VEINS: S/D reversal consistent with elevated LAP/diastolic dysfunction.    SYSTEM MEASUREMENT TABLES    2D  %FS: 29.67 %  Ao Diam: 3.65 cm  EDV(Teich): 127.77 ml  EF(Cube): 65.21 %  EF(Teich): 56.37 %  ESV(Cube): 48.06 ml  ESV(Teich): 55.74 ml  IVSd: 1.44 cm  LA Area: 17.79 cm2  LA Diam: 5.34 cm  LVEDV MOD A4C: 116.48 ml  LVEF MOD A4C: 49.73 %  LVESV MOD A4C: 58.55 ml  LVIDd: 5.17 cm  LVIDs: 3.64 cm  LVLd A4C: 8.46 cm  LVLs A4C: 6.7 cm  LVPWd: 1.36 cm  RA Area: 21.82 cm2  RV Diam.: 4.22 cm  SI(Cube): 38.51 ml/m2  SI(Teich): 30.78 ml/m2  SV MOD A4C: 57.93 ml  SV(Cube): 90.1 ml  SV(Teich): 72.03 ml    CW  AR Dec Corson: 4.79 m/s2  AR Dec Time: 1282.69 ms  AR PHT: 371.98 ms  AR Vmax: 6.15 m/s  AR maxP.11 mmHg  TR Vmax: 2.65 m/s  TR maxP.17 mmHg    MM  TAPSE: 2.23 cm    PW  E': 0.08 m/s  E/E': 7.32  MV A Jerardo: 0.51 m/s  MV Dec Corson: 2.06 m/s2  MV DecT: 266.25 ms  MV E Jerardo: 0.55 m/s  MV E/A Ratio: 1.07    [employer] Accredited Echocardiography Laboratory    Prepared and electronically signed by    RYAN Saleh MD  Signed 04-Sep-2020 12:13:39    No results found for this or any previous visit.    No results found for this or any previous visit.    No valid procedures specified.  No results found for this or any previous visit.      Meds/Allergies   all current active meds have been reviewed and current meds:   Current Facility-Administered Medications   Medication Dose Route Frequency    albuterol inhalation solution 5 mg  5 mg Nebulization Q4H PRN    amiodarone tablet 200 mg  200 mg Oral Daily    aspirin (ECOTRIN LOW STRENGTH) EC tablet 81 mg  81 mg Oral Daily    atorvastatin (LIPITOR) tablet 40 mg  40 mg Oral Daily    cefTRIAXone  (ROCEPHIN) 2,000 mg in dextrose 5 % 50 mL IVPB  2,000 mg Intravenous Q24H    gabapentin (NEURONTIN) capsule 900 mg  900 mg Oral HS    guaiFENesin (MUCINEX) 12 hr tablet 600 mg  600 mg Oral BID    labetalol (NORMODYNE) tablet 100 mg  100 mg Oral BID    polyethylene glycol (MIRALAX) packet 17 g  17 g Oral Daily    senna (SENOKOT) tablet 8.6 mg  1 tablet Oral Daily    spironolactone (ALDACTONE) tablet 12.5 mg  12.5 mg Oral Daily    tamsulosin (FLOMAX) capsule 0.4 mg  0.4 mg Oral Daily    torsemide (DEMADEX) tablet 40 mg  40 mg Oral Daily    warfarin (COUMADIN) tablet 2.5 mg  2.5 mg Oral Daily (warfarin)     Facility-Administered Medications Prior to Admission   Medication    albuterol (PROVENTIL HFA,VENTOLIN HFA) inhaler 2 puff     Medications Prior to Admission   Medication    albuterol (Ventolin HFA) 90 mcg/act inhaler    amiodarone 200 mg tablet    aspirin (ECOTRIN LOW STRENGTH) 81 mg EC tablet    atorvastatin (LIPITOR) 40 mg tablet    cholecalciferol (VITAMIN D3) 1,000 units tablet    FIBER ADULT GUMMIES PO    gabapentin (NEURONTIN) 300 mg capsule    labetalol (NORMODYNE) 100 mg tablet    multivitamin (THERAGRAN) TABS    potassium chloride (Klor-Con M20) 20 mEq tablet    senna (SENOKOT) 8.6 MG tablet    tamsulosin (FLOMAX) 0.4 mg    torsemide (DEMADEX) 20 mg tablet    warfarin (Coumadin) 5 mg tablet     Counseling / Coordination of Care  Total floor / unit time spent today 20 minutes.  Greater than 50% of total time was spent with the patient and / or family counseling and / or coordination of care.      ** Please Note: Dragon 360 Dictation voice to text software may have been used in the creation of this document. **

## 2024-05-01 NOTE — DISCHARGE SUMMARY
[facility]  Discharge- Oscar Espinosa 1940, 83 y.o. male MRN: [id number]  Unit/Bed#: S -01 Encounter: [id number]  Primary Care Provider: Lea Reyes, MD   Date and time admitted to hospital: 4/26/2024  7:26 PM    * Pleural effusion with shortness of breath  Assessment & Plan  Patient presents with worsening dyspnea on exertion  Hypoxic in the emergency department requiring 3 L nasal cannula oxygen  Recent Labs     04/29/24  0458 04/30/24  0504 05/01/24  0518   WBC 7.46 6.72 6.64     Imaging findings appear to be consistent with left-sided loculated parapneumonic effusion that may be amenable to thoracentesis, not on admission due to supratherapeutic INR.   Thoracentesis labs consistent with exudative effusion  Blood cultures - no growth at 4 days.  Body fluid culture shows no growth.    Patient saturating above 90% on room air.  Repeat CT chest shows unchanged moderate left effusion with adjacent atelectasis.  Unchanged ascending thoracic aortic measuring 4 cm.  Interstitial lung disease changes suggesting bilateral atelectasis.    Plan:  Completed 7 days of Rocephin due to loculated pleural effusion.  Follow-up with pulmonology in the outpatient setting.    Acute on chronic congestive heart failure (HCC)  Assessment & Plan  Wt Readings from Last 3 Encounters:   05/01/24 99.8 kg (220 lb)   04/19/24 103 kg (227 lb 9.6 oz)   03/07/24 114 kg (252 lb 6.4 oz)     Patient has history of chronic diastolic CHF with mildly reduced ejection fraction 45% in the setting of lower extremity lymphedema  Patient recently began lymphedema therapy with bilateral lower extremity binding, following this patient experienced roughly 20 pound weight loss in a month due to increased excretion from increased venous return  Patient reports adherence to outpatient diuretic therapy as well as to cardiac diet.  Chest x-ray on admission shows large left-sided pleural effusion  Diuretic regimen is torsemide 40  mg on Monday and Friday and 20 mg on Saturday and Sunday    Plan:  Torsemide 40 mg daily for 7 days a week, this will be the new regimen.  Spironolactone 12.5 mg daily  Fluid restriction 2000 mL  Sodium restriction    Atrial fibrillation (HCC)  Assessment & Plan  Goal INR 2-3  Per patient: Home regimen is 5 mg every Sunday, 2.5 mg Monday through Saturday  Total weekly dose: 20mg  INR checked while in hospital, elevated 4.2  Recent Labs     04/30/24  0504 05/01/24  0518   INR 1.68* 2.04*   Patient complaining of lower chest tightness, she was sudden onset and lasted 20 minutes.  EKG showed atrial fibrillation.  Chest x-ray was unchanged from yesterday.    Plan:  Continue amiodarone 200 mg daily  Continue taking warfarin with INR goal of 2-3  Follow-up with cardiology outpatient for possible cardioversion    Elevated transaminase level  Assessment & Plan  Recent Labs     04/29/24  0458 04/30/24  0504 05/01/24  0518   AST 71* 78* 94*   ALT 67* 71* 87*   ALKPHOS 121* 113* 117*   TBILI 1.01* 0.82 0.60   Right upper quadrant ultrasound on 4/28 shows No acute abnormality or suspicious mass and mildly echogenic hepatic parenchyma suggesting steatosis.  Acute hepatitis panel normal  Consider changing statin class  Monitor CMP serially in the outpatient setting.    Anemia  Assessment & Plan  Recent Labs     04/30/24  0504 05/01/24  0518 05/02/24  0540   HGB 9.7* 9.4* 9.5*     Patient demonstrating new anemia, down from baseline of 13-14  Pt has also historically demonstrated mild macrocytosis, now demonstrating normocytosis   Vitamin B12 higher than normal.  Folate normal.  Ferritin normal, iron saturation, iron and TIBC low.    Likely anemia of chronic inflammation.    Plan:  Monitor CBC serially in the outpatient setting.    Chronic obstructive pulmonary disease (HCC)  Assessment & Plan  Patient has history of COPD, historically maintained on multiple medications but now only maintained on albuterol as needed in outpatient  setting  Not in exacerbation    Plan:  Albuterol as needed while inpatient  Continue to monitor work of breathing    Chronic pain syndrome  Assessment & Plan  Continue gabapentin 900 mg at bedtime    3-vessel CAD  Assessment & Plan  History of quadruple bypass  Echocardiogram on 4/27 shows mildly reduced ejection fraction of 45%  Continue ASA, statin, beta-blockade, diuretic      Severe obstructive sleep apnea  Assessment & Plan  Patient has history of severe YEVGENIY, last sleep study in 4/9/2021 demonstrating apnea-hypopnea index of 40.1  Patient was historically trialed on CPAP which she was intolerant of, inspire implanted initially proposed to patient who refused    Plan:  Inpatient CPAP offered to patient, patient declined    Benign essential hypertension  Assessment & Plan  Pressures appropriate while inpatient  Blood Pressure: 105/56   Continue home regimen of labetalol and IV lasix 80 mg twice daily      Medical Problems       Resolved Problems  Date Reviewed: 4/29/2024   None       Discharging Resident: Mi Estrada MD  Discharging Attending: Maye Antunez DO  PCP: Lea Reyes, MD  Admission Date:   Admission Orders (From admission, onward)       Ordered        04/26/24 2224  INPATIENT ADMISSION  Once                          Discharge Date: 05/02/24    Consultations During Hospital Stay:  Cardiology  Pulmonology    Procedures Performed:   Thoracocentesis    Significant Findings / Test Results:   CT chest wo contrast    Result Date: 5/1/2024  Impression: Unchanged moderate left effusion with adjacent atelectasis. Unchanged ascending thoracic aortic measuring 5 cm. Interstitial lung disease changes suggested bilaterally with atelectasis in the lingula and left lower lobe. Workstation performed: [id number]     XR chest portable    Result Date: 5/1/2024  Impression: No significant interval change. Small left pleural effusion and associated basilar atelectasis Workstation performed: [id number]      right [facility]  quadrant    Result Date: 4/28/2024  Impression: No acute abnormality or suspicious mass Mildly echogenic hepatic parenchyma suggesting steatosis. Workstation performed: [id number]     XR chest portable    Result Date: 4/28/2024  Impression: Decrease in left pleural effusion with pneumothorax. Workstation performed: [id number]     XR chest 1 view portable    Result Date: 4/27/2024  Impression: Mild pulmonary venous congestion with moderate left effusion and left base atelectasis. Workstation performed: [id number]     CT chest wo contrast    Result Date: 4/27/2024  Impression: Minimally loculated moderate-sized left pleural effusion. Compressive atelectasis at the left lower lobe and lingula. Stable fusiform aneurysmal enlargement of the ascending thoracic aorta measuring up to 49 mm comparing to 7/27/22. Workstation performed: [id number]       XR chest portable    Result Date: 5/1/2024  Impression No significant interval change. Small left pleural effusion and associated basilar atelectasis Workstation performed: [id number]           Incidental Findings:   None    Test Results Pending at Discharge (will require follow up):  None     Outpatient Tests Requested:  None    Complications:  None    Reason for Admission: Pleural effusion    Hospital Course:   Oscar Espinosa is a 83 y.o. male patient who originally presented to the hospital on 4/26/2024 due to shortness of breath since 10 days and progressively getting worse.  He endorsed that he had difficulty climbing up stairs, attending to his ADLs, but denies any orthopnea.  He also had productive cough of yellow sputum but denied any chest pain, fevers, chills, nausea.  Of note he did complete lymphedema treatment and had a lost 20 pounds in the course of 1 month.  In the ED he was hemodynamically stable but hypoxic, satting 988% on room air.  He was placed on 2 to 3 L of nasal cannula with improvement in saturation.  He had mild anemia and mild hyponatremia.  BNP  "was elevated as well as mildly elevated D-dimer but his INR was supratherapeutic and he was on warfarin.  CT demonstrated loculated moderate-sized left-sided pleural effusion with compressive atelectasis.  He was initially treated with IV Lasix and switch to oral torsemide with addition of spironolactone.  Pulmonology was consulted and he underwent thoracocentesis with 600 bloody fluid removed.  Fluid was exudative and patient was treated with ceftriaxone for 7 days.  Repeat CT scan demonstrated-unchanged moderate left effusion with adjacent atelectasis.  He underwent an ambulatory pulse ox evaluation as well as a home oxygen evaluation and does not require oxygen as he is able to maintain saturation above 89%.  Patient is referred to follow-up with cardiology for cardioversion for atrial fibrillation as well as pulmonology.  Patient is stable for discharge to home today.      Please see above list of diagnoses and related plan for additional information.     Condition at Discharge: stable    Discharge Day Visit / Exam:   Subjective: Patient evaluated bedside.  He has no acute complaints at this time.  He denies any chest pain, nausea, vomiting, diarrhea.  Vitals: Blood Pressure: 105/56 (05/02/24 0810)  Pulse: 76 (05/02/24 0810)  Temperature: 98.1 °F (36.7 °C) (05/02/24 0753)  Temp Source: Oral (04/30/24 2259)  Respirations: 20 (05/02/24 0753)  Height: 5' 11\" (180.3 cm) (04/27/24 1330)  Weight - Scale: 99.6 kg (219 lb 9.3 oz) (05/02/24 0549)  SpO2: 95 % (05/02/24 0922)  Exam:   Physical Exam  Vitals and nursing note reviewed.   Constitutional:       General: He is not in acute distress.     Appearance: He is well-developed.   HENT:      Head: Normocephalic and atraumatic.   Eyes:      Conjunctiva/sclera: Conjunctivae normal.   Cardiovascular:      Rate and Rhythm: Normal rate and regular rhythm.      Heart sounds: No murmur heard.  Pulmonary:      Effort: Pulmonary effort is normal. No respiratory distress.      " Breath sounds: Normal breath sounds.   Abdominal:      Palpations: Abdomen is soft.      Tenderness: There is no abdominal tenderness.   Musculoskeletal:         General: No swelling.      Cervical back: Neck supple.   Skin:     General: Skin is warm and dry.      Capillary Refill: Capillary refill takes less than 2 seconds.   Neurological:      Mental Status: He is alert and oriented to person, place, and time.   Psychiatric:         Mood and Affect: Mood normal.     .    Discussion with Family: Patient declined call to .     Discharge instructions/Information to patient and family:   See after visit summary for information provided to patient and family.      Provisions for Follow-Up Care:  See after visit summary for information related to follow-up care and any pertinent home health orders.      Mobility at time of Discharge:   Basic Mobility Inpatient Raw Score: 22  JH-HLM Goal: 7: Walk 25 feet or more  JH-HLM Achieved: 7: Walk 25 feet or more  HLM Goal achieved. Continue to encourage appropriate mobility.     Disposition:   Home    Planned Readmission: None    Discharge Medications:  See after visit summary for reconciled discharge medications provided to patient and/or family.      **Please Note: This note may have been constructed using a voice recognition system**

## 2024-05-01 NOTE — CASE MANAGEMENT
Case Management Progress Note    Patient name Oscar Espinosa  Location S /S -01 MRN [id number]  : 1940 Date 2024       LOS (days): 5  Geometric Mean LOS (GMLOS) (days): 4.4  Days to GMLOS:-0.4        OBJECTIVE:        Current admission status: Inpatient  Preferred Pharmacy:   [facility] - [place] [id number]  [address]  [address]  [facility] [address]  Phone: [phone] Fax: [phone]    [id number] - [place], [place] - [address]  [address] [address]  Phone: [phone] Fax: [phone]    Primary Care Provider: Lea Reyes, MD    Primary Insurance: MEDICARE  Secondary Insurance: [employer]    PROGRESS NOTE:  Weekly Care Management Length of Stay Review     Current LOS: 5 Days    Most Recent Labs:     Lab Results   Component Value Date/Time    WBC 6.64 2024 05:18 AM    HGB 9.4 (L) 2024 05:18 AM    HCT 30.9 (L) 2024 05:18 AM     2024 05:18 AM    SODIUM 138 2024 05:18 AM    K 3.8 2024 05:18 AM    CL 99 2024 05:18 AM    CO2 33 (H) 2024 05:18 AM    BUN 16 2024 05:18 AM    CREATININE 0.90 2024 05:18 AM    GLUC 101 2024 05:18 AM    ALKPHOS 117 (H) 2024 05:18 AM    ALT 87 (H) 2024 05:18 AM    AST 94 (H) 2024 05:18 AM    ALB 2.6 (L) 2024 05:18 AM    TBILI 0.60 2024 05:18 AM    INR 2.04 (H) 2024 05:18 AM       Most Recent Vitals:   Vitals:    24 1659   BP:    Pulse:    Resp:    Temp:    SpO2: (!) 89%        Identified Barriers to Discharge/Discharge Goals/Care Management Interventions: PE, new chest pain, shortness of breath, po diuretics    Intended Discharge Disposition: anticipated home    Expected Discharge Date:  48hrs

## 2024-05-01 NOTE — PLAN OF CARE
Problem: Potential for Falls  Goal: Patient will remain free of falls  Description: INTERVENTIONS:  - Educate patient/family on patient safety including physical limitations  - Instruct patient to call for assistance with activity   - Consult OT/PT to assist with strengthening/mobility   - Keep Call bell within reach  - Keep bed low and locked with side rails adjusted as appropriate  - Keep care items and personal belongings within reach  - Initiate and maintain comfort rounds  - Make Fall Risk Sign visible to staff  - Offer Toileting every 2 Hours, in advance of need  - Initiate/Maintain bed/chair alarm  - Obtain necessary fall risk management equipment: yellow socks  - Apply yellow socks and bracelet for high fall risk patients  - Consider moving patient to room near nurses station  Outcome: Progressing     Problem: Prexisting or High Potential for Compromised Skin Integrity  Goal: Skin integrity is maintained or improved  Description: INTERVENTIONS:  - Identify patients at risk for skin breakdown  - Assess and monitor skin integrity  - Assess and monitor nutrition and hydration status  - Monitor labs   - Assess for incontinence   - Turn and reposition patient  - Assist with mobility/ambulation  - Relieve pressure over bony prominences  - Avoid friction and shearing  - Provide appropriate hygiene as needed including keeping skin clean and dry  - Evaluate need for skin moisturizer/barrier cream  - Collaborate with interdisciplinary team   - Patient/family teaching  - Consider wound care consult   Outcome: Progressing     Problem: PAIN - ADULT  Goal: Verbalizes/displays adequate comfort level or baseline comfort level  Description: Interventions:  - Encourage patient to monitor pain and request assistance  - Assess pain using appropriate pain scale  - Administer analgesics based on type and severity of pain and evaluate response  - Implement non-pharmacological measures as appropriate and evaluate response  -  Consider cultural and social influences on pain and pain management  - Notify physician/advanced practitioner if interventions unsuccessful or patient reports new pain  Outcome: Progressing     Problem: INFECTION - ADULT  Goal: Absence or prevention of progression during hospitalization  Description: INTERVENTIONS:  - Assess and monitor for signs and symptoms of infection  - Monitor lab/diagnostic results  - Monitor all insertion sites, i.e. indwelling lines, tubes, and drains  - Monitor endotracheal if appropriate and nasal secretions for changes in amount and color  - [place] appropriate cooling/warming therapies per order  - Administer medications as ordered  - Instruct and encourage patient and family to use good hand hygiene technique  - Identify and instruct in appropriate isolation precautions for identified infection/condition  Outcome: Progressing  Goal: Absence of fever/infection during neutropenic period  Description: INTERVENTIONS:  - Monitor WBC    Outcome: Progressing     Problem: SAFETY ADULT  Goal: Patient will remain free of falls  Description: INTERVENTIONS:  - Educate patient/family on patient safety including physical limitations  - Instruct patient to call for assistance with activity   - Consult OT/PT to assist with strengthening/mobility   - Keep Call bell within reach  - Keep bed low and locked with side rails adjusted as appropriate  - Keep care items and personal belongings within reach  - Initiate and maintain comfort rounds  - Make Fall Risk Sign visible to staff  - Offer Toileting every 2 Hours, in advance of need  - Initiate/Maintain bed/chair alarm  - Obtain necessary fall risk management equipment: yellow socks  - Apply yellow socks and bracelet for high fall risk patients  - Consider moving patient to room near nurses station  Outcome: Progressing  Goal: Maintain or return to baseline ADL function  Description: INTERVENTIONS:  -  Assess patient's ability to carry out ADLs; assess  patient's baseline for ADL function and identify physical deficits which impact ability to perform ADLs (bathing, care of mouth/teeth, toileting, grooming, dressing, etc.)  - Assess/evaluate cause of self-care deficits   - Assess range of motion  - Assess patient's mobility; develop plan if impaired  - Assess patient's need for assistive devices and provide as appropriate  - Encourage maximum independence but intervene and supervise when necessary  - Involve family in performance of ADLs  - Assess for home care needs following discharge   - Consider OT consult to assist with ADL evaluation and planning for discharge  - Provide patient education as appropriate  Outcome: Progressing  Goal: Maintains/Returns to pre admission functional level  Description: INTERVENTIONS:  - Perform AM-PAC 6 Click Basic Mobility/ Daily Activity assessment daily.  - Set and communicate daily mobility goal to care team and patient/family/caregiver.   - Collaborate with rehabilitation services on mobility goals if consulted  - Perform Range of Motion   - Reposition patient every 2 hours.  - Dangle patient 3 times a day  - Stand patient 3 times a day  - Ambulate patient 3 times a day  - Out of bed to chair 3 times a day   - Out of bed for meals 3 times a day  - Out of bed for toileting  - Record patient progress and toleration of activity level   Outcome: Progressing     Problem: DISCHARGE PLANNING  Goal: Discharge to home or other facility with appropriate resources  Description: INTERVENTIONS:  - Identify barriers to discharge w/patient and caregiver  - Arrange for needed discharge resources and transportation as appropriate  - Identify discharge learning needs (meds, wound care, etc.)  - Arrange for interpretive services to assist at discharge as needed  - Refer to Case Management Department for coordinating discharge planning if the patient needs post-hospital services based on physician/advanced practitioner order or complex needs  related to functional status, cognitive ability, or social support system  Outcome: Progressing     Problem: Knowledge Deficit  Goal: Patient/family/caregiver demonstrates understanding of disease process, treatment plan, medications, and discharge instructions  Description: Complete learning assessment and assess knowledge base.  Interventions:  - Provide teaching at level of understanding  - Provide teaching via preferred learning methods  Outcome: Progressing

## 2024-05-01 NOTE — PROGRESS NOTES
[facility]  Progress Note  Name: Oscar Espinosa I  MRN: [id number]  Unit/Bed#: S -01 I Date of Admission: 4/26/2024   Date of Service: 5/1/2024 I Hospital Day: 5    Assessment/Plan   * Pleural effusion with shortness of breath  Assessment & Plan  Patient presents with worsening dyspnea on exertion  Hypoxic in the emergency department requiring 3 L nasal cannula oxygen  Recent Labs     04/29/24  0458 04/30/24  0504 05/01/24  0518   WBC 7.46 6.72 6.64     Imaging findings appear to be consistent with left-sided loculated parapneumonic effusion that may be amenable to thoracentesis, not on admission due to supratherapeutic INR.   Thoracentesis labs consistent with exudative effusion  Blood cultures - no growth at 4 days.  Body fluid culture shows no growth.    Patient desatted again to 80s today upon ambulating to the bathroom.    Plan:  Continue supplementary oxygen to maintain O2 sats above 90%  Continue Rocephin, day 6/7. Complete for 7 days due to loculated pleural effusion.  Trend CBC  Continue diuresis.  Pulmonology consulted.  Repeat CT chest    Acute on chronic congestive heart failure (HCC)  Assessment & Plan  Wt Readings from Last 3 Encounters:   05/01/24 99.8 kg (220 lb)   04/19/24 103 kg (227 lb 9.6 oz)   03/07/24 114 kg (252 lb 6.4 oz)     Patient has history of chronic diastolic CHF with mildly reduced ejection fraction 45% in the setting of lower extremity lymphedema  Patient recently began lymphedema therapy with bilateral lower extremity binding, following this patient experienced roughly 20 pound weight loss in a month due to increased excretion from increased venous return  Patient reports adherence to outpatient diuretic therapy as well as to cardiac diet.  Chest x-ray on admission shows large left-sided pleural effusion  Diuretic regimen is torsemide 40 mg on Monday and Friday and 20 mg on Saturday and Sunday    Plan:  Resume p.o diuretics. Torsemide 40 mg  daily.  Add spironolactone 12.5 mg daily  Daily weights while inpatient  Fluid restriction 2000 mL  Sodium restriction  Monitor I's and O's  Trend CMP    Atrial fibrillation (HCC)  Assessment & Plan  Goal INR 2-3  Per patient: Home regimen is 5 mg every Sunday, 2.5 mg Monday through Saturday  Total weekly dose: 20mg  INR checked while in hospital, elevated 4.2  Recent Labs     04/30/24  0504 05/01/24  0518   INR 1.68* 2.04*   Patient complaining of lower chest tightness, she was sudden onset and lasted 20 minutes.  EKG showed atrial fibrillation.  Chest x-ray was unchanged from yesterday.    Plan:  Continue amiodarone 200 mg daily  Patient can resume warfarin with INR goal of 2-3   Telemetry monitoring.    Elevated transaminase level  Assessment & Plan  Recent Labs     04/29/24 0458 04/30/24  0504 05/01/24  0518   AST 71* 78* 94*   ALT 67* 71* 87*   ALKPHOS 121* 113* 117*   TBILI 1.01* 0.82 0.60   Right upper quadrant ultrasound on 4/28 shows No acute abnormality or suspicious mass and mildly echogenic hepatic parenchyma suggesting steatosis.  Acute hepatitis panel normal  Trend CMP  Consider changing statin class    Anemia  Assessment & Plan  Recent Labs     04/29/24 0458 04/30/24  0504 05/01/24  0518   HGB 9.9* 9.7* 9.4*     Patient demonstrating new anemia, down from baseline of 13-14  Pt has also historically demonstrated mild macrocytosis, now demonstrating normocytosis   Vitamin B12 higher than normal.  Folate normal.  Ferritin normal, iron saturation, iron and TIBC low.    Likely anemia of chronic inflammation.    Plan:  Continue to monitor    Chronic obstructive pulmonary disease (HCC)  Assessment & Plan  Patient has history of COPD, historically maintained on multiple medications but now only maintained on albuterol as needed in outpatient setting  Not in exacerbation    Plan:  Albuterol as needed while inpatient  Continue to monitor work of breathing    Chronic pain syndrome  Assessment & Plan  Continue  gabapentin 900 mg at bedtime    3-vessel CAD  Assessment & Plan  History of quadruple bypass  Echocardiogram on  shows mildly reduced ejection fraction of 45%  Continue ASA, statin, beta-blockade, diuretic      Severe obstructive sleep apnea  Assessment & Plan  Patient has history of severe YEVGENIY, last sleep study in 2021 demonstrating apnea-hypopnea index of 40.1  Patient was historically trialed on CPAP which she was intolerant of, inspire implanted initially proposed to patient who refused    Plan:  Inpatient CPAP offered to patient, patient declined    Benign essential hypertension  Assessment & Plan  Pressures appropriate while inpatient  Blood Pressure: 131/70   Continue home regimen of labetalol and IV lasix 80 mg twice daily             VTE Pharmacologic Prophylaxis: VTE Score: 7 High Risk (Score >/= 5) - Pharmacological DVT Prophylaxis Ordered: warfarin (Coumadin). Sequential Compression Devices Ordered.    Mobility:   Basic Mobility Inpatient Raw Score: 24  JH-HLM Goal: 8: Walk 250 feet or more  JH-HLM Achieved: 7: Walk 25 feet or more  JH-HLM Goal NOT achieved. Continue with multidisciplinary rounding and encourage appropriate mobility to improve upon JH-HLM goals.    Patient Centered Rounds: I performed bedside rounds with nursing staff today.  Discussions with Specialists or Other Care Team Provider:     Education and Discussions with Family / Patient: Patient declined call to .     Current Length of Stay: 5 day(s)  Current Patient Status: Inpatient   Discharge Plan: Anticipate discharge in 24-48 hrs to home.    Code Status: Level 3 - DNAR and DNI    Subjective:   Patient is feeling better. Denies any difficulty breathing despite desatting to 80s on ambulating to the bathroom. He is worried his legs will have fluid build up again. He is advised to ask wife to bring in his compression stockings. Offered ace wraps which he declines.    Objective:     Vitals:   Temp (24hrs), Av.2 °F  (36.8 °C), Min:97.9 °F (36.6 °C), Max:98.4 °F (36.9 °C)    Temp:  [97.9 °F (36.6 °C)-98.4 °F (36.9 °C)] 98.4 °F (36.9 °C)  HR:  [60-74] 71  Resp:  [16] 16  BP: ()/(58-72) 110/58  SpO2:  [92 %-97 %] 97 %  Body mass index is 30.68 kg/m².     Input and Output Summary (last 24 hours):     Intake/Output Summary (Last 24 hours) at 5/1/2024 1206  Last data filed at 5/1/2024 0820  Gross per 24 hour   Intake 360 ml   Output 1275 ml   Net -915 ml       Physical Exam:   Physical Exam  Vitals and nursing note reviewed.   Constitutional:       General: He is not in acute distress.     Appearance: He is well-developed.   HENT:      Head: Normocephalic and atraumatic.   Eyes:      Extraocular Movements: Extraocular movements intact.      Pupils: Pupils are equal, round, and reactive to light.   Cardiovascular:      Rate and Rhythm: Normal rate and regular rhythm.      Heart sounds: No murmur heard.  Pulmonary:      Effort: Pulmonary effort is normal. No respiratory distress.   Abdominal:      Palpations: Abdomen is soft.      Tenderness: There is no abdominal tenderness.   Musculoskeletal:         General: No swelling.      Cervical back: Neck supple.      Right lower leg: Edema (+1) present.      Left lower leg: Edema (+1) present.   Skin:     General: Skin is warm and dry.      Capillary Refill: Capillary refill takes less than 2 seconds.   Neurological:      General: No focal deficit present.      Mental Status: He is alert and oriented to person, place, and time. Mental status is at baseline.   Psychiatric:         Mood and Affect: Mood normal.          Additional Data:     Labs:  Results from last 7 days   Lab Units 05/01/24  0518 04/29/24  0458 04/28/24  0628   WBC Thousand/uL 6.64   < > 7.55   HEMOGLOBIN g/dL 9.4*   < > 9.8*   HEMATOCRIT % 30.9*   < > 31.8*   PLATELETS Thousands/uL 241   < > 246   SEGS PCT %  --   --  76*   LYMPHO PCT %  --   --  10*   MONO PCT %  --   --  11   EOS PCT %  --   --  1    < > = values in  this interval not displayed.     Results from last 7 days   Lab Units 05/01/24  0518   SODIUM mmol/L 138   POTASSIUM mmol/L 3.8   CHLORIDE mmol/L 99   CO2 mmol/L 33*   BUN mg/dL 16   CREATININE mg/dL 0.90   ANION GAP mmol/L 6   CALCIUM mg/dL 8.2*   ALBUMIN g/dL 2.6*   TOTAL BILIRUBIN mg/dL 0.60   ALK PHOS U/L 117*   ALT U/L 87*   AST U/L 94*   GLUCOSE RANDOM mg/dL 101     Results from last 7 days   Lab Units 05/01/24  0518   INR  2.04*             Results from last 7 days   Lab Units 04/27/24  0547 04/26/24  1942   PROCALCITONIN ng/ml 0.08 0.08       Lines/Drains:  Invasive Devices       Peripheral Intravenous Line  Duration             Peripheral IV 04/26/24 Distal;Left;Upper;Ventral (anterior) Arm 4 days                          Imaging: Reviewed radiology reports from this admission including: chest xray    Recent Cultures (last 7 days):   Results from last 7 days   Lab Units 04/28/24  1207 04/27/24  0250 04/27/24  0006 04/26/24  2239 04/26/24  2230   BLOOD CULTURE   --   --   --  No Growth After 4 Days. No Growth After 4 Days.   SPUTUM CULTURE   --   --  Test not performed. Suggest repeat specimen.  --   --    GRAM STAIN RESULT  Rare Polys  No bacteria seen  --  4+ Epithelial Cells*  1+ Polys*  4+ Gram negative rods*  4+ Gram positive cocci in chains and clusters*  >10 squamous epithelial cells/lpf, indicating orophayngeal contamination.*  --   --    BODY FLUID CULTURE, STERILE  No growth  --   --   --   --    LEGIONELLA URINARY ANTIGEN   --  Negative  --   --   --        Last 24 Hours Medication List:   Current Facility-Administered Medications   Medication Dose Route Frequency Provider Last Rate    albuterol  5 mg Nebulization Q4H PRN Tin Contreras MD      amiodarone  200 mg Oral Daily Tin Contreras MD      aspirin  81 mg Oral Daily Tin Contreras MD      atorvastatin  40 mg Oral Daily Tin Contreras MD      cefTRIAXone  2,000 mg Intravenous Q24H Roly Waite MD 2,000 mg (04/30/24 2116)    gabapentin   900 mg Oral HS Tin Contreras MD      guaiFENesin  600 mg Oral BID Tin Contreras MD      labetalol  100 mg Oral BID Tin Contreras MD      polyethylene glycol  17 g Oral Daily Tin Contreras MD      senna  1 tablet Oral Daily Tin Contreras MD      spironolactone  12.5 mg Oral Daily SOSA Benavidez      tamsulosin  0.4 mg Oral Daily Tin Contreras MD      torsemide  40 mg Oral Daily SOSA Benavidez      warfarin  2.5 mg Oral Daily (warfarin) Jeri Carlson MD          Today, Patient Was Seen By: Mi Estrada MD    **Please Note: This note may have been constructed using a voice recognition system.**

## 2024-05-02 ENCOUNTER — TELEPHONE (OUTPATIENT)
Age: 84
End: 2024-05-02

## 2024-05-02 VITALS
HEIGHT: 71 IN | BODY MASS INDEX: 30.74 KG/M2 | HEART RATE: 76 BPM | TEMPERATURE: 98.1 F | DIASTOLIC BLOOD PRESSURE: 56 MMHG | RESPIRATION RATE: 20 BRPM | OXYGEN SATURATION: 95 % | WEIGHT: 219.58 LBS | SYSTOLIC BLOOD PRESSURE: 105 MMHG

## 2024-05-02 LAB
BACTERIA BLD CULT: NORMAL
BACTERIA BLD CULT: NORMAL
ERYTHROCYTE [DISTWIDTH] IN BLOOD BY AUTOMATED COUNT: 16.2 % (ref 11.6–15.1)
HCT VFR BLD AUTO: 30.8 % (ref 36.5–49.3)
HGB BLD-MCNC: 9.5 G/DL (ref 12–17)
INR PPP: 2.14 (ref 0.84–1.19)
MCH RBC QN AUTO: 29.6 PG (ref 26.8–34.3)
MCHC RBC AUTO-ENTMCNC: 30.8 G/DL (ref 31.4–37.4)
MCV RBC AUTO: 96 FL (ref 82–98)
PLATELET # BLD AUTO: 248 THOUSANDS/UL (ref 149–390)
PMV BLD AUTO: 9.7 FL (ref 8.9–12.7)
PROTHROMBIN TIME: 24.8 SECONDS (ref 11.6–14.5)
RBC # BLD AUTO: 3.21 MILLION/UL (ref 3.88–5.62)
WBC # BLD AUTO: 6.66 THOUSAND/UL (ref 4.31–10.16)

## 2024-05-02 PROCEDURE — 94760 N-INVAS EAR/PLS OXIMETRY 1: CPT

## 2024-05-02 PROCEDURE — 99232 SBSQ HOSP IP/OBS MODERATE 35: CPT | Performed by: INTERNAL MEDICINE

## 2024-05-02 PROCEDURE — 99239 HOSP IP/OBS DSCHRG MGMT >30: CPT | Performed by: INTERNAL MEDICINE

## 2024-05-02 PROCEDURE — 80053 COMPREHEN METABOLIC PANEL: CPT

## 2024-05-02 PROCEDURE — 85027 COMPLETE CBC AUTOMATED: CPT

## 2024-05-02 PROCEDURE — 85610 PROTHROMBIN TIME: CPT | Performed by: INTERNAL MEDICINE

## 2024-05-02 PROCEDURE — 94761 N-INVAS EAR/PLS OXIMETRY MLT: CPT

## 2024-05-02 RX ORDER — SPIRONOLACTONE 25 MG/1
12.5 TABLET ORAL DAILY
Qty: 15 TABLET | Refills: 0 | Status: SHIPPED | OUTPATIENT
Start: 2024-05-03 | End: 2024-05-02

## 2024-05-02 RX ORDER — TORSEMIDE 20 MG/1
TABLET ORAL
Qty: 120 TABLET | Refills: 0 | Status: SHIPPED | OUTPATIENT
Start: 2024-05-02 | End: 2024-05-02

## 2024-05-02 RX ORDER — TORSEMIDE 20 MG/1
TABLET ORAL
Qty: 120 TABLET | Refills: 0 | Status: SHIPPED | OUTPATIENT
Start: 2024-05-02

## 2024-05-02 RX ORDER — SPIRONOLACTONE 25 MG/1
12.5 TABLET ORAL DAILY
Qty: 15 TABLET | Refills: 0 | Status: SHIPPED | OUTPATIENT
Start: 2024-05-03 | End: 2024-05-08 | Stop reason: SDUPTHER

## 2024-05-02 RX ORDER — WARFARIN SODIUM 2.5 MG/1
TABLET ORAL
Qty: 30 TABLET | Refills: 1 | Status: SHIPPED | OUTPATIENT
Start: 2024-05-02 | End: 2024-05-02

## 2024-05-02 RX ORDER — WARFARIN SODIUM 2.5 MG/1
TABLET ORAL
Qty: 30 TABLET | Refills: 0 | Status: SHIPPED | OUTPATIENT
Start: 2024-05-02

## 2024-05-02 RX ORDER — POTASSIUM CHLORIDE 20 MEQ/1
TABLET, EXTENDED RELEASE ORAL
Qty: 75 TABLET | Refills: 0 | Status: SHIPPED | OUTPATIENT
Start: 2024-05-02

## 2024-05-02 RX ADMIN — SENNOSIDES 8.6 MG: 8.6 TABLET, FILM COATED ORAL at 08:08

## 2024-05-02 RX ADMIN — POLYETHYLENE GLYCOL 3350 17 G: 17 POWDER, FOR SOLUTION ORAL at 08:08

## 2024-05-02 RX ADMIN — GUAIFENESIN 600 MG: 600 TABLET ORAL at 08:08

## 2024-05-02 RX ADMIN — CEFTRIAXONE SODIUM 2000 MG: 10 INJECTION, POWDER, FOR SOLUTION INTRAVENOUS at 15:30

## 2024-05-02 RX ADMIN — TORSEMIDE 40 MG: 20 TABLET ORAL at 08:08

## 2024-05-02 RX ADMIN — SPIRONOLACTONE 12.5 MG: 25 TABLET ORAL at 08:08

## 2024-05-02 RX ADMIN — AMIODARONE HYDROCHLORIDE 200 MG: 200 TABLET ORAL at 08:08

## 2024-05-02 NOTE — RESPIRATORY THERAPY NOTE
Home Oxygen Qualifying Test     Patient name: Oscar Espinosa        : 1940   Date of Test:  May 2, 2024  Diagnosis:    Home Oxygen Test:    **Medicare Guidelines require item(s) 1-5 on all ambulatory patients or 1 and 2 on non-ambulatory patients.    1. Baseline SPO2 on Room Air at rest 93 %   If <= 88% on Room Air add O2 via NC to obtain SpO2 >=88%. If LPM needed, document LPM  needed to reach =>88%    SPO2 during exertion on Room Air 89  %  During exertion monitor SPO2. If SPO2 increases >=89%, do not add supplemental oxygen    SPO2 on Oxygen at Rest  % at  LPM    SPO2 during exertion on Oxygen  % at  LPM    Test performed during exertion activity.      []  Supplemental Home Oxygen is indicated.    [x]  Client does not qualify for home oxygen.    Respiratory Additional Notes- Patient does not qualify for home O2 at this time.   Awilda Orozco, RT

## 2024-05-02 NOTE — CASE MANAGEMENT
Case Management Discharge Planning Note    Patient name Oscar Espinosa  Location S /S -01 MRN [id number]  : 1940 Date 2024       Current Admission Date: 2024  Current Admission Diagnosis:Pleural effusion with shortness of breath   Patient Active Problem List    Diagnosis Date Noted    Pleural effusion with shortness of breath 2024    Hyponatremia 2024    Anemia 2024    Elevated transaminase level 2024    Hypertensive heart and chronic kidney disease with heart failure and stage 1 through stage 4 chronic kidney disease, or chronic kidney disease (HCC) 2024    Bilateral leg edema 2024    Bilateral carotid artery stenosis 2024    Venous insufficiency of both lower extremities 2023    Stage 3a chronic kidney disease ([facility]) 10/27/2022    Platelets decreased ([facility]) 2022    Myofascial pain syndrome 2022    Atrial fibrillation ([facility]) 2022    Hypertensive heart disease with heart failure ([facility]) 2022    Ascending aortic aneurysm ([facility]) 2021    Nocturnal hypoxemia     Moderate to severe pulmonary hypertension ([facility]) 2021    Acute on chronic congestive heart failure ([facility]) 2019    Chronic obstructive pulmonary disease ([facility]) 2019    Multiple pulmonary nodules 09/10/2019    Chronic pain syndrome     Spondylolisthesis at L4-L5 level 2018    Intervertebral disc disorder with radiculopathy of lumbar region     Subclinical hypothyroidism 10/22/2014    Abdominal aortic aneurysm without rupture ([facility]) 2014    Aortic valve disorder 2014    3-vessel CAD 2014    Herniated lumbar intervertebral disc 2013    Disc degeneration, lumbar 2013    Lumbar radiculopathy 2013    Benign essential hypertension 04/10/2013    Impaired fasting glucose 04/10/2013    Severe obesity (BMI 35.0-35.9 with comorbidity) ([facility]) 04/10/2013    Hyperlipidemia 2012    Microscopic hematuria 2012     Severe obstructive sleep apnea 09/07/2012    Arteriosclerotic cardiovascular disease 09/07/2012      LOS (days): 6  Geometric Mean LOS (GMLOS) (days): 4.4  Days to GMLOS:-1.1     OBJECTIVE:  Risk of Unplanned Readmission Score: 19.53         Current admission status: Inpatient   Preferred Pharmacy:   [facility] - [place], [place] - [address]  [address]  [address]  [facility] [address]  Phone: [phone] Fax: [phone]    [id number] - [place], [place] - [address]  [address]  [place] [address]  Phone: [phone] Fax: [phone]    Primary Care Provider: Lea Reyes, MD    Primary Insurance: MEDICARE  Secondary Insurance: AARP    DISCHARGE DETAILS:                                                                                        IMM Given (Date):: 05/02/24  IMM Given to:: Patient     Additional Comments: CM met with patient to review IMM this morning -- Patient in agreement with discharge today. He declined interest in appealing. He verbalized understanding of Medicare rights. No further CM needs indicated at this time. CM will remain available.             IMM reviewed with patient, patient agrees with discharge determination.

## 2024-05-02 NOTE — DISCHARGE INSTR - AVS FIRST PAGE
Dear Oscar Espinosa,     It was our pleasure to care for you here at [facility].  It is our hope that we were always able to exceed the expected standards for your care during your stay.  You were hospitalized due to pleural effusion.  You were cared for on the fourth floor by Mi Estrada MD under the service of Maye Antunez DO with the [facility] who covers for your primary care physician (PCP), Lea Reyes, MD, while you were hospitalized.  If you have any questions or concerns related to this hospitalization, you may contact us at .  For follow up as well as any medication refills, we recommend that you follow up with your primary care physician.  A registered nurse will reach out to you by phone within a few days after your discharge to answer any additional questions that you may have after going home.  However, at this time we provide for you here, the most important instructions / recommendations at discharge:     Notable Medication Adjustments -   Please change the way you take torsemide.  Take torsemide 40 mg daily 7 days a week.  Please start taking spironolactone 12.5 mg daily by mouth.  Please change how you take your potassium tablets.  Start taking Potassium chloride 20 mEq daily 7 days a week.  Please change the way you take warfarin.  Take warfarin 2.5 mg daily by mouth.  Testing Required after Discharge -   Basic metabolic panel in 1 week to check for your potassium.  Recheck INR in 1 week.  ** Please contact your PCP to request testing orders for any of the testing recommended here **  Important follow up information -   Please follow-up with your primary care physician in 1 week.  Please follow-up with your cardiologist Dr. Vergara on 5/6.  You may require cardioversion for your atrial fibrillation.  Please follow-up with pulmonologist on 5/6.  Other Instructions -   You are advised to restrict fluid intake to 2000 mL  daily.  Please review this entire after visit summary as additional general instructions including medication list, appointments, activity, diet, any pertinent wound care, and other additional recommendations from your care team that may be provided for you.      Sincerely,     Mi Estrada MD

## 2024-05-02 NOTE — TELEPHONE ENCOUNTER
Patient's wife calls regarding Zio monitor.  Patient was admitted to the hospital on 4/26.  Patient never applied the monitor, as they were going to place it on 4/27 but did not get the chance to.    Patient's wife said Mary told her to mail it back.     Patient's wife is asking if she should mail it back, or hold onto it to apply for when patient gets discharged- which she said might be today.    Please advise.

## 2024-05-02 NOTE — PROGRESS NOTES
General Cardiology   Progress Note -  Team One   Oscar Espinosa 83 y.o. male MRN: [id number]  Unit/Bed#: S -01 Encounter: [id number]    Assessment     Acute hypoxic respiratory failure, resolved  Multifactorial in setting of pneumonia with pleural effusion and CHF  CT chest 5/1: moderate left sided pleural effusion.   Did not qualify for home O2 today     Acute on chronic HFmrEF    CXR showed mild pulmonary venous congestion with moderate left effusion  TTE 4/27/2024: LVEF 45% with severe diastolic dysfunction, dilated atria, moderate MR, and severe TR  Home diuretic: Torsemide 40 mg p.o. daily M-F and 20 mg p.o. daily on Saturday and Sunday.  He reports compliance with his medications  Hospital diuretic: Lasix 80 mg IV twice daily, transitioned back to torsemide 5/1  I/Os: not accurately kept  Weight: 219 lb by standing scale  Renal function stable, appears euvolemic     Paroxysmal atrial fibrillation  Currently in rate controlled atrial fibrillation on labetalol 100 mg twice daily and amiodarone 200 mg daily  Anticoagulated on warfarin-INR 2.14 today     HTN-controlled, average /60 on diuretics and labetalol     History of CAD s/p CABG  CABG x 4 in 2003 (LIMA to LAD, SVG to OM1, OM2, and SVG to PDA)  Denies any anginal complaints  Stress test in 2019 reviewed showing small amount of ischemia in the inferolateral region, no interval change in myocardial perfusion compared to prior stress test.  On ASA, atorvastatin, and labetalol     Left lower lobe pneumonia- On IV antibiotics, s/p thoracentesis 4/28 for parapneumonic effusion     Plan  Appears euvolemic, only desats to 89% on room air with ambulation  Continue torsemide 40 mg daily and spironolactone 12.5 mg daily  Continue rate control of Afib for now with labetalol and amiodarone. Consider cardioversion as an outpatient.  Follow strict I/Os, daily standing weights, am BMP  Treatment of pneumonia per primary team  Close outpatient follow up  "arranged for 24    Subjective  Review of Systems   Constitutional: Negative for chills, malaise/fatigue and weight gain.   Cardiovascular:  Negative for chest pain, dyspnea on exertion, leg swelling, orthopnea, palpitations and syncope.   Respiratory:  Negative for cough, shortness of breath, sleep disturbances due to breathing and sputum production.    Endocrine: Negative.    Hematologic/Lymphatic: Negative.    Gastrointestinal:  Negative for bloating and nausea.   Genitourinary: Negative.    Neurological:  Negative for dizziness, light-headedness and weakness.   Psychiatric/Behavioral:  Negative for altered mental status.    All other systems reviewed and are negative.      Objective:   Vitals: Blood pressure 105/56, pulse 76, temperature 98.1 °F (36.7 °C), resp. rate 20, height 5' 11\" (1.803 m), weight 99.6 kg (219 lb 9.3 oz), SpO2 93%., Body mass index is 30.62 kg/m².,     Systolic (24hrs), Av , Min:105 , Max:122     Diastolic (24hrs), Av, Min:56, Max:68        Intake/Output Summary (Last 24 hours) at 2024 0920  Last data filed at 2024 0900  Gross per 24 hour   Intake 655 ml   Output 1375 ml   Net -720 ml     Wt Readings from Last 3 Encounters:   24 99.6 kg (219 lb 9.3 oz)   24 103 kg (227 lb 9.6 oz)   24 114 kg (252 lb 6.4 oz)     Telemetry Review: Afib, rates 60s-80s. Occasional PVCs, brief NSVT    Physical Exam  Constitutional:       General: He is not in acute distress.     Appearance: He is obese.   Neck:      Vascular: No hepatojugular reflux or JVD.   Cardiovascular:      Rate and Rhythm: Normal rate. Rhythm irregularly irregular.      Heart sounds: Normal heart sounds. No murmur heard.     No friction rub. No gallop.   Pulmonary:      Effort: Pulmonary effort is normal. No respiratory distress.      Breath sounds: No rales.      Comments: Fine crackles LLL, room air  Abdominal:      General: Bowel sounds are normal. There is no distension.      Palpations: " Abdomen is soft.      Tenderness: There is no abdominal tenderness.   Musculoskeletal:         General: No tenderness. Normal range of motion.      Cervical back: Neck supple.      Right lower leg: Edema (trace) present.      Left lower leg: Edema (trace) present.   Skin:     General: Skin is warm and dry.      Findings: No erythema.   Neurological:      Mental Status: He is alert and oriented to person, place, and time.   Psychiatric:         Mood and Affect: Mood normal.         LABORATORY RESULTS      CBC with diff:   Results from last 7 days   Lab Units 05/02/24  0540 05/01/24  0518 04/30/24  0504 04/29/24 0458 04/28/24 0628 04/27/24 0547 04/26/24 1942   WBC Thousand/uL 6.66 6.64 6.72 7.46 7.55 7.92 9.89   HEMOGLOBIN g/dL 9.5* 9.4* 9.7* 9.9* 9.8* 10.7* 10.3*   HEMATOCRIT % 30.8* 30.9* 31.4* 32.5* 31.8* 34.6* 32.5*   MCV fL 96 97 95 96 96 95 95   PLATELETS Thousands/uL 248 241 239 268 246 251 259   RBC Million/uL 3.21* 3.19* 3.31* 3.37* 3.31* 3.63* 3.41*   MCH pg 29.6 29.5 29.3 29.4 29.6 29.5 30.2   MCHC g/dL 30.8* 30.4* 30.9* 30.5* 30.8* 30.9* 31.7   RDW % 16.2* 16.1* 16.0* 16.2* 16.1* 16.0* 15.9*   MPV fL 9.7 9.4 9.1 9.7 9.5 9.7 9.6   NRBC AUTO /100 WBCs  --   --   --   --  0  --  0     CMP:  Results from last 7 days   Lab Units 05/02/24  0540 05/01/24 0518 04/30/24  0504 04/29/24 0458 04/28/24 0628 04/27/24 0547 04/26/24 1942   POTASSIUM mmol/L 3.8 3.8 3.4* 3.7 3.7 3.9 4.3   CHLORIDE mmol/L 99 99 98 98 98 97 95*   CO2 mmol/L 33* 33* 32 34* 34* 29 33*   BUN mg/dL 18 16 16 19 19 20 23   CREATININE mg/dL 1.05 0.90 0.89 0.93 1.01 1.17 1.31*   CALCIUM mg/dL 8.1* 8.2* 8.2* 8.3* 8.5 8.6 8.3*   AST U/L 94* 94* 78* 71* 63* 53* 61*   ALT U/L 88* 87* 71* 67* 60* 54* 62*   ALK PHOS U/L 126* 117* 113* 121* 118* 120* 132*   EGFR ml/min/1.73sq m 65 79 79 76 68 57 50     BMP:  Results from last 7 days   Lab Units 05/02/24  0540 05/01/24  0518 04/30/24  0504 04/29/24  0458 04/28/24  0628 04/27/24  0547 04/26/24  1942    POTASSIUM mmol/L 3.8 3.8 3.4* 3.7 3.7 3.9 4.3   CHLORIDE mmol/L 99 99 98 98 98 97 95*   CO2 mmol/L 33* 33* 32 34* 34* 29 33*   BUN mg/dL 18 16 16 19 19 20 23   CREATININE mg/dL 1.05 0.90 0.89 0.93 1.01 1.17 1.31*   CALCIUM mg/dL 8.1* 8.2* 8.2* 8.3* 8.5 8.6 8.3*     Lab Results   Component Value Date    CREATININE 1.05 2024    CREATININE 0.90 2024    CREATININE 0.89 2024     Lab Results   Component Value Date    NTBNP 141 07/10/2019      Results from last 7 days   Lab Units 24  0504 24  0547   MAGNESIUM mg/dL 2.0 2.1     Results from last 7 days   Lab Units 24  0801 24  0518 24  0504 24  0628 24  0547 24  2347   INR  2.14* 2.04* 1.68* 1.66* 3.76* 4.12*     Lipid Profile:   Lab Results   Component Value Date    CHOL 124 10/15/2015    CHOL 159 2015    CHOL 155 2013     Lab Results   Component Value Date    HDL 50 2024    HDL 68 2023    HDL 76 2023     Lab Results   Component Value Date    LDLCALC 48 2024    LDLCALC 53 2023    LDLCALC 58 2023     Lab Results   Component Value Date    TRIG 37 2024    TRIG 54 2023    TRIG 41 2023       Cardiac testing:   Results for orders placed during the hospital encounter of 20    Echo complete with contrast if indicated    Narrative  [address] [address] [phone]    Transthoracic Echocardiogram  2D, M-mode, Doppler, and Color Doppler    Study date:  04-Sep-2020    Patient: LIZ GARCIA  MR number: [id number]  Account number: [id number]  : 1940  Age: 80 years  Gender: Male  Status: Outpatient  Location: [address] [place] [address] [facility]  Height: 71 in  Weight: 253.4 lb  BP: 134/ 76 mmHg    Indications: 3 vessel CAD;Arteriosclerotic CVD;Essential HTN.    Diagnoses: I11.9 - Hypertensive heart disease without heart failure, I25.83 - Coronary atherosclerosis due to lipid  jolly plaque    Sonographer:  GUNJAN Chávez  Primary Physician:  Lea Reyes, MD  Referring Physician:  Raza Alan DO  Group:  [facility]  Cardiology Fellow:  Karla Jaquez MD  Interpreting Physician:  RYAN Saleh MD    SUMMARY    LEFT VENTRICLE:  Size was normal.  Systolic function was normal. Ejection fraction was estimated to be 60 %.  Although no diagnostic regional wall motion abnormality was identified, this possibility cannot be completely excluded on the basis of this study.  Wall thickness was mildly increased.  There was mild concentric hypertrophy.  Doppler parameters were consistent with abnormal left ventricular relaxation (grade 1 diastolic dysfunction).    RIGHT VENTRICLE:  The size was at the upper limits of normal.  Systolic function was normal.  Wall thickness was increased.    LEFT ATRIUM:  The atrium was mildly dilated.    RIGHT ATRIUM:  The atrium was mildly dilated.    MITRAL VALVE:  There was mild regurgitation.    AORTIC VALVE:  There was trace regurgitation.    TRICUSPID VALVE:  There was mild regurgitation.  Estimated peak PA pressure was 50 mmHg.  The findings suggest moderate pulmonary hypertension.    AORTA:  The root exhibited dilatation at 44 mm.  There was dilatation of the ascending aorta at 44 mm.    IVC, HEPATIC VEINS:  The respirophasic change in diameter was less than 50%.    PULMONARY VEINS:  S/D reversal consistent with elevated LAP/diastolic dysfunction.    COMPARISONS:  Comparison was made with the previous study of 23-May-2014. Ascending aortic and aortic root sizes have not significantly changed. Pulmonary artery pressure has increased.    HISTORY: PRIOR HISTORY: Chronic systolic HF;HTN;Aneurysm of ascending aorta;HLD;YEVGENIY;Abdominal aortic aneurysm without rupture;Former smoker.    PROCEDURE: The study was performed in the [address] [place] [address] [facility]. This was a routine study. The transthoracic approach was used. The study included  complete 2D imaging, M-mode, complete spectral Doppler, and color Doppler. The  heart rate was 73 bpm, at the start of the study. Images were obtained from the parasternal, apical, subcostal, and suprasternal notch acoustic windows. This was a technically difficult study.    LEFT VENTRICLE: Size was normal. Systolic function was normal. Ejection fraction was estimated to be 60 %. Although no diagnostic regional wall motion abnormality was identified, this possibility cannot be completely excluded on the basis  of this study. Wall thickness was mildly increased. There was mild concentric hypertrophy. DOPPLER: Doppler parameters were consistent with abnormal left ventricular relaxation (grade 1 diastolic dysfunction).    RIGHT VENTRICLE: The size was at the upper limits of normal. Systolic function was normal. Wall thickness was increased.    LEFT ATRIUM: The atrium was mildly dilated.    RIGHT ATRIUM: The atrium was mildly dilated.    MITRAL VALVE: There was mild annular calcification. Valve structure was normal. There was normal leaflet separation. DOPPLER: The transmitral velocity was within the normal range. There was no evidence for stenosis. There was mild  regurgitation.    AORTIC VALVE: The valve was probably trileaflet. Leaflets exhibited mild calcification, normal cuspal separation, and sclerosis. DOPPLER: Transaortic velocity was within the normal range. There was no evidence for stenosis. There was trace  regurgitation.    TRICUSPID VALVE: The valve structure was normal. There was normal leaflet separation. DOPPLER: The transtricuspid velocity was within the normal range. There was no evidence for stenosis. There was mild regurgitation. Estimated peak PA  pressure was 50 mmHg. The findings suggest moderate pulmonary hypertension.    PULMONIC VALVE: Not well visualized. DOPPLER: The transpulmonic velocity was within the normal range. There was trace regurgitation.    PERICARDIUM: There was no pericardial  effusion.    AORTA: The root exhibited dilatation at 44 mm. There was dilatation of the ascending aorta at 44 mm.    SYSTEMIC VEINS: IVC: The inferior vena cava was normal in size. The respirophasic change in diameter was less than 50%.    PULMONARY VEINS: S/D reversal consistent with elevated LAP/diastolic dysfunction.    SYSTEM MEASUREMENT TABLES    2D  %FS: 29.67 %  Ao Diam: 3.65 cm  EDV(Teich): 127.77 ml  EF(Cube): 65.21 %  EF(Teich): 56.37 %  ESV(Cube): 48.06 ml  ESV(Teich): 55.74 ml  IVSd: 1.44 cm  LA Area: 17.79 cm2  LA Diam: 5.34 cm  LVEDV MOD A4C: 116.48 ml  LVEF MOD A4C: 49.73 %  LVESV MOD A4C: 58.55 ml  LVIDd: 5.17 cm  LVIDs: 3.64 cm  LVLd A4C: 8.46 cm  LVLs A4C: 6.7 cm  LVPWd: 1.36 cm  RA Area: 21.82 cm2  RV Diam.: 4.22 cm  SI(Cube): 38.51 ml/m2  SI(Teich): 30.78 ml/m2  SV MOD A4C: 57.93 ml  SV(Cube): 90.1 ml  SV(Teich): 72.03 ml    CW  AR Dec [place]: 4.79 m/s2  AR Dec Time: 1282.69 ms  AR PHT: 371.98 ms  AR Vmax: 6.15 m/s  AR maxP.11 mmHg  TR Vmax: 2.65 m/s  TR maxP.17 mmHg    MM  TAPSE: 2.23 cm    PW  E': 0.08 m/s  E/E': 7.32  MV A Jerardo: 0.51 m/s  MV Dec [place]: 2.06 m/s2  MV DecT: 266.25 ms  MV E Jerardo: 0.55 m/s  MV E/A Ratio: 1.07    [employer] Accredited Echocardiography Laboratory    Prepared and electronically signed by    RYAN Saleh MD  Signed 04-Sep-2020 12:13:39    Meds/Allergies   all current active meds have been reviewed and current meds:   Current Facility-Administered Medications   Medication Dose Route Frequency    albuterol inhalation solution 5 mg  5 mg Nebulization Q4H PRN    amiodarone tablet 200 mg  200 mg Oral Daily    aspirin (ECOTRIN LOW STRENGTH) EC tablet 81 mg  81 mg Oral Daily    atorvastatin (LIPITOR) tablet 40 mg  40 mg Oral Daily    cefTRIAXone (ROCEPHIN) 2,000 mg in dextrose 5 % 50 mL IVPB  2,000 mg Intravenous Q24H    gabapentin (NEURONTIN) capsule 900 mg  900 mg Oral HS    guaiFENesin (MUCINEX) 12 hr tablet 600 mg  600 mg Oral BID    labetalol  (NORMODYNE) tablet 100 mg  100 mg Oral BID    polyethylene glycol (MIRALAX) packet 17 g  17 g Oral Daily    senna (SENOKOT) tablet 8.6 mg  1 tablet Oral Daily    spironolactone (ALDACTONE) tablet 12.5 mg  12.5 mg Oral Daily    tamsulosin (FLOMAX) capsule 0.4 mg  0.4 mg Oral Daily    torsemide (DEMADEX) tablet 40 mg  40 mg Oral Daily    warfarin (COUMADIN) tablet 2.5 mg  2.5 mg Oral Daily (warfarin)     Facility-Administered Medications Prior to Admission   Medication    albuterol (PROVENTIL HFA,VENTOLIN HFA) inhaler 2 puff     Medications Prior to Admission   Medication    albuterol (Ventolin HFA) 90 mcg/act inhaler    amiodarone 200 mg tablet    aspirin (ECOTRIN LOW STRENGTH) 81 mg EC tablet    atorvastatin (LIPITOR) 40 mg tablet    cholecalciferol (VITAMIN D3) 1,000 units tablet    FIBER ADULT GUMMIES PO    gabapentin (NEURONTIN) 300 mg capsule    labetalol (NORMODYNE) 100 mg tablet    multivitamin (THERAGRAN) TABS    potassium chloride (Klor-Con M20) 20 mEq tablet    senna (SENOKOT) 8.6 MG tablet    tamsulosin (FLOMAX) 0.4 mg    torsemide (DEMADEX) 20 mg tablet    warfarin (Coumadin) 5 mg tablet     Counseling / Coordination of Care  Total floor / unit time spent today 20 minutes.  Greater than 50% of total time was spent with the patient and / or family counseling and / or coordination of care.      ** Please Note: Dragon 360 Dictation voice to text software may have been used in the creation of this document. **

## 2024-05-02 NOTE — PLAN OF CARE
Problem: Potential for Falls  Goal: Patient will remain free of falls  Description: INTERVENTIONS:  - Educate patient/family on patient safety including physical limitations  - Instruct patient to call for assistance with activity   - Consult OT/PT to assist with strengthening/mobility   - Keep Call bell within reach  - Keep bed low and locked with side rails adjusted as appropriate  - Keep care items and personal belongings within reach  - Initiate and maintain comfort rounds  - Make Fall Risk Sign visible to staff  - Apply yellow socks and bracelet for high fall risk patients  - Consider moving patient to room near nurses station  Outcome: Progressing     Problem: Prexisting or High Potential for Compromised Skin Integrity  Goal: Skin integrity is maintained or improved  Description: INTERVENTIONS:  - Identify patients at risk for skin breakdown  - Assess and monitor skin integrity  - Assess and monitor nutrition and hydration status  - Monitor labs   - Assess for incontinence   - Turn and reposition patient  - Assist with mobility/ambulation  - Relieve pressure over bony prominences  - Avoid friction and shearing  - Provide appropriate hygiene as needed including keeping skin clean and dry  - Evaluate need for skin moisturizer/barrier cream  - Collaborate with interdisciplinary team   - Patient/family teaching  - Consider wound care consult   Outcome: Progressing     Problem: PAIN - ADULT  Goal: Verbalizes/displays adequate comfort level or baseline comfort level  Description: Interventions:  - Encourage patient to monitor pain and request assistance  - Assess pain using appropriate pain scale  - Administer analgesics based on type and severity of pain and evaluate response  - Implement non-pharmacological measures as appropriate and evaluate response  - Consider cultural and social influences on pain and pain management  - Notify physician/advanced practitioner if interventions unsuccessful or patient reports  new pain  Outcome: Progressing     Problem: INFECTION - ADULT  Goal: Absence or prevention of progression during hospitalization  Description: INTERVENTIONS:  - Assess and monitor for signs and symptoms of infection  - Monitor lab/diagnostic results  - Monitor all insertion sites, i.e. indwelling lines, tubes, and drains  - Monitor endotracheal if appropriate and nasal secretions for changes in amount and color  - Bard appropriate cooling/warming therapies per order  - Administer medications as ordered  - Instruct and encourage patient and family to use good hand hygiene technique  - Identify and instruct in appropriate isolation precautions for identified infection/condition  Outcome: Progressing  Goal: Absence of fever/infection during neutropenic period  Description: INTERVENTIONS:  - Monitor WBC    Outcome: Progressing     Problem: SAFETY ADULT  Goal: Patient will remain free of falls  Description: INTERVENTIONS:  - Educate patient/family on patient safety including physical limitations  - Instruct patient to call for assistance with activity   - Consult OT/PT to assist with strengthening/mobility   - Keep Call bell within reach  - Keep bed low and locked with side rails adjusted as appropriate  - Keep care items and personal belongings within reach  - Initiate and maintain comfort rounds  - Make Fall Risk Sign visible to staff  - Apply yellow socks and bracelet for high fall risk patients  - Consider moving patient to room near nurses station  Outcome: Progressing  Goal: Maintain or return to baseline ADL function  Description: INTERVENTIONS:  -  Assess patient's ability to carry out ADLs; assess patient's baseline for ADL function and identify physical deficits which impact ability to perform ADLs (bathing, care of mouth/teeth, toileting, grooming, dressing, etc.)  - Assess/evaluate cause of self-care deficits   - Assess range of motion  - Assess patient's mobility; develop plan if impaired  - Assess  patient's need for assistive devices and provide as appropriate  - Encourage maximum independence but intervene and supervise when necessary  - Involve family in performance of ADLs  - Assess for home care needs following discharge   - Consider OT consult to assist with ADL evaluation and planning for discharge  - Provide patient education as appropriate  Outcome: Progressing  Goal: Maintains/Returns to pre admission functional level  Description: INTERVENTIONS:  - Perform AM-PAC 6 Click Basic Mobility/ Daily Activity assessment daily.  - Set and communicate daily mobility goal to care team and patient/family/caregiver.   - Collaborate with rehabilitation services on mobility goals if consulted  - Out of bed for toileting  - Record patient progress and toleration of activity level   Outcome: Progressing     Problem: DISCHARGE PLANNING  Goal: Discharge to home or other facility with appropriate resources  Description: INTERVENTIONS:  - Identify barriers to discharge w/patient and caregiver  - Arrange for needed discharge resources and transportation as appropriate  - Identify discharge learning needs (meds, wound care, etc.)  - Arrange for interpretive services to assist at discharge as needed  - Refer to Case Management Department for coordinating discharge planning if the patient needs post-hospital services based on physician/advanced practitioner order or complex needs related to functional status, cognitive ability, or social support system  Outcome: Progressing     Problem: Knowledge Deficit  Goal: Patient/family/caregiver demonstrates understanding of disease process, treatment plan, medications, and discharge instructions  Description: Complete learning assessment and assess knowledge base.  Interventions:  - Provide teaching at level of understanding  - Provide teaching via preferred learning methods  Outcome: Progressing

## 2024-05-03 ENCOUNTER — TRANSITIONAL CARE MANAGEMENT (OUTPATIENT)
Dept: INTERNAL MEDICINE CLINIC | Facility: CLINIC | Age: 84
End: 2024-05-03

## 2024-05-05 LAB
ATRIAL RATE: 65 BPM
QRS AXIS: 98 DEGREES
QRSD INTERVAL: 108 MS
QT INTERVAL: 502 MS
QTC INTERVAL: 509 MS
T WAVE AXIS: 180 DEGREES
VENTRICULAR RATE: 62 BPM

## 2024-05-05 PROCEDURE — 93010 ELECTROCARDIOGRAM REPORT: CPT | Performed by: INTERNAL MEDICINE

## 2024-05-05 NOTE — PROGRESS NOTES
Advanced Heart Failure / Pulmonary Hypertension Outpatient Visit    Oscar Espinosa 83 y.o. male   MRN: [id number]  Encounter: [id number]    Assessment:  Patient Active Problem List    Diagnosis Date Noted    Acute respiratory failure with hypoxia ([facility]) 05/06/2024    Pleural effusion with shortness of breath 04/27/2024    Hyponatremia 04/27/2024    Anemia 04/27/2024    Elevated transaminase level 04/27/2024    Hypertensive heart and chronic kidney disease with heart failure and stage 1 through stage 4 chronic kidney disease, or chronic kidney disease (HCC) 02/29/2024    Bilateral leg edema 01/24/2024    Bilateral carotid artery stenosis 01/23/2024    Venous insufficiency of both lower extremities 08/31/2023    Stage 3a chronic kidney disease (HCC) 10/27/2022    Platelets decreased ([facility]) 07/27/2022    Myofascial pain syndrome 07/27/2022    Atrial fibrillation ([facility]) 07/27/2022    Hypertensive heart disease with heart failure ([facility]) 01/14/2022    Ascending aortic aneurysm ([facility]) 05/21/2021    Nocturnal hypoxemia     Moderate to severe pulmonary hypertension ([facility]) 01/06/2021    Acute on chronic congestive heart failure (HCC) 09/11/2019    Chronic obstructive pulmonary disease ([facility]) 09/11/2019    Multiple pulmonary nodules 09/10/2019    Chronic pain syndrome     Spondylolisthesis at L4-L5 level 11/13/2018    Intervertebral disc disorder with radiculopathy of lumbar region     Subclinical hypothyroidism 10/22/2014    Abdominal aortic aneurysm without rupture ([facility]) 04/14/2014    Aortic valve disorder 04/14/2014    3-vessel CAD 02/28/2014    Herniated lumbar intervertebral disc 12/04/2013    Disc degeneration, lumbar 09/03/2013    Lumbar radiculopathy 09/03/2013    Benign essential hypertension 04/10/2013    Impaired fasting glucose 04/10/2013    Severe obesity (BMI 35.0-35.9 with comorbidity) ([facility]) 04/10/2013    Hyperlipidemia 09/07/2012    Microscopic hematuria 09/07/2012    Severe obstructive sleep apnea 09/07/2012     Arteriosclerotic cardiovascular disease 09/07/2012       Today's Plan:  Warm, reasonable volume status on exam. Continue current regimen.    Consideration for cardioversion in the future depending Holter monitor results. Has Zio on right now. Will defer timing of this to patient's primary cardiologist. Has an appointment scheduled with Dr. Alan next month   Saw pulm earlier today; O2 ordered to use with exertion and at night.   2g sodium restriction, 2 L fluid restriction, daily weights.    Plan:  Acute on chronic HFmrEF, LVEF 45%  Etiology: history of CAD s/p CABG, afib   TTE 4/27/2024: LVEF 45%.  Normal wall motion.  Severely abnormal diastolic function.  Normal RV.  CHAU.  Mild AI, moderate MR, severe TR.  Moderately dilated ascending aorta at 4.5 cm, aortic root 3.5 cm.    Home diuretic: torsemide 40 mg QD     Weighed 219 lbs 5/2/24. Today, weighs 223 lbs on office scale.  Most recent BMP from 5/2/24: Sodium 138, potassium 3.8, creatinine 1.05, BUN 18, GFR 65.    Pharmacotherapies / Neurohormonal Blockade:  --Beta Blocker: Currently on labetalol 100 mg twice daily  --ARNi / ACEi / ARB:   --Aldosterone Antagonist: Spironolactone 12.5 mg daily  --SGLT2 Inhibitor:  --Diuretic: Torsemide 40 mg QD    Sudden Cardiac Death Risk Reduction:  --ICD: LVEF > 35%    Electrical Resynchronization:  --Candidacy for BiV device:    Advanced Therapies: Will continue to monitor.  --Inotrope:  --LVAD/Transplant Candidacy:    Acute hypoxic respiratory failure, resolved  Multifactorial in setting of pneumonia with pleural effusion and CHF  CT chest 5/1: moderate left sided pleural effusion.   Did not qualify for home O2 while admitted      Paroxysmal atrial fibrillation  Continues on labetalol 100 mg twice daily and amiodarone 200 mg daily  Anticoagulated on warfarin     HTN-controlled     History of CAD s/p CABG  CABG x 4 in 2003 (LIMA to LAD, SVG to OM1, OM2, and SVG to PDA)  Denies any anginal complaints  Stress test in 2019  reviewed showing small amount of ischemia in the inferolateral region, no interval change in myocardial perfusion compared to prior stress test.  On ASA, atorvastatin, and labetalol     Left lower lobe pneumonia-completed antibiotics, s/p thoracentesis 4/28 for parapneumonic effusion    YEVGENIY, not on CPAP       HPI:   Oscar Espinosa is a 83-year-old male with a PMH as above who presents for follow up. Follows with Dr. Alan.  Recently admitted 4/26 to 5/2 to [facility] for acute hypoxic respiratory failure in the setting of loculated parapneumonic effusion.  Underwent thoracentesis with exudative fluid, treated with IV antibiotics.  He was also treated for volume overload with IV Lasix and transitioned to torsemide and spironolactone on discharge.  Had also recently completed lymphedema treatment and lost 20 pounds in the past month.    5/6/24: Presents today for follow-up.  Reports that he has been getting winded with exertion; has a bad back, which makes it hard for him to walk long distances as well.  Otherwise denies any cardiac complaints today.  Feels like the torsemide has been making him urinate well, and feels that his legs have significantly improved post lymphedema treatment.  Denies chest pain, dizziness, PND, orthopnea.  Has been doing his best with sodium and fluid restrictions.  Has been weighing himself every day.  He is currently wearing the Zio patch.    Past Medical History:   Diagnosis Date    Abdominal aortic aneurysm ([facility])     s/p repair    Abnormal ECG july 2022    Aneurysm ([facility]) 2007    Aneurysm of abdominal aorta ([facility])     49mm, trileaflet AV    Atrial fibrillation ([facility])     Eliquis    BPH (benign prostatic hyperplasia)     CAD (coronary artery disease)     s/p CABGx3    CHF (congestive heart failure) ([facility])     Chronic pain     Gabapentin    Chronic pain disorder     lumbar    COPD (chronic obstructive pulmonary disease) ([facility])     Coronary artery disease     Former tobacco use      Hyperlipidemia     Hypertension     Hypothyroidism     Lumbar radiculopathy     Lumbar stenosis     s/p injections    Neuropathy     Obesity (BMI 30-39.9)     YEVGENIY (obstructive sleep apnea)     unable to tolerate CPAP    Pulmonary hypertension (HCC)     moderate to severe    Spondylolisthesis of lumbar region     Visual impairment 2022    Vitamin D deficiency      Review of Systems   Constitutional:  Negative for chills and fever.   HENT:  Negative for ear pain and sore throat.    Eyes:  Negative for pain and visual disturbance.   Respiratory:  Positive for shortness of breath (With exertion). Negative for cough.    Cardiovascular:  Positive for leg swelling (Significantly improved). Negative for chest pain and palpitations.   Gastrointestinal:  Negative for abdominal pain and vomiting.   Genitourinary:  Negative for dysuria and hematuria.   Musculoskeletal:  Negative for arthralgias and back pain.   Skin:  Negative for color change and rash.   Neurological:  Negative for seizures and syncope.   All other systems reviewed and are negative.  14-point ROS completed and negative except as stated above and/or in the HPI.      Allergies   Allergen Reactions    Meloxicam Edema       Current Outpatient Medications:     albuterol (Ventolin HFA) 90 mcg/act inhaler, Inhale 2 puffs every 6 (six) hours as needed for wheezing, Disp: 54 g, Rfl: 1    amiodarone 200 mg tablet, Take 1 tablet (200 mg total) by mouth daily, Disp: 90 tablet, Rfl: 3    aspirin (ECOTRIN LOW STRENGTH) 81 mg EC tablet, Take 1 tablet by mouth daily, Disp: , Rfl:     atorvastatin (LIPITOR) 40 mg tablet, Take 1 tablet (40 mg total) by mouth daily, Disp: 90 tablet, Rfl: 3    cholecalciferol (VITAMIN D3) 1,000 units tablet, Take 2,000 Units by mouth daily, Disp: , Rfl:     FIBER ADULT GUMMIES PO, Take 1 caplet by mouth daily , Disp: , Rfl:     gabapentin (NEURONTIN) 300 mg capsule, Take 3 capsules (900 mg total) by mouth daily at bedtime, Disp: , Rfl:      labetalol (NORMODYNE) 100 mg tablet, Take 1 tablet (100 mg total) by mouth 2 (two) times a day, Disp: 180 tablet, Rfl: 3    multivitamin (THERAGRAN) TABS, Take 1 tablet by mouth daily, Disp: , Rfl:     potassium chloride (Klor-Con M20) 20 mEq tablet, Take one tab daily, 7 days a week., Disp: 75 tablet, Rfl: 0    senna (SENOKOT) 8.6 MG tablet, Take 1 tablet by mouth as needed  , Disp: , Rfl:     spironolactone (ALDACTONE) 25 mg tablet, Take 0.5 tablets (12.5 mg total) by mouth daily Do not start before May 3, 2024., Disp: 15 tablet, Rfl: 0    tamsulosin (FLOMAX) 0.4 mg, Take 1 capsule by mouth daily, Disp: , Rfl:     torsemide (DEMADEX) 20 mg tablet, Torsemide 40mg daily 7 days a week., Disp: 120 tablet, Rfl: 0    warfarin (Coumadin) 2.5 mg tablet, TAKE 1 TABLET BY MOUTH DAILY, Disp: 30 tablet, Rfl: 0    Current Facility-Administered Medications:     albuterol (PROVENTIL HFA,VENTOLIN HFA) inhaler 2 puff, 2 puff, Inhalation, Q6H PRN, Gerson Terrell MD    Social History     Socioeconomic History    Marital status: /Civil Union     Spouse name: Not on file    Number of children: Not on file    Years of education: Not on file    Highest education level: Not on file   Occupational History    Occupation: retired   Tobacco Use    Smoking status: Former     Current packs/day: 0.00     Average packs/day: 1 pack/day for 55.6 years (55.6 ttl pk-yrs)     Types: Cigarettes     Start date: 1957     Quit date: 2012     Years since quittin.7    Smokeless tobacco: Never   Vaping Use    Vaping status: Never Used   Substance and Sexual Activity    Alcohol use: Yes     Alcohol/week: 21.0 standard drinks of alcohol     Types: 21 Cans of beer per week    Drug use: No    Sexual activity: Never   Other Topics Concern    Not on file   Social History Narrative    Not on file     Social Determinants of Health     Financial Resource Strain: Low Risk  (3/7/2024)    Overall Financial Resource Strain (CARDIA)     Difficulty of  "Paying Living Expenses: Not hard at all   Food Insecurity: No Food Insecurity (4/29/2024)    Hunger Vital Sign     Worried About Running Out of Food in the Last Year: Never true     Ran Out of Food in the Last Year: Never true   Transportation Needs: No Transportation Needs (4/29/2024)    PRAPARE - Transportation     Lack of Transportation (Medical): No     Lack of Transportation (Non-Medical): No   Physical Activity: Not on file   Stress: Not on file   Social Connections: Not on file   Intimate Partner Violence: Not on file   Housing Stability: Low Risk  (4/29/2024)    Housing Stability Vital Sign     Unable to Pay for Housing in the Last Year: No     Number of Places Lived in the Last Year: 1     Unstable Housing in the Last Year: No     Family History   Problem Relation Age of Onset    Heart disease Mother     Stroke Father         stroke syndrome    Aneurysm Brother         abdominal    Aortic aneurysm Brother         ascending    Coronary artery disease Family     Hypertension Family     Other Son         gioblastoma multiforme       Vitals:  Blood pressure 96/58, pulse 70, height 5' 11\" (1.803 m), weight 101 kg (223 lb), SpO2 (!) 87%.  Body mass index is 31.1 kg/m².  Wt Readings from Last 10 Encounters:   05/06/24 101 kg (223 lb)   05/02/24 99.6 kg (219 lb 9.3 oz)   04/19/24 103 kg (227 lb 9.6 oz)   03/07/24 114 kg (252 lb 6.4 oz)   03/05/24 114 kg (252 lb 1.6 oz)   03/04/24 114 kg (251 lb)   02/26/24 114 kg (251 lb)   02/21/24 115 kg (254 lb)   01/24/24 114 kg (251 lb)   01/11/24 114 kg (252 lb 3.2 oz)     Vitals:    05/06/24 1418   BP: 96/58   BP Location: Right arm   Patient Position: Sitting   Cuff Size: Large   Pulse: 70   SpO2: (!) 87%   Weight: 101 kg (223 lb)   Height: 5' 11\" (1.803 m)       Physical Exam  Constitutional:       Appearance: Normal appearance.   HENT:      Head: Normocephalic.      Nose: Nose normal.      Mouth/Throat:      Mouth: Mucous membranes are moist.   Eyes:      " Conjunctiva/sclera: Conjunctivae normal.   Neck:      Vascular: No JVD.   Cardiovascular:      Rate and Rhythm: Normal rate and regular rhythm.      Comments: Trace pitting edema BLLE, compression socks in place  Pulmonary:      Effort: Pulmonary effort is normal.      Breath sounds: Normal breath sounds.   Musculoskeletal:      Cervical back: Neck supple.   Skin:     General: Skin is warm and dry.   Neurological:      General: No focal deficit present.      Mental Status: He is alert and oriented to person, place, and time.   Psychiatric:         Mood and Affect: Mood normal.         Behavior: Behavior normal.         Labs & Results:  Lab Results   Component Value Date    WBC 6.66 05/02/2024    HGB 9.5 (L) 05/02/2024    HCT 30.8 (L) 05/02/2024    MCV 96 05/02/2024     05/02/2024     Lab Results   Component Value Date    SODIUM 138 05/02/2024    K 3.8 05/02/2024    CL 99 05/02/2024    CO2 33 (H) 05/02/2024    BUN 18 05/02/2024    CREATININE 1.05 05/02/2024    GLUC 89 05/02/2024    CALCIUM 8.1 (L) 05/02/2024     Lab Results   Component Value Date    INR 2.14 (H) 05/02/2024    INR 2.04 (H) 05/01/2024    INR 1.68 (H) 04/30/2024    PROTIME 24.8 (H) 05/02/2024    PROTIME 23.8 (H) 05/01/2024    PROTIME 20.6 (H) 04/30/2024     Lab Results   Component Value Date     (H) 04/26/2024      Lab Results   Component Value Date    NTBNP 141 07/10/2019          Thank you for the opportunity to participate in the care of this patient.    Muna Hall PA-C

## 2024-05-06 ENCOUNTER — OFFICE VISIT (OUTPATIENT)
Dept: PULMONOLOGY | Facility: CLINIC | Age: 84
End: 2024-05-06
Payer: MEDICARE

## 2024-05-06 ENCOUNTER — OFFICE VISIT (OUTPATIENT)
Dept: CARDIOLOGY CLINIC | Facility: CLINIC | Age: 84
End: 2024-05-06
Payer: MEDICARE

## 2024-05-06 VITALS
HEART RATE: 61 BPM | DIASTOLIC BLOOD PRESSURE: 70 MMHG | TEMPERATURE: 97.4 F | SYSTOLIC BLOOD PRESSURE: 128 MMHG | OXYGEN SATURATION: 89 %

## 2024-05-06 VITALS
HEART RATE: 70 BPM | SYSTOLIC BLOOD PRESSURE: 96 MMHG | DIASTOLIC BLOOD PRESSURE: 58 MMHG | HEIGHT: 71 IN | BODY MASS INDEX: 31.22 KG/M2 | OXYGEN SATURATION: 87 % | WEIGHT: 223 LBS

## 2024-05-06 DIAGNOSIS — J90 PLEURAL EFFUSION: ICD-10-CM

## 2024-05-06 DIAGNOSIS — I25.10 3-VESSEL CAD: ICD-10-CM

## 2024-05-06 DIAGNOSIS — J44.9 COPD, MILD (HCC): ICD-10-CM

## 2024-05-06 DIAGNOSIS — Z09 HOSPITAL DISCHARGE FOLLOW-UP: ICD-10-CM

## 2024-05-06 DIAGNOSIS — R91.8 MULTIPLE PULMONARY NODULES: ICD-10-CM

## 2024-05-06 DIAGNOSIS — I50.43 ACUTE ON CHRONIC COMBINED SYSTOLIC AND DIASTOLIC CONGESTIVE HEART FAILURE (HCC): ICD-10-CM

## 2024-05-06 DIAGNOSIS — I50.22 HEART FAILURE WITH MILDLY REDUCED EJECTION FRACTION (HFMREF) (HCC): Primary | ICD-10-CM

## 2024-05-06 DIAGNOSIS — J96.01 ACUTE RESPIRATORY FAILURE WITH HYPOXIA (HCC): ICD-10-CM

## 2024-05-06 DIAGNOSIS — I27.20 MODERATE TO SEVERE PULMONARY HYPERTENSION (HCC): ICD-10-CM

## 2024-05-06 DIAGNOSIS — G47.34 NOCTURNAL HYPOXEMIA: ICD-10-CM

## 2024-05-06 DIAGNOSIS — I48.91 ATRIAL FIBRILLATION, UNSPECIFIED TYPE (HCC): ICD-10-CM

## 2024-05-06 DIAGNOSIS — J44.9 CHRONIC OBSTRUCTIVE PULMONARY DISEASE, UNSPECIFIED COPD TYPE (HCC): Primary | ICD-10-CM

## 2024-05-06 DIAGNOSIS — G47.33 SEVERE OBSTRUCTIVE SLEEP APNEA: ICD-10-CM

## 2024-05-06 PROCEDURE — 99214 OFFICE O/P EST MOD 30 MIN: CPT | Performed by: PHYSICIAN ASSISTANT

## 2024-05-06 PROCEDURE — 94618 PULMONARY STRESS TESTING: CPT | Performed by: NURSE PRACTITIONER

## 2024-05-06 PROCEDURE — 99214 OFFICE O/P EST MOD 30 MIN: CPT | Performed by: NURSE PRACTITIONER

## 2024-05-06 PROCEDURE — G2211 COMPLEX E/M VISIT ADD ON: HCPCS | Performed by: PHYSICIAN ASSISTANT

## 2024-05-06 RX ORDER — ALBUTEROL SULFATE 90 UG/1
2 AEROSOL, METERED RESPIRATORY (INHALATION) EVERY 6 HOURS PRN
Qty: 54 G | Refills: 1 | Status: SHIPPED | OUTPATIENT
Start: 2024-05-06 | End: 2024-05-08

## 2024-05-06 NOTE — ASSESSMENT & PLAN NOTE
Oxygen titration study completed  Resting SpO2 on room air: 91%  Lowest SpO2 with ambulation: 88%  Oxygen needed at rest to maintain SpO2 greater than 88%: 0 L  Oxygen needed with ambulation to maintain SpO2 greater than 88%: 2 L  Heart rate at rest: 63  Heart rate with ambulation: 78    Indicates that patient requires 2 L supplemental oxygen with exertion  Oxygen ordered

## 2024-05-06 NOTE — ASSESSMENT & PLAN NOTE
Sleep study completed in April 2021 showed severe YEVGENIY with AHI of 40  Patient does not tolerate PAP therapy  Lowest oxygen saturation was 79% during sleep study  Patient recommended to wear supplemental oxygen overnight even if he is not going to use PAP therapy  Will complete overnight pulse oximetry to have updated results

## 2024-05-06 NOTE — ASSESSMENT & PLAN NOTE
TTE completed 04/27/2024 show EF 45% systolic function mildly reduced diastolic function severely abnormal consistent with ungraded restrictive relaxation RVSP 55.00 mmHg    Currently on torsemide 20 mg daily  Follows with cardiology  Likely contributing to shortness of breath

## 2024-05-06 NOTE — ASSESSMENT & PLAN NOTE
Recent hospitalization for left-sided loculated parapneumonic effusion  Thoracentesis completed 04/26/2024-fluid exudative in nature, neutrophilic and lymphocytic predominant  Patient treated with 7 days of ceftriaxone    Repeat CT on 05/01/24 showed unchanged moderate left effusion with adjacent atelectasis.  Patient denies any worsening dyspnea but became hypoxic with ambulation.  Will place patient on supplemental oxygen and repeat imaging    Will repeat CT chest in 6 weeks  If shortness of breath worsens we will check chest x-ray prior to CAT scan

## 2024-05-06 NOTE — PATIENT INSTRUCTIONS
Continue current regimen.    Please weigh yourself every day (after emptying your bladder) and keep a detailed log of weights.   Contact the Heart Failure program at [phone] if you gain 3+ lbs overnight or 5+ lbs in 5-7 days.  Limit daily sodium/salt intake to 2000 mg daily to prevent fluid retention.  Avoid canned foods, fast food/Chinese food, and processed meats (hot dogs, lunch meat, and sausage etc.). Caution with condiments.  Limit fluid intake to 2000 mL or 2 liters (about 60-65 ounces) daily.  Avoid electrolyte replacement drinks (such as Gatorade, Pedialyte, Propel, Liquid IV, etc.).  Bring complete list of medications and log of daily weights to your follow-up appointment.      Cough medication you were on in the hospital:  Guaifenesin (Mucinex)

## 2024-05-06 NOTE — ASSESSMENT & PLAN NOTE
Does not appear in exacerbation  Currently well-controlled on albuterol 90 mcg/ACT 2 puff every 6 hours as needed, does not use daily maintenance inhaler  Was previously on Stiolto with no significant improvement as per patient, can consider restarting Spiriva if patient becomes symptomatic    PFTs completed 02/24/2021 FEV1 74%, FVC 74%, FEV1/FVC ratio 71% DLCO 54%, no large airway obstruction on spirometry

## 2024-05-06 NOTE — PROGRESS NOTES
Pulmonary Follow-Up Note   Oscar Espinosa 83 y.o. male MRN: [id number]  5/6/2024      Assessment/Plan:    Problem List Items Addressed This Visit       Severe obstructive sleep apnea     Sleep study completed in April 2021 showed severe YEVGENIY with AHI of 40  Patient does not tolerate PAP therapy  Lowest oxygen saturation was 79% during sleep study  Patient recommended to wear supplemental oxygen overnight even if he is not going to use PAP therapy  Will complete overnight pulse oximetry to have updated results           Multiple pulmonary nodules     Nodules remained stable on most recent imaging  No continued surveillance required at this time         Relevant Medications    albuterol (Ventolin HFA) 90 mcg/act inhaler    Acute on chronic congestive heart failure (HCC)      TTE completed 04/27/2024 shows EF 45% systolic function mildly reduced, wall motion normal, diastolic function is severely abnormal consistent with upgraded restrictive relaxation RVSP 55 point mmHg  Currently on Demadex increased to 40 mg daily and spironolactone 12.5 mg daily  Fluid restriction 2000 mL, low-sodium diet    Continue diuresis per cardiology.     Wt Readings from Last 3 Encounters:   05/06/24 101 kg (223 lb)   05/02/24 99.6 kg (219 lb 9.3 oz)   04/19/24 103 kg (227 lb 9.6 oz)            Chronic obstructive pulmonary disease (HCC) - Primary     Does not appear in exacerbation  Currently well-controlled on albuterol 90 mcg/ACT 2 puff every 6 hours as needed, does not use daily maintenance inhaler  Was previously on Stiolto with no significant improvement as per patient, can consider restarting Spiriva if patient becomes symptomatic    PFTs completed 02/24/2021 FEV1 74%, FVC 74%, FEV1/FVC ratio 71% DLCO 54%, no large airway obstruction on spirometry         Relevant Medications    albuterol (Ventolin HFA) 90 mcg/act inhaler    Other Relevant Orders    POCT 6 minute walk (Completed)    Moderate to severe pulmonary hypertension (HCC)      TTE completed 04/27/2024 show EF 45% systolic function mildly reduced diastolic function severely abnormal consistent with ungraded restrictive relaxation RVSP 55.00 mmHg    Currently on torsemide 20 mg daily  Follows with cardiology  Likely contributing to shortness of breath         Nocturnal hypoxemia     Home sleep study completed in 2021 showed lowest oxygen saturation 79% and 56% of sleep time with oxygen saturation below 90%  Patient does not tolerate PAP therapy  Scheduled for CPAP study but did not complete  Patient has not been using any PAP therapy or supplemental oxygen at night  6 minute walk test completed today patient qualifies for supplemental oxygen at home.  Encouraged to use at nighttime also  Will recheck overnight pulse oximetry         Relevant Orders    Pulse oximetry overnight    Pleural effusion with shortness of breath     Recent hospitalization for left-sided loculated parapneumonic effusion  Thoracentesis completed 04/26/2024-fluid exudative in nature, neutrophilic and lymphocytic predominant  Patient treated with 7 days of ceftriaxone    Repeat CT on 05/01/24 showed unchanged moderate left effusion with adjacent atelectasis.  Patient denies any worsening dyspnea but became hypoxic with ambulation.  Will place patient on supplemental oxygen and repeat imaging    Will repeat CT chest in 6 weeks  If shortness of breath worsens we will check chest x-ray prior to CAT scan         Relevant Medications    albuterol (Ventolin HFA) 90 mcg/act inhaler    Other Relevant Orders    CT chest without contrast    Acute respiratory failure with hypoxia (HCC)     Oxygen titration study completed  Resting SpO2 on room air: 91%  Lowest SpO2 with ambulation: 88%  Oxygen needed at rest to maintain SpO2 greater than 88%: 0 L  Oxygen needed with ambulation to maintain SpO2 greater than 88%: 2 L  Heart rate at rest: 63  Heart rate with ambulation: 78    Indicates that patient requires 2 L supplemental oxygen  with exertion  Oxygen ordered         Relevant Orders    POCT 6 minute walk (Completed)    Home Oxygen with Portability     Other Visit Diagnoses       COPD, mild (HCC)        Relevant Medications    albuterol (Ventolin HFA) 90 mcg/act inhaler              Return in about 3 months (around 8/6/2024).    All of Oscar's questions were answered prior to leaving the office today. He will follow-up in 3 months or sooner should the need arise. He is aware to call our office with any further questions or concerns.    History of Present Illness   Reason for Visit: Hospital follow up  Chief Complaint: Dyspnea with exertion  HPI: Oscar Espinosa is a 83 y.o. male who presents to the office today for hospital follow-up.  Patient had recent hospitalization 04/26/2024 to 05/02/2024 for shortness of breath found to have pleural effusion.  Thoracentesis completed 04/26/2024 fluid exudative in nature, neutrophilic and lymphocytic predominant.  CT chest completed after thoracentesis showed unchanged moderate left-sided effusion.  Effusion suspected to be parapneumonic in nature.  Patient was treated with 7 days of ceftriaxone.      Has been seen in the office by Dr. Terrell for YEVGENIY, COPD, SOB, not since 12/29/2021.      Patient presents to the office today endorsing improvement in overall symptoms.  Wife feels like he has worsening shortness of breath with exertion at home.  She has checked his oxygen saturation and noticed it to be in the 80s.  6-minute walk test completed patient qualifies for supplemental oxygen at home.  Exertional hypoxia likely multifactorial secondary to CHF, COPD and left-sided pleural effusion.  Will check CT scan in 6 weeks  Can repeat chest x-ray if patient becomes symptomatic consider waiting for repeat CT    Review of Systems   Constitutional:  Negative for chills, fatigue and fever.   HENT:  Negative for congestion.    Respiratory:  Positive for shortness of breath. Negative for cough and wheezing.     Gastrointestinal:  Negative for constipation and diarrhea.   Neurological:  Negative for dizziness, syncope and light-headedness.       Historical Information   Past Medical History:   Diagnosis Date    Abdominal aortic aneurysm ([facility])     s/p repair    Abnormal ECG july 2022    Aneurysm ([facility]) 2007    Aneurysm of abdominal aorta ([facility])     49mm, trileaflet AV    Atrial fibrillation ([facility])     Eliquis    BPH (benign prostatic hyperplasia)     CAD (coronary artery disease)     s/p CABGx3    CHF (congestive heart failure) ([facility])     Chronic pain     Gabapentin    Chronic pain disorder     lumbar    COPD (chronic obstructive pulmonary disease) ([facility])     Coronary artery disease     Former tobacco use     Hyperlipidemia     Hypertension     Hypothyroidism     Lumbar radiculopathy     Lumbar stenosis     s/p injections    Neuropathy     Obesity (BMI 30-39.9)     YEVGENIY (obstructive sleep apnea)     unable to tolerate CPAP    Pulmonary hypertension ([facility])     moderate to severe    Spondylolisthesis of lumbar region     Visual impairment 2022    Vitamin D deficiency      Past Surgical History:   Procedure Laterality Date    ABDOMINAL AORTIC ANEURYSM REPAIR  08/09/2007    2 dock limbs with visc extens prosth    CARDIAC CATHETERIZATION      COLONOSCOPY      CORONARY ARTERY BYPASS GRAFT  2003    LIMA to LAD, sequential SVG to OM1 & OM2, SVG to RDA    LUMBAR EPIDURAL INJECTION       Family History   Problem Relation Age of Onset    Heart disease Mother     Stroke Father         stroke syndrome    Aneurysm Brother         abdominal    Aortic aneurysm Brother         ascending    Coronary artery disease Family     Hypertension Family     Other Son         gioblastoma multiforme     Social History   Social History     Substance and Sexual Activity   Alcohol Use Yes    Alcohol/week: 21.0 standard drinks of alcohol    Types: 21 Cans of beer per week     Social History     Substance and Sexual Activity   Drug Use No     Social History      Tobacco Use   Smoking Status Former    Current packs/day: 0.00    Average packs/day: 1 pack/day for 55.6 years (55.6 ttl pk-yrs)    Types: Cigarettes    Start date: 1957    Quit date: 2012    Years since quittin.7   Smokeless Tobacco Never     E-Cigarette/Vaping    E-Cigarette Use Never User      E-Cigarette/Vaping Substances    Nicotine No     THC No     CBD No     Flavoring No     Other No     Unknown No        Meds/Allergies     Current Outpatient Medications:     albuterol (Ventolin HFA) 90 mcg/act inhaler, Inhale 2 puffs every 6 (six) hours as needed for wheezing, Disp: 54 g, Rfl: 1    amiodarone 200 mg tablet, Take 1 tablet (200 mg total) by mouth daily, Disp: 90 tablet, Rfl: 3    aspirin (ECOTRIN LOW STRENGTH) 81 mg EC tablet, Take 1 tablet by mouth daily, Disp: , Rfl:     atorvastatin (LIPITOR) 40 mg tablet, Take 1 tablet (40 mg total) by mouth daily, Disp: 90 tablet, Rfl: 3    cholecalciferol (VITAMIN D3) 1,000 units tablet, Take 2,000 Units by mouth daily, Disp: , Rfl:     FIBER ADULT GUMMIES PO, Take 1 caplet by mouth daily , Disp: , Rfl:     gabapentin (NEURONTIN) 300 mg capsule, Take 3 capsules (900 mg total) by mouth daily at bedtime, Disp: , Rfl:     labetalol (NORMODYNE) 100 mg tablet, Take 1 tablet (100 mg total) by mouth 2 (two) times a day, Disp: 180 tablet, Rfl: 3    multivitamin (THERAGRAN) TABS, Take 1 tablet by mouth daily, Disp: , Rfl:     potassium chloride (Klor-Con M20) 20 mEq tablet, Take one tab daily, 7 days a week., Disp: 75 tablet, Rfl: 0    senna (SENOKOT) 8.6 MG tablet, Take 1 tablet by mouth as needed  , Disp: , Rfl:     spironolactone (ALDACTONE) 25 mg tablet, Take 0.5 tablets (12.5 mg total) by mouth daily Do not start before May 3, 2024., Disp: 15 tablet, Rfl: 0    tamsulosin (FLOMAX) 0.4 mg, Take 1 capsule by mouth daily, Disp: , Rfl:     torsemide (DEMADEX) 20 mg tablet, Torsemide 40mg daily 7 days a week., Disp: 120 tablet, Rfl: 0    warfarin (Coumadin) 2.5  mg tablet, TAKE 1 TABLET BY MOUTH DAILY, Disp: 30 tablet, Rfl: 0    Current Facility-Administered Medications:     albuterol (PROVENTIL HFA,VENTOLIN HFA) inhaler 2 puff, 2 puff, Inhalation, Q6H PRN, Gerson Terrell MD  Allergies   Allergen Reactions    Meloxicam Edema       Vitals: Blood pressure 128/70, pulse 61, temperature (!) 97.4 °F (36.3 °C), temperature source Tympanic, SpO2 (!) 89%. There is no height or weight on file to calculate BMI. Oxygen Therapy  SpO2: (!) 89 %  Oxygen Therapy: None (Room air)RA    Physical Exam:  Physical Exam  Vitals reviewed.   Constitutional:       General: He is not in acute distress.     Appearance: He is not ill-appearing.   HENT:      Head: Normocephalic and atraumatic.      Right Ear: External ear normal.      Left Ear: External ear normal.      Nose: Nose normal.      Mouth/Throat:      Mouth: Mucous membranes are dry.   Eyes:      Extraocular Movements: Extraocular movements intact.      Pupils: Pupils are equal, round, and reactive to light.   Cardiovascular:      Rate and Rhythm: Normal rate and regular rhythm.      Pulses: Normal pulses.      Heart sounds: Normal heart sounds.   Pulmonary:      Effort: Pulmonary effort is normal.      Breath sounds: No wheezing or rhonchi.   Abdominal:      General: Bowel sounds are normal.      Tenderness: There is no abdominal tenderness.   Musculoskeletal:         General: Normal range of motion.      Cervical back: Normal range of motion and neck supple.      Right lower leg: No edema.      Left lower leg: No edema.   Skin:     General: Skin is warm and dry.   Neurological:      Mental Status: He is alert and oriented to person, place, and time.   Psychiatric:         Mood and Affect: Mood normal.         Behavior: Behavior normal.         Thought Content: Thought content normal.         Judgment: Judgment normal.         Imaging and other studies: I have personally reviewed pertinent reports.     CT chest 05/01/2024 shows unchanged  "moderate left effusion with adjacent atelectasis, unchanged ascending thoracic aortic measuring 5 cm  Interstitial lung disease changes suggested bilaterally with atelectasis in the lingula and left lower lobe    Pulmonary Results (PFTs, PSG): I have personally reviewed pertinent reports.     PFTs completed 02/24/2021 FEV1 74%, FVC 74%, FEV1/FVC ratio 71% DLCO 54%, no large airway obstruction on spirometry    Today's Testing:     Six Minute Walk Test:   Oxygen titration study completed  Resting SpO2 on room air: 91%  Lowest SpO2 with ambulation: 88%  Oxygen needed at rest to maintain SpO2 greater than 88%: 0 L  Oxygen needed with ambulation to maintain SpO2 greater than 88%: 2 L  Heart rate at rest: 63  Heart rate with ambulation: 78      SOSA Lane  [facility]      Portions of the record may have been created with voice recognition software.  Occasional wrong word or \"sound a like\" substitutions may have occurred due to the inherent limitations of voice recognition software.  Read the chart carefully and recognize, using context, where substitutions have occurred or contact the dictating provider.  "

## 2024-05-06 NOTE — ASSESSMENT & PLAN NOTE
TTE completed 04/27/2024 shows EF 45% systolic function mildly reduced, wall motion normal, diastolic function is severely abnormal consistent with upgraded restrictive relaxation RVSP 55 point mmHg  Currently on Demadex increased to 40 mg daily and spironolactone 12.5 mg daily  Fluid restriction 2000 mL, low-sodium diet    Continue diuresis per cardiology.     Wt Readings from Last 3 Encounters:   05/06/24 101 kg (223 lb)   05/02/24 99.6 kg (219 lb 9.3 oz)   04/19/24 103 kg (227 lb 9.6 oz)

## 2024-05-06 NOTE — PATIENT INSTRUCTIONS
Oxygen ordered, use with exertion and at night, will check overnight pulse oximetry  Repeat Ct chest in 6 weeks  Follow up in 3 months

## 2024-05-06 NOTE — ASSESSMENT & PLAN NOTE
Home sleep study completed in 2021 showed lowest oxygen saturation 79% and 56% of sleep time with oxygen saturation below 90%  Patient does not tolerate PAP therapy  Scheduled for CPAP study but did not complete  Patient has not been using any PAP therapy or supplemental oxygen at night  6 minute walk test completed today patient qualifies for supplemental oxygen at home.  Encouraged to use at nighttime also  Will recheck overnight pulse oximetry

## 2024-05-07 ENCOUNTER — ANTICOAG VISIT (OUTPATIENT)
Dept: CARDIOLOGY CLINIC | Facility: CLINIC | Age: 84
End: 2024-05-07

## 2024-05-07 ENCOUNTER — APPOINTMENT (OUTPATIENT)
Dept: LAB | Facility: CLINIC | Age: 84
End: 2024-05-07
Payer: MEDICARE

## 2024-05-07 ENCOUNTER — TELEPHONE (OUTPATIENT)
Age: 84
End: 2024-05-07

## 2024-05-07 DIAGNOSIS — I48.0 PAROXYSMAL ATRIAL FIBRILLATION (HCC): Primary | ICD-10-CM

## 2024-05-07 DIAGNOSIS — E03.8 SUBCLINICAL HYPOTHYROIDISM: ICD-10-CM

## 2024-05-07 NOTE — TELEPHONE ENCOUNTER
Patients wife Dorcas calling stating [employer] wants to deliver the home concentrator but also tanks with wheels. She states it was discussed he would need a portable and not tanks. [employer] stated that is not what is on the order. Please advise. [phone]

## 2024-05-08 ENCOUNTER — OFFICE VISIT (OUTPATIENT)
Dept: INTERNAL MEDICINE CLINIC | Facility: CLINIC | Age: 84
End: 2024-05-08
Payer: MEDICARE

## 2024-05-08 VITALS
WEIGHT: 224 LBS | HEIGHT: 71 IN | BODY MASS INDEX: 31.36 KG/M2 | OXYGEN SATURATION: 94 % | HEART RATE: 66 BPM | SYSTOLIC BLOOD PRESSURE: 132 MMHG | DIASTOLIC BLOOD PRESSURE: 70 MMHG

## 2024-05-08 DIAGNOSIS — J90 PLEURAL EFFUSION: ICD-10-CM

## 2024-05-08 DIAGNOSIS — J96.01 ACUTE RESPIRATORY FAILURE WITH HYPOXIA (HCC): ICD-10-CM

## 2024-05-08 DIAGNOSIS — I50.32 CHRONIC DIASTOLIC CONGESTIVE HEART FAILURE (HCC): Primary | ICD-10-CM

## 2024-05-08 DIAGNOSIS — M54.16 LUMBAR RADICULOPATHY: ICD-10-CM

## 2024-05-08 DIAGNOSIS — I50.43 ACUTE ON CHRONIC COMBINED SYSTOLIC AND DIASTOLIC CONGESTIVE HEART FAILURE (HCC): ICD-10-CM

## 2024-05-08 DIAGNOSIS — I50.42 CHRONIC COMBINED SYSTOLIC AND DIASTOLIC CONGESTIVE HEART FAILURE (HCC): ICD-10-CM

## 2024-05-08 DIAGNOSIS — J44.9 CHRONIC OBSTRUCTIVE PULMONARY DISEASE, UNSPECIFIED COPD TYPE (HCC): ICD-10-CM

## 2024-05-08 DIAGNOSIS — D50.9 IRON DEFICIENCY ANEMIA, UNSPECIFIED IRON DEFICIENCY ANEMIA TYPE: ICD-10-CM

## 2024-05-08 DIAGNOSIS — R74.01 ELEVATED TRANSAMINASE LEVEL: ICD-10-CM

## 2024-05-08 LAB
ALBUMIN SERPL BCP-MCNC: 2.6 G/DL (ref 3.5–5)
ALBUMIN SERPL BCP-MCNC: 2.6 G/DL (ref 3.5–5)
ALP SERPL-CCNC: 117 U/L (ref 34–104)
ALP SERPL-CCNC: 126 U/L (ref 34–104)
ALT SERPL W P-5'-P-CCNC: 87 U/L (ref 7–52)
ALT SERPL W P-5'-P-CCNC: 88 U/L (ref 7–52)
ANION GAP SERPL CALCULATED.3IONS-SCNC: 6 MMOL/L (ref 4–13)
ANION GAP SERPL CALCULATED.3IONS-SCNC: 6 MMOL/L (ref 4–13)
AST SERPL W P-5'-P-CCNC: 94 U/L (ref 13–39)
AST SERPL W P-5'-P-CCNC: 94 U/L (ref 13–39)
BILIRUB SERPL-MCNC: 0.6 MG/DL (ref 0.2–1)
BILIRUB SERPL-MCNC: 0.6 MG/DL (ref 0.2–1)
BUN SERPL-MCNC: 16 MG/DL (ref 5–25)
BUN SERPL-MCNC: 18 MG/DL (ref 5–25)
CALCIUM ALBUM COR SERPL-MCNC: 9.2 MG/DL (ref 8.3–10.1)
CALCIUM ALBUM COR SERPL-MCNC: 9.3 MG/DL (ref 8.3–10.1)
CALCIUM SERPL-MCNC: 8.1 MG/DL (ref 8.4–10.2)
CALCIUM SERPL-MCNC: 8.2 MG/DL (ref 8.4–10.2)
CHLORIDE SERPL-SCNC: 99 MMOL/L (ref 96–108)
CHLORIDE SERPL-SCNC: 99 MMOL/L (ref 96–108)
CO2 SERPL-SCNC: 33 MMOL/L (ref 21–32)
CO2 SERPL-SCNC: 33 MMOL/L (ref 21–32)
CREAT SERPL-MCNC: 0.9 MG/DL (ref 0.6–1.3)
CREAT SERPL-MCNC: 1.05 MG/DL (ref 0.6–1.3)
GFR SERPL CREATININE-BSD FRML MDRD: 65 ML/MIN/1.73SQ M
GFR SERPL CREATININE-BSD FRML MDRD: 79 ML/MIN/1.73SQ M
GLUCOSE SERPL-MCNC: 101 MG/DL (ref 65–140)
GLUCOSE SERPL-MCNC: 89 MG/DL (ref 65–140)
POTASSIUM SERPL-SCNC: 3.8 MMOL/L (ref 3.5–5.3)
POTASSIUM SERPL-SCNC: 3.8 MMOL/L (ref 3.5–5.3)
PROT SERPL-MCNC: 6.3 G/DL (ref 6.4–8.4)
PROT SERPL-MCNC: 6.3 G/DL (ref 6.4–8.4)
SODIUM SERPL-SCNC: 138 MMOL/L (ref 135–147)
SODIUM SERPL-SCNC: 138 MMOL/L (ref 135–147)

## 2024-05-08 PROCEDURE — 99496 TRANSJ CARE MGMT HIGH F2F 7D: CPT | Performed by: INTERNAL MEDICINE

## 2024-05-08 RX ORDER — SPIRONOLACTONE 25 MG/1
12.5 TABLET ORAL DAILY
Qty: 45 TABLET | Refills: 3 | Status: SHIPPED | OUTPATIENT
Start: 2024-05-08

## 2024-05-08 RX ORDER — ALBUTEROL SULFATE 90 UG/1
2 AEROSOL, METERED RESPIRATORY (INHALATION) EVERY 6 HOURS PRN
Qty: 24 G | Refills: 3 | Status: SHIPPED | OUTPATIENT
Start: 2024-05-08

## 2024-05-08 RX ORDER — GABAPENTIN 300 MG/1
CAPSULE ORAL
Qty: 450 CAPSULE | Refills: 1 | Status: SHIPPED | OUTPATIENT
Start: 2024-05-08

## 2024-05-08 NOTE — PROGRESS NOTES
Assessment & Plan     1. Chronic diastolic congestive heart failure (HCC)  Assessment & Plan:  Wt Readings from Last 3 Encounters:   05/08/24 102 kg (224 lb)   05/06/24 101 kg (223 lb)   05/02/24 99.6 kg (219 lb 9.3 oz)         Continue torsemide 40mg and now also on spironolactone 12.5mg  Obtain CBC CMP with next INR      Orders:  -     spironolactone (ALDACTONE) 25 mg tablet; Take 0.5 tablets (12.5 mg total) by mouth daily  -     Comprehensive metabolic panel    2. Chronic obstructive pulmonary disease, unspecified COPD type (HCC)  Assessment & Plan:  Not in exacerbation, not on any inhalers    Orders:  -     albuterol (ProAir HFA) 90 mcg/act inhaler; Inhale 2 puffs every 6 (six) hours as needed for wheezing    3. Iron deficiency anemia, unspecified iron deficiency anemia type  -     CBC    4. Lumbar radiculopathy  Assessment & Plan:  Increase gabapentin to 300mg in the morning, afternoon and continue 900mg at night    Orders:  -     gabapentin (Neurontin) 300 mg capsule; Take 1 capsule (300 mg total) by mouth 2 (two) times a day AND 3 capsules (900 mg total) daily at bedtime.    5. Acute on chronic combined systolic and diastolic congestive heart failure (HCC)    6. Acute respiratory failure with hypoxia (HCC)    7. Elevated transaminase level  -     Comprehensive metabolic panel    8. Pleural effusion with shortness of breath  Assessment & Plan:  Completed ceftriaxone, thoracentesis 600ml  Overall better but still quite SOB  Awaiting portable O2  Repeat CT in 6 weeks  F/u with pulmonology      9. Chronic combined systolic and diastolic congestive heart failure (HCC)  Assessment & Plan:  Wt Readings from Last 3 Encounters:   05/08/24 102 kg (224 lb)   05/06/24 101 kg (223 lb)   05/02/24 99.6 kg (219 lb 9.3 oz)         Continue torsemide 40mg and now also on spironolactone 12.5mg  Obtain CBC CMP with next INR             Subjective     Transitional Care Management Review:   Oscar THURSTON Francisca is a 83 y.o. male here  for TCM follow up.     During the TCM phone call patient stated:  TCM Call     Date and time call was made  5/3/2024 10:16 AM    Hospital care reviewed  Records not available    Patient was hospitialized at  [facility]    Date of Admission  04/26/24    Date of discharge  05/02/24    Diagnosis  Pleural effusion with shortness of breath    Disposition  Home    Were the patients medications reviewed and updated  No    Current Symptoms  None      TCM Call     Post hospital issues  None    Should patient be enrolled in anticoag monitoring?  Yes    Scheduled for follow up?  Yes    Did you obtain your prescribed medications  Yes    Do you need help managing your prescriptions or medications  No    Is transportation to your appointment needed  No    I have advised the patient to call PCP with any new or worsening symptoms  Samantha Harry - MR/MA    Are you recieving any outpatient services  No    Are you recieving home care services  No    Are you using any community resources  No    Have you fallen in the last 12 months  No    Interperter language line needed  No    Counseling  Patient        Admitted for worsening SOB and dx with left pneumonia with a loculated effusion Underwent thoracentesis on 4/26 yielding 600ml, completed 7 days of ceftriaxone. Repeat CT on 5/1 did not show a change in size of the effusion. Feeling better overall but still with SOB. Repeat CT in about 6 weeks.   Also with CHF and was diuresed. Spironolactone added to torsemide  Weight is stable, LE edema improved even prior to admission with compression  Follows fluid restriction and has cut back significantly on alcohol consumption  C/o chronic low back pain and is on gabapentin 900mg HS        Review of Systems   Constitutional:  Negative for unexpected weight change.   Respiratory:  Positive for shortness of breath.    Cardiovascular:  Positive for leg swelling. Negative for chest pain and palpitations.   Gastrointestinal:  Negative for  "abdominal pain, blood in stool, constipation and diarrhea.   Neurological:  Positive for dizziness.       Objective     /70 (BP Location: Left arm, Patient Position: Sitting, Cuff Size: Large)   Pulse 66   Ht 5' 11\" (1.803 m)   Wt 102 kg (224 lb)   SpO2 94%   BMI 31.24 kg/m²      Physical Exam  Vitals and nursing note reviewed.   Constitutional:       General: He is not in acute distress.     Appearance: He is well-developed.   Eyes:      Conjunctiva/sclera: Conjunctivae normal.   Cardiovascular:      Rate and Rhythm: Normal rate and regular rhythm.      Heart sounds: No murmur heard.  Pulmonary:      Effort: Pulmonary effort is normal. No respiratory distress.      Breath sounds: Normal breath sounds.   Abdominal:      Palpations: Abdomen is soft.      Tenderness: There is no abdominal tenderness.   Musculoskeletal:      Cervical back: Neck supple.      Right lower leg: Edema (gr 1) present.      Left lower leg: Edema (gr 1) present.   Skin:     General: Skin is warm and dry.      Capillary Refill: Capillary refill takes less than 2 seconds.   Neurological:      Mental Status: He is alert.   Psychiatric:         Mood and Affect: Mood normal.         Behavior: Behavior normal.       Medications have been reviewed by provider in current encounter    Lea Reyes, MD         "

## 2024-05-10 LAB
DME PARACHUTE DELIVERY DATE ACTUAL: NORMAL
DME PARACHUTE DELIVERY DATE EXPECTED: NORMAL
DME PARACHUTE DELIVERY DATE EXPECTED: NORMAL
DME PARACHUTE DELIVERY DATE REQUESTED: NORMAL
DME PARACHUTE DELIVERY DATE REQUESTED: NORMAL
DME PARACHUTE ITEM DESCRIPTION: NORMAL
DME PARACHUTE ORDER STATUS: NORMAL
DME PARACHUTE ORDER STATUS: NORMAL
DME PARACHUTE SUPPLIER NAME: NORMAL
DME PARACHUTE SUPPLIER NAME: NORMAL
DME PARACHUTE SUPPLIER PHONE: NORMAL
DME PARACHUTE SUPPLIER PHONE: NORMAL

## 2024-05-10 NOTE — TELEPHONE ENCOUNTER
I called back and L/M asking the Pt to call back to explain to her that in the Oxygen order it states to be evaluated for conserving device so in order for the Pt to qualify for the POC the RT would need to evaluate the Pt in his home to see if he qualifies or not meanwhile he has portable tanks to get around.

## 2024-05-10 NOTE — TELEPHONE ENCOUNTER
Patients wife called upset as she still has not had a return call from our office. She spoke with [employer] and they are trying to deliver oxygen tanks to their home and she does not want the tanks as they cannot carry them. [facility] called the patient while we were on the phone together and they stated they are going to bring the order to their home today. She hopes it will be the actual POC and not just tanks. She will call our office back if there are any problems after delivery. Thank you.

## 2024-05-11 PROBLEM — E87.1 HYPONATREMIA: Status: RESOLVED | Noted: 2024-04-27 | Resolved: 2024-05-11

## 2024-05-11 PROBLEM — J96.01 ACUTE RESPIRATORY FAILURE WITH HYPOXIA (HCC): Status: RESOLVED | Noted: 2024-05-06 | Resolved: 2024-05-11

## 2024-05-11 PROBLEM — I13.0 HYPERTENSIVE HEART AND CHRONIC KIDNEY DISEASE WITH HEART FAILURE AND STAGE 1 THROUGH STAGE 4 CHRONIC KIDNEY DISEASE, OR CHRONIC KIDNEY DISEASE (HCC): Status: ACTIVE | Noted: 2022-10-27

## 2024-05-11 PROBLEM — I50.32 CHRONIC DIASTOLIC CONGESTIVE HEART FAILURE (HCC): Status: ACTIVE | Noted: 2024-05-11

## 2024-05-11 PROBLEM — I50.9 ACUTE ON CHRONIC CONGESTIVE HEART FAILURE (HCC): Status: RESOLVED | Noted: 2019-09-11 | Resolved: 2024-05-11

## 2024-05-11 PROBLEM — I50.42 CHRONIC COMBINED SYSTOLIC AND DIASTOLIC CONGESTIVE HEART FAILURE (HCC): Status: ACTIVE | Noted: 2024-05-11

## 2024-05-11 PROBLEM — D69.6 PLATELETS DECREASED (HCC): Status: RESOLVED | Noted: 2022-07-27 | Resolved: 2024-05-11

## 2024-05-11 NOTE — ASSESSMENT & PLAN NOTE
Wt Readings from Last 3 Encounters:   05/08/24 102 kg (224 lb)   05/06/24 101 kg (223 lb)   05/02/24 99.6 kg (219 lb 9.3 oz)         Continue torsemide 40mg and now also on spironolactone 12.5mg  Obtain CBC CMP with next INR

## 2024-05-11 NOTE — ASSESSMENT & PLAN NOTE
Completed ceftriaxone, thoracentesis 600ml  Overall better but still quite SOB  Awaiting portable O2  Repeat CT in 6 weeks  F/u with pulmonology

## 2024-05-15 ENCOUNTER — TELEPHONE (OUTPATIENT)
Dept: CARDIOLOGY CLINIC | Facility: CLINIC | Age: 84
End: 2024-05-15

## 2024-05-15 ENCOUNTER — ANTICOAG VISIT (OUTPATIENT)
Dept: CARDIOLOGY CLINIC | Facility: CLINIC | Age: 84
End: 2024-05-15

## 2024-05-15 ENCOUNTER — APPOINTMENT (OUTPATIENT)
Dept: LAB | Facility: CLINIC | Age: 84
End: 2024-05-15
Payer: MEDICARE

## 2024-05-15 DIAGNOSIS — I48.0 PAROXYSMAL ATRIAL FIBRILLATION (HCC): Primary | ICD-10-CM

## 2024-05-15 LAB
ALBUMIN SERPL BCP-MCNC: 3.1 G/DL (ref 3.5–5)
ALP SERPL-CCNC: 109 U/L (ref 34–104)
ALT SERPL W P-5'-P-CCNC: 22 U/L (ref 7–52)
ANION GAP SERPL CALCULATED.3IONS-SCNC: 10 MMOL/L (ref 4–13)
AST SERPL W P-5'-P-CCNC: 28 U/L (ref 13–39)
BILIRUB SERPL-MCNC: 0.75 MG/DL (ref 0.2–1)
BUN SERPL-MCNC: 20 MG/DL (ref 5–25)
CALCIUM ALBUM COR SERPL-MCNC: 9.6 MG/DL (ref 8.3–10.1)
CALCIUM SERPL-MCNC: 8.9 MG/DL (ref 8.4–10.2)
CHLORIDE SERPL-SCNC: 97 MMOL/L (ref 96–108)
CO2 SERPL-SCNC: 32 MMOL/L (ref 21–32)
CREAT SERPL-MCNC: 1.66 MG/DL (ref 0.6–1.3)
ERYTHROCYTE [DISTWIDTH] IN BLOOD BY AUTOMATED COUNT: 17.9 % (ref 11.6–15.1)
GFR SERPL CREATININE-BSD FRML MDRD: 37 ML/MIN/1.73SQ M
GLUCOSE P FAST SERPL-MCNC: 83 MG/DL (ref 65–99)
HCT VFR BLD AUTO: 32.7 % (ref 36.5–49.3)
HGB BLD-MCNC: 9.7 G/DL (ref 12–17)
MCH RBC QN AUTO: 29.4 PG (ref 26.8–34.3)
MCHC RBC AUTO-ENTMCNC: 29.7 G/DL (ref 31.4–37.4)
MCV RBC AUTO: 99 FL (ref 82–98)
PLATELET # BLD AUTO: 254 THOUSANDS/UL (ref 149–390)
PMV BLD AUTO: 9.7 FL (ref 8.9–12.7)
POTASSIUM SERPL-SCNC: 5 MMOL/L (ref 3.5–5.3)
PROT SERPL-MCNC: 6.9 G/DL (ref 6.4–8.4)
RBC # BLD AUTO: 3.3 MILLION/UL (ref 3.88–5.62)
SODIUM SERPL-SCNC: 139 MMOL/L (ref 135–147)
WBC # BLD AUTO: 5.84 THOUSAND/UL (ref 4.31–10.16)

## 2024-05-15 PROCEDURE — 80053 COMPREHEN METABOLIC PANEL: CPT | Performed by: INTERNAL MEDICINE

## 2024-05-15 PROCEDURE — 85027 COMPLETE CBC AUTOMATED: CPT | Performed by: INTERNAL MEDICINE

## 2024-05-17 ENCOUNTER — CLINICAL SUPPORT (OUTPATIENT)
Dept: CARDIOLOGY CLINIC | Facility: CLINIC | Age: 84
End: 2024-05-17
Payer: MEDICARE

## 2024-05-17 DIAGNOSIS — I48.21 PERMANENT ATRIAL FIBRILLATION (HCC): ICD-10-CM

## 2024-05-17 PROCEDURE — 93244 EXT ECG>48HR<7D REV&INTERPJ: CPT

## 2024-05-19 NOTE — PATIENT INSTRUCTIONS
Symptoms of stroke:  - Unable to speak or understand speech  - Unable to move one side of the body (arm or leg)  - Visual changes  - Call 911 for any symptoms of stroke

## 2024-05-20 ENCOUNTER — TELEPHONE (OUTPATIENT)
Age: 84
End: 2024-05-20

## 2024-05-20 ENCOUNTER — OFFICE VISIT (OUTPATIENT)
Dept: VASCULAR SURGERY | Facility: CLINIC | Age: 84
End: 2024-05-20
Payer: MEDICARE

## 2024-05-20 VITALS
OXYGEN SATURATION: 94 % | RESPIRATION RATE: 20 BRPM | HEIGHT: 71 IN | BODY MASS INDEX: 31.05 KG/M2 | HEART RATE: 56 BPM | DIASTOLIC BLOOD PRESSURE: 60 MMHG | SYSTOLIC BLOOD PRESSURE: 134 MMHG | WEIGHT: 221.8 LBS

## 2024-05-20 DIAGNOSIS — R73.01 IMPAIRED FASTING GLUCOSE: ICD-10-CM

## 2024-05-20 DIAGNOSIS — I71.40 ABDOMINAL AORTIC ANEURYSM (AAA) WITHOUT RUPTURE, UNSPECIFIED PART (HCC): Primary | ICD-10-CM

## 2024-05-20 DIAGNOSIS — I50.42 CHRONIC COMBINED SYSTOLIC AND DIASTOLIC CONGESTIVE HEART FAILURE (HCC): ICD-10-CM

## 2024-05-20 DIAGNOSIS — I87.2 VENOUS INSUFFICIENCY OF BOTH LOWER EXTREMITIES: ICD-10-CM

## 2024-05-20 DIAGNOSIS — I13.0 HYPERTENSIVE HEART AND CHRONIC KIDNEY DISEASE WITH HEART FAILURE AND STAGE 1 THROUGH STAGE 4 CHRONIC KIDNEY DISEASE, OR CHRONIC KIDNEY DISEASE (HCC): ICD-10-CM

## 2024-05-20 DIAGNOSIS — I65.23 BILATERAL CAROTID ARTERY STENOSIS: ICD-10-CM

## 2024-05-20 DIAGNOSIS — I71.21 ANEURYSM OF ASCENDING AORTA WITHOUT RUPTURE (HCC): ICD-10-CM

## 2024-05-20 DIAGNOSIS — E66.01 SEVERE OBESITY (BMI 35.0-35.9 WITH COMORBIDITY) (HCC): ICD-10-CM

## 2024-05-20 DIAGNOSIS — I48.0 PAROXYSMAL ATRIAL FIBRILLATION (HCC): ICD-10-CM

## 2024-05-20 DIAGNOSIS — E78.2 MIXED HYPERLIPIDEMIA: ICD-10-CM

## 2024-05-20 PROCEDURE — 99213 OFFICE O/P EST LOW 20 MIN: CPT | Performed by: PHYSICIAN ASSISTANT

## 2024-05-20 NOTE — TELEPHONE ENCOUNTER
S/w pts wife she just wanted a copy of u/s orders and asked if we could put dates of tests and times for her they did not printout on AVS mailing today

## 2024-05-20 NOTE — PROGRESS NOTES
Ambulatory Visit  Name: Oscar Espinosa      : 1940      MRN: [id number]  Encounter Provider: Candace Louise PA-C  Encounter Date: 2024   Encounter department: [facility]    Assessment & Plan   1. Abdominal aortic aneurysm (AAA) without rupture, unspecified part (HCC)  2. Bilateral carotid artery stenosis  3. Hypertensive heart and chronic kidney disease with heart failure and stage 1 through stage 4 chronic kidney disease, or chronic kidney disease (HCC)  4. Mixed hyperlipidemia  5. Impaired fasting glucose  6. Paroxysmal atrial fibrillation (HCC)  7. Aneurysm of ascending aorta without rupture (HCC)  8. Venous insufficiency of both lower extremities  9. Severe obesity (BMI 35.0-35.9 with comorbidity) (HCC)  10. Chronic combined systolic and diastolic congestive heart failure (HCC)      Abdominal aortic aneurysm (AAA) without rupture, unspecified part (HCC)  -No abdominal or back pain  -S/P oAAA repair  ([place]); transferring care to [facility] for surveillance  -AOIL 3/27/24: Widely patent ABF bypass graft.     NAA's were not performed due to lymphedema    -Stable, asymptomatic and patent ABF bypass   -Continue with medical therapy aspirin, atorvastatin and warfarin (AF)  -Follow up AOIL in 1 year  -Check AOIL and will follow up with rec's        Venous insufficiency of both lower extremities  Bilateral leg edema  -Chronic B LE edema x 10 years  -Very severe legs edema with drainage  -Recently recommended for compressive measures and recent referral to lymphedema clinic     -R leg LEV 24:    No DVT or SVT.     R GSV reflux throughout the thigh and calf (mid thigh 11.3 mm, reflux 3.04; knee 11.7, 2.09; prox calf 8.9, reflux 2.79). Triphasic waveforms.     Exam: marked 2+ B R>L LE edema, R pedal edema; mild weeping, lichenification; no open wounds    -Patient with chronic leg swelling with underlying deep and superficial venous insufficiency  -Continue with lymphedema  therapy  -Working on compression pumps (lymph clinic) - rec 60 min BID and titrate  -Continue with medical compression to be worn daily and removed at night  -Compression, periodic elevation, low sodium, regular exercise and wt loss  -Patient education regarding venous insufficiency provided  -Follow up 6 months for recheck or sooner, if needed       Bilateral carotid artery stenosis  -     VAS carotid complete study; Future  -CV duplex 10/19/23: Mild 1-49% bilateral carotid stenosis (R 63/18, L 73/9)       R vertebral antegrade; early systolic deceleration, suggestive of more proximal disease.     -Mild asymptomatic carotid artery stenosis with very low velocities.  -Patient education regarding pathophysiology and treatment of carotid disease was provided  -Recommend optimal medical therapy and modification of risk factors  -Continue with medical therapy aspirin, atorvastatin and warfarin (AF)  -Follow-up carotid duplex study next year.         History of Present Illness     Patient had an AOIL on 3/27/24. Pt denies abdominal pain, change in appetite, or back pain. Pt is s/p AAA repair on 8/9/2007. Pt is wearing compression wraps and compression stockings.     Oscar Espinosa 83 yoM former smoker, obesity, severe YEVGENIY- intolerant CPAP, COPD, DDD with lumbar radiculopathy, pulmo Htn, Htn, HLD, CKD IIIa, AF, CAD, chronic dCHF, ascending Aortic aneurysm, AAA s/p open repair ([place], ?'07), venous insufficiency with LE edema who is referred to vascular surgery for leg swelling.  Of note, he has had no AAA follow up for years.  A duplex was attempted in 2021, but could essentially not be completed due to poor quality/obese.      1/24/24:  Patient is new to vascular with extensive cardiovascular history and chronic B LE edema on diuretics which have been managed by cardiology.  The patient has a longstanding history of leg edema.  In the distant past he tried wearing compression but they were too difficult to get on.   He had difficulty bending over the abdomen to don the stockings and they were too tight.  He does have OTC compression sleeves for the calves but I explained that this is not optimal treatment.  He complains that recently the legs have been worsening with more swelling and now weeping.  He has been having more problems despite reporting no sodium added diet and adjustments in diuretics.  He underwent a right leg venous duplex which was negative for DVT, but did show significant reflux in the GSV and suggest deep venous insufficiency.  He has no fevers or chills.  No history of DVT.      Medical therapy includes: Torsemide 20, amiodarone, warfarin, atorvastatin 40.     2/26/24: Patient returns for follow up of venous insufficiency.  He had baseline PT/ lymphedema assessment last week with recommendation for lymphedema therapy twice weekly x 6 weeks.  They ordered special wraps at the lymphedema clinic for him.  He will be going for his first treatment on 2/28 since he had cancellations due to weather events.  He is also seeing cardiology because he does have history of moderate MR/TR.  He is having a repeat echocardiogram.     He is wearing compression, he continues to have very severe bilateral leg swelling.  He is becoming frustrated.  We discussed the importance of continued daily management of lymphedema.  He will be starting treatment with the lymphedema clinic and with compliance they expect it will be 6 to 12 weeks before he is compensated.  I do not expect that he will be manageable with standard compression only.  Due to the severity of secondary lymphedema due to venous insufficiency and likely other factors such as cardiac and metabolic, he would benefit from lymphedema pump therapy to help with maintain condition of legs and prevent wounds and infections.     Patient also would like us to follow his repaired AAA.  He is asymptomatic from AAA standpoint.  So I will order AOIL since he has not had  evaluation in several years.     5/20/24:  Mr. Espinosa returns for vascular follow up and review AOIL.     He has no abdominal or back pain. No leg or foot pain. AAA repair from 2007 remains patent. We will get him back on surveillance protocol.     He also has mild carotid artery stenosis and due for duplex in Oct which is scheduled but velocities are very low so we can repeat next year with AOIL.    His biggest problem remains marked LE edema. He has been working with lymph clinic with some improvement. He should continue with wraps, elevation, skin care and monitoring condition. They are working on lymph pumps for him at the lymph clinic.    He should continue with aspirin/statin therapy, as well as warfarin (AF, cards).        AOIL 3/27/24  [facility]  CLINICAL:  Indications:  Surveillance of AAA repair.  Patient denies claudication and rest  pain.  Operative History:  2007-08-09 AAA repair (aorto-bi-fem)  2003-01-01 CABG x 3  Risk Factors  The patient has history of HTN, Hyperlipidemia, CKD, CAD and previous smoking  (quit >10yrs ago).     FINDINGS:     Unilateral                PSV (cm/s)  AP (cm)  TRV (cm)    Sup-Diamond Ao                       135      2.5              Px Inf-Renan Ao                     50      1.9       1.6    Celiac                           120                       Prox. SMA                        121                       Aortic Inflow anastmosis          52                       Aortic Inflow Artery              50                       Graft Stem                        98                          Segment              Right             Left                                        PSV (cm/s)   RAR  PSV (cm/s)   RAR    Prox Renal                  108  0.80         111  0.82    Dist Graft Limb             133                97          Limb Anastomosis            132                82          Limb Outflow Artery         112               114          Mid Graft Limb               "115               171          Prox Graft Limb              87               105                   CONCLUSION:     Impression  Widely patent Ytsyb-Ob-Ixuvpep bypass graft  The celiac trunk, superior mesenteric and bilateral renal arteries are widely  patent.     NAA's/physiologic testing deferred by patient due to lymphedema and compression  braces on feet and legs. Patient denied claudication.     Compared to previous study on 9/4/2020, the graft appears widely patent      Review of Systems   Constitutional: Negative.    HENT: Negative.     Eyes: Negative.    Respiratory:  Positive for cough and shortness of breath (on O2 at night).    Cardiovascular: Negative.         Afib   Gastrointestinal: Negative.    Endocrine: Negative.    Genitourinary: Negative.    Musculoskeletal: Negative.    Skin: Negative.    Allergic/Immunologic: Negative.    Neurological:  Positive for dizziness.   Hematological: Negative.    Psychiatric/Behavioral: Negative.         Objective     /60 (BP Location: Right arm, Patient Position: Sitting, Cuff Size: Standard)   Pulse 56   Resp 20   Ht 5' 11\" (1.803 m)   Wt 101 kg (221 lb 12.8 oz)   SpO2 94%   BMI 30.93 kg/m²       2+ B LE edema, 2+ R pedal edema. Marked chronic stasis changes. Moderate burden of varicose veins. Feet warm and well-perfused.     Physical Exam  Vitals and nursing note reviewed.   Constitutional:       Appearance: He is well-developed.   HENT:      Head: Normocephalic and atraumatic.   Eyes:      Pupils: Pupils are equal, round, and reactive to light.   Neck:      Vascular: No carotid bruit or JVD.      Trachea: Trachea normal.   Cardiovascular:      Rate and Rhythm: Normal rate and regular rhythm.      Pulses:           Carotid pulses are 2+ on the right side and 2+ on the left side.       Radial pulses are 2+ on the right side and 2+ on the left side.        Dorsalis pedis pulses are 1+ on the right side and 1+ on the left side.      Heart sounds: S1 normal " "and S2 normal. Murmur heard.      Systolic murmur is present with a grade of 2/6.      No friction rub. No gallop.   Pulmonary:      Effort: Pulmonary effort is normal. No accessory muscle usage or respiratory distress.      Breath sounds: Normal breath sounds. No wheezing or rales.   Abdominal:      General: Bowel sounds are normal. There is no distension.      Palpations: Abdomen is soft.      Tenderness: There is no abdominal tenderness.   Musculoskeletal:         General: No deformity. Normal range of motion.      Cervical back: Neck supple.      Right lower le+ Pitting Edema present.      Left lower le+ Pitting Edema present.   Skin:     General: Skin is warm and dry.      Capillary Refill: Capillary refill takes less than 2 seconds.      Findings: No lesion or rash.      Nails: There is no clubbing.   Neurological:      Mental Status: He is alert and oriented to person, place, and time.      Comments: Grossly normal    Psychiatric:         Behavior: Behavior is cooperative.             Administrative Statements               I have reviewed and made appropriate changes to the review of systems input by the medical assistant.    Vitals:    24 1029   BP: 134/60   BP Location: Right arm   Patient Position: Sitting   Cuff Size: Standard   Pulse: 56   Resp: 20   SpO2: 94%   Weight: 101 kg (221 lb 12.8 oz)   Height: 5' 11\" (1.803 m)       Patient Active Problem List   Diagnosis    Benign essential hypertension    Hyperlipidemia    Impaired fasting glucose    Microscopic hematuria    Severe obesity (BMI 35.0-35.9 with comorbidity) (HCC)    Severe obstructive sleep apnea    Subclinical hypothyroidism    3-vessel CAD    Abdominal aortic aneurysm without rupture (HCC)    Aortic valve disorder    Lumbar radiculopathy    Chronic pain syndrome    Multiple pulmonary nodules    Chronic obstructive pulmonary disease (HCC)    Moderate to severe pulmonary hypertension (HCC)    Nocturnal hypoxemia    Ascending " aortic aneurysm (HCC)    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)    Myofascial pain syndrome    Atrial fibrillation (HCC)    Venous insufficiency of both lower extremities    Bilateral carotid artery stenosis    Hypertensive heart and chronic kidney disease with heart failure and stage 1 through stage 4 chronic kidney disease, or chronic kidney disease (HCC)    Pleural effusion with shortness of breath    Anemia    Elevated transaminase level    Chronic combined systolic and diastolic congestive heart failure (HCC)       Past Surgical History:   Procedure Laterality Date    ABDOMINAL AORTIC ANEURYSM REPAIR  2007    2 dock limbs with visc extens prosth    CARDIAC CATHETERIZATION      COLONOSCOPY      CORONARY ARTERY BYPASS GRAFT      LIMA to LAD, sequential SVG to OM1 & OM2, SVG to RDA    LUMBAR EPIDURAL INJECTION         Family History   Problem Relation Age of Onset    Heart disease Mother     Stroke Father         stroke syndrome    Aneurysm Brother         abdominal    Aortic aneurysm Brother         ascending    Coronary artery disease Family     Hypertension Family     Other Son         gioblastoma multiforme       Social History     Socioeconomic History    Marital status: /Civil Union     Spouse name: Not on file    Number of children: Not on file    Years of education: Not on file    Highest education level: Not on file   Occupational History    Occupation: retired   Tobacco Use    Smoking status: Former     Current packs/day: 0.00     Average packs/day: 1 pack/day for 55.6 years (55.6 ttl pk-yrs)     Types: Cigarettes     Start date: 1957     Quit date: 2012     Years since quittin.8     Passive exposure: Past    Smokeless tobacco: Never   Vaping Use    Vaping status: Never Used   Substance and Sexual Activity    Alcohol use: Yes     Alcohol/week: 21.0 standard drinks of alcohol     Types: 21 Cans of beer per week    Drug use: No    Sexual activity: Never   Other  Topics Concern    Not on file   Social History Narrative    Not on file     Social Determinants of Health     Financial Resource Strain: Low Risk  (3/7/2024)    Overall Financial Resource Strain (CARDIA)     Difficulty of Paying Living Expenses: Not hard at all   Food Insecurity: No Food Insecurity (4/29/2024)    Hunger Vital Sign     Worried About Running Out of Food in the Last Year: Never true     Ran Out of Food in the Last Year: Never true   Transportation Needs: No Transportation Needs (4/29/2024)    PRAPARE - Transportation     Lack of Transportation (Medical): No     Lack of Transportation (Non-Medical): No   Physical Activity: Not on file   Stress: Not on file   Social Connections: Not on file   Intimate Partner Violence: Not on file   Housing Stability: Low Risk  (4/29/2024)    Housing Stability Vital Sign     Unable to Pay for Housing in the Last Year: No     Number of Places Lived in the Last Year: 1     Unstable Housing in the Last Year: No       Allergies   Allergen Reactions    Meloxicam Edema         Current Outpatient Medications:     albuterol (ProAir HFA) 90 mcg/act inhaler, Inhale 2 puffs every 6 (six) hours as needed for wheezing, Disp: 24 g, Rfl: 3    amiodarone 200 mg tablet, Take 1 tablet (200 mg total) by mouth daily, Disp: 90 tablet, Rfl: 3    aspirin (ECOTRIN LOW STRENGTH) 81 mg EC tablet, Take 1 tablet by mouth daily, Disp: , Rfl:     atorvastatin (LIPITOR) 40 mg tablet, Take 1 tablet (40 mg total) by mouth daily, Disp: 90 tablet, Rfl: 3    cholecalciferol (VITAMIN D3) 1,000 units tablet, Take 2,000 Units by mouth daily, Disp: , Rfl:     FIBER ADULT GUMMIES PO, Take 1 caplet by mouth daily , Disp: , Rfl:     gabapentin (Neurontin) 300 mg capsule, Take 1 capsule (300 mg total) by mouth 2 (two) times a day AND 3 capsules (900 mg total) daily at bedtime., Disp: 450 capsule, Rfl: 1    labetalol (NORMODYNE) 100 mg tablet, Take 1 tablet (100 mg total) by mouth 2 (two) times a day, Disp: 180  tablet, Rfl: 3    multivitamin (THERAGRAN) TABS, Take 1 tablet by mouth daily, Disp: , Rfl:     potassium chloride (Klor-Con M20) 20 mEq tablet, Take one tab daily, 7 days a week., Disp: 75 tablet, Rfl: 0    senna (SENOKOT) 8.6 MG tablet, Take 1 tablet by mouth as needed  , Disp: , Rfl:     spironolactone (ALDACTONE) 25 mg tablet, Take 0.5 tablets (12.5 mg total) by mouth daily, Disp: 45 tablet, Rfl: 3    tamsulosin (FLOMAX) 0.4 mg, Take 1 capsule by mouth daily, Disp: , Rfl:     torsemide (DEMADEX) 20 mg tablet, Torsemide 40mg daily 7 days a week., Disp: 120 tablet, Rfl: 0    warfarin (Coumadin) 2.5 mg tablet, TAKE 1 TABLET BY MOUTH DAILY, Disp: 30 tablet, Rfl: 0

## 2024-05-20 NOTE — TELEPHONE ENCOUNTER
Pt's partner calling stating she received the wrong copy of upcoming testing and would like a new AVS and copy of the testing orders for the AOIL and CV on 3/27/25 sent to them via mail confirm address on file is correct

## 2024-05-21 ENCOUNTER — APPOINTMENT (OUTPATIENT)
Dept: LAB | Age: 84
End: 2024-05-21
Payer: MEDICARE

## 2024-05-21 ENCOUNTER — ANTICOAG VISIT (OUTPATIENT)
Dept: CARDIOLOGY CLINIC | Facility: CLINIC | Age: 84
End: 2024-05-21

## 2024-05-21 DIAGNOSIS — I48.0 PAROXYSMAL ATRIAL FIBRILLATION (HCC): Primary | ICD-10-CM

## 2024-05-21 DIAGNOSIS — I48.21 PERMANENT ATRIAL FIBRILLATION (HCC): ICD-10-CM

## 2024-05-21 RX ORDER — WARFARIN SODIUM 2.5 MG/1
TABLET ORAL
Qty: 30 TABLET | Refills: 2 | Status: SHIPPED | OUTPATIENT
Start: 2024-05-21

## 2024-05-23 LAB
DME PARACHUTE DELIVERY DATE ACTUAL: NORMAL
DME PARACHUTE DELIVERY DATE EXPECTED: NORMAL
DME PARACHUTE DELIVERY DATE REQUESTED: NORMAL
DME PARACHUTE ITEM DESCRIPTION: NORMAL
DME PARACHUTE ORDER STATUS: NORMAL
DME PARACHUTE SUPPLIER NAME: NORMAL
DME PARACHUTE SUPPLIER PHONE: NORMAL

## 2024-05-28 ENCOUNTER — APPOINTMENT (OUTPATIENT)
Dept: LAB | Facility: CLINIC | Age: 84
End: 2024-05-28
Payer: MEDICARE

## 2024-05-28 DIAGNOSIS — N17.9 AKI (ACUTE KIDNEY INJURY) (HCC): Primary | ICD-10-CM

## 2024-05-29 ENCOUNTER — ANTICOAG VISIT (OUTPATIENT)
Dept: CARDIOLOGY CLINIC | Facility: CLINIC | Age: 84
End: 2024-05-29

## 2024-05-29 DIAGNOSIS — I48.0 PAROXYSMAL ATRIAL FIBRILLATION (HCC): Primary | ICD-10-CM

## 2024-06-04 ENCOUNTER — ANTICOAG VISIT (OUTPATIENT)
Dept: CARDIOLOGY CLINIC | Facility: CLINIC | Age: 84
End: 2024-06-04

## 2024-06-04 ENCOUNTER — APPOINTMENT (OUTPATIENT)
Dept: LAB | Facility: CLINIC | Age: 84
End: 2024-06-04
Payer: MEDICARE

## 2024-06-04 DIAGNOSIS — I48.0 PAROXYSMAL ATRIAL FIBRILLATION (HCC): Primary | ICD-10-CM

## 2024-06-06 ENCOUNTER — OFFICE VISIT (OUTPATIENT)
Dept: CARDIOLOGY CLINIC | Facility: CLINIC | Age: 84
End: 2024-06-06
Payer: MEDICARE

## 2024-06-06 ENCOUNTER — APPOINTMENT (OUTPATIENT)
Dept: LAB | Facility: CLINIC | Age: 84
End: 2024-06-06
Payer: MEDICARE

## 2024-06-06 ENCOUNTER — RA CDI HCC (OUTPATIENT)
Dept: OTHER | Facility: HOSPITAL | Age: 84
End: 2024-06-06

## 2024-06-06 VITALS
HEIGHT: 71 IN | SYSTOLIC BLOOD PRESSURE: 130 MMHG | BODY MASS INDEX: 30.94 KG/M2 | HEART RATE: 70 BPM | DIASTOLIC BLOOD PRESSURE: 72 MMHG | OXYGEN SATURATION: 93 % | WEIGHT: 221 LBS

## 2024-06-06 DIAGNOSIS — I50.42 CHRONIC COMBINED SYSTOLIC AND DIASTOLIC CONGESTIVE HEART FAILURE (HCC): ICD-10-CM

## 2024-06-06 DIAGNOSIS — I10 BENIGN ESSENTIAL HYPERTENSION: ICD-10-CM

## 2024-06-06 DIAGNOSIS — I71.40 ABDOMINAL AORTIC ANEURYSM (AAA) WITHOUT RUPTURE, UNSPECIFIED PART (HCC): ICD-10-CM

## 2024-06-06 DIAGNOSIS — I48.21 PERMANENT ATRIAL FIBRILLATION (HCC): Primary | ICD-10-CM

## 2024-06-06 DIAGNOSIS — I25.10 3-VESSEL CAD: ICD-10-CM

## 2024-06-06 DIAGNOSIS — J90 PLEURAL EFFUSION: ICD-10-CM

## 2024-06-06 DIAGNOSIS — I71.21 ANEURYSM OF ASCENDING AORTA WITHOUT RUPTURE (HCC): ICD-10-CM

## 2024-06-06 LAB
ANION GAP SERPL CALCULATED.3IONS-SCNC: 7 MMOL/L (ref 4–13)
BNP SERPL-MCNC: 947 PG/ML (ref 0–100)
BUN SERPL-MCNC: 36 MG/DL (ref 5–25)
CALCIUM SERPL-MCNC: 8.2 MG/DL (ref 8.4–10.2)
CHLORIDE SERPL-SCNC: 95 MMOL/L (ref 96–108)
CO2 SERPL-SCNC: 32 MMOL/L (ref 21–32)
CREAT SERPL-MCNC: 1.51 MG/DL (ref 0.6–1.3)
GFR SERPL CREATININE-BSD FRML MDRD: 42 ML/MIN/1.73SQ M
GLUCOSE SERPL-MCNC: 90 MG/DL (ref 65–140)
POTASSIUM SERPL-SCNC: 5.7 MMOL/L (ref 3.5–5.3)
SODIUM SERPL-SCNC: 134 MMOL/L (ref 135–147)

## 2024-06-06 PROCEDURE — 36415 COLL VENOUS BLD VENIPUNCTURE: CPT

## 2024-06-06 PROCEDURE — 80048 BASIC METABOLIC PNL TOTAL CA: CPT

## 2024-06-06 PROCEDURE — 83880 ASSAY OF NATRIURETIC PEPTIDE: CPT

## 2024-06-06 PROCEDURE — 99214 OFFICE O/P EST MOD 30 MIN: CPT | Performed by: INTERNAL MEDICINE

## 2024-06-07 PROCEDURE — 93000 ELECTROCARDIOGRAM COMPLETE: CPT | Performed by: INTERNAL MEDICINE

## 2024-06-07 NOTE — PROGRESS NOTES
Cardiology Follow Up    Oscar Espinosa  1940  [id number]  [facility] [place]  [address]  [address]  [place] [id number] [phone]    1. Permanent atrial fibrillation (HCC)  POCT ECG      2. Chronic combined systolic and diastolic congestive heart failure (HCC)  Basic metabolic panel    B-Type Natriuretic Peptide(BNP)      3. 3-vessel CAD        4. Abdominal aortic aneurysm (AAA) without rupture, unspecified part (HCC)        5. Aneurysm of ascending aorta without rupture (HCC)        6. Benign essential hypertension        7. Pleural effusion with shortness of breath            Discussion/Summary: He was in the hospital recently with pneumonia and loculated effusion.  This is currently being managed by pulmonary as a repeat CT on Monday.  Blood work after he left the hospital showed LITO.  I repeated blood work for today.  Weights continued to trend down he is no different than he was when I saw him in April yet he is on significant oxygen requirements I think this is mostly due to the underlying pulmonary issue.  Continue slow diuresis he does not tolerate rapid fluid removal.  Ambulatory monitor showed paroxysms of atrial fibrillation and flutter.  Continue amiodarone for maintenance of sinus rhythm.  Will reevaluate after his CAT scan next week and discussed with pulmonary.    Interval History:  83 year-old gentleman with multivessel coronary disease, history of CABG, hypertension, hyperlipidemia, ascending aortic aneurysm, abdominal aorta aneurysm status post repair, sleep apnea presents to establish care with me in the office.  He has been seeing 1 of my partners for several years.  Functionally he has been doing great.  Denies any exertional chest pain, shortness of breath, palpitations, lightheadedness, dizziness, or syncope.  He has put on some weight during the pandemic.  He has been trying to make some dietary  changes start some low-level exercise.        Recent hospitalization was reviewed, left lower lung loculated effusion that was drained repeat CT still shows residual fluid he has some low-grade fevers and chills.  He has been on antibiotics.  Denies any cough.  Is still oxygen dependent on 2 L.  Weights have been unchanged he remains at 220.  Spironolactone was added to his regimen in the hospital.  Repeat blood work showed LITO and elevated potassium.      Problem List       Positive colorectal cancer screening using Cologuard test    Benign essential hypertension    Hyperlipidemia    Impaired fasting glucose    Microscopic hematuria    Overview Signed 5/14/2018  9:17 AM by Lea Reyes, MD     Annotation - [id number]: Dr Dee         Obesity    Obstructive sleep apnea    Overview Signed 5/14/2018  9:17 AM by Lea Reyes, MD     Annotation - [id number]: Dr Ellington.         Subclinical hypothyroidism    Overview Signed 5/14/2018  9:17 AM by Lea Reyes, MD     Transitioned From: Hypothyroidism         3-vessel CAD    Aneurysm of abdominal aorta (HCC)    Aneurysm of ascending aorta ([facility])    Aortic valve disorder    Arteriosclerotic cardiovascular disease    Disc degeneration, lumbar    Herniated lumbar intervertebral disc    Lumbar radiculopathy    Intervertebral disc disorder with radiculopathy of lumbar region    Spondylolisthesis at L4-L5 level    Chronic pain syndrome    SOB (shortness of breath)    Multiple pulmonary nodules    Heart failure, systolic ([facility])    Wt Readings from Last 3 Encounters:   06/06/24 100 kg (221 lb)   05/20/24 101 kg (221 lb 12.8 oz)   05/08/24 102 kg (224 lb)                 COPD, 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[facility])    Overview Deleted 9/11/2019  3:22 PM by Gerson Terrell MD                  Past Medical History:   Diagnosis Date    Abdominal aortic aneurysm (HCC)     s/p repair    Abnormal ECG july 2022    Acute on chronic congestive heart failure (HCC) 09/11/2019    Acute respiratory failure with hypoxia ([facility]) 05/06/2024     Aneurysm ([facility])     Aneurysm of abdominal aorta ([facility])     49mm, trileaflet AV    Atrial fibrillation ([facility])     Eliquis    BPH (benign prostatic hyperplasia)     CAD (coronary artery disease)     s/p CABGx3    CHF (congestive heart failure) ([facility])     Chronic pain     Gabapentin    Chronic pain disorder     lumbar    COPD (chronic obstructive pulmonary disease) ([facility])     Coronary artery disease     Former tobacco use     Hyperlipidemia     Hypertension     Hyponatremia 2024    Hypothyroidism     Lumbar radiculopathy     Lumbar stenosis     s/p injections    Neuropathy     Obesity (BMI 30-39.9)     YEVGENIY (obstructive sleep apnea)     unable to tolerate CPAP    Platelets decreased ([facility]) 2022    Pulmonary hypertension ([facility])     moderate to severe    Spondylolisthesis of lumbar region     Visual impairment     Vitamin D deficiency      Social History     Socioeconomic History    Marital status: /Civil Union     Spouse name: Not on file    Number of children: Not on file    Years of education: Not on file    Highest education level: Not on file   Occupational History    Occupation: retired   Tobacco Use    Smoking status: Former     Current packs/day: 0.00     Average packs/day: 1 pack/day for 55.6 years (55.6 ttl pk-yrs)     Types: Cigarettes     Start date: 1957     Quit date: 2012     Years since quittin.8     Passive exposure: Past    Smokeless tobacco: Never   Vaping Use    Vaping status: Never Used   Substance and Sexual Activity    Alcohol use: Yes     Alcohol/week: 21.0 standard drinks of alcohol     Types: 21 Cans of beer per week    Drug use: No    Sexual activity: Never   Other Topics Concern    Not on file   Social History Narrative    Not on file     Social Determinants of Health     Financial Resource Strain: Low Risk  (3/7/2024)    Overall Financial Resource Strain (CARDIA)     Difficulty of Paying Living Expenses: Not hard at all   Food Insecurity: No Food Insecurity  (4/29/2024)    Hunger Vital Sign     Worried About Running Out of Food in the Last Year: Never true     Ran Out of Food in the Last Year: Never true   Transportation Needs: No Transportation Needs (4/29/2024)    PRAPARE - Transportation     Lack of Transportation (Medical): No     Lack of Transportation (Non-Medical): No   Physical Activity: Not on file   Stress: Not on file   Social Connections: Not on file   Intimate Partner Violence: Not on file   Housing Stability: Low Risk  (4/29/2024)    Housing Stability Vital Sign     Unable to Pay for Housing in the Last Year: No     Number of Places Lived in the Last Year: 1     Unstable Housing in the Last Year: No      Family History   Problem Relation Age of Onset    Heart disease Mother     Stroke Father         stroke syndrome    Aneurysm Brother         abdominal    Aortic aneurysm Brother         ascending    Coronary artery disease Family     Hypertension Family     Other Son         gioblastoma multiforme     Past Surgical History:   Procedure Laterality Date    ABDOMINAL AORTIC ANEURYSM REPAIR  08/09/2007    2 dock limbs with visc extens prosth    CARDIAC CATHETERIZATION      COLONOSCOPY      CORONARY ARTERY BYPASS GRAFT  2003    LIMA to LAD, sequential SVG to OM1 & OM2, SVG to RDA    LUMBAR EPIDURAL INJECTION         Current Outpatient Medications:     albuterol (ProAir HFA) 90 mcg/act inhaler, Inhale 2 puffs every 6 (six) hours as needed for wheezing, Disp: 24 g, Rfl: 3    amiodarone 200 mg tablet, Take 1 tablet (200 mg total) by mouth daily, Disp: 90 tablet, Rfl: 3    aspirin (ECOTRIN LOW STRENGTH) 81 mg EC tablet, Take 1 tablet by mouth daily, Disp: , Rfl:     atorvastatin (LIPITOR) 40 mg tablet, Take 1 tablet (40 mg total) by mouth daily, Disp: 90 tablet, Rfl: 3    cholecalciferol (VITAMIN D3) 1,000 units tablet, Take 2,000 Units by mouth daily, Disp: , Rfl:     FIBER ADULT GUMMIES PO, Take 1 caplet by mouth daily , Disp: , Rfl:     gabapentin (Neurontin)  300 mg capsule, Take 1 capsule (300 mg total) by mouth 2 (two) times a day AND 3 capsules (900 mg total) daily at bedtime., Disp: 450 capsule, Rfl: 1    labetalol (NORMODYNE) 100 mg tablet, Take 1 tablet (100 mg total) by mouth 2 (two) times a day, Disp: 180 tablet, Rfl: 3    multivitamin (THERAGRAN) TABS, Take 1 tablet by mouth daily, Disp: , Rfl:     potassium chloride (Klor-Con M20) 20 mEq tablet, Take one tab daily, 7 days a week., Disp: 75 tablet, Rfl: 0    senna (SENOKOT) 8.6 MG tablet, Take 1 tablet by mouth as needed  , Disp: , Rfl:     spironolactone (ALDACTONE) 25 mg tablet, Take 0.5 tablets (12.5 mg total) by mouth daily, Disp: 45 tablet, Rfl: 3    tamsulosin (FLOMAX) 0.4 mg, Take 1 capsule by mouth daily, Disp: , Rfl:     torsemide (DEMADEX) 20 mg tablet, Torsemide 40mg daily 7 days a week., Disp: 120 tablet, Rfl: 0    warfarin (Coumadin) 2.5 mg tablet, TAKE 1 TABLET BY MOUTH DAILY, Disp: 30 tablet, Rfl: 2  Allergies   Allergen Reactions    Meloxicam Edema       Labs:     Chemistry        Component Value Date/Time     (L) 10/15/2015 1042    K 5.7 (H) 06/06/2024 1358    K 4.7 10/15/2015 1042    CL 95 (L) 06/06/2024 1358     10/15/2015 1042    CO2 32 06/06/2024 1358    CO2 30 10/15/2015 1042    BUN 36 (H) 06/06/2024 1358    BUN 15 10/15/2015 1042    CREATININE 1.51 (H) 06/06/2024 1358    CREATININE 1.03 10/15/2015 1042        Component Value Date/Time    CALCIUM 8.2 (L) 06/06/2024 1358    CALCIUM 9.1 10/15/2015 1042    ALKPHOS 109 (H) 05/15/2024 0711    ALKPHOS 71 10/15/2015 1042    AST 28 05/15/2024 0711    AST 14 10/15/2015 1042    ALT 22 05/15/2024 0711    ALT 23 10/15/2015 1042    BILITOT 0.79 10/15/2015 1042            Lab Results   Component Value Date    CHOL 124 10/15/2015    CHOL 159 04/01/2015    CHOL 155 09/30/2013     Lab Results   Component Value Date    HDL 50 02/20/2024    HDL 68 08/17/2023    HDL 76 01/27/2023     Lab Results   Component Value Date    LDLCALC 48 02/20/2024     "LDLCALC 53 08/17/2023    LDLCALC 58 01/27/2023     Lab Results   Component Value Date    TRIG 37 02/20/2024    TRIG 54 08/17/2023    TRIG 41 01/27/2023     No results found for: \"CHOLHDL\"    Imaging: No results found.    ECG:  AFib      Review of Systems   Constitutional: Negative.   HENT: Negative.     Eyes: Negative.    Cardiovascular: Negative.    Respiratory: Negative.     Endocrine: Negative.    Hematologic/Lymphatic: Negative.    Skin: Negative.    Musculoskeletal: Negative.    Gastrointestinal: Negative.    Genitourinary: Negative.    Neurological: Negative.    Psychiatric/Behavioral: Negative.     All other systems reviewed and are negative.      Vitals:    06/06/24 1253   BP: 130/72   Pulse: 70   SpO2: 93%     Vitals:    06/06/24 1253   Weight: 100 kg (221 lb)     Height: 5' 11\" (180.3 cm)   Body mass index is 30.82 kg/m².    Physical Exam:  Vital signs reviewed  General:  Alert and cooperative, appears stated age, no acute distress  HEENT:  PERRLA, EOMI, no scleral icterus, no conjunctival pallor  Neck:  No lymphadenopathy, no thyromegaly, no carotid bruits, no elevated JVP  Heart:  Regular rate and rhythm, normal S1/S2, no S3/S4, no murmur, rubs or gallops.  PMI nondisplaced  Lungs:  Clear to auscultation bilaterally, no wheezes rales or rhonchi  Abdomen:  Soft, non-tender, positive bowel sounds, no rebound or guarding,   no organomegaly   Extremities:  Normal range of motion.  No clubbing, cyanosis or edema   Vascular:  2+ pedal pulses  Skin:  No rashes or lesions on exposed skin  Neurologic:  Cranial nerves II-XII grossly intact without focal deficits  Psych:  Normal mood and affect                "

## 2024-06-10 ENCOUNTER — HOSPITAL ENCOUNTER (OUTPATIENT)
Dept: RADIOLOGY | Age: 84
Discharge: HOME | End: 2024-06-10
Payer: MEDICARE

## 2024-06-10 ENCOUNTER — TELEPHONE (OUTPATIENT)
Age: 84
End: 2024-06-10

## 2024-06-10 DIAGNOSIS — J90 PLEURAL EFFUSION: ICD-10-CM

## 2024-06-10 DIAGNOSIS — I50.32 CHRONIC DIASTOLIC CONGESTIVE HEART FAILURE (HCC): ICD-10-CM

## 2024-06-10 PROCEDURE — 71250 CT THORAX DX C-: CPT

## 2024-06-10 RX ORDER — TORSEMIDE 20 MG/1
40 TABLET ORAL DAILY
Qty: 180 TABLET | Refills: 3 | Status: ON HOLD | OUTPATIENT
Start: 2024-06-10

## 2024-06-10 NOTE — TELEPHONE ENCOUNTER
Dorcas is calling about Erazo medication, Torsemide.   She states when he was at the hospital a few weeks ago they change the way he took this medication to 2 20 mg tablets (40 mg total) per day 7 days a week.    She would like to confirm with Dr. Reyes that he should be taking the dosage of 40mg per day, everyday of the week and if this is correct to refill the medication for him ([facility])- he only has 18 pills left.     Please advise, Dorcas would like a call back to confirm.   thank you!

## 2024-06-11 ENCOUNTER — ANTICOAG VISIT (OUTPATIENT)
Dept: CARDIOLOGY CLINIC | Facility: CLINIC | Age: 84
End: 2024-06-11

## 2024-06-11 ENCOUNTER — APPOINTMENT (OUTPATIENT)
Dept: LAB | Facility: CLINIC | Age: 84
End: 2024-06-11
Payer: MEDICARE

## 2024-06-11 DIAGNOSIS — R73.01 IMPAIRED FASTING GLUCOSE: ICD-10-CM

## 2024-06-11 DIAGNOSIS — I48.0 PAROXYSMAL ATRIAL FIBRILLATION (HCC): Primary | ICD-10-CM

## 2024-06-11 DIAGNOSIS — I50.32 CHRONIC DIASTOLIC CONGESTIVE HEART FAILURE (HCC): ICD-10-CM

## 2024-06-11 DIAGNOSIS — E03.8 SUBCLINICAL HYPOTHYROIDISM: ICD-10-CM

## 2024-06-11 DIAGNOSIS — I48.91 ATRIAL FIBRILLATION, UNSPECIFIED TYPE (HCC): ICD-10-CM

## 2024-06-11 RX ORDER — AMIODARONE HYDROCHLORIDE 200 MG/1
200 TABLET ORAL DAILY
Qty: 90 TABLET | Refills: 3 | Status: ON HOLD | OUTPATIENT
Start: 2024-06-11

## 2024-06-13 ENCOUNTER — OFFICE VISIT (OUTPATIENT)
Dept: INTERNAL MEDICINE CLINIC | Facility: CLINIC | Age: 84
End: 2024-06-13
Payer: MEDICARE

## 2024-06-13 VITALS
BODY MASS INDEX: 30.66 KG/M2 | WEIGHT: 219 LBS | TEMPERATURE: 97 F | OXYGEN SATURATION: 93 % | RESPIRATION RATE: 18 BRPM | HEIGHT: 71 IN | DIASTOLIC BLOOD PRESSURE: 64 MMHG | SYSTOLIC BLOOD PRESSURE: 128 MMHG | HEART RATE: 60 BPM

## 2024-06-13 DIAGNOSIS — I50.42 CHRONIC COMBINED SYSTOLIC AND DIASTOLIC CONGESTIVE HEART FAILURE (HCC): ICD-10-CM

## 2024-06-13 DIAGNOSIS — E03.8 SUBCLINICAL HYPOTHYROIDISM: ICD-10-CM

## 2024-06-13 DIAGNOSIS — D64.9 ANEMIA, UNSPECIFIED TYPE: ICD-10-CM

## 2024-06-13 DIAGNOSIS — M54.16 LUMBAR RADICULOPATHY: ICD-10-CM

## 2024-06-13 DIAGNOSIS — N17.9 AKI (ACUTE KIDNEY INJURY) (HCC): ICD-10-CM

## 2024-06-13 DIAGNOSIS — I87.2 VENOUS INSUFFICIENCY OF BOTH LOWER EXTREMITIES: ICD-10-CM

## 2024-06-13 DIAGNOSIS — J90 PLEURAL EFFUSION: Primary | ICD-10-CM

## 2024-06-13 DIAGNOSIS — I50.32 CHRONIC DIASTOLIC CONGESTIVE HEART FAILURE (HCC): ICD-10-CM

## 2024-06-13 DIAGNOSIS — R73.01 IMPAIRED FASTING GLUCOSE: ICD-10-CM

## 2024-06-13 PROBLEM — R74.01 ELEVATED TRANSAMINASE LEVEL: Status: RESOLVED | Noted: 2024-04-27 | Resolved: 2024-06-13

## 2024-06-13 PROCEDURE — 99214 OFFICE O/P EST MOD 30 MIN: CPT | Performed by: INTERNAL MEDICINE

## 2024-06-13 PROCEDURE — G2211 COMPLEX E/M VISIT ADD ON: HCPCS | Performed by: INTERNAL MEDICINE

## 2024-06-13 RX ORDER — POTASSIUM CHLORIDE 20 MEQ/1
TABLET, EXTENDED RELEASE ORAL
Qty: 75 TABLET | Refills: 1 | Status: ON HOLD | OUTPATIENT
Start: 2024-06-13

## 2024-06-13 RX ORDER — TRAMADOL HYDROCHLORIDE 50 MG/1
50 TABLET ORAL EVERY 6 HOURS PRN
Qty: 30 TABLET | Refills: 0 | Status: SHIPPED | OUTPATIENT
Start: 2024-06-13 | End: 2024-06-18

## 2024-06-13 NOTE — PROGRESS NOTES
Ambulatory Visit  Name: Oscar Espinosa      : 1940      MRN: [id number]  Encounter Provider: Lea Reyes, MD  Encounter Date: 2024   Enc[facility] department: [facility]    Assessment & Plan   1. Pleural effusion with shortness of breath  Assessment & Plan:  Most likely the main cause of the SOB  Awaiting CT reading  F/u with pulm  2. Impaired fasting glucose  -     Hemoglobin A1C; Future; Expected date: 2024  3. Anemia, unspecified type  -     CBC; Future; Expected date: 2024  4. LITO (acute kidney injury) ([facility])  -     Basic metabolic panel; Future; Expected date: 2024  5. Subclinical hypothyroidism  -     TSH, 3rd generation with Free T4 reflex; Future; Expected date: 2024  6. Lumbar radiculopathy  Assessment & Plan:  Known multilevel spondylosis  No improvement on gabapentin so wean off by reducing by 1 capsule weekly  His back pain bothers him as much as his SOB  His wife asked about him trying tramadol. We discussed the risks/side effects and benefits and agreed he can try it    Orders:  -     traMADol (Ultram) 50 mg tablet; Take 1 tablet (50 mg total) by mouth every 6 (six) hours as needed for moderate pain  7. Venous insufficiency of both lower extremities  Assessment & Plan:  Stopped using compression a few days ago and LE edema has increased  Stopped because of severe tingling and itching in the feet at night  Advised to resume and use only during the day, elevated LE at night  Waiting to receive leg pumps    8. Chronic combined systolic and diastolic congestive heart failure (HCC)  Assessment & Plan:  Wt Readings from Last 3 Encounters:   24 99.3 kg (219 lb)   24 100 kg (221 lb)   24 101 kg (221 lb 12.8 oz)     Weight is stable  Increase LE edema from not using compression socks for a few days  Continue torsemide 40mg  (spironolactone D/C'd due to hyperkalemia)  Obtain BMP          Labs on Tuesday with INR       History of Present  Illness     Admitted for worsening SOB and dx with left pneumonia with a loculated effusion in April Underwent thoracentesis on 4/26 yielding 600ml, completed 7 days of ceftriaxone. Repeat CT on 5/1 did not show a change in size of the effusion. F/U CT pending  He is on torsemide 40mg, spironolactone stopped due to high K  Weight is stable LE edema worse because he stopped using compression socks a few days ago. He was experiencing a lot of itching and tingling in the feet at night  Follows fluid restriction and has cut back significantly on alcohol consumption  C/o chronic low back pain and is on gabapentin 300mg in am and afternoon, 900mg HS without relief  Says he cannot do anything at all because of the back pain, walking a few steps is limited due to the pain  Wife is asking about tramadol        Review of Systems   Constitutional:  Positive for fatigue.   Respiratory:  Positive for shortness of breath.    Cardiovascular:  Positive for leg swelling. Negative for chest pain and palpitations.   Endocrine: Positive for cold intolerance.   Musculoskeletal:  Positive for back pain.     Past Medical History:   Diagnosis Date   • Abdominal aortic aneurysm ([facility])     s/p repair   • Abnormal ECG july 2022   • Acute on chronic congestive heart failure ([facility]) 09/11/2019   • Acute respiratory failure with hypoxia ([facility]) 05/06/2024   • Aneurysm ([facility]) 2007   • Aneurysm of abdominal aorta ([facility])     49mm, trileaflet AV   • Atrial fibrillation ([facility])     Eliquis   • BPH (benign prostatic hyperplasia)    • CAD (coronary artery disease)     s/p CABGx3   • CHF (congestive heart failure) ([facility])    • Chronic pain     Gabapentin   • Chronic pain disorder     lumbar   • COPD (chronic obstructive pulmonary disease) ([facility])    • Coronary artery disease    • Elevated transaminase level 04/27/2024   • Former tobacco use    • Hyperlipidemia    • Hypertension    • Hyponatremia 04/27/2024   • Hypothyroidism    • Lumbar radiculopathy    • Lumbar  stenosis     s/p injections   • Neuropathy    • Obesity (BMI 30-39.9)    • YEVGENIY (obstructive sleep apnea)     unable to tolerate CPAP   • Platelets decreased (HCC) 2022   • Pulmonary hypertension (HCC)     moderate to severe   • Spondylolisthesis of lumbar region    • Visual impairment    • Vitamin D deficiency      Past Surgical History:   Procedure Laterality Date   • ABDOMINAL AORTIC ANEURYSM REPAIR  2007    2 dock limbs with visc extens prosth   • CARDIAC CATHETERIZATION     • COLONOSCOPY     • CORONARY ARTERY BYPASS GRAFT      LIMA to LAD, sequential SVG to OM1 & OM2, SVG to RDA   • LUMBAR EPIDURAL INJECTION       Family History   Problem Relation Age of Onset   • Heart disease Mother    • Stroke Father         stroke syndrome   • Aneurysm Brother         abdominal   • Aortic aneurysm Brother         ascending   • Coronary artery disease Family    • Hypertension Family    • Other Son         gioblastoma multiforme     Social History     Tobacco Use   • Smoking status: Former     Current packs/day: 0.00     Average packs/day: 1 pack/day for 55.6 years (55.6 ttl pk-yrs)     Types: Cigarettes     Start date: 1957     Quit date: 2012     Years since quittin.8     Passive exposure: Past   • Smokeless tobacco: Never   Vaping Use   • Vaping status: Never Used   Substance and Sexual Activity   • Alcohol use: Yes     Alcohol/week: 21.0 standard drinks of alcohol     Types: 21 Cans of beer per week   • Drug use: No   • Sexual activity: Never     Current Outpatient Medications on File Prior to Visit   Medication Sig   • albuterol (ProAir HFA) 90 mcg/act inhaler Inhale 2 puffs every 6 (six) hours as needed for wheezing   • amiodarone 200 mg tablet TAKE 1 TABLET BY MOUTH DAILY   • aspirin (ECOTRIN LOW STRENGTH) 81 mg EC tablet Take 1 tablet by mouth daily   • atorvastatin (LIPITOR) 40 mg tablet Take 1 tablet (40 mg total) by mouth daily   • cholecalciferol (VITAMIN D3) 1,000 units tablet Take  2,000 Units by mouth daily   • FIBER ADULT GUMMIES PO Take 1 caplet by mouth daily    • gabapentin (Neurontin) 300 mg capsule Take 1 capsule (300 mg total) by mouth 2 (two) times a day AND 3 capsules (900 mg total) daily at bedtime.   • labetalol (NORMODYNE) 100 mg tablet Take 1 tablet (100 mg total) by mouth 2 (two) times a day   • multivitamin (THERAGRAN) TABS Take 1 tablet by mouth daily   • potassium chloride (Klor-Con M20) 20 mEq tablet Take one tab daily, 7 days a week.   • senna (SENOKOT) 8.6 MG tablet Take 1 tablet by mouth as needed     • tamsulosin (FLOMAX) 0.4 mg Take 1 capsule by mouth daily   • torsemide (DEMADEX) 20 mg tablet Take 2 tablets (40 mg total) by mouth daily   • warfarin (Coumadin) 2.5 mg tablet TAKE 1 TABLET BY MOUTH DAILY   • spironolactone (ALDACTONE) 25 mg tablet Take 0.5 tablets (12.5 mg total) by mouth daily (Patient not taking: Reported on 6/13/2024)     Allergies   Allergen Reactions   • Meloxicam Edema     Immunization History   Administered Date(s) Administered   • COVID-19 MODERNA VACC 0.5 ML IM 01/26/2021, 03/05/2021, 10/28/2021, 11/28/2021   • COVID-19 Moderna Vac BIVALENT 12 Yr+ IM 0.5 ML 10/12/2022   • COVID-19 Moderna mRNA Vaccine 12 Yr+ 50 mcg/0.5 mL (Spikevax) 10/03/2023   • COVID-19 Moderna vac 6-11y or adult booster 50 mcg/0.5 mL 05/03/2022   • INFLUENZA 10/23/2007, 10/02/2013, 10/08/2016, 10/25/2017, 10/30/2020, 10/13/2021, 10/06/2022, 10/03/2023   • Influenza Split High Dose Preservative Free IM 10/02/2013, 10/02/2013, 10/22/2014, 10/27/2015, 10/08/2016, 10/03/2019   • Influenza, seasonal, injectable 10/20/2012, 10/30/2018   • Pneumococcal Conjugate 13-Valent 04/24/2015   • Pneumococcal Polysaccharide PPV23 09/07/2012   • Respiratory Syncytial Virus Vaccine (Recombinant, Adjuvanted) 12/11/2023   • Zoster Vaccine Recombinant 10/03/2019, 12/11/2019     Objective     /64 (BP Location: Left arm, Patient Position: Sitting, Cuff Size: Standard)   Pulse 60   Temp (!)  "97 °F (36.1 °C) (Tympanic)   Resp 18   Ht 5' 11\" (1.803 m)   Wt 99.3 kg (219 lb)   SpO2 93%   BMI 30.54 kg/m²     Physical Exam  Constitutional:       Appearance: He is ill-appearing.   Cardiovascular:      Rate and Rhythm: Regular rhythm.      Heart sounds: Normal heart sounds.   Pulmonary:      Breath sounds: Normal breath sounds.   Abdominal:      Palpations: Abdomen is soft.      Tenderness: There is no abdominal tenderness.   Musculoskeletal:      Cervical back: Neck supple.      Right lower leg: Edema (gr 2) present.      Left lower leg: Edema (gr 1 with small blister on the lower shin) present.       Administrative Statements         "

## 2024-06-13 NOTE — ASSESSMENT & PLAN NOTE
Known multilevel spondylosis  No improvement on gabapentin so wean off by reducing by 1 capsule weekly  His back pain bothers him as much as his SOB  His wife asked about him trying tramadol. We discussed the risks/side effects and benefits and agreed he can try it

## 2024-06-13 NOTE — TELEPHONE ENCOUNTER
Patient's wife called in and stated that Doctor Reyes forgot to order patient's  potassium chloride (Klor-Con M20) 20 mEq tablet and requested a refill.

## 2024-06-13 NOTE — ASSESSMENT & PLAN NOTE
Stopped using compression a few days ago and LE edema has increased  Stopped because of severe tingling and itching in the feet at night  Advised to resume and use only during the day, elevated LE at night  Waiting to receive leg pumps

## 2024-06-13 NOTE — ASSESSMENT & PLAN NOTE
Wt Readings from Last 3 Encounters:   06/13/24 99.3 kg (219 lb)   06/06/24 100 kg (221 lb)   05/20/24 101 kg (221 lb 12.8 oz)     Weight is stable  Increase LE edema from not using compression socks for a few days  Continue torsemide 40mg  (spironolactone D/C'd due to hyperkalemia)  Obtain BMP

## 2024-06-16 LAB
DME PARACHUTE DELIVERY DATE ACTUAL: NORMAL
DME PARACHUTE DELIVERY DATE REQUESTED: NORMAL
DME PARACHUTE ITEM DESCRIPTION: NORMAL
DME PARACHUTE ORDER STATUS: NORMAL
DME PARACHUTE SUPPLIER NAME: NORMAL
DME PARACHUTE SUPPLIER PHONE: NORMAL

## 2024-06-18 ENCOUNTER — TELEPHONE (OUTPATIENT)
Dept: OTHER | Facility: HOSPITAL | Age: 84
End: 2024-06-18

## 2024-06-18 ENCOUNTER — APPOINTMENT (EMERGENCY)
Dept: RADIOLOGY | Facility: HOSPITAL | Age: 84
DRG: 177 | End: 2024-06-18
Payer: MEDICARE

## 2024-06-18 ENCOUNTER — HOSPITAL ENCOUNTER (INPATIENT)
Facility: HOSPITAL | Age: 84
LOS: 8 days | Discharge: HOME HEALTH | DRG: 177 | End: 2024-06-26
Attending: EMERGENCY MEDICINE | Admitting: INTERNAL MEDICINE
Payer: MEDICARE

## 2024-06-18 ENCOUNTER — ANTICOAG VISIT (OUTPATIENT)
Dept: CARDIOLOGY CLINIC | Facility: CLINIC | Age: 84
End: 2024-06-18

## 2024-06-18 ENCOUNTER — APPOINTMENT (OUTPATIENT)
Dept: LAB | Facility: CLINIC | Age: 84
End: 2024-06-18
Payer: MEDICARE

## 2024-06-18 DIAGNOSIS — R09.02 HYPOXIA: ICD-10-CM

## 2024-06-18 DIAGNOSIS — I48.21 PERMANENT ATRIAL FIBRILLATION (HCC): ICD-10-CM

## 2024-06-18 DIAGNOSIS — J90 PLEURAL EFFUSION: ICD-10-CM

## 2024-06-18 DIAGNOSIS — J18.9 PNEUMONIA: Primary | ICD-10-CM

## 2024-06-18 DIAGNOSIS — J44.9 CHRONIC OBSTRUCTIVE PULMONARY DISEASE, UNSPECIFIED COPD TYPE (HCC): ICD-10-CM

## 2024-06-18 DIAGNOSIS — D64.9 ANEMIA, UNSPECIFIED TYPE: ICD-10-CM

## 2024-06-18 DIAGNOSIS — R73.01 IMPAIRED FASTING GLUCOSE: ICD-10-CM

## 2024-06-18 DIAGNOSIS — I48.0 PAROXYSMAL ATRIAL FIBRILLATION (HCC): Primary | ICD-10-CM

## 2024-06-18 DIAGNOSIS — I48.91 ATRIAL FIBRILLATION, UNSPECIFIED TYPE (HCC): ICD-10-CM

## 2024-06-18 DIAGNOSIS — R05.8 PRODUCTIVE COUGH: ICD-10-CM

## 2024-06-18 DIAGNOSIS — R06.02 SOB (SHORTNESS OF BREATH): ICD-10-CM

## 2024-06-18 DIAGNOSIS — N17.9 AKI (ACUTE KIDNEY INJURY) (HCC): ICD-10-CM

## 2024-06-18 DIAGNOSIS — E03.8 SUBCLINICAL HYPOTHYROIDISM: ICD-10-CM

## 2024-06-18 PROBLEM — R73.03 PREDIABETES: Status: ACTIVE | Noted: 2024-06-18

## 2024-06-18 PROBLEM — R79.89 ELEVATED SERUM CREATININE: Status: ACTIVE | Noted: 2024-06-18

## 2024-06-18 LAB
2HR DELTA HS TROPONIN: 0 NG/L
ALBUMIN SERPL BCG-MCNC: 2.4 G/DL (ref 3.5–5)
ALP SERPL-CCNC: 147 U/L (ref 34–104)
ALT SERPL W P-5'-P-CCNC: 53 U/L (ref 7–52)
ANION GAP SERPL CALCULATED.3IONS-SCNC: 5 MMOL/L (ref 4–13)
ANION GAP SERPL CALCULATED.3IONS-SCNC: 8 MMOL/L (ref 4–13)
AST SERPL W P-5'-P-CCNC: 58 U/L (ref 13–39)
BACTERIA UR QL AUTO: ABNORMAL /HPF
BASOPHILS # BLD AUTO: 0.04 THOUSANDS/ÂΜL (ref 0–0.1)
BASOPHILS NFR BLD AUTO: 0 % (ref 0–1)
BILIRUB SERPL-MCNC: 0.84 MG/DL (ref 0.2–1)
BILIRUB UR QL STRIP: NEGATIVE
BUN SERPL-MCNC: 34 MG/DL (ref 5–25)
BUN SERPL-MCNC: 37 MG/DL (ref 5–25)
CALCIUM ALBUM COR SERPL-MCNC: 9.4 MG/DL (ref 8.3–10.1)
CALCIUM SERPL-MCNC: 8.1 MG/DL (ref 8.4–10.2)
CALCIUM SERPL-MCNC: 8.1 MG/DL (ref 8.4–10.2)
CARDIAC TROPONIN I PNL SERPL HS: 12 NG/L
CARDIAC TROPONIN I PNL SERPL HS: 12 NG/L
CHLORIDE SERPL-SCNC: 95 MMOL/L (ref 96–108)
CHLORIDE SERPL-SCNC: 95 MMOL/L (ref 96–108)
CLARITY UR: CLEAR
CO2 SERPL-SCNC: 32 MMOL/L (ref 21–32)
CO2 SERPL-SCNC: 33 MMOL/L (ref 21–32)
COLOR UR: ABNORMAL
CREAT SERPL-MCNC: 1.5 MG/DL (ref 0.6–1.3)
CREAT SERPL-MCNC: 1.5 MG/DL (ref 0.6–1.3)
DME PARACHUTE DELIVERY DATE ACTUAL: NORMAL
DME PARACHUTE DELIVERY DATE REQUESTED: NORMAL
DME PARACHUTE ITEM DESCRIPTION: NORMAL
DME PARACHUTE ORDER STATUS: NORMAL
DME PARACHUTE SUPPLIER NAME: NORMAL
DME PARACHUTE SUPPLIER PHONE: NORMAL
EOSINOPHIL # BLD AUTO: 0.04 THOUSAND/ÂΜL (ref 0–0.61)
EOSINOPHIL NFR BLD AUTO: 0 % (ref 0–6)
ERYTHROCYTE [DISTWIDTH] IN BLOOD BY AUTOMATED COUNT: 17.6 % (ref 11.6–15.1)
ERYTHROCYTE [DISTWIDTH] IN BLOOD BY AUTOMATED COUNT: 17.7 % (ref 11.6–15.1)
EST. AVERAGE GLUCOSE BLD GHB EST-MCNC: 134 MG/DL
FLUAV RNA RESP QL NAA+PROBE: NEGATIVE
FLUBV RNA RESP QL NAA+PROBE: NEGATIVE
GFR SERPL CREATININE-BSD FRML MDRD: 42 ML/MIN/1.73SQ M
GFR SERPL CREATININE-BSD FRML MDRD: 42 ML/MIN/1.73SQ M
GLUCOSE P FAST SERPL-MCNC: 106 MG/DL (ref 65–99)
GLUCOSE SERPL-MCNC: 97 MG/DL (ref 65–140)
GLUCOSE UR STRIP-MCNC: NEGATIVE MG/DL
HBA1C MFR BLD: 6.3 %
HCT VFR BLD AUTO: 27.2 % (ref 36.5–49.3)
HCT VFR BLD AUTO: 28.4 % (ref 36.5–49.3)
HGB BLD-MCNC: 8.4 G/DL (ref 12–17)
HGB BLD-MCNC: 8.8 G/DL (ref 12–17)
HGB UR QL STRIP.AUTO: NEGATIVE
HYALINE CASTS #/AREA URNS LPF: ABNORMAL /LPF
IMM GRANULOCYTES # BLD AUTO: 0.14 THOUSAND/UL (ref 0–0.2)
IMM GRANULOCYTES NFR BLD AUTO: 1 % (ref 0–2)
KETONES UR STRIP-MCNC: NEGATIVE MG/DL
LACTATE SERPL-SCNC: 1.2 MMOL/L (ref 0.5–2)
LEUKOCYTE ESTERASE UR QL STRIP: NEGATIVE
LYMPHOCYTES # BLD AUTO: 0.57 THOUSANDS/ÂΜL (ref 0.6–4.47)
LYMPHOCYTES NFR BLD AUTO: 5 % (ref 14–44)
MCH RBC QN AUTO: 29.1 PG (ref 26.8–34.3)
MCH RBC QN AUTO: 29.2 PG (ref 26.8–34.3)
MCHC RBC AUTO-ENTMCNC: 30.9 G/DL (ref 31.4–37.4)
MCHC RBC AUTO-ENTMCNC: 31 G/DL (ref 31.4–37.4)
MCV RBC AUTO: 94 FL (ref 82–98)
MCV RBC AUTO: 94 FL (ref 82–98)
MONOCYTES # BLD AUTO: 0.86 THOUSAND/ÂΜL (ref 0.17–1.22)
MONOCYTES NFR BLD AUTO: 7 % (ref 4–12)
NEUTROPHILS # BLD AUTO: 11.06 THOUSANDS/ÂΜL (ref 1.85–7.62)
NEUTS SEG NFR BLD AUTO: 87 % (ref 43–75)
NITRITE UR QL STRIP: NEGATIVE
NON-SQ EPI CELLS URNS QL MICRO: ABNORMAL /HPF
NRBC BLD AUTO-RTO: 0 /100 WBCS
PH UR STRIP.AUTO: 5.5 [PH]
PLATELET # BLD AUTO: 304 THOUSANDS/UL (ref 149–390)
PLATELET # BLD AUTO: 318 THOUSANDS/UL (ref 149–390)
PMV BLD AUTO: 9.6 FL (ref 8.9–12.7)
PMV BLD AUTO: 9.9 FL (ref 8.9–12.7)
POTASSIUM SERPL-SCNC: 4.5 MMOL/L (ref 3.5–5.3)
POTASSIUM SERPL-SCNC: 4.6 MMOL/L (ref 3.5–5.3)
PROCALCITONIN SERPL-MCNC: 0.35 NG/ML
PROT SERPL-MCNC: 6.8 G/DL (ref 6.4–8.4)
PROT UR STRIP-MCNC: NEGATIVE MG/DL
RBC # BLD AUTO: 2.88 MILLION/UL (ref 3.88–5.62)
RBC # BLD AUTO: 3.02 MILLION/UL (ref 3.88–5.62)
RBC #/AREA URNS AUTO: ABNORMAL /HPF
RSV RNA RESP QL NAA+PROBE: NEGATIVE
SARS-COV-2 RNA RESP QL NAA+PROBE: NEGATIVE
SODIUM SERPL-SCNC: 133 MMOL/L (ref 135–147)
SODIUM SERPL-SCNC: 135 MMOL/L (ref 135–147)
SP GR UR STRIP.AUTO: 1.01 (ref 1–1.03)
T4 FREE SERPL-MCNC: 1.12 NG/DL (ref 0.61–1.12)
TSH SERPL DL<=0.05 MIU/L-ACNC: 4.56 UIU/ML (ref 0.45–4.5)
UROBILINOGEN UR STRIP-ACNC: <2 MG/DL
WBC # BLD AUTO: 12.71 THOUSAND/UL (ref 4.31–10.16)
WBC # BLD AUTO: 14.15 THOUSAND/UL (ref 4.31–10.16)
WBC #/AREA URNS AUTO: ABNORMAL /HPF

## 2024-06-18 PROCEDURE — 93005 ELECTROCARDIOGRAM TRACING: CPT

## 2024-06-18 PROCEDURE — 99223 1ST HOSP IP/OBS HIGH 75: CPT | Performed by: INTERNAL MEDICINE

## 2024-06-18 PROCEDURE — 84145 PROCALCITONIN (PCT): CPT

## 2024-06-18 PROCEDURE — 99285 EMERGENCY DEPT VISIT HI MDM: CPT

## 2024-06-18 PROCEDURE — 84443 ASSAY THYROID STIM HORMONE: CPT

## 2024-06-18 PROCEDURE — 84484 ASSAY OF TROPONIN QUANT: CPT

## 2024-06-18 PROCEDURE — 83036 HEMOGLOBIN GLYCOSYLATED A1C: CPT

## 2024-06-18 PROCEDURE — 0241U HB NFCT DS VIR RESP RNA 4 TRGT: CPT

## 2024-06-18 PROCEDURE — 83605 ASSAY OF LACTIC ACID: CPT

## 2024-06-18 PROCEDURE — 96365 THER/PROPH/DIAG IV INF INIT: CPT

## 2024-06-18 PROCEDURE — 76604 US EXAM CHEST: CPT | Performed by: EMERGENCY MEDICINE

## 2024-06-18 PROCEDURE — 80053 COMPREHEN METABOLIC PANEL: CPT

## 2024-06-18 PROCEDURE — 87040 BLOOD CULTURE FOR BACTERIA: CPT

## 2024-06-18 PROCEDURE — 99285 EMERGENCY DEPT VISIT HI MDM: CPT | Performed by: EMERGENCY MEDICINE

## 2024-06-18 PROCEDURE — 81001 URINALYSIS AUTO W/SCOPE: CPT

## 2024-06-18 PROCEDURE — 85027 COMPLETE CBC AUTOMATED: CPT

## 2024-06-18 PROCEDURE — 85025 COMPLETE CBC W/AUTO DIFF WBC: CPT

## 2024-06-18 PROCEDURE — 71045 X-RAY EXAM CHEST 1 VIEW: CPT

## 2024-06-18 PROCEDURE — 84439 ASSAY OF FREE THYROXINE: CPT

## 2024-06-18 RX ORDER — AZITHROMYCIN 250 MG/1
500 TABLET, FILM COATED ORAL DAILY
Status: DISCONTINUED | OUTPATIENT
Start: 2024-06-19 | End: 2024-06-20

## 2024-06-18 RX ORDER — GABAPENTIN 300 MG/1
900 CAPSULE ORAL
Status: DISCONTINUED | OUTPATIENT
Start: 2024-06-18 | End: 2024-06-26 | Stop reason: HOSPADM

## 2024-06-18 RX ORDER — BENZONATATE 100 MG/1
100 CAPSULE ORAL 3 TIMES DAILY PRN
Status: DISCONTINUED | OUTPATIENT
Start: 2024-06-18 | End: 2024-06-18

## 2024-06-18 RX ORDER — LABETALOL 100 MG/1
100 TABLET, FILM COATED ORAL 2 TIMES DAILY
Status: DISCONTINUED | OUTPATIENT
Start: 2024-06-18 | End: 2024-06-26 | Stop reason: HOSPADM

## 2024-06-18 RX ORDER — WARFARIN SODIUM 2.5 MG/1
1.25 TABLET ORAL
Status: CANCELLED | OUTPATIENT
Start: 2024-06-18

## 2024-06-18 RX ORDER — GUAIFENESIN 600 MG/1
1200 TABLET, EXTENDED RELEASE ORAL EVERY 12 HOURS SCHEDULED
Status: DISCONTINUED | OUTPATIENT
Start: 2024-06-18 | End: 2024-06-26 | Stop reason: HOSPADM

## 2024-06-18 RX ORDER — POTASSIUM CHLORIDE 20 MEQ/1
20 TABLET, EXTENDED RELEASE ORAL DAILY
Status: DISCONTINUED | OUTPATIENT
Start: 2024-06-19 | End: 2024-06-26 | Stop reason: HOSPADM

## 2024-06-18 RX ORDER — AMIODARONE HYDROCHLORIDE 200 MG/1
200 TABLET ORAL DAILY
Status: DISCONTINUED | OUTPATIENT
Start: 2024-06-19 | End: 2024-06-26 | Stop reason: HOSPADM

## 2024-06-18 RX ORDER — TAMSULOSIN HYDROCHLORIDE 0.4 MG/1
0.4 CAPSULE ORAL
Status: DISCONTINUED | OUTPATIENT
Start: 2024-06-18 | End: 2024-06-26 | Stop reason: HOSPADM

## 2024-06-18 RX ORDER — ACETAMINOPHEN 325 MG/1
650 TABLET ORAL EVERY 6 HOURS PRN
Status: DISCONTINUED | OUTPATIENT
Start: 2024-06-18 | End: 2024-06-26 | Stop reason: HOSPADM

## 2024-06-18 RX ORDER — WARFARIN SODIUM 2.5 MG/1
2.5 TABLET ORAL
Status: CANCELLED | OUTPATIENT
Start: 2024-06-19

## 2024-06-18 RX ORDER — ATORVASTATIN CALCIUM 40 MG/1
40 TABLET, FILM COATED ORAL DAILY
Status: DISCONTINUED | OUTPATIENT
Start: 2024-06-19 | End: 2024-06-26 | Stop reason: HOSPADM

## 2024-06-18 RX ORDER — BENZONATATE 100 MG/1
200 CAPSULE ORAL 3 TIMES DAILY PRN
Status: DISCONTINUED | OUTPATIENT
Start: 2024-06-18 | End: 2024-06-26 | Stop reason: HOSPADM

## 2024-06-18 RX ORDER — SENNOSIDES 8.6 MG
1 TABLET ORAL
Status: DISCONTINUED | OUTPATIENT
Start: 2024-06-18 | End: 2024-06-26 | Stop reason: HOSPADM

## 2024-06-18 RX ORDER — ALBUTEROL SULFATE 90 UG/1
2 AEROSOL, METERED RESPIRATORY (INHALATION) EVERY 6 HOURS PRN
Status: DISCONTINUED | OUTPATIENT
Start: 2024-06-18 | End: 2024-06-26 | Stop reason: HOSPADM

## 2024-06-18 RX ORDER — TORSEMIDE 20 MG/1
40 TABLET ORAL DAILY
Status: CANCELLED | OUTPATIENT
Start: 2024-06-19

## 2024-06-18 RX ADMIN — VANCOMYCIN HYDROCHLORIDE 2000 MG: 5 INJECTION, POWDER, LYOPHILIZED, FOR SOLUTION INTRAVENOUS at 18:17

## 2024-06-18 RX ADMIN — TAMSULOSIN HYDROCHLORIDE 0.4 MG: 0.4 CAPSULE ORAL at 20:28

## 2024-06-18 RX ADMIN — GUAIFENESIN 1200 MG: 600 TABLET ORAL at 20:28

## 2024-06-18 RX ADMIN — LABETALOL HYDROCHLORIDE 100 MG: 100 TABLET, FILM COATED ORAL at 20:28

## 2024-06-18 RX ADMIN — CEFTRIAXONE SODIUM 1000 MG: 10 INJECTION, POWDER, FOR SOLUTION INTRAVENOUS at 15:17

## 2024-06-18 RX ADMIN — AZITHROMYCIN MONOHYDRATE 500 MG: 500 INJECTION, POWDER, LYOPHILIZED, FOR SOLUTION INTRAVENOUS at 15:59

## 2024-06-18 RX ADMIN — GABAPENTIN 900 MG: 300 CAPSULE ORAL at 21:57

## 2024-06-18 NOTE — ED ATTENDING ATTESTATION
6/18/2024  I, Mere Chatterjee MD, saw and evaluated the patient. I have discussed the patient with the resident/non-physician practitioner and agree with the resident's/non-physician practitioner's findings, Plan of Care, and MDM as documented in the resident's/non-physician practitioner's note, except where noted. All available labs and Radiology studies were reviewed.  I was present for key portions of any procedure(s) performed by the resident/non-physician practitioner and I was immediately available to provide assistance.       At this point I agree with the current assessment done in the Emergency Department.  I have conducted an independent evaluation of this patient a history and physical is as follows:    82 y/o male, 2 days of sob, cough with brown sputum, ct scan 6 days ago with loculated effusion. H/o copd/chf/pna. B/l LE edema, at baseline. Subjective fevers. Sent in by pulm.    Septic and cardiac work up, cxr, likely admit    Wt Readings from Last 3 Encounters:   06/13/24 99.3 kg (219 lb)   06/06/24 100 kg (221 lb)   05/20/24 101 kg (221 lb 12.8 oz)         ED Course         Critical Care Time  Procedures

## 2024-06-18 NOTE — ASSESSMENT & PLAN NOTE
On review of labs, anemia is new since April 2024.   Last labs in August 2023 were normal.   There has been a slow trend downward.   Hgb 8.4 on admission    Plan:  Monitor CBC  Transfuse if Hgb <7

## 2024-06-18 NOTE — ASSESSMENT & PLAN NOTE
Home regimen: Amiodorone 200 mg p.o. daily, labetolol 100 mg BID, warfarin 2.5 mg    Plan:  Continue amiodarone and labetalol  Hold warfarin for for today for possible thoracentesis  PT/ INR monitoring

## 2024-06-18 NOTE — ED NOTES
Oxygen saturation noted to be 85% with patient on RA. Patient states that he uses 2L NC PRN and at night. Placed patient on 2L NC with improvement in oxygen saturation to 91-93%. MEREDITH Clemens, resident provider at bedside during episode.     Jemma Calderón RN  06/18/24 [id number]

## 2024-06-18 NOTE — ASSESSMENT & PLAN NOTE
A1C- 6.3 %    Plan:  Monitor blood sugars  Consider insulin sliding scale if glucose elevated  Diabetic diet

## 2024-06-18 NOTE — ED PROCEDURE NOTE
Procedure  POC Cardiac US    Date/Time: 6/18/2024 3:55 PM    Performed by: Ashli Sheridan MD  Authorized by: Mere Chatterjee MD    Patient location:  ED  Other Assisting Provider: Yes (comment) (Vivek Lang)    Procedure details:     Exam Type:  Educational    Indications: dyspnea      Assessment / Evaluation for: cardiac function and pericardial effusion      Exam Type: initial exam      Image quality: non-diagnostic      Image availability:  Images available in PACS  Patient Details:     Cardiac Rhythm:  Regular  Cardiac findings:     Echo technique: limited 2D      Views obtained: parasternal long axis, parasternal short axis, subcostal and apical      Pericardial effusion: absent    Pulmonary findings:     B-lines: more than 3    POC Lung US    Date/Time: 6/18/2024 3:56 PM    Performed by: Ashli Sheridan MD  Authorized by: Ashli Sheridan MD    Patient location:  ED  Other Assisting Provider: Yes (comment) (Vivek Lang)    Procedure details:     Exam Type:  Diagnostic    Indications: dyspnea      Exam Type: initial exam      Image quality: non-diagnostic    Left Hemithorax Findings:     Left pleura visualized:  Visualized    Left Hemithorax Findings: B-line pattern    Right Lung Findings:     Right pleural visualized:  Visualized    Right hemithorax findings: B-line pattern                     Ashli Sheridan MD  06/18/24 1557

## 2024-06-18 NOTE — TELEPHONE ENCOUNTER
Called to discuss CT chest results with patient and wife.  Wife states patient has had intermittent fevers for the last few days.  Worsening shortness of breath, can barely make it to the bathroom and back, and dark brown sputum production.    I recommended patient go to the emergency room for further evaluation.  Wife and Pt agreeable.  Plans to head to emergency room when we hang up.

## 2024-06-18 NOTE — H&P
[facility]  H&P  Name: Oscar Espinosa 83 y.o. male I MRN: [id number]  Unit/Bed#: W -01 I Date of Admission: 6/18/2024   Date of Service: 6/18/2024 I Hospital Day: 0      Assessment & Plan   Acute hypoxic respiratory failure (HCC)  Assessment & Plan  Patient presents with shortness of breath for 2 days. Past smoker, 2 packs/day, quit over 10 years ago. Patient states that he has also been having cough which is productive of dark brown phlegm for 2 days. Patient also mentions that he has been feeling feverish and chills while at home Denies chest pain, abdominal pain, n/v/d,dizziness, lightheadedness, HA, dysuria, hematuria, hematochezia, or melena. Sent in by pulmonologist following CT results. Admitted to the hospital the beginning of May due to pneumonia at which point he required a thoracentesis on 4/26 yielding 600ml, completed 7 days of ceftriaxone.  CT chest wo contrast (06/10/2024)- persistent loculated moderate size left pleural effusion w/ partial collapse of LLL. Stable AAA measuring 4.7 cm (since May 2024).   Patient wears a baseline of 2 L oxygen on exertion and during sleep since last hospital admission.  In the ED- O2 desat to 83% on 2L, moved up to 5 L, sitting 91%  Patient reports 38 lb weight loss over the last few months and has decreased appetite.   Received vancomycin, azithromycin, and ceftriaxone in the ER.  Viral panel negative    Plan:  Continue with IV cefepime and azithromycin p.o.  F/u blood culture, sputum culture, Strep pneumo and Legionella antigen  Trend WBC/ procal  Monitor temp  Tylenol 650 mg prn  Tessalon Perles 200 mg TID prn  Mucinex 1200 mg BID  Incentive spirometer  Flutter valve  Consult pulmonology/IR possible thoracentesis    Elevated serum creatinine  Assessment & Plan  Baseline Cr 0.9-1.1 prior to May 2024  Elevated to 1.66 as of May 2024 and has been stable since then        Plan:  Holding IV fluids given overall volume overload with 3+  pitting edema  Hold torsemide  Monitor BMP  F/u urinalysis    Prediabetes  Assessment & Plan  A1C- 6.3 %    Plan:  Monitor blood sugars  Consider insulin sliding scale if glucose elevated  Diabetic diet    Chronic combined systolic and diastolic congestive heart failure (HCC)  Assessment & Plan  Wt Readings from Last 3 Encounters:   06/13/24 99.3 kg (219 lb)   06/06/24 100 kg (221 lb)   05/20/24 101 kg (221 lb 12.8 oz)     Home regimen: torsemide 40 mg PO daily, labetolol 100 mg    Plan:  Hold diuretics in the setting of elevated Cr  Monitor BMP  Monitor I/O        Anemia  Assessment & Plan  On review of labs, anemia is new since April 2024.   Last labs in August 2023 were normal.   There has been a slow trend downward.   Hgb 8.4 on admission    Plan:  Monitor CBC  Transfuse if Hgb <7    Pleural effusion with shortness of breath  Assessment & Plan  Patient admitted to the hospital the beginning of May due to pneumonia at which point he required a thoracentesis on 4/26 yielding 600ml, completed 7 days of ceftriaxone.   CT chest wo contrast (04/27/2024)- Minimally loculated moderate-sized left pleural effusion. Compressive atelectasis at the left lower lobe and lingula.   CT chest wo contrast (05/01/2024)- Unchanged moderate left effusion with adjacent atelectasis. Unchanged ascending thoracic aortic measuring 5 cm. Interstitial lung disease changes suggested bilaterally with atelectasis in the lingula and left lower lobe.  CT chest wo contrast (06/10/2024)- persistent loculated moderate size left pleural effusion w/ partial collapse of LLL. Stable AAA measuring 4.7 cm (since May 2024).         Atrial fibrillation (HCC)  Assessment & Plan  Home regimen: Amiodorone 200 mg p.o. daily, labetolol 100 mg BID, warfarin 2.5 mg    Plan:  Continue amiodarone and labetalol  Hold warfarin for for today for possible thoracentesis  PT/ INR monitoring    Chronic pain syndrome  Assessment & Plan  Home regimen: gabapentin 900 mg  HS    Plan:  Reduce dose if further elevation of Creatinine    3-vessel CAD  Assessment & Plan  Home regimen: ASA 81 mg, lipitor 40 mg, labetolol 100 mg    Plan:  Continue home regimen    VTE Pharmacologic Prophylaxis:   High Risk (Score >/= 5) - Pharmacological DVT Prophylaxis Ordered: warfarin (Coumadin). Sequential Compression Devices Ordered.  Code Status: Level 3 - DNAR and DNI   Discussion with family: Updated  (wife) at bedside.    Anticipated Length of Stay: Patient will be admitted on an inpatient basis with an anticipated length of stay of greater than 2 midnights secondary to  .    Total Time Spent on Date of Encounter in care of patient: 30 mins. This time was spent on one or more of the following: performing physical exam; counseling and coordination of care; obtaining or reviewing history; documenting in the medical record; reviewing/ordering tests, medications or procedures; communicating with other healthcare professionals and discussing with patient's family/caregivers.    Chief Complaint: acute hypoxic respiratory failure likely secondary to pneumonia    History of Present Illness:  Oscar Espinosa is a 83 y.o. male with a PMH of CHF, CAD status post CABG, COPD, AAA, Afib, lymphedema who presents with 2 days of shortness of breath and cough productive of dark brown phlegm that is occasionally lighter red in color. Patient also mentions that he has been feeling feverish and chills while at home, temperature up to 99.9. Denies chest pain, abdominal pain, n/v/d,dizziness, lightheadedness, HA, dysuria, hematuria, hematochezia, or melena. Patient is a past smoker and quit over 10 years ago.  Patient was admitted to the hospital the beginning of May due to pneumonia at which point he required a thoracentesis on 4/26 yielding 600ml, completed 7 days of ceftriaxone. Patient has been on 2 L oxygen on exertion and during sleep since this last hospital admission. His most recent CT on  06/10/2024 showed persistent loculated moderate size left pleural effusion w/ partial collapse of LLL. His pulmonologist recommended he come to the ED.   In the ED, patient was afebrile. Patient O2 decreased to 83% on 2L and he was moved up to 5 L, currently saturating in the 90s while sitting. Viral panel was negative. Patient met SIRS criteria due to elevated WBC and tachypnea. Troponin and lactate were normal. Hyponatremia 133, low Hgb 8.4, elevated Cr 1.5, hypocalcemia 8.1, mild transaminitis, INR 2.29. Chest X ray per my wet read shows effusion in LLL. Patient received vancomycin, azithromycin, and ceftriaxone in the ED. Patient was admitted for acute hypoxic respiratory failure likely secondary to pneumonia.     Review of Systems:  Review of Systems   Constitutional:  Positive for appetite change, chills, fever and unexpected weight change. Negative for diaphoresis.   HENT:  Negative for congestion, sneezing and trouble swallowing.    Eyes:  Negative for discharge.   Respiratory:  Positive for shortness of breath. Negative for cough, wheezing and stridor.    Cardiovascular:  Positive for leg swelling. Negative for chest pain and palpitations.   Gastrointestinal:  Negative for abdominal distention, abdominal pain, blood in stool, constipation, diarrhea, nausea and vomiting.   Genitourinary:  Negative for difficulty urinating, dysuria and hematuria.   Musculoskeletal:  Positive for back pain. Negative for arthralgias, joint swelling and myalgias.   Skin:  Negative for color change and pallor.   Neurological:  Negative for dizziness, light-headedness, numbness and headaches.   Hematological:  Negative for adenopathy.   Psychiatric/Behavioral:  Negative for agitation.        Past Medical and Surgical History:   Past Medical History:   Diagnosis Date    Abdominal aortic aneurysm (HCC)     s/p repair    Abnormal ECG july 2022    Acute on chronic congestive heart failure (HCC) 09/11/2019    Acute respiratory failure  with hypoxia ([facility]) 05/06/2024    Aneurysm ([facility]) 2007    Aneurysm of abdominal aorta ([facility])     49mm, trileaflet AV    Atrial fibrillation ([facility])     Eliquis    BPH (benign prostatic hyperplasia)     CAD (coronary artery disease)     s/p CABGx3    CHF (congestive heart failure) ([facility])     Chronic pain     Gabapentin    Chronic pain disorder     lumbar    COPD (chronic obstructive pulmonary disease) ([facility])     Coronary artery disease     Elevated transaminase level 04/27/2024    Former tobacco use     Hyperlipidemia     Hypertension     Hyponatremia 04/27/2024    Hypothyroidism     Lumbar radiculopathy     Lumbar stenosis     s/p injections    Neuropathy     Obesity (BMI 30-39.9)     YEVGENIY (obstructive sleep apnea)     unable to tolerate CPAP    Platelets decreased ([facility]) 07/27/2022    Pulmonary hypertension ([facility])     moderate to severe    Spondylolisthesis of lumbar region     Visual impairment 2022    Vitamin D deficiency        Past Surgical History:   Procedure Laterality Date    ABDOMINAL AORTIC ANEURYSM REPAIR  08/09/2007    2 dock limbs with visc extens prosth    CARDIAC CATHETERIZATION      COLONOSCOPY      CORONARY ARTERY BYPASS GRAFT  2003    LIMA to LAD, sequential SVG to OM1 & OM2, SVG to RDA    LUMBAR EPIDURAL INJECTION         Meds/Allergies:  Prior to Admission medications    Medication Sig Start Date End Date Taking? Authorizing Provider   albuterol (ProAir HFA) 90 mcg/act inhaler Inhale 2 puffs every 6 (six) hours as needed for wheezing 5/8/24   Lea Reyes, MD   amiodarone 200 mg tablet TAKE 1 TABLET BY MOUTH DAILY 6/11/24   Raza Alan DO   aspirin (ECOTRIN LOW STRENGTH) 81 mg EC tablet Take 1 tablet by mouth daily 9/7/12   Historical Provider, MD   atorvastatin (LIPITOR) 40 mg tablet Take 1 tablet (40 mg total) by mouth daily 9/13/23   Omar Kessler MD   cholecalciferol (VITAMIN D3) 1,000 units tablet Take 2,000 Units by mouth daily    Historical Provider, MD   FIBER ADULT GUMMIES PO Take 1  caplet by mouth daily     Historical Provider, MD   gabapentin (Neurontin) 300 mg capsule Take 1 capsule (300 mg total) by mouth 2 (two) times a day AND 3 capsules (900 mg total) daily at bedtime. 5/8/24   Lea Reyes, MD   labetalol (NORMODYNE) 100 mg tablet Take 1 tablet (100 mg total) by mouth 2 (two) times a day 9/21/23   Raza Alan DO   multivitamin (THERAGRAN) TABS Take 1 tablet by mouth daily    Historical Provider, MD   potassium chloride (Klor-Con M20) 20 mEq tablet Take one tab daily, 7 days a week. 6/13/24   Mi Estrada MD   senna (SENOKOT) 8.6 MG tablet Take 1 tablet by mouth as needed      Historical Provider, MD   spironolactone (ALDACTONE) 25 mg tablet Take 0.5 tablets (12.5 mg total) by mouth daily  Patient not taking: Reported on 6/13/2024 5/8/24   Lea Reyes, MD   tamsulosin (FLOMAX) 0.4 mg Take 1 capsule by mouth daily    Historical Provider, MD   torsemide (DEMADEX) 20 mg tablet Take 2 tablets (40 mg total) by mouth daily 6/10/24   Lea Reyes, MD   warfarin (Coumadin) 2.5 mg tablet TAKE 1 TABLET BY MOUTH DAILY 5/21/24   Raza Alan DO   traMADol (Ultram) 50 mg tablet Take 1 tablet (50 mg total) by mouth every 6 (six) hours as needed for moderate pain 6/13/24 6/18/24  Lea Reyes, MD     I have reviewed home medications with patient personally.    Allergies:   Allergies   Allergen Reactions    Meloxicam Edema       Social History:  Marital Status: /Civil Union   Occupation:   Patient Pre-hospital Living Situation: Home  Patient Pre-hospital Level of Mobility: walks with walker  Patient Pre-hospital Diet Restrictions:   Substance Use History:   Social History     Substance and Sexual Activity   Alcohol Use Yes    Alcohol/week: 21.0 standard drinks of alcohol    Types: 21 Cans of beer per week     Social History     Tobacco Use   Smoking Status Former    Current packs/day: 0.00    Average packs/day: 1 pack/day for 55.6 years (55.6 ttl pk-yrs)    Types: Cigarettes    Start date:  1957    Quit date: 2012    Years since quittin.8    Passive exposure: Past   Smokeless Tobacco Never     Social History     Substance and Sexual Activity   Drug Use No       Family History:      Physical Exam:     Vitals:   Blood Pressure: 120/67 (24)  Pulse: 74 (24)  Temperature: 98.3 °F (36.8 °C) (24)  Temp Source: Oral (24 1406)  Respirations: 22 (24 1430)  SpO2: 91 % (24)    Physical Exam  Constitutional:       General: He is not in acute distress.     Appearance: Normal appearance. He is not ill-appearing.   HENT:      Head: Normocephalic and atraumatic.      Right Ear: External ear normal.      Left Ear: External ear normal.      Nose: Nose normal.      Mouth/Throat:      Mouth: Mucous membranes are moist.      Pharynx: Oropharynx is clear.   Eyes:      General: No scleral icterus.     Extraocular Movements: Extraocular movements intact.      Conjunctiva/sclera: Conjunctivae normal.   Cardiovascular:      Rate and Rhythm: Normal rate and regular rhythm.      Pulses: Normal pulses.      Heart sounds: Normal heart sounds.   Pulmonary:      Effort: No respiratory distress.      Breath sounds: Wheezing (slight wheezing in left lung) present.      Comments: Decreased breath sounds bilaterally  Abdominal:      General: Abdomen is flat. Bowel sounds are normal. There is no distension.      Palpations: Abdomen is soft.      Tenderness: There is no abdominal tenderness.   Musculoskeletal:         General: Normal range of motion.      Cervical back: Normal range of motion and neck supple. No rigidity.      Right lower leg: Edema (2+) present.      Left lower leg: Edema (2+) present.   Skin:     General: Skin is warm and dry.   Neurological:      General: No focal deficit present.      Mental Status: He is alert and oriented to person, place, and time.   Psychiatric:         Mood and Affect: Mood normal.          Additional Data:     Lab  Results:  Results from last 7 days   Lab Units 06/18/24  1448   WBC Thousand/uL 12.71*   HEMOGLOBIN g/dL 8.4*   HEMATOCRIT % 27.2*   PLATELETS Thousands/uL 304   SEGS PCT % 87*   LYMPHO PCT % 5*   MONO PCT % 7   EOS PCT % 0     Results from last 7 days   Lab Units 06/18/24  1448   SODIUM mmol/L 133*   POTASSIUM mmol/L 4.5   CHLORIDE mmol/L 95*   CO2 mmol/L 33*   BUN mg/dL 37*   CREATININE mg/dL 1.50*   ANION GAP mmol/L 5   CALCIUM mg/dL 8.1*   ALBUMIN g/dL 2.4*   TOTAL BILIRUBIN mg/dL 0.84   ALK PHOS U/L 147*   ALT U/L 53*   AST U/L 58*   GLUCOSE RANDOM mg/dL 97     Results from last 7 days   Lab Units 06/18/24  0834   INR  2.29*         Lab Results   Component Value Date    HGBA1C 6.3 (H) 06/18/2024    HGBA1C 6.2 (H) 02/20/2024    HGBA1C 6.1 (H) 08/17/2023     Results from last 7 days   Lab Units 06/18/24  1448   LACTIC ACID mmol/L 1.2   PROCALCITONIN ng/ml 0.35*       Lines/Drains:  Invasive Devices       Peripheral Intravenous Line  Duration             Peripheral IV 06/18/24 Distal;Left;Upper;Ventral (anterior) Arm <1 day    Peripheral IV 06/18/24 Proximal;Right;Ventral (anterior) Forearm <1 day                        Imaging: Reviewed radiology reports from this admission including: chest xray, chest CT scan, and ultrasound(s)  XR chest 1 view portable   ED Interpretation by Jame Clemens MD (06/18 1653)   Image was independently interpreted by myself and showed left sided pleural effusion vs. consolidation.            EKG and Other Studies Reviewed on Admission:   EKG: sinus rhythm, inverted V4-6  Previous EKG- a fib which pt goes in and out of    ** Please Note: This note has been constructed using a voice recognition system. **

## 2024-06-18 NOTE — ED PROVIDER NOTES
History  Chief Complaint   Patient presents with    Shortness of Breath     Patient sent by pulmonology for possible thoracentesis. CT from 6/10 shows fluid on lungs. C/o SOB coughing up flem and low grade fevers at home.        83-year-old male with extensive PMH including CHF, CAD status post CABG, COPD, AAA, and atrial fibrillation who presents to the ED for shortness of breath.  Patient states that for the past 2 days she has been having cough which is productive of dark brown phlegm.  Patient was contacted by his pulmonologist and advised to go to the emergency department due to his CT chest results.  Patient was also recently admitted to the hospital the beginning of May due to pneumonia at which point he required a thoracentesis.  Patient also mentions that he has been feeling feverish and chills while at home and also wears a baseline of 2 L oxygen while around the house.  No other acute concerns at this time. Denies chest pain, abdominal pain, n/v/d,dizziness, lightheadedness, HA, dysuria, hematuria, hematochezia, or melena.               Prior to Admission Medications   Prescriptions Last Dose Informant Patient Reported? Taking?   FIBER ADULT GUMMIES PO  Self Yes No   Sig: Take 1 caplet by mouth daily    albuterol (ProAir HFA) 90 mcg/act inhaler  Self No No   Sig: Inhale 2 puffs every 6 (six) hours as needed for wheezing   amiodarone 200 mg tablet   No No   Sig: TAKE 1 TABLET BY MOUTH DAILY   aspirin (ECOTRIN LOW STRENGTH) 81 mg EC tablet  Self Yes No   Sig: Take 1 tablet by mouth daily   atorvastatin (LIPITOR) 40 mg tablet  Self No No   Sig: Take 1 tablet (40 mg total) by mouth daily   cholecalciferol (VITAMIN D3) 1,000 units tablet  Self Yes No   Sig: Take 2,000 Units by mouth daily   gabapentin (Neurontin) 300 mg capsule  Self No No   Sig: Take 1 capsule (300 mg total) by mouth 2 (two) times a day AND 3 capsules (900 mg total) daily at bedtime.   labetalol (NORMODYNE) 100 mg tablet  Self No No   Sig:  Take 1 tablet (100 mg total) by mouth 2 (two) times a day   multivitamin (THERAGRAN) TABS  Self Yes No   Sig: Take 1 tablet by mouth daily   potassium chloride (Klor-Con M20) 20 mEq tablet   No No   Sig: Take one tab daily, 7 days a week.   senna (SENOKOT) 8.6 MG tablet  Self Yes No   Sig: Take 1 tablet by mouth as needed     spironolactone (ALDACTONE) 25 mg tablet  Self No No   Sig: Take 0.5 tablets (12.5 mg total) by mouth daily   Patient not taking: Reported on 6/13/2024   tamsulosin (FLOMAX) 0.4 mg  Self Yes No   Sig: Take 1 capsule by mouth daily   torsemide (DEMADEX) 20 mg tablet   No No   Sig: Take 2 tablets (40 mg total) by mouth daily   warfarin (Coumadin) 2.5 mg tablet  Self No No   Sig: TAKE 1 TABLET BY MOUTH DAILY      Facility-Administered Medications: None       Past Medical History:   Diagnosis Date    Abdominal aortic aneurysm ([facility])     s/p repair    Abnormal ECG july 2022    Acute on chronic congestive heart failure ([facility]) 09/11/2019    Acute respiratory failure with hypoxia ([facility]) 05/06/2024    Aneurysm ([facility]) 2007    Aneurysm of abdominal aorta ([facility])     49mm, trileaflet AV    Atrial fibrillation ([facility])     Eliquis    BPH (benign prostatic hyperplasia)     CAD (coronary artery disease)     s/p CABGx3    CHF (congestive heart failure) ([facility])     Chronic pain     Gabapentin    Chronic pain disorder     lumbar    COPD (chronic obstructive pulmonary disease) ([facility])     Coronary artery disease     Elevated transaminase level 04/27/2024    Former tobacco use     Hyperlipidemia     Hypertension     Hyponatremia 04/27/2024    Hypothyroidism     Lumbar radiculopathy     Lumbar stenosis     s/p injections    Neuropathy     Obesity (BMI 30-39.9)     YEVGENIY (obstructive sleep apnea)     unable to tolerate CPAP    Platelets decreased ([facility]) 07/27/2022    Pulmonary hypertension ([facility])     moderate to severe    Spondylolisthesis of lumbar region     Visual impairment 2022    Vitamin D deficiency        Past Surgical  History:   Procedure Laterality Date    ABDOMINAL AORTIC ANEURYSM REPAIR  2007    2 dock limbs with visc extens prosth    CARDIAC CATHETERIZATION      COLONOSCOPY      CORONARY ARTERY BYPASS GRAFT      LIMA to LAD, sequential SVG to OM1 & OM2, SVG to RDA    LUMBAR EPIDURAL INJECTION         Family History   Problem Relation Age of Onset    Heart disease Mother     Stroke Father         stroke syndrome    Aneurysm Brother         abdominal    Aortic aneurysm Brother         ascending    Coronary artery disease Family     Hypertension Family     Other Son         gioblastoma multiforme     I have reviewed and agree with the history as documented.    E-Cigarette/Vaping    E-Cigarette Use Never User      E-Cigarette/Vaping Substances    Nicotine No     THC No     CBD No     Flavoring No     Other No     Unknown No      Social History     Tobacco Use    Smoking status: Former     Current packs/day: 0.00     Average packs/day: 1 pack/day for 55.6 years (55.6 ttl pk-yrs)     Types: Cigarettes     Start date: 1957     Quit date: 2012     Years since quittin.8     Passive exposure: Past    Smokeless tobacco: Never   Vaping Use    Vaping status: Never Used   Substance Use Topics    Alcohol use: Yes     Alcohol/week: 21.0 standard drinks of alcohol     Types: 21 Cans of beer per week    Drug use: No        Review of Systems   Constitutional:  Positive for chills and fever. Negative for appetite change, diaphoresis and fatigue.   HENT: Negative.  Negative for congestion, ear discharge, ear pain, postnasal drip, rhinorrhea, sore throat and trouble swallowing.    Eyes: Negative.  Negative for pain and visual disturbance.   Respiratory:  Positive for cough and shortness of breath.    Cardiovascular: Negative.  Negative for chest pain.   Gastrointestinal: Negative.  Negative for abdominal pain, blood in stool, constipation, diarrhea, nausea and vomiting.   Genitourinary: Negative.  Negative for decreased  urine volume, difficulty urinating, dysuria, flank pain and hematuria.   Musculoskeletal: Negative.  Negative for arthralgias and back pain.   Skin: Negative.  Negative for color change and rash.   Neurological: Negative.  Negative for dizziness, syncope, weakness, light-headedness and headaches.       Physical Exam  ED Triage Vitals   Temperature Pulse Respirations Blood Pressure SpO2   06/18/24 1406 06/18/24 1406 06/18/24 1406 06/18/24 1406 06/18/24 1406   99 °F (37.2 °C) 67 18 103/57 (!) 89 %      Temp Source Heart Rate Source Patient Position - Orthostatic VS BP Location FiO2 (%)   06/18/24 1406 06/18/24 1406 06/18/24 1406 06/18/24 1406 --   Oral Monitor Sitting Right arm       Pain Score       06/18/24 1921       No Pain             Orthostatic Vital Signs  Vitals:    06/18/24 1630 06/18/24 1921 06/18/24 2028 06/18/24 2228   BP: 124/59 120/67 120/67 114/53   Pulse: 71 74 84 67   Patient Position - Orthostatic VS:  Sitting         Physical Exam  Constitutional:       Appearance: Normal appearance. He is obese.      Comments: On 3 L O2 NC   HENT:      Head: Normocephalic and atraumatic.      Right Ear: External ear normal.      Left Ear: External ear normal.      Nose: Nose normal.      Mouth/Throat:      Pharynx: Oropharynx is clear.   Eyes:      Conjunctiva/sclera: Conjunctivae normal.   Cardiovascular:      Rate and Rhythm: Normal rate and regular rhythm.      Pulses: Normal pulses.      Heart sounds: Normal heart sounds.      Comments: RRR with +S1 and S2, no murmurs appreciated on exam. Peripheral pulses intact.    Pulmonary:      Effort: Pulmonary effort is normal. No respiratory distress.      Breath sounds: Normal breath sounds. No wheezing, rhonchi or rales.      Comments: Conversational dyspnea. CTABL with no abnormal lung sounds such as wheezes or rales appreciated on exam.     Abdominal:      General: Abdomen is flat. Bowel sounds are normal. There is no distension.      Palpations: Abdomen is soft.       Tenderness: There is no abdominal tenderness.      Comments: Soft, non tender, normo-active bowel sounds. Without rigidity, guarding, or distension.     Musculoskeletal:         General: No tenderness. Normal range of motion.      Cervical back: Normal range of motion.      Right lower leg: Edema present.      Left lower leg: Edema present.   Skin:     General: Skin is warm and dry.   Neurological:      General: No focal deficit present.      Mental Status: He is alert and oriented to person, place, and time. Mental status is at baseline.      Comments: CN grossly intact on visualization. No focal neurologic deficits noted on exam.  5/5 strength in all extremities. Neurovascularly intact with normal sensation and motor function.           ED Medications  Medications   albuterol (PROVENTIL HFA,VENTOLIN HFA) inhaler 2 puff (has no administration in time range)   amiodarone tablet 200 mg (has no administration in time range)   aspirin (ECOTRIN LOW STRENGTH) EC tablet 81 mg (has no administration in time range)   atorvastatin (LIPITOR) tablet 40 mg (has no administration in time range)   Cholecalciferol (VITAMIN D3) tablet 2,000 Units (has no administration in time range)   psyllium (METAMUCIL) 1 packet (has no administration in time range)   gabapentin (NEURONTIN) capsule 900 mg (900 mg Oral Given 6/18/24 2157)   labetalol (NORMODYNE) tablet 100 mg (100 mg Oral Given 6/18/24 2028)   multivitamin stress formula tablet 1 tablet (has no administration in time range)   potassium chloride (Klor-Con M20) CR tablet 20 mEq (has no administration in time range)   senna (SENOKOT) tablet 8.6 mg (has no administration in time range)   tamsulosin (FLOMAX) capsule 0.4 mg (0.4 mg Oral Given 6/18/24 2028)   cefepime (MAXIPIME) 2 g/50 mL dextrose IVPB (has no administration in time range)   azithromycin (ZITHROMAX) tablet 500 mg (has no administration in time range)   guaiFENesin (MUCINEX) 12 hr tablet 1,200 mg (1,200 mg Oral  Given 6/18/24 2028)   acetaminophen (TYLENOL) tablet 650 mg (has no administration in time range)   benzonatate (TESSALON PERLES) capsule 200 mg (has no administration in time range)   vancomycin (VANCOCIN) 2,000 mg in sodium chloride 0.9 % 500 mL IVPB (2,000 mg Intravenous New Bag 6/18/24 1817)   ceftriaxone (ROCEPHIN) 1 g/50 mL in dextrose IVPB (0 mg Intravenous Stopped 6/18/24 1547)   azithromycin (ZITHROMAX) 500 mg in sodium chloride 0.9% 250mL IVPB 500 mg (0 mg Intravenous Stopped 6/18/24 1659)       Diagnostic Studies  Results Reviewed       Procedure Component Value Units Date/Time    Blood culture #1 [[id number]] Collected: 06/18/24 1448    Lab Status: Preliminary result Specimen: Blood from Arm, Right Updated: 06/18/24 2201     Blood Culture Received in Microbiology Lab. Culture in Progress.    Blood culture #2 [[id number]] Collected: 06/18/24 1457    Lab Status: Preliminary result Specimen: Blood from Arm, Left Updated: 06/18/24 2201     Blood Culture Received in Microbiology Lab. Culture in Progress.    Urinalysis with microscopic [[id number]]  (Abnormal) Collected: 06/18/24 2032    Lab Status: Final result Specimen: Urine, Clean Catch Updated: 06/18/24 2108     Color, UA Light Yellow     Clarity, UA Clear     Specific Gravity, UA 1.010     pH, UA 5.5     Leukocytes, UA Negative     Nitrite, UA Negative     Protein, UA Negative mg/dl      Glucose, UA Negative mg/dl      Ketones, UA Negative mg/dl      Urobilinogen, UA <2.0 mg/dl      Bilirubin, UA Negative     Occult Blood, UA Negative     RBC, UA 1-2 /hpf      WBC, UA None Seen /hpf      Epithelial Cells None Seen /hpf      Bacteria, UA Occasional /hpf      Hyaline Casts, UA 0-3 /lpf     Procalcitonin [[id number]]  (Abnormal) Collected: 06/18/24 1448    Lab Status: Final result Specimen: Blood from Arm, Right Updated: 06/18/24 1923     Procalcitonin 0.35 ng/ml     HS Troponin I 2hr [[id number]]  (Normal) Collected: 06/18/24 1752    Lab Status: Final result  Specimen: Blood from Arm, Left Updated: 06/18/24 1822     hs TnI 2hr 12 ng/L      Delta 2hr hsTnI 0 ng/L     HS Troponin I 4hr [[id number]]     Lab Status: No result Specimen: Blood     FLU/RSV/COVID - if FLU/RSV clinically relevant [[id number]]  (Normal) Collected: 06/18/24 1457    Lab Status: Final result Specimen: Nares from Nose Updated: 06/18/24 1552     SARS-CoV-2 Negative     INFLUENZA A PCR Negative     INFLUENZA B PCR Negative     RSV PCR Negative    Narrative:      FOR PEDIATRIC PATIENTS - copy/paste COVID Guidelines URL to browser: [email or web address]    SARS-CoV-2 assay is a Nucleic Acid Amplification assay intended for the  qualitative detection of nucleic acid from SARS-CoV-2 in nasopharyngeal  swabs. Results are for the presumptive identification of SARS-CoV-2 RNA.    Positive results are indicative of infection with SARS-CoV-2, the virus  causing COVID-19, but do not rule out bacterial infection or co-infection  with other viruses. Laboratories within the [place] and its  territories are required to report all positive results to the appropriate  public health authorities. Negative results do not preclude SARS-CoV-2  infection and should not be used as the sole basis for treatment or other  patient management decisions. Negative results must be combined with  clinical observations, patient history, and epidemiological information.  This test has not been FDA cleared or approved.    This test has been authorized by FDA under an Emergency Use Authorization  (EUA). This test is only authorized for the duration of time the  declaration that circumstances exist justifying the authorization of the  emergency use of an in vitro diagnostic tests for detection of SARS-CoV-2  virus and/or diagnosis of COVID-19 infection under section 564(b)(1) of  the Act, 21 U.S.C. 360bbb-3(b)(1), unless the authorization is terminated  or revoked sooner. The test has  been validated but independent review by FDA  and CLIA is pending.    Test performed using [employer] GeneXpert: This RT-PCR assay targets N2,  a region unique to SARS-CoV-2. A conserved region in the E-gene was chosen  for pan-Sarbecovirus detection which includes SARS-CoV-2.    According to CMS-2020-01-R, this platform meets the definition of high-throughput technology.    HS Troponin 0hr (reflex protocol) [[id number]]  (Normal) Collected: 06/18/24 1448    Lab Status: Final result Specimen: Blood from Arm, Right Updated: 06/18/24 1522     hs TnI 0hr 12 ng/L     Lactic acid, plasma (w/reflex if result > 2.0) [[id number]]  (Normal) Collected: 06/18/24 1448    Lab Status: Final result Specimen: Blood from Arm, Right Updated: 06/18/24 1514     LACTIC ACID 1.2 mmol/L     Narrative:      Result may be elevated if tourniquet was used during collection.    Comprehensive metabolic panel [[id number]]  (Abnormal) Collected: 06/18/24 1448    Lab Status: Final result Specimen: Blood from Arm, Right Updated: 06/18/24 1514     Sodium 133 mmol/L      Potassium 4.5 mmol/L      Chloride 95 mmol/L      CO2 33 mmol/L      ANION GAP 5 mmol/L      BUN 37 mg/dL      Creatinine 1.50 mg/dL      Glucose 97 mg/dL      Calcium 8.1 mg/dL      Corrected Calcium 9.4 mg/dL      AST 58 U/L      ALT 53 U/L      Alkaline Phosphatase 147 U/L      Total Protein 6.8 g/dL      Albumin 2.4 g/dL      Total Bilirubin 0.84 mg/dL      eGFR 42 ml/min/1.73sq m     Narrative:      National Kidney Disease Foundation guidelines for Chronic Kidney Disease (CKD):     Stage 1 with normal or high GFR (GFR > 90 mL/min/1.73 square meters)    Stage 2 Mild CKD (GFR = 60-89 mL/min/1.73 square meters)    Stage 3A Moderate CKD (GFR = 45-59 mL/min/1.73 square meters)    Stage 3B Moderate CKD (GFR = 30-44 mL/min/1.73 square meters)    Stage 4 Severe CKD (GFR = 15-29 mL/min/1.73 square meters)    Stage 5 End Stage CKD (GFR <15 mL/min/1.73 square meters)  Note: GFR calculation is  accurate only with a steady state creatinine    CBC and differential [[id number]]  (Abnormal) Collected: 06/18/24 1448    Lab Status: Final result Specimen: Blood from Arm, Right Updated: 06/18/24 1458     WBC 12.71 Thousand/uL      RBC 2.88 Million/uL      Hemoglobin 8.4 g/dL      Hematocrit 27.2 %      MCV 94 fL      MCH 29.2 pg      MCHC 30.9 g/dL      RDW 17.7 %      MPV 9.9 fL      Platelets 304 Thousands/uL      nRBC 0 /100 WBCs      Segmented % 87 %      Immature Grans % 1 %      Lymphocytes % 5 %      Monocytes % 7 %      Eosinophils Relative 0 %      Basophils Relative 0 %      Absolute Neutrophils 11.06 Thousands/µL      Absolute Immature Grans 0.14 Thousand/uL      Absolute Lymphocytes 0.57 Thousands/µL      Absolute Monocytes 0.86 Thousand/µL      Eosinophils Absolute 0.04 Thousand/µL      Basophils Absolute 0.04 Thousands/µL                    XR chest 1 view portable   ED Interpretation by Jame Clemens MD (06/18 4403)   Image was independently interpreted by myself and showed left sided pleural effusion vs. consolidation.              Procedures  ECG 12 Lead Documentation Only    Date/Time: 6/18/2024 2:30 PM    Performed by: Jame Clemens MD  Authorized by: Jame Clemens MD    Indications / Diagnosis:  Shortness of breath  ECG reviewed by me, the ED Provider: yes    Patient location:  ED  Previous ECG:     Previous ECG:  Compared to current    Similarity:  Changes noted  Interpretation:     Interpretation: abnormal    Rate:     ECG rate:  67    ECG rate assessment: normal    Rhythm:     Rhythm: sinus rhythm    Ectopy:     Ectopy: none    QRS:     QRS axis:  Normal    QRS intervals:  Normal  Conduction:     Conduction: normal    ST segments:     ST segments:  Normal  T waves:     T waves: inverted      Inverted:  V4, V6 and V5  Comments:      Previous EKG showed A fib which patient states he goes into and out of.        ED Course  ED Course as of 06/18/24 2339 Tue Jun 18, 2024   1503 WBC(!): 12.71  Meets  SIRs criteria with tachypnea and elevated WBCs, will give antibiotics   1514 LACTIC ACID: 1.2   1514 No acute changes or concerns on CMP as compared to previous labs   1522 hs TnI 0hr: 12                          Initial Sepsis Screening       Row Name 06/18/24 1502                Is the patient's history suggestive of a new or worsening infection? Yes (Proceed)  -TQ        Suspected source of infection pneumonia  -TQ        Indicate SIRS criteria Tachypnea > 20 resp per min;Leukocytosis (WBC > 44771 IJL) OR Leukopenia (WBC <4000 IJL) OR Bandemia (WBC >10% bands)  -TQ        Are two or more of the above signs & symptoms of infection both present and new to the patient? Yes (Proceed)  -TQ        Assess for evidence of organ dysfunction: Are any of the below criteria present within 6 hours of suspected infection and SIRS criteria that are NOT considered to be chronic conditions? --                  User Key  (r) = Recorded By, (t) = Taken By, (c) = Cosigned By      Initials Name Provider Type    TQ Jame Clemens MD Resident                              Medical Decision Making  83-year-old male with extensive PMH including CHF, CAD status post CABG, COPD, AAA, and atrial fibrillation who presented to the ED for shortness of breath.  Patient's labs and imaging were obtained and reviewed by the ED provider.  See ED course for more details about patient's ED stay.  Patient's labs showed elevation of WBCs of 12.7 as well as elevated absolute neutrophils and mild hyponatremia.  Patient's one-view portable chest x-ray showed left-sided pleural effusion versus consolidation as per my independent interpretation.  While in the emergency department, patient was given 500 mg of azithromycin, 25 mg/kg vancomycin, as well as 1 g of ceftriaxone.  Through shared decision making to the patient and provider, the patient was planned for admission.  Patient's case was discussed with the internal medicine team at which point he was accepted  for inpatient admission under the service of Dr. Gale.  Patient was agreeable to this plan.    Amount and/or Complexity of Data Reviewed  Labs: ordered. Decision-making details documented in ED Course.  Radiology: ordered and independent interpretation performed.    Risk  Decision regarding hospitalization.          Disposition  Final diagnoses:   Pneumonia   SOB (shortness of breath)   Hypoxia   Productive cough     Time reflects when diagnosis was documented in both MDM as applicable and the Disposition within this note       Time User Action Codes Description Comment    6/18/2024  3:32 PM [place], Jame Add [J18.9] Pneumonia     6/18/2024  3:32 PM [place], Jame Add [R06.02] SOB (shortness of breath)     6/18/2024  3:33 PM [place], Jame Add [R09.02] Hypoxia     6/18/2024  3:33 PM [place], Jame Add [R05.8] Productive cough     6/18/2024  4:55 PM GanDelisa olivares Add [J90] Pleural effusion with shortness of breath           ED Disposition       ED Disposition   Admit    Condition   Stable    Date/Time   Tue Jun 18, 2024  3:32 PM    Comment   Case was discussed with [facility] and the patient's admission status was agreed to be Admission Status: inpatient status to the service of Dr. Gale .               Follow-up Information    None         Current Discharge Medication List        CONTINUE these medications which have NOT CHANGED    Details   albuterol (ProAir HFA) 90 mcg/act inhaler Inhale 2 puffs every 6 (six) hours as needed for wheezing  Qty: 24 g, Refills: 3    Comments: Substitution to a formulary equivalent within the same pharmaceutical class is authorized.  Associated Diagnoses: Chronic obstructive pulmonary disease, unspecified COPD type (HCC)      amiodarone 200 mg tablet TAKE 1 TABLET BY MOUTH DAILY  Qty: 90 tablet, Refills: 3    Comments: Please send a replace/new response with 90-Day Supply if appropriate to maximize member benefit. Requesting 1 year supply.  Associated Diagnoses: Atrial fibrillation,  unspecified type (HCC)      aspirin (ECOTRIN LOW STRENGTH) 81 mg EC tablet Take 1 tablet by mouth daily      atorvastatin (LIPITOR) 40 mg tablet Take 1 tablet (40 mg total) by mouth daily  Qty: 90 tablet, Refills: 3    Comments: Requesting 1 year supply  Associated Diagnoses: Mixed hyperlipidemia      cholecalciferol (VITAMIN D3) 1,000 units tablet Take 2,000 Units by mouth daily      FIBER ADULT GUMMIES PO Take 1 caplet by mouth daily       gabapentin (Neurontin) 300 mg capsule Take 1 capsule (300 mg total) by mouth 2 (two) times a day AND 3 capsules (900 mg total) daily at bedtime.  Qty: 450 capsule, Refills: 1    Associated Diagnoses: Lumbar radiculopathy      labetalol (NORMODYNE) 100 mg tablet Take 1 tablet (100 mg total) by mouth 2 (two) times a day  Qty: 180 tablet, Refills: 3    Comments: Requesting 1 year supply  Associated Diagnoses: Essential hypertension      multivitamin (THERAGRAN) TABS Take 1 tablet by mouth daily      potassium chloride (Klor-Con M20) 20 mEq tablet Take one tab daily, 7 days a week.  Qty: 75 tablet, Refills: 1    Associated Diagnoses: Chronic diastolic congestive heart failure (HCC)      senna (SENOKOT) 8.6 MG tablet Take 1 tablet by mouth as needed        spironolactone (ALDACTONE) 25 mg tablet Take 0.5 tablets (12.5 mg total) by mouth daily  Qty: 45 tablet, Refills: 3    Associated Diagnoses: Chronic diastolic congestive heart failure (HCC)      tamsulosin (FLOMAX) 0.4 mg Take 1 capsule by mouth daily      torsemide (DEMADEX) 20 mg tablet Take 2 tablets (40 mg total) by mouth daily  Qty: 180 tablet, Refills: 3    Associated Diagnoses: Chronic diastolic congestive heart failure (HCC)      warfarin (Coumadin) 2.5 mg tablet TAKE 1 TABLET BY MOUTH DAILY  Qty: 30 tablet, Refills: 2    Associated Diagnoses: Permanent atrial fibrillation (HCC)           No discharge procedures on file.    PDMP Review         Value Time User    PDMP Reviewed  Yes 6/13/2024 10:03 AM Lea Reyes, MD              ED Provider  Attending physically available and evaluated Oscar Espinosa. I managed the patient along with the ED Attending.    Electronically Signed by           Jame Clemens MD  06/18/24 5129

## 2024-06-18 NOTE — ASSESSMENT & PLAN NOTE
Patient admitted to the hospital the beginning of May due to pneumonia at which point he required a thoracentesis on 4/26 yielding 600ml, completed 7 days of ceftriaxone.   CT chest wo contrast (04/27/2024)- Minimally loculated moderate-sized left pleural effusion. Compressive atelectasis at the left lower lobe and lingula.   CT chest wo contrast (05/01/2024)- Unchanged moderate left effusion with adjacent atelectasis. Unchanged ascending thoracic aortic measuring 5 cm. Interstitial lung disease changes suggested bilaterally with atelectasis in the lingula and left lower lobe.  CT chest wo contrast (06/10/2024)- persistent loculated moderate size left pleural effusion w/ partial collapse of LLL. Stable AAA measuring 4.7 cm (since May 2024).

## 2024-06-18 NOTE — ED NOTES
Patient desatted to 83% on 2L NC, NC moved up to 5L sitting at 91%      Izzy Alcala RN  06/18/24 [id number]

## 2024-06-18 NOTE — ASSESSMENT & PLAN NOTE
Baseline Cr 0.9-1.1 prior to May 2024  Elevated to 1.66 as of May 2024 and has been stable since then        Plan:  Holding IV fluids given overall volume overload with 3+ pitting edema  Hold torsemide  Monitor BMP  F/u urinalysis

## 2024-06-18 NOTE — ASSESSMENT & PLAN NOTE
Wt Readings from Last 3 Encounters:   06/13/24 99.3 kg (219 lb)   06/06/24 100 kg (221 lb)   05/20/24 101 kg (221 lb 12.8 oz)     Home regimen: torsemide 40 mg PO daily, labetolol 100 mg    Plan:  Hold diuretics in the setting of elevated Cr  Monitor BMP  Monitor I/O

## 2024-06-18 NOTE — ASSESSMENT & PLAN NOTE
Patient presents with shortness of breath for 2 days. Past smoker, 2 packs/day, quit over 10 years ago. Patient states that he has also been having cough which is productive of dark brown phlegm for 2 days. Patient also mentions that he has been feeling feverish and chills while at home Denies chest pain, abdominal pain, n/v/d,dizziness, lightheadedness, HA, dysuria, hematuria, hematochezia, or melena. Sent in by pulmonologist following CT results. Admitted to the hospital the beginning of May due to pneumonia at which point he required a thoracentesis on 4/26 yielding 600ml, completed 7 days of ceftriaxone.  CT chest wo contrast (06/10/2024)- persistent loculated moderate size left pleural effusion w/ partial collapse of LLL. Stable AAA measuring 4.7 cm (since May 2024).   Patient wears a baseline of 2 L oxygen on exertion and during sleep since last hospital admission.  In the ED- O2 desat to 83% on 2L, moved up to 5 L, sitting 91%  Patient reports 38 lb weight loss over the last few months and has decreased appetite.   Received vancomycin, azithromycin, and ceftriaxone in the ER.  Viral panel negative    Plan:  Continue with IV cefepime and azithromycin p.o.  F/u blood culture, sputum culture, Strep pneumo and Legionella antigen  Trend WBC/ procal  Monitor temp  Tylenol 650 mg prn  Tessalon Perles 200 mg TID prn  Mucinex 1200 mg BID  Incentive spirometer  Flutter valve  Consult pulmonology/IR possible thoracentesis

## 2024-06-19 ENCOUNTER — APPOINTMENT (INPATIENT)
Dept: RADIOLOGY | Facility: HOSPITAL | Age: 84
DRG: 177 | End: 2024-06-19
Attending: RADIOLOGY
Payer: MEDICARE

## 2024-06-19 ENCOUNTER — TELEPHONE (OUTPATIENT)
Age: 84
End: 2024-06-19

## 2024-06-19 PROBLEM — R79.89 ABNORMAL TSH: Status: ACTIVE | Noted: 2024-06-19

## 2024-06-19 LAB
ALBUMIN SERPL BCG-MCNC: 2.3 G/DL (ref 3.5–5)
ALP SERPL-CCNC: 130 U/L (ref 34–104)
ALT SERPL W P-5'-P-CCNC: 44 U/L (ref 7–52)
ANION GAP SERPL CALCULATED.3IONS-SCNC: 5 MMOL/L (ref 4–13)
APPEARANCE FLD: ABNORMAL
AST SERPL W P-5'-P-CCNC: 40 U/L (ref 13–39)
BASOPHILS # BLD AUTO: 0.06 THOUSANDS/ÂΜL (ref 0–0.1)
BASOPHILS NFR BLD AUTO: 0 % (ref 0–1)
BILIRUB SERPL-MCNC: 0.87 MG/DL (ref 0.2–1)
BUN SERPL-MCNC: 32 MG/DL (ref 5–25)
CALCIUM ALBUM COR SERPL-MCNC: 9.3 MG/DL (ref 8.3–10.1)
CALCIUM SERPL-MCNC: 7.9 MG/DL (ref 8.4–10.2)
CHLORIDE SERPL-SCNC: 98 MMOL/L (ref 96–108)
CO2 SERPL-SCNC: 31 MMOL/L (ref 21–32)
COLOR FLD: ABNORMAL
CREAT SERPL-MCNC: 1.21 MG/DL (ref 0.6–1.3)
EOSINOPHIL # BLD AUTO: 0.05 THOUSAND/ÂΜL (ref 0–0.61)
EOSINOPHIL NFR BLD AUTO: 0 % (ref 0–6)
ERYTHROCYTE [DISTWIDTH] IN BLOOD BY AUTOMATED COUNT: 17.9 % (ref 11.6–15.1)
FERRITIN SERPL-MCNC: 139 NG/ML (ref 24–336)
GFR SERPL CREATININE-BSD FRML MDRD: 55 ML/MIN/1.73SQ M
GLUCOSE FLD-MCNC: <10 MG/DL
GLUCOSE SERPL-MCNC: 109 MG/DL (ref 65–140)
HCT VFR BLD AUTO: 25.3 % (ref 36.5–49.3)
HGB BLD-MCNC: 7.9 G/DL (ref 12–17)
IMM GRANULOCYTES # BLD AUTO: 0.19 THOUSAND/UL (ref 0–0.2)
IMM GRANULOCYTES NFR BLD AUTO: 1 % (ref 0–2)
INR PPP: 2.16 (ref 0.84–1.19)
IRON SATN MFR SERPL: 8 % (ref 15–50)
IRON SERPL-MCNC: 12 UG/DL (ref 50–212)
LDH FLD L TO P-CCNC: NORMAL U/L
LYMPHOCYTES # BLD AUTO: 0.53 THOUSANDS/ÂΜL (ref 0.6–4.47)
LYMPHOCYTES NFR BLD AUTO: 3 % (ref 14–44)
MAGNESIUM SERPL-MCNC: 1.9 MG/DL (ref 1.9–2.7)
MCH RBC QN AUTO: 28.8 PG (ref 26.8–34.3)
MCHC RBC AUTO-ENTMCNC: 31.2 G/DL (ref 31.4–37.4)
MCV RBC AUTO: 92 FL (ref 82–98)
MONOCYTES # BLD AUTO: 1.03 THOUSAND/ÂΜL (ref 0.17–1.22)
MONOCYTES NFR BLD AUTO: 6 % (ref 4–12)
NEUTROPHILS # BLD AUTO: 15.13 THOUSANDS/ÂΜL (ref 1.85–7.62)
NEUTS SEG NFR BLD AUTO: 90 % (ref 43–75)
NRBC BLD AUTO-RTO: 0 /100 WBCS
PH BODY FLUID: 5.6
PLATELET # BLD AUTO: 258 THOUSANDS/UL (ref 149–390)
PMV BLD AUTO: 9.6 FL (ref 8.9–12.7)
POTASSIUM SERPL-SCNC: 4.2 MMOL/L (ref 3.5–5.3)
PROCALCITONIN SERPL-MCNC: 0.3 NG/ML
PROT FLD-MCNC: <3 G/DL
PROT SERPL-MCNC: 6.2 G/DL (ref 6.4–8.4)
PROTHROMBIN TIME: 25 SECONDS (ref 11.6–14.5)
RBC # BLD AUTO: 2.74 MILLION/UL (ref 3.88–5.62)
SITE: ABNORMAL
SODIUM SERPL-SCNC: 134 MMOL/L (ref 135–147)
TIBC SERPL-MCNC: 145 UG/DL (ref 250–450)
TOTAL CELLS COUNTED SPEC: 100
TOTAL PROTEIN FLUID: 1.9 G/DL
TRIGL FLD-MCNC: 84 MG/DL
UIBC SERPL-MCNC: 133 UG/DL (ref 155–355)
WBC # BLD AUTO: 16.99 THOUSAND/UL (ref 4.31–10.16)
WBC # FLD MANUAL: ABNORMAL /UL
WBC OTHER NFR FLD MANUAL: 100 %

## 2024-06-19 PROCEDURE — 83615 LACTATE (LD) (LDH) ENZYME: CPT | Performed by: STUDENT IN AN ORGANIZED HEALTH CARE EDUCATION/TRAINING PROGRAM

## 2024-06-19 PROCEDURE — 99223 1ST HOSP IP/OBS HIGH 75: CPT | Performed by: STUDENT IN AN ORGANIZED HEALTH CARE EDUCATION/TRAINING PROGRAM

## 2024-06-19 PROCEDURE — 83550 IRON BINDING TEST: CPT

## 2024-06-19 PROCEDURE — NC001 PR NO CHARGE: Performed by: RADIOLOGY

## 2024-06-19 PROCEDURE — 84145 PROCALCITONIN (PCT): CPT

## 2024-06-19 PROCEDURE — 88112 CYTOPATH CELL ENHANCE TECH: CPT | Performed by: STUDENT IN AN ORGANIZED HEALTH CARE EDUCATION/TRAINING PROGRAM

## 2024-06-19 PROCEDURE — 32557 INSERT CATH PLEURA W/ IMAGE: CPT | Performed by: RADIOLOGY

## 2024-06-19 PROCEDURE — 87205 SMEAR GRAM STAIN: CPT | Performed by: STUDENT IN AN ORGANIZED HEALTH CARE EDUCATION/TRAINING PROGRAM

## 2024-06-19 PROCEDURE — 87077 CULTURE AEROBIC IDENTIFY: CPT | Performed by: STUDENT IN AN ORGANIZED HEALTH CARE EDUCATION/TRAINING PROGRAM

## 2024-06-19 PROCEDURE — 82728 ASSAY OF FERRITIN: CPT

## 2024-06-19 PROCEDURE — 83735 ASSAY OF MAGNESIUM: CPT

## 2024-06-19 PROCEDURE — 87070 CULTURE OTHR SPECIMN AEROBIC: CPT | Performed by: STUDENT IN AN ORGANIZED HEALTH CARE EDUCATION/TRAINING PROGRAM

## 2024-06-19 PROCEDURE — 85025 COMPLETE CBC W/AUTO DIFF WBC: CPT

## 2024-06-19 PROCEDURE — 88305 TISSUE EXAM BY PATHOLOGIST: CPT | Performed by: STUDENT IN AN ORGANIZED HEALTH CARE EDUCATION/TRAINING PROGRAM

## 2024-06-19 PROCEDURE — 84478 ASSAY OF TRIGLYCERIDES: CPT | Performed by: STUDENT IN AN ORGANIZED HEALTH CARE EDUCATION/TRAINING PROGRAM

## 2024-06-19 PROCEDURE — C1729 CATH, DRAINAGE: HCPCS | Performed by: GENERAL PRACTICE

## 2024-06-19 PROCEDURE — 32557 INSERT CATH PLEURA W/ IMAGE: CPT

## 2024-06-19 PROCEDURE — 83540 ASSAY OF IRON: CPT

## 2024-06-19 PROCEDURE — 83986 ASSAY PH BODY FLUID NOS: CPT | Performed by: STUDENT IN AN ORGANIZED HEALTH CARE EDUCATION/TRAINING PROGRAM

## 2024-06-19 PROCEDURE — 80053 COMPREHEN METABOLIC PANEL: CPT

## 2024-06-19 PROCEDURE — 99232 SBSQ HOSP IP/OBS MODERATE 35: CPT | Performed by: INTERNAL MEDICINE

## 2024-06-19 PROCEDURE — 89051 BODY FLUID CELL COUNT: CPT | Performed by: STUDENT IN AN ORGANIZED HEALTH CARE EDUCATION/TRAINING PROGRAM

## 2024-06-19 PROCEDURE — 87181 SC STD AGAR DILUTION PER AGT: CPT | Performed by: STUDENT IN AN ORGANIZED HEALTH CARE EDUCATION/TRAINING PROGRAM

## 2024-06-19 PROCEDURE — 82945 GLUCOSE OTHER FLUID: CPT | Performed by: STUDENT IN AN ORGANIZED HEALTH CARE EDUCATION/TRAINING PROGRAM

## 2024-06-19 PROCEDURE — 85610 PROTHROMBIN TIME: CPT

## 2024-06-19 PROCEDURE — 84157 ASSAY OF PROTEIN OTHER: CPT | Performed by: STUDENT IN AN ORGANIZED HEALTH CARE EDUCATION/TRAINING PROGRAM

## 2024-06-19 RX ORDER — WARFARIN SODIUM 2.5 MG/1
2.5 TABLET ORAL
Status: DISCONTINUED | OUTPATIENT
Start: 2024-06-20 | End: 2024-06-24

## 2024-06-19 RX ORDER — FENTANYL CITRATE 50 UG/ML
INJECTION, SOLUTION INTRAMUSCULAR; INTRAVENOUS AS NEEDED
Status: COMPLETED | OUTPATIENT
Start: 2024-06-19 | End: 2024-06-19

## 2024-06-19 RX ORDER — LIDOCAINE WITH 8.4% SOD BICARB 0.9%(10ML)
SYRINGE (ML) INJECTION AS NEEDED
Status: COMPLETED | OUTPATIENT
Start: 2024-06-19 | End: 2024-06-19

## 2024-06-19 RX ADMIN — LABETALOL HYDROCHLORIDE 100 MG: 100 TABLET, FILM COATED ORAL at 08:31

## 2024-06-19 RX ADMIN — ASPIRIN 81 MG: 81 TABLET, COATED ORAL at 08:30

## 2024-06-19 RX ADMIN — Medication 1 PACKET: at 08:30

## 2024-06-19 RX ADMIN — GABAPENTIN 900 MG: 300 CAPSULE ORAL at 21:05

## 2024-06-19 RX ADMIN — CEFEPIME 2000 MG: 2 INJECTION, POWDER, FOR SOLUTION INTRAVENOUS at 14:14

## 2024-06-19 RX ADMIN — FENTANYL CITRATE 25 MCG: 50 INJECTION INTRAMUSCULAR; INTRAVENOUS at 13:25

## 2024-06-19 RX ADMIN — ATORVASTATIN CALCIUM 40 MG: 40 TABLET, FILM COATED ORAL at 08:31

## 2024-06-19 RX ADMIN — AMIODARONE HYDROCHLORIDE 200 MG: 200 TABLET ORAL at 08:31

## 2024-06-19 RX ADMIN — Medication 2000 UNITS: at 08:30

## 2024-06-19 RX ADMIN — B-COMPLEX W/ C & FOLIC ACID TAB 1 TABLET: TAB at 08:31

## 2024-06-19 RX ADMIN — LABETALOL HYDROCHLORIDE 100 MG: 100 TABLET, FILM COATED ORAL at 17:17

## 2024-06-19 RX ADMIN — AZITHROMYCIN DIHYDRATE 500 MG: 250 TABLET ORAL at 17:17

## 2024-06-19 RX ADMIN — CEFEPIME 2000 MG: 2 INJECTION, POWDER, FOR SOLUTION INTRAVENOUS at 00:54

## 2024-06-19 RX ADMIN — GUAIFENESIN 1200 MG: 600 TABLET ORAL at 08:31

## 2024-06-19 RX ADMIN — TAMSULOSIN HYDROCHLORIDE 0.4 MG: 0.4 CAPSULE ORAL at 17:17

## 2024-06-19 RX ADMIN — POTASSIUM CHLORIDE 20 MEQ: 1500 TABLET, EXTENDED RELEASE ORAL at 08:31

## 2024-06-19 RX ADMIN — Medication 5 ML: at 13:34

## 2024-06-19 RX ADMIN — GUAIFENESIN 1200 MG: 600 TABLET ORAL at 21:05

## 2024-06-19 NOTE — CONSULTS
"Progress Note - Pulmonary   Oscar Espinosa 83 y.o. male MRN: [id number]  Unit/Bed#: W -01 Encouter:[id number]    Assessment/Plan:    Acute on chronic hypoxic respiratory failure  Patient uses 2 L NC with exertion  Has acute on chronic respiratory failure evidenced with increased oxygen needs  Most likely due to #2 and #4  Pneumonia  Suspicion for pneumonia given WBC elevation, symptomatically patient has been having some dark brown sputum coming up and also has been feeling chills and is feverish for the last several days  Received vancomycin, ceftriaxone and azithromycin in the ED  Received antibiotics including vancomycin, cefepime, azithromycin  COPD   Patient takes albuterol at home  Is not on maintenance at home  Mentioned that he has some wheezing but on physical exam there was no wheezes for now  Continue management of #2 and #4  Continue monitoring for exacerbation  Pleural effusion  Patient had loculated pleural effusions which was parapneumonic and exudative with neutrophilic and lymphocytic predominance  He had several thoracentesis done previously and had 600 cc drained but because of the loculated nature it was not fully drained  IR is consulted, patient is getting chest tube  Moderate to severe pulmonary hypertension  Patient has a history of moderate to severe pulmonary hypertension and severe YEVGENIY not tolerating PAP  Uses oxygen nightly and with exertion      Recommendations and plan:  Continue cefepime and azithromycin for now  Follow-up with cultures  Patient is getting chest tube via IR, IR care appreciated  Chest x-ray after chest tube placement and in the morning  Monitor chest tube output  Send fluid analysis labs    Chief Complaint:    \" I've been coughing and feel short of breath\"    HPI: This is an 83-year-old male with past medical history of severe YEVGENIY with nocturnal hypoxemia, COPD, moderate to severe pulmonary hypertension, CHF, recent hospitalization with left-sided loculated " parapneumonic effusion-exudative neutrophilic and lymphocytic predominant who presented to the hospital because of productive cough with dark brown sputum and shortness of breath.  Also he has been having intermittent fevers before coming to the hospital.    Subjective:    Patient feels short of breath with exertion, and has been complaining of coughing as well as chills and occasional wheezes.     Objective:     VS: Febrile, normotensive, saturating in low 90s on 4 L nasal cannula.    Physical exam:     Physical Exam  Vitals and nursing note reviewed.   Constitutional:       General: He is not in acute distress.     Appearance: He is well-developed.   HENT:      Head: Normocephalic and atraumatic.   Eyes:      Conjunctiva/sclera: Conjunctivae normal.   Cardiovascular:      Rate and Rhythm: Normal rate and regular rhythm.   Pulmonary:      Effort: Pulmonary effort is normal. No respiratory distress.      Breath sounds: Normal breath sounds.      Comments: Decreased lung sounds on the left lower part of the chest  Abdominal:      Palpations: Abdomen is soft.      Tenderness: There is no abdominal tenderness.   Musculoskeletal:         General: No swelling.      Cervical back: Neck supple.   Skin:     General: Skin is warm and dry.      Capillary Refill: Capillary refill takes less than 2 seconds.   Neurological:      Mental Status: He is alert.   Psychiatric:         Mood and Affect: Mood normal.           Labs: WBC 14.15 initially with slight bump to 16.99 this morning, hemoglobin in middle eights and high sevens, creatinine elevated at 1.5 initially.  Procalcitonin  Micro labs: Blood cultures pending, flu RSV COVID-negative    Imaging: Chest x-ray showed left pleural effusion similar to prior study and left lower lobe opacities likely atelectasis versus pneumonia.    Previous workup: CT chest from around a week and a half ago showed persistent loculated moderate size left pleural effusion with partial collapse of  "the left lower lobe lung.  Pleural effusion was exudative in nature with neutrophilic and lymphocytic predominant differential.  For COPD: PFTs from 02/24/2021 FEV1 74%, FVC 74%, FEV1/FVC ratio 71% DLCO 54%, no large airway obstruction on spirometry.     Treated with: Azithromycin, cefepime, Mucinex.     Vital signs:     Vitals: Blood pressure 108/54, pulse 77, temperature 98.8 °F (37.1 °C), resp. rate 22, height 5' 11\" (1.803 m), SpO2 91%.On 5 Ls,Body mass index is 30.54 kg/m².      Intake/Output Summary (Last 24 hours) at 6/19/2024 0916  Last data filed at 6/19/2024 0907  Gross per 24 hour   Intake 830 ml   Output 125 ml   Net 705 ml       Invasive Devices       Peripheral Intravenous Line  Duration             Peripheral IV 06/18/24 Distal;Left;Upper;Ventral (anterior) Arm <1 day    Peripheral IV 06/18/24 Proximal;Right;Ventral (anterior) Forearm <1 day                    Physical Exam:     Physical Exam  Vitals and nursing note reviewed.   Constitutional:       General: He is not in acute distress.     Appearance: He is well-developed.   HENT:      Head: Normocephalic and atraumatic.   Eyes:      Conjunctiva/sclera: Conjunctivae normal.   Cardiovascular:      Rate and Rhythm: Normal rate and regular rhythm.   Pulmonary:      Effort: Pulmonary effort is normal. No respiratory distress.      Breath sounds: Normal breath sounds.      Comments: Decreased lung sounds on the left lower part of the chest  Abdominal:      Palpations: Abdomen is soft.      Tenderness: There is no abdominal tenderness.   Musculoskeletal:         General: No swelling.      Cervical back: Neck supple.   Skin:     General: Skin is warm and dry.      Capillary Refill: Capillary refill takes less than 2 seconds.   Neurological:      Mental Status: He is alert.   Psychiatric:         Mood and Affect: Mood normal.         Labs: I have personally reviewed pertinent lab results., ABG: No results found for: \"PHART\", \"ATT7CCY\", \"PO2ART\", \"LOK5PTB\", " "\"T9CYKNIM\", \"BEART\", \"SOURCE\", BNP: No results found for: \"BNP\", CBC:   Lab Results   Component Value Date    WBC 16.99 (H) 06/19/2024    HGB 7.9 (L) 06/19/2024    HCT 25.3 (L) 06/19/2024    MCV 92 06/19/2024     06/19/2024    RBC 2.74 (L) 06/19/2024    MCH 28.8 06/19/2024    MCHC 31.2 (L) 06/19/2024    RDW 17.9 (H) 06/19/2024    MPV 9.6 06/19/2024    NRBC 0 06/19/2024   , CMP:   Lab Results   Component Value Date    SODIUM 134 (L) 06/19/2024    K 4.2 06/19/2024    CL 98 06/19/2024    CO2 31 06/19/2024    BUN 32 (H) 06/19/2024    CREATININE 1.21 06/19/2024    CALCIUM 7.9 (L) 06/19/2024    AST 40 (H) 06/19/2024    ALT 44 06/19/2024    ALKPHOS 130 (H) 06/19/2024    EGFR 55 06/19/2024         Imaging and other studies: I have personally reviewed pertinent reports.      XR chest 1 view portable   ED Interpretation by Jame Clemens MD (06/18 1224)   Image was independently interpreted by myself and showed left sided pleural effusion vs. consolidation.        Final Result by Darrell Esteves MD (06/19 3537)      Left pleural effusion is similar to prior study. Left lower lobe opacity can represent atelectasis given the appearance on the prior CT. Pneumonia is to be determined on clinical grounds.            Workstation performed: [id number]         IR chest tube placement    (Results Pending)     "

## 2024-06-19 NOTE — PLAN OF CARE
Problem: RESPIRATORY - ADULT  Goal: Achieves optimal ventilation and oxygenation  Description: INTERVENTIONS:  - Assess for changes in respiratory status  - Assess for changes in mentation and behavior  - Position to facilitate oxygenation and minimize respiratory effort  - Oxygen administered by appropriate delivery if ordered  - Initiate smoking cessation education as indicated  - Encourage broncho-pulmonary hygiene including cough, deep breathe, Incentive Spirometry  - Assess the need for suctioning and aspirate as needed  - Assess and instruct to report SOB or any respiratory difficulty  - Respiratory Therapy support as indicated  Outcome: Progressing     Problem: GENITOURINARY - ADULT  Goal: Maintains or returns to baseline urinary function  Description: INTERVENTIONS:  - Assess urinary function  - Encourage oral fluids to ensure adequate hydration if ordered  - Administer IV fluids as ordered to ensure adequate hydration  - Administer ordered medications as needed  - Offer frequent toileting  - Follow urinary retention protocol if ordered  Outcome: Progressing  Goal: Absence of urinary retention  Description: INTERVENTIONS:  - Assess patient’s ability to void and empty bladder  - Monitor I/O  - Bladder scan as needed  - Discuss with physician/AP medications to alleviate retention as needed  - Discuss catheterization for long term situations as appropriate  Outcome: Progressing     Problem: METABOLIC, FLUID AND ELECTROLYTES - ADULT  Goal: Electrolytes maintained within normal limits  Description: INTERVENTIONS:  - Monitor labs and assess patient for signs and symptoms of electrolyte imbalances  - Administer electrolyte replacement as ordered  - Monitor response to electrolyte replacements, including repeat lab results as appropriate  - Instruct patient on fluid and nutrition as appropriate  Outcome: Progressing  Goal: Fluid balance maintained  Description: INTERVENTIONS:  - Monitor labs   - Monitor I/O and  WT  - Instruct patient on fluid and nutrition as appropriate  - Assess for signs & symptoms of volume excess or deficit  Outcome: Progressing  Goal: Glucose maintained within target range  Description: INTERVENTIONS:  - Monitor Blood Glucose as ordered  - Assess for signs and symptoms of hyperglycemia and hypoglycemia  - Administer ordered medications to maintain glucose within target range  - Assess nutritional intake and initiate nutrition service referral as needed  Outcome: Progressing

## 2024-06-19 NOTE — ASSESSMENT & PLAN NOTE
Lab Results   Component Value Date    HGBA1C 6.3 (H) 06/18/2024        Plan:  Monitor blood sugars  Consider insulin sliding scale if glucose elevated  Diabetic diet

## 2024-06-19 NOTE — ASSESSMENT & PLAN NOTE
On review of labs, anemia is new since April 2024.   Last labs in August 2023 were normal.   There has been a slow trend downward.   Hgb 8.4 on admission    Plan:  Monitor CBC  Transfuse if Hgb <7  Iron panel pending

## 2024-06-19 NOTE — TELEMEDICINE
e-Consult ([facility])  - Interventional Radiology  Oscar Espinosa 83 y.o. male MRN: [id number]  Unit/Bed#: W -01 Encounter: [id number]          Interventional Radiology has been consulted to evaluate Oscar Espinosa    We were consulted by [facility] concerning this patient with a loculated left pleural effusion.    Inpatient Consult to IR  Consult performed by: Rosalia Cornell MD  Consult ordered by: Delisa Acevedo MD        06/19/24    Assessment/Recommendation:   83-year-old male with complex medical history including COPD, pulmonary hypertension, CHF, with recent hospitalization secondary to a parapneumonic effusion on the left.  He presented to the hospital with productive cough and shortness of breath with intermittent fevers.  CT scan demonstrates recurrence of the effusion, now with an appearance suggesting underlying loculation.    The patient's white blood cell count is 16.99.    IR was initially consulted regarding a repeat thoracentesis, however, with this appearance and clinical presentation, this is likely an empyema with loculations and will require chest tube and possibly tPA/dornase subsequently.  Discussed with hospitalist and pulmonology who both agree.  Will plan to perform the procedure this afternoon.    11-20 minutes, >50% of the total time devoted to medical consultative verbal/EMR discussion between providers. Written report will be generated in the EMR.     Thank you for allowing Interventional Radiology to participate in the care of Oscar Espinosa. Please don't hesitate to call or TigerText us with any questions.     Rosalia Cornell MD

## 2024-06-19 NOTE — ASSESSMENT & PLAN NOTE
Patient presents with shortness of breath for 2 days. Past smoker, 2 packs/day, quit over 10 years ago. Patient states that he has also been having cough which is productive of dark brown phlegm for 2 days. Patient also mentions that he has been feeling feverish and chills while at home Denies chest pain, abdominal pain, n/v/d,dizziness, lightheadedness, HA, dysuria, hematuria, hematochezia, or melena. Sent in by pulmonologist following CT results. Admitted to the hospital the beginning of May due to pneumonia at which point he required a thoracentesis on 4/26 yielding 600ml, completed 7 days of ceftriaxone.  CT chest wo contrast (06/10/2024)- persistent loculated moderate size left pleural effusion w/ partial collapse of LLL. Stable AAA measuring 4.7 cm (since May 2024).   Patient wears a baseline of 2 L oxygen on exertion and during sleep since last hospital admission.  In the ED- O2 desat to 83% on 2L, moved up to 5 L, sitting 91%  Patient reports 38 lb weight loss over the last few months and has decreased appetite.   Received vancomycin, azithromycin, and ceftriaxone in the ER.  Viral panel negative  Pro-Aristides 0.30    Plan:  Continue with IV cefepime and azithromycin p.o.  F/u blood culture, sputum culture, Strep pneumo and Legionella antigen  Monitor temp  Tylenol 650 mg prn  Tessalon Perles 200 mg TID prn  Mucinex 1200 mg BID  Incentive spirometer  Flutter valve  Consulted pulmonology/IR possible thoracentesis

## 2024-06-19 NOTE — PROGRESS NOTES
"The history and physical were reviewed, along with progress notes, and the patient was examined. There are no changes since it was written.    /54   Pulse 77   Temp 98.8 °F (37.1 °C)   Resp 22   Ht 5' 11\" (1.803 m)   SpO2 91%   BMI 30.54 kg/m²      "

## 2024-06-19 NOTE — BRIEF OP NOTE (RAD/CATH)
IR CHEST TUBE PLACEMENT Procedure Note    PATIENT NAME: Oscar Espinosa  : 1940  MRN: [id number]    Pre-op Diagnosis:   1. Pneumonia    2. SOB (shortness of breath)    3. Hypoxia    4. Productive cough    5. Pleural effusion with shortness of breath      Post-op Diagnosis:   1. Pneumonia    2. SOB (shortness of breath)    3. Hypoxia    4. Productive cough    5. Pleural effusion with shortness of breath        Surgeon:   Rosalia Cornell MD  Resident:  Azalea Mariscal MD      Estimated Blood Loss: Less than 5 mL.    Findings: Left sided chest tube placement under ultrasound guidance.     Specimens: 300 mL of mildly frothy, slightly viscous and serosanguinous fluid drained and samples were sent for ordered laboratory evaluation.    Complications:  No complications.    Anesthesia: local and IV Fentanyl    Azalea Mariscal MD     Date: 2024  Time: 1:49 PM

## 2024-06-19 NOTE — PROGRESS NOTES
[facility]  Progress Note  Name: Oscar Espinosa I  MRN: [id number]  Unit/Bed#: W -01 I Date of Admission: 6/18/2024   Date of Service: 6/19/2024 I Hospital Day: 1    Assessment & Plan   Acute hypoxic respiratory failure (HCC)  Assessment & Plan  Patient presents with shortness of breath for 2 days. Past smoker, 2 packs/day, quit over 10 years ago. Patient states that he has also been having cough which is productive of dark brown phlegm for 2 days. Patient also mentions that he has been feeling feverish and chills while at home Denies chest pain, abdominal pain, n/v/d,dizziness, lightheadedness, HA, dysuria, hematuria, hematochezia, or melena. Sent in by pulmonologist following CT results. Admitted to the hospital the beginning of May due to pneumonia at which point he required a thoracentesis on 4/26 yielding 600ml, completed 7 days of ceftriaxone.  CT chest wo contrast (06/10/2024)- persistent loculated moderate size left pleural effusion w/ partial collapse of LLL. Stable AAA measuring 4.7 cm (since May 2024).   Patient wears a baseline of 2 L oxygen on exertion and during sleep since last hospital admission.  In the ED- O2 desat to 83% on 2L, moved up to 5 L, sitting 91%  Patient reports 38 lb weight loss over the last few months and has decreased appetite.   Received vancomycin, azithromycin, and ceftriaxone in the ER.  Viral panel negative  Pro-Aristides 0.30    Plan:  Continue with IV cefepime and azithromycin p.o.  F/u blood culture, sputum culture, Strep pneumo and Legionella antigen  Monitor temp  Tylenol 650 mg prn  Tessalon Perles 200 mg TID prn  Mucinex 1200 mg BID  Incentive spirometer  Flutter valve  Consulted pulmonology/IR possible thoracentesis    Abnormal TSH  Assessment & Plan  TSH 4.559, free T4 within normal limits    Elevated serum creatinine  Assessment & Plan  Baseline Cr 0.9-1.1 prior to May 2024  Elevated to 1.66 as of May 2024 and has been stable since then         Plan:  Holding IV fluids given overall volume overload with 3+ pitting edema  Hold torsemide  Monitor BMP  F/u urinalysis    Prediabetes  Assessment & Plan  Lab Results   Component Value Date    HGBA1C 6.3 (H) 06/18/2024        Plan:  Monitor blood sugars  Consider insulin sliding scale if glucose elevated  Diabetic diet    Chronic combined systolic and diastolic congestive heart failure (HCC)  Assessment & Plan  Wt Readings from Last 3 Encounters:   06/13/24 99.3 kg (219 lb)   06/06/24 100 kg (221 lb)   05/20/24 101 kg (221 lb 12.8 oz)     Home regimen: torsemide 40 mg PO daily, labetolol 100 mg    Plan:  Hold diuretics in the setting of elevated Cr  Monitor BMP  Monitor I/O        Anemia  Assessment & Plan  On review of labs, anemia is new since April 2024.   Last labs in August 2023 were normal.   There has been a slow trend downward.   Hgb 8.4 on admission    Plan:  Monitor CBC  Transfuse if Hgb <7  Iron panel pending    Pleural effusion with shortness of breath  Assessment & Plan  Patient admitted to the hospital the beginning of May due to pneumonia at which point he required a thoracentesis on 4/26 yielding 600ml, completed 7 days of ceftriaxone.   CT chest wo contrast (04/27/2024)- Minimally loculated moderate-sized left pleural effusion. Compressive atelectasis at the left lower lobe and lingula.   CT chest wo contrast (05/01/2024)- Unchanged moderate left effusion with adjacent atelectasis. Unchanged ascending thoracic aortic measuring 5 cm. Interstitial lung disease changes suggested bilaterally with atelectasis in the lingula and left lower lobe.  CT chest wo contrast (06/10/2024)- persistent loculated moderate size left pleural effusion w/ partial collapse of LLL. Stable AAA measuring 4.7 cm (since May 2024).         Atrial fibrillation (HCC)  Assessment & Plan  Home regimen: Amiodorone 200 mg p.o. daily, labetolol 100 mg BID, warfarin 2.5 mg    Plan:  Continue amiodarone and labetalol  Hold  warfarin for for today for possible thoracentesis  PT/ INR monitoring    Chronic pain syndrome  Assessment & Plan  Home regimen: gabapentin 900 mg HS    Plan:  Reduce dose if further elevation of Creatinine    3-vessel CAD  Assessment & Plan  Home regimen: ASA 81 mg, lipitor 40 mg, labetolol 100 mg    Plan:  Continue home regimen               VTE Pharmacologic Prophylaxis: VTE Score: 5 High Risk (Score >/= 5) - Pharmacological DVT Prophylaxis Ordered: Warfarin held for possible thoracentesis s. Sequential Compression Devices Ordered.    Mobility:   Basic Mobility Inpatient Raw Score: 18  -[facility] Goal: 6: Walk 10 steps or more  JH-HLM Achieved: 7: Walk 25 feet or more      Patient Centered Rounds: I performed bedside rounds with nursing staff today.   Discussions with Specialists or Other Care Team Provider:     Education and Discussions with Family / Patient:  Will update spouse.     Total Time Spent on Date of Encounter in care of patient: 30 mins. This time was spent on one or more of the following: performing physical exam; counseling and coordination of care; obtaining or reviewing history; documenting in the medical record; reviewing/ordering tests, medications or procedures; communicating with other healthcare professionals and discussing with patient's family/caregivers.    Current Length of Stay: 1 day(s)  Current Patient Status: Inpatient   Certification Statement:   Discharge Plan: Anticipate discharge in 48 hrs to home.    Code Status: Level 3 - DNAR and DNI    Subjective:   Patient was seen and examined at bedside this morning.  Patient comfortably laying in bed not in any acute distress.  Patient denies having any chest pain or acute shortness of breath although still requiring oxygen at this point.  No other questions or concerns this time    Objective:     Vitals:   Temp (24hrs), Av.7 °F (37.1 °C), Min:98.3 °F (36.8 °C), Max:99 °F (37.2 °C)    Temp:  [98.3 °F (36.8 °C)-99 °F (37.2 °C)] 98.8 °F  (37.1 °C)  HR:  [66-84] 77  Resp:  [18-22] 22  BP: (103-157)/(53-67) 108/54  SpO2:  [83 %-96 %] 91 %  Body mass index is 30.54 kg/m².     Input and Output Summary (last 24 hours):     Intake/Output Summary (Last 24 hours) at 6/19/2024 0907  Last data filed at 6/18/2024 2001  Gross per 24 hour   Intake 530 ml   Output 125 ml   Net 405 ml       Physical Exam:   Physical Exam  Constitutional:       General: He is not in acute distress.     Appearance: Normal appearance. He is not ill-appearing.   HENT:      Head: Normocephalic and atraumatic.      Right Ear: External ear normal.      Left Ear: External ear normal.      Nose: Nose normal.      Mouth/Throat:      Mouth: Mucous membranes are moist.      Pharynx: Oropharynx is clear.   Eyes:      General: No scleral icterus.     Extraocular Movements: Extraocular movements intact.      Conjunctiva/sclera: Conjunctivae normal.   Cardiovascular:      Rate and Rhythm: Normal rate and regular rhythm.      Pulses: Normal pulses.      Heart sounds: Normal heart sounds.   Pulmonary:      Effort: No respiratory distress.      Breath sounds: No wheezing.      Comments: Decreased breath sounds bilaterally  Abdominal:      General: Abdomen is flat. Bowel sounds are normal. There is no distension.      Palpations: Abdomen is soft.      Tenderness: There is no abdominal tenderness.   Musculoskeletal:         General: Normal range of motion.      Cervical back: Normal range of motion and neck supple. No rigidity.      Right lower leg: Edema (2+) present.      Left lower leg: Edema (2+) present.   Skin:     General: Skin is warm and dry.   Neurological:      General: No focal deficit present.      Mental Status: He is alert and oriented to person, place, and time.   Psychiatric:         Mood and Affect: Mood normal.          Additional Data:     Labs:  Results from last 7 days   Lab Units 06/19/24  0512   WBC Thousand/uL 16.99*   HEMOGLOBIN g/dL 7.9*   HEMATOCRIT % 25.3*   PLATELETS  Thousands/uL 258   SEGS PCT % 90*   LYMPHO PCT % 3*   MONO PCT % 6   EOS PCT % 0     Results from last 7 days   Lab Units 06/19/24  0512   SODIUM mmol/L 134*   POTASSIUM mmol/L 4.2   CHLORIDE mmol/L 98   CO2 mmol/L 31   BUN mg/dL 32*   CREATININE mg/dL 1.21   ANION GAP mmol/L 5   CALCIUM mg/dL 7.9*   ALBUMIN g/dL 2.3*   TOTAL BILIRUBIN mg/dL 0.87   ALK PHOS U/L 130*   ALT U/L 44   AST U/L 40*   GLUCOSE RANDOM mg/dL 109     Results from last 7 days   Lab Units 06/19/24  0512   INR  2.16*         Results from last 7 days   Lab Units 06/18/24  0834   HEMOGLOBIN A1C % 6.3*     Results from last 7 days   Lab Units 06/19/24  0512 06/18/24  1448   LACTIC ACID mmol/L  --  1.2   PROCALCITONIN ng/ml 0.30* 0.35*       Lines/Drains:  Invasive Devices       Peripheral Intravenous Line  Duration             Peripheral IV 06/18/24 Distal;Left;Upper;Ventral (anterior) Arm <1 day    Peripheral IV 06/18/24 Proximal;Right;Ventral (anterior) Forearm <1 day                          Imaging:     Recent Cultures (last 7 days):   Results from last 7 days   Lab Units 06/18/24  1457 06/18/24  1448   BLOOD CULTURE  Received in Microbiology Lab. Culture in Progress. Received in Microbiology Lab. Culture in Progress.       Last 24 Hours Medication List:   Current Facility-Administered Medications   Medication Dose Route Frequency Provider Last Rate    acetaminophen  650 mg Oral Q6H PRN Delisa Acevedo MD      albuterol  2 puff Inhalation Q6H PRN Delisa Acevedo MD      amiodarone  200 mg Oral Daily Delisa Acevedo MD      aspirin  81 mg Oral Daily Delisa Acevedo MD      atorvastatin  40 mg Oral Daily Delisa Acevedo MD      azithromycin  500 mg Oral Daily Delisa Acevedo MD      benzonatate  200 mg Oral TID PRN Oscar Jack DO      cholecalciferol  2,000 Units Oral Daily Delisa Acevedo MD      gabapentin  900 mg Oral HS Delisa Acevedo MD      guaiFENesin  1,200 mg Oral Q12H GONZALO Delisa Acevedo MD      labetalol  100 mg Oral BID Delisa Acevedo MD      multivitamin stress  formula  1 tablet Oral Daily Delisa Acevedo MD      potassium chloride  20 mEq Oral Daily Delisa Acevedo MD      psyllium  1 packet Oral Daily Delisa Acevedo MD      senna  1 tablet Oral HS PRN Delisa Acevedo MD      tamsulosin  0.4 mg Oral Daily With Dinner Delisa Acevedo MD          Today, Patient Was Seen By: Delisa Acevedo MD    **Please Note: This note may have been constructed using a voice recognition system.**

## 2024-06-19 NOTE — SEDATION DOCUMENTATION
Procedure ended. 12fr chest tube placed, dressing applied. Specimen sent to lab. Report and care assumed by primary RN

## 2024-06-19 NOTE — PLAN OF CARE
Problem: RESPIRATORY - ADULT  Goal: Achieves optimal ventilation and oxygenation  Description: INTERVENTIONS:  - Assess for changes in respiratory status  - Assess for changes in mentation and behavior  - Position to facilitate oxygenation and minimize respiratory effort  - Oxygen administered by appropriate delivery if ordered  - Initiate smoking cessation education as indicated  - Encourage broncho-pulmonary hygiene including cough, deep breathe, Incentive Spirometry  - Assess the need for suctioning and aspirate as needed  - Assess and instruct to report SOB or any respiratory difficulty  - Respiratory Therapy support as indicated  Outcome: Progressing     Problem: GENITOURINARY - ADULT  Goal: Maintains or returns to baseline urinary function  Description: INTERVENTIONS:  - Assess urinary function  - Encourage oral fluids to ensure adequate hydration if ordered  - Administer IV fluids as ordered to ensure adequate hydration  - Administer ordered medications as needed  - Offer frequent toileting  - Follow urinary retention protocol if ordered  Outcome: Progressing     Problem: METABOLIC, FLUID AND ELECTROLYTES - ADULT  Goal: Electrolytes maintained within normal limits  Description: INTERVENTIONS:  - Monitor labs and assess patient for signs and symptoms of electrolyte imbalances  - Administer electrolyte replacement as ordered  - Monitor response to electrolyte replacements, including repeat lab results as appropriate  - Instruct patient on fluid and nutrition as appropriate  Outcome: Progressing  Goal: Fluid balance maintained  Description: INTERVENTIONS:  - Monitor labs   - Monitor I/O and WT  - Instruct patient on fluid and nutrition as appropriate  - Assess for signs & symptoms of volume excess or deficit  Outcome: Progressing  Goal: Glucose maintained within target range  Description: INTERVENTIONS:  - Monitor Blood Glucose as ordered  - Assess for signs and symptoms of hyperglycemia and hypoglycemia  -  Administer ordered medications to maintain glucose within target range  - Assess nutritional intake and initiate nutrition service referral as needed  Outcome: Progressing

## 2024-06-19 NOTE — CASE MANAGEMENT
Case Management Assessment & Discharge Planning Note    Patient name Oscar Espinosa  Location W /W -01 MRN [id number]  : 1940 Date 2024       Current Admission Date: 2024  Current Admission Diagnosis:Anemia   Patient Active Problem List    Diagnosis Date Noted Date Diagnosed    Abnormal TSH 2024     Prediabetes 2024     Elevated serum creatinine 2024     Chronic combined systolic and diastolic congestive heart failure (HCC) 2024     Acute hypoxic respiratory failure (HCC) 2024     Pleural effusion with shortness of breath 2024     Anemia 2024     Bilateral carotid artery stenosis 2024     Venous insufficiency of both lower extremities 2023     Hypertensive heart and chronic kidney disease with heart failure and stage 1 through stage 4 chronic kidney disease, or chronic kidney disease (HCC) 10/27/2022     Myofascial pain syndrome 2022     Atrial fibrillation (HCC) 2022     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) 2022     Ascending aortic aneurysm (HCC) 2021     Nocturnal hypoxemia      Moderate to severe pulmonary hypertension (HCC) 2021     Chronic obstructive pulmonary disease (HCC) 2019     Multiple pulmonary nodules 09/10/2019     Chronic pain syndrome      Subclinical hypothyroidism 10/22/2014     Abdominal aortic aneurysm without rupture (HCC) 2014     Aortic valve disorder 2014     Lumbar radiculopathy 2013     Benign essential hypertension 04/10/2013     Impaired fasting glucose 04/10/2013     Severe obesity (BMI 35.0-35.9 with comorbidity) (HCC) 04/10/2013     Hyperlipidemia 2012     Microscopic hematuria 2012     Severe obstructive sleep apnea 2012     3-vessel CAD 2012       LOS (days): 1  Geometric Mean LOS (GMLOS) (days):   Days to GMLOS:     OBJECTIVE:    Risk of Unplanned Readmission Score: 21.67         Current admission  status: Inpatient       Preferred Pharmacy:   [facility] - [place], [place] - [address]  [address]  [address]  [facility] [address]  Phone: [phone] Fax: [phone]    [id number] - [place], [place] - [address]  [address]  [place] [address]  Phone: [phone] Fax: [phone]    [facility] - [place], [place] - [address]  [address]  [address]  [facility] [address]  Phone: [phone] Fax: [phone]    Primary Care Provider: Lea Reyes, MD    Primary Insurance: MEDICARE  Secondary Insurance: AARP    ASSESSMENT:  Active Health Care Proxies    There are no active Health Care Proxies on file.                 Readmission Root Cause  30 Day Readmission: No    Patient Information  Admitted from:: Home  Mental Status: Alert  During Assessment patient was accompanied by: Not accompanied during assessment  Assessment information provided by:: Patient  Primary Caregiver: Self  Support Systems: Self, Spouse/significant other  Home entry access options. Select all that apply.: Stairs  Number of steps to enter home.: 3  Type of Current Residence: [facility]  Living Arrangements: Lives w/ Spouse/significant other  Is patient a ?: No    Activities of Daily Living Prior to Admission  Functional Status: Independent  Completes ADLs independently?: Yes  Ambulates independently?: Yes  Does patient use assisted devices?: Yes  Assisted Devices (DME) used: Straight Cane, Walker, Home Oxygen concentrator  DME Company Name (respiratory supplies): [employer]  O2 Rate(s): 2L at night and prn  Does patient currently own DME?: Yes  What DME does the patient currently own?: Home Oxygen concentrator, Straight Cane, Walker  Does patient have a history of Outpatient Therapy (PT/OT)?: No  Does the patient have a history of Short-Term Rehab?: No  Does patient have a history of HHC?: No  Does patient currently have HHC?: No          Patient Information Continued  Income Source: SSI/SSD  Does patient have prescription coverage?: Yes  Does patient receive dialysis treatments?: No  Does patient have a history of substance abuse?: No  Does patient have a history of Mental Health Diagnosis?: No         Means of Transportation  Means of Transport to Appts:: Drives Self      Social Determinants of Health (SDOH)      Flowsheet Row Most Recent Value   Housing Stability    In the last 12 months, was there a time when you were not able to pay the mortgage or rent on time? N   In the past 12 months, how many times have you moved where you were living? 0   At any time in the past 12 months, were you homeless or living in a shelter (including now)? N   Transportation Needs    In the past 12 months, has lack of transportation kept you from medical appointments or from getting medications? no   In the past 12 months, has lack of transportation kept you from meetings, work, or from getting things needed for daily living? No   Food Insecurity    Within the past 12 months, you worried that your food would run out before you got the money to buy more. Never true   Within the past 12 months, the food you bought just didn't last and you didn't have money to get more. Never true   Utilities    In the past 12 months has the electric, gas, oil, or water company threatened to shut off services in your home? No            DISCHARGE DETAILS:    Discharge planning discussed with:: Patient  Freedom of Choice: Yes  Comments - Freedom of Choice: CM met with patient at bedside. CM introduced self and CM role. CM verified PCP and pharmacy. Patient is independent, uses a walker occassionally and has a cane on order, uses 2L O2 at night and as needed with activity, no HHC services at this time, patient drives. Wife will provide ride on DC.  PT/OT evals pending, CM will follow  CM contacted family/caregiver?: Yes  Were Treatment Team discharge recommendations reviewed with  patient/caregiver?: Yes  Did patient/caregiver verbalize understanding of patient care needs?: Yes  Were patient/caregiver advised of the risks associated with not following Treatment Team discharge recommendations?: Yes    Contacts  Patient Contacts: Dorcas Espinosa  Relationship to Patient:: Family  Contact Method: Phone  Phone Number: [phone]  Reason/Outcome: Emergency Contact, Discharge Planning              Other Referral/Resources/Interventions Provided:  Referral Comments: Patient anticipates home no needs, PT/OT evals pending, CM will follow

## 2024-06-19 NOTE — TELEPHONE ENCOUNTER
This patient was given a script for compression stockings but it was not dated. Please send a new one to the same fax.

## 2024-06-20 ENCOUNTER — APPOINTMENT (INPATIENT)
Dept: RADIOLOGY | Facility: HOSPITAL | Age: 84
DRG: 177 | End: 2024-06-20
Payer: MEDICARE

## 2024-06-20 LAB
ANION GAP SERPL CALCULATED.3IONS-SCNC: 6 MMOL/L (ref 4–13)
BASOPHILS # BLD AUTO: 0.06 THOUSANDS/ÂΜL (ref 0–0.1)
BASOPHILS NFR BLD AUTO: 1 % (ref 0–1)
BUN SERPL-MCNC: 31 MG/DL (ref 5–25)
CALCIUM SERPL-MCNC: 8.1 MG/DL (ref 8.4–10.2)
CHLORIDE SERPL-SCNC: 98 MMOL/L (ref 96–108)
CO2 SERPL-SCNC: 30 MMOL/L (ref 21–32)
CREAT SERPL-MCNC: 1.09 MG/DL (ref 0.6–1.3)
EOSINOPHIL # BLD AUTO: 0.11 THOUSAND/ÂΜL (ref 0–0.61)
EOSINOPHIL NFR BLD AUTO: 1 % (ref 0–6)
ERYTHROCYTE [DISTWIDTH] IN BLOOD BY AUTOMATED COUNT: 17.6 % (ref 11.6–15.1)
GFR SERPL CREATININE-BSD FRML MDRD: 62 ML/MIN/1.73SQ M
GLUCOSE SERPL-MCNC: 101 MG/DL (ref 65–140)
HCT VFR BLD AUTO: 26.7 % (ref 36.5–49.3)
HGB BLD-MCNC: 8.1 G/DL (ref 12–17)
IMM GRANULOCYTES # BLD AUTO: 0.13 THOUSAND/UL (ref 0–0.2)
IMM GRANULOCYTES NFR BLD AUTO: 1 % (ref 0–2)
INR PPP: 2.4 (ref 0.84–1.19)
L PNEUMO1 AG UR QL IA.RAPID: NEGATIVE
LDH SERPL-CCNC: 146 U/L (ref 140–271)
LYMPHOCYTES # BLD AUTO: 0.79 THOUSANDS/ÂΜL (ref 0.6–4.47)
LYMPHOCYTES NFR BLD AUTO: 6 % (ref 14–44)
MCH RBC QN AUTO: 28.4 PG (ref 26.8–34.3)
MCHC RBC AUTO-ENTMCNC: 30.3 G/DL (ref 31.4–37.4)
MCV RBC AUTO: 94 FL (ref 82–98)
MONOCYTES # BLD AUTO: 0.84 THOUSAND/ÂΜL (ref 0.17–1.22)
MONOCYTES NFR BLD AUTO: 7 % (ref 4–12)
NEUTROPHILS # BLD AUTO: 11.08 THOUSANDS/ÂΜL (ref 1.85–7.62)
NEUTS SEG NFR BLD AUTO: 84 % (ref 43–75)
NRBC BLD AUTO-RTO: 0 /100 WBCS
PLATELET # BLD AUTO: 263 THOUSANDS/UL (ref 149–390)
PMV BLD AUTO: 9.9 FL (ref 8.9–12.7)
POTASSIUM SERPL-SCNC: 4.5 MMOL/L (ref 3.5–5.3)
PROTHROMBIN TIME: 27 SECONDS (ref 11.6–14.5)
RBC # BLD AUTO: 2.85 MILLION/UL (ref 3.88–5.62)
S PNEUM AG UR QL: NEGATIVE
SODIUM SERPL-SCNC: 134 MMOL/L (ref 135–147)
WBC # BLD AUTO: 13.01 THOUSAND/UL (ref 4.31–10.16)

## 2024-06-20 PROCEDURE — 85025 COMPLETE CBC W/AUTO DIFF WBC: CPT

## 2024-06-20 PROCEDURE — 85610 PROTHROMBIN TIME: CPT

## 2024-06-20 PROCEDURE — 3E0L3GC INTRODUCTION OF OTHER THERAPEUTIC SUBSTANCE INTO PLEURAL CAVITY, PERCUTANEOUS APPROACH: ICD-10-PCS | Performed by: STUDENT IN AN ORGANIZED HEALTH CARE EDUCATION/TRAINING PROGRAM

## 2024-06-20 PROCEDURE — 99232 SBSQ HOSP IP/OBS MODERATE 35: CPT | Performed by: STUDENT IN AN ORGANIZED HEALTH CARE EDUCATION/TRAINING PROGRAM

## 2024-06-20 PROCEDURE — 83615 LACTATE (LD) (LDH) ENZYME: CPT

## 2024-06-20 PROCEDURE — 80048 BASIC METABOLIC PNL TOTAL CA: CPT

## 2024-06-20 PROCEDURE — 97167 OT EVAL HIGH COMPLEX 60 MIN: CPT

## 2024-06-20 PROCEDURE — 87449 NOS EACH ORGANISM AG IA: CPT

## 2024-06-20 PROCEDURE — NC001 PR NO CHARGE: Performed by: STUDENT IN AN ORGANIZED HEALTH CARE EDUCATION/TRAINING PROGRAM

## 2024-06-20 PROCEDURE — 3E0L317 INTRODUCTION OF OTHER THROMBOLYTIC INTO PLEURAL CAVITY, PERCUTANEOUS APPROACH: ICD-10-PCS | Performed by: STUDENT IN AN ORGANIZED HEALTH CARE EDUCATION/TRAINING PROGRAM

## 2024-06-20 PROCEDURE — 71045 X-RAY EXAM CHEST 1 VIEW: CPT

## 2024-06-20 PROCEDURE — 99232 SBSQ HOSP IP/OBS MODERATE 35: CPT | Performed by: INTERNAL MEDICINE

## 2024-06-20 RX ORDER — POLYETHYLENE GLYCOL 3350 17 G/17G
17 POWDER, FOR SOLUTION ORAL DAILY
Status: DISCONTINUED | OUTPATIENT
Start: 2024-06-20 | End: 2024-06-26 | Stop reason: HOSPADM

## 2024-06-20 RX ORDER — DOCUSATE SODIUM 100 MG/1
100 CAPSULE, LIQUID FILLED ORAL 2 TIMES DAILY
Status: DISCONTINUED | OUTPATIENT
Start: 2024-06-20 | End: 2024-06-26 | Stop reason: HOSPADM

## 2024-06-20 RX ORDER — SENNOSIDES 8.6 MG
2 TABLET ORAL
Status: DISCONTINUED | OUTPATIENT
Start: 2024-06-20 | End: 2024-06-26 | Stop reason: HOSPADM

## 2024-06-20 RX ORDER — TORSEMIDE 20 MG/1
20 TABLET ORAL 2 TIMES DAILY
Status: DISCONTINUED | OUTPATIENT
Start: 2024-06-20 | End: 2024-06-23

## 2024-06-20 RX ADMIN — TORSEMIDE 20 MG: 20 TABLET ORAL at 13:48

## 2024-06-20 RX ADMIN — LABETALOL HYDROCHLORIDE 100 MG: 100 TABLET, FILM COATED ORAL at 17:10

## 2024-06-20 RX ADMIN — B-COMPLEX W/ C & FOLIC ACID TAB 1 TABLET: TAB at 09:37

## 2024-06-20 RX ADMIN — ACETAMINOPHEN 650 MG: 325 TABLET, FILM COATED ORAL at 17:10

## 2024-06-20 RX ADMIN — SENNOSIDES 17.2 MG: 8.6 TABLET, FILM COATED ORAL at 21:52

## 2024-06-20 RX ADMIN — DORNASE ALFA 5 MG: 1 SOLUTION RESPIRATORY (INHALATION) at 13:48

## 2024-06-20 RX ADMIN — TAMSULOSIN HYDROCHLORIDE 0.4 MG: 0.4 CAPSULE ORAL at 17:11

## 2024-06-20 RX ADMIN — ATORVASTATIN CALCIUM 40 MG: 40 TABLET, FILM COATED ORAL at 09:36

## 2024-06-20 RX ADMIN — CEFEPIME 2000 MG: 2 INJECTION, POWDER, FOR SOLUTION INTRAVENOUS at 13:48

## 2024-06-20 RX ADMIN — GUAIFENESIN 1200 MG: 600 TABLET ORAL at 21:52

## 2024-06-20 RX ADMIN — AZITHROMYCIN DIHYDRATE 500 MG: 250 TABLET ORAL at 17:11

## 2024-06-20 RX ADMIN — GUAIFENESIN 1200 MG: 600 TABLET ORAL at 09:36

## 2024-06-20 RX ADMIN — ASPIRIN 81 MG: 81 TABLET, COATED ORAL at 09:37

## 2024-06-20 RX ADMIN — ALTEPLASE 10 MG: 2.2 INJECTION, POWDER, LYOPHILIZED, FOR SOLUTION INTRAVENOUS at 13:48

## 2024-06-20 RX ADMIN — WARFARIN SODIUM 2.5 MG: 2.5 TABLET ORAL at 17:11

## 2024-06-20 RX ADMIN — DOCUSATE SODIUM 100 MG: 100 CAPSULE, LIQUID FILLED ORAL at 09:41

## 2024-06-20 RX ADMIN — LABETALOL HYDROCHLORIDE 100 MG: 100 TABLET, FILM COATED ORAL at 09:37

## 2024-06-20 RX ADMIN — Medication 1 PACKET: at 09:35

## 2024-06-20 RX ADMIN — CEFEPIME 2000 MG: 2 INJECTION, POWDER, FOR SOLUTION INTRAVENOUS at 01:13

## 2024-06-20 RX ADMIN — Medication 2000 UNITS: at 09:36

## 2024-06-20 RX ADMIN — POTASSIUM CHLORIDE 20 MEQ: 1500 TABLET, EXTENDED RELEASE ORAL at 09:37

## 2024-06-20 RX ADMIN — CEFTRIAXONE SODIUM 1000 MG: 10 INJECTION, POWDER, FOR SOLUTION INTRAVENOUS at 21:53

## 2024-06-20 RX ADMIN — POLYETHYLENE GLYCOL 3350 17 G: 17 POWDER, FOR SOLUTION ORAL at 09:41

## 2024-06-20 RX ADMIN — DOCUSATE SODIUM 100 MG: 100 CAPSULE, LIQUID FILLED ORAL at 17:11

## 2024-06-20 RX ADMIN — GABAPENTIN 900 MG: 300 CAPSULE ORAL at 21:52

## 2024-06-20 RX ADMIN — AMIODARONE HYDROCHLORIDE 200 MG: 200 TABLET ORAL at 09:37

## 2024-06-20 NOTE — ASSESSMENT & PLAN NOTE
Wt Readings from Last 3 Encounters:   06/13/24 99.3 kg (219 lb)   06/06/24 100 kg (221 lb)   05/20/24 101 kg (221 lb 12.8 oz)     Home regimen: torsemide 40 mg PO daily, labetolol 100 mg  Diuretics initially held in the setting of elevated Cr. Creatinine improved.     Plan:  Continue torsemide  Monitor BMP  Monitor I/O

## 2024-06-20 NOTE — PROCEDURES
Risks and benefits discussion were held regarding instillation of medication. Decision to proceed with chest tube instillation of TPA Dnase     Left chest tube was instilled with 10 mg of TPA  in 30 mL of sterile water and 5 mg of Dornase in sterile water. The tube was then clamped at 13:40 PM and will remain clamped until 14:40 PM. The nurse will unclamp then and keep chest tube to suction.     Pollo Hall, DO  PGY-4, Pulmonary/Critical Care  [facility]

## 2024-06-20 NOTE — PROGRESS NOTES
[facility]  Progress Note  Name: Oscar Espinosa I  MRN: [id number]  Unit/Bed#: W -01 I Date of Admission: 6/18/2024   Date of Service: 6/20/2024 I Hospital Day: 2    Assessment & Plan   Acute hypoxic respiratory failure (HCC)  Assessment & Plan  Patient presents with shortness of breath for 2 days. Past smoker, 2 packs/day, quit over 10 years ago. Patient states that he has also been having cough which is productive of dark brown phlegm for 2 days. Patient also mentions that he has been feeling feverish and chills while at home Denies chest pain, abdominal pain, n/v/d,dizziness, lightheadedness, HA, dysuria, hematuria, hematochezia, or melena. Sent in by pulmonologist following CT results. Admitted to the hospital the beginning of May due to pneumonia at which point he required a thoracentesis on 4/26 yielding 600ml, completed 7 days of ceftriaxone.  CT chest wo contrast (06/10/2024)- persistent loculated moderate size left pleural effusion w/ partial collapse of LLL. Stable AAA measuring 4.7 cm (since May 2024).   Patient wears a baseline of 2 L oxygen on exertion and during sleep since last hospital admission.  In the ED- O2 desat to 83% on 2L, moved up to 5 L, sitting 91%  Patient reports 38 lb weight loss over the last few months and has decreased appetite.   Received vancomycin, azithromycin, and ceftriaxone in the ER.  Viral panel negative  Pro-Aristides elevated to 0.35 on 06/18/2024 and 0.30 on 06/19/2024  Blood culture no growth at 24 hours  IR placed left sided chest tube and drained 300 mL of mildly frothy, slightly viscous and serosanguinous fluid and samples were sent for ordered laboratory evaluation.   Left chest tube was instilled with 10 mg of TPA  in 30 mL of sterile water and 5 mg of Dornase in sterile water due to incomplete drainage from loculations. The tube was then clamped at 13:40 PM and will remain clamped until 14:40 PM. The nurse will unclamp then and keep  chest tube to suction.   Pleural fluid culture and gram stain (06/19/2024) show 3+ polys and 2+ gram + cocci in chains. Body fluid white cell count of 573,600, fluid pH 5.6, LD fluid >12,000    Plan:  Continue with IV cefepime and azithromycin p.o.; day 3 of antibiotics.  Will narrow Abx per cultures  6/20 TPA Dornase to facilitate drainage per pulm  F/u blood culture, sputum culture, Strep pneumo and Legionella antigen. IR specimen  Monitor temp  Tylenol 650 mg prn  Tessalon Perles 200 mg TID prn  Mucinex 1200 mg BID  Incentive spirometer  Flutter valve  Pulmonary and IR recommendations appreciated    Abnormal TSH  Assessment & Plan  TSH 4.559, free T4 within normal limits    Elevated serum creatinine  Assessment & Plan  Baseline Cr 0.9-1.1 prior to May 2024  Elevated to 1.66 as of May 2024 and has been stable since then  At baseline    Prediabetes  Assessment & Plan  Lab Results   Component Value Date    HGBA1C 6.3 (H) 06/18/2024        Plan:  Monitor blood sugars  Consider insulin sliding scale if glucose elevated  Diabetic diet    Chronic combined systolic and diastolic congestive heart failure (HCC)  Assessment & Plan  Wt Readings from Last 3 Encounters:   06/13/24 99.3 kg (219 lb)   06/06/24 100 kg (221 lb)   05/20/24 101 kg (221 lb 12.8 oz)     Home regimen: torsemide 40 mg PO daily, labetolol 100 mg  Diuretics initially held in the setting of elevated Cr. Creatinine improved.     Plan:  Continue torsemide  Monitor BMP  Monitor I/O    Anemia  Assessment & Plan  On review of labs, anemia is new since April 2024.   Last labs in August 2023 were normal.   There has been a slow trend downward.   Hgb 8.4 on admission    Plan:  Monitor CBC  Transfuse if Hgb <7    Atrial fibrillation (HCC)  Assessment & Plan  Home regimen: Amiodorone 200 mg p.o. daily, labetolol 100 mg BID, warfarin 2.5 mg  Warfarin held prior to IR procedure and later continued.    Plan:  Continue amiodarone, labetolol, warfarin  PT/ INR  monitoring    Chronic obstructive pulmonary disease (HCC)  Assessment & Plan  Not in exacerbations, albuterol as needed.     Chronic pain syndrome  Assessment & Plan  Home regimen: gabapentin 900 mg HS    Plan:  Reduce dose if further elevation of Creatinine    CAD (coronary artery disease)  Assessment & Plan  Home regimen: ASA 81 mg, lipitor 40 mg, labetolol 100 mg    Plan:  Continue home regimen    * Pleural effusion with shortness of breath  Assessment & Plan  Patient admitted to the hospital the beginning of May due to pneumonia at which point he required a thoracentesis on 4/26 yielding 600ml, completed 7 days of ceftriaxone.   CT chest wo contrast (04/27/2024)- Minimally loculated moderate-sized left pleural effusion. Compressive atelectasis at the left lower lobe and lingula.   CT chest wo contrast (05/01/2024)- Unchanged moderate left effusion with adjacent atelectasis. Unchanged ascending thoracic aortic measuring 5 cm. Interstitial lung disease changes suggested bilaterally with atelectasis in the lingula and left lower lobe.  CT chest wo contrast (06/10/2024)- persistent loculated moderate size left pleural effusion w/ partial collapse of LLL. Stable AAA measuring 4.7 cm (since May 2024).   On this admission, IR placed left sided chest tube and drained 300 mL of mildly frothy, slightly viscous and serosanguinous fluid and samples were sent for ordered laboratory evaluation.   Left chest tube was instilled with 10 mg of TPA  in 30 mL of sterile water and 5 mg of Dornase in sterile water due to incomplete drainage from loculations. The tube was then clamped at 13:40 PM and will remain clamped until 14:40 PM. The nurse will unclamp then and keep chest tube to suction.                  VTE Pharmacologic Prophylaxis: VTE Score: 5 High Risk (Score >/= 5) - Pharmacological DVT Prophylaxis Ordered: warfarin (Coumadin). Sequential Compression Devices Ordered.    Mobility:   Basic Mobility Inpatient Raw Score:  18  JH-HLM Goal: 6: Walk 10 steps or more  JH-HLM Achieved: 5: Stand (1 or more minutes)  JH-HLM Goal NOT achieved. Continue with multidisciplinary rounding and encourage appropriate mobility to improve upon JH-HLM goals.    Patient Centered Rounds: I performed bedside rounds with nursing staff today.   Discussions with Specialists or Other Care Team Provider: pulmonology, IR     Education and Discussions with Family / Patient: Updated  (wife) at bedside.    Total Time Spent on Date of Encounter in care of patient: 30 mins. This time was spent on one or more of the following: performing physical exam; counseling and coordination of care; obtaining or reviewing history; documenting in the medical record; reviewing/ordering tests, medications or procedures; communicating with other healthcare professionals and discussing with patient's family/caregivers.    Current Length of Stay: 2 day(s)  Current Patient Status: Inpatient   Discharge Plan: Anticipate discharge in 24-48 hrs to home.    Code Status: Level 3 - DNAR and DNI    Subjective:   Patient was seen and examined at bedside this morning. Patient comfortably sitting in bed and not in any acute distress. Patient reports he did not sleep much because the tube insertion was bothering him. He reports he has not had bowel movements since admission.  Bowel regimen started. Patient denies continued shortness of breath or chest pain and is saturating in the 90s on room air. No other questions or concerns this time.     Objective:     Vitals:   Temp (24hrs), Av °F (36.7 °C), Min:97.9 °F (36.6 °C), Max:98 °F (36.7 °C)    Temp:  [97.9 °F (36.6 °C)-98 °F (36.7 °C)] 97.9 °F (36.6 °C)  HR:  [54-69] 65  Resp:  [12-16] 12  BP: (101-118)/(54-56) 118/56  SpO2:  [94 %-96 %] 95 %  Body mass index is 30.54 kg/m².     Input and Output Summary (last 24 hours):     Intake/Output Summary (Last 24 hours) at 2024 1527  Last data filed at 2024 1327  Gross per 24  hour   Intake 2300 ml   Output 1540 ml   Net 760 ml       Physical Exam:   Physical Exam  Constitutional:       General: He is not in acute distress.     Appearance: He is not ill-appearing.   HENT:      Head: Normocephalic and atraumatic.      Right Ear: External ear normal.      Left Ear: External ear normal.      Nose: Nose normal.      Mouth/Throat:      Mouth: Mucous membranes are moist.      Pharynx: Oropharynx is clear.   Eyes:      Extraocular Movements: Extraocular movements intact.      Conjunctiva/sclera: Conjunctivae normal.   Cardiovascular:      Rate and Rhythm: Normal rate and regular rhythm.      Heart sounds: No murmur heard.     No friction rub. No gallop.   Pulmonary:      Effort: Pulmonary effort is normal. No respiratory distress.      Breath sounds: No wheezing or rales.      Comments: Breath sounds improved from yesterday.  Chest tube in place  Abdominal:      General: Abdomen is flat. Bowel sounds are normal. There is no distension.      Palpations: Abdomen is soft.      Tenderness: There is no abdominal tenderness.   Musculoskeletal:         General: Normal range of motion.      Cervical back: Normal range of motion and neck supple.      Right lower leg: Edema present.      Left lower leg: Edema present.      Comments: Foot pumps present   Skin:     General: Skin is warm and dry.   Neurological:      General: No focal deficit present.      Mental Status: He is alert and oriented to person, place, and time.   Psychiatric:         Mood and Affect: Mood normal.          Additional Data:     Labs:  Results from last 7 days   Lab Units 06/20/24  0451   WBC Thousand/uL 13.01*   HEMOGLOBIN g/dL 8.1*   HEMATOCRIT % 26.7*   PLATELETS Thousands/uL 263   SEGS PCT % 84*   LYMPHO PCT % 6*   MONO PCT % 7   EOS PCT % 1     Results from last 7 days   Lab Units 06/20/24  0451 06/19/24  0512   SODIUM mmol/L 134* 134*   POTASSIUM mmol/L 4.5 4.2   CHLORIDE mmol/L 98 98   CO2 mmol/L 30 31   BUN mg/dL 31* 32*    CREATININE mg/dL 1.09 1.21   ANION GAP mmol/L 6 5   CALCIUM mg/dL 8.1* 7.9*   ALBUMIN g/dL  --  2.3*   TOTAL BILIRUBIN mg/dL  --  0.87   ALK PHOS U/L  --  130*   ALT U/L  --  44   AST U/L  --  40*   GLUCOSE RANDOM mg/dL 101 109     Results from last 7 days   Lab Units 06/20/24  0451   INR  2.40*         Results from last 7 days   Lab Units 06/18/24  0834   HEMOGLOBIN A1C % 6.3*     Results from last 7 days   Lab Units 06/19/24  0512 06/18/24  1448   LACTIC ACID mmol/L  --  1.2   PROCALCITONIN ng/ml 0.30* 0.35*       Lines/Drains:  Invasive Devices       Peripheral Intravenous Line  Duration             Peripheral IV 06/18/24 Distal;Left;Upper;Ventral (anterior) Arm 2 days              Drain  Duration             Chest Tube Left 12 Fr. 1 day                          Imaging: Reviewed radiology reports from this admission including: chest xray, chest CT scan, procedure reports, and ultrasound(s)    Recent Cultures (last 7 days):   Results from last 7 days   Lab Units 06/20/24  0912 06/19/24  1336 06/18/24  1457 06/18/24  1448   BLOOD CULTURE   --   --  No Growth at 24 hrs. No Growth at 24 hrs.   GRAM STAIN RESULT   --  3+ Polys*  2+ Gram positive cocci in chains*  --   --    BODY FLUID CULTURE, STERILE   --  2+ Growth of Streptococcus intermedius*  --   --    LEGIONELLA URINARY ANTIGEN  Negative  --   --   --        Last 24 Hours Medication List:   Current Facility-Administered Medications   Medication Dose Route Frequency Provider Last Rate    acetaminophen  650 mg Oral Q6H PRN Delisa Acevedo MD      albuterol  2 puff Inhalation Q6H PRN Delisa Acevedo MD      amiodarone  200 mg Oral Daily Delisa Acevedo MD      aspirin  81 mg Oral Daily Delisa Acevedo MD      atorvastatin  40 mg Oral Daily Delisa Acevedo MD      azithromycin  500 mg Oral Daily Delisa Acevedo MD      benzonatate  200 mg Oral TID PRN Oscar Jack DO      cefepime  2,000 mg Intravenous Q12H Delisa Acevedo MD Stopped (06/20/24 1401)    cholecalciferol  2,000 Units  Oral Daily Delisa Acevedo MD      docusate sodium  100 mg Oral BID Delisa Acevedo MD      gabapentin  900 mg Oral HS Delisa Acevedo MD      guaiFENesin  1,200 mg Oral Q12H GONZALO Delisa Acevedo MD      labetalol  100 mg Oral BID Delisa Acevedo MD      multivitamin stress formula  1 tablet Oral Daily Deilsa Acevedo MD      polyethylene glycol  17 g Oral Daily Delisa Acevedo MD      potassium chloride  20 mEq Oral Daily Delisa Acevedo MD      psyllium  1 packet Oral Daily Delisa Acevedo MD      senna  2 tablet Oral HS Delisa Acevedo MD      senna  1 tablet Oral HS PRN Delisa Acevedo MD      tamsulosin  0.4 mg Oral Daily With Dinner Delisa Acevedo MD      torsemide  20 mg Oral BID Delisa Acevedo MD      warfarin  2.5 mg Oral Daily (warfarin) Delisa Acevedo MD          Today, Patient Was Seen By: Delisa Acevedo MD    **Please Note: This note may have been constructed using a voice recognition system.**

## 2024-06-20 NOTE — ASSESSMENT & PLAN NOTE
Home regimen: Amiodorone 200 mg p.o. daily, labetolol 100 mg BID, warfarin 2.5 mg  Warfarin held prior to IR procedure and later continued.    Plan:  Continue amiodarone, labetolol, warfarin  PT/ INR monitoring

## 2024-06-20 NOTE — OCCUPATIONAL THERAPY NOTE
Occupational Therapy Evaluation     Patient Name: Oscar Espinosa  Today's Date: 6/20/2024  Problem List  Principal Problem:    Pleural effusion with shortness of breath  Active Problems:    CAD (coronary artery disease)    Chronic pain syndrome    Chronic obstructive pulmonary disease ([facility])    Atrial fibrillation ([facility])    Anemia    Acute hypoxic respiratory failure ([facility])    Chronic combined systolic and diastolic congestive heart failure ([facility])    Prediabetes    Elevated serum creatinine    Abnormal TSH    Past Medical History  Past Medical History:   Diagnosis Date    Abdominal aortic aneurysm ([facility])     s/p repair    Abnormal ECG july 2022    Acute on chronic congestive heart failure ([facility]) 09/11/2019    Acute respiratory failure with hypoxia ([facility]) 05/06/2024    Aneurysm ([facility]) 2007    Aneurysm of abdominal aorta ([facility])     49mm, trileaflet AV    Atrial fibrillation ([facility])     Eliquis    BPH (benign prostatic hyperplasia)     CAD (coronary artery disease)     s/p CABGx3    CHF (congestive heart failure) ([facility])     Chronic pain     Gabapentin    Chronic pain disorder     lumbar    COPD (chronic obstructive pulmonary disease) ([facility])     Coronary artery disease     Elevated transaminase level 04/27/2024    Former tobacco use     Hyperlipidemia     Hypertension     Hyponatremia 04/27/2024    Hypothyroidism     Lumbar radiculopathy     Lumbar stenosis     s/p injections    Neuropathy     Obesity (BMI 30-39.9)     YEVGENIY (obstructive sleep apnea)     unable to tolerate CPAP    Platelets decreased ([facility]) 07/27/2022    Pulmonary hypertension ([facility])     moderate to severe    Spondylolisthesis of lumbar region     Visual impairment 2022    Vitamin D deficiency      Past Surgical History  Past Surgical History:   Procedure Laterality Date    ABDOMINAL AORTIC ANEURYSM REPAIR  08/09/2007    2 dock limbs with visc extens prosth    CARDIAC CATHETERIZATION      COLONOSCOPY      CORONARY ARTERY BYPASS GRAFT  2003    LIMA to LAD,  sequential SVG to OM1 & OM2, SVG to RDA    IR CHEST TUBE PLACEMENT  6/19/2024    LUMBAR EPIDURAL INJECTION               06/20/24 1508   OT Last Visit   OT Visit Date 06/20/24   Note Type   Note type Evaluation   Pain Assessment   Pain Assessment Tool 0-10   Pain Score No Pain   Restrictions/Precautions   Weight Bearing Precautions Per Order No   Other Precautions Multiple lines;Fall Risk;Chair Alarm;Bed Alarm  (L chest tube, masimo , lymphedema. 2L O2 worn at night only)   Home Living   Type of Home House   Home Layout One level;Performs ADLs on one level;Able to live on main level with bedroom/bathroom;Stairs to enter with rails  (3 BHUPENDRA c HR through garage)   Bathroom Shower/Tub Walk-in shower   Bathroom Toilet Raised   Bathroom Equipment Built-in shower seat   Bathroom Accessibility Accessible   Home Equipment Walker;Cane;Long-handled shoehorn  (RW and standard walker.)   Additional Comments RW used very occasional to walk in the yard, primarily no AD used   Prior Function   Level of [place] Independent with ADLs;Independent with functional mobility;Independent with IADLS   Lives With Spouse   Receives Help From Family   IADLs Independent with driving;Independent with meal prep  (spouse manages groceries, checks medications, and does home management tasks)   Falls in the last 6 months 0   Vocational Retired   Comments recently finished lymphedema OP PT therapy. Sleeps in  a recliner   Lifestyle   [employer] PTA, pt is (I) c ADLs including compression stockings. Spouse manages most IADLs. Lives c spouse, no AD.   Reciprocal Relationships supportive spouse   Service to Others retired   General   Additional Pertinent History Pt admitted d/t pleural effusion with SOB. hx of lymphedema, chronic pain., COPD, anemia, prediabetes, ARF c hypoxia. s/p chest tube placement   Family/Caregiver Present   (spouse present)   Additional General Comments Spouse and pt inquiring re: continuing lympedema wrapping while IP (notes  they have noticed and increased swelling). Spoke with [facility] resident who verbalized pt OK to continue wrapping while inpatient, Madhaviangaline RN made aware   Subjective   Subjective Pt agreeable to OT session   ADL   Eating Assistance 7  Independent   Grooming Assistance 6  Modified Independent   UB Bathing Assistance 6  Modified Independent   LB Bathing Assistance 5  Supervision/Setup   UB Dressing Assistance 6  Modified independent   LB Dressing Assistance 5  Supervision/Setup   Toileting Assistance  5  Supervision/Setup   Functional Assistance 5  Supervision/Setup   Bed Mobility   Additional Comments seated at EOB upon arrival, OOB in recliner at end of session   Transfers   Sit to Stand 5  Supervision   Additional items Assist x 1;Increased time required;Verbal cues   Stand to Sit 5  Supervision   Additional items Assist x 1;Increased time required;Verbal cues   Additional Comments 1 small LOB laterally upon standing, self corrects without assistance. no AD used. denies lightheaded and dizziness   Functional Mobility   Functional Mobility 5  Supervision   Additional Comments household distance within room. no further LOBs. Denies pain. close supervision for safety + management of chest tube   Additional items   (no AD)   Balance   Static Sitting Good   Dynamic Sitting Good   Static Standing Fair +   Dynamic Standing Fair +   Activity Tolerance   Activity Tolerance Patient limited by fatigue   Medical Staff Made Aware Communication c [facility] resident Rc Jackson.   Nurse Made Aware RN Wilner and PCA   RUE Assessment   RUE Assessment WFL  (MMT grossly 4/5 based on functional assessment)   LUE Assessment   LUE Assessment WFL  (MMT grossly 4/5 based on functional assessment)   Hand Function   Gross Motor Coordination Functional   Fine Motor Coordination Functional   Sensation   Light Touch No apparent deficits   Cognition   Overall Cognitive Status WFL   Arousal/Participation Alert;Cooperative   Attention Within  functional limits   Orientation Level Oriented X4   Memory Within functional limits   Following Commands Follows all commands and directions without difficulty   Comments Pt agreeable to OT session, pleasant. Appropriate safety awareness and comprehension of medical status/ plan   Assessment   Limitation Decreased ADL status;Decreased endurance;Decreased self-care trans;Decreased high-level ADLs   Prognosis Good   Assessment Patient is a 83 y.o. male seen for OT evaluation at [facility] following admission on 6/18/2024  s/p Pleural effusion. Please see above for comprehensive list of comorbidities and significant PMHx impacting functional performance.  Upon initial evaluation, pt appears to be performing below baseline functional status.   Occupational performance is affected by the following deficits: endurance , decreased balance , decreased standing tolerance for self care tasks , and decreased activity tolerance . Personal/Environmental factors impacting D/C include: steps to enter/navigate the home. Supporting factors include: support system available Patient would benefit from OT services within the acute care setting to maximize level of functional independence in the following areas self-care transfers, functional mobility, and ADLs.  From OT standpoint, recommendation at time of D/C would be Level 3: minimum resource intensity .   Goals   Patient Goals to return home soon, to avoid needing to return to OP lymphedema PT   Plan   Treatment Interventions ADL retraining;Functional transfer training;UE strengthening/ROM;Endurance training;Patient/family training;Equipment evaluation/education;Compensatory technique education;Continued evaluation   Goal Expiration Date 06/30/24   OT Treatment Day 0   OT Frequency 2-3x/wk   Discharge Recommendation   Rehab Resource Intensity Level, OT III (Minimum Resource Intensity)   Additional Comments  The patient's raw score on the AM-PAC Daily Activity  Inpatient Short Form is 21. A raw score of greater than or equal to 19 suggests the patient may benefit from discharge to home. Please refer to the recommendation of the Occupational Therapist for safe discharge planning.   AM-PAC Daily Activity Inpatient   Lower Body Dressing 3   Bathing 3   Toileting 3   Upper Body Dressing 4   Grooming 4   Eating 4   Daily Activity Raw Score 21   Daily Activity Standardized Score (Calc for Raw Score >=11) 44.27   AM-PAC Applied Cognition Inpatient   Following a Speech/Presentation 4   Understanding Ordinary Conversation 4   Taking Medications 4   Remembering Where Things Are Placed or Put Away 4   Remembering List of 4-5 Errands 4   Taking Care of Complicated Tasks 4   Applied Cognition Raw Score 24   Applied Cognition Standardized Score 62.21   End of Consult   Education Provided Yes   Patient Position at End of Consult Bedside chair;Bed/Chair alarm activated;All needs within reach   Nurse Communication Nurse aware of consult   Goals established on initial evaluation in order to achieve pt's goal of returning home      Pt will complete UB ADLs Mod independent   for increased ADL independence within 10 days.     Pt will complete LB ADLs Mod independent   for increased ADL independence within 10 days.     Pt will complete toileting Mod independent   with use of DME for increased ADL independence within 10 days.     Pt will demonstrate proper body mechanics to complete self-care transfers and functional mobility with Mod independent  and use of LRAD for increased safety and functional independence within 10 days.     Pt will demonstrate standing tolerance of 10 min for increased activity tolerance during ADL/IADL tasks within 10 days.     Pt will demonstrate proper body mechanics and fall prevention strategies during 100% of tx sessions for increased safety awareness during ADL/IADLs    Pt will demonstrate activity tolerance of 30 min in therapeutic tasks for increased  participation in meaningful activities upon D/C.    Pt will demonstrate OOB sitting tolerance of 2-4 hr/day for increased activity tolerance and engagement in leisure activities within 10 days.     Jana Askew, OT

## 2024-06-20 NOTE — ASSESSMENT & PLAN NOTE
Patient admitted to the hospital the beginning of May due to pneumonia at which point he required a thoracentesis on 4/26 yielding 600ml, completed 7 days of ceftriaxone.   CT chest wo contrast (04/27/2024)- Minimally loculated moderate-sized left pleural effusion. Compressive atelectasis at the left lower lobe and lingula.   CT chest wo contrast (05/01/2024)- Unchanged moderate left effusion with adjacent atelectasis. Unchanged ascending thoracic aortic measuring 5 cm. Interstitial lung disease changes suggested bilaterally with atelectasis in the lingula and left lower lobe.  CT chest wo contrast (06/10/2024)- persistent loculated moderate size left pleural effusion w/ partial collapse of LLL. Stable AAA measuring 4.7 cm (since May 2024).   On this admission, IR placed left sided chest tube and drained 300 mL of mildly frothy, slightly viscous and serosanguinous fluid and samples were sent for ordered laboratory evaluation.   Left chest tube was instilled with 10 mg of TPA  in 30 mL of sterile water and 5 mg of Dornase in sterile water due to incomplete drainage from loculations. The tube was then clamped at 13:40 PM and will remain clamped until 14:40 PM. The nurse will unclamp then and keep chest tube to suction.

## 2024-06-20 NOTE — PROGRESS NOTES
Progress Note - Pulmonary   Osacr Espinosa 83 y.o. male MRN: [id number]  Unit/Bed#: W -01 Encounter: [id number]    Assessment:  Acute on chronic hypoxic respiratory failure.  Home: Patient uses 2 L NC with exertion.  Today pt is off the Oxygen. Sat at 95 percent.     Recent History of Pneumonia  POA chills and feverish for the last several days.  CT scan demonstrates recurrence of the effusion, now with an appearance suggesting underlying loculation.   Received vancomycin, ceftriaxone and azithromycin in the ED  Received antibiotics including vancomycin, cefepime, azithromycin.  Strep Pneumo and Legionella Negative.   Currently on Azithromycin 500 mg daily and cefepime 2000 mg Q12H. Today Day2.  Denies fever, SOB today.     3. Empyema:   CT scan demonstrates recurrence of the effusion, now with an appearance suggesting underlying loculation.  Left sided chest tube placement 6/19. 300 ml of mildly frothy, slightly viscous and serosanguinous fluid drained.   TODAY  450 mL with pus in drainage.   Since chest tube placement total output nearly 800 mL.   Pleural fluid culture:  Culture Too young. Gm stain Gm Positive cocci.   Extra turbid Fluid with PH 5.6, LDH >02786. Glucose <10. ,600, TP <3.0  Pt c/o pain at insertion site. Advised Tylenol.   In the setting of empyema with loculations will proceed with TPA Dornase. First dose on 6/20/24. Will monitor the output and clinical improvement. will repeat second dose and chest x ray after 2 nd dose.    Currently on Azithromycin 500 mg daily and cefepime 2000 mg Q12H. Today Day2.    COPD   Not in exacerbation.   Albuterol PRN.     Moderate to severe pulmonary hypertension  Patient has a history of moderate to severe pulmonary hypertension and severe YEVGENIY not tolerating PAP  Uses oxygen nightly and with exertion    Chief Complaint:   Fever and chills.    Subjective:   Pt is doing well. No fever, SOB.     Objective:     Vitals: Blood pressure 118/56, pulse 65,  "temperature 97.9 °F (36.6 °C), resp. rate 12, height 5' 11\" (1.803 m), SpO2 95%.,Body mass index is 30.54 kg/m².      Intake/Output Summary (Last 24 hours) at 6/20/2024 1418  Last data filed at 6/20/2024 1327  Gross per 24 hour   Intake 2300 ml   Output 1540 ml   Net 760 ml       Invasive Devices       Peripheral Intravenous Line  Duration             Peripheral IV 06/18/24 Distal;Left;Upper;Ventral (anterior) Arm 1 day    Peripheral IV 06/18/24 Proximal;Right;Ventral (anterior) Forearm 1 day              Drain  Duration             Chest Tube Left 12 Fr. 1 day                    Physical Exam  Vitals reviewed.   Eyes:      Conjunctiva/sclera: Conjunctivae normal.   HENT:      Head: Normocephalic.   Cardiovascular:      Rate and Rhythm: Normal rate.      Pulses: Normal pulses.   Abdominal:      Palpations: Abdomen is soft.   Constitutional:       Appearance: He is well-developed and well-nourished.   Pulmonary:      Effort: Pulmonary effort is normal. No accessory muscle usage, respiratory distress or accessory muscle usage.      Breath sounds: Rales present.      Comments: Left chest tube in place connected to suction.   Chest:      Chest wall: No tenderness.   Neurological:      General: No focal deficit present.          Labs: I have personally reviewed pertinent lab results.  Imaging and other studies: I have personally reviewed pertinent reports.      "

## 2024-06-20 NOTE — ASSESSMENT & PLAN NOTE
Baseline Cr 0.9-1.1 prior to May 2024  Elevated to 1.66 as of May 2024 and has been stable since then  At baseline

## 2024-06-20 NOTE — ASSESSMENT & PLAN NOTE
Patient presents with shortness of breath for 2 days. Past smoker, 2 packs/day, quit over 10 years ago. Patient states that he has also been having cough which is productive of dark brown phlegm for 2 days. Patient also mentions that he has been feeling feverish and chills while at home Denies chest pain, abdominal pain, n/v/d,dizziness, lightheadedness, HA, dysuria, hematuria, hematochezia, or melena. Sent in by pulmonologist following CT results. Admitted to the hospital the beginning of May due to pneumonia at which point he required a thoracentesis on 4/26 yielding 600ml, completed 7 days of ceftriaxone.  CT chest wo contrast (06/10/2024)- persistent loculated moderate size left pleural effusion w/ partial collapse of LLL. Stable AAA measuring 4.7 cm (since May 2024).   Patient wears a baseline of 2 L oxygen on exertion and during sleep since last hospital admission.  In the ED- O2 desat to 83% on 2L, moved up to 5 L, sitting 91%  Patient reports 38 lb weight loss over the last few months and has decreased appetite.   Received vancomycin, azithromycin, and ceftriaxone in the ER.  Viral panel negative  Pro-Aristides elevated to 0.35 on 06/18/2024 and 0.30 on 06/19/2024  Blood culture no growth at 24 hours  IR placed left sided chest tube and drained 300 mL of mildly frothy, slightly viscous and serosanguinous fluid and samples were sent for ordered laboratory evaluation.   Left chest tube was instilled with 10 mg of TPA  in 30 mL of sterile water and 5 mg of Dornase in sterile water due to incomplete drainage from loculations. The tube was then clamped at 13:40 PM and will remain clamped until 14:40 PM. The nurse will unclamp then and keep chest tube to suction.   Pleural fluid culture and gram stain (06/19/2024) show 3+ polys and 2+ gram + cocci in chains. Body fluid white cell count of 573,600, fluid pH 5.6, LD fluid >12,000    Plan:  Continue with IV cefepime and azithromycin p.o.; day 3 of antibiotics.  Will narrow  Abx per cultures  6/20 TPA Dornase to facilitate drainage per pulm  F/u blood culture, sputum culture, Strep pneumo and Legionella antigen. IR specimen  Monitor temp  Tylenol 650 mg prn  Tessalon Perles 200 mg TID prn  Mucinex 1200 mg BID  Incentive spirometer  Flutter valve  Pulmonary and IR recommendations appreciated

## 2024-06-20 NOTE — PLAN OF CARE
Problem: OCCUPATIONAL THERAPY ADULT  Goal: Performs self-care activities at highest level of function for planned discharge setting.  See evaluation for individualized goals.  Description: Treatment Interventions: ADL retraining, Functional transfer training, UE strengthening/ROM, Endurance training, Patient/family training, Equipment evaluation/education, Compensatory technique education, Continued evaluation          See flowsheet documentation for full assessment, interventions and recommendations.   Note: Limitation: Decreased ADL status, Decreased endurance, Decreased self-care trans, Decreased high-level ADLs  Prognosis: Good  Assessment: Patient is a 83 y.o. male seen for OT evaluation at [facility] following admission on 6/18/2024  s/p Pleural effusion. Please see above for comprehensive list of comorbidities and significant PMHx impacting functional performance.  Upon initial evaluation, pt appears to be performing below baseline functional status.   Occupational performance is affected by the following deficits: endurance , decreased balance , decreased standing tolerance for self care tasks , and decreased activity tolerance . Personal/Environmental factors impacting D/C include: steps to enter/navigate the home. Supporting factors include: support system available Patient would benefit from OT services within the acute care setting to maximize level of functional independence in the following areas self-care transfers, functional mobility, and ADLs.  From OT standpoint, recommendation at time of D/C would be Level 3: minimum resource intensity .     Rehab Resource Intensity Level, OT: III (Minimum Resource Intensity)     Jana Askew OT

## 2024-06-21 ENCOUNTER — APPOINTMENT (INPATIENT)
Dept: RADIOLOGY | Facility: HOSPITAL | Age: 84
DRG: 177 | End: 2024-06-21
Payer: MEDICARE

## 2024-06-21 ENCOUNTER — HOME HEALTH ADMISSION (OUTPATIENT)
Dept: HOME HEALTH SERVICES | Facility: HOME HEALTHCARE | Age: 84
End: 2024-06-21
Payer: MEDICARE

## 2024-06-21 LAB
ANION GAP SERPL CALCULATED.3IONS-SCNC: 4 MMOL/L (ref 4–13)
BASOPHILS # BLD AUTO: 0.06 THOUSANDS/ÂΜL (ref 0–0.1)
BASOPHILS NFR BLD AUTO: 1 % (ref 0–1)
BUN SERPL-MCNC: 30 MG/DL (ref 5–25)
CALCIUM SERPL-MCNC: 8.2 MG/DL (ref 8.4–10.2)
CHLORIDE SERPL-SCNC: 99 MMOL/L (ref 96–108)
CO2 SERPL-SCNC: 29 MMOL/L (ref 21–32)
CREAT SERPL-MCNC: 1.09 MG/DL (ref 0.6–1.3)
EOSINOPHIL # BLD AUTO: 0.11 THOUSAND/ÂΜL (ref 0–0.61)
EOSINOPHIL NFR BLD AUTO: 1 % (ref 0–6)
ERYTHROCYTE [DISTWIDTH] IN BLOOD BY AUTOMATED COUNT: 17.6 % (ref 11.6–15.1)
GFR SERPL CREATININE-BSD FRML MDRD: 62 ML/MIN/1.73SQ M
GLUCOSE SERPL-MCNC: 90 MG/DL (ref 65–140)
HCT VFR BLD AUTO: 27.6 % (ref 36.5–49.3)
HGB BLD-MCNC: 8.5 G/DL (ref 12–17)
IMM GRANULOCYTES # BLD AUTO: 0.09 THOUSAND/UL (ref 0–0.2)
IMM GRANULOCYTES NFR BLD AUTO: 1 % (ref 0–2)
INR PPP: 2.58 (ref 0.84–1.19)
LYMPHOCYTES # BLD AUTO: 0.88 THOUSANDS/ÂΜL (ref 0.6–4.47)
LYMPHOCYTES NFR BLD AUTO: 8 % (ref 14–44)
MCH RBC QN AUTO: 28.6 PG (ref 26.8–34.3)
MCHC RBC AUTO-ENTMCNC: 30.8 G/DL (ref 31.4–37.4)
MCV RBC AUTO: 93 FL (ref 82–98)
MONOCYTES # BLD AUTO: 0.85 THOUSAND/ÂΜL (ref 0.17–1.22)
MONOCYTES NFR BLD AUTO: 8 % (ref 4–12)
NEUTROPHILS # BLD AUTO: 8.63 THOUSANDS/ÂΜL (ref 1.85–7.62)
NEUTS SEG NFR BLD AUTO: 81 % (ref 43–75)
NRBC BLD AUTO-RTO: 0 /100 WBCS
PLATELET # BLD AUTO: 284 THOUSANDS/UL (ref 149–390)
PMV BLD AUTO: 9.6 FL (ref 8.9–12.7)
POTASSIUM SERPL-SCNC: 4.5 MMOL/L (ref 3.5–5.3)
PROTHROMBIN TIME: 28.6 SECONDS (ref 11.6–14.5)
RBC # BLD AUTO: 2.97 MILLION/UL (ref 3.88–5.62)
SODIUM SERPL-SCNC: 132 MMOL/L (ref 135–147)
WBC # BLD AUTO: 10.62 THOUSAND/UL (ref 4.31–10.16)

## 2024-06-21 PROCEDURE — 85025 COMPLETE CBC W/AUTO DIFF WBC: CPT

## 2024-06-21 PROCEDURE — NC001 PR NO CHARGE: Performed by: STUDENT IN AN ORGANIZED HEALTH CARE EDUCATION/TRAINING PROGRAM

## 2024-06-21 PROCEDURE — 0W9B30Z DRAINAGE OF LEFT PLEURAL CAVITY WITH DRAINAGE DEVICE, PERCUTANEOUS APPROACH: ICD-10-PCS | Performed by: RADIOLOGY

## 2024-06-21 PROCEDURE — 80048 BASIC METABOLIC PNL TOTAL CA: CPT

## 2024-06-21 PROCEDURE — 71045 X-RAY EXAM CHEST 1 VIEW: CPT

## 2024-06-21 PROCEDURE — 85610 PROTHROMBIN TIME: CPT

## 2024-06-21 PROCEDURE — 97163 PT EVAL HIGH COMPLEX 45 MIN: CPT

## 2024-06-21 PROCEDURE — 99232 SBSQ HOSP IP/OBS MODERATE 35: CPT | Performed by: INTERNAL MEDICINE

## 2024-06-21 PROCEDURE — 99232 SBSQ HOSP IP/OBS MODERATE 35: CPT | Performed by: STUDENT IN AN ORGANIZED HEALTH CARE EDUCATION/TRAINING PROGRAM

## 2024-06-21 RX ORDER — CALCIUM GLUCONATE 20 MG/ML
2 INJECTION, SOLUTION INTRAVENOUS ONCE
Status: COMPLETED | OUTPATIENT
Start: 2024-06-21 | End: 2024-06-21

## 2024-06-21 RX ADMIN — B-COMPLEX W/ C & FOLIC ACID TAB 1 TABLET: TAB at 08:10

## 2024-06-21 RX ADMIN — DOCUSATE SODIUM 100 MG: 100 CAPSULE, LIQUID FILLED ORAL at 08:10

## 2024-06-21 RX ADMIN — ASPIRIN 81 MG: 81 TABLET, COATED ORAL at 08:10

## 2024-06-21 RX ADMIN — Medication 1 PACKET: at 08:09

## 2024-06-21 RX ADMIN — POLYETHYLENE GLYCOL 3350 17 G: 17 POWDER, FOR SOLUTION ORAL at 08:10

## 2024-06-21 RX ADMIN — TAMSULOSIN HYDROCHLORIDE 0.4 MG: 0.4 CAPSULE ORAL at 17:19

## 2024-06-21 RX ADMIN — TORSEMIDE 20 MG: 20 TABLET ORAL at 08:10

## 2024-06-21 RX ADMIN — ALTEPLASE 10 MG: 2.2 INJECTION, POWDER, LYOPHILIZED, FOR SOLUTION INTRAVENOUS at 02:28

## 2024-06-21 RX ADMIN — WARFARIN SODIUM 2.5 MG: 2.5 TABLET ORAL at 17:19

## 2024-06-21 RX ADMIN — POTASSIUM CHLORIDE 20 MEQ: 1500 TABLET, EXTENDED RELEASE ORAL at 08:10

## 2024-06-21 RX ADMIN — CEFTRIAXONE SODIUM 1000 MG: 10 INJECTION, POWDER, FOR SOLUTION INTRAVENOUS at 21:27

## 2024-06-21 RX ADMIN — GABAPENTIN 900 MG: 300 CAPSULE ORAL at 21:28

## 2024-06-21 RX ADMIN — AMIODARONE HYDROCHLORIDE 200 MG: 200 TABLET ORAL at 08:10

## 2024-06-21 RX ADMIN — GUAIFENESIN 1200 MG: 600 TABLET ORAL at 21:28

## 2024-06-21 RX ADMIN — Medication 2000 UNITS: at 08:10

## 2024-06-21 RX ADMIN — TORSEMIDE 20 MG: 20 TABLET ORAL at 21:28

## 2024-06-21 RX ADMIN — ALTEPLASE 10 MG: 2.2 INJECTION, POWDER, LYOPHILIZED, FOR SOLUTION INTRAVENOUS at 15:37

## 2024-06-21 RX ADMIN — GUAIFENESIN 1200 MG: 600 TABLET ORAL at 08:10

## 2024-06-21 RX ADMIN — DOCUSATE SODIUM 100 MG: 100 CAPSULE, LIQUID FILLED ORAL at 17:19

## 2024-06-21 RX ADMIN — SENNOSIDES 17.2 MG: 8.6 TABLET, FILM COATED ORAL at 21:27

## 2024-06-21 RX ADMIN — DORNASE ALFA 5 MG: 1 SOLUTION RESPIRATORY (INHALATION) at 02:28

## 2024-06-21 RX ADMIN — ATORVASTATIN CALCIUM 40 MG: 40 TABLET, FILM COATED ORAL at 08:10

## 2024-06-21 RX ADMIN — ACETAMINOPHEN 650 MG: 325 TABLET, FILM COATED ORAL at 22:43

## 2024-06-21 RX ADMIN — CALCIUM GLUCONATE 2 G: 20 INJECTION, SOLUTION INTRAVENOUS at 08:10

## 2024-06-21 RX ADMIN — LABETALOL HYDROCHLORIDE 100 MG: 100 TABLET, FILM COATED ORAL at 08:10

## 2024-06-21 RX ADMIN — LABETALOL HYDROCHLORIDE 100 MG: 100 TABLET, FILM COATED ORAL at 17:19

## 2024-06-21 RX ADMIN — DORNASE ALFA 5 MG: 1 SOLUTION RESPIRATORY (INHALATION) at 15:37

## 2024-06-21 NOTE — PROCEDURES
Risks and benefits discussion were held regarding instillation of medication. Decision to proceed with chest tube instillation of TPA Dnase     L chest tube was instilled with 10 mg of TPA  in 30 mL of sterile water and 5 mg of Dornase in sterile water. The tube was then clamped at 15:20 PM and will remain clamped until 16:20 PM . The nurse will unclamp then and keep chest tube to suction.     Pollo Hall, DO  PGY-4, Pulmonary/Critical Care  [facility]

## 2024-06-21 NOTE — PLAN OF CARE
Problem: PHYSICAL THERAPY ADULT  Goal: Performs mobility at highest level of function for planned discharge setting.  See evaluation for individualized goals.  Description: Treatment/Interventions: LE strengthening/ROM, Elevations, Therapeutic exercise, Endurance training, Patient/family training, Equipment eval/education, Gait training, Spoke to nursing          See flowsheet documentation for full assessment, interventions and recommendations.  Note: Prognosis: Fair  Problem List: Decreased endurance, Decreased strength, Decreased skin integrity, Decreased mobility, Impaired balance, Obesity  Assessment: Pt is a 82 yo male who presented to [facility] with SOB and admitted on 6/18/2924 and dx with pleural effusion with SOB. PT was consulted with orders for Up and OOB as tolerated per MD. Pt has PMH significant for CAD, COPD, and afib. During evaluation pt was able to perform transfers and ambulation within room for 28 ft(14'x2) however required supervision from PT due to decreased LE strength and impaired balance. Pt was further restricted by chest tube to suction, decreased skin integrity from lymphedema and current medical management. Gait significant for decreased speed, forward flexed posture and NBOS. Pt prognosis is fair due to current functional status however is limited by PMH and current ongoing medical management required.  Prior to admission, pt was able to complete all ADLs and functional mobility within his home and to his backyard independently. At baseline pt was additionally able to walk for 50 ft independently prior to requiring a rest break. Given findings of evaluation, pt is not currently at baseline. Pt would benefit from skilled PT care in order to address aforementioned impairments, functional mobility, improve QOL, reduce caregiver burden and decrease risk for falls. Upon discharge would recommend a level III disposition.        Rehab Resource Intensity Level, PT: III (Minimum Resource  Intensity)    See flowsheet documentation for full assessment.

## 2024-06-21 NOTE — CASE MANAGEMENT
Case Management Discharge Planning Note    Patient name Oscar Espinosa  Location W /W -01 MRN [id number]  : 1940 Date 2024       Current Admission Date: 2024  Current Admission Diagnosis:Pleural effusion with shortness of breath   Patient Active Problem List    Diagnosis Date Noted Date Diagnosed    Abnormal TSH 2024     Prediabetes 2024     Elevated serum creatinine 2024     Chronic combined systolic and diastolic congestive heart failure (HCC) 2024     Acute hypoxic respiratory failure (HCC) 2024     Pleural effusion with shortness of breath 2024     Hyponatremia 2024     Anemia 2024     Bilateral carotid artery stenosis 2024     Venous insufficiency of both lower extremities 2023     Hypertensive heart and chronic kidney disease with heart failure and stage 1 through stage 4 chronic kidney disease, or chronic kidney disease (HCC) 10/27/2022     Myofascial pain syndrome 2022     Atrial fibrillation ([facility]) 2022     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) 2022     Ascending aortic aneurysm (HCC) 2021     Nocturnal hypoxemia      Moderate to severe pulmonary hypertension (HCC) 2021     Chronic obstructive pulmonary disease ([facility]) 2019     Multiple pulmonary nodules 09/10/2019     Chronic pain syndrome      Subclinical hypothyroidism 10/22/2014     Abdominal aortic aneurysm without rupture ([facility]) 2014     Aortic valve disorder 2014     Lumbar radiculopathy 2013     Benign essential hypertension 04/10/2013     Impaired fasting glucose 04/10/2013     Severe obesity (BMI 35.0-35.9 with comorbidity) (HCC) 04/10/2013     Hyperlipidemia 2012     Microscopic hematuria 2012     Severe obstructive sleep apnea 2012     CAD (coronary artery disease) 2012       LOS (days): 3  Geometric Mean LOS (GMLOS) (days): 4.1  Days to GMLOS:1.2      OBJECTIVE:  Risk of Unplanned Readmission Score: 26.21         Current admission status: Inpatient   Preferred Pharmacy:   [facility] - [place] [id number]  [address]  [address]  [facility] [address]  Phone: [phone] Fax: [phone]    [id number] - [place], [place] - [address]  [address]  [place] [address]  Phone: [phone] Fax: [phone]    [facility] - [place], [place] - [address]  [address]  [address]  [facility] [address]  Phone: [phone] Fax: [phone]    Primary Care Provider: Lea Reyes, MD    Primary Insurance: MEDICARE  Secondary Insurance: AARP    DISCHARGE DETAILS:    Discharge planning discussed with:: patient and wife, Dorcas at bedside  Freedom of Choice: Yes  Comments - Freedom of Choice: [facility]  CM contacted family/caregiver?: Yes  Were Treatment Team discharge recommendations reviewed with patient/caregiver?: Yes  Did patient/caregiver verbalize understanding of patient care needs?: Yes    Contacts  Patient Contacts: Dorcas  Relationship to Patient:: Family  Contact Method: In Person  Reason/Outcome: Emergency Contact, Discharge Planning, Continuity of Care, Referral    Requested Home Health Care         Is the patient interested in [facility] at discharge?: Yes  Home Health Discipline requested:: Physical Therapy, Nursing, Occupational Therapy  Home Health Agency Name:: Other (pending referrals)  Home Health Follow-Up Provider:: PCP  Home Health Services Needed:: Strengthening/Theraputic Exercises to Improve Function, Oxygen Via Nasal Cannula, Gait/ADL Training, Evaluate Functional Status and Safety  Homebound Criteria Met:: Requires the Assistance of Another Person for Safe Ambulation or to Leave the Home, Uses an Assist Device (i.e. cane, walker, etc)  Supporting Clincal Findings:: Limited Endurance, Requires Oxygen    DME Referral Provided  Referral made for DME?:  No    Other Referral/Resources/Interventions Provided:  Interventions: HHC  Referral Comments: CM met with patient and wife, Dorcas at bedside to discuss therapy recommendation of HHC. Patient and wife both agreeable to services-- stating they would like [facility], if possible. Referrals placed via [facility]. CM to follow up as able to continue with dcp.    Would you like to participate in our [facility] service program?  : No - Declined    Treatment Team Recommendation: Home with Home Health Care  Discharge Destination Plan:: Home with Home Health Care  Transport at Discharge : Family

## 2024-06-21 NOTE — ASSESSMENT & PLAN NOTE
Patient presents with shortness of breath for 2 days. Past smoker, 2 packs/day, quit over 10 years ago. Patient states that he has also been having cough which is productive of dark brown phlegm for 2 days. Patient also mentions that he has been feeling feverish and chills while at home Denies chest pain, abdominal pain, n/v/d,dizziness, lightheadedness, HA, dysuria, hematuria, hematochezia, or melena. Sent in by pulmonologist following CT results. Admitted to the hospital the beginning of May due to pneumonia at which point he required a thoracentesis on 4/26 yielding 600ml, completed 7 days of ceftriaxone.  CT chest wo contrast (06/10/2024)- persistent loculated moderate size left pleural effusion w/ partial collapse of LLL. Stable AAA measuring 4.7 cm (since May 2024).   Patient wears a baseline of 2 L oxygen on exertion and during sleep since last hospital admission.  In the ED- O2 desat to 83% on 2L, moved up to 5 L, sitting 91%  Patient reports 38 lb weight loss over the last few months and has decreased appetite.   Received vancomycin, azithromycin, and ceftriaxone in the ER.  Viral panel negative  Pro-Aristides elevated to 0.35 on 06/18/2024 and 0.30 on 06/19/2024  Blood culture no growth at 24 hours  IR placed left sided chest tube and drained 300 mL of mildly frothy, slightly viscous and serosanguinous fluid and samples were sent for ordered laboratory evaluation.   On 06/21, left chest tube was instilled with 10 mg of TPA  in 30 mL of sterile water and 5 mg of Dornase in sterile water due to incomplete drainage from loculations. The tube was then clamped at 13:40 PM and will remain clamped until 14:40 PM. The nurse will unclamp then and keep chest tube to suction.   Pleural fluid culture and gram stain (06/19/2024) show 3+ polys and 2+ gram + cocci in chains, and 2+ growth of streptococcus intermedius. Extra turbid Fluid with PH 5.6, LDH >63678. Glucose <10. ,600, TP <3.0  Today 1000 mL with pus in  drainage.   Since chest tube placement total output nearly 1300 mL.   Chest x ray on 06/20- Status post placement of left pleural catheter with partially diminished size of the left effusion. No pneumothorax   Legionella and Strep Pneumonia antigen negative  Fluid cultures cultures strep intermedius    Repeat chest x ray on 6/21 - Persistent dense left lung base opacity which may represent a combination of effusion and parenchymal opacity.     Plan:  6/21 continue with IV ceftriaxone; day 4 of antibiotics  Monitor temp  Tylenol 650 mg prn  Tessalon Perles 200 mg TID prn  Mucinex 1200 mg BID  Incentive spirometer  Flutter valve  Pulmonary and IR recommendations appreciated

## 2024-06-21 NOTE — QUICK NOTE
At this time, patient's left sided CT was unclamped, and returned to -20 cmH2O suction.  Patient tolerated dwell well and without incident.

## 2024-06-21 NOTE — QUICK NOTE
I, personally instilled patient's left sided CT with dose #2 of 6 of tPA (30 mL) and dornase (30 mL), then clamped.  RN aware that patient is clamped and will continue to reposition himself throughout dwell to disperse medication.  Patient tolerated instillation well and without incident.  Will unclamp in 60 min.  RN aware to call for increased work of breathing, respiratory distress, increased FiO2 requirements, or complaints of pain in chest.

## 2024-06-21 NOTE — ASSESSMENT & PLAN NOTE
Home regimen: gabapentin 900 mg HS  OT recommended level 3, thus, likely home PT OT.   PT recommended a level III disposition upon discharge.     Plan:  Reduce dose if further elevation of Creatinine

## 2024-06-21 NOTE — CASE MANAGEMENT
Case Management Progress Note    Patient name Oscar Espinosa  Location W /W -01 MRN [id number]  : 1940 Date 2024       LOS (days): 3  Geometric Mean LOS (GMLOS) (days): 4.1  Days to GMLOS:1.2        OBJECTIVE:        Current admission status: Inpatient  Preferred Pharmacy:   [facility] - [place] [address] [address]  Phone: [phone] Fax: [phone]    [id number] - [place], [place] - [address] [place] [address] [facility]  [place] [address]  Phone: [phone] Fax: [phone]    [facility] - [place], [place] - [address]  [address] [address]  [facility] [address]  Phone: [phone] Fax: [phone]    Primary Care Provider: Lea Reyes, MD    Primary Insurance: MEDICARE  Secondary Insurance: AARP    PROGRESS NOTE:  [facility] able to accept-- reserved in [facility] and added to follow up providers.     Patient and wife notified of same.

## 2024-06-21 NOTE — ASSESSMENT & PLAN NOTE
Patient admitted to the hospital the beginning of May due to pneumonia at which point he required a thoracentesis on 4/26 yielding 600ml, completed 7 days of ceftriaxone.   CT chest wo contrast (04/27/2024)- Minimally loculated moderate-sized left pleural effusion. Compressive atelectasis at the left lower lobe and lingula.   CT chest wo contrast (05/01/2024)- Unchanged moderate left effusion with adjacent atelectasis. Unchanged ascending thoracic aortic measuring 5 cm. Interstitial lung disease changes suggested bilaterally with atelectasis in the lingula and left lower lobe.  CT chest wo contrast (06/10/2024)- persistent loculated moderate size left pleural effusion w/ partial collapse of LLL. Stable AAA measuring 4.7 cm (since May 2024).   On this admission, IR placed left sided chest tube and drained 300 mL of mildly frothy, slightly viscous and serosanguinous fluid and samples were sent for ordered laboratory evaluation.   Left chest tube was instilled with 10 mg of TPA  in 30 mL of sterile water and 5 mg of Dornase in sterile water due to incomplete drainage from loculations. The tube was then clamped at 13:40 PM and will remain clamped until 14:40 PM. The nurse will unclamp then and keep chest tube to suction.   Pleural fluid culture and gram stain (06/19/2024) show 3+ polys and 2+ gram + cocci in chains, and 2+ growth of streptococcus intermedius. Extra turbid Fluid with PH 5.6, LDH >66969. Glucose <10. ,600, TP <3.0  Today 1000 mL with pus in drainage.   Since chest tube placement total output nearly 1300 mL.   Chest x ray on 06/20- Status post placement of left pleural catheter with partially diminished size of the left effusion. No pneumothorax

## 2024-06-21 NOTE — PROGRESS NOTES
[facility]  Progress Note  Name: Oscar Espinosa I  MRN: [id number]  Unit/Bed#: W -01 I Date of Admission: 6/18/2024   Date of Service: 6/21/2024 I Hospital Day: 3    Assessment & Plan   Acute hypoxic respiratory failure (HCC)  Assessment & Plan  Patient presents with shortness of breath for 2 days. Past smoker, 2 packs/day, quit over 10 years ago. Patient states that he has also been having cough which is productive of dark brown phlegm for 2 days. Patient also mentions that he has been feeling feverish and chills while at home Denies chest pain, abdominal pain, n/v/d,dizziness, lightheadedness, HA, dysuria, hematuria, hematochezia, or melena. Sent in by pulmonologist following CT results. Admitted to the hospital the beginning of May due to pneumonia at which point he required a thoracentesis on 4/26 yielding 600ml, completed 7 days of ceftriaxone.  CT chest wo contrast (06/10/2024)- persistent loculated moderate size left pleural effusion w/ partial collapse of LLL. Stable AAA measuring 4.7 cm (since May 2024).   Patient wears a baseline of 2 L oxygen on exertion and during sleep since last hospital admission.  In the ED- O2 desat to 83% on 2L, moved up to 5 L, sitting 91%  Patient reports 38 lb weight loss over the last few months and has decreased appetite.   Received vancomycin, azithromycin, and ceftriaxone in the ER.  Viral panel negative  Pro-Aristides elevated to 0.35 on 06/18/2024 and 0.30 on 06/19/2024  Blood culture no growth at 24 hours  IR placed left sided chest tube and drained 300 mL of mildly frothy, slightly viscous and serosanguinous fluid and samples were sent for ordered laboratory evaluation.   On 06/21, left chest tube was instilled with 10 mg of TPA  in 30 mL of sterile water and 5 mg of Dornase in sterile water due to incomplete drainage from loculations. The tube was then clamped at 13:40 PM and will remain clamped until 14:40 PM. The nurse will unclamp then  and keep chest tube to suction.   Pleural fluid culture and gram stain (06/19/2024) show 3+ polys and 2+ gram + cocci in chains, and 2+ growth of streptococcus intermedius. Extra turbid Fluid with PH 5.6, LDH >59241. Glucose <10. ,600, TP <3.0  Today 1000 mL with pus in drainage.   Since chest tube placement total output nearly 1300 mL.   Chest x ray on 06/20- Status post placement of left pleural catheter with partially diminished size of the left effusion. No pneumothorax   Legionella and Strep Pneumonia antigen negative  Fluid cultures cultures strep intermedius    Repeat chest x ray on 6/21 - Persistent dense left lung base opacity which may represent a combination of effusion and parenchymal opacity.     Plan:  6/21 continue with IV ceftriaxone; day 4 of antibiotics  Monitor temp  Tylenol 650 mg prn  Tessalon Perles 200 mg TID prn  Mucinex 1200 mg BID  Incentive spirometer  Flutter valve  Pulmonary and IR recommendations appreciated    Abnormal TSH  Assessment & Plan  TSH 4.559, free T4 within normal limits    Elevated serum creatinine  Assessment & Plan  Baseline Cr 0.9-1.1 prior to May 2024  Elevated to 1.66 as of May 2024 and has been stable since then  Currently at baseline    Prediabetes  Assessment & Plan  Lab Results   Component Value Date    HGBA1C 6.3 (H) 06/18/2024        Plan:  Monitor blood sugars  Consider insulin sliding scale if glucose elevated  Diabetic diet    Chronic combined systolic and diastolic congestive heart failure (HCC)  Assessment & Plan  Wt Readings from Last 3 Encounters:   06/13/24 99.3 kg (219 lb)   06/06/24 100 kg (221 lb)   05/20/24 101 kg (221 lb 12.8 oz)     Home regimen: torsemide 40 mg PO daily, labetolol 100 mg  Diuretics were initially held in the setting of elevated Cr. Creatinine improved.  Diuretics resumed    Plan:  Continue home regimen  Monitor BMP  Monitor I/O    Anemia  Assessment & Plan  On review of labs, anemia is new since April 2024.   Last labs in  August 2023 were normal.   There has been a slow trend downward.   Hgb 8.4 on admission    Plan:  Monitor CBC  Transfuse if Hgb <7    Atrial fibrillation (HCC)  Assessment & Plan  Home regimen: Amiodorone 200 mg p.o. daily, labetolol 100 mg BID, warfarin 2.5 mg  Warfarin held prior to IR procedure and later continued.    Plan:  Continue amiodarone, labetolol, warfarin  PT/ INR monitoring    Chronic obstructive pulmonary disease (HCC)  Assessment & Plan  Not in exacerbations, albuterol as needed.     Chronic pain syndrome  Assessment & Plan  Home regimen: gabapentin 900 mg HS  OT recommended level 3, thus, likely home PT OT.   PT recommended a level III disposition upon discharge.     Plan:  Reduce dose if further elevation of Creatinine    CAD (coronary artery disease)  Assessment & Plan  Home regimen: ASA 81 mg, lipitor 40 mg, labetolol 100 mg    Plan:  Continue home regimen    * Pleural effusion with shortness of breath  Assessment & Plan  Patient admitted to the hospital the beginning of May due to pneumonia at which point he required a thoracentesis on 4/26 yielding 600ml, completed 7 days of ceftriaxone.   CT chest wo contrast (04/27/2024)- Minimally loculated moderate-sized left pleural effusion. Compressive atelectasis at the left lower lobe and lingula.   CT chest wo contrast (05/01/2024)- Unchanged moderate left effusion with adjacent atelectasis. Unchanged ascending thoracic aortic measuring 5 cm. Interstitial lung disease changes suggested bilaterally with atelectasis in the lingula and left lower lobe.  CT chest wo contrast (06/10/2024)- persistent loculated moderate size left pleural effusion w/ partial collapse of LLL. Stable AAA measuring 4.7 cm (since May 2024).   On this admission, IR placed left sided chest tube and drained 300 mL of mildly frothy, slightly viscous and serosanguinous fluid and samples were sent for ordered laboratory evaluation.   Left chest tube was instilled with 10 mg of TPA  in  30 mL of sterile water and 5 mg of Dornase in sterile water due to incomplete drainage from loculations. The tube was then clamped at 13:40 PM and will remain clamped until 14:40 PM. The nurse will unclamp then and keep chest tube to suction.   Pleural fluid culture and gram stain (06/19/2024) show 3+ polys and 2+ gram + cocci in chains, and 2+ growth of streptococcus intermedius. Extra turbid Fluid with PH 5.6, LDH >82835. Glucose <10. ,600, TP <3.0  Today 1000 mL with pus in drainage.   Since chest tube placement total output nearly 1300 mL.   Chest x ray on 06/20- Status post placement of left pleural catheter with partially diminished size of the left effusion. No pneumothorax                    VTE Pharmacologic Prophylaxis: VTE Score: 5 High Risk (Score >/= 5) - Pharmacological DVT Prophylaxis Ordered: warfarin (Coumadin). Sequential Compression Devices Ordered.    Mobility:   Basic Mobility Inpatient Raw Score: 15  JH-HLM Goal: 4: Move to chair/commode  JH-HLM Achieved: 4: Move to chair/commode  JH-HLM Goal NOT achieved. Continue with multidisciplinary rounding and encourage appropriate mobility to improve upon JH-HLM goals.    Patient Centered Rounds: I performed bedside rounds with nursing staff today.   Discussions with Specialists or Other Care Team Provider:  pulmonology, IR, PT, OT    Education and Discussions with Family / Patient: Updated  (wife) at bedside.    Total Time Spent on Date of Encounter in care of patient: 30 mins. This time was spent on one or more of the following: performing physical exam; counseling and coordination of care; obtaining or reviewing history; documenting in the medical record; reviewing/ordering tests, medications or procedures; communicating with other healthcare professionals and discussing with patient's family/caregivers.    Current Length of Stay: 3 day(s)  Current Patient Status: Inpatient   Discharge Plan: Anticipate discharge in 24-48 hrs to  home.    Code Status: Level 3 - DNAR and DNI    Subjective:   Patient was seen and examined at bedside this morning. Patient comfortably sitting in bed and not in any acute distress. Patient reports pain is improved in chest tube site from yesterday. Patient had a partial bowel movement yesterday.  Otherwise no other questions or concerns this morning    Objective:     Vitals:   Temp (24hrs), Av.7 °F (36.5 °C), Min:97.6 °F (36.4 °C), Max:97.8 °F (36.6 °C)    Temp:  [97.6 °F (36.4 °C)-97.8 °F (36.6 °C)] 97.6 °F (36.4 °C)  HR:  [58-68] 65  Resp:  [20] 20  BP: (106-127)/(54-58) 114/54  SpO2:  [92 %-95 %] 92 %  Body mass index is 30.54 kg/m².     Input and Output Summary (last 24 hours):     Intake/Output Summary (Last 24 hours) at 2024 1423  Last data filed at 2024 1344  Gross per 24 hour   Intake 1060 ml   Output 1555 ml   Net -495 ml       Physical Exam:    Physical Exam  Vitals and nursing note reviewed.   Constitutional:       General: He is not in acute distress.     Appearance: He is well-developed.   HENT:      Head: Normocephalic and atraumatic.   Eyes:      Conjunctiva/sclera: Conjunctivae normal.   Cardiovascular:      Rate and Rhythm: Normal rate and regular rhythm.   Pulmonary:      Effort: Pulmonary effort is normal. No respiratory distress.      Breath sounds: Normal breath sounds.      Comments: Decreased lung sounds on the left lower part of the chest    Chest tube in place  Abdominal:      Palpations: Abdomen is soft.      Tenderness: There is no abdominal tenderness.   Musculoskeletal:         General: No swelling.      Cervical back: Neck supple.      Right lower leg: Edema present.      Left lower leg: Edema present.   Skin:     General: Skin is warm and dry.      Capillary Refill: Capillary refill takes less than 2 seconds.   Neurological:      Mental Status: He is alert.   Psychiatric:         Mood and Affect: Mood normal.          Additional Data:     Labs:  Results from last 7 days    Lab Units 06/21/24  0527   WBC Thousand/uL 10.62*   HEMOGLOBIN g/dL 8.5*   HEMATOCRIT % 27.6*   PLATELETS Thousands/uL 284   SEGS PCT % 81*   LYMPHO PCT % 8*   MONO PCT % 8   EOS PCT % 1     Results from last 7 days   Lab Units 06/21/24  0527 06/20/24  0451 06/19/24  0512   SODIUM mmol/L 132*   < > 134*   POTASSIUM mmol/L 4.5   < > 4.2   CHLORIDE mmol/L 99   < > 98   CO2 mmol/L 29   < > 31   BUN mg/dL 30*   < > 32*   CREATININE mg/dL 1.09   < > 1.21   ANION GAP mmol/L 4   < > 5   CALCIUM mg/dL 8.2*   < > 7.9*   ALBUMIN g/dL  --   --  2.3*   TOTAL BILIRUBIN mg/dL  --   --  0.87   ALK PHOS U/L  --   --  130*   ALT U/L  --   --  44   AST U/L  --   --  40*   GLUCOSE RANDOM mg/dL 90   < > 109    < > = values in this interval not displayed.     Results from last 7 days   Lab Units 06/21/24  0527   INR  2.58*         Results from last 7 days   Lab Units 06/18/24  0834   HEMOGLOBIN A1C % 6.3*     Results from last 7 days   Lab Units 06/19/24  0512 06/18/24  1448   LACTIC ACID mmol/L  --  1.2   PROCALCITONIN ng/ml 0.30* 0.35*       Lines/Drains:  Invasive Devices       Peripheral Intravenous Line  Duration             Peripheral IV 06/18/24 Distal;Left;Upper;Ventral (anterior) Arm 2 days              Drain  Duration             Chest Tube Left 12 Fr. 2 days                          Imaging: Reviewed radiology reports from this admission including: chest xray, chest CT scan, procedure reports, and ultrasound(s)    Recent Cultures (last 7 days):   Results from last 7 days   Lab Units 06/20/24  0912 06/19/24  1336 06/18/24  1457 06/18/24  1448   BLOOD CULTURE   --   --  No Growth at 48 hrs. No Growth at 48 hrs.   GRAM STAIN RESULT   --  3+ Polys*  2+ Gram positive cocci in chains*  --   --    BODY FLUID CULTURE, STERILE   --  2+ Growth of Streptococcus intermedius*  --   --    LEGIONELLA URINARY ANTIGEN  Negative  --   --   --        Last 24 Hours Medication List:   Current Facility-Administered Medications   Medication Dose  Route Frequency Provider Last Rate    acetaminophen  650 mg Oral Q6H PRN Delisa Acevedo MD      albuterol  2 puff Inhalation Q6H PRN Delisa Acevedo MD      alteplase (TPA) 10 mg in SWFI 30 mL chest tube/drain tube instillation  10 mg Chest Tube Once Pollohelen Hall, DO      And    dornase shavonne (PULMOZYME) 5 mg in 30 mL SWFI chest tube/drain tube instillation  5 mg Chest Tube Once Pollo P Hall, DO      amiodarone  200 mg Oral Daily Delisa Acevedo MD      aspirin  81 mg Oral Daily Delisa Acevedo MD      atorvastatin  40 mg Oral Daily Delisa Acevedo MD      benzonatate  200 mg Oral TID PRN Oscar Jack DO      cefTRIAXone  1,000 mg Intravenous Q24H Andreas Singleton MD 1,000 mg (06/20/24 4153)    cholecalciferol  2,000 Units Oral Daily Delisa Acevedo MD      docusate sodium  100 mg Oral BID Delisa Acevedo MD      gabapentin  900 mg Oral HS Delisa Acevedo MD      guaiFENesin  1,200 mg Oral Q12H GONZALO Delisa Acevedo MD      labetalol  100 mg Oral BID Delisa Acevedo MD      multivitamin stress formula  1 tablet Oral Daily Delisa Acevedo MD      polyethylene glycol  17 g Oral Daily Delisa Acevedo MD      potassium chloride  20 mEq Oral Daily Delisa Acevedo MD      psyllium  1 packet Oral Daily Delisa Acevedo MD      senna  2 tablet Oral HS Delisa Acevedo MD      senna  1 tablet Oral HS PRN Delisa Acevedo MD      tamsulosin  0.4 mg Oral Daily With Dinner Delisa Acevedo MD      torsemide  20 mg Oral BID Delisa Acevedo MD      warfarin  2.5 mg Oral Daily (warfarin) Delisa Acevedo MD          Today, Patient Was Seen By: Delisa Acevedo MD    **Please Note: This note may have been constructed using a voice recognition system.**

## 2024-06-21 NOTE — PHYSICAL THERAPY NOTE
Physical Therapy Evaluation    Patient's Name: Oscar Espinosa    Admitting Diagnosis  Pneumonia [J18.9]  SOB (shortness of breath) [R06.02]  Pleural effusion [J90]  Hypoxia [R09.02]  Productive cough [R05.8]    Problem List  Patient Active Problem List   Diagnosis    Benign essential hypertension    Hyperlipidemia    Impaired fasting glucose    Microscopic hematuria    Severe obesity (BMI 35.0-35.9 with comorbidity) (HCC)    Severe obstructive sleep apnea    Subclinical hypothyroidism    CAD (coronary artery disease)    Abdominal aortic aneurysm without rupture (HCC)    Aortic valve disorder    Lumbar radiculopathy    Chronic pain syndrome    Multiple pulmonary nodules    Chronic obstructive pulmonary disease (HCC)    Moderate to severe pulmonary hypertension (HCC)    Nocturnal hypoxemia    Ascending aortic aneurysm (HCC)    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)    Myofascial pain syndrome    Atrial fibrillation (HCC)    Venous insufficiency of both lower extremities    Bilateral carotid artery stenosis    Hypertensive heart and chronic kidney disease with heart failure and stage 1 through stage 4 chronic kidney disease, or chronic kidney disease (HCC)    Pleural effusion with shortness of breath    Hyponatremia    Anemia    Acute hypoxic respiratory failure (HCC)    Chronic combined systolic and diastolic congestive heart failure (HCC)    Prediabetes    Elevated serum creatinine    Abnormal TSH       Past Medical History  Past Medical History:   Diagnosis Date    Abdominal aortic aneurysm (HCC)     s/p repair    Abnormal ECG july 2022    Acute on chronic congestive heart failure (HCC) 09/11/2019    Acute respiratory failure with hypoxia (HCC) 05/06/2024    Aneurysm (HCC) 2007    Aneurysm of abdominal aorta (HCC)     49mm, trileaflet AV    Atrial fibrillation (HCC)     Eliquis    BPH (benign prostatic hyperplasia)     CAD (coronary artery disease)     s/p CABGx3    CHF (congestive heart failure)  (HCC)     Chronic pain     Gabapentin    Chronic pain disorder     lumbar    COPD (chronic obstructive pulmonary disease) (HCC)     Coronary artery disease     Elevated transaminase level 04/27/2024    Former tobacco use     Hyperlipidemia     Hypertension     Hyponatremia 04/27/2024    Hypothyroidism     Lumbar radiculopathy     Lumbar stenosis     s/p injections    Neuropathy     Obesity (BMI 30-39.9)     YEVGENIY (obstructive sleep apnea)     unable to tolerate CPAP    Platelets decreased (HCC) 07/27/2022    Pulmonary hypertension ([facility])     moderate to severe    Spondylolisthesis of lumbar region     Visual impairment 2022    Vitamin D deficiency        Past Surgical History  Past Surgical History:   Procedure Laterality Date    ABDOMINAL AORTIC ANEURYSM REPAIR  08/09/2007    2 dock limbs with visc extens prosth    CARDIAC CATHETERIZATION      COLONOSCOPY      CORONARY ARTERY BYPASS GRAFT  2003    LIMA to LAD, sequential SVG to OM1 & OM2, SVG to RDA    IR CHEST TUBE PLACEMENT  6/19/2024    LUMBAR EPIDURAL INJECTION        06/21/24 0823   PT Last Visit   PT Visit Date 06/21/24   Note Type   Note type Evaluation   Pain Assessment   Pain Assessment Tool FLACC   Pain Rating: FLACC (Rest) - Face 0   Pain Rating: FLACC (Rest) - Legs 0   Pain Rating: FLACC (Rest) - Activity 0   Pain Rating: FLACC (Rest) - Cry 0   Pain Rating: FLACC (Rest) - Consolability 0   Score: FLACC (Rest) 0   Pain Rating: FLACC (Activity) - Face 0   Pain Rating: FLACC (Activity) - Legs 0   Pain Rating: FLACC (Activity) - Activity 0   Pain Rating: FLACC (Activity) - Cry 0   Pain Rating: FLACC (Activity) - Consolability 0   Score: FLACC (Activity) 0   Restrictions/Precautions   Weight Bearing Precautions Per Order No   Other Precautions Chair Alarm;Bed Alarm;Fall Risk;Multiple lines  (chest tube)   Home Living   Type of Home House   Home Layout One level;Performs ADLs on one level;Able to live on main level with bedroom/bathroom;Stairs to enter with  rails  (3 BHUPENDRA w/ HR R)   Bathroom Shower/Tub Walk-in shower   Bathroom Toilet Raised   Bathroom Equipment Built-in shower seat   Home Equipment Walker;Cane   Additional Comments pt spends time in his rec room, 12 BHUPENDRA   Prior Function   Level of [place] Independent with functional mobility;Independent with ADLs;Independent with IADLS   Lives With Spouse   Receives Help From Family   IADLs Independent with meal prep;Independent with driving   Falls in the last 6 months 0   Vocational Retired   Comments Recently d/c from OPPT for b/l lympedema mgmt on 2024. Pt reports having two RW within the home he utilizies when he becomes fatigued and/or his LBP stirrs up. Pt utilizes supplemental O2 intermittently PRN t/o day and at night. Independent with all aspects of mobility, mod I don/doff clothing/garments per OPPT 2024.   Cognition   Orientation Level Oriented X4  (Pt identified by wristband, name, .)   Following Commands Follows all commands and directions without difficulty   Subjective   Subjective Pt agreeable to IE with PT.Pt states wanting to shower at some point   RLE Assessment   RLE Assessment X  (LE MMT performed seated at EOB)   Strength RLE   R Hip Flexion 4/5   R Knee Flexion 4/5   R Knee Extension 4/5   R Ankle Dorsiflexion 4/5   R Ankle Plantar Flexion 4/5   LLE Assessment   LLE Assessment X  (LE MMT performed seated at EOB)   Strength LLE   L Hip Flexion 4-/5   L Knee Flexion 4/5   L Knee Extension 4/5   L Ankle Dorsiflexion 4/5   L Ankle Plantar Flexion 4/5   Bed Mobility   Additional Comments pt seated EOB pre/post session. sits EOB independently   Transfers   Sit to Stand 5  Supervision   Additional items Assist x 1   Stand to Sit 5  Supervision   Additional items Assist x 1;Increased time required   Additional Comments requires UE support to perform STS   Ambulation/Elevation   Gait pattern Foward flexed;Short stride;Step through pattern   Gait Assistance 5  Supervision   Additional  "items Assist x 1   Assistive Device None   Distance 14x2  (limited by chest tube to suction)   Stair Management Assistance 5  Supervision   Additional items Increased time required;Assist x 1   Stair Management Technique One rail R  (utilized R bedrail with 8\" step next to bed)   Number of Stairs 2   Ambulation/Elevation Additional Comments denies SOB, dizziness and lightheaded with ambulation. overall gait assessment was limited by chest tube placement   Balance   Static Sitting Fair   Dynamic Sitting Fair -   Static Standing Fair -   Dynamic Standing Fair -   Ambulatory Fair -   Activity Tolerance   Activity Tolerance Patient limited by fatigue   Medical Staff Made Aware spoke with CM   Nurse Made Aware care coordinated with nurse, PCA made aware of bed alarm/Ibed issue post session   Assessment   Prognosis Fair   Problem List Decreased endurance;Decreased strength;Decreased skin integrity;Decreased mobility;Impaired balance;Obesity   Assessment Pt is a 82 yo male who [facility] to [facility] with SOB and admitted on 6/18/2924 and dx with pleural effusion with SOB. PT was consulted with orders for Up and OOB as tolerated per MD. Pt has PMH significant for CAD, COPD, and afib. During evaluation pt was able to perform transfers and ambulation within room for 28 ft(14'x2) however required supervision from PT due to decreased LE strength and impaired balance. Pt was further restricted by chest tube to suction, decreased skin integrity from lymphedema and current medical management. Gait significant for decreased speed, forward flexed posture and NBOS. Pt prognosis is fair due to current functional status however is limited by PMH and current ongoing medical management required.  Prior to admission, pt was able to complete all ADLs and functional mobility within his home and to his backyard independently. At baseline pt was additionally able to walk for 50 ft independently prior to requiring a rest break. Given findings of " evaluation, pt is not currently at baseline. Pt would benefit from skilled PT care in order to address aforementioned impairments, functional mobility, improve QOL, reduce caregiver burden and decrease risk for falls. Upon discharge would recommend a level III disposition.   Goals   Patient Goals to go home   STG Expiration Date 07/05/24   Short Term Goal #1 1. Pt will improve LE strength to 5/5 in order to promote increased strength, activity tolerance and endurance.   2. Pt will be able to ambulate for at least 50 ft independently with nelli vitals to promote return to home environment and leisure activities.  3. Pt will demonstrate an improvement of at least one balance grade to promote ability to independently perform ADLs, and recreational tasks such as gardening.   4. Pt will be able to ascend/descend 12  steps using RHR to help navigate to basement within home.   5. Pt will demonstrate ability to perform all functional transfers independently to promote return to PLOF.   PT Treatment Day 0   Plan   Treatment/Interventions LE strengthening/ROM;Elevations;Therapeutic exercise;Endurance training;Patient/family training;Equipment eval/education;Gait training;Spoke to nursing   PT Frequency 2-3x/wk   Discharge Recommendation   Rehab Resource Intensity Level, PT III (Minimum Resource Intensity)   AM-PAC Basic Mobility Inpatient   Turning in Flat Bed Without Bedrails 3   Lying on Back to Sitting on Edge of Flat Bed Without Bedrails 2   Moving Bed to Chair 3   Standing Up From Chair Using Arms 3   Walk in Room 3   Climb 3-5 Stairs With Railing 3   Basic Mobility Inpatient Raw Score 17   Basic Mobility Standardized Score 39.67   [facility] Highest Level Of Mobility   -HLM Goal 5: Stand one or more mins   -HLM Achieved 6: Walk 10 steps or more   End of Consult   Patient Position at End of Consult Seated edge of bed;Bed/Chair alarm activated;All needs within reach  (nurse notified about bed alarm/Ibed issue)    The patient's AM-PAC Basic Mobility Inpatient Short Form Raw Score is 17. A Raw score of greater than 16 suggests the patient may benefit from discharge to home. Please also refer to the recommendation of the Physical Therapist for safe discharge planning.  Raza Howell, PT    *note written by SHIRA Mo

## 2024-06-21 NOTE — PROGRESS NOTES
Progress Note - Pulmonary   Oscar Espinosa 83 y.o. male MRN: [id number]  Unit/Bed#: W -01 Encounter: [id number]    Assessment:  Assessment:  Acute on chronic hypoxic respiratory failure.  Home: Patient uses 2 L NC with exertion and Bedtime.   Today pt is on 2 LOxygen. Sat at 92 percent.      Recent History of Pneumonia  POA chills and feverish for the last several days.  CT scan demonstrates recurrence of the effusion, now with an appearance suggesting underlying loculation.   Received vancomycin, ceftriaxone and azithromycin in the ED  Received antibiotics including vancomycin, cefepime, azithromycin.  Strep Pneumo and Legionella Negative.   See empyema plan.     3. Empyema:   CT scan demonstrates recurrence of the effusion, now with an appearance suggesting underlying loculation.  Left sided chest tube placement 6/19. 300 ml of mildly frothy, slightly viscous and serosanguinous fluid drained.   TODAY  1000 mL with pus in drainage.   Since chest tube placement total output nearly 1300 mL.   Pleural fluid culture:  Culture Too young. Gm stain Gm Positive cocci.   Extra turbid Fluid with PH 5.6, LDH >86826. Glucose <10. ,600, TP <3.0  Pt c/o pain at insertion site. Advised Tylenol.   Received TPA Dornase. First dose on 6/20/24. Today Day2. Pt is clinically improving. Repeat chest x ray on 6/21 wet read- Left side costophrenic angle is minimally visible.    Current Antibiotic course: de-escalated patient's antibiotic to IV ceftriaxone from IV cefepime.  Denies fever, SOB today.       COPD   Not in exacerbation.   Albuterol PRN.      Moderate to severe pulmonary hypertension  Patient has a history of moderate to severe pulmonary hypertension and severe YEVGENIY not tolerating PAP  Uses oxygen nightly and with exertion      Chief Complaint:   Fever and chills.     Subjective:   Pt is doing well. NO fevers, SOB    Objective:     Vitals: Blood pressure 114/54, pulse 65, temperature 97.6 °F (36.4 °C), resp. rate  "20, height 5' 11\" (1.803 m), SpO2 92%.,Body mass index is 30.54 kg/m².      Intake/Output Summary (Last 24 hours) at 6/21/2024 1018  Last data filed at 6/21/2024 0900  Gross per 24 hour   Intake 1540 ml   Output 1005 ml   Net 535 ml       Invasive Devices       Peripheral Intravenous Line  Duration             Peripheral IV 06/18/24 Distal;Left;Upper;Ventral (anterior) Arm 2 days              Drain  Duration             Chest Tube Left 12 Fr. 1 day                    Physical Exam: /54   Pulse 65   Temp 97.6 °F (36.4 °C)   Resp 20   Ht 5' 11\" (1.803 m)   SpO2 92%   BMI 30.54 kg/m²     Physical Exam  Vitals reviewed.   Eyes:      Conjunctiva/sclera: Conjunctivae normal.   HENT:      Head: Normocephalic.      Mouth/Throat:      Mouth: Mucous membranes are moist.   Cardiovascular:      Rate and Rhythm: Normal rate.      Pulses: Normal pulses.      Heart sounds: Normal heart sounds.   Abdominal:      Palpations: Abdomen is soft.   Constitutional:       Appearance: He is well-developed.   Pulmonary:      Effort: Pulmonary effort is normal. No accessory muscle usage, respiratory distress or accessory muscle usage.      Breath sounds: Normal breath sounds. No wheezing or rales.   Neurological:      Mental Status: He is alert.         Review of Systems   Constitutional: Negative.    Respiratory: Negative.     Cardiovascular: Negative.    Gastrointestinal: Negative.    Genitourinary: Negative.    Neurological: Negative.    Psychiatric/Behavioral: Negative.     All other systems reviewed and are negative.       Labs: I have personally reviewed pertinent lab results.  Imaging and other studies: I have personally reviewed pertinent reports.      "

## 2024-06-21 NOTE — ASSESSMENT & PLAN NOTE
Baseline Cr 0.9-1.1 prior to May 2024  Elevated to 1.66 as of May 2024 and has been stable since then  Currently at baseline

## 2024-06-21 NOTE — ASSESSMENT & PLAN NOTE
Wt Readings from Last 3 Encounters:   06/13/24 99.3 kg (219 lb)   06/06/24 100 kg (221 lb)   05/20/24 101 kg (221 lb 12.8 oz)     Home regimen: torsemide 40 mg PO daily, labetolol 100 mg  Diuretics were initially held in the setting of elevated Cr. Creatinine improved.  Diuretics resumed    Plan:  Continue home regimen  Monitor BMP  Monitor I/O

## 2024-06-22 ENCOUNTER — APPOINTMENT (INPATIENT)
Dept: RADIOLOGY | Facility: HOSPITAL | Age: 84
DRG: 177 | End: 2024-06-22
Payer: MEDICARE

## 2024-06-22 LAB
ANION GAP SERPL CALCULATED.3IONS-SCNC: 7 MMOL/L (ref 4–13)
BACTERIA SPEC BFLD CULT: ABNORMAL
BASOPHILS # BLD AUTO: 0.04 THOUSANDS/ÂΜL (ref 0–0.1)
BASOPHILS NFR BLD AUTO: 1 % (ref 0–1)
BUN SERPL-MCNC: 26 MG/DL (ref 5–25)
CALCIUM SERPL-MCNC: 8.4 MG/DL (ref 8.4–10.2)
CHLORIDE SERPL-SCNC: 96 MMOL/L (ref 96–108)
CO2 SERPL-SCNC: 32 MMOL/L (ref 21–32)
CREAT SERPL-MCNC: 0.99 MG/DL (ref 0.6–1.3)
EOSINOPHIL # BLD AUTO: 0.12 THOUSAND/ÂΜL (ref 0–0.61)
EOSINOPHIL NFR BLD AUTO: 1 % (ref 0–6)
ERYTHROCYTE [DISTWIDTH] IN BLOOD BY AUTOMATED COUNT: 17.6 % (ref 11.6–15.1)
GFR SERPL CREATININE-BSD FRML MDRD: 70 ML/MIN/1.73SQ M
GLUCOSE SERPL-MCNC: 91 MG/DL (ref 65–140)
GRAM STN SPEC: ABNORMAL
GRAM STN SPEC: ABNORMAL
HCT VFR BLD AUTO: 27.9 % (ref 36.5–49.3)
HGB BLD-MCNC: 8.6 G/DL (ref 12–17)
IMM GRANULOCYTES # BLD AUTO: 0.07 THOUSAND/UL (ref 0–0.2)
IMM GRANULOCYTES NFR BLD AUTO: 1 % (ref 0–2)
INR PPP: 2.57 (ref 0.84–1.19)
LYMPHOCYTES # BLD AUTO: 0.7 THOUSANDS/ÂΜL (ref 0.6–4.47)
LYMPHOCYTES NFR BLD AUTO: 8 % (ref 14–44)
MCH RBC QN AUTO: 28.7 PG (ref 26.8–34.3)
MCHC RBC AUTO-ENTMCNC: 30.8 G/DL (ref 31.4–37.4)
MCV RBC AUTO: 93 FL (ref 82–98)
MONOCYTES # BLD AUTO: 0.68 THOUSAND/ÂΜL (ref 0.17–1.22)
MONOCYTES NFR BLD AUTO: 8 % (ref 4–12)
NEUTROPHILS # BLD AUTO: 6.94 THOUSANDS/ÂΜL (ref 1.85–7.62)
NEUTS SEG NFR BLD AUTO: 81 % (ref 43–75)
NRBC BLD AUTO-RTO: 0 /100 WBCS
PLATELET # BLD AUTO: 312 THOUSANDS/UL (ref 149–390)
PMV BLD AUTO: 9.6 FL (ref 8.9–12.7)
POTASSIUM SERPL-SCNC: 3.8 MMOL/L (ref 3.5–5.3)
PROTHROMBIN TIME: 28.5 SECONDS (ref 11.6–14.5)
RBC # BLD AUTO: 3 MILLION/UL (ref 3.88–5.62)
SODIUM SERPL-SCNC: 135 MMOL/L (ref 135–147)
WBC # BLD AUTO: 8.55 THOUSAND/UL (ref 4.31–10.16)

## 2024-06-22 PROCEDURE — 99232 SBSQ HOSP IP/OBS MODERATE 35: CPT | Performed by: INTERNAL MEDICINE

## 2024-06-22 PROCEDURE — 97110 THERAPEUTIC EXERCISES: CPT

## 2024-06-22 PROCEDURE — 71045 X-RAY EXAM CHEST 1 VIEW: CPT

## 2024-06-22 PROCEDURE — 97116 GAIT TRAINING THERAPY: CPT

## 2024-06-22 PROCEDURE — 85025 COMPLETE CBC W/AUTO DIFF WBC: CPT

## 2024-06-22 PROCEDURE — 80048 BASIC METABOLIC PNL TOTAL CA: CPT

## 2024-06-22 PROCEDURE — 32562 LYSE CHEST FIBRIN SUBQ DAY: CPT | Performed by: INTERNAL MEDICINE

## 2024-06-22 PROCEDURE — 97530 THERAPEUTIC ACTIVITIES: CPT

## 2024-06-22 PROCEDURE — 85610 PROTHROMBIN TIME: CPT

## 2024-06-22 RX ADMIN — DOCUSATE SODIUM 100 MG: 100 CAPSULE, LIQUID FILLED ORAL at 09:07

## 2024-06-22 RX ADMIN — ACETAMINOPHEN 650 MG: 325 TABLET, FILM COATED ORAL at 21:00

## 2024-06-22 RX ADMIN — SENNOSIDES 17.2 MG: 8.6 TABLET, FILM COATED ORAL at 21:00

## 2024-06-22 RX ADMIN — LABETALOL HYDROCHLORIDE 100 MG: 100 TABLET, FILM COATED ORAL at 09:07

## 2024-06-22 RX ADMIN — DOCUSATE SODIUM 100 MG: 100 CAPSULE, LIQUID FILLED ORAL at 16:51

## 2024-06-22 RX ADMIN — LABETALOL HYDROCHLORIDE 100 MG: 100 TABLET, FILM COATED ORAL at 16:51

## 2024-06-22 RX ADMIN — ASPIRIN 81 MG: 81 TABLET, COATED ORAL at 09:07

## 2024-06-22 RX ADMIN — WARFARIN SODIUM 2.5 MG: 2.5 TABLET ORAL at 16:51

## 2024-06-22 RX ADMIN — GABAPENTIN 900 MG: 300 CAPSULE ORAL at 21:00

## 2024-06-22 RX ADMIN — Medication 2000 UNITS: at 09:07

## 2024-06-22 RX ADMIN — B-COMPLEX W/ C & FOLIC ACID TAB 1 TABLET: TAB at 09:07

## 2024-06-22 RX ADMIN — ATORVASTATIN CALCIUM 40 MG: 40 TABLET, FILM COATED ORAL at 09:07

## 2024-06-22 RX ADMIN — AMIODARONE HYDROCHLORIDE 200 MG: 200 TABLET ORAL at 09:07

## 2024-06-22 RX ADMIN — GUAIFENESIN 1200 MG: 600 TABLET ORAL at 09:07

## 2024-06-22 RX ADMIN — WATER 10 MG: 1 INJECTION INTRAMUSCULAR; INTRAVENOUS; SUBCUTANEOUS at 13:56

## 2024-06-22 RX ADMIN — CEFTRIAXONE SODIUM 1000 MG: 10 INJECTION, POWDER, FOR SOLUTION INTRAVENOUS at 20:23

## 2024-06-22 RX ADMIN — TORSEMIDE 20 MG: 20 TABLET ORAL at 09:07

## 2024-06-22 RX ADMIN — TORSEMIDE 20 MG: 20 TABLET ORAL at 21:00

## 2024-06-22 RX ADMIN — TAMSULOSIN HYDROCHLORIDE 0.4 MG: 0.4 CAPSULE ORAL at 16:36

## 2024-06-22 RX ADMIN — DORNASE ALFA 5 MG: 1 SOLUTION RESPIRATORY (INHALATION) at 13:56

## 2024-06-22 RX ADMIN — POTASSIUM CHLORIDE 20 MEQ: 1500 TABLET, EXTENDED RELEASE ORAL at 09:07

## 2024-06-22 RX ADMIN — GUAIFENESIN 1200 MG: 600 TABLET ORAL at 21:00

## 2024-06-22 RX ADMIN — Medication 1 PACKET: at 09:08

## 2024-06-22 RX ADMIN — POLYETHYLENE GLYCOL 3350 17 G: 17 POWDER, FOR SOLUTION ORAL at 09:08

## 2024-06-22 NOTE — PROGRESS NOTES
[facility]  Progress Note  Name: Oscar Espinosa I  MRN: [id number]  Unit/Bed#: W -01 I Date of Admission: 6/18/2024   Date of Service: 6/22/2024 I Hospital Day: 4    Assessment & Plan   Acute hypoxic respiratory failure (HCC)  Assessment & Plan  Patient presents with shortness of breath for 2 days. Past smoker, 2 packs/day, quit over 10 years ago. Patient states that he has also been having cough which is productive of dark brown phlegm for 2 days. Patient also mentions that he has been feeling feverish and chills while at home Denies chest pain, abdominal pain, n/v/d,dizziness, lightheadedness, HA, dysuria, hematuria, hematochezia, or melena. Sent in by pulmonologist following CT results. Admitted to the hospital the beginning of May due to pneumonia at which point he required a thoracentesis on 4/26 yielding 600ml, completed 7 days of ceftriaxone.  CT chest wo contrast (06/10/2024)- persistent loculated moderate size left pleural effusion w/ partial collapse of LLL. Stable AAA measuring 4.7 cm (since May 2024).   Patient wears a baseline of 2 L oxygen on exertion and during sleep since last hospital admission.  In the ED- O2 desat to 83% on 2L, moved up to 5 L, sitting 91%  Patient reports 38 lb weight loss over the last few months and has decreased appetite.   Received vancomycin, azithromycin, and ceftriaxone in the ER.  Viral panel negative  Pro-Aristides elevated to 0.35 on 06/18/2024 and 0.30 on 06/19/2024  Blood culture no growth at 24 hours  IR placed left sided chest tube and drained 300 mL of mildly frothy, slightly viscous and serosanguinous fluid and samples were sent for ordered laboratory evaluation.   On 06/21, left chest tube was instilled with 10 mg of TPA  in 30 mL of sterile water and 5 mg of Dornase in sterile water due to incomplete drainage from loculations. The tube was then clamped at 13:40 PM and will remain clamped until 14:40 PM. The nurse will unclamp then  and keep chest tube to suction.   Pleural fluid culture and gram stain (06/19/2024) show 3+ polys and 2+ gram + cocci in chains, and 2+ growth of streptococcus intermedius. Extra turbid Fluid with PH 5.6, LDH >45734. Glucose <10. ,600, TP <3.0  Today 1000 mL with pus in drainage.   Since chest tube placement total output nearly 1300 mL.   Chest x ray on 06/20- Status post placement of left pleural catheter with partially diminished size of the left effusion. No pneumothorax   Legionella and Strep Pneumonia antigen negative  Fluid cultures cultures strep intermedius    6/21 chest x-ray- Persistent dense left lung base opacity which may represent a combination of effusion and parenchymal opacity.   6/22 chest x-ray: Stable small loculated left basilar hydropneumothorax/empyema and left base atelectasis.     Plan:  6/22 continue with IV ceftriaxone; day 5 of antibiotics  ContinueTPA Dnase per pulmonology  Monitor temp  Tylenol 650 mg prn  Tessalon Perles 200 mg TID prn  Mucinex 1200 mg BID  Incentive spirometer  Flutter valve  Pulmonary and IR recommendations appreciated    Abnormal TSH  Assessment & Plan  TSH 4.559, free T4 within normal limits    Elevated serum creatinine  Assessment & Plan  Baseline Cr 0.9-1.1 prior to May 2024  Elevated to 1.66 as of May 2024 and has been stable since then  Currently at baseline    Prediabetes  Assessment & Plan  Lab Results   Component Value Date    HGBA1C 6.3 (H) 06/18/2024        Plan:  Monitor blood sugars  Consider insulin sliding scale if glucose elevated  Diabetic diet    Chronic combined systolic and diastolic congestive heart failure (HCC)  Assessment & Plan  Wt Readings from Last 3 Encounters:   06/13/24 99.3 kg (219 lb)   06/06/24 100 kg (221 lb)   05/20/24 101 kg (221 lb 12.8 oz)     Home regimen: torsemide 40 mg PO daily, labetolol 100 mg  Diuretics were initially held in the setting of elevated Cr. Creatinine improved.  Diuretics resumed    Plan:  Continue home  regimen  Monitor BMP  Monitor I/O    Anemia  Assessment & Plan  On review of labs, anemia is new since April 2024.   Last labs in August 2023 were normal.   There has been a slow trend downward.   Hgb 8.4 on admission    Plan:  Monitor CBC  Transfuse if Hgb <7    Atrial fibrillation (HCC)  Assessment & Plan  Home regimen: Amiodorone 200 mg p.o. daily, labetolol 100 mg BID, warfarin 2.5 mg  Warfarin held prior to IR procedure and later continued.    Plan:  Continue amiodarone, labetolol, warfarin  PT/ INR monitoring    Chronic obstructive pulmonary disease (HCC)  Assessment & Plan  Not in exacerbations, albuterol as needed.     Chronic pain syndrome  Assessment & Plan  Home regimen: gabapentin 900 mg HS  OT recommended level 3, thus, likely home PT OT.   PT recommended a level III disposition upon discharge.     Plan:  Reduce dose of gabapentin if further elevation of Creatinine    CAD (coronary artery disease)  Assessment & Plan  Home regimen: ASA 81 mg, lipitor 40 mg, labetolol 100 mg    Plan:  Continue home regimen    * Pleural effusion with shortness of breath  Assessment & Plan  Patient admitted to the hospital the beginning of May due to pneumonia at which point he required a thoracentesis on 4/26 yielding 600ml, completed 7 days of ceftriaxone.   CT chest wo contrast (04/27/2024)- Minimally loculated moderate-sized left pleural effusion. Compressive atelectasis at the left lower lobe and lingula.   CT chest wo contrast (05/01/2024)- Unchanged moderate left effusion with adjacent atelectasis. Unchanged ascending thoracic aortic measuring 5 cm. Interstitial lung disease changes suggested bilaterally with atelectasis in the lingula and left lower lobe.  CT chest wo contrast (06/10/2024)- persistent loculated moderate size left pleural effusion w/ partial collapse of LLL. Stable AAA measuring 4.7 cm (since May 2024).   On this admission, IR placed left sided chest tube and drained 300 mL of mildly frothy, slightly  viscous and serosanguinous fluid and samples were sent for ordered laboratory evaluation.   Left chest tube was instilled with 10 mg of TPA  in 30 mL of sterile water and 5 mg of Dornase in sterile water due to incomplete drainage from loculations. The tube was then clamped at 13:40 PM and will remain clamped until 14:40 PM. The nurse will unclamp then and keep chest tube to suction.   Pleural fluid culture and gram stain (06/19/2024) show 3+ polys and 2+ gram + cocci in chains, and 2+ growth of streptococcus intermedius. Extra turbid Fluid with PH 5.6, LDH >82284. Glucose <10. ,600, TP <3.0  Today 1000 mL with pus in drainage.   Since chest tube placement total output nearly 1300 mL.   Chest x ray on 06/20- Status post placement of left pleural catheter with partially diminished size of the left effusion. No pneumothorax                    VTE Pharmacologic Prophylaxis: VTE Score: 5 High Risk (Score >/= 5) - Pharmacological DVT Prophylaxis Ordered: warfarin (Coumadin). Sequential Compression Devices Ordered.    Mobility:   Basic Mobility Inpatient Raw Score: 15  -[facility] Goal: 4: Move to chair/commode  -HL Achieved: 3: Sit at edge of bed      Patient Centered Rounds: I performed bedside rounds with nursing staff today.   Discussions with Specialists or Other Care Team Provider:     Education and Discussions with Family / Patient: Updated  (wife) via phone.    Total Time Spent on Date of Encounter in care of patient: 30 mins. This time was spent on one or more of the following: performing physical exam; counseling and coordination of care; obtaining or reviewing history; documenting in the medical record; reviewing/ordering tests, medications or procedures; communicating with other healthcare professionals and discussing with patient's family/caregivers.    Current Length of Stay: 4 day(s)  Current Patient Status: Inpatient   Certification Statement:   Discharge Plan: Anticipate discharge in 48  hrs to home with home services.    Code Status: Level 3 - DNAR and DNI    Subjective:   Patient was seen and examined at bedside this morning.  Patient was comfortably laying in bed not in any acute distress.  Patient denies having any chest pain, shortness of breath.  Patient did endorse frustration that he was supposed to be getting tPA DNase at 3 AM but never received any and nobody mention anything to him about why he was not getting it either.  Otherwise no other questions or concerns at this time    Objective:     Vitals:   Temp (24hrs), Av.2 °F (36.8 °C), Min:98 °F (36.7 °C), Max:98.6 °F (37 °C)    Temp:  [98 °F (36.7 °C)-98.6 °F (37 °C)] 98 °F (36.7 °C)  HR:  [54-76] 76  Resp:  [12-20] 20  BP: (107-123)/(54-57) 118/54  SpO2:  [91 %-97 %] 93 %  Body mass index is 30.54 kg/m².     Input and Output Summary (last 24 hours):     Intake/Output Summary (Last 24 hours) at 2024 1152  Last data filed at 2024 0852  Gross per 24 hour   Intake 392 ml   Output 4325 ml   Net -3933 ml       Physical Exam:   Physical Exam  Vitals and nursing note reviewed.   Constitutional:       General: He is not in acute distress.     Appearance: He is well-developed.   HENT:      Head: Normocephalic and atraumatic.   Eyes:      Conjunctiva/sclera: Conjunctivae normal.   Cardiovascular:      Rate and Rhythm: Normal rate and regular rhythm.   Pulmonary:      Effort: Pulmonary effort is normal. No respiratory distress.      Breath sounds: Normal breath sounds.      Comments: Decreased lung sounds on the left lower part of the chest    Chest tube in place  Abdominal:      Palpations: Abdomen is soft.      Tenderness: There is no abdominal tenderness.   Musculoskeletal:         General: No swelling.      Cervical back: Neck supple.      Right lower leg: Edema present.      Left lower leg: Edema present.   Skin:     General: Skin is warm and dry.      Capillary Refill: Capillary refill takes less than 2 seconds.   Neurological:       Mental Status: He is alert.   Psychiatric:         Mood and Affect: Mood normal.          Additional Data:     Labs:  Results from last 7 days   Lab Units 06/22/24  0559   WBC Thousand/uL 8.55   HEMOGLOBIN g/dL 8.6*   HEMATOCRIT % 27.9*   PLATELETS Thousands/uL 312   SEGS PCT % 81*   LYMPHO PCT % 8*   MONO PCT % 8   EOS PCT % 1     Results from last 7 days   Lab Units 06/22/24  0559 06/20/24  0451 06/19/24  0512   SODIUM mmol/L 135   < > 134*   POTASSIUM mmol/L 3.8   < > 4.2   CHLORIDE mmol/L 96   < > 98   CO2 mmol/L 32   < > 31   BUN mg/dL 26*   < > 32*   CREATININE mg/dL 0.99   < > 1.21   ANION GAP mmol/L 7   < > 5   CALCIUM mg/dL 8.4   < > 7.9*   ALBUMIN g/dL  --   --  2.3*   TOTAL BILIRUBIN mg/dL  --   --  0.87   ALK PHOS U/L  --   --  130*   ALT U/L  --   --  44   AST U/L  --   --  40*   GLUCOSE RANDOM mg/dL 91   < > 109    < > = values in this interval not displayed.     Results from last 7 days   Lab Units 06/22/24  0810   INR  2.57*         Results from last 7 days   Lab Units 06/18/24  0834   HEMOGLOBIN A1C % 6.3*     Results from last 7 days   Lab Units 06/19/24  0512 06/18/24  1448   LACTIC ACID mmol/L  --  1.2   PROCALCITONIN ng/ml 0.30* 0.35*       Lines/Drains:  Invasive Devices       Peripheral Intravenous Line  Duration             Peripheral IV 06/18/24 Distal;Left;Upper;Ventral (anterior) Arm 3 days              Drain  Duration             Chest Tube Left 12 Fr. 2 days                          Imaging:     Recent Cultures (last 7 days):   Results from last 7 days   Lab Units 06/20/24  0912 06/19/24  1336 06/18/24  1457 06/18/24  1448   BLOOD CULTURE   --   --  No Growth at 72 hrs. No Growth at 72 hrs.   GRAM STAIN RESULT   --  3+ Polys*  2+ Gram positive cocci in chains*  --   --    BODY FLUID CULTURE, STERILE   --  2+ Growth of Streptococcus intermedius*  --   --    LEGIONELLA URINARY ANTIGEN  Negative  --   --   --        Last 24 Hours Medication List:   Current Facility-Administered  Medications   Medication Dose Route Frequency Provider Last Rate    acetaminophen  650 mg Oral Q6H PRN Delisa Acevedo MD      albuterol  2 puff Inhalation Q6H PRN Delisa Acevedo MD      amiodarone  200 mg Oral Daily Delisa Acevedo MD      aspirin  81 mg Oral Daily Delisa Acevedo MD      atorvastatin  40 mg Oral Daily Delisa Acevedo MD      benzonatate  200 mg Oral TID PRN Oscar Jack DO      cefTRIAXone  1,000 mg Intravenous Q24H Andreas Singleton MD 1,000 mg (06/21/24 2127)    cholecalciferol  2,000 Units Oral Daily Delisa Acevedo MD      docusate sodium  100 mg Oral BID Delisa Acevedo MD      gabapentin  900 mg Oral HS Delisa Acevedo MD      guaiFENesin  1,200 mg Oral Q12H GONZALO Delisa Acevedo MD      labetalol  100 mg Oral BID Delisa Acevedo MD      multivitamin stress formula  1 tablet Oral Daily Delisa Acevedo MD      polyethylene glycol  17 g Oral Daily Delisa Acevedo MD      potassium chloride  20 mEq Oral Daily Delisa Acevedo MD      psyllium  1 packet Oral Daily Delisa Acevedo MD      senna  2 tablet Oral HS Delisa Acevedo MD      senna  1 tablet Oral HS PRN Delisa Acevedo MD      tamsulosin  0.4 mg Oral Daily With Dinner Delisa Acevedo MD      torsemide  20 mg Oral BID Delisa Acevedo MD      warfarin  2.5 mg Oral Daily (warfarin) Delisa Acevedo MD          Today, Patient Was Seen By: Delisa Acevedo MD    **Please Note: This note may have been constructed using a voice recognition system.**

## 2024-06-22 NOTE — ASSESSMENT & PLAN NOTE
Patient presents with shortness of breath for 2 days. Past smoker, 2 packs/day, quit over 10 years ago. Patient states that he has also been having cough which is productive of dark brown phlegm for 2 days. Patient also mentions that he has been feeling feverish and chills while at home Denies chest pain, abdominal pain, n/v/d,dizziness, lightheadedness, HA, dysuria, hematuria, hematochezia, or melena. Sent in by pulmonologist following CT results. Admitted to the hospital the beginning of May due to pneumonia at which point he required a thoracentesis on 4/26 yielding 600ml, completed 7 days of ceftriaxone.  CT chest wo contrast (06/10/2024)- persistent loculated moderate size left pleural effusion w/ partial collapse of LLL. Stable AAA measuring 4.7 cm (since May 2024).   Patient wears a baseline of 2 L oxygen on exertion and during sleep since last hospital admission.  In the ED- O2 desat to 83% on 2L, moved up to 5 L, sitting 91%  Patient reports 38 lb weight loss over the last few months and has decreased appetite.   Received vancomycin, azithromycin, and ceftriaxone in the ER.  Viral panel negative  Pro-Aristides elevated to 0.35 on 06/18/2024 and 0.30 on 06/19/2024  Blood culture no growth at 24 hours  IR placed left sided chest tube and drained 300 mL of mildly frothy, slightly viscous and serosanguinous fluid and samples were sent for ordered laboratory evaluation.   On 06/21, left chest tube was instilled with 10 mg of TPA  in 30 mL of sterile water and 5 mg of Dornase in sterile water due to incomplete drainage from loculations. The tube was then clamped at 13:40 PM and will remain clamped until 14:40 PM. The nurse will unclamp then and keep chest tube to suction.   Pleural fluid culture and gram stain (06/19/2024) show 3+ polys and 2+ gram + cocci in chains, and 2+ growth of streptococcus intermedius. Extra turbid Fluid with PH 5.6, LDH >32442. Glucose <10. ,600, TP <3.0  Today 1000 mL with pus in  drainage.   Since chest tube placement total output nearly 1300 mL.   Chest x ray on 06/20- Status post placement of left pleural catheter with partially diminished size of the left effusion. No pneumothorax   Legionella and Strep Pneumonia antigen negative  Fluid cultures cultures strep intermedius    6/21 chest x-ray- Persistent dense left lung base opacity which may represent a combination of effusion and parenchymal opacity.   6/22 chest x-ray: Stable small loculated left basilar hydropneumothorax/empyema and left base atelectasis.     Plan:  6/22 continue with IV ceftriaxone; day 5 of antibiotics  ContinueTPA Dnase per pulmonology  Monitor temp  Tylenol 650 mg prn  Tessalon Perles 200 mg TID prn  Mucinex 1200 mg BID  Incentive spirometer  Flutter valve  Pulmonary and IR recommendations appreciated

## 2024-06-22 NOTE — PROGRESS NOTES
Pleural instillation note:    L chest tube was instilled with 10 mg of TPA  in 30 mL of sterile water and 5 mg of Dornase in sterile water. The tube was then clamped at 1400 and will remain clamped until 1500. The nurse will unclamp then and keep chest tube to [id number] suction.     Will repeat at 0200 on 6/23 as well.      Roly Hernández, DO

## 2024-06-22 NOTE — ASSESSMENT & PLAN NOTE
Patient admitted to the hospital the beginning of May due to pneumonia at which point he required a thoracentesis on 4/26 yielding 600ml, completed 7 days of ceftriaxone.   CT chest wo contrast (04/27/2024)- Minimally loculated moderate-sized left pleural effusion. Compressive atelectasis at the left lower lobe and lingula.   CT chest wo contrast (05/01/2024)- Unchanged moderate left effusion with adjacent atelectasis. Unchanged ascending thoracic aortic measuring 5 cm. Interstitial lung disease changes suggested bilaterally with atelectasis in the lingula and left lower lobe.  CT chest wo contrast (06/10/2024)- persistent loculated moderate size left pleural effusion w/ partial collapse of LLL. Stable AAA measuring 4.7 cm (since May 2024).   On this admission, IR placed left sided chest tube and drained 300 mL of mildly frothy, slightly viscous and serosanguinous fluid and samples were sent for ordered laboratory evaluation.   Left chest tube was instilled with 10 mg of TPA  in 30 mL of sterile water and 5 mg of Dornase in sterile water due to incomplete drainage from loculations. The tube was then clamped at 13:40 PM and will remain clamped until 14:40 PM. The nurse will unclamp then and keep chest tube to suction.   Pleural fluid culture and gram stain (06/19/2024) show 3+ polys and 2+ gram + cocci in chains, and 2+ growth of streptococcus intermedius. Extra turbid Fluid with PH 5.6, LDH >86971. Glucose <10. ,600, TP <3.0  Today 1000 mL with pus in drainage.   Since chest tube placement total output nearly 1300 mL.   Chest x ray on 06/20- Status post placement of left pleural catheter with partially diminished size of the left effusion. No pneumothorax

## 2024-06-22 NOTE — PHYSICAL THERAPY NOTE
PHYSICAL THERAPY NOTE          Patient Name: Oscar Espinosa  Today's Date: 6/22/2024 06/22/24 1202   PT Last Visit   PT Visit Date 06/22/24   Note Type   Note Type Treatment   Pain Assessment   Pain Assessment Tool 0-10   Pain Score No Pain   Pain Rating: FLACC (Rest) - Face 0   Pain Rating: FLACC (Rest) - Legs 0   Pain Rating: FLACC (Rest) - Activity 0   Pain Rating: FLACC (Rest) - Cry 0   Pain Rating: FLACC (Rest) - Consolability 0   Score: FLACC (Rest) 0   Pain Rating: FLACC (Activity) - Face 0   Pain Rating: FLACC (Activity) - Legs 0   Pain Rating: FLACC (Activity) - Activity 0   Pain Rating: FLACC (Activity) - Cry 0   Pain Rating: FLACC (Activity) - Consolability 0   Score: FLACC (Activity) 0   Restrictions/Precautions   Weight Bearing Precautions Per Order No   Other Precautions Chair Alarm;Bed Alarm;Fall Risk;Multiple lines  (chest tube, 2L NC02)   General   Chart Reviewed Yes   Response to Previous Treatment Patient with no complaints from previous session.   Family/Caregiver Present No   Cognition   Overall Cognitive Status WFL   Arousal/Participation Responsive;Alert;Cooperative   Attention Within functional limits   Orientation Level Oriented X4   Memory Within functional limits   Following Commands Follows all commands and directions without difficulty   Comments pt was pleasant and cooperative throughout PT intervention   Subjective   Subjective pt was agreeable to participate in PT intervention and stted 0/10 pain pre/post tx session   Bed Mobility   Additional Comments pt seated EOB /pre/post tx session   Transfers   Sit to Stand 5  Supervision   Additional items Assist x 1;Increased time required;Verbal cues   Stand to Sit 5  Supervision   Additional items Assist x 1;Increased time required;Verbal cues   Stand pivot Unable to assess   Additional Comments pt cotninues to remain consistant for requiring /s and  VC's for hand placement in order to complete all transfers to and from RW   Ambulation/Elevation   Gait pattern Foward flexed;Excessively slow;Short stride   Gait Assistance 5  Supervision   Additional items Assist x 1;Verbal cues   Assistive Device Rolling walker   Distance 30'x1 RW  (limited by fatigue)   Stair Management Assistance 5  Supervision   Additional items Assist x 1;Verbal cues;Increased time required   Stair Management Technique Two rails;Step to pattern;Foreward;Backward   Number of Stairs 12   Ambulation/Elevation Additional Comments pt reports fatigue  post ambulation and stair trials   Balance   Static Sitting Fair   Dynamic Sitting Fair -   Static Standing Fair -   Dynamic Standing Fair -   Ambulatory Fair -  (w/ RW)   Endurance Deficit   Endurance Deficit Yes   Endurance Deficit Description limited ambulation distance and activity tolerance   Activity Tolerance   Activity Tolerance Patient limited by fatigue   Nurse Made Aware Spoke to RN   Exercises   Hip Abduction Sitting;15 reps;AROM;Bilateral   Hip Adduction Sitting;15 reps;AROM;Bilateral  (pillow squeezes)   Knee AROM Long Arc Quad Sitting;10 reps;AROM;Bilateral   Ankle Pumps Sitting;15 reps;AROM;Bilateral   Marching Sitting;10 reps;AROM;Bilateral   Assessment   Prognosis Fair   Problem List Decreased endurance;Decreased mobility;Impaired balance;Obesity;Decreased strength   Assessment pt began tx session seated EOB and was agreeable to participate in PT intervention. PT intervention to focus on transfer training, TE activities, posture/balance w/ gait and stair trials. In compariosn to previous tx sessions pt continues to remain consistant for requiring /s for all functional transfers to and from RW, ambulation with RW and stair traisl in todays tx session. pt did require Vc's for hand placement w/ transfers in order to increase safety and balance. pt remains limited with activity toleranec and ambulation distance due to fatigue as pt  required several seated therapeutic rest breaks. Sp02 remained>91 % throughout PT intervention on 2 LNC02 with ambulation and stair trials. pt completed 12 steps with bilateral hand rails and close /s, no LOB. pt would benefit from continued skilled PT intervention in order to increase activity tolerance, functional mobility, ambulation distance. Post tx pt seated EOB w/ call bell and all pt needs met   Goals   Patient Goals to go home soon   STG Expiration Date 07/05/24   PT Treatment Day 1   Plan   Treatment/Interventions Functional transfer training;LE strengthening/ROM;Elevations;Therapeutic exercise;Endurance training;Patient/family training;Equipment eval/education;Bed mobility;Gait training;Spoke to nursing   Progress Slow progress, decreased activity tolerance   PT Frequency 2-3x/wk   Discharge Recommendation   Rehab Resource Intensity Level, PT III (Minimum Resource Intensity)   AM-PAC Basic Mobility Inpatient   Turning in Flat Bed Without Bedrails 2   Lying on Back to Sitting on Edge of Flat Bed Without Bedrails 2   Moving Bed to Chair 3   Standing Up From Chair Using Arms 3   Walk in Room 3   Climb 3-5 Stairs With Railing 3   Basic Mobility Inpatient Raw Score 16   Basic Mobility Standardized Score 38.32   [facility] Highest Level Of Mobility   -HL Goal 5: Stand one or more mins   -HLM Achieved 7: Walk 25 feet or more   Education   Education Provided Mobility training;Assistive device;Other  (stair trials)   Patient Demonstrates acceptance/verbal understanding   End of Consult   Patient Position at End of Consult Seated edge of bed;Bed/Chair alarm activated;All needs within reach   The patient's AM-PAC Basic Mobility Inpatient Short Form Raw Score is 16. A Raw score of less than or equal to 16 suggests the patient may benefit from discharge to post-acute rehabilitation services. Please also refer to the recommendation of the Physical Therapist for safe discharge planning.    Nikko Velasco

## 2024-06-22 NOTE — PLAN OF CARE
Problem: PHYSICAL THERAPY ADULT  Goal: Performs mobility at highest level of function for planned discharge setting.  See evaluation for individualized goals.  Description: Treatment/Interventions: LE strengthening/ROM, Elevations, Therapeutic exercise, Endurance training, Patient/family training, Equipment eval/education, Gait training, Spoke to nursing          See flowsheet documentation for full assessment, interventions and recommendations.  Outcome: Progressing  Note: Prognosis: Fair  Problem List: Decreased endurance, Decreased mobility, Impaired balance, Obesity, Decreased strength  Assessment: pt began tx session seated EOB and was agreeable to participate in PT intervention. PT intervention to focus on transfer training, TE activities, posture/balance w/ gait and stair trials. In compariosn to previous tx sessions pt continues to remain consistant for requiring /s for all functional transfers to and from RW, ambulation with RW and stair traisl in todays tx session. pt did require Vc's for hand placement w/ transfers in order to increase safety and balance. pt remains limited with activity toleranec and ambulation distance due to fatigue as pt required several seated therapeutic rest breaks. Sp02 remained>91 % throughout PT intervention on 2 LNC02 with ambulation and stair trials. pt completed 12 steps with bilateral hand rails and close /s, no LOB. pt would benefit from continued skilled PT intervention in order to increase activity tolerance, functional mobility, ambulation distance. Post tx pt seated EOB w/ call bell and all pt needs met        Rehab Resource Intensity Level, PT: III (Minimum Resource Intensity)    See flowsheet documentation for full assessment.

## 2024-06-22 NOTE — ASSESSMENT & PLAN NOTE
Home regimen: gabapentin 900 mg HS  OT recommended level 3, thus, likely home PT OT.   PT recommended a level III disposition upon discharge.     Plan:  Reduce dose of gabapentin if further elevation of Creatinine

## 2024-06-22 NOTE — PROGRESS NOTES
"Progress Note - Pulmonary   Oscar Espinosa 83 y.o. male MRN: [id number]  Unit/Bed#: W -01 Encounter: [id number]      Assessment & Recommendations:  Acute/chronic hypoxic respiratory failure  Titrate O2 to keep SpO2 >90%, IS, OOB-chair    Left Strep intermedius empyema   Continue CTX D#5 antibiotics, may need extended course  Continue TPA/Dornase instillations plan #4/6 now and then repeat in 12 hours  Will repeat NC CT chest after completion of 6th dose of TPA/Dornase  CT to remain on -[id number] suction     Atrial fibrillation on warfarin - per primary service    Chronic systolic/diastolic CHF - per primary service  Reported COPD w/o AE      Subjective:   Feeling improved, no chest pain, no pleurisy, no fevers or chills.  Tolerating 3 doses of TPA/Dornase instillation.      Objective:       Vitals: Blood pressure 118/54, pulse 76, temperature 98 °F (36.7 °C), resp. rate 20, height 5' 11\" (1.803 m), SpO2 93%., 2LNC, Body mass index is 30.54 kg/m².      Intake/Output Summary (Last 24 hours) at 6/22/2024 1230  Last data filed at 6/22/2024 0852  Gross per 24 hour   Intake 392 ml   Output 4325 ml   Net -3933 ml         Physical Exam  Gen: Older man sitting on side of bed, awake, alert, oriented x 3, no acute distress  HEENT: Mucous membranes moist, no oral lesions, no thrush  NECK: No accessory muscle use, JVP not elevated  Cardiac: Regular, single S1, single S2, no murmurs, no rubs, no gallops  Lungs: diminished BS at left base, no pleural rubs, no wheeze or rales, left CT 550cc output, starting to clear, no -[id number] suction, no air leak  Abdomen: normoactive bowel sounds, soft nontender, nondistended, no rebound or rigidity, no guarding  Extremities: no cyanosis, no clubbing, 2+ LE edema with CVS changes    Labs: I have personally reviewed pertinent lab results.  Laboratory and Diagnostics  Results from last 7 days   Lab Units 06/22/24  0559 06/21/24  0527 06/20/24  0451 06/19/24  0512 06/18/24  1448 " 06/18/24  0834   WBC Thousand/uL 8.55 10.62* 13.01* 16.99* 12.71* 14.15*   HEMOGLOBIN g/dL 8.6* 8.5* 8.1* 7.9* 8.4* 8.8*   HEMATOCRIT % 27.9* 27.6* 26.7* 25.3* 27.2* 28.4*   PLATELETS Thousands/uL 312 284 263 258 304 318   SEGS PCT % 81* 81* 84* 90* 87*  --    MONO PCT % 8 8 7 6 7  --    EOS PCT % 1 1 1 0 0  --      Results from last 7 days   Lab Units 06/22/24  0559 06/21/24  0527 06/20/24  0451 06/19/24  0512 06/18/24  1448 06/18/24  0834   SODIUM mmol/L 135 132* 134* 134* 133* 135   POTASSIUM mmol/L 3.8 4.5 4.5 4.2 4.5 4.6   CHLORIDE mmol/L 96 99 98 98 95* 95*   CO2 mmol/L 32 29 30 31 33* 32   ANION GAP mmol/L 7 4 6 5 5 8   BUN mg/dL 26* 30* 31* 32* 37* 34*   CREATININE mg/dL 0.99 1.09 1.09 1.21 1.50* 1.50*   CALCIUM mg/dL 8.4 8.2* 8.1* 7.9* 8.1* 8.1*   GLUCOSE RANDOM mg/dL 91 90 101 109 97  --    ALT U/L  --   --   --  44 53*  --    AST U/L  --   --   --  40* 58*  --    ALK PHOS U/L  --   --   --  130* 147*  --    ALBUMIN g/dL  --   --   --  2.3* 2.4*  --    TOTAL BILIRUBIN mg/dL  --   --   --  0.87 0.84  --      Results from last 7 days   Lab Units 06/19/24  0512   MAGNESIUM mg/dL 1.9      Results from last 7 days   Lab Units 06/22/24  0810 06/21/24  0527 06/20/24  0451 06/19/24  0512 06/18/24  0834   INR  2.57* 2.58* 2.40* 2.16* 2.29*          Results from last 7 days   Lab Units 06/18/24  1448   LACTIC ACID mmol/L 1.2     Results from last 7 days   Lab Units 06/19/24  0512   FERRITIN ng/mL 139     Results from last 7 days   Lab Units 06/20/24  0451   LD U/L 146             Results from last 7 days   Lab Units 06/19/24  0512 06/18/24  1448   PROCALCITONIN ng/ml 0.30* 0.35*         Microbiology:  Left Pleural Fluid 6/19 - LDH >12K, pH 5.6, glu <10, TP <3, TG 84  (+) 2+ Strep intermedius   Strep/legionella ag neg  Bld CX 6/18 NGTD  FLU/RSV/COVID neg    Imaging and other studies: I have personally reviewed pertinent reports.   and I have personally reviewed pertinent films in PACS  CXR 6/22 - small loculated left  pleural effusion, pigtail catheter in place, no PTX    CT 6/10 - persistent moderate loculated left effusion with some LLL collapse, mild reticular changes    TTE 4/2024 EF 45%, normal RV size/function, RVSP 55mmHg      Roly Hernández DO, FACP  [facility]

## 2024-06-23 LAB
ANION GAP SERPL CALCULATED.3IONS-SCNC: 7 MMOL/L (ref 4–13)
ATRIAL RATE: 49 BPM
BACTERIA BLD CULT: NORMAL
BACTERIA BLD CULT: NORMAL
BASOPHILS # BLD AUTO: 0.08 THOUSANDS/ÂΜL (ref 0–0.1)
BASOPHILS NFR BLD AUTO: 1 % (ref 0–1)
BUN SERPL-MCNC: 26 MG/DL (ref 5–25)
CALCIUM SERPL-MCNC: 8.2 MG/DL (ref 8.4–10.2)
CHLORIDE SERPL-SCNC: 98 MMOL/L (ref 96–108)
CO2 SERPL-SCNC: 31 MMOL/L (ref 21–32)
CREAT SERPL-MCNC: 1.05 MG/DL (ref 0.6–1.3)
EOSINOPHIL # BLD AUTO: 0.09 THOUSAND/ÂΜL (ref 0–0.61)
EOSINOPHIL NFR BLD AUTO: 1 % (ref 0–6)
ERYTHROCYTE [DISTWIDTH] IN BLOOD BY AUTOMATED COUNT: 17.6 % (ref 11.6–15.1)
GFR SERPL CREATININE-BSD FRML MDRD: 65 ML/MIN/1.73SQ M
GLUCOSE SERPL-MCNC: 100 MG/DL (ref 65–140)
HCT VFR BLD AUTO: 26.9 % (ref 36.5–49.3)
HGB BLD-MCNC: 8.2 G/DL (ref 12–17)
IMM GRANULOCYTES # BLD AUTO: 0.11 THOUSAND/UL (ref 0–0.2)
IMM GRANULOCYTES NFR BLD AUTO: 1 % (ref 0–2)
LYMPHOCYTES # BLD AUTO: 0.81 THOUSANDS/ÂΜL (ref 0.6–4.47)
LYMPHOCYTES NFR BLD AUTO: 9 % (ref 14–44)
MCH RBC QN AUTO: 28 PG (ref 26.8–34.3)
MCHC RBC AUTO-ENTMCNC: 30.5 G/DL (ref 31.4–37.4)
MCV RBC AUTO: 92 FL (ref 82–98)
MONOCYTES # BLD AUTO: 0.78 THOUSAND/ÂΜL (ref 0.17–1.22)
MONOCYTES NFR BLD AUTO: 9 % (ref 4–12)
NEUTROPHILS # BLD AUTO: 6.76 THOUSANDS/ÂΜL (ref 1.85–7.62)
NEUTS SEG NFR BLD AUTO: 79 % (ref 43–75)
NRBC BLD AUTO-RTO: 0 /100 WBCS
PLATELET # BLD AUTO: 279 THOUSANDS/UL (ref 149–390)
PMV BLD AUTO: 9.7 FL (ref 8.9–12.7)
POTASSIUM SERPL-SCNC: 3.9 MMOL/L (ref 3.5–5.3)
QRS AXIS: 17 DEGREES
QRSD INTERVAL: 100 MS
QT INTERVAL: 434 MS
QTC INTERVAL: 458 MS
RBC # BLD AUTO: 2.93 MILLION/UL (ref 3.88–5.62)
SODIUM SERPL-SCNC: 136 MMOL/L (ref 135–147)
T WAVE AXIS: 0 DEGREES
VENTRICULAR RATE: 67 BPM
WBC # BLD AUTO: 8.63 THOUSAND/UL (ref 4.31–10.16)

## 2024-06-23 PROCEDURE — 99232 SBSQ HOSP IP/OBS MODERATE 35: CPT | Performed by: INTERNAL MEDICINE

## 2024-06-23 PROCEDURE — 93010 ELECTROCARDIOGRAM REPORT: CPT | Performed by: INTERNAL MEDICINE

## 2024-06-23 PROCEDURE — 32562 LYSE CHEST FIBRIN SUBQ DAY: CPT | Performed by: INTERNAL MEDICINE

## 2024-06-23 PROCEDURE — 85025 COMPLETE CBC W/AUTO DIFF WBC: CPT

## 2024-06-23 PROCEDURE — 80048 BASIC METABOLIC PNL TOTAL CA: CPT

## 2024-06-23 RX ORDER — TORSEMIDE 20 MG/1
40 TABLET ORAL DAILY
Status: DISCONTINUED | OUTPATIENT
Start: 2024-06-23 | End: 2024-06-23

## 2024-06-23 RX ORDER — TORSEMIDE 20 MG/1
40 TABLET ORAL DAILY
Status: DISCONTINUED | OUTPATIENT
Start: 2024-06-23 | End: 2024-06-26 | Stop reason: HOSPADM

## 2024-06-23 RX ADMIN — WARFARIN SODIUM 2.5 MG: 2.5 TABLET ORAL at 17:14

## 2024-06-23 RX ADMIN — AMIODARONE HYDROCHLORIDE 200 MG: 200 TABLET ORAL at 08:23

## 2024-06-23 RX ADMIN — ATORVASTATIN CALCIUM 40 MG: 40 TABLET, FILM COATED ORAL at 08:23

## 2024-06-23 RX ADMIN — DOCUSATE SODIUM 100 MG: 100 CAPSULE, LIQUID FILLED ORAL at 17:14

## 2024-06-23 RX ADMIN — POTASSIUM CHLORIDE 20 MEQ: 1500 TABLET, EXTENDED RELEASE ORAL at 08:26

## 2024-06-23 RX ADMIN — ACETAMINOPHEN 650 MG: 325 TABLET, FILM COATED ORAL at 19:38

## 2024-06-23 RX ADMIN — SENNOSIDES 17.2 MG: 8.6 TABLET, FILM COATED ORAL at 21:40

## 2024-06-23 RX ADMIN — ALTEPLASE 10 MG: 2.2 INJECTION, POWDER, LYOPHILIZED, FOR SOLUTION INTRAVENOUS at 12:32

## 2024-06-23 RX ADMIN — Medication 1 PACKET: at 08:22

## 2024-06-23 RX ADMIN — ALTEPLASE 10 MG: 2.2 INJECTION, POWDER, LYOPHILIZED, FOR SOLUTION INTRAVENOUS at 02:39

## 2024-06-23 RX ADMIN — ASPIRIN 81 MG: 81 TABLET, COATED ORAL at 08:25

## 2024-06-23 RX ADMIN — CEFTRIAXONE SODIUM 1000 MG: 10 INJECTION, POWDER, FOR SOLUTION INTRAVENOUS at 19:38

## 2024-06-23 RX ADMIN — TAMSULOSIN HYDROCHLORIDE 0.4 MG: 0.4 CAPSULE ORAL at 17:14

## 2024-06-23 RX ADMIN — GABAPENTIN 900 MG: 300 CAPSULE ORAL at 21:40

## 2024-06-23 RX ADMIN — GUAIFENESIN 1200 MG: 600 TABLET ORAL at 21:40

## 2024-06-23 RX ADMIN — GUAIFENESIN 1200 MG: 600 TABLET ORAL at 08:23

## 2024-06-23 RX ADMIN — B-COMPLEX W/ C & FOLIC ACID TAB 1 TABLET: TAB at 08:24

## 2024-06-23 RX ADMIN — TORSEMIDE 40 MG: 20 TABLET ORAL at 12:31

## 2024-06-23 RX ADMIN — DOCUSATE SODIUM 100 MG: 100 CAPSULE, LIQUID FILLED ORAL at 08:24

## 2024-06-23 RX ADMIN — DORNASE ALFA 5 MG: 1 SOLUTION RESPIRATORY (INHALATION) at 12:32

## 2024-06-23 RX ADMIN — Medication 2000 UNITS: at 08:23

## 2024-06-23 RX ADMIN — POLYETHYLENE GLYCOL 3350 17 G: 17 POWDER, FOR SOLUTION ORAL at 08:21

## 2024-06-23 RX ADMIN — DORNASE ALFA 5 MG: 1 SOLUTION RESPIRATORY (INHALATION) at 02:39

## 2024-06-23 NOTE — ASSESSMENT & PLAN NOTE
Patient presents with shortness of breath for 2 days. Past smoker, 2 packs/day, quit over 10 years ago. Sent in by pulmonologist following CT results. Admitted to the hospital the beginning of May due to pneumonia at which point he required a thoracentesis on 4/26 yielding 600ml, completed 7 days of ceftriaxone.  CT chest wo contrast (06/10/2024)- persistent loculated moderate size left pleural effusion w/ partial collapse of LLL. Stable AAA measuring 4.7 cm (since May 2024).   Patient wears a baseline of 2 L oxygen on exertion and during sleep since last hospital admission.  Chest tube placed 6/20 left chest tube   Pleural fluid culture and gram stain (06/19/2024) show 3+ polys and 2+ gram + cocci in chains, and 2+ growth of streptococcus intermedius. Extra turbid Fluid with PH 5.6, LDH >54437. Glucose <10. ,600, TP <3.0  Legionella and Strep Pneumonia antigen negative  Fluid cultures cultures strep intermedius susceptible to ceftriaxone.  6 out of 6.  Dornase instillations completed.    Plan:  Pulm recommendations: continue with IV ceftriaxone (day 6). Repeat CT chest with contrast in the a.m.  Monitor temp  Tylenol 650 mg prn  Tessalon Perles 200 mg TID prn  Mucinex 1200 mg BID  Incentive spirometer, Flutter valve

## 2024-06-23 NOTE — QUICK NOTE
I, personally instilled patient's left-sided CT with dose #5 of 6 of tPA (30 mL) and dornase (30 mL), then clamped.  RN aware that patient is clamped and will assist reposition patient throughout dwell to disperse medication.  Patient tolerated instillation well and without incident.  Will unclamp in 60 min.  RN aware to call for increased work of breathing, respiratory distress, increased FiO2 requirements, or complaints of pain in chest.

## 2024-06-23 NOTE — PLAN OF CARE
Problem: RESPIRATORY - ADULT  Goal: Achieves optimal ventilation and oxygenation  Description: INTERVENTIONS:  - Assess for changes in respiratory status  - Assess for changes in mentation and behavior  - Position to facilitate oxygenation and minimize respiratory effort  - Oxygen administered by appropriate delivery if ordered  - Initiate smoking cessation education as indicated  - Encourage broncho-pulmonary hygiene including cough, deep breathe, Incentive Spirometry  - Assess the need for suctioning and aspirate as needed  - Assess and instruct to report SOB or any respiratory difficulty  - Respiratory Therapy support as indicated  Outcome: Progressing     Problem: METABOLIC, FLUID AND ELECTROLYTES - ADULT  Goal: Electrolytes maintained within normal limits  Description: INTERVENTIONS:  - Monitor labs and assess patient for signs and symptoms of electrolyte imbalances  - Administer electrolyte replacement as ordered  - Monitor response to electrolyte replacements, including repeat lab results as appropriate  - Instruct patient on fluid and nutrition as appropriate  Outcome: Progressing     Problem: PAIN - ADULT  Goal: Verbalizes/displays adequate comfort level or baseline comfort level  Description: Interventions:  - Encourage patient to monitor pain and request assistance  - Assess pain using appropriate pain scale  - Administer analgesics based on type and severity of pain and evaluate response  - Implement non-pharmacological measures as appropriate and evaluate response  - Consider cultural and social influences on pain and pain management  - Notify physician/advanced practitioner if interventions unsuccessful or patient reports new pain  Outcome: Progressing     Problem: INFECTION - ADULT  Goal: Absence or prevention of progression during hospitalization  Description: INTERVENTIONS:  - Assess and monitor for signs and symptoms of infection  - Monitor lab/diagnostic results  - Monitor all insertion sites,  i.e. indwelling lines, tubes, and drains  - Monitor endotracheal if appropriate and nasal secretions for changes in amount and color  - [place] appropriate cooling/warming therapies per order  - Administer medications as ordered  - Instruct and encourage patient and family to use good hand hygiene technique  - Identify and instruct in appropriate isolation precautions for identified infection/condition  Outcome: Progressing     Problem: SAFETY ADULT  Goal: Patient will remain free of falls  Description: INTERVENTIONS:  - Educate patient/family on patient safety including physical limitations  - Instruct patient to call for assistance with activity   - Consult OT/PT to assist with strengthening/mobility   - Keep Call bell within reach  - Keep bed low and locked with side rails adjusted as appropriate  - Keep care items and personal belongings within reach  - Initiate and maintain comfort rounds  - Make Fall Risk Sign visible to staff  - Offer Toileting every 2 Hours, in advance of need  - Initiate/Maintain bed/chair alarm    Problem: DISCHARGE PLANNING  Goal: Discharge to home or other facility with appropriate resources  Description: INTERVENTIONS:  - Identify barriers to discharge w/patient and caregiver  - Arrange for needed discharge resources and transportation as appropriate  - Identify discharge learning needs (meds, wound care, etc.)  - Arrange for interpretive services to assist at discharge as needed  - Refer to Case Management Department for coordinating discharge planning if the patient needs post-hospital services based on physician/advanced practitioner order or complex needs related to functional status, cognitive ability, or social support system  Outcome: Progressing   - Apply yellow socks and bracelet for high fall risk patients  - Consider moving patient to room near nurses station  Outcome: Progressing

## 2024-06-23 NOTE — PROGRESS NOTES
Pleural instillation note:     L chest tube was instilled with 10 mg of TPA  in 30 mL of sterile water and 5 mg of Dornase in sterile water. The tube was then clamped at 1225 and will remain clamped until 1325. The nurse will unclamp then and keep chest tube to -[id number] suction.      This is planned final dose pending CT results in AM.       Roly Hernández, DO

## 2024-06-23 NOTE — PLAN OF CARE
Problem: RESPIRATORY - ADULT  Goal: Achieves optimal ventilation and oxygenation  Description: INTERVENTIONS:  - Assess for changes in respiratory status  - Assess for changes in mentation and behavior  - Position to facilitate oxygenation and minimize respiratory effort  - Oxygen administered by appropriate delivery if ordered  - Initiate smoking cessation education as indicated  - Encourage broncho-pulmonary hygiene including cough, deep breathe, Incentive Spirometry  - Assess the need for suctioning and aspirate as needed  - Assess and instruct to report SOB or any respiratory difficulty  - Respiratory Therapy support as indicated  Outcome: Progressing     Problem: PAIN - ADULT  Goal: Verbalizes/displays adequate comfort level or baseline comfort level  Description: Interventions:  - Encourage patient to monitor pain and request assistance  - Assess pain using appropriate pain scale  - Administer analgesics based on type and severity of pain and evaluate response  - Implement non-pharmacological measures as appropriate and evaluate response  - Consider cultural and social influences on pain and pain management  - Notify physician/advanced practitioner if interventions unsuccessful or patient reports new pain  Outcome: Progressing     Problem: SAFETY ADULT  Goal: Patient will remain free of falls  Description: INTERVENTIONS:  - Educate patient/family on patient safety including physical limitations  - Instruct patient to call for assistance with activity   - Consult OT/PT to assist with strengthening/mobility   - Keep Call bell within reach  - Keep bed low and locked with side rails adjusted as appropriate  - Keep care items and personal belongings within reach  - Initiate and maintain comfort rounds  - Make Fall Risk Sign visible to staff  - Offer Toileting every two hours, in advance of need  - Initiate/Maintain bed and chair alarm  - Obtain necessary fall risk management equipment:   - Apply yellow socks and  bracelet for high fall risk patients  - Consider moving patient to room near nurses station  Outcome: Progressing     Problem: GENITOURINARY - ADULT  Goal: Absence of urinary retention  Description: INTERVENTIONS:  - Assess patient’s ability to void and empty bladder  - Monitor I/O  - Bladder scan as needed  - Discuss with physician/AP medications to alleviate retention as needed  - Discuss catheterization for long term situations as appropriate  Outcome: Progressing

## 2024-06-23 NOTE — PROGRESS NOTES
[facility]  Progress Note  Name: Oscar Espinosa I  MRN: [id number]  Unit/Bed#: W -01 I Date of Admission: 6/18/2024   Date of Service: 6/23/2024 I Hospital Day: 5    Assessment & Plan   * Pleural effusion with shortness of breath  Assessment & Plan  3/24 pneumonia: required a thoracentesis on 4/26 yielding 600ml, completed 7 days of ceftriaxone.   CT chest wo contrast (06/10/2024)- persistent loculated moderate size left pleural effusion w/ partial collapse of LLL. Stable AAA measuring 4.7 cm (since May 2024).   IR placed left sided chest tube and drained 300 mL of mildly frothy, slightly viscous and serosanguinous fluid and samples were sent for ordered laboratory evaluation.   Pleural fluid culture and gram stain (06/19/2024) show 3+ polys and 2+ gram + cocci in chains, and 2+ growth of streptococcus intermedius, susceptible to ceftriaxone. Extra turbid Fluid with PH 5.6, LDH >72578. Glucose <10. ,600, TP <3.0.    6 out of 6 TPA/dornase instilled today.  Follow-up CT chest with contrast planned for tomorrow.  Continue ceftriaxone day 6 today.    Acute hypoxic respiratory failure (HCC)  Assessment & Plan  Patient presents with shortness of breath for 2 days. Past smoker, 2 packs/day, quit over 10 years ago. Sent in by pulmonologist following CT results. Admitted to the hospital the beginning of May due to pneumonia at which point he required a thoracentesis on 4/26 yielding 600ml, completed 7 days of ceftriaxone.  CT chest wo contrast (06/10/2024)- persistent loculated moderate size left pleural effusion w/ partial collapse of LLL. Stable AAA measuring 4.7 cm (since May 2024).   Patient wears a baseline of 2 L oxygen on exertion and during sleep since last hospital admission.  Chest tube placed 6/20 left chest tube   Pleural fluid culture and gram stain (06/19/2024) show 3+ polys and 2+ gram + cocci in chains, and 2+ growth of streptococcus intermedius. Extra turbid Fluid  with PH 5.6, LDH >28619. Glucose <10. ,600, TP <3.0  Legionella and Strep Pneumonia antigen negative  Fluid cultures cultures strep intermedius susceptible to ceftriaxone.  6 out of 6.  Dornase instillations completed.    Plan:  Pulm recommendations: continue with IV ceftriaxone (day 6). Repeat CT chest with contrast in the a.m.  Monitor temp  Tylenol 650 mg prn  Tessalon Perles 200 mg TID prn  Mucinex 1200 mg BID  Incentive spirometer, Flutter valve    Elevated serum creatinine  Assessment & Plan  Baseline Cr 0.9-1.1 prior to May 2024  Elevated to 1.66 as of May 2024 and has been stable since then  Currently at baseline    Prediabetes  Assessment & Plan  Lab Results   Component Value Date    HGBA1C 6.3 (H) 06/18/2024        Plan:  Monitor blood sugars  Consider insulin sliding scale if glucose elevated  Diabetic diet    Anemia  Assessment & Plan  On review of labs, anemia is new since April 2024.   Last labs in August 2023 were normal.   There has been a slow trend downward.   Hgb 8.4 on admission    Plan:  Monitor CBC  Transfuse if Hgb <7    Abnormal TSH  Assessment & Plan  TSH 4.559, free T4 within normal limits    Chronic combined systolic and diastolic congestive heart failure (HCC)  Assessment & Plan  Wt Readings from Last 3 Encounters:   06/13/24 99.3 kg (219 lb)   06/06/24 100 kg (221 lb)   05/20/24 101 kg (221 lb 12.8 oz)     Home regimen: torsemide 40 mg PO daily, labetolol 100 mg  Diuretics were initially held in the setting of elevated Cr. Creatinine improved.  Diuretics resumed    Plan:  Continue home regimen  Monitor BMP  Monitor I/O    Atrial fibrillation (HCC)  Assessment & Plan  Home regimen: Amiodorone 200 mg p.o. daily, labetolol 100 mg BID, warfarin 2.5 mg  Warfarin held prior to IR procedure and later continued.    Plan:  Continue amiodarone, labetolol, warfarin  PT/ INR monitoring    Chronic obstructive pulmonary disease (HCC)  Assessment & Plan  Not in exacerbations, albuterol as  needed.     Chronic pain syndrome  Assessment & Plan  Home regimen: gabapentin 900 mg HS  PT/OT recommended level III.    Plan:  Reduce dose of gabapentin if further elevation of Creatinine    CAD (coronary artery disease)  Assessment & Plan  Home regimen: ASA 81 mg, lipitor 40 mg, labetolol 100 mg    Plan:  Continue home regimen          VTE Pharmacologic Prophylaxis: VTE Score: 5 High Risk (Score >/= 5) - Pharmacological DVT Prophylaxis Ordered: warfarin (Coumadin). Sequential Compression Devices Ordered.    Mobility:   Basic Mobility Inpatient Raw Score: 23  JH-HLM Goal: 7: Walk 25 feet or more  JH-HLM Achieved: 5: Stand (1 or more minutes)  JH-HLM Goal achieved. Continue to encourage appropriate mobility.    Patient Centered Rounds: I performed bedside rounds with nursing staff today.  Discussions with Specialists or Other Care Team Provider: Pulmonology    Education and Discussions with Family / Patient: Attempted to update  (wife) via phone. Left voicemail.     Current Length of Stay: 5 day(s)  Current Patient Status: Inpatient   Discharge Plan: Anticipate discharge in 48 hrs to home.    Code Status: Level 3 - DNAR and DNI    Subjective:   Patient examined at bedside, endorses feeling well overall.  Patient related overnight events of weakness in his left lower extremity while ambulating to the bathroom, causing his left leg to give out.  He attributes this to getting up and walking quickly and not taking his time to feel steady on his feet.  He denies any issues since the event.  He denies any pain in the leg or knee, dizziness or lightheadedness.    Objective:     Vitals:   Temp (24hrs), Av.9 °F (36.6 °C), Min:97.3 °F (36.3 °C), Max:98.3 °F (36.8 °C)    Temp:  [97.3 °F (36.3 °C)-98.3 °F (36.8 °C)] 97.3 °F (36.3 °C)  HR:  [71-79] 74  Resp:  [16-18] 16  BP: (103-116)/(51-64) 106/64  SpO2:  [88 %-97 %] 88 %  Body mass index is 30.54 kg/m².     Input and Output Summary (last 24 hours):      Intake/Output Summary (Last 24 hours) at 6/23/2024 1524  Last data filed at 6/23/2024 1052  Gross per 24 hour   Intake 1560 ml   Output 2430 ml   Net -870 ml       Physical Exam:   Physical Exam  Vitals reviewed.   Constitutional:       General: He is not in acute distress.     Appearance: Normal appearance. He is not ill-appearing.   HENT:      Head: Normocephalic and atraumatic.      Mouth/Throat:      Mouth: Mucous membranes are moist.      Pharynx: Oropharynx is clear.   Eyes:      Extraocular Movements: Extraocular movements intact.      Conjunctiva/sclera: Conjunctivae normal.   Cardiovascular:      Rate and Rhythm: Normal rate and regular rhythm.   Pulmonary:      Effort: Pulmonary effort is normal. No respiratory distress.      Breath sounds: Normal breath sounds. No stridor. No wheezing.      Comments: Decreased breath sound in the left lower lobe,  improving  Abdominal:      General: Bowel sounds are normal.   Musculoskeletal:      Right lower leg: Edema present.      Left lower leg: Edema present.      Comments: Chronic venous stasis changes bilateral lower extremities.   Neurological:      Mental Status: He is alert and oriented to person, place, and time. Mental status is at baseline.          Additional Data:     Labs:  Results from last 7 days   Lab Units 06/23/24  0509   WBC Thousand/uL 8.63   HEMOGLOBIN g/dL 8.2*   HEMATOCRIT % 26.9*   PLATELETS Thousands/uL 279   SEGS PCT % 79*   LYMPHO PCT % 9*   MONO PCT % 9   EOS PCT % 1     Results from last 7 days   Lab Units 06/23/24  0509 06/20/24  0451 06/19/24  0512   SODIUM mmol/L 136   < > 134*   POTASSIUM mmol/L 3.9   < > 4.2   CHLORIDE mmol/L 98   < > 98   CO2 mmol/L 31   < > 31   BUN mg/dL 26*   < > 32*   CREATININE mg/dL 1.05   < > 1.21   ANION GAP mmol/L 7   < > 5   CALCIUM mg/dL 8.2*   < > 7.9*   ALBUMIN g/dL  --   --  2.3*   TOTAL BILIRUBIN mg/dL  --   --  0.87   ALK PHOS U/L  --   --  130*   ALT U/L  --   --  44   AST U/L  --   --  40*    GLUCOSE RANDOM mg/dL 100   < > 109    < > = values in this interval not displayed.     Results from last 7 days   Lab Units 06/22/24  0810   INR  2.57*         Results from last 7 days   Lab Units 06/18/24  0834   HEMOGLOBIN A1C % 6.3*     Results from last 7 days   Lab Units 06/19/24  0512 06/18/24  1448   LACTIC ACID mmol/L  --  1.2   PROCALCITONIN ng/ml 0.30* 0.35*       Lines/Drains:  Invasive Devices       Peripheral Intravenous Line  Duration             Peripheral IV 06/22/24 Distal;Dorsal (posterior);Left Forearm <1 day              Drain  Duration             Chest Tube Left 12 Fr. 4 days                    Imaging: Reviewed radiology reports from this admission including: chest xray    Recent Cultures (last 7 days):   Results from last 7 days   Lab Units 06/20/24  0912 06/19/24  1336 06/18/24  1457 06/18/24  1448   BLOOD CULTURE   --   --  No Growth After 4 Days. No Growth After 4 Days.   GRAM STAIN RESULT   --  3+ Polys*  2+ Gram positive cocci in chains*  --   --    BODY FLUID CULTURE, STERILE   --  2+ Growth of Streptococcus intermedius*  --   --    LEGIONELLA URINARY ANTIGEN  Negative  --   --   --        Last 24 Hours Medication List:   Current Facility-Administered Medications   Medication Dose Route Frequency Provider Last Rate    acetaminophen  650 mg Oral Q6H PRN Delisa Acevedo MD      albuterol  2 puff Inhalation Q6H PRN Delisa Acevedo MD      amiodarone  200 mg Oral Daily Delisa Acevedo MD      aspirin  81 mg Oral Daily Delisa Acevedo MD      atorvastatin  40 mg Oral Daily Delisa Acevedo MD      benzonatate  200 mg Oral TID PRN Oscar Jack DO      cefTRIAXone  1,000 mg Intravenous Q24H Andreas Singleton MD 1,000 mg (06/22/24 2023)    cholecalciferol  2,000 Units Oral Daily Delisa Acevedo MD      docusate sodium  100 mg Oral BID Delisa Acevedo MD      gabapentin  900 mg Oral HS Delisa Acevedo MD      guaiFENesin  1,200 mg Oral Q12H [facility] Delisa Acevedo MD      labetalol  100 mg Oral BID Delisa Acevedo MD       multivitamin stress formula  1 tablet Oral Daily Delisa Acevedo MD      polyethylene glycol  17 g Oral Daily Delisa Acevedo MD      potassium chloride  20 mEq Oral Daily Delisa Acevedo MD      psyllium  1 packet Oral Daily Delisa Acevedo MD      senna  2 tablet Oral HS Delisa Acevedo MD      senna  1 tablet Oral HS PRN Delisa Acevedo MD      tamsulosin  0.4 mg Oral Daily With Dinner Delisa Acevedo MD      torsemide  40 mg Oral Daily Rc Jackson MD      warfarin  2.5 mg Oral Daily (warfarin) Delisa Acevedo MD          Today, Patient Was Seen By: Rc Jackson MD    **Please Note: This note may have been constructed using a voice recognition system.**

## 2024-06-23 NOTE — ASSESSMENT & PLAN NOTE
3/24 pneumonia: required a thoracentesis on 4/26 yielding 600ml, completed 7 days of ceftriaxone.   CT chest wo contrast (06/10/2024)- persistent loculated moderate size left pleural effusion w/ partial collapse of LLL. Stable AAA measuring 4.7 cm (since May 2024).   IR placed left sided chest tube and drained 300 mL of mildly frothy, slightly viscous and serosanguinous fluid and samples were sent for ordered laboratory evaluation.   Pleural fluid culture and gram stain (06/19/2024) show 3+ polys and 2+ gram + cocci in chains, and 2+ growth of streptococcus intermedius, susceptible to ceftriaxone. Extra turbid Fluid with PH 5.6, LDH >78998. Glucose <10. ,600, TP <3.0.    6 out of 6 TPA/dornase instilled today.  Follow-up CT chest with contrast planned for tomorrow.  Continue ceftriaxone day 6 today.

## 2024-06-23 NOTE — PROGRESS NOTES
"Progress Note - Pulmonary   Oscar Espinosa 83 y.o. male MRN: [id number]  Unit/Bed#: W -01 Encounter: [id number]      Assessment & Recommendations:  Acute/chronic hypoxic respiratory failure  Titrate O2 to keep SpO2 >90%, IS, OOB-chair    Left Strep intermedius empyema   Continue CTX D#6 antibiotics, may need extended course  Continue TPA/Dornase instillations plan #6/6 this afternoon, allow to drain overnight   Repeat NC CT chest to evaluate pleural space in AM 6/24  CT to remain on -06agR2T suction     Atrial fibrillation on warfarin - per primary service    Chronic systolic/diastolic CHF - per primary service    Reported COPD w/o AE    I updated Dr. Singleton at bedside      Subjective:   Feels well, no sig sputum production, no chest pain, no pleurisy, no fevers or chills    Objective:       Vitals: Blood pressure 105/53, pulse 79, temperature 98.1 °F (36.7 °C), resp. rate 17, height 5' 11\" (1.803 m), SpO2 92%., 97% 3LNC, Body mass index is 30.54 kg/m².      Intake/Output Summary (Last 24 hours) at 6/23/2024 0820  Last data filed at 6/23/2024 0710  Gross per 24 hour   Intake 840 ml   Output 2720 ml   Net -1880 ml         Physical Exam  GEN older man sitting on side of bed, conversant, nontoxic  HEENT MMM, no thrush, no oral lesions  Neck No accessory muscle use, JVP not elevated  CV reg, single s1/2, no m/r  Pulm diminished BS at left base but improving air movement, no rales or rhonchi, no wheeze, left  cc serosanguinous output yesterday, 60+ cc today, flushes easily, no air leak, -[id number]  ABD +BS soft NTND, no rebound  EXT warm, brisk cap refill, chronic lymphedema      Labs: I have personally reviewed pertinent lab results.  Laboratory and Diagnostics  Results from last 7 days   Lab Units 06/23/24  0509 06/22/24  0559 06/21/24  0527 06/20/24  0451 06/19/24  0512 06/18/24  1448 06/18/24  0834   WBC Thousand/uL 8.63 8.55 10.62* 13.01* 16.99* 12.71* 14.15*   HEMOGLOBIN g/dL 8.2* 8.6* 8.5* 8.1* 7.9* " 8.4* 8.8*   HEMATOCRIT % 26.9* 27.9* 27.6* 26.7* 25.3* 27.2* 28.4*   PLATELETS Thousands/uL 279 312 284 263 258 304 318   SEGS PCT % 79* 81* 81* 84* 90* 87*  --    MONO PCT % 9 8 8 7 6 7  --    EOS PCT % 1 1 1 1 0 0  --      Results from last 7 days   Lab Units 06/23/24  0509 06/22/24  0559 06/21/24  0527 06/20/24  0451 06/19/24  0512 06/18/24  1448 06/18/24  0834   SODIUM mmol/L 136 135 132* 134* 134* 133* 135   POTASSIUM mmol/L 3.9 3.8 4.5 4.5 4.2 4.5 4.6   CHLORIDE mmol/L 98 96 99 98 98 95* 95*   CO2 mmol/L 31 32 29 30 31 33* 32   ANION GAP mmol/L 7 7 4 6 5 5 8   BUN mg/dL 26* 26* 30* 31* 32* 37* 34*   CREATININE mg/dL 1.05 0.99 1.09 1.09 1.21 1.50* 1.50*   CALCIUM mg/dL 8.2* 8.4 8.2* 8.1* 7.9* 8.1* 8.1*   GLUCOSE RANDOM mg/dL 100 91 90 101 109 97  --    ALT U/L  --   --   --   --  44 53*  --    AST U/L  --   --   --   --  40* 58*  --    ALK PHOS U/L  --   --   --   --  130* 147*  --    ALBUMIN g/dL  --   --   --   --  2.3* 2.4*  --    TOTAL BILIRUBIN mg/dL  --   --   --   --  0.87 0.84  --      Results from last 7 days   Lab Units 06/19/24  0512   MAGNESIUM mg/dL 1.9      Results from last 7 days   Lab Units 06/22/24  0810 06/21/24  0527 06/20/24  0451 06/19/24  0512 06/18/24  0834   INR  2.57* 2.58* 2.40* 2.16* 2.29*          Results from last 7 days   Lab Units 06/18/24  1448   LACTIC ACID mmol/L 1.2     Results from last 7 days   Lab Units 06/19/24  0512   FERRITIN ng/mL 139     Results from last 7 days   Lab Units 06/20/24  0451   LD U/L 146             Results from last 7 days   Lab Units 06/19/24  0512 06/18/24  1448   PROCALCITONIN ng/ml 0.30* 0.35*         Microbiology:  Left Pleural Fluid 6/19 - LDH >12K, pH 5.6, glu <10, TP <3, TG 84  (+) 2+ Strep intermedius   Strep/legionella ag neg  Bld CX 6/18 NGTD  FLU/RSV/COVID neg    Imaging and other studies: new images and reports personally reviewed  CXR 6/22 - small loculated left pleural effusion, pigtail catheter in place, no PTX    CT 6/10 - persistent  moderate loculated left effusion with some LLL collapse, mild reticular changes    TTE 4/2024 EF 45%, normal RV size/function, RVSP 55mmHg      Roly Hernández DO, FACP  [facility]

## 2024-06-23 NOTE — ASSESSMENT & PLAN NOTE
Home regimen: gabapentin 900 mg HS  PT/OT recommended level III.    Plan:  Reduce dose of gabapentin if further elevation of Creatinine

## 2024-06-24 ENCOUNTER — APPOINTMENT (INPATIENT)
Dept: CT IMAGING | Facility: HOSPITAL | Age: 84
DRG: 177 | End: 2024-06-24
Payer: MEDICARE

## 2024-06-24 LAB
ANION GAP SERPL CALCULATED.3IONS-SCNC: 1 MMOL/L (ref 4–13)
BUN SERPL-MCNC: 22 MG/DL (ref 5–25)
CALCIUM SERPL-MCNC: 8.5 MG/DL (ref 8.4–10.2)
CHLORIDE SERPL-SCNC: 97 MMOL/L (ref 96–108)
CO2 SERPL-SCNC: 36 MMOL/L (ref 21–32)
CREAT SERPL-MCNC: 1.05 MG/DL (ref 0.6–1.3)
ERYTHROCYTE [DISTWIDTH] IN BLOOD BY AUTOMATED COUNT: 17.7 % (ref 11.6–15.1)
GFR SERPL CREATININE-BSD FRML MDRD: 65 ML/MIN/1.73SQ M
GLUCOSE SERPL-MCNC: 138 MG/DL (ref 65–140)
HCT VFR BLD AUTO: 30.1 % (ref 36.5–49.3)
HGB BLD-MCNC: 9.3 G/DL (ref 12–17)
INR PPP: 3.14 (ref 0.84–1.19)
MCH RBC QN AUTO: 29.2 PG (ref 26.8–34.3)
MCHC RBC AUTO-ENTMCNC: 30.9 G/DL (ref 31.4–37.4)
MCV RBC AUTO: 94 FL (ref 82–98)
PLATELET # BLD AUTO: 300 THOUSANDS/UL (ref 149–390)
PMV BLD AUTO: 10 FL (ref 8.9–12.7)
POTASSIUM SERPL-SCNC: 3.8 MMOL/L (ref 3.5–5.3)
PROTHROMBIN TIME: 33.3 SECONDS (ref 11.6–14.5)
RBC # BLD AUTO: 3.19 MILLION/UL (ref 3.88–5.62)
SODIUM SERPL-SCNC: 134 MMOL/L (ref 135–147)
WBC # BLD AUTO: 8.96 THOUSAND/UL (ref 4.31–10.16)

## 2024-06-24 PROCEDURE — 99232 SBSQ HOSP IP/OBS MODERATE 35: CPT | Performed by: INTERNAL MEDICINE

## 2024-06-24 PROCEDURE — 85027 COMPLETE CBC AUTOMATED: CPT

## 2024-06-24 PROCEDURE — 85610 PROTHROMBIN TIME: CPT

## 2024-06-24 PROCEDURE — 80048 BASIC METABOLIC PNL TOTAL CA: CPT

## 2024-06-24 PROCEDURE — 71250 CT THORAX DX C-: CPT

## 2024-06-24 PROCEDURE — 88305 TISSUE EXAM BY PATHOLOGIST: CPT | Performed by: STUDENT IN AN ORGANIZED HEALTH CARE EDUCATION/TRAINING PROGRAM

## 2024-06-24 PROCEDURE — 88112 CYTOPATH CELL ENHANCE TECH: CPT | Performed by: STUDENT IN AN ORGANIZED HEALTH CARE EDUCATION/TRAINING PROGRAM

## 2024-06-24 RX ORDER — AMOXICILLIN AND CLAVULANATE POTASSIUM 875; 125 MG/1; MG/1
1 TABLET, FILM COATED ORAL EVERY 12 HOURS SCHEDULED
Status: DISCONTINUED | OUTPATIENT
Start: 2024-06-24 | End: 2024-06-26 | Stop reason: HOSPADM

## 2024-06-24 RX ADMIN — Medication 2000 UNITS: at 08:14

## 2024-06-24 RX ADMIN — TORSEMIDE 40 MG: 20 TABLET ORAL at 08:14

## 2024-06-24 RX ADMIN — DOCUSATE SODIUM 100 MG: 100 CAPSULE, LIQUID FILLED ORAL at 08:14

## 2024-06-24 RX ADMIN — AMIODARONE HYDROCHLORIDE 200 MG: 200 TABLET ORAL at 08:14

## 2024-06-24 RX ADMIN — AMOXICILLIN AND CLAVULANATE POTASSIUM 1 TABLET: 875; 125 TABLET, FILM COATED ORAL at 20:58

## 2024-06-24 RX ADMIN — POTASSIUM CHLORIDE 20 MEQ: 1500 TABLET, EXTENDED RELEASE ORAL at 08:14

## 2024-06-24 RX ADMIN — SENNOSIDES 17.2 MG: 8.6 TABLET, FILM COATED ORAL at 20:52

## 2024-06-24 RX ADMIN — DOCUSATE SODIUM 100 MG: 100 CAPSULE, LIQUID FILLED ORAL at 17:19

## 2024-06-24 RX ADMIN — B-COMPLEX W/ C & FOLIC ACID TAB 1 TABLET: TAB at 08:14

## 2024-06-24 RX ADMIN — POLYETHYLENE GLYCOL 3350 17 G: 17 POWDER, FOR SOLUTION ORAL at 08:15

## 2024-06-24 RX ADMIN — GUAIFENESIN 1200 MG: 600 TABLET ORAL at 08:14

## 2024-06-24 RX ADMIN — Medication 1 PACKET: at 08:15

## 2024-06-24 RX ADMIN — AMOXICILLIN AND CLAVULANATE POTASSIUM 1 TABLET: 875; 125 TABLET, FILM COATED ORAL at 15:16

## 2024-06-24 RX ADMIN — GUAIFENESIN 1200 MG: 600 TABLET ORAL at 20:52

## 2024-06-24 RX ADMIN — ASPIRIN 81 MG: 81 TABLET, COATED ORAL at 08:14

## 2024-06-24 RX ADMIN — GABAPENTIN 900 MG: 300 CAPSULE ORAL at 20:53

## 2024-06-24 RX ADMIN — ATORVASTATIN CALCIUM 40 MG: 40 TABLET, FILM COATED ORAL at 08:14

## 2024-06-24 RX ADMIN — LABETALOL HYDROCHLORIDE 100 MG: 100 TABLET, FILM COATED ORAL at 08:14

## 2024-06-24 RX ADMIN — TAMSULOSIN HYDROCHLORIDE 0.4 MG: 0.4 CAPSULE ORAL at 15:16

## 2024-06-24 NOTE — PLAN OF CARE
Problem: RESPIRATORY - ADULT  Goal: Achieves optimal ventilation and oxygenation  Description: INTERVENTIONS:  - Assess for changes in respiratory status  - Assess for changes in mentation and behavior  - Position to facilitate oxygenation and minimize respiratory effort  - Oxygen administered by appropriate delivery if ordered  - Initiate smoking cessation education as indicated  - Encourage broncho-pulmonary hygiene including cough, deep breathe, Incentive Spirometry  - Assess the need for suctioning and aspirate as needed  - Assess and instruct to report SOB or any respiratory difficulty  - Respiratory Therapy support as indicated  Outcome: Progressing     Problem: METABOLIC, FLUID AND ELECTROLYTES - ADULT  Goal: Electrolytes maintained within normal limits  Description: INTERVENTIONS:  - Monitor labs and assess patient for signs and symptoms of electrolyte imbalances  - Administer electrolyte replacement as ordered  - Monitor response to electrolyte replacements, including repeat lab results as appropriate  - Instruct patient on fluid and nutrition as appropriate  Outcome: Progressing  Goal: Fluid balance maintained  Description: INTERVENTIONS:  - Monitor labs   - Monitor I/O and WT  - Instruct patient on fluid and nutrition as appropriate  - Assess for signs & symptoms of volume excess or deficit  Outcome: Progressing  Goal: Glucose maintained within target range  Description: INTERVENTIONS:  - Monitor Blood Glucose as ordered  - Assess for signs and symptoms of hyperglycemia and hypoglycemia  - Administer ordered medications to maintain glucose within target range  - Assess nutritional intake and initiate nutrition service referral as needed  Outcome: Progressing     Problem: PAIN - ADULT  Goal: Verbalizes/displays adequate comfort level or baseline comfort level  Description: Interventions:  - Encourage patient to monitor pain and request assistance  - Assess pain using appropriate pain scale  - Administer  analgesics based on type and severity of pain and evaluate response  - Implement non-pharmacological measures as appropriate and evaluate response  - Consider cultural and social influences on pain and pain management  - Notify physician/advanced practitioner if interventions unsuccessful or patient reports new pain  Outcome: Progressing     Problem: INFECTION - ADULT  Goal: Absence or prevention of progression during hospitalization  Description: INTERVENTIONS:  - Assess and monitor for signs and symptoms of infection  - Monitor lab/diagnostic results  - Monitor all insertion sites, i.e. indwelling lines, tubes, and drains  - Monitor endotracheal if appropriate and nasal secretions for changes in amount and color  - [place] appropriate cooling/warming therapies per order  - Administer medications as ordered  - Instruct and encourage patient and family to use good hand hygiene technique  - Identify and instruct in appropriate isolation precautions for identified infection/condition  Outcome: Progressing  Goal: Absence of fever/infection during neutropenic period  Description: INTERVENTIONS:  - Monitor WBC    Outcome: Progressing     Problem: SAFETY ADULT  Goal: Patient will remain free of falls  Description: INTERVENTIONS:  - Educate patient/family on patient safety including physical limitations  - Instruct patient to call for assistance with activity   - Consult OT/PT to assist with strengthening/mobility   - Keep Call bell within reach  - Keep bed low and locked with side rails adjusted as appropriate  - Keep care items and personal belongings within reach  - Initiate and maintain comfort rounds  - Make Fall Risk Sign visible to staff  - Offer Toileting every  Hours, in advance of need  - Initiate/Maintain alarm  - Obtain necessary fall risk management equipment:   - Apply yellow socks and bracelet for high fall risk patients  - Consider moving patient to room near nurses station  Outcome: Progressing  Goal:  Maintain or return to baseline ADL function  Description: INTERVENTIONS:  -  Assess patient's ability to carry out ADLs; assess patient's baseline for ADL function and identify physical deficits which impact ability to perform ADLs (bathing, care of mouth/teeth, toileting, grooming, dressing, etc.)  - Assess/evaluate cause of self-care deficits   - Assess range of motion  - Assess patient's mobility; develop plan if impaired  - Assess patient's need for assistive devices and provide as appropriate  - Encourage maximum independence but intervene and supervise when necessary  - Involve family in performance of ADLs  - Assess for home care needs following discharge   - Consider OT consult to assist with ADL evaluation and planning for discharge  - Provide patient education as appropriate  Outcome: Progressing  Goal: Maintains/Returns to pre admission functional level  Description: INTERVENTIONS:  - Perform AM-PAC 6 Click Basic Mobility/ Daily Activity assessment daily.  - Set and communicate daily mobility goal to care team and patient/family/caregiver.   - Collaborate with rehabilitation services on mobility goals if consulted  - Perform Range of Motion 3 times a day.  - Reposition patient every 3 hours.  - Dangle patient 3 times a day  - Stand patient 3 times a day  - Ambulate patient 3 times a day  - Out of bed to chair 3 times a day   - Out of bed for meals 3 times a day  - Out of bed for toileting  - Record patient progress and toleration of activity level   Outcome: Progressing

## 2024-06-24 NOTE — ASSESSMENT & PLAN NOTE
Home regimen: Amiodorone 200 mg p.o. daily, labetolol 100 mg BID, warfarin 2.5 mg  Warfarin held prior to IR procedure and later continued.    6/24 INR 3.14    Plan:  Continue amiodarone, labetolol  6/24 hold warfarin  PT/ INR monitoring

## 2024-06-24 NOTE — ASSESSMENT & PLAN NOTE
3/24 pneumonia: required a thoracentesis on 4/26 yielding 600ml, completed 7 days of ceftriaxone.   CT chest wo contrast (06/10/2024)- persistent loculated moderate size left pleural effusion w/ partial collapse of LLL. Stable AAA measuring 4.7 cm (since May 2024).   IR placed left sided chest tube and drained 300 mL of mildly frothy, slightly viscous and serosanguinous fluid and samples were sent for ordered laboratory evaluation.   Pleural fluid culture and gram stain (06/19/2024) show 3+ polys and 2+ gram + cocci in chains, and 2+ growth of streptococcus intermedius, susceptible to ceftriaxone. Extra turbid Fluid with PH 5.6, LDH >65819. Glucose <10. ,600, TP <3.0.  Patient is now status post 6 instillations of tPA/dornase. Will likely hold off on further installations and monitor chest tube output    CT chest wo contrast (06/24/2024) showing improvement of pleural space this morning   Still having adequate output from chest tube, now with 400 to 500 mL over the past 24 hours  Patient will need chest tube until output is less than 50 mL/day   Will likely need repeat CT chest once output declines to above rate   Follow-up with pulmonology office on 7/19/2024  Continue with pulmonary hygiene, out of bed to chair as tolerated, respiratory protocol

## 2024-06-24 NOTE — PLAN OF CARE
Problem: RESPIRATORY - ADULT  Goal: Achieves optimal ventilation and oxygenation  Description: INTERVENTIONS:  - Assess for changes in respiratory status  - Assess for changes in mentation and behavior  - Position to facilitate oxygenation and minimize respiratory effort  - Oxygen administered by appropriate delivery if ordered  - Initiate smoking cessation education as indicated  - Encourage broncho-pulmonary hygiene including cough, deep breathe, Incentive Spirometry  - Assess the need for suctioning and aspirate as needed  - Assess and instruct to report SOB or any respiratory difficulty  - Respiratory Therapy support as indicated  Outcome: Progressing     Problem: GENITOURINARY - ADULT  Goal: Maintains or returns to baseline urinary function  Description: INTERVENTIONS:  - Assess urinary function  - Encourage oral fluids to ensure adequate hydration if ordered  - Administer IV fluids as ordered to ensure adequate hydration  - Administer ordered medications as needed  - Offer frequent toileting  - Follow urinary retention protocol if ordered  Outcome: Progressing  Goal: Absence of urinary retention  Description: INTERVENTIONS:  - Assess patient’s ability to void and empty bladder  - Monitor I/O  - Bladder scan as needed  - Discuss with physician/AP medications to alleviate retention as needed  - Discuss catheterization for long term situations as appropriate  Outcome: Progressing     Problem: METABOLIC, FLUID AND ELECTROLYTES - ADULT  Goal: Electrolytes maintained within normal limits  Description: INTERVENTIONS:  - Monitor labs and assess patient for signs and symptoms of electrolyte imbalances  - Administer electrolyte replacement as ordered  - Monitor response to electrolyte replacements, including repeat lab results as appropriate  - Instruct patient on fluid and nutrition as appropriate  Outcome: Progressing  Goal: Fluid balance maintained  Description: INTERVENTIONS:  - Monitor labs   - Monitor I/O and  WT  - Instruct patient on fluid and nutrition as appropriate  - Assess for signs & symptoms of volume excess or deficit  Outcome: Progressing  Goal: Glucose maintained within target range  Description: INTERVENTIONS:  - Monitor Blood Glucose as ordered  - Assess for signs and symptoms of hyperglycemia and hypoglycemia  - Administer ordered medications to maintain glucose within target range  - Assess nutritional intake and initiate nutrition service referral as needed  Outcome: Progressing     Problem: PAIN - ADULT  Goal: Verbalizes/displays adequate comfort level or baseline comfort level  Description: Interventions:  - Encourage patient to monitor pain and request assistance  - Assess pain using appropriate pain scale  - Administer analgesics based on type and severity of pain and evaluate response  - Implement non-pharmacological measures as appropriate and evaluate response  - Consider cultural and social influences on pain and pain management  - Notify physician/advanced practitioner if interventions unsuccessful or patient reports new pain  Outcome: Progressing     Problem: INFECTION - ADULT  Goal: Absence or prevention of progression during hospitalization  Description: INTERVENTIONS:  - Assess and monitor for signs and symptoms of infection  - Monitor lab/diagnostic results  - Monitor all insertion sites, i.e. indwelling lines, tubes, and drains  - Monitor endotracheal if appropriate and nasal secretions for changes in amount and color  - [place] appropriate cooling/warming therapies per order  - Administer medications as ordered  - Instruct and encourage patient and family to use good hand hygiene technique  - Identify and instruct in appropriate isolation precautions for identified infection/condition  Outcome: Progressing  Goal: Absence of fever/infection during neutropenic period  Description: INTERVENTIONS:  - Monitor WBC    Outcome: Progressing     Problem: SAFETY ADULT  Goal: Patient will remain  free of falls  Description: INTERVENTIONS:  - Educate patient/family on patient safety including physical limitations  - Instruct patient to call for assistance with activity   - Consult OT/PT to assist with strengthening/mobility   - Keep Call bell within reach  - Keep bed low and locked with side rails adjusted as appropriate  - Keep care items and personal belongings within reach  - Initiate and maintain comfort rounds  - Make Fall Risk Sign visible to staff  - Apply yellow socks and bracelet for high fall risk patients  - Consider moving patient to room near nurses station  Outcome: Progressing  Goal: Maintain or return to baseline ADL function  Description: INTERVENTIONS:  -  Assess patient's ability to carry out ADLs; assess patient's baseline for ADL function and identify physical deficits which impact ability to perform ADLs (bathing, care of mouth/teeth, toileting, grooming, dressing, etc.)  - Assess/evaluate cause of self-care deficits   - Assess range of motion  - Assess patient's mobility; develop plan if impaired  - Assess patient's need for assistive devices and provide as appropriate  - Encourage maximum independence but intervene and supervise when necessary  - Involve family in performance of ADLs  - Assess for home care needs following discharge   - Consider OT consult to assist with ADL evaluation and planning for discharge  - Provide patient education as appropriate  Outcome: Progressing  Goal: Maintains/Returns to pre admission functional level  Description: INTERVENTIONS:  - Perform AM-PAC 6 Click Basic Mobility/ Daily Activity assessment daily.  - Set and communicate daily mobility goal to care team and patient/family/caregiver.   - Collaborate with rehabilitation services on mobility goals if consulted  - Out of bed for toileting  - Record patient progress and toleration of activity level   Outcome: Progressing     Problem: DISCHARGE PLANNING  Goal: Discharge to home or other facility with  appropriate resources  Description: INTERVENTIONS:  - Identify barriers to discharge w/patient and caregiver  - Arrange for needed discharge resources and transportation as appropriate  - Identify discharge learning needs (meds, wound care, etc.)  - Arrange for interpretive services to assist at discharge as needed  - Refer to Case Management Department for coordinating discharge planning if the patient needs post-hospital services based on physician/advanced practitioner order or complex needs related to functional status, cognitive ability, or social support system  Outcome: Progressing     Problem: Knowledge Deficit  Goal: Patient/family/caregiver demonstrates understanding of disease process, treatment plan, medications, and discharge instructions  Description: Complete learning assessment and assess knowledge base.  Interventions:  - Provide teaching at level of understanding  - Provide teaching via preferred learning methods  Outcome: Progressing

## 2024-06-24 NOTE — PROGRESS NOTES
[facility]  Progress Note  Name: Oscar Espinosa I  MRN: [id number]  Unit/Bed#: W -01 I Date of Admission: 6/18/2024   Date of Service: 6/24/2024 I Hospital Day: 6    Assessment & Plan   Acute hypoxic respiratory failure (HCC)  Assessment & Plan  Patient presents with shortness of breath for 2 days. Past smoker, 2 packs/day, quit over 10 years ago. Sent in by pulmonologist following CT results. Admitted to the hospital the beginning of May due to pneumonia at which point he required a thoracentesis on 4/26 yielding 600ml, completed 7 days of ceftriaxone.  CT chest wo contrast (06/10/2024)- persistent loculated moderate size left pleural effusion w/ partial collapse of LLL. Stable AAA measuring 4.7 cm (since May 2024).   Patient wears a baseline of 2 L oxygen on exertion and during sleep since last hospital admission.  Chest tube placed 6/20 left chest tube   Pleural fluid culture and gram stain (06/19/2024) show 3+ polys and 2+ gram + cocci in chains, and 2+ growth of streptococcus intermedius. Extra turbid Fluid with PH 5.6, LDH >95724. Glucose <10. ,600, TP <3.0  Legionella and Strep Pneumonia antigen negative  Fluid cultures cultures strep intermedius susceptible to ceftriaxone.    Per pulmonary patient is now status post 6 instillations of tPA/dornase. Will likely hold off on further installations and monitor chest tube output    CT chest wo contrast (06/24/2024) showing improvement of pleural space this morning. Still having adequate output from chest tube, now with 400 to 500 mL over the past 24 hours. Patient will need chest tube until output is less than  mL/day. Will likely need repeat CT chest once output declines to above rate     Plan:  6/24 switched to Augmentin, day 7 of 14 of total antibiotics  Monitor temp  Tylenol 650 mg prn  Tessalon Perles 200 mg TID prn  Mucinex 1200 mg BID  Incentive spirometer, Flutter valve  Continue with pulmonary hygiene, out of  bed to chair as tolerated, respiratory protocol  Follow-up with pulmonology office on 7/19/2024    Atrial fibrillation (HCC)  Assessment & Plan  Home regimen: Amiodorone 200 mg p.o. daily, labetolol 100 mg BID, warfarin 2.5 mg  Warfarin held prior to IR procedure and later continued.    6/24 INR 3.14    Plan:  Continue amiodarone, labetolol  6/24 hold warfarin  PT/ INR monitoring    Abnormal TSH  Assessment & Plan  TSH 4.559, free T4 within normal limits    Elevated serum creatinine  Assessment & Plan  Baseline Cr 0.9-1.1 prior to May 2024  Elevated to 1.66 as of May 2024 and has been stable since then  Currently at baseline    Prediabetes  Assessment & Plan  Lab Results   Component Value Date    HGBA1C 6.3 (H) 06/18/2024        Plan:  Monitor blood sugars  Consider insulin sliding scale if glucose elevated  Diabetic diet    Chronic combined systolic and diastolic congestive heart failure (HCC)  Assessment & Plan  Wt Readings from Last 3 Encounters:   06/13/24 99.3 kg (219 lb)   06/06/24 100 kg (221 lb)   05/20/24 101 kg (221 lb 12.8 oz)     Home regimen: torsemide 40 mg PO daily, labetolol 100 mg  Diuretics were initially held in the setting of elevated Cr. Creatinine improved.  Diuretics resumed    Plan:  Continue home regimen. Give Torsemide 40 mg in the mornings  Monitor BMP  Monitor I/O    Anemia  Assessment & Plan  On review of labs, anemia is new since April 2024.   Last labs in August 2023 were normal.   There has been a slow trend downward.   Hgb 8.4 on admission    Plan:  Monitor CBC  Transfuse if Hgb <7    Hyponatremia  Assessment & Plan  Patient currently stable.     Chronic obstructive pulmonary disease (HCC)  Assessment & Plan  Not in exacerbations, albuterol as needed.     Chronic pain syndrome  Assessment & Plan  Home regimen: gabapentin 900 mg HS  PT/OT recommended level III.    Plan:  Reduce dose of gabapentin if further elevation of Creatinine    CAD (coronary artery disease)  Assessment &  Plan  Home regimen: ASA 81 mg, lipitor 40 mg, labetolol 100 mg    Plan:  Continue home regimen    * Pleural effusion with shortness of breath  Assessment & Plan  3/24 pneumonia: required a thoracentesis on 4/26 yielding 600ml, completed 7 days of ceftriaxone.   CT chest wo contrast (06/10/2024)- persistent loculated moderate size left pleural effusion w/ partial collapse of LLL. Stable AAA measuring 4.7 cm (since May 2024).   IR placed left sided chest tube and drained 300 mL of mildly frothy, slightly viscous and serosanguinous fluid and samples were sent for ordered laboratory evaluation.   Pleural fluid culture and gram stain (06/19/2024) show 3+ polys and 2+ gram + cocci in chains, and 2+ growth of streptococcus intermedius, susceptible to ceftriaxone. Extra turbid Fluid with PH 5.6, LDH >89654. Glucose <10. ,600, TP <3.0.  Patient is now status post 6 instillations of tPA/dornase. Will likely hold off on further installations and monitor chest tube output    CT chest wo contrast (06/24/2024) showing improvement of pleural space this morning   Still having adequate output from chest tube, now with 400 to 500 mL over the past 24 hours  Patient will need chest tube until output is less than 50 mL/day   Will likely need repeat CT chest once output declines to above rate   Follow-up with pulmonology office on 7/19/2024  Continue with pulmonary hygiene, out of bed to chair as tolerated, respiratory protocol               VTE Pharmacologic Prophylaxis: VTE Score: 5 High Risk (Score >/= 5) - Pharmacological DVT Prophylaxis Ordered: warfarin (Coumadin). Sequential Compression Devices Ordered.    Mobility:   Basic Mobility Inpatient Raw Score: 22  JH-HLM Goal: 7: Walk 25 feet or more  JH-HLM Achieved: 6: Walk 10 steps or more  JH-HLM Goal NOT achieved. Continue with multidisciplinary rounding and encourage appropriate mobility to improve upon JH-HLM goals.    Patient Centered Rounds: I performed bedside rounds  with nursing staff today.   Discussions with Specialists or Other Care Team Provider: pulmonology    Education and Discussions with Family / Patient: Attempted to update  (wife) via phone. Left voicemail.     Total Time Spent on Date of Encounter in care of patient: 30 mins. This time was spent on one or more of the following: performing physical exam; counseling and coordination of care; obtaining or reviewing history; documenting in the medical record; reviewing/ordering tests, medications or procedures; communicating with other healthcare professionals and discussing with patient's family/caregivers.    Current Length of Stay: 6 day(s)  Current Patient Status: Inpatient   Discharge Plan: Anticipate discharge in 24-48 hrs to home.    Code Status: Level 3 - DNAR and DNI    Subjective:   Patient was seen and examined at bedside this morning. Patient was comfortably sitting in bed not in any acute distress. Patient denies having any pain or shortness of breath. He reported he did not sleep much for having to use the bathroom very often.     Objective:     Vitals:   Temp (24hrs), Av °F (36.7 °C), Min:97.3 °F (36.3 °C), Max:98.4 °F (36.9 °C)    Temp:  [97.3 °F (36.3 °C)-98.4 °F (36.9 °C)] 97.9 °F (36.6 °C)  HR:  [71-83] 80  Resp:  [12-18] 12  BP: (103-109)/(56-65) 103/56  SpO2:  [88 %-95 %] 93 %  Body mass index is 30.54 kg/m².     Input and Output Summary (last 24 hours):     Intake/Output Summary (Last 24 hours) at 2024 1506  Last data filed at 2024 1300  Gross per 24 hour   Intake 900 ml   Output 1530 ml   Net -630 ml       Physical Exam:   Physical Exam  Constitutional:       General: He is not in acute distress.     Appearance: Normal appearance. He is not ill-appearing.   HENT:      Head: Normocephalic and atraumatic.      Right Ear: External ear normal.      Left Ear: External ear normal.      Nose: Nose normal.      Mouth/Throat:      Mouth: Mucous membranes are moist.      Pharynx:  Oropharynx is clear.   Eyes:      Extraocular Movements: Extraocular movements intact.      Conjunctiva/sclera: Conjunctivae normal.   Cardiovascular:      Rate and Rhythm: Normal rate and regular rhythm.      Pulses: Normal pulses.      Heart sounds: Normal heart sounds. No murmur heard.     No friction rub. No gallop.   Pulmonary:      Effort: Pulmonary effort is normal. No respiratory distress.      Breath sounds: Normal breath sounds. No wheezing or rales.      Comments: Decreased breath sounds, left greater than right. Chest tube in place.  Abdominal:      General: Abdomen is flat. Bowel sounds are normal. There is no distension.      Palpations: Abdomen is soft.      Tenderness: There is no abdominal tenderness.   Musculoskeletal:         General: No swelling. Normal range of motion.      Cervical back: Normal range of motion and neck supple. No rigidity.      Right lower leg: Edema present.      Left lower leg: Edema present.      Comments: Chronic venous stasis changes bilateral lower extremities.    Skin:     General: Skin is warm and dry.   Neurological:      General: No focal deficit present.      Mental Status: He is alert and oriented to person, place, and time.   Psychiatric:         Mood and Affect: Mood normal.          Additional Data:     Labs:  Results from last 7 days   Lab Units 06/24/24  0646 06/23/24  0509   WBC Thousand/uL 8.96 8.63   HEMOGLOBIN g/dL 9.3* 8.2*   HEMATOCRIT % 30.1* 26.9*   PLATELETS Thousands/uL 300 279   SEGS PCT %  --  79*   LYMPHO PCT %  --  9*   MONO PCT %  --  9   EOS PCT %  --  1     Results from last 7 days   Lab Units 06/24/24  0818 06/20/24  0451 06/19/24  0512   SODIUM mmol/L 134*   < > 134*   POTASSIUM mmol/L 3.8   < > 4.2   CHLORIDE mmol/L 97   < > 98   CO2 mmol/L 36*   < > 31   BUN mg/dL 22   < > 32*   CREATININE mg/dL 1.05   < > 1.21   ANION GAP mmol/L 1*   < > 5   CALCIUM mg/dL 8.5   < > 7.9*   ALBUMIN g/dL  --   --  2.3*   TOTAL BILIRUBIN mg/dL  --   --  0.87    ALK PHOS U/L  --   --  130*   ALT U/L  --   --  44   AST U/L  --   --  40*   GLUCOSE RANDOM mg/dL 138   < > 109    < > = values in this interval not displayed.     Results from last 7 days   Lab Units 06/24/24  0450   INR  3.14*         Results from last 7 days   Lab Units 06/18/24  0834   HEMOGLOBIN A1C % 6.3*     Results from last 7 days   Lab Units 06/19/24  0512 06/18/24  1448   LACTIC ACID mmol/L  --  1.2   PROCALCITONIN ng/ml 0.30* 0.35*       Lines/Drains:  Invasive Devices       Peripheral Intravenous Line  Duration             Peripheral IV 06/22/24 Distal;Dorsal (posterior);Left Forearm 1 day              Drain  Duration             Chest Tube Left 12 Fr. 5 days                          Imaging: Reviewed radiology reports from this admission including: chest xray and chest CT scan    Recent Cultures (last 7 days):   Results from last 7 days   Lab Units 06/20/24  0912 06/19/24  1336 06/18/24  1457 06/18/24  1448   BLOOD CULTURE   --   --  No Growth After 5 Days. No Growth After 5 Days.   GRAM STAIN RESULT   --  3+ Polys*  2+ Gram positive cocci in chains*  --   --    BODY FLUID CULTURE, STERILE   --  2+ Growth of Streptococcus intermedius*  --   --    LEGIONELLA URINARY ANTIGEN  Negative  --   --   --        Last 24 Hours Medication List:   Current Facility-Administered Medications   Medication Dose Route Frequency Provider Last Rate    acetaminophen  650 mg Oral Q6H PRN Delisa Acevedo MD      albuterol  2 puff Inhalation Q6H PRN Delisa Acevedo MD      amiodarone  200 mg Oral Daily Delisa Acevedo MD      amoxicillin-clavulanate  1 tablet Oral Q12H [facility] Pollo Hall, DO      aspirin  81 mg Oral Daily Delisa Acevedo MD      atorvastatin  40 mg Oral Daily Delisa Acevedo MD      benzonatate  200 mg Oral TID PRN Oscar Jack,       cholecalciferol  2,000 Units Oral Daily Delisa Acevedo MD      docusate sodium  100 mg Oral BID Delisa Acevedo MD      gabapentin  900 mg Oral HS Delisa Acevedo MD      guaiFENesin  1,200 mg Oral  Q12H [facility] Delisa Acevedo MD      labetalol  100 mg Oral BID Delisa Acevedo MD      multivitamin stress formula  1 tablet Oral Daily Delisa Acevedo MD      polyethylene glycol  17 g Oral Daily Delisa Acevedo MD      potassium chloride  20 mEq Oral Daily Delisa Acevedo MD      psyllium  1 packet Oral Daily Delisa Acevedo MD      senna  2 tablet Oral HS Delisa Acevedo MD      senna  1 tablet Oral HS PRN Delisa Acevedo MD      tamsulosin  0.4 mg Oral Daily With Dinner Delisa Acevedo MD      torsemide  40 mg Oral Daily Rc Jackson MD          Today, Patient Was Seen By: Delisa Acevedo MD    **Please Note: This note may have been constructed using a voice recognition system.**

## 2024-06-24 NOTE — ASSESSMENT & PLAN NOTE
Patient presents with shortness of breath for 2 days. Past smoker, 2 packs/day, quit over 10 years ago. Sent in by pulmonologist following CT results. Admitted to the hospital the beginning of May due to pneumonia at which point he required a thoracentesis on 4/26 yielding 600ml, completed 7 days of ceftriaxone.  CT chest wo contrast (06/10/2024)- persistent loculated moderate size left pleural effusion w/ partial collapse of LLL. Stable AAA measuring 4.7 cm (since May 2024).   Patient wears a baseline of 2 L oxygen on exertion and during sleep since last hospital admission.  Chest tube placed 6/20 left chest tube   Pleural fluid culture and gram stain (06/19/2024) show 3+ polys and 2+ gram + cocci in chains, and 2+ growth of streptococcus intermedius. Extra turbid Fluid with PH 5.6, LDH >12796. Glucose <10. ,600, TP <3.0  Legionella and Strep Pneumonia antigen negative  Fluid cultures cultures strep intermedius susceptible to ceftriaxone.    Per pulmonary patient is now status post 6 instillations of tPA/dornase. Will likely hold off on further installations and monitor chest tube output    CT chest wo contrast (06/24/2024) showing improvement of pleural space this morning. Still having adequate output from chest tube, now with 400 to 500 mL over the past 24 hours. Patient will need chest tube until output is less than  mL/day. Will likely need repeat CT chest once output declines to above rate     Plan:  6/24 switched to Augmentin, day 7 of 14 of total antibiotics  Monitor temp  Tylenol 650 mg prn  Tessalon Perles 200 mg TID prn  Mucinex 1200 mg BID  Incentive spirometer, Flutter valve  Continue with pulmonary hygiene, out of bed to chair as tolerated, respiratory protocol  Follow-up with pulmonology office on 7/19/2024

## 2024-06-24 NOTE — ASSESSMENT & PLAN NOTE
Wt Readings from Last 3 Encounters:   06/13/24 99.3 kg (219 lb)   06/06/24 100 kg (221 lb)   05/20/24 101 kg (221 lb 12.8 oz)     Home regimen: torsemide 40 mg PO daily, labetolol 100 mg  Diuretics were initially held in the setting of elevated Cr. Creatinine improved.  Diuretics resumed    Plan:  Continue home regimen. Give Torsemide 40 mg in the mornings  Monitor BMP  Monitor I/O

## 2024-06-24 NOTE — PROGRESS NOTES
PULMONOLOGY PROGRESS NOTE     Name: Oscar Espinosa   Age & Sex: 83 y.o. male   MRN: [id number]  Unit/Bed#: W -01   Encounter: [id number]    PATIENT INFORMATION     Name: Oscar Espinosa   Age & Sex: 83 y.o. male   MRN: [id number]  Hospital Stay Days: 6    ASSESSMENT/PLAN     Assessment:     Patient is an 83-year-old male with a past medical history of YEVGENIY, COPD, pulmonary hypertension, and recent hospitalization for pneumonia and parapneumonic effusion who initially presented on 6/18/2024 with chills/shortness of breath.  Patient was found to have a worsening loculated pleural effusion, now status post chest tube.  Fluid consistent with empyema.  Pulmonology was consulted for further recommendations.    Acute on chronic hypoxemic respiratory failure  Left strep intermedius empyema  A-fib on warfarin  Chronic systolic/diastolic heart failure  Reported COPD without acute exacerbation  YEVGENIY  Pulmonary hypertension    Plan:  Continue to titrate O2 to maintain SpO2 greater than 88%, or patient comfort  Patient is now status post 6 instillations of tPA/dornase  Still having adequate output from chest tube, now with 400 to 500 mL over the past 24 hours  Will likely hold off on further installations and monitor chest tube output  CT showing improvement of pleural space this morning  Patient will need chest tube until output is less than 100-50 mL/day  Continue chest tube to remain on suction at -20 cm of water  Continue with pulmonary hygiene, out of bed to chair as tolerated, respiratory protocol  Would cover anerobes with Unasyn/Augmentin, complete a 2 week course (8 days left)  Follow-up with our office on 7/19/2024  Rest of care per primary      SUBJECTIVE     Patient seen and examined. No acute events overnight.  Patient's breathing continues to improve, still with some mild discomfort at his chest tube site.  Adequate output from chest tube.  All other review systems negative.    OBJECTIVE     Vitals:     24 1712 24 2219 24 0743 24 0812   BP: 105/65 106/64 109/63    BP Location:       Pulse: 83 71 80    Resp:  16 18    Temp:  98.3 °F (36.8 °C) 98.4 °F (36.9 °C)    TempSrc:       SpO2: 91% 95% 92% 93%   Height:          Temperature:   Temp (24hrs), Av °F (36.7 °C), Min:97.3 °F (36.3 °C), Max:98.4 °F (36.9 °C)    Temperature: 98.4 °F (36.9 °C)  Intake & Output:  I/O          0701   0700  0701   07 07 0700    P.O. 840 1560 180    IV Piggyback       Total Intake 840 1560 180    Urine 1930 1650     Stool       Chest Tube 540 440     Total Output 2470 2090     Net -1630 -530 +180           Unmeasured Urine Occurrence  1 x           Weights:   IBW (Ideal Body Weight): 75.3 kg    Body mass index is 30.54 kg/m².  Weight (last 2 days)       None          Physical Exam  Vitals reviewed.   Constitutional:       General: He is not in acute distress.  HENT:      Head: Normocephalic and atraumatic.      Right Ear: External ear normal.      Left Ear: External ear normal.      Nose: Nose normal.      Mouth/Throat:      Mouth: Mucous membranes are moist.      Pharynx: Oropharynx is clear.   Eyes:      Extraocular Movements: Extraocular movements intact.      Pupils: Pupils are equal, round, and reactive to light.   Cardiovascular:      Rate and Rhythm: Normal rate and regular rhythm.      Pulses: Normal pulses.      Heart sounds: Normal heart sounds.   Pulmonary:      Effort: Pulmonary effort is normal.      Comments: Decreased breath sounds, left greater than right  Abdominal:      General: Abdomen is flat. Bowel sounds are normal. There is no distension.   Musculoskeletal:      Right lower leg: No edema.      Left lower leg: No edema.   Skin:     Capillary Refill: Capillary refill takes less than 2 seconds.   Neurological:      General: No focal deficit present.      Mental Status: He is alert and oriented to person, place, and time.   Psychiatric:         Mood and  Affect: Mood normal.         Behavior: Behavior normal.           LABORATORY DATA     Labs: I have personally reviewed pertinent reports.  Results from last 7 days   Lab Units 06/24/24  0646 06/23/24  0509 06/22/24  0559 06/21/24  0527   WBC Thousand/uL 8.96 8.63 8.55 10.62*   HEMOGLOBIN g/dL 9.3* 8.2* 8.6* 8.5*   HEMATOCRIT % 30.1* 26.9* 27.9* 27.6*   PLATELETS Thousands/uL 300 279 312 284   SEGS PCT %  --  79* 81* 81*   MONO PCT %  --  9 8 8   EOS PCT %  --  1 1 1      Results from last 7 days   Lab Units 06/24/24  0818 06/23/24  0509 06/22/24  0559 06/20/24  0451 06/19/24  0512 06/18/24  1448   POTASSIUM mmol/L 3.8 3.9 3.8   < > 4.2 4.5   CHLORIDE mmol/L 97 98 96   < > 98 95*   CO2 mmol/L 36* 31 32   < > 31 33*   BUN mg/dL 22 26* 26*   < > 32* 37*   CREATININE mg/dL 1.05 1.05 0.99   < > 1.21 1.50*   CALCIUM mg/dL 8.5 8.2* 8.4   < > 7.9* 8.1*   ALK PHOS U/L  --   --   --   --  130* 147*   ALT U/L  --   --   --   --  44 53*   AST U/L  --   --   --   --  40* 58*    < > = values in this interval not displayed.     Results from last 7 days   Lab Units 06/19/24  0512   MAGNESIUM mg/dL 1.9          Results from last 7 days   Lab Units 06/24/24  0450 06/22/24  0810 06/21/24  0527   INR  3.14* 2.57* 2.58*     Results from last 7 days   Lab Units 06/18/24  1448   LACTIC ACID mmol/L 1.2       Micro:   Results from last 7 days   Lab Units 06/20/24  0912 06/19/24  1336 06/18/24  1457 06/18/24  1448   BLOOD CULTURE   --   --  No Growth After 5 Days. No Growth After 5 Days.   GRAM STAIN RESULT   --  3+ Polys*  2+ Gram positive cocci in chains*  --   --    BODY FLUID CULTURE, STERILE   --  2+ Growth of Streptococcus intermedius*  --   --    LEGIONELLA URINARY ANTIGEN  Negative  --   --   --    STREP PNEUMONIAE ANTIGEN, URINE  Negative  --   --   --          IMAGING & DIAGNOSTIC TESTING     Radiology Results: I have personally reviewed pertinent reports.  XR chest portable    Result Date: 6/22/2024  Impression: Stable small  loculated left basilar hydropneumothorax/empyema and left base atelectasis. Workstation performed: [id number]     XR chest portable    Result Date: 6/21/2024  Impression: Persistent dense  left  lung base opacity which may represent a combination of effusion and parenchymal opacity. Workstation performed: [id number]     XR chest portable    Result Date: 6/20/2024  Impression: Status post placement of left pleural catheter with partially diminished size of the left effusion. No pneumothorax Workstation performed: [id number]     IR chest tube placement    Result Date: 6/19/2024  Impression: Impression: Successful placement of a 12 [place] all-purpose drainage catheter into the left pleural space under ultrasound guidance, yielding thick purulent serosanguineous pleural fluid. If incomplete drainage occurs due to loculations, consideration of administration of tPA/dornase into the left chest tube should be made. Workstation performed: [id number]     XR chest 1 view portable    Result Date: 6/19/2024  Impression: Left pleural effusion is similar to prior study. Left lower lobe opacity can represent atelectasis given the appearance on the prior CT. Pneumonia is to be determined on clinical grounds. Workstation performed: [id number]     Other Diagnostic Testing: I have personally reviewed pertinent reports.    ACTIVE MEDICATIONS     Current Facility-Administered Medications   Medication Dose Route Frequency    acetaminophen (TYLENOL) tablet 650 mg  650 mg Oral Q6H PRN    albuterol (PROVENTIL HFA,VENTOLIN HFA) inhaler 2 puff  2 puff Inhalation Q6H PRN    amiodarone tablet 200 mg  200 mg Oral Daily    aspirin (ECOTRIN LOW STRENGTH) EC tablet 81 mg  81 mg Oral Daily    atorvastatin (LIPITOR) tablet 40 mg  40 mg Oral Daily    benzonatate (TESSALON PERLES) capsule 200 mg  200 mg Oral TID PRN    ceftriaxone (ROCEPHIN) 1 g/50 mL in dextrose IVPB  1,000 mg Intravenous Q24H    Cholecalciferol (VITAMIN D3) tablet 2,000 Units   "2,000 Units Oral Daily    docusate sodium (COLACE) capsule 100 mg  100 mg Oral BID    gabapentin (NEURONTIN) capsule 900 mg  900 mg Oral HS    guaiFENesin (MUCINEX) 12 hr tablet 1,200 mg  1,200 mg Oral Q12H GONZALO    labetalol (NORMODYNE) tablet 100 mg  100 mg Oral BID    multivitamin stress formula tablet 1 tablet  1 tablet Oral Daily    polyethylene glycol (MIRALAX) packet 17 g  17 g Oral Daily    potassium chloride (Klor-Con M20) CR tablet 20 mEq  20 mEq Oral Daily    psyllium (METAMUCIL) 1 packet  1 packet Oral Daily    senna (SENOKOT) tablet 17.2 mg  2 tablet Oral HS    senna (SENOKOT) tablet 8.6 mg  1 tablet Oral HS PRN    tamsulosin (FLOMAX) capsule 0.4 mg  0.4 mg Oral Daily With Dinner    torsemide (DEMADEX) tablet 40 mg  40 mg Oral Daily    warfarin (COUMADIN) tablet 2.5 mg  2.5 mg Oral Daily (warfarin)         Disclaimer: Portions of the record may have been created with voice recognition software. Occasional wrong word or \"sound a like\" substitutions may have occurred due to the inherent limitations of voice recognition software. Careful consideration should be taken to recognize, using context, where substitutions have occurred.    Pollo Hall DO, MS  Pulmonary and Critical Care Fellow, PGY-IV  [facility]    "

## 2024-06-25 LAB
ANION GAP SERPL CALCULATED.3IONS-SCNC: 5 MMOL/L (ref 4–13)
BASOPHILS # BLD AUTO: 0.08 THOUSANDS/ÂΜL (ref 0–0.1)
BASOPHILS NFR BLD AUTO: 1 % (ref 0–1)
BUN SERPL-MCNC: 20 MG/DL (ref 5–25)
CALCIUM SERPL-MCNC: 8.1 MG/DL (ref 8.4–10.2)
CHLORIDE SERPL-SCNC: 96 MMOL/L (ref 96–108)
CO2 SERPL-SCNC: 35 MMOL/L (ref 21–32)
CREAT SERPL-MCNC: 1 MG/DL (ref 0.6–1.3)
EOSINOPHIL # BLD AUTO: 0.2 THOUSAND/ÂΜL (ref 0–0.61)
EOSINOPHIL NFR BLD AUTO: 3 % (ref 0–6)
ERYTHROCYTE [DISTWIDTH] IN BLOOD BY AUTOMATED COUNT: 17.7 % (ref 11.6–15.1)
GFR SERPL CREATININE-BSD FRML MDRD: 69 ML/MIN/1.73SQ M
GLUCOSE SERPL-MCNC: 87 MG/DL (ref 65–140)
HCT VFR BLD AUTO: 26.1 % (ref 36.5–49.3)
HGB BLD-MCNC: 8.1 G/DL (ref 12–17)
IMM GRANULOCYTES # BLD AUTO: 0.09 THOUSAND/UL (ref 0–0.2)
IMM GRANULOCYTES NFR BLD AUTO: 1 % (ref 0–2)
INR PPP: 3.26 (ref 0.84–1.19)
LYMPHOCYTES # BLD AUTO: 1.03 THOUSANDS/ÂΜL (ref 0.6–4.47)
LYMPHOCYTES NFR BLD AUTO: 13 % (ref 14–44)
MCH RBC QN AUTO: 28.6 PG (ref 26.8–34.3)
MCHC RBC AUTO-ENTMCNC: 31 G/DL (ref 31.4–37.4)
MCV RBC AUTO: 92 FL (ref 82–98)
MONOCYTES # BLD AUTO: 0.8 THOUSAND/ÂΜL (ref 0.17–1.22)
MONOCYTES NFR BLD AUTO: 10 % (ref 4–12)
NEUTROPHILS # BLD AUTO: 5.46 THOUSANDS/ÂΜL (ref 1.85–7.62)
NEUTS SEG NFR BLD AUTO: 72 % (ref 43–75)
NRBC BLD AUTO-RTO: 0 /100 WBCS
PLATELET # BLD AUTO: 290 THOUSANDS/UL (ref 149–390)
PMV BLD AUTO: 9.7 FL (ref 8.9–12.7)
POTASSIUM SERPL-SCNC: 3.8 MMOL/L (ref 3.5–5.3)
PROTHROMBIN TIME: 34.3 SECONDS (ref 11.6–14.5)
RBC # BLD AUTO: 2.83 MILLION/UL (ref 3.88–5.62)
SODIUM SERPL-SCNC: 136 MMOL/L (ref 135–147)
WBC # BLD AUTO: 7.66 THOUSAND/UL (ref 4.31–10.16)

## 2024-06-25 PROCEDURE — 80048 BASIC METABOLIC PNL TOTAL CA: CPT

## 2024-06-25 PROCEDURE — 99232 SBSQ HOSP IP/OBS MODERATE 35: CPT | Performed by: INTERNAL MEDICINE

## 2024-06-25 PROCEDURE — 85610 PROTHROMBIN TIME: CPT | Performed by: INTERNAL MEDICINE

## 2024-06-25 PROCEDURE — 85025 COMPLETE CBC W/AUTO DIFF WBC: CPT

## 2024-06-25 RX ADMIN — GUAIFENESIN 1200 MG: 600 TABLET ORAL at 22:03

## 2024-06-25 RX ADMIN — ATORVASTATIN CALCIUM 40 MG: 40 TABLET, FILM COATED ORAL at 09:27

## 2024-06-25 RX ADMIN — LABETALOL HYDROCHLORIDE 100 MG: 100 TABLET, FILM COATED ORAL at 17:14

## 2024-06-25 RX ADMIN — DOCUSATE SODIUM 100 MG: 100 CAPSULE, LIQUID FILLED ORAL at 17:14

## 2024-06-25 RX ADMIN — GUAIFENESIN 1200 MG: 600 TABLET ORAL at 09:27

## 2024-06-25 RX ADMIN — Medication 2000 UNITS: at 09:27

## 2024-06-25 RX ADMIN — POLYETHYLENE GLYCOL 3350 17 G: 17 POWDER, FOR SOLUTION ORAL at 09:28

## 2024-06-25 RX ADMIN — TAMSULOSIN HYDROCHLORIDE 0.4 MG: 0.4 CAPSULE ORAL at 15:58

## 2024-06-25 RX ADMIN — AMIODARONE HYDROCHLORIDE 200 MG: 200 TABLET ORAL at 09:27

## 2024-06-25 RX ADMIN — B-COMPLEX W/ C & FOLIC ACID TAB 1 TABLET: TAB at 09:27

## 2024-06-25 RX ADMIN — DOCUSATE SODIUM 100 MG: 100 CAPSULE, LIQUID FILLED ORAL at 09:27

## 2024-06-25 RX ADMIN — AMOXICILLIN AND CLAVULANATE POTASSIUM 1 TABLET: 875; 125 TABLET, FILM COATED ORAL at 09:28

## 2024-06-25 RX ADMIN — SENNOSIDES 17.2 MG: 8.6 TABLET, FILM COATED ORAL at 22:02

## 2024-06-25 RX ADMIN — ASPIRIN 81 MG: 81 TABLET, COATED ORAL at 09:27

## 2024-06-25 RX ADMIN — Medication 1 PACKET: at 09:28

## 2024-06-25 RX ADMIN — AMOXICILLIN AND CLAVULANATE POTASSIUM 1 TABLET: 875; 125 TABLET, FILM COATED ORAL at 22:03

## 2024-06-25 RX ADMIN — GABAPENTIN 900 MG: 300 CAPSULE ORAL at 22:02

## 2024-06-25 RX ADMIN — POTASSIUM CHLORIDE 20 MEQ: 1500 TABLET, EXTENDED RELEASE ORAL at 09:27

## 2024-06-25 NOTE — ASSESSMENT & PLAN NOTE
3/24 pneumonia: required a thoracentesis on 4/26 yielding 600ml, completed 7 days of ceftriaxone.   CT chest wo contrast (06/10/2024)- persistent loculated moderate size left pleural effusion w/ partial collapse of LLL. Stable AAA measuring 4.7 cm (since May 2024).   IR placed left sided chest tube and drained 300 mL of mildly frothy, slightly viscous and serosanguinous fluid and samples were sent for ordered laboratory evaluation.   Pleural fluid culture and gram stain (06/19/2024) show 3+ polys and 2+ gram + cocci in chains, and 2+ growth of streptococcus intermedius, susceptible to ceftriaxone. Extra turbid Fluid with PH 5.6, LDH >39349. Glucose <10. ,600, TP <3.0.  Patient is now status post 6 instillations of tPA/dornase. Will likely hold off on further installations and monitor chest tube output    CT chest wo contrast (06/24/2024) showing improvement of pleural space this morning     Plan  Chest tube flushed with no output, pulmonology plans for removal  Monitor Overnight for chest tube removal  Continue Augmentin for total of 14 days  Anticipate discharge tomorrow

## 2024-06-25 NOTE — PROGRESS NOTES
"  Assessment:    Patient is an 83-year-old male with a past medical history of YEVGENIY, COPD, pulmonary hypertension, and recent hospitalization for pneumonia and parapneumonic effusion who initially presented on 6/18/2024 with chills/shortness of breath.  Patient was found to have a worsening loculated pleural effusion, now status post chest tube.  Fluid consistent with empyema.  Pulmonology was consulted for further recommendations.     Acute on chronic hypoxemic respiratory failure  Left strep intermedius empyema  A-fib on warfarin  Chronic systolic/diastolic heart failure  Reported COPD without acute exacerbation  YEVGENIY  Pulmonary hypertension     Plan:  Continue to titrate O2 to maintain SpO2 greater than 88%, or patient comfort  Minimal output from from chest tub over the past 24 hours  Will remove chest tube today  Can be discharged from a pulmonary standpoint.   Continue  with Augmentin, complete a 2 week course (7 days left)  Follow-up with our office on 7/19/2024  Rest of care per primary      Subjective/Objective       Subjective: patient seen and examine at the bedside. No complaints offered.    Objective:     Vitals: Blood pressure (!) 94/46, pulse 72, temperature 97.6 °F (36.4 °C), resp. rate 16, height 5' 11\" (1.803 m), SpO2 95%.,Body mass index is 30.54 kg/m².      Intake/Output Summary (Last 24 hours) at 6/25/2024 1141  Last data filed at 6/25/2024 0925  Gross per 24 hour   Intake 1140 ml   Output 1200 ml   Net -60 ml       Invasive Devices       Peripheral Intravenous Line  Duration             Peripheral IV 06/22/24 Distal;Dorsal (posterior);Left Forearm 2 days              Drain  Duration             Chest Tube Left 12 Fr. 5 days                    Physical Exam: BP (!) 94/46   Pulse 72   Temp 97.6 °F (36.4 °C)   Resp 16   Ht 5' 11\" (1.803 m)   SpO2 95%   BMI 30.54 kg/m²     General Appearance:    Alert, cooperative, no distress, appears stated age   Head:    Normocephalic, without obvious " abnormality, atraumatic   Eyes:    PERRL, conjunctiva/corneas clear, EOM's intact, fundi     benign, both eyes        Ears:    Normal TM's and external ear canals, both ears   Nose:   Nares normal, septum midline, mucosa normal, no drainage    or sinus tenderness   Throat:   Lips, mucosa, and tongue normal; teeth and gums normal   Neck:   Supple, symmetrical, trachea midline, no adenopathy;        thyroid:  No enlargement/tenderness/nodules; no carotid    bruit or JVD   Back:     Symmetric, no curvature, ROM normal, no CVA tenderness   Lungs:     Clear to auscultation bilaterally, respirations unlabored   Chest wall:    No tenderness or deformity   Heart:    Regular rate and rhythm, S1 and S2 normal, no murmur, rub   or gallop   Abdomen:     Soft, non-tender, bowel sounds active all four quadrants,     no masses, no organomegaly   Genitalia:    Normal male without lesion, discharge or tenderness   Rectal:    Normal tone, normal prostate, no masses or tenderness;    guaiac negative stool   Extremities:   Extremities normal, atraumatic, no cyanosis or edema   Pulses:   2+ and symmetric all extremities   Skin:   Skin color, texture, turgor normal, no rashes or lesions   Lymph nodes:   Cervical, supraclavicular, and axillary nodes normal   Neurologic:   CNII-XII intact. Normal strength, sensation and reflexes       throughout        Lab, Imaging and other studies: I have personally reviewed pertinent reports.

## 2024-06-25 NOTE — PLAN OF CARE
Problem: RESPIRATORY - ADULT  Goal: Achieves optimal ventilation and oxygenation  Description: INTERVENTIONS:  - Assess for changes in respiratory status  - Assess for changes in mentation and behavior  - Position to facilitate oxygenation and minimize respiratory effort  - Oxygen administered by appropriate delivery if ordered  - Initiate smoking cessation education as indicated  - Encourage broncho-pulmonary hygiene including cough, deep breathe, Incentive Spirometry  - Assess the need for suctioning and aspirate as needed  - Assess and instruct to report SOB or any respiratory difficulty  - Respiratory Therapy support as indicated  Outcome: Progressing     Problem: GENITOURINARY - ADULT  Goal: Maintains or returns to baseline urinary function  Description: INTERVENTIONS:  - Assess urinary function  - Encourage oral fluids to ensure adequate hydration if ordered  - Administer IV fluids as ordered to ensure adequate hydration  - Administer ordered medications as needed  - Offer frequent toileting  - Follow urinary retention protocol if ordered  Outcome: Progressing  Goal: Absence of urinary retention  Description: INTERVENTIONS:  - Assess patient’s ability to void and empty bladder  - Monitor I/O  - Bladder scan as needed  - Discuss with physician/AP medications to alleviate retention as needed  - Discuss catheterization for long term situations as appropriate  Outcome: Progressing     Problem: METABOLIC, FLUID AND ELECTROLYTES - ADULT  Goal: Electrolytes maintained within normal limits  Description: INTERVENTIONS:  - Monitor labs and assess patient for signs and symptoms of electrolyte imbalances  - Administer electrolyte replacement as ordered  - Monitor response to electrolyte replacements, including repeat lab results as appropriate  - Instruct patient on fluid and nutrition as appropriate  Outcome: Progressing  Goal: Fluid balance maintained  Description: INTERVENTIONS:  - Monitor labs   - Monitor I/O and  WT  - Instruct patient on fluid and nutrition as appropriate  - Assess for signs & symptoms of volume excess or deficit  Outcome: Progressing  Goal: Glucose maintained within target range  Description: INTERVENTIONS:  - Monitor Blood Glucose as ordered  - Assess for signs and symptoms of hyperglycemia and hypoglycemia  - Administer ordered medications to maintain glucose within target range  - Assess nutritional intake and initiate nutrition service referral as needed  Outcome: Progressing     Problem: PAIN - ADULT  Goal: Verbalizes/displays adequate comfort level or baseline comfort level  Description: Interventions:  - Encourage patient to monitor pain and request assistance  - Assess pain using appropriate pain scale  - Administer analgesics based on type and severity of pain and evaluate response  - Implement non-pharmacological measures as appropriate and evaluate response  - Consider cultural and social influences on pain and pain management  - Notify physician/advanced practitioner if interventions unsuccessful or patient reports new pain  Outcome: Progressing     Problem: INFECTION - ADULT  Goal: Absence or prevention of progression during hospitalization  Description: INTERVENTIONS:  - Assess and monitor for signs and symptoms of infection  - Monitor lab/diagnostic results  - Monitor all insertion sites, i.e. indwelling lines, tubes, and drains  - Monitor endotracheal if appropriate and nasal secretions for changes in amount and color  - [place] appropriate cooling/warming therapies per order  - Administer medications as ordered  - Instruct and encourage patient and family to use good hand hygiene technique  - Identify and instruct in appropriate isolation precautions for identified infection/condition  Outcome: Progressing  Goal: Absence of fever/infection during neutropenic period  Description: INTERVENTIONS:  - Monitor WBC    Outcome: Progressing     Problem: SAFETY ADULT  Goal: Patient will remain  free of falls  Description: INTERVENTIONS:  - Educate patient/family on patient safety including physical limitations  - Instruct patient to call for assistance with activity   - Consult OT/PT to assist with strengthening/mobility   - Keep Call bell within reach  - Keep bed low and locked with side rails adjusted as appropriate  - Keep care items and personal belongings within reach  - Initiate and maintain comfort rounds  - Apply yellow socks and bracelet for high fall risk patients  - Consider moving patient to room near nurses station  Outcome: Progressing  Goal: Maintain or return to baseline ADL function  Description: INTERVENTIONS:  -  Assess patient's ability to carry out ADLs; assess patient's baseline for ADL function and identify physical deficits which impact ability to perform ADLs (bathing, care of mouth/teeth, toileting, grooming, dressing, etc.)  - Assess/evaluate cause of self-care deficits   - Assess range of motion  - Assess patient's mobility; develop plan if impaired  - Assess patient's need for assistive devices and provide as appropriate  - Encourage maximum independence but intervene and supervise when necessary  - Involve family in performance of ADLs  - Assess for home care needs following discharge   - Consider OT consult to assist with ADL evaluation and planning for discharge  - Provide patient education as appropriate  Outcome: Progressing  Goal: Maintains/Returns to pre admission functional level  Description: INTERVENTIONS:  - Perform AM-PAC 6 Click Basic Mobility/ Daily Activity assessment daily.  - Set and communicate daily mobility goal to care team and patient/family/caregiver.   - Out of bed for toileting  - Record patient progress and toleration of activity level   Outcome: Progressing     Problem: DISCHARGE PLANNING  Goal: Discharge to home or other facility with appropriate resources  Description: INTERVENTIONS:  - Identify barriers to discharge w/patient and caregiver  -  Arrange for needed discharge resources and transportation as appropriate  - Identify discharge learning needs (meds, wound care, etc.)  - Arrange for interpretive services to assist at discharge as needed  - Refer to Case Management Department for coordinating discharge planning if the patient needs post-hospital services based on physician/advanced practitioner order or complex needs related to functional status, cognitive ability, or social support system  Outcome: Progressing     Problem: Knowledge Deficit  Goal: Patient/family/caregiver demonstrates understanding of disease process, treatment plan, medications, and discharge instructions  Description: Complete learning assessment and assess knowledge base.  Interventions:  - Provide teaching at level of understanding  - Provide teaching via preferred learning methods  Outcome: Progressing

## 2024-06-25 NOTE — ASSESSMENT & PLAN NOTE
Home regimen: Amiodorone 200 mg p.o. daily, labetolol 100 mg BID, warfarin 2.5 mg  Warfarin held prior to IR procedure and later continued.    Recent Labs     06/24/24  0450 06/25/24  0510   INR 3.14* 3.26*         Plan:  Continue amiodarone, labetolol  Continue to hold warfarin  PT/ INR monitoring

## 2024-06-25 NOTE — PLAN OF CARE
Problem: RESPIRATORY - ADULT  Goal: Achieves optimal ventilation and oxygenation  Description: INTERVENTIONS:  - Assess for changes in respiratory status  - Assess for changes in mentation and behavior  - Position to facilitate oxygenation and minimize respiratory effort  - Oxygen administered by appropriate delivery if ordered  - Initiate smoking cessation education as indicated  - Encourage broncho-pulmonary hygiene including cough, deep breathe, Incentive Spirometry  - Assess the need for suctioning and aspirate as needed  - Assess and instruct to report SOB or any respiratory difficulty  - Respiratory Therapy support as indicated  Outcome: Progressing     Problem: GENITOURINARY - ADULT  Goal: Maintains or returns to baseline urinary function  Description: INTERVENTIONS:  - Assess urinary function  - Encourage oral fluids to ensure adequate hydration if ordered  - Administer IV fluids as ordered to ensure adequate hydration  - Administer ordered medications as needed  - Offer frequent toileting  - Follow urinary retention protocol if ordered  Outcome: Progressing  Goal: Absence of urinary retention  Description: INTERVENTIONS:  - Assess patient's ability to void and empty bladder  - Monitor I/O  - Bladder scan as needed  - Discuss with physician/AP medications to alleviate retention as needed  - Discuss catheterization for long term situations as appropriate  Outcome: Progressing     Problem: METABOLIC, FLUID AND ELECTROLYTES - ADULT  Goal: Electrolytes maintained within normal limits  Description: INTERVENTIONS:  - Monitor labs and assess patient for signs and symptoms of electrolyte imbalances  - Administer electrolyte replacement as ordered  - Monitor response to electrolyte replacements, including repeat lab results as appropriate  - Instruct patient on fluid and nutrition as appropriate  Outcome: Progressing  Goal: Fluid balance maintained  Description: INTERVENTIONS:  - Monitor labs   - Monitor I/O and  WT  - Instruct patient on fluid and nutrition as appropriate  - Assess for signs & symptoms of volume excess or deficit  Outcome: Progressing  Goal: Glucose maintained within target range  Description: INTERVENTIONS:  - Monitor Blood Glucose as ordered  - Assess for signs and symptoms of hyperglycemia and hypoglycemia  - Administer ordered medications to maintain glucose within target range  - Assess nutritional intake and initiate nutrition service referral as needed  Outcome: Progressing     Problem: PAIN - ADULT  Goal: Verbalizes/displays adequate comfort level or baseline comfort level  Description: Interventions:  - Encourage patient to monitor pain and request assistance  - Assess pain using appropriate pain scale  - Administer analgesics based on type and severity of pain and evaluate response  - Implement non-pharmacological measures as appropriate and evaluate response  - Consider cultural and social influences on pain and pain management  - Notify physician/advanced practitioner if interventions unsuccessful or patient reports new pain  Outcome: Progressing     Problem: INFECTION - ADULT  Goal: Absence or prevention of progression during hospitalization  Description: INTERVENTIONS:  - Assess and monitor for signs and symptoms of infection  - Monitor lab/diagnostic results  - Monitor all insertion sites, i.e. indwelling lines, tubes, and drains  - Monitor endotracheal if appropriate and nasal secretions for changes in amount and color  - [place] appropriate cooling/warming therapies per order  - Administer medications as ordered  - Instruct and encourage patient and family to use good hand hygiene technique  - Identify and instruct in appropriate isolation precautions for identified infection/condition  Outcome: Progressing  Goal: Absence of fever/infection during neutropenic period  Description: INTERVENTIONS:  - Monitor WBC    Outcome: Progressing     Problem: SAFETY ADULT  Goal: Patient will remain  free of falls  Description: INTERVENTIONS:  - Educate patient/family on patient safety including physical limitations  - Instruct patient to call for assistance with activity   - Consult OT/PT to assist with strengthening/mobility   - Keep Call bell within reach  - Keep bed low and locked with side rails adjusted as appropriate  - Keep care items and personal belongings within reach  - Initiate and maintain comfort rounds  - Make Fall Risk Sign visible to staff  - Offer Toileting every  Hours, in advance of need  - Initiate/Maintain alarm  - Obtain necessary fall risk management equipment:  - Apply yellow socks and bracelet for high fall risk patients  - Consider moving patient to room near nurses station  Outcome: Progressing  Goal: Maintain or return to baseline ADL function  Description: INTERVENTIONS:  -  Assess patient's ability to carry out ADLs; assess patient's baseline for ADL function and identify physical deficits which impact ability to perform ADLs (bathing, care of mouth/teeth, toileting, grooming, dressing, etc.)  - Assess/evaluate cause of self-care deficits   - Assess range of motion  - Assess patient's mobility; develop plan if impaired  - Assess patient's need for assistive devices and provide as appropriate  - Encourage maximum independence but intervene and supervise when necessary  - Involve family in performance of ADLs  - Assess for home care needs following discharge   - Consider OT consult to assist with ADL evaluation and planning for discharge  - Provide patient education as appropriate  Outcome: Progressing  Goal: Maintains/Returns to pre admission functional level  Description: INTERVENTIONS:  - Perform AM-PAC 6 Click Basic Mobility/ Daily Activity assessment daily.  - Set and communicate daily mobility goal to care team and patient/family/caregiver.   - Collaborate with rehabilitation services on mobility goals if consulted  - Perform Range of Motion  times a day.  - Reposition patient  every  hours.  - Dangle patient  times a day  - Stand patient  times a day  - Ambulate patient  times a day  - Out of bed to chair  times a day   - Out of bed for meals  times a day  - Out of bed for toileting  - Record patient progress and toleration of activity level   Outcome: Progressing     Problem: DISCHARGE PLANNING  Goal: Discharge to home or other facility with appropriate resources  Description: INTERVENTIONS:  - Identify barriers to discharge w/patient and caregiver  - Arrange for needed discharge resources and transportation as appropriate  - Identify discharge learning needs (meds, wound care, etc.)  - Arrange for interpretive services to assist at discharge as needed  - Refer to Case Management Department for coordinating discharge planning if the patient needs post-hospital services based on physician/advanced practitioner order or complex needs related to functional status, cognitive ability, or social support system  Outcome: Progressing     Problem: Knowledge Deficit  Goal: Patient/family/caregiver demonstrates understanding of disease process, treatment plan, medications, and discharge instructions  Description: Complete learning assessment and assess knowledge base.  Interventions:  - Provide teaching at level of understanding  - Provide teaching via preferred learning methods  Outcome: Progressing

## 2024-06-25 NOTE — ASSESSMENT & PLAN NOTE
Patient presents with shortness of breath for 2 days. Past smoker, 2 packs/day, quit over 10 years ago. Sent in by pulmonologist following CT results. Admitted to the hospital the beginning of May due to pneumonia at which point he required a thoracentesis on 4/26 yielding 600ml, completed 7 days of ceftriaxone.  CT chest wo contrast (06/10/2024)- persistent loculated moderate size left pleural effusion w/ partial collapse of LLL. Stable AAA measuring 4.7 cm (since May 2024).   Patient wears a baseline of 2 L oxygen on exertion and during sleep since last hospital admission.  Chest tube placed 6/20 left chest tube   Pleural fluid culture and gram stain (06/19/2024) show 3+ polys and 2+ gram + cocci in chains, and 2+ growth of streptococcus intermedius. Extra turbid Fluid with PH 5.6, LDH >42592. Glucose <10. ,600, TP <3.0  Legionella and Strep Pneumonia antigen negative  Fluid cultures cultures strep intermedius susceptible to ceftriaxone.    Per pulmonary patient is now status post 6 instillations of tPA/dornase. Will likely hold off on further installations and monitor chest tube output    CT chest wo contrast (06/24/2024) showing improvement of pleural space this morning. Still having adequate output from chest tube, now with 400 to 500 mL over the past 24 hours. Patient will need chest tube until output is less than  mL/day. Will likely need repeat CT chest once output declines to above rate   6/25-no further chest tube output,, been removed today    Plan:  6/24 switched to Augmentin, day 8 of 14 of total antibiotics  Monitor temp  Tylenol 650 mg prn  Tessalon Perles 200 mg TID prn  Mucinex 1200 mg BID  Incentive spirometer, Flutter valve  Continue with pulmonary hygiene, out of bed to chair as tolerated, respiratory protocol  Follow-up with pulmonology office on 7/19/2024  Discharge tomorrow

## 2024-06-25 NOTE — PROGRESS NOTES
[facility]  Progress Note  Name: Oscar Espinosa I  MRN: [id number]  Unit/Bed#: W -01 I Date of Admission: 6/18/2024   Date of Service: 6/25/2024 I Hospital Day: 7    Assessment & Plan   * Pleural effusion with shortness of breath  Assessment & Plan  3/24 pneumonia: required a thoracentesis on 4/26 yielding 600ml, completed 7 days of ceftriaxone.   CT chest wo contrast (06/10/2024)- persistent loculated moderate size left pleural effusion w/ partial collapse of LLL. Stable AAA measuring 4.7 cm (since May 2024).   IR placed left sided chest tube and drained 300 mL of mildly frothy, slightly viscous and serosanguinous fluid and samples were sent for ordered laboratory evaluation.   Pleural fluid culture and gram stain (06/19/2024) show 3+ polys and 2+ gram + cocci in chains, and 2+ growth of streptococcus intermedius, susceptible to ceftriaxone. Extra turbid Fluid with PH 5.6, LDH >52045. Glucose <10. ,600, TP <3.0.  Patient is now status post 6 instillations of tPA/dornase. Will likely hold off on further installations and monitor chest tube output    CT chest wo contrast (06/24/2024) showing improvement of pleural space this morning     Plan  Chest tube flushed with no output, pulmonology plans for removal  Continue Augmentin for total of 14 days  Anticipate discharge tomorrow    Abnormal TSH  Assessment & Plan  TSH 4.559, free T4 within normal limits    Elevated serum creatinine  Assessment & Plan  Baseline Cr 0.9-1.1 prior to May 2024  Elevated to 1.66 as of May 2024 and has been stable since then  Currently at baseline    Prediabetes  Assessment & Plan  Lab Results   Component Value Date    HGBA1C 6.3 (H) 06/18/2024        Plan:  Monitor blood sugars  Consider insulin sliding scale if glucose elevated  Diabetic diet    Chronic combined systolic and diastolic congestive heart failure (HCC)  Assessment & Plan  Wt Readings from Last 3 Encounters:   06/13/24 99.3 kg (219 lb)    06/06/24 100 kg (221 lb)   05/20/24 101 kg (221 lb 12.8 oz)     Home regimen: torsemide 40 mg PO daily, labetolol 100 mg  Diuretics were initially held in the setting of elevated Cr. Creatinine improved.  Diuretics resumed    Plan:  Continue home regimen. Give Torsemide 40 mg in the mornings  Monitor BMP  Monitor I/O    Acute hypoxic respiratory failure (HCC)  Assessment & Plan  Patient presents with shortness of breath for 2 days. Past smoker, 2 packs/day, quit over 10 years ago. Sent in by pulmonologist following CT results. Admitted to the hospital the beginning of May due to pneumonia at which point he required a thoracentesis on 4/26 yielding 600ml, completed 7 days of ceftriaxone.  CT chest wo contrast (06/10/2024)- persistent loculated moderate size left pleural effusion w/ partial collapse of LLL. Stable AAA measuring 4.7 cm (since May 2024).   Patient wears a baseline of 2 L oxygen on exertion and during sleep since last hospital admission.  Chest tube placed 6/20 left chest tube   Pleural fluid culture and gram stain (06/19/2024) show 3+ polys and 2+ gram + cocci in chains, and 2+ growth of streptococcus intermedius. Extra turbid Fluid with PH 5.6, LDH >89692. Glucose <10. ,600, TP <3.0  Legionella and Strep Pneumonia antigen negative  Fluid cultures cultures strep intermedius susceptible to ceftriaxone.    Per pulmonary patient is now status post 6 instillations of tPA/dornase. Will likely hold off on further installations and monitor chest tube output    CT chest wo contrast (06/24/2024) showing improvement of pleural space this morning. Still having adequate output from chest tube, now with 400 to 500 mL over the past 24 hours. Patient will need chest tube until output is less than  mL/day. Will likely need repeat CT chest once output declines to above rate   6/25-no further chest tube output,, been removed today    Plan:  6/24 switched to Augmentin, day 8 of 14 of total  antibiotics  Monitor temp  Tylenol 650 mg prn  Tessalon Perles 200 mg TID prn  Mucinex 1200 mg BID  Incentive spirometer, Flutter valve  Continue with pulmonary hygiene, out of bed to chair as tolerated, respiratory protocol  Follow-up with pulmonology office on 7/19/2024  Discharge tomorrow    Anemia  Assessment & Plan  On review of labs, anemia is new since April 2024.   Last labs in August 2023 were normal.   There has been a slow trend downward.   Hgb 8.4 on admission    Plan:  Monitor CBC  Transfuse if Hgb <7    Hyponatremia  Assessment & Plan  Patient currently stable.     Atrial fibrillation (HCC)  Assessment & Plan  Home regimen: Amiodorone 200 mg p.o. daily, labetolol 100 mg BID, warfarin 2.5 mg  Warfarin held prior to IR procedure and later continued.    Recent Labs     06/24/24  0450 06/25/24  0510   INR 3.14* 3.26*         Plan:  Continue amiodarone, labetolol  Continue to hold warfarin  PT/ INR monitoring    Chronic obstructive pulmonary disease (HCC)  Assessment & Plan  Not in exacerbations, albuterol as needed.     Chronic pain syndrome  Assessment & Plan  Home regimen: gabapentin 900 mg HS  PT/OT recommended level III.    Plan:  Reduce dose of gabapentin if further elevation of Creatinine    CAD (coronary artery disease)  Assessment & Plan  Home regimen: ASA 81 mg, lipitor 40 mg, labetolol 100 mg    Plan:  Continue home regimen               VTE Pharmacologic Prophylaxis: VTE Score: 5 High Risk (Score >/= 5) - Pharmacological DVT Prophylaxis Ordered: warfarin (Coumadin). Sequential Compression Devices Ordered.    Mobility:   Basic Mobility Inpatient Raw Score: 21  JH-HLM Goal: 6: Walk 10 steps or more  JH-HLM Achieved: 7: Walk 25 feet or more  JH-HLM Goal achieved. Continue to encourage appropriate mobility.    Patient Centered Rounds: I performed bedside rounds with nursing staff today.  Discussions with Specialists or Other Care Team Provider: Pulmonology    Education and Discussions with Family /  Patient: Updated  (wife) via phone.-The voice message    Current Length of Stay: 7 day(s)  Current Patient Status: Inpatient   Discharge Plan: Anticipate discharge tomorrow to home.    Code Status: Level 3 - DNAR and DNI    Subjective:   No events overnight, patient has no complaints.  Feels well    Objective:     Vitals:   Temp (24hrs), Av.1 °F (36.7 °C), Min:97.6 °F (36.4 °C), Max:98.3 °F (36.8 °C)    Temp:  [97.6 °F (36.4 °C)-98.3 °F (36.8 °C)] 98.3 °F (36.8 °C)  HR:  [67-80] 69  Resp:  [12-16] 12  BP: ()/(46-61) 116/61  SpO2:  [89 %-95 %] 95 %  Body mass index is 30.54 kg/m².     Input and Output Summary (last 24 hours):     Intake/Output Summary (Last 24 hours) at 2024 1719  Last data filed at 2024 1545  Gross per 24 hour   Intake 780 ml   Output 1325 ml   Net -545 ml       Physical Exam:   Physical Exam  Vitals and nursing note reviewed.   Constitutional:       General: He is not in acute distress.     Appearance: He is well-developed.   HENT:      Head: Normocephalic and atraumatic.   Eyes:      Conjunctiva/sclera: Conjunctivae normal.   Cardiovascular:      Rate and Rhythm: Normal rate and regular rhythm.      Heart sounds: No murmur heard.  Pulmonary:      Effort: Pulmonary effort is normal. No respiratory distress.      Breath sounds: Normal breath sounds.      Comments: Chest tube suction -20 mmH20  Decreased breath sounds on the left side  Abdominal:      Palpations: Abdomen is soft.      Tenderness: There is no abdominal tenderness.   Musculoskeletal:         General: No swelling.      Cervical back: Neck supple.   Skin:     General: Skin is warm and dry.      Capillary Refill: Capillary refill takes less than 2 seconds.   Neurological:      Mental Status: He is alert.   Psychiatric:         Mood and Affect: Mood normal.          Additional Data:     Labs:  Results from last 7 days   Lab Units 24  0510   WBC Thousand/uL 7.66   HEMOGLOBIN g/dL 8.1*   HEMATOCRIT %  26.1*   PLATELETS Thousands/uL 290   SEGS PCT % 72   LYMPHO PCT % 13*   MONO PCT % 10   EOS PCT % 3     Results from last 7 days   Lab Units 06/25/24  0510 06/20/24  0451 06/19/24  0512   SODIUM mmol/L 136   < > 134*   POTASSIUM mmol/L 3.8   < > 4.2   CHLORIDE mmol/L 96   < > 98   CO2 mmol/L 35*   < > 31   BUN mg/dL 20   < > 32*   CREATININE mg/dL 1.00   < > 1.21   ANION GAP mmol/L 5   < > 5   CALCIUM mg/dL 8.1*   < > 7.9*   ALBUMIN g/dL  --   --  2.3*   TOTAL BILIRUBIN mg/dL  --   --  0.87   ALK PHOS U/L  --   --  130*   ALT U/L  --   --  44   AST U/L  --   --  40*   GLUCOSE RANDOM mg/dL 87   < > 109    < > = values in this interval not displayed.     Results from last 7 days   Lab Units 06/25/24  0510   INR  3.26*             Results from last 7 days   Lab Units 06/19/24  0512   PROCALCITONIN ng/ml 0.30*       Lines/Drains:  Invasive Devices       Peripheral Intravenous Line  Duration             Peripheral IV 06/22/24 Distal;Dorsal (posterior);Left Forearm 2 days              Drain  Duration             Chest Tube Left 12 Fr. 6 days                          Imaging: Reviewed radiology reports from this admission including: chest CT scan    Recent Cultures (last 7 days):   Results from last 7 days   Lab Units 06/20/24  0912 06/19/24  1336   GRAM STAIN RESULT   --  3+ Polys*  2+ Gram positive cocci in chains*   BODY FLUID CULTURE, STERILE   --  2+ Growth of Streptococcus intermedius*   LEGIONELLA URINARY ANTIGEN  Negative  --        Last 24 Hours Medication List:   Current Facility-Administered Medications   Medication Dose Route Frequency Provider Last Rate    acetaminophen  650 mg Oral Q6H PRN Delisa Acevedo MD      albuterol  2 puff Inhalation Q6H PRN Delisa Acevedo MD      amiodarone  200 mg Oral Daily Delisa Acevedo MD      amoxicillin-clavulanate  1 tablet Oral Q12H GONZALO Hall DO      aspirin  81 mg Oral Daily Delisa Acevedo MD      atorvastatin  40 mg Oral Daily Delisa Acevedo MD      benzonatate  200 mg Oral TID  PRN Oscar Jack DO      cholecalciferol  2,000 Units Oral Daily Delisa Acevedo MD      docusate sodium  100 mg Oral BID Delisa Acevedo MD      gabapentin  900 mg Oral HS Delisa Acevedo MD      guaiFENesin  1,200 mg Oral Q12H GONZALO Delisa Acevedo MD      labetalol  100 mg Oral BID Delisa Acevedo MD      multivitamin stress formula  1 tablet Oral Daily Delisa Acevedo MD      polyethylene glycol  17 g Oral Daily Delisa Acevedo MD      potassium chloride  20 mEq Oral Daily Delisa Acevedo MD      psyllium  1 packet Oral Daily Delisa Acevedo MD      senna  2 tablet Oral HS Delisa Acevedo MD      senna  1 tablet Oral HS PRN Delisa Acevedo MD      tamsulosin  0.4 mg Oral Daily With Dinner Delisa Acevedo MD      torsemide  40 mg Oral Daily Rc Jackson MD          Today, Patient Was Seen By: Nathalia Ignacio MD    **Please Note: This note may have been constructed using a voice recognition system.**

## 2024-06-26 ENCOUNTER — APPOINTMENT (INPATIENT)
Dept: RADIOLOGY | Facility: HOSPITAL | Age: 84
DRG: 177 | End: 2024-06-26
Attending: INTERNAL MEDICINE
Payer: MEDICARE

## 2024-06-26 ENCOUNTER — APPOINTMENT (INPATIENT)
Dept: RADIOLOGY | Facility: HOSPITAL | Age: 84
DRG: 177 | End: 2024-06-26
Payer: MEDICARE

## 2024-06-26 VITALS
HEART RATE: 60 BPM | RESPIRATION RATE: 16 BRPM | TEMPERATURE: 98 F | BODY MASS INDEX: 30.54 KG/M2 | HEIGHT: 71 IN | SYSTOLIC BLOOD PRESSURE: 122 MMHG | OXYGEN SATURATION: 97 % | DIASTOLIC BLOOD PRESSURE: 63 MMHG

## 2024-06-26 PROBLEM — R79.89 ELEVATED SERUM CREATININE: Status: RESOLVED | Noted: 2024-06-18 | Resolved: 2024-06-26

## 2024-06-26 PROBLEM — E87.1 HYPONATREMIA: Status: RESOLVED | Noted: 2024-04-27 | Resolved: 2024-06-26

## 2024-06-26 LAB
ANION GAP SERPL CALCULATED.3IONS-SCNC: 3 MMOL/L (ref 4–13)
BASOPHILS # BLD AUTO: 0.09 THOUSANDS/ÂΜL (ref 0–0.1)
BASOPHILS NFR BLD AUTO: 1 % (ref 0–1)
BUN SERPL-MCNC: 16 MG/DL (ref 5–25)
CALCIUM SERPL-MCNC: 8.4 MG/DL (ref 8.4–10.2)
CHLORIDE SERPL-SCNC: 98 MMOL/L (ref 96–108)
CO2 SERPL-SCNC: 35 MMOL/L (ref 21–32)
CREAT SERPL-MCNC: 1.01 MG/DL (ref 0.6–1.3)
EOSINOPHIL # BLD AUTO: 0.18 THOUSAND/ÂΜL (ref 0–0.61)
EOSINOPHIL NFR BLD AUTO: 3 % (ref 0–6)
ERYTHROCYTE [DISTWIDTH] IN BLOOD BY AUTOMATED COUNT: 17.8 % (ref 11.6–15.1)
GFR SERPL CREATININE-BSD FRML MDRD: 68 ML/MIN/1.73SQ M
GLUCOSE SERPL-MCNC: 90 MG/DL (ref 65–140)
HCT VFR BLD AUTO: 26.1 % (ref 36.5–49.3)
HGB BLD-MCNC: 8.1 G/DL (ref 12–17)
IMM GRANULOCYTES # BLD AUTO: 0.07 THOUSAND/UL (ref 0–0.2)
IMM GRANULOCYTES NFR BLD AUTO: 1 % (ref 0–2)
INR PPP: 2.81 (ref 0.84–1.19)
LYMPHOCYTES # BLD AUTO: 0.88 THOUSANDS/ÂΜL (ref 0.6–4.47)
LYMPHOCYTES NFR BLD AUTO: 14 % (ref 14–44)
MAGNESIUM SERPL-MCNC: 1.9 MG/DL (ref 1.9–2.7)
MCH RBC QN AUTO: 28.8 PG (ref 26.8–34.3)
MCHC RBC AUTO-ENTMCNC: 31 G/DL (ref 31.4–37.4)
MCV RBC AUTO: 93 FL (ref 82–98)
MONOCYTES # BLD AUTO: 0.65 THOUSAND/ÂΜL (ref 0.17–1.22)
MONOCYTES NFR BLD AUTO: 10 % (ref 4–12)
NEUTROPHILS # BLD AUTO: 4.4 THOUSANDS/ÂΜL (ref 1.85–7.62)
NEUTS SEG NFR BLD AUTO: 71 % (ref 43–75)
NRBC BLD AUTO-RTO: 0 /100 WBCS
PLATELET # BLD AUTO: 277 THOUSANDS/UL (ref 149–390)
PMV BLD AUTO: 9.6 FL (ref 8.9–12.7)
POTASSIUM SERPL-SCNC: 3.8 MMOL/L (ref 3.5–5.3)
PROTHROMBIN TIME: 30.5 SECONDS (ref 11.6–14.5)
RBC # BLD AUTO: 2.81 MILLION/UL (ref 3.88–5.62)
SODIUM SERPL-SCNC: 136 MMOL/L (ref 135–147)
WBC # BLD AUTO: 6.27 THOUSAND/UL (ref 4.31–10.16)

## 2024-06-26 PROCEDURE — 85610 PROTHROMBIN TIME: CPT

## 2024-06-26 PROCEDURE — 71045 X-RAY EXAM CHEST 1 VIEW: CPT

## 2024-06-26 PROCEDURE — 83735 ASSAY OF MAGNESIUM: CPT

## 2024-06-26 PROCEDURE — 99239 HOSP IP/OBS DSCHRG MGMT >30: CPT | Performed by: INTERNAL MEDICINE

## 2024-06-26 PROCEDURE — 85025 COMPLETE CBC W/AUTO DIFF WBC: CPT

## 2024-06-26 PROCEDURE — 80048 BASIC METABOLIC PNL TOTAL CA: CPT

## 2024-06-26 RX ORDER — WARFARIN SODIUM 2.5 MG/1
TABLET ORAL
Qty: 30 TABLET | Refills: 0 | Status: CANCELLED | OUTPATIENT
Start: 2024-06-28

## 2024-06-26 RX ORDER — ALBUTEROL SULFATE 90 UG/1
2 AEROSOL, METERED RESPIRATORY (INHALATION) EVERY 6 HOURS PRN
Qty: 24 G | Refills: 0 | Status: ON HOLD | OUTPATIENT
Start: 2024-06-26

## 2024-06-26 RX ORDER — AMOXICILLIN AND CLAVULANATE POTASSIUM 875; 125 MG/1; MG/1
1 TABLET, FILM COATED ORAL EVERY 12 HOURS SCHEDULED
Qty: 11 TABLET | Refills: 0 | Status: SHIPPED | OUTPATIENT
Start: 2024-06-26 | End: 2024-06-26

## 2024-06-26 RX ORDER — WARFARIN SODIUM 2.5 MG/1
TABLET ORAL
Qty: 30 TABLET | Refills: 0 | Status: ON HOLD | OUTPATIENT
Start: 2024-06-27

## 2024-06-26 RX ORDER — AMOXICILLIN AND CLAVULANATE POTASSIUM 875; 125 MG/1; MG/1
1 TABLET, FILM COATED ORAL EVERY 12 HOURS SCHEDULED
Qty: 11 TABLET | Refills: 0 | Status: SHIPPED | OUTPATIENT
Start: 2024-06-26 | End: 2024-07-02

## 2024-06-26 RX ORDER — AMIODARONE HYDROCHLORIDE 200 MG/1
200 TABLET ORAL DAILY
Qty: 90 TABLET | Refills: 0 | Status: ON HOLD | OUTPATIENT
Start: 2024-06-26

## 2024-06-26 RX ORDER — POTASSIUM CHLORIDE 20 MEQ/1
20 TABLET, EXTENDED RELEASE ORAL ONCE
Status: COMPLETED | OUTPATIENT
Start: 2024-06-26 | End: 2024-06-26

## 2024-06-26 RX ORDER — MAGNESIUM SULFATE 1 G/100ML
1 INJECTION INTRAVENOUS ONCE
Status: COMPLETED | OUTPATIENT
Start: 2024-06-26 | End: 2024-06-26

## 2024-06-26 RX ADMIN — GUAIFENESIN 1200 MG: 600 TABLET ORAL at 08:02

## 2024-06-26 RX ADMIN — DOCUSATE SODIUM 100 MG: 100 CAPSULE, LIQUID FILLED ORAL at 08:03

## 2024-06-26 RX ADMIN — ASPIRIN 81 MG: 81 TABLET, COATED ORAL at 08:02

## 2024-06-26 RX ADMIN — MAGNESIUM SULFATE HEPTAHYDRATE 1 G: 1 INJECTION, SOLUTION INTRAVENOUS at 14:16

## 2024-06-26 RX ADMIN — AMOXICILLIN AND CLAVULANATE POTASSIUM 1 TABLET: 875; 125 TABLET, FILM COATED ORAL at 08:03

## 2024-06-26 RX ADMIN — POTASSIUM CHLORIDE 20 MEQ: 1500 TABLET, EXTENDED RELEASE ORAL at 08:02

## 2024-06-26 RX ADMIN — TORSEMIDE 40 MG: 20 TABLET ORAL at 08:02

## 2024-06-26 RX ADMIN — B-COMPLEX W/ C & FOLIC ACID TAB 1 TABLET: TAB at 08:02

## 2024-06-26 RX ADMIN — POLYETHYLENE GLYCOL 3350 17 G: 17 POWDER, FOR SOLUTION ORAL at 08:02

## 2024-06-26 RX ADMIN — LABETALOL HYDROCHLORIDE 100 MG: 100 TABLET, FILM COATED ORAL at 08:02

## 2024-06-26 RX ADMIN — POTASSIUM CHLORIDE 20 MEQ: 1500 TABLET, EXTENDED RELEASE ORAL at 08:03

## 2024-06-26 RX ADMIN — Medication 2000 UNITS: at 08:03

## 2024-06-26 RX ADMIN — AMIODARONE HYDROCHLORIDE 200 MG: 200 TABLET ORAL at 08:02

## 2024-06-26 RX ADMIN — ATORVASTATIN CALCIUM 40 MG: 40 TABLET, FILM COATED ORAL at 08:02

## 2024-06-26 RX ADMIN — Medication 1 PACKET: at 08:02

## 2024-06-26 NOTE — ASSESSMENT & PLAN NOTE
Patient presents with shortness of breath for 2 days. Past smoker, 2 packs/day, quit over 10 years ago. Sent in by pulmonologist following CT results. Admitted to the hospital the beginning of May due to pneumonia at which point he required a thoracentesis on 4/26 yielding 600ml, completed 7 days of ceftriaxone.  CT chest wo contrast (06/10/2024)- persistent loculated moderate size left pleural effusion w/ partial collapse of LLL. Stable AAA measuring 4.7 cm (since May 2024).   Patient wears a baseline of 2 L oxygen on exertion and during sleep since last hospital admission.  Chest tube placed 6/20 left chest tube   Pleural fluid culture and gram stain (06/19/2024) show 3+ polys and 2+ gram + cocci in chains, and 2+ growth of streptococcus intermedius. Extra turbid Fluid with PH 5.6, LDH >85438. Glucose <10. ,600, TP <3.0  Legionella and Strep Pneumonia antigen negative  Fluid cultures cultures strep intermedius susceptible to ceftriaxone.    Per pulmonary patient is now status post 6 instillations of tPA/dornase. Will likely hold off on further installations and monitor chest tube output    CT chest wo contrast (06/24/2024) showing improvement of pleural space this morning. Still having adequate output from chest tube, now with 400 to 500 mL over the past 24 hours. Patient will need chest tube until output is less than  mL/day. Will likely need repeat CT chest once output declines to above rate   6/25-no further chest tube output,, been removed today    Plan:  6/24 switched to Augmentin, day 9 of 14 of total antibiotics  Monitor temp  Tylenol 650 mg prn  Tessalon Perles 200 mg TID prn  Mucinex 1200 mg BID  Incentive spirometer, Flutter valve  Continue with pulmonary hygiene, out of bed to chair as tolerated, respiratory protocol  Follow-up with pulmonology office on 7/19/2024  Discharge today

## 2024-06-26 NOTE — ASSESSMENT & PLAN NOTE
On review of labs, anemia is new since April 2024.   Last labs in August 2023 were normal.   There has been a slow trend downward.   Hgb 8.4 on admission    Plan:  Monitor CBC  Transfuse if Hgb <7  Outpatient colonoscopy can be recommended

## 2024-06-26 NOTE — ASSESSMENT & PLAN NOTE
3/24 pneumonia: required a thoracentesis on 4/26 yielding 600ml, completed 7 days of ceftriaxone.   CT chest wo contrast (06/10/2024)- persistent loculated moderate size left pleural effusion w/ partial collapse of LLL. Stable AAA measuring 4.7 cm (since May 2024).   IR placed left sided chest tube and drained 300 mL of mildly frothy, slightly viscous and serosanguinous fluid and samples were sent for ordered laboratory evaluation.   Pleural fluid culture and gram stain (06/19/2024) show 3+ polys and 2+ gram + cocci in chains, and 2+ growth of streptococcus intermedius, susceptible to ceftriaxone. Extra turbid Fluid with PH 5.6, LDH >76540. Glucose <10. ,600, TP <3.0.  Patient is now status post 6 instillations of tPA/dornase. Will likely hold off on further installations and monitor chest tube output    CT chest wo contrast (06/24/2024) showing improvement of pleural space this morning   Patient is maintaining lights criteria, exudative pleural effusion    Plan  Status post chest tube removal, continue

## 2024-06-26 NOTE — DISCHARGE INSTR - AVS FIRST PAGE
Dear Oscar Espinosa,     It was our pleasure to care for you here at [facility].  It is our hope that we were always able to exceed the expected standards for your care during your stay.  You were hospitalized due to pleural effusion (infectious fluid accumulation between the layers of the lungs).  You were cared for on the [address] floor by Winnie Gruber MD under the service of Mick Montelongo MD with the [facility] who covers for your primary care physician (PCP), Lea Reyes, MD, while you were hospitalized.  If you have any questions or concerns related to this hospitalization, you may contact us at .  For follow up as well as any medication refills, we recommend that you follow up with your primary care physician.  A registered nurse will reach out to you by phone within a few days after your discharge to answer any additional questions that you may have after going home.  However, at this time we provide for you here, the most important instructions / recommendations at discharge:     Notable Medication Adjustments -   Please start taking oral amoxicillin-clavulanate 1 tablet every 12 hours starting from 9 PM on 6/26/2024 through morning of 7/2/2024.  Total 11 tablets will be needed.  Please start taking oral warfarin 2.5 mg daily from 6/27/2024.  Testing Required after Discharge -   Please undergo INR (international normalized ratio) 2 times (24-hour apart from each other).  The first INR lab work should be done on 6/28/2024 and the second INR lab work should be on 6/29/2024.  Your outpatient PCP will order the above-mentioned test.  Your outpatient PCP/Home with home health provider may order some test, if required.  Important follow up information -   Please follow-up with your outpatient primary care provider within 1 week of discharge.  Please follow-up with your outpatient Home with home health provider ([facility]) (visiting nurse  Association) within 1 week of discharge.  Other Instructions -   Please be compliant to the medications.  If new symptoms happen or current symptoms worsen, call 911 or come to ED.  You may expect some pain or swelling at the site of chest tube insertion, please use oral Tylenol as needed for that.  Please review this entire after visit summary as additional general instructions including medication list, appointments, activity, diet, any pertinent wound care, and other additional recommendations from your care team that may be provided for you.      Sincerely,     Winnie Gruber MD

## 2024-06-26 NOTE — PLAN OF CARE
Problem: RESPIRATORY - ADULT  Goal: Achieves optimal ventilation and oxygenation  Description: INTERVENTIONS:  - Assess for changes in respiratory status  - Assess for changes in mentation and behavior  - Position to facilitate oxygenation and minimize respiratory effort  - Oxygen administered by appropriate delivery if ordered  - Initiate smoking cessation education as indicated  - Encourage broncho-pulmonary hygiene including cough, deep breathe, Incentive Spirometry  - Assess the need for suctioning and aspirate as needed  - Assess and instruct to report SOB or any respiratory difficulty  - Respiratory Therapy support as indicated  Outcome: Progressing     Problem: GENITOURINARY - ADULT  Goal: Maintains or returns to baseline urinary function  Description: INTERVENTIONS:  - Assess urinary function  - Encourage oral fluids to ensure adequate hydration if ordered  - Administer IV fluids as ordered to ensure adequate hydration  - Administer ordered medications as needed  - Offer frequent toileting  - Follow urinary retention protocol if ordered  Outcome: Progressing  Goal: Absence of urinary retention  Description: INTERVENTIONS:  - Assess patient's ability to void and empty bladder  - Monitor I/O  - Bladder scan as needed  - Discuss with physician/AP medications to alleviate retention as needed  - Discuss catheterization for long term situations as appropriate  Outcome: Progressing     Problem: METABOLIC, FLUID AND ELECTROLYTES - ADULT  Goal: Electrolytes maintained within normal limits  Description: INTERVENTIONS:  - Monitor labs and assess patient for signs and symptoms of electrolyte imbalances  - Administer electrolyte replacement as ordered  - Monitor response to electrolyte replacements, including repeat lab results as appropriate  - Instruct patient on fluid and nutrition as appropriate  Outcome: Progressing  Goal: Fluid balance maintained  Description: INTERVENTIONS:  - Monitor labs   - Monitor I/O and  WT  - Instruct patient on fluid and nutrition as appropriate  - Assess for signs & symptoms of volume excess or deficit  Outcome: Progressing  Goal: Glucose maintained within target range  Description: INTERVENTIONS:  - Monitor Blood Glucose as ordered  - Assess for signs and symptoms of hyperglycemia and hypoglycemia  - Administer ordered medications to maintain glucose within target range  - Assess nutritional intake and initiate nutrition service referral as needed  Outcome: Progressing     Problem: PAIN - ADULT  Goal: Verbalizes/displays adequate comfort level or baseline comfort level  Description: Interventions:  - Encourage patient to monitor pain and request assistance  - Assess pain using appropriate pain scale  - Administer analgesics based on type and severity of pain and evaluate response  - Implement non-pharmacological measures as appropriate and evaluate response  - Consider cultural and social influences on pain and pain management  - Notify physician/advanced practitioner if interventions unsuccessful or patient reports new pain  Outcome: Progressing     Problem: INFECTION - ADULT  Goal: Absence or prevention of progression during hospitalization  Description: INTERVENTIONS:  - Assess and monitor for signs and symptoms of infection  - Monitor lab/diagnostic results  - Monitor all insertion sites, i.e. indwelling lines, tubes, and drains  - Monitor endotracheal if appropriate and nasal secretions for changes in amount and color  - [place] appropriate cooling/warming therapies per order  - Administer medications as ordered  - Instruct and encourage patient and family to use good hand hygiene technique  - Identify and instruct in appropriate isolation precautions for identified infection/condition  Outcome: Progressing  Goal: Absence of fever/infection during neutropenic period  Description: INTERVENTIONS:  - Monitor WBC    Outcome: Progressing     Problem: SAFETY ADULT  Goal: Patient will remain  free of falls  Description: INTERVENTIONS:  - Educate patient/family on patient safety including physical limitations  - Instruct patient to call for assistance with activity   - Consult OT/PT to assist with strengthening/mobility   - Keep Call bell within reach  - Keep bed low and locked with side rails adjusted as appropriate  - Keep care items and personal belongings within reach  - Initiate and maintain comfort rounds  - Make Fall Risk Sign visible to staff  - Offer Toileting every  Hours, in advance of need  - Initiate/Maintain alarm  - Obtain necessary fall risk management equipment:  - Apply yellow socks and bracelet for high fall risk patients  - Consider moving patient to room near nurses station  Outcome: Progressing  Goal: Maintain or return to baseline ADL function  Description: INTERVENTIONS:  -  Assess patient's ability to carry out ADLs; assess patient's baseline for ADL function and identify physical deficits which impact ability to perform ADLs (bathing, care of mouth/teeth, toileting, grooming, dressing, etc.)  - Assess/evaluate cause of self-care deficits   - Assess range of motion  - Assess patient's mobility; develop plan if impaired  - Assess patient's need for assistive devices and provide as appropriate  - Encourage maximum independence but intervene and supervise when necessary  - Involve family in performance of ADLs  - Assess for home care needs following discharge   - Consider OT consult to assist with ADL evaluation and planning for discharge  - Provide patient education as appropriate  Outcome: Progressing  Goal: Maintains/Returns to pre admission functional level  Description: INTERVENTIONS:  - Perform AM-PAC 6 Click Basic Mobility/ Daily Activity assessment daily.  - Set and communicate daily mobility goal to care team and patient/family/caregiver.   - Collaborate with rehabilitation services on mobility goals if consulted  - Perform Range of Motion  times a day.  - Reposition patient  every  hours.  - Dangle patient  times a day  - Stand patient  times a day  - Ambulate patient  times a day  - Out of bed to chair  times a day   - Out of bed for meals  times a day  - Out of bed for toileting  - Record patient progress and toleration of activity level   Outcome: Progressing     Problem: DISCHARGE PLANNING  Goal: Discharge to home or other facility with appropriate resources  Description: INTERVENTIONS:  - Identify barriers to discharge w/patient and caregiver  - Arrange for needed discharge resources and transportation as appropriate  - Identify discharge learning needs (meds, wound care, etc.)  - Arrange for interpretive services to assist at discharge as needed  - Refer to Case Management Department for coordinating discharge planning if the patient needs post-hospital services based on physician/advanced practitioner order or complex needs related to functional status, cognitive ability, or social support system  Outcome: Progressing     Problem: Knowledge Deficit  Goal: Patient/family/caregiver demonstrates understanding of disease process, treatment plan, medications, and discharge instructions  Description: Complete learning assessment and assess knowledge base.  Interventions:  - Provide teaching at level of understanding  - Provide teaching via preferred learning methods  Outcome: Progressing     Problem: Nutrition/Hydration-ADULT  Goal: Nutrient/Hydration intake appropriate for improving, restoring or maintaining nutritional needs  Description: Monitor and assess patient's nutrition/hydration status for malnutrition. Collaborate with interdisciplinary team and initiate plan and interventions as ordered.  Monitor patient's weight and dietary intake as ordered or per policy. Utilize nutrition screening tool and intervene as necessary. Determine patient's food preferences and provide high-protein, high-caloric foods as appropriate.     INTERVENTIONS:  - Monitor oral intake, urinary output,  labs, and treatment plans  - Assess nutrition and hydration status and recommend course of action  - Evaluate amount of meals eaten  - Assist patient with eating if necessary   - Allow adequate time for meals  - Recommend/ encourage appropriate diets, oral nutritional supplements, and vitamin/mineral supplements  - Order, calculate, and assess calorie counts as needed  - Recommend, monitor, and adjust tube feedings and TPN/PPN based on assessed needs  - Assess need for intravenous fluids  - Provide specific nutrition/hydration education as appropriate  - Include patient/family/caregiver in decisions related to nutrition  Outcome: Progressing

## 2024-06-26 NOTE — PLAN OF CARE
Problem: RESPIRATORY - ADULT  Goal: Achieves optimal ventilation and oxygenation  Description: INTERVENTIONS:  - Assess for changes in respiratory status  - Assess for changes in mentation and behavior  - Position to facilitate oxygenation and minimize respiratory effort  - Oxygen administered by appropriate delivery if ordered  - Initiate smoking cessation education as indicated  - Encourage broncho-pulmonary hygiene including cough, deep breathe, Incentive Spirometry  - Assess the need for suctioning and aspirate as needed  - Assess and instruct to report SOB or any respiratory difficulty  - Respiratory Therapy support as indicated  Outcome: Progressing     Problem: GENITOURINARY - ADULT  Goal: Maintains or returns to baseline urinary function  Description: INTERVENTIONS:  - Assess urinary function  - Encourage oral fluids to ensure adequate hydration if ordered  - Administer IV fluids as ordered to ensure adequate hydration  - Administer ordered medications as needed  - Offer frequent toileting  - Follow urinary retention protocol if ordered  Outcome: Progressing  Goal: Absence of urinary retention  Description: INTERVENTIONS:  - Assess patient's ability to void and empty bladder  - Monitor I/O  - Bladder scan as needed  - Discuss with physician/AP medications to alleviate retention as needed  - Discuss catheterization for long term situations as appropriate  Outcome: Progressing     Problem: METABOLIC, FLUID AND ELECTROLYTES - ADULT  Goal: Electrolytes maintained within normal limits  Description: INTERVENTIONS:  - Monitor labs and assess patient for signs and symptoms of electrolyte imbalances  - Administer electrolyte replacement as ordered  - Monitor response to electrolyte replacements, including repeat lab results as appropriate  - Instruct patient on fluid and nutrition as appropriate  Outcome: Progressing  Goal: Fluid balance maintained  Description: INTERVENTIONS:  - Monitor labs   - Monitor I/O and  WT  - Instruct patient on fluid and nutrition as appropriate  - Assess for signs & symptoms of volume excess or deficit  Outcome: Progressing  Goal: Glucose maintained within target range  Description: INTERVENTIONS:  - Monitor Blood Glucose as ordered  - Assess for signs and symptoms of hyperglycemia and hypoglycemia  - Administer ordered medications to maintain glucose within target range  - Assess nutritional intake and initiate nutrition service referral as needed  Outcome: Progressing     Problem: PAIN - ADULT  Goal: Verbalizes/displays adequate comfort level or baseline comfort level  Description: Interventions:  - Encourage patient to monitor pain and request assistance  - Assess pain using appropriate pain scale  - Administer analgesics based on type and severity of pain and evaluate response  - Implement non-pharmacological measures as appropriate and evaluate response  - Consider cultural and social influences on pain and pain management  - Notify physician/advanced practitioner if interventions unsuccessful or patient reports new pain  Outcome: Progressing     Problem: INFECTION - ADULT  Goal: Absence or prevention of progression during hospitalization  Description: INTERVENTIONS:  - Assess and monitor for signs and symptoms of infection  - Monitor lab/diagnostic results  - Monitor all insertion sites, i.e. indwelling lines, tubes, and drains  - Monitor endotracheal if appropriate and nasal secretions for changes in amount and color  - [place] appropriate cooling/warming therapies per order  - Administer medications as ordered  - Instruct and encourage patient and family to use good hand hygiene technique  - Identify and instruct in appropriate isolation precautions for identified infection/condition  Outcome: Progressing  Goal: Absence of fever/infection during neutropenic period  Description: INTERVENTIONS:  - Monitor WBC    Outcome: Progressing     Problem: SAFETY ADULT  Goal: Patient will remain  free of falls  Description: INTERVENTIONS:  - Educate patient/family on patient safety including physical limitations  - Instruct patient to call for assistance with activity   - Consult OT/PT to assist with strengthening/mobility   - Keep Call bell within reach  - Keep bed low and locked with side rails adjusted as appropriate  - Keep care items and personal belongings within reach  - Initiate and maintain comfort rounds  - Make Fall Risk Sign visible to staff  - Offer Toileting every  Hours, in advance of need  - Initiate/Maintain alarm  - Obtain necessary fall risk management equipment:  - Apply yellow socks and bracelet for high fall risk patients  - Consider moving patient to room near nurses station  Outcome: Progressing  Goal: Maintain or return to baseline ADL function  Description: INTERVENTIONS:  -  Assess patient's ability to carry out ADLs; assess patient's baseline for ADL function and identify physical deficits which impact ability to perform ADLs (bathing, care of mouth/teeth, toileting, grooming, dressing, etc.)  - Assess/evaluate cause of self-care deficits   - Assess range of motion  - Assess patient's mobility; develop plan if impaired  - Assess patient's need for assistive devices and provide as appropriate  - Encourage maximum independence but intervene and supervise when necessary  - Involve family in performance of ADLs  - Assess for home care needs following discharge   - Consider OT consult to assist with ADL evaluation and planning for discharge  - Provide patient education as appropriate  Outcome: Progressing  Goal: Maintains/Returns to pre admission functional level  Description: INTERVENTIONS:  - Perform AM-PAC 6 Click Basic Mobility/ Daily Activity assessment daily.  - Set and communicate daily mobility goal to care team and patient/family/caregiver.   - Collaborate with rehabilitation services on mobility goals if consulted  - Perform Range of Motion  times a day.  - Reposition patient  every  hours.  - Dangle patient  times a day  - Stand patient  times a day  - Ambulate patient  times a day  - Out of bed to chair  times a day   - Out of bed for meals  times a day  - Out of bed for toileting  - Record patient progress and toleration of activity level   Outcome: Progressing     Problem: DISCHARGE PLANNING  Goal: Discharge to home or other facility with appropriate resources  Description: INTERVENTIONS:  - Identify barriers to discharge w/patient and caregiver  - Arrange for needed discharge resources and transportation as appropriate  - Identify discharge learning needs (meds, wound care, etc.)  - Arrange for interpretive services to assist at discharge as needed  - Refer to Case Management Department for coordinating discharge planning if the patient needs post-hospital services based on physician/advanced practitioner order or complex needs related to functional status, cognitive ability, or social support system  Outcome: Progressing     Problem: Knowledge Deficit  Goal: Patient/family/caregiver demonstrates understanding of disease process, treatment plan, medications, and discharge instructions  Description: Complete learning assessment and assess knowledge base.  Interventions:  - Provide teaching at level of understanding  - Provide teaching via preferred learning methods  Outcome: Progressing     Problem: Nutrition/Hydration-ADULT  Goal: Nutrient/Hydration intake appropriate for improving, restoring or maintaining nutritional needs  Description: Monitor and assess patient's nutrition/hydration status for malnutrition. Collaborate with interdisciplinary team and initiate plan and interventions as ordered.  Monitor patient's weight and dietary intake as ordered or per policy. Utilize nutrition screening tool and intervene as necessary. Determine patient's food preferences and provide high-protein, high-caloric foods as appropriate.     INTERVENTIONS:  - Monitor oral intake, urinary output,  labs, and treatment plans  - Assess nutrition and hydration status and recommend course of action  - Evaluate amount of meals eaten  - Assist patient with eating if necessary   - Allow adequate time for meals  - Recommend/ encourage appropriate diets, oral nutritional supplements, and vitamin/mineral supplements  - Order, calculate, and assess calorie counts as needed  - Recommend, monitor, and adjust tube feedings and TPN/PPN based on assessed needs  - Assess need for intravenous fluids  - Provide specific nutrition/hydration education as appropriate  - Include patient/family/caregiver in decisions related to nutrition  Outcome: Progressing

## 2024-06-26 NOTE — ASSESSMENT & PLAN NOTE
Home regimen: Amiodorone 200 mg p.o. daily, labetolol 100 mg BID, warfarin 2.5 mg  Warfarin held prior to IR procedure     Recent Labs     06/24/24  0450 06/25/24  0510 06/26/24  0528   INR 3.14* 3.26* 2.81*           Plan:  Continue amiodarone, labetolol  Continue to hold warfarin until INR is below 2 (2 x 24-hour apart)  PT/ INR monitoring

## 2024-06-26 NOTE — CASE MANAGEMENT
Case Management Discharge Planning Note    Patient name Oscar Espinosa  Location W /W -01 MRN [id number]  : 1940 Date 2024       Current Admission Date: 2024  Current Admission Diagnosis:Pleural effusion with shortness of breath   Patient Active Problem List    Diagnosis Date Noted Date Diagnosed    Abnormal TSH 2024     Prediabetes 2024     Elevated serum creatinine 2024     Chronic combined systolic and diastolic congestive heart failure (HCC) 2024     Acute hypoxic respiratory failure (HCC) 2024     Pleural effusion with shortness of breath 2024     Hyponatremia 2024     Anemia 2024     Bilateral carotid artery stenosis 2024     Venous insufficiency of both lower extremities 2023     Hypertensive heart and chronic kidney disease with heart failure and stage 1 through stage 4 chronic kidney disease, or chronic kidney disease ([facility]) 10/27/2022     Myofascial pain syndrome 2022     Atrial fibrillation ([facility]) 2022     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) 2022     Ascending aortic aneurysm ([facility]) 2021     Nocturnal hypoxemia      Moderate to severe pulmonary hypertension ([facility]) 2021     Chronic obstructive pulmonary disease ([facility]) 2019     Multiple pulmonary nodules 09/10/2019     Chronic pain syndrome      Subclinical hypothyroidism 10/22/2014     Abdominal aortic aneurysm without rupture ([facility]) 2014     Aortic valve disorder 2014     Lumbar radiculopathy 2013     Benign essential hypertension 04/10/2013     Impaired fasting glucose 04/10/2013     Severe obesity (BMI 35.0-35.9 with comorbidity) ([facility]) 04/10/2013     Hyperlipidemia 2012     Microscopic hematuria 2012     Severe obstructive sleep apnea 2012     CAD (coronary artery disease) 2012       LOS (days): 8  Geometric Mean LOS (GMLOS) (days): 5  Days to GMLOS:-2.9     OBJECTIVE:  Risk  of Unplanned Readmission Score: 21.09         Current admission status: Inpatient   Preferred Pharmacy:   [facility] - [place] [address]  [address]  [address]  [facility] [address]  Phone: [phone] Fax: [phone]    [id number] - [place], [place] - [address]  [address]  [place] [address]  Phone: [phone] Fax: [phone]    [facility] - [place], [place] - [address]  [address]  [address]  [facility] [address]  Phone: [phone] Fax: [phone]    Primary Care Provider: Lea Reyes, MD    Primary Insurance: MEDICARE  Secondary Insurance: AARP    DISCHARGE DETAILS:    Discharge planning discussed with:: Patient and wife  Freedom of Choice: Yes  Comments - Freedom of Choice: [facility]  CM contacted family/caregiver?: Yes  Were Treatment Team discharge recommendations reviewed with patient/caregiver?: Yes  Did patient/caregiver verbalize understanding of patient care needs?: Yes  Were patient/caregiver advised of the risks associated with not following Treatment Team discharge recommendations?: Yes    Contacts  Patient Contacts: Dorcas  Relationship to Patient:: Family  Contact Method: In Person  Reason/Outcome: Discharge Planning    Requested Home Health Care         Is the patient interested in HHC at discharge?: Yes  Home Health Discipline requested:: Nursing, Occupational Therapy, Physical Therapy  Home Health Agency Name:: [facility]  Home Health Follow-Up Provider:: PCP  Home Health Services Needed:: Strengthening/Theraputic Exercises to Improve Function, Evaluate Functional Status and Safety  Supporting Clincal Findings:: Fatigues Easliy in Short Distances, Limited Endurance, Dyspnea with Exertion         Other Referral/Resources/Interventions Provided:  Interventions: HHC  Referral Comments: Patient for discharge today. [facility] for HHC. IMM reviewed with patient and signed.  Wife to transport         Treatment Team Recommendation: Home with Home Health Care  Discharge Destination Plan:: Home with Home Health Care  Transport at Discharge : Family                             IMM Given (Date):: 06/26/24  IMM Given to:: Patient     IMM reviewed with patient, patient agrees with discharge determination.

## 2024-06-26 NOTE — ASSESSMENT & PLAN NOTE
Lab Results   Component Value Date    HGBA1C 6.3 (H) 06/18/2024        Plan:  Continue dietary modifications  Follow-up with PCP

## 2024-06-26 NOTE — QUICK NOTE
I did attempt to inform patient's family member.  A voicemail was not set up yet so I did not put a voicemail.

## 2024-06-26 NOTE — DISCHARGE SUMMARY
[facility]  Discharge- Oscar Espinosa 1940, 83 y.o. male MRN: [id number]  Unit/Bed#: W -Anju Encounter: [id number]  Primary Care Provider: Lea Reyes, MD   Date and time admitted to hospital: 6/18/2024  2:16 PM    * Acute hypoxic respiratory failure (HCC)  Assessment & Plan  Patient presents with shortness of breath for 2 days. Past smoker, 2 packs/day, quit over 10 years ago. Sent in by pulmonologist following CT results. Admitted to the hospital the beginning of May due to pneumonia at which point he required a thoracentesis on 4/26 yielding 600ml, completed 7 days of ceftriaxone.  CT chest wo contrast (06/10/2024)- persistent loculated moderate size left pleural effusion w/ partial collapse of LLL. Stable AAA measuring 4.7 cm (since May 2024).   Patient wears a baseline of 2 L oxygen on exertion and during sleep since last hospital admission.  Chest tube placed 6/20 left chest tube   Pleural fluid culture and gram stain (06/19/2024) show 3+ polys and 2+ gram + cocci in chains, and 2+ growth of streptococcus intermedius. Extra turbid Fluid with PH 5.6, LDH >61728. Glucose <10. ,600, TP <3.0  Legionella and Strep Pneumonia antigen negative  Fluid cultures cultures strep intermedius susceptible to ceftriaxone.    Per pulmonary patient is now status post 6 instillations of tPA/dornase. Will likely hold off on further installations and monitor chest tube output    CT chest wo contrast (06/24/2024) showing improvement of pleural space this morning. Still having adequate output from chest tube, now with 400 to 500 mL over the past 24 hours. Patient will need chest tube until output is less than  mL/day. Will likely need repeat CT chest once output declines to above rate   6/25-no further chest tube output,, been removed today    Plan:  6/24 switched to Augmentin, day 9 of 14 of total antibiotics  Monitor temp  Tylenol 650 mg prn  Tessalon Perles 200 mg TID prn  Mucinex  1200 mg BID  Incentive spirometer, Flutter valve  Continue with pulmonary hygiene, out of bed to chair as tolerated, respiratory protocol  Follow-up with pulmonology office on 7/19/2024  Discharge today    Abnormal TSH  Assessment & Plan  TSH 4.559, free T4 within normal limits    Prediabetes  Assessment & Plan  Lab Results   Component Value Date    HGBA1C 6.3 (H) 06/18/2024        Plan:  Continue dietary modifications  Follow-up with PCP    Chronic combined systolic and diastolic congestive heart failure (HCC)  Assessment & Plan  Wt Readings from Last 3 Encounters:   06/13/24 99.3 kg (219 lb)   06/06/24 100 kg (221 lb)   05/20/24 101 kg (221 lb 12.8 oz)     Home regimen: torsemide 40 mg PO daily, labetolol 100 mg  Diuretics were initially held in the setting of elevated Cr. Creatinine improved.  Diuretics resumed    Plan:  Continue home regimen. Give Torsemide 40 mg in the mornings  Monitor BMP  Monitor I/O    Anemia  Assessment & Plan  On review of labs, anemia is new since April 2024.   Last labs in August 2023 were normal.   There has been a slow trend downward.   Hgb 8.4 on admission    Plan:  Monitor CBC  Transfuse if Hgb <7  Outpatient colonoscopy can be recommended    Pleural effusion with shortness of breath  Assessment & Plan  3/24 pneumonia: required a thoracentesis on 4/26 yielding 600ml, completed 7 days of ceftriaxone.   CT chest wo contrast (06/10/2024)- persistent loculated moderate size left pleural effusion w/ partial collapse of LLL. Stable AAA measuring 4.7 cm (since May 2024).   IR placed left sided chest tube and drained 300 mL of mildly frothy, slightly viscous and serosanguinous fluid and samples were sent for ordered laboratory evaluation.   Pleural fluid culture and gram stain (06/19/2024) show 3+ polys and 2+ gram + cocci in chains, and 2+ growth of streptococcus intermedius, susceptible to ceftriaxone. Extra turbid Fluid with PH 5.6, LDH >13670. Glucose <10. ,600, TP <3.0.  Patient  is now status post 6 instillations of tPA/dornase. Will likely hold off on further installations and monitor chest tube output    CT chest wo contrast (06/24/2024) showing improvement of pleural space this morning   Patient is maintaining lights criteria, exudative pleural effusion    Plan  Status post chest tube removal, continue    Atrial fibrillation (HCC)  Assessment & Plan  Home regimen: Amiodorone 200 mg p.o. daily, labetolol 100 mg BID, warfarin 2.5 mg  Warfarin held prior to IR procedure     Recent Labs     06/24/24  0450 06/25/24  0510 06/26/24  0528   INR 3.14* 3.26* 2.81*           Plan:  Continue amiodarone, labetolol  Continue to hold warfarin until INR is below 2 (2 x 24-hour apart)  PT/ INR monitoring    Chronic obstructive pulmonary disease (HCC)  Assessment & Plan  Not in exacerbations, albuterol as needed.     Chronic pain syndrome  Assessment & Plan  Home regimen: gabapentin 900 mg HS  PT/OT recommended level III.    Plan:  Reduce dose of gabapentin if further elevation of Creatinine    CAD (coronary artery disease)  Assessment & Plan  Home regimen: ASA 81 mg, lipitor 40 mg, labetolol 100 mg bid    Plan:  Continue home regimen    Elevated serum creatinine-resolved as of 6/26/2024  Assessment & Plan  Baseline Cr 0.9-1.1 prior to May 2024  Elevated to 1.66 as of May 2024 and has been stable since then  Currently at baseline    Hyponatremia-resolved as of 6/26/2024  Assessment & Plan  Patient currently stable.         Medical Problems       Resolved Problems  Date Reviewed: 6/24/2024            Resolved    Hyponatremia 6/26/2024     Resolved by  Winnie Gruber MD    Elevated serum creatinine 6/26/2024     Resolved by  Winnie Gruber MD    Overview Deleted 6/18/2024  6:57 PM by Js Connor                Discharging Resident: Winnie Gruber MD  Discharging Attending: Mick Montelongo MD  PCP: Lea Reyes, MD  Admission Date:   Admission Orders (From admission, onward)       Ordered         06/18/24 1533  INPATIENT ADMISSION  Once                          Discharge Date: 06/26/24    Consultations During Hospital Stay:  Pulmonology    Procedures Performed:   Left-sided chest tube placement for parapneumonic effusion on the left pleural cavity    Significant Findings / Test Results:   Streptococcus intermedius in the pleural fluid susceptible to co-amoxiclav    Incidental Findings:   no  I reviewed the above mentioned incidental findings with the patient and/or family and they expressed understanding.    Test Results Pending at Discharge (will require follow up):  no     Outpatient Tests Requested:  INR 2 times (24-hour apart)    Complications: Parapneumonic effusion s/p chest tube    Reason for Admission: Fever, chills, productive sputum    Hospital Course:   Oscar Espinosa is a 83 y.o. male patient who originally presented to the hospital on 6/18/2024 with past medical history of CHF, CAD, atrial fibrillation, chronic hypoxic respiratory failure (2 L baseline nasal cannula oxygen), recent episode of pneumonia s/p thoracocentesis and patient was treated with ceftriaxone for 7 days.    During this admission, patient complained of fevers, chills, chest discomfort, shortness of breath,'s productive sputum with blood-tinged.    On admission, chest x-ray shows a left pleural effusion, left lower lobe opacity can represent atelectasis and possible pneumonia.  IR guided thoracocentesis was done and shows positive lights criteria in the background of parapneumonic effusion in the background of ceftriaxone.  After the IR guided thoracocentesis, repeated CT chest shows decreased loculated pleural effusion on the left side.      Pleural fluid analysis showed Streptococcus intermedius.  In the early days of this admission, patient was treated with ceftriaxone, azithromycin, vancomycin in ED and then patient was put on cefepime.    Streptococcus intermedius was susceptible to co-amoxiclav and then patient was  "switched to oral co-amoxiclav and pulmonary medicine was consulted.  Pulmonologist did recommend co-amoxiclav for total 2 weeks through 7/2/2024.    Because of thoracocentesis, patient's warfarin was stopped and patient was asked to follow-up with outpatient PCP for INR workup before he starts warfarin.    On 6/26, patient's chest tube was removed and patient was stable to be discharged.    Please see above list of diagnoses and related plan for additional information.     Condition at Discharge: good    Discharge Day Visit / Exam:   Subjective: Patient is stable and doing well.  As per the patient and nurse, there were no significant overnight events.  Patient was bit disappointed because chest tube was not removed yesterday, which seem to be a valid reason.  Vitals: Blood Pressure: 124/64 (06/26/24 0717)  Pulse: 68 (06/26/24 0717)  Temperature: 98 °F (36.7 °C) (06/26/24 0717)  Temp Source: Oral (06/23/24 1520)  Respirations: 16 (06/26/24 0717)  Height: 5' 11\" (180.3 cm) (06/18/24 1921)  SpO2: 96 % (06/26/24 0717)  Exam:   Physical Exam   Vitals and nursing note reviewed.   Constitutional:       General: He is not in acute distress.     Appearance: He is obese.  HENT:      Head: Normocephalic and atraumatic.   Eyes:      Conjunctiva/sclera: Conjunctivae normal.   Cardiovascular:      Rate and Rhythm: Normal rate and regular rhythm.      Heart sounds: No murmur heard.  Pulmonary:      Effort: Pulmonary effort is normal. No respiratory distress.      Breath sounds: Normal breath sounds.      Comments: Chest tube was placed early in the morning, which got removed later on.  Abdominal:      Palpations: Abdomen is soft.      Tenderness: There is no abdominal tenderness.   Musculoskeletal:         General: No swelling.      Cervical back: Neck supple.   Skin:     General: Skin is warm and dry.      Capillary Refill: Capillary refill takes less than 2 seconds.   Neurological:      Mental Status: He is alert. "   Psychiatric:         Mood and Affect: Mood normal.   Discussion with Family: Will attempt informing patient's family member    Discharge instructions/Information to patient and family:   See after visit summary for information provided to patient and family.      Provisions for Follow-Up Care:  See after visit summary for information related to follow-up care and any pertinent home health orders.      Mobility at time of Discharge:   Basic Mobility Inpatient Raw Score: 21  JH-HLM Goal: 6: Walk 10 steps or more  JH-HLM Achieved: 6: Walk 10 steps or more  HLM Goal achieved. Continue to encourage appropriate mobility.     Disposition:   Home    Planned Readmission: no    Discharge Medications:  See after visit summary for reconciled discharge medications provided to patient and/or family.      **Please Note: This note may have been constructed using a voice recognition system**

## 2024-06-27 ENCOUNTER — TELEPHONE (OUTPATIENT)
Dept: INTERNAL MEDICINE CLINIC | Facility: CLINIC | Age: 84
End: 2024-06-27

## 2024-06-27 ENCOUNTER — TRANSITIONAL CARE MANAGEMENT (OUTPATIENT)
Dept: INTERNAL MEDICINE CLINIC | Facility: CLINIC | Age: 84
End: 2024-06-27

## 2024-06-27 NOTE — TELEPHONE ENCOUNTER
Spoke with wife and scheduled TCM with Dr Castaneda and resident. Is this ok? If you want him scheduled with you please let us know when and where to schedule.    She also stated that he needs INR w/in two days of each other, 6/28 and 6/29 as per the notes from the hospital. Can you please place those?    Please helio VELÁSQUEZ clerical- when we call wife said leave message if she does not .

## 2024-06-27 NOTE — TELEPHONE ENCOUNTER
Ok to see resident since I will be away. Cardiology manages INR. Staff- please call cardiology coumadin clinic to place order.

## 2024-06-28 ENCOUNTER — HOME CARE VISIT (OUTPATIENT)
Dept: HOME HEALTH SERVICES | Facility: HOME HEALTHCARE | Age: 84
End: 2024-06-28

## 2024-06-28 NOTE — TELEPHONE ENCOUNTER
Patients wife called in regarding INR. She already has lab set for this Tuesday is she to keep this and the order that was placed from today 6/28 @ 430a is this good fro what they were D/C'd with from the hospital for back to back INR 6/28 & 6/29.    Please reach out to patient to help her with this.  Thank you

## 2024-06-29 ENCOUNTER — HOME CARE VISIT (OUTPATIENT)
Dept: HOME HEALTH SERVICES | Facility: HOME HEALTHCARE | Age: 84
End: 2024-06-29
Payer: MEDICARE

## 2024-06-29 VITALS
TEMPERATURE: 98 F | SYSTOLIC BLOOD PRESSURE: 112 MMHG | WEIGHT: 210 LBS | DIASTOLIC BLOOD PRESSURE: 70 MMHG | HEIGHT: 71 IN | HEART RATE: 56 BPM | OXYGEN SATURATION: 93 % | RESPIRATION RATE: 18 BRPM | BODY MASS INDEX: 29.4 KG/M2

## 2024-06-29 PROCEDURE — 400013 VN SOC

## 2024-06-29 PROCEDURE — G0299 HHS/HOSPICE OF RN EA 15 MIN: HCPCS

## 2024-06-29 PROCEDURE — 10330081 VN NO-PAY CLAIM PROCEDURE

## 2024-07-01 ENCOUNTER — ANTICOAG VISIT (OUTPATIENT)
Dept: CARDIOLOGY CLINIC | Facility: CLINIC | Age: 84
End: 2024-07-01

## 2024-07-01 ENCOUNTER — APPOINTMENT (OUTPATIENT)
Dept: LAB | Facility: CLINIC | Age: 84
End: 2024-07-01
Payer: MEDICARE

## 2024-07-01 DIAGNOSIS — I48.0 PAROXYSMAL ATRIAL FIBRILLATION (HCC): Primary | ICD-10-CM

## 2024-07-02 ENCOUNTER — OFFICE VISIT (OUTPATIENT)
Dept: INTERNAL MEDICINE CLINIC | Facility: CLINIC | Age: 84
End: 2024-07-02
Payer: MEDICARE

## 2024-07-02 VITALS
WEIGHT: 219 LBS | BODY MASS INDEX: 30.66 KG/M2 | HEIGHT: 71 IN | OXYGEN SATURATION: 98 % | HEART RATE: 56 BPM | SYSTOLIC BLOOD PRESSURE: 104 MMHG | DIASTOLIC BLOOD PRESSURE: 68 MMHG

## 2024-07-02 DIAGNOSIS — J18.9 PNEUMONIA OF LEFT LOWER LOBE DUE TO INFECTIOUS ORGANISM: ICD-10-CM

## 2024-07-02 DIAGNOSIS — D64.9 ANEMIA, UNSPECIFIED TYPE: Primary | ICD-10-CM

## 2024-07-02 DIAGNOSIS — D51.8 VITAMIN B12 DEFICIENCY (DIETARY) ANEMIA: ICD-10-CM

## 2024-07-02 DIAGNOSIS — D52.9 ANEMIA DUE TO FOLIC ACID DEFICIENCY, UNSPECIFIED DEFICIENCY TYPE: ICD-10-CM

## 2024-07-02 PROCEDURE — 99496 TRANSJ CARE MGMT HIGH F2F 7D: CPT

## 2024-07-02 NOTE — ASSESSMENT & PLAN NOTE
Patient has recent hospitalization for the diagnosis of pneumonia of left lung  It was found to be loculated and chest tube was placed  Chest tube removed on 6/26 before discharge  Denies any chest pain, shortness of breath, cough  Since hospital discharge he has been requiring 2 L oxygen when he is moving  Completed Augmentin on 7/1/2024    Plan:  Continue oxygen as needed 2 L via nasal cannula  Monitor for symptoms  Follow-up with pulmonology

## 2024-07-02 NOTE — ASSESSMENT & PLAN NOTE
His recent hemoglobin is 8.1  Iron panel shows iron 12, TIBC 145, saturation 8%, ferritin 133  His hemoglobin was 11-14 before last hospitalization  No complaints of bleeding  Could not discharge iron therapy for recent infection    Plan:  Ordered CBC, haptoglobin, LDH, MMA, vitamin B12, folic acid levels, reticulocyte  Follow-up with Dr. Reyes end of July

## 2024-07-02 NOTE — PROGRESS NOTES
Transition of Care Visit  Name: Oscar Espinosa      : 1940      MRN: [id number]  Encounter Provider: Debra Watts MD  Encounter Date: 2024   Encounter department: [facility]    Assessment & Plan   1. Anemia, unspecified type  Assessment & Plan:  His recent hemoglobin is 8.1  Iron panel shows iron 12, TIBC 145, saturation 8%, ferritin 133  His hemoglobin was 11-14 before last hospitalization  No complaints of bleeding  Could not discharge iron therapy for recent infection    Plan:  Ordered CBC, haptoglobin, LDH, MMA, vitamin B12, folic acid levels, reticulocyte  Follow-up with Dr. Reyes end   Orders:  -     Vitamin B12; Future; Expected date: 2024  -     Folate; Future; Expected date: 2024  -     Haptoglobin; Future; Expected date: 2024  -     LD,Blood; Future; Expected date: 2024  -     CBC and differential; Future  -     Reticulocytes; Future; Expected date: 2024  -     Methylmalonic acid, serum; Future; Expected date: 2024  2. Pneumonia of left lower lobe due to infectious organism  Assessment & Plan:  Patient has recent hospitalization for the diagnosis of pneumonia of left lung  It was found to be loculated and chest tube was placed  Chest tube removed on  before discharge  Denies any chest pain, shortness of breath, cough  Since hospital discharge he has been requiring 2 L oxygen when he is moving  Completed Augmentin on 2024    Plan:  Continue oxygen as needed 2 L via nasal cannula  Monitor for symptoms  Follow-up with pulmonology  3. Vitamin B12 deficiency (dietary) anemia  -     Vitamin B12; Future; Expected date: 2024  4. 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 Reyes end of July  Orders:  -     Folate; Future; Expected date: 07/09/2024         History of Present Illness     Transitional Care Management Review:   Oscar Espinosa is a 83 y.o. male here for TCM follow up.     During the TCM phone call patient stated:  TCM Call       Date and time call was made  6/27/2024 10:06 AM    Hospital care reviewed  Records not available    Patient was hospitialized at  [facility]    Date of Admission  06/18/24    Date of discharge  06/26/24    Diagnosis  Acute hypoxic respiratory failure (HCC)    Disposition  Home    Were the patients medications reviewed and updated  No    Current Symptoms  None          TCM Call       Post hospital issues  None    Should patient be enrolled in anticoag monitoring?  Yes    Scheduled for follow up?  Yes    Did you obtain your prescribed medications  Yes    Do you need help managing your prescriptions or medications  No    Is transportation to your appointment needed  No    I have advised the patient to call PCP with any new or worsening symptoms  Samantha Harry - /[place]    Are you recieving any outpatient services  No    Are you recieving home care services  Yes    Types of home care services  Nurse visit; Home health aid    Are you using any community resources  No    Have you fallen in the last 12 months  No    Interperter language line needed  No    Counseling  Patient          Recheck FREDERIC Espinosa a 83-year-old male with PMH of A-fib on warfarin, acute respiratory failure with hypoxia, CHF, BPH, chronic pain, COPD who came in today for follow-up after recent hospitalization with a diagnosis of pneumonia.  He denies any complaints.  No fever, chills, chest pain, shortness of breath, cough.  He has been requiring 2 L oxygen on ambulation.  Today he came here for follow-up after hospital discharge and he finished  "antibiotic course yesterday 7/1/2024.  During hospitalization his hemoglobin was 8.1 and he was told to follow-up with his primary care to discuss more about anemia.      Review of Systems   Constitutional:  Positive for activity change. Negative for appetite change, chills, diaphoresis, fever and unexpected weight change.   HENT:  Negative for congestion, rhinorrhea, trouble swallowing and voice change.    Eyes:  Negative for visual disturbance.   Respiratory:  Negative for cough, choking, chest tightness, shortness of breath, wheezing and stridor.    Cardiovascular:  Negative for chest pain, palpitations and leg swelling.   Gastrointestinal:  Negative for abdominal distention, abdominal pain, anal bleeding, blood in stool, diarrhea, nausea and vomiting.   Endocrine: Negative for polyuria.   Genitourinary:  Negative for decreased urine volume, difficulty urinating and enuresis.   Musculoskeletal:  Negative for arthralgias, back pain, gait problem and joint swelling.   Skin:  Negative for color change, pallor, rash and wound.   Neurological:  Negative for dizziness, tremors, weakness, light-headedness, numbness and headaches.   Psychiatric/Behavioral:  Negative for agitation, behavioral problems, confusion and sleep disturbance. The patient is not nervous/anxious.      Objective     /68 (BP Location: Left arm, Patient Position: Sitting, Cuff Size: Standard)   Pulse 56   Ht 5' 11\" (1.803 m)   Wt 99.3 kg (219 lb)   SpO2 98%   BMI 30.54 kg/m²     Physical Exam  Vitals and nursing note reviewed.   Constitutional:       General: He is not in acute distress.     Appearance: He is well-developed.   HENT:      Head: Normocephalic and atraumatic.      Nose: Nose normal.      Mouth/Throat:      Mouth: Mucous membranes are moist.      Pharynx: Oropharynx is clear.   Eyes:      Conjunctiva/sclera: Conjunctivae normal.   Cardiovascular:      Rate and Rhythm: Normal rate and regular rhythm.      Heart sounds: No murmur " heard.  Pulmonary:      Effort: Pulmonary effort is normal. No respiratory distress.      Breath sounds: Normal breath sounds.   Abdominal:      Palpations: Abdomen is soft.      Tenderness: There is no abdominal tenderness.   Musculoskeletal:         General: No swelling.      Cervical back: Neck supple.   Skin:     General: Skin is warm and dry.      Capillary Refill: Capillary refill takes less than 2 seconds.   Neurological:      General: No focal deficit present.      Mental Status: He is alert and oriented to person, place, and time.   Psychiatric:         Mood and Affect: Mood normal.       Medications have been reviewed by provider in current encounter    Administrative Statements   I have spent a total time of 30 minutes in caring for this patient on the day of the visit/encounter including Diagnostic results, Risks and benefits of tx options, Instructions for management, Patient and family education, Counseling / Coordination of care, Documenting in the medical record, Reviewing / ordering tests, medicine, procedures  , and Obtaining or reviewing history  .

## 2024-07-03 ENCOUNTER — HOME CARE VISIT (OUTPATIENT)
Dept: HOME HEALTH SERVICES | Facility: HOME HEALTHCARE | Age: 84
End: 2024-07-03
Payer: MEDICARE

## 2024-07-03 VITALS
SYSTOLIC BLOOD PRESSURE: 104 MMHG | OXYGEN SATURATION: 94 % | TEMPERATURE: 97.4 F | HEART RATE: 69 BPM | DIASTOLIC BLOOD PRESSURE: 64 MMHG | RESPIRATION RATE: 18 BRPM

## 2024-07-03 PROCEDURE — G0300 HHS/HOSPICE OF LPN EA 15 MIN: HCPCS

## 2024-07-05 ENCOUNTER — HOME CARE VISIT (OUTPATIENT)
Dept: HOME HEALTH SERVICES | Facility: HOME HEALTHCARE | Age: 84
End: 2024-07-05
Payer: MEDICARE

## 2024-07-05 PROCEDURE — G0151 HHCP-SERV OF PT,EA 15 MIN: HCPCS

## 2024-07-06 ENCOUNTER — HOSPITAL ENCOUNTER (INPATIENT)
Facility: HOSPITAL | Age: 84
LOS: 1 days | Discharge: HOME HEALTH | DRG: 315 | End: 2024-07-08
Attending: EMERGENCY MEDICINE | Admitting: INTERNAL MEDICINE
Payer: MEDICARE

## 2024-07-06 ENCOUNTER — APPOINTMENT (EMERGENCY)
Dept: RADIOLOGY | Facility: HOSPITAL | Age: 84
DRG: 315 | End: 2024-07-06
Payer: MEDICARE

## 2024-07-06 DIAGNOSIS — N17.9 ACUTE KIDNEY INJURY (HCC): ICD-10-CM

## 2024-07-06 DIAGNOSIS — I48.21 PERMANENT ATRIAL FIBRILLATION (HCC): ICD-10-CM

## 2024-07-06 DIAGNOSIS — I50.32 CHRONIC DIASTOLIC CONGESTIVE HEART FAILURE (HCC): ICD-10-CM

## 2024-07-06 DIAGNOSIS — I13.0 HYPERTENSIVE HEART AND CHRONIC KIDNEY DISEASE WITH HEART FAILURE AND STAGE 1 THROUGH STAGE 4 CHRONIC KIDNEY DISEASE, OR CHRONIC KIDNEY DISEASE (HCC): ICD-10-CM

## 2024-07-06 DIAGNOSIS — I95.9 HYPOTENSION: Primary | ICD-10-CM

## 2024-07-06 DIAGNOSIS — E86.0 DEHYDRATION: ICD-10-CM

## 2024-07-06 DIAGNOSIS — I50.9 CHF (CONGESTIVE HEART FAILURE) (HCC): ICD-10-CM

## 2024-07-06 LAB
2HR DELTA HS TROPONIN: 0 NG/L
ALBUMIN SERPL BCG-MCNC: 2.9 G/DL (ref 3.5–5)
ALP SERPL-CCNC: 112 U/L (ref 34–104)
ALT SERPL W P-5'-P-CCNC: 20 U/L (ref 7–52)
ANION GAP SERPL CALCULATED.3IONS-SCNC: 5 MMOL/L (ref 4–13)
APTT PPP: 37 SECONDS (ref 23–37)
AST SERPL W P-5'-P-CCNC: 21 U/L (ref 13–39)
BASOPHILS # BLD AUTO: 0.05 THOUSANDS/ÂΜL (ref 0–0.1)
BASOPHILS NFR BLD AUTO: 1 % (ref 0–1)
BILIRUB SERPL-MCNC: 0.62 MG/DL (ref 0.2–1)
BILIRUB UR QL STRIP: NEGATIVE
BNP SERPL-MCNC: 703 PG/ML (ref 0–100)
BUN SERPL-MCNC: 19 MG/DL (ref 5–25)
CALCIUM ALBUM COR SERPL-MCNC: 9.3 MG/DL (ref 8.3–10.1)
CALCIUM SERPL-MCNC: 8.4 MG/DL (ref 8.4–10.2)
CARDIAC TROPONIN I PNL SERPL HS: 12 NG/L
CARDIAC TROPONIN I PNL SERPL HS: 12 NG/L
CHLORIDE SERPL-SCNC: 94 MMOL/L (ref 96–108)
CLARITY UR: CLEAR
CO2 SERPL-SCNC: 36 MMOL/L (ref 21–32)
COLOR UR: COLORLESS
CREAT SERPL-MCNC: 1.32 MG/DL (ref 0.6–1.3)
EOSINOPHIL # BLD AUTO: 0.12 THOUSAND/ÂΜL (ref 0–0.61)
EOSINOPHIL NFR BLD AUTO: 2 % (ref 0–6)
ERYTHROCYTE [DISTWIDTH] IN BLOOD BY AUTOMATED COUNT: 19.7 % (ref 11.6–15.1)
FLUAV RNA RESP QL NAA+PROBE: NEGATIVE
FLUBV RNA RESP QL NAA+PROBE: NEGATIVE
GFR SERPL CREATININE-BSD FRML MDRD: 49 ML/MIN/1.73SQ M
GLUCOSE SERPL-MCNC: 87 MG/DL (ref 65–140)
GLUCOSE UR STRIP-MCNC: NEGATIVE MG/DL
HCT VFR BLD AUTO: 26.5 % (ref 36.5–49.3)
HGB BLD-MCNC: 8 G/DL (ref 12–17)
HGB UR QL STRIP.AUTO: NEGATIVE
IMM GRANULOCYTES # BLD AUTO: 0.02 THOUSAND/UL (ref 0–0.2)
IMM GRANULOCYTES NFR BLD AUTO: 0 % (ref 0–2)
INR PPP: 1.75 (ref 0.84–1.19)
KETONES UR STRIP-MCNC: NEGATIVE MG/DL
LEUKOCYTE ESTERASE UR QL STRIP: NEGATIVE
LIPASE SERPL-CCNC: 12 U/L (ref 11–82)
LYMPHOCYTES # BLD AUTO: 0.69 THOUSANDS/ÂΜL (ref 0.6–4.47)
LYMPHOCYTES NFR BLD AUTO: 13 % (ref 14–44)
MCH RBC QN AUTO: 28.9 PG (ref 26.8–34.3)
MCHC RBC AUTO-ENTMCNC: 30.2 G/DL (ref 31.4–37.4)
MCV RBC AUTO: 96 FL (ref 82–98)
MONOCYTES # BLD AUTO: 0.55 THOUSAND/ÂΜL (ref 0.17–1.22)
MONOCYTES NFR BLD AUTO: 11 % (ref 4–12)
NEUTROPHILS # BLD AUTO: 3.81 THOUSANDS/ÂΜL (ref 1.85–7.62)
NEUTS SEG NFR BLD AUTO: 73 % (ref 43–75)
NITRITE UR QL STRIP: NEGATIVE
NRBC BLD AUTO-RTO: 0 /100 WBCS
PH UR STRIP.AUTO: 7 [PH]
PLATELET # BLD AUTO: 200 THOUSANDS/UL (ref 149–390)
PMV BLD AUTO: 9.8 FL (ref 8.9–12.7)
POTASSIUM SERPL-SCNC: 4.3 MMOL/L (ref 3.5–5.3)
PROT SERPL-MCNC: 6.9 G/DL (ref 6.4–8.4)
PROT UR STRIP-MCNC: NEGATIVE MG/DL
PROTHROMBIN TIME: 21.3 SECONDS (ref 11.6–14.5)
RBC # BLD AUTO: 2.77 MILLION/UL (ref 3.88–5.62)
RSV RNA RESP QL NAA+PROBE: NEGATIVE
SARS-COV-2 RNA RESP QL NAA+PROBE: NEGATIVE
SODIUM SERPL-SCNC: 135 MMOL/L (ref 135–147)
SP GR UR STRIP.AUTO: 1.01 (ref 1–1.03)
UROBILINOGEN UR STRIP-ACNC: <2 MG/DL
WBC # BLD AUTO: 5.24 THOUSAND/UL (ref 4.31–10.16)

## 2024-07-06 PROCEDURE — 81003 URINALYSIS AUTO W/O SCOPE: CPT | Performed by: EMERGENCY MEDICINE

## 2024-07-06 PROCEDURE — 99285 EMERGENCY DEPT VISIT HI MDM: CPT | Performed by: EMERGENCY MEDICINE

## 2024-07-06 PROCEDURE — 85025 COMPLETE CBC W/AUTO DIFF WBC: CPT | Performed by: EMERGENCY MEDICINE

## 2024-07-06 PROCEDURE — 0241U HB NFCT DS VIR RESP RNA 4 TRGT: CPT | Performed by: EMERGENCY MEDICINE

## 2024-07-06 PROCEDURE — 96360 HYDRATION IV INFUSION INIT: CPT

## 2024-07-06 PROCEDURE — 71045 X-RAY EXAM CHEST 1 VIEW: CPT

## 2024-07-06 PROCEDURE — 83880 ASSAY OF NATRIURETIC PEPTIDE: CPT | Performed by: EMERGENCY MEDICINE

## 2024-07-06 PROCEDURE — 80053 COMPREHEN METABOLIC PANEL: CPT | Performed by: EMERGENCY MEDICINE

## 2024-07-06 PROCEDURE — 99285 EMERGENCY DEPT VISIT HI MDM: CPT

## 2024-07-06 PROCEDURE — 83690 ASSAY OF LIPASE: CPT | Performed by: EMERGENCY MEDICINE

## 2024-07-06 PROCEDURE — 99223 1ST HOSP IP/OBS HIGH 75: CPT

## 2024-07-06 PROCEDURE — 93005 ELECTROCARDIOGRAM TRACING: CPT

## 2024-07-06 PROCEDURE — 85730 THROMBOPLASTIN TIME PARTIAL: CPT | Performed by: EMERGENCY MEDICINE

## 2024-07-06 PROCEDURE — 84484 ASSAY OF TROPONIN QUANT: CPT | Performed by: EMERGENCY MEDICINE

## 2024-07-06 PROCEDURE — 85610 PROTHROMBIN TIME: CPT | Performed by: EMERGENCY MEDICINE

## 2024-07-06 PROCEDURE — 36415 COLL VENOUS BLD VENIPUNCTURE: CPT | Performed by: EMERGENCY MEDICINE

## 2024-07-06 RX ORDER — SENNOSIDES 8.6 MG
1 TABLET ORAL 2 TIMES DAILY
Status: DISCONTINUED | OUTPATIENT
Start: 2024-07-06 | End: 2024-07-08 | Stop reason: HOSPADM

## 2024-07-06 RX ORDER — WARFARIN SODIUM 2.5 MG/1
2.5 TABLET ORAL EVERY OTHER DAY
Status: DISCONTINUED | OUTPATIENT
Start: 2024-07-08 | End: 2024-07-08

## 2024-07-06 RX ORDER — GABAPENTIN 300 MG/1
900 CAPSULE ORAL
Status: DISCONTINUED | OUTPATIENT
Start: 2024-07-06 | End: 2024-07-08 | Stop reason: HOSPADM

## 2024-07-06 RX ORDER — ATORVASTATIN CALCIUM 40 MG/1
40 TABLET, FILM COATED ORAL DAILY
Status: DISCONTINUED | OUTPATIENT
Start: 2024-07-07 | End: 2024-07-08 | Stop reason: HOSPADM

## 2024-07-06 RX ORDER — WARFARIN SODIUM 2.5 MG/1
1.25 TABLET ORAL
Status: COMPLETED | OUTPATIENT
Start: 2024-07-06 | End: 2024-07-06

## 2024-07-06 RX ORDER — LABETALOL 100 MG/1
100 TABLET, FILM COATED ORAL DAILY
Status: DISCONTINUED | OUTPATIENT
Start: 2024-07-07 | End: 2024-07-08 | Stop reason: HOSPADM

## 2024-07-06 RX ORDER — ACETAMINOPHEN 325 MG/1
975 TABLET ORAL EVERY 8 HOURS PRN
Status: DISCONTINUED | OUTPATIENT
Start: 2024-07-06 | End: 2024-07-08 | Stop reason: HOSPADM

## 2024-07-06 RX ORDER — POLYETHYLENE GLYCOL 3350 17 G/17G
17 POWDER, FOR SOLUTION ORAL DAILY
Status: DISCONTINUED | OUTPATIENT
Start: 2024-07-07 | End: 2024-07-08 | Stop reason: HOSPADM

## 2024-07-06 RX ORDER — TAMSULOSIN HYDROCHLORIDE 0.4 MG/1
0.4 CAPSULE ORAL DAILY
Status: DISCONTINUED | OUTPATIENT
Start: 2024-07-06 | End: 2024-07-08 | Stop reason: HOSPADM

## 2024-07-06 RX ORDER — WARFARIN SODIUM 2.5 MG/1
2.5 TABLET ORAL DAILY
Status: DISCONTINUED | OUTPATIENT
Start: 2024-07-07 | End: 2024-07-06

## 2024-07-06 RX ORDER — ALBUTEROL SULFATE 90 UG/1
2 AEROSOL, METERED RESPIRATORY (INHALATION) EVERY 6 HOURS PRN
Status: DISCONTINUED | OUTPATIENT
Start: 2024-07-06 | End: 2024-07-08 | Stop reason: HOSPADM

## 2024-07-06 RX ORDER — WARFARIN SODIUM 2.5 MG/1
1.25 TABLET ORAL EVERY OTHER DAY
Status: DISCONTINUED | OUTPATIENT
Start: 2024-07-07 | End: 2024-07-08

## 2024-07-06 RX ORDER — GABAPENTIN 300 MG/1
300 CAPSULE ORAL
Status: DISCONTINUED | OUTPATIENT
Start: 2024-07-07 | End: 2024-07-08 | Stop reason: HOSPADM

## 2024-07-06 RX ORDER — AMIODARONE HYDROCHLORIDE 200 MG/1
200 TABLET ORAL DAILY
Status: DISCONTINUED | OUTPATIENT
Start: 2024-07-07 | End: 2024-07-08 | Stop reason: HOSPADM

## 2024-07-06 RX ADMIN — TAMSULOSIN HYDROCHLORIDE 0.4 MG: 0.4 CAPSULE ORAL at 22:33

## 2024-07-06 RX ADMIN — SENNOSIDES 8.6 MG: 8.6 TABLET, FILM COATED ORAL at 22:33

## 2024-07-06 RX ADMIN — ASPIRIN 81 MG: 81 TABLET, COATED ORAL at 22:32

## 2024-07-06 RX ADMIN — SODIUM CHLORIDE 500 ML: 0.9 INJECTION, SOLUTION INTRAVENOUS at 17:05

## 2024-07-06 RX ADMIN — GABAPENTIN 900 MG: 300 CAPSULE ORAL at 22:32

## 2024-07-06 RX ADMIN — WARFARIN SODIUM 1.25 MG: 2.5 TABLET ORAL at 22:34

## 2024-07-06 NOTE — ED PROVIDER NOTES
Pt Name: Oscar Espinosa  MRN: [id number]  Birthdate 1940  Age/Sex: 83 y.o. male  Date of evaluation: 7/6/2024  PCP: Lea Reyes, MD    CHIEF COMPLAINT    Chief Complaint   Patient presents with    Hypotension     Pt presenting for hypotension at home, the PT who was at his house yesterday noticed his blood pressure was lower than normal for him (102/46). He states some lightheadedness, denies any falls. Today his bp at home was 86/40 around 2pm. He held his labetalol this AM per cardiologist. Pt also states feeling more SOB, wears 2L NC PRN at home. Pt recently started taking tramadol.          HPI    83 y.o. male presenting with lightheadedness and hypotension.  Patient states that he was feeling slightly lightheaded at home yesterday, noted to have a blood pressure of 102/46 which is lower than his usual.  He took his beta-blocker as prescribed last night, today was  instructed to hold his labetalol this morning by his cardiologist and did not take same.  He did note a blood pressure of 86/40 at home around 2 PM, also felt lightheaded at that time, came to the ER.  Patient does note a recent admission for pneumonia as well as need for chest tube for a large pleural effusion, states that he typically wears 2 L nasal cannula as needed at home as needed that oxygen more frequently.  He also recently started taking tramadol for pain.  He denies fever, trauma, chest pain, other symptoms.      HPI      Past Medical and Surgical History    Past Medical History:   Diagnosis Date    Abdominal aortic aneurysm (HCC)     s/p repair    Abnormal ECG july 2022    Acute on chronic congestive heart failure (HCC) 09/11/2019    Acute respiratory failure with hypoxia ([facility]) 05/06/2024    Aneurysm ([facility]) 2007    Aneurysm of abdominal aorta ([facility])     49mm, trileaflet AV    Atrial fibrillation (HCC)     Eliquis    BPH (benign prostatic hyperplasia)     CAD (coronary artery disease)     s/p CABGx3    CHF (congestive heart failure)  (HCC)     Chronic pain     Gabapentin    Chronic pain disorder     lumbar    COPD (chronic obstructive pulmonary disease) (HCC)     Coronary artery disease     Elevated serum creatinine 2024    Elevated transaminase level 2024    Former tobacco use     Hyperlipidemia     Hypertension     Hyponatremia 2024    Hyponatremia 2024    Hypothyroidism     Lumbar radiculopathy     Lumbar stenosis     s/p injections    Neuropathy     Obesity (BMI 30-39.9)     YEVGENIY (obstructive sleep apnea)     unable to tolerate CPAP    Platelets decreased (HCC) 2022    Pulmonary hypertension (HCC)     moderate to severe    Spondylolisthesis of lumbar region     Visual impairment     Vitamin D deficiency        Past Surgical History:   Procedure Laterality Date    ABDOMINAL AORTIC ANEURYSM REPAIR  2007    2 dock limbs with visc extens prosth    CARDIAC CATHETERIZATION      COLONOSCOPY      CORONARY ARTERY BYPASS GRAFT      LIMA to LAD, sequential SVG to OM1 & OM2, SVG to RDA    IR CHEST TUBE PLACEMENT  2024    LUMBAR EPIDURAL INJECTION         Family History   Problem Relation Age of Onset    Heart disease Mother     Stroke Father         stroke syndrome    Aneurysm Brother         abdominal    Aortic aneurysm Brother         ascending    Coronary artery disease Family     Hypertension Family     Other Son         gioblastoma multiforme       Social History     Tobacco Use    Smoking status: Former     Current packs/day: 0.00     Average packs/day: 1 pack/day for 55.6 years (55.6 ttl pk-yrs)     Types: Cigarettes     Start date: 1957     Quit date: 2012     Years since quittin.9     Passive exposure: Past    Smokeless tobacco: Never   Vaping Use    Vaping status: Never Used   Substance Use Topics    Alcohol use: Yes     Alcohol/week: 21.0 standard drinks of alcohol     Types: 21 Cans of beer per week    Drug use: No           Allergies    Allergies   Allergen Reactions    Meloxicam  Edema       Home Medications    Prior to Admission medications    Medication Sig Start Date End Date Taking? Authorizing Provider   acetaminophen (TYLENOL) 325 mg tablet Take 2 tablets by mouth every 6 (six) hours as needed for fever, headaches, mild pain, moderate pain or severe pain. Indications: Fever, Pain    Historical Provider, MD   albuterol (ProAir HFA) 90 mcg/act inhaler Inhale 2 puffs every 6 (six) hours as needed for wheezing 6/26/24   Winnie Gruber MD   amiodarone 200 mg tablet Take 1 tablet (200 mg total) by mouth daily 6/26/24   Winnie Gruber MD   aspirin (ECOTRIN LOW STRENGTH) 81 mg EC tablet Take 1 tablet by mouth daily 9/7/12   Historical Provider, MD   atorvastatin (LIPITOR) 40 mg tablet Take 1 tablet (40 mg total) by mouth daily 9/13/23   Oamr Kessler MD   cholecalciferol (VITAMIN D3) 1,000 units tablet Take 2,000 Units by mouth daily    Historical Provider, MD   FIBER ADULT GUMMIES PO Take 1 caplet by mouth daily     Historical Provider, MD   gabapentin (Neurontin) 300 mg capsule Take 1 capsule (300 mg total) by mouth 2 (two) times a day AND 3 capsules (900 mg total) daily at bedtime. 5/8/24   Lea Reyes, MD   labetalol (NORMODYNE) 100 mg tablet Take 1 tablet (100 mg total) by mouth 2 (two) times a day 9/21/23   Raza Alan DO   multivitamin (THERAGRAN) TABS Take 1 tablet by mouth daily    Historical Provider, MD   oxygen gas Inhale 2 L continuous. 2L of oxygen via nasal cannula as needed and at night  Indications: short of breath    Historical Provider, MD   potassium chloride (Klor-Con M20) 20 mEq tablet Take one tab daily, 7 days a week. 6/13/24   Mi Estrada MD   senna (SENOKOT) 8.6 MG tablet Take 1 tablet by mouth as needed    Historical Provider, MD   tamsulosin (FLOMAX) 0.4 mg Take 1 capsule by mouth daily    Historical Provider, MD   torsemide (DEMADEX) 20 mg tablet Take 2 tablets (40 mg total) by mouth daily 6/10/24   Lea Reyes, MD   traMADol (ULTRAM) 50 mg  tablet Take 50 mg by mouth every 6 (six) hours as needed for moderate pain. Indications: Pain    Historical Provider, MD   warfarin (Coumadin) 2.5 mg tablet TAKE 1 TABLET BY MOUTH DAILY Do not start before June 27, 2024. 6/27/24   Winnie Gruber MD           Review of Systems    Review of Systems   Constitutional:  Positive for fatigue. Negative for appetite change, chills and diaphoresis.   HENT:  Negative for drooling, facial swelling, trouble swallowing and voice change.    Respiratory:  Positive for shortness of breath. Negative for apnea and wheezing.    Cardiovascular:  Negative for chest pain and leg swelling.   Gastrointestinal:  Negative for abdominal distention, abdominal pain, diarrhea, nausea and vomiting.   Genitourinary:  Negative for dysuria and urgency.   Musculoskeletal:  Negative for arthralgias, back pain, gait problem and neck pain.   Skin:  Negative for color change, rash and wound.   Neurological:  Positive for light-headedness. Negative for seizures, speech difficulty, weakness and headaches.   Psychiatric/Behavioral:  Negative for agitation, behavioral problems and dysphoric mood. The patient is not nervous/anxious.            All other systems reviewed and negative.    Physical Exam      ED Triage Vitals [07/06/24 1526]   Temperature Pulse Respirations Blood Pressure SpO2   98.4 °F (36.9 °C) 57 18 114/55 96 %      Temp Source Heart Rate Source Patient Position - Orthostatic VS BP Location FiO2 (%)   Oral Monitor Sitting Left arm --      Pain Score       --               Physical Exam  Vitals and nursing note reviewed.   Constitutional:       General: He is not in acute distress.     Appearance: He is well-developed. He is not ill-appearing, toxic-appearing or diaphoretic.   HENT:      Head: Normocephalic and atraumatic.      Right Ear: External ear normal.      Left Ear: External ear normal.      Nose: Nose normal. No congestion or rhinorrhea.      Mouth/Throat:      Mouth: Mucous  membranes are moist.      Pharynx: Oropharynx is clear. No oropharyngeal exudate or posterior oropharyngeal erythema.   Eyes:      Conjunctiva/sclera: Conjunctivae normal.      Pupils: Pupils are equal, round, and reactive to light.   Neck:      Trachea: No tracheal deviation.   Cardiovascular:      Rate and Rhythm: Normal rate and regular rhythm.      Pulses: Normal pulses.      Heart sounds: Normal heart sounds. No murmur heard.  Pulmonary:      Effort: Pulmonary effort is normal. No respiratory distress.      Breath sounds: No stridor. Rales present. No wheezing.      Comments: Bibasilar crackles, more prominent on the left  Abdominal:      General: There is no distension.      Palpations: Abdomen is soft.      Tenderness: There is no abdominal tenderness. There is no guarding or rebound.   Musculoskeletal:         General: No deformity. Normal range of motion.      Cervical back: Normal range of motion and neck supple. No rigidity or tenderness.      Right lower leg: No edema.   Skin:     General: Skin is warm and dry.      Capillary Refill: Capillary refill takes less than 2 seconds.      Findings: No rash.   Neurological:      Mental Status: He is alert and oriented to person, place, and time.   Psychiatric:         Behavior: Behavior normal.         Thought Content: Thought content normal.         Judgment: Judgment normal.              Diagnostic Results    EKG Interpretation    Rate:  55  BPM  Rhythm:    Sinus bradycardia   Axis:  Normal   Intervals: First-degree AV block QTc  484 ms  Q waves:  No pathologic Q waves   T waves:  Normal   ST segments:  No significant elevations or depressions     Impression: Sinus bradycardia with first-degree AV block and prolonged QTc but without evidence of acute ischemia or significant arrhythmia      EKG for comparison: EKG dated 18 June 2024 showed A-fib, otherwise similar in character with no major changes    EKG interpreted by me.     Labs:    Results Reviewed        Procedure Component Value Units Date/Time    HS Troponin I 2hr [[id number]]  (Normal) Collected: 07/06/24 1819    Lab Status: Final result Specimen: Blood from Line, Venous Updated: 07/06/24 1849     hs TnI 2hr 12 ng/L      Delta 2hr hsTnI 0 ng/L     Comprehensive metabolic panel [[id number]]  (Abnormal) Collected: 07/06/24 1603    Lab Status: Final result Specimen: Blood from Arm, Left Updated: 07/06/24 1720     Sodium 135 mmol/L      Potassium 4.3 mmol/L      Chloride 94 mmol/L      CO2 36 mmol/L      ANION GAP 5 mmol/L      BUN 19 mg/dL      Creatinine 1.32 mg/dL      Glucose 87 mg/dL      Calcium 8.4 mg/dL      Corrected Calcium 9.3 mg/dL      AST 21 U/L      ALT 20 U/L      Alkaline Phosphatase 112 U/L      Total Protein 6.9 g/dL      Albumin 2.9 g/dL      Total Bilirubin 0.62 mg/dL      eGFR 49 ml/min/1.73sq m     Narrative:      National Kidney Disease Foundation guidelines for Chronic Kidney Disease (CKD):     Stage 1 with normal or high GFR (GFR > 90 mL/min/1.73 square meters)    Stage 2 Mild CKD (GFR = 60-89 mL/min/1.73 square meters)    Stage 3A Moderate CKD (GFR = 45-59 mL/min/1.73 square meters)    Stage 3B Moderate CKD (GFR = 30-44 mL/min/1.73 square meters)    Stage 4 Severe CKD (GFR = 15-29 mL/min/1.73 square meters)    Stage 5 End Stage CKD (GFR <15 mL/min/1.73 square meters)  Note: GFR calculation is accurate only with a steady state creatinine    Lipase [[id number]]  (Normal) Collected: 07/06/24 1603    Lab Status: Final result Specimen: Blood from Arm, Left Updated: 07/06/24 1720     Lipase 12 u/L     UA (URINE) with reflex to Scope [[id number]] Collected: 07/06/24 1701    Lab Status: Final result Specimen: Urine, Other Updated: 07/06/24 1709     Color, UA Colorless     Clarity, UA Clear     Specific Gravity, UA 1.006     pH, UA 7.0     Leukocytes, UA Negative     Nitrite, UA Negative     Protein, UA Negative mg/dl      Glucose, UA Negative mg/dl      Ketones, UA Negative mg/dl      Urobilinogen, UA  <2.0 mg/dl      Bilirubin, UA Negative     Occult Blood, UA Negative    FLU/RSV/COVID - if FLU/RSV clinically relevant [[id number]]  (Normal) Collected: 07/06/24 1600    Lab Status: Final result Specimen: Nares from Nose Updated: 07/06/24 1658     SARS-CoV-2 Negative     INFLUENZA A PCR Negative     INFLUENZA B PCR Negative     RSV PCR Negative    Narrative:      FOR PEDIATRIC PATIENTS - copy/paste COVID Guidelines URL to browser: [email or web address]    SARS-CoV-2 assay is a Nucleic Acid Amplification assay intended for the  qualitative detection of nucleic acid from SARS-CoV-2 in nasopharyngeal  swabs. Results are for the presumptive identification of SARS-CoV-2 RNA.    Positive results are indicative of infection with SARS-CoV-2, the virus  causing COVID-19, but do not rule out bacterial infection or co-infection  with other viruses. Laboratories within the [place] and its  territories are required to report all positive results to the appropriate  public health authorities. Negative results do not preclude SARS-CoV-2  infection and should not be used as the sole basis for treatment or other  patient management decisions. Negative results must be combined with  clinical observations, patient history, and epidemiological information.  This test has not been FDA cleared or approved.    This test has been authorized by FDA under an Emergency Use Authorization  (EUA). This test is only authorized for the duration of time the  declaration that circumstances exist justifying the authorization of the  emergency use of an in vitro diagnostic tests for detection of SARS-CoV-2  virus and/or diagnosis of COVID-19 infection under section 564(b)(1) of  the Act, 21 U.S.C. 360bbb-3(b)(1), unless the authorization is terminated  or revoked sooner. The test has been validated but independent review by FDA  and CLIA is pending.    Test performed using [employer]:  This RT-PCR assay targets N2,  a region unique to SARS-CoV-2. A conserved region in the E-gene was chosen  for pan-Sarbecovirus detection which includes SARS-CoV-2.    According to CMS-2020-01-R, this platform meets the definition of high-throughput technology.    Protime-INR [[id number]]  (Abnormal) Collected: 07/06/24 1600    Lab Status: Final result Specimen: Blood from Arm, Left Updated: 07/06/24 1635     Protime 21.3 seconds      INR 1.75    APTT [[id number]]  (Normal) Collected: 07/06/24 1600    Lab Status: Final result Specimen: Blood from Arm, Left Updated: 07/06/24 1635     PTT 37 seconds     HS Troponin I 4hr [[id number]]     Lab Status: No result Specimen: Blood     HS Troponin 0hr (reflex protocol) [[id number]]  (Normal) Collected: 07/06/24 1600    Lab Status: Final result Specimen: Blood from Arm, Left Updated: 07/06/24 1631     hs TnI 0hr 12 ng/L     B-Type Natriuretic Peptide(BNP) [[id number]]  (Abnormal) Collected: 07/06/24 1600    Lab Status: Final result Specimen: Blood from Arm, Left Updated: 07/06/24 1630      pg/mL     CBC and differential [[id number]]  (Abnormal) Collected: 07/06/24 1600    Lab Status: Final result Specimen: Blood from Arm, Left Updated: 07/06/24 1607     WBC 5.24 Thousand/uL      RBC 2.77 Million/uL      Hemoglobin 8.0 g/dL      Hematocrit 26.5 %      MCV 96 fL      MCH 28.9 pg      MCHC 30.2 g/dL      RDW 19.7 %      MPV 9.8 fL      Platelets 200 Thousands/uL      nRBC 0 /100 WBCs      Segmented % 73 %      Immature Grans % 0 %      Lymphocytes % 13 %      Monocytes % 11 %      Eosinophils Relative 2 %      Basophils Relative 1 %      Absolute Neutrophils 3.81 Thousands/µL      Absolute Immature Grans 0.02 Thousand/uL      Absolute Lymphocytes 0.69 Thousands/µL      Absolute Monocytes 0.55 Thousand/µL      Eosinophils Absolute 0.12 Thousand/µL      Basophils Absolute 0.05 Thousands/µL             All labs reviewed and utilized in the medical decision making  process    Radiology:    XR chest 1 view portable   Final Result      Mild pulmonary venous congestion. Redemonstration of left pleural thickening and left base atelectasis/scar. Residual left pleural effusion not excluded.            Workstation performed: [id number]             All radiology studies independently viewed by me and interpreted by the radiologist.    Procedure    Procedures        ED Course of Care and Re-Assessments      Patient became recurrently hypotensive, given IV fluid bolus of 500 mL with resolution of same.  Labs remarkable for evidence of dehydration with mild LITO as well as elevated BNP, chest x-ray remarkable for pulmonary venous congestion.  Discussed with patient, patient and wife are more comfortable with observation admission at this time rather than discharge home which seems reasonable based on anticipated several days at least until patient can be followed up outpatient, as well as clinical situation of evidence of dehydration and acute kidney injury coupled with some volume overload.    Medications   sodium chloride 0.9 % bolus 500 mL (0 mL Intravenous Stopped 7/6/24 1818)           FINAL IMPRESSION    Final diagnoses:   Hypotension   Acute kidney injury (HCC)   Dehydration   CHF (congestive heart failure) (HCC)         DISPOSITION/PLAN    Presentation as above with hypotension in the setting of LITO and dehydration as well as congestive heart failure.  Suspect transient hypotension potentiated by dehydration, with some concern for accumulation of patient's home labetalol in the setting of LITO.  Vital signs examination as above.  Patient does appear to be volume overloaded extra vascularly based on chest x-ray as well as swelling in the legs, although appears overall volume down and intravascularly dry.  Given 500 mL bolus of IV fluids in ER, during ER stay patient output approximately 1 L of urine.  After initial management in ER, admitted to internal medicine for further care,  "hemodynamically stable and comfortable at that time.  Time reflects when diagnosis was documented in both MDM as applicable and the Disposition within this note       Time User Action Codes Description Comment    7/6/2024  8:11 PM Joel Acuña Add [I95.9] Hypotension     7/6/2024  8:11 PM Joel Acuña Add [N17.9] Acute kidney injury (HCC)     7/6/2024  8:11 PM Joel Acuña Add [E86.0] Dehydration     7/6/2024  8:11 PM Joel Acuña Add [I50.9] CHF (congestive heart failure) (HCC)           ED Disposition       ED Disposition   Admit    Condition   Stable    Date/Time   Sat Jul 6, 2024  8:11 PM    Comment   Case was discussed with [facility] and the patient's admission status was agreed to be Admission Status: observation status to the service of Dr. Prieto .               Follow-up Information    None           PATIENT REFERRED TO:    No follow-up provider specified.    DISCHARGE MEDICATIONS:    Patient's Medications   Discharge Prescriptions    No medications on file       No discharge procedures on file.         Joel Acuña MD    Portions of the record may have been created with voice recognition software.  Occasional wrong word or \"sound alike\" substitutions may have occurred due to the inherent limitations of voice recognition software.  Please read the chart carefully and recognize, using context, where substitutions have occurred     Joel Acuña MD  07/06/24 2027    "

## 2024-07-06 NOTE — ED NOTES
Patient and wife decided they would like to leave, they want to know what they should do with patients labetalol. Provider aware.      Urmila Arguello RN  07/06/24 8395

## 2024-07-07 ENCOUNTER — HOME CARE VISIT (OUTPATIENT)
Dept: HOME HEALTH SERVICES | Facility: HOME HEALTHCARE | Age: 84
End: 2024-07-07
Payer: MEDICARE

## 2024-07-07 LAB
ANION GAP SERPL CALCULATED.3IONS-SCNC: 3 MMOL/L (ref 4–13)
ATRIAL RATE: 55 BPM
BACTERIA UR QL AUTO: ABNORMAL /HPF
BASOPHILS # BLD AUTO: 0.06 THOUSANDS/ÂΜL (ref 0–0.1)
BASOPHILS NFR BLD AUTO: 1 % (ref 0–1)
BILIRUB UR QL STRIP: NEGATIVE
BUN SERPL-MCNC: 19 MG/DL (ref 5–25)
CALCIUM SERPL-MCNC: 8.3 MG/DL (ref 8.4–10.2)
CHLORIDE SERPL-SCNC: 95 MMOL/L (ref 96–108)
CLARITY UR: CLEAR
CO2 SERPL-SCNC: 37 MMOL/L (ref 21–32)
COLOR UR: ABNORMAL
CREAT SERPL-MCNC: 1.27 MG/DL (ref 0.6–1.3)
EOSINOPHIL # BLD AUTO: 0.18 THOUSAND/ÂΜL (ref 0–0.61)
EOSINOPHIL NFR BLD AUTO: 3 % (ref 0–6)
ERYTHROCYTE [DISTWIDTH] IN BLOOD BY AUTOMATED COUNT: 19.8 % (ref 11.6–15.1)
GFR SERPL CREATININE-BSD FRML MDRD: 52 ML/MIN/1.73SQ M
GLUCOSE SERPL-MCNC: 88 MG/DL (ref 65–140)
GLUCOSE UR STRIP-MCNC: NEGATIVE MG/DL
HCT VFR BLD AUTO: 27.5 % (ref 36.5–49.3)
HGB BLD-MCNC: 8.4 G/DL (ref 12–17)
HGB UR QL STRIP.AUTO: NEGATIVE
HYALINE CASTS #/AREA URNS LPF: ABNORMAL /LPF
IMM GRANULOCYTES # BLD AUTO: 0.04 THOUSAND/UL (ref 0–0.2)
IMM GRANULOCYTES NFR BLD AUTO: 1 % (ref 0–2)
INR PPP: 1.66 (ref 0.84–1.19)
KETONES UR STRIP-MCNC: NEGATIVE MG/DL
LEUKOCYTE ESTERASE UR QL STRIP: NEGATIVE
LYMPHOCYTES # BLD AUTO: 1.13 THOUSANDS/ÂΜL (ref 0.6–4.47)
LYMPHOCYTES NFR BLD AUTO: 22 % (ref 14–44)
MAGNESIUM SERPL-MCNC: 2.1 MG/DL (ref 1.9–2.7)
MCH RBC QN AUTO: 29.4 PG (ref 26.8–34.3)
MCHC RBC AUTO-ENTMCNC: 30.5 G/DL (ref 31.4–37.4)
MCV RBC AUTO: 96 FL (ref 82–98)
MONOCYTES # BLD AUTO: 0.73 THOUSAND/ÂΜL (ref 0.17–1.22)
MONOCYTES NFR BLD AUTO: 14 % (ref 4–12)
NEUTROPHILS # BLD AUTO: 3.11 THOUSANDS/ÂΜL (ref 1.85–7.62)
NEUTS SEG NFR BLD AUTO: 59 % (ref 43–75)
NITRITE UR QL STRIP: NEGATIVE
NON-SQ EPI CELLS URNS QL MICRO: ABNORMAL /HPF
NRBC BLD AUTO-RTO: 0 /100 WBCS
P AXIS: 73 DEGREES
PH UR STRIP.AUTO: 6.5 [PH]
PLATELET # BLD AUTO: 225 THOUSANDS/UL (ref 149–390)
PMV BLD AUTO: 10.3 FL (ref 8.9–12.7)
POTASSIUM SERPL-SCNC: 3.8 MMOL/L (ref 3.5–5.3)
PR INTERVAL: 268 MS
PROT UR STRIP-MCNC: NEGATIVE MG/DL
PROTHROMBIN TIME: 20.4 SECONDS (ref 11.6–14.5)
QRS AXIS: 65 DEGREES
QRSD INTERVAL: 102 MS
QT INTERVAL: 506 MS
QTC INTERVAL: 484 MS
RBC # BLD AUTO: 2.86 MILLION/UL (ref 3.88–5.62)
RBC #/AREA URNS AUTO: ABNORMAL /HPF
SODIUM SERPL-SCNC: 135 MMOL/L (ref 135–147)
SP GR UR STRIP.AUTO: 1.01 (ref 1–1.03)
T WAVE AXIS: 37 DEGREES
UROBILINOGEN UR STRIP-ACNC: <2 MG/DL
VENTRICULAR RATE: 55 BPM
WBC # BLD AUTO: 5.25 THOUSAND/UL (ref 4.31–10.16)
WBC #/AREA URNS AUTO: ABNORMAL /HPF

## 2024-07-07 PROCEDURE — 81001 URINALYSIS AUTO W/SCOPE: CPT

## 2024-07-07 PROCEDURE — 99233 SBSQ HOSP IP/OBS HIGH 50: CPT | Performed by: INTERNAL MEDICINE

## 2024-07-07 PROCEDURE — 85610 PROTHROMBIN TIME: CPT | Performed by: HOSPITALIST

## 2024-07-07 PROCEDURE — 93010 ELECTROCARDIOGRAM REPORT: CPT | Performed by: INTERNAL MEDICINE

## 2024-07-07 PROCEDURE — 80048 BASIC METABOLIC PNL TOTAL CA: CPT | Performed by: HOSPITALIST

## 2024-07-07 PROCEDURE — 83735 ASSAY OF MAGNESIUM: CPT | Performed by: HOSPITALIST

## 2024-07-07 PROCEDURE — 85025 COMPLETE CBC W/AUTO DIFF WBC: CPT | Performed by: HOSPITALIST

## 2024-07-07 RX ORDER — TORSEMIDE 20 MG/1
20 TABLET ORAL DAILY
Status: DISCONTINUED | OUTPATIENT
Start: 2024-07-07 | End: 2024-07-08 | Stop reason: HOSPADM

## 2024-07-07 RX ORDER — WARFARIN SODIUM 1 MG/1
1 TABLET ORAL
Status: COMPLETED | OUTPATIENT
Start: 2024-07-07 | End: 2024-07-07

## 2024-07-07 RX ADMIN — TAMSULOSIN HYDROCHLORIDE 0.4 MG: 0.4 CAPSULE ORAL at 21:44

## 2024-07-07 RX ADMIN — TORSEMIDE 20 MG: 20 TABLET ORAL at 17:23

## 2024-07-07 RX ADMIN — WARFARIN SODIUM 1 MG: 1 TABLET ORAL at 17:22

## 2024-07-07 RX ADMIN — WARFARIN SODIUM 1.25 MG: 2.5 TABLET ORAL at 17:23

## 2024-07-07 RX ADMIN — AMIODARONE HYDROCHLORIDE 200 MG: 200 TABLET ORAL at 09:31

## 2024-07-07 RX ADMIN — SENNOSIDES 8.6 MG: 8.6 TABLET, FILM COATED ORAL at 17:23

## 2024-07-07 RX ADMIN — POLYETHYLENE GLYCOL 3350 17 G: 17 POWDER, FOR SOLUTION ORAL at 09:30

## 2024-07-07 RX ADMIN — ASPIRIN 81 MG: 81 TABLET, COATED ORAL at 21:44

## 2024-07-07 RX ADMIN — Medication 2000 UNITS: at 09:30

## 2024-07-07 RX ADMIN — GABAPENTIN 900 MG: 300 CAPSULE ORAL at 21:44

## 2024-07-07 RX ADMIN — SENNOSIDES 8.6 MG: 8.6 TABLET, FILM COATED ORAL at 09:31

## 2024-07-07 RX ADMIN — LABETALOL HYDROCHLORIDE 100 MG: 100 TABLET, FILM COATED ORAL at 09:31

## 2024-07-07 RX ADMIN — ATORVASTATIN CALCIUM 40 MG: 40 TABLET, FILM COATED ORAL at 09:30

## 2024-07-07 NOTE — ASSESSMENT & PLAN NOTE
Creatinine baseline appears to be 1-1.1.  Mildly elevated on admission 1.32  Patient given 500 mL bolus in the emergency department.  Will hold off on further fluids given CXR showing mild pulmonary vascular congestion  Obtain UA  Obtain bladder scan  Trend BMP daily  Avoid nephrotoxic medication

## 2024-07-07 NOTE — ASSESSMENT & PLAN NOTE
Patient presents with lightheadedness and hypotension reports feeling lightheaded at home yesterday noted to have blood pressure 102/46.  He took blood pressure medication last night and noticed that his blood pressure this evening was 86/40 and felt lightheaded.  He denies any presyncope or recent falls. Of note patient recently started on tramadol for lower back pain.  He reports taking dose yesterday morning, evening and once again this morning.  Emergency department patient had stable blood pressures ranging from 100-130's.  Patient had transient drop in blood pressure x 1 with SBP in 80's which resolved 10 minutes later.  EKG showing normal sinus rhythm with first-degree AV block  Cardiology recently decreased patient's home labetalol frequency from twice daily to once daily.  Orthostatic blood pressures in the emergency department negative  Continue to monitor blood pressure closely  Hold off on tramadol as this may be contributing.  Advised patient to change positions slowly.  Blood pressure is stable, will resume torsemide at 20 mg daily, at home dose is 40 mg daily, will continue labetalol 100 mg daily, monitor blood pressure,

## 2024-07-07 NOTE — CASE COMMUNICATION
1215...Patients wife called into office to cancel vists for Monday 7/8/24 as patient admitted to hospital.

## 2024-07-07 NOTE — ASSESSMENT & PLAN NOTE
Patient recently started on tramadol for management of chronic lower back pain.  Will hold this in the setting of hypotension.  Continue as needed analgesia with Tylenol

## 2024-07-07 NOTE — H&P
[facility]  H&P  Name: Oscar Espinosa 83 y.o. male I MRN: [id number]  Unit/Bed#: ED-17 I Date of Admission: 7/6/2024   Date of Service: 7/6/2024 I Hospital Day: 0      Assessment & Plan   * Hypotension  Assessment & Plan  Patient presents with lightheadedness and hypotension reports feeling lightheaded at home yesterday noted to have blood pressure 102/46.  He took blood pressure medication last night and noticed that his blood pressure this evening was 86/40 and felt lightheaded.  He denies any presyncope or recent falls. Of note patient recently started on tramadol for lower back pain.  He reports taking dose yesterday morning, evening and once again this morning.  Emergency department patient had stable blood pressures ranging from 100-130's.  Patient had transient drop in blood pressure x 1 with SBP in 80's which resolved 10 minutes later.  EKG showing normal sinus rhythm with first-degree AV block  Cardiology recently decreased patient's home labetalol frequency from twice daily to once daily.  Orthostatic blood pressures in the emergency department negative  Continue to monitor blood pressure closely  Hold off on tramadol as this may be contributing.  Advised patient to change positions slowly.    Chronic combined systolic and diastolic congestive heart failure (HCC)  Assessment & Plan  Wt Readings from Last 3 Encounters:   07/02/24 99.3 kg (219 lb)   06/29/24 95.3 kg (210 lb)   06/13/24 99.3 kg (219 lb)   Patient reports continued nasal cannula chronically as needed at home.  He has been having to use it more over the past month following previous admission for pneumonia and required chest tube for pleural effusion.  Has worsening lower extremity edema that patient attributes to noncompliance with Ace wraps at home.  Lung fields clear bilaterally  CXR showing mild pulmonary vascular congestion    3/24 LVEF 47% systolic function mildly reduced there is global mild  hypokinesis.  Diastolic function abnormal.  Patient does not appear overtly volume overloaded.  Patient received 500 mill fluid bolus in the ED will hold off on further fluids.  Will hold off on IV diuretics at this point.  If patient's symptoms worsen consider trial of IV Lasix.  Continue daily weights and I's and O's    Elevated serum creatinine  Assessment & Plan  Creatinine baseline appears to be 1-1.1.  Mildly elevated on admission 1.32  Patient given 500 mL bolus in the emergency department.  Will hold off on further fluids given CXR showing mild pulmonary vascular congestion  Obtain UA  Obtain bladder scan  Trend BMP daily  Avoid nephrotoxic medication    Atrial fibrillation (HCC)  Assessment & Plan  Patient on amiodarone, labetalol, Coumadin for A-fib  EKG appears to be sinus bradycardia with first-degree AV block  PT/INR 1.75 currently subtherapeutic will give additional dose of Coumadin 1.25 tonight.  Alternates 2.5 mg Coumadin and 1.25 mg Coumadin every other day  Recheck PT/INR in the morning goal 2-3      Chronic obstructive pulmonary disease (HCC)  Assessment & Plan  Does not appear in acute exacerbation at this time Home albuterol    Lumbar radiculopathy  Assessment & Plan  Patient recently started on tramadol for management of chronic lower back pain.  Will hold this in the setting of hypotension.  Continue as needed analgesia with Tylenol      CAD (coronary artery disease)  Assessment & Plan  Continue home ASA, Lipitor, labetalol    Severe obstructive sleep apnea  Assessment & Plan  Intolerant to CPAP continue 2 L nasal cannula at bedtime         VTE Pharmacologic Prophylaxis:   Moderate Risk (Score 3-4) - Pharmacological DVT Prophylaxis Ordered: warfarin (Coumadin).  Code Status: Level 3 - DNAR and DNI   Discussion with family: Updated  (wife) via phone.    Anticipated Length of Stay: Patient will be admitted on an observation basis with an anticipated length of stay of less than 2  midnights secondary to hypotension.    Total Time Spent on Date of Encounter in care of patient: 75 mins. This time was spent on one or more of the following: performing physical exam; counseling and coordination of care; obtaining or reviewing history; documenting in the medical record; reviewing/ordering tests, medications or procedures; communicating with other healthcare professionals and discussing with patient's family/caregivers.    Chief Complaint: Hypotension    History of Present Illness:  Oscar Espinosa is a 83 y.o. male with a PMH of COPD, A-fib, CAD, YEVGENIY, lumbar radiculopathy, CHF, pulmonary hypertension, aortic aneurysm who presents with hypotension.    Patient reports hypotension at home was told by home physical therapist to come to the emergency department for further evaluation.  Patient reports he was recently started on tramadol for his chronic lower back pain.  He took first dose of medication yesterday morning and additional dose yesterday evening.  Yesterday evening he noticed that he had blood pressure of 102/46 which is abnormally low for him.  He stopped taking his labetalol dose in the evening and reach out to his cardiologist who recommended taking labetalol once daily.  On 7-6 patient took tramadol in the morning and his labetalol.  He noticed around 1400 that he had blood pressure of 86/40.  He has associated lightheadedness at that time he denies any chest pain, nausea, vomiting, diaphoresis, palpitations at that time.  He noticed that when he change positions quickly his symptoms were worse.  He reports eating and drinking okay at home has been compliant with his occasions.  He wears 2 L nasal cannula at bedtime for sleep but over the past month has been requiring to use his oxygen more frequently.  This has been ongoing since before his prior admission with pneumonia and pleural effusions in June.  At my time evaluation patient reports that he is asymptomatic.  He had ambulated to  the bathroom and denies further incidence of dizziness, shortness of breath, or lightheadedness.  He denies any alcohol use, tobacco use, or illicit drug use.      Review of Systems:  Review of Systems   Constitutional:  Positive for appetite change. Negative for activity change, chills, diaphoresis, fatigue and fever.   HENT:  Negative for ear pain and sore throat.    Eyes:  Negative for pain and visual disturbance.   Respiratory:  Positive for shortness of breath. Negative for cough, choking and chest tightness.    Cardiovascular:  Positive for leg swelling. Negative for chest pain and palpitations.   Gastrointestinal:  Positive for constipation. Negative for abdominal distention, abdominal pain, diarrhea, nausea and vomiting.   Genitourinary:  Negative for dysuria and hematuria.   Musculoskeletal:  Negative for arthralgias and back pain.   Skin:  Negative for color change and rash.   Neurological:  Positive for light-headedness. Negative for dizziness, tremors, seizures, syncope, facial asymmetry, speech difficulty, weakness, numbness and headaches.   Psychiatric/Behavioral: Negative.     All other systems reviewed and are negative.      Past Medical and Surgical History:   Past Medical History:   Diagnosis Date    Abdominal aortic aneurysm (HCC)     s/p repair    Abnormal ECG july 2022    Acute on chronic congestive heart failure (HCC) 09/11/2019    Acute respiratory failure with hypoxia ([facility]) 05/06/2024    Aneurysm ([facility]) 2007    Aneurysm of abdominal aorta ([facility])     49mm, trileaflet AV    Atrial fibrillation ([facility])     Eliquis    BPH (benign prostatic hyperplasia)     CAD (coronary artery disease)     s/p CABGx3    CHF (congestive heart failure) ([facility])     Chronic pain     Gabapentin    Chronic pain disorder     lumbar    COPD (chronic obstructive pulmonary disease) ([facility])     Coronary artery disease     Elevated serum creatinine 06/18/2024    Elevated transaminase level 04/27/2024    Former tobacco use      Hyperlipidemia     Hypertension     Hyponatremia 04/27/2024    Hyponatremia 04/27/2024    Hypothyroidism     Lumbar radiculopathy     Lumbar stenosis     s/p injections    Neuropathy     Obesity (BMI 30-39.9)     YEVGENIY (obstructive sleep apnea)     unable to tolerate CPAP    Platelets decreased (HCC) 07/27/2022    Pulmonary hypertension (HCC)     moderate to severe    Spondylolisthesis of lumbar region     Visual impairment 2022    Vitamin D deficiency        Past Surgical History:   Procedure Laterality Date    ABDOMINAL AORTIC ANEURYSM REPAIR  08/09/2007    2 dock limbs with visc extens prosth    CARDIAC CATHETERIZATION      COLONOSCOPY      CORONARY ARTERY BYPASS GRAFT  2003    LIMA to LAD, sequential SVG to OM1 & OM2, SVG to RDA    IR CHEST TUBE PLACEMENT  6/19/2024    LUMBAR EPIDURAL INJECTION         Meds/Allergies:  Prior to Admission medications    Medication Sig Start Date End Date Taking? Authorizing Provider   acetaminophen (TYLENOL) 325 mg tablet Take 2 tablets by mouth every 6 (six) hours as needed for fever, headaches, mild pain, moderate pain or severe pain. Indications: Fever, Pain    Historical Provider, MD   albuterol (ProAir HFA) 90 mcg/act inhaler Inhale 2 puffs every 6 (six) hours as needed for wheezing 6/26/24   Winnie Gruber MD   amiodarone 200 mg tablet Take 1 tablet (200 mg total) by mouth daily 6/26/24   Winnie Gruber MD   aspirin (ECOTRIN LOW STRENGTH) 81 mg EC tablet Take 1 tablet by mouth daily 9/7/12   Historical Provider, MD   atorvastatin (LIPITOR) 40 mg tablet Take 1 tablet (40 mg total) by mouth daily 9/13/23   Omar Kessler MD   cholecalciferol (VITAMIN D3) 1,000 units tablet Take 2,000 Units by mouth daily    Historical Provider, MD   FIBER ADULT GUMMIES PO Take 1 caplet by mouth daily     Historical Provider, MD   gabapentin (Neurontin) 300 mg capsule Take 1 capsule (300 mg total) by mouth 2 (two) times a day AND 3 capsules (900 mg total) daily at bedtime. 5/8/24    Lea Reyes, MD   labetalol (NORMODYNE) 100 mg tablet Take 1 tablet (100 mg total) by mouth 2 (two) times a day 23   Raza Alan DO   multivitamin (THERAGRAN) TABS Take 1 tablet by mouth daily    Historical Provider, MD   oxygen gas Inhale 2 L continuous. 2L of oxygen via nasal cannula as needed and at night  Indications: short of breath    Historical Provider, MD   potassium chloride (Klor-Con M20) 20 mEq tablet Take one tab daily, 7 days a week. 24   Mi Estrada MD   senna (SENOKOT) 8.6 MG tablet Take 1 tablet by mouth as needed    Historical Provider, MD   tamsulosin (FLOMAX) 0.4 mg Take 1 capsule by mouth daily    Historical Provider, MD   torsemide (DEMADEX) 20 mg tablet Take 2 tablets (40 mg total) by mouth daily 6/10/24   Lea Reyes, MD   traMADol (ULTRAM) 50 mg tablet Take 50 mg by mouth every 6 (six) hours as needed for moderate pain. Indications: Pain    Historical Provider, MD   warfarin (Coumadin) 2.5 mg tablet TAKE 1 TABLET BY MOUTH DAILY Do not start before 2024. 24   Winnie Gruber MD     I have reviewed home medications with patient personally.    Allergies:   Allergies   Allergen Reactions    Meloxicam Edema       Social History:  Marital Status: /Civil Union   Occupation: N/A  Patient Pre-hospital Living Situation: Home  Patient Pre-hospital Level of Mobility: walks  Patient Pre-hospital Diet Restrictions: None  Substance Use History:   Social History     Substance and Sexual Activity   Alcohol Use Yes    Alcohol/week: 21.0 standard drinks of alcohol    Types: 21 Cans of beer per week     Social History     Tobacco Use   Smoking Status Former    Current packs/day: 0.00    Average packs/day: 1 pack/day for 55.6 years (55.6 ttl pk-yrs)    Types: Cigarettes    Start date: 1957    Quit date: 2012    Years since quittin.9    Passive exposure: Past   Smokeless Tobacco Never     Social History     Substance and Sexual Activity   Drug Use No        Family History:  Family History   Problem Relation Age of Onset    Heart disease Mother     Stroke Father         stroke syndrome    Aneurysm Brother         abdominal    Aortic aneurysm Brother         ascending    Coronary artery disease Family     Hypertension Family     Other Son         gioblastoma multiforme       Physical Exam:     Vitals:   Blood Pressure: 127/57 (24 2300)  Pulse: 55 (24 2300)  Temperature: 98.4 °F (36.9 °C) (24 1526)  Temp Source: Oral (24 1526)  Respirations: 18 (24 2300)  SpO2: 98 % (24 2300)    Physical Exam  Vitals and nursing note reviewed.   Constitutional:       General: He is not in acute distress.     Appearance: He is well-developed. He is not ill-appearing or diaphoretic.   HENT:      Head: Normocephalic and atraumatic.   Eyes:      Conjunctiva/sclera: Conjunctivae normal.   Cardiovascular:      Rate and Rhythm: Normal rate and regular rhythm.      Heart sounds: No murmur heard.  Pulmonary:      Effort: Pulmonary effort is normal. No tachypnea, accessory muscle usage or respiratory distress.      Breath sounds: Normal breath sounds. No decreased air movement or transmitted upper airway sounds. No wheezing or rales.      Comments: Lungs are clear to auscultation bilaterally  Abdominal:      General: There is no distension.      Palpations: Abdomen is soft.      Tenderness: There is no abdominal tenderness. There is no guarding.   Musculoskeletal:         General: No swelling.      Cervical back: Neck supple.      Right lower le+ Pitting Edema present.      Left lower le+ Pitting Edema present.   Skin:     General: Skin is warm and dry.      Capillary Refill: Capillary refill takes less than 2 seconds.      Comments: Venous stasis changes in bilateral lower extremities.   Neurological:      Mental Status: He is alert and oriented to person, place, and time.      GCS: GCS eye subscore is 4. GCS verbal subscore is 5. GCS motor  subscore is 6.      Cranial Nerves: No dysarthria or facial asymmetry.      Motor: No weakness or tremor.   Psychiatric:         Mood and Affect: Mood normal.          Additional Data:     Lab Results:  Results from last 7 days   Lab Units 07/06/24  1600   WBC Thousand/uL 5.24   HEMOGLOBIN g/dL 8.0*   HEMATOCRIT % 26.5*   PLATELETS Thousands/uL 200   SEGS PCT % 73   LYMPHO PCT % 13*   MONO PCT % 11   EOS PCT % 2     Results from last 7 days   Lab Units 07/06/24  1603   SODIUM mmol/L 135   POTASSIUM mmol/L 4.3   CHLORIDE mmol/L 94*   CO2 mmol/L 36*   BUN mg/dL 19   CREATININE mg/dL 1.32*   ANION GAP mmol/L 5   CALCIUM mg/dL 8.4   ALBUMIN g/dL 2.9*   TOTAL BILIRUBIN mg/dL 0.62   ALK PHOS U/L 112*   ALT U/L 20   AST U/L 21   GLUCOSE RANDOM mg/dL 87     Results from last 7 days   Lab Units 07/06/24  1600   INR  1.75*         Lab Results   Component Value Date    HGBA1C 6.3 (H) 06/18/2024    HGBA1C 6.2 (H) 02/20/2024    HGBA1C 6.1 (H) 08/17/2023           Lines/Drains:  Invasive Devices       Peripheral Intravenous Line  Duration             Peripheral IV 07/06/24 Left Antecubital <1 day                        Imaging: Reviewed radiology reports from this admission including: chest xray  XR chest 1 view portable   Final Result by Lisset Roland MD (07/06 1652)      Mild pulmonary venous congestion. Redemonstration of left pleural thickening and left base atelectasis/scar. Residual left pleural effusion not excluded.            Workstation performed: [id number]             EKG and Other Studies Reviewed on Admission:   EKG:  Sinus bradycardia with first-degree heart block.    ** Please Note: This note has been constructed using a voice recognition system. **

## 2024-07-07 NOTE — ASSESSMENT & PLAN NOTE
Patient presents with lightheadedness and hypotension reports feeling lightheaded at home yesterday noted to have blood pressure 102/46.  He took blood pressure medication last night and noticed that his blood pressure this evening was 86/40 and felt lightheaded.  He denies any presyncope or recent falls. Of note patient recently started on tramadol for lower back pain.  He reports taking dose yesterday morning, evening and once again this morning.  Emergency department patient had stable blood pressures ranging from 100-130's.  Patient had transient drop in blood pressure x 1 with SBP in 80's which resolved 10 minutes later.  EKG showing normal sinus rhythm with first-degree AV block  Cardiology recently decreased patient's home labetalol frequency from twice daily to once daily.  Orthostatic blood pressures in the emergency department negative  Continue to monitor blood pressure closely  Hold off on tramadol as this may be contributing.  Advised patient to change positions slowly.

## 2024-07-07 NOTE — ASSESSMENT & PLAN NOTE
Patient on amiodarone, labetalol, Coumadin for A-fib  EKG appears to be sinus bradycardia with first-degree AV block  PT/INR 1.75 currently subtherapeutic will give additional dose of Coumadin 1.25 tonight.  Alternates 2.5 mg Coumadin and 1.25 mg Coumadin every other day  Recheck PT/INR in the morning goal 2-3

## 2024-07-07 NOTE — ASSESSMENT & PLAN NOTE
Wt Readings from Last 3 Encounters:   07/02/24 99.3 kg (219 lb)   06/29/24 95.3 kg (210 lb)   06/13/24 99.3 kg (219 lb)   Patient reports continued nasal cannula chronically as needed at home.  He has been having to use it more over the past month following previous admission for pneumonia and required chest tube for pleural effusion.  Has worsening lower extremity edema that patient attributes to noncompliance with Ace wraps at home.  Lung fields clear bilaterally  CXR showing mild pulmonary vascular congestion    3/24 LVEF 47% systolic function mildly reduced there is global mild hypokinesis.  Diastolic function abnormal.  Patient does not appear overtly volume overloaded.  Patient received 500 mill fluid bolus in the ED will hold off on further fluids.  Will hold off on IV diuretics at this point.  If patient's symptoms worsen consider trial of IV Lasix.  Continue daily weights and I's and O's

## 2024-07-07 NOTE — ASSESSMENT & PLAN NOTE
Patient on amiodarone, labetalol, Coumadin for A-fib  EKG appears to be sinus bradycardia with first-degree AV block  PT/INR 1.75 currently subtherapeutic will give additional dose of Coumadin 1.25 tonight.  Alternates 2.5 mg Coumadin and 1.25 mg Coumadin every other day  Recheck PT/INR in the morning goal 2-3  Would give extra dose of 1mg coumadin today

## 2024-07-07 NOTE — ASSESSMENT & PLAN NOTE
Wt Readings from Last 3 Encounters:   07/07/24 98.6 kg (217 lb 6 oz)   07/02/24 99.3 kg (219 lb)   06/29/24 95.3 kg (210 lb)   Patient reports continued nasal cannula chronically as needed at home.  He has been having to use it more over the past month following previous admission for pneumonia and required chest tube for pleural effusion.  Has worsening lower extremity edema that patient attributes to noncompliance with Ace wraps at home.  Lung fields clear bilaterally  CXR showing mild pulmonary vascular congestion    3/24 LVEF 47% systolic function mildly reduced there is global mild hypokinesis.  Diastolic function abnormal.  Patient does not appear overtly volume overloaded.  Patient received 500 mill fluid bolus in the ED will hold off on further fluids.  Will hold off on IV diuretics at this point.  If patient's symptoms worsen consider trial of IV Lasix.  Continue daily weights and I's and O's  Blood pressure is stable, will resume torsemide 20mg , currently on labetalol 100 mg daily,

## 2024-07-07 NOTE — PROGRESS NOTES
formerly [facility]  Progress Note  Name: Oscar Espinosa I  MRN: [id number]  Unit/Bed#: S -01 I Date of Admission: 7/6/2024   Date of Service: 7/7/2024 I Hospital Day: 0    Assessment & Plan   Elevated serum creatinine  Assessment & Plan  Creatinine baseline appears to be 1-1.1.  Mildly elevated on admission 1.32  Patient given 500 mL bolus in the emergency department.  Will hold off on further fluids given CXR showing mild pulmonary vascular congestion  Obtain UA  Obtain bladder scan  Trend BMP daily  Avoid nephrotoxic medication    Chronic combined systolic and diastolic congestive heart failure (HCC)  Assessment & Plan  Wt Readings from Last 3 Encounters:   07/07/24 98.6 kg (217 lb 6 oz)   07/02/24 99.3 kg (219 lb)   06/29/24 95.3 kg (210 lb)   Patient reports continued nasal cannula chronically as needed at home.  He has been having to use it more over the past month following previous admission for pneumonia and required chest tube for pleural effusion.  Has worsening lower extremity edema that patient attributes to noncompliance with Ace wraps at home.  Lung fields clear bilaterally  CXR showing mild pulmonary vascular congestion    3/24 LVEF 47% systolic function mildly reduced there is global mild hypokinesis.  Diastolic function abnormal.  Patient does not appear overtly volume overloaded.  Patient received 500 mill fluid bolus in the ED will hold off on further fluids.  Will hold off on IV diuretics at this point.  If patient's symptoms worsen consider trial of IV Lasix.  Continue daily weights and I's and O's  Blood pressure is stable, will resume torsemide 20mg , currently on labetalol 100 mg daily,    Atrial fibrillation (HCC)  Assessment & Plan  Patient on amiodarone, labetalol, Coumadin for A-fib  EKG appears to be sinus bradycardia with first-degree AV block  PT/INR 1.75 currently subtherapeutic will give additional dose of Coumadin 1.25 tonight.  Alternates 2.5 mg  Coumadin and 1.25 mg Coumadin every other day  Recheck PT/INR in the morning goal 2-3  Would give extra dose of 1mg coumadin today       Chronic obstructive pulmonary disease (HCC)  Assessment & Plan  Does not appear in acute exacerbation at this time Home albuterol    Lumbar radiculopathy  Assessment & Plan  Patient recently started on tramadol for management of chronic lower back pain.  Will hold this in the setting of hypotension.  Continue as needed analgesia with Tylenol      CAD (coronary artery disease)  Assessment & Plan  Continue home ASA, Lipitor, labetalol    Severe obstructive sleep apnea  Assessment & Plan  Intolerant to CPAP continue 2 L nasal cannula at bedtime    * Hypotension  Assessment & Plan  Patient presents with lightheadedness and hypotension reports feeling lightheaded at home yesterday noted to have blood pressure 102/46.  He took blood pressure medication last night and noticed that his blood pressure this evening was 86/40 and felt lightheaded.  He denies any presyncope or recent falls. Of note patient recently started on tramadol for lower back pain.  He reports taking dose yesterday morning, evening and once again this morning.  Emergency department patient had stable blood pressures ranging from 100-130's.  Patient had transient drop in blood pressure x 1 with SBP in 80's which resolved 10 minutes later.  EKG showing normal sinus rhythm with first-degree AV block  Cardiology recently decreased patient's home labetalol frequency from twice daily to once daily.  Orthostatic blood pressures in the emergency department negative  Continue to monitor blood pressure closely  Hold off on tramadol as this may be contributing.  Advised patient to change positions slowly.  Blood pressure is stable, will resume torsemide at 20 mg daily, at home dose is 40 mg daily, will continue labetalol 100 mg daily, monitor blood pressure,             Elevated serum creatinine  Assessment & Plan  Creatinine  baseline appears to be 1-1.1.  Mildly elevated on admission 1.32  Patient given 500 mL bolus in the emergency department.  Will hold off on further fluids given CXR showing mild pulmonary vascular congestion  Obtain UA  Obtain bladder scan  Trend BMP daily  Avoid nephrotoxic medication    Chronic combined systolic and diastolic congestive heart failure (HCC)  Assessment & Plan  Wt Readings from Last 3 Encounters:   07/07/24 98.6 kg (217 lb 6 oz)   07/02/24 99.3 kg (219 lb)   06/29/24 95.3 kg (210 lb)   Patient reports continued nasal cannula chronically as needed at home.  He has been having to use it more over the past month following previous admission for pneumonia and required chest tube for pleural effusion.  Has worsening lower extremity edema that patient attributes to noncompliance with Ace wraps at home.  Lung fields clear bilaterally  CXR showing mild pulmonary vascular congestion    3/24 LVEF 47% systolic function mildly reduced there is global mild hypokinesis.  Diastolic function abnormal.  Patient does not appear overtly volume overloaded.  Patient received 500 mill fluid bolus in the ED will hold off on further fluids.  Will hold off on IV diuretics at this point.  If patient's symptoms worsen consider trial of IV Lasix.  Continue daily weights and I's and O's  Blood pressure is stable, will resume torsemide 20mg , currently on labetalol 100 mg daily,    Atrial fibrillation (HCC)  Assessment & Plan  Patient on amiodarone, labetalol, Coumadin for A-fib  EKG appears to be sinus bradycardia with first-degree AV block  PT/INR 1.75 currently subtherapeutic will give additional dose of Coumadin 1.25 tonight.  Alternates 2.5 mg Coumadin and 1.25 mg Coumadin every other day  Recheck PT/INR in the morning goal 2-3  Would give extra dose of 1mg coumadin today       Chronic obstructive pulmonary disease (HCC)  Assessment & Plan  Does not appear in acute exacerbation at this time Home albuterol    Lumbar  radiculopathy  Assessment & Plan  Patient recently started on tramadol for management of chronic lower back pain.  Will hold this in the setting of hypotension.  Continue as needed analgesia with Tylenol      CAD (coronary artery disease)  Assessment & Plan  Continue home ASA, Lipitor, labetalol    Severe obstructive sleep apnea  Assessment & Plan  Intolerant to CPAP continue 2 L nasal cannula at bedtime    * Hypotension  Assessment & Plan  Patient presents with lightheadedness and hypotension reports feeling lightheaded at home yesterday noted to have blood pressure 102/46.  He took blood pressure medication last night and noticed that his blood pressure this evening was 86/40 and felt lightheaded.  He denies any presyncope or recent falls. Of note patient recently started on tramadol for lower back pain.  He reports taking dose yesterday morning, evening and once again this morning.  Emergency department patient had stable blood pressures ranging from 100-130's.  Patient had transient drop in blood pressure x 1 with SBP in 80's which resolved 10 minutes later.  EKG showing normal sinus rhythm with first-degree AV block  Cardiology recently decreased patient's home labetalol frequency from twice daily to once daily.  Orthostatic blood pressures in the emergency department negative  Continue to monitor blood pressure closely  Hold off on tramadol as this may be contributing.  Advised patient to change positions slowly.  Blood pressure is stable, will resume torsemide at 20 mg daily, at home dose is 40 mg daily, will continue labetalol 100 mg daily, monitor blood pressure,        VTE Pharmacologic Prophylaxis:   Pharmacologic: Warfarin (Coumadin)      Mechanical VTE Prophylaxis in Place: Yes    Patient Centered Rounds: I have performed bedside rounds with nursing staff today.    Discussions with Specialists or Other Care Team Provider: yes    Education and Discussions with Family / Patient: with wife    Time Spent  for Care: 45 minutes.  More than 50% of total time spent on counseling and coordination of care as described above.    Current Length of Stay: 0 day(s)    Current Patient Status: Inpatient   Certification Statement: The patient will continue to require additional inpatient hospital stay due to hypotension and medication management,    Discharge Plan: home in 24 hrs    Code Status: Level 3 - DNAR and DNI      Subjective:   Patient feels better, has no dizziness or lightheadedness, denies of shortness of breath but however the wife reports that he gets out of breath walking a few distance,    Objective:     Vitals:   Temp (24hrs), Av.2 °F (36.8 °C), Min:97.6 °F (36.4 °C), Max:98.4 °F (36.9 °C)    Temp:  [97.6 °F (36.4 °C)-98.4 °F (36.9 °C)] 97.6 °F (36.4 °C)  HR:  [54-70] 62  Resp:  [16-18] 16  BP: ()/(51-63) 121/60  SpO2:  [91 %-100 %] 96 %  Body mass index is 30.32 kg/m².     Input and Output Summary (last 24 hours):       Intake/Output Summary (Last 24 hours) at 2024 1422  Last data filed at 2024 0900  Gross per 24 hour   Intake 1300 ml   Output 1075 ml   Net 225 ml       Physical Exam:     HEENT-PERRLA, moist oral mucosa  Neck-supple, no JVD elevation   Respiratory-decreased air entry,crackles heard at rt base  Cardiovascular system-S1, S2 heard, no murmur or gallops or rubs  Abdomen-soft, nontender, no guarding or rigidity, bowel sounds heard  Extremities-chronic Le edema present   Peripheral pulses palpable  Musculoskeletal-no contractures  Central nervous system-no acute focal neurological deficit ,no sensory or motor deficit noted.  Skin-no rash noted    Additional Data:     Labs:    Results from last 7 days   Lab Units 24  0401   WBC Thousand/uL 5.25   HEMOGLOBIN g/dL 8.4*   HEMATOCRIT % 27.5*   PLATELETS Thousands/uL 225   SEGS PCT % 59   LYMPHO PCT % 22   MONO PCT % 14*   EOS PCT % 3     Results from last 7 days   Lab Units 24  0401 24  1603   SODIUM mmol/L 135 135    POTASSIUM mmol/L 3.8 4.3   CHLORIDE mmol/L 95* 94*   CO2 mmol/L 37* 36*   BUN mg/dL 19 19   CREATININE mg/dL 1.27 1.32*   ANION GAP mmol/L 3* 5   CALCIUM mg/dL 8.3* 8.4   ALBUMIN g/dL  --  2.9*   TOTAL BILIRUBIN mg/dL  --  0.62   ALK PHOS U/L  --  112*   ALT U/L  --  20   AST U/L  --  21   GLUCOSE RANDOM mg/dL 88 87     Results from last 7 days   Lab Units 07/07/24  0401   INR  1.66*                       * I Have Reviewed All Lab Data Listed Above.  * Additional Pertinent Lab Tests Reviewed: All Labs For Current Hospital Admission Reviewed    Imaging:    Imaging Reports Reviewed Today Include: I have personally reviewed pertinent films in PACS.    Imaging Personally Reviewed by Myself Includes:  Same as above    Recent Cultures (last 7 days):           Last 24 Hours Medication List:   Current Facility-Administered Medications   Medication Dose Route Frequency Provider Last Rate    acetaminophen  975 mg Oral Q8H PRN Eren Oliva, CRNP      albuterol  2 puff Inhalation Q6H PRN Eren Aura Pj, CRNP      amiodarone  200 mg Oral Daily Eren Aura Pj, CRNP      aspirin  81 mg Oral Daily Eren Aura Pj, CRNP      atorvastatin  40 mg Oral Daily Eren Aura Pj, CRNP      cholecalciferol  2,000 Units Oral Daily Eren Aura Pj, CRNP      gabapentin  300 mg Oral BID (AM & Afternoon) Eren Oliva, CRNP      And    gabapentin  900 mg Oral HS Eren Aurawolfgang Oliva, CRNP      labetalol  100 mg Oral Daily Eren Aurawolfgang Oliva, CRNP      polyethylene glycol  17 g Oral Daily Eren Aurawolfgang Oliva, CRNP      senna  1 tablet Oral BID Eren Oliva, BRENDANP      tamsulosin  0.4 mg Oral Daily Eren Aurawolfgang Oliva, CRNP      torsemide  20 mg Oral Daily Dotty Elkins MD      warfarin  1 mg Oral Once (warfarin) Dotty Elkins MD      warfarin  1.25 mg Oral Every Other Day Eren Chavarria  SOSA Oliva      [START ON 7/8/2024] warfarin  2.5 mg Oral Every Other Day SOSA Juan          Today, Patient Was Seen By: Dotty Elkins MD    ** Please Note: Dictation voice to text software may have been used in the creation of this document. **

## 2024-07-08 ENCOUNTER — HOME CARE VISIT (OUTPATIENT)
Dept: HOME HEALTH SERVICES | Facility: HOME HEALTHCARE | Age: 84
End: 2024-07-08
Payer: MEDICARE

## 2024-07-08 VITALS
TEMPERATURE: 98.5 F | RESPIRATION RATE: 18 BRPM | HEIGHT: 71 IN | DIASTOLIC BLOOD PRESSURE: 52 MMHG | OXYGEN SATURATION: 94 % | HEART RATE: 62 BPM | BODY MASS INDEX: 30.22 KG/M2 | SYSTOLIC BLOOD PRESSURE: 102 MMHG | WEIGHT: 215.83 LBS

## 2024-07-08 VITALS
HEART RATE: 7 BPM | RESPIRATION RATE: 20 BRPM | DIASTOLIC BLOOD PRESSURE: 52 MMHG | OXYGEN SATURATION: 97 % | SYSTOLIC BLOOD PRESSURE: 98 MMHG

## 2024-07-08 LAB
ANION GAP SERPL CALCULATED.3IONS-SCNC: 4 MMOL/L (ref 4–13)
BUN SERPL-MCNC: 15 MG/DL (ref 5–25)
CALCIUM SERPL-MCNC: 8 MG/DL (ref 8.4–10.2)
CHLORIDE SERPL-SCNC: 97 MMOL/L (ref 96–108)
CO2 SERPL-SCNC: 35 MMOL/L (ref 21–32)
CREAT SERPL-MCNC: 1.15 MG/DL (ref 0.6–1.3)
GFR SERPL CREATININE-BSD FRML MDRD: 58 ML/MIN/1.73SQ M
GLUCOSE SERPL-MCNC: 135 MG/DL (ref 65–140)
INR PPP: 1.8 (ref 0.84–1.19)
POTASSIUM SERPL-SCNC: 3.7 MMOL/L (ref 3.5–5.3)
PROTHROMBIN TIME: 21.7 SECONDS (ref 11.6–14.5)
SODIUM SERPL-SCNC: 136 MMOL/L (ref 135–147)

## 2024-07-08 PROCEDURE — 85610 PROTHROMBIN TIME: CPT | Performed by: INTERNAL MEDICINE

## 2024-07-08 PROCEDURE — 99239 HOSP IP/OBS DSCHRG MGMT >30: CPT | Performed by: INTERNAL MEDICINE

## 2024-07-08 PROCEDURE — 80048 BASIC METABOLIC PNL TOTAL CA: CPT

## 2024-07-08 RX ORDER — WARFARIN SODIUM 2.5 MG/1
TABLET ORAL
Refills: 0 | Status: CANCELLED | OUTPATIENT
Start: 2024-07-09

## 2024-07-08 RX ORDER — LABETALOL 100 MG/1
100 TABLET, FILM COATED ORAL DAILY
Qty: 30 TABLET | Refills: 0 | Status: SHIPPED | OUTPATIENT
Start: 2024-07-09 | End: 2024-07-12

## 2024-07-08 RX ORDER — WARFARIN SODIUM 2.5 MG/1
TABLET ORAL
Qty: 10 TABLET | Refills: 0 | Status: SHIPPED | OUTPATIENT
Start: 2024-07-08

## 2024-07-08 RX ORDER — TORSEMIDE 20 MG/1
TABLET ORAL
Qty: 180 TABLET | Refills: 0 | Status: SHIPPED | OUTPATIENT
Start: 2024-07-08

## 2024-07-08 RX ORDER — TORSEMIDE 20 MG/1
40 TABLET ORAL ONCE
Status: COMPLETED | OUTPATIENT
Start: 2024-07-08 | End: 2024-07-08

## 2024-07-08 RX ORDER — WARFARIN SODIUM 2.5 MG/1
2.5 TABLET ORAL
Status: DISCONTINUED | OUTPATIENT
Start: 2024-07-08 | End: 2024-07-08 | Stop reason: HOSPADM

## 2024-07-08 RX ORDER — WARFARIN SODIUM 2.5 MG/1
1.25 TABLET ORAL
Status: DISCONTINUED | OUTPATIENT
Start: 2024-07-09 | End: 2024-07-08 | Stop reason: HOSPADM

## 2024-07-08 RX ADMIN — POLYETHYLENE GLYCOL 3350 17 G: 17 POWDER, FOR SOLUTION ORAL at 09:24

## 2024-07-08 RX ADMIN — SENNOSIDES 8.6 MG: 8.6 TABLET, FILM COATED ORAL at 09:22

## 2024-07-08 RX ADMIN — Medication 2000 UNITS: at 09:22

## 2024-07-08 RX ADMIN — TORSEMIDE 40 MG: 20 TABLET ORAL at 11:47

## 2024-07-08 RX ADMIN — ATORVASTATIN CALCIUM 40 MG: 40 TABLET, FILM COATED ORAL at 09:22

## 2024-07-08 RX ADMIN — AMIODARONE HYDROCHLORIDE 200 MG: 200 TABLET ORAL at 09:22

## 2024-07-08 NOTE — CASE MANAGEMENT
Case Management Assessment & Discharge Planning Note    Patient name Oscar Espinosa  Location S /S -01 MRN [id number]  : 1940 Date 2024       Current Admission Date: 2024  Current Admission Diagnosis:Hypotension   Patient Active Problem List    Diagnosis Date Noted Date Diagnosed    Hypotension 2024     Pneumonia of left lower lobe due to infectious organism 2024     Abnormal TSH 2024     Prediabetes 2024     Elevated serum creatinine 2024     Chronic combined systolic and diastolic congestive heart failure (HCC) 2024     Acute hypoxic respiratory failure (HCC) 2024     Pleural effusion with shortness of breath 2024     Anemia 2024     Bilateral carotid artery stenosis 2024     Venous insufficiency of both lower extremities 2023     Hypertensive heart and chronic kidney disease with heart failure and stage 1 through stage 4 chronic kidney disease, or chronic kidney disease (HCC) 10/27/2022     Myofascial pain syndrome 2022     Atrial fibrillation (HCC) 2022     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) 2022     Ascending aortic aneurysm (HCC) 2021     Nocturnal hypoxemia      Moderate to severe pulmonary hypertension (HCC) 2021     Chronic obstructive pulmonary disease (HCC) 2019     Multiple pulmonary nodules 09/10/2019     Chronic pain syndrome      Subclinical hypothyroidism 10/22/2014     Abdominal aortic aneurysm without rupture ([facility]) 2014     Aortic valve disorder 2014     Lumbar radiculopathy 2013     Benign essential hypertension 04/10/2013     Impaired fasting glucose 04/10/2013     Severe obesity (BMI 35.0-35.9 with comorbidity) (HCC) 04/10/2013     Hyperlipidemia 2012     Microscopic hematuria 2012     Severe obstructive sleep apnea 2012     CAD (coronary artery disease) 2012       LOS (days): 1  Geometric Mean LOS  (GMLOS) (days):   Days to GMLOS:     OBJECTIVE:  PATIENT READMITTED TO HOSPITAL  Risk of Unplanned Readmission Score: 24.25         Current admission status: Inpatient       Preferred Pharmacy:   [facility] - [place] [id number]  [address]  [address]  [place] [address]  Phone: [phone] Fax: [phone]    [id number] - [place], [place] - [address]  [address]  [place] [address]  Phone: [phone] Fax: [phone]    [facility] - [place], [place] - [address]  [address]  [facility] [address]  [facility] [address]  Phone: [phone] Fax: [phone]    Primary Care Provider: Lea Reyes, MD    Primary Insurance: MEDICARE  Secondary Insurance: AARP    ASSESSMENT:  Active Health Care Proxies       Dorcas Espinosa Alternate Health Care Agent - Spouse   Primary Phone: [phone] (Home)  Mobile Phone: [phone]                           Readmission Root Cause  30 Day Readmission: Yes  Who directed you to return to the hospital?: Self  Did you understand whom to contact if you had questions or problems?: Yes  Did you get your prescriptions before you left the hospital?: Yes  Were you able to get your prescriptions filled when you left the hospital?: Yes  Did you take your medications as prescribed?: Yes  Were you able to get to your follow-up appointments?: Yes  During previous admission, was a post-acute recommendation made?: No  Patient was readmitted due to: Hypotension  Action Plan: Pt to return to home with the resumption of [facility] services    Patient Information  Admitted from:: Home  Mental Status: Alert  During Assessment patient was accompanied by: Not accompanied during assessment  Assessment information provided by:: Patient  Primary Caregiver: Self  Support Systems: Spouse/significant other  County of Residence: [place]  What city do you live in?: [place]  Home entry access  options. Select all that apply.: Stairs  Number of steps to enter home.: 3  Do the steps have railings?: No  Type of Current Residence: [facility]  Living Arrangements: Lives w/ Spouse/significant other  Is patient a ?: No    Activities of Daily Living Prior to Admission  Functional Status: Independent  Completes ADLs independently?: Yes  Ambulates independently?: Yes  Does patient use assisted devices?: Yes  Assisted Devices (DME) used: Straight Cane, Walker, Home Oxygen concentrator, Portable Oxygen tanks  DME Company Name (respiratory supplies): [employer]  O2 Rate(s): 2  Does patient currently own DME?: Yes  What DME does the patient currently own?: Home Oxygen concentrator, Portable Oxygen tanks, Straight Cane, Walker  Does patient have a history of Outpatient Therapy (PT/OT)?: No  Does the patient have a history of Short-Term Rehab?: No  Does patient have a history of HHC?: Yes ([facility])  Does patient currently have HHC?: Yes    Current Home Health Care  Type of Current Home Care Services: Home PT, Home OT, Nurse visit  Current Home Health Agency:: [facility]  Current Home Health Follow-Up Provider:: PCP    Patient Information Continued  Income Source: Pension/penitentiary  Does patient have prescription coverage?: Yes  Does patient receive dialysis treatments?: No  Does patient have a history of substance abuse?: No  Does patient have a history of Mental Health Diagnosis?: No    PHQ 2/9 Screening   Reviewed PHQ 2/9 Depression Screening Score?: No    Means of Transportation  Means of Transport to Appts:: Drives Self      Social Determinants of Health (SDOH)      Flowsheet Row Most Recent Value   Housing Stability    In the last 12 months, was there a time when you were not able to pay the mortgage or rent on time? N   At any time in the past 12 months, were you homeless or living in a shelter (including now)? N   Transportation Needs    In the past 12 months, has lack of transportation kept you from medical  appointments or from getting medications? no   In the past 12 months, has lack of transportation kept you from meetings, work, or from getting things needed for daily living? No   Food Insecurity    Within the past 12 months, you worried that your food would run out before you got the money to buy more. Never true   Within the past 12 months, the food you bought just didn't last and you didn't have money to get more. Never true   Utilities    In the past 12 months has the electric, gas, oil, or water company threatened to shut off services in your home? No            DISCHARGE DETAILS:    Discharge planning discussed with:: Patient  Freedom of Choice: Yes  Comments - Freedom of Choice: Pt confirmed currently being open to [facility] which he would like to resume.                     Requested Home Health Care         Is the patient interested in HHC at discharge?: Yes  Home Health Discipline requested:: Nursing, Occupational Therapy, Physical Therapy  [facility] Health Agency Name::  [facility]  HHA External Referral Reason (only applicable if external HHA name selected): Patient has established relationship with provider  Home Health Follow-Up Provider:: PCP  Home Health Services Needed:: Gait/ADL Training, Strengthening/Theraputic Exercises to Improve Function, Other (comment)  Homebound Criteria Met:: Uses an Assist Device (i.e. cane, walker, etc)  Supporting Clincal Findings:: Fatigues Easliy in Short Distances, Limited Endurance    DME Referral Provided  Referral made for DME?: No    Other Referral/Resources/Interventions Provided:  Interventions: HHC  Referral Comments: CM made a HHC referral to [facility] for resumption of care.         Treatment Team Recommendation: Home with Home Health Care  Discharge Destination Plan:: Home with Home Health Care

## 2024-07-08 NOTE — ASSESSMENT & PLAN NOTE
Patient on amiodarone, labetalol, Coumadin for A-fib  EKG appears to be sinus bradycardia with first-degree AV block  PT/INR 1.75 currently subtherapeutic will give additional dose of Coumadin 1.25 tonight.  Alternates 2.5 mg Coumadin and 1.25 mg Coumadin every other day  Recheck PT/INR in the morning goal 2-3  Continue outpatient regimen of 1.25 mg of Coumadin every Sunday, Tuesday, Thursday.  Coumadin 2.5 mg every Monday, Wednesday, Friday, Saturday  Obtain PT/INR outpatient Wednesday  Follow-up with outpatient cardiologist

## 2024-07-08 NOTE — PLAN OF CARE
Problem: PAIN - ADULT  Goal: Verbalizes/displays adequate comfort level or baseline comfort level  Description: Interventions:  - Encourage patient to monitor pain and request assistance  - Assess pain using appropriate pain scale  - Administer analgesics based on type and severity of pain and evaluate response  - Implement non-pharmacological measures as appropriate and evaluate response  - Consider cultural and social influences on pain and pain management  - Notify physician/advanced practitioner if interventions unsuccessful or patient reports new pain  Outcome: Progressing     Problem: INFECTION - ADULT  Goal: Absence or prevention of progression during hospitalization  Description: INTERVENTIONS:  - Assess and monitor for signs and symptoms of infection  - Monitor lab/diagnostic results  - Monitor all insertion sites, i.e. indwelling lines, tubes, and drains  - Monitor endotracheal if appropriate and nasal secretions for changes in amount and color  - [place] appropriate cooling/warming therapies per order  - Administer medications as ordered  - Instruct and encourage patient and family to use good hand hygiene technique  - Identify and instruct in appropriate isolation precautions for identified infection/condition  Outcome: Progressing  Goal: Absence of fever/infection during neutropenic period  Description: INTERVENTIONS:  - Monitor WBC    Outcome: Progressing     Problem: SAFETY ADULT  Goal: Patient will remain free of falls  Description: INTERVENTIONS:  - Educate patient/family on patient safety including physical limitations  - Instruct patient to call for assistance with activity   - Consult OT/PT to assist with strengthening/mobility   - Keep Call bell within reach  - Keep bed low and locked with side rails adjusted as appropriate  - Keep care items and personal belongings within reach  - Initiate and maintain comfort rounds  - Make Fall Risk Sign visible to staff  - Offer Toileting every  Hours,  in advance of need  - Initiate/Maintain alarm  - Obtain necessary fall risk management equipment:   - Apply yellow socks and bracelet for high fall risk patients  - Consider moving patient to room near nurses station  Outcome: Progressing  Goal: Maintain or return to baseline ADL function  Description: INTERVENTIONS:  -  Assess patient's ability to carry out ADLs; assess patient's baseline for ADL function and identify physical deficits which impact ability to perform ADLs (bathing, care of mouth/teeth, toileting, grooming, dressing, etc.)  - Assess/evaluate cause of self-care deficits   - Assess range of motion  - Assess patient's mobility; develop plan if impaired  - Assess patient's need for assistive devices and provide as appropriate  - Encourage maximum independence but intervene and supervise when necessary  - Involve family in performance of ADLs  - Assess for home care needs following discharge   - Consider OT consult to assist with ADL evaluation and planning for discharge  - Provide patient education as appropriate  Outcome: Progressing  Goal: Maintains/Returns to pre admission functional level  Description: INTERVENTIONS:  - Perform AM-PAC 6 Click Basic Mobility/ Daily Activity assessment daily.  - Set and communicate daily mobility goal to care team and patient/family/caregiver.   - Collaborate with rehabilitation services on mobility goals if consulted  - Perform Range of Motion  times a day.  - Reposition patient every hours.  - Dangle patient  times a day  - Stand patient  times a day  - Ambulate patient  times a day  - Out of bed to chair  times a day   - Out of bed for meals  times a day  - Out of bed for toileting  - Record patient progress and toleration of activity level   Outcome: Progressing     Problem: DISCHARGE PLANNING  Goal: Discharge to home or other facility with appropriate resources  Description: INTERVENTIONS:  - Identify barriers to discharge w/patient and caregiver  - Arrange for  needed discharge resources and transportation as appropriate  - Identify discharge learning needs (meds, wound care, etc.)  - Arrange for interpretive services to assist at discharge as needed  - Refer to Case Management Department for coordinating discharge planning if the patient needs post-hospital services based on physician/advanced practitioner order or complex needs related to functional status, cognitive ability, or social support system  Outcome: Progressing     Problem: Knowledge Deficit  Goal: Patient/family/caregiver demonstrates understanding of disease process, treatment plan, medications, and discharge instructions  Description: Complete learning assessment and assess knowledge base.  Interventions:  - Provide teaching at level of understanding  - Provide teaching via preferred learning methods  Outcome: Progressing

## 2024-07-08 NOTE — DISCHARGE INSTR - AVS FIRST PAGE
Dear Oscar Espinosa,     It was our pleasure to care for you here at [facility].  It is our hope that we were always able to exceed the expected standards for your care during your stay.  You were hospitalized due to hypotension.  You were cared for on the 3rd floor by Pablito Quiñones Jr, DO under the service of Dotty Luciano* with the [facility] who covers for your primary care physician (PCP), Lea Reyes, MD, while you were hospitalized.  If you have any questions or concerns related to this hospitalization, you may contact us at .  For follow up as well as any medication refills, we recommend that you follow up with your primary care physician.  A registered nurse will reach out to you by phone within a few days after your discharge to answer any additional questions that you may have after going home.  However, at this time we provide for you here, the most important instructions / recommendations at discharge:     Notable Medication Adjustments -   Please take Labetolol 100 mg tablet ONCE daily.  Please take one 20 mg tablet torsemide on Tuesday, Thursday, Saturday, Saturday.  Take two 20 mg tablet torsemide every Monday, Wednesday, and Friday.  Please take 1.25 mg Coumadin every Sunday, Tuesday, and Thursday.  Take 2.5 mg Coumadin every other day of the week.  Testing Required after Discharge -   Please get PT/INR done outpatient Wednesday  ** Please contact your PCP to request testing orders for any of the testing recommended here **  Important follow up information -   Please follow-up with your primary care physician in 1 week.  Continue to follow-up with outpatient cardiologist and pulmonologist.  Other Instructions -   If you experience any more episodes of hypotension, dizziness, lightheadedness please report to the emergency department  Please review this entire after visit summary as additional general  instructions including medication list, appointments, activity, diet, any pertinent wound care, and other additional recommendations from your care team that may be provided for you.      Sincerely,     Pablito Quiñones Jr, DO

## 2024-07-08 NOTE — PROGRESS NOTES
Patient:    MRN:  [id number]    Aidin Request ID:  [id number]    Level of care reserved:  Home Health Agency    Partner Reserved:  [facility], [place] [address] [phone]    Clinical needs requested:    Geography searched:  [phone]    Start of Service:    Request sent:  9:46am EDT on 7/8/2024 by Huang Rodriguez    Partner reserved:  10:26am EDT on 7/8/2024 by Huang Rodriguez    Choice list shared:  10:26am EDT on 7/8/2024 by Huang Rodriguez

## 2024-07-08 NOTE — ASSESSMENT & PLAN NOTE
Patient presents with lightheadedness and hypotension reports feeling lightheaded at home yesterday noted to have blood pressure 102/46.  He took blood pressure medication last night and noticed that his blood pressure this evening was 86/40 and felt lightheaded.  He denies any presyncope or recent falls. Of note patient recently started on tramadol for lower back pain.  He reports taking dose yesterday morning, evening and once again this morning.  Emergency department patient had stable blood pressures ranging from 100-130's.  Patient had transient drop in blood pressure x 1 with SBP in 80's which resolved 10 minutes later.  EKG showing normal sinus rhythm with first-degree AV block  Cardiology recently decreased patient's home labetalol frequency from twice daily to once daily.  Orthostatic blood pressures in the emergency department negative  Continue to monitor blood pressure closely  Advised patient to change positions slowly.  Blood pressure is stable.  Should take 20 mg torsemide 1 time daily every Tuesday, Thursday, Saturday, and Sunday.  Should take two 20 mg tablet torsemide on Monday, Wednesday, and Friday  Follow-up with outpatient cardiology

## 2024-07-08 NOTE — DISCHARGE SUMMARY
[facility]  Discharge- Oscar Espinosa 1940, 83 y.o. male MRN: [id number]  Unit/Bed#: S -01 Encounter: [id number]  Primary Care Provider: Lea Reyes, MD   Date and time admitted to hospital: 7/6/2024  3:32 PM    * Hypotension  Assessment & Plan  Patient presents with lightheadedness and hypotension reports feeling lightheaded at home yesterday noted to have blood pressure 102/46.  He took blood pressure medication last night and noticed that his blood pressure this evening was 86/40 and felt lightheaded.  He denies any presyncope or recent falls. Of note patient recently started on tramadol for lower back pain.  He reports taking dose yesterday morning, evening and once again this morning.  Emergency department patient had stable blood pressures ranging from 100-130's.  Patient had transient drop in blood pressure x 1 with SBP in 80's which resolved 10 minutes later.  EKG showing normal sinus rhythm with first-degree AV block  Cardiology recently decreased patient's home labetalol frequency from twice daily to once daily.  Orthostatic blood pressures in the emergency department negative  Continue to monitor blood pressure closely  Advised patient to change positions slowly.  Blood pressure is stable.  Should take 20 mg torsemide 1 time daily every Tuesday, Thursday, Saturday, and Sunday.  Should take two 20 mg tablet torsemide on Monday, Wednesday, and Friday  Follow-up with outpatient cardiology    Elevated serum creatinine  Assessment & Plan  Creatinine baseline appears to be 1-1.1.  Mildly elevated on admission 1.32  Patient given 500 mL bolus in the emergency department.  Will hold off on further fluids given CXR showing mild pulmonary vascular congestion  UA was bland  Creatinine is trending down toward baseline  Avoid nephrotoxic medication    Chronic combined systolic and diastolic congestive heart failure (HCC)  Assessment & Plan  Wt Readings from Last 3 Encounters:    07/08/24 97.9 kg (215 lb 13.3 oz)   07/02/24 99.3 kg (219 lb)   06/29/24 95.3 kg (210 lb)   Patient reports continued nasal cannula chronically as needed at home.  He has been having to use it more over the past month following previous admission for pneumonia and required chest tube for pleural effusion.  Has worsening lower extremity edema that patient attributes to noncompliance with Ace wraps at home.  Lung fields clear bilaterally  CXR showing mild pulmonary vascular congestion    3/24 LVEF 47% systolic function mildly reduced there is global mild hypokinesis.  Diastolic function abnormal.  Patient does not appear overtly volume overloaded.  Patient received 500 mill fluid bolus in the ED will hold off on further fluids.    Blood pressure is stable, continue labetalol 100 mg once daily, torsemide dosing as listed under hypotension.  Follow-up with cardiology outpatient    Atrial fibrillation (HCC)  Assessment & Plan  Patient on amiodarone, labetalol, Coumadin for A-fib  EKG appears to be sinus bradycardia with first-degree AV block  PT/INR 1.75 currently subtherapeutic will give additional dose of Coumadin 1.25 tonight.  Alternates 2.5 mg Coumadin and 1.25 mg Coumadin every other day  Recheck PT/INR in the morning goal 2-3  Continue outpatient regimen of 1.25 mg of Coumadin every Sunday, Tuesday, Thursday.  Coumadin 2.5 mg every Monday, Wednesday, Friday, Saturday  Obtain PT/INR outpatient Wednesday  Follow-up with outpatient cardiologist      Chronic obstructive pulmonary disease (HCC)  Assessment & Plan  Does not appear in acute exacerbation at this time.  Continue home albuterol.    Lumbar radiculopathy  Assessment & Plan   -Continue tramadol as needed for pain      CAD (coronary artery disease)  Assessment & Plan  Continue home ASA, Lipitor, labetalol    Severe obstructive sleep apnea  Assessment & Plan  Intolerant to CPAP continue 2 L nasal cannula at bedtime          Medical Problems        Resolved Problems  Date Reviewed: 7/7/2024   None       Discharging Resident: Pablito Quiñones Jr, DO  Discharging Attending: No att. providers found  PCP: Lea Reyes, MD  Admission Date:   Admission Orders (From admission, onward)       Ordered        07/07/24 1345  INPATIENT ADMISSION  Once            07/06/24 2011  Place in Observation  Once                          Discharge Date: 07/08/24    Consultations During Hospital Stay:  None    Procedures Performed:   None    Significant Findings / Test Results:   EKG: Sinus bradycardia with first-degree heart block  Chest x-ray mild pulmonary venous congestion and left pleural thickening  Blood pressure was 81/51 on admission    Incidental Findings:   None      Test Results Pending at Discharge (will require follow up):  None     Outpatient Tests Requested:  PT/INR to be done this week.  Follow-up with primary care physician within 1 week.    Complications: None    Reason for Admission: Hypotension    Hospital Course:   Oscar Espinosa is a 83 y.o. male patient who originally presented to the hospital on 7/6/2024 due to hypotension and lightheadedness.  The day before admission he began taking tramadol for his lumbar radiculopathic pain.  He was working with his physical therapist at home when he began to feel lightheaded and dizzy.  The physical therapist took his blood pressure and it was 102/46.  A similar episode happened the next day on July 6th, the day of his admission.  He had an EKG done which had shown sinus bradycardia with first-degree heart block.  He had a chest x-ray done which revealed mild pulmonary venous congestion and left pleural thickening.  His creatinine was elevated at 1.32, his hemoglobin was 8.  He was treated with fluids with symptomatic improvement.  During his hospital stay urinalysis was unremarkable, viral panel was negative, orthostatic testing was unremarkable, and blood pressure remained mostly stable.  His creatinine has  "been trending down to baseline.  Today patient had no complaints said he was feeling generally well,  with no lightheadedness or dizziness. He w discharged today medically stable.      Please see above list of diagnoses and related plan for additional information.     Condition at Discharge: stable    Discharge Day Visit / Exam:   Subjective: No acute events overnight.  Patient indicates feeling generally well there have been no episodes of lightheadedness or dizziness.  He also indicates he is able to ambulate with no issues.  Vitals: Blood Pressure: 102/52 (07/08/24 1417)  Pulse: 62 (07/08/24 1417)  Temperature: 98.5 °F (36.9 °C) (07/08/24 0728)  Temp Source: Oral (07/07/24 2242)  Respirations: 18 (07/08/24 0728)  Height: 5' 11\" (180.3 cm) (07/07/24 0300)  Weight - Scale: 97.9 kg (215 lb 13.3 oz) (07/08/24 0528)  SpO2: 94 % (07/08/24 1417)  Exam:   Physical Exam  Constitutional:       Appearance: Normal appearance.   HENT:      Head: Normocephalic and atraumatic.      Mouth/Throat:      Mouth: Mucous membranes are moist.      Pharynx: Oropharynx is clear.   Cardiovascular:      Rate and Rhythm: Normal rate. Rhythm irregular.      Pulses: Normal pulses.      Heart sounds: Normal heart sounds. No murmur heard.  Pulmonary:      Effort: Pulmonary effort is normal. No respiratory distress.      Comments: Fine crackles in basilar lung fields bilaterally   Abdominal:      General: Abdomen is flat. There is no distension.      Palpations: Abdomen is soft.   Musculoskeletal:         General: Swelling present.      Comments: +2 non-pitting edema in lower extremities bilaterally   Skin:     General: Skin is warm and dry.      Capillary Refill: Capillary refill takes less than 2 seconds.   Neurological:      General: No focal deficit present.      Mental Status: He is alert and oriented to person, place, and time.   Psychiatric:         Mood and Affect: Mood normal.         Behavior: Behavior normal.          Discussion with " Family: Attempted to update  (wife) via phone. Unable to contact.    Discharge instructions/Information to patient and family:   See after visit summary for information provided to patient and family.      Provisions for Follow-Up Care:  See after visit summary for information related to follow-up care and any pertinent home health orders.      Mobility at time of Discharge:   Basic Mobility Inpatient Raw Score: 18  JH-HLM Goal: 6: Walk 10 steps or more  JH-HLM Achieved: 7: Walk 25 feet or more  HLM Goal achieved. Continue to encourage appropriate mobility.     Disposition:   Home with [facility] (Reminder: Complete face to face encounter)    Planned Readmission: No    Discharge Medications:  See after visit summary for reconciled discharge medications provided to patient and/or family.      **Please Note: This note may have been constructed using a voice recognition system**

## 2024-07-08 NOTE — ASSESSMENT & PLAN NOTE
Wt Readings from Last 3 Encounters:   07/08/24 97.9 kg (215 lb 13.3 oz)   07/02/24 99.3 kg (219 lb)   06/29/24 95.3 kg (210 lb)   Patient reports continued nasal cannula chronically as needed at home.  He has been having to use it more over the past month following previous admission for pneumonia and required chest tube for pleural effusion.  Has worsening lower extremity edema that patient attributes to noncompliance with Ace wraps at home.  Lung fields clear bilaterally  CXR showing mild pulmonary vascular congestion    3/24 LVEF 47% systolic function mildly reduced there is global mild hypokinesis.  Diastolic function abnormal.  Patient does not appear overtly volume overloaded.  Patient received 500 mill fluid bolus in the ED will hold off on further fluids.    Blood pressure is stable, continue labetalol 100 mg once daily, torsemide dosing as listed under hypotension.  Follow-up with cardiology outpatient

## 2024-07-08 NOTE — ASSESSMENT & PLAN NOTE
Creatinine baseline appears to be 1-1.1.  Mildly elevated on admission 1.32  Patient given 500 mL bolus in the emergency department.  Will hold off on further fluids given CXR showing mild pulmonary vascular congestion  UA was bland  Creatinine is trending down toward baseline  Avoid nephrotoxic medication

## 2024-07-08 NOTE — PLAN OF CARE
Problem: PAIN - ADULT  Goal: Verbalizes/displays adequate comfort level or baseline comfort level  Description: Interventions:  - Encourage patient to monitor pain and request assistance  - Assess pain using appropriate pain scale  - Administer analgesics based on type and severity of pain and evaluate response  - Implement non-pharmacological measures as appropriate and evaluate response  - Consider cultural and social influences on pain and pain management  - Notify physician/advanced practitioner if interventions unsuccessful or patient reports new pain  Outcome: Progressing     Problem: INFECTION - ADULT  Goal: Absence or prevention of progression during hospitalization  Description: INTERVENTIONS:  - Assess and monitor for signs and symptoms of infection  - Monitor lab/diagnostic results  - Monitor all insertion sites, i.e. indwelling lines, tubes, and drains  - Monitor endotracheal if appropriate and nasal secretions for changes in amount and color  - [place] appropriate cooling/warming therapies per order  - Administer medications as ordered  - Instruct and encourage patient and family to use good hand hygiene technique  - Identify and instruct in appropriate isolation precautions for identified infection/condition  Outcome: Progressing  Goal: Absence of fever/infection during neutropenic period  Description: INTERVENTIONS:  - Monitor WBC    Outcome: Progressing     Problem: SAFETY ADULT  Goal: Patient will remain free of falls  Description: INTERVENTIONS:  - Educate patient/family on patient safety including physical limitations  - Instruct patient to call for assistance with activity   - Consult OT/PT to assist with strengthening/mobility   - Keep Call bell within reach  - Keep bed low and locked with side rails adjusted as appropriate  - Keep care items and personal belongings within reach  - Initiate and maintain comfort rounds  - Make Fall Risk Sign visible to staff  - Offer Toileting every 2 Hours,  in advance of need  - Initiate/Maintain alarm  - Obtain necessary fall risk management equipment:   - Apply yellow socks and bracelet for high fall risk patients  - Consider moving patient to room near nurses station  Outcome: Progressing  Goal: Maintain or return to baseline ADL function  Description: INTERVENTIONS:  -  Assess patient's ability to carry out ADLs; assess patient's baseline for ADL function and identify physical deficits which impact ability to perform ADLs (bathing, care of mouth/teeth, toileting, grooming, dressing, etc.)  - Assess/evaluate cause of self-care deficits   - Assess range of motion  - Assess patient's mobility; develop plan if impaired  - Assess patient's need for assistive devices and provide as appropriate  - Encourage maximum independence but intervene and supervise when necessary  - Involve family in performance of ADLs  - Assess for home care needs following discharge   - Consider OT consult to assist with ADL evaluation and planning for discharge  - Provide patient education as appropriate  Outcome: Progressing  Goal: Maintains/Returns to pre admission functional level  Description: INTERVENTIONS:  - Perform AM-PAC 6 Click Basic Mobility/ Daily Activity assessment daily.  - Set and communicate daily mobility goal to care team and patient/family/caregiver.   - Collaborate with rehabilitation services on mobility goals if consulted  - Perform Range of Motion 2 times a day.  - Reposition patient every 2 hours.  - Dangle patient 2 times a day  - Stand patient 2 times a day  - Ambulate patient 2 times a day  - Out of bed to chair 2 times a day   - Out of bed for meals 2 times a day  - Out of bed for toileting  - Record patient progress and toleration of activity level   Outcome: Progressing     Problem: DISCHARGE PLANNING  Goal: Discharge to home or other facility with appropriate resources  Description: INTERVENTIONS:  - Identify barriers to discharge w/patient and caregiver  -  Arrange for needed discharge resources and transportation as appropriate  - Identify discharge learning needs (meds, wound care, etc.)  - Arrange for interpretive services to assist at discharge as needed  - Refer to Case Management Department for coordinating discharge planning if the patient needs post-hospital services based on physician/advanced practitioner order or complex needs related to functional status, cognitive ability, or social support system  Outcome: Progressing     Problem: Knowledge Deficit  Goal: Patient/family/caregiver demonstrates understanding of disease process, treatment plan, medications, and discharge instructions  Description: Complete learning assessment and assess knowledge base.  Interventions:  - Provide teaching at level of understanding  - Provide teaching via preferred learning methods  Outcome: Progressing

## 2024-07-09 ENCOUNTER — ANTICOAG VISIT (OUTPATIENT)
Dept: CARDIOLOGY CLINIC | Facility: CLINIC | Age: 84
End: 2024-07-09

## 2024-07-09 ENCOUNTER — HOME CARE VISIT (OUTPATIENT)
Dept: HOME HEALTH SERVICES | Facility: HOME HEALTHCARE | Age: 84
End: 2024-07-09
Payer: MEDICARE

## 2024-07-09 ENCOUNTER — APPOINTMENT (OUTPATIENT)
Dept: LAB | Facility: CLINIC | Age: 84
End: 2024-07-09
Payer: MEDICARE

## 2024-07-09 DIAGNOSIS — D52.9 ANEMIA DUE TO FOLIC ACID DEFICIENCY, UNSPECIFIED DEFICIENCY TYPE: ICD-10-CM

## 2024-07-09 DIAGNOSIS — D51.8 VITAMIN B12 DEFICIENCY (DIETARY) ANEMIA: ICD-10-CM

## 2024-07-09 DIAGNOSIS — I48.0 PAROXYSMAL ATRIAL FIBRILLATION (HCC): Primary | ICD-10-CM

## 2024-07-09 DIAGNOSIS — D64.9 ANEMIA, UNSPECIFIED TYPE: ICD-10-CM

## 2024-07-09 LAB
BASOPHILS # BLD AUTO: 0.06 THOUSANDS/ÂΜL (ref 0–0.1)
BASOPHILS NFR BLD AUTO: 1 % (ref 0–1)
EOSINOPHIL # BLD AUTO: 0.16 THOUSAND/ÂΜL (ref 0–0.61)
EOSINOPHIL NFR BLD AUTO: 3 % (ref 0–6)
ERYTHROCYTE [DISTWIDTH] IN BLOOD BY AUTOMATED COUNT: 20 % (ref 11.6–15.1)
EST. AVERAGE GLUCOSE BLD GHB EST-MCNC: 123 MG/DL
FOLATE SERPL-MCNC: 13.8 NG/ML
HBA1C MFR BLD: 5.9 %
HCT VFR BLD AUTO: 26.5 % (ref 36.5–49.3)
HGB BLD-MCNC: 8.1 G/DL (ref 12–17)
IMM GRANULOCYTES # BLD AUTO: 0.02 THOUSAND/UL (ref 0–0.2)
IMM GRANULOCYTES NFR BLD AUTO: 0 % (ref 0–2)
LDH SERPL-CCNC: 130 U/L (ref 140–271)
LYMPHOCYTES # BLD AUTO: 1 THOUSANDS/ÂΜL (ref 0.6–4.47)
LYMPHOCYTES NFR BLD AUTO: 22 % (ref 14–44)
MCH RBC QN AUTO: 29.3 PG (ref 26.8–34.3)
MCHC RBC AUTO-ENTMCNC: 30.6 G/DL (ref 31.4–37.4)
MCV RBC AUTO: 96 FL (ref 82–98)
MONOCYTES # BLD AUTO: 0.61 THOUSAND/ÂΜL (ref 0.17–1.22)
MONOCYTES NFR BLD AUTO: 13 % (ref 4–12)
NEUTROPHILS # BLD AUTO: 2.8 THOUSANDS/ÂΜL (ref 1.85–7.62)
NEUTS SEG NFR BLD AUTO: 61 % (ref 43–75)
NRBC BLD AUTO-RTO: 0 /100 WBCS
PLATELET # BLD AUTO: 191 THOUSANDS/UL (ref 149–390)
PMV BLD AUTO: 10.6 FL (ref 8.9–12.7)
RBC # BLD AUTO: 2.76 MILLION/UL (ref 3.88–5.62)
RETICS # AUTO: ABNORMAL 10*3/UL (ref 14356–105094)
RETICS # CALC: 2.28 % (ref 0.37–1.87)
T3 SERPL-MCNC: 0.6 NG/ML
T4 FREE SERPL-MCNC: 1.12 NG/DL (ref 0.61–1.12)
TSH SERPL DL<=0.05 MIU/L-ACNC: 7.41 UIU/ML (ref 0.45–4.5)
VIT B12 SERPL-MCNC: 618 PG/ML (ref 180–914)
WBC # BLD AUTO: 4.65 THOUSAND/UL (ref 4.31–10.16)

## 2024-07-09 PROCEDURE — 82746 ASSAY OF FOLIC ACID SERUM: CPT

## 2024-07-09 PROCEDURE — 85025 COMPLETE CBC W/AUTO DIFF WBC: CPT

## 2024-07-09 PROCEDURE — 85045 AUTOMATED RETICULOCYTE COUNT: CPT

## 2024-07-09 PROCEDURE — 83010 ASSAY OF HAPTOGLOBIN QUANT: CPT

## 2024-07-09 PROCEDURE — 83615 LACTATE (LD) (LDH) ENZYME: CPT

## 2024-07-09 PROCEDURE — 84443 ASSAY THYROID STIM HORMONE: CPT

## 2024-07-09 PROCEDURE — 82607 VITAMIN B-12: CPT

## 2024-07-09 PROCEDURE — 83036 HEMOGLOBIN GLYCOSYLATED A1C: CPT

## 2024-07-09 PROCEDURE — 83918 ORGANIC ACIDS TOTAL QUANT: CPT

## 2024-07-10 ENCOUNTER — TRANSITIONAL CARE MANAGEMENT (OUTPATIENT)
Dept: INTERNAL MEDICINE CLINIC | Facility: CLINIC | Age: 84
End: 2024-07-10

## 2024-07-10 ENCOUNTER — TELEPHONE (OUTPATIENT)
Dept: INTERNAL MEDICINE CLINIC | Facility: CLINIC | Age: 84
End: 2024-07-10

## 2024-07-10 ENCOUNTER — TELEPHONE (OUTPATIENT)
Age: 84
End: 2024-07-10

## 2024-07-10 ENCOUNTER — HOME CARE VISIT (OUTPATIENT)
Dept: HOME HEALTH SERVICES | Facility: HOME HEALTHCARE | Age: 84
End: 2024-07-10
Payer: MEDICARE

## 2024-07-10 VITALS
DIASTOLIC BLOOD PRESSURE: 64 MMHG | SYSTOLIC BLOOD PRESSURE: 104 MMHG | OXYGEN SATURATION: 96 % | TEMPERATURE: 96.6 F | HEART RATE: 52 BPM | RESPIRATION RATE: 16 BRPM

## 2024-07-10 LAB — HAPTOGLOB SERPL-MCNC: 194 MG/DL (ref 38–329)

## 2024-07-10 PROCEDURE — G0299 HHS/HOSPICE OF RN EA 15 MIN: HCPCS

## 2024-07-10 NOTE — TELEPHONE ENCOUNTER
Fabiola Grove from home health services called. He needs a verbal consent or an inbasket message from the covering provider in order to dress a wound on the pts buttock area. His most recent note is in the note for reference. Please reach out to fabiola at [phone] or send an in basket message.

## 2024-07-10 NOTE — TELEPHONE ENCOUNTER
Kevin with [facility] called stating pt vitals 104/64, 52.   Pt is asymptomatic, and they will bring in a log of readings to the hospital f/u appt toya/ Savita on Friday.    Pt was admitted with hypotension on 7/6/2024

## 2024-07-10 NOTE — CASE COMMUNICATION
Medication discrepancies or Major drug interactions:Patient is taking gabapentin 3 capsules (900 mg total) daily at bedtime.    Abnormal clinical findings: RN to focus on energy conservation and refused OT.   Bradycardia HR 52-54 nonsymptomatic except normal SOB with moderate exertion. /64. +3 pitting edema BLE ankle and +2 in the feet.   Patient has a nonblanchable pink open area on right buttocks. Ok to use etiology of pressure  injury and cleanse gently with soap and water pat dry apply zinc petroleum and menthol, like calazime or calmoseptine TID.   Response needed, please respond via In Basket  [facility] has Admitted your patient to Home Health service with the following disciplines: SN and PT  Patient stated goals of care: Improved breathing   Potential barriers to goal achievement:back pain  Primary focus of home health care:Cardiac Circulatory  Antic ipated visit pattern and next visit date: 1w1 2w3   Thank you for allowing us to participate in the care of your patient.      Keivn Grove RN

## 2024-07-10 NOTE — CASE COMMUNICATION
TC to PCP notified Shannon of need to address admission note and patient is taking miralax once daily PRN for constipation.   TC to cardiology Emilia to notify of need to address admission note. Emilia reports to keep log of HR and BP Weight and bring to appointment on Friday.

## 2024-07-11 ENCOUNTER — RA CDI HCC (OUTPATIENT)
Dept: OTHER | Facility: HOSPITAL | Age: 84
End: 2024-07-11

## 2024-07-11 ENCOUNTER — HOME CARE VISIT (OUTPATIENT)
Dept: HOME HEALTH SERVICES | Facility: HOME HEALTHCARE | Age: 84
End: 2024-07-11
Payer: MEDICARE

## 2024-07-11 VITALS
RESPIRATION RATE: 26 BRPM | OXYGEN SATURATION: 97 % | DIASTOLIC BLOOD PRESSURE: 52 MMHG | SYSTOLIC BLOOD PRESSURE: 100 MMHG | HEART RATE: 68 BPM

## 2024-07-11 PROCEDURE — G0151 HHCP-SERV OF PT,EA 15 MIN: HCPCS

## 2024-07-12 ENCOUNTER — OFFICE VISIT (OUTPATIENT)
Dept: CARDIOLOGY CLINIC | Facility: CLINIC | Age: 84
End: 2024-07-12
Payer: MEDICARE

## 2024-07-12 VITALS
HEART RATE: 59 BPM | HEIGHT: 71 IN | WEIGHT: 220 LBS | SYSTOLIC BLOOD PRESSURE: 127 MMHG | DIASTOLIC BLOOD PRESSURE: 66 MMHG | BODY MASS INDEX: 30.8 KG/M2 | OXYGEN SATURATION: 98 %

## 2024-07-12 DIAGNOSIS — G47.33 SEVERE OBSTRUCTIVE SLEEP APNEA: ICD-10-CM

## 2024-07-12 DIAGNOSIS — I13.0 HYPERTENSIVE HEART AND CHRONIC KIDNEY DISEASE WITH HEART FAILURE AND STAGE 1 THROUGH STAGE 4 CHRONIC KIDNEY DISEASE, OR CHRONIC KIDNEY DISEASE (HCC): ICD-10-CM

## 2024-07-12 DIAGNOSIS — I71.21 ANEURYSM OF ASCENDING AORTA WITHOUT RUPTURE (HCC): ICD-10-CM

## 2024-07-12 DIAGNOSIS — I27.20 MODERATE TO SEVERE PULMONARY HYPERTENSION (HCC): ICD-10-CM

## 2024-07-12 DIAGNOSIS — I48.0 PAROXYSMAL ATRIAL FIBRILLATION (HCC): Primary | ICD-10-CM

## 2024-07-12 DIAGNOSIS — I25.10 CORONARY ARTERY DISEASE INVOLVING NATIVE CORONARY ARTERY OF NATIVE HEART WITHOUT ANGINA PECTORIS: ICD-10-CM

## 2024-07-12 DIAGNOSIS — E78.2 MIXED HYPERLIPIDEMIA: ICD-10-CM

## 2024-07-12 LAB — METHYLMALONATE SERPL-SCNC: 296 NMOL/L (ref 0–378)

## 2024-07-12 PROCEDURE — 93000 ELECTROCARDIOGRAM COMPLETE: CPT

## 2024-07-12 PROCEDURE — 99214 OFFICE O/P EST MOD 30 MIN: CPT

## 2024-07-12 RX ORDER — LABETALOL 100 MG/1
50 TABLET, FILM COATED ORAL DAILY
Qty: 30 TABLET | Refills: 1 | Status: SHIPPED | OUTPATIENT
Start: 2024-07-12

## 2024-07-12 NOTE — PROGRESS NOTES
Oscar KARENA Francisca  1940  [id number]  [facility] [address] [facility] [address] [phone]    1. Paroxysmal atrial fibrillation (HCC)  POCT ECG      2. Hypertensive heart and chronic kidney disease with heart failure and stage 1 through stage 4 chronic kidney disease, or chronic kidney disease (HCC)  labetalol (NORMODYNE) 100 mg tablet      3. Moderate to severe pulmonary hypertension (HCC)        4. Coronary artery disease involving native coronary artery of native heart without angina pectoris        5. Aneurysm of ascending aorta without rupture (HCC)        6. Severe obstructive sleep apnea        7. Mixed hyperlipidemia            Summary/Discussion:  Hypotension:  - s/p hospital admission from 7/6/2024-7/8/2024   - he reports continued hypotension at home with lowest SBP reading in the 80's and symptomatic  - blood pressure today, 127/66  - recommend decreasing his labetalol to 50 mg daily    - continue close blood pressure monitoring   - if blood pressures remain low further adjustments to his antihypertensive regimen     Paroxysmal atrial fibrillation:  - EKG in office today: sinus bradycardia   - [id number] = 4  - anticoagulation on warfarin  follows with out coumadin clinic   - will decrease his labetalol to 50 mg daily as noted above  - continue amiodarone    Chronic HFmrEF, 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%  - echo (4/2024): LV wall thickness mod increased, mod concentric hypertrophy, LVEF 45%, mildly reduced systolic function, no RMWA, diastolic function severely abnormal, LA/RA mildly dilated, mild AR, mod MR, severe TR, PASP 55, AA 4.5 cm  - creatinine (7/8/2024): 1.15  - potassium (7/8/2024): 3.7  - repeat BMP   - compensated on physical exam   continue torsemide + potassium chloride    - continue GDMT  further GDMT limited due to hypotension and bradycardia  - heart failure education provided with the importance of limiting sodium intake to <2 grams, (2000 mg) per day,   monitoring fluid intake <2 liters (2000 ml) per day, and daily weights      Coronary artery disease:  - with prior CABG  - without symptoms of angina   - continue on aspirin, statin, and beta blocker    Dyslipidemia:  - Lipid Profile:    Latest Reference Range & Units 02/20/24 09:14   Cholesterol See Comment mg/dL 105   Triglycerides See Comment mg/dL 37   HDL >=40 mg/dL 50   Non-HDL Cholesterol mg/dl 55   LDL Calculated 0 - 100 mg/dL 48   - acceptable numbers; continue statin   - encouraged low cholesterol diet and annual lipid follow up    Obstructive sleep apnea:  - wears 2L of oxygen at bedtime     Chronic supplemental oxygen use:  - was not wearing oxygen in office today with spo2 of 88%. Improved to 98% on supplemental oxygen     Interval History: Oscar Espinosa is a 83 y.o. year old male with history of HFmrEF, CAD, PAF, hypotension, HLD, YEVGENIY, COPD on chronic supplemental oxygen use, AAA, chronic B/L LE lymphedema, and CKD who presents to the office today for a hospital follow up.     He originally presented to the hospital on 7/6/2024 due to hypotension and lightheadedness.  The day before admission he began taking tramadol for his lumbar radiculopathic pain.  He was working with his physical therapist at home when he began to feel lightheaded and dizzy.  The physical therapist took his blood pressure and it was 102/46.  A similar episode happened the next day on July 6th, the day of his admission.  He had an EKG done which had shown sinus bradycardia with first-degree heart block.  He had a chest x-ray done which revealed mild pulmonary venous congestion and left pleural thickening.  His creatinine was elevated at 1.32, his hemoglobin was 8.  He was treated with fluids with symptomatic improvement.  During his hospital stay urinalysis was unremarkable, viral panel was negative, orthostatic testing was unremarkable, and blood pressure remained mostly stable.  His creatinine has been trending down to  baseline.     Since his hospital admission he continues to experience hypotension with lowest home SBP reading in the 80's in which he is symptomatic. He does wear supplemental oxygen at home. He reports daily weights, limiting his sodium, and monitoring his fluid intake. He has chronic B/L LE lymphedema which he follows with home PT for management. He denies any chest pain/pressure/discomfort. He denies acute weight gain, orthopnea, and PND. He denies near syncope and syncope. He denies palpitations.        He will RTO on 9/13 with Dr. Alan or sooner if necessary. He will call with any concerns.       Medical Problems       Problem List       Benign essential hypertension    Hyperlipidemia    Impaired fasting glucose    Microscopic hematuria    Overview Signed 5/14/2018  9:17 AM by Lea Reyes, MD     Annotation - [id number]: Dr Dee         Severe obesity (BMI 35.0-35.9 with comorbidity) (HCC)    Severe obstructive sleep apnea    Overview Signed 5/14/2018  9:17 AM by Lea Reyes, MD     Annotation - [id number]: Dr Ellington.         Subclinical hypothyroidism    Overview Signed 5/14/2018  9:17 AM by Lea Reyes, MD     Transitioned From: Hypothyroidism         CAD (coronary artery disease)    Abdominal aortic aneurysm without rupture (HCC)    Aortic valve disorder    Lumbar radiculopathy    Chronic pain syndrome    Multiple pulmonary nodules    Chronic obstructive pulmonary disease (HCC)    Overview Deleted 9/11/2019  3:22 PM by Gerson Terrell MD            Moderate to severe pulmonary hypertension (HCC)    Nocturnal hypoxemia    Ascending aortic aneurysm (HCC)    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)    Overview Signed 1/14/2022  7:14 AM by Xiomara Gomez     As per ICD10 guidelines:Provider approved         Wt Readings from Last 3 Encounters:   07/12/24 99.8 kg (220 lb)   07/08/24 97.9 kg (215 lb 13.3 oz)   07/02/24 99.3 kg (219 lb)                 Myofascial pain syndrome    Atrial fibrillation (HCC)     Venous insufficiency of both lower extremities    Bilateral carotid artery stenosis    Hypertensive heart and chronic kidney disease with heart failure and stage 1 through stage 4 chronic kidney disease, or chronic kidney disease (HCC)    Overview Signed 2/29/2024 12:45 PM by Xiomara Gomez     As per ICD 10 guidelines         Wt Readings from Last 3 Encounters:   07/12/24 99.8 kg (220 lb)   07/08/24 97.9 kg (215 lb 13.3 oz)   07/02/24 99.3 kg (219 lb)                 Pleural effusion with shortness of breath    Anemia    Acute hypoxic respiratory failure (HCC)    Chronic combined systolic and diastolic congestive heart failure (HCC)    Wt Readings from Last 3 Encounters:   07/12/24 99.8 kg (220 lb)   07/08/24 97.9 kg (215 lb 13.3 oz)   07/02/24 99.3 kg (219 lb)                 Prediabetes    Elevated serum creatinine    Overview Deleted 6/18/2024  6:57 PM by Js Connor            Abnormal TSH    Pneumonia of left lower lobe due to infectious organism    Hypotension        Past Medical History:   Diagnosis Date    Abdominal aortic aneurysm (HCC)     s/p repair    Abnormal ECG july 2022    Acute on chronic congestive heart failure (HCC) 09/11/2019    Acute respiratory failure with hypoxia (HCC) 05/06/2024    Aneurysm ([facility]) 2007    Aneurysm of abdominal aorta (HCC)     49mm, trileaflet AV    Atrial fibrillation (HCC)     Eliquis    BPH (benign prostatic hyperplasia)     CAD (coronary artery disease)     s/p CABGx3    CHF (congestive heart failure) ([facility])     Chronic pain     Gabapentin    Chronic pain disorder     lumbar    COPD (chronic obstructive pulmonary disease) ([facility])     Coronary artery disease     Elevated serum creatinine 06/18/2024    Elevated transaminase level 04/27/2024    Former tobacco use     Hyperlipidemia     Hypertension     Hyponatremia 04/27/2024    Hyponatremia 04/27/2024    Hypothyroidism     Lumbar radiculopathy     Lumbar stenosis     s/p injections    Neuropathy     Obesity (BMI 30-39.9)      YEVGENIY (obstructive sleep apnea)     unable to tolerate CPAP    Platelets decreased (HCC) 2022    Pulmonary hypertension (HCC)     moderate to severe    Spondylolisthesis of lumbar region     Visual impairment     Vitamin D deficiency      Social History     Socioeconomic History    Marital status: /Civil Union     Spouse name: Not on file    Number of children: Not on file    Years of education: Not on file    Highest education level: Not on file   Occupational History    Occupation: retired   Tobacco Use    Smoking status: Former     Current packs/day: 0.00     Average packs/day: 1 pack/day for 55.6 years (55.6 ttl pk-yrs)     Types: Cigarettes     Start date: 1957     Quit date: 2012     Years since quittin.9     Passive exposure: Past    Smokeless tobacco: Never   Vaping Use    Vaping status: Never Used   Substance and Sexual Activity    Alcohol use: Yes     Alcohol/week: 21.0 standard drinks of alcohol     Types: 21 Cans of beer per week    Drug use: No    Sexual activity: Never   Other Topics Concern    Not on file   Social History Narrative    Not on file     Social Determinants of Health     Financial Resource Strain: Low Risk  (3/7/2024)    Overall Financial Resource Strain (CARDIA)     Difficulty of Paying Living Expenses: Not hard at all   Food Insecurity: No Food Insecurity (2024)    Hunger Vital Sign     Worried About Running Out of Food in the Last Year: Never true     Ran Out of Food in the Last Year: Never true   Transportation Needs: No Transportation Needs (2024)    PRAPARE - Transportation     Lack of Transportation (Medical): No     Lack of Transportation (Non-Medical): No   Physical Activity: Not on file   Stress: Not on file   Social Connections: Not on file   Intimate Partner Violence: Not on file   Housing Stability: Low Risk  (2024)    Housing Stability Vital Sign     Unable to Pay for Housing in the Last Year: No     Number of Times Moved in the  Last Year: 0     Homeless in the Last Year: No      Family History   Problem Relation Age of Onset    Heart disease Mother     Stroke Father         stroke syndrome    Aneurysm Brother         abdominal    Aortic aneurysm Brother         ascending    Coronary artery disease Family     Hypertension Family     Other Son         gioblastoma multiforme     Past Surgical History:   Procedure Laterality Date    ABDOMINAL AORTIC ANEURYSM REPAIR  08/09/2007    2 dock limbs with visc extens prosth    CARDIAC CATHETERIZATION      COLONOSCOPY      CORONARY ARTERY BYPASS GRAFT  2003    LIMA to LAD, sequential SVG to OM1 & OM2, SVG to RDA    IR CHEST TUBE PLACEMENT  6/19/2024    LUMBAR EPIDURAL INJECTION         Current Outpatient Medications:     acetaminophen (TYLENOL) 325 mg tablet, Take 2 tablets by mouth every 6 (six) hours as needed for fever, headaches, mild pain, moderate pain or severe pain. Indications: Fever, Pain, Disp: , Rfl:     albuterol (ProAir HFA) 90 mcg/act inhaler, Inhale 2 puffs every 6 (six) hours as needed for wheezing, Disp: 24 g, Rfl: 0    amiodarone 200 mg tablet, Take 1 tablet (200 mg total) by mouth daily, Disp: 90 tablet, Rfl: 0    aspirin (ECOTRIN LOW STRENGTH) 81 mg EC tablet, Take 1 tablet by mouth daily, Disp: , Rfl:     atorvastatin (LIPITOR) 40 mg tablet, Take 1 tablet (40 mg total) by mouth daily, Disp: 90 tablet, Rfl: 3    cholecalciferol (VITAMIN D3) 1,000 units tablet, Take 2,000 Units by mouth daily, Disp: , Rfl:     FIBER ADULT GUMMIES PO, Take 1 caplet by mouth daily , Disp: , Rfl:     gabapentin (Neurontin) 300 mg capsule, Take 1 capsule (300 mg total) by mouth 2 (two) times a day AND 3 capsules (900 mg total) daily at bedtime. (Patient taking differently:  3 capsules (900 mg total) daily at bedtime.), Disp: 450 capsule, Rfl: 1    labetalol (NORMODYNE) 100 mg tablet, Take 0.5 tablets (50 mg total) by mouth in the morning, Disp: 30 tablet, Rfl: 1    multivitamin (THERAGRAN) TABS, Take 1  tablet by mouth daily, Disp: , Rfl:     oxygen gas, Inhale 2 L continuous. 2L of oxygen via nasal cannula as needed and at night  Indications: short of breath, Disp: , Rfl:     polyethylene glycol (MIRALAX) 17 g packet, Take 17 g by mouth daily as needed (constipation). Indications: ., Disp: , Rfl:     potassium chloride (Klor-Con M20) 20 mEq tablet, Take one tab daily, 7 days a week., Disp: 75 tablet, Rfl: 1    senna (SENOKOT) 8.6 MG tablet, Take 1 tablet by mouth as needed, Disp: , Rfl:     tamsulosin (FLOMAX) 0.4 mg, Take 1 capsule by mouth daily, Disp: , Rfl:     torsemide (DEMADEX) 20 mg tablet, Take 1 (20mg) tablet on Tuesday, Thursday, Saturday, Sunday, and 2 tablets (40 mg total) on Monday, Wednesday, and Friday, Disp: 180 tablet, Rfl: 0    warfarin (Coumadin) 2.5 mg tablet, Take 1.25mg every Sunday, Tuesday, and Thursday. Take 2.5 mg on every other day. (Patient taking differently: 1.25 mg  Sun Thurs, 2.5 mg Mon Tue Wed Fri Sat recheck 7/16/24), Disp: 10 tablet, Rfl: 0    traMADol (ULTRAM) 50 mg tablet, Take 50 mg by mouth every 6 (six) hours as needed for moderate pain. Indications: Pain (Patient not taking: Reported on 7/12/2024), Disp: , Rfl:   Allergies   Allergen Reactions    Meloxicam Edema       Labs:     Chemistry        Component Value Date/Time     (L) 10/15/2015 1042    K 3.7 07/08/2024 1109    K 4.7 10/15/2015 1042    CL 97 07/08/2024 1109     10/15/2015 1042    CO2 35 (H) 07/08/2024 1109    CO2 30 10/15/2015 1042    BUN 15 07/08/2024 1109    BUN 15 10/15/2015 1042    CREATININE 1.15 07/08/2024 1109    CREATININE 1.03 10/15/2015 1042        Component Value Date/Time    CALCIUM 8.0 (L) 07/08/2024 1109    CALCIUM 9.1 10/15/2015 1042    ALKPHOS 112 (H) 07/06/2024 1603    ALKPHOS 71 10/15/2015 1042    AST 21 07/06/2024 1603    AST 14 10/15/2015 1042    ALT 20 07/06/2024 1603    ALT 23 10/15/2015 1042    BILITOT 0.79 10/15/2015 1042            Lab Results   Component Value Date    CHOL  "124 10/15/2015    CHOL 159 04/01/2015    CHOL 155 09/30/2013     Lab Results   Component Value Date    HDL 50 02/20/2024    HDL 68 08/17/2023    HDL 76 01/27/2023     Lab Results   Component Value Date    LDLCALC 48 02/20/2024    LDLCALC 53 08/17/2023    LDLCALC 58 01/27/2023     Lab Results   Component Value Date    TRIG 37 02/20/2024    TRIG 54 08/17/2023    TRIG 41 01/27/2023     No results found for: \"CHOLHDL\"    Imaging: XR chest 1 view portable    Result Date: 7/6/2024  Narrative: XR CHEST PORTABLE INDICATION: sob. COMPARISON: CXR 6/26/2024, chest CT 6/4/2024. FINDINGS: Mild pulmonary venous congestion. Redemonstration of left pleural thickening and left base atelectasis/scar. No pneumothorax. Moderate cardiomegaly, CABG. Bones are unremarkable for age. Normal upper abdomen.     Impression: Mild pulmonary venous congestion. Redemonstration of left pleural thickening and left base atelectasis/scar. Residual left pleural effusion not excluded. Workstation performed: [id number]     XR chest portable    Result Date: 6/26/2024  Narrative: CHEST INDICATION: Post chest tube removal. Rule out pneumothorax. COMPARISON: Several prior chest radiographs, the most recent from 6/22/2024 and CT of the chest from 6/24/2024. TECHNIQUE: XR CHEST PORTABLE FINDINGS: The previously seen left-sided chest tube has been removed. The left hemidiaphragm remains obscured, likely related to a persistent small left pleural effusion. Patchy opacities persist in the left lung, better seen on the recent CT. No evidence of pneumothorax. Cardiac silhouette not accurately accessed on this projection. The patient is status post median sternotomy and CABG.     Impression: No evidence of pneumothorax status post left chest tube removal. Small left pleural effusion and patchy opacities at the left lung base persist. Workstation performed: [id number]     CT chest wo contrast    Result Date: 6/24/2024  Narrative: CT CHEST WITHOUT IV CONTRAST " INDICATION: left empyema. COMPARISON: CT chest 6/10/2024. TECHNIQUE: CT examination of the chest was performed without intravenous contrast. Multiplanar 2D reformatted images were created from the source data. This examination, like all CT scans performed in the [facility], was performed utilizing techniques to minimize radiation dose exposure, including the use of iterative reconstruction and automated exposure control. Radiation dose length product (DLP) for this visit: 613 mGy-cm FINDINGS: LUNGS: Increased patchy opacities in the lower lobes and nodular opacities in the lingula and right middle lobe. Mild diffuse bronchiectasis. PLEURA: Decreased small loculated thick walled left pleural collection of fluid and gas with chest tube now in place. No significant right-sided pleural collection. HEART/GREAT VESSELS: Cardiomegaly. Post CABG changes. Coronary artery and aortic calcifications. Unchanged 48 mm ascending aortic aneurysm. MEDIASTINUM AND MYLES: Unremarkable. CHEST WALL AND LOWER NECK: Unremarkable. VISUALIZED STRUCTURES IN THE UPPER ABDOMEN: Renal and hepatic cysts. OSSEOUS STRUCTURES: No acute fracture or destructive osseous lesion. Degenerative changes in the spine.     Impression: 1.  Decreased now small loculated left pleural collection. 2.  Increased bilateral patchy and nodular opacities consistent with multifocal infection. 3.  Unchanged 4.8 cm ascending aortic aneurysm allowing for measurement differences. Workstation performed: [id number]     XR chest portable    Result Date: 6/22/2024  Narrative: XR CHEST PORTABLE INDICATION: L empyema. COMPARISON: 6/21/2024 and 6/20/2024, chest CT 6/10/2024. FINDINGS: Persistent left pigtail catheter with stable small loculated left basilar hydropneumothorax and left base atelectasis. Normal heart size, CABG, AVR. Bones are unremarkable for age. Normal upper abdomen.     Impression: Stable small loculated left basilar hydropneumothorax/empyema  and left base atelectasis. Workstation performed: [id number]     XR chest portable    Result Date: 6/21/2024  Narrative: XR CHEST PORTABLE INDICATION: L empyema. COMPARISON: 6/20/2024 FINDINGS: Stable placement of left-sided pigtail catheter. Sternotomy. Persistent dense  left  lung base opacity which may represent a combination of effusion and parenchymal opacity. No pneumothorax. Unchanged cardiomediastinal silhouette. No acute osseous abnormality within the limitations of portable radiography. Normal upper abdomen.     Impression: Persistent dense  left  lung base opacity which may represent a combination of effusion and parenchymal opacity. Workstation performed: [id number]     XR chest portable    Result Date: 6/20/2024  Narrative: XR CHEST PORTABLE INDICATION: post chest tube placement. COMPARISON: 6/18/2024 FINDINGS: Left pleural pigtail catheter is seen curling over the left lower hemithorax. No pneumothorax. Left pleural effusion partially diminished in size. Underlying airspace disease noted. Patient is status post median sternotomy. Normal upper abdomen.     Impression: Status post placement of left pleural catheter with partially diminished size of the left effusion. No pneumothorax Workstation performed: [id number]     IR chest tube placement    Result Date: 6/19/2024  Narrative: Ultrasound guided left chest tube placement Clinical History: Loculated recurrent effusion, suspected underlying infection. : Azalea Mariscal MD. Assistant: Rosalia Cornell MD Conscious sedation time: 0 Technique: The patient was brought to the interventional radiology area and placed upright on a stretcher. The left chest was then examined with ultrasound and the skin was marked. The collection appeared to contain thick complex fluid under ultrasound. The skin was then prepped, and draped in usual sterile fashion. Lidocaine was administered to the skin and a small skin incision was made. Under direct ultrasound  guidance, a 19 gauge needle was advanced into the pleural space. A Crane wire was advanced through the needle, and the needle was removed. After tract dilatation, a 12 Green[place]ic all-purpose drainage catheter was advanced, and the loop formed in the pleural space. The catheter was connected to a Pleur-Evac. Thick purulent serosanguineous pleural fluid was drained. The patient tolerated the procedure well and suffered no complications.     Impression: Impression: Successful placement of a 12 [place] all-purpose drainage catheter into the left pleural space under ultrasound guidance, yielding thick purulent serosanguineous pleural fluid. If incomplete drainage occurs due to loculations, consideration of administration of tPA/dornase into the left chest tube should be made. Workstation performed: [id number]     XR chest 1 view portable    Result Date: 6/19/2024  Narrative: XR CHEST PORTABLE INDICATION: shortness of breath, suspected PNA. COMPARISON: Chest radiograph April 30, 2024. Correlation CT chest [place] 10, 2024 FINDINGS: Left pleural effusion is similar to prior study. Left lower lung opacity. Normal-sized cardiomediastinal silhouette. Post CABG. No pneumothorax.     Impression: Left pleural effusion is similar to prior study. Left lower lobe opacity can represent atelectasis given the appearance on the prior CT. Pneumonia is to be determined on clinical grounds. Workstation performed: [id number]      bedside procedure    Result Date: 6/18/2024  Narrative: [id number]      ECG:  Sinus neris with 1st degree AV block, possible anterior infarct, age undetermined     Review of Systems   Constitutional: Negative.   HENT: Negative.     Eyes: Negative.    Cardiovascular:  Positive for dyspnea on exertion (chronic) and leg swelling (chronic). Negative for chest pain, irregular heartbeat, near-syncope, orthopnea, palpitations, paroxysmal nocturnal dyspnea and syncope.   Respiratory:  Negative  "for sleep disturbances due to breathing.    Endocrine: Negative.    Hematologic/Lymphatic: Negative.    Skin: Negative.    Musculoskeletal:  Positive for arthritis and muscle weakness.   Gastrointestinal: Negative.    Genitourinary: Negative.    Neurological: Negative.    Psychiatric/Behavioral: Negative.     Allergic/Immunologic: Negative.        Vitals:    07/12/24 0851   BP: 127/66   Pulse: 59   SpO2: 98%     Vitals:    07/12/24 0851   Weight: 99.8 kg (220 lb)     Height: 5' 11\" (180.3 cm)   Body mass index is 30.68 kg/m².    Physical Exam  Vitals and nursing note reviewed.   Constitutional:       General: He is not in acute distress.     Appearance: He is ill-appearing.   HENT:      Head: Normocephalic.   Cardiovascular:      Rate and Rhythm: Regular rhythm. Bradycardia present.      Heart sounds: No murmur heard.  Pulmonary:      Breath sounds: Decreased breath sounds and wheezing present.      Comments: 2 L NC (chronic)  Musculoskeletal:      Cervical back: Normal range of motion.      Right lower leg: Edema present.      Left lower leg: Edema present.      Comments: Uses walker   Skin:     General: Skin is warm.      Comments: PVD   Neurological:      Mental Status: He is alert and oriented to person, place, and time.        "

## 2024-07-13 NOTE — CASE COMMUNICATION
In basket message from Megan Dunham  Good morning Kevin,  I just wanted to let you know that Dr. Reyes is currently out of the office however her messages are being addressed by our covering providers. Dr. Alicia Castaneda has reviewed this message and is agreeable to the wound dressing for this patient. If you need anything else, please let us know. Thank you.     CHRISTEN Garcia Clinical Coordinator

## 2024-07-15 ENCOUNTER — HOME CARE VISIT (OUTPATIENT)
Dept: HOME HEALTH SERVICES | Facility: HOME HEALTHCARE | Age: 84
End: 2024-07-15
Payer: MEDICARE

## 2024-07-15 VITALS
DIASTOLIC BLOOD PRESSURE: 68 MMHG | TEMPERATURE: 98 F | HEART RATE: 78 BPM | RESPIRATION RATE: 16 BRPM | OXYGEN SATURATION: 98 % | SYSTOLIC BLOOD PRESSURE: 128 MMHG

## 2024-07-15 PROCEDURE — G0299 HHS/HOSPICE OF RN EA 15 MIN: HCPCS

## 2024-07-15 NOTE — CASE COMMUNICATION
S[facility] to [facility] Home     Ordering MD (home health only) Rylee reyes    Wound 1 left leg Full  Frequency qod    All items are ordered by the each unless otherwise noted.  Orders should be for a 2 week period (unless noted by insurance)    Cleansers  Saline [place]  NOT STOCKED  [id number] (not covered by private ins)    Dry Dressings   (Nonsterile gauze not covered by private insurance)  (Sterile gauze may be requested for insurance rather than no nsterile gauze)  Gauze 4x4 N/S 200 4x4s per unit  [id number] 1, Gauze Rian stretch roll 4inch n/s 12 rolls per unit  [id number] 1 and ABD 5x9 [id number] 14    Tape  Transpore white  2in [id number] 1    Moist Wound Products  Dermagran [id number] 1 box

## 2024-07-16 ENCOUNTER — ANTICOAG VISIT (OUTPATIENT)
Dept: CARDIOLOGY CLINIC | Facility: CLINIC | Age: 84
End: 2024-07-16

## 2024-07-16 ENCOUNTER — HOME CARE VISIT (OUTPATIENT)
Dept: HOME HEALTH SERVICES | Facility: HOME HEALTHCARE | Age: 84
End: 2024-07-16
Payer: MEDICARE

## 2024-07-16 ENCOUNTER — APPOINTMENT (OUTPATIENT)
Dept: LAB | Facility: CLINIC | Age: 84
End: 2024-07-16
Payer: MEDICARE

## 2024-07-16 VITALS — DIASTOLIC BLOOD PRESSURE: 62 MMHG | HEART RATE: 64 BPM | SYSTOLIC BLOOD PRESSURE: 112 MMHG | RESPIRATION RATE: 20 BRPM

## 2024-07-16 DIAGNOSIS — I48.21 PERMANENT ATRIAL FIBRILLATION (HCC): ICD-10-CM

## 2024-07-16 DIAGNOSIS — I48.0 PAROXYSMAL ATRIAL FIBRILLATION (HCC): Primary | ICD-10-CM

## 2024-07-16 LAB
INR PPP: 1.92 (ref 0.84–1.19)
PROTHROMBIN TIME: 21.7 SECONDS (ref 11.6–14.5)

## 2024-07-16 PROCEDURE — G0151 HHCP-SERV OF PT,EA 15 MIN: HCPCS

## 2024-07-16 PROCEDURE — 10330064 CLEANSER, WND SIMPLY SALINE 7.1OZ

## 2024-07-16 PROCEDURE — 10330064 BANDAGE, CNFRM 4X4.1YDS N/S LF (12RL/BG"

## 2024-07-16 PROCEDURE — 36415 COLL VENOUS BLD VENIPUNCTURE: CPT

## 2024-07-16 PROCEDURE — 85610 PROTHROMBIN TIME: CPT

## 2024-07-16 PROCEDURE — 10330064 SPONGE, GAUZE 8PLY N/S 4X4" (200/PK 20P"

## 2024-07-16 PROCEDURE — 10330064 TAPE, ADHSV TRANSPORE WHT 2 (6RL/BX 10B"

## 2024-07-16 PROCEDURE — 10330064 PAD, ABD 5X9 STR LF (1/PK 20PK/BX) MGM1"

## 2024-07-16 PROCEDURE — 10330064 DRESSING, HYDROGEL 4X4 (15/BX 4BX/CS)

## 2024-07-17 ENCOUNTER — HOME CARE VISIT (OUTPATIENT)
Dept: HOME HEALTH SERVICES | Facility: HOME HEALTHCARE | Age: 84
End: 2024-07-17
Payer: MEDICARE

## 2024-07-17 VITALS
TEMPERATURE: 98 F | OXYGEN SATURATION: 94 % | RESPIRATION RATE: 16 BRPM | SYSTOLIC BLOOD PRESSURE: 118 MMHG | HEART RATE: 72 BPM | DIASTOLIC BLOOD PRESSURE: 60 MMHG

## 2024-07-17 PROCEDURE — G0299 HHS/HOSPICE OF RN EA 15 MIN: HCPCS

## 2024-07-18 ENCOUNTER — OFFICE VISIT (OUTPATIENT)
Dept: INTERNAL MEDICINE CLINIC | Facility: CLINIC | Age: 84
End: 2024-07-18
Payer: MEDICARE

## 2024-07-18 VITALS
SYSTOLIC BLOOD PRESSURE: 104 MMHG | HEIGHT: 71 IN | WEIGHT: 221.4 LBS | DIASTOLIC BLOOD PRESSURE: 60 MMHG | BODY MASS INDEX: 31 KG/M2 | HEART RATE: 56 BPM | OXYGEN SATURATION: 92 %

## 2024-07-18 DIAGNOSIS — E03.8 SUBCLINICAL HYPOTHYROIDISM: ICD-10-CM

## 2024-07-18 DIAGNOSIS — I95.2 HYPOTENSION DUE TO DRUGS: Primary | ICD-10-CM

## 2024-07-18 DIAGNOSIS — I50.42 CHRONIC COMBINED SYSTOLIC AND DIASTOLIC CONGESTIVE HEART FAILURE (HCC): ICD-10-CM

## 2024-07-18 DIAGNOSIS — R79.89 ELEVATED SERUM CREATININE: ICD-10-CM

## 2024-07-18 DIAGNOSIS — J96.01 ACUTE HYPOXIC RESPIRATORY FAILURE (HCC): ICD-10-CM

## 2024-07-18 DIAGNOSIS — J90 PLEURAL EFFUSION: ICD-10-CM

## 2024-07-18 DIAGNOSIS — M54.16 LUMBAR RADICULOPATHY: ICD-10-CM

## 2024-07-18 DIAGNOSIS — J18.9 PNEUMONIA OF LEFT LOWER LOBE DUE TO INFECTIOUS ORGANISM: ICD-10-CM

## 2024-07-18 DIAGNOSIS — I87.2 VENOUS INSUFFICIENCY OF BOTH LOWER EXTREMITIES: ICD-10-CM

## 2024-07-18 DIAGNOSIS — D64.9 ANEMIA, UNSPECIFIED TYPE: ICD-10-CM

## 2024-07-18 DIAGNOSIS — R79.89 ABNORMAL TSH: ICD-10-CM

## 2024-07-18 PROCEDURE — 99214 OFFICE O/P EST MOD 30 MIN: CPT | Performed by: INTERNAL MEDICINE

## 2024-07-18 RX ORDER — TRAMADOL HYDROCHLORIDE 50 MG/1
50 TABLET ORAL EVERY 6 HOURS PRN
Qty: 30 TABLET | Refills: 0 | Status: SHIPPED | OUTPATIENT
Start: 2024-07-18

## 2024-07-18 NOTE — ASSESSMENT & PLAN NOTE
Completed antibiotic course   Does not appear to have pneumonia clinically-no cough, fever  F/u with pulm

## 2024-07-18 NOTE — ASSESSMENT & PLAN NOTE
Wt Readings from Last 3 Encounters:   07/18/24 100 kg (221 lb 6.4 oz)   07/12/24 99.8 kg (220 lb)   07/08/24 97.9 kg (215 lb 13.3 oz)     LE edema from venous insufficiency more than acute CHF  Continue daily weight, fluid and salt restriction

## 2024-07-18 NOTE — ASSESSMENT & PLAN NOTE
Low normal BP on current meds without increase in fatigue, dizziness  Labetalol reduced more recently by cardiology

## 2024-07-18 NOTE — PROGRESS NOTES
Transition of Care Visit  Name: Oscar Espinosa      : 1940      MRN: [id number]  Encounter Provider: Lea Reyes, MD  Encounter Date: 2024   [facility]    Assessment & Plan   1. Hypotension due to drugs  Assessment & Plan:  Low normal BP on current meds without increase in fatigue, dizziness  Labetalol reduced more recently by cardiology  2. Chronic combined systolic and diastolic congestive heart failure (HCC)  Assessment & Plan:  Wt Readings from Last 3 Encounters:   24 100 kg (221 lb 6.4 oz)   24 99.8 kg (220 lb)   24 97.9 kg (215 lb 13.3 oz)     LE edema from venous insufficiency more than acute CHF  Continue daily weight, fluid and salt restriction        Orders:  -     Basic metabolic panel  -     Wheeled Walker  3. Pneumonia of left lower lobe due to infectious organism  Assessment & Plan:  Completed antibiotic course   Does not appear to have pneumonia clinically-no cough, fever  F/u with pulm  4. Pleural effusion with shortness of breath  Assessment & Plan:  Chest tube placed at hospitalization in   Lungs clear on exam today  5. Acute hypoxic respiratory failure (HCC)  Assessment & Plan:  O2 PRN  6. Anemia, unspecified type  Assessment & Plan:  Started when he had pneumonia in April  Normal iron B12 folate  Continue to monitor  Orders:  -     CBC  -     Protein electrophoresis, serum; Future  7. Abnormal TSH  8. Elevated serum creatinine  9. Subclinical hypothyroidism  Assessment & Plan:  Elevated TSH, normal T4  Continue to observe  10. Venous insufficiency of both lower extremities  Assessment & Plan:  Continue compression wraps  Venous pumps received but unsure how to operate so waiting for teaching  11. Lumbar radiculopathy  Assessment & Plan:  Little relief from tramadol but would like to continue  Orders:  -     traMADol (ULTRAM) 50 mg tablet; Take 1 tablet (50 mg total) by mouth every 6 (six) hours as needed for moderate  pain         History of Present Illness     Transitional Care Management Review:   Oscar Espinosa is a 83 y.o. male here for TCM follow up.     During the TCM phone call patient stated:  TCM Call     Date and time call was made  7/10/2024  8:40 AM    Hospital care reviewed  Records not available    Patient was hospitialized at  [facility]    Date of Admission  07/06/24    Date of discharge  07/08/24    Diagnosis  Hypotension    Disposition  Home    Were the patients medications reviewed and updated  No    Current Symptoms  None      TCM Call     Post hospital issues  None    Should patient be enrolled in anticoag monitoring?  No    Scheduled for follow up?  Yes    Did you obtain your prescribed medications  Yes    Do you need help managing your prescriptions or medications  No    Is transportation to your appointment needed  No    I have advised the patient to call PCP with any new or worsening symptoms  Samantha WHITLEY/MA    Are you recieving any outpatient services  No    Are you recieving home care services  Yes    Types of home care services  Nurse visit; Home health aid    Are you using any community resources  No    Have you fallen in the last 12 months  No    Interperter language line needed  No    Counseling  Patient        Admitted for hypotension  Labetalol and torsemide reduced  Fluid restricting, <2L a day  + weight gain, LE edema since d/c attributed to increase salt intake being back on a regular diet  He also sustained a skin tear on the left shin and just resumed compression  Needs education on leg pumps which they received a few weeks ago  Continues to have SOB at rest and with exertion, minimal improvement after antibiotics, chest tube insertion in June for a loculated pleural effusion  Uses O2 PRN and would like to get portable O2; O2 at home with exertion can be ion low 80s  Difficulty walking and needs a walker  Uses a cane at this time      Review of Systems   Constitutional:   "Positive for unexpected weight change (weight gain).   Respiratory:  Positive for shortness of breath. Negative for cough.    Cardiovascular:  Positive for leg swelling. Negative for chest pain and palpitations.   Gastrointestinal:  Negative for abdominal pain, blood in stool, constipation and diarrhea.   Musculoskeletal:  Positive for back pain.   Neurological:  Negative for dizziness.     Objective     /60 (BP Location: Left arm, Patient Position: Sitting, Cuff Size: Standard)   Pulse 56   Ht 5' 11\" (1.803 m)   Wt 100 kg (221 lb 6.4 oz)   SpO2 92%   BMI 30.88 kg/m²     Physical Exam  Vitals and nursing note reviewed.   Constitutional:       General: He is not in acute distress.     Appearance: He is well-developed. He is ill-appearing.   Eyes:      Conjunctiva/sclera: Conjunctivae normal.   Cardiovascular:      Rate and Rhythm: Bradycardia present. Rhythm irregular.      Heart sounds: No murmur heard.  Pulmonary:      Effort: Pulmonary effort is normal. No respiratory distress.      Breath sounds: Normal breath sounds.   Abdominal:      Palpations: Abdomen is soft.      Tenderness: There is no abdominal tenderness.   Musculoskeletal:      Right lower leg: Edema present.      Left lower leg: Edema present.   Skin:     Coloration: Skin is pale.   Neurological:      Mental Status: He is alert.   Psychiatric:         Mood and Affect: Mood normal.         Behavior: Behavior normal.       Medications have been reviewed by provider in current encounter    Administrative Statements         "

## 2024-07-18 NOTE — ASSESSMENT & PLAN NOTE
Continue compression wraps  Venous pumps received but unsure how to operate so waiting for teaching

## 2024-07-19 ENCOUNTER — HOME CARE VISIT (OUTPATIENT)
Dept: HOME HEALTH SERVICES | Facility: HOME HEALTHCARE | Age: 84
End: 2024-07-19
Payer: MEDICARE

## 2024-07-19 ENCOUNTER — OFFICE VISIT (OUTPATIENT)
Dept: PULMONOLOGY | Facility: CLINIC | Age: 84
End: 2024-07-19
Payer: MEDICARE

## 2024-07-19 VITALS
WEIGHT: 222.2 LBS | SYSTOLIC BLOOD PRESSURE: 122 MMHG | BODY MASS INDEX: 30.99 KG/M2 | DIASTOLIC BLOOD PRESSURE: 64 MMHG | TEMPERATURE: 97.7 F | OXYGEN SATURATION: 93 % | HEART RATE: 61 BPM

## 2024-07-19 DIAGNOSIS — E66.01 SEVERE OBESITY (BMI 35.0-35.9 WITH COMORBIDITY) (HCC): ICD-10-CM

## 2024-07-19 DIAGNOSIS — J90 PLEURAL EFFUSION: ICD-10-CM

## 2024-07-19 DIAGNOSIS — J44.9 CHRONIC OBSTRUCTIVE PULMONARY DISEASE, UNSPECIFIED COPD TYPE (HCC): ICD-10-CM

## 2024-07-19 DIAGNOSIS — J44.9 CHRONIC OBSTRUCTIVE PULMONARY DISEASE, UNSPECIFIED COPD TYPE (HCC): Primary | ICD-10-CM

## 2024-07-19 DIAGNOSIS — G47.33 SEVERE OBSTRUCTIVE SLEEP APNEA: ICD-10-CM

## 2024-07-19 DIAGNOSIS — R91.8 MULTIPLE PULMONARY NODULES: ICD-10-CM

## 2024-07-19 DIAGNOSIS — G47.34 NOCTURNAL HYPOXEMIA: ICD-10-CM

## 2024-07-19 DIAGNOSIS — J96.01 ACUTE HYPOXIC RESPIRATORY FAILURE (HCC): ICD-10-CM

## 2024-07-19 PROCEDURE — 99214 OFFICE O/P EST MOD 30 MIN: CPT | Performed by: NURSE PRACTITIONER

## 2024-07-19 PROCEDURE — G0151 HHCP-SERV OF PT,EA 15 MIN: HCPCS

## 2024-07-19 PROCEDURE — G2211 COMPLEX E/M VISIT ADD ON: HCPCS | Performed by: NURSE PRACTITIONER

## 2024-07-19 RX ORDER — FLUTICASONE PROPIONATE AND SALMETEROL 100; 50 UG/1; UG/1
1 POWDER RESPIRATORY (INHALATION) 2 TIMES DAILY
Qty: 180 BLISTER | Refills: 1 | Status: SHIPPED | OUTPATIENT
Start: 2024-07-19

## 2024-07-19 RX ORDER — FLUTICASONE PROPIONATE AND SALMETEROL 100; 50 UG/1; UG/1
1 POWDER RESPIRATORY (INHALATION) 2 TIMES DAILY
Qty: 60 BLISTER | Refills: 0 | Status: SHIPPED | OUTPATIENT
Start: 2024-07-19 | End: 2024-07-19

## 2024-07-19 NOTE — PROGRESS NOTES
Pulmonary Follow-Up Note   Oscar Espinosa 83 y.o. male MRN: [id number]  7/22/2024      Assessment/Plan:    Problem List Items Addressed This Visit       Severe obesity (BMI 35.0-35.9 with comorbidity) (HCC)     Diet and lifestyle modifications discussed  Decrease caloric intake increase daily activity  Weight loss encouraged           Severe obstructive sleep apnea     Does not tolerate CPAP  Remains on 2 L NC at bedtime         Multiple pulmonary nodules     Most recent CT chest 06/24/2024 shows decrease in now small loculated left pleural collection.  Increased bilateral patchy and nodular opacities consistent with multifocal infection    Repeat CT ordered due to history of pleural effusion and pneumonia.         Chronic obstructive pulmonary disease (HCC) - Primary     Does not appear in exacerbation  Does endorse episodes of shortness of breath throughout the day mostly with exertion, admits to forgetting to use albuterol rescue inhaler often during episodes  Discussed at length benefits of daily maintenance inhaler  Patient not currently on any daily maintenance therapy.  Will start Advair 1 puff twice daily, continue albuterol 90 mcg/ACT 2 puffs every 6 hours as needed    Would consider repeat PFTs if patient becomes more symptomatic  Currently uses supplemental oxygen as needed         Nocturnal hypoxemia     Home sleep study completed in 2021 showed lowest oxygen saturation 79% and 86% of sleep time with oxygen saturation below 90%.  Patient does not tolerate PAP therapy    He will continue 2 L supplemental oxygen at night         Pleural effusion with shortness of breath     Thoracentesis April 2024 600 ml bloody fluid drained  Hospitalized in June, with left-sided chest tube placed   Clinical improvement noted  Repeat CT chest         Relevant Orders    CT chest without contrast    Acute hypoxic respiratory failure (HCC)     Uses oxygen as needed          Vaccines: UTD    Return in about 4 months (around  11/19/2024).    All of Oscar's questions were answered prior to leaving the office today. He will follow-up in 3 months or sooner should the need arise. He is aware to call our office with any further questions or concerns.    History of Present Illness   Reason for Visit: Hospital follow up  Chief Complaint: feels well  HPI: Oscar Espinosa is a 83 y.o. male who presents to the office today for hospital follow up.  Patient had recent hospitalization 07/06/2024 to 07/08/2024 for lightheadedness and hypotension, he was also admitted 06/18/2024 to 06/26/2024 for acute hypoxic respiratory failure secondary to pneumonia and pleural effusion-with IR placed left-sided chest tube 6/20, removed 6/24, discharged on Augmentin.  Presented to the office today overall feeling well.  Does not appear in exacerbation.  Denies any fever chills night sweats chest pain or hemoptysis    Patient endorses episodes of shortness of breath throughout the day.  He does not use a current daily maintenance inhaler.  Discussed at length the benefits of daily maintenance inhaler.  He is agreeable to start Advair 1 puff twice daily.  Can continue to use albuterol 2 puffs every 6 hours as needed for shortness of breath.  He is on supplemental oxygen as needed at home.  He will continue to monitor his SpO2 at home and use oxygen when below 89% or when he feels he needs it  Repeat CAT scan ordered.    Patient continues to remain tobacco free  Wife present during exam.  Patient and wife agreeable with current plan  Follow-up in 4 months or sooner if needed    Review of Systems   Respiratory:  Positive for cough and shortness of breath.        Historical Information   Past Medical History:   Diagnosis Date    Abdominal aortic aneurysm (HCC)     s/p repair    Abnormal ECG july 2022    Acute on chronic congestive heart failure (HCC) 09/11/2019    Acute respiratory failure with hypoxia ([facility]) 05/06/2024    Aneurysm ([facility]) 2007    Aneurysm of abdominal  aorta (HCC)     49mm, trileaflet AV    Atrial fibrillation (HCC)     Eliquis    BPH (benign prostatic hyperplasia)     CAD (coronary artery disease)     s/p CABGx3    CHF (congestive heart failure) (HCC)     Chronic pain     Gabapentin    Chronic pain disorder     lumbar    COPD (chronic obstructive pulmonary disease) ([facility])     Coronary artery disease     Elevated serum creatinine 06/18/2024    Elevated serum creatinine 06/18/2024    Elevated transaminase level 04/27/2024    Former tobacco use     Hyperlipidemia     Hypertension     Hyponatremia 04/27/2024    Hyponatremia 04/27/2024    Hypothyroidism     Lumbar radiculopathy     Lumbar stenosis     s/p injections    Neuropathy     Obesity (BMI 30-39.9)     YEVGENIY (obstructive sleep apnea)     unable to tolerate CPAP    Platelets decreased ([facility]) 07/27/2022    Pulmonary hypertension ([facility])     moderate to severe    Spondylolisthesis of lumbar region     Visual impairment 2022    Vitamin D deficiency      Past Surgical History:   Procedure Laterality Date    ABDOMINAL AORTIC ANEURYSM REPAIR  08/09/2007    2 dock limbs with visc extens prosth    CARDIAC CATHETERIZATION      COLONOSCOPY      CORONARY ARTERY BYPASS GRAFT  2003    LIMA to LAD, sequential SVG to OM1 & OM2, SVG to RDA    IR CHEST TUBE PLACEMENT  6/19/2024    LUMBAR EPIDURAL INJECTION       Family History   Problem Relation Age of Onset    Heart disease Mother     Stroke Father         stroke syndrome    Aneurysm Brother         abdominal    Aortic aneurysm Brother         ascending    Coronary artery disease Family     Hypertension Family     Other Son         gioblastoma multiforme     Social History   Social History     Substance and Sexual Activity   Alcohol Use Not Currently     Social History     Substance and Sexual Activity   Drug Use No     Social History     Tobacco Use   Smoking Status Former    Current packs/day: 0.00    Average packs/day: 1 pack/day for 55.6 years (55.6 ttl pk-yrs)    Types:  Cigarettes    Start date: 1957    Quit date: 2012    Years since quittin.9    Passive exposure: Past   Smokeless Tobacco Never     E-Cigarette/Vaping    E-Cigarette Use Never User      E-Cigarette/Vaping Substances    Nicotine No     THC No     CBD No     Flavoring No     Other No     Unknown No        Meds/Allergies     Current Outpatient Medications:     acetaminophen (TYLENOL) 325 mg tablet, Take 2 tablets by mouth every 6 (six) hours as needed for fever, headaches, mild pain, moderate pain or severe pain. Indications: Fever, Pain, Disp: , Rfl:     albuterol (ProAir HFA) 90 mcg/act inhaler, Inhale 2 puffs every 6 (six) hours as needed for wheezing, Disp: 24 g, Rfl: 0    amiodarone 200 mg tablet, Take 1 tablet (200 mg total) by mouth daily, Disp: 90 tablet, Rfl: 0    aspirin (ECOTRIN LOW STRENGTH) 81 mg EC tablet, Take 1 tablet by mouth daily, Disp: , Rfl:     atorvastatin (LIPITOR) 40 mg tablet, Take 1 tablet (40 mg total) by mouth daily, Disp: 90 tablet, Rfl: 3    cholecalciferol (VITAMIN D3) 1,000 units tablet, Take 2,000 Units by mouth daily, Disp: , Rfl:     FIBER ADULT GUMMIES PO, Take 1 caplet by mouth daily , Disp: , Rfl:     gabapentin (Neurontin) 300 mg capsule, Take 1 capsule (300 mg total) by mouth 2 (two) times a day AND 3 capsules (900 mg total) daily at bedtime. (Patient taking differently:  3 capsules (900 mg total) daily at bedtime.), Disp: 450 capsule, Rfl: 1    labetalol (NORMODYNE) 100 mg tablet, Take 0.5 tablets (50 mg total) by mouth in the morning, Disp: 30 tablet, Rfl: 1    multivitamin (THERAGRAN) TABS, Take 1 tablet by mouth daily, Disp: , Rfl:     oxygen gas, Inhale 2 L continuous. 2L of oxygen via nasal cannula as needed and at night  Indications: short of breath, Disp: , Rfl:     polyethylene glycol (MIRALAX) 17 g packet, Take 17 g by mouth daily as needed (constipation). Oscar Espinosa dissolve in 8 oz liquid of choice   Indications: ., Disp: , Rfl:     potassium  chloride (Klor-Con M20) 20 mEq tablet, Take one tab daily, 7 days a week., Disp: 75 tablet, Rfl: 1    senna (SENOKOT) 8.6 MG tablet, Take 1 tablet by mouth as needed for constipation, Disp: , Rfl:     tamsulosin (FLOMAX) 0.4 mg, Take 1 capsule by mouth daily, Disp: , Rfl:     torsemide (DEMADEX) 20 mg tablet, Take 1 (20mg) tablet on Tuesday, Thursday, Saturday, Sunday, and 2 tablets (40 mg total) on Monday, Wednesday, and Friday, Disp: 180 tablet, Rfl: 0    traMADol (ULTRAM) 50 mg tablet, Take 1 tablet (50 mg total) by mouth every 6 (six) hours as needed for moderate pain, Disp: 30 tablet, Rfl: 0    warfarin (Coumadin) 2.5 mg tablet, Take 1.25mg every Sunday, Tuesday, and Thursday. Take 2.5 mg on every other day. (Patient taking differently: 1.25 mg  Sun Thurs, 2.5 mg Mon Tue  Wed Fri Sat recheck 7/16/24), Disp: 10 tablet, Rfl: 0    Fluticasone-Salmeterol (Advair) 100-50 mcg/dose inhaler, INHALE 1 PUFF BY MOUTH TWICE DAILY. RINSE MOUTH AFTER USE, Disp: 180 blister, Rfl: 1  Allergies   Allergen Reactions    Meloxicam Edema       Vitals: Blood pressure 122/64, pulse 61, temperature 97.7 °F (36.5 °C), temperature source Temporal, weight 101 kg (222 lb 3.2 oz), SpO2 93%. Body mass index is 30.99 kg/m². Oxygen Therapy  SpO2: 93 % (2L)2 L    Physical Exam:  Physical Exam  Vitals reviewed.   Constitutional:       General: He is not in acute distress.     Appearance: He is obese. He is not ill-appearing.   HENT:      Head: Normocephalic and atraumatic.      Right Ear: External ear normal.      Left Ear: External ear normal.      Nose: Nose normal.      Mouth/Throat:      Mouth: Mucous membranes are moist.      Pharynx: Oropharynx is clear.   Eyes:      Pupils: Pupils are equal, round, and reactive to light.   Cardiovascular:      Rate and Rhythm: Normal rate and regular rhythm.      Pulses: Normal pulses.      Heart sounds: Normal heart sounds.   Pulmonary:      Effort: Pulmonary effort is normal.      Comments: Breath  "sounds bilaterally  Abdominal:      General: Bowel sounds are normal.      Tenderness: There is no abdominal tenderness.   Musculoskeletal:         General: Normal range of motion.      Cervical back: Normal range of motion and neck supple.      Right lower leg: Edema present.      Left lower leg: Edema present.      Comments: Bilateral lower extremity lymphedema   Skin:     General: Skin is warm and dry.   Neurological:      Mental Status: He is alert and oriented to person, place, and time.   Psychiatric:         Mood and Affect: Mood normal.         Behavior: Behavior normal.         Thought Content: Thought content normal.         Judgment: Judgment normal.       Imaging and other studies: I have personally reviewed pertinent reports.     CT chest 06/24/2024 shows decreased now small loculated left pleural collection.  Increased bilateral patchy and nodular opacities consistent with multifocal infection    Pulmonary Results (PFTs, PSG): I have personally reviewed pertinent reports.     PFT's 02/24/2021 show a large airway obstruction on spirometry.  Decreased forced vital capacity with normal TLC and elevated residual volume consistent with air trapping FEV1 74%, FVC 74%, FEV1/FVC ratio 71% DLCO 54%      SOSA Lane  [facility]    Portions of the record may have been created with voice recognition software.  Occasional wrong word or \"sound a like\" substitutions may have occurred due to the inherent limitations of voice recognition software.  Read the chart carefully and recognize, using context, where substitutions have occurred or contact the dictating provider.  "

## 2024-07-21 VITALS
DIASTOLIC BLOOD PRESSURE: 58 MMHG | RESPIRATION RATE: 20 BRPM | SYSTOLIC BLOOD PRESSURE: 104 MMHG | HEART RATE: 58 BPM | OXYGEN SATURATION: 92 %

## 2024-07-22 NOTE — ASSESSMENT & PLAN NOTE
Most recent CT chest 06/24/2024 shows decrease in now small loculated left pleural collection.  Increased bilateral patchy and nodular opacities consistent with multifocal infection    Repeat CT ordered due to history of pleural effusion and pneumonia.

## 2024-07-22 NOTE — ASSESSMENT & PLAN NOTE
Diet and lifestyle modifications discussed  Decrease caloric intake increase daily activity  Weight loss encouraged

## 2024-07-22 NOTE — ASSESSMENT & PLAN NOTE
Thoracentesis April 2024 600 ml bloody fluid drained  Hospitalized in June, with left-sided chest tube placed   Clinical improvement noted  Repeat CT chest

## 2024-07-22 NOTE — ASSESSMENT & PLAN NOTE
Home sleep study completed in 2021 showed lowest oxygen saturation 79% and 86% of sleep time with oxygen saturation below 90%.  Patient does not tolerate PAP therapy    He will continue 2 L supplemental oxygen at night

## 2024-07-22 NOTE — ASSESSMENT & PLAN NOTE
Does not appear in exacerbation  Does endorse episodes of shortness of breath throughout the day mostly with exertion, admits to forgetting to use albuterol rescue inhaler often during episodes  Discussed at length benefits of daily maintenance inhaler  Patient not currently on any daily maintenance therapy.  Will start Advair 1 puff twice daily, continue albuterol 90 mcg/ACT 2 puffs every 6 hours as needed    Would consider repeat PFTs if patient becomes more symptomatic  Currently uses supplemental oxygen as needed

## 2024-07-23 ENCOUNTER — HOME CARE VISIT (OUTPATIENT)
Dept: HOME HEALTH SERVICES | Facility: HOME HEALTHCARE | Age: 84
End: 2024-07-23
Payer: MEDICARE

## 2024-07-23 ENCOUNTER — ANTICOAG VISIT (OUTPATIENT)
Dept: CARDIOLOGY CLINIC | Facility: CLINIC | Age: 84
End: 2024-07-23

## 2024-07-23 ENCOUNTER — APPOINTMENT (OUTPATIENT)
Dept: LAB | Facility: CLINIC | Age: 84
End: 2024-07-23
Payer: MEDICARE

## 2024-07-23 VITALS
HEART RATE: 67 BPM | TEMPERATURE: 98 F | SYSTOLIC BLOOD PRESSURE: 128 MMHG | RESPIRATION RATE: 16 BRPM | OXYGEN SATURATION: 96 % | DIASTOLIC BLOOD PRESSURE: 66 MMHG

## 2024-07-23 VITALS — OXYGEN SATURATION: 89 % | DIASTOLIC BLOOD PRESSURE: 60 MMHG | HEART RATE: 69 BPM | SYSTOLIC BLOOD PRESSURE: 118 MMHG

## 2024-07-23 DIAGNOSIS — I48.21 PERMANENT ATRIAL FIBRILLATION (HCC): ICD-10-CM

## 2024-07-23 DIAGNOSIS — I48.0 PAROXYSMAL ATRIAL FIBRILLATION (HCC): ICD-10-CM

## 2024-07-23 DIAGNOSIS — I48.0 PAROXYSMAL ATRIAL FIBRILLATION (HCC): Primary | ICD-10-CM

## 2024-07-23 DIAGNOSIS — D64.9 ANEMIA, UNSPECIFIED TYPE: ICD-10-CM

## 2024-07-23 LAB
ANION GAP SERPL CALCULATED.3IONS-SCNC: 8 MMOL/L (ref 4–13)
BUN SERPL-MCNC: 15 MG/DL (ref 5–25)
CALCIUM SERPL-MCNC: 8.5 MG/DL (ref 8.4–10.2)
CHLORIDE SERPL-SCNC: 98 MMOL/L (ref 96–108)
CO2 SERPL-SCNC: 33 MMOL/L (ref 21–32)
CREAT SERPL-MCNC: 1.16 MG/DL (ref 0.6–1.3)
ERYTHROCYTE [DISTWIDTH] IN BLOOD BY AUTOMATED COUNT: 18.6 % (ref 11.6–15.1)
GFR SERPL CREATININE-BSD FRML MDRD: 58 ML/MIN/1.73SQ M
GLUCOSE P FAST SERPL-MCNC: 81 MG/DL (ref 65–99)
HCT VFR BLD AUTO: 27.5 % (ref 36.5–49.3)
HGB BLD-MCNC: 8.2 G/DL (ref 12–17)
INR PPP: 1.88 (ref 0.84–1.19)
MCH RBC QN AUTO: 28.9 PG (ref 26.8–34.3)
MCHC RBC AUTO-ENTMCNC: 29.8 G/DL (ref 31.4–37.4)
MCV RBC AUTO: 97 FL (ref 82–98)
PLATELET # BLD AUTO: 146 THOUSANDS/UL (ref 149–390)
PMV BLD AUTO: 10.8 FL (ref 8.9–12.7)
POTASSIUM SERPL-SCNC: 4.2 MMOL/L (ref 3.5–5.3)
PROTHROMBIN TIME: 21.3 SECONDS (ref 11.6–14.5)
RBC # BLD AUTO: 2.84 MILLION/UL (ref 3.88–5.62)
SODIUM SERPL-SCNC: 139 MMOL/L (ref 135–147)
WBC # BLD AUTO: 4.58 THOUSAND/UL (ref 4.31–10.16)

## 2024-07-23 PROCEDURE — 85027 COMPLETE CBC AUTOMATED: CPT | Performed by: INTERNAL MEDICINE

## 2024-07-23 PROCEDURE — 80048 BASIC METABOLIC PNL TOTAL CA: CPT | Performed by: INTERNAL MEDICINE

## 2024-07-23 PROCEDURE — 85610 PROTHROMBIN TIME: CPT

## 2024-07-23 PROCEDURE — 36415 COLL VENOUS BLD VENIPUNCTURE: CPT

## 2024-07-23 PROCEDURE — G0299 HHS/HOSPICE OF RN EA 15 MIN: HCPCS

## 2024-07-23 PROCEDURE — 84165 PROTEIN E-PHORESIS SERUM: CPT

## 2024-07-23 PROCEDURE — G0157 HHC PT ASSISTANT EA 15: HCPCS

## 2024-07-23 PROCEDURE — 86334 IMMUNOFIX E-PHORESIS SERUM: CPT

## 2024-07-24 LAB
ALBUMIN SERPL ELPH-MCNC: 2.82 G/DL (ref 3.2–5.1)
ALBUMIN SERPL ELPH-MCNC: 42.8 % (ref 48–70)
ALPHA1 GLOB SERPL ELPH-MCNC: 0.34 G/DL (ref 0.15–0.47)
ALPHA1 GLOB SERPL ELPH-MCNC: 5.2 % (ref 1.8–7)
ALPHA2 GLOB SERPL ELPH-MCNC: 0.75 G/DL (ref 0.42–1.04)
ALPHA2 GLOB SERPL ELPH-MCNC: 11.3 % (ref 5.9–14.9)
BETA GLOB ABNORMAL SERPL ELPH-MCNC: 0.48 G/DL (ref 0.31–0.57)
BETA1 GLOB SERPL ELPH-MCNC: 7.2 % (ref 4.7–7.7)
BETA2 GLOB SERPL ELPH-MCNC: 6.2 % (ref 3.1–7.9)
BETA2+GAMMA GLOB SERPL ELPH-MCNC: 0.41 G/DL (ref 0.2–0.58)
DME PARACHUTE DELIVERY DATE ACTUAL: NORMAL
DME PARACHUTE DELIVERY DATE REQUESTED: NORMAL
DME PARACHUTE ITEM DESCRIPTION: NORMAL
DME PARACHUTE ORDER STATUS: NORMAL
DME PARACHUTE SUPPLIER NAME: NORMAL
DME PARACHUTE SUPPLIER PHONE: NORMAL
GAMMA GLOB ABNORMAL SERPL ELPH-MCNC: 1.8 G/DL (ref 0.4–1.66)
GAMMA GLOB SERPL ELPH-MCNC: 27.3 % (ref 6.9–22.3)
IGG/ALB SER: 0.75 {RATIO} (ref 1.1–1.8)
INTERPRETATION UR IFE-IMP: NORMAL
PROT PATTERN SERPL ELPH-IMP: ABNORMAL
PROT SERPL-MCNC: 6.6 G/DL (ref 6.4–8.2)

## 2024-07-24 PROCEDURE — 86334 IMMUNOFIX E-PHORESIS SERUM: CPT | Performed by: STUDENT IN AN ORGANIZED HEALTH CARE EDUCATION/TRAINING PROGRAM

## 2024-07-24 PROCEDURE — 84165 PROTEIN E-PHORESIS SERUM: CPT | Performed by: STUDENT IN AN ORGANIZED HEALTH CARE EDUCATION/TRAINING PROGRAM

## 2024-07-25 ENCOUNTER — HOME CARE VISIT (OUTPATIENT)
Dept: HOME HEALTH SERVICES | Facility: HOME HEALTHCARE | Age: 84
End: 2024-07-25
Payer: MEDICARE

## 2024-07-25 VITALS
OXYGEN SATURATION: 97 % | SYSTOLIC BLOOD PRESSURE: 110 MMHG | HEART RATE: 69 BPM | TEMPERATURE: 98 F | RESPIRATION RATE: 18 BRPM | DIASTOLIC BLOOD PRESSURE: 66 MMHG

## 2024-07-25 PROCEDURE — G0299 HHS/HOSPICE OF RN EA 15 MIN: HCPCS

## 2024-07-26 ENCOUNTER — HOME CARE VISIT (OUTPATIENT)
Dept: HOME HEALTH SERVICES | Facility: HOME HEALTHCARE | Age: 84
End: 2024-07-26
Payer: MEDICARE

## 2024-07-26 PROCEDURE — G0151 HHCP-SERV OF PT,EA 15 MIN: HCPCS

## 2024-07-27 VITALS
SYSTOLIC BLOOD PRESSURE: 106 MMHG | OXYGEN SATURATION: 97 % | RESPIRATION RATE: 20 BRPM | DIASTOLIC BLOOD PRESSURE: 62 MMHG

## 2024-07-29 ENCOUNTER — HOSPITAL ENCOUNTER (OUTPATIENT)
Dept: RADIOLOGY | Age: 84
Discharge: HOME | End: 2024-07-29
Payer: MEDICARE

## 2024-07-29 ENCOUNTER — HOME CARE VISIT (OUTPATIENT)
Dept: HOME HEALTH SERVICES | Facility: HOME HEALTHCARE | Age: 84
End: 2024-07-29
Payer: MEDICARE

## 2024-07-29 VITALS
RESPIRATION RATE: 20 BRPM | DIASTOLIC BLOOD PRESSURE: 60 MMHG | HEART RATE: 60 BPM | SYSTOLIC BLOOD PRESSURE: 108 MMHG | OXYGEN SATURATION: 86 %

## 2024-07-29 DIAGNOSIS — J90 PLEURAL EFFUSION: ICD-10-CM

## 2024-07-29 PROCEDURE — G0151 HHCP-SERV OF PT,EA 15 MIN: HCPCS

## 2024-07-29 PROCEDURE — 71250 CT THORAX DX C-: CPT

## 2024-07-30 ENCOUNTER — APPOINTMENT (OUTPATIENT)
Dept: LAB | Facility: CLINIC | Age: 84
End: 2024-07-30
Payer: MEDICARE

## 2024-07-30 ENCOUNTER — ANTICOAG VISIT (OUTPATIENT)
Dept: CARDIOLOGY CLINIC | Facility: CLINIC | Age: 84
End: 2024-07-30

## 2024-07-30 ENCOUNTER — HOME CARE VISIT (OUTPATIENT)
Dept: HOME HEALTH SERVICES | Facility: HOME HEALTHCARE | Age: 84
End: 2024-07-30
Payer: MEDICARE

## 2024-07-30 DIAGNOSIS — I48.0 PAROXYSMAL ATRIAL FIBRILLATION (HCC): ICD-10-CM

## 2024-07-30 DIAGNOSIS — I48.0 PAROXYSMAL ATRIAL FIBRILLATION (HCC): Primary | ICD-10-CM

## 2024-07-30 LAB
INR PPP: 2.37 (ref 0.84–1.19)
PROTHROMBIN TIME: 25.5 SECONDS (ref 11.6–14.5)

## 2024-07-30 PROCEDURE — G0299 HHS/HOSPICE OF RN EA 15 MIN: HCPCS

## 2024-07-30 PROCEDURE — 85610 PROTHROMBIN TIME: CPT

## 2024-07-30 PROCEDURE — 36415 COLL VENOUS BLD VENIPUNCTURE: CPT

## 2024-07-30 NOTE — CASE COMMUNICATION
SN dc to therapy today patient doesnt feel need for another sn visit . frank signed today for dc for therapy

## 2024-07-31 ENCOUNTER — HOME CARE VISIT (OUTPATIENT)
Dept: HOME HEALTH SERVICES | Facility: HOME HEALTHCARE | Age: 84
End: 2024-07-31
Payer: MEDICARE

## 2024-07-31 ENCOUNTER — TELEPHONE (OUTPATIENT)
Age: 84
End: 2024-07-31

## 2024-07-31 VITALS
HEART RATE: 62 BPM | SYSTOLIC BLOOD PRESSURE: 124 MMHG | TEMPERATURE: 98 F | RESPIRATION RATE: 16 BRPM | OXYGEN SATURATION: 96 % | DIASTOLIC BLOOD PRESSURE: 66 MMHG

## 2024-07-31 VITALS
HEART RATE: 72 BPM | RESPIRATION RATE: 30 BRPM | OXYGEN SATURATION: 88 % | SYSTOLIC BLOOD PRESSURE: 122 MMHG | DIASTOLIC BLOOD PRESSURE: 64 MMHG

## 2024-07-31 DIAGNOSIS — I48.21 PERMANENT ATRIAL FIBRILLATION (HCC): ICD-10-CM

## 2024-07-31 DIAGNOSIS — J44.9 CHRONIC OBSTRUCTIVE PULMONARY DISEASE, UNSPECIFIED COPD TYPE (HCC): Primary | ICD-10-CM

## 2024-07-31 LAB
DME PARACHUTE DELIVERY DATE REQUESTED: NORMAL
DME PARACHUTE ITEM DESCRIPTION: NORMAL
DME PARACHUTE ORDER STATUS: NORMAL
DME PARACHUTE SUPPLIER NAME: NORMAL
DME PARACHUTE SUPPLIER PHONE: NORMAL

## 2024-07-31 PROCEDURE — G0151 HHCP-SERV OF PT,EA 15 MIN: HCPCS

## 2024-07-31 RX ORDER — WARFARIN SODIUM 2.5 MG/1
TABLET ORAL
Qty: 90 TABLET | Refills: 3 | Status: SHIPPED | OUTPATIENT
Start: 2024-07-31

## 2024-07-31 NOTE — TELEPHONE ENCOUNTER
"Incoming call by Gely at [facility]:    She is currently speaking with patient/patient's wife.    Requesting \"Pulse dose type portable oxygen concentrator with evaluation\" order sent over the [employer].  "

## 2024-07-31 NOTE — PROGRESS NOTES
Home Oxygen with Portability order is placed in [facility]

## 2024-07-31 NOTE — TELEPHONE ENCOUNTER
Home O[facility] with Portability [facility]

## 2024-08-01 DIAGNOSIS — J44.9 CHRONIC OBSTRUCTIVE PULMONARY DISEASE, UNSPECIFIED COPD TYPE (HCC): Primary | ICD-10-CM

## 2024-08-01 PROBLEM — J18.9 PNEUMONIA OF LEFT LOWER LOBE DUE TO INFECTIOUS ORGANISM: Status: RESOLVED | Noted: 2024-07-02 | Resolved: 2024-08-01

## 2024-08-02 ENCOUNTER — TELEPHONE (OUTPATIENT)
Age: 84
End: 2024-08-02

## 2024-08-02 NOTE — TELEPHONE ENCOUNTER
Gely from [employer] called in regarding the patient o2 re cert- Gely stated for the patient next office visit note needs to indicate patient chronic stable of O2

## 2024-08-07 ENCOUNTER — HOME CARE VISIT (OUTPATIENT)
Dept: HOME HEALTH SERVICES | Facility: HOME HEALTHCARE | Age: 84
End: 2024-08-07
Payer: MEDICARE

## 2024-08-07 VITALS
SYSTOLIC BLOOD PRESSURE: 120 MMHG | HEART RATE: 56 BPM | OXYGEN SATURATION: 98 % | RESPIRATION RATE: 20 BRPM | DIASTOLIC BLOOD PRESSURE: 60 MMHG

## 2024-08-07 PROCEDURE — G0151 HHCP-SERV OF PT,EA 15 MIN: HCPCS

## 2024-08-09 ENCOUNTER — HOME CARE VISIT (OUTPATIENT)
Dept: HOME HEALTH SERVICES | Facility: HOME HEALTHCARE | Age: 84
End: 2024-08-09
Payer: MEDICARE

## 2024-08-09 VITALS
OXYGEN SATURATION: 96 % | DIASTOLIC BLOOD PRESSURE: 52 MMHG | HEART RATE: 58 BPM | SYSTOLIC BLOOD PRESSURE: 98 MMHG | RESPIRATION RATE: 18 BRPM

## 2024-08-09 PROCEDURE — G0151 HHCP-SERV OF PT,EA 15 MIN: HCPCS

## 2024-08-12 ENCOUNTER — TELEPHONE (OUTPATIENT)
Age: 84
End: 2024-08-12

## 2024-08-12 DIAGNOSIS — J96.11 CHRONIC RESPIRATORY FAILURE WITH HYPOXIA (HCC): Primary | ICD-10-CM

## 2024-08-12 LAB
DME PARACHUTE DELIVERY DATE REQUESTED: NORMAL
DME PARACHUTE ITEM DESCRIPTION: NORMAL
DME PARACHUTE ITEM DESCRIPTION: NORMAL
DME PARACHUTE ORDER STATUS: NORMAL
DME PARACHUTE SUPPLIER NAME: NORMAL
DME PARACHUTE SUPPLIER PHONE: NORMAL

## 2024-08-12 NOTE — TELEPHONE ENCOUNTER
Patient's wife is calling because they just spoke to [facility] and we have never sent in the correct script for the patient's POC long term use can we please call [facility] and see what is needed from us for the patient to get the correct oxygen please thank you please advise patient's wife after this done

## 2024-08-12 NOTE — TELEPHONE ENCOUNTER
Called [facility] and spoke to Cele. She stated that pt need updated office visit and 6 min walk since pt is on long term oxygen. The last script was placed on 7/31/24 but need updated 6 min walk order placed.

## 2024-08-13 ENCOUNTER — APPOINTMENT (OUTPATIENT)
Dept: LAB | Facility: CLINIC | Age: 84
End: 2024-08-13
Payer: MEDICARE

## 2024-08-13 ENCOUNTER — ANTICOAG VISIT (OUTPATIENT)
Dept: CARDIOLOGY CLINIC | Facility: CLINIC | Age: 84
End: 2024-08-13

## 2024-08-13 DIAGNOSIS — I48.0 PAROXYSMAL ATRIAL FIBRILLATION (HCC): Primary | ICD-10-CM

## 2024-08-13 DIAGNOSIS — I48.0 PAROXYSMAL ATRIAL FIBRILLATION (HCC): ICD-10-CM

## 2024-08-13 LAB
INR PPP: 2.49 (ref 0.85–1.19)
PROTHROMBIN TIME: 26.8 SECONDS (ref 12.3–15)

## 2024-08-13 PROCEDURE — 85610 PROTHROMBIN TIME: CPT

## 2024-08-13 PROCEDURE — 36415 COLL VENOUS BLD VENIPUNCTURE: CPT

## 2024-08-14 LAB
DME PARACHUTE DELIVERY DATE EXPECTED: NORMAL
DME PARACHUTE DELIVERY DATE REQUESTED: NORMAL
DME PARACHUTE ITEM DESCRIPTION: NORMAL
DME PARACHUTE ORDER STATUS: NORMAL
DME PARACHUTE SUPPLIER NAME: NORMAL
DME PARACHUTE SUPPLIER PHONE: NORMAL

## 2024-08-25 DIAGNOSIS — I50.42 CHRONIC COMBINED SYSTOLIC AND DIASTOLIC CONGESTIVE HEART FAILURE (HCC): ICD-10-CM

## 2024-08-25 DIAGNOSIS — D64.9 ANEMIA, UNSPECIFIED TYPE: Primary | ICD-10-CM

## 2024-08-26 ENCOUNTER — TELEPHONE (OUTPATIENT)
Age: 84
End: 2024-08-26

## 2024-08-26 NOTE — TELEPHONE ENCOUNTER
Patients wife  called to verify lab orders.  She stated she received a call from Megan this morning stating that Dr. Reyes ordered her labs for October.  Patient was confused because patients appointment is this Wednesday 8/28.  Her husbands appointment is in October.  I checked her husbands chart and the labs were ordered for him and not her.    Clarified up confusion for patient.  Patient expressed understanding

## 2024-08-28 DIAGNOSIS — D64.9 ANEMIA, UNSPECIFIED TYPE: Primary | ICD-10-CM

## 2024-08-30 ENCOUNTER — CLINICAL SUPPORT (OUTPATIENT)
Dept: PULMONOLOGY | Facility: CLINIC | Age: 84
End: 2024-08-30
Payer: MEDICARE

## 2024-08-30 DIAGNOSIS — J44.9 CHRONIC OBSTRUCTIVE PULMONARY DISEASE, UNSPECIFIED COPD TYPE (HCC): Primary | ICD-10-CM

## 2024-08-30 PROCEDURE — G0239 OTH RESP PROC, GROUP: HCPCS

## 2024-08-30 NOTE — PROGRESS NOTES
Pulmonary Rehabilitation   Assessment and Individualized Treatment Plan  INITIAL       Today's date: 2024  # of Exercise Sessions Completed: 1-Evaluation  Patient name: Oscar Espinosa     : 1940       MRN: [id number]  Referring Physician: Racquel Wilson DO   Pulmonologist: SOSA Gonzales (Kandice Faustin DO)  Provider: Mickey  Clinician: Willian Box MS, CEP     Dx:    Encounter Diagnosis   Name Primary?    Chronic obstructive pulmonary disease, unspecified COPD type (HCC) Yes     Date of onset: 2019      Treatment is tailored to this patient's individual needs.  The ITP was reviewed with the patient and all questions were answered to their satisfaction.  Additional ITP documentation can be found electronically including daily and monthly exercise summaries, daily session notes with ECG summaries, education notes, daily medication reconciliation, and daily physician supervision.      COMMENTS:  Pt attended 1st initial evaluation to begin Pulmonary Rehab 2x/week. He is using continuous supplemental O2 2L/min. He is looking to go away for giving and wants to increase his strength and overall endurance.       ASSESSMENT      Medical History:   Past Medical History:   Diagnosis Date    Abdominal aortic aneurysm (HCC)     s/p repair    Abnormal ECG 2022    Acute on chronic congestive heart failure (HCC) 2019    Acute respiratory failure with hypoxia ([facility]) 2024    Aneurysm ([facility])     Aneurysm of abdominal aorta ([facility])     49mm, trileaflet AV    Atrial fibrillation ([facility])     Eliquis    BPH (benign prostatic hyperplasia)     CAD (coronary artery disease)     s/p CABGx3    CHF (congestive heart failure) ([facility])     Chronic pain     Gabapentin    Chronic pain disorder     lumbar    COPD (chronic obstructive pulmonary disease) (HCC)     Coronary artery disease     Elevated serum creatinine 2024    Elevated serum creatinine 2024    Elevated transaminase  level 04/27/2024    Former tobacco use     Hyperlipidemia     Hypertension     Hyponatremia 04/27/2024    Hyponatremia 04/27/2024    Hypothyroidism     Lumbar radiculopathy     Lumbar stenosis     s/p injections    Neuropathy     Obesity (BMI 30-39.9)     YEVGENIY (obstructive sleep apnea)     unable to tolerate CPAP    Platelets decreased (HCC) 07/27/2022    Pulmonary hypertension (HCC)     moderate to severe    Spondylolisthesis of lumbar region     Visual impairment 2022    Vitamin D deficiency        Family History:  Family History   Problem Relation Age of Onset    Heart disease Mother     Stroke Father         stroke syndrome    Aneurysm Brother         abdominal    Aortic aneurysm Brother         ascending    Coronary artery disease Family     Hypertension Family     Other Son         gioblastoma multiforme       Allergies:   Meloxicam    Current Medications:   Current Outpatient Medications   Medication Sig Dispense Refill    acetaminophen (TYLENOL) 325 mg tablet Take 2 tablets by mouth every 6 (six) hours as needed for fever, headaches, mild pain, moderate pain or severe pain. Indications: Fever, Pain      albuterol (ProAir HFA) 90 mcg/act inhaler Inhale 2 puffs every 6 (six) hours as needed for wheezing 24 g 0    amiodarone 200 mg tablet Take 1 tablet (200 mg total) by mouth daily 90 tablet 0    aspirin (ECOTRIN LOW STRENGTH) 81 mg EC tablet Take 1 tablet by mouth daily      atorvastatin (LIPITOR) 40 mg tablet Take 1 tablet (40 mg total) by mouth daily 90 tablet 3    cholecalciferol (VITAMIN D3) 1,000 units tablet Take 2,000 Units by mouth daily      FIBER ADULT GUMMIES PO Take 1 caplet by mouth daily       Fluticasone-Salmeterol (Advair) 100-50 mcg/dose inhaler INHALE 1 PUFF BY MOUTH TWICE DAILY. RINSE MOUTH AFTER  blister 1    gabapentin (Neurontin) 300 mg capsule Take 1 capsule (300 mg total) by mouth 2 (two) times a day AND 3 capsules (900 mg total) daily at bedtime. (Patient taking differently:  3  capsules (900 mg total) daily at bedtime.) 450 capsule 1    labetalol (NORMODYNE) 100 mg tablet Take 0.5 tablets (50 mg total) by mouth in the morning 30 tablet 1    multivitamin (THERAGRAN) TABS Take 1 tablet by mouth daily      oxygen gas Inhale 2 L continuous. 2L of oxygen via nasal cannula as needed and at night  Indications: short of breath      polyethylene glycol (MIRALAX) 17 g packet Take 17 g by mouth daily as needed (constipation). Oscar Espinosa  dissolve in 8 oz liquid of choice   Indications: .      potassium chloride (Klor-Con M20) 20 mEq tablet Take one tab daily, 7 days a week. 75 tablet 1    senna (SENOKOT) 8.6 MG tablet Take 1 tablet by mouth as needed for constipation      tamsulosin (FLOMAX) 0.4 mg Take 1 capsule by mouth daily      torsemide (DEMADEX) 20 mg tablet Take 1 (20mg) tablet on Tuesday, Thursday, Saturday, Sunday, and 2 tablets (40 mg total) on Monday, Wednesday, and Friday 180 tablet 0    traMADol (ULTRAM) 50 mg tablet Take 1 tablet (50 mg total) by mouth every 6 (six) hours as needed for moderate pain 30 tablet 0    warfarin (Coumadin) 2.5 mg tablet Take 1 tab by moth daily or as directed by physician 90 tablet 3     No current facility-administered medications for this visit.       Physical Limitations: lumbar pain after walking a distance     Fall Risk: Moderate   Comments: Ambulates with a steady gait with no assist device and Denies a fall in the past 6 months    Cultural needs: none    PFT:  Yes 2/24/2021    FEV1/FVC ratio of 71%   FEV1 of 74% predicted  mildobstruction.    Medication compliance: Yes  Comments: Pt reports to be compliant with medications      Pulmonary Disease Risk Factors:  smoking    Sleep Disorders:   obstructive sleep apnea:  CPCP/BiPAP:  no - does not tolerate, uses supplemental O2       EXERCISE ASSESSMENT:      Initial Fitness Assessment: 6MWT:  Resting:    Resting:  BP: 116/76  HR: 62  SpO2: 95%  Dyspnea: 0/10  O2 Use: 2 l/min, Exercise:  BP:  126/70  HR:   SpO2: 86-91%  Dyspnea: 6/10  O2 use: 2 l/min, METs:  1.7, ECG Summary: NSR with freq. PVCs, couplet, Symptoms: low back pain and PANDA, and Comments: 1 rest break       Current Functional Status:  Occupation: retired  Recreation/Physical activity: Traveling, patio, rec room   ADL’s:Capable of performing light to moderate ADLs limited by Dyspnea  [place]: limited by Dyspnea able to perform self-care  Exercise:  none  Home exercise equipment: None  Other Comments: N/A    SMART Exercise Goals:   reduced score on CAT, improved 6MWT distance, reduced dyspnea during exercise, improved exercise tolerance based on peak METs tolerated in pulmonary rehab exercise session, and SpO2 >88% during exercise    Patient Specific EXERCISE GOALS:     reduced dependence on supplemental O2, reduced number of COPD exacerbations, attend pulmonary rehab regularly, decrease sitting time at home, start a walking program, begin a consistent exercise regimen , reduced pulmonary related hospital readmissions , decreased rest needed with physical activity/exercise, increased muscular strength, increased energy, increased stamina with ADLs, and more independence at home    PSYCHOSOCIAL ASSESSMENT:    Date of last assessment: 8/30/2024  Depression screening:  PHQ-9 = 4    Interpretation:  1-4 = Minimal Depression  Anxiety screening:  ERON-7 = 2    Interpretation: 0-4  = Not anxious    Pt self-report of depression and anxiety  Patient reports slight feelings of depression   Patient reports slight feelings of anxiety  Reports great emotional support     Self-reported stress level:  5  Stress Management: practice Relaxation Techniques, exercise, keep a positive mindset, spend time outside, enjoy a hobby, spend time with family, TV, puzzles, keep busy around the house, gardening, and yard work    Quality of Life Screen:  (Higher score indicates disease impact on QOL)  [facility] COOP score: 29/40      CAT: 20/40      Shortness of  "breath questionnaire: 28/120    Social Support:   spouse, children, and friends  Community/Social Activities: N/A    SMART Goals:    Reduce perceived stress to 1-3/10, improved [facility] QOL < 27, Feelings in [place] Score < 3, Physical Fitness in [place] Score < 3, Daily Activity in [place] Score < 3, Social Activities in [place] Score < 3, [facility] Score < 3, [facility] Score < 3, Quality of Life in [facility] Score < 3 ,  reduced time feeling down, depressed or hopeless, improved sleeping habits, feel less tired with more energy, reduced time feeling like a failure and having negative self thoughts, reduced feelings of restlessness, and reduced irritability    Patient Specific PSYCHOCOSOCIAL GOALS:    spend time with family and reduce slight feelings of depression/anxiety, and increase energy levels      Psychosocial Assessment as it relates to rehabilitation: Patient denies issues with his/her family or home life that may affect their rehabilitation efforts.       NUTRITION ASSESSMENT:    Initial Weight:  221.6 lbs   Current Weight:     Height:   Ht Readings from Last 1 Encounters:   07/18/24 5' 11\" (1.803 m)       Rate Your Plate Score: 53/81    Self-reported Current Dietary Habits:  Loves to eat salads  Fruits and veggies in diet  Drinks green tea, reviewed increasing water intake   Does not eat out  Does not use sodium  Not a big meat eater     ETOH:   Social History     Substance and Sexual Activity   Alcohol Use Not Currently       SMART Goals:   reduced BMI to < 25, decreased body fat% <25%   (M), reduced waist circumference <40 inches (M), fasting BG , improved A1c  < 7.0%, Improved Rate Your Plate score  >64, 2.5-5%  wt loss, eat 6 or more servings of grain products per day, eat whole grain breads, brown rice and whole grain cereals, eat 5 or more servings of fruits and vegetables a day, eat 2 or more servings of low fat milk or yogurt a day, eat no more than 6oz of " meat per day, eat red meat once a week or less, eat poultry without the skin, choose 1% or skim milk, rarely eat cheese or choose reduced fat or skim, rarely eat frozen desserts or choose fruit or fat-free sweets, Do not cook with oil, butter or margarine, Do not eat fried foods, choose light or fat-free salad dressings or damico, choose healthy snacks such as fruit, pretzels, and low fat crackers, choose healthy desserts and sweets such as orquidea food cake or  fruit, choose low sodium canned, frozen/packaged foods or rarely/never eat, rarely/never eat salty snacks, choose low sugar desserts and sweets, and drink less than 8oz of soda and sweetened drinks per day    Patient Specific NUTRITION GOALS:    increase water intake to reduce/thin mucus production reduced SOB while eating eat smaller, more frequent meals decrease caloric intake improve hydration weight loss increased muscle mass      OTHER CORE COMPONENT ASSESSMENT:    Hospitalizations in the past year: 3    Oxygen needs:   Rest:  supplemental O2 via nasal cannula @ 2L/min   Exercise/physical activity:  supplemental O2 via nasal cannula @ 2L/min   Sleep:   supplemental O2 via nasal cannula @ 2L/min     Does Pt monitor home SpO2? yes   Average SpO2 at rest:  95-99% with oxygen, 90% on RA    Average SpO2 with ADLs/physical activity:  low 80s % on RA       Use of Rescue Inhaler:  Yes PRN    Use of Maintenance Inhaler: Yes:  2 times per day    Use of Nebulizer Treatments:  No    Patient practices breathing techniques at home:  Reviewed with patient on:  8/30/2024    CAD Risk Factors:  Cholesterol: Yes  HTN: Yes  DM: Pre-diabetes  Obesity: Yes     Smoking: Former user  Quit 2012  1ppd for 55.6 years    SMART Core Component Goals:   consistent, controlled resting BP < 130/80, medication compliance, abstain from smoking, and demonstrate pursed lipped breathing    Patient Specific CORE COMPONENT GOALS:    reduced dietary sodium <2000mg, assess daily wt to report an  increase greater than 3lbs in a day or 5lbs in a week, medication compliance, Abstain from smoking, compliance with supplemental oxygen, and monitor home pulse oximetry      Blood Pressure:    Restin/76  Exercise: 126/70  Recovery: 112/78      INDIVIDUALIZED TREATMENT PLAN    The patient is agreeable to attend 24 pulmonary rehab exercise sessions.      EXERCISE GOALS AND PLAN    Current Aerobic Exercise Prescription       Frequency: 2 days/week   Supplement with home exercise 2+ days/wk as tolerated        Minutes: 20-40         METS: 1.5-2.0              SpO2: >88% on supplemental O2 2L/min via NC               RPD: 4-6                      HR: RHR +30-40bpm   RPE: 4-5         Modalities: UBE, NuStep, Recumbent bike, and Room walking      Aerobic Exercise Prescription Plan for Progression:    Frequency: 2 days/week    Supplement with home exercise 2+ days/wk as tolerated       Minutes: 30-40        METS: 2.0-2.4              SpO2: >88% on supplemental O2 2L/min via NC              RPD: 4-6                      HR:RHR +30-40bpm   RPE: 4-5        Modalities: Treadmill, UBE, NuStep, Recumbent bike, and Room walking        Supplemental Oxygen Needs with Exercise:  supplemental O2 2L/min via nasal canula and will titrate O2 to maintain SpO2 >88%.    Strength training:  Will be added following 2-3 weeks of monitored exercise sessions   Modalities: Chest Press, Lateral Raise, Arm Curl, Sit to Stands, Upright Rows, and Front Raises    Education: pursed lipped breathing, relaxation breathing, home exercise guidelines, benefits of exercise for pulmonary disease, RPE scale, RPD scale, O2 saturation monitoring, and appropriate O2 response to exercise    Progress toward Exercise Goals:    Reviewed Pt goals and determined plan of care    Exercise Plan:  Titrate supplemental oxygen as needed to maintain SpO2>88% with exercise, learn to conserve energy with ADLs , practice diaphragmatic breathing, reduce time sitting at  home, increased strength of respiratory muscles, utilize PLB with physical activity, and begin a home exercise program     Readiness to change: Preparation:  (Getting ready to change)       NUTRITION GOALS AND PLAN    Progress toward Nutritional goals:    Reviewed Pt goals and determined plan of care    Nutrition Plan: group class: Reading Food Labels, group Class: Heart Healthy Eating, increase intake of whole grains, replace refined flours with whole grains, increase daily intake of fruits and vegetables, increase daily intake of low fat dairy, limit meat intake to less than 6oz per day, eat red meat once a week or less, eat poultry without the skin, drink/use 1%  low fat or skim  milk, eat reduced-fat or part-skim cheese or rarely eat, rarely eat frozen desserts, cook without fats or oils, never/rarely eat fried foods, use light or fat-free salad dressings and mayonnaise, eat healthy snacks like fruit, pretzels, and low fat crackers, choose desserts such as fruit, orquidea food cake, low-fat or fat-free sweets, choose low sodium canned, frozen, packaged foods or rarely eat these foods, rarely/never eat salty snacks, choose low sugar desserts and sweets, and drink less than 8oz of non-diet soda, punch etc. per day    SMART goals are based Rate Your Plate Dietary Self-Assessment. These are the areas in which the patient could score higher on the assessment.  Goals include recommendations for a heart healthy diet based on American Heart Association.    Nutrition Education: heart healthy eating principles  weight loss and management strategies  low sodium diet  maintaining hydration  portion control    Readiness to change: Preparation:  (Getting ready to change)       PSYCHOSOCIAL GOALS AND PLAN    Information to begin using [employer] was provided as well as contact information for counseling through  [facility].    Progress toward Psychosocial goals:    Reviewed Pt goals and determined plan of  care    Psychosocial: Class:  Stress and Pulmonary Disease, Enroll in "[employer] Practice relaxation techniques, Exercise, Spend time outdoors, Read a Book, Keep a positive mindset, Join a support group , Reduce dependence  on family, Meet new people, puzzles, Enjoy family, and Avoid triggers    Psychosocial Education: depression and pulmonary disease and tools to manage fear/anxiety related to becoming SOB    Readiness to change: Preparation:  (Getting ready to change)       OTHER CORE COMPONENTS GOALS AND PLAN    Tobacco Use Intervention:     Pt quit 2012   and has abstained    Oxygen Goal: Maintain SpO2>88% during exercise or as advised by pulmonolgist    Progress toward Core Component goals:    Reviewed Pt goals and determined plan of care    Core Component Plan: medication compliance, avoid places with second hand smoke, avoid processed foods, engage in regular exercise, eliminate salt shaker at the table, use salt substitutes, check labels for sodium content, monitor home BP, group class: Pulmonary Anatomy and Physiology, and group class:  Pulmonary Medications    Core Component Education:  preventing infections, bronchodilators, and bronchial hygiene    Readiness to change: Preparation:  (Getting ready to change)

## 2024-09-04 ENCOUNTER — CLINICAL SUPPORT (OUTPATIENT)
Dept: PULMONOLOGY | Facility: CLINIC | Age: 84
End: 2024-09-04
Payer: MEDICARE

## 2024-09-04 DIAGNOSIS — J44.9 CHRONIC OBSTRUCTIVE PULMONARY DISEASE, UNSPECIFIED COPD TYPE (HCC): Primary | ICD-10-CM

## 2024-09-04 PROCEDURE — G0239 OTH RESP PROC, GROUP: HCPCS

## 2024-09-09 ENCOUNTER — CLINICAL SUPPORT (OUTPATIENT)
Dept: PULMONOLOGY | Facility: CLINIC | Age: 84
End: 2024-09-09
Payer: MEDICARE

## 2024-09-09 DIAGNOSIS — J44.9 CHRONIC OBSTRUCTIVE PULMONARY DISEASE, UNSPECIFIED COPD TYPE (HCC): Primary | ICD-10-CM

## 2024-09-09 PROCEDURE — G0239 OTH RESP PROC, GROUP: HCPCS

## 2024-09-10 ENCOUNTER — APPOINTMENT (OUTPATIENT)
Dept: LAB | Facility: CLINIC | Age: 84
End: 2024-09-10
Payer: MEDICARE

## 2024-09-10 ENCOUNTER — ANTICOAG VISIT (OUTPATIENT)
Dept: CARDIOLOGY CLINIC | Facility: CLINIC | Age: 84
End: 2024-09-10

## 2024-09-10 DIAGNOSIS — I48.0 PAROXYSMAL ATRIAL FIBRILLATION (HCC): ICD-10-CM

## 2024-09-10 DIAGNOSIS — I48.0 PAROXYSMAL ATRIAL FIBRILLATION (HCC): Primary | ICD-10-CM

## 2024-09-10 LAB
INR PPP: 1.77 (ref 0.85–1.19)
PROTHROMBIN TIME: 20.8 SECONDS (ref 12.3–15)

## 2024-09-10 PROCEDURE — 36415 COLL VENOUS BLD VENIPUNCTURE: CPT

## 2024-09-10 PROCEDURE — 85610 PROTHROMBIN TIME: CPT

## 2024-09-11 ENCOUNTER — CLINICAL SUPPORT (OUTPATIENT)
Dept: PULMONOLOGY | Facility: CLINIC | Age: 84
End: 2024-09-11
Payer: MEDICARE

## 2024-09-11 DIAGNOSIS — J44.9 CHRONIC OBSTRUCTIVE PULMONARY DISEASE, UNSPECIFIED COPD TYPE (HCC): Primary | ICD-10-CM

## 2024-09-11 PROCEDURE — G0239 OTH RESP PROC, GROUP: HCPCS

## 2024-09-11 NOTE — PROGRESS NOTES
See scanned exercise session detailed report  
Return to the ED for any new or worsening symptoms  Take your medication as prescribed  Follow up on Thursday for a wound check with plastics   Advance activity as tolerated

## 2024-09-13 ENCOUNTER — OFFICE VISIT (OUTPATIENT)
Dept: CARDIOLOGY CLINIC | Facility: CLINIC | Age: 84
End: 2024-09-13
Payer: MEDICARE

## 2024-09-13 VITALS
DIASTOLIC BLOOD PRESSURE: 60 MMHG | WEIGHT: 215.6 LBS | OXYGEN SATURATION: 96 % | BODY MASS INDEX: 30.07 KG/M2 | HEART RATE: 59 BPM | SYSTOLIC BLOOD PRESSURE: 110 MMHG

## 2024-09-13 DIAGNOSIS — G47.33 SEVERE OBSTRUCTIVE SLEEP APNEA: ICD-10-CM

## 2024-09-13 DIAGNOSIS — I25.10 CORONARY ARTERY DISEASE INVOLVING NATIVE HEART WITHOUT ANGINA PECTORIS, UNSPECIFIED VESSEL OR LESION TYPE: ICD-10-CM

## 2024-09-13 DIAGNOSIS — I71.21 ANEURYSM OF ASCENDING AORTA WITHOUT RUPTURE (HCC): ICD-10-CM

## 2024-09-13 DIAGNOSIS — I50.42 CHRONIC COMBINED SYSTOLIC AND DIASTOLIC CONGESTIVE HEART FAILURE (HCC): ICD-10-CM

## 2024-09-13 DIAGNOSIS — I27.20 MODERATE TO SEVERE PULMONARY HYPERTENSION (HCC): ICD-10-CM

## 2024-09-13 DIAGNOSIS — I11.0 HYPERTENSIVE HEART DISEASE WITH HEART FAILURE (HCC): ICD-10-CM

## 2024-09-13 DIAGNOSIS — I27.20 PULMONARY HTN (HCC): Primary | ICD-10-CM

## 2024-09-13 PROCEDURE — 99214 OFFICE O/P EST MOD 30 MIN: CPT | Performed by: INTERNAL MEDICINE

## 2024-09-13 NOTE — PROGRESS NOTES
Cardiology Follow Up    Oscar Espinosa  1940  [id number]  [facility] [place]  [address]  [address]  [place] [address] [phone]    1. Pulmonary HTN (HCC)  Echo follow up/limited w/ contrast if indicated      2. Aneurysm of ascending aorta without rupture (HCC)        3. Coronary artery disease involving native heart without angina pectoris, unspecified vessel or lesion type        4. Chronic combined systolic and diastolic congestive heart failure (HCC)        5. Moderate to severe pulmonary hypertension (HCC)        6. 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)        7. Severe obstructive sleep apnea            Discussion/Summary: Overall he continues to make good progression.  He is down another 10 pounds since her visit in June.  He continues to follow with pulmonary regarding pneumonia and loculated effusion.  Oxygen requirements are slowly coming down.  He is remaining in sinus rhythm.  Recent 1 week Zio patch showed A-fib burden was 10%.  Continue full anticoagulation.  Blood pressure is well-controlled.  He is euvolemic on exam.  Ascending aorta has not changed to more than 0.1 cm in 5 years no need for any CT surgical evaluation at this point in time.    Interval History:  83 year-old gentleman with multivessel coronary disease, history of CABG, hypertension, hyperlipidemia, ascending aortic aneurysm, abdominal aorta aneurysm status post repair, sleep apnea presents to establish care with me in the office.  He has been seeing 1 of my partners for several years.  Functionally he has been doing great.  Denies any exertional chest pain, shortness of breath, palpitations, lightheadedness, dizziness, or syncope.  He has put on some weight during the pandemic.  He has been trying to make some dietary changes start some low-level exercise.      He is doing much better he is in pulmonary rehab now functional  capacity has been improving after hospitalizations this summer.  Loculated effusion continues to decrease in size.  Denies any anginal sounding chest pain or discomfort.  Weight is down 10 pounds.  Lower extremity edema has improved.    Problem List       Positive colorectal cancer screening using Cologuard test    Benign essential hypertension    Hyperlipidemia    Impaired fasting glucose    Microscopic hematuria    Overview Signed 5/14/2018  9:17 AM by Lea Reyes, MD     Annotation - [id number]: Dr Dee         Obesity    Obstructive sleep apnea    Overview Signed 5/14/2018  9:17 AM by Lea Reyes, MD     Annotation - [id number]: Dr Ellington.         Subclinical hypothyroidism    Overview Signed 5/14/2018  9:17 AM by Lea Reyes, MD     Transitioned From: Hypothyroidism         3-vessel CAD    Aneurysm of abdominal aorta (HCC)    Aneurysm of ascending aorta (HCC)    Aortic valve disorder    Arteriosclerotic cardiovascular disease    Disc degeneration, lumbar    Herniated lumbar intervertebral disc    Lumbar radiculopathy    Intervertebral disc disorder with radiculopathy of lumbar region    Spondylolisthesis at L4-L5 level    Chronic pain syndrome    SOB (shortness of breath)    Multiple pulmonary nodules    Heart failure, systolic ([facility])    Wt Readings from Last 3 Encounters:   09/13/24 97.8 kg (215 lb 9.6 oz)   07/19/24 101 kg (222 lb 3.2 oz)   07/18/24 100 kg (221 lb 6.4 oz)                 COPD, mild ([facility])    Overview Deleted 9/11/2019  3:22 PM by Gerson Terrell MD                  Past Medical History:   Diagnosis Date    Abdominal aortic aneurysm (HCC)     s/p repair    Abnormal ECG july 2022    Acute on chronic congestive heart failure (HCC) 09/11/2019    Acute respiratory failure with hypoxia (HCC) 05/06/2024    Aneurysm (HCC) 2007    Aneurysm of abdominal aorta (HCC)     49mm, trileaflet AV    Atrial fibrillation (HCC)     Eliquis    BPH (benign prostatic hyperplasia)     CAD (coronary artery disease)     s/p CABGx3     CHF (congestive heart failure) (HCC)     Chronic pain     Gabapentin    Chronic pain disorder     lumbar    COPD (chronic obstructive pulmonary disease) (HCC)     Coronary artery disease     Elevated serum creatinine 2024    Elevated serum creatinine 2024    Elevated transaminase level 2024    Former tobacco use     Hyperlipidemia     Hypertension     Hyponatremia 2024    Hyponatremia 2024    Hypothyroidism     Lumbar radiculopathy     Lumbar stenosis     s/p injections    Neuropathy     Obesity (BMI 30-39.9)     YEVGENIY (obstructive sleep apnea)     unable to tolerate CPAP    Platelets decreased ([facility]) 2022    Pulmonary hypertension ([facility])     moderate to severe    Spondylolisthesis of lumbar region     Visual impairment     Vitamin D deficiency      Social History     Socioeconomic History    Marital status: /Civil Union     Spouse name: Not on file    Number of children: Not on file    Years of education: Not on file    Highest education level: Not on file   Occupational History    Occupation: retired   Tobacco Use    Smoking status: Former     Current packs/day: 0.00     Average packs/day: 1 pack/day for 55.6 years (55.6 ttl pk-yrs)     Types: Cigarettes     Start date: 1957     Quit date: 2012     Years since quittin.1     Passive exposure: Past    Smokeless tobacco: Never   Vaping Use    Vaping status: Never Used   Substance and Sexual Activity    Alcohol use: Not Currently    Drug use: No    Sexual activity: Never   Other Topics Concern    Not on file   Social History Narrative    Not on file     Social Determinants of Health     Financial Resource Strain: Low Risk  (3/7/2024)    Overall Financial Resource Strain (CARDIA)     Difficulty of Paying Living Expenses: Not hard at all   Food Insecurity: No Food Insecurity (2024)    Hunger Vital Sign     Worried About Running Out of Food in the Last Year: Never true     Ran Out of Food in the Last Year:  Never true   Transportation Needs: No Transportation Needs (7/8/2024)    PRAPARE - Transportation     Lack of Transportation (Medical): No     Lack of Transportation (Non-Medical): No   Physical Activity: Not on file   Stress: Not on file   Social Connections: Not on file   Intimate Partner Violence: Not on file   Housing Stability: Low Risk  (7/8/2024)    Housing Stability Vital Sign     Unable to Pay for Housing in the Last Year: No     Number of Times Moved in the Last Year: 0     Homeless in the Last Year: No      Family History   Problem Relation Age of Onset    Heart disease Mother     Stroke Father         stroke syndrome    Aneurysm Brother         abdominal    Aortic aneurysm Brother         ascending    Coronary artery disease Family     Hypertension Family     Other Son         gioblastoma multiforme     Past Surgical History:   Procedure Laterality Date    ABDOMINAL AORTIC ANEURYSM REPAIR  08/09/2007    2 dock limbs with visc extens prosth    CARDIAC CATHETERIZATION      COLONOSCOPY      CORONARY ARTERY BYPASS GRAFT  2003    LIMA to LAD, sequential SVG to OM1 & OM2, SVG to RDA    IR CHEST TUBE PLACEMENT  6/19/2024    LUMBAR EPIDURAL INJECTION         Current Outpatient Medications:     acetaminophen (TYLENOL) 325 mg tablet, Take 2 tablets by mouth every 6 (six) hours as needed for fever, headaches, mild pain, moderate pain or severe pain. Indications: Fever, Pain, Disp: , Rfl:     albuterol (ProAir HFA) 90 mcg/act inhaler, Inhale 2 puffs every 6 (six) hours as needed for wheezing, Disp: 24 g, Rfl: 0    amiodarone 200 mg tablet, Take 1 tablet (200 mg total) by mouth daily, Disp: 90 tablet, Rfl: 0    aspirin (ECOTRIN LOW STRENGTH) 81 mg EC tablet, Take 1 tablet by mouth daily, Disp: , Rfl:     atorvastatin (LIPITOR) 40 mg tablet, Take 1 tablet (40 mg total) by mouth daily, Disp: 90 tablet, Rfl: 3    cholecalciferol (VITAMIN D3) 1,000 units tablet, Take 2,000 Units by mouth daily, Disp: , Rfl:      Fluticasone-Salmeterol (Advair) 100-50 mcg/dose inhaler, INHALE 1 PUFF BY MOUTH TWICE DAILY. RINSE MOUTH AFTER USE, Disp: 180 blister, Rfl: 1    gabapentin (Neurontin) 300 mg capsule, Take 1 capsule (300 mg total) by mouth 2 (two) times a day AND 3 capsules (900 mg total) daily at bedtime. (Patient taking differently:  3 capsules (900 mg total) daily at bedtime.), Disp: 450 capsule, Rfl: 1    labetalol (NORMODYNE) 100 mg tablet, Take 0.5 tablets (50 mg total) by mouth in the morning, Disp: 30 tablet, Rfl: 1    multivitamin (THERAGRAN) TABS, Take 1 tablet by mouth daily, Disp: , Rfl:     oxygen gas, Inhale 2 L continuous. 2L of oxygen via nasal cannula as needed and at night  Indications: short of breath, Disp: , Rfl:     polyethylene glycol (MIRALAX) 17 g packet, Take 17 g by mouth daily as needed (constipation). Oscar Espinosa dissolve in 8 oz liquid of choice   Indications: ., Disp: , Rfl:     potassium chloride (Klor-Con M20) 20 mEq tablet, Take one tab daily, 7 days a week., Disp: 75 tablet, Rfl: 1    senna (SENOKOT) 8.6 MG tablet, Take 1 tablet by mouth as needed for constipation, Disp: , Rfl:     tamsulosin (FLOMAX) 0.4 mg, Take 1 capsule by mouth daily, Disp: , Rfl:     torsemide (DEMADEX) 20 mg tablet, Take 1 (20mg) tablet on Tuesday, Thursday, Saturday, Sunday, and 2 tablets (40 mg total) on Monday, Wednesday, and Friday, Disp: 180 tablet, Rfl: 0    warfarin (Coumadin) 2.5 mg tablet, Take 1 tab by moth daily or as directed by physician, Disp: 90 tablet, Rfl: 3    FIBER ADULT GUMMIES PO, Take 1 caplet by mouth daily  (Patient not taking: Reported on 9/13/2024), Disp: , Rfl:     traMADol (ULTRAM) 50 mg tablet, Take 1 tablet (50 mg total) by mouth every 6 (six) hours as needed for moderate pain (Patient not taking: Reported on 9/13/2024), Disp: 30 tablet, Rfl: 0  Allergies   Allergen Reactions    Meloxicam Edema       Labs:     Chemistry        Component Value Date/Time     (L) 10/15/2015 1042    K  "4.2 07/23/2024 0821    K 4.7 10/15/2015 1042    CL 98 07/23/2024 0821     10/15/2015 1042    CO2 33 (H) 07/23/2024 0821    CO2 30 10/15/2015 1042    BUN 15 07/23/2024 0821    BUN 15 10/15/2015 1042    CREATININE 1.16 07/23/2024 0821    CREATININE 1.03 10/15/2015 1042        Component Value Date/Time    CALCIUM 8.5 07/23/2024 0821    CALCIUM 9.1 10/15/2015 1042    ALKPHOS 112 (H) 07/06/2024 1603    ALKPHOS 71 10/15/2015 1042    AST 21 07/06/2024 1603    AST 14 10/15/2015 1042    ALT 20 07/06/2024 1603    ALT 23 10/15/2015 1042    BILITOT 0.79 10/15/2015 1042            Lab Results   Component Value Date    CHOL 124 10/15/2015    CHOL 159 04/01/2015    CHOL 155 09/30/2013     Lab Results   Component Value Date    HDL 50 02/20/2024    HDL 68 08/17/2023    HDL 76 01/27/2023     Lab Results   Component Value Date    LDLCALC 48 02/20/2024    LDLCALC 53 08/17/2023    LDLCALC 58 01/27/2023     Lab Results   Component Value Date    TRIG 37 02/20/2024    TRIG 54 08/17/2023    TRIG 41 01/27/2023     No results found for: \"CHOLHDL\"    Imaging: No results found.    ECG:  AFib      Review of Systems   Constitutional: Negative.   HENT: Negative.     Eyes: Negative.    Cardiovascular: Negative.    Respiratory: Negative.     Endocrine: Negative.    Hematologic/Lymphatic: Negative.    Skin: Negative.    Musculoskeletal: Negative.    Gastrointestinal: Negative.    Genitourinary: Negative.    Neurological: Negative.    Psychiatric/Behavioral: Negative.     All other systems reviewed and are negative.      Vitals:    09/13/24 1303   BP: 110/60   Pulse: 59   SpO2: 96%     Vitals:    09/13/24 1303   Weight: 97.8 kg (215 lb 9.6 oz)         Body mass index is 30.07 kg/m².    Physical Exam:  Vital signs reviewed  General:  Alert and cooperative, appears stated age, no acute distress  HEENT:  PERRLA, EOMI, no scleral icterus, no conjunctival pallor  Neck:  No lymphadenopathy, no thyromegaly, no carotid bruits, no elevated JVP  Heart:  " Regular rate and rhythm, normal S1/S2, no S3/S4, no murmur, rubs or gallops.  PMI nondisplaced  Lungs:  Clear to auscultation bilaterally, no wheezes rales or rhonchi  Abdomen:  Soft, non-tender, positive bowel sounds, no rebound or guarding,   no organomegaly   Extremities:  Normal range of motion.  No clubbing, cyanosis or edema   Vascular:  2+ pedal pulses  Skin:  No rashes or lesions on exposed skin  Neurologic:  Cranial nerves II-XII grossly intact without focal deficits  Psych:  Normal mood and affect

## 2024-09-16 ENCOUNTER — CLINICAL SUPPORT (OUTPATIENT)
Dept: PULMONOLOGY | Facility: CLINIC | Age: 84
End: 2024-09-16
Payer: MEDICARE

## 2024-09-16 DIAGNOSIS — I50.32 CHRONIC DIASTOLIC CONGESTIVE HEART FAILURE (HCC): ICD-10-CM

## 2024-09-16 DIAGNOSIS — J44.9 CHRONIC OBSTRUCTIVE PULMONARY DISEASE, UNSPECIFIED COPD TYPE (HCC): Primary | ICD-10-CM

## 2024-09-16 PROCEDURE — G0239 OTH RESP PROC, GROUP: HCPCS

## 2024-09-17 RX ORDER — POTASSIUM CHLORIDE 1500 MG/1
TABLET, EXTENDED RELEASE ORAL
Qty: 90 TABLET | Refills: 1 | Status: SHIPPED | OUTPATIENT
Start: 2024-09-17

## 2024-09-18 ENCOUNTER — CLINICAL SUPPORT (OUTPATIENT)
Dept: PULMONOLOGY | Facility: CLINIC | Age: 84
End: 2024-09-18
Payer: MEDICARE

## 2024-09-18 DIAGNOSIS — J44.9 CHRONIC OBSTRUCTIVE PULMONARY DISEASE, UNSPECIFIED COPD TYPE (HCC): Primary | ICD-10-CM

## 2024-09-18 PROCEDURE — G0239 OTH RESP PROC, GROUP: HCPCS

## 2024-09-19 DIAGNOSIS — I48.91 ATRIAL FIBRILLATION, UNSPECIFIED TYPE (HCC): ICD-10-CM

## 2024-09-19 RX ORDER — AMIODARONE HYDROCHLORIDE 200 MG/1
200 TABLET ORAL DAILY
Qty: 30 TABLET | Refills: 0 | Status: SHIPPED | OUTPATIENT
Start: 2024-09-19 | End: 2024-09-25 | Stop reason: SDUPTHER

## 2024-09-19 NOTE — TELEPHONE ENCOUNTER
Script changed to 30 day supply for local pharmacy as patient nearly out of medication. Will submit 90 day mail order once dispensed.

## 2024-09-19 NOTE — TELEPHONE ENCOUNTER
Medication: amiodarone 200 mg tablet     Dose/Frequency: Take 1 tablet (200 mg total) by mouth daily,     Quantity: 90 tablet     Pharmacy: [facility] - [place], [place]  [address]     Office:   [] PCP/Provider -   [x] Speciality/Provider - Winnie Gruber MD     Does the patient have enough for 3 days?   [] Yes   [x] No - Send as HP to POD

## 2024-09-23 ENCOUNTER — CLINICAL SUPPORT (OUTPATIENT)
Dept: PULMONOLOGY | Facility: CLINIC | Age: 84
End: 2024-09-23
Payer: MEDICARE

## 2024-09-23 DIAGNOSIS — J44.9 CHRONIC OBSTRUCTIVE PULMONARY DISEASE, UNSPECIFIED COPD TYPE (HCC): Primary | ICD-10-CM

## 2024-09-23 PROCEDURE — G0239 OTH RESP PROC, GROUP: HCPCS

## 2024-09-23 NOTE — PROGRESS NOTES
Pulmonary Rehabilitation   Assessment and Individualized Treatment Plan  30 DAY      Today's date: 2024  # of Exercise Sessions Completed: 7  Patient name: Oscar Espinosa     : 1940       MRN: [id number]  Referring Physician: Racquel Wilson DO   Pulmonologist: SOSA Gonzales (Kandice Faustin DO)  Provider: Mickey  Clinician: Willian Box MS, CEP     Dx:    Encounter Diagnosis   Name Primary?    Chronic obstructive pulmonary disease, unspecified COPD type (HCC) Yes     Date of onset: 2019      Treatment is tailored to this patient's individual needs.  The ITP was reviewed with the patient and all questions were answered to their satisfaction.  Additional ITP documentation can be found electronically including daily and monthly exercise summaries, daily session notes with ECG summaries, education notes, daily medication reconciliation, and daily physician supervision.      COMMENTS:  Pt attended 1st initial evaluation to begin Pulmonary Rehab 2x/week. He is using continuous supplemental O2 2L/min. He is looking to go away for Thanksgiving and wants to increase his strength and overall endurance.     30 Day 24: Increased strength in lower and upper body reported at 30 days. Reporting more energy following exercise sessions. He is noticing it while gardening and able to walk longer in the grocery store holding onto the cart. No changes to diet. Compliant with supplemental O2. Reports ongoing back pain with walking interval.     ASSESSMENT      Medical History:   Past Medical History:   Diagnosis Date    Abdominal aortic aneurysm (HCC)     s/p repair    Abnormal ECG 2022    Acute on chronic congestive heart failure (HCC) 2019    Acute respiratory failure with hypoxia (HCC) 2024    Aneurysm (HCC) 2007    Aneurysm of abdominal aorta (HCC)     49mm, trileaflet AV    Atrial fibrillation (HCC)     Eliquis    BPH (benign prostatic hyperplasia)     CAD (coronary  artery disease)     s/p CABGx3    CHF (congestive heart failure) ([facility])     Chronic pain     Gabapentin    Chronic pain disorder     lumbar    COPD (chronic obstructive pulmonary disease) ([facility])     Coronary artery disease     Elevated serum creatinine 06/18/2024    Elevated serum creatinine 06/18/2024    Elevated transaminase level 04/27/2024    Former tobacco use     Hyperlipidemia     Hypertension     Hyponatremia 04/27/2024    Hyponatremia 04/27/2024    Hypothyroidism     Lumbar radiculopathy     Lumbar stenosis     s/p injections    Neuropathy     Obesity (BMI 30-39.9)     YEVGENIY (obstructive sleep apnea)     unable to tolerate CPAP    Platelets decreased ([facility]) 07/27/2022    Pulmonary hypertension ([facility])     moderate to severe    Spondylolisthesis of lumbar region     Visual impairment 2022    Vitamin D deficiency        Family History:  Family History   Problem Relation Age of Onset    Heart disease Mother     Stroke Father         stroke syndrome    Aneurysm Brother         abdominal    Aortic aneurysm Brother         ascending    Coronary artery disease Family     Hypertension Family     Other Son         gioblastoma multiforme       Allergies:   Meloxicam    Current Medications:   Current Outpatient Medications   Medication Sig Dispense Refill    acetaminophen (TYLENOL) 325 mg tablet Take 2 tablets by mouth every 6 (six) hours as needed for fever, headaches, mild pain, moderate pain or severe pain. Indications: Fever, Pain      albuterol (ProAir HFA) 90 mcg/act inhaler Inhale 2 puffs every 6 (six) hours as needed for wheezing 24 g 0    amiodarone 200 mg tablet Take 1 tablet (200 mg total) by mouth daily 30 tablet 0    aspirin (ECOTRIN LOW STRENGTH) 81 mg EC tablet Take 1 tablet by mouth daily      atorvastatin (LIPITOR) 40 mg tablet Take 1 tablet (40 mg total) by mouth daily 90 tablet 3    cholecalciferol (VITAMIN D3) 1,000 units tablet Take 2,000 Units by mouth daily      FIBER ADULT GUMMIES PO Take 1 caplet by  mouth daily  (Patient not taking: Reported on 9/13/2024)      Fluticasone-Salmeterol (Advair) 100-50 mcg/dose inhaler INHALE 1 PUFF BY MOUTH TWICE DAILY. RINSE MOUTH AFTER  blister 1    gabapentin (Neurontin) 300 mg capsule Take 1 capsule (300 mg total) by mouth 2 (two) times a day AND 3 capsules (900 mg total) daily at bedtime. (Patient taking differently:  3 capsules (900 mg total) daily at bedtime.) 450 capsule 1    labetalol (NORMODYNE) 100 mg tablet Take 0.5 tablets (50 mg total) by mouth in the morning 30 tablet 1    multivitamin (THERAGRAN) TABS Take 1 tablet by mouth daily      oxygen gas Inhale 2 L continuous. 2L of oxygen via nasal cannula as needed and at night  Indications: short of breath      polyethylene glycol (MIRALAX) 17 g packet Take 17 g by mouth daily as needed (constipation). Oscar Espinosa  dissolve in 8 oz liquid of choice   Indications: .      potassium chloride (Klor-Con M20) 20 mEq tablet TAKE 1 TABLET BY MOUTH DAILY 7  DAYS WEEKLY 90 tablet 1    senna (SENOKOT) 8.6 MG tablet Take 1 tablet by mouth as needed for constipation      tamsulosin (FLOMAX) 0.4 mg Take 1 capsule by mouth daily      torsemide (DEMADEX) 20 mg tablet Take 1 (20mg) tablet on Tuesday, Thursday, Saturday, Sunday, and 2 tablets (40 mg total) on Monday, Wednesday, and Friday 180 tablet 0    traMADol (ULTRAM) 50 mg tablet Take 1 tablet (50 mg total) by mouth every 6 (six) hours as needed for moderate pain (Patient not taking: Reported on 9/13/2024) 30 tablet 0    warfarin (Coumadin) 2.5 mg tablet Take 1 tab by moth daily or as directed by physician 90 tablet 3     No current facility-administered medications for this visit.       Physical Limitations: lumbar pain after walking a distance     Fall Risk: Moderate   Comments: Ambulates with a steady gait with no assist device and Denies a fall in the past 6 months    Cultural needs: none    PFT:  Yes 2/24/2021    FEV1/FVC ratio of 71%   FEV1 of 74%  predicted  mildobstruction.    Medication compliance: Yes  Comments: Pt reports to be compliant with medications      Pulmonary Disease Risk Factors:  smoking    Sleep Disorders:   obstructive sleep apnea:  CPCP/BiPAP:  no - does not tolerate, uses supplemental O2       EXERCISE ASSESSMENT:      Initial Fitness Assessment: 6MWT:  Resting:    Resting:  BP: 116/76  HR: 62  SpO2: 95%  Dyspnea: 0/10  O2 Use: 2 l/min, Exercise:  BP: 126/70  HR:   SpO2: 86-91%  Dyspnea: 6/10  O2 use: 2 l/min, METs:  1.7, ECG Summary: NSR with freq. PVCs, couplet, Symptoms: low back pain and PANDA, and Comments: 1 rest break       Current Functional Status:  Occupation: retired  Recreation/Physical activity: Traveling, patio, rec room   ADL’s:Capable of performing light to moderate ADLs limited by Dyspnea  [place]: limited by Dyspnea able to perform self-care  Exercise:  none  Home exercise equipment: None  Other Comments: N/A    SMART Exercise Goals:   reduced score on CAT, improved 6MWT distance, reduced dyspnea during exercise, improved exercise tolerance based on peak METs tolerated in pulmonary rehab exercise session, and SpO2 >88% during exercise    Patient Specific EXERCISE GOALS:     reduced dependence on supplemental O2, reduced number of COPD exacerbations, attend pulmonary rehab regularly, decrease sitting time at home, start a walking program, begin a consistent exercise regimen , reduced pulmonary related hospital readmissions , decreased rest needed with physical activity/exercise, increased muscular strength, increased energy, increased stamina with ADLs, and more independence at home    PSYCHOSOCIAL ASSESSMENT:    Date of last assessment: 8/30/2024  Depression screening:  PHQ-9 = 4    Interpretation:  1-4 = Minimal Depression  Anxiety screening:  ERON-7 = 2    Interpretation: 0-4  = Not anxious    Pt self-report of depression and anxiety  Patient reports slight feelings of depression   Patient reports slight  "feelings of anxiety  Reports great emotional support     Self-reported stress level:  5  Stress Management: practice Relaxation Techniques, exercise, keep a positive mindset, spend time outside, enjoy a hobby, spend time with family, TV, puzzles, keep busy around the house, gardening, and yard work    Quality of Life Screen:  (Higher score indicates disease impact on QOL)  [facility] COOP score: 29/40      CAT: 20/40      Shortness of breath questionnaire: 28/120    Social Support:   spouse, children, and friends  Community/Social Activities: N/A    SMART Goals:    Reduce perceived stress to 1-3/10, improved [facility] QOL < 27, Feelings in [facility] Score < 3, Physical Fitness in [facility] Score < 3, Daily Activity in [facility] Score < 3, Social Activities in [facility] Score < 3, Pain in [facility] Score < 3, Overall Health in [facility] Score < 3, Quality of Life in [facility] Score < 3 ,  reduced time feeling down, depressed or hopeless, improved sleeping habits, feel less tired with more energy, reduced time feeling like a failure and having negative self thoughts, reduced feelings of restlessness, and reduced irritability    Patient Specific PSYCHOCOSOCIAL GOALS:    spend time with family and reduce slight feelings of depression/anxiety, and increase energy levels      Psychosocial Assessment as it relates to rehabilitation: Patient denies issues with his/her family or home life that may affect their rehabilitation efforts.       NUTRITION ASSESSMENT:    Initial Weight:  221.6 lbs   Current Weight: 218.6 lbs     Height:   Ht Readings from Last 1 Encounters:   07/18/24 5' 11\" (1.803 m)       Rate Your Plate Score: 53/81    Self-reported Current Dietary Habits:  Loves to eat salads  Fruits and veggies in diet  Drinks green tea, reviewed increasing water intake   Does not eat out  Does not use sodium  Not a big meat eater     ETOH:   Social History     Substance and Sexual Activity   Alcohol Use Not Currently "       SMART Goals:   reduced BMI to < 25, decreased body fat% <25%   (M), reduced waist circumference <40 inches (M), fasting BG , improved A1c  < 7.0%, Improved Rate Your Plate score  >64, 2.5-5%  wt loss, eat 6 or more servings of grain products per day, eat whole grain breads, brown rice and whole grain cereals, eat 5 or more servings of fruits and vegetables a day, eat 2 or more servings of low fat milk or yogurt a day, eat no more than 6oz of meat per day, eat red meat once a week or less, eat poultry without the skin, choose 1% or skim milk, rarely eat cheese or choose reduced fat or skim, rarely eat frozen desserts or choose fruit or fat-free sweets, Do not cook with oil, butter or margarine, Do not eat fried foods, choose light or fat-free salad dressings or damico, choose healthy snacks such as fruit, pretzels, and low fat crackers, choose healthy desserts and sweets such as orquidea food cake or  fruit, choose low sodium canned, frozen/packaged foods or rarely/never eat, rarely/never eat salty snacks, choose low sugar desserts and sweets, and drink less than 8oz of soda and sweetened drinks per day    Patient Specific NUTRITION GOALS:    increase water intake to reduce/thin mucus production reduced SOB while eating eat smaller, more frequent meals decrease caloric intake improve hydration weight loss increased muscle mass      OTHER CORE COMPONENT ASSESSMENT:    Hospitalizations in the past year: 3    Oxygen needs:   Rest:  supplemental O2 via nasal cannula @ 2L/min   Exercise/physical activity:  supplemental O2 via nasal cannula @ 2L/min   Sleep:   supplemental O2 via nasal cannula @ 2L/min     Does Pt monitor home SpO2? yes   Average SpO2 at rest:  95-99% with oxygen, 90% on RA    Average SpO2 with ADLs/physical activity:  low 80s % on RA       Use of Rescue Inhaler:  Yes PRN    Use of Maintenance Inhaler: Yes:  2 times per day    Use of Nebulizer Treatments:  No    Patient practices breathing  techniques at home:  Reviewed with patient on:  2024    CAD Risk Factors:  Cholesterol: Yes  HTN: Yes  DM: Pre-diabetes  Obesity: Yes     Smoking: Former user  Quit   1ppd for 55.6 years    SMART Core Component Goals:   consistent, controlled resting BP < 130/80, medication compliance, abstain from smoking, and demonstrate pursed lipped breathing    Patient Specific CORE COMPONENT GOALS:    reduced dietary sodium <2000mg, assess daily wt to report an increase greater than 3lbs in a day or 5lbs in a week, medication compliance, Abstain from smoking, compliance with supplemental oxygen, and monitor home pulse oximetry      Blood Pressure:    Restin//80  Exercise: 110//72  Recovery: 98//80      INDIVIDUALIZED TREATMENT PLAN    The patient is agreeable to attend 24 pulmonary rehab exercise sessions.      EXERCISE GOALS AND PLAN    Current Aerobic Exercise Prescription       Frequency: 2 days/week   Supplement with home exercise 2+ days/wk as tolerated        Minutes: 20-30         METS: 1.8              SpO2: 86-97% on supplemental O2 2L/min via NC               RPD: 1-6                      HR:  68-87   RPE: 4-5         Modalities: UBE, NuStep, and Room walking      Aerobic Exercise Prescription Plan for Progression:    Frequency: 2 days/week    Supplement with home exercise 2+ days/wk as tolerated       Minutes: 30-40        METS: 1.9-2.2              SpO2: >88% on supplemental O2 2L/min via NC              RPD: 4-6                      HR:RHR +30-40bpm   RPE: 4-5        Modalities: UBE, NuStep, Recumbent bike, and Room walking        Supplemental Oxygen Needs with Exercise:  supplemental O2 2L/min via nasal canula and will titrate O2 to maintain SpO2 >88%.    Strength training:  Will be added following 2-3 weeks of monitored exercise sessions   Modalities: Chest Press, Lateral Raise, Arm Curl, Sit to Stands, Upright Rows, and Front Raises    Education: pursed lipped breathing,  relaxation breathing, home exercise guidelines, benefits of exercise for pulmonary disease, RPE scale, RPD scale, O2 saturation monitoring, appropriate O2 response to exercise, and education class: Exercise For The Pulmonary Patient    Progress toward Exercise Goals:    Pt is progressing and showing improvement  toward the following goals:  reporting increased lower and upper strength, feels stronger with gardening and walking in the grocery store, SpO2 mostly >88% on supplemental O2 2L/min (drops with room walking), and use of PLB. Pt is hydrating with exercise..  , Pt has not made progress toward the following goals: no formal home exercise with rehab. , Will continue to educate and progress as tolerated.    Exercise Plan:  Titrate supplemental oxygen as needed to maintain SpO2>88% with exercise, learn to conserve energy with ADLs , practice diaphragmatic breathing, reduce time sitting at home, increased strength of respiratory muscles, utilize PLB with physical activity, and begin a home exercise program     Readiness to change: Preparation:  (Getting ready to change)       NUTRITION GOALS AND PLAN    Progress toward Nutritional goals:    Pt is progressing and showing improvement  toward the following goals:  weight loss noted in the past 30 days and monitoring daily weight at home.  , Pt has not made progress toward the following goals: no changes to diet- likes to splurge on the weekends. , Will continue to educate and progress as tolerated.    Nutrition Plan: group class: Reading Food Labels, group Class: Heart Healthy Eating, increase intake of whole grains, replace refined flours with whole grains, increase daily intake of fruits and vegetables, increase daily intake of low fat dairy, limit meat intake to less than 6oz per day, eat red meat once a week or less, eat poultry without the skin, drink/use 1%  low fat or skim  milk, eat reduced-fat or part-skim cheese or rarely eat, rarely eat frozen desserts,  cook without fats or oils, never/rarely eat fried foods, use light or fat-free salad dressings and mayonnaise, eat healthy snacks like fruit, pretzels, and low fat crackers, choose desserts such as fruit, orquidea food cake, low-fat or fat-free sweets, choose low sodium canned, frozen, packaged foods or rarely eat these foods, rarely/never eat salty snacks, choose low sugar desserts and sweets, and drink less than 8oz of non-diet soda, punch etc. per day    SMART goals are based Rate Your Plate Dietary Self-Assessment. These are the areas in which the patient could score higher on the assessment.  Goals include recommendations for a heart healthy diet based on American Heart Association.    Nutrition Education: heart healthy eating principles  weight loss and management strategies  low sodium diet  maintaining hydration  portion control    Readiness to change: Preparation:  (Getting ready to change)       PSYCHOSOCIAL GOALS AND PLAN    Information to begin using [employer] was provided as well as contact information for counseling through  [facility].    Progress toward Psychosocial goals:    Pt is progressing and showing improvement  toward the following goals:  maintaining emotional health with great support system. Feeling better with increased energy levels following sessions.  , Will continue to educate and progress as tolerated.    Psychosocial: Class:  Stress and Pulmonary Disease, Enroll in [employer], Practice relaxation techniques, Exercise, Spend time outdoors, Read a Book, Keep a positive mindset, Join a support group , Reduce dependence  on family, Meet new people, puzzles, Enjoy family, and Avoid triggers    Psychosocial Education: depression and pulmonary disease and tools to manage fear/anxiety related to becoming SOB    Readiness to change: Preparation:  (Getting ready to change)       OTHER CORE COMPONENTS GOALS AND PLAN    Tobacco Use Intervention:     Pt quit 2012   and has  abstained    Oxygen Goal: Maintain SpO2>88% during exercise or as advised by pulmonolgist    Progress toward Core Component goals:    Pt is progressing and showing improvement  toward the following goals:  abstaining from smoking, medication compliance, compliance with supplemental O2, and blood pressures are stable.  , Pt has not made progress toward the following goals: needs to be more aware sodium intake. , Will continue to educate and progress as tolerated.    Core Component Plan: medication compliance, avoid places with second hand smoke, avoid processed foods, engage in regular exercise, eliminate salt shaker at the table, use salt substitutes, check labels for sodium content, and monitor home BP    Core Component Education:  preventing infections, bronchodilators, bronchial hygiene, education: Pulmonary Anatomy and Physiology, education:  Pulmonary Medications, education: Oxygen, and education: Control Breathing    Readiness to change: Action:  (Changing behavior)

## 2024-09-24 ENCOUNTER — APPOINTMENT (OUTPATIENT)
Dept: LAB | Facility: CLINIC | Age: 84
End: 2024-09-24
Payer: MEDICARE

## 2024-09-24 ENCOUNTER — ANTICOAG VISIT (OUTPATIENT)
Dept: CARDIOLOGY CLINIC | Facility: CLINIC | Age: 84
End: 2024-09-24

## 2024-09-24 DIAGNOSIS — I48.0 PAROXYSMAL ATRIAL FIBRILLATION (HCC): Primary | ICD-10-CM

## 2024-09-24 DIAGNOSIS — I48.0 PAROXYSMAL ATRIAL FIBRILLATION (HCC): ICD-10-CM

## 2024-09-24 LAB
INR PPP: 1.8 (ref 0.85–1.19)
PROTHROMBIN TIME: 21 SECONDS (ref 12.3–15)

## 2024-09-24 PROCEDURE — 85610 PROTHROMBIN TIME: CPT

## 2024-09-24 PROCEDURE — 36415 COLL VENOUS BLD VENIPUNCTURE: CPT

## 2024-09-25 ENCOUNTER — CLINICAL SUPPORT (OUTPATIENT)
Dept: PULMONOLOGY | Facility: CLINIC | Age: 84
End: 2024-09-25
Payer: MEDICARE

## 2024-09-25 DIAGNOSIS — J44.9 CHRONIC OBSTRUCTIVE PULMONARY DISEASE, UNSPECIFIED COPD TYPE (HCC): Primary | ICD-10-CM

## 2024-09-25 DIAGNOSIS — I48.91 ATRIAL FIBRILLATION, UNSPECIFIED TYPE (HCC): ICD-10-CM

## 2024-09-25 PROCEDURE — G0239 OTH RESP PROC, GROUP: HCPCS

## 2024-09-25 RX ORDER — AMIODARONE HYDROCHLORIDE 200 MG/1
200 TABLET ORAL DAILY
Qty: 90 TABLET | Refills: 3 | Status: SHIPPED | OUTPATIENT
Start: 2024-09-25

## 2024-09-25 NOTE — TELEPHONE ENCOUNTER
Refill must be reviewed and completed by the office or provider. The refill is unable to be approved or denied by the medication management team.     This refill cannot be delegated

## 2024-09-30 ENCOUNTER — CLINICAL SUPPORT (OUTPATIENT)
Dept: PULMONOLOGY | Facility: CLINIC | Age: 84
End: 2024-09-30
Payer: MEDICARE

## 2024-09-30 DIAGNOSIS — J44.9 CHRONIC OBSTRUCTIVE PULMONARY DISEASE, UNSPECIFIED COPD TYPE (HCC): Primary | ICD-10-CM

## 2024-09-30 PROCEDURE — G0239 OTH RESP PROC, GROUP: HCPCS

## 2024-10-01 ENCOUNTER — TELEPHONE (OUTPATIENT)
Age: 84
End: 2024-10-01

## 2024-10-01 ENCOUNTER — APPOINTMENT (OUTPATIENT)
Dept: LAB | Facility: CLINIC | Age: 84
End: 2024-10-01
Payer: MEDICARE

## 2024-10-01 DIAGNOSIS — I48.0 PAROXYSMAL ATRIAL FIBRILLATION (HCC): ICD-10-CM

## 2024-10-01 LAB
ANION GAP SERPL CALCULATED.3IONS-SCNC: 6 MMOL/L (ref 4–13)
BUN SERPL-MCNC: 26 MG/DL (ref 5–25)
CALCIUM SERPL-MCNC: 8.8 MG/DL (ref 8.4–10.2)
CHLORIDE SERPL-SCNC: 97 MMOL/L (ref 96–108)
CO2 SERPL-SCNC: 33 MMOL/L (ref 21–32)
CREAT SERPL-MCNC: 1.55 MG/DL (ref 0.6–1.3)
ERYTHROCYTE [DISTWIDTH] IN BLOOD BY AUTOMATED COUNT: 17.6 % (ref 11.6–15.1)
GFR SERPL CREATININE-BSD FRML MDRD: 40 ML/MIN/1.73SQ M
GLUCOSE P FAST SERPL-MCNC: 94 MG/DL (ref 65–99)
HCT VFR BLD AUTO: 27.7 % (ref 36.5–49.3)
HGB BLD-MCNC: 8.2 G/DL (ref 12–17)
MCH RBC QN AUTO: 28.4 PG (ref 26.8–34.3)
MCHC RBC AUTO-ENTMCNC: 29.6 G/DL (ref 31.4–37.4)
MCV RBC AUTO: 96 FL (ref 82–98)
PLATELET # BLD AUTO: 133 THOUSANDS/UL (ref 149–390)
PMV BLD AUTO: 11.4 FL (ref 8.9–12.7)
POTASSIUM SERPL-SCNC: 5 MMOL/L (ref 3.5–5.3)
RBC # BLD AUTO: 2.89 MILLION/UL (ref 3.88–5.62)
SODIUM SERPL-SCNC: 136 MMOL/L (ref 135–147)
WBC # BLD AUTO: 4.49 THOUSAND/UL (ref 4.31–10.16)

## 2024-10-01 NOTE — TELEPHONE ENCOUNTER
Caller: Dorcas Espinosa    Doctor: Dr. Rich    Reason for call: Wife called regarding letter to schedule 2yr aneurysm f/u. She stated they feel that the pt is no longer a candidate for surgery due to his other health issues and are declining f/u care at this time. Wife does not want us to send another letter.

## 2024-10-02 ENCOUNTER — CLINICAL SUPPORT (OUTPATIENT)
Dept: PULMONOLOGY | Facility: CLINIC | Age: 84
End: 2024-10-02
Payer: MEDICARE

## 2024-10-02 DIAGNOSIS — J44.9 CHRONIC OBSTRUCTIVE PULMONARY DISEASE, UNSPECIFIED COPD TYPE (HCC): Primary | ICD-10-CM

## 2024-10-02 PROCEDURE — G0239 OTH RESP PROC, GROUP: HCPCS

## 2024-10-07 ENCOUNTER — CLINICAL SUPPORT (OUTPATIENT)
Dept: PULMONOLOGY | Facility: CLINIC | Age: 84
End: 2024-10-07
Payer: MEDICARE

## 2024-10-07 DIAGNOSIS — J44.9 CHRONIC OBSTRUCTIVE PULMONARY DISEASE, UNSPECIFIED COPD TYPE (HCC): Primary | ICD-10-CM

## 2024-10-07 PROCEDURE — G0239 OTH RESP PROC, GROUP: HCPCS

## 2024-10-07 NOTE — PROGRESS NOTES
Direct Access Lymphedema Evaluation    Today's Date: 10/9/2024  Patient name: Oscar Espinosa  : 1940  MRN: [id number]  Referring provider: Reyes, Lea, MD  Dx:  Encounter Diagnosis     ICD-10-CM    1. Lymphedema  I89.0           Assessment    Assessment details: Oscar Espinosa is a 83 y.o. year old male with complaints of chronic lower leg edema returning for 6 month follow up.  Patient's presentation is consistent with stage 2 acquired lymphedema secondary to chronic venous stasis, chronic kidney disease, and chronic CHF.  Last course of care, he presented with stage 3 lymphedema with venous wound. He was prescribed and fit with inelastic velcro compression wraps, and showed full wound healing and a 16% (1600mL) and 21% (2500mL) reduction in LLE and RLE respectively. He reports compliance with compression over the last 6 months, except in the last month due to new onset of foot paresthesias.  .   Today, impairments found on evaluation: mildly increased LE volumes (700mL per leg) and persisting moderately severe fibrotic tissue changes of BLE toes to knee, decreased LE mobility and functional mobility including gait. Relevant medical history includes extensive cardiopulmonary compromise. The listed physical impairments contribute to the following functional limitations: decreased tolerance to standing and walking; and the following disability: increased risk of infection and hospitalization from integumentary compromise, lymphatic compromise, and open wound.    New compression garments were ordered from [facility] today and patient to continue daytime (and nighttime PRN) compression until follow up in 6 months, or return to office sooner if lymphedema worsens.    Differential diagnosis for new foot paresthesias is lumbar stenosis vs hypoxemia. Lumbar stenosis may be symptomatic in this way due to decreased overall mobility in the last few months. Recommending nocturnal pulse oximetry due  to known nocturnal hypoxemia to see if it correlates with foot paresthesias.  Understanding of Dx/Px/POC: good     Prognosis: good    Plan    Frequency: 2x week  Plan of Care beginning date: 10/9/2024  Plan of Care expiration date: 10/9/2024  Treatment plan discussed with: patient and referring physician      Subjective/History:  Chief Complaint: chronic, constant BLE edema, flared in the last month.  HPI: insidious onset many years ago, R>L; Last seen by lymphedema therapy 04/2024, then was hospitalized for pneumonia x2 courses of hospitalization, then returned to ED for hypotension. Started pulmonary rehab and reports improved endurance and strength in arms and legs.  Prior Edema Treatments: Ready Wrap calf and foot to BLE; pneumatic pump from [facility] received in May 2025 but not used. Had been consistent with daytime compression wear over the last 5 months, but decreased compliance over the last month and noted increased edema in lower legs. Notes that wraps are getting worn out.  New symptom: Patient reports bottom of the foot paresthesias (every night, bilateral and symmetrical, a tingling itching sensation) that improved slightly with discontinuation of compression wraps at night, but not full resolution.  Imaging:   R leg LEV 1/2/24:  No DVT or SVT.  R GSV reflux throughout the thigh and calf (mid thigh 11.3 mm, reflux 3.04; knee 11.7, 2.09; prox calf 8.9, reflux 2.79). Triphasic waveforms.  CV duplex 10/19/23: Mild 1-49% bilateral carotid stenosis (R 63/18, L 73/9);  R vertebral antegrade; early systolic deceleration, suggestive of more proximal disease.  TTE completed 04/27/2024: EF 45% systolic function mildly reduced diastolic function severely abnormal consistent with ungraded restrictive relaxation RVSP 55.00 mmHg   Relevant PMHx: CHF with chronic A-fib, on Warfarin (previously Eliqious but d/c due to finances), aspirin, and atorvastatin; bilateral carotid artery stenosis; CABG x4 2003; AAA  repaired 2007; HTN; moderate pulm HTN; HLD; stage 3a kidney disease; severe YEVGENIY- cannot tolerate CPAP; chronic LBP L3-4; Nocturnal hypoxemia-- Patient has not been using any PAP therapy or supplemental oxygen at night   Personal history of Cancer? No    Lymphedema special questions  Feeling of heaviness, fullness, pressure? yes  Resolves with elevation: No  Sleeping location? Recliner with legs up or down- due to YEVGENIY - since 2005  Change in fit of shoes/clothes/jewelry?yes  History of Cellulitis?  no  History of S/DVT? no  History of Leg wounds? Yes- diffuse lymphorrhea BLE R>L  Sees a podiatrist regularly for foot care? no  Presence of trunk/abdominal/genital edema? no  Latex Allergy? no    Pain: none  Function: limited walking due to back pain; lives with wife  Working Status: retired  Exercise status: pulm rehab   Patient goals: reduce discomfort and weeping of legs.    Objective  Lymphedema Exam: Lower Extremities  Abdomen/Genitals: abdominal pannus; no edema  Lower extremity left  Toes: spongy, pitting, brawny  Dorsum of foot: mild firm, pitting, brawny  Ankle: moderate firm, pitting, fmod fibrosis, hemosideran staining   Calf:  spongy, pitting, hemosideran staining, fibrotic tissue changes  Knee: spongy pitting medially  Thigh: spongy nonpitting  Buttock: WNL  Lower extremity right  Toes: spongy, pitting, brawny  Dorsum of foot: mild firm, pitting, brawny  Ankle: moderate firm, pitting, fmod fibrosis, hemosideran staining   Calf:  spongy, pitting, hemosideran staining, fibrotic tissue changes  Knee: spongy pitting medially  Thigh: spongy nonpitting  Buttock: WNL  Lymphedema classification (0 latent, 1 reverse, 2 non-rev, 3 elephantiasis): 2  >> REFER TO FLOW SHEET FOR GIRTH MEASUREMENTS <<    Skin Assessment:  Capillary refill: 2 sec  Stemmer's sign: yes  Fibrosis (0 normal - 4 >severe): 3  Erythema scale (1 very faint, 2 faint, 3 bright, 4 very bright): 1  Hemosiderin staining present: yes  Blister present:  no  Wound present: no  Scar present: no  Skin quality: well hydrated, brawny, atrophic    Functional Capacity:  Gait assessment: slow gait speed  Transfer status: indep  Ability to don/doff clothing/garments: indep with wraps and shoes and pantys  Lower quarter Sensation: Normal  Lower quarter Range of Motion: Abnormal, decreased ankle DF (25% limited) and knee flexion (10% limited) due to edema bilaterally  Lower Quarter Strength: Not tested- functionally WNL       Precautions: multiple comorbidities including cardiac    POC expires Auth Status? (BOMN, approved, pending) Unit limit (Daily) Auth Start date Expiration date PT/OT + Visit Limit?    BOMN         Date of Service 10/9        Visits Used 1        Visits Remaining 99          Compression Rx 10/9        Compression garment Ready Wrap calf (Med Long) and foot SL (large) for BLE- re-order placed through [facility] **       Compression pumps [employer]- emailed rep for technical support to house        Manual Ther         volumetrics completed        MLD                  Ther Ex                                    Ther Activity & Self Care         Skin care indep        Garment Fit/Train indep                 Pt Education         Edema differential dx 10' reviewed CHF signs/symptoms        Lymphatic A&P         Compression options

## 2024-10-08 ENCOUNTER — APPOINTMENT (OUTPATIENT)
Dept: LAB | Facility: CLINIC | Age: 84
End: 2024-10-08
Payer: MEDICARE

## 2024-10-08 ENCOUNTER — ANTICOAG VISIT (OUTPATIENT)
Dept: CARDIOLOGY CLINIC | Facility: CLINIC | Age: 84
End: 2024-10-08

## 2024-10-08 DIAGNOSIS — I48.0 PAROXYSMAL ATRIAL FIBRILLATION (HCC): ICD-10-CM

## 2024-10-08 DIAGNOSIS — I48.0 PAROXYSMAL ATRIAL FIBRILLATION (HCC): Primary | ICD-10-CM

## 2024-10-08 LAB
INR PPP: 2.24 (ref 0.85–1.19)
PROTHROMBIN TIME: 24.7 SECONDS (ref 12.3–15)

## 2024-10-08 PROCEDURE — 85610 PROTHROMBIN TIME: CPT

## 2024-10-09 ENCOUNTER — OFFICE VISIT (OUTPATIENT)
Dept: PHYSICAL THERAPY | Facility: REHABILITATION | Age: 84
End: 2024-10-09
Payer: MEDICARE

## 2024-10-09 DIAGNOSIS — I89.0 LYMPHEDEMA: Primary | ICD-10-CM

## 2024-10-09 PROCEDURE — 97162 PT EVAL MOD COMPLEX 30 MIN: CPT | Performed by: PHYSICAL THERAPIST

## 2024-10-10 ENCOUNTER — OFFICE VISIT (OUTPATIENT)
Dept: INTERNAL MEDICINE CLINIC | Facility: CLINIC | Age: 84
End: 2024-10-10
Payer: MEDICARE

## 2024-10-10 VITALS
BODY MASS INDEX: 31.36 KG/M2 | WEIGHT: 224 LBS | DIASTOLIC BLOOD PRESSURE: 66 MMHG | SYSTOLIC BLOOD PRESSURE: 104 MMHG | OXYGEN SATURATION: 92 % | HEIGHT: 71 IN | HEART RATE: 59 BPM

## 2024-10-10 DIAGNOSIS — J44.9 CHRONIC OBSTRUCTIVE PULMONARY DISEASE, UNSPECIFIED COPD TYPE (HCC): ICD-10-CM

## 2024-10-10 DIAGNOSIS — E78.2 MIXED HYPERLIPIDEMIA: ICD-10-CM

## 2024-10-10 DIAGNOSIS — I50.42 CHRONIC COMBINED SYSTOLIC AND DIASTOLIC CONGESTIVE HEART FAILURE (HCC): ICD-10-CM

## 2024-10-10 DIAGNOSIS — M54.16 LUMBAR RADICULOPATHY: Primary | ICD-10-CM

## 2024-10-10 DIAGNOSIS — Z23 ENCOUNTER FOR IMMUNIZATION: ICD-10-CM

## 2024-10-10 DIAGNOSIS — R73.03 PREDIABETES: ICD-10-CM

## 2024-10-10 DIAGNOSIS — I10 BENIGN ESSENTIAL HYPERTENSION: ICD-10-CM

## 2024-10-10 DIAGNOSIS — D50.9 IRON DEFICIENCY ANEMIA, UNSPECIFIED IRON DEFICIENCY ANEMIA TYPE: ICD-10-CM

## 2024-10-10 DIAGNOSIS — R73.01 IMPAIRED FASTING GLUCOSE: ICD-10-CM

## 2024-10-10 PROCEDURE — G0009 ADMIN PNEUMOCOCCAL VACCINE: HCPCS

## 2024-10-10 PROCEDURE — 90677 PCV20 VACCINE IM: CPT

## 2024-10-10 PROCEDURE — 90662 IIV NO PRSV INCREASED AG IM: CPT

## 2024-10-10 PROCEDURE — 99214 OFFICE O/P EST MOD 30 MIN: CPT | Performed by: INTERNAL MEDICINE

## 2024-10-10 PROCEDURE — G0008 ADMIN INFLUENZA VIRUS VAC: HCPCS

## 2024-10-10 RX ORDER — OXYCODONE HYDROCHLORIDE 5 MG/1
5 TABLET ORAL EVERY 6 HOURS PRN
Qty: 30 TABLET | Refills: 0 | Status: SHIPPED | OUTPATIENT
Start: 2024-10-10

## 2024-10-10 NOTE — ASSESSMENT & PLAN NOTE
Wt Readings from Last 3 Encounters:   10/17/24 102 kg (225 lb)   10/10/24 102 kg (224 lb)   09/13/24 97.8 kg (215 lb 9.6 oz)     Recent weight gain  Discussed fluid restriction low-salt diet  Continue torsemide 40 mg Monday Wednesday Friday 20 mg the rest of the week

## 2024-10-10 NOTE — ASSESSMENT & PLAN NOTE
Discussed starting oxycodone for severe back pain that limits him from daily activities and participating in physical therapy  Aware of side effects including sedation and constipation  Orders:    oxyCODONE (Roxicodone) 5 immediate release tablet; Take 1 tablet (5 mg total) by mouth every 6 (six) hours as needed for severe pain Max Daily Amount: 20 mg

## 2024-10-10 NOTE — PROGRESS NOTES
Ambulatory Visit  Name: sOcar Espinosa      : 1940      MRN: [id number]  Encounter Provider: Lea Reyes, MD  Encounter Date: 10/10/2024   Encounter department: [facility]    Assessment & Plan  Lumbar radiculopathy  Discussed starting oxycodone for severe back pain that limits him from daily activities and participating in physical therapy  Aware of side effects including sedation and constipation  Orders:    oxyCODONE (Roxicodone) 5 immediate release tablet; Take 1 tablet (5 mg total) by mouth every 6 (six) hours as needed for severe pain Max Daily Amount: 20 mg    Chronic combined systolic and diastolic congestive heart failure (HCC)  Wt Readings from Last 3 Encounters:   10/17/24 102 kg (225 lb)   10/10/24 102 kg (224 lb)   24 97.8 kg (215 lb 9.6 oz)     Recent weight gain  Discussed fluid restriction low-salt diet  Continue torsemide 40 mg  20 mg the rest of the week               Chronic obstructive pulmonary disease, unspecified COPD type (HCC)  Oxygen dependent         Encounter for immunization    Orders:    influenza vaccine, high-dose, PF 0.5 mL (Fluzone High Dose)    Pneumococcal Conjugate Vaccine 20-valent (Pcv20)    Prediabetes    Orders:    CBC and differential; Future    Comprehensive metabolic panel; Future    Hemoglobin A1C; Future    Impaired fasting glucose    Orders:    Lipid panel; Future    Benign essential hypertension    Orders:    CBC and differential; Future    Comprehensive metabolic panel; Future    Mixed hyperlipidemia    Orders:    Lipid panel; Future    Iron deficiency anemia, unspecified iron deficiency anemia type  Appointment with hematology scheduled            History of Present Illness     Here with his wife for a follow-up  Main concern is pain that is not alleviated by tramadol  Finds it is hard to participate in any therapy because of pain  Traveling to [place] soon to visit family      Review of Systems    Constitutional:  Positive for unexpected weight change (Weight gain).   Respiratory:  Positive for shortness of breath.    Cardiovascular:  Positive for leg swelling.   Gastrointestinal:  Negative for abdominal pain.   Musculoskeletal:  Positive for back pain.   Neurological:  Negative for dizziness.     Past Medical History:   Diagnosis Date    Abdominal aortic aneurysm ([facility])     s/p repair    Abnormal ECG july 2022    Acute on chronic congestive heart failure ([facility]) 09/11/2019    Acute respiratory failure with hypoxia ([facility]) 05/06/2024    Aneurysm ([facility]) 2007    Aneurysm of abdominal aorta ([facility])     49mm, trileaflet AV    Atrial fibrillation ([facility])     Eliquis    BPH (benign prostatic hyperplasia)     CAD (coronary artery disease)     s/p CABGx3    CHF (congestive heart failure) ([facility])     Chronic pain     Gabapentin    Chronic pain disorder     lumbar    COPD (chronic obstructive pulmonary disease) ([facility])     Coronary artery disease     Elevated serum creatinine 06/18/2024    Elevated serum creatinine 06/18/2024    Elevated transaminase level 04/27/2024    Former tobacco use     Hyperlipidemia     Hypertension     Hyponatremia 04/27/2024    Hyponatremia 04/27/2024    Hypothyroidism     Lumbar radiculopathy     Lumbar stenosis     s/p injections    Neuropathy     Obesity (BMI 30-39.9)     YEVGENIY (obstructive sleep apnea)     unable to tolerate CPAP    Platelets decreased ([facility]) 07/27/2022    Pulmonary hypertension ([facility])     moderate to severe    Spondylolisthesis of lumbar region     Visual impairment 2022    Vitamin D deficiency      Past Surgical History:   Procedure Laterality Date    ABDOMINAL AORTIC ANEURYSM REPAIR  08/09/2007    2 dock limbs with visc extens prosth    CARDIAC CATHETERIZATION      COLONOSCOPY      CORONARY ARTERY BYPASS GRAFT  2003    LIMA to LAD, sequential SVG to OM1 & OM2, SVG to RDA    IR CHEST TUBE PLACEMENT  6/19/2024    LUMBAR EPIDURAL INJECTION       Family History   Problem Relation Age of  Onset    Heart disease Mother     Stroke Father         stroke syndrome    Aneurysm Brother         abdominal    Aortic aneurysm Brother         ascending    Coronary artery disease Family     Hypertension Family     Other Son         gioblastoma multiforme     Social History     Tobacco Use    Smoking status: Former     Current packs/day: 0.00     Average packs/day: 1 pack/day for 55.6 years (55.6 ttl pk-yrs)     Types: Cigarettes     Start date: 1957     Quit date: 2012     Years since quittin.2     Passive exposure: Past    Smokeless tobacco: Never   Vaping Use    Vaping status: Never Used   Substance and Sexual Activity    Alcohol use: Not Currently    Drug use: No    Sexual activity: Never     Current Outpatient Medications on File Prior to Visit   Medication Sig    acetaminophen (TYLENOL) 325 mg tablet Take 2 tablets by mouth every 6 (six) hours as needed for fever, headaches, mild pain, moderate pain or severe pain. Indications: Fever, Pain    albuterol (ProAir HFA) 90 mcg/act inhaler Inhale 2 puffs every 6 (six) hours as needed for wheezing    amiodarone 200 mg tablet Take 1 tablet (200 mg total) by mouth daily    aspirin (ECOTRIN LOW STRENGTH) 81 mg EC tablet Take 1 tablet by mouth daily    atorvastatin (LIPITOR) 40 mg tablet Take 1 tablet (40 mg total) by mouth daily    cholecalciferol (VITAMIN D3) 1,000 units tablet Take 2,000 Units by mouth daily    Fluticasone-Salmeterol (Advair) 100-50 mcg/dose inhaler INHALE 1 PUFF BY MOUTH TWICE DAILY. RINSE MOUTH AFTER USE    labetalol (NORMODYNE) 100 mg tablet Take 0.5 tablets (50 mg total) by mouth in the morning    multivitamin (THERAGRAN) TABS Take 1 tablet by mouth daily    oxygen gas Inhale 2 L continuous. 2L of oxygen via nasal cannula as needed and at night  Indications: short of breath    polyethylene glycol (MIRALAX) 17 g packet Take 17 g by mouth daily as needed (constipation). Oscar Espinosa  dissolve in 8 oz liquid of choice    "Indications: .    potassium chloride (Klor-Con M20) 20 mEq tablet TAKE 1 TABLET BY MOUTH DAILY 7  DAYS WEEKLY    senna (SENOKOT) 8.6 MG tablet Take 1 tablet by mouth as needed for constipation    tamsulosin (FLOMAX) 0.4 mg Take 1 capsule by mouth daily    torsemide (DEMADEX) 20 mg tablet Take 1 (20mg) tablet on Tuesday, Thursday, Saturday, Sunday, and 2 tablets (40 mg total) on Monday, Wednesday, and Friday    warfarin (Coumadin) 2.5 mg tablet Take 1 tab by moth daily or as directed by physician    FIBER ADULT GUMMIES PO Take 1 caplet by mouth daily  (Patient not taking: Reported on 9/13/2024)     Allergies   Allergen Reactions    Meloxicam Edema     Immunization History   Administered Date(s) Administered    COVID-19 MODERNA VACC 0.5 ML IM 01/26/2021, 03/05/2021, 10/28/2021, 11/28/2021    COVID-19 Moderna Vac BIVALENT 12 Yr+ IM 0.5 ML 10/12/2022    COVID-19 Moderna mRNA Vaccine 12 Yr+ 50 mcg/0.5 mL (Spikevax) 10/03/2023    COVID-19 Moderna vac 6-11y or adult booster 50 mcg/0.5 mL 05/03/2022    INFLUENZA 10/23/2007, 10/02/2013, 10/08/2016, 10/25/2017, 10/30/2020, 10/13/2021, 10/06/2022, 10/03/2023    Influenza Split High Dose Preservative Free IM 10/02/2013, 10/02/2013, 10/22/2014, 10/27/2015, 10/08/2016, 10/03/2019, 10/10/2024    Influenza, seasonal, injectable 10/20/2012, 10/30/2018    Pneumococcal Conjugate 13-Valent 04/24/2015    Pneumococcal Conjugate Vaccine 20-valent (Pcv20), Polysace 10/10/2024    Pneumococcal Polysaccharide PPV23 09/07/2012    Respiratory Syncytial Virus Vaccine (Recombinant, Adjuvanted) 12/11/2023    Zoster Vaccine Recombinant 10/03/2019, 12/11/2019     Objective     /66 (BP Location: Left arm, Patient Position: Sitting, Cuff Size: Standard)   Pulse 59   Ht 5' 11\" (1.803 m)   Wt 102 kg (224 lb)   SpO2 92%   BMI 31.24 kg/m²     Physical Exam  Constitutional:       General: He is not in acute distress.     Appearance: He is ill-appearing.   Cardiovascular:      Rate and Rhythm: " Regular rhythm.      Heart sounds: Normal heart sounds.   Pulmonary:      Breath sounds: Normal breath sounds.   Abdominal:      Tenderness: There is no abdominal tenderness.   Musculoskeletal:      Right lower leg: Edema present.      Left lower leg: Edema present.   Neurological:      Gait: Gait abnormal.

## 2024-10-14 ENCOUNTER — CLINICAL SUPPORT (OUTPATIENT)
Dept: PULMONOLOGY | Facility: CLINIC | Age: 84
End: 2024-10-14
Payer: MEDICARE

## 2024-10-14 DIAGNOSIS — J44.9 CHRONIC OBSTRUCTIVE PULMONARY DISEASE, UNSPECIFIED COPD TYPE (HCC): Primary | ICD-10-CM

## 2024-10-14 PROCEDURE — G0239 OTH RESP PROC, GROUP: HCPCS

## 2024-10-16 ENCOUNTER — CLINICAL SUPPORT (OUTPATIENT)
Dept: PULMONOLOGY | Facility: CLINIC | Age: 84
End: 2024-10-16
Payer: MEDICARE

## 2024-10-16 DIAGNOSIS — J44.9 CHRONIC OBSTRUCTIVE PULMONARY DISEASE, UNSPECIFIED COPD TYPE (HCC): Primary | ICD-10-CM

## 2024-10-16 PROCEDURE — G0239 OTH RESP PROC, GROUP: HCPCS

## 2024-10-17 ENCOUNTER — TELEPHONE (OUTPATIENT)
Dept: HEMATOLOGY ONCOLOGY | Facility: CLINIC | Age: 84
End: 2024-10-17

## 2024-10-17 ENCOUNTER — OFFICE VISIT (OUTPATIENT)
Dept: HEMATOLOGY ONCOLOGY | Facility: CLINIC | Age: 84
End: 2024-10-17
Payer: MEDICARE

## 2024-10-17 VITALS
WEIGHT: 225 LBS | DIASTOLIC BLOOD PRESSURE: 66 MMHG | BODY MASS INDEX: 31.5 KG/M2 | HEART RATE: 66 BPM | RESPIRATION RATE: 17 BRPM | HEIGHT: 71 IN | TEMPERATURE: 98.3 F | SYSTOLIC BLOOD PRESSURE: 116 MMHG | OXYGEN SATURATION: 91 %

## 2024-10-17 DIAGNOSIS — D69.6 THROMBOCYTOPENIA (HCC): Primary | ICD-10-CM

## 2024-10-17 DIAGNOSIS — D50.8 IRON DEFICIENCY ANEMIA SECONDARY TO INADEQUATE DIETARY IRON INTAKE: ICD-10-CM

## 2024-10-17 DIAGNOSIS — D64.9 ANEMIA, UNSPECIFIED TYPE: ICD-10-CM

## 2024-10-17 PROBLEM — D50.9 IRON DEFICIENCY ANEMIA, UNSPECIFIED: Status: ACTIVE | Noted: 2024-10-17

## 2024-10-17 PROCEDURE — 99203 OFFICE O/P NEW LOW 30 MIN: CPT | Performed by: PHYSICIAN ASSISTANT

## 2024-10-17 RX ORDER — SODIUM CHLORIDE 9 MG/ML
20 INJECTION, SOLUTION INTRAVENOUS ONCE
OUTPATIENT
Start: 2024-10-23

## 2024-10-17 NOTE — PROGRESS NOTES
Oncology Outpatient Consult Note  Oscar Espinosa 84 y.o. male MRN: @ Encounter: [id number]        Date:  10/17/2024      Assessment/ Plan:      Anemia.    Hemoglobin   13.3  8/2023=>   11.2  2/2024 =>   10.3  4/2024 =>   8       7/6/2024 7/2024 no monoclonal band on SPEP.  Normal B12, folate, reticulocyte, haptoglobin.  No LDH elevation      2.  Iron deficiency.  Iron saturation 8% June 2024, (9% 4/24) Ferritin normal but suspect related to inflammation during his hospitalization.    9/2021 colonoscopy at [facility]-diverticulosis, polyps  Oral iron can be constipating.    Discussed possibility of IV iron replacement to see if this helps improve his hemoglobin.  Potential side effects of IV iron could include but may not be limited to:  change in taste, diarrhea, muscle cramps, nausea or vomiting, pain in the arms or legs, pain or burning sensation in the injection site, allergic reaction.  The patient verbalized understanding and wishes to proceed.    Feraheme x 1 dose requested    3.  CKD.  Baseline creatinine 1;  EGFR 50- 60s since 10/2021    4.  Mild thrombocytopenia 130s to 140s in July and October 2024.  (Previously normal)    Follow-up in 4 to 6 weeks with labs    Orders Placed This Encounter   Procedures    CBC and differential    Comprehensive metabolic panel    Kappa/Lambda Light Chains Free With Ratio, Serum    IgG, IgA, IgM    Erythropoietin    Leukemia/Lymphoma flow cytometry          HPI:  Oscar Espinosa ' Aly'  is a 84 y.o. seen for initial consultation 10/17/2024 at the referral of Reyes, Lea, MD regarding anemia.    Past medical history AAA, history of tobacco use 2 packs/day, quitting around 2014, CHF, prediabetes, atrial fibrillation on Coumadin, CAD, COPD, prediabetes, HTN, hyperlipidemia, pulmonary hypertension, recurrent nonmalignant pleural effusion, subclinical hypothyroidism, lumbar radiculopathy, chronic pain syndrome  2003-  anemia s/p heart surgery; chronic microcytic scopic  hematuria.  He is undergoing workup in the past including prostate biopsy    9/2021 colonoscopy at [facility]-diverticulosis, polyps    August 17, 2023 hemoglobin 13.3, , white blood cell count 5, 64% segs, 18% lymphocytes, 13% monocytes, platelet count 150    2/20/24 hemoglobin 11.2    Admitted 4/26 to 5/2/2024 secondary to progressive shortness of breath CTA identified left-sided pleural effusion.  Thoracentesis removed 600 cc of fluid.  No malignancy.  He underwent diuresis.  He was discharged on 2 L of O2  4/26/24 hemoglobin 10.3, iron saturation 9%, ferritin 116    Admitted June 18 with acute hypoxic respiratory failure.  Recurrent pleural effusion.  No malignancy.   chest tube placed  Hemoglobin 8.4 on admission  6/19/24 iron saturation 8%, ferritin 139      Admitted July 2024 due to hypotension and lightheadedness  7/6/24 hemoglobin 8, MCV 86, creatinine 1.32, total protein 6.9, albumin 2.9, normal calcium alk phos 112      7/9/24 B12 618, normal MMA, folate 13.8, haptoglobin 194, , retic 2.28, no monoclonal gammopathy on SPEP    10/1/24 hemoglobin 8.2, white blood cell count 4.49, platelets 133 (no diff)    BUN 26, creatinine 1.5 on BMP.  Normal calcium 8.8    Denies any melena, hematochezia, hematuria, epistaxis or other source of bleeding.    Review of Systems   Constitutional:  Positive for fatigue. Negative for appetite change, chills, diaphoresis, fever and unexpected weight change.   HENT:   Negative for mouth sores, nosebleeds, sore throat, tinnitus and voice change.    Eyes:  Negative for eye problems.   Respiratory:  Positive for shortness of breath. Negative for chest tightness, cough and wheezing.         On nasal O2   Cardiovascular:  Negative for chest pain, leg swelling and palpitations.   Gastrointestinal:  Negative for abdominal distention, abdominal pain, blood in stool, constipation, diarrhea, nausea, rectal pain and vomiting.   Endocrine: Negative for hot flashes.   Genitourinary:  Negative.     Musculoskeletal:  Negative for gait problem and myalgias.   Skin:  Negative for itching and rash.   Neurological:  Negative for dizziness, gait problem, headaches, light-headedness and numbness.   Hematological:  Negative for adenopathy.   Psychiatric/Behavioral:  Negative for confusion and sleep disturbance. The patient is not nervous/anxious.         Test Results:      Labs:   Lab Results   Component Value Date    HGB 8.2 (L) 10/01/2024    HCT 27.7 (L) 10/01/2024    MCV 96 10/01/2024     (L) 10/01/2024    WBC 4.49 10/01/2024    NRBC 0 07/09/2024     Lab Results   Component Value Date     (L) 10/15/2015    K 5.0 10/01/2024    CL 97 10/01/2024    CO2 33 (H) 10/01/2024    ANIONGAP 4 10/15/2015    BUN 26 (H) 10/01/2024    CREATININE 1.55 (H) 10/01/2024    GLUCOSE 98 10/15/2015    GLUF 94 10/01/2024    CALCIUM 8.8 10/01/2024    CORRECTEDCA 9.3 07/06/2024    AST 21 07/06/2024    ALT 20 07/06/2024    ALKPHOS 112 (H) 07/06/2024    PROT 7.3 10/15/2015    BILITOT 0.79 10/15/2015    EGFR 40 10/01/2024           Imaging: .No results found.          Active Problems:   Patient Active Problem List   Diagnosis    Benign essential hypertension    Hyperlipidemia    Impaired fasting glucose    Microscopic hematuria    Severe obesity (BMI 35.0-35.9 with comorbidity) (HCC)    Severe obstructive sleep apnea    Subclinical hypothyroidism    CAD (coronary artery disease)    Abdominal aortic aneurysm without rupture (HCC)    Aortic valve disorder    Lumbar radiculopathy    Chronic pain syndrome    Multiple pulmonary nodules    Chronic obstructive pulmonary disease (HCC)    Moderate to severe pulmonary hypertension (HCC)    Nocturnal hypoxemia    Ascending aortic aneurysm (HCC)    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)    Myofascial pain syndrome    Paroxysmal atrial fibrillation (HCC)    Venous insufficiency of both lower extremities    Bilateral carotid artery stenosis    Hypertensive heart and  chronic kidney disease with heart failure and stage 1 through stage 4 chronic kidney disease, or chronic kidney disease (HCC)    Pleural effusion with shortness of breath    Anemia    Acute hypoxic respiratory failure (HCC)    Chronic combined systolic and diastolic congestive heart failure (HCC)    Prediabetes    Hypotension    Chronic obstructive pulmonary disease, unspecified COPD type (HCC)    Chronic respiratory failure with hypoxia (HCC)       Past Medical History:   Past Medical History:   Diagnosis Date    Abdominal aortic aneurysm ([facility])     s/p repair    Abnormal ECG july 2022    Acute on chronic congestive heart failure ([facility]) 09/11/2019    Acute respiratory failure with hypoxia ([facility]) 05/06/2024    Aneurysm ([facility]) 2007    Aneurysm of abdominal aorta ([facility])     49mm, trileaflet AV    Atrial fibrillation ([facility])     Eliquis    BPH (benign prostatic hyperplasia)     CAD (coronary artery disease)     s/p CABGx3    CHF (congestive heart failure) ([facility])     Chronic pain     Gabapentin    Chronic pain disorder     lumbar    COPD (chronic obstructive pulmonary disease) ([facility])     Coronary artery disease     Elevated serum creatinine 06/18/2024    Elevated serum creatinine 06/18/2024    Elevated transaminase level 04/27/2024    Former tobacco use     Hyperlipidemia     Hypertension     Hyponatremia 04/27/2024    Hyponatremia 04/27/2024    Hypothyroidism     Lumbar radiculopathy     Lumbar stenosis     s/p injections    Neuropathy     Obesity (BMI 30-39.9)     YEVGENIY (obstructive sleep apnea)     unable to tolerate CPAP    Platelets decreased ([facility]) 07/27/2022    Pulmonary hypertension ([facility])     moderate to severe    Spondylolisthesis of lumbar region     Visual impairment 2022    Vitamin D deficiency        Surgical History:   Past Surgical History:   Procedure Laterality Date    ABDOMINAL AORTIC ANEURYSM REPAIR  08/09/2007    2 dock limbs with visc extens prosth    CARDIAC CATHETERIZATION      COLONOSCOPY      CORONARY  ARTERY BYPASS GRAFT  2003    LIMA to LAD, sequential SVG to OM1 & OM2, SVG to RDA    IR CHEST TUBE PLACEMENT  2024    LUMBAR EPIDURAL INJECTION         Family History:    Family History   Problem Relation Age of Onset    Heart disease Mother     Stroke Father         stroke syndrome    Aneurysm Brother         abdominal    Aortic aneurysm Brother         ascending    Coronary artery disease Family     Hypertension Family     Other Son         gioblastoma multiforme       Cancer-related family history is not on file.    Social History:   Social History     Socioeconomic History    Marital status: /Civil Union     Spouse name: Not on file    Number of children: Not on file    Years of education: Not on file    Highest education level: Not on file   Occupational History    Occupation: retired   Tobacco Use    Smoking status: Former     Current packs/day: 0.00     Average packs/day: 1 pack/day for 55.6 years (55.6 ttl pk-yrs)     Types: Cigarettes     Start date: 1957     Quit date: 2012     Years since quittin.2     Passive exposure: Past    Smokeless tobacco: Never   Vaping Use    Vaping status: Never Used   Substance and Sexual Activity    Alcohol use: Not Currently    Drug use: No    Sexual activity: Never   Other Topics Concern    Not on file   Social History Narrative    Not on file     Social Determinants of Health     Financial Resource Strain: Low Risk  (3/7/2024)    Overall Financial Resource Strain (CARDIA)     Difficulty of Paying Living Expenses: Not hard at all   Food Insecurity: No Food Insecurity (2024)    Hunger Vital Sign     Worried About Running Out of Food in the Last Year: Never true     Ran Out of Food in the Last Year: Never true   Transportation Needs: No Transportation Needs (2024)    PRAPARE - Transportation     Lack of Transportation (Medical): No     Lack of Transportation (Non-Medical): No   Physical Activity: Not on file   Stress: Not on file   Social  Connections: Not on file   Intimate Partner Violence: Not on file   Housing Stability: Low Risk  (7/8/2024)    Housing Stability Vital Sign     Unable to Pay for Housing in the Last Year: No     Number of Times Moved in the Last Year: 0     Homeless in the Last Year: No       Current Medications:   Current Outpatient Medications   Medication Sig Dispense Refill    acetaminophen (TYLENOL) 325 mg tablet Take 2 tablets by mouth every 6 (six) hours as needed for fever, headaches, mild pain, moderate pain or severe pain. Indications: Fever, Pain      albuterol (ProAir HFA) 90 mcg/act inhaler Inhale 2 puffs every 6 (six) hours as needed for wheezing 24 g 0    amiodarone 200 mg tablet Take 1 tablet (200 mg total) by mouth daily 90 tablet 3    aspirin (ECOTRIN LOW STRENGTH) 81 mg EC tablet Take 1 tablet by mouth daily      atorvastatin (LIPITOR) 40 mg tablet Take 1 tablet (40 mg total) by mouth daily 90 tablet 3    cholecalciferol (VITAMIN D3) 1,000 units tablet Take 2,000 Units by mouth daily      FIBER ADULT GUMMIES PO Take 1 caplet by mouth daily  (Patient not taking: Reported on 9/13/2024)      Fluticasone-Salmeterol (Advair) 100-50 mcg/dose inhaler INHALE 1 PUFF BY MOUTH TWICE DAILY. RINSE MOUTH AFTER  blister 1    labetalol (NORMODYNE) 100 mg tablet Take 0.5 tablets (50 mg total) by mouth in the morning 30 tablet 1    multivitamin (THERAGRAN) TABS Take 1 tablet by mouth daily      oxyCODONE (Roxicodone) 5 immediate release tablet Take 1 tablet (5 mg total) by mouth every 6 (six) hours as needed for severe pain Max Daily Amount: 20 mg 30 tablet 0    oxygen gas Inhale 2 L continuous. 2L of oxygen via nasal cannula as needed and at night  Indications: short of breath      polyethylene glycol (MIRALAX) 17 g packet Take 17 g by mouth daily as needed (constipation). Oscar Espinosa  dissolve in 8 oz liquid of choice   Indications: .      potassium chloride (Klor-Con M20) 20 mEq tablet TAKE 1 TABLET BY MOUTH DAILY 7  " DAYS WEEKLY 90 tablet 1    senna (SENOKOT) 8.6 MG tablet Take 1 tablet by mouth as needed for constipation      tamsulosin (FLOMAX) 0.4 mg Take 1 capsule by mouth daily      torsemide (DEMADEX) 20 mg tablet Take 1 (20mg) tablet on Tuesday, Thursday, Saturday, Sunday, and 2 tablets (40 mg total) on Monday, Wednesday, and Friday 180 tablet 0    warfarin (Coumadin) 2.5 mg tablet Take 1 tab by moth daily or as directed by physician 90 tablet 3     No current facility-administered medications for this visit.       Allergies:   Allergies   Allergen Reactions    Meloxicam Edema         Physical Exam:    There is no height or weight on file to calculate BSA.   Ht Readings from Last 3 Encounters:   10/10/24 5' 11\" (1.803 m)   07/18/24 5' 11\" (1.803 m)   07/12/24 5' 11\" (1.803 m)       Wt Readings from Last 3 Encounters:   10/10/24 102 kg (224 lb)   09/13/24 97.8 kg (215 lb 9.6 oz)   07/19/24 101 kg (222 lb 3.2 oz)        Temp Readings from Last 3 Encounters:   07/30/24 98 °F (36.7 °C) (Temporal)   07/25/24 98 °F (36.7 °C) (Temporal)   07/23/24 98 °F (36.7 °C) (Temporal)        BP Readings from Last 3 Encounters:   10/10/24 104/66   09/13/24 110/60   08/09/24 98/52         Pulse Readings from Last 3 Encounters:   10/10/24 59   09/13/24 59   08/09/24 58          Physical Exam    Physical Exam  Vitals reviewed.   Constitutional:       General: He is not in acute distress.     Appearance: He is well-developed. He is ill-appearing (chronic). He is not diaphoretic.   HENT:      Head: Normocephalic and atraumatic.   Eyes:      Conjunctiva/sclera: Conjunctivae normal.   Neck:      Trachea: No tracheal deviation.   Cardiovascular:      Rate and Rhythm: Normal rate and regular rhythm.      Heart sounds: No murmur heard.     No friction rub. No gallop.   Pulmonary:      Effort: Pulmonary effort is normal. No respiratory distress.      Breath sounds: No wheezing or rales.      Comments: On nasal O2  Chest:      Chest wall: No " tenderness.   Abdominal:      General: There is no distension.      Palpations: Abdomen is soft.      Tenderness: There is no abdominal tenderness.   Musculoskeletal:      Cervical back: Normal range of motion and neck supple.   Lymphadenopathy:      Cervical: No cervical adenopathy.   Skin:     General: Skin is warm and dry.      Coloration: Skin is not pale.      Findings: No erythema.   Neurological:      General: No focal deficit present.      Mental Status: He is alert. Mental status is at baseline.   Psychiatric:         Behavior: Behavior normal.         Thought Content: Thought content normal.             Emergency Contacts:    Extended Emergency Contact Information  Primary Emergency Contact: Dorcas Espinosa  Address: [address] [place] [id number] [facility]  Home Phone: [phone]  Mobile Phone: [phone]  Relation: Spouse

## 2024-10-21 ENCOUNTER — CLINICAL SUPPORT (OUTPATIENT)
Dept: PULMONOLOGY | Facility: CLINIC | Age: 84
End: 2024-10-21
Payer: MEDICARE

## 2024-10-21 DIAGNOSIS — I50.32 CHRONIC DIASTOLIC CONGESTIVE HEART FAILURE (HCC): ICD-10-CM

## 2024-10-21 DIAGNOSIS — J44.9 CHRONIC OBSTRUCTIVE PULMONARY DISEASE, UNSPECIFIED COPD TYPE (HCC): Primary | ICD-10-CM

## 2024-10-21 PROCEDURE — G0239 OTH RESP PROC, GROUP: HCPCS

## 2024-10-22 ENCOUNTER — APPOINTMENT (EMERGENCY)
Dept: CT IMAGING | Facility: HOSPITAL | Age: 84
DRG: 197 | End: 2024-10-22
Payer: MEDICARE

## 2024-10-22 ENCOUNTER — APPOINTMENT (EMERGENCY)
Dept: RADIOLOGY | Facility: HOSPITAL | Age: 84
DRG: 197 | End: 2024-10-22
Payer: MEDICARE

## 2024-10-22 ENCOUNTER — LAB (OUTPATIENT)
Dept: LAB | Facility: CLINIC | Age: 84
End: 2024-10-22
Payer: MEDICARE

## 2024-10-22 ENCOUNTER — ANTICOAG VISIT (OUTPATIENT)
Dept: CARDIOLOGY CLINIC | Facility: CLINIC | Age: 84
End: 2024-10-22

## 2024-10-22 ENCOUNTER — HOSPITAL ENCOUNTER (INPATIENT)
Facility: HOSPITAL | Age: 84
LOS: 3 days | Discharge: HOME | DRG: 197 | End: 2024-10-25
Attending: EMERGENCY MEDICINE | Admitting: INTERNAL MEDICINE
Payer: MEDICARE

## 2024-10-22 ENCOUNTER — NURSE TRIAGE (OUTPATIENT)
Age: 84
End: 2024-10-22

## 2024-10-22 DIAGNOSIS — I50.9 CHF EXACERBATION (HCC): Primary | ICD-10-CM

## 2024-10-22 DIAGNOSIS — J18.9 PNEUMONIA: ICD-10-CM

## 2024-10-22 DIAGNOSIS — I48.0 PAROXYSMAL ATRIAL FIBRILLATION (HCC): ICD-10-CM

## 2024-10-22 DIAGNOSIS — R04.2 HEMOPTYSIS: ICD-10-CM

## 2024-10-22 DIAGNOSIS — J44.9 COPD (CHRONIC OBSTRUCTIVE PULMONARY DISEASE) (HCC): ICD-10-CM

## 2024-10-22 DIAGNOSIS — J96.11 CHRONIC HYPOXIC RESPIRATORY FAILURE, ON HOME OXYGEN THERAPY  (HCC): ICD-10-CM

## 2024-10-22 DIAGNOSIS — I48.21 PERMANENT ATRIAL FIBRILLATION (HCC): ICD-10-CM

## 2024-10-22 DIAGNOSIS — Z99.81 CHRONIC HYPOXIC RESPIRATORY FAILURE, ON HOME OXYGEN THERAPY  (HCC): ICD-10-CM

## 2024-10-22 DIAGNOSIS — I50.42 CHRONIC COMBINED SYSTOLIC AND DIASTOLIC CONGESTIVE HEART FAILURE (HCC): ICD-10-CM

## 2024-10-22 DIAGNOSIS — I48.0 PAROXYSMAL ATRIAL FIBRILLATION (HCC): Primary | ICD-10-CM

## 2024-10-22 DIAGNOSIS — J90 PLEURAL EFFUSION: ICD-10-CM

## 2024-10-22 DIAGNOSIS — R06.02 SOB (SHORTNESS OF BREATH): ICD-10-CM

## 2024-10-22 DIAGNOSIS — J98.8 RESPIRATORY TRACT INFECTION: ICD-10-CM

## 2024-10-22 DIAGNOSIS — J44.9 CHRONIC OBSTRUCTIVE PULMONARY DISEASE, UNSPECIFIED COPD TYPE (HCC): ICD-10-CM

## 2024-10-22 LAB
2HR DELTA HS TROPONIN: -1 NG/L
ALBUMIN SERPL BCG-MCNC: 3.7 G/DL (ref 3.5–5)
ALP SERPL-CCNC: 87 U/L (ref 34–104)
ALT SERPL W P-5'-P-CCNC: 24 U/L (ref 7–52)
ANION GAP SERPL CALCULATED.3IONS-SCNC: 7 MMOL/L (ref 4–13)
AST SERPL W P-5'-P-CCNC: 32 U/L (ref 13–39)
BASOPHILS # BLD AUTO: 0.03 THOUSANDS/ΜL (ref 0–0.1)
BASOPHILS NFR BLD AUTO: 1 % (ref 0–1)
BILIRUB SERPL-MCNC: 1.09 MG/DL (ref 0.2–1)
BNP SERPL-MCNC: 589 PG/ML (ref 0–100)
BUN SERPL-MCNC: 23 MG/DL (ref 5–25)
CALCIUM SERPL-MCNC: 9.1 MG/DL (ref 8.4–10.2)
CARDIAC TROPONIN I PNL SERPL HS: 12 NG/L
CARDIAC TROPONIN I PNL SERPL HS: 13 NG/L
CHLORIDE SERPL-SCNC: 97 MMOL/L (ref 96–108)
CO2 SERPL-SCNC: 33 MMOL/L (ref 21–32)
CREAT SERPL-MCNC: 1.54 MG/DL (ref 0.6–1.3)
EOSINOPHIL # BLD AUTO: 0.05 THOUSAND/ΜL (ref 0–0.61)
EOSINOPHIL NFR BLD AUTO: 1 % (ref 0–6)
ERYTHROCYTE [DISTWIDTH] IN BLOOD BY AUTOMATED COUNT: 17.9 % (ref 11.6–15.1)
FLUAV AG UPPER RESP QL IA.RAPID: NEGATIVE
FLUBV AG UPPER RESP QL IA.RAPID: NEGATIVE
GFR SERPL CREATININE-BSD FRML MDRD: 41 ML/MIN/1.73SQ M
GLUCOSE SERPL-MCNC: 89 MG/DL (ref 65–140)
HCT VFR BLD AUTO: 27 % (ref 36.5–49.3)
HGB BLD-MCNC: 8.2 G/DL (ref 12–17)
IMM GRANULOCYTES # BLD AUTO: 0.03 THOUSAND/UL (ref 0–0.2)
IMM GRANULOCYTES NFR BLD AUTO: 1 % (ref 0–2)
INR PPP: 3.25 (ref 0.85–1.19)
LYMPHOCYTES # BLD AUTO: 0.49 THOUSANDS/ΜL (ref 0.6–4.47)
LYMPHOCYTES NFR BLD AUTO: 11 % (ref 14–44)
MCH RBC QN AUTO: 28.5 PG (ref 26.8–34.3)
MCHC RBC AUTO-ENTMCNC: 30.4 G/DL (ref 31.4–37.4)
MCV RBC AUTO: 94 FL (ref 82–98)
MONOCYTES # BLD AUTO: 0.52 THOUSAND/ΜL (ref 0.17–1.22)
MONOCYTES NFR BLD AUTO: 11 % (ref 4–12)
NEUTROPHILS # BLD AUTO: 3.43 THOUSANDS/ΜL (ref 1.85–7.62)
NEUTS SEG NFR BLD AUTO: 75 % (ref 43–75)
NRBC BLD AUTO-RTO: 0 /100 WBCS
PLATELET # BLD AUTO: 128 THOUSANDS/UL (ref 149–390)
PMV BLD AUTO: 11.6 FL (ref 8.9–12.7)
POTASSIUM SERPL-SCNC: 4.6 MMOL/L (ref 3.5–5.3)
PROT SERPL-MCNC: 7.2 G/DL (ref 6.4–8.4)
PROTHROMBIN TIME: 32.8 SECONDS (ref 12.3–15)
RBC # BLD AUTO: 2.88 MILLION/UL (ref 3.88–5.62)
SARS-COV+SARS-COV-2 AG RESP QL IA.RAPID: NEGATIVE
SODIUM SERPL-SCNC: 137 MMOL/L (ref 135–147)
WBC # BLD AUTO: 4.55 THOUSAND/UL (ref 4.31–10.16)

## 2024-10-22 PROCEDURE — 87804 INFLUENZA ASSAY W/OPTIC: CPT

## 2024-10-22 PROCEDURE — 71045 X-RAY EXAM CHEST 1 VIEW: CPT

## 2024-10-22 PROCEDURE — 99285 EMERGENCY DEPT VISIT HI MDM: CPT

## 2024-10-22 PROCEDURE — 96374 THER/PROPH/DIAG INJ IV PUSH: CPT

## 2024-10-22 PROCEDURE — 84484 ASSAY OF TROPONIN QUANT: CPT | Performed by: EMERGENCY MEDICINE

## 2024-10-22 PROCEDURE — 84443 ASSAY THYROID STIM HORMONE: CPT | Performed by: NURSE PRACTITIONER

## 2024-10-22 PROCEDURE — 85610 PROTHROMBIN TIME: CPT

## 2024-10-22 PROCEDURE — 87811 SARS-COV-2 COVID19 W/OPTIC: CPT

## 2024-10-22 PROCEDURE — 99223 1ST HOSP IP/OBS HIGH 75: CPT | Performed by: NURSE PRACTITIONER

## 2024-10-22 PROCEDURE — 93005 ELECTROCARDIOGRAM TRACING: CPT

## 2024-10-22 PROCEDURE — 36415 COLL VENOUS BLD VENIPUNCTURE: CPT

## 2024-10-22 PROCEDURE — 83880 ASSAY OF NATRIURETIC PEPTIDE: CPT

## 2024-10-22 PROCEDURE — 85025 COMPLETE CBC W/AUTO DIFF WBC: CPT | Performed by: EMERGENCY MEDICINE

## 2024-10-22 PROCEDURE — 84439 ASSAY OF FREE THYROXINE: CPT | Performed by: NURSE PRACTITIONER

## 2024-10-22 PROCEDURE — 84145 PROCALCITONIN (PCT): CPT | Performed by: NURSE PRACTITIONER

## 2024-10-22 PROCEDURE — 80053 COMPREHEN METABOLIC PANEL: CPT | Performed by: EMERGENCY MEDICINE

## 2024-10-22 PROCEDURE — 71250 CT THORAX DX C-: CPT

## 2024-10-22 RX ORDER — ACETAMINOPHEN 325 MG/1
650 TABLET ORAL EVERY 6 HOURS PRN
Status: DISCONTINUED | OUTPATIENT
Start: 2024-10-22 | End: 2024-10-25 | Stop reason: HOSPADM

## 2024-10-22 RX ORDER — SENNOSIDES 8.6 MG
1 TABLET ORAL
Status: DISCONTINUED | OUTPATIENT
Start: 2024-10-22 | End: 2024-10-25 | Stop reason: HOSPADM

## 2024-10-22 RX ORDER — TORSEMIDE 20 MG/1
TABLET ORAL
Qty: 180 TABLET | Refills: 3 | Status: SHIPPED | OUTPATIENT
Start: 2024-10-22

## 2024-10-22 RX ORDER — LABETALOL 100 MG/1
50 TABLET, FILM COATED ORAL DAILY
Status: DISCONTINUED | OUTPATIENT
Start: 2024-10-23 | End: 2024-10-25 | Stop reason: HOSPADM

## 2024-10-22 RX ORDER — ALBUTEROL SULFATE 0.83 MG/ML
2.5 SOLUTION RESPIRATORY (INHALATION) EVERY 4 HOURS PRN
Status: DISCONTINUED | OUTPATIENT
Start: 2024-10-22 | End: 2024-10-25 | Stop reason: HOSPADM

## 2024-10-22 RX ORDER — TORSEMIDE 20 MG/1
40 TABLET ORAL DAILY
Status: DISCONTINUED | OUTPATIENT
Start: 2024-10-23 | End: 2024-10-25 | Stop reason: HOSPADM

## 2024-10-22 RX ORDER — GABAPENTIN 300 MG/1
300 CAPSULE ORAL
COMMUNITY

## 2024-10-22 RX ORDER — ATORVASTATIN CALCIUM 40 MG/1
40 TABLET, FILM COATED ORAL DAILY
Status: DISCONTINUED | OUTPATIENT
Start: 2024-10-23 | End: 2024-10-25 | Stop reason: HOSPADM

## 2024-10-22 RX ORDER — AMIODARONE HYDROCHLORIDE 200 MG/1
200 TABLET ORAL DAILY
Status: DISCONTINUED | OUTPATIENT
Start: 2024-10-23 | End: 2024-10-25 | Stop reason: HOSPADM

## 2024-10-22 RX ORDER — TAMSULOSIN HYDROCHLORIDE 0.4 MG/1
0.4 CAPSULE ORAL
Status: DISCONTINUED | OUTPATIENT
Start: 2024-10-23 | End: 2024-10-25 | Stop reason: HOSPADM

## 2024-10-22 RX ORDER — OXYCODONE HYDROCHLORIDE 5 MG/1
5 TABLET ORAL EVERY 6 HOURS PRN
Status: DISCONTINUED | OUTPATIENT
Start: 2024-10-22 | End: 2024-10-25 | Stop reason: HOSPADM

## 2024-10-22 RX ORDER — GABAPENTIN 300 MG/1
300 CAPSULE ORAL
Status: DISCONTINUED | OUTPATIENT
Start: 2024-10-23 | End: 2024-10-25 | Stop reason: HOSPADM

## 2024-10-22 RX ORDER — AZITHROMYCIN 250 MG/1
500 TABLET, FILM COATED ORAL EVERY 24 HOURS
Status: DISCONTINUED | OUTPATIENT
Start: 2024-10-23 | End: 2024-10-25 | Stop reason: HOSPADM

## 2024-10-22 RX ORDER — FUROSEMIDE 10 MG/ML
40 INJECTION INTRAMUSCULAR; INTRAVENOUS ONCE
Status: COMPLETED | OUTPATIENT
Start: 2024-10-22 | End: 2024-10-22

## 2024-10-22 RX ORDER — FLUTICASONE FUROATE AND VILANTEROL 100; 25 UG/1; UG/1
1 POWDER RESPIRATORY (INHALATION)
Status: DISCONTINUED | OUTPATIENT
Start: 2024-10-23 | End: 2024-10-25 | Stop reason: HOSPADM

## 2024-10-22 RX ORDER — POLYETHYLENE GLYCOL 3350 17 G/17G
17 POWDER, FOR SOLUTION ORAL
Status: DISCONTINUED | OUTPATIENT
Start: 2024-10-23 | End: 2024-10-25 | Stop reason: HOSPADM

## 2024-10-22 RX ADMIN — FUROSEMIDE 40 MG: 10 INJECTION, SOLUTION INTRAMUSCULAR; INTRAVENOUS at 22:44

## 2024-10-22 NOTE — TELEPHONE ENCOUNTER
Patient wife calls stating the patient started spitting up blood yesterday.  This has happen 3-4 times now.  It is about a tablespoon in amount and maroon colored.   Patient also experiencing an increase in SOB. Denies fevers, coughing, cold symptoms, chest pain, blood in urine or stool.    Per protocol patient needs evaluation within 4 hours.  No appointments available until 10 /29 . I recommended ER evaluation but patient declines and would like the doctor to review and advise or an appointment with Dr Reyes.

## 2024-10-22 NOTE — ED PROVIDER NOTES
"Time reflects when diagnosis was documented in both MDM as applicable and the Disposition within this note       Time User Action Codes Description Comment    10/22/2024 10:55 PM Iam Johnson Add [I50.9] CHF exacerbation (HCC)     10/22/2024 10:55 PM Iam Johnson Add [R06.02] SOB (shortness of breath)     10/22/2024 10:56 PM Iam Johnson Add [R04.2] Hemoptysis     10/22/2024 10:56 PM Iam Johnson Add [J90] Pleural effusion     10/22/2024 10:56 PM Iam Johnson Add [J44.9] COPD (chronic obstructive pulmonary disease) (HCC)           ED Disposition       ED Disposition   Admit    Condition   Stable    Date/Time   Tue Oct 22, 2024 10:55 PM    Comment   Case was discussed with [facility] and the patient's admission status was agreed to be Admission Status: inpatient status to the service of Dr. Contreras.               Assessment & Plan       Medical Decision Making  84-year-old male presents to ED for evaluation of hemoptysis, shortness of breath, leg swelling as seen in HPI.  On physical examination patient vital signs stable.  Nontachycardic.  Nonhypoxic.  Afebrile.  Normotensive.  On 2 L nasal cannula.  This is patient's baseline.  Alert and responding to questions appropriately.  Rales on lung auscultation.  Nontoxic-appearing.  Bilateral ankle edema.  Given patient's history and presentation obtained workup consisting of CMP, troponin, CBC, BMP, flu/COVID, CT chest without contrast.   Differential diagnosis includes but not limited to CHF exacerbation, COPD exacerbation, pneumonia, MI, ACS, pleural effusion    CMP without concerning values compared to baseline.  Troponin of 13 NG/L at 0 hours.  2-hour troponin of 12 NG/L.  CBC without leukocytosis.  Maintained hemoglobin of 8.2.  Lower suspicion for active hemorrhaging.  BNP elevated at 589.  Negative flu/COVID swab.  Noncon chest CT returned demonstrating the following:   \"Cardiomegaly. Coronary atherosclerosis, status post CABG.  Pleural effusions with " "prominence of the interstitial markings, especially in the lower lung fields, as described; consider a component of fluid overload/CHF. Some scarring is noted in the periphery. An interstitial pneumonitis such as NSIP is also a differential consideration. Patchy opacities in the lower lung fields could represent edema, atelectasis, and/or infection. Correlation with the patient symptoms and laboratory values recommended. Low-density of the blood pool noted, suspicious for anemia.\"     Discussed results with patient.  Explained results of imaging.  Have low suspicion for pneumonia.  Patient afebrile, no leukocytosis, no nasal congestion, no sore throat, no bodyaches, no chills.  Patient presentation and lab results more suggestive of CHF exacerbation versus COPD exacerbation versus pleural effusion.  Providing dose of IV Lasix.  Admitting to [facility] for further evaluation and treatment.  Patient agreeable to admission.  Patient accepted by [facility].    Amount and/or Complexity of Data Reviewed  Labs: ordered.  Radiology: ordered.    Risk  Prescription drug management.  Decision regarding hospitalization.             Medications   furosemide (LASIX) injection 40 mg (40 mg Intravenous Given 10/22/24 2244)       ED Risk Strat Scores                           SBIRT 22yo+      Flowsheet Row Most Recent Value   Initial Alcohol Screen: US AUDIT-C     1. How often do you have a drink containing alcohol? 0 Filed at: 10/22/2024 1753   2. How many drinks containing alcohol do you have on a typical day you are drinking?  0 Filed at: 10/22/2024 1753   3a. Male UNDER 65: How often do you have five or more drinks on one occasion? 0 Filed at: 10/22/2024 1753   3b. FEMALE Any Age, or MALE 65+: How often do you have 4 or more drinks on one occassion? 0 Filed at: 10/22/2024 1753   Audit-C Score 0 Filed at: 10/22/2024 1753   JOHN: How many times in the past year have you...    Used an illegal drug or used a prescription medication for " non-medical reasons? Never Filed at: 10/22/2024 0477                            History of Present Illness       Chief Complaint   Patient presents with    Vomiting     Patient states he has been spitting up blood since yesterday morning. States dark red blood with mucus. +SOB. 2L O2 NC chronic.        Past Medical History:   Diagnosis Date    Abdominal aortic aneurysm (HCC)     s/p repair    Abnormal ECG july 2022    Acute on chronic congestive heart failure ([facility]) 09/11/2019    Acute respiratory failure with hypoxia ([facility]) 05/06/2024    Aneurysm ([facility]ly [facility]) 2007    Aneurysm of abdominal aorta ([facility])     49mm, trileaflet AV    Atrial fibrillation ([facility])     Eliquis    BPH (benign prostatic hyperplasia)     CAD (coronary artery disease)     s/p CABGx3    CHF (congestive heart failure) ([facility])     Chronic pain     Gabapentin    Chronic pain disorder     lumbar    COPD (chronic obstructive pulmonary disease) ([facility])     Coronary artery disease     Elevated serum creatinine 06/18/2024    Elevated serum creatinine 06/18/2024    Elevated transaminase level 04/27/2024    Former tobacco use     Hyperlipidemia     Hypertension     Hyponatremia 04/27/2024    Hyponatremia 04/27/2024    Hypothyroidism     Lumbar radiculopathy     Lumbar stenosis     s/p injections    Neuropathy     Obesity (BMI 30-39.9)     YEVGENIY (obstructive sleep apnea)     unable to tolerate CPAP    Platelets decreased ([facility]) 07/27/2022    Pulmonary hypertension ([facility])     moderate to severe    Spondylolisthesis of lumbar region     Visual impairment 2022    Vitamin D deficiency       Past Surgical History:   Procedure Laterality Date    ABDOMINAL AORTIC ANEURYSM REPAIR  08/09/2007    2 dock limbs with visc extens prosth    CARDIAC CATHETERIZATION      COLONOSCOPY      CORONARY ARTERY BYPASS GRAFT  2003    LIMA to LAD, sequential SVG to OM1 & OM2, SVG to RDA    IR CHEST TUBE PLACEMENT  6/19/2024    LUMBAR EPIDURAL INJECTION        Family History   Problem Relation Age of  Onset    Heart disease Mother     Stroke Father         stroke syndrome    Aneurysm Brother         abdominal    Aortic aneurysm Brother         ascending    Coronary artery disease Family     Hypertension Family     Other Son         gioblastoma multiforme      Social History     Tobacco Use    Smoking status: Former     Current packs/day: 0.00     Average packs/day: 1 pack/day for 55.6 years (55.6 ttl pk-yrs)     Types: Cigarettes     Start date: 1957     Quit date: 2012     Years since quittin.2     Passive exposure: Past    Smokeless tobacco: Never   Vaping Use    Vaping status: Never Used   Substance Use Topics    Alcohol use: Not Currently    Drug use: No      E-Cigarette/Vaping    E-Cigarette Use Never User       E-Cigarette/Vaping Substances    Nicotine No     THC No     CBD No     Flavoring No     Other No     Unknown No       I have reviewed and agree with the history as documented.     85 yo male with history of AFIB, COPD, YEVGENIY, CHF presents to ED for evaluation of shortness of breath, coughing up blood. Patient states that since yesterday he has been producing bloody sputum. Blood is dark mixed with mucus. Also has noticed that the past couple of days he has been developing worsening dyspnea with exertion.  Denies fever, chills, nasal congestion, sore throat, chest pain, abdominal pain, dysuria, hematuria, increased urinary frequency.  Does endorse increased leg swelling past couple days.  Has not been wearing compression stockings due to discomfort.  Patient attributes leg swelling to this.  Takes torsemide on a set schedule on certain days.     Patient reports that in the past several months he has been admitted for pulmonary issues.  He was admitted from  to May 2, 2024 for pleural effusion, pneumonia.  Patient had thoracentesis performed at that time.  600 cc of bloody fluid removed via thoracentesis.  Fluid was exudative.      Patient then presented again on 2024 with  shortness of breath.  Was admitted for 8 days until June 26.  Had a chest tube placed during admission to drain pleural effusion seen on imaging at that time.  Once again treated with antibiotics for pneumonia.     Patient follows up with pulmonology as outpatient.  Current presentation is similar to previous symptoms that led him to admission.             Review of Systems   Constitutional:  Negative for fever.   HENT:  Negative for congestion.    Respiratory:  Positive for shortness of breath. Negative for stridor.         Positive hemoptysis   Cardiovascular:  Positive for leg swelling.   All other systems reviewed and are negative.          Objective       ED Triage Vitals   Temperature Pulse Blood Pressure Respirations SpO2 Patient Position - Orthostatic VS   10/22/24 1759 10/22/24 1753 10/22/24 1753 10/22/24 1753 10/22/24 1753 10/22/24 1753   98.3 °F (36.8 °C) 70 124/75 20 96 % Sitting      Temp Source Heart Rate Source BP Location FiO2 (%) Pain Score    10/22/24 1759 10/22/24 1753 10/22/24 1753 -- 10/22/24 2000    Oral Monitor Right arm  No Pain      Vitals      Date and Time Temp Pulse SpO2 Resp BP Pain Score FACES Pain Rating User   10/22/24 2240 -- 69 95 % -- 133/62 -- -- AN   10/22/24 2223 -- 66 93 % -- 137/64 -- -- AN   10/22/24 2200 -- 65 95 % -- 143/66 -- -- AN   10/22/24 2138 -- 63 96 % 16 134/63 No Pain -- AN   10/22/24 2040 98.2 °F (36.8 °C) 68 92 % 16 123/60 No Pain -- AN   10/22/24 2000 98.5 °F (36.9 °C) 67 96 % 16 154/70 No Pain -- AN   10/22/24 1759 98.3 °F (36.8 °C) -- -- -- -- -- -- RI   10/22/24 1753 -- 70 96 % 20 124/75 -- -- RI            Physical Exam  Vitals and nursing note reviewed.   Constitutional:       General: He is not in acute distress.     Appearance: He is well-developed. He is not toxic-appearing or diaphoretic.   HENT:      Head: Normocephalic and atraumatic.   Eyes:      General: No scleral icterus.        Right eye: No discharge.         Left eye: No discharge.       Extraocular Movements: Extraocular movements intact.      Conjunctiva/sclera: Conjunctivae normal.      Pupils: Pupils are equal, round, and reactive to light.   Cardiovascular:      Rate and Rhythm: Normal rate and regular rhythm.      Pulses: Normal pulses.      Heart sounds: Normal heart sounds. No murmur heard.     No friction rub. No gallop.   Pulmonary:      Effort: Pulmonary effort is normal. No respiratory distress.      Breath sounds: No stridor. Rales present. No wheezing or rhonchi.      Comments: On 2 L nasal cannula.  Abdominal:      Palpations: Abdomen is soft.      Tenderness: There is no abdominal tenderness.   Musculoskeletal:         General: No swelling.      Cervical back: Neck supple.      Right lower leg: Edema present.      Left lower leg: Edema present.   Skin:     General: Skin is warm and dry.      Capillary Refill: Capillary refill takes less than 2 seconds.   Neurological:      General: No focal deficit present.      Mental Status: He is alert and oriented to person, place, and time. Mental status is at baseline.   Psychiatric:         Mood and Affect: Mood normal.         Results Reviewed       Procedure Component Value Units Date/Time    FLU/COVID Rapid Antigen (30 min. TAT) - Preferred screening test in ED [[id number]]  (Normal) Collected: 10/22/24 1932    Lab Status: Final result Specimen: Nares from Nose Updated: 10/22/24 2047     SARS COV Rapid Antigen Negative     Influenza A Rapid Antigen Negative     Influenza B Rapid Antigen Negative    Narrative:      This test has been performed using the Quidel Arlen 2 FLU+SARS Antigen test under the Emergency Use Authorization (EUA). This test has been validated by the  and verified by the performing laboratory. The Arlen uses lateral flow immunofluorescent sandwich assay to detect SARS-COV, Influenza A and Influenza B Antigen.     The Quidel Arlen 2 SARS Antigen test does not differentiate between SARS-CoV and SARS-CoV-2.      Negative results are presumptive and may be confirmed with a molecular assay, if necessary, for patient management. Negative results do not rule out SARS-CoV-2 or influenza infection and should not be used as the sole basis for treatment or patient management decisions. A negative test result may occur if the level of antigen in a sample is below the limit of detection of this test.     Positive results are indicative of the presence of viral antigens, but do not rule out bacterial infection or co-infection with other viruses.     All test results should be used as an adjunct to clinical observations and other information available to the provider.    FOR PEDIATRIC PATIENTS - copy/paste COVID Guidelines URL to browser: [email or web address]    HS Troponin I 2hr [[id number]]  (Normal) Collected: 10/22/24 1932    Lab Status: Final result Specimen: Blood from Arm, Left Updated: 10/22/24 2020     hs TnI 2hr 12 ng/L      Delta 2hr hsTnI -1 ng/L     B-Type Natriuretic Peptide(BNP) [[id number]]  (Abnormal) Collected: 10/22/24 1757    Lab Status: Final result Specimen: Blood from Arm, Right Updated: 10/22/24 2006      pg/mL     [place] draw [[id number]] Collected: 10/22/24 1757    Lab Status: In process Specimen: Blood from Arm, Right Updated: 10/22/24 2001    Narrative:      The following orders were created for panel order [place] draw.  Procedure                               Abnormality         Status                     ---------                               -----------         ------                     Light Blue Top on hold[[id number]]                           Final result               Lavender Top 7ml on hold[[id number]]                         In process                   Please view results for these tests on the individual orders.    CBC and differential [[id number]]  (Abnormal) Collected: 10/22/24 1757    Lab Status: Final result Specimen: Blood from  Arm, Right Updated: 10/22/24 1848     WBC 4.55 Thousand/uL      RBC 2.88 Million/uL      Hemoglobin 8.2 g/dL      Hematocrit 27.0 %      MCV 94 fL      MCH 28.5 pg      MCHC 30.4 g/dL      RDW 17.9 %      MPV 11.6 fL      Platelets 128 Thousands/uL      nRBC 0 /100 WBCs      Segmented % 75 %      Immature Grans % 1 %      Lymphocytes % 11 %      Monocytes % 11 %      Eosinophils Relative 1 %      Basophils Relative 1 %      Absolute Neutrophils 3.43 Thousands/µL      Absolute Immature Grans 0.03 Thousand/uL      Absolute Lymphocytes 0.49 Thousands/µL      Absolute Monocytes 0.52 Thousand/µL      Eosinophils Absolute 0.05 Thousand/µL      Basophils Absolute 0.03 Thousands/µL     HS Troponin 0hr (reflex protocol) [[id number]]  (Normal) Collected: 10/22/24 1757    Lab Status: Final result Specimen: Blood from Arm, Right Updated: 10/22/24 1833     hs TnI 0hr 13 ng/L     Comprehensive metabolic panel [[id number]]  (Abnormal) Collected: 10/22/24 1757    Lab Status: Final result Specimen: Blood from Arm, Right Updated: 10/22/24 1829     Sodium 137 mmol/L      Potassium 4.6 mmol/L      Chloride 97 mmol/L      CO2 33 mmol/L      ANION GAP 7 mmol/L      BUN 23 mg/dL      Creatinine 1.54 mg/dL      Glucose 89 mg/dL      Calcium 9.1 mg/dL      AST 32 U/L      ALT 24 U/L      Alkaline Phosphatase 87 U/L      Total Protein 7.2 g/dL      Albumin 3.7 g/dL      Total Bilirubin 1.09 mg/dL      eGFR 41 ml/min/1.73sq m     Narrative:      National Kidney Disease Foundation guidelines for Chronic Kidney Disease (CKD):     Stage 1 with normal or high GFR (GFR > 90 mL/min/1.73 square meters)    Stage 2 Mild CKD (GFR = 60-89 mL/min/1.73 square meters)    Stage 3A Moderate CKD (GFR = 45-59 mL/min/1.73 square meters)    Stage 3B Moderate CKD (GFR = 30-44 mL/min/1.73 square meters)    Stage 4 Severe CKD (GFR = 15-29 mL/min/1.73 square meters)    Stage 5 End Stage CKD (GFR <15 mL/min/1.73 square meters)  Note: GFR calculation is accurate only  with a steady state creatinine            CT chest without contrast   Final Interpretation by Saleem Kendall DO (10/22 2221)      Cardiomegaly. Coronary atherosclerosis, status post CABG.      Pleural effusions with prominence of the interstitial markings, especially in the lower lung fields, as described; consider a component of fluid overload/CHF. Some scarring is noted in the periphery. An interstitial pneumonitis such as NSIP is also a    differential consideration. Patchy opacities in the lower lung fields could represent edema, atelectasis, and/or infection. Correlation with the patient symptoms and laboratory values recommended.      Low-density of the blood pool noted, suspicious for anemia.      Small hiatal hernia suspected, renal and hepatic cysts, and other findings as above.               Workstation performed: [id number]         XR chest 1 view portable    (Results Pending)       Procedures    ED Medication and Procedure Management   Prior to Admission Medications   Prescriptions Last Dose Informant Patient Reported? Taking?   FIBER ADULT GUMMIES PO  Self, Spouse/Significant Other Yes No   Sig: Take 1 caplet by mouth daily    Patient not taking: Reported on 9/13/2024   Fluticasone-Salmeterol (Advair) 100-50 mcg/dose inhaler  Self, Spouse/Significant Other No No   Sig: INHALE 1 PUFF BY MOUTH TWICE DAILY. RINSE MOUTH AFTER USE   acetaminophen (TYLENOL) 325 mg tablet  Self, Spouse/Significant Other Yes No   Sig: Take 2 tablets by mouth every 6 (six) hours as needed for fever, headaches, mild pain, moderate pain or severe pain. Indications: Fever, Pain   albuterol (ProAir HFA) 90 mcg/act inhaler  Self, Spouse/Significant Other No No   Sig: Inhale 2 puffs every 6 (six) hours as needed for wheezing   amiodarone 200 mg tablet   No No   Sig: Take 1 tablet (200 mg total) by mouth daily   aspirin (ECOTRIN LOW STRENGTH) 81 mg EC tablet  Self, Spouse/Significant Other Yes No   Sig: Take 1 tablet by  mouth daily   atorvastatin (LIPITOR) 40 mg tablet  Self, Spouse/Significant Other No No   Sig: Take 1 tablet (40 mg total) by mouth daily   cholecalciferol (VITAMIN D3) 1,000 units tablet  Self, Spouse/Significant Other Yes No   Sig: Take 2,000 Units by mouth daily   labetalol (NORMODYNE) 100 mg tablet  Self, Spouse/Significant Other No No   Sig: Take 0.5 tablets (50 mg total) by mouth in the morning   multivitamin (THERAGRAN) TABS  Self, Spouse/Significant Other Yes No   Sig: Take 1 tablet by mouth daily   oxyCODONE (Roxicodone) 5 immediate release tablet   No No   Sig: Take 1 tablet (5 mg total) by mouth every 6 (six) hours as needed for severe pain Max Daily Amount: 20 mg   oxygen gas  Self, Spouse/Significant Other Yes No   Sig: Inhale 2 L continuous. 2L of oxygen via nasal cannula as needed and at night  Indications: short of breath   polyethylene glycol (MIRALAX) 17 g packet  Self, Spouse/Significant Other Yes No   Sig: Take 17 g by mouth daily as needed (constipation). Oscar Espinosa  dissolve in 8 oz liquid of choice   Indications: .   potassium chloride (Klor-Con M20) 20 mEq tablet   No No   Sig: TAKE 1 TABLET BY MOUTH DAILY 7  DAYS WEEKLY   senna (SENOKOT) 8.6 MG tablet  Self, Spouse/Significant Other Yes No   Sig: Take 1 tablet by mouth as needed for constipation   tamsulosin (FLOMAX) 0.4 mg  Self, Spouse/Significant Other Yes No   Sig: Take 1 capsule by mouth daily   torsemide (DEMADEX) 20 mg tablet   No No   Sig: Take 1 (20mg) tablet on Tuesday, Thursday, Saturday, Sunday, and 2 tablets (40 mg total) on Monday, Wednesday, and Friday   warfarin (Coumadin) 2.5 mg tablet  Self, Spouse/Significant Other No No   Sig: Take 1 tab by moth daily or as directed by physician      Facility-Administered Medications: None     Patient's Medications   Discharge Prescriptions    No medications on file     No discharge procedures on file.  ED SEPSIS DOCUMENTATION   Time reflects when diagnosis was documented in both  MDM as applicable and the Disposition within this note       Time User Action Codes Description Comment    10/22/2024 10:55 PM Iam Johnson [I50.9] CHF exacerbation (HCC)     10/22/2024 10:55 PM Iam Johnson [R06.02] SOB (shortness of breath)     10/22/2024 10:56 PM Iam Johnson [R04.2] Hemoptysis     10/22/2024 10:56 PM Iam Johnson [J90] Pleural effusion     10/22/2024 10:56 PM Iam Johnson [J44.9] COPD (chronic obstructive pulmonary disease) ([facility])                  Iam Johnson PA-C  10/23/24 9437

## 2024-10-22 NOTE — TELEPHONE ENCOUNTER
He needs to go to the emergency room.  Needs to be evaluated get blood work and a CT scan of his chest.

## 2024-10-22 NOTE — TELEPHONE ENCOUNTER
Reason for Disposition   [1] Coughed up blood AND [2] > 1 tablespoon (15 ml)    Protocols used: Coughing Up Blood-Adult-AH

## 2024-10-23 ENCOUNTER — APPOINTMENT (OUTPATIENT)
Dept: PULMONOLOGY | Facility: CLINIC | Age: 84
End: 2024-10-23
Payer: MEDICARE

## 2024-10-23 LAB
ANION GAP SERPL CALCULATED.3IONS-SCNC: 8 MMOL/L (ref 4–13)
ATRIAL RATE: 74 BPM
BASOPHILS # BLD AUTO: 0.03 THOUSANDS/ΜL (ref 0–0.1)
BASOPHILS NFR BLD AUTO: 1 % (ref 0–1)
BUN SERPL-MCNC: 25 MG/DL (ref 5–25)
CALCIUM SERPL-MCNC: 8.6 MG/DL (ref 8.4–10.2)
CHLORIDE SERPL-SCNC: 98 MMOL/L (ref 96–108)
CO2 SERPL-SCNC: 32 MMOL/L (ref 21–32)
CREAT SERPL-MCNC: 1.45 MG/DL (ref 0.6–1.3)
EOSINOPHIL # BLD AUTO: 0.06 THOUSAND/ΜL (ref 0–0.61)
EOSINOPHIL NFR BLD AUTO: 2 % (ref 0–6)
ERYTHROCYTE [DISTWIDTH] IN BLOOD BY AUTOMATED COUNT: 18.2 % (ref 11.6–15.1)
GFR SERPL CREATININE-BSD FRML MDRD: 44 ML/MIN/1.73SQ M
GLUCOSE SERPL-MCNC: 94 MG/DL (ref 65–140)
HCT VFR BLD AUTO: 25.7 % (ref 36.5–49.3)
HGB BLD-MCNC: 7.9 G/DL (ref 12–17)
IMM GRANULOCYTES # BLD AUTO: 0.02 THOUSAND/UL (ref 0–0.2)
IMM GRANULOCYTES NFR BLD AUTO: 1 % (ref 0–2)
INR PPP: 3.1 (ref 0.85–1.19)
LYMPHOCYTES # BLD AUTO: 0.57 THOUSANDS/ΜL (ref 0.6–4.47)
LYMPHOCYTES NFR BLD AUTO: 14 % (ref 14–44)
MAGNESIUM SERPL-MCNC: 2.1 MG/DL (ref 1.9–2.7)
MCH RBC QN AUTO: 28.7 PG (ref 26.8–34.3)
MCHC RBC AUTO-ENTMCNC: 30.7 G/DL (ref 31.4–37.4)
MCV RBC AUTO: 94 FL (ref 82–98)
MONOCYTES # BLD AUTO: 0.61 THOUSAND/ΜL (ref 0.17–1.22)
MONOCYTES NFR BLD AUTO: 15 % (ref 4–12)
NEUTROPHILS # BLD AUTO: 2.77 THOUSANDS/ΜL (ref 1.85–7.62)
NEUTS SEG NFR BLD AUTO: 67 % (ref 43–75)
NRBC BLD AUTO-RTO: 0 /100 WBCS
PLATELET # BLD AUTO: 106 THOUSANDS/UL (ref 149–390)
PMV BLD AUTO: 10.1 FL (ref 8.9–12.7)
POTASSIUM SERPL-SCNC: 4.1 MMOL/L (ref 3.5–5.3)
PROCALCITONIN SERPL-MCNC: <0.05 NG/ML
PROCALCITONIN SERPL-MCNC: <0.05 NG/ML
PROTHROMBIN TIME: 32.5 SECONDS (ref 12.3–15)
QRS AXIS: 116 DEGREES
QRSD INTERVAL: 102 MS
QT INTERVAL: 438 MS
QTC INTERVAL: 479 MS
RBC # BLD AUTO: 2.75 MILLION/UL (ref 3.88–5.62)
SODIUM SERPL-SCNC: 138 MMOL/L (ref 135–147)
T WAVE AXIS: -35 DEGREES
T4 FREE SERPL-MCNC: 1.26 NG/DL (ref 0.61–1.12)
TSH SERPL DL<=0.05 MIU/L-ACNC: 5.99 UIU/ML (ref 0.45–4.5)
VENTRICULAR RATE: 72 BPM
WBC # BLD AUTO: 4.06 THOUSAND/UL (ref 4.31–10.16)

## 2024-10-23 PROCEDURE — 80048 BASIC METABOLIC PNL TOTAL CA: CPT | Performed by: NURSE PRACTITIONER

## 2024-10-23 PROCEDURE — 99232 SBSQ HOSP IP/OBS MODERATE 35: CPT | Performed by: INTERNAL MEDICINE

## 2024-10-23 PROCEDURE — 87070 CULTURE OTHR SPECIMN AEROBIC: CPT | Performed by: NURSE PRACTITIONER

## 2024-10-23 PROCEDURE — 94664 DEMO&/EVAL PT USE INHALER: CPT

## 2024-10-23 PROCEDURE — 84145 PROCALCITONIN (PCT): CPT | Performed by: NURSE PRACTITIONER

## 2024-10-23 PROCEDURE — 87205 SMEAR GRAM STAIN: CPT | Performed by: NURSE PRACTITIONER

## 2024-10-23 PROCEDURE — 94640 AIRWAY INHALATION TREATMENT: CPT

## 2024-10-23 PROCEDURE — 93010 ELECTROCARDIOGRAM REPORT: CPT | Performed by: INTERNAL MEDICINE

## 2024-10-23 PROCEDURE — 83735 ASSAY OF MAGNESIUM: CPT | Performed by: NURSE PRACTITIONER

## 2024-10-23 PROCEDURE — 85610 PROTHROMBIN TIME: CPT | Performed by: NURSE PRACTITIONER

## 2024-10-23 PROCEDURE — 99223 1ST HOSP IP/OBS HIGH 75: CPT | Performed by: INTERNAL MEDICINE

## 2024-10-23 PROCEDURE — 94760 N-INVAS EAR/PLS OXIMETRY 1: CPT

## 2024-10-23 PROCEDURE — 85025 COMPLETE CBC W/AUTO DIFF WBC: CPT | Performed by: NURSE PRACTITIONER

## 2024-10-23 RX ORDER — LEVALBUTEROL INHALATION SOLUTION 0.63 MG/3ML
0.63 SOLUTION RESPIRATORY (INHALATION)
Status: DISCONTINUED | OUTPATIENT
Start: 2024-10-23 | End: 2024-10-25 | Stop reason: HOSPADM

## 2024-10-23 RX ADMIN — Medication 2000 UNITS: at 09:18

## 2024-10-23 RX ADMIN — AZITHROMYCIN DIHYDRATE 500 MG: 250 TABLET ORAL at 23:17

## 2024-10-23 RX ADMIN — AMIODARONE HYDROCHLORIDE 200 MG: 200 TABLET ORAL at 09:18

## 2024-10-23 RX ADMIN — FLUTICASONE FUROATE AND VILANTEROL TRIFENATATE 1 PUFF: 100; 25 POWDER RESPIRATORY (INHALATION) at 09:18

## 2024-10-23 RX ADMIN — IRON SUCROSE 200 MG: 20 INJECTION, SOLUTION INTRAVENOUS at 19:10

## 2024-10-23 RX ADMIN — CEFTRIAXONE 1000 MG: 2 INJECTION, POWDER, FOR SOLUTION INTRAMUSCULAR; INTRAVENOUS at 23:17

## 2024-10-23 RX ADMIN — GABAPENTIN 300 MG: 300 CAPSULE ORAL at 00:20

## 2024-10-23 RX ADMIN — CEFTRIAXONE 1000 MG: 2 INJECTION, POWDER, FOR SOLUTION INTRAMUSCULAR; INTRAVENOUS at 00:22

## 2024-10-23 RX ADMIN — TAMSULOSIN HYDROCHLORIDE 0.4 MG: 0.4 CAPSULE ORAL at 17:40

## 2024-10-23 RX ADMIN — LABETALOL HYDROCHLORIDE 50 MG: 100 TABLET, FILM COATED ORAL at 09:18

## 2024-10-23 RX ADMIN — GABAPENTIN 300 MG: 300 CAPSULE ORAL at 21:11

## 2024-10-23 RX ADMIN — IPRATROPIUM BROMIDE 0.5 MG: 0.5 SOLUTION RESPIRATORY (INHALATION) at 15:05

## 2024-10-23 RX ADMIN — POLYETHYLENE GLYCOL 3350 17 G: 17 POWDER, FOR SOLUTION ORAL at 00:19

## 2024-10-23 RX ADMIN — TORSEMIDE 40 MG: 20 TABLET ORAL at 09:19

## 2024-10-23 RX ADMIN — POLYETHYLENE GLYCOL 3350 17 G: 17 POWDER, FOR SOLUTION ORAL at 21:11

## 2024-10-23 RX ADMIN — IPRATROPIUM BROMIDE 0.5 MG: 0.5 SOLUTION RESPIRATORY (INHALATION) at 20:28

## 2024-10-23 RX ADMIN — AZITHROMYCIN DIHYDRATE 500 MG: 250 TABLET ORAL at 00:21

## 2024-10-23 RX ADMIN — LEVALBUTEROL HYDROCHLORIDE 0.63 MG: 0.63 SOLUTION RESPIRATORY (INHALATION) at 15:05

## 2024-10-23 RX ADMIN — LEVALBUTEROL HYDROCHLORIDE 0.63 MG: 0.63 SOLUTION RESPIRATORY (INHALATION) at 20:27

## 2024-10-23 RX ADMIN — ATORVASTATIN CALCIUM 40 MG: 40 TABLET, FILM COATED ORAL at 09:18

## 2024-10-23 NOTE — ASSESSMENT & PLAN NOTE
Platelet count down to 128  Was 130s to 140s in July and October from previous normal counts  Evaluated by heme-onc in the office in October, planning on follow-up  Aspirin on hold in setting of hemoptysis

## 2024-10-23 NOTE — ASSESSMENT & PLAN NOTE
Rate controlled A-fib on admission EKG  Continue PTA amiodarone 200 mg daily  Chronically anticoagulated with warfarin, currently on hold with hemoptysis  Goal INR 2-3  INR on admission 3.25  Check daily PT/INR

## 2024-10-23 NOTE — ASSESSMENT & PLAN NOTE
Presents with hemoptysis from home over the past 2 days, had 3 episodes on day of admission described as blood mixed with sputum  Reports increased dyspnea over the past week, change in normal morning sputum as it turned to tan 4 days ago, brown 3 days ago and then with blood streaks over the past 2 days  CT chest revealing patchy opacities in the lower fields  Treat for suspected underlying infectious process with ceftriaxone/azithromycin for now.  If no further evidence of an active bacterial infection and if pulmonologist is okay, may discontinue antibiotics.  Check procalcitonin: Negative x 2.  Check sputum culture: Follow-up results.  Appreciate pulmonary consultation: Patient's bloody mucus production likely secondary to tracheobronchitis versus early pneumonia.  If patient continues to have hemoptysis, may pursue bronchoscopy.  Continue to hold off patient's Coumadin for now.

## 2024-10-23 NOTE — ASSESSMENT & PLAN NOTE
Hemoglobin 8.2 on admission; hemoglobin today went down to 7.9.  Was planning on outpatient IV iron infusion this week.  Discussed with the patient and okay with receiving IV Venofer here.  Trend hemoglobin in setting of hemoptysis

## 2024-10-23 NOTE — ASSESSMENT & PLAN NOTE
Wt Readings from Last 3 Encounters:   10/17/24 102 kg (225 lb)   10/10/24 102 kg (224 lb)   09/13/24 97.8 kg (215 lb 9.6 oz)   Mildly volume overloaded  Bilateral lower extremity lymphedema at baseline  Received IV furosemide in ED, continue PTA torsemide at 40 mg daily  Intake output, daily weights  2 g sodium diet, fluid restriction

## 2024-10-23 NOTE — ASSESSMENT & PLAN NOTE
Chronically wearing 2 L nasal cannula since discharge from hospital in June  Attends pulmonary rehab twice weekly  Plan  Continue on nasal cannula  Monitor O2 requirements

## 2024-10-23 NOTE — PROGRESS NOTES
Pulmonary Rehabilitation   Assessment and Individualized Treatment Plan  60 DAY      Today's date: 2024  # of Exercise Sessions Completed: 14  Patient name: Oscar Espinosa     : 1940       MRN: [id number]  Referring Physician: Racquel Wilson DO   Pulmonologist: SOSA Gonzales (Kandice Faustin DO)  Provider: Mickey  Clinician: Willian Box MS, CEP     Dx:    Encounter Diagnosis   Name Primary?    Chronic obstructive pulmonary disease, unspecified COPD type (HCC) Yes     Date of onset: 2019      Treatment is tailored to this patient's individual needs.  The ITP was reviewed with the patient and all questions were answered to their satisfaction.  Additional ITP documentation can be found electronically including daily and monthly exercise summaries, daily session notes with ECG summaries, education notes, daily medication reconciliation, and daily physician supervision.      COMMENTS:  Pt attended 1st initial evaluation to begin Pulmonary Rehab 2x/week. He is using continuous supplemental O2 2L/min. He is looking to go away for Thanksgiving and wants to increase his strength and overall endurance.     30 Day 24: Increased strength in lower and upper body reported at 30 days. Reporting more energy following exercise sessions. He is noticing it while gardening and able to walk longer in the grocery store holding onto the cart. No changes to diet. Compliant with supplemental O2. Reports ongoing back pain with walking interval.     60 day 10/23/24: Pt currently hospitalized due to CHF exacerbation. Unable to assess progression. Pt will need clearance to return back to rehab following discharge.     ASSESSMENT      Medical History:   Past Medical History:   Diagnosis Date    Abdominal aortic aneurysm (HCC)     s/p repair    Abnormal ECG 2022    Acute on chronic congestive heart failure (HCC) 2019    Acute respiratory failure with hypoxia (HCC) 2024    Aneurysm  ([facility]) 2007    Aneurysm of abdominal aorta ([facility])     49mm, trileaflet AV    Atrial fibrillation ([facility])     Eliquis    BPH (benign prostatic hyperplasia)     CAD (coronary artery disease)     s/p CABGx3    CHF (congestive heart failure) ([facility])     Chronic pain     Gabapentin    Chronic pain disorder     lumbar    COPD (chronic obstructive pulmonary disease) ([facility])     Coronary artery disease     Elevated serum creatinine 06/18/2024    Elevated serum creatinine 06/18/2024    Elevated transaminase level 04/27/2024    Former tobacco use     Hyperlipidemia     Hypertension     Hyponatremia 04/27/2024    Hyponatremia 04/27/2024    Hypothyroidism     Lumbar radiculopathy     Lumbar stenosis     s/p injections    Neuropathy     Obesity (BMI 30-39.9)     YEVGENIY (obstructive sleep apnea)     unable to tolerate CPAP    Platelets decreased ([facility]) 07/27/2022    Pulmonary hypertension ([facility])     moderate to severe    Spondylolisthesis of lumbar region     Visual impairment 2022    Vitamin D deficiency        Family History:  Family History   Problem Relation Age of Onset    Heart disease Mother     Stroke Father         stroke syndrome    Aneurysm Brother         abdominal    Aortic aneurysm Brother         ascending    Coronary artery disease Family     Hypertension Family     Other Son         gioblastoma multiforme       Allergies:   Meloxicam    Current Medications:   No current facility-administered medications for this visit.     No current outpatient medications on file.     Facility-Administered Medications Ordered in Other Visits   Medication Dose Route Frequency Provider Last Rate Last Admin    acetaminophen (TYLENOL) tablet 650 mg  650 mg Oral Q6H PRN Jana S Michele, CRNP        albuterol inhalation solution 2.5 mg  2.5 mg Nebulization Q4H PRN Jana S Michele, CRNP        amiodarone tablet 200 mg  200 mg Oral Daily Jana S Michele, CRNP        atorvastatin (LIPITOR) tablet 40 mg  40 mg Oral Daily Jana S Michele, CRNP         azithromycin (ZITHROMAX) tablet 500 mg  500 mg Oral Q24H Jana S Michele, CRNP   500 mg at 10/23/24 0021    ceftriaxone (ROCEPHIN) 1 g/50 mL in dextrose IVPB  1,000 mg Intravenous Q24H Jana S Michele, CRNP 100 mL/hr at 10/23/24 0022 1,000 mg at 10/23/24 0022    Cholecalciferol (VITAMIN D3) tablet 2,000 Units  2,000 Units Oral Daily Jana S Michele, CRNP        Fluticasone Furoate-Vilanterol 100-25 mcg/actuation 1 puff  1 puff Inhalation Daily Jana S Michele, CRNP        gabapentin (NEURONTIN) capsule 300 mg  300 mg Oral HS Jana S Michele, CRNP   300 mg at 10/23/24 0020    labetalol (NORMODYNE) tablet 50 mg  50 mg Oral Daily Jana S Michele, CRNP        oxyCODONE (ROXICODONE) IR tablet 5 mg  5 mg Oral Q6H PRN Jana S Michele, CRNP        polyethylene glycol (MIRALAX) packet 17 g  17 g Oral HS Jana S Michele, CRNP   17 g at 10/23/24 0019    senna (SENOKOT) tablet 8.6 mg  1 tablet Oral HS PRN Jana S Michele, CRNP        tamsulosin (FLOMAX) capsule 0.4 mg  0.4 mg Oral Daily With Dinner Jana S Michele, CRNP        torsemide (DEMADEX) tablet 40 mg  40 mg Oral Daily Jana S Michele, CRNP           Physical Limitations: lumbar pain after walking a distance     Fall Risk: Moderate   Comments: Ambulates with a steady gait with no assist device and Denies a fall in the past 6 months    Cultural needs: none    PFT:  Yes 2/24/2021    FEV1/FVC ratio of 71%   FEV1 of 74% predicted  mildobstruction.    Medication compliance: Yes  Comments: Pt reports to be compliant with medications      Pulmonary Disease Risk Factors:  smoking    Sleep Disorders:   obstructive sleep apnea:  CPCP/BiPAP:  no - does not tolerate, uses supplemental O2       EXERCISE ASSESSMENT:      Initial Fitness Assessment: 6MWT:  Resting:    Resting:  BP: 116/76  HR: 62  SpO2: 95%  Dyspnea: 0/10  O2 Use: 2 l/min, Exercise:  BP: 126/70  HR:   SpO2: 86-91%  Dyspnea: 6/10  O2 use: 2 l/min, METs:  1.7, ECG Summary: NSR with freq. PVCs, couplet, Symptoms: low back pain and PANDA,  and Comments: 1 rest break       Current Functional Status:  Occupation: retired  Recreation/Physical activity: Traveling, patio, rec room   ADL’s:Capable of performing light to moderate ADLs limited by Dyspnea  Bartholomew: limited by Dyspnea able to perform self-care  Exercise:  none  Home exercise equipment: None  Other Comments: N/A    SMART Exercise Goals:   reduced score on CAT, improved 6MWT distance, reduced dyspnea during exercise, improved exercise tolerance based on peak METs tolerated in pulmonary rehab exercise session, and SpO2 >88% during exercise    Patient Specific EXERCISE GOALS:     reduced dependence on supplemental O2, reduced number of COPD exacerbations, attend pulmonary rehab regularly, decrease sitting time at home, start a walking program, begin a consistent exercise regimen , reduced pulmonary related hospital readmissions , decreased rest needed with physical activity/exercise, increased muscular strength, increased energy, increased stamina with ADLs, and more independence at home    PSYCHOSOCIAL ASSESSMENT:    Date of last assessment: 8/30/2024  Depression screening:  PHQ-9 = 4    Interpretation:  1-4 = Minimal Depression  Anxiety screening:  ERON-7 = 2    Interpretation: 0-4  = Not anxious    Pt self-report of depression and anxiety  Patient reports slight feelings of depression   Patient reports slight feelings of anxiety  Reports great emotional support     Self-reported stress level:  5  Stress Management: practice Relaxation Techniques, exercise, keep a positive mindset, spend time outside, enjoy a hobby, spend time with family, TV, puzzles, keep busy around the house, gardening, and yard work    Quality of Life Screen:  (Higher score indicates disease impact on QOL)  [facility] COOP score: 29/40      CAT: 20/40      Shortness of breath questionnaire: 28/120    Social Support:   spouse, children, and friends  Community/Social Activities: N/A    SMART Goals:    Reduce perceived  "stress to 1-3/10, improved [facility] QOL < 27, Feelings in [facility] Score < 3, Physical Fitness in [facility] Score < 3, Daily Activity in [facility] Score < 3, Social Activities in [facility] Score < 3, Pain in [facility] Score < 3, Overall Health in [facility] Score < 3, Quality of Life in [facility] Score < 3 ,  reduced time feeling down, depressed or hopeless, improved sleeping habits, feel less tired with more energy, reduced time feeling like a failure and having negative self thoughts, reduced feelings of restlessness, and reduced irritability    Patient Specific PSYCHOCOSOCIAL GOALS:    spend time with family and reduce slight feelings of depression/anxiety, and increase energy levels      Psychosocial Assessment as it relates to rehabilitation: Patient denies issues with his/her family or home life that may affect their rehabilitation efforts.       NUTRITION ASSESSMENT:    Initial Weight:  221.6 lbs   Current Weight: 225.8 lbs     Height:   Ht Readings from Last 1 Encounters:   10/17/24 5' 11\" (1.803 m)       Rate Your Plate Score: 53/81    Self-reported Current Dietary Habits:  Loves to eat salads  Fruits and veggies in diet  Drinks green tea, reviewed increasing water intake   Does not eat out  Does not use sodium  Not a big meat eater     ETOH:   Social History     Substance and Sexual Activity   Alcohol Use Not Currently       SMART Goals:   reduced BMI to < 25, decreased body fat% <25%   (M), reduced waist circumference <40 inches (M), fasting BG , improved A1c  < 7.0%, Improved Rate Your Plate score  >64, 2.5-5%  wt loss, eat 6 or more servings of grain products per day, eat whole grain breads, brown rice and whole grain cereals, eat 5 or more servings of fruits and vegetables a day, eat 2 or more servings of low fat milk or yogurt a day, eat no more than 6oz of meat per day, eat red meat once a week or less, eat poultry without the skin, choose 1% or skim milk, rarely eat cheese or choose reduced " fat or skim, rarely eat frozen desserts or choose fruit or fat-free sweets, Do not cook with oil, butter or margarine, Do not eat fried foods, choose light or fat-free salad dressings or damico, choose healthy snacks such as fruit, pretzels, and low fat crackers, choose healthy desserts and sweets such as orquidea food cake or  fruit, choose low sodium canned, frozen/packaged foods or rarely/never eat, rarely/never eat salty snacks, choose low sugar desserts and sweets, and drink less than 8oz of soda and sweetened drinks per day    Patient Specific NUTRITION GOALS:    increase water intake to reduce/thin mucus production reduced SOB while eating eat smaller, more frequent meals decrease caloric intake improve hydration weight loss increased muscle mass      OTHER CORE COMPONENT ASSESSMENT:    Hospitalizations in the past year: 3    Oxygen needs:   Rest:  supplemental O2 via nasal cannula @ 2L/min   Exercise/physical activity:  supplemental O2 via nasal cannula @ 2L/min   Sleep:   supplemental O2 via nasal cannula @ 2L/min     Does Pt monitor home SpO2? yes   Average SpO2 at rest:  95-99% with oxygen, 90% on RA    Average SpO2 with ADLs/physical activity:  low 80s % on RA       Use of Rescue Inhaler:  Yes PRN    Use of Maintenance Inhaler: Yes:  2 times per day    Use of Nebulizer Treatments:  No    Patient practices breathing techniques at home:  Reviewed with patient on:  8/30/2024    CAD Risk Factors:  Cholesterol: Yes  HTN: Yes  DM: Pre-diabetes  Obesity: Yes     Smoking: Former user  Quit 2012  1ppd for 55.6 years    SMART Core Component Goals:   consistent, controlled resting BP < 130/80, medication compliance, abstain from smoking, and demonstrate pursed lipped breathing    Patient Specific CORE COMPONENT GOALS:    reduced dietary sodium <2000mg, assess daily wt to report an increase greater than 3lbs in a day or 5lbs in a week, medication compliance, Abstain from smoking, compliance with supplemental oxygen, and  monitor home pulse oximetry      Blood Pressure:    Restin//70  Exercise: 102//62  Recovery: 90//62      INDIVIDUALIZED TREATMENT PLAN    The patient is agreeable to attend 24 pulmonary rehab exercise sessions.      EXERCISE GOALS AND PLAN    Current Aerobic Exercise Prescription       Frequency: 2 days/week   Supplement with home exercise 2+ days/wk as tolerated        Minutes: 30-40         METS: 1.8-2.0              SpO2: 90-97% on supplemental O2 2L/min via NC               RPD: 3-5                      HR:     RPE: 4-5         Modalities: UBE, NuStep, and Room walking      Aerobic Exercise Prescription Plan for Progression:    Frequency: 2 days/week    Supplement with home exercise 2+ days/wk as tolerated       Minutes: 40-45        METS: 2.1-2.4              SpO2: >88% on supplemental O2 2L/min via NC              RPD: 4-6                      HR:RHR +30-40bpm   RPE: 4-5        Modalities: UBE, NuStep, Recumbent bike, and Room walking        Supplemental Oxygen Needs with Exercise:  supplemental O2 2L/min via nasal canula and will titrate O2 to maintain SpO2 >88%.    Strength training:  Will be added following 2-3 weeks of monitored exercise sessions   Modalities: Chest Press, Lateral Raise, Arm Curl, Sit to Stands, Upright Rows, and Front Raises    Education: pursed lipped breathing, relaxation breathing, home exercise guidelines, benefits of exercise for pulmonary disease, RPE scale, RPD scale, O2 saturation monitoring, appropriate O2 response to exercise, and education class: Exercise For The Pulmonary Patient    Progress toward Exercise Goals:    Pt has not made progress toward the following goals: no formal home exercise with rehab and pt currently hospitalized. . , Will continue to educate and progress as tolerated.    Exercise Plan:  Titrate supplemental oxygen as needed to maintain SpO2>88% with exercise, learn to conserve energy with ADLs , practice diaphragmatic  breathing, reduce time sitting at home, increased strength of respiratory muscles, utilize PLB with physical activity, and begin a home exercise program     Readiness to change: Preparation:  (Getting ready to change)       NUTRITION GOALS AND PLAN    Progress toward Nutritional goals:    Pt has not made progress toward the following goals: no changes to diet- likes to splurge on the weekends and pt currently hospitalized . , Will continue to educate and progress as tolerated.    Nutrition Plan: group class: Reading Food Labels, increase intake of whole grains, replace refined flours with whole grains, increase daily intake of fruits and vegetables, increase daily intake of low fat dairy, limit meat intake to less than 6oz per day, eat red meat once a week or less, eat poultry without the skin, drink/use 1%  low fat or skim  milk, eat reduced-fat or part-skim cheese or rarely eat, rarely eat frozen desserts, cook without fats or oils, never/rarely eat fried foods, use light or fat-free salad dressings and mayonnaise, eat healthy snacks like fruit, pretzels, and low fat crackers, choose desserts such as fruit, orquidea food cake, low-fat or fat-free sweets, choose low sodium canned, frozen, packaged foods or rarely eat these foods, rarely/never eat salty snacks, choose low sugar desserts and sweets, and drink less than 8oz of non-diet soda, punch etc. per day    SMART goals are based Rate Your Plate Dietary Self-Assessment. These are the areas in which the patient could score higher on the assessment.  Goals include recommendations for a heart healthy diet based on American Heart Association.    Nutrition Education: heart healthy eating principles  weight loss and management strategies  low sodium diet  maintaining hydration  portion control  group class: Heart Healthy Eating\    Readiness to change: Preparation:  (Getting ready to change)       PSYCHOSOCIAL GOALS AND PLAN    Information to begin using [employer] was  provided as well as contact information for counseling through  [facility].    Progress toward Psychosocial goals:    Pt has not made progress toward the following goals: pt currently hospitalized. , Will continue to educate and progress as tolerated.    Psychosocial: Enroll in [employer], Practice relaxation techniques, Exercise, Spend time outdoors, Read a Book, Keep a positive mindset, Join a support group , Reduce dependence  on family, Meet new people, puzzles, Enjoy family, and Avoid triggers    Psychosocial Education: depression and pulmonary disease, tools to manage fear/anxiety related to becoming SOB, and class:  Stress and Pulmonary Disease    Readiness to change: Preparation:  (Getting ready to change)       OTHER CORE COMPONENTS GOALS AND PLAN    Tobacco Use Intervention:     Pt quit 2012   and has abstained    Oxygen Goal: Maintain SpO2>88% during exercise or as advised by pulmonolgist    Progress toward Core Component goals:    Pt has not made progress toward the following goals: pt currently hospitalized. , Will continue to educate and progress as tolerated.    Core Component Plan: medication compliance, avoid places with second hand smoke, avoid processed foods, engage in regular exercise, eliminate salt shaker at the table, use salt substitutes, check labels for sodium content, and monitor home BP    Core Component Education:  preventing infections, bronchodilators, bronchial hygiene, education: Pulmonary Anatomy and Physiology, education:  Pulmonary Medications, education: Oxygen, and education: Control Breathing    Readiness to change: Action:  (Changing behavior)

## 2024-10-23 NOTE — ASSESSMENT & PLAN NOTE
Wt Readings from Last 3 Encounters:   10/23/24 105 kg (231 lb 4.2 oz)   10/17/24 102 kg (225 lb)   10/10/24 102 kg (224 lb)   Mildly volume overloaded on admission; diuresed well with IV Lasix, given in the emergency room.  Bilateral lower extremity lymphedema at baseline  Received IV furosemide in ED, continue PTA torsemide at 40 mg daily  Intake output, daily weights  2 g sodium diet, fluid restriction

## 2024-10-23 NOTE — CASE MANAGEMENT
Case Management Assessment & Discharge Planning Note    Patient name Oscar Espinosa  Location /-01 MRN [id number]  : 1940 Date 10/23/2024       Current Admission Date: 10/22/2024  Current Admission Diagnosis:Hemoptysis   Patient Active Problem List    Diagnosis Date Noted Date Diagnosed    Hemoptysis 10/22/2024     Iron deficiency anemia, unspecified 10/17/2024     Chronic respiratory failure with hypoxia ([facility]) 2024     Chronic obstructive pulmonary disease, unspecified COPD type ([facility]) 2024     Hypotension 2024     Prediabetes 2024     Chronic combined systolic and diastolic congestive heart failure ([facility]) 2024     Acute hypoxic respiratory failure ([facility]) 2024     Pleural effusion with shortness of breath 2024     Anemia 2024     Bilateral carotid artery stenosis 2024     Venous insufficiency of both lower extremities 2023     Hypertensive heart and chronic kidney disease with heart failure and stage 1 through stage 4 chronic kidney disease, or chronic kidney disease ([facility]) 10/27/2022     Thrombocytopenia ([facility]) 2022     Myofascial pain syndrome 2022     Paroxysmal atrial fibrillation ([facility]) 2022     Hypertensive heart disease with heart failure ([facility]) 2022     Ascending aortic aneurysm ([facility]) 2021     Nocturnal hypoxemia      Moderate to severe pulmonary hypertension ([facility]) 2021     Chronic obstructive pulmonary disease ([facility]) 2019     Multiple pulmonary nodules 09/10/2019     Chronic pain syndrome      Subclinical hypothyroidism 10/22/2014     Abdominal aortic aneurysm without rupture ([facility]) 2014     Aortic valve disorder 2014     Lumbar radiculopathy 2013     Benign essential hypertension 04/10/2013     Impaired fasting glucose 04/10/2013     Severe obesity (BMI 35.0-35.9 with comorbidity) ([facility]) 04/10/2013     Hyperlipidemia 2012     Microscopic hematuria 2012      Severe obstructive sleep apnea 09/07/2012     CAD (coronary artery disease) 09/07/2012       LOS (days): 1  Geometric Mean LOS (GMLOS) (days): 2.1  Days to GMLOS:1.7     OBJECTIVE:    Risk of Unplanned Readmission Score: 23.72         Current admission status: Inpatient       Preferred Pharmacy:   [facility] - [place], [place] - [address]  [address]  [address]  [facility] [address]  Phone: [phone] Fax: [phone]    [id number] - [place], [place] - [address]  [address]  [place] [address]  Phone: [phone] Fax: [phone]    [facility] - [place], [place] - [address]  [address]  [address]  [facility] [address]  Phone: [phone] Fax: [phone]    Primary Care Provider: Lea Reyes, MD    Primary Insurance: MEDICARE  Secondary Insurance: AARP    ASSESSMENT:  Active Health Care Proxies       Dorcas Esipnosa Health Care Agent - Spouse   Primary Phone: [phone] (Home)  Mobile Phone: [phone]                           Readmission Root Cause  30 Day Readmission: No    Patient Information  Admitted from:: Home  Mental Status: Alert  During Assessment patient was accompanied by: Not accompanied during assessment  Assessment information provided by:: Patient  Primary Caregiver: Self  Support Systems: Self, Spouse/significant other  County of Residence: [place]  [place]  Home entry access options. Select all that apply.: Stairs  Number of steps to enter home.: 3  Type of Current Residence: [facility]  Living Arrangements: Lives w/ Spouse/significant other    Activities of Daily Living Prior to Admission  Functional Status: Independent  Completes ADLs independently?: Yes  Ambulates independently?: Yes  Does patient use assisted devices?: Yes  Assisted Devices (DME) used: Home Oxygen concentrator, Portable Oxygen concentrator, Portable Oxygen tanks, Walker, Straight  Cane  DME Company Name (respiratory supplies): [employer]  O2 Rate(s): 2L  Does patient currently own DME?: Yes  What DME does the patient currently own?: Bedside Commode, Wheelchair, Portable Oxygen tanks, Home Oxygen concentrator, Straight Cane, Portable Oxygen concentrator, Walker  Does patient have a history of Outpatient Therapy (PT/OT)?: Yes  Does the patient have a history of Short-Term Rehab?: No  Does patient have a history of HHC?: Yes  Does patient currently have HHC?: No         Patient Information Continued  Income Source: Pension/[employer]  Does patient have prescription coverage?: Yes  Does patient receive dialysis treatments?: No  Does patient have a history of substance abuse?: No  Does patient have a history of Mental Health Diagnosis?: No         Means of Transportation  Means of Transport to Appts:: Family transport          DISCHARGE DETAILS:    Discharge planning discussed with:: Patient  Freedom of Choice: Yes  Comments - Freedom of Choice: Pt reported preference of returning home  CM contacted family/caregiver?: No- see comments (Pt A&O)  Were Treatment Team discharge recommendations reviewed with patient/caregiver?: Yes  Did patient/caregiver verbalize understanding of patient care needs?: Yes  Were patient/caregiver advised of the risks associated with not following Treatment Team discharge recommendations?: Yes    Contacts  Patient Contacts: Patient  Contact Method: In Person  Reason/Outcome: Continuity of Care, Discharge Planning    Other Referral/Resources/Interventions Provided:  Interventions: Other (Specify)  Referral Comments: CM spoke with pt at bedside, introduced self and role with discharge planning. Pt reported he lives with his spouse in a ranch style home with 3 BHUPENDRA. Pt reported he is completely independent with ADLs and functional mobility. Pt reported he has a cane, RW, transport w/c, and BSC. Pt reported he is on 2L O2 at home. Home O2 concentrator, tanks and POC are  provided by [employer]. Pt reported he currently goes to pulmonary rehab. Pt reported he has a hx of [facility] through [facility]. Pt reported his preferred pharamcy is [facility] for short term medications. Pt also utilizes [facility] for home delivered medications. Pt reported his spouse assists with all transportation needs. CM will remain available.    Would you like to participate in our [facility] service program?  : No - Declined    Treatment Team Recommendation: Home  Discharge Destination Plan:: Home

## 2024-10-23 NOTE — ASSESSMENT & PLAN NOTE
Hemoglobin 8.2, appears stable from baseline of around 8.  Recommend inpatient iron panel and treatment  Trend hemoglobin in setting of hemoptysis

## 2024-10-23 NOTE — ASSESSMENT & PLAN NOTE
Presents with hemoptysis from home over the past 2 days, had 3 episodes on day of admission described as blood mixed with sputum  Reports increased dyspnea over the past week, change in normal morning sputum as it turned to tan 4 days ago, brown 3 days ago and then with blood streaks over the past 2 days  CT chest revealing patchy opacities in the lower fields  Treat for suspected underlying infectious process with ceftriaxone/azithromycin  Check procalcitonin  Check sputum culture  Appreciate pulmonary consultation

## 2024-10-23 NOTE — ASSESSMENT & PLAN NOTE
Hemoglobin 8.2, appears stable  Was planning on outpatient IV iron infusion  Trend hemoglobin in setting of hemoptysis

## 2024-10-23 NOTE — ASSESSMENT & PLAN NOTE
Wt Readings from Last 3 Encounters:   10/23/24 105 kg (231 lb 4.2 oz)   10/17/24 102 kg (225 lb)   10/10/24 102 kg (224 lb)   Last echocardiogram April 2024 showed LVEF45%  Patient has history of chronic lymphedema,  Endorses worsening leg swelling in the past week   Between 10-16lbs of weight gain since 09/13/24.  CT findings of b/l pleural effusions, and prominence of interstitial markings especially in lower lung fields likely secondary to fluid overload, CHF exacerbation.    Plan  Recommend further diuresis

## 2024-10-23 NOTE — ASSESSMENT & PLAN NOTE
Patient has a one week history of worsening dyspnea,   4 day history of productive cough, initially yellowish green and transitioned to brown sputum  Patient states 2 days ago it became maroon colored  Yesterday prior to admission 3 episodes of maroon colored sputum  No marlyn hemoptysis or bright red blood.  Vitals on admission 96% on room air.  Hemoglobin on admission 8.2. Stable from patients baseline of around 8.  CBC no leukocytosis, procal <0.05.   Flu/covid negative  CXR: Enlarged cardiac silhouette, mild pulmonary vascular congestion, small bibasilar effusions.  CT wo contrast  B/l pleural effusions, and passive lower lobe atelectasis. There is prominence of the interstitial markings, especially in the lower lung fields, consider a component of fluid overload/CHF. An interstitial pneumonitis such as NSIP is also a  differential consideration. Some patchy opacities are seen in the lower lung fields which could represent edema, atelectasis, and/or infection. Possible consolidation in left lower lobe.  Patient has history of HFrEF with an EF 45% on last echo in April 2024. Patient noted to have approximately 16 lbs weight gain  Patient noticed increased leg swelling in the past week, after taking off compressive leg wraps.    Hemoptysis unclear etiology possibly 2/2 bronchitis vs in the setting of CHF exacerbation in the setting of recent weight gain and leg swelling. Less likely 2/2 to be PNA given no leukocytosis, normal pro calcitonin.ddx also includes pulmonary cancer, less likely tuberculosis, autoimmune etiology.    Plan   Continue holding coumadin  Continue antibiotics and f/u sputum cultures  Recommend further diuresis  Will consider bronchoscopy if no improvement with diuresis   Recommend to consider non vitamin K oral anticoagulant

## 2024-10-23 NOTE — ASSESSMENT & PLAN NOTE
Rate controlled A-fib on admission EKG  Continue PTA amiodarone 200 mg daily  Chronically anticoagulated with warfarin, currently on hold with hemoptysis  Goal INR 2-3  Supratherapeutic INR.  Check daily PT/INR  Adjust treatment accordingly.

## 2024-10-23 NOTE — PLAN OF CARE
Problem: INFECTION - ADULT  Goal: Absence or prevention of progression during hospitalization  Description: INTERVENTIONS:  - Assess and monitor for signs and symptoms of infection  - Monitor lab/diagnostic results  - Monitor all insertion sites, i.e. indwelling lines, tubes, and drains  - Monitor endotracheal if appropriate and nasal secretions for changes in amount and color  - [place] appropriate cooling/warming therapies per order  - Administer medications as ordered  - Instruct and encourage patient and family to use good hand hygiene technique  - Identify and instruct in appropriate isolation precautions for identified infection/condition  Outcome: Progressing     Problem: SAFETY ADULT  Goal: Patient will remain free of falls  Description: INTERVENTIONS:  - Educate patient/family on patient safety including physical limitations  - Instruct patient to call for assistance with activity   - Consult OT/PT to assist with strengthening/mobility   - Keep Call bell within reach  - Keep bed low and locked with side rails adjusted as appropriate  - Keep care items and personal belongings within reach  - Initiate and maintain comfort rounds  - Make Fall Risk Sign visible to staff  - Offer Toileting every two hours, in advance of need  - Initiate/Maintain bed and chair alarm  - Obtain necessary fall risk management equipment:   - Apply yellow socks and bracelet for high fall risk patients  - Consider moving patient to room near nurses station  Outcome: Progressing     Problem: RESPIRATORY - ADULT  Goal: Achieves optimal ventilation and oxygenation  Description: INTERVENTIONS:  - Assess for changes in respiratory status  - Assess for changes in mentation and behavior  - Position to facilitate oxygenation and minimize respiratory effort  - Oxygen administered by appropriate delivery if ordered  - Initiate smoking cessation education as indicated  - Encourage broncho-pulmonary hygiene including cough, deep breathe,  Incentive Spirometry  - Assess the need for suctioning and aspirate as needed  - Assess and instruct to report SOB or any respiratory difficulty  - Respiratory Therapy support as indicated  Outcome: Progressing     Problem: GASTROINTESTINAL - ADULT  Goal: Maintains adequate nutritional intake  Description: INTERVENTIONS:  - Monitor percentage of each meal consumed  - Identify factors contributing to decreased intake, treat as appropriate  - Assist with meals as needed  - Monitor I&O, weight, and lab values if indicated  - Obtain nutrition services referral as needed  Outcome: Progressing     Problem: GASTROINTESTINAL - ADULT  Goal: Maintains or returns to baseline bowel function  Description: INTERVENTIONS:  - Assess bowel function  - Encourage oral fluids to ensure adequate hydration  - Administer IV fluids if ordered to ensure adequate hydration  - Administer ordered medications as needed  - Encourage mobilization and activity  - Consider nutritional services referral to assist patient with adequate nutrition and appropriate food choices  Outcome: Progressing     Problem: Knowledge Deficit  Goal: Patient/family/caregiver demonstrates understanding of disease process, treatment plan, medications, and discharge instructions  Description: Complete learning assessment and assess knowledge base.  Interventions:  - Provide teaching at level of understanding  - Provide teaching via preferred learning methods  Outcome: Progressing

## 2024-10-23 NOTE — ASSESSMENT & PLAN NOTE
CAD s/p CABG  AAA s/p repair  Follows with cardiology  Aspirin on hold due to hemoptysis  Continue statin

## 2024-10-23 NOTE — ED NOTES
SBAR sent to Charge Nurse, patient is off to the Unit, AAOx3 resp even and unlabored with no S$S of distress.      Juanis Martines RN  10/23/24 0145

## 2024-10-23 NOTE — H&P
H&P - Hospitalist   Name: Oscar Espinosa 84 y.o. male I MRN: [id number]  Unit/Bed#: ED-25 I Date of Admission: 10/22/2024   Date of Service: 10/23/2024 I Hospital Day: 1     Assessment & Plan  Hemoptysis  Presents with hemoptysis from home over the past 2 days, had 3 episodes on day of admission described as blood mixed with sputum  Reports increased dyspnea over the past week, change in normal morning sputum as it turned to tan 4 days ago, brown 3 days ago and then with blood streaks over the past 2 days  CT chest revealing patchy opacities in the lower fields  Treat for suspected underlying infectious process with ceftriaxone/azithromycin  Check procalcitonin  Check sputum culture  Appreciate pulmonary consultation  Benign essential hypertension  Continue PTA labetalol 50 mg daily  Trend blood pressure  Hyperlipidemia  Continue statin  Severe obstructive sleep apnea  Cannot tolerate CPAP  Wears 2 L nasal cannula  Chronic pain syndrome  Gabapentin 300 mg at bedtime  As needed oxycodone for severe back pain-PCP Rx  Chronic obstructive pulmonary disease (HCC)  History COPD, is nasal cannula dependent  PTA medications include Wixela, albuterol  Continue Breo daily, nebulized bronchodilators  Appreciate pulmonary consultation  Thrombocytopenia (HCC)  Platelet count down to 128  Was 130s to 140s in July and October from previous normal counts  Evaluated by heme-onc in the office in October, planning on follow-up  Aspirin on hold in setting of hemoptysis  Paroxysmal atrial fibrillation (HCC)  Rate controlled A-fib on admission EKG  Continue PTA amiodarone 200 mg daily  Chronically anticoagulated with warfarin, currently on hold with hemoptysis  Goal INR 2-3  INR on admission 3.25  Check daily PT/INR  Chronic combined systolic and diastolic congestive heart failure (HCC)  Wt Readings from Last 3 Encounters:   10/17/24 102 kg (225 lb)   10/10/24 102 kg (224 lb)   09/13/24 97.8 kg (215 lb 9.6 oz)   Mildly volume  overloaded  Bilateral lower extremity lymphedema at baseline  Received IV furosemide in ED, continue PTA torsemide at 40 mg daily  Intake output, daily weights  2 g sodium diet, fluid restriction  Chronic respiratory failure with hypoxia (HCC)  Chronically wearing 2 L nasal cannula since discharge from hospital in June  Attends pulmonary rehab twice weekly  Iron deficiency anemia, unspecified  Hemoglobin 8.2, appears stable  Was planning on outpatient IV iron infusion  Trend hemoglobin in setting of hemoptysis  Subclinical hypothyroidism  Update TSH while on amiodarone  No home medications  CAD (coronary artery disease)  CAD s/p CABG  AAA s/p repair  Follows with cardiology  Aspirin on hold due to hemoptysis  Continue statin      VTE Pharmacologic Prophylaxis:   High Risk (Score >/= 5) - Pharmacological DVT Prophylaxis Contraindicated. Sequential Compression Devices Ordered.  Code Status: Level 1 - Full Code   Discussion with family: Updated  (wife) at bedside.    Anticipated Length of Stay: Patient will be admitted on an inpatient basis with an anticipated length of stay of greater than 2 midnights secondary to hemoptysis.    History of Present Illness   Chief Complaint: Hemoptysis    Oscar Espinosa is a 84 y.o. male with a PMH of A-fib on warfarin, CAD status post CABG, AAA s/p repair, chronic respiratory failure on nasal cannula, chronic pain syndrome, iron deficiency anemia, thrombocytopenia, recent hospital admissions in May and June for pneumonia, loculated pleural effusion status post chest tube in June.  He presents with 1 week of worsening dyspnea, 4 days of sputum color change and 2 days of hemoptysis.  Reports 3 episodes on day of admission of blood mixed with sputum.  CT chest in the ED revealed bilateral patchy infiltrates.  Denies fever/chills/chest pain.  Endorses worsening dyspnea despite participating in pulmonary rehab twice weekly.  Presented to the medical service for further  evaluation and treatment.    Review of Systems   Constitutional:  Negative for chills and fever.   HENT:  Negative for ear pain and sore throat.    Eyes:  Negative for pain and visual disturbance.   Respiratory:  Positive for cough and shortness of breath.    Cardiovascular:  Positive for leg swelling. Negative for chest pain and palpitations.   Gastrointestinal:  Negative for abdominal pain and vomiting.   Genitourinary:  Negative for dysuria and hematuria.   Musculoskeletal:  Positive for back pain. Negative for arthralgias.   Skin:  Negative for color change and rash.   Neurological:  Negative for seizures and syncope.   All other systems reviewed and are negative.      Historical Information   Past Medical History:   Diagnosis Date    Abdominal aortic aneurysm ([facility])     s/p repair    Abnormal ECG july 2022    Acute on chronic congestive heart failure ([facility]) 09/11/2019    Acute respiratory failure with hypoxia ([facility]) 05/06/2024    Aneurysm ([facility]) 2007    Aneurysm of abdominal aorta ([facility])     49mm, trileaflet AV    Atrial fibrillation ([facility])     Eliquis    BPH (benign prostatic hyperplasia)     CAD (coronary artery disease)     s/p CABGx3    CHF (congestive heart failure) ([facility])     Chronic pain     Gabapentin    Chronic pain disorder     lumbar    COPD (chronic obstructive pulmonary disease) ([facility])     Coronary artery disease     Elevated serum creatinine 06/18/2024    Elevated serum creatinine 06/18/2024    Elevated transaminase level 04/27/2024    Former tobacco use     Hyperlipidemia     Hypertension     Hyponatremia 04/27/2024    Hyponatremia 04/27/2024    Hypothyroidism     Lumbar radiculopathy     Lumbar stenosis     s/p injections    Neuropathy     Obesity (BMI 30-39.9)     YEVGENIY (obstructive sleep apnea)     unable to tolerate CPAP    Platelets decreased ([facility]) 07/27/2022    Pulmonary hypertension ([facility])     moderate to severe    Spondylolisthesis of lumbar region     Visual impairment 2022    Vitamin D deficiency       Past Surgical History:   Procedure Laterality Date    ABDOMINAL AORTIC ANEURYSM REPAIR  2007    2 dock limbs with visc extens prosth    CARDIAC CATHETERIZATION      COLONOSCOPY      CORONARY ARTERY BYPASS GRAFT      LIMA to LAD, sequential SVG to OM1 & OM2, SVG to RDA    IR CHEST TUBE PLACEMENT  2024    LUMBAR EPIDURAL INJECTION       Social History     Tobacco Use    Smoking status: Former     Current packs/day: 0.00     Average packs/day: 1 pack/day for 55.6 years (55.6 ttl pk-yrs)     Types: Cigarettes     Start date: 1957     Quit date: 2012     Years since quittin.2     Passive exposure: Past    Smokeless tobacco: Never   Vaping Use    Vaping status: Never Used   Substance and Sexual Activity    Alcohol use: Not Currently    Drug use: No    Sexual activity: Never     E-Cigarette/Vaping    E-Cigarette Use Never User      E-Cigarette/Vaping Substances    Nicotine No     THC No     CBD No     Flavoring No     Other No     Unknown No      Family History   Problem Relation Age of Onset    Heart disease Mother     Stroke Father         stroke syndrome    Aneurysm Brother         abdominal    Aortic aneurysm Brother         ascending    Coronary artery disease Family     Hypertension Family     Other Son         gioblastoma multiforme     Social History:  Marital Status: /Civil Union   Occupation: Retired  Patient Pre-hospital Living Situation: With spouse  Patient Pre-hospital Level of Mobility: walks  Patient Pre-hospital Diet Restrictions: None    Meds/Allergies   I have reviewed home medications with patient personally.  Prior to Admission medications    Medication Sig Start Date End Date Taking? Authorizing Provider   gabapentin (NEURONTIN) 300 mg capsule Take 300 mg by mouth daily at bedtime   Yes Historical Provider, MD   acetaminophen (TYLENOL) 325 mg tablet Take 2 tablets by mouth every 6 (six) hours as needed for fever, headaches, mild pain, moderate pain or severe  pain. Indications: Fever, Pain    Historical Provider, MD   albuterol (ProAir HFA) 90 mcg/act inhaler Inhale 2 puffs every 6 (six) hours as needed for wheezing 6/26/24   Winnie Gruber MD   amiodarone 200 mg tablet Take 1 tablet (200 mg total) by mouth daily 9/25/24   Raza Alan DO   aspirin (ECOTRIN LOW STRENGTH) 81 mg EC tablet Take 1 tablet by mouth daily 9/7/12   Historical Provider, MD   atorvastatin (LIPITOR) 40 mg tablet Take 1 tablet (40 mg total) by mouth daily 9/13/23   Omar Kessler MD   cholecalciferol (VITAMIN D3) 1,000 units tablet Take 2,000 Units by mouth daily    Historical Provider, MD   FIBER ADULT GUMMIES PO Take 1 caplet by mouth daily   Patient not taking: Reported on 9/13/2024    Historical Provider, MD   Fluticasone-Salmeterol (Advair) 100-50 mcg/dose inhaler INHALE 1 PUFF BY MOUTH TWICE DAILY. RINSE MOUTH AFTER USE 7/19/24   SOSA Lane   labetalol (NORMODYNE) 100 mg tablet Take 0.5 tablets (50 mg total) by mouth in the morning 7/12/24   SOSA Marrufo   multivitamin (THERAGRAN) TABS Take 1 tablet by mouth daily    Historical Provider, MD   oxyCODONE (Roxicodone) 5 immediate release tablet Take 1 tablet (5 mg total) by mouth every 6 (six) hours as needed for severe pain Max Daily Amount: 20 mg 10/10/24   Lea Reyes, MD   oxygen gas Inhale 2 L continuous. 2L of oxygen via nasal cannula as needed and at night  Indications: short of breath    Historical Provider, MD   polyethylene glycol (MIRALAX) 17 g packet Take 17 g by mouth daily as needed (constipation). Oscar Espinosa  dissolve in 8 oz liquid of choice   Indications: .    Lea Reyes, MD   potassium chloride (Klor-Con M20) 20 mEq tablet TAKE 1 TABLET BY MOUTH DAILY 7  DAYS WEEKLY 9/17/24   Lea Reyes, MD   senna (SENOKOT) 8.6 MG tablet Take 1 tablet by mouth as needed for constipation    Historical Provider, MD   tamsulosin (FLOMAX) 0.4 mg Take 1 capsule by mouth daily    Historical Provider, MD    torsemide (DEMADEX) 20 mg tablet Take 1 (20mg) tablet on Tuesday, Thursday, Saturday, Sunday, and 2 tablets (40 mg total) on Monday, Wednesday, and Friday 10/22/24   Lea Reyes, MD   warfarin (Coumadin) 2.5 mg tablet Take 1 tab by moth daily or as directed by physician 7/31/24   Raza Alan DO     Allergies   Allergen Reactions    Meloxicam Edema       Objective :  Temp:  [98 °F (36.7 °C)-98.5 °F (36.9 °C)] 98 °F (36.7 °C)  HR:  [63-70] 64  BP: (123-154)/(60-75) 145/65  Resp:  [16-20] 16  SpO2:  [92 %-96 %] 96 %  O2 Device: Nasal cannula  Nasal Cannula O2 Flow Rate (L/min):  [2 L/min] 2 L/min    Physical Exam  Vitals and nursing note reviewed.   Constitutional:       General: He is not in acute distress.     Appearance: He is well-developed. He is ill-appearing.   HENT:      Head: Normocephalic and atraumatic.      Mouth/Throat:      Mouth: Mucous membranes are moist.   Eyes:      Conjunctiva/sclera: Conjunctivae normal.   Cardiovascular:      Rate and Rhythm: Normal rate and regular rhythm.      Heart sounds: No murmur heard.  Pulmonary:      Effort: Pulmonary effort is normal. No respiratory distress.      Breath sounds: Wheezing, rhonchi and rales present.   Abdominal:      Palpations: Abdomen is soft.      Tenderness: There is no abdominal tenderness.   Musculoskeletal:         General: Swelling present.      Cervical back: Neck supple.      Right lower leg: Edema present.      Left lower leg: Edema present.   Skin:     General: Skin is warm and dry.      Capillary Refill: Capillary refill takes less than 2 seconds.   Neurological:      General: No focal deficit present.      Mental Status: He is alert and oriented to person, place, and time.   Psychiatric:         Mood and Affect: Mood normal.         Behavior: Behavior normal.          Lab Results: I have reviewed the following results:  Results from last 7 days   Lab Units 10/22/24  1757   WBC Thousand/uL 4.55   HEMOGLOBIN g/dL 8.2*   HEMATOCRIT %  27.0*   PLATELETS Thousands/uL 128*   SEGS PCT % 75   LYMPHO PCT % 11*   MONO PCT % 11   EOS PCT % 1     Results from last 7 days   Lab Units 10/22/24  1757   SODIUM mmol/L 137   POTASSIUM mmol/L 4.6   CHLORIDE mmol/L 97   CO2 mmol/L 33*   BUN mg/dL 23   CREATININE mg/dL 1.54*   ANION GAP mmol/L 7   CALCIUM mg/dL 9.1   ALBUMIN g/dL 3.7   TOTAL BILIRUBIN mg/dL 1.09*   ALK PHOS U/L 87   ALT U/L 24   AST U/L 32   GLUCOSE RANDOM mg/dL 89     Results from last 7 days   Lab Units 10/22/24  0845   INR  3.25*         Lab Results   Component Value Date    HGBA1C 5.9 (H) 07/09/2024    HGBA1C 6.3 (H) 06/18/2024    HGBA1C 6.2 (H) 02/20/2024           Imaging Results Review: I reviewed radiology reports from this admission including: CT chest.  Other Study Results Review: EKG was reviewed.  Rate controlled atrial fibrillation    Administrative Statements   I have spent a total time of 45 minutes in caring for this patient on the day of the visit/encounter including Diagnostic results, Impressions, Counseling / Coordination of care, Documenting in the medical record, Reviewing / ordering tests, medicine, procedures  , and Obtaining or reviewing history  .    ** Please Note: This note has been constructed using a voice recognition system. **

## 2024-10-23 NOTE — PROGRESS NOTES
Progress Note - Hospitalist   Name: Oscar Espinosa 84 y.o. male I MRN: [id number]  Unit/Bed#: -01 I Date of Admission: 10/22/2024   Date of Service: 10/23/2024 I Hospital Day: 1     Assessment & Plan  Hemoptysis  Presents with hemoptysis from home over the past 2 days, had 3 episodes on day of admission described as blood mixed with sputum  Reports increased dyspnea over the past week, change in normal morning sputum as it turned to tan 4 days ago, brown 3 days ago and then with blood streaks over the past 2 days  CT chest revealing patchy opacities in the lower fields  Treat for suspected underlying infectious process with ceftriaxone/azithromycin for now.  If no further evidence of an active bacterial infection and if pulmonologist is okay, may discontinue antibiotics.  Check procalcitonin: Negative x 2.  Check sputum culture: Follow-up results.  Appreciate pulmonary consultation: Patient's bloody mucus production likely secondary to tracheobronchitis versus early pneumonia.  If patient continues to have hemoptysis, may pursue bronchoscopy.  Continue to hold off patient's Coumadin for now.  Benign essential hypertension  Continue PTA labetalol 50 mg daily  Trend blood pressure  Hyperlipidemia  Continue statin  Severe obstructive sleep apnea  Cannot tolerate CPAP  Wears 2 L nasal cannula  Chronic pain syndrome  Gabapentin 300 mg at bedtime  As needed oxycodone for severe back pain-PCP Rx  Chronic obstructive pulmonary disease (HCC)  History COPD, is nasal cannula dependent  PTA medications include Wixela, albuterol  Continue Breo daily, nebulized bronchodilators  Appreciate pulmonary consultation  Thrombocytopenia (HCC)  Platelet count down to 128  Was 130s to 140s in July and October from previous normal counts  Evaluated by heme-onc in the office in October, planning on follow-up  Aspirin on hold in setting of hemoptysis  Paroxysmal atrial fibrillation (HCC)  Rate controlled A-fib on admission  EKG  Continue PTA amiodarone 200 mg daily  Chronically anticoagulated with warfarin, currently on hold with hemoptysis  Goal INR 2-3  Supratherapeutic INR.  Check daily PT/INR  Adjust treatment accordingly.  Chronic combined systolic and diastolic congestive heart failure (HCC)  Wt Readings from Last 3 Encounters:   10/23/24 105 kg (231 lb 4.2 oz)   10/17/24 102 kg (225 lb)   10/10/24 102 kg (224 lb)   Mildly volume overloaded on admission; diuresed well with IV Lasix, given in the emergency room.  Bilateral lower extremity lymphedema at baseline  Received IV furosemide in ED, continue PTA torsemide at 40 mg daily  Intake output, daily weights  2 g sodium diet, fluid restriction  Chronic respiratory failure with hypoxia (HCC)  Chronically wearing 2 L nasal cannula since discharge from hospital in June  Attends pulmonary rehab twice weekly  Iron deficiency anemia, unspecified  Hemoglobin 8.2 on admission; hemoglobin today went down to 7.9.  Was planning on outpatient IV iron infusion this week.  Discussed with the patient and okay with receiving IV Venofer here.  Trend hemoglobin in setting of hemoptysis  Subclinical hypothyroidism  Update TSH while on amiodarone  No home medications  CAD (coronary artery disease)  CAD s/p CABG  AAA s/p repair  Follows with cardiology  Aspirin on hold due to hemoptysis  Continue statin    VTE Pharmacologic Prophylaxis: VTE Score: 4 Moderate Risk (Score 3-4) - Pharmacological DVT Prophylaxis Contraindicated. Sequential Compression Devices Ordered.    Mobility:   Basic Mobility Inpatient Raw Score: 17  JH-HLM Goal: 5: Stand one or more mins  JH-HLM Achieved: 8: Walk 250 feet ot more  JH-HLM Goal NOT achieved. Continue with multidisciplinary rounding and encourage appropriate mobility to improve upon JH-HLM goals.    Patient Centered Rounds: I performed bedside rounds with nursing staff today.   Discussions with Specialists or Other Care Team Provider: Case management.    Education and  Discussions with Family / Patient: Patient declined call to .     Current Length of Stay: 1 day(s)  Current Patient Status: Inpatient   Certification Statement: The patient will continue to require additional inpatient hospital stay due to findings and plans.  Discharge Plan:  Still to be determined.  If continues not having hemoptysis tomorrow, and patient continued to improve, possible discharge tomorrow.    Code Status: Level 1 - Full Code    Subjective   Patient was seen and examined this morning.  Patient was doing fine and better.  Patient denied any shortness of breath and denied any more having hemoptysis.  Patient still has some cough, but not as bad as before.  Patient denied any pains or any nausea or vomiting or any fever or chills or dizziness.    Objective :  Temp:  [97.7 °F (36.5 °C)-98.2 °F (36.8 °C)] 97.9 °F (36.6 °C)  HR:  [63-69] 66  BP: (113-145)/(54-66) 141/64  Resp:  [16-22] 18  SpO2:  [92 %-100 %] 99 %  O2 Device: Nasal cannula  Nasal Cannula O2 Flow Rate (L/min):  [2 L/min] 2 L/min    Body mass index is 32.25 kg/m².     Input and Output Summary (last 24 hours):     Intake/Output Summary (Last 24 hours) at 10/23/2024 2030  Last data filed at 10/23/2024 1905  Gross per 24 hour   Intake 960 ml   Output 1100 ml   Net -140 ml       Physical Exam  Vitals and nursing note reviewed.   Constitutional:       General: He is not in acute distress.     Appearance: He is not ill-appearing, toxic-appearing or diaphoretic.   Cardiovascular:      Rate and Rhythm: Normal rate and regular rhythm.      Heart sounds: Normal heart sounds.   Pulmonary:      Effort: Pulmonary effort is normal. No respiratory distress.      Breath sounds: Normal breath sounds. No stridor. No wheezing, rhonchi or rales.   Abdominal:      General: Bowel sounds are normal. There is no distension.      Palpations: Abdomen is soft.      Tenderness: There is no abdominal tenderness. There is no guarding.   Musculoskeletal:       Right lower leg: Edema present.      Left lower leg: Edema present.      Comments: (Patient has history of chronic bilateral lower extremity lymphedema).   Skin:     General: Skin is warm.      Coloration: Skin is not pale.      Findings: No erythema or rash.   Neurological:      General: No focal deficit present.      Mental Status: He is alert and oriented to person, place, and time. Mental status is at baseline.   Psychiatric:         Mood and Affect: Mood normal.         Behavior: Behavior normal.         Thought Content: Thought content normal.           Lines/Drains:              Lab Results: I have reviewed the following results:   Results from last 7 days   Lab Units 10/23/24  0543   WBC Thousand/uL 4.06*   HEMOGLOBIN g/dL 7.9*   HEMATOCRIT % 25.7*   PLATELETS Thousands/uL 106*   SEGS PCT % 67   LYMPHO PCT % 14   MONO PCT % 15*   EOS PCT % 2     Results from last 7 days   Lab Units 10/23/24  0543 10/22/24  1757   SODIUM mmol/L 138 137   POTASSIUM mmol/L 4.1 4.6   CHLORIDE mmol/L 98 97   CO2 mmol/L 32 33*   BUN mg/dL 25 23   CREATININE mg/dL 1.45* 1.54*   ANION GAP mmol/L 8 7   CALCIUM mg/dL 8.6 9.1   ALBUMIN g/dL  --  3.7   TOTAL BILIRUBIN mg/dL  --  1.09*   ALK PHOS U/L  --  87   ALT U/L  --  24   AST U/L  --  32   GLUCOSE RANDOM mg/dL 94 89     Results from last 7 days   Lab Units 10/23/24  0543   INR  3.10*             Results from last 7 days   Lab Units 10/23/24  0543 10/22/24  1757   PROCALCITONIN ng/ml <0.05 <0.05       Recent Cultures (last 7 days):         Imaging Results Review: I reviewed radiology reports from this admission including: chest xray and CT chest.  Other Study Results Review: EKG was reviewed.     Last 24 Hours Medication List:     Current Facility-Administered Medications:     acetaminophen (TYLENOL) tablet 650 mg, Q6H PRN    albuterol inhalation solution 2.5 mg, Q4H PRN    amiodarone tablet 200 mg, Daily    atorvastatin (LIPITOR) tablet 40 mg, Daily    azithromycin (ZITHROMAX)  tablet 500 mg, Q24H    ceftriaxone (ROCEPHIN) 1 g/50 mL in dextrose IVPB, Q24H, Last Rate: 1,000 mg (10/23/24 0022)    Cholecalciferol (VITAMIN D3) tablet 2,000 Units, Daily    Fluticasone Furoate-Vilanterol 100-25 mcg/actuation 1 puff, Daily    gabapentin (NEURONTIN) capsule 300 mg, HS    ipratropium (ATROVENT) 0.02 % inhalation solution 0.5 mg, TID    labetalol (NORMODYNE) tablet 50 mg, Daily    levalbuterol (XOPENEX) inhalation solution 0.63 mg, TID    oxyCODONE (ROXICODONE) IR tablet 5 mg, Q6H PRN    polyethylene glycol (MIRALAX) packet 17 g, HS    senna (SENOKOT) tablet 8.6 mg, HS PRN    tamsulosin (FLOMAX) capsule 0.4 mg, Daily With Dinner    torsemide (DEMADEX) tablet 40 mg, Daily    Administrative Statements   Today, Patient Was Seen By: Andreas Singleton MD      **Please Note: This note may have been constructed using a voice recognition system.**

## 2024-10-23 NOTE — CONSULTS
Consultation - Pulmonology   Name: Oscar Espinosa 84 y.o. male I MRN: [id number]  Unit/Bed#: -01 I Date of Admission: 10/22/2024   Date of Service: 10/23/2024 I Hospital Day: 1   Consults  Physician Requesting Evaluation: Andreas Villarreal*   Reason for Evaluation / Principal Problem: Hemoptysis    Assessment & Plan  Hemoptysis  Patient has a one week history of worsening dyspnea,   4 day history of productive cough, initially yellowish green and transitioned to brown sputum  Patient states 2 days ago it became maroon colored  Yesterday prior to admission 3 episodes of maroon colored sputum  No marlyn hemoptysis or bright red blood.  Vitals on admission 96% on room air.  Hemoglobin on admission 8.2. Stable from patients baseline of around 8.  CBC no leukocytosis, procal <0.05.   Flu/covid negative  CXR: Enlarged cardiac silhouette, mild pulmonary vascular congestion, small bibasilar effusions.  CT wo contrast  B/l pleural effusions, and passive lower lobe atelectasis. There is prominence of the interstitial markings, especially in the lower lung fields, consider a component of fluid overload/CHF. An interstitial pneumonitis such as NSIP is also a  differential consideration. Some patchy opacities are seen in the lower lung fields which could represent edema, atelectasis, and/or infection. Possible consolidation in left lower lobe.  Patient has history of HFrEF with an EF 45% on last echo in April 2024. Patient noted to have approximately 16 lbs weight gain  Patient noticed increased leg swelling in the past week, after taking off compressive leg wraps.    Hemoptysis unclear etiology possibly 2/2 bronchitis vs in the setting of CHF exacerbation in the setting of recent weight gain and leg swelling. Less likely 2/2 to be PNA given no leukocytosis, normal pro calcitonin.ddx also includes pulmonary cancer, less likely tuberculosis, autoimmune etiology.    Plan   Continue holding coumadin  Continue  antibiotics and f/u sputum cultures  Recommend further diuresis  Will consider bronchoscopy if no improvement with diuresis   Recommend to consider non vitamin K oral anticoagulant  Chronic combined systolic and diastolic congestive heart failure (HCC)  Wt Readings from Last 3 Encounters:   10/23/24 105 kg (231 lb 4.2 oz)   10/17/24 102 kg (225 lb)   10/10/24 102 kg (224 lb)   Last echocardiogram April 2024 showed LVEF45%  Patient has history of chronic lymphedema,  Endorses worsening leg swelling in the past week   Between 10-16lbs of weight gain since 09/13/24.  CT findings of b/l pleural effusions, and prominence of interstitial markings especially in lower lung fields likely secondary to fluid overload, CHF exacerbation.    Plan  Recommend further diuresis  Chronic obstructive pulmonary disease (HCC)  Last PFT 2021,FEV/FVC ratio 71. FVC 74% as predicted.  Patient on 2L nasal cannula at home.  PTA medications include Wixela, albuterol    Plan   Breo-ellipta 1 puff daily.  Xenopex/Atrovent nebulizers TID  Continue PTA albuterol  Chronic respiratory failure with hypoxia (HCC)  Chronically wearing 2 L nasal cannula since discharge from hospital in June  Attends pulmonary rehab twice weekly  Plan  Continue on nasal cannula  Monitor O2 requirements  Severe obstructive sleep apnea  Patient has history of YEVGENIY  Noncompliant with CPAP    Plan  Continue with nasal cannula   Iron deficiency anemia, unspecified  Hemoglobin 8.2, appears stable from baseline of around 8.  Recommend inpatient iron panel and treatment  Trend hemoglobin in setting of hemoptysis  Benign essential hypertension    Hyperlipidemia    Subclinical hypothyroidism    CAD (coronary artery disease)    Chronic pain syndrome    Thrombocytopenia (HCC)    Paroxysmal atrial fibrillation (HCC)      History of Present Illness   Oscar Espinosa is a 84 y.o. male with a PMH of A-fib on warfarin, COPD on home O2, YEVGENIY, CAD s/p CABG, AARTI, thrombocytopenia who presents  with one week history of worsening dyspnea, and 2 day history of hemoptysis. Patient noticed about a 1 week ago, patient became more short of breath, he noticed this while walking to the garage he became more short of breath while on 2L NC with desataturations going down to low 80's, or while he was working in his garage, it would go down to upper 70's. Recently about 4 days ago patient noticed began having a  cough, that was productive and was a dark brown color, 2 days ago patient noticed maroon colored sputum, he states this cough mostly occurs in the morning. Hemoptysis yesterday brought him into the hospital. Patient sleeps in the recliner, a week ago took off leg wraps, and noticed increased leg swelling He denies fever,sore throat,  headaches, ear pain, sinus pain, no night sweats, weight loss, history of incarceration chest pain, abdominal pain, nausea, or vomiting, good appetite, no diarrhea. No hematuria, no dysuria. No recent urine color changes. He does endorse increased urinary frequency due to BPH.      Review of Systems    Historical Information   I have reviewed the patient's PMH, PSH, Social History, Family History, Meds, and Allergies  Tobacco History: 40 pack year smoking history, quit about 10 years ago  Occupational History: retired  Family History:  Family History   Problem Relation Age of Onset    Heart disease Mother     Stroke Father         stroke syndrome    Aneurysm Brother         abdominal    Aortic aneurysm Brother         ascending    Coronary artery disease Family     Hypertension Family     Other Son         gioblastoma multiforme       Objective :  Temp:  [97.7 °F (36.5 °C)-98.5 °F (36.9 °C)] 97.8 °F (36.6 °C)  HR:  [63-70] 63  BP: (113-154)/(54-75) 132/63  Resp:  [16-22] 19  SpO2:  [92 %-96 %] 93 %  O2 Device: Nasal cannula  Nasal Cannula O2 Flow Rate (L/min):  [2 L/min] 2 L/min    Physical Exam  Constitutional:       General: He is not in acute distress.     Appearance: He is not  ill-appearing.   HENT:      Nose: Nose normal. No congestion or rhinorrhea.      Mouth/Throat:      Mouth: Mucous membranes are moist.      Pharynx: No oropharyngeal exudate or posterior oropharyngeal erythema.   Eyes:      General: No scleral icterus.  Cardiovascular:      Rate and Rhythm: Normal rate and regular rhythm.   Pulmonary:      Effort: No respiratory distress.      Breath sounds: No stridor. Rhonchi present. No rales.   Musculoskeletal:      Right lower leg: Edema (Bilateral tense, lower extremity edema) present.      Left lower leg: Edema present.           Lab Results: I have reviewed the following results:  .     10/22/24  1757 10/22/24  1932 10/23/24  0543   WBC 4.55  --  4.06*   HGB 8.2*  --  7.9*   HCT 27.0*  --  25.7*   *  --  106*   SODIUM 137  --  138   K 4.6  --  4.1   CL 97  --  98   CO2 33*  --  32   BUN 23  --  25   CREATININE 1.54*  --  1.45*   GLUC 89  --  94   MG  --   --  2.1   AST 32  --   --    ALT 24  --   --    ALB 3.7  --   --    TBILI 1.09*  --   --    ALKPHOS 87  --   --    INR  --   --  3.10*   HSTNI0 13  --   --    HSTNI2  --  12  --    *  --   --      ABG: No new results in last 24 hours.    Imaging Results Review: I reviewed radiology reports from this admission including: chest xray and CT chest.  Other Study Results Review: EKG was reviewed.   PFT Results Reviewed: reviewed    VTE Prophylaxis:

## 2024-10-23 NOTE — ASSESSMENT & PLAN NOTE
History COPD, is nasal cannula dependent  PTA medications include Wixela, albuterol  Continue Breo daily, nebulized bronchodilators  Appreciate pulmonary consultation

## 2024-10-23 NOTE — PLAN OF CARE
Problem: PAIN - ADULT  Goal: Verbalizes/displays adequate comfort level or baseline comfort level  Description: Interventions:  - Encourage patient to monitor pain and request assistance  - Assess pain using appropriate pain scale  - Administer analgesics based on type and severity of pain and evaluate response  - Implement non-pharmacological measures as appropriate and evaluate response  - Consider cultural and social influences on pain and pain management  - Notify physician/advanced practitioner if interventions unsuccessful or patient reports new pain  Outcome: Progressing     Problem: INFECTION - ADULT  Goal: Absence or prevention of progression during hospitalization  Description: INTERVENTIONS:  - Assess and monitor for signs and symptoms of infection  - Monitor lab/diagnostic results  - Monitor all insertion sites, i.e. indwelling lines, tubes, and drains  - Monitor endotracheal if appropriate and nasal secretions for changes in amount and color  - [place] appropriate cooling/warming therapies per order  - Administer medications as ordered  - Instruct and encourage patient and family to use good hand hygiene technique  - Identify and instruct in appropriate isolation precautions for identified infection/condition  Outcome: Progressing  Goal: Absence of fever/infection during neutropenic period  Description: INTERVENTIONS:  - Monitor WBC    Outcome: Progressing     Problem: SAFETY ADULT  Goal: Patient will remain free of falls  Description: INTERVENTIONS:  - Educate patient/family on patient safety including physical limitations  - Instruct patient to call for assistance with activity   - Consult OT/PT to assist with strengthening/mobility   - Keep Call bell within reach  - Keep bed low and locked with side rails adjusted as appropriate  - Keep care items and personal belongings within reach  - Initiate and maintain comfort rounds  - Make Fall Risk Sign visible to staff  - Offer Toileting every 2 Hours,  in advance of need  - Initiate/Maintain bed/chair alarm  - Obtain necessary fall risk management equipment: yellow bracelet/socks  - Apply yellow socks and bracelet for high fall risk patients  - Consider moving patient to room near nurses station  Outcome: Progressing  Goal: Maintain or return to baseline ADL function  Description: INTERVENTIONS:  -  Assess patient's ability to carry out ADLs; assess patient's baseline for ADL function and identify physical deficits which impact ability to perform ADLs (bathing, care of mouth/teeth, toileting, grooming, dressing, etc.)  - Assess/evaluate cause of self-care deficits   - Assess range of motion  - Assess patient's mobility; develop plan if impaired  - Assess patient's need for assistive devices and provide as appropriate  - Encourage maximum independence but intervene and supervise when necessary  - Involve family in performance of ADLs  - Assess for home care needs following discharge   - Consider OT consult to assist with ADL evaluation and planning for discharge  - Provide patient education as appropriate  Outcome: Progressing  Goal: Maintains/Returns to pre admission functional level  Description: INTERVENTIONS:  - Perform AM-PAC 6 Click Basic Mobility/ Daily Activity assessment daily.  - Set and communicate daily mobility goal to care team and patient/family/caregiver.   - Collaborate with rehabilitation services on mobility goals if consulted  - Perform Range of Motion 3 times a day.  - Reposition patient every 2 hours.  - Dangle patient 3 times a day  - Stand patient 3 times a day  - Ambulate patient 3 times a day  - Out of bed to chair 3 times a day   - Out of bed for meals 3 times a day  - Out of bed for toileting  - Record patient progress and toleration of activity level   Outcome: Progressing     Problem: DISCHARGE PLANNING  Goal: Discharge to home or other facility with appropriate resources  Description: INTERVENTIONS:  - Identify barriers to discharge  w/patient and caregiver  - Arrange for needed discharge resources and transportation as appropriate  - Identify discharge learning needs (meds, wound care, etc.)  - Arrange for interpretive services to assist at discharge as needed  - Refer to Case Management Department for coordinating discharge planning if the patient needs post-hospital services based on physician/advanced practitioner order or complex needs related to functional status, cognitive ability, or social support system  Outcome: Progressing     Problem: Knowledge Deficit  Goal: Patient/family/caregiver demonstrates understanding of disease process, treatment plan, medications, and discharge instructions  Description: Complete learning assessment and assess knowledge base.  Interventions:  - Provide teaching at level of understanding  - Provide teaching via preferred learning methods  Outcome: Progressing     Problem: RESPIRATORY - ADULT  Goal: Achieves optimal ventilation and oxygenation  Description: INTERVENTIONS:  - Assess for changes in respiratory status  - Assess for changes in mentation and behavior  - Position to facilitate oxygenation and minimize respiratory effort  - Oxygen administered by appropriate delivery if ordered  - Initiate smoking cessation education as indicated  - Encourage broncho-pulmonary hygiene including cough, deep breathe, Incentive Spirometry  - Assess the need for suctioning and aspirate as needed  - Assess and instruct to report SOB or any respiratory difficulty  - Respiratory Therapy support as indicated  Outcome: Progressing     Problem: GASTROINTESTINAL - ADULT  Goal: Maintains or returns to baseline bowel function  Description: INTERVENTIONS:  - Assess bowel function  - Encourage oral fluids to ensure adequate hydration  - Administer IV fluids if ordered to ensure adequate hydration  - Administer ordered medications as needed  - Encourage mobilization and activity  - Consider nutritional services referral to assist  patient with adequate nutrition and appropriate food choices  Outcome: Progressing  Goal: Maintains adequate nutritional intake  Description: INTERVENTIONS:  - Monitor percentage of each meal consumed  - Identify factors contributing to decreased intake, treat as appropriate  - Assist with meals as needed  - Monitor I&O, weight, and lab values if indicated  - Obtain nutrition services referral as needed  Outcome: Progressing  Goal: Oral mucous membranes remain intact  Description: INTERVENTIONS  - Assess oral mucosa and hygiene practices  - Implement preventative oral hygiene regimen  - Implement oral medicated treatments as ordered  - Initiate Nutrition services referral as needed  Outcome: Progressing

## 2024-10-23 NOTE — ASSESSMENT & PLAN NOTE
Last PFT 2021,FEV/FVC ratio 71. FVC 74% as predicted.  Patient on 2L nasal cannula at home.  PTA medications include Wixela, albuterol    Plan   Breo-ellipta 1 puff daily.  Xenopex/Atrovent nebulizers TID  Continue PTA albuterol

## 2024-10-23 NOTE — ASSESSMENT & PLAN NOTE
Chronically wearing 2 L nasal cannula since discharge from hospital in June  Attends pulmonary rehab twice weekly

## 2024-10-24 ENCOUNTER — HOSPITAL ENCOUNTER (OUTPATIENT)
Dept: INFUSION CENTER | Facility: HOSPITAL | Age: 84
Discharge: HOME | End: 2024-10-24
Attending: INTERNAL MEDICINE

## 2024-10-24 PROBLEM — N18.9 CKD (CHRONIC KIDNEY DISEASE): Chronic | Status: ACTIVE | Noted: 2024-10-24

## 2024-10-24 LAB
ANION GAP SERPL CALCULATED.3IONS-SCNC: 6 MMOL/L (ref 4–13)
BUN SERPL-MCNC: 26 MG/DL (ref 5–25)
CALCIUM SERPL-MCNC: 8.7 MG/DL (ref 8.4–10.2)
CHLORIDE SERPL-SCNC: 98 MMOL/L (ref 96–108)
CO2 SERPL-SCNC: 32 MMOL/L (ref 21–32)
CREAT SERPL-MCNC: 1.52 MG/DL (ref 0.6–1.3)
ERYTHROCYTE [DISTWIDTH] IN BLOOD BY AUTOMATED COUNT: 18 % (ref 11.6–15.1)
GFR SERPL CREATININE-BSD FRML MDRD: 41 ML/MIN/1.73SQ M
GLUCOSE SERPL-MCNC: 93 MG/DL (ref 65–140)
HCT VFR BLD AUTO: 25.5 % (ref 36.5–49.3)
HGB BLD-MCNC: 7.7 G/DL (ref 12–17)
HOLD SPECIMEN: NORMAL
INR PPP: 2.97 (ref 0.85–1.19)
MCH RBC QN AUTO: 28.1 PG (ref 26.8–34.3)
MCHC RBC AUTO-ENTMCNC: 30.2 G/DL (ref 31.4–37.4)
MCV RBC AUTO: 93 FL (ref 82–98)
PLATELET # BLD AUTO: 118 THOUSANDS/UL (ref 149–390)
PMV BLD AUTO: 11.3 FL (ref 8.9–12.7)
POTASSIUM SERPL-SCNC: 4.1 MMOL/L (ref 3.5–5.3)
PROTHROMBIN TIME: 31.5 SECONDS (ref 12.3–15)
RBC # BLD AUTO: 2.74 MILLION/UL (ref 3.88–5.62)
SODIUM SERPL-SCNC: 136 MMOL/L (ref 135–147)
WBC # BLD AUTO: 4.54 THOUSAND/UL (ref 4.31–10.16)

## 2024-10-24 PROCEDURE — 94760 N-INVAS EAR/PLS OXIMETRY 1: CPT

## 2024-10-24 PROCEDURE — 87449 NOS EACH ORGANISM AG IA: CPT | Performed by: STUDENT IN AN ORGANIZED HEALTH CARE EDUCATION/TRAINING PROGRAM

## 2024-10-24 PROCEDURE — 94640 AIRWAY INHALATION TREATMENT: CPT

## 2024-10-24 PROCEDURE — 80048 BASIC METABOLIC PNL TOTAL CA: CPT | Performed by: INTERNAL MEDICINE

## 2024-10-24 PROCEDURE — 99232 SBSQ HOSP IP/OBS MODERATE 35: CPT | Performed by: INTERNAL MEDICINE

## 2024-10-24 PROCEDURE — 85610 PROTHROMBIN TIME: CPT | Performed by: NURSE PRACTITIONER

## 2024-10-24 PROCEDURE — 85027 COMPLETE CBC AUTOMATED: CPT | Performed by: INTERNAL MEDICINE

## 2024-10-24 RX ADMIN — POLYETHYLENE GLYCOL 3350 17 G: 17 POWDER, FOR SOLUTION ORAL at 21:00

## 2024-10-24 RX ADMIN — LEVALBUTEROL HYDROCHLORIDE 0.63 MG: 0.63 SOLUTION RESPIRATORY (INHALATION) at 19:48

## 2024-10-24 RX ADMIN — ATORVASTATIN CALCIUM 40 MG: 40 TABLET, FILM COATED ORAL at 08:57

## 2024-10-24 RX ADMIN — IPRATROPIUM BROMIDE 0.5 MG: 0.5 SOLUTION RESPIRATORY (INHALATION) at 15:24

## 2024-10-24 RX ADMIN — LEVALBUTEROL HYDROCHLORIDE 0.63 MG: 0.63 SOLUTION RESPIRATORY (INHALATION) at 10:32

## 2024-10-24 RX ADMIN — TORSEMIDE 40 MG: 20 TABLET ORAL at 08:57

## 2024-10-24 RX ADMIN — LEVALBUTEROL HYDROCHLORIDE 0.63 MG: 0.63 SOLUTION RESPIRATORY (INHALATION) at 15:24

## 2024-10-24 RX ADMIN — Medication 2000 UNITS: at 08:57

## 2024-10-24 RX ADMIN — IPRATROPIUM BROMIDE 0.5 MG: 0.5 SOLUTION RESPIRATORY (INHALATION) at 19:48

## 2024-10-24 RX ADMIN — TAMSULOSIN HYDROCHLORIDE 0.4 MG: 0.4 CAPSULE ORAL at 17:10

## 2024-10-24 RX ADMIN — GABAPENTIN 300 MG: 300 CAPSULE ORAL at 21:00

## 2024-10-24 RX ADMIN — AMIODARONE HYDROCHLORIDE 200 MG: 200 TABLET ORAL at 08:57

## 2024-10-24 RX ADMIN — IRON SUCROSE 200 MG: 20 INJECTION, SOLUTION INTRAVENOUS at 18:56

## 2024-10-24 RX ADMIN — IPRATROPIUM BROMIDE 0.5 MG: 0.5 SOLUTION RESPIRATORY (INHALATION) at 10:32

## 2024-10-24 RX ADMIN — LABETALOL HYDROCHLORIDE 50 MG: 100 TABLET, FILM COATED ORAL at 08:59

## 2024-10-24 NOTE — PROGRESS NOTES
Progress Note - Pulmonology   Name: Oscar Espinosa 84 y.o. male I MRN: [id number]  Unit/Bed#: -01 I Date of Admission: 10/22/2024   Date of Service: 10/24/2024 I Hospital Day: 2    Assessment & Plan  Hemoptysis  Patient has a one week history of worsening dyspnea,   4 day history of productive cough, initially yellowish green and transitioned to brown sputum  Patient states 2 days ago it became maroon colored  Prior to admission 3 episodes of maroon colored sputum  No marlyn hemoptysis or bright red blood.  Vitals on admission 96% on room air.  Hemoglobin on admission 8.2. Stable from patients baseline of around 8.  CBC no leukocytosis, procal <0.05.   Flu/covid negative  CXR: Enlarged cardiac silhouette, mild pulmonary vascular congestion, small bibasilar effusions.  CT wo contrast  B/l pleural effusions, and passive lower lobe atelectasis. There is prominence of the interstitial markings, especially in the lower lung fields, consider a component of fluid overload/CHF. An interstitial pneumonitis such as NSIP is also a  differential consideration. Some patchy opacities are seen in the lower lung fields which could represent edema, atelectasis, and/or infection. Possible consolidation in left lower lobe.  Patient has history of HFrEF with an EF 45% on last echo in April 2024. Patient noted to have approximately 16 lbs weight gain  Patient noticed increased leg swelling in the past week, after taking off compressive leg wraps.    Hemoptysis in the setting of recent purulent mucus production/combined systolic and diastolic heart failure/COPD with chronic hypoxic respiratory failure/iron deficiency anemia     Hemoptysis possibly 2/2 tracheobronchitis. Less likely PNA given no leukocytosis, normal pro calcitonin however will treat with antibiotics for possible early PNA and follow sputum culutres. ddx also includes pulmonary cancer, less likely tuberculosis, autoimmune etiology.    Plan   Continue holding  coumadin  Continue ceftriaxone and azithromycine and f/u sputum cultures  Recommend further diuresis  Will consider bronchoscopy if hemoptysis still ongoing  Recommend to consider non vitamin K oral anticoagulant  Chronic combined systolic and diastolic congestive heart failure (HCC)  Wt Readings from Last 3 Encounters:   10/23/24 105 kg (231 lb 4.2 oz)   10/17/24 102 kg (225 lb)   10/10/24 102 kg (224 lb)   Last echocardiogram April 2024 showed LVEF45%  Patient has history of chronic lymphedema,  Endorses worsening leg swelling in the past week   Between 10-16lbs of weight gain since 09/13/24.  CT findings of b/l pleural effusions, and prominence of interstitial markings especially in lower lung fields likely secondary to fluid overload, CHF exacerbation.    Plan  Recommend further diuresis  Chronic obstructive pulmonary disease (HCC)  Last PFT 2021,FEV/FVC ratio 71. FVC 74% as predicted.  Patient on 2L nasal cannula at home.  PTA medications include Wixela, albuterol    Plan   Breo-ellipta 1 puff daily.  Xenopex/Atrovent nebulizers TID  Continue PTA albuterol  Chronic respiratory failure with hypoxia (HCC)  Chronically wearing 2 L nasal cannula since discharge from hospital in June  Attends pulmonary rehab twice weekly  Plan  Continue on nasal cannula  Monitor O2 requirements  Severe obstructive sleep apnea  Patient has history of YEVGENIY  Noncompliant with CPAP    Plan  Continue with nasal cannula   Iron deficiency anemia, unspecified  Hemoglobin 8.2, appears stable from baseline of around 8.  Received 1 dose of IV venofer  Trend hemoglobin in setting of hemoptysis  Benign essential hypertension    Hyperlipidemia    Abnormal thyroid function test    CAD (coronary artery disease)    Chronic pain syndrome    Thrombocytopenia (HCC)    Paroxysmal atrial fibrillation (HCC)    CKD (chronic kidney disease)  Lab Results   Component Value Date    EGFR 41 10/24/2024    EGFR 44 10/23/2024    EGFR 41 10/22/2024    CREATININE  1.52 (H) 10/24/2024    CREATININE 1.45 (H) 10/23/2024    CREATININE 1.54 (H) 10/22/2024       24 Hour Events : no significant 24 hour events  Subjective : Patient states he had 3 episodes of cough with clear sputum yesterday night, . Patient endorses a maroon colored sputum production this morning. No other episodes of hemoptysis noted. Patient denies any fevers, chills, chest pain.     Objective :  Temp:  [97.9 °F (36.6 °C)-98 °F (36.7 °C)] 98 °F (36.7 °C)  HR:  [63-72] 72  BP: (126-141)/(58-64) 126/58  Resp:  [18-20] 20  SpO2:  [91 %-100 %] 100 %  O2 Device: Nasal cannula  Nasal Cannula O2 Flow Rate (L/min):  [2 L/min] 2 L/min    Physical Exam  Constitutional:       General: He is not in acute distress.     Appearance: He is not ill-appearing.   HENT:      Nose: Nose normal. No congestion or rhinorrhea.      Mouth/Throat:      Mouth: Mucous membranes are moist.      Pharynx: No oropharyngeal exudate or posterior oropharyngeal erythema.   Eyes:      General: No scleral icterus.  Cardiovascular:      Rate and Rhythm: Normal rate and regular rhythm.   Pulmonary:      Effort: No respiratory distress.      Breath sounds: No stridor. No rhonchi or rales.   Musculoskeletal:      Right lower leg: Edema present.      Left lower leg: Edema present.           Lab Results: I have reviewed the following results:   .     10/24/24  0504   WBC 4.54   HGB 7.7*   HCT 25.5*   *   SODIUM 136   K 4.1   CL 98   CO2 32   BUN 26*   CREATININE 1.52*   GLUC 93   INR 2.97*     ABG: No new results in last 24 hours.    Imaging Results Review: I reviewed radiology reports from this admission including: chest xray.

## 2024-10-24 NOTE — ASSESSMENT & PLAN NOTE
Improving spontaneously.  Platelet count down to 128  Was 130s to 140s in July and October from previous normal counts  Evaluated by heme-onc in the office in October, planning on follow-up  Aspirin on hold in setting of hemoptysis

## 2024-10-24 NOTE — ASSESSMENT & PLAN NOTE
Rate controlled A-fib on admission EKG  Continue PTA amiodarone 200 mg daily  Chronically anticoagulated with warfarin, currently on hold with hemoptysis  Goal INR 2-3  Status post supratherapeutic INR.  Check daily PT/INR  As per pulmonologist, continue to hold off on Coumadin for today.  If hemoptysis completely resolved tomorrow, may restart Coumadin.  Adjust treatment accordingly.

## 2024-10-24 NOTE — ASSESSMENT & PLAN NOTE
Wt Readings from Last 3 Encounters:   10/24/24 103 kg (226 lb 3.1 oz)   10/17/24 102 kg (225 lb)   10/10/24 102 kg (224 lb)   Mildly volume overloaded on admission; diuresed well with IV Lasix, given in the emergency room.  Bilateral lower extremity lymphedema at baseline  Received IV furosemide in ED, continue PTA torsemide at 40 mg daily  Intake output, daily weights  2 g sodium diet, fluid restriction

## 2024-10-24 NOTE — ASSESSMENT & PLAN NOTE
Patient has a one week history of worsening dyspnea,   4 day history of productive cough, initially yellowish green and transitioned to brown sputum  Patient states 2 days ago it became maroon colored  Prior to admission 3 episodes of maroon colored sputum  No marlyn hemoptysis or bright red blood.  Vitals on admission 96% on room air.  Hemoglobin on admission 8.2. Stable from patients baseline of around 8.  CBC no leukocytosis, procal <0.05.   Flu/covid negative  CXR: Enlarged cardiac silhouette, mild pulmonary vascular congestion, small bibasilar effusions.  CT wo contrast  B/l pleural effusions, and passive lower lobe atelectasis. There is prominence of the interstitial markings, especially in the lower lung fields, consider a component of fluid overload/CHF. An interstitial pneumonitis such as NSIP is also a  differential consideration. Some patchy opacities are seen in the lower lung fields which could represent edema, atelectasis, and/or infection. Possible consolidation in left lower lobe.  Patient has history of HFrEF with an EF 45% on last echo in April 2024. Patient noted to have approximately 16 lbs weight gain  Patient noticed increased leg swelling in the past week, after taking off compressive leg wraps.    Hemoptysis in the setting of recent purulent mucus production/combined systolic and diastolic heart failure/COPD with chronic hypoxic respiratory failure/iron deficiency anemia     Hemoptysis possibly 2/2 tracheobronchitis. Less likely PNA given no leukocytosis, normal pro calcitonin however will treat with antibiotics for possible early PNA and follow sputum culutres. ddx also includes pulmonary cancer, less likely tuberculosis, autoimmune etiology.    Plan   Continue holding coumadin  Continue ceftriaxone and azithromycine and f/u sputum cultures  Recommend further diuresis  Will consider bronchoscopy if hemoptysis still ongoing  Recommend to consider non vitamin K oral anticoagulant

## 2024-10-24 NOTE — PROGRESS NOTES
Progress Note - Hospitalist   Name: Oscar Espinosa 84 y.o. male I MRN: [id number]  Unit/Bed#: S -01 I Date of Admission: 10/22/2024   Date of Service: 10/24/2024 I Hospital Day: 2     Assessment & Plan  Hemoptysis  Presents with hemoptysis from home over the past 2 days, had 3 episodes on day of admission described as blood mixed with sputum  Reports increased dyspnea over the past week, change in normal morning sputum as it turned to tan 4 days ago, brown 3 days ago and then with blood streaks over the past 2 days  CT chest revealing patchy opacities in the lower fields  Treat for suspected underlying infectious process with ceftriaxone/azithromycin for now.  If no further evidence of an active bacterial infection and if pulmonologist is okay, may discontinue antibiotics.  Check procalcitonin: Negative x 2.  Check sputum culture: Follow-up results.  Appreciate pulmonary consultation: Patient's bloody mucus production likely secondary to tracheobronchitis versus early pneumonia.  If patient continues to have hemoptysis, may pursue bronchoscopy.  Continue to hold off patient's Coumadin for now.  Benign essential hypertension  Continue PTA labetalol 50 mg daily  Trend blood pressure  Hyperlipidemia  Continue statin  Severe obstructive sleep apnea  Cannot tolerate CPAP  Wears 2 L nasal cannula  Chronic pain syndrome  Gabapentin 300 mg at bedtime  As needed oxycodone for severe back pain-PCP Rx  Chronic obstructive pulmonary disease (HCC)  History COPD, is nasal cannula dependent  PTA medications include Wixela, albuterol  Continue Breo daily, nebulized bronchodilators  Appreciate pulmonary consultation  Thrombocytopenia (HCC)  Improving spontaneously.  Platelet count down to 128  Was 130s to 140s in July and October from previous normal counts  Evaluated by heme-onc in the office in October, planning on follow-up  Aspirin on hold in setting of hemoptysis  Paroxysmal atrial fibrillation (HCC)  Rate controlled  A-fib on admission EKG  Continue PTA amiodarone 200 mg daily  Chronically anticoagulated with warfarin, currently on hold with hemoptysis  Goal INR 2-3  Status post supratherapeutic INR.  Check daily PT/INR  As per pulmonologist, continue to hold off on Coumadin for today.  If hemoptysis completely resolved tomorrow, may restart Coumadin.  Adjust treatment accordingly.  Chronic combined systolic and diastolic congestive heart failure (HCC)  Wt Readings from Last 3 Encounters:   10/24/24 103 kg (226 lb 3.1 oz)   10/17/24 102 kg (225 lb)   10/10/24 102 kg (224 lb)   Mildly volume overloaded on admission; diuresed well with IV Lasix, given in the emergency room.  Bilateral lower extremity lymphedema at baseline  Received IV furosemide in ED, continue PTA torsemide at 40 mg daily  Intake output, daily weights  2 g sodium diet, fluid restriction  Chronic respiratory failure with hypoxia (HCC)  Chronically wearing 2 L nasal cannula since discharge from hospital in June  Attends pulmonary rehab twice weekly  Iron deficiency anemia, unspecified  Hemoglobin 8.2 on admission; hemoglobin today went down to 7.7.  Was planning on outpatient IV iron infusion this week.  Discussed with the patient and okay with receiving IV Venofer here.  Being given another dose today.  Trend hemoglobin in setting of hemoptysis  Abnormal thyroid function test  Update TSH while on amiodarone  TSH and free T4 mildly elevated.  Likely due to amiodarone.  No home medications  Recommending recheck in 4 to 6 weeks.  CAD (coronary artery disease)  CAD s/p CABG  AAA s/p repair  Follows with cardiology  Aspirin on hold due to hemoptysis  Continue statin  CKD (chronic kidney disease)  Lab Results   Component Value Date    EGFR 41 10/24/2024    EGFR 44 10/23/2024    EGFR 41 10/22/2024    CREATININE 1.52 (H) 10/24/2024    CREATININE 1.45 (H) 10/23/2024    CREATININE 1.54 (H) 10/22/2024   Since October 1, 2024, patient's serum creatinine had been stable at  around 1.5.  On previous states before this, patient's serum creatinine running around 1.15-1.32.  Continue to monitor.  Avoid nephrotoxins.  Avoid hypotension.  Monitor input and output.    VTE Pharmacologic Prophylaxis: VTE Score: 4 Moderate Risk (Score 3-4) - Pharmacological DVT Prophylaxis Ordered: warfarin (Coumadin).    Mobility:   Basic Mobility Inpatient Raw Score: 17  JH-HLM Goal: 5: Stand one or more mins  JH-HLM Achieved: 7: Walk 25 feet or more  JH-HLM Goal NOT achieved. Continue with multidisciplinary rounding and encourage appropriate mobility to improve upon JH-HLM goals.    Patient Centered Rounds: I performed bedside rounds with nursing staff today.   Discussions with Specialists or Other Care Team Provider: .    Education and Discussions with Family / Patient: Updated  (wife) at bedside.    Current Length of Stay: 2 day(s)  Current Patient Status: Inpatient   Certification Statement: The patient will continue to require additional inpatient hospital stay due to above findings and plans.  Discharge Plan: Anticipate discharge in 24-48 hrs to home.    Code Status: Level 1 - Full Code    Subjective   Patient was seen and examined this morning.  Patient had another episode of hemoptysis this morning.  Patient admitted to occasional shortness of breath most especially on exertion, as well as generalized weakness.  Patient still has occasional cough.  Patient denied any pains or any nausea or vomiting or any fever or chills or dizziness.    Objective :  Temp:  [97.7 °F (36.5 °C)-98 °F (36.7 °C)] 97.7 °F (36.5 °C)  HR:  [63-72] 65  BP: (118-130)/(51-60) 118/51  Resp:  [18-20] 20  SpO2:  [91 %-100 %] 97 %  O2 Device: Nasal cannula  Nasal Cannula O2 Flow Rate (L/min):  [2 L/min] 2 L/min    Body mass index is 31.55 kg/m².     Input and Output Summary (last 24 hours):     Intake/Output Summary (Last 24 hours) at 10/24/2024 6701  Last data filed at 10/24/2024 0902  Gross per 24 hour    Intake 360 ml   Output 1250 ml   Net -890 ml       Physical Exam  Vitals and nursing note reviewed. Exam conducted with a chaperone present.   Constitutional:       General: He is not in acute distress.     Appearance: He is not ill-appearing, toxic-appearing or diaphoretic.   Cardiovascular:      Rate and Rhythm: Normal rate and regular rhythm.      Heart sounds: Normal heart sounds.   Pulmonary:      Effort: Pulmonary effort is normal. No respiratory distress.      Breath sounds: Normal breath sounds.   Abdominal:      General: Bowel sounds are normal. There is no distension.      Palpations: Abdomen is soft.      Tenderness: There is no abdominal tenderness.   Musculoskeletal:      Right lower leg: Edema present.      Left lower leg: Edema present.      Comments: Patient has history of chronic bilateral lower extremity lymphedema (from my discussion with the patient and compared to the last time I saw him, extent of the edema was better than before).   Skin:     General: Skin is warm.      Coloration: Skin is not pale.      Findings: No erythema or rash.   Neurological:      General: No focal deficit present.      Mental Status: He is alert and oriented to person, place, and time. Mental status is at baseline.   Psychiatric:         Mood and Affect: Mood normal.         Behavior: Behavior normal.         Thought Content: Thought content normal.           Lines/Drains:              Lab Results: I have reviewed the following results:   Results from last 7 days   Lab Units 10/24/24  0504 10/23/24  0543   WBC Thousand/uL 4.54 4.06*   HEMOGLOBIN g/dL 7.7* 7.9*   HEMATOCRIT % 25.5* 25.7*   PLATELETS Thousands/uL 118* 106*   SEGS PCT %  --  67   LYMPHO PCT %  --  14   MONO PCT %  --  15*   EOS PCT %  --  2     Results from last 7 days   Lab Units 10/24/24  0504 10/23/24  0543 10/22/24  1757   SODIUM mmol/L 136   < > 137   POTASSIUM mmol/L 4.1   < > 4.6   CHLORIDE mmol/L 98   < > 97   CO2 mmol/L 32   < > 33*   BUN  mg/dL 26*   < > 23   CREATININE mg/dL 1.52*   < > 1.54*   ANION GAP mmol/L 6   < > 7   CALCIUM mg/dL 8.7   < > 9.1   ALBUMIN g/dL  --   --  3.7   TOTAL BILIRUBIN mg/dL  --   --  1.09*   ALK PHOS U/L  --   --  87   ALT U/L  --   --  24   AST U/L  --   --  32   GLUCOSE RANDOM mg/dL 93   < > 89    < > = values in this interval not displayed.     Results from last 7 days   Lab Units 10/24/24  0504   INR  2.97*             Results from last 7 days   Lab Units 10/23/24  0543 10/22/24  1757   PROCALCITONIN ng/ml <0.05 <0.05       Recent Cultures (last 7 days):   Results from last 7 days   Lab Units 10/23/24  1909   SPUTUM CULTURE  Culture too young- will reincubate   GRAM STAIN RESULT  No polys seen*  2+ Gram positive cocci in chains*  1+ Gram negative rods*       Imaging Results Review: I reviewed radiology reports from this admission including: chest xray and CT chest.  Other Study Results Review: No additional pertinent studies reviewed.    Last 24 Hours Medication List:     Current Facility-Administered Medications:     acetaminophen (TYLENOL) tablet 650 mg, Q6H PRN    albuterol inhalation solution 2.5 mg, Q4H PRN    amiodarone tablet 200 mg, Daily    atorvastatin (LIPITOR) tablet 40 mg, Daily    azithromycin (ZITHROMAX) tablet 500 mg, Q24H    ceftriaxone (ROCEPHIN) 1 g/50 mL in dextrose IVPB, Q24H, Last Rate: Stopped (10/24/24 0007)    Cholecalciferol (VITAMIN D3) tablet 2,000 Units, Daily    Fluticasone Furoate-Vilanterol 100-25 mcg/actuation 1 puff, Daily    gabapentin (NEURONTIN) capsule 300 mg, HS    ipratropium (ATROVENT) 0.02 % inhalation solution 0.5 mg, TID    iron sucrose (VENOFER) 200 mg in sodium chloride 0.9 % 100 mL IVPB, Once    labetalol (NORMODYNE) tablet 50 mg, Daily    levalbuterol (XOPENEX) inhalation solution 0.63 mg, TID    oxyCODONE (ROXICODONE) IR tablet 5 mg, Q6H PRN    polyethylene glycol (MIRALAX) packet 17 g, HS    senna (SENOKOT) tablet 8.6 mg, HS PRN    tamsulosin (FLOMAX) capsule 0.4  mg, Daily With Dinner    torsemide (DEMADEX) tablet 40 mg, Daily    Administrative Statements   Today, Patient Was Seen By: Andreas Singleton MD      **Please Note: This note may have been constructed using a voice recognition system.**

## 2024-10-24 NOTE — ASSESSMENT & PLAN NOTE
Hemoglobin 8.2 on admission; hemoglobin today went down to 7.7.  Was planning on outpatient IV iron infusion this week.  Discussed with the patient and okay with receiving IV Venofer here.  Being given another dose today.  Trend hemoglobin in setting of hemoptysis

## 2024-10-24 NOTE — ASSESSMENT & PLAN NOTE
Lab Results   Component Value Date    EGFR 41 10/24/2024    EGFR 44 10/23/2024    EGFR 41 10/22/2024    CREATININE 1.52 (H) 10/24/2024    CREATININE 1.45 (H) 10/23/2024    CREATININE 1.54 (H) 10/22/2024

## 2024-10-24 NOTE — ASSESSMENT & PLAN NOTE
Hemoglobin 8.2, appears stable from baseline of around 8.  Received 1 dose of IV venofer  Trend hemoglobin in setting of hemoptysis

## 2024-10-24 NOTE — PLAN OF CARE
Problem: PAIN - ADULT  Goal: Verbalizes/displays adequate comfort level or baseline comfort level  Description: Interventions:  - Encourage patient to monitor pain and request assistance  - Assess pain using appropriate pain scale  - Administer analgesics based on type and severity of pain and evaluate response  - Implement non-pharmacological measures as appropriate and evaluate response  - Consider cultural and social influences on pain and pain management  - Notify physician/advanced practitioner if interventions unsuccessful or patient reports new pain  Outcome: Progressing     Problem: INFECTION - ADULT  Goal: Absence or prevention of progression during hospitalization  Description: INTERVENTIONS:  - Assess and monitor for signs and symptoms of infection  - Monitor lab/diagnostic results  - Monitor all insertion sites, i.e. indwelling lines, tubes, and drains  - Monitor endotracheal if appropriate and nasal secretions for changes in amount and color  - [place] appropriate cooling/warming therapies per order  - Administer medications as ordered  - Instruct and encourage patient and family to use good hand hygiene technique  - Identify and instruct in appropriate isolation precautions for identified infection/condition  Outcome: Progressing

## 2024-10-24 NOTE — ASSESSMENT & PLAN NOTE
Lab Results   Component Value Date    EGFR 41 10/24/2024    EGFR 44 10/23/2024    EGFR 41 10/22/2024    CREATININE 1.52 (H) 10/24/2024    CREATININE 1.45 (H) 10/23/2024    CREATININE 1.54 (H) 10/22/2024   Since October 1, 2024, patient's serum creatinine had been stable at around 1.5.  On previous states before this, patient's serum creatinine running around 1.15-1.32.  Continue to monitor.  Avoid nephrotoxins.  Avoid hypotension.  Monitor input and output.

## 2024-10-24 NOTE — ASSESSMENT & PLAN NOTE
Update TSH while on amiodarone  TSH and free T4 mildly elevated.  Likely due to amiodarone.  No home medications  Recommending recheck in 4 to 6 weeks.

## 2024-10-25 ENCOUNTER — TELEPHONE (OUTPATIENT)
Dept: HEMATOLOGY ONCOLOGY | Facility: CLINIC | Age: 84
End: 2024-10-25

## 2024-10-25 VITALS
BODY MASS INDEX: 31.08 KG/M2 | WEIGHT: 222 LBS | HEIGHT: 71 IN | RESPIRATION RATE: 18 BRPM | HEART RATE: 62 BPM | OXYGEN SATURATION: 94 % | TEMPERATURE: 97.3 F | SYSTOLIC BLOOD PRESSURE: 113 MMHG | DIASTOLIC BLOOD PRESSURE: 80 MMHG

## 2024-10-25 PROBLEM — R04.2 HEMOPTYSIS: Status: RESOLVED | Noted: 2024-10-22 | Resolved: 2024-10-25

## 2024-10-25 LAB
ANION GAP SERPL CALCULATED.3IONS-SCNC: 6 MMOL/L (ref 4–13)
BACTERIA SPT RESP CULT: ABNORMAL
BUN SERPL-MCNC: 25 MG/DL (ref 5–25)
CALCIUM SERPL-MCNC: 8.8 MG/DL (ref 8.4–10.2)
CHLORIDE SERPL-SCNC: 99 MMOL/L (ref 96–108)
CO2 SERPL-SCNC: 33 MMOL/L (ref 21–32)
CREAT SERPL-MCNC: 1.43 MG/DL (ref 0.6–1.3)
ERYTHROCYTE [DISTWIDTH] IN BLOOD BY AUTOMATED COUNT: 18.1 % (ref 11.6–15.1)
GFR SERPL CREATININE-BSD FRML MDRD: 44 ML/MIN/1.73SQ M
GLUCOSE SERPL-MCNC: 91 MG/DL (ref 65–140)
GRAM STN SPEC: ABNORMAL
HCT VFR BLD AUTO: 27.3 % (ref 36.5–49.3)
HGB BLD-MCNC: 8.2 G/DL (ref 12–17)
INR PPP: 2.51 (ref 0.85–1.19)
L PNEUMO1 AG UR QL IA.RAPID: NEGATIVE
MCH RBC QN AUTO: 27.9 PG (ref 26.8–34.3)
MCHC RBC AUTO-ENTMCNC: 30 G/DL (ref 31.4–37.4)
MCV RBC AUTO: 93 FL (ref 82–98)
PLATELET # BLD AUTO: 124 THOUSANDS/UL (ref 149–390)
PMV BLD AUTO: 11.7 FL (ref 8.9–12.7)
POTASSIUM SERPL-SCNC: 4 MMOL/L (ref 3.5–5.3)
PROTHROMBIN TIME: 27.7 SECONDS (ref 12.3–15)
RBC # BLD AUTO: 2.94 MILLION/UL (ref 3.88–5.62)
S PNEUM AG UR QL: NEGATIVE
SODIUM SERPL-SCNC: 138 MMOL/L (ref 135–147)
WBC # BLD AUTO: 4.13 THOUSAND/UL (ref 4.31–10.16)

## 2024-10-25 PROCEDURE — 99239 HOSP IP/OBS DSCHRG MGMT >30: CPT | Performed by: INTERNAL MEDICINE

## 2024-10-25 PROCEDURE — 80048 BASIC METABOLIC PNL TOTAL CA: CPT | Performed by: INTERNAL MEDICINE

## 2024-10-25 PROCEDURE — 85027 COMPLETE CBC AUTOMATED: CPT | Performed by: INTERNAL MEDICINE

## 2024-10-25 PROCEDURE — 94760 N-INVAS EAR/PLS OXIMETRY 1: CPT

## 2024-10-25 PROCEDURE — 94640 AIRWAY INHALATION TREATMENT: CPT

## 2024-10-25 PROCEDURE — 85610 PROTHROMBIN TIME: CPT | Performed by: NURSE PRACTITIONER

## 2024-10-25 PROCEDURE — 99232 SBSQ HOSP IP/OBS MODERATE 35: CPT | Performed by: STUDENT IN AN ORGANIZED HEALTH CARE EDUCATION/TRAINING PROGRAM

## 2024-10-25 RX ORDER — CEFDINIR 300 MG/1
300 CAPSULE ORAL EVERY 12 HOURS SCHEDULED
Qty: 8 CAPSULE | Refills: 0 | Status: SHIPPED | OUTPATIENT
Start: 2024-10-25 | End: 2024-10-29

## 2024-10-25 RX ORDER — CEFDINIR 300 MG/1
300 CAPSULE ORAL EVERY 12 HOURS SCHEDULED
Qty: 8 CAPSULE | Refills: 0 | Status: SHIPPED | OUTPATIENT
Start: 2024-10-25 | End: 2024-10-25

## 2024-10-25 RX ADMIN — Medication 2000 UNITS: at 09:24

## 2024-10-25 RX ADMIN — LABETALOL HYDROCHLORIDE 50 MG: 100 TABLET, FILM COATED ORAL at 09:25

## 2024-10-25 RX ADMIN — TORSEMIDE 40 MG: 20 TABLET ORAL at 09:24

## 2024-10-25 RX ADMIN — IRON SUCROSE 200 MG: 20 INJECTION, SOLUTION INTRAVENOUS at 14:51

## 2024-10-25 RX ADMIN — LEVALBUTEROL HYDROCHLORIDE 0.63 MG: 0.63 SOLUTION RESPIRATORY (INHALATION) at 14:01

## 2024-10-25 RX ADMIN — IPRATROPIUM BROMIDE 0.5 MG: 0.5 SOLUTION RESPIRATORY (INHALATION) at 14:01

## 2024-10-25 RX ADMIN — AZITHROMYCIN DIHYDRATE 500 MG: 250 TABLET ORAL at 00:03

## 2024-10-25 RX ADMIN — ATORVASTATIN CALCIUM 40 MG: 40 TABLET, FILM COATED ORAL at 09:25

## 2024-10-25 RX ADMIN — CEFTRIAXONE 1000 MG: 2 INJECTION, POWDER, FOR SOLUTION INTRAMUSCULAR; INTRAVENOUS at 00:11

## 2024-10-25 RX ADMIN — LEVALBUTEROL HYDROCHLORIDE 0.63 MG: 0.63 SOLUTION RESPIRATORY (INHALATION) at 07:46

## 2024-10-25 RX ADMIN — IPRATROPIUM BROMIDE 0.5 MG: 0.5 SOLUTION RESPIRATORY (INHALATION) at 07:46

## 2024-10-25 RX ADMIN — AMIODARONE HYDROCHLORIDE 200 MG: 200 TABLET ORAL at 09:25

## 2024-10-25 NOTE — ASSESSMENT & PLAN NOTE
Update TSH while on amiodarone  TSH and free T4 mildly elevated.  Likely due to amiodarone.  No home medications  Recommending recheck in 4 to 6 weeks.  PCP may facilitate this.  Outpatient follow-up with PCP.

## 2024-10-25 NOTE — PROGRESS NOTES
Progress Note - Pulmonology   Name: Oscar Espinosa 84 y.o. male I MRN: [id number]  Unit/Bed#: S -01 I Date of Admission: 10/22/2024   Date of Service: 10/25/2024 I Hospital Day: 3    Assessment & Plan  Hemoptysis  Patient has a one week history of worsening dyspnea,   4 day history of productive cough, initially yellowish green and transitioned to brown sputum  Patient states 2 days ago it became maroon colored  Prior to admission 3 episodes of maroon colored sputum  No marlyn hemoptysis or bright red blood.  Vitals on admission 96% on room air.  Hemoglobin on admission 8.2. Stable from patients baseline of around 8.  CBC no leukocytosis, procal <0.05.   Flu/covid negative  CXR: Enlarged cardiac silhouette, mild pulmonary vascular congestion, small bibasilar effusions.  CT wo contrast  B/l pleural effusions, and passive lower lobe atelectasis. There is prominence of the interstitial markings, especially in the lower lung fields, consider a component of fluid overload/CHF. An interstitial pneumonitis such as NSIP is also a  differential consideration. Some patchy opacities are seen in the lower lung fields which could represent edema, atelectasis, and/or infection. Possible consolidation in left lower lobe.  Patient has history of HFrEF with an EF 45% on last echo in April 2024. Patient noted to have approximately 16 lbs weight gain  Patient noticed increased leg swelling in the past week, after taking off compressive leg wraps.    Hemoptysis in the setting of recent purulent mucus production/combined systolic and diastolic heart failure/COPD with chronic hypoxic respiratory failure/iron deficiency anemia     Hemoptysis possibly 2/2 tracheobronchitis. Less likely PNA given no leukocytosis, normal pro calcitonin however will treat with antibiotics for possible early PNA and follow sputum culutres. ddx also includes pulmonary cancer, less likely tuberculosis, autoimmune etiology.  Negative urine antigen for  legionella, strep pneumonia  Sputum culture 2+ gram positive cocci in chains, 1+ gram negative rods    Plan   Can restart coumadin  Will d/c azithromycin  Recommend 4 days oral cefdinir for 7 total days of abx.  Patient clear for dc from pulmonology standpoint  F/u with pulmonology outpatient in 2 weeks  Continue pulmonary rehab outpatient.  Recommend to consider non vitamin K oral anticoagulant  Chronic combined systolic and diastolic congestive heart failure (HCC)  Wt Readings from Last 3 Encounters:   10/25/24 101 kg (222 lb 0.1 oz)   10/17/24 102 kg (225 lb)   10/10/24 102 kg (224 lb)   Last echocardiogram April 2024 showed LVEF45%  Patient has history of chronic lymphedema,  Endorses worsening leg swelling in the past week   Between 10-16lbs of weight gain since 09/13/24.  CT findings of b/l pleural effusions, and prominence of interstitial markings especially in lower lung fields likely secondary to fluid overload, CHF exacerbation.    Plan  Continue PTA lasix  Chronic obstructive pulmonary disease (HCC)  Last PFT 2021,FEV/FVC ratio 71. FVC 74% as predicted.  Patient on 2L nasal cannula at home.  PTA medications include Wixela, albuterol    Plan   Breo-ellipta 1 puff daily.  Xenopex/Atrovent nebulizers TID  Continue PTA albuterol  Chronic respiratory failure with hypoxia (HCC)  Chronically wearing 2 L nasal cannula since discharge from hospital in June  Attends pulmonary rehab twice weekly  Plan  Continue on nasal cannula  Monitor O2 requirements  Severe obstructive sleep apnea  Patient has history of YEVGENIY  Noncompliant with CPAP    Plan  Continue with nasal cannula   Iron deficiency anemia, unspecified  Hemoglobin 8.2, appears stable from baseline of around 8.  Received 2 dose of IV venofer  Trend hemoglobin in setting of hemoptysis    24 Hour Events : Episode of maroon colored sputum yesterday morning, received iron infusion yesterday  Subjective : Patient states his has not had any more maroon colored sputum  since yesterday morning. Patient states he coughed about twice yesterday and was only productive of white sputum. He denies any fevers, chillls, chest pain. He does endorse dyspnea upon exertion. Is on his baseline 2L NC saturating 95%.    Objective :  Temp:  [97.4 °F (36.3 °C)-97.7 °F (36.5 °C)] 97.4 °F (36.3 °C)  HR:  [61-71] 71  BP: (116-122)/(51-61) 122/61  Resp:  [16] 16  SpO2:  [87 %-100 %] 90 %  O2 Device: Nasal cannula  Nasal Cannula O2 Flow Rate (L/min):  [1.5 L/min-2 L/min] 1.5 L/min    Physical Exam  Constitutional:       General: He is not in acute distress.     Appearance: He is not ill-appearing.   HENT:      Nose: Nose normal. No congestion or rhinorrhea.      Mouth/Throat:      Mouth: Mucous membranes are moist.      Pharynx: No oropharyngeal exudate or posterior oropharyngeal erythema.   Eyes:      General: No scleral icterus.  Cardiovascular:      Rate and Rhythm: Normal rate and regular rhythm.   Pulmonary:      Effort: No respiratory distress.      Breath sounds: No stridor. No rhonchi or rales.   Musculoskeletal:      Right lower leg: Edema present.      Left lower leg: Edema present.         Lab Results: I have reviewed the following results:   .     10/25/24  0437   WBC 4.13*   HGB 8.2*   HCT 27.3*   *   SODIUM 138   K 4.0   CL 99   CO2 33*   BUN 25   CREATININE 1.43*   GLUC 91   INR 2.51*     ABG: No new results in last 24 hours.

## 2024-10-25 NOTE — ASSESSMENT & PLAN NOTE
Wt Readings from Last 3 Encounters:   10/25/24 101 kg (222 lb 0.1 oz)   10/17/24 102 kg (225 lb)   10/10/24 102 kg (224 lb)   Mildly volume overloaded on admission; diuresed well with IV Lasix, given in the emergency room.  Bilateral lower extremity lymphedema at baseline  Received IV furosemide in ED, continue PTA torsemide at 40 mg daily  Intake output, daily weights  2 g sodium diet, fluid restriction

## 2024-10-25 NOTE — DISCHARGE INSTR - AVS FIRST PAGE
As per discussion with you, take your Coumadin 2.5 mg once a day (do not take extra doses beyond the 2.5 mg in the meantime).  You should have a PT and INR blood work in 1 week.  I am also recommending rechecking/monitoring your complete blood count and basic metabolic profile (blood work) in the outpatient.  Your primary care physician and/or your other outpatient doctors may facilitate these.  If you have significant bleeding, shortness of breath, pains, fever or chills, persistent cough, dizziness, nausea/vomiting, diarrhea, call your primary care physician and/or go to the nearest emergency room.  Continue with your oxygen as previously prescribed.  As per pulmonologist, you are again ordered to continue with pulmonary rehab.  Pulmonologist asked me to reorder it again so that you will have more rehabilitation sessions.  As discussed with you and from my discussion with Erica Hoover PA-C, she will call you for your next iron infusion next week.

## 2024-10-25 NOTE — ASSESSMENT & PLAN NOTE
Continue PTA labetalol 50 mg daily  Status post Trend blood pressure  Outpatient follow-up with primary care physician.

## 2024-10-25 NOTE — DISCHARGE SUMMARY
Discharge Summary - Hospitalist   Name: Oscar Espinosa 84 y.o. male I MRN: [id number]  Unit/Bed#: S -01 I Date of Admission: 10/22/2024   Date of Service: 10/25/2024 I Hospital Day: 3     Assessment & Plan  Hemoptysis (Resolved: 10/25/2024)  Resolved.  Presents with hemoptysis from home over the past 2 days, had 3 episodes on day of admission described as blood mixed with sputum  Reports increased dyspnea over the past week, change in normal morning sputum as it turned to tan 4 days ago, brown 3 days ago and then with blood streaks over the past 2 days  CT chest revealing patchy opacities in the lower fields  Treat for suspected underlying infectious process with ceftriaxone/azithromycin for now. Check procalcitonin: Negative x 2.  Check sputum culture: Mixed respiratory jairo.  Appreciate pulmonary consultation: Patient's bloody mucus production likely secondary to tracheobronchitis.  If patient continues to have hemoptysis, may pursue bronchoscopy.  Status post holding of patient's Coumadin.  As per discussion with pulmonologist, patient may be discharged from his standpoint and follow-up with them in the office.  He recommended prescription of cefdinir to complete a total course of antibiotic for 7 days.  As per pulmonologist, he recommended to renew pulmonary rehabilitation order.  Thus this was done.  Benign essential hypertension  Continue PTA labetalol 50 mg daily  Status post Trend blood pressure  Outpatient follow-up with primary care physician.  Hyperlipidemia  Continue statin  Outpatient follow-up with PCP.  Severe obstructive sleep apnea  Cannot tolerate CPAP  Wears 2 L nasal cannula  Outpatient follow-up with pulmonologist.  Chronic pain syndrome  Gabapentin 300 mg at bedtime  As needed oxycodone for severe back pain-PCP Rx  Outpatient follow-up with PCP.  Chronic obstructive pulmonary disease (HCC)  History COPD, is nasal cannula dependent  PTA medications include Wixela, albuterol  Continue  Breo daily, nebulized bronchodilators while in the hospital.  Appreciate pulmonary consultation  Continue patient's COPD medications at home.  Outpatient follow-up with pulmonologist.  Thrombocytopenia (HCC)  Improving spontaneously.  Platelet count down to 128  Was 130s to 140s in July and October from previous normal counts  Evaluated by heme-onc in the office in October, planning on follow-up  Aspirin on hold in setting of hemoptysis  Paroxysmal atrial fibrillation (HCC)  Rate controlled A-fib on admission EKG  Continue PTA amiodarone 200 mg daily  Chronically anticoagulated with warfarin, was on hold due to patient's previous hemoptysis  Goal INR 2-3  Status post supratherapeutic INR.  Monitor PT/INR in the outpatient.  As per pulmonologist, may resume patient's Coumadin on discharge.  From my discussion with the patient, he takes 2.5 mg of Coumadin most of the days of the week and there were times he is scheduled to take 3.75 mg of Coumadin.  Thus, with patient presenting with mild supratherapeutic INR and with hemoptysis, I instructed the patient to just continue with Coumadin 2.5 mg once a day (all days of the week).  Patient's PT and INR should be monitored closely in the outpatient.  PCP may facilitate this  Adjust treatment accordingly.  Outpatient follow-up with PCP.  Chronic combined systolic and diastolic congestive heart failure (HCC)  Wt Readings from Last 3 Encounters:   10/25/24 101 kg (222 lb 0.1 oz)   10/17/24 102 kg (225 lb)   10/10/24 102 kg (224 lb)   Mildly volume overloaded on admission; diuresed well with IV Lasix, given in the emergency room.  Bilateral lower extremity lymphedema at baseline, better as compared to previous times I had seen him.  Received IV furosemide in ED, continue PTA torsemide at 40 mg daily  Intake output, daily weights  2 g sodium diet, fluid restriction  Outpatient follow-up with primary care physician/cardiologist.  Chronic respiratory failure with hypoxia  (HCC)  Chronically wearing 2 L nasal cannula since discharge from hospital in June  Attends pulmonary rehab twice weekly  Outpatient follow-up with pulmonologist.  Continue oxygen as previously prescribed.  Iron deficiency anemia, unspecified  Hemoglobin 8.2 on admission; hemoglobin went down to 7.7, but went up back to 8.2..  Was planning on outpatient IV iron infusion this week.  Discussed with the patient and okay with receiving IV Venofer here.    Spoke to patient's hematologist, Erica Hoover PA-C, may give her another dose of IV Venofer today prior to discharge, this patient received 3 doses of IV Venofer here.  Hematologist will call the patient next week to schedule his next iron infusion in the infusion center.  Monitor CBC in the outpatient.  Outpatient follow-up with PCP/hematologist.  Abnormal thyroid function test  Update TSH while on amiodarone  TSH and free T4 mildly elevated.  Likely due to amiodarone.  No home medications  Recommending recheck in 4 to 6 weeks.  PCP may facilitate this.  Outpatient follow-up with PCP.  CAD (coronary artery disease)  CAD s/p CABG  AAA s/p repair  Follows with cardiology  Aspirin on hold due to hemoptysis  Continue statin  Outpatient follow-up with cardiologist.  CKD (chronic kidney disease)  Lab Results   Component Value Date    EGFR 44 10/25/2024    EGFR 41 10/24/2024    EGFR 44 10/23/2024    CREATININE 1.43 (H) 10/25/2024    CREATININE 1.52 (H) 10/24/2024    CREATININE 1.45 (H) 10/23/2024   Since October 1, 2024, patient's serum creatinine had been stable at around 1.5.  On previous states before this, patient's serum creatinine running around 1.15-1.32.  Continue to monitor.  Avoid nephrotoxins.  Avoid hypotension.  Status post monitor input and output.  Outpatient follow-up with PCP.  Recommending recheck/monitor patient's BMP.  PCP may facilitate this.     Medical Problems       Resolved Problems  Date Reviewed: 10/25/2024            Resolved    *  (Principal) Hemoptysis 10/25/2024     Resolved by  Andreas Singleton MD        Discharging Physician / Practitioner: Andreas Singleton MD  PCP: Lea Reyes, MD  Admission Date:   Admission Orders (From admission, onward)       Ordered        10/22/24 2255  INPATIENT ADMISSION  Once                          Discharge Date: 10/25/24    Consultations During Hospital Stay:  Pulmonologist.    Procedures Performed:   Please see notes above.    Significant Findings / Test Results:   Please see notes above.    Incidental Findings:   None.    Test Results Pending at Discharge (will require follow up):   None.     Outpatient Tests Requested:  None.  However I am recommending rechecking/monitoring patient's PT and INR, CBC with differential count, BMP, thyroid function test, and serum electrolytes.  PCP may facilitate these.    Complications:   None.    Reason for Admission: Hemoptysis.    Hospital Course:   Oscar Espinosa is a 84 y.o. male patient who originally presented to the hospital on 10/22/2024 due to hemoptysis and cough.  Pulmonologist was consulted.  As per pulmonologist, patient's hemoptysis and cough likely due to tracheobronchitis.  It was also found out, the patient had supratherapeutic INR.  This patient's Coumadin was put on hold.  Patient's hemoptysis had resolved spontaneously.  Since hemoptysis had resolved, no bronchoscopy was done.  Patient was on IV antibiotics here.  Today, as per pulmonologist, he was okay with the discharge of the patient.  He recommended prescription of cefdinir to complete a 7-day course of antibiotic treatment.  Patient will follow-up with him in the office.  He also recommended for me to renew patient's pulmonary rehabilitation therapy.  Thus these were all done.  Patient had worsening anemia here, but improved with IV Venofer infusion.  Patient gets iron infusion in the outpatient.  Patient will follow-up with his hematologist.  Patient's condition improved.   "Patient's hemoptysis had resolved.  Patient was okay with with his discharge to home today.    Please see above list of diagnoses and related plan for additional information.       Condition at Discharge: stable    Discharge Day Visit / Exam:   Subjective:    Patient was seen and examined this morning and again visited the patient this afternoon.  On both occasions, patient was doing fine and better.  Patient denied any more hemoptysis.  Patient's cough also had subsided significantly.  Patient denied shortness of breath.  Patient denied any pains or any nausea or vomiting or any fever or chills.    Vitals: Blood Pressure: 113/80 (10/25/24 1601)  Pulse: 62 (10/25/24 1601)  Temperature: (!) 97.3 °F (36.3 °C) (10/25/24 1601)  Temp Source: Oral (10/24/24 2108)  Respirations: 18 (10/25/24 1601)  Height: 5' 11\" (180.3 cm) (10/24/24 1350)  Weight - Scale: 101 kg (222 lb 0.1 oz) (10/25/24 0436)  SpO2: 94 % (10/25/24 1601)  Physical Exam  Vitals and nursing note reviewed.   Constitutional:       General: He is not in acute distress.     Appearance: He is not ill-appearing, toxic-appearing or diaphoretic.   Cardiovascular:      Rate and Rhythm: Normal rate and regular rhythm.      Heart sounds: Normal heart sounds.   Pulmonary:      Effort: Pulmonary effort is normal. No respiratory distress.      Breath sounds: Normal breath sounds. No stridor. No wheezing, rhonchi or rales.   Abdominal:      General: Bowel sounds are normal. There is no distension.      Palpations: Abdomen is soft.      Tenderness: There is no abdominal tenderness. There is no guarding.   Musculoskeletal:      Right lower leg: Edema present.      Left lower leg: Edema present.      Comments: (Patient has history of chronic bilateral lower extremity lymphedema, and intensity of patient's edema had improved significantly as compared to previous times that I had him as my patient).   Skin:     General: Skin is warm.      Coloration: Skin is not pale.      " Findings: No erythema or rash.   Neurological:      General: No focal deficit present.      Mental Status: He is alert and oriented to person, place, and time. Mental status is at baseline.   Psychiatric:         Mood and Affect: Mood normal.         Behavior: Behavior normal.         Thought Content: Thought content normal.            Discussion with Family: Updated  (wife) at bedside.    Discharge instructions/Information to patient and family:   See after visit summary for information provided to patient and family.      Provisions for Follow-Up Care:  See after visit summary for information related to follow-up care and any pertinent home health orders.      Mobility at time of Discharge:   Basic Mobility Inpatient Raw Score: 22  JH-HLM Goal: 7: Walk 25 feet or more  JH-HLM Achieved: 7: Walk 25 feet or more  HLM Goal achieved. Continue to encourage appropriate mobility.     Disposition:   Home with outpatient pulmonary rehabilitation therapy.    Planned Readmission: None.    Discharge Medications:  See after visit summary for reconciled discharge medications provided to patient and/or family.      Administrative Statements   Discharge Statement:  I have spent a total time of 45 minutes in caring for this patient on the day of the visit/encounter. >30 minutes of time was spent on: Diagnostic results, Risks and benefits of tx options, Instructions for management, Patient and family education, Importance of tx compliance, Impressions, Counseling / Coordination of care, Documenting in the medical record, Reviewing / ordering tests, medicine, procedures  , and Communicating with other healthcare professionals .    **Please Note: This note may have been constructed using a voice recognition system**

## 2024-10-25 NOTE — TELEPHONE ENCOUNTER
Patient admitted 2nd to CHF exacerbation.  Had hemoptysis.  D/W Dr. Singleton.      He will be discharged next week.    Discharging him on coumadin 2.5 mg po daily.    Venofer 300mg x 3 doses.      Hgb improved from 7.7 -> 8.2 after 400mg.      Patient missed his IV iron outpatient 10/24/24    Recommend getting him r/s for outpatient IV iron next week or following week.    Have repeat labs as ordered drawn 2 weeks prior to 12/11/24 f/u

## 2024-10-25 NOTE — ASSESSMENT & PLAN NOTE
Hemoglobin 8.2 on admission; hemoglobin went down to 7.7, but went up back to 8.2..  Was planning on outpatient IV iron infusion this week.  Discussed with the patient and okay with receiving IV Venofer here.    Spoke to patient's hematologist, Erica Hoover PA-C, may give her another dose of IV Venofer today prior to discharge, this patient received 3 doses of IV Venofer here.  Hematologist will call the patient next week to schedule his next iron infusion in the infusion center.  Monitor CBC in the outpatient.  Outpatient follow-up with PCP/hematologist.

## 2024-10-25 NOTE — ASSESSMENT & PLAN NOTE
Lab Results   Component Value Date    EGFR 44 10/25/2024    EGFR 41 10/24/2024    EGFR 44 10/23/2024    CREATININE 1.43 (H) 10/25/2024    CREATININE 1.52 (H) 10/24/2024    CREATININE 1.45 (H) 10/23/2024   Since October 1, 2024, patient's serum creatinine had been stable at around 1.5.  On previous states before this, patient's serum creatinine running around 1.15-1.32.  Continue to monitor.  Avoid nephrotoxins.  Avoid hypotension.  Monitor input and output.

## 2024-10-25 NOTE — ASSESSMENT & PLAN NOTE
Gabapentin 300 mg at bedtime  As needed oxycodone for severe back pain-PCP Rx  Outpatient follow-up with PCP.

## 2024-10-25 NOTE — ASSESSMENT & PLAN NOTE
Patient has a one week history of worsening dyspnea,   4 day history of productive cough, initially yellowish green and transitioned to brown sputum  Patient states 2 days ago it became maroon colored  Prior to admission 3 episodes of maroon colored sputum  No marlyn hemoptysis or bright red blood.  Vitals on admission 96% on room air.  Hemoglobin on admission 8.2. Stable from patients baseline of around 8.  CBC no leukocytosis, procal <0.05.   Flu/covid negative  CXR: Enlarged cardiac silhouette, mild pulmonary vascular congestion, small bibasilar effusions.  CT wo contrast  B/l pleural effusions, and passive lower lobe atelectasis. There is prominence of the interstitial markings, especially in the lower lung fields, consider a component of fluid overload/CHF. An interstitial pneumonitis such as NSIP is also a  differential consideration. Some patchy opacities are seen in the lower lung fields which could represent edema, atelectasis, and/or infection. Possible consolidation in left lower lobe.  Patient has history of HFrEF with an EF 45% on last echo in April 2024. Patient noted to have approximately 16 lbs weight gain  Patient noticed increased leg swelling in the past week, after taking off compressive leg wraps.    Hemoptysis in the setting of recent purulent mucus production/combined systolic and diastolic heart failure/COPD with chronic hypoxic respiratory failure/iron deficiency anemia     Hemoptysis possibly 2/2 tracheobronchitis. Less likely PNA given no leukocytosis, normal pro calcitonin however will treat with antibiotics for possible early PNA and follow sputum culutres. ddx also includes pulmonary cancer, less likely tuberculosis, autoimmune etiology.  Negative urine antigen for legionella, strep pneumonia  Sputum culture 2+ gram positive cocci in chains, 1+ gram negative rods    Plan   Can restart coumadin  Will d/c azithromycin  Recommend 4 days oral cefdinir for 7 total days of abx.  Patient clear  for dc from pulmonology standpoint  F/u with pulmonology outpatient in 2 weeks  Continue pulmonary rehab outpatient.  Recommend to consider non vitamin K oral anticoagulant

## 2024-10-25 NOTE — ASSESSMENT & PLAN NOTE
History COPD, is nasal cannula dependent  PTA medications include Wixela, albuterol  Continue Breo daily, nebulized bronchodilators while in the hospital.  Appreciate pulmonary consultation  Continue patient's COPD medications at home.  Outpatient follow-up with pulmonologist.

## 2024-10-25 NOTE — ASSESSMENT & PLAN NOTE
Wt Readings from Last 3 Encounters:   10/25/24 101 kg (222 lb 0.1 oz)   10/17/24 102 kg (225 lb)   10/10/24 102 kg (224 lb)   Mildly volume overloaded on admission; diuresed well with IV Lasix, given in the emergency room.  Bilateral lower extremity lymphedema at baseline, better as compared to previous times I had seen him.  Received IV furosemide in ED, continue PTA torsemide at 40 mg daily  Intake output, daily weights  2 g sodium diet, fluid restriction  Outpatient follow-up with primary care physician/cardiologist.

## 2024-10-25 NOTE — ASSESSMENT & PLAN NOTE
Resolved.  Presents with hemoptysis from home over the past 2 days, had 3 episodes on day of admission described as blood mixed with sputum  Reports increased dyspnea over the past week, change in normal morning sputum as it turned to tan 4 days ago, brown 3 days ago and then with blood streaks over the past 2 days  CT chest revealing patchy opacities in the lower fields  Treat for suspected underlying infectious process with ceftriaxone/azithromycin for now. Check procalcitonin: Negative x 2.  Check sputum culture: Mixed respiratory jairo.  Appreciate pulmonary consultation: Patient's bloody mucus production likely secondary to tracheobronchitis.  If patient continues to have hemoptysis, may pursue bronchoscopy.  Status post holding of patient's Coumadin.  As per discussion with pulmonologist, patient may be discharged from his standpoint and follow-up with them in the office.  He recommended prescription of cefdinir to complete a total course of antibiotic for 7 days.  As per pulmonologist, he recommended to renew pulmonary rehabilitation order.  Thus this was done.

## 2024-10-25 NOTE — ASSESSMENT & PLAN NOTE
Wt Readings from Last 3 Encounters:   10/25/24 101 kg (222 lb 0.1 oz)   10/17/24 102 kg (225 lb)   10/10/24 102 kg (224 lb)   Last echocardiogram April 2024 showed [id number]%  Patient has history of chronic lymphedema,  Endorses worsening leg swelling in the past week   Between 10-16lbs of weight gain since 09/13/24.  CT findings of b/l pleural effusions, and prominence of interstitial markings especially in lower lung fields likely secondary to fluid overload, CHF exacerbation.    Plan  Continue PTA lasix

## 2024-10-25 NOTE — PLAN OF CARE
Problem: PAIN - ADULT  Goal: Verbalizes/displays adequate comfort level or baseline comfort level  Description: Interventions:  - Encourage patient to monitor pain and request assistance  - Assess pain using appropriate pain scale  - Administer analgesics based on type and severity of pain and evaluate response  - Implement non-pharmacological measures as appropriate and evaluate response  - Consider cultural and social influences on pain and pain management  - Notify physician/advanced practitioner if interventions unsuccessful or patient reports new pain  Outcome: Progressing     Problem: INFECTION - ADULT  Goal: Absence or prevention of progression during hospitalization  Description: INTERVENTIONS:  - Assess and monitor for signs and symptoms of infection  - Monitor lab/diagnostic results  - Monitor all insertion sites, i.e. indwelling lines, tubes, and drains  - Monitor endotracheal if appropriate and nasal secretions for changes in amount and color  - [place] appropriate cooling/warming therapies per order  - Administer medications as ordered  - Instruct and encourage patient and family to use good hand hygiene technique  - Identify and instruct in appropriate isolation precautions for identified infection/condition  Outcome: Progressing  Goal: Absence of fever/infection during neutropenic period  Description: INTERVENTIONS:  - Monitor WBC    Outcome: Progressing     Problem: SAFETY ADULT  Goal: Patient will remain free of falls  Description: INTERVENTIONS:  - Educate patient/family on patient safety including physical limitations  - Instruct patient to call for assistance with activity   - Consult OT/PT to assist with strengthening/mobility   - Keep Call bell within reach  - Keep bed low and locked with side rails adjusted as appropriate  - Keep care items and personal belongings within reach  - Initiate and maintain comfort rounds  - Make Fall Risk Sign visible to staff  - Offer Toileting every 2 Hours,  in advance of need  - Initiate/Maintain alarm  - Obtain necessary fall risk management equipment:   - Apply yellow socks and bracelet for high fall risk patients  - Consider moving patient to room near nurses station  Outcome: Progressing  Goal: Maintain or return to baseline ADL function  Description: INTERVENTIONS:  -  Assess patient's ability to carry out ADLs; assess patient's baseline for ADL function and identify physical deficits which impact ability to perform ADLs (bathing, care of mouth/teeth, toileting, grooming, dressing, etc.)  - Assess/evaluate cause of self-care deficits   - Assess range of motion  - Assess patient's mobility; develop plan if impaired  - Assess patient's need for assistive devices and provide as appropriate  - Encourage maximum independence but intervene and supervise when necessary  - Involve family in performance of ADLs  - Assess for home care needs following discharge   - Consider OT consult to assist with ADL evaluation and planning for discharge  - Provide patient education as appropriate  Outcome: Progressing  Goal: Maintains/Returns to pre admission functional level  Description: INTERVENTIONS:  - Perform AM-PAC 6 Click Basic Mobility/ Daily Activity assessment daily.  - Set and communicate daily mobility goal to care team and patient/family/caregiver.   - Collaborate with rehabilitation services on mobility goals if consulted  - Perform Range of Motion 2 times a day.  - Reposition patient every 2 hours.  - Dangle patient 2 times a day  - Stand patient 2 times a day  - Ambulate patient 2 times a day  - Out of bed to chair 2 times a day   - Out of bed for meals 2 times a day  - Out of bed for toileting  - Record patient progress and toleration of activity level   Outcome: Progressing     Problem: DISCHARGE PLANNING  Goal: Discharge to home or other facility with appropriate resources  Description: INTERVENTIONS:  - Identify barriers to discharge w/patient and caregiver  -  Arrange for needed discharge resources and transportation as appropriate  - Identify discharge learning needs (meds, wound care, etc.)  - Arrange for interpretive services to assist at discharge as needed  - Refer to Case Management Department for coordinating discharge planning if the patient needs post-hospital services based on physician/advanced practitioner order or complex needs related to functional status, cognitive ability, or social support system  Outcome: Progressing     Problem: Knowledge Deficit  Goal: Patient/family/caregiver demonstrates understanding of disease process, treatment plan, medications, and discharge instructions  Description: Complete learning assessment and assess knowledge base.  Interventions:  - Provide teaching at level of understanding  - Provide teaching via preferred learning methods  Outcome: Progressing     Problem: RESPIRATORY - ADULT  Goal: Achieves optimal ventilation and oxygenation  Description: INTERVENTIONS:  - Assess for changes in respiratory status  - Assess for changes in mentation and behavior  - Position to facilitate oxygenation and minimize respiratory effort  - Oxygen administered by appropriate delivery if ordered  - Initiate smoking cessation education as indicated  - Encourage broncho-pulmonary hygiene including cough, deep breathe, Incentive Spirometry  - Assess the need for suctioning and aspirate as needed  - Assess and instruct to report SOB or any respiratory difficulty  - Respiratory Therapy support as indicated  Outcome: Progressing     Problem: GASTROINTESTINAL - ADULT  Goal: Maintains or returns to baseline bowel function  Description: INTERVENTIONS:  - Assess bowel function  - Encourage oral fluids to ensure adequate hydration  - Administer IV fluids if ordered to ensure adequate hydration  - Administer ordered medications as needed  - Encourage mobilization and activity  - Consider nutritional services referral to assist patient with adequate  nutrition and appropriate food choices  Outcome: Progressing  Goal: Maintains adequate nutritional intake  Description: INTERVENTIONS:  - Monitor percentage of each meal consumed  - Identify factors contributing to decreased intake, treat as appropriate  - Assist with meals as needed  - Monitor I&O, weight, and lab values if indicated  - Obtain nutrition services referral as needed  Outcome: Progressing  Goal: Oral mucous membranes remain intact  Description: INTERVENTIONS  - Assess oral mucosa and hygiene practices  - Implement preventative oral hygiene regimen  - Implement oral medicated treatments as ordered  - Initiate Nutrition services referral as needed  Outcome: Progressing     Problem: Nutrition/Hydration-ADULT  Goal: Nutrient/Hydration intake appropriate for improving, restoring or maintaining nutritional needs  Description: Monitor and assess patient's nutrition/hydration status for malnutrition. Collaborate with interdisciplinary team and initiate plan and interventions as ordered.  Monitor patient's weight and dietary intake as ordered or per policy. Utilize nutrition screening tool and intervene as necessary. Determine patient's food preferences and provide high-protein, high-caloric foods as appropriate.     INTERVENTIONS:  - Monitor oral intake, urinary output, labs, and treatment plans  - Assess nutrition and hydration status and recommend course of action  - Evaluate amount of meals eaten  - Assist patient with eating if necessary   - Allow adequate time for meals  - Recommend/ encourage appropriate diets, oral nutritional supplements, and vitamin/mineral supplements  - Order, calculate, and assess calorie counts as needed  - Recommend, monitor, and adjust tube feedings and TPN/PPN based on assessed needs  - Assess need for intravenous fluids  - Provide specific nutrition/hydration education as appropriate  - Include patient/family/caregiver in decisions related to nutrition  Outcome: Progressing

## 2024-10-25 NOTE — ASSESSMENT & PLAN NOTE
CAD s/p CABG  AAA s/p repair  Follows with cardiology  Aspirin on hold due to hemoptysis  Continue statin  Outpatient follow-up with cardiologist.

## 2024-10-25 NOTE — ASSESSMENT & PLAN NOTE
Hemoglobin 8.2, appears stable from baseline of around 8.  Received 2 dose of IV venofer  Trend hemoglobin in setting of hemoptysis

## 2024-10-25 NOTE — ASSESSMENT & PLAN NOTE
Chronically wearing 2 L nasal cannula since discharge from hospital in June  Attends pulmonary rehab twice weekly  Outpatient follow-up with pulmonologist.  Continue oxygen as previously prescribed.

## 2024-10-25 NOTE — PLAN OF CARE
Problem: PAIN - ADULT  Goal: Verbalizes/displays adequate comfort level or baseline comfort level  Description: Interventions:  - Encourage patient to monitor pain and request assistance  - Assess pain using appropriate pain scale  - Administer analgesics based on type and severity of pain and evaluate response  - Implement non-pharmacological measures as appropriate and evaluate response  - Consider cultural and social influences on pain and pain management  - Notify physician/advanced practitioner if interventions unsuccessful or patient reports new pain  Outcome: Progressing     Problem: INFECTION - ADULT  Goal: Absence or prevention of progression during hospitalization  Description: INTERVENTIONS:  - Assess and monitor for signs and symptoms of infection  - Monitor lab/diagnostic results  - Monitor all insertion sites, i.e. indwelling lines, tubes, and drains  - Monitor endotracheal if appropriate and nasal secretions for changes in amount and color  - [place] appropriate cooling/warming therapies per order  - Administer medications as ordered  - Instruct and encourage patient and family to use good hand hygiene technique  - Identify and instruct in appropriate isolation precautions for identified infection/condition  Outcome: Progressing  Goal: Absence of fever/infection during neutropenic period  Description: INTERVENTIONS:  - Monitor WBC    Outcome: Progressing     Problem: SAFETY ADULT  Goal: Patient will remain free of falls  Description: INTERVENTIONS:  - Educate patient/family on patient safety including physical limitations  - Instruct patient to call for assistance with activity   - Consult OT/PT to assist with strengthening/mobility   - Keep Call bell within reach  - Keep bed low and locked with side rails adjusted as appropriate  - Keep care items and personal belongings within reach  - Initiate and maintain comfort rounds  - Make Fall Risk Sign visible to staff  - Offer Toileting every 2 Hours,  in advance of need  - Initiate/Maintain bed alarm  - Obtain necessary fall risk management equipment: yellow socks  - Apply yellow socks and bracelet for high fall risk patients  - Consider moving patient to room near nurses station  Outcome: Progressing  Goal: Maintain or return to baseline ADL function  Description: INTERVENTIONS:  -  Assess patient's ability to carry out ADLs; assess patient's baseline for ADL function and identify physical deficits which impact ability to perform ADLs (bathing, care of mouth/teeth, toileting, grooming, dressing, etc.)  - Assess/evaluate cause of self-care deficits   - Assess range of motion  - Assess patient's mobility; develop plan if impaired  - Assess patient's need for assistive devices and provide as appropriate  - Encourage maximum independence but intervene and supervise when necessary  - Involve family in performance of ADLs  - Assess for home care needs following discharge   - Consider OT consult to assist with ADL evaluation and planning for discharge  - Provide patient education as appropriate  Outcome: Progressing  Goal: Maintains/Returns to pre admission functional level  Description: INTERVENTIONS:  - Perform AM-PAC 6 Click Basic Mobility/ Daily Activity assessment daily.  - Set and communicate daily mobility goal to care team and patient/family/caregiver.   - Collaborate with rehabilitation services on mobility goals if consulted  - Perform Range of Motion 2 times a day.  - Reposition patient every 2 hours.  - Dangle patient 2 times a day  - Stand patient 2 times a day  - Ambulate patient 2 times a day  - Out of bed to chair 2 times a day   - Out of bed for meals 2 times a day  - Out of bed for toileting  - Record patient progress and toleration of activity level   Outcome: Progressing     Problem: DISCHARGE PLANNING  Goal: Discharge to home or other facility with appropriate resources  Description: INTERVENTIONS:  - Identify barriers to discharge w/patient and  caregiver  - Arrange for needed discharge resources and transportation as appropriate  - Identify discharge learning needs (meds, wound care, etc.)  - Arrange for interpretive services to assist at discharge as needed  - Refer to Case Management Department for coordinating discharge planning if the patient needs post-hospital services based on physician/advanced practitioner order or complex needs related to functional status, cognitive ability, or social support system  Outcome: Progressing     Problem: Knowledge Deficit  Goal: Patient/family/caregiver demonstrates understanding of disease process, treatment plan, medications, and discharge instructions  Description: Complete learning assessment and assess knowledge base.  Interventions:  - Provide teaching at level of understanding  - Provide teaching via preferred learning methods  Outcome: Progressing     Problem: RESPIRATORY - ADULT  Goal: Achieves optimal ventilation and oxygenation  Description: INTERVENTIONS:  - Assess for changes in respiratory status  - Assess for changes in mentation and behavior  - Position to facilitate oxygenation and minimize respiratory effort  - Oxygen administered by appropriate delivery if ordered  - Initiate smoking cessation education as indicated  - Encourage broncho-pulmonary hygiene including cough, deep breathe, Incentive Spirometry  - Assess the need for suctioning and aspirate as needed  - Assess and instruct to report SOB or any respiratory difficulty  - Respiratory Therapy support as indicated  Outcome: Progressing     Problem: GASTROINTESTINAL - ADULT  Goal: Maintains or returns to baseline bowel function  Description: INTERVENTIONS:  - Assess bowel function  - Encourage oral fluids to ensure adequate hydration  - Administer IV fluids if ordered to ensure adequate hydration  - Administer ordered medications as needed  - Encourage mobilization and activity  - Consider nutritional services referral to assist patient with  adequate nutrition and appropriate food choices  Outcome: Progressing  Goal: Maintains adequate nutritional intake  Description: INTERVENTIONS:  - Monitor percentage of each meal consumed  - Identify factors contributing to decreased intake, treat as appropriate  - Assist with meals as needed  - Monitor I&O, weight, and lab values if indicated  - Obtain nutrition services referral as needed  Outcome: Progressing  Goal: Oral mucous membranes remain intact  Description: INTERVENTIONS  - Assess oral mucosa and hygiene practices  - Implement preventative oral hygiene regimen  - Implement oral medicated treatments as ordered  - Initiate Nutrition services referral as needed  Outcome: Progressing     Problem: Nutrition/Hydration-ADULT  Goal: Nutrient/Hydration intake appropriate for improving, restoring or maintaining nutritional needs  Description: Monitor and assess patient's nutrition/hydration status for malnutrition. Collaborate with interdisciplinary team and initiate plan and interventions as ordered.  Monitor patient's weight and dietary intake as ordered or per policy. Utilize nutrition screening tool and intervene as necessary. Determine patient's food preferences and provide high-protein, high-caloric foods as appropriate.     INTERVENTIONS:  - Monitor oral intake, urinary output, labs, and treatment plans  - Assess nutrition and hydration status and recommend course of action  - Evaluate amount of meals eaten  - Assist patient with eating if necessary   - Allow adequate time for meals  - Recommend/ encourage appropriate diets, oral nutritional supplements, and vitamin/mineral supplements  - Order, calculate, and assess calorie counts as needed  - Recommend, monitor, and adjust tube feedings and TPN/PPN based on assessed needs  - Assess need for intravenous fluids  - Provide specific nutrition/hydration education as appropriate  - Include patient/family/caregiver in decisions related to nutrition  Outcome:  Progressing

## 2024-10-25 NOTE — ASSESSMENT & PLAN NOTE
Rate controlled A-fib on admission EKG  Continue PTA amiodarone 200 mg daily  Chronically anticoagulated with warfarin, was on hold due to patient's previous hemoptysis  Goal INR 2-3  Status post supratherapeutic INR.  Monitor PT/INR in the outpatient.  As per pulmonologist, may resume patient's Coumadin on discharge.  From my discussion with the patient, he takes 2.5 mg of Coumadin most of the days of the week and there were times he is scheduled to take 3.75 mg of Coumadin.  Thus, with patient presenting with mild supratherapeutic INR and with hemoptysis, I instructed the patient to just continue with Coumadin 2.5 mg once a day (all days of the week).  Patient's PT and INR should be monitored closely in the outpatient.  PCP may facilitate this  Adjust treatment accordingly.  Outpatient follow-up with PCP.

## 2024-10-25 NOTE — ASSESSMENT & PLAN NOTE
Lab Results   Component Value Date    EGFR 44 10/25/2024    EGFR 41 10/24/2024    EGFR 44 10/23/2024    CREATININE 1.43 (H) 10/25/2024    CREATININE 1.52 (H) 10/24/2024    CREATININE 1.45 (H) 10/23/2024   Since October 1, 2024, patient's serum creatinine had been stable at around 1.5.  On previous states before this, patient's serum creatinine running around 1.15-1.32.  Continue to monitor.  Avoid nephrotoxins.  Avoid hypotension.  Status post monitor input and output.  Outpatient follow-up with PCP.  Recommending recheck/monitor patient's BMP.  PCP may facilitate this.

## 2024-10-28 ENCOUNTER — CLINICAL SUPPORT (OUTPATIENT)
Dept: INTERNAL MEDICINE CLINIC | Facility: CLINIC | Age: 84
End: 2024-10-28
Payer: MEDICARE

## 2024-10-28 ENCOUNTER — CLINICAL SUPPORT (OUTPATIENT)
Dept: PULMONOLOGY | Facility: CLINIC | Age: 84
End: 2024-10-28
Payer: MEDICARE

## 2024-10-28 ENCOUNTER — APPOINTMENT (OUTPATIENT)
Dept: PULMONOLOGY | Facility: CLINIC | Age: 84
End: 2024-10-28
Payer: MEDICARE

## 2024-10-28 ENCOUNTER — TRANSITIONAL CARE MANAGEMENT (OUTPATIENT)
Dept: INTERNAL MEDICINE CLINIC | Facility: CLINIC | Age: 84
End: 2024-10-28

## 2024-10-28 DIAGNOSIS — J44.9 CHRONIC OBSTRUCTIVE PULMONARY DISEASE, UNSPECIFIED COPD TYPE (HCC): Primary | ICD-10-CM

## 2024-10-28 DIAGNOSIS — Z23 NEED FOR COVID-19 VACCINE: Primary | ICD-10-CM

## 2024-10-28 PROCEDURE — G0239 OTH RESP PROC, GROUP: HCPCS

## 2024-10-28 PROCEDURE — 90480 ADMN SARSCOV2 VAC 1/ONLY CMP: CPT

## 2024-10-28 PROCEDURE — 91320 SARSCV2 VAC 30MCG TRS-SUC IM: CPT

## 2024-10-28 NOTE — TELEPHONE ENCOUNTER
Called patient to relay message, wife Dorcas answered the phone and is on communication consent.     She verbalized understanding and is aware she will hear from the infusion schedulers to get patient scheduled for IV iron. She has no additional questions or concerns at this moment.

## 2024-10-28 NOTE — TELEPHONE ENCOUNTER
Lvm that I will call wife back between 1-2 and left my number so she can call me as well to schedule pt at [facility]

## 2024-10-30 ENCOUNTER — CLINICAL SUPPORT (OUTPATIENT)
Dept: PULMONOLOGY | Facility: CLINIC | Age: 84
End: 2024-10-30
Payer: MEDICARE

## 2024-10-30 DIAGNOSIS — E78.2 MIXED HYPERLIPIDEMIA: ICD-10-CM

## 2024-10-30 DIAGNOSIS — J44.9 CHRONIC OBSTRUCTIVE PULMONARY DISEASE, UNSPECIFIED COPD TYPE (HCC): Primary | ICD-10-CM

## 2024-10-30 PROCEDURE — G0239 OTH RESP PROC, GROUP: HCPCS

## 2024-10-30 RX ORDER — MUPIROCIN 20 MG/G
OINTMENT TOPICAL
COMMUNITY
Start: 2024-10-14

## 2024-10-30 RX ORDER — ATORVASTATIN CALCIUM 40 MG/1
40 TABLET, FILM COATED ORAL DAILY
Qty: 90 TABLET | Refills: 1 | Status: SHIPPED | OUTPATIENT
Start: 2024-10-30

## 2024-10-31 ENCOUNTER — OFFICE VISIT (OUTPATIENT)
Dept: INTERNAL MEDICINE CLINIC | Facility: CLINIC | Age: 84
End: 2024-10-31
Payer: MEDICARE

## 2024-10-31 VITALS
RESPIRATION RATE: 18 BRPM | DIASTOLIC BLOOD PRESSURE: 80 MMHG | WEIGHT: 230 LBS | HEIGHT: 71 IN | BODY MASS INDEX: 32.2 KG/M2 | OXYGEN SATURATION: 86 % | SYSTOLIC BLOOD PRESSURE: 110 MMHG | HEART RATE: 64 BPM

## 2024-10-31 DIAGNOSIS — J44.9 CHRONIC OBSTRUCTIVE PULMONARY DISEASE, UNSPECIFIED COPD TYPE (HCC): ICD-10-CM

## 2024-10-31 DIAGNOSIS — D50.9 IRON DEFICIENCY ANEMIA, UNSPECIFIED IRON DEFICIENCY ANEMIA TYPE: ICD-10-CM

## 2024-10-31 DIAGNOSIS — J96.11 CHRONIC RESPIRATORY FAILURE WITH HYPOXIA (HCC): ICD-10-CM

## 2024-10-31 DIAGNOSIS — I95.2 HYPOTENSION DUE TO DRUGS: ICD-10-CM

## 2024-10-31 DIAGNOSIS — F11.20 CONTINUOUS OPIOID DEPENDENCE (HCC): ICD-10-CM

## 2024-10-31 DIAGNOSIS — M54.16 LUMBAR RADICULOPATHY: Primary | ICD-10-CM

## 2024-10-31 DIAGNOSIS — I50.42 CHRONIC COMBINED SYSTOLIC AND DIASTOLIC CONGESTIVE HEART FAILURE (HCC): ICD-10-CM

## 2024-10-31 DIAGNOSIS — R94.6 ABNORMAL THYROID FUNCTION TEST: ICD-10-CM

## 2024-10-31 PROCEDURE — 99496 TRANSJ CARE MGMT HIGH F2F 7D: CPT | Performed by: INTERNAL MEDICINE

## 2024-10-31 RX ORDER — OXYCODONE HYDROCHLORIDE 10 MG/1
10 TABLET ORAL EVERY 6 HOURS PRN
Qty: 120 TABLET | Refills: 0 | Status: SHIPPED | OUTPATIENT
Start: 2024-10-31

## 2024-10-31 NOTE — ASSESSMENT & PLAN NOTE
Anemia suspected to be from iron deficiency   He received 3 doses of Venofer in the hospital and is scheduled to get 1 more dose  Follow-up with hematology

## 2024-10-31 NOTE — ASSESSMENT & PLAN NOTE
Poor quality of life due to limited physical activity mainly due to back pain  He has failed pain management intervention  No relief from oxycodone 5 mg  We agreed to raise it to 10 mg  Recommend that he see palliative care for pain relief especially with end-stage COPD and congestive heart failure  Orders:    oxyCODONE (ROXICODONE) 10 MG TABS; Take 1 tablet (10 mg total) by mouth every 6 (six) hours as needed for moderate pain Max Daily Amount: 40 mg    Ambulatory Referral to Palliative Care; Future

## 2024-10-31 NOTE — ASSESSMENT & PLAN NOTE
Orders:    naloxone (NARCAN) 4 mg/0.1 mL nasal spray; Administer 1 spray into a nostril. If no response after 2-3 minutes, give another dose in the other nostril using a new spray.

## 2024-10-31 NOTE — ASSESSMENT & PLAN NOTE
Reports that sometimes the systolic blood pressure is around 100  We discussed that he can skip the labetalol (50 mg) on those days that his systolic blood pressure is below 110

## 2024-10-31 NOTE — PROGRESS NOTES
Transition of Care Visit  Name: Oscar Espinosa      : 1940      MRN: [id number]  Encounter Provider: Lea Reyes, MD  Encounter Date: 10/31/2024   Enc[facility] department: [facility]    Assessment & Plan  Lumbar radiculopathy  Poor quality of life due to limited physical activity mainly due to back pain  He has failed pain management intervention  No relief from oxycodone 5 mg  We agreed to raise it to 10 mg  Recommend that he see palliative care for pain relief especially with end-stage COPD and congestive heart failure  Orders:    oxyCODONE (ROXICODONE) 10 MG TABS; Take 1 tablet (10 mg total) by mouth every 6 (six) hours as needed for moderate pain Max Daily Amount: 40 mg    Ambulatory Referral to Palliative Care; Future    Chronic combined systolic and diastolic congestive heart failure (HCC)  Wt Readings from Last 3 Encounters:   10/31/24 104 kg (230 lb)   10/25/24 101 kg (222 lb 0.1 oz)   10/17/24 102 kg (225 lb)     Recent weight gain and leg edema since hospitalization  Shortness of breath unchanged  Instructed to increase torsemide to 40 mg today Thursday until   He will then resume 20 mg  and , 40 mg   Recommended to see palliative care      Orders:    Ambulatory Referral to Palliative Care; Future    Chronic obstructive pulmonary disease, unspecified COPD type (HCC)  Oxygen dependent, compliant with treatment  Recommended to see palliative care  Orders:    Ambulatory Referral to Palliative Care; Future    Chronic respiratory failure with hypoxia (HCC)  Oxygen dependent  Orders:    Ambulatory Referral to Palliative Care; Future    Hypotension due to drugs  Reports that sometimes the systolic blood pressure is around 100  We discussed that he can skip the labetalol (50 mg) on those days that his systolic blood pressure is below 110       Continuous opioid dependence (HCC)    Orders:    naloxone (NARCAN) 4 mg/0.1 mL nasal  spray; Administer 1 spray into a nostril. If no response after 2-3 minutes, give another dose in the other nostril using a new spray.    Abnormal thyroid function test    Orders:    TSH, 3rd generation with Free T4 reflex    Iron deficiency anemia, unspecified iron deficiency anemia type  Anemia suspected to be from iron deficiency   He received 3 doses of Venofer in the hospital and is scheduled to get 1 more dose  Follow-up with hematology            History of Present Illness     Transitional Care Management Review:   Oscar Espinosa is a 84 y.o. male here for TCM follow up.     During the TCM phone call patient stated:  TCM Call       Date and time call was made  10/28/2024 11:13 AM    Hospital care reviewed  Records not available    Patient was hospitialized at  [facility]    Date of Admission  10/22/24    Date of discharge  10/25/24    Diagnosis  Hemoptysis    Disposition  Home    Were the patients medications reviewed and updated  No    Current Symptoms  None          TCM Call       Post hospital issues  None    Should patient be enrolled in anticoag monitoring?  Yes    Scheduled for follow up?  Yes    Did you obtain your prescribed medications  Yes    Do you need help managing your prescriptions or medications  No    Is transportation to your appointment needed  No    I have advised the patient to call PCP with any new or worsening symptoms  Samantha Peres MA/    Are you recieving any outpatient services  No    Are you recieving home care services  No    Types of home care services  Nurse visit; Home health aid    Are you using any community resources  No    Have you fallen in the last 12 months  No    Interperter language line needed  No    Counseling  Patient          Advanced COPD and congestive heart failure, oxygen and diuretic dependent, multiple hospitalizations  Presented to the emergency room with hemoptysis and worsening shortness of breath  He was diuresed and received intravenous  "antibiotics for pneumonia.  He was discharged on cefdinir which she has completed  Anemic suspected to be iron deficient for at least 6 months and received 3 doses of Venofer during hospital stay  Prior to this most recent hospitalization we started oxycodone for severe pain from lumbar spondylosis  He did not have any relief from 5 mg  For quality of life because of limited physical activity primarily because of low back pain      Review of Systems   Constitutional:  Positive for unexpected weight change (weight gain).   Respiratory:  Positive for cough and shortness of breath.    Cardiovascular:  Positive for leg swelling.   Gastrointestinal:  Negative for abdominal pain, constipation and diarrhea.   Musculoskeletal:  Positive for back pain.   Neurological:  Negative for dizziness and headaches.     Objective     /80 (BP Location: Left arm, Patient Position: Sitting, Cuff Size: Standard)   Pulse 64   Resp 18   Ht 5' 11\" (1.803 m)   Wt 104 kg (230 lb)   SpO2 (!) 86%   BMI 32.08 kg/m²     Physical Exam  Vitals and nursing note reviewed.   Constitutional:       General: He is not in acute distress.     Appearance: He is well-developed. He is ill-appearing.   Eyes:      Conjunctiva/sclera: Conjunctivae normal.   Cardiovascular:      Rate and Rhythm: Normal rate and regular rhythm.      Heart sounds: No murmur heard.  Pulmonary:      Effort: Pulmonary effort is normal. No respiratory distress.      Breath sounds: Normal breath sounds.   Musculoskeletal:      Cervical back: Neck supple.   Skin:     Capillary Refill: Capillary refill takes less than 2 seconds.   Neurological:      Mental Status: He is alert.   Psychiatric:         Mood and Affect: Mood is depressed.       Medications have been reviewed by provider in current encounter      "

## 2024-10-31 NOTE — PATIENT INSTRUCTIONS
Increase torsemide to 40mg daily for the next 4 days and starting on Monday, resume previous dose (40mg MWF and 20mg Tth S/S)  If systolic BP is under 110, do not take labetalol

## 2024-10-31 NOTE — ASSESSMENT & PLAN NOTE
Oxygen dependent, compliant with treatment  Recommended to see palliative care  Orders:    Ambulatory Referral to Palliative Care; Future

## 2024-10-31 NOTE — ASSESSMENT & PLAN NOTE
Wt Readings from Last 3 Encounters:   10/31/24 104 kg (230 lb)   10/25/24 101 kg (222 lb 0.1 oz)   10/17/24 102 kg (225 lb)     Recent weight gain and leg edema since hospitalization  Shortness of breath unchanged  Instructed to increase torsemide to 40 mg today Thursday until Sunday  He will then resume 20 mg Tuesday Thursday Saturday and Sunday, 40 mg Monday Wednesday Friday  Recommended to see palliative care      Orders:    Ambulatory Referral to Palliative Care; Future

## 2024-11-04 ENCOUNTER — CLINICAL SUPPORT (OUTPATIENT)
Dept: PULMONOLOGY | Facility: CLINIC | Age: 84
End: 2024-11-04
Payer: MEDICARE

## 2024-11-04 ENCOUNTER — TELEPHONE (OUTPATIENT)
Dept: INFUSION CENTER | Facility: CLINIC | Age: 84
End: 2024-11-04

## 2024-11-04 DIAGNOSIS — J44.9 CHRONIC OBSTRUCTIVE PULMONARY DISEASE, UNSPECIFIED COPD TYPE (HCC): Primary | ICD-10-CM

## 2024-11-04 DIAGNOSIS — D50.8 IRON DEFICIENCY ANEMIA SECONDARY TO INADEQUATE DIETARY IRON INTAKE: Primary | ICD-10-CM

## 2024-11-04 PROCEDURE — G0239 OTH RESP PROC, GROUP: HCPCS

## 2024-11-04 RX ORDER — SODIUM CHLORIDE 9 MG/ML
20 INJECTION, SOLUTION INTRAVENOUS ONCE
Status: CANCELLED | OUTPATIENT
Start: 2024-11-06

## 2024-11-04 NOTE — TELEPHONE ENCOUNTER
Clarified Iron orders with office RN  10/25/24 tele encounter from Erica haro references venofer 300mg x3 doses- per office this is summary of iron given inpatient- patient is to receive one dose only of feraheme as ordered

## 2024-11-04 NOTE — TELEPHONE ENCOUNTER
New pt call made, spoke to pts spouse Dorcas, all instructions provided and all questions answered

## 2024-11-04 NOTE — PROGRESS NOTES
Exercise session details    Increased weight gain in 1 week. Dr. Alan made aware.   Increased Torsemide over the next 3 days:   Take 1 torsemide this afternoon  Tuesday: take 2 torsemide   Wednesday: 2 torsemide in the morning, 1 in the afternoon  Pt repeat weight on Wednesday and update Dr. Alan.

## 2024-11-05 ENCOUNTER — ANTICOAG VISIT (OUTPATIENT)
Dept: CARDIOLOGY CLINIC | Facility: CLINIC | Age: 84
End: 2024-11-05

## 2024-11-05 ENCOUNTER — APPOINTMENT (OUTPATIENT)
Dept: LAB | Facility: CLINIC | Age: 84
End: 2024-11-05
Payer: MEDICARE

## 2024-11-05 ENCOUNTER — OFFICE VISIT (OUTPATIENT)
Dept: PULMONOLOGY | Facility: CLINIC | Age: 84
End: 2024-11-05
Payer: MEDICARE

## 2024-11-05 VITALS
DIASTOLIC BLOOD PRESSURE: 62 MMHG | HEIGHT: 71 IN | TEMPERATURE: 97 F | SYSTOLIC BLOOD PRESSURE: 110 MMHG | BODY MASS INDEX: 32.34 KG/M2 | WEIGHT: 231 LBS | HEART RATE: 59 BPM | OXYGEN SATURATION: 90 %

## 2024-11-05 DIAGNOSIS — D69.6 THROMBOCYTOPENIA (HCC): ICD-10-CM

## 2024-11-05 DIAGNOSIS — G47.33 SEVERE OBSTRUCTIVE SLEEP APNEA: ICD-10-CM

## 2024-11-05 DIAGNOSIS — I48.0 PAROXYSMAL ATRIAL FIBRILLATION (HCC): Primary | ICD-10-CM

## 2024-11-05 DIAGNOSIS — I48.0 PAROXYSMAL ATRIAL FIBRILLATION (HCC): ICD-10-CM

## 2024-11-05 DIAGNOSIS — D64.9 ANEMIA, UNSPECIFIED TYPE: ICD-10-CM

## 2024-11-05 DIAGNOSIS — J96.11 CHRONIC RESPIRATORY FAILURE WITH HYPOXIA (HCC): Primary | ICD-10-CM

## 2024-11-05 DIAGNOSIS — J44.9 COPD, MILD (HCC): ICD-10-CM

## 2024-11-05 DIAGNOSIS — D50.8 IRON DEFICIENCY ANEMIA SECONDARY TO INADEQUATE DIETARY IRON INTAKE: ICD-10-CM

## 2024-11-05 DIAGNOSIS — E66.01 SEVERE OBESITY (BMI 35.0-35.9 WITH COMORBIDITY) (HCC): ICD-10-CM

## 2024-11-05 LAB
INR PPP: 2.35 (ref 0.85–1.19)
PROTHROMBIN TIME: 25.7 SECONDS (ref 12.3–15)

## 2024-11-05 PROCEDURE — G2211 COMPLEX E/M VISIT ADD ON: HCPCS | Performed by: STUDENT IN AN ORGANIZED HEALTH CARE EDUCATION/TRAINING PROGRAM

## 2024-11-05 PROCEDURE — 36415 COLL VENOUS BLD VENIPUNCTURE: CPT

## 2024-11-05 PROCEDURE — 99214 OFFICE O/P EST MOD 30 MIN: CPT | Performed by: STUDENT IN AN ORGANIZED HEALTH CARE EDUCATION/TRAINING PROGRAM

## 2024-11-05 PROCEDURE — 85610 PROTHROMBIN TIME: CPT

## 2024-11-05 NOTE — PROGRESS NOTES
Consultation - Pulmonary Medicine   Oscar Espinosa 84 y.o. male MRN: [id number]      Reason for Consult: Dyspnea    Oscar Espinosa is a 84 y.o. male with a PMH of COPD, HTN, CAD, AAA, Afib, Hfp/rEF(45%), CKD, HLD, Obesity, YEVGENIY who presents for follow up from recent hospitalization    Hemoptysis - Likely epistaxis in the setting of coumadin, oxygen therapy. Other possibilities are pulmonary edema vs tracheobronchitis. No further episodes since     Dyspnea - Patient has mild COPD on PFTs from 2021, his symptoms are likely more driven by he heart. He is up 15lbs from his dry weight.  Patient is following closely with his cardiologist and has increased his diuretic dose.  - Continue diuresis  - Educated on CHF management      Chronic Hypoxic respiratory Failure - Continue 2L with rest/Exertion  - patient plans for flight to [place], I recommended increasing by 1-2L while on the flight       Moreno Beebe MD  [facility]    _____________________________________________________________________    HPI:    Oscar Espinosa is a 84 y.o. male with a PMH of COPD, HTN, CAD, AAA, Afib, Hfp/rEF(45%), CKD, HLD, Obesity, YEVGENIY who presents for follow up from recent hospitalization    Worse shortness of rbeath, increased leg swelling /weight gain, recently increased torsemide  Recent admission for hemoptysis - recurrent nose bleeds due to oxygen, blood dark mixed with sputum     6/18/2024 - Hospitalization for pneumonia and parapneumonic effusion who initially presented on     PFT results:  The most recent pulmonary function tests were reviewed.  Dyspnea  Spirometry:  FEV1/FVC Ratio is 71.  FEV1 is 2.26L which is 74% predicted.  FVC is 3.17L which is 74% predicted.    Lung volumes: Total lung capacity is 7.76L which is 106% predicted.  Residual volume is 164% predicted.  Diffusing capacity: 54% predicted    Imaging:  I personally reviewed the images on the PAC system pertinent to today's visit  CT  "Chest  Cardiomegaly. Coronary atherosclerosis, status post CABG.     Pleural effusions with prominence of the interstitial markings, especially in the lower lung fields, as described; consider a component of fluid overload/CHF. Some scarring is noted in the periphery. An interstitial pneumonitis such as NSIP is also a   differential consideration. Patchy opacities in the lower lung fields could represent edema, atelectasis, and/or infection. Correlation with the patient symptoms and laboratory values recommended.     Low-density of the blood pool noted, suspicious for anemia.     Small hiatal hernia suspected, renal and hepatic cysts, and other findings as above.         PhysicalExamination:  Vitals:   /62   Pulse 59   Temp (!) 97 °F (36.1 °C)   Ht 5' 11\" (1.803 m)   Wt 105 kg (231 lb)   SpO2 90%   BMI 32.22 kg/m²     Appearance -- NAD, speaking full sentences  Neuro -- A&Ox3  Neck -- no JVD  Heart -- RRR, no murmurs  Lungs -- Basilar crackles  Abdomen -- soft, NTND  Extremities -- WWP, +++ LE edema  Skin -- no rash    Review of Systems:  Aside from what is mentioned in the HPI, the review of systems otherwise negative.    Immunization History   Administered Date(s) Administered    COVID-19 MODERNA VACC 0.5 ML IM 01/26/2021, 03/05/2021, 10/28/2021, 11/28/2021    COVID-19 Moderna Vac BIVALENT 12 Yr+ IM 0.5 ML 10/12/2022    COVID-19 Moderna mRNA Vaccine 12 Yr+ 50 mcg/0.5 mL (Spikevax) 10/03/2023    COVID-19 Moderna vac 6-11y or adult booster 50 mcg/0.5 mL 05/03/2022    COVID-19 Pfizer mRNA vacc PF gustavo-sucrose 12 yr and older (Comirnaty) 10/28/2024    INFLUENZA 10/23/2007, 10/02/2013, 10/08/2016, 10/25/2017, 10/30/2020, 10/13/2021, 10/06/2022, 10/03/2023    Influenza Split High Dose Preservative Free IM 10/02/2013, 10/02/2013, 10/22/2014, 10/27/2015, 10/08/2016, 10/03/2019, 10/10/2024    Influenza, seasonal, injectable 10/20/2012, 10/30/2018    Pneumococcal Conjugate 13-Valent 04/24/2015    Pneumococcal " Conjugate Vaccine 20-valent (Pcv20), Polysace 10/10/2024    Pneumococcal Polysaccharide PPV23 09/07/2012    Respiratory Syncytial Virus Vaccine (Recombinant, Adjuvanted) 12/11/2023    Zoster Vaccine Recombinant 10/03/2019, 12/11/2019        Current Medications:    Current Outpatient Medications:     acetaminophen (TYLENOL) 325 mg tablet, Take 2 tablets by mouth every 6 (six) hours as needed for fever, headaches, mild pain, moderate pain or severe pain. Indications: Fever, Pain, Disp: , Rfl:     albuterol (ProAir HFA) 90 mcg/act inhaler, Inhale 2 puffs every 6 (six) hours as needed for wheezing, Disp: 24 g, Rfl: 0    amiodarone 200 mg tablet, Take 1 tablet (200 mg total) by mouth daily, Disp: 90 tablet, Rfl: 3    aspirin (ECOTRIN LOW STRENGTH) 81 mg EC tablet, Take 1 tablet by mouth daily, Disp: , Rfl:     atorvastatin (LIPITOR) 40 mg tablet, TAKE 1 TABLET BY MOUTH DAILY, Disp: 90 tablet, Rfl: 1    cholecalciferol (VITAMIN D3) 1,000 units tablet, Take 2,000 Units by mouth daily, Disp: , Rfl:     Fluticasone-Salmeterol (Advair) 100-50 mcg/dose inhaler, INHALE 1 PUFF BY MOUTH TWICE DAILY. RINSE MOUTH AFTER USE, Disp: 180 blister, Rfl: 1    gabapentin (NEURONTIN) 300 mg capsule, Take 300 mg by mouth daily at bedtime, Disp: , Rfl:     labetalol (NORMODYNE) 100 mg tablet, Take 0.5 tablets (50 mg total) by mouth in the morning, Disp: 30 tablet, Rfl: 1    multivitamin (THERAGRAN) TABS, Take 1 tablet by mouth daily, Disp: , Rfl:     mupirocin (BACTROBAN) 2 % ointment, APPLY TOPICALLY TO SORES TWICE DAILY UNTIL HEALED, Disp: , Rfl:     naloxone (NARCAN) 4 mg/0.1 mL nasal spray, Administer 1 spray into a nostril. If no response after 2-3 minutes, give another dose in the other nostril using a new spray., Disp: 1 each, Rfl: 1    oxyCODONE (ROXICODONE) 10 MG TABS, Take 1 tablet (10 mg total) by mouth every 6 (six) hours as needed for moderate pain Max Daily Amount: 40 mg, Disp: 120 tablet, Rfl: 0    oxygen gas, Inhale 2 L  continuous. 2L of oxygen via nasal cannula as needed and at night  Indications: short of breath, Disp: , Rfl:     polyethylene glycol (MIRALAX) 17 g packet, Take 17 g by mouth daily as needed (constipation). Oscar Espinosa dissolve in 8 oz liquid of choice   Indications: ., Disp: , Rfl:     potassium chloride (Klor-Con M20) 20 mEq tablet, TAKE 1 TABLET BY MOUTH DAILY 7  DAYS WEEKLY, Disp: 90 tablet, Rfl: 1    senna (SENOKOT) 8.6 MG tablet, Take 1 tablet by mouth as needed for constipation, Disp: , Rfl:     tamsulosin (FLOMAX) 0.4 mg, Take 1 capsule by mouth daily, Disp: , Rfl:     torsemide (DEMADEX) 20 mg tablet, Take 1 (20mg) tablet on Tuesday, Thursday, Saturday, Sunday, and 2 tablets (40 mg total) on Monday, Wednesday, and Friday, Disp: 180 tablet, Rfl: 3    warfarin (Coumadin) 2.5 mg tablet, Take 1 tab by moth daily or as directed by physician, Disp: 90 tablet, Rfl: 3    Historical Information   Past Medical History:   Diagnosis Date    Abdominal aortic aneurysm (HCC)     s/p repair    Abnormal ECG july 2022    Acute on chronic congestive heart failure (HCC) 09/11/2019    Acute respiratory failure with hypoxia ([facility]) 05/06/2024    Aneurysm ([facility]) 2007    Aneurysm of abdominal aorta ([facility])     49mm, trileaflet AV    Atrial fibrillation ([facility])     Eliquis    BPH (benign prostatic hyperplasia)     CAD (coronary artery disease)     s/p CABGx3    CHF (congestive heart failure) ([facility])     Chronic pain     Gabapentin    Chronic pain disorder     lumbar    COPD (chronic obstructive pulmonary disease) ([facility])     Coronary artery disease     Elevated serum creatinine 06/18/2024    Elevated serum creatinine 06/18/2024    Elevated transaminase level 04/27/2024    Former tobacco use     Hyperlipidemia     Hypertension     Hyponatremia 04/27/2024    Hyponatremia 04/27/2024    Hypothyroidism     Lumbar radiculopathy     Lumbar stenosis     s/p injections    Neuropathy     Obesity (BMI 30-39.9)     YEVGENIY (obstructive sleep apnea)   "   unable to tolerate CPAP    Platelets decreased (HCC) 2022    Pulmonary hypertension (HCC)     moderate to severe    Spondylolisthesis of lumbar region     Visual impairment     Vitamin D deficiency      Past Surgical History:   Procedure Laterality Date    ABDOMINAL AORTIC ANEURYSM REPAIR  2007    2 dock limbs with visc extens prosth    CARDIAC CATHETERIZATION      COLONOSCOPY      CORONARY ARTERY BYPASS GRAFT      LIMA to LAD, sequential SVG to OM1 & OM2, SVG to RDA    IR CHEST TUBE PLACEMENT  2024    LUMBAR EPIDURAL INJECTION       Social History   Social History     Tobacco Use   Smoking Status Former    Current packs/day: 0.00    Average packs/day: 1 pack/day for 55.6 years (55.6 ttl pk-yrs)    Types: Cigarettes    Start date: 1957    Quit date: 2012    Years since quittin.2    Passive exposure: Past   Smokeless Tobacco Never       Family History:   Family History   Problem Relation Age of Onset    Heart disease Mother     Stroke Father         stroke syndrome    Aneurysm Brother         abdominal    Aortic aneurysm Brother         ascending    Coronary artery disease Family     Hypertension Family     Other Son         gioblastoma multiforme                 Diagnostic Data:  Labs:  I personally reviewed the most recent laboratory data pertinent to today's visit    Lab Results   Component Value Date    WBC 4.13 (L) 10/25/2024    HGB 8.2 (L) 10/25/2024    HCT 27.3 (L) 10/25/2024    MCV 93 10/25/2024     (L) 10/25/2024     Lab Results   Component Value Date    GLUCOSE 98 10/15/2015    CALCIUM 8.8 10/25/2024     (L) 10/15/2015    K 4.0 10/25/2024    CO2 33 (H) 10/25/2024    CL 99 10/25/2024    BUN 25 10/25/2024    CREATININE 1.43 (H) 10/25/2024     No results found for: \"IGE\"  Lab Results   Component Value Date    ALT 24 10/22/2024    AST 32 10/22/2024    ALKPHOS 87 10/22/2024    BILITOT 0.79 10/15/2015           I have spent a total time of  35 minutes on " 11/05/24 in caring for this patient including Diagnostic results, Prognosis, and Risks and benefits of tx options.   _

## 2024-11-06 ENCOUNTER — HOSPITAL ENCOUNTER (OUTPATIENT)
Dept: INFUSION CENTER | Facility: CLINIC | Age: 84
Discharge: HOME | End: 2024-11-06
Payer: MEDICARE

## 2024-11-06 ENCOUNTER — CLINICAL SUPPORT (OUTPATIENT)
Dept: PULMONOLOGY | Facility: CLINIC | Age: 84
End: 2024-11-06
Payer: MEDICARE

## 2024-11-06 VITALS
OXYGEN SATURATION: 98 % | RESPIRATION RATE: 18 BRPM | HEART RATE: 60 BPM | TEMPERATURE: 97.9 F | DIASTOLIC BLOOD PRESSURE: 72 MMHG | SYSTOLIC BLOOD PRESSURE: 124 MMHG

## 2024-11-06 DIAGNOSIS — D50.8 IRON DEFICIENCY ANEMIA SECONDARY TO INADEQUATE DIETARY IRON INTAKE: Primary | ICD-10-CM

## 2024-11-06 DIAGNOSIS — J44.9 CHRONIC OBSTRUCTIVE PULMONARY DISEASE, UNSPECIFIED COPD TYPE (HCC): Primary | ICD-10-CM

## 2024-11-06 PROCEDURE — G0239 OTH RESP PROC, GROUP: HCPCS

## 2024-11-06 PROCEDURE — 96365 THER/PROPH/DIAG IV INF INIT: CPT

## 2024-11-06 RX ORDER — SODIUM CHLORIDE 9 MG/ML
20 INJECTION, SOLUTION INTRAVENOUS ONCE
Status: CANCELLED | OUTPATIENT
Start: 2024-11-06

## 2024-11-06 RX ORDER — SODIUM CHLORIDE 9 MG/ML
20 INJECTION, SOLUTION INTRAVENOUS ONCE
Status: COMPLETED | OUTPATIENT
Start: 2024-11-06 | End: 2024-11-06

## 2024-11-06 RX ADMIN — FERUMOXYTOL 510 MG: 510 INJECTION INTRAVENOUS at 15:49

## 2024-11-06 RX ADMIN — SODIUM CHLORIDE 20 ML/HR: 0.9 INJECTION, SOLUTION INTRAVENOUS at 15:40

## 2024-11-06 NOTE — PROGRESS NOTES
Pt tolerated without incident. Pt will follow up with hem onc as he is only to receive 1 dose of Faraheme.  Declines AVS.

## 2024-11-07 ENCOUNTER — HOSPITAL ENCOUNTER (OUTPATIENT)
Dept: NON INVASIVE DIAGNOSTICS | Facility: CLINIC | Age: 84
Discharge: HOME | End: 2024-11-07
Payer: MEDICARE

## 2024-11-07 VITALS
BODY MASS INDEX: 32.34 KG/M2 | HEART RATE: 60 BPM | HEIGHT: 71 IN | SYSTOLIC BLOOD PRESSURE: 124 MMHG | WEIGHT: 231 LBS | DIASTOLIC BLOOD PRESSURE: 72 MMHG

## 2024-11-07 DIAGNOSIS — I27.20 PULMONARY HTN (HCC): ICD-10-CM

## 2024-11-07 LAB
APICAL FOUR CHAMBER EJECTION FRACTION: 52 %
ASCENDING AORTA: 4.4 CM
BSA FOR ECHO PROCEDURE: 2.24 M2
DOP CALC LVOT AREA: 3.46 CM2
DOP CALC LVOT DIAMETER: 2.1 CM
LAAS-AP2: 36.4 CM2
LAAS-AP4: 34.9 CM2
LEFT ATRIUM VOLUME (MOD BIPLANE): 141 ML
LEFT ATRIUM VOLUME INDEX (MOD BIPLANE): 62.9 ML/M2
RA PRESSURE ESTIMATED: 15 MMHG
RIGHT ATRIUM AREA SYSTOLE A4C: 27.1 CM2
RIGHT VENTRICLE ID DIMENSION: 4.1 CM
RV PSP: 55 MMHG
SINOTUBULAR JUNCTION: 3.5 CM
SL CV LEFT ATRIUM LENGTH A2C: 7.1 CM
SL CV LV EF: 60
SL CV SINUS OF VALSALVA 2D: 4.1 CM
STJ: 3.5 CM
TR MAX PG: 40 MMHG
TR PEAK VELOCITY: 3.2 M/S
TRICUSPID VALVE PEAK REGURGITATION VELOCITY: 3.17 M/S

## 2024-11-07 PROCEDURE — 93308 TTE F-UP OR LMTD: CPT | Performed by: INTERNAL MEDICINE

## 2024-11-07 PROCEDURE — 93325 DOPPLER ECHO COLOR FLOW MAPG: CPT | Performed by: INTERNAL MEDICINE

## 2024-11-07 PROCEDURE — 93325 DOPPLER ECHO COLOR FLOW MAPG: CPT

## 2024-11-07 PROCEDURE — 93308 TTE F-UP OR LMTD: CPT

## 2024-11-07 PROCEDURE — 93321 DOPPLER ECHO F-UP/LMTD STD: CPT

## 2024-11-07 PROCEDURE — 93321 DOPPLER ECHO F-UP/LMTD STD: CPT | Performed by: INTERNAL MEDICINE

## 2024-11-11 ENCOUNTER — CLINICAL SUPPORT (OUTPATIENT)
Dept: PULMONOLOGY | Facility: CLINIC | Age: 84
End: 2024-11-11
Payer: MEDICARE

## 2024-11-11 ENCOUNTER — APPOINTMENT (EMERGENCY)
Dept: RADIOLOGY | Facility: HOSPITAL | Age: 84
DRG: 291 | End: 2024-11-11
Payer: MEDICARE

## 2024-11-11 ENCOUNTER — HOSPITAL ENCOUNTER (INPATIENT)
Facility: HOSPITAL | Age: 84
LOS: 5 days | Discharge: HOME | DRG: 291 | End: 2024-11-16
Attending: EMERGENCY MEDICINE | Admitting: INTERNAL MEDICINE
Payer: MEDICARE

## 2024-11-11 ENCOUNTER — HOSPITAL ENCOUNTER (INPATIENT)
Dept: VASCULAR ULTRASOUND | Facility: HOSPITAL | Age: 84
Discharge: HOME | DRG: 291 | End: 2024-11-11
Payer: MEDICARE

## 2024-11-11 DIAGNOSIS — I27.20 MODERATE TO SEVERE PULMONARY HYPERTENSION (HCC): ICD-10-CM

## 2024-11-11 DIAGNOSIS — I50.33 ACUTE ON CHRONIC DIASTOLIC HEART FAILURE (HCC): ICD-10-CM

## 2024-11-11 DIAGNOSIS — J44.9 CHRONIC OBSTRUCTIVE PULMONARY DISEASE, UNSPECIFIED COPD TYPE (HCC): Primary | ICD-10-CM

## 2024-11-11 DIAGNOSIS — N17.9 AKI (ACUTE KIDNEY INJURY) (HCC): ICD-10-CM

## 2024-11-11 DIAGNOSIS — I50.9 ACUTE EXACERBATION OF CHF (CONGESTIVE HEART FAILURE) (HCC): Primary | ICD-10-CM

## 2024-11-11 DIAGNOSIS — I50.30 (HFPEF) HEART FAILURE WITH PRESERVED EJECTION FRACTION (HCC): ICD-10-CM

## 2024-11-11 DIAGNOSIS — J44.9 CHRONIC OBSTRUCTIVE PULMONARY DISEASE, UNSPECIFIED COPD TYPE (HCC): ICD-10-CM

## 2024-11-11 DIAGNOSIS — I48.21 PERMANENT ATRIAL FIBRILLATION (HCC): ICD-10-CM

## 2024-11-11 PROBLEM — I50.43 ACUTE ON CHRONIC COMBINED SYSTOLIC AND DIASTOLIC HEART FAILURE (HCC): Status: ACTIVE | Noted: 2024-11-11

## 2024-11-11 LAB
2HR DELTA HS TROPONIN: 1 NG/L
ALBUMIN SERPL BCG-MCNC: 3.8 G/DL (ref 3.5–5)
ALP SERPL-CCNC: 93 U/L (ref 34–104)
ALT SERPL W P-5'-P-CCNC: 24 U/L (ref 7–52)
ANION GAP SERPL CALCULATED.3IONS-SCNC: 9 MMOL/L (ref 4–13)
AST SERPL W P-5'-P-CCNC: 35 U/L (ref 13–39)
BASOPHILS # BLD AUTO: 0.04 THOUSANDS/ÂΜL (ref 0–0.1)
BASOPHILS NFR BLD AUTO: 1 % (ref 0–1)
BILIRUB SERPL-MCNC: 1.48 MG/DL (ref 0.2–1)
BNP SERPL-MCNC: 674 PG/ML (ref 0–100)
BUN SERPL-MCNC: 29 MG/DL (ref 5–25)
CALCIUM SERPL-MCNC: 9.3 MG/DL (ref 8.4–10.2)
CARDIAC TROPONIN I PNL SERPL HS: 12 NG/L
CARDIAC TROPONIN I PNL SERPL HS: 13 NG/L
CHLORIDE SERPL-SCNC: 97 MMOL/L (ref 96–108)
CO2 SERPL-SCNC: 30 MMOL/L (ref 21–32)
CREAT SERPL-MCNC: 1.86 MG/DL (ref 0.6–1.3)
EOSINOPHIL # BLD AUTO: 0.06 THOUSAND/ÂΜL (ref 0–0.61)
EOSINOPHIL NFR BLD AUTO: 2 % (ref 0–6)
ERYTHROCYTE [DISTWIDTH] IN BLOOD BY AUTOMATED COUNT: 23.3 % (ref 11.6–15.1)
GFR SERPL CREATININE-BSD FRML MDRD: 32 ML/MIN/1.73SQ M
GLUCOSE SERPL-MCNC: 82 MG/DL (ref 65–140)
HCT VFR BLD AUTO: 29.4 % (ref 36.5–49.3)
HGB BLD-MCNC: 9 G/DL (ref 12–17)
IMM GRANULOCYTES # BLD AUTO: 0.01 THOUSAND/UL (ref 0–0.2)
IMM GRANULOCYTES NFR BLD AUTO: 0 % (ref 0–2)
INR PPP: 1.86 (ref 0.85–1.19)
LYMPHOCYTES # BLD AUTO: 0.49 THOUSANDS/ÂΜL (ref 0.6–4.47)
LYMPHOCYTES NFR BLD AUTO: 12 % (ref 14–44)
MCH RBC QN AUTO: 30 PG (ref 26.8–34.3)
MCHC RBC AUTO-ENTMCNC: 30.6 G/DL (ref 31.4–37.4)
MCV RBC AUTO: 98 FL (ref 82–98)
MONOCYTES # BLD AUTO: 0.52 THOUSAND/ÂΜL (ref 0.17–1.22)
MONOCYTES NFR BLD AUTO: 13 % (ref 4–12)
NEUTROPHILS # BLD AUTO: 3.01 THOUSANDS/ÂΜL (ref 1.85–7.62)
NEUTS SEG NFR BLD AUTO: 72 % (ref 43–75)
NRBC BLD AUTO-RTO: 0 /100 WBCS
PLATELET # BLD AUTO: 128 THOUSANDS/UL (ref 149–390)
PMV BLD AUTO: 11.7 FL (ref 8.9–12.7)
POTASSIUM SERPL-SCNC: 4.5 MMOL/L (ref 3.5–5.3)
PROT SERPL-MCNC: 7.3 G/DL (ref 6.4–8.4)
PROTHROMBIN TIME: 22.2 SECONDS (ref 12.3–15)
RBC # BLD AUTO: 3 MILLION/UL (ref 3.88–5.62)
SODIUM SERPL-SCNC: 136 MMOL/L (ref 135–147)
WBC # BLD AUTO: 4.13 THOUSAND/UL (ref 4.31–10.16)

## 2024-11-11 PROCEDURE — 83880 ASSAY OF NATRIURETIC PEPTIDE: CPT | Performed by: EMERGENCY MEDICINE

## 2024-11-11 PROCEDURE — 99285 EMERGENCY DEPT VISIT HI MDM: CPT

## 2024-11-11 PROCEDURE — 85025 COMPLETE CBC W/AUTO DIFF WBC: CPT | Performed by: EMERGENCY MEDICINE

## 2024-11-11 PROCEDURE — 93970 EXTREMITY STUDY: CPT

## 2024-11-11 PROCEDURE — 36415 COLL VENOUS BLD VENIPUNCTURE: CPT

## 2024-11-11 PROCEDURE — 84484 ASSAY OF TROPONIN QUANT: CPT | Performed by: EMERGENCY MEDICINE

## 2024-11-11 PROCEDURE — G0239 OTH RESP PROC, GROUP: HCPCS

## 2024-11-11 PROCEDURE — 93005 ELECTROCARDIOGRAM TRACING: CPT

## 2024-11-11 PROCEDURE — 85610 PROTHROMBIN TIME: CPT | Performed by: EMERGENCY MEDICINE

## 2024-11-11 PROCEDURE — 96374 THER/PROPH/DIAG INJ IV PUSH: CPT

## 2024-11-11 PROCEDURE — 93970 EXTREMITY STUDY: CPT | Performed by: SURGERY

## 2024-11-11 PROCEDURE — 71045 X-RAY EXAM CHEST 1 VIEW: CPT

## 2024-11-11 PROCEDURE — 80053 COMPREHEN METABOLIC PANEL: CPT | Performed by: EMERGENCY MEDICINE

## 2024-11-11 PROCEDURE — 99223 1ST HOSP IP/OBS HIGH 75: CPT | Performed by: INTERNAL MEDICINE

## 2024-11-11 PROCEDURE — 99285 EMERGENCY DEPT VISIT HI MDM: CPT | Performed by: EMERGENCY MEDICINE

## 2024-11-11 RX ORDER — ALBUTEROL SULFATE 90 UG/1
2 INHALANT RESPIRATORY (INHALATION) EVERY 6 HOURS PRN
Status: DISCONTINUED | OUTPATIENT
Start: 2024-11-11 | End: 2024-11-16 | Stop reason: HOSPADM

## 2024-11-11 RX ORDER — FUROSEMIDE 10 MG/ML
50 INJECTION INTRAMUSCULAR; INTRAVENOUS 2 TIMES DAILY
Status: DISCONTINUED | OUTPATIENT
Start: 2024-11-11 | End: 2024-11-13

## 2024-11-11 RX ORDER — OXYCODONE HYDROCHLORIDE 10 MG/1
10 TABLET ORAL EVERY 6 HOURS PRN
Status: DISCONTINUED | OUTPATIENT
Start: 2024-11-11 | End: 2024-11-16 | Stop reason: HOSPADM

## 2024-11-11 RX ORDER — FLUTICASONE FUROATE AND VILANTEROL 100; 25 UG/1; UG/1
1 POWDER RESPIRATORY (INHALATION)
Status: DISCONTINUED | OUTPATIENT
Start: 2024-11-11 | End: 2024-11-16 | Stop reason: HOSPADM

## 2024-11-11 RX ORDER — POLYETHYLENE GLYCOL 3350 17 G/17G
17 POWDER, FOR SOLUTION ORAL DAILY
Status: DISCONTINUED | OUTPATIENT
Start: 2024-11-11 | End: 2024-11-16 | Stop reason: HOSPADM

## 2024-11-11 RX ORDER — WARFARIN SODIUM 2.5 MG/1
2.5 TABLET ORAL
Status: DISCONTINUED | OUTPATIENT
Start: 2024-11-11 | End: 2024-11-11

## 2024-11-11 RX ORDER — POTASSIUM CHLORIDE 1500 MG/1
20 TABLET, EXTENDED RELEASE ORAL DAILY
Status: DISCONTINUED | OUTPATIENT
Start: 2024-11-11 | End: 2024-11-16 | Stop reason: HOSPADM

## 2024-11-11 RX ORDER — TAMSULOSIN HYDROCHLORIDE 0.4 MG/1
0.4 CAPSULE ORAL DAILY
Status: DISCONTINUED | OUTPATIENT
Start: 2024-11-11 | End: 2024-11-16 | Stop reason: HOSPADM

## 2024-11-11 RX ORDER — WARFARIN SODIUM 7.5 MG/1
3.75 TABLET ORAL
Status: DISCONTINUED | OUTPATIENT
Start: 2024-11-14 | End: 2024-11-16 | Stop reason: HOSPADM

## 2024-11-11 RX ORDER — FUROSEMIDE 10 MG/ML
50 INJECTION INTRAMUSCULAR; INTRAVENOUS ONCE
Status: COMPLETED | OUTPATIENT
Start: 2024-11-11 | End: 2024-11-11

## 2024-11-11 RX ORDER — ACETAMINOPHEN 325 MG/1
650 TABLET ORAL EVERY 6 HOURS PRN
Status: DISCONTINUED | OUTPATIENT
Start: 2024-11-11 | End: 2024-11-16 | Stop reason: HOSPADM

## 2024-11-11 RX ORDER — ASPIRIN 81 MG/1
81 TABLET ORAL DAILY
Status: DISCONTINUED | OUTPATIENT
Start: 2024-11-11 | End: 2024-11-16 | Stop reason: HOSPADM

## 2024-11-11 RX ORDER — POLYETHYLENE GLYCOL 3350 17 G/17G
17 POWDER, FOR SOLUTION ORAL DAILY PRN
Status: DISCONTINUED | OUTPATIENT
Start: 2024-11-11 | End: 2024-11-11

## 2024-11-11 RX ORDER — AMIODARONE HYDROCHLORIDE 200 MG/1
200 TABLET ORAL DAILY
Status: DISCONTINUED | OUTPATIENT
Start: 2024-11-11 | End: 2024-11-16 | Stop reason: HOSPADM

## 2024-11-11 RX ORDER — LABETALOL 100 MG/1
50 TABLET, FILM COATED ORAL DAILY
Status: DISCONTINUED | OUTPATIENT
Start: 2024-11-11 | End: 2024-11-16 | Stop reason: HOSPADM

## 2024-11-11 RX ORDER — ATORVASTATIN CALCIUM 40 MG/1
40 TABLET, FILM COATED ORAL DAILY
Status: DISCONTINUED | OUTPATIENT
Start: 2024-11-11 | End: 2024-11-16 | Stop reason: HOSPADM

## 2024-11-11 RX ORDER — AMOXICILLIN 250 MG
2 CAPSULE ORAL 2 TIMES DAILY
Status: DISCONTINUED | OUTPATIENT
Start: 2024-11-11 | End: 2024-11-16 | Stop reason: HOSPADM

## 2024-11-11 RX ORDER — WARFARIN SODIUM 2.5 MG/1
3.75 TABLET ORAL
Status: COMPLETED | OUTPATIENT
Start: 2024-11-11 | End: 2024-11-11

## 2024-11-11 RX ORDER — WARFARIN SODIUM 2.5 MG/1
2.5 TABLET ORAL
Status: DISCONTINUED | OUTPATIENT
Start: 2024-11-12 | End: 2024-11-16 | Stop reason: HOSPADM

## 2024-11-11 RX ORDER — SENNOSIDES 8.6 MG
1 TABLET ORAL
Status: DISCONTINUED | OUTPATIENT
Start: 2024-11-11 | End: 2024-11-11

## 2024-11-11 RX ORDER — GABAPENTIN 100 MG/1
100 CAPSULE ORAL
Status: DISCONTINUED | OUTPATIENT
Start: 2024-11-11 | End: 2024-11-16 | Stop reason: HOSPADM

## 2024-11-11 RX ADMIN — GABAPENTIN 100 MG: 100 CAPSULE ORAL at 22:19

## 2024-11-11 RX ADMIN — FUROSEMIDE 50 MG: 10 INJECTION, SOLUTION INTRAMUSCULAR; INTRAVENOUS at 12:46

## 2024-11-11 RX ADMIN — ATORVASTATIN CALCIUM 40 MG: 40 TABLET, FILM COATED ORAL at 17:50

## 2024-11-11 RX ADMIN — WARFARIN SODIUM 3.75 MG: 2.5 TABLET ORAL at 17:56

## 2024-11-11 RX ADMIN — ASPIRIN 81 MG: 81 TABLET, COATED ORAL at 17:59

## 2024-11-11 RX ADMIN — B-COMPLEX W/ C & FOLIC ACID TAB 1 TABLET: TAB at 22:19

## 2024-11-11 RX ADMIN — ACETAMINOPHEN 650 MG: 325 TABLET, FILM COATED ORAL at 22:19

## 2024-11-11 RX ADMIN — FUROSEMIDE 50 MG: 10 INJECTION, SOLUTION INTRAMUSCULAR; INTRAVENOUS at 17:53

## 2024-11-11 RX ADMIN — TAMSULOSIN HYDROCHLORIDE 0.4 MG: 0.4 CAPSULE ORAL at 17:50

## 2024-11-11 RX ADMIN — POTASSIUM CHLORIDE 20 MEQ: 1500 TABLET, EXTENDED RELEASE ORAL at 17:50

## 2024-11-11 NOTE — ASSESSMENT & PLAN NOTE
Bilateral lower extremity venous insufficiency, chronic issue for him  He states that over the past few weeks he has noticed his right leg being significantly more swollen than the left leg  This is in the setting of CHF exacerbation  As well as having subtherapeutic INR    Plan:  Will check VAS ultrasound to evaluate for DVT  Diuresis for CHF exacerbation as above

## 2024-11-11 NOTE — ED ATTENDING ATTESTATION
11/11/2024  I, Raza Ho MD, saw and evaluated the patient. I have discussed the patient with the resident/non-physician practitioner and agree with the resident's/non-physician practitioner's findings, Plan of Care, and MDM as documented in the resident's/non-physician practitioner's note, except where noted. All available labs and Radiology studies were reviewed.  I was present for key portions of any procedure(s) performed by the resident/non-physician practitioner and I was immediately available to provide assistance.       At this point I agree with the current assessment done in the Emergency Department.  I have conducted an independent evaluation of this patient a history and physical is as follows:    History    Patient is an 84-year-old male, with a history significant for A-fib anticoagulated on warfarin and CHF per my review of the medical record, who presents to the ED today for evaluation of bilateral lower extremity edema, right greater than left.  Patient states this is baseline for him.  There is associated orthopnea, dyspnea.  Patient symptoms began 3 to 4 days ago and been constant and progressively worsening since their onset.  Patient denies chest pain, abdominal pain.  He states he has been taking his warfarin as prescribed.  No clear remitting factors.  Patient has attempted his diuretic.    Patient's wife, present in room providing collateral history, states patient is not confused.    Wt Readings from Last 3 Encounters:   11/11/24 106 kg (233 lb 7.5 oz)   11/07/24 105 kg (231 lb)   11/05/24 105 kg (231 lb)        Patient is without other concerns at this time.     ROS  Patient denies: Fever; dysphagia; vision change; chest pain; abdominal pain; polyuria; dysuria; rash; weakness; numbness; difficulty walking; confusion    Physical Exam    GENERAL APPEARANCE: NAD.  Ill-appearing, chronic appearing  NEURO: Patient is speaking clearly in complete sentences.  Patient is answering  appropriately and able follow commands.  Patient is moving all four extremities spontaneously.  No facial droop.  Tongue midline.  HEENT: PERRL, Moist mucous membranes, external ears normal, nose normal  Neck: No cervical adenopathy  CV: RRR. No murmurs, rubs, gallops  LUNGS: Normal respiratory effort.  Bibasilar rales  GI: Abdomen non-distended. Soft. Non-tender and without rebound or guarding   : Deferred at this time  MSK: No deformity.  Bilateral lower extremity edema.  No pain with calf squeeze.  Skin: Warm and dry  Capillary refill: <2 seconds    Patient is currently afebrile and dynamically stable.    Assessment/Plan/MDM  Dyspnea, orthopnea, bilateral lower extremity GAB, rales  -This presentation is concerning for: Acute CHF exacerbation.  Also considered LITO, electrolyte abnormality, anemia, ACS  -Will investigate with cardiac workup  -Will manage with IV Lasix and further based upon workup    ED Course  ED Course as of 11/11/24 1336   Mon Nov 11, 2024   1145 ECG per my independent interpretation: Bradycardic rate, regular rhythm, PVCs, rightward axis, no ST elevations or depressions.  Junctional rhythm   1221 hs TnI 0hr: 12  WNL   1221 Hemoglobin(!): 9.0  Low   1221 Creatinine(!): 1.86  High    1256 BNP(!): 674  High    1315 POCT INR(!): 1.86  Slightly subtheraputic          Critical Care Time  Procedures

## 2024-11-11 NOTE — H&P
H&P - Hospitalist   Name: Oscar Espinosa 84 y.o. male I MRN: [id number]  Unit/Bed#: ED-35 I Date of Admission: 11/11/2024   Date of Service: 11/11/2024 I Hospital Day: 0     Assessment & Plan  Acute on chronic combined systolic and diastolic heart failure (HCC)  Wt Readings from Last 3 Encounters:   11/11/24 106 kg (233 lb 7.5 oz)   11/07/24 105 kg (231 lb)   11/05/24 105 kg (231 lb)   Follows with Dr. Alan outpatient, last seen September 13.  Dry weight at the time 215 pounds  GDMT includes labetalol 50 mg daily and diuretic with torsemide 40 mg daily on Monday, Wednesday, Friday, and 20 mg daily the rest of the week.  He reports compliance with this.  Patient's weight has slowly been increasing, possibly secondary to his dietary noncompliance.  Endorses eating salty foods and snacking with pretzels.  Wife at the bedside also contributes to this history.  Presenting today with worsening SOB, increased weight, concern for volume overload when he was at pulmonary rehabilitation  Bnp 674, chest x-ray consistent with CHF and pulmonary congestion bilaterally  Recent echo on 11/7 showing EF 60%, normal left ventricular systolic function, moderate pulmonary hypertension with right ventricular systolic pressure 55 mmHg, and severe tricuspid regurgitation   On exam, bilateral JVD, bilateral +3 pitting edema up to the knees, significantly worse in the right lower extremity  Received 50 mg IV Lasix in the ED, urine output over 600 ml afterwards    Plan:  Will admit for CHF exacerbation  Will start Lasix 50 mg IV twice daily, adjust for goal to have the patient net negative 1 to 2 L daily  Monitor ins and outs  Daily standing weights  Cardiac diet, fluid restriction 1500 mL daily, salt restriction 2 g daily  Will defer cardiology consultation to primary team, we will hold off for now  Monitor on telemetry  Monitor BMPs  Replete mag and K    Benign essential hypertension  Continue labetalol 50 mg daily  We are currently  diuresing the patient with IV Lasix  Hyperlipidemia  Continue Lipitor 40 mg daily  Severe obstructive sleep apnea  Severe YEVGENIY, noncompliant with CPAP unable to tolerate it  CAD (coronary artery disease)  Status post CABG in 2003  Continue Lipitor and aspirin  Abdominal aortic aneurysm without rupture (HCC)  Status post repair  Chronic obstructive pulmonary disease (HCC)  Mild COPD, last PFTs done in 2021  Follows with pulmonary Dr. Beebe  Will continue Advair and as needed albuterol  Moderate to severe pulmonary hypertension (HCC)  Echo done November 7 EF 60%, normal left ventricular systolic function, moderate pulmonary hypertension with right ventricular systolic pressure 55 mmHg, and severe tricuspid regurgitation  Mild pulmonary hypertension likely secondary to group 2 PH from uncontrolled YEVGENIY and COPD  Ascending aortic aneurysm (HCC)  Last measured in July 2024, which was 48 mm unchanged from previous imaging, stable  Paroxysmal atrial fibrillation (HCC)  Rate controlled with labetalol 50 mg daily  On Coumadin 2.5 mg daily except Thursdays where he takes 3.75 mg  INR subtherapeutic 1.8, has previously been higher 2.3-3.2    Plan:  Will give 3.75 mg today  Starting tomorrow 2.5 mg daily except on Thursdays where he can continue taking 3.75 mg  Recheck INR tomorrow  Venous insufficiency of both lower extremities  Bilateral lower extremity venous insufficiency, chronic issue for him  He states that over the past few weeks he has noticed his right leg being significantly more swollen than the left leg  This is in the setting of CHF exacerbation  As well as having subtherapeutic INR    Plan:  Will check VAS ultrasound to evaluate for DVT  Diuresis for CHF exacerbation as above  Chronic respiratory failure with hypoxia (HCC)  Chronically on 2 L O2 at home  Continuous opioid dependence (HCC)  Chronically on oxycodone 10 mg every 6 hours as needed for moderate pain from his lower back      VTE Pharmacologic  Prophylaxis: VTE Score: 5 High Risk (Score >/= 5) - Pharmacological DVT Prophylaxis Ordered: warfarin (Coumadin). Sequential Compression Devices Ordered.  Code Status: Level 3 - DNAR and DNI   Discussion with family: Updated  (wife) at bedside.    Anticipated Length of Stay: Patient will be admitted on an inpatient basis with an anticipated length of stay of greater than 2 midnights secondary to CHF exacerbation.    History of Present Illness   Chief Complaint: Increased weight, worsening SOB    Oscar Espinosa is a 84 y.o. male with a PMH of chronic combined systolic and diastolic heart failure, chronic hypoxic respiratory failure on 2 L O2 at home, A-fib on Coumadin, AAA s/p repair, thoracic aortic aneurysm, CAD status post CABG, severe YEVGENIY noncompliant with CPAP, moderate pulmonary hypertension who presents with 3 to 4 weeks of worsening SOB with exertion, orthopnea, increased weight gain, and worsening right lower extremity swelling more than left lower extremity swelling.  Patient states that he sleeps in a recliner, his breathing has been worsening slowly over the past few weeks, he has been more fatigued and requiring more oxygen use than before.  He states that he has been noncompliant with his dietary recommendations, as he eats salty snacks with pretzels and TV dinners at home regularly.  Wife is at the bedside she endorses similar story witnessing this.  He follows with Dr. Alan outpatient and was euvolemic at the last office visit in September, weaned to 15 pounds.  Currently he is at 233 pounds, well over his dry weight.  He denies any chest pain or pressure or discomfort, lightheadedness or dizziness or palpitations..    Review of Systems   Constitutional:  Negative for chills and fever.   HENT:  Negative for ear pain and sore throat.    Eyes:  Negative for pain and visual disturbance.   Respiratory:  Positive for shortness of breath. Negative for cough.    Cardiovascular:  Positive  for leg swelling. Negative for chest pain and palpitations.   Gastrointestinal:  Positive for abdominal distention. Negative for abdominal pain and vomiting.   Genitourinary:  Negative for dysuria and hematuria.   Musculoskeletal:  Positive for back pain. Negative for arthralgias.   Skin:  Positive for rash (venous stasis b/l). Negative for color change.   Neurological:  Negative for seizures and syncope.   Psychiatric/Behavioral:  Negative for confusion.    All other systems reviewed and are negative.      Historical Information   Past Medical History:   Diagnosis Date    Abdominal aortic aneurysm ([facility])     s/p repair    Abnormal ECG july 2022    Acute on chronic congestive heart failure ([facility]) 09/11/2019    Acute respiratory failure with hypoxia ([facility]) 05/06/2024    Aneurysm ([facility]) 2007    Aneurysm of abdominal aorta ([facility])     49mm, trileaflet AV    Atrial fibrillation ([facility])     Eliquis    BPH (benign prostatic hyperplasia)     CAD (coronary artery disease)     s/p CABGx3    CHF (congestive heart failure) ([facility])     Chronic pain     Gabapentin    Chronic pain disorder     lumbar    COPD (chronic obstructive pulmonary disease) ([facility])     Coronary artery disease     Elevated serum creatinine 06/18/2024    Elevated serum creatinine 06/18/2024    Elevated transaminase level 04/27/2024    Former tobacco use     Hyperlipidemia     Hypertension     Hyponatremia 04/27/2024    Hyponatremia 04/27/2024    Hypothyroidism     Lumbar radiculopathy     Lumbar stenosis     s/p injections    Neuropathy     Obesity (BMI 30-39.9)     YEVGENIY (obstructive sleep apnea)     unable to tolerate CPAP    Platelets decreased ([facility]) 07/27/2022    Pulmonary hypertension ([facility])     moderate to severe    Spondylolisthesis of lumbar region     Visual impairment 2022    Vitamin D deficiency      Past Surgical History:   Procedure Laterality Date    ABDOMINAL AORTIC ANEURYSM REPAIR  08/09/2007    2 dock limbs with visc extens prosth    CARDIAC  CATHETERIZATION      COLONOSCOPY      CORONARY ARTERY BYPASS GRAFT      LIMA to LAD, sequential SVG to OM1 & OM2, SVG to RDA    IR CHEST TUBE PLACEMENT  2024    LUMBAR EPIDURAL INJECTION       Social History     Tobacco Use    Smoking status: Former     Current packs/day: 0.00     Average packs/day: 1 pack/day for 55.6 years (55.6 ttl pk-yrs)     Types: Cigarettes     Start date: 1957     Quit date: 2012     Years since quittin.2     Passive exposure: Past    Smokeless tobacco: Never   Vaping Use    Vaping status: Never Used   Substance and Sexual Activity    Alcohol use: Not Currently    Drug use: No    Sexual activity: Never     E-Cigarette/Vaping    E-Cigarette Use Never User      E-Cigarette/Vaping Substances    Nicotine No     THC No     CBD No     Flavoring No     Other No     Unknown No      Family History   Problem Relation Age of Onset    Heart disease Mother     Stroke Father         stroke syndrome    Aneurysm Brother         abdominal    Aortic aneurysm Brother         ascending    Coronary artery disease Family     Hypertension Family     Other Son         gioblastoma multiforme     Social History:  Marital Status: /Civil Union   Occupation:   Patient Pre-hospital Living Situation: Home  Patient Pre-hospital Level of Mobility: walks with walker  Patient Pre-hospital Diet Restrictions: Cardiac salt restricted and fluid restricted diet    Meds/Allergies   I have reviewed home medications with patient personally.  Prior to Admission medications    Medication Sig Start Date End Date Taking? Authorizing Provider   albuterol (ProAir HFA) 90 mcg/act inhaler Inhale 2 puffs every 6 (six) hours as needed for wheezing 24  Yes Winnie Gruber MD   amiodarone 200 mg tablet Take 1 tablet (200 mg total) by mouth daily 24  Yes Raza Alan DO   aspirin (ECOTRIN LOW STRENGTH) 81 mg EC tablet Take 1 tablet by mouth daily 12  Yes Historical Provider, MD   atorvastatin  (LIPITOR) 40 mg tablet TAKE 1 TABLET BY MOUTH DAILY 10/30/24  Yes Omar Kessler MD   cholecalciferol (VITAMIN D3) 1,000 units tablet Take 2,000 Units by mouth daily   Yes Historical Provider, MD   Fluticasone-Salmeterol (Advair) 100-50 mcg/dose inhaler INHALE 1 PUFF BY MOUTH TWICE DAILY. RINSE MOUTH AFTER USE 7/19/24  Yes SOSA Lane   gabapentin (NEURONTIN) 300 mg capsule Take 300 mg by mouth daily at bedtime   Yes Historical Provider, MD   labetalol (NORMODYNE) 100 mg tablet Take 0.5 tablets (50 mg total) by mouth in the morning 7/12/24  Yes SOSA Marrufo   multivitamin (THERAGRAN) TABS Take 1 tablet by mouth daily   Yes Historical Provider, MD   oxyCODONE (ROXICODONE) 10 MG TABS Take 1 tablet (10 mg total) by mouth every 6 (six) hours as needed for moderate pain Max Daily Amount: 40 mg 10/31/24  Yes Lea Reyes, MD   oxygen gas Inhale 2 L continuous. 2L of oxygen via nasal cannula as needed and at night  Indications: short of breath   Yes Historical Provider, MD   polyethylene glycol (MIRALAX) 17 g packet Take 17 g by mouth daily as needed (constipation). Oscar Espinosa  dissolve in 8 oz liquid of choice   Indications: .   Yes Lea Reyes, MD   potassium chloride (Klor-Con M20) 20 mEq tablet TAKE 1 TABLET BY MOUTH DAILY 7  DAYS WEEKLY 9/17/24  Yes Lea Reyes, MD   senna (SENOKOT) 8.6 MG tablet Take 1 tablet by mouth as needed for constipation   Yes Historical Provider, MD   tamsulosin (FLOMAX) 0.4 mg Take 1 capsule by mouth daily   Yes Historical Provider, MD   torsemide (DEMADEX) 20 mg tablet Take 1 (20mg) tablet on Tuesday, Thursday, Saturday, Sunday, and 2 tablets (40 mg total) on Monday, Wednesday, and Friday 10/22/24  Yes Lea Reyes, MD   warfarin (Coumadin) 2.5 mg tablet Take 1 tab by moth daily or as directed by physician 7/31/24  Yes Raza Alan DO   acetaminophen (TYLENOL) 325 mg tablet Take 2 tablets by mouth every 6 (six) hours as needed for fever, headaches, mild pain,  moderate pain or severe pain. Indications: Fever, Pain    Historical Provider, MD   mupirocin (BACTROBAN) 2 % ointment APPLY TOPICALLY TO SORES TWICE DAILY UNTIL HEALED 10/14/24   Historical Provider, MD   naloxone (NARCAN) 4 mg/0.1 mL nasal spray Administer 1 spray into a nostril. If no response after 2-3 minutes, give another dose in the other nostril using a new spray. 10/31/24 10/31/25  Lea Reyes, MD     Allergies   Allergen Reactions    Meloxicam Edema       Objective :  Temp:  [97.8 °F (36.6 °C)] 97.8 °F (36.6 °C)  HR:  [57-69] 58  BP: (140-153)/(61-67) 153/67  Resp:  [18] 18  SpO2:  [91 %-97 %] 97 %  O2 Device: Nasal cannula  Nasal Cannula O2 Flow Rate (L/min):  [2 L/min] 2 L/min    Physical Exam  Vitals and nursing note reviewed.   Constitutional:       General: He is not in acute distress.     Appearance: Normal appearance. He is well-developed. He is obese. He is not ill-appearing.   HENT:      Head: Normocephalic and atraumatic.      Mouth/Throat:      Mouth: Mucous membranes are moist.   Eyes:      Conjunctiva/sclera: Conjunctivae normal.   Neck:      Comments: Bilateral JVD noted  Cardiovascular:      Rate and Rhythm: Normal rate and regular rhythm.      Pulses: Normal pulses.      Heart sounds: Normal heart sounds. No murmur heard.     No friction rub. No gallop.   Pulmonary:      Effort: Pulmonary effort is normal. No respiratory distress.      Breath sounds: Rales present. No wheezing.   Abdominal:      General: There is distension.      Palpations: Abdomen is soft.      Tenderness: There is no abdominal tenderness. There is no guarding.   Musculoskeletal:         General: Swelling (+3 pitting edema bilateral lower extremities up to the knees) present.      Cervical back: Neck supple.      Right lower leg: Edema (More swollen than left lower extremity) present.      Left lower leg: Edema present.   Skin:     General: Skin is warm and dry.      Capillary Refill: Capillary refill takes less than 2  seconds.   Neurological:      Mental Status: He is alert and oriented to person, place, and time.   Psychiatric:         Mood and Affect: Mood normal.         Behavior: Behavior normal.          Lines/Drains:            Lab Results: I have reviewed the following results:  Results from last 7 days   Lab Units 11/11/24  1126   WBC Thousand/uL 4.13*   HEMOGLOBIN g/dL 9.0*   HEMATOCRIT % 29.4*   PLATELETS Thousands/uL 128*   SEGS PCT % 72   LYMPHO PCT % 12*   MONO PCT % 13*   EOS PCT % 2     Results from last 7 days   Lab Units 11/11/24  1126   SODIUM mmol/L 136   POTASSIUM mmol/L 4.5   CHLORIDE mmol/L 97   CO2 mmol/L 30   BUN mg/dL 29*   CREATININE mg/dL 1.86*   ANION GAP mmol/L 9   CALCIUM mg/dL 9.3   ALBUMIN g/dL 3.8   TOTAL BILIRUBIN mg/dL 1.48*   ALK PHOS U/L 93   ALT U/L 24   AST U/L 35   GLUCOSE RANDOM mg/dL 82     Results from last 7 days   Lab Units 11/11/24  1126   INR  1.86*         Lab Results   Component Value Date    HGBA1C 5.9 (H) 07/09/2024    HGBA1C 6.3 (H) 06/18/2024    HGBA1C 6.2 (H) 02/20/2024           Imaging Results Review: I personally reviewed the following image studies/reports in PACS and discussed pertinent findings with Radiology: chest xray. My interpretation of the radiology images/reports is: Bilateral pulmonary congestion.  Other Study Results Review: EKG was reviewed.     Administrative Statements   I have spent a total time of 30 minutes in caring for this patient on the day of the visit/encounter including Diagnostic results.    ** Please Note: This note has been constructed using a voice recognition system. **

## 2024-11-11 NOTE — ASSESSMENT & PLAN NOTE
Echo done November 7 EF 60%, normal left ventricular systolic function, moderate pulmonary hypertension with right ventricular systolic pressure 55 mmHg, and severe tricuspid regurgitation  Mild pulmonary hypertension likely secondary to group 2 PH from uncontrolled YEVGENIY and COPD

## 2024-11-11 NOTE — ASSESSMENT & PLAN NOTE
Mild COPD, last PFTs done in 2021  Follows with pulmonary Dr. Beebe  Will continue Advair and as needed albuterol

## 2024-11-11 NOTE — ED PROCEDURE NOTE
Procedure  POC Cardiac US    Date/Time: 11/11/2024 12:31 PM    Performed by: Pawan Cosby DO  Authorized by: Pawan Cosby DO    Procedure details:     Exam Type:  Educational    Indications: dyspnea      Assessment / Evaluation for: cardiac function and pericardial effusion      Exam Type: initial exam      Image quality: limited diagnostic      Image availability:  Images available in PACS  Patient Details:     Cardiac Rhythm:  Regular  Cardiac findings:     Echo technique: limited 2D      Views obtained: parasternal long axis, parasternal short axis, subcostal and apical      Pericardial effusion: absent      Tamponade physiology: absent      Wall motion: hypodynamic      LV systolic function: depressed      RV dilation: mild    Pulmonary findings:     B-lines: more than 3    Interpretation:     Fluid Status:  Hypervolemic                   Pawan Cosby DO  11/11/24 1237

## 2024-11-11 NOTE — LETTER
[facility] [address]  FLOOR MED SURG UNIT  [address]  [place] [id number]  Dept: [phone]    November 16, 2024     Patient: Oscar Espinosa   YOB: 1940   Date of Visit: 11/11/2024       To Whom it May Concern:    Oscar Espinosa is under my professional care. He was seen in the hospital from 11/11/2024 to 11/16/24. He  is medically cleared for discharge from the hospital. He may return to pulmonary rehab. .    If you have any questions or concerns, please don't hesitate to call.         Sincerely,          Gely Thacker MD

## 2024-11-11 NOTE — ASSESSMENT & PLAN NOTE
Wt Readings from Last 3 Encounters:   11/11/24 106 kg (233 lb 7.5 oz)   11/07/24 105 kg (231 lb)   11/05/24 105 kg (231 lb)   Follows with Dr. Alan outpatient, last seen September 13.  Dry weight at the time 215 pounds  GDMT includes labetalol 50 mg daily and diuretic with torsemide 40 mg daily on Monday, Wednesday, Friday, and 20 mg daily the rest of the week.  He reports compliance with this.  Patient's weight has slowly been increasing, possibly secondary to his dietary noncompliance.  Endorses eating salty foods and snacking with pretzels.  Wife at the bedside also contributes to this history.  Presenting today with worsening SOB, increased weight, concern for volume overload when he was at pulmonary rehabilitation  Bnp 674, chest x-ray consistent with CHF and pulmonary congestion bilaterally  Recent echo on 11/7 showing EF 60%, normal left ventricular systolic function, moderate pulmonary hypertension with right ventricular systolic pressure 55 mmHg, and severe tricuspid regurgitation   On exam, bilateral JVD, bilateral +3 pitting edema up to the knees, significantly worse in the right lower extremity  Received 50 mg IV Lasix in the ED, urine output over 600 ml afterwards    Plan:  Will admit for CHF exacerbation  Will start Lasix 50 mg IV twice daily, adjust for goal to have the patient net negative 1 to 2 L daily  Monitor ins and outs  Daily standing weights  Cardiac diet, fluid restriction 1500 mL daily, salt restriction 2 g daily  Will defer cardiology consultation to primary team, we will hold off for now  Monitor on telemetry  Monitor BMPs  Replete mag and K

## 2024-11-11 NOTE — ASSESSMENT & PLAN NOTE
Rate controlled with labetalol 50 mg daily  On Coumadin 2.5 mg daily except Thursdays where he takes 3.75 mg  INR subtherapeutic 1.8, has previously been higher 2.3-3.2    Plan:  Will give 3.75 mg today  Starting tomorrow 2.5 mg daily except on Thursdays where he can continue taking 3.75 mg  Recheck INR tomorrow

## 2024-11-12 PROBLEM — N17.9 AKI (ACUTE KIDNEY INJURY) (HCC): Status: ACTIVE | Noted: 2024-11-12

## 2024-11-12 LAB
ANION GAP SERPL CALCULATED.3IONS-SCNC: 8 MMOL/L (ref 4–13)
ATRIAL RATE: 55 BPM
BUN SERPL-MCNC: 26 MG/DL (ref 5–25)
CALCIUM SERPL-MCNC: 9 MG/DL (ref 8.4–10.2)
CHLORIDE SERPL-SCNC: 99 MMOL/L (ref 96–108)
CO2 SERPL-SCNC: 31 MMOL/L (ref 21–32)
CREAT SERPL-MCNC: 1.61 MG/DL (ref 0.6–1.3)
ERYTHROCYTE [DISTWIDTH] IN BLOOD BY AUTOMATED COUNT: 23.5 % (ref 11.6–15.1)
GFR SERPL CREATININE-BSD FRML MDRD: 38 ML/MIN/1.73SQ M
GLUCOSE SERPL-MCNC: 77 MG/DL (ref 65–140)
HCT VFR BLD AUTO: 29.4 % (ref 36.5–49.3)
HGB BLD-MCNC: 9.1 G/DL (ref 12–17)
INR PPP: 1.74 (ref 0.85–1.19)
MAGNESIUM SERPL-MCNC: 2.2 MG/DL (ref 1.9–2.7)
MCH RBC QN AUTO: 30 PG (ref 26.8–34.3)
MCHC RBC AUTO-ENTMCNC: 31 G/DL (ref 31.4–37.4)
MCV RBC AUTO: 97 FL (ref 82–98)
PHOSPHATE SERPL-MCNC: 3.7 MG/DL (ref 2.3–4.1)
PLATELET # BLD AUTO: 112 THOUSANDS/UL (ref 149–390)
PMV BLD AUTO: 10.5 FL (ref 8.9–12.7)
POTASSIUM SERPL-SCNC: 3.9 MMOL/L (ref 3.5–5.3)
PROTHROMBIN TIME: 21.1 SECONDS (ref 12.3–15)
QRS AXIS: 97 DEGREES
QRSD INTERVAL: 106 MS
QT INTERVAL: 490 MS
QTC INTERVAL: 481 MS
RBC # BLD AUTO: 3.03 MILLION/UL (ref 3.88–5.62)
SODIUM SERPL-SCNC: 138 MMOL/L (ref 135–147)
T WAVE AXIS: 26 DEGREES
VENTRICULAR RATE: 58 BPM
WBC # BLD AUTO: 3.92 THOUSAND/UL (ref 4.31–10.16)

## 2024-11-12 PROCEDURE — 93010 ELECTROCARDIOGRAM REPORT: CPT | Performed by: INTERNAL MEDICINE

## 2024-11-12 PROCEDURE — 99223 1ST HOSP IP/OBS HIGH 75: CPT | Performed by: INTERNAL MEDICINE

## 2024-11-12 PROCEDURE — 83735 ASSAY OF MAGNESIUM: CPT

## 2024-11-12 PROCEDURE — 85610 PROTHROMBIN TIME: CPT

## 2024-11-12 PROCEDURE — 94760 N-INVAS EAR/PLS OXIMETRY 1: CPT

## 2024-11-12 PROCEDURE — 84100 ASSAY OF PHOSPHORUS: CPT

## 2024-11-12 PROCEDURE — 85027 COMPLETE CBC AUTOMATED: CPT

## 2024-11-12 PROCEDURE — 99232 SBSQ HOSP IP/OBS MODERATE 35: CPT | Performed by: INTERNAL MEDICINE

## 2024-11-12 PROCEDURE — 80048 BASIC METABOLIC PNL TOTAL CA: CPT

## 2024-11-12 RX ORDER — WARFARIN SODIUM 5 MG/1
5 TABLET ORAL
Status: COMPLETED | OUTPATIENT
Start: 2024-11-12 | End: 2024-11-12

## 2024-11-12 RX ADMIN — LABETALOL HYDROCHLORIDE 50 MG: 100 TABLET, FILM COATED ORAL at 08:39

## 2024-11-12 RX ADMIN — ASPIRIN 81 MG: 81 TABLET, COATED ORAL at 08:40

## 2024-11-12 RX ADMIN — GABAPENTIN 100 MG: 100 CAPSULE ORAL at 21:36

## 2024-11-12 RX ADMIN — Medication 2000 UNITS: at 08:39

## 2024-11-12 RX ADMIN — WARFARIN SODIUM 5 MG: 5 TABLET ORAL at 18:28

## 2024-11-12 RX ADMIN — FLUTICASONE FUROATE AND VILANTEROL TRIFENATATE 1 PUFF: 100; 25 POWDER RESPIRATORY (INHALATION) at 10:01

## 2024-11-12 RX ADMIN — AMIODARONE HYDROCHLORIDE 200 MG: 200 TABLET ORAL at 08:39

## 2024-11-12 RX ADMIN — FUROSEMIDE 50 MG: 10 INJECTION, SOLUTION INTRAMUSCULAR; INTRAVENOUS at 18:24

## 2024-11-12 RX ADMIN — FUROSEMIDE 50 MG: 10 INJECTION, SOLUTION INTRAMUSCULAR; INTRAVENOUS at 08:40

## 2024-11-12 RX ADMIN — TAMSULOSIN HYDROCHLORIDE 0.4 MG: 0.4 CAPSULE ORAL at 08:39

## 2024-11-12 RX ADMIN — POTASSIUM CHLORIDE 20 MEQ: 1500 TABLET, EXTENDED RELEASE ORAL at 08:39

## 2024-11-12 RX ADMIN — B-COMPLEX W/ C & FOLIC ACID TAB 1 TABLET: TAB at 08:39

## 2024-11-12 RX ADMIN — WARFARIN SODIUM 2.5 MG: 2.5 TABLET ORAL at 18:25

## 2024-11-12 RX ADMIN — ATORVASTATIN CALCIUM 40 MG: 40 TABLET, FILM COATED ORAL at 08:40

## 2024-11-12 RX ADMIN — SENNOSIDES AND DOCUSATE SODIUM 2 TABLET: 8.6; 5 TABLET ORAL at 18:25

## 2024-11-12 RX ADMIN — SENNOSIDES AND DOCUSATE SODIUM 2 TABLET: 8.6; 5 TABLET ORAL at 08:39

## 2024-11-12 NOTE — ASSESSMENT & PLAN NOTE
Bilateral lower extremity venous insufficiency, chronic issue for him  He states that over the past few weeks he has noticed his right leg being significantly more swollen than the left leg  This is in the setting of CHF exacerbation  As well as having subtherapeutic INR  VAS ultrasound showed no evidence of DVT    Plan:  Diuresis for CHF exacerbation as above

## 2024-11-12 NOTE — ASSESSMENT & PLAN NOTE
Wt Readings from Last 3 Encounters:   11/12/24 99.9 kg (220 lb 3.2 oz)   11/07/24 105 kg (231 lb)   11/05/24 105 kg (231 lb)     Chest xray showed CHF with small bibasilar effusions   On furosemide 50 mg IV BID   He takes Torsemide 20 mg PO daily T/R/S/S and torsemide 40 mg PO daily M/W/F  Monitor I/Os -3.7 L in 24 hours   Daily weights 220 lbs today from 223 lbs yesterday. His dry weight is 215 lbs   On 2 gram Na diet and 1500 ml fluid restriction   Reviewed HF education with him and his wife.

## 2024-11-12 NOTE — CONSULTS
Consultation - Cardiology Team One  Oscar Espinosa 84 y.o. male MRN: [id number]  Unit/Bed#: -01 Encounter: [id number]    Inpatient consult to Cardiology  Consult performed by: SOSA Plaza  Consult ordered by: Gilbert Long MD      Physician Requesting Consult: Zahira Oseguera MD  Reason for Consult / Principal Problem: CHF     Assessment & Plan  Acute on chronic combined systolic and diastolic heart failure (HCC)  Wt Readings from Last 3 Encounters:   11/12/24 99.9 kg (220 lb 3.2 oz)   11/07/24 105 kg (231 lb)   11/05/24 105 kg (231 lb)     Chest xray showed CHF with small bibasilar effusions   On furosemide 50 mg IV BID   He takes Torsemide 20 mg PO daily T/R/S/S and torsemide 40 mg PO daily M/W/F  Monitor I/Os -3.7 L in 24 hours   Daily weights 220 lbs today from 223 lbs yesterday. His dry weight is 215 lbs   On 2 gram Na diet and 1500 ml fluid restriction   Reviewed HF education with him and his wife.   Benign essential hypertension  BP stable: 148/56  On labetalol 50 mg PO daily   Hyperlipidemia  LDL 48  On atorvastatin 40 mg PO daily   CAD (coronary artery disease)  History of CABG x 4 in 2003 (LIMA to LAD, VG to OM1, OM2 and VG to PDA)   On aspirin, atorvastatin and labetalol   Paroxysmal atrial fibrillation (HCC)  On amiodarone 200 mg PO daily and labetalol 50 mg PO daily  for rhythm control   On coumadin for OAC   INR 1.74  Reviewed ECG and telemetry showing sinus bradycardia HR 50s  Chronic respiratory failure with hypoxia (HCC)  On 2 L NC chronically. Currently on 3 L NC   LITO (acute kidney injury) (HCC)  Creatinine 1.61 today from 1.86 yesterday   Baseline appears closer to 1.5    History of Present Illness   HPI: Oscar Espinosa is a 84 y.o. year old male who has a history of  CAD s/p CABG x 4 in 2003 , chronic HFpEF, paroxysmal atrial fibrillation on coumadin, AAA resection 2007, hypertension, hyperlipidemia, CKD stage III, lower extremity lymphedema and untreated  YEVGENIY. He  follows with cardiologist Dr. Alan.                He presents to [facility] ER 11/11/2024 from pulmonary rehab due to increased swelling in his legs and weight gain. He also noted increased SOB. He has chronic hypoxic respiratory failure and wears 2 L NC. He notes symptoms of weights gain for about 1 month. His swelling in his legs have also been progressively getting worse in the past month. He notified his primary cardiologist last week and was advised to double his torsemide dose for 3 days and go to the ER if not improving. He reports no chest pain, palpitations or dizziness. He reports he has not been following a low salt diet at home. He has been eating salty snacks and frozen dinners. He reports compliance with his medications.     Echocardiogram 11/2024 showed EF 60%, L and R atrium dilated, mild to moderate MR and severe TR.     He lives at home with his wife. He denies tobacco use and reports rare alcohol use.     EKG reviewed personally:  Sinus bradycardia with 1 st degree AV block with PVCs  Ventricular rate 58 bpm     Telemetry reviewed personally:   Sinus bradycardia HR  50s        Review of Systems   Constitutional: Positive for weight gain. Negative for chills and fever.   HENT:  Negative for congestion.    Cardiovascular:  Positive for dyspnea on exertion. Negative for chest pain.   Respiratory:  Positive for shortness of breath.    Musculoskeletal:  Negative for falls.   Gastrointestinal:  Positive for bloating. Negative for nausea and vomiting.   Neurological:  Negative for dizziness and light-headedness.   Psychiatric/Behavioral:  Negative for altered mental status.    All other systems reviewed and are negative.    Historical Information   Past Medical History:   Diagnosis Date    Abdominal aortic aneurysm (HCC)     s/p repair    Abnormal ECG july 2022    Acute on chronic congestive heart failure (HCC) 09/11/2019    Acute respiratory failure with hypoxia ([facility]) 05/06/2024    Aneurysm ([facility])      Aneurysm of abdominal aorta (HCC)     49mm, trileaflet AV    Atrial fibrillation (HCC)     Eliquis    BPH (benign prostatic hyperplasia)     CAD (coronary artery disease)     s/p CABGx3    CHF (congestive heart failure) (HCC)     Chronic pain     Gabapentin    Chronic pain disorder     lumbar    COPD (chronic obstructive pulmonary disease) (HCC)     Coronary artery disease     Elevated serum creatinine 2024    Elevated serum creatinine 2024    Elevated transaminase level 2024    Former tobacco use     Hyperlipidemia     Hypertension     Hyponatremia 2024    Hyponatremia 2024    Hypothyroidism     Lumbar radiculopathy     Lumbar stenosis     s/p injections    Neuropathy     Obesity (BMI 30-39.9)     YEVGENIY (obstructive sleep apnea)     unable to tolerate CPAP    Platelets decreased ([facility]) 2022    Pulmonary hypertension (HCC)     moderate to severe    Spondylolisthesis of lumbar region     Visual impairment     Vitamin D deficiency      Past Surgical History:   Procedure Laterality Date    ABDOMINAL AORTIC ANEURYSM REPAIR  2007    2 dock limbs with visc extens prosth    CARDIAC CATHETERIZATION      COLONOSCOPY      CORONARY ARTERY BYPASS GRAFT      LIMA to LAD, sequential SVG to OM1 & OM2, SVG to RDA    EYE SURGERY      IR CHEST TUBE PLACEMENT  2024    LUMBAR EPIDURAL INJECTION       Social History     Substance and Sexual Activity   Alcohol Use Yes    Alcohol/week: 1.0 standard drink of alcohol    Types: 1 Cans of beer per week     Social History     Substance and Sexual Activity   Drug Use No     Social History     Tobacco Use   Smoking Status Former    Current packs/day: 0.00    Average packs/day: 1 pack/day for 55.6 years (55.6 ttl pk-yrs)    Types: Cigarettes    Start date: 1957    Quit date: 2012    Years since quittin.2    Passive exposure: Past   Smokeless Tobacco Never     Family History:   Family History   Problem Relation Age of Onset     Heart disease Mother     Stroke Father         stroke syndrome    Aneurysm Brother         abdominal    Aortic aneurysm Brother         ascending    Coronary artery disease Family     Hypertension Family     Other Son         gioblastoma multiforme       Meds/Allergies   all current active meds have been reviewed and current meds:   Current Facility-Administered Medications:     acetaminophen (TYLENOL) tablet 650 mg, Q6H PRN    albuterol (PROVENTIL HFA,VENTOLIN HFA) inhaler 2 puff, Q6H PRN    amiodarone tablet 200 mg, Daily    aspirin (ECOTRIN LOW STRENGTH) EC tablet 81 mg, Daily    atorvastatin (LIPITOR) tablet 40 mg, Daily    Cholecalciferol (VITAMIN D3) tablet 2,000 Units, Daily    Fluticasone Furoate-Vilanterol 100-25 mcg/actuation 1 puff, Daily    furosemide (LASIX) injection 50 mg, BID    gabapentin (NEURONTIN) capsule 100 mg, HS    labetalol (NORMODYNE) tablet 50 mg, Daily    multivitamin stress formula tablet 1 tablet, Daily    oxyCODONE (ROXICODONE) immediate release tablet 10 mg, Q6H PRN    polyethylene glycol (MIRALAX) packet 17 g, Daily    potassium chloride (Klor-Con M20) CR tablet 20 mEq, Daily    senna-docusate sodium (SENOKOT S) 8.6-50 mg per tablet 2 tablet, BID    tamsulosin (FLOMAX) capsule 0.4 mg, Daily    warfarin (COUMADIN) tablet 2.5 mg, Once per day on     [START ON 2024] warfarin (COUMADIN) tablet 3.75 mg, Every Thursday    warfarin (COUMADIN) tablet 5 mg, Once (warfarin)       Allergies   Allergen Reactions    Meloxicam Edema       Objective   Vitals: Blood pressure 148/66, pulse 60, temperature 97.8 °F (36.6 °C), temperature source Oral, resp. rate 17, weight 99.9 kg (220 lb 3.2 oz), SpO2 94%.,     Body mass index is 30.71 kg/m².,     Systolic (24hrs), Av , Min:108 , Max:161     Diastolic (24hrs), Av, Min:52, Max:69      Intake/Output Summary (Last 24 hours) at 2024 1450  Last data filed at 2024 0848  Gross per 24  hour   Intake 880 ml   Output 3700 ml   Net -2820 ml     Weight (last 2 days)       Date/Time Weight    11/12/24 0526 99.9 (220.2)    11/11/24 1900 102 (223.8)    11/11/24 1235 106 (233.47)          Invasive Devices       Peripheral Intravenous Line  Duration             Peripheral IV 11/11/24 Right;Ventral (anterior) Forearm 1 day                      Physical Exam  Constitutional:       Appearance: He is obese.   HENT:      Head: Normocephalic.      Mouth/Throat:      Mouth: Mucous membranes are moist.   Cardiovascular:      Rate and Rhythm: Regular rhythm. Bradycardia present.      Pulses: Normal pulses.   Pulmonary:      Effort: Pulmonary effort is normal. No respiratory distress.      Comments: 3 L NC   Decreased in bases   Abdominal:      General: Bowel sounds are normal.   Musculoskeletal:         General: Swelling present.      Cervical back: Neck supple.      Comments: + 2 edema bilateral LE extending to thighs    Skin:     General: Skin is warm and dry.      Capillary Refill: Capillary refill takes less than 2 seconds.   Neurological:      Mental Status: He is alert and oriented to person, place, and time.   Psychiatric:         Mood and Affect: Mood normal.       LABORATORY RESULTS:      CBC with diff:   Results from last 7 days   Lab Units 11/12/24  0536 11/11/24  1126   WBC Thousand/uL 3.92* 4.13*   HEMOGLOBIN g/dL 9.1* 9.0*   HEMATOCRIT % 29.4* 29.4*   MCV fL 97 98   PLATELETS Thousands/uL 112* 128*   RBC Million/uL 3.03* 3.00*   MCH pg 30.0 30.0   MCHC g/dL 31.0* 30.6*   RDW % 23.5* 23.3*   MPV fL 10.5 11.7   NRBC AUTO /100 WBCs  --  0       CMP:  Results from last 7 days   Lab Units 11/12/24  0536 11/11/24  1126   POTASSIUM mmol/L 3.9 4.5   CHLORIDE mmol/L 99 97   CO2 mmol/L 31 30   BUN mg/dL 26* 29*   CREATININE mg/dL 1.61* 1.86*   CALCIUM mg/dL 9.0 9.3   AST U/L  --  35   ALT U/L  --  24   ALK PHOS U/L  --  93   EGFR ml/min/1.73sq m 38 32       BMP:  Results from last 7 days   Lab Units  24  0536 24  1126   POTASSIUM mmol/L 3.9 4.5   CHLORIDE mmol/L 99 97   CO2 mmol/L 31 30   BUN mg/dL 26* 29*   CREATININE mg/dL 1.61* 1.86*   CALCIUM mg/dL 9.0 9.3          Lab Results   Component Value Date    NTBNP 141 07/10/2019            Results from last 7 days   Lab Units 24  0536   MAGNESIUM mg/dL 2.2                     Results from last 7 days   Lab Units 24  0536 24  1126   INR  1.74* 1.86*     Lipid Profile:   Lab Results   Component Value Date    CHOL 124 10/15/2015    CHOL 159 2015    CHOL 155 2013     Lab Results   Component Value Date    HDL 50 2024    HDL 68 2023    HDL 76 2023     Lab Results   Component Value Date    LDLCALC 48 2024    LDLCALC 53 2023    LDLCALC 58 2023     Lab Results   Component Value Date    TRIG 37 2024    TRIG 54 2023    TRIG 41 2023         Cardiac testing:   Results for orders placed during the hospital encounter of 20    Echo complete with contrast if [place] [address]  [phone]    Transthoracic Echocardiogram  2D, M-mode, Doppler, and Color Doppler    Study date:  04-Sep-2020    Patient: LIZ GARCIA  MR number: [id number]  Account number: [id number]  : 1940  Age: 80 years  Gender: Male  Status: Outpatient  Location: [address] [place] [address] [facility]  Height: 71 in  Weight: 253.4 lb  BP: 134/ 76 mmHg    Indications: 3 vessel CAD;Arteriosclerotic CVD;Essential HTN.    Diagnoses: I11.9 - Hypertensive heart disease without heart failure, I25.83 - Coronary atherosclerosis due to lipid rich plaque    Sonographer:  GUNJAN Chávez  Primary Physician:  Lea Reyes, MD  Referring Physician:  Raza Alan DO  Group:  [facility]  Cardiology Fellow:  Karla Jaquez MD  Interpreting Physician:  RYAN Saleh MD    SUMMARY    LEFT VENTRICLE:  Size was  normal.  Systolic function was normal. Ejection fraction was estimated to be 60 %.  Although no diagnostic regional wall motion abnormality was identified, this possibility cannot be completely excluded on the basis of this study.  Wall thickness was mildly increased.  There was mild concentric hypertrophy.  Doppler parameters were consistent with abnormal left ventricular relaxation (grade 1 diastolic dysfunction).    RIGHT VENTRICLE:  The size was at the upper limits of normal.  Systolic function was normal.  Wall thickness was increased.    LEFT ATRIUM:  The atrium was mildly dilated.    RIGHT ATRIUM:  The atrium was mildly dilated.    MITRAL VALVE:  There was mild regurgitation.    AORTIC VALVE:  There was trace regurgitation.    TRICUSPID VALVE:  There was mild regurgitation.  Estimated peak PA pressure was 50 mmHg.  The findings suggest moderate pulmonary hypertension.    AORTA:  The root exhibited dilatation at 44 mm.  There was dilatation of the ascending aorta at 44 mm.    IVC, HEPATIC VEINS:  The respirophasic change in diameter was less than 50%.    PULMONARY VEINS:  S/D reversal consistent with elevated LAP/diastolic dysfunction.    COMPARISONS:  Comparison was made with the previous study of 23-May-2014. Ascending aortic and aortic root sizes have not significantly changed. Pulmonary artery pressure has increased.    HISTORY: PRIOR HISTORY: Chronic systolic HF;HTN;Aneurysm of ascending aorta;HLD;YEVGENIY;Abdominal aortic aneurysm without rupture;Former smoker.    PROCEDURE: The study was performed in the [address] [place] [address] [facility]. This was a routine study. The transthoracic approach was used. The study included complete 2D imaging, M-mode, complete spectral Doppler, and color Doppler. The  heart rate was 73 bpm, at the start of the study. Images were obtained from the parasternal, apical, subcostal, and suprasternal notch acoustic windows. This was a technically difficult study.    LEFT VENTRICLE:  Size was normal. Systolic function was normal. Ejection fraction was estimated to be 60 %. Although no diagnostic regional wall motion abnormality was identified, this possibility cannot be completely excluded on the basis  of this study. Wall thickness was mildly increased. There was mild concentric hypertrophy. DOPPLER: Doppler parameters were consistent with abnormal left ventricular relaxation (grade 1 diastolic dysfunction).    RIGHT VENTRICLE: The size was at the upper limits of normal. Systolic function was normal. Wall thickness was increased.    LEFT ATRIUM: The atrium was mildly dilated.    RIGHT ATRIUM: The atrium was mildly dilated.    MITRAL VALVE: There was mild annular calcification. Valve structure was normal. There was normal leaflet separation. DOPPLER: The transmitral velocity was within the normal range. There was no evidence for stenosis. There was mild  regurgitation.    AORTIC VALVE: The valve was probably trileaflet. Leaflets exhibited mild calcification, normal cuspal separation, and sclerosis. DOPPLER: Transaortic velocity was within the normal range. There was no evidence for stenosis. There was trace  regurgitation.    TRICUSPID VALVE: The valve structure was normal. There was normal leaflet separation. DOPPLER: The transtricuspid velocity was within the normal range. There was no evidence for stenosis. There was mild regurgitation. Estimated peak PA  pressure was 50 mmHg. The findings suggest moderate pulmonary hypertension.    PULMONIC VALVE: Not well visualized. DOPPLER: The transpulmonic velocity was within the normal range. There was trace regurgitation.    PERICARDIUM: There was no pericardial effusion.    AORTA: The root exhibited dilatation at 44 mm. There was dilatation of the ascending aorta at 44 mm.    SYSTEMIC VEINS: IVC: The inferior vena cava was normal in size. The respirophasic change in diameter was less than 50%.    PULMONARY VEINS: S/D reversal consistent with elevated  LAP/diastolic dysfunction.    SYSTEM MEASUREMENT TABLES    2D  %FS: 29.67 %  Ao Diam: 3.65 cm  EDV(Teich): 127.77 ml  EF(Cube): 65.21 %  EF(Teich): 56.37 %  ESV(Cube): 48.06 ml  ESV(Teich): 55.74 ml  IVSd: 1.44 cm  LA Area: 17.79 cm2  LA Diam: 5.34 cm  LVEDV MOD A4C: 116.48 ml  LVEF MOD A4C: 49.73 %  LVESV MOD A4C: 58.55 ml  LVIDd: 5.17 cm  LVIDs: 3.64 cm  LVLd A4C: 8.46 cm  LVLs A4C: 6.7 cm  LVPWd: 1.36 cm  RA Area: 21.82 cm2  RV Diam.: 4.22 cm  SI(Cube): 38.51 ml/m2  SI(Teich): 30.78 ml/m2  SV MOD A4C: 57.93 ml  SV(Cube): 90.1 ml  SV(Teich): 72.03 ml    CW  AR Dec Sabine: 4.79 m/s2  AR Dec Time: 1282.69 ms  AR PHT: 371.98 ms  AR Vmax: 6.15 m/s  AR maxP.11 mmHg  TR Vmax: 2.65 m/s  TR maxP.17 mmHg    MM  TAPSE: 2.23 cm    PW  E': 0.08 m/s  E/E': 7.32  MV A Jerardo: 0.51 m/s  MV Dec Sabine: 2.06 m/s2  MV DecT: 266.25 ms  MV E Jerardo: 0.55 m/s  MV E/A Ratio: 1.07    [employer] Accredited Echocardiography Laboratory    Prepared and electronically signed by    RYAN Saleh MD  Signed 04-Sep-2020 12:13:39    No results found for this or any previous visit.    No valid procedures specified.  No results found for this or any previous visit.    Imaging:    VAS VENOUS DUPLEX - LOWER LIMB BILATERAL    Result Date: 2024  Narrative:  [facility] REPORT CLINICAL: Indications: Patient presents with bilateral lower extremity edema x a few months. Operative History: 2007 AAA repair (aorto-bi-fem) 2003 CABG x 3 Risk Factors The patient has history of Obesity, HTN, Hyperlipidemia, CKD, CAD, CHF, A-fib, COPD, AAA, previous smoking (quit >10yrs ago), and is currently taking a blood thinner.  FINDINGS:  Right         Reflux  Valve Closure Time  GSV Inguinal  Reflux                2.58     CONCLUSION:  Impression:  RIGHT LOWER LIMB: No evidence of acute or chronic deep vein thrombosis. No evidence of superficial thrombophlebitis noted. Doppler evaluation shows reflux noted in the  saphenofemoral junction. Popliteal, posterior tibial and anterior tibial arterial Doppler waveforms are triphasic.  LEFT LOWER LIMB: No evidence of acute or chronic deep vein thrombosis. No evidence of superficial thrombophlebitis noted. The GSV was not visualized in the thigh secondary to prior harvest. Doppler evaluation shows a normal response to augmentation maneuvers. Popliteal and posterior tibial arterial Doppler waveforms are triphasic. There appears to be a high grade stenosis vs occlusion of the anterior tibial artery.  Tech Note: There is an echogenic structure located in the bilateral inguinal region measuring approximately 2.60cm on the right and 2.22cm on the left suggestive of enlarged lymphatic channels.  Technical findings were given to Arvind Lantigua MD via [employer] secure chat at 17:50.  SIGNATURE: Electronically Signed by: RAMOS XIAO DO, RPVI on 2024-11-11 06:38:20 PM    US bedside procedure    Result Date: 11/11/2024  Narrative: [id number]    XR chest 1 view portable    Result Date: 11/11/2024  Narrative: XR CHEST PORTABLE INDICATION: SOB. COMPARISON: 10/22/2024 FINDINGS: Mild central congestion and small bibasilar pleural effusions. No consolidation or pneumothorax. Cardiomegaly as before. Bones are unremarkable for age. Normal upper abdomen.     Impression: CHF with small bibasilar pleural effusions. Workstation performed: [id number]     Echo follow up/limited w/ contrast if indicated    Result Date: 11/7/2024  Narrative:   Left Ventricle: The left ventricular ejection fraction is 60%. Systolic function is normal. Wall motion is normal.   IVS: There is both systolic and diastolic flattening of the interventricular septum consistent with right ventricle pressure and volume overload.   Right Ventricle: Right ventricular cavity size is dilated. Systolic function is normal.   Left Atrium: The atrium is dilated.   Right Atrium: The atrium is dilated.   Mitral Valve:  There is mild annular calcification. There is mild to moderate regurgitation.   Tricuspid Valve: There is severe regurgitation. The right ventricular systolic pressure is moderately elevated. The estimated right ventricular systolic pressure is 55.00 mmHg.  This may be underestimated due to severity of TR jet.   Aorta: The ascending aorta is mildly dilated. The ascending aorta is 4.4 cm.     XR chest 1 view portable    Result Date: 10/23/2024  Narrative: XR CHEST PORTABLE INDICATION: Shortness of breath. COMPARISON: July 6, 2024 FINDINGS: Sternotomy Mild pulmonary vascular congestion. Small bibasilar effusions. No pneumothorax. Enlarged cardiac silhouette, unchanged. Bones are unremarkable for age. Normal upper abdomen.     Impression: Cardiomegaly, mild pulmonary vascular congestive failure, and small bilateral pleural effusions. Workstation performed: [id number]     CT chest without contrast    Result Date: 10/22/2024  Narrative: CT CHEST WITHOUT IV CONTRAST INDICATION: concern for pneumonia. COMPARISON: CT chest dated 7/29/2024 TECHNIQUE: CT examination of the chest was performed without intravenous contrast. Multiplanar 2D reformatted images were created from the source data. This examination, like all CT scans performed in the [facility], was performed utilizing techniques to minimize radiation dose exposure, including the use of iterative reconstruction and automated exposure control. Radiation dose length product (DLP) for this visit: 506 mGy-cm FINDINGS: LUNGS/PLEURA: Tiny pleural effusion on the left. Small pleural effusion on the right. Right-sided pleural effusion appears mildly intervally increased when compared to the prior. There is some associated passive atelectasis noted dependently in the bilateral lower lung fields. Some scarring is seen in the periphery. There is prominence of the interstitial markings, especially in the lower lung fields, consider a component of fluid  overload/CHF. An interstitial pneumonitis such as NSIP is also a differential consideration. Some patchy opacities are seen in the lower lung fields which could represent edema, atelectasis, and/or infection. HEART/GREAT VESSELS: Heart is enlarged. Mild coronary atherosclerosis. Status post CABG. Severe coronary atherosclerosis. Uncoiled aorta. No thoracic aortic aneurysm. Low-density of the blood pool noted, suspicious for anemia. MEDIASTINUM AND MYLES: Unremarkable. CHEST WALL AND LOWER NECK: Median sternotomy changes. Body wall edema VISUALIZED STRUCTURES IN THE UPPER ABDOMEN: Mild pancreatic atrophy. Small hiatal hernia suspected. Multiple bilateral renal cysts. Small left hepatic cyst OSSEOUS STRUCTURES: Multilevel degenerative changes of the spine. No acute fracture or destructive osseous lesion.     Impression: Cardiomegaly. Coronary atherosclerosis, status post CABG. Pleural effusions with prominence of the interstitial markings, especially in the lower lung fields, as described; consider a component of fluid overload/CHF. Some scarring is noted in the periphery. An interstitial pneumonitis such as NSIP is also a differential consideration. Patchy opacities in the lower lung fields could represent edema, atelectasis, and/or infection. Correlation with the patient symptoms and laboratory values recommended. Low-density of the blood pool noted, suspicious for anemia. Small hiatal hernia suspected, renal and hepatic cysts, and other findings as above. Workstation performed: [id number]       Thank you for allowing us to participate in this patient's care.     Counseling / Coordination of Care  Total floor / unit time spent today 45 minutes.  Greater than 50% of total time was spent with the patient and / or family counseling and / or coordination of care.  A description of the counseling / coordination of care: Review of history, current assessment, development of a plan.      Code Status: Level 3 - DNAR and  DNI    ** Please Note: Dragon 360 Dictation voice to text software may have been used in the creation of this document. **

## 2024-11-12 NOTE — PLAN OF CARE
Problem: PAIN - ADULT  Goal: Verbalizes/displays adequate comfort level or baseline comfort level  Description: Interventions:  - Encourage patient to monitor pain and request assistance  - Assess pain using appropriate pain scale  - Administer analgesics based on type and severity of pain and evaluate response  - Implement non-pharmacological measures as appropriate and evaluate response  - Consider cultural and social influences on pain and pain management  - Notify physician/advanced practitioner if interventions unsuccessful or patient reports new pain  Outcome: Progressing     Problem: INFECTION - ADULT  Goal: Absence or prevention of progression during hospitalization  Description: INTERVENTIONS:  - Assess and monitor for signs and symptoms of infection  - Monitor lab/diagnostic results  - Monitor all insertion sites, i.e. indwelling lines, tubes, and drains  - Monitor endotracheal if appropriate and nasal secretions for changes in amount and color  - [place] appropriate cooling/warming therapies per order  - Administer medications as ordered  - Instruct and encourage patient and family to use good hand hygiene technique  - Identify and instruct in appropriate isolation precautions for identified infection/condition  Outcome: Progressing     Problem: SAFETY ADULT  Goal: Patient will remain free of falls  Description: INTERVENTIONS:  - Educate patient/family on patient safety including physical limitations  - Instruct patient to call for assistance with activity   - Consult OT/PT to assist with strengthening/mobility   - Keep Call bell within reach  - Keep bed low and locked with side rails adjusted as appropriate  - Keep care items and personal belongings within reach  - Initiate and maintain comfort rounds  - Make Fall Risk Sign visible to staff  - Apply yellow socks and bracelet for high fall risk patients  - Consider moving patient to room near nurses station  Outcome: Progressing

## 2024-11-12 NOTE — ASSESSMENT & PLAN NOTE
On amiodarone 200 mg PO daily and labetalol 50 mg PO daily  for rhythm control   On coumadin for OAC   INR 1.74  Reviewed ECG and telemetry showing sinus bradycardia HR 50s

## 2024-11-12 NOTE — PLAN OF CARE
Problem: Potential for Falls  Goal: Patient will remain free of falls  Description: INTERVENTIONS:  - Educate patient/family on patient safety including physical limitations  - Instruct patient to call for assistance with activity   - Consult OT/PT to assist with strengthening/mobility   - Keep Call bell within reach  - Keep bed low and locked with side rails adjusted as appropriate  - Keep care items and personal belongings within reach  - Initiate and maintain comfort rounds  - Make Fall Risk Sign visible to staff  - Offer Toileting every  Hours, in advance of need  - Initiate/Maintain alarm  - Obtain necessary fall risk management equipment:   - Apply yellow socks and bracelet for high fall risk patients  - Consider moving patient to room near nurses station  Outcome: Progressing     Problem: Nutrition/Hydration-ADULT  Goal: Nutrient/Hydration intake appropriate for improving, restoring or maintaining nutritional needs  Description: Monitor and assess patient's nutrition/hydration status for malnutrition. Collaborate with interdisciplinary team and initiate plan and interventions as ordered.  Monitor patient's weight and dietary intake as ordered or per policy. Utilize nutrition screening tool and intervene as necessary. Determine patient's food preferences and provide high-protein, high-caloric foods as appropriate.     INTERVENTIONS:  - Monitor oral intake, urinary output, labs, and treatment plans  - Assess nutrition and hydration status and recommend course of action  - Evaluate amount of meals eaten  - Assist patient with eating if necessary   - Allow adequate time for meals  - Recommend/ encourage appropriate diets, oral nutritional supplements, and vitamin/mineral supplements  - Order, calculate, and assess calorie counts as needed  - Recommend, monitor, and adjust tube feedings and TPN/PPN based on assessed needs  - Assess need for intravenous fluids  - Provide specific nutrition/hydration education as  appropriate  - Include patient/family/caregiver in decisions related to nutrition  Outcome: Progressing     Problem: PAIN - ADULT  Goal: Verbalizes/displays adequate comfort level or baseline comfort level  Description: Interventions:  - Encourage patient to monitor pain and request assistance  - Assess pain using appropriate pain scale  - Administer analgesics based on type and severity of pain and evaluate response  - Implement non-pharmacological measures as appropriate and evaluate response  - Consider cultural and social influences on pain and pain management  - Notify physician/advanced practitioner if interventions unsuccessful or patient reports new pain  Outcome: Progressing     Problem: INFECTION - ADULT  Goal: Absence or prevention of progression during hospitalization  Description: INTERVENTIONS:  - Assess and monitor for signs and symptoms of infection  - Monitor lab/diagnostic results  - Monitor all insertion sites, i.e. indwelling lines, tubes, and drains  - Monitor endotracheal if appropriate and nasal secretions for changes in amount and color  - [place] appropriate cooling/warming therapies per order  - Administer medications as ordered  - Instruct and encourage patient and family to use good hand hygiene technique  - Identify and instruct in appropriate isolation precautions for identified infection/condition  Outcome: Progressing  Goal: Absence of fever/infection during neutropenic period  Description: INTERVENTIONS:  - Monitor WBC    Outcome: Progressing     Problem: SAFETY ADULT  Goal: Patient will remain free of falls  Description: INTERVENTIONS:  - Educate patient/family on patient safety including physical limitations  - Instruct patient to call for assistance with activity   - Consult OT/PT to assist with strengthening/mobility   - Keep Call bell within reach  - Keep bed low and locked with side rails adjusted as appropriate  - Keep care items and personal belongings within reach  -  Initiate and maintain comfort rounds  - Make Fall Risk Sign visible to staff  - Offer Toileting every Hours, in advance of need  - Initiate/Maintain alarm  - Obtain necessary fall risk management equipment:   - Apply yellow socks and bracelet for high fall risk patients  - Consider moving patient to room near nurses station  Outcome: Progressing  Goal: Maintain or return to baseline ADL function  Description: INTERVENTIONS:  -  Assess patient's ability to carry out ADLs; assess patient's baseline for ADL function and identify physical deficits which impact ability to perform ADLs (bathing, care of mouth/teeth, toileting, grooming, dressing, etc.)  - Assess/evaluate cause of self-care deficits   - Assess range of motion  - Assess patient's mobility; develop plan if impaired  - Assess patient's need for assistive devices and provide as appropriate  - Encourage maximum independence but intervene and supervise when necessary  - Involve family in performance of ADLs  - Assess for home care needs following discharge   - Consider OT consult to assist with ADL evaluation and planning for discharge  - Provide patient education as appropriate  Outcome: Progressing  Goal: Maintains/Returns to pre admission functional level  Description: INTERVENTIONS:  - Perform AM-PAC 6 Click Basic Mobility/ Daily Activity assessment daily.  - Set and communicate daily mobility goal to care team and patient/family/caregiver.   - Collaborate with rehabilitation services on mobility goals if consulted  - Perform Range of Motion  times a day.  - Reposition patient every  hours.  - Dangle patient  times a day  - Stand patient  times a day  - Ambulate patient  times a day  - Out of bed to chair  times a day   - Out of bed for meals  times a day  - Out of bed for toileting  - Record patient progress and toleration of activity level   Outcome: Progressing     Problem: DISCHARGE PLANNING  Goal: Discharge to home or other facility with appropriate  resources  Description: INTERVENTIONS:  - Identify barriers to discharge w/patient and caregiver  - Arrange for needed discharge resources and transportation as appropriate  - Identify discharge learning needs (meds, wound care, etc.)  - Arrange for interpretive services to assist at discharge as needed  - Refer to Case Management Department for coordinating discharge planning if the patient needs post-hospital services based on physician/advanced practitioner order or complex needs related to functional status, cognitive ability, or social support system  Outcome: Progressing     Problem: Knowledge Deficit  Goal: Patient/family/caregiver demonstrates understanding of disease process, treatment plan, medications, and discharge instructions  Description: Complete learning assessment and assess knowledge base.  Interventions:  - Provide teaching at level of understanding  - Provide teaching via preferred learning methods  Outcome: Progressing     Problem: CARDIOVASCULAR - ADULT  Goal: Maintains optimal cardiac output and hemodynamic stability  Description: INTERVENTIONS:  - Monitor I/O, vital signs and rhythm  - Monitor for S/S and trends of decreased cardiac output  - Administer and titrate ordered vasoactive medications to optimize hemodynamic stability  - Assess quality of pulses, skin color and temperature  - Assess for signs of decreased coronary artery perfusion  - Instruct patient to report change in severity of symptoms  Outcome: Progressing  Goal: Absence of cardiac dysrhythmias or at baseline rhythm  Description: INTERVENTIONS:  - Continuous cardiac monitoring, vital signs, obtain 12 lead EKG if ordered  - Administer antiarrhythmic and heart rate control medications as ordered  - Monitor electrolytes and administer replacement therapy as ordered  Outcome: Progressing

## 2024-11-12 NOTE — ASSESSMENT & PLAN NOTE
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

## 2024-11-12 NOTE — ASSESSMENT & PLAN NOTE
History of CABG x 4 in 2003 (LIMA to LAD, VG to OM1, OM2 and VG to PDA)   On aspirin, atorvastatin and labetalol

## 2024-11-12 NOTE — ASSESSMENT & PLAN NOTE
Wt Readings from Last 3 Encounters:   11/12/24 99.9 kg (220 lb 3.2 oz)   11/07/24 105 kg (231 lb)   11/05/24 105 kg (231 lb)   Follows with Dr. Alan outpatient, last seen September 13.  Dry weight at the time 215 pounds  GDMT includes labetalol 50 mg daily and diuretic with torsemide 40 mg daily on Monday, Wednesday, Friday, and 20 mg daily the rest of the week.  He reports compliance with this.  Patient's weight has slowly been increasing, possibly secondary to his dietary noncompliance.  Endorses eating salty foods and snacking with pretzels.  Wife at the bedside also contributes to this history.  Presenting today with worsening SOB, increased weight, concern for volume overload when he was at pulmonary rehabilitation  Bnp 674, chest x-ray consistent with CHF and pulmonary congestion bilaterally  Recent echo on 11/7 showing EF 60%, normal left ventricular systolic function, moderate pulmonary hypertension with right ventricular systolic pressure 55 mmHg, and severe tricuspid regurgitation   On exam, bilateral JVD, bilateral +3 pitting edema up to the knees, significantly worse in the right lower extremity  Received 50 mg IV Lasix in the ED, urine output over 600 ml afterwards    Plan:  Will admit for CHF exacerbation  Cardiology consult placed, appreciate recs  Continue Lasix 50 mg IV twice daily, adjust for goal to have the patient net negative 1 to 2 L daily  Monitor ins and outs  Daily standing weights  Cardiac diet, fluid restriction 1500 mL daily, salt restriction 2 g daily  Monitor BMPs  Replete mag and K as needed to maintain Mg>2 and K>4

## 2024-11-12 NOTE — ASSESSMENT & PLAN NOTE
Rate controlled with labetalol 50 mg daily  On Coumadin 2.5 mg daily except Thursdays where he takes 3.75 mg  Received Coumadin 3.75 mg x1 yesterday  INR continue to be subtherapeutic, has previously been higher 2.3-3.2    Plan:  Will give additional Coumadin 5 mg today  Continue home Coumadin 2.5 mg daily except on Thursdays where he can continue taking 3.75 mg  Recheck INR tomorrow

## 2024-11-12 NOTE — PROGRESS NOTES
Progress Note - Hospitalist   Name: Oscar Espinosa 84 y.o. male I MRN: [id number]  Unit/Bed#: -01 I Date of Admission: 11/11/2024   Date of Service: 11/12/2024 I Hospital Day: 1    Assessment & Plan  Acute on chronic combined systolic and diastolic heart failure (HCC)  Wt Readings from Last 3 Encounters:   11/12/24 99.9 kg (220 lb 3.2 oz)   11/07/24 105 kg (231 lb)   11/05/24 105 kg (231 lb)   Follows with Dr. Alan outpatient, last seen September 13.  Dry weight at the time 215 pounds  GDMT includes labetalol 50 mg daily and diuretic with torsemide 40 mg daily on Monday, Wednesday, Friday, and 20 mg daily the rest of the week.  He reports compliance with this.  Patient's weight has slowly been increasing, possibly secondary to his dietary noncompliance.  Endorses eating salty foods and snacking with pretzels.  Wife at the bedside also contributes to this history.  Presenting today with worsening SOB, increased weight, concern for volume overload when he was at pulmonary rehabilitation  Bnp 674, chest x-ray consistent with CHF and pulmonary congestion bilaterally  Recent echo on 11/7 showing EF 60%, normal left ventricular systolic function, moderate pulmonary hypertension with right ventricular systolic pressure 55 mmHg, and severe tricuspid regurgitation   On exam, bilateral JVD, bilateral +3 pitting edema up to the knees, significantly worse in the right lower extremity  Received 50 mg IV Lasix in the ED, urine output over 600 ml afterwards    Plan:  Will admit for CHF exacerbation  Cardiology consult placed, appreciate recs  Continue Lasix 50 mg IV twice daily, adjust for goal to have the patient net negative 1 to 2 L daily  Monitor ins and outs  Daily standing weights  Cardiac diet, fluid restriction 1500 mL daily, salt restriction 2 g daily  Monitor BMPs  Replete mag and K as needed to maintain Mg>2 and K>4  Paroxysmal atrial fibrillation (HCC)  Rate controlled with labetalol 50 mg daily  On Coumadin  2.5 mg daily except Thursdays where he takes 3.75 mg  Received Coumadin 3.75 mg x1 yesterday  INR continue to be subtherapeutic, has previously been higher 2.3-3.2    Plan:  Will give additional Coumadin 5 mg today  Continue home Coumadin 2.5 mg daily except on Thursdays where he can continue taking 3.75 mg  Recheck INR tomorrow  Benign essential hypertension  Continue labetalol 50 mg daily  We are currently diuresing the patient with IV Lasix  Hyperlipidemia  Continue Lipitor 40 mg daily  Severe obstructive sleep apnea  Severe YEVGENIY, noncompliant with CPAP unable to tolerate it  CAD (coronary artery disease)  Status post CABG in 2003  Continue Lipitor and aspirin  Abdominal aortic aneurysm without rupture (HCC)  Status post repair  Chronic obstructive pulmonary disease (HCC)  Mild COPD, last PFTs done in 2021  Follows with pulmonary Dr. Beebe  Will continue Advair and as needed albuterol  Moderate to severe pulmonary hypertension (HCC)  Echo done November 7 EF 60%, normal left ventricular systolic function, moderate pulmonary hypertension with right ventricular systolic pressure 55 mmHg, and severe tricuspid regurgitation  Mild pulmonary hypertension likely secondary to group 2 PH from uncontrolled YEVGENIY and COPD  Ascending aortic aneurysm (HCC)  Last measured in July 2024, which was 48 mm unchanged from previous imaging, stable  Venous insufficiency of both lower extremities  Bilateral lower extremity venous insufficiency, chronic issue for him  He states that over the past few weeks he has noticed his right leg being significantly more swollen than the left leg  This is in the setting of CHF exacerbation  As well as having subtherapeutic INR  VAS ultrasound showed no evidence of DVT    Plan:  Diuresis for CHF exacerbation as above  Chronic respiratory failure with hypoxia (HCC)  Chronically on 2 L O2 at home  Continuous opioid dependence (HCC)  Chronically on oxycodone 10 mg every 6 hours as needed for moderate  pain from his lower back  LITO (acute kidney injury) (HCC)  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 Sequential Compression Devices Ordered.    Mobility:   Basic Mobility Inpatient Raw Score: 18  JH-HLM Goal: 6: Walk 10 steps or more  JH-HLM Goal NOT achieved. Continue with multidisciplinary rounding and encourage appropriate mobility to improve upon JH-HLM goals.    Patient Centered Rounds: I performed bedside rounds with nursing staff today.   Discussions with Specialists or Other Care Team Provider: None    Education and Discussions with Family / Patient: Attempted to update  (wife) via phone. Unable to contact.    Current Length of Stay: 1 day(s)  Current Patient Status: Inpatient   Certification Statement: The patient will continue to require additional inpatient hospital stay due to CHF  Discharge Plan: Anticipate discharge in 24-48 hrs to home.    Code Status: Level 3 - DNAR and DNI    Subjective   Patient had no acute events overnight.  He was seen and examined at bedside.  He has no new concerns.  He denies lightheadedness/dizziness, chest pain, abdominal discomfort, leg pain.  Continues to endorse some shortness of breath but reported improvement from prior.  He is tolerating diet well.  Has been urinating appropriately.    Objective :  Temp:  [97.5 °F (36.4 °C)-97.8 °F (36.6 °C)] 97.5 °F (36.4 °C)  HR:  [54-69] 60  BP: (108-161)/(52-69) 126/58  Resp:  [16-22] 18  SpO2:  [90 %-100 %] 98 %  O2 Device: Nasal cannula  Nasal Cannula O2 Flow Rate (L/min):  [2 L/min] 2 L/min    Body mass index is 30.71 kg/m².     Input and Output Summary (last 24 hours):     Intake/Output Summary (Last 24 hours) at 11/12/2024 0646  Last data filed at 11/12/2024 0601  Gross  per 24 hour   Intake 400 ml   Output 4100 ml   Net -3700 ml       Physical Exam  Vitals reviewed.   Constitutional:       General: He is not in acute distress.     Appearance: He is well-developed.   HENT:      Head: Normocephalic and atraumatic.      Mouth/Throat:      Mouth: Mucous membranes are moist.   Eyes:      Conjunctiva/sclera: Conjunctivae normal.   Cardiovascular:      Rate and Rhythm: Normal rate and regular rhythm.      Pulses: Normal pulses.      Heart sounds: Normal heart sounds. No murmur heard.     Comments: +JVD bilaterally    Pulmonary:      Effort: Pulmonary effort is normal. No respiratory distress.      Comments: Decreased breath sounds on bilateral bases    Abdominal:      General: There is distension.      Palpations: Abdomen is soft.      Tenderness: There is no abdominal tenderness.   Musculoskeletal:         General: Swelling present. No tenderness.      Cervical back: Neck supple.   Skin:     General: Skin is warm and dry.      Capillary Refill: Capillary refill takes less than 2 seconds.      Comments: Chronic venous stasis skin changes   Neurological:      General: No focal deficit present.      Mental Status: He is alert and oriented to person, place, and time.   Psychiatric:         Mood and Affect: Mood normal.         Lines/Drains:        Telemetry:  Telemetry Orders (From admission, onward)               24 Hour Telemetry Monitoring  Continuous x 24 Hours (Telem)        Question:  Reason for 24 Hour Telemetry  Answer:  Decompensated CHF- and any one of the following: continuous diuretic infusion or total diuretic dose >200 mg daily, associated electrolyte derangement (I.e. K < 3.0), ionotropic drip (continuous infusion), hx of ventricular arrhythmia, or new EF < 35%                     Telemetry Reviewed: Normal Sinus Rhythm and PVCs  Indication for Continued Telemetry Use: No indication for continued use. Will discontinue.                Lab Results: I have reviewed the following  results:   Results from last 7 days   Lab Units 11/11/24  1126   WBC Thousand/uL 4.13*   HEMOGLOBIN g/dL 9.0*   HEMATOCRIT % 29.4*   PLATELETS Thousands/uL 128*   SEGS PCT % 72   LYMPHO PCT % 12*   MONO PCT % 13*   EOS PCT % 2     Results from last 7 days   Lab Units 11/11/24  1126   SODIUM mmol/L 136   POTASSIUM mmol/L 4.5   CHLORIDE mmol/L 97   CO2 mmol/L 30   BUN mg/dL 29*   CREATININE mg/dL 1.86*   ANION GAP mmol/L 9   CALCIUM mg/dL 9.3   ALBUMIN g/dL 3.8   TOTAL BILIRUBIN mg/dL 1.48*   ALK PHOS U/L 93   ALT U/L 24   AST U/L 35   GLUCOSE RANDOM mg/dL 82     Results from last 7 days   Lab Units 11/11/24  1126   INR  1.86*                   Recent Cultures (last 7 days):               Last 24 Hours Medication List:     Current Facility-Administered Medications:     acetaminophen (TYLENOL) tablet 650 mg, Q6H PRN    albuterol (PROVENTIL HFA,VENTOLIN HFA) inhaler 2 puff, Q6H PRN    amiodarone tablet 200 mg, Daily    aspirin (ECOTRIN LOW STRENGTH) EC tablet 81 mg, Daily    atorvastatin (LIPITOR) tablet 40 mg, Daily    Cholecalciferol (VITAMIN D3) tablet 2,000 Units, Daily    Fluticasone Furoate-Vilanterol 100-25 mcg/actuation 1 puff, Daily    furosemide (LASIX) injection 50 mg, BID    gabapentin (NEURONTIN) capsule 100 mg, HS    labetalol (NORMODYNE) tablet 50 mg, Daily    multivitamin stress formula tablet 1 tablet, Daily    oxyCODONE (ROXICODONE) immediate release tablet 10 mg, Q6H PRN    polyethylene glycol (MIRALAX) packet 17 g, Daily    potassium chloride (Klor-Con M20) CR tablet 20 mEq, Daily    senna-docusate sodium (SENOKOT S) 8.6-50 mg per tablet 2 tablet, BID    tamsulosin (FLOMAX) capsule 0.4 mg, Daily    warfarin (COUMADIN) tablet 2.5 mg, Once per day on Sunday Monday Tuesday Wednesday Friday Saturday    [START ON 11/14/2024] warfarin (COUMADIN) tablet 3.75 mg, Every Thursday    Administrative Statements   Today, Patient Was Seen By: Gely Thacker MD      **Please Note: This note may have been  constructed using a voice recognition system.**

## 2024-11-13 ENCOUNTER — APPOINTMENT (OUTPATIENT)
Dept: PULMONOLOGY | Facility: CLINIC | Age: 84
End: 2024-11-13
Payer: MEDICARE

## 2024-11-13 LAB
ANION GAP SERPL CALCULATED.3IONS-SCNC: 6 MMOL/L (ref 4–13)
BASOPHILS # BLD AUTO: 0.03 THOUSANDS/ÂΜL (ref 0–0.1)
BASOPHILS NFR BLD AUTO: 1 % (ref 0–1)
BUN SERPL-MCNC: 21 MG/DL (ref 5–25)
CALCIUM SERPL-MCNC: 8.8 MG/DL (ref 8.4–10.2)
CHLORIDE SERPL-SCNC: 101 MMOL/L (ref 96–108)
CO2 SERPL-SCNC: 32 MMOL/L (ref 21–32)
CREAT SERPL-MCNC: 1.58 MG/DL (ref 0.6–1.3)
EOSINOPHIL # BLD AUTO: 0.08 THOUSAND/ÂΜL (ref 0–0.61)
EOSINOPHIL NFR BLD AUTO: 2 % (ref 0–6)
ERYTHROCYTE [DISTWIDTH] IN BLOOD BY AUTOMATED COUNT: 23.5 % (ref 11.6–15.1)
FERRITIN SERPL-MCNC: 213 NG/ML (ref 24–336)
GFR SERPL CREATININE-BSD FRML MDRD: 39 ML/MIN/1.73SQ M
GLUCOSE SERPL-MCNC: 94 MG/DL (ref 65–140)
HCT VFR BLD AUTO: 29.9 % (ref 36.5–49.3)
HGB BLD-MCNC: 9 G/DL (ref 12–17)
IMM GRANULOCYTES # BLD AUTO: 0.03 THOUSAND/UL (ref 0–0.2)
IMM GRANULOCYTES NFR BLD AUTO: 1 % (ref 0–2)
INR PPP: 2.24 (ref 0.85–1.19)
IRON SATN MFR SERPL: 13 % (ref 15–50)
IRON SERPL-MCNC: 42 UG/DL (ref 50–212)
LYMPHOCYTES # BLD AUTO: 0.49 THOUSANDS/ÂΜL (ref 0.6–4.47)
LYMPHOCYTES NFR BLD AUTO: 12 % (ref 14–44)
MAGNESIUM SERPL-MCNC: 2.2 MG/DL (ref 1.9–2.7)
MCH RBC QN AUTO: 29.7 PG (ref 26.8–34.3)
MCHC RBC AUTO-ENTMCNC: 30.1 G/DL (ref 31.4–37.4)
MCV RBC AUTO: 99 FL (ref 82–98)
MONOCYTES # BLD AUTO: 0.6 THOUSAND/ÂΜL (ref 0.17–1.22)
MONOCYTES NFR BLD AUTO: 15 % (ref 4–12)
NEUTROPHILS # BLD AUTO: 2.91 THOUSANDS/ÂΜL (ref 1.85–7.62)
NEUTS SEG NFR BLD AUTO: 69 % (ref 43–75)
NRBC BLD AUTO-RTO: 0 /100 WBCS
PLATELET # BLD AUTO: 107 THOUSANDS/UL (ref 149–390)
PMV BLD AUTO: 10.1 FL (ref 8.9–12.7)
POTASSIUM SERPL-SCNC: 3.8 MMOL/L (ref 3.5–5.3)
PROTHROMBIN TIME: 25.5 SECONDS (ref 12.3–15)
RBC # BLD AUTO: 3.03 MILLION/UL (ref 3.88–5.62)
SODIUM SERPL-SCNC: 139 MMOL/L (ref 135–147)
T4 FREE SERPL-MCNC: 1.14 NG/DL (ref 0.61–1.12)
TIBC SERPL-MCNC: 312 UG/DL (ref 250–450)
TSH SERPL DL<=0.05 MIU/L-ACNC: 5.89 UIU/ML (ref 0.45–4.5)
UIBC SERPL-MCNC: 270 UG/DL (ref 155–355)
WBC # BLD AUTO: 4.14 THOUSAND/UL (ref 4.31–10.16)

## 2024-11-13 PROCEDURE — 84443 ASSAY THYROID STIM HORMONE: CPT | Performed by: INTERNAL MEDICINE

## 2024-11-13 PROCEDURE — 85025 COMPLETE CBC W/AUTO DIFF WBC: CPT

## 2024-11-13 PROCEDURE — 99233 SBSQ HOSP IP/OBS HIGH 50: CPT | Performed by: INTERNAL MEDICINE

## 2024-11-13 PROCEDURE — 99232 SBSQ HOSP IP/OBS MODERATE 35: CPT | Performed by: INTERNAL MEDICINE

## 2024-11-13 PROCEDURE — 83550 IRON BINDING TEST: CPT | Performed by: INTERNAL MEDICINE

## 2024-11-13 PROCEDURE — 84439 ASSAY OF FREE THYROXINE: CPT | Performed by: INTERNAL MEDICINE

## 2024-11-13 PROCEDURE — 83540 ASSAY OF IRON: CPT | Performed by: INTERNAL MEDICINE

## 2024-11-13 PROCEDURE — 83735 ASSAY OF MAGNESIUM: CPT

## 2024-11-13 PROCEDURE — 85610 PROTHROMBIN TIME: CPT

## 2024-11-13 PROCEDURE — 80048 BASIC METABOLIC PNL TOTAL CA: CPT

## 2024-11-13 PROCEDURE — 82728 ASSAY OF FERRITIN: CPT | Performed by: INTERNAL MEDICINE

## 2024-11-13 RX ORDER — FUROSEMIDE 10 MG/ML
20 INJECTION INTRAMUSCULAR; INTRAVENOUS ONCE
Status: COMPLETED | OUTPATIENT
Start: 2024-11-13 | End: 2024-11-13

## 2024-11-13 RX ORDER — POTASSIUM CHLORIDE 1500 MG/1
20 TABLET, EXTENDED RELEASE ORAL ONCE
Status: COMPLETED | OUTPATIENT
Start: 2024-11-13 | End: 2024-11-13

## 2024-11-13 RX ORDER — FUROSEMIDE 10 MG/ML
80 INJECTION INTRAMUSCULAR; INTRAVENOUS 2 TIMES DAILY
Status: DISCONTINUED | OUTPATIENT
Start: 2024-11-13 | End: 2024-11-15

## 2024-11-13 RX ADMIN — WARFARIN SODIUM 2.5 MG: 2.5 TABLET ORAL at 17:13

## 2024-11-13 RX ADMIN — SENNOSIDES AND DOCUSATE SODIUM 2 TABLET: 8.6; 5 TABLET ORAL at 08:28

## 2024-11-13 RX ADMIN — SENNOSIDES AND DOCUSATE SODIUM 2 TABLET: 8.6; 5 TABLET ORAL at 17:13

## 2024-11-13 RX ADMIN — FUROSEMIDE 80 MG: 10 INJECTION, SOLUTION INTRAMUSCULAR; INTRAVENOUS at 17:13

## 2024-11-13 RX ADMIN — FUROSEMIDE 20 MG: 10 INJECTION, SOLUTION INTRAVENOUS at 10:57

## 2024-11-13 RX ADMIN — LABETALOL HYDROCHLORIDE 50 MG: 100 TABLET, FILM COATED ORAL at 08:28

## 2024-11-13 RX ADMIN — Medication 2000 UNITS: at 08:27

## 2024-11-13 RX ADMIN — ATORVASTATIN CALCIUM 40 MG: 40 TABLET, FILM COATED ORAL at 08:29

## 2024-11-13 RX ADMIN — FLUTICASONE FUROATE AND VILANTEROL TRIFENATATE 1 PUFF: 100; 25 POWDER RESPIRATORY (INHALATION) at 08:36

## 2024-11-13 RX ADMIN — TAMSULOSIN HYDROCHLORIDE 0.4 MG: 0.4 CAPSULE ORAL at 08:28

## 2024-11-13 RX ADMIN — B-COMPLEX W/ C & FOLIC ACID TAB 1 TABLET: TAB at 08:28

## 2024-11-13 RX ADMIN — POTASSIUM CHLORIDE 20 MEQ: 1500 TABLET, EXTENDED RELEASE ORAL at 08:30

## 2024-11-13 RX ADMIN — Medication 6 MG: at 21:59

## 2024-11-13 RX ADMIN — FUROSEMIDE 50 MG: 10 INJECTION, SOLUTION INTRAMUSCULAR; INTRAVENOUS at 08:30

## 2024-11-13 RX ADMIN — POLYETHYLENE GLYCOL 3350 17 G: 17 POWDER, FOR SOLUTION ORAL at 08:29

## 2024-11-13 RX ADMIN — POTASSIUM CHLORIDE 20 MEQ: 1500 TABLET, EXTENDED RELEASE ORAL at 08:27

## 2024-11-13 RX ADMIN — AMIODARONE HYDROCHLORIDE 200 MG: 200 TABLET ORAL at 08:29

## 2024-11-13 RX ADMIN — ASPIRIN 81 MG: 81 TABLET, COATED ORAL at 08:29

## 2024-11-13 RX ADMIN — GABAPENTIN 100 MG: 100 CAPSULE ORAL at 21:59

## 2024-11-13 NOTE — ASSESSMENT & PLAN NOTE
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 Expect serum creatinine levels to improve with continued diuresis    Plan:  Continue IV diuresis  Monitor BMP

## 2024-11-13 NOTE — ASSESSMENT & PLAN NOTE
Wt Readings from Last 3 Encounters:   11/13/24 99.4 kg (219 lb 2.2 oz)   11/07/24 105 kg (231 lb)   11/05/24 105 kg (231 lb)   Follows with Dr. Alan outpatient, last seen September 13.  Dry weight at the time 215 pounds  GDMT includes labetalol 50 mg daily and diuretic with torsemide 40 mg daily on Monday, Wednesday, Friday, and 20 mg daily the rest of the week.  He reports compliance with this.  Patient's weight has slowly been increasing, possibly secondary to his dietary noncompliance.  Endorses eating salty foods and snacking with pretzels.  Wife at the bedside also contributes to this history.  Presenting today with worsening SOB, increased weight, concern for volume overload when he was at pulmonary rehabilitation  Bnp 674, chest x-ray consistent with CHF and pulmonary congestion bilaterally  Recent echo on 11/7 showing EF 60%, normal left ventricular systolic function, moderate pulmonary hypertension with right ventricular systolic pressure 55 mmHg, and severe tricuspid regurgitation   On exam, bilateral JVD, bilateral +3 pitting edema up to the knees, significantly worse in the right lower extremity  Received 50 mg IV Lasix in the ED, urine output over 600 ml afterwards    Plan:  Cardiology on board, appreciate recs  Recommend to continue Lasix 50 mg IV twice daily, adjust for goal to have the patient net negative 1 to 2 L daily  Monitor ins and outs  Daily standing weights  Cardiac diet, fluid restriction 1500 mL daily, salt restriction 2 g daily  Monitor BMPs  Replete mag and K as needed to maintain Mg>2 and K>4

## 2024-11-13 NOTE — PLAN OF CARE
Problem: Potential for Falls  Goal: Patient will remain free of falls  Description: INTERVENTIONS:  - Educate patient/family on patient safety including physical limitations  - Instruct patient to call for assistance with activity   - Consult OT/PT to assist with strengthening/mobility   - Keep Call bell within reach  - Keep bed low and locked with side rails adjusted as appropriate  - Keep care items and personal belongings within reach  - Initiate and maintain comfort rounds  - Make Fall Risk Sign visible to staff  - Offer Toileting every 2 Hours, in advance of need  - Initiate/Maintain alarm  - Obtain necessary fall risk management equipment:   - Apply yellow socks and bracelet for high fall risk patients  - Consider moving patient to room near nurses station  Outcome: Progressing     Problem: Nutrition/Hydration-ADULT  Goal: Nutrient/Hydration intake appropriate for improving, restoring or maintaining nutritional needs  Description: Monitor and assess patient's nutrition/hydration status for malnutrition. Collaborate with interdisciplinary team and initiate plan and interventions as ordered.  Monitor patient's weight and dietary intake as ordered or per policy. Utilize nutrition screening tool and intervene as necessary. Determine patient's food preferences and provide high-protein, high-caloric foods as appropriate.     INTERVENTIONS:  - Monitor oral intake, urinary output, labs, and treatment plans  - Assess nutrition and hydration status and recommend course of action  - Evaluate amount of meals eaten  - Assist patient with eating if necessary   - Allow adequate time for meals  - Recommend/ encourage appropriate diets, oral nutritional supplements, and vitamin/mineral supplements  - Order, calculate, and assess calorie counts as needed  - Recommend, monitor, and adjust tube feedings and TPN/PPN based on assessed needs  - Assess need for intravenous fluids  - Provide specific nutrition/hydration education as  appropriate  - Include patient/family/caregiver in decisions related to nutrition  Outcome: Progressing     Problem: PAIN - ADULT  Goal: Verbalizes/displays adequate comfort level or baseline comfort level  Description: Interventions:  - Encourage patient to monitor pain and request assistance  - Assess pain using appropriate pain scale  - Administer analgesics based on type and severity of pain and evaluate response  - Implement non-pharmacological measures as appropriate and evaluate response  - Consider cultural and social influences on pain and pain management  - Notify physician/advanced practitioner if interventions unsuccessful or patient reports new pain  Outcome: Progressing     Problem: INFECTION - ADULT  Goal: Absence or prevention of progression during hospitalization  Description: INTERVENTIONS:  - Assess and monitor for signs and symptoms of infection  - Monitor lab/diagnostic results  - Monitor all insertion sites, i.e. indwelling lines, tubes, and drains  - Monitor endotracheal if appropriate and nasal secretions for changes in amount and color  - Marina appropriate cooling/warming therapies per order  - Administer medications as ordered  - Instruct and encourage patient and family to use good hand hygiene technique  - Identify and instruct in appropriate isolation precautions for identified infection/condition  Outcome: Progressing  Goal: Absence of fever/infection during neutropenic period  Description: INTERVENTIONS:  - Monitor WBC    Outcome: Progressing     Problem: SAFETY ADULT  Goal: Patient will remain free of falls  Description: INTERVENTIONS:  - Educate patient/family on patient safety including physical limitations  - Instruct patient to call for assistance with activity   - Consult OT/PT to assist with strengthening/mobility   - Keep Call bell within reach  - Keep bed low and locked with side rails adjusted as appropriate  - Keep care items and personal belongings within reach  -  Initiate and maintain comfort rounds  - Make Fall Risk Sign visible to staff  - Offer Toileting every 2 Hours, in advance of need  - Initiate/Maintain alarm  - Obtain necessary fall risk management equipment:   - Apply yellow socks and bracelet for high fall risk patients  - Consider moving patient to room near nurses station  Outcome: Progressing  Goal: Maintain or return to baseline ADL function  Description: INTERVENTIONS:  -  Assess patient's ability to carry out ADLs; assess patient's baseline for ADL function and identify physical deficits which impact ability to perform ADLs (bathing, care of mouth/teeth, toileting, grooming, dressing, etc.)  - Assess/evaluate cause of self-care deficits   - Assess range of motion  - Assess patient's mobility; develop plan if impaired  - Assess patient's need for assistive devices and provide as appropriate  - Encourage maximum independence but intervene and supervise when necessary  - Involve family in performance of ADLs  - Assess for home care needs following discharge   - Consider OT consult to assist with ADL evaluation and planning for discharge  - Provide patient education as appropriate  Outcome: Progressing  Goal: Maintains/Returns to pre admission functional level  Description: INTERVENTIONS:  - Perform AM-PAC 6 Click Basic Mobility/ Daily Activity assessment daily.  - Set and communicate daily mobility goal to care team and patient/family/caregiver.   - Collaborate with rehabilitation services on mobility goals if consulted  - Perform Range of Motion 2 times a day.  - Reposition patient every 2 hours.  - Dangle patient 2 times a day  - Stand patient 2 times a day  - Ambulate patient 2 times a day  - Out of bed to chair 2 times a day   - Out of bed for meals 2 times a day  - Out of bed for toileting  - Record patient progress and toleration of activity level   Outcome: Progressing     Problem: DISCHARGE PLANNING  Goal: Discharge to home or other facility with  appropriate resources  Description: INTERVENTIONS:  - Identify barriers to discharge w/patient and caregiver  - Arrange for needed discharge resources and transportation as appropriate  - Identify discharge learning needs (meds, wound care, etc.)  - Arrange for interpretive services to assist at discharge as needed  - Refer to Case Management Department for coordinating discharge planning if the patient needs post-hospital services based on physician/advanced practitioner order or complex needs related to functional status, cognitive ability, or social support system  Outcome: Progressing     Problem: Knowledge Deficit  Goal: Patient/family/caregiver demonstrates understanding of disease process, treatment plan, medications, and discharge instructions  Description: Complete learning assessment and assess knowledge base.  Interventions:  - Provide teaching at level of understanding  - Provide teaching via preferred learning methods  Outcome: Progressing     Problem: CARDIOVASCULAR - ADULT  Goal: Maintains optimal cardiac output and hemodynamic stability  Description: INTERVENTIONS:  - Monitor I/O, vital signs and rhythm  - Monitor for S/S and trends of decreased cardiac output  - Administer and titrate ordered vasoactive medications to optimize hemodynamic stability  - Assess quality of pulses, skin color and temperature  - Assess for signs of decreased coronary artery perfusion  - Instruct patient to report change in severity of symptoms  Outcome: Progressing  Goal: Absence of cardiac dysrhythmias or at baseline rhythm  Description: INTERVENTIONS:  - Continuous cardiac monitoring, vital signs, obtain 12 lead EKG if ordered  - Administer antiarrhythmic and heart rate control medications as ordered  - Monitor electrolytes and administer replacement therapy as ordered  Outcome: Progressing

## 2024-11-13 NOTE — ASSESSMENT & PLAN NOTE
On amiodarone 200 mg PO daily and labetalol 50 mg PO daily  for rhythm control   On coumadin for OAC   INR 2.2

## 2024-11-13 NOTE — ASSESSMENT & PLAN NOTE
Rate controlled with labetalol 50 mg daily  On Coumadin 2.5 mg daily except Thursdays where he takes 3.75 mg  Received Coumadin 3.75 mg x1 yesterday however, INR continued to be subtherapeutic  Received additional Coumadin 5 mg x 1 yesterday  INR today within therapeutic range0    Plan:  Continue home Coumadin dosing at 2.5 mg daily except on Thursdays where he can continue taking 3.75 mg  Monitor INR

## 2024-11-13 NOTE — ASSESSMENT & PLAN NOTE
Wt Readings from Last 3 Encounters:   11/13/24 99.4 kg (219 lb 2.2 oz)   11/07/24 105 kg (231 lb)   11/05/24 105 kg (231 lb)    on admission   Chest xray showed CHF with small bibasilar effusions   On furosemide 50 mg IV BID, will increased to furosemide 80 mg IV BID today   He takes Torsemide 20 mg PO daily T/R/S/S and torsemide 40 mg PO daily M/W/F  Monitor I/Os -1.3 L in 24 hours   Daily weights 219 lbs today from 220 lbs yesterday. His dry weight is 215 lbs   On 2 gram Na diet and 1500 ml fluid restriction   Reviewed HF education

## 2024-11-13 NOTE — RESPIRATORY THERAPY NOTE
Pt states he doesnt wear cpap at home and refuses to wear cpap in hospital. Pt on 2L NC and does not appear to be in any respiratory distress at this time

## 2024-11-13 NOTE — ASSESSMENT & PLAN NOTE
Creatinine improving with diuretics: 1.5 today (baseline)  from 1.61   How Severe Is Your Skin Lesion?: moderate Have Your Skin Lesions Been Treated?: not been treated Is This A New Presentation, Or A Follow-Up?: Skin Lesions

## 2024-11-13 NOTE — PROGRESS NOTES
Progress Note - Hospitalist   Name: Oscar Espinosa 84 y.o. male I MRN: [id number]  Unit/Bed#: S -01 I Date of Admission: 11/11/2024   Date of Service: 11/13/2024 I Hospital Day: 2    Assessment & Plan  Acute on chronic combined systolic and diastolic heart failure (HCC)  Wt Readings from Last 3 Encounters:   11/13/24 99.4 kg (219 lb 2.2 oz)   11/07/24 105 kg (231 lb)   11/05/24 105 kg (231 lb)   Follows with Dr. Alan outpatient, last seen September 13.  Dry weight at the time 215 pounds  GDMT includes labetalol 50 mg daily and diuretic with torsemide 40 mg daily on Monday, Wednesday, Friday, and 20 mg daily the rest of the week.  He reports compliance with this.  Patient's weight has slowly been increasing, possibly secondary to his dietary noncompliance.  Endorses eating salty foods and snacking with pretzels.  Wife at the bedside also contributes to this history.  Presenting today with worsening SOB, increased weight, concern for volume overload when he was at pulmonary rehabilitation  Bnp 674, chest x-ray consistent with CHF and pulmonary congestion bilaterally  Recent echo on 11/7 showing EF 60%, normal left ventricular systolic function, moderate pulmonary hypertension with right ventricular systolic pressure 55 mmHg, and severe tricuspid regurgitation   On exam, bilateral JVD, bilateral +3 pitting edema up to the knees, significantly worse in the right lower extremity  Received 50 mg IV Lasix in the ED, urine output over 600 ml afterwards    Plan:  Cardiology on board, appreciate recs  Recommend to continue Lasix 50 mg IV twice daily, adjust for goal to have the patient net negative 1 to 2 L daily  Monitor ins and outs  Daily standing weights  Cardiac diet, fluid restriction 1500 mL daily, salt restriction 2 g daily  Monitor BMPs  Replete mag and K as needed to maintain Mg>2 and K>4  Paroxysmal atrial fibrillation (HCC)  Rate controlled with labetalol 50 mg daily  On Coumadin 2.5 mg daily except  Thursdays where he takes 3.75 mg  Received Coumadin 3.75 mg x1 yesterday however, INR continued to be subtherapeutic  Received additional Coumadin 5 mg x 1 yesterday  INR today within therapeutic range0    Plan:  Continue home Coumadin dosing at 2.5 mg daily except on Thursdays where he can continue taking 3.75 mg  Monitor INR  Benign essential hypertension  Continue labetalol 50 mg daily  We are currently diuresing the patient with IV Lasix  Hyperlipidemia  Continue Lipitor 40 mg daily  Severe obstructive sleep apnea  Severe YEVGENIY, noncompliant with CPAP unable to tolerate it  Patient reports he does not use CPAP at home  CAD (coronary artery disease)  Status post CABG in 2003  Continue Lipitor and aspirin  Abdominal aortic aneurysm without rupture (HCC)  Status post repair  Chronic obstructive pulmonary disease (HCC)  Mild COPD, last PFTs done in 2021  Follows with pulmonary Dr. Beebe  Will continue Advair and as needed albuterol  Moderate to severe pulmonary hypertension (HCC)  Echo done November 7 EF 60%, normal left ventricular systolic function, moderate pulmonary hypertension with right ventricular systolic pressure 55 mmHg, and severe tricuspid regurgitation  Mild pulmonary hypertension likely secondary to group 2 PH from uncontrolled YEVGENIY and COPD  Ascending aortic aneurysm (HCC)  Last measured in July 2024, which was 48 mm unchanged from previous imaging, stable  Venous insufficiency of both lower extremities  Bilateral lower extremity venous insufficiency, chronic issue for him  He states that over the past few weeks he has noticed his right leg being significantly more swollen than the left leg  This is in the setting of CHF exacerbation  As well as having subtherapeutic INR  VAS ultrasound showed no evidence of DVT    Plan:  Diuresis for CHF exacerbation as above  Chronic respiratory failure with hypoxia (HCC)  Chronically on 2 L O2 at home  Continuous opioid dependence (HCC)  Chronically on oxycodone  10 mg every 6 hours as needed for moderate pain from his lower back  LITO (acute kidney injury) (HCC)  LITO on stage 3B CKD  Baseline Cr appear to be 1.0-1.1 from June/July of this year  Cr on adm: 1.86  Likely in the setting of acute CHF. Expect serum creatinine levels to improve with continued diuresis    Plan:  Continue IV diuresis  Monitor BMP    VTE Pharmacologic Prophylaxis: VTE Score: 5 High Risk (Score >/= 5) - Pharmacological DVT Prophylaxis Ordered: warfarin (Coumadin). Sequential Compression Devices Ordered.    Mobility:   Basic Mobility Inpatient Raw Score: 18  JH-HLM Goal: 6: Walk 10 steps or more  JH-HLM Achieved: 6: Walk 10 steps or more  JH-HLM Goal achieved. Continue to encourage appropriate mobility.    Patient Centered Rounds: I performed bedside rounds with nursing staff today.   Discussions with Specialists or Other Care Team Provider: Cardiology    Education and Discussions with Family / Patient:  Will call patient's wife to provide update.     Current Length of Stay: 2 day(s)  Current Patient Status: Inpatient   Certification Statement: The patient will continue to require additional inpatient hospital stay due to CHF  Discharge Plan: Anticipate discharge in 24-48 hrs to home.    Code Status: Level 3 - DNAR and DNI    Subjective   Patient had no acute events overnight.  Patient was seen and examined at bedside. He does not appear to be in acute distress. He denies any acute concerns.  He continues to endorse shortness of breath on exertion.  Denies chest pain, lightheadedness, palpitations, abdominal discomfort, leg discomfort.  He is tolerating diet well.  Urinating appropriately. No urinary symptoms reported.    Objective :  Temp:  [97.4 °F (36.3 °C)-98.7 °F (37.1 °C)] 97.4 °F (36.3 °C)  HR:  [53-62] 53  BP: (116-129)/(58-61) 116/58  Resp:  [17-18] 17  SpO2:  [95 %-96 %] 96 %  O2 Device: Nasal cannula  Nasal Cannula O2 Flow Rate (L/min):  [2 L/min] 2 L/min    Body mass index is 30.56 kg/m².      Input and Output Summary (last 24 hours):     Intake/Output Summary (Last 24 hours) at 11/13/2024 0619  Last data filed at 11/13/2024 0520  Gross per 24 hour   Intake 780 ml   Output 1800 ml   Net -1020 ml       Physical Exam  Vitals reviewed.   Constitutional:       General: He is not in acute distress.     Appearance: He is well-developed.   HENT:      Head: Normocephalic and atraumatic.      Mouth/Throat:      Mouth: Mucous membranes are moist.   Eyes:      Conjunctiva/sclera: Conjunctivae normal.   Neck:      Comments: +JVD bilaterally    Cardiovascular:      Rate and Rhythm: Regular rhythm. Bradycardia present.      Pulses: Normal pulses.      Heart sounds: Normal heart sounds. No murmur heard.  Pulmonary:      Effort: Pulmonary effort is normal. No respiratory distress.      Breath sounds: Rhonchi (L) present.   Abdominal:      General: There is distension.      Palpations: Abdomen is soft.      Tenderness: There is no abdominal tenderness.   Musculoskeletal:         General: Swelling (+1 bilateral pitting edema) present. No tenderness.      Cervical back: Neck supple.   Skin:     General: Skin is warm and dry.      Capillary Refill: Capillary refill takes less than 2 seconds.      Comments: Chronic venous stasis changes bilaterally     Neurological:      General: No focal deficit present.      Mental Status: He is alert and oriented to person, place, and time.   Psychiatric:         Mood and Affect: Mood normal.         Lines/Drains:              Lab Results: I have reviewed the following results:   Results from last 7 days   Lab Units 11/13/24  0501   WBC Thousand/uL 4.14*   HEMOGLOBIN g/dL 9.0*   HEMATOCRIT % 29.9*   PLATELETS Thousands/uL 107*   SEGS PCT % 69   LYMPHO PCT % 12*   MONO PCT % 15*   EOS PCT % 2     Results from last 7 days   Lab Units 11/13/24  0501 11/12/24  0536 11/11/24  1126   SODIUM mmol/L 139   < > 136   POTASSIUM mmol/L 3.8   < > 4.5   CHLORIDE mmol/L 101   < > 97   CO2 mmol/L 32    < > 30   BUN mg/dL 21   < > 29*   CREATININE mg/dL 1.58*   < > 1.86*   ANION GAP mmol/L 6   < > 9   CALCIUM mg/dL 8.8   < > 9.3   ALBUMIN g/dL  --   --  3.8   TOTAL BILIRUBIN mg/dL  --   --  1.48*   ALK PHOS U/L  --   --  93   ALT U/L  --   --  24   AST U/L  --   --  35   GLUCOSE RANDOM mg/dL 94   < > 82    < > = values in this interval not displayed.     Results from last 7 days   Lab Units 11/13/24  0501   INR  2.24*                   Recent Cultures (last 7 days):               Last 24 Hours Medication List:     Current Facility-Administered Medications:     acetaminophen (TYLENOL) tablet 650 mg, Q6H PRN    albuterol (PROVENTIL HFA,VENTOLIN HFA) inhaler 2 puff, Q6H PRN    amiodarone tablet 200 mg, Daily    aspirin (ECOTRIN LOW STRENGTH) EC tablet 81 mg, Daily    atorvastatin (LIPITOR) tablet 40 mg, Daily    Cholecalciferol (VITAMIN D3) tablet 2,000 Units, Daily    Fluticasone Furoate-Vilanterol 100-25 mcg/actuation 1 puff, Daily    furosemide (LASIX) injection 50 mg, BID    gabapentin (NEURONTIN) capsule 100 mg, HS    labetalol (NORMODYNE) tablet 50 mg, Daily    multivitamin stress formula tablet 1 tablet, Daily    oxyCODONE (ROXICODONE) immediate release tablet 10 mg, Q6H PRN    polyethylene glycol (MIRALAX) packet 17 g, Daily    potassium chloride (Klor-Con M20) CR tablet 20 mEq, Daily    potassium chloride (Klor-Con M20) CR tablet 20 mEq, Once    senna-docusate sodium (SENOKOT S) 8.6-50 mg per tablet 2 tablet, BID    tamsulosin (FLOMAX) capsule 0.4 mg, Daily    warfarin (COUMADIN) tablet 2.5 mg, Once per day on Sunday Monday Tuesday Wednesday Friday Saturday    [START ON 11/14/2024] warfarin (COUMADIN) tablet 3.75 mg, Every Thursday    Administrative Statements   Today, Patient Was Seen By: Gely Thacker MD      **Please Note: This note may have been constructed using a voice recognition system.**

## 2024-11-13 NOTE — ASSESSMENT & PLAN NOTE
Severe YEVGENIY, noncompliant with CPAP unable to tolerate it  Patient reports he does not use CPAP at home   Consent 2/Introductory Paragraph: Mohs surgery was explained to the patient and consent was obtained. The risks, benefits and alternatives to therapy were discussed in detail. Specifically, the risks of infection, scarring, bleeding, prolonged wound healing, incomplete removal, allergy to anesthesia, nerve injury and recurrence were addressed. Prior to the procedure, the treatment site was clearly identified and confirmed by the patient. All components of Universal Protocol/PAUSE Rule completed.

## 2024-11-13 NOTE — PROGRESS NOTES
Progress Note - Cardiology   Name: Oscar Espinosa 84 y.o. male I MRN: [id number]  Unit/Bed#: S -01 I Date of Admission: 11/11/2024   Date of Service: 11/13/2024 I Hospital Day: 2     Assessment & Plan  Acute on chronic combined systolic and diastolic heart failure (HCC)  Wt Readings from Last 3 Encounters:   11/13/24 99.4 kg (219 lb 2.2 oz)   11/07/24 105 kg (231 lb)   11/05/24 105 kg (231 lb)    on admission   Chest xray showed CHF with small bibasilar effusions   On furosemide 50 mg IV BID, will increased to furosemide 80 mg IV BID today   He takes Torsemide 20 mg PO daily T/R/S/S and torsemide 40 mg PO daily M/W/F  Monitor I/Os -1.3 L in 24 hours   Daily weights 219 lbs today from 220 lbs yesterday. His dry weight is 215 lbs   On 2 gram Na diet and 1500 ml fluid restriction   Reviewed HF education  Benign essential hypertension  BP stable: 120/61  On labetalol 50 mg PO daily   Hyperlipidemia  LDL 48  On atorvastatin 40 mg PO daily   CAD (coronary artery disease)  History of CABG x 4 in 2003 (LIMA to LAD, VG to OM1, OM2 and VG to PDA)   On aspirin, atorvastatin and labetalol   Paroxysmal atrial fibrillation (HCC)  On amiodarone 200 mg PO daily and labetalol 50 mg PO daily  for rhythm control   On coumadin for OAC   INR 2.2  Chronic respiratory failure with hypoxia (HCC)  On 2 L NC chronically.  LITO (acute kidney injury) (HCC)  Creatinine improving with diuretics: 1.5 today (baseline)  from 1.61    Subjective  Patient reports a decrease in his urine output today and notes continues dyspnea with minimal exertion. No chest pain or palpitations. No fever or chills.     Review of Systems   Constitutional: Positive for malaise/fatigue. Negative for chills and fever.   HENT:  Negative for congestion.    Cardiovascular:  Positive for dyspnea on exertion and leg swelling. Negative for chest pain and palpitations.   Respiratory:  Negative for shortness of breath.    Musculoskeletal:  Negative for falls.  "  Gastrointestinal:  Positive for bloating. Negative for nausea and vomiting.   Neurological:  Negative for dizziness and light-headedness.   Psychiatric/Behavioral:  Negative for altered mental status.    All other systems reviewed and are negative.      Objective:   Vitals: Blood pressure 118/65, pulse 61, temperature 97.9 °F (36.6 °C), temperature source Oral, resp. rate 18, height 5' 11\" (1.803 m), weight 99.4 kg (219 lb 2.2 oz), SpO2 95%.,       Body mass index is 30.56 kg/m².,     Systolic (24hrs), Av , Min:116 , Max:129     Diastolic (24hrs), Av, Min:58, Max:65          Intake/Output Summary (Last 24 hours) at 2024 1022  Last data filed at 2024 0900  Gross per 24 hour   Intake 420 ml   Output 1900 ml   Net -1480 ml     Weight (last 2 days)       Date/Time Weight    24 0500 99.4 (219.14)    24 0526 99.9 (220.2)    24 1900 102 (223.8)    24 1235 106 (233.47)          Telemetry Review: No telemetry     Physical Exam  Constitutional:       General: He is not in acute distress.     Appearance: He is obese.   HENT:      Head: Normocephalic.      Mouth/Throat:      Mouth: Mucous membranes are moist.   Cardiovascular:      Rate and Rhythm: Normal rate. Rhythm irregular.      Pulses: Normal pulses.   Pulmonary:      Effort: Pulmonary effort is normal. No respiratory distress.      Comments: 2 L NC   Decreased in bases   Abdominal:      General: Bowel sounds are normal.      Palpations: Abdomen is soft.   Musculoskeletal:         General: Swelling present. Normal range of motion.      Cervical back: Neck supple.      Comments: + 2 edema bilateral lower extremities    Skin:     General: Skin is warm and dry.      Capillary Refill: Capillary refill takes less than 2 seconds.   Neurological:      Mental Status: He is alert and oriented to person, place, and time.   Psychiatric:         Mood and Affect: Mood normal.       LABORATORY RESULTS      CBC with diff:   Results from " last 7 days   Lab Units 11/13/24  0501 11/12/24  0536 11/11/24  1126   WBC Thousand/uL 4.14* 3.92* 4.13*   HEMOGLOBIN g/dL 9.0* 9.1* 9.0*   HEMATOCRIT % 29.9* 29.4* 29.4*   MCV fL 99* 97 98   PLATELETS Thousands/uL 107* 112* 128*   RBC Million/uL 3.03* 3.03* 3.00*   MCH pg 29.7 30.0 30.0   MCHC g/dL 30.1* 31.0* 30.6*   RDW % 23.5* 23.5* 23.3*   MPV fL 10.1 10.5 11.7   NRBC AUTO /100 WBCs 0  --  0       CMP:  Results from last 7 days   Lab Units 11/13/24  0501 11/12/24  0536 11/11/24  1126   POTASSIUM mmol/L 3.8 3.9 4.5   CHLORIDE mmol/L 101 99 97   CO2 mmol/L 32 31 30   BUN mg/dL 21 26* 29*   CREATININE mg/dL 1.58* 1.61* 1.86*   CALCIUM mg/dL 8.8 9.0 9.3   AST U/L  --   --  35   ALT U/L  --   --  24   ALK PHOS U/L  --   --  93   EGFR ml/min/1.73sq m 39 38 32       BMP:  Results from last 7 days   Lab Units 11/13/24  0501 11/12/24  0536 11/11/24  1126   POTASSIUM mmol/L 3.8 3.9 4.5   CHLORIDE mmol/L 101 99 97   CO2 mmol/L 32 31 30   BUN mg/dL 21 26* 29*   CREATININE mg/dL 1.58* 1.61* 1.86*   CALCIUM mg/dL 8.8 9.0 9.3       Lab Results   Component Value Date    NTBNP 141 07/10/2019             Results from last 7 days   Lab Units 11/13/24  0501 11/12/24  0536   MAGNESIUM mg/dL 2.2 2.2                 Results from last 7 days   Lab Units 11/13/24  0501   TSH 3RD GENERATON uIU/mL 5.889*       Results from last 7 days   Lab Units 11/13/24  0501 11/12/24  0536 11/11/24  1126   INR  2.24* 1.74* 1.86*       Lipid Profile:   Lab Results   Component Value Date    CHOL 124 10/15/2015    CHOL 159 04/01/2015    CHOL 155 09/30/2013     Lab Results   Component Value Date    HDL 50 02/20/2024    HDL 68 08/17/2023    HDL 76 01/27/2023     Lab Results   Component Value Date    LDLCALC 48 02/20/2024    LDLCALC 53 08/17/2023    LDLCALC 58 01/27/2023     Lab Results   Component Value Date    TRIG 37 02/20/2024    TRIG 54 08/17/2023    TRIG 41 01/27/2023       Cardiac testing:   Results for orders placed during the hospital encounter of  20    Echo complete with contrast if indicated    Narrative  [place] [phone]    Transthoracic Echocardiogram  2D, M-mode, Doppler, and Color Doppler    Study date:  04-Sep-2020    Patient: LIZ GARCIA  MR number: [id number]  Account number: [id number]  : 1940  Age: 80 years  Gender: Male  Status: Outpatient  Location: [address] [place] [address] [facility]  Height: 71 in  Weight: 253.4 lb  BP: 134/ 76 mmHg    Indications: 3 vessel CAD;Arteriosclerotic CVD;Essential HTN.    Diagnoses: I11.9 - Hypertensive heart disease without heart failure, I25.83 - Coronary atherosclerosis due to lipid rich plaque    Sonographer:  GUNJAN Chávez  Primary Physician:  Lea Reyes, MD  Referring Physician:  Raza Alan DO  Group:  [facility]  Cardiology Fellow:  Karla Jaquez MD  Interpreting Physician:  RYAN Saleh MD    SUMMARY    LEFT VENTRICLE:  Size was normal.  Systolic function was normal. Ejection fraction was estimated to be 60 %.  Although no diagnostic regional wall motion abnormality was identified, this possibility cannot be completely excluded on the basis of this study.  Wall thickness was mildly increased.  There was mild concentric hypertrophy.  Doppler parameters were consistent with abnormal left ventricular relaxation (grade 1 diastolic dysfunction).    RIGHT VENTRICLE:  The size was at the upper limits of normal.  Systolic function was normal.  Wall thickness was increased.    LEFT ATRIUM:  The atrium was mildly dilated.    RIGHT ATRIUM:  The atrium was mildly dilated.    MITRAL VALVE:  There was mild regurgitation.    AORTIC VALVE:  There was trace regurgitation.    TRICUSPID VALVE:  There was mild regurgitation.  Estimated peak PA pressure was 50 mmHg.  The findings suggest moderate pulmonary hypertension.    AORTA:  The root exhibited dilatation at 44 mm.  There was dilatation of the  ascending aorta at 44 mm.    IVC, HEPATIC VEINS:  The respirophasic change in diameter was less than 50%.    PULMONARY VEINS:  S/D reversal consistent with elevated LAP/diastolic dysfunction.    COMPARISONS:  Comparison was made with the previous study of 23-May-2014. Ascending aortic and aortic root sizes have not significantly changed. Pulmonary artery pressure has increased.    HISTORY: PRIOR HISTORY: Chronic systolic HF;HTN;Aneurysm of ascending aorta;HLD;YEVGENIY;Abdominal aortic aneurysm without rupture;Former smoker.    PROCEDURE: The study was performed in the [address] [place] [address] [facility]. This was a routine study. The transthoracic approach was used. The study included complete 2D imaging, M-mode, complete spectral Doppler, and color Doppler. The  heart rate was 73 bpm, at the start of the study. Images were obtained from the parasternal, apical, subcostal, and suprasternal notch acoustic windows. This was a technically difficult study.    LEFT VENTRICLE: Size was normal. Systolic function was normal. Ejection fraction was estimated to be 60 %. Although no diagnostic regional wall motion abnormality was identified, this possibility cannot be completely excluded on the basis  of this study. Wall thickness was mildly increased. There was mild concentric hypertrophy. DOPPLER: Doppler parameters were consistent with abnormal left ventricular relaxation (grade 1 diastolic dysfunction).    RIGHT VENTRICLE: The size was at the upper limits of normal. Systolic function was normal. Wall thickness was increased.    LEFT ATRIUM: The atrium was mildly dilated.    RIGHT ATRIUM: The atrium was mildly dilated.    MITRAL VALVE: There was mild annular calcification. Valve structure was normal. There was normal leaflet separation. DOPPLER: The transmitral velocity was within the normal range. There was no evidence for stenosis. There was mild  regurgitation.    AORTIC VALVE: The valve was probably trileaflet. Leaflets  exhibited mild calcification, normal cuspal separation, and sclerosis. DOPPLER: Transaortic velocity was within the normal range. There was no evidence for stenosis. There was trace  regurgitation.    TRICUSPID VALVE: The valve structure was normal. There was normal leaflet separation. DOPPLER: The transtricuspid velocity was within the normal range. There was no evidence for stenosis. There was mild regurgitation. Estimated peak PA  pressure was 50 mmHg. The findings suggest moderate pulmonary hypertension.    PULMONIC VALVE: Not well visualized. DOPPLER: The transpulmonic velocity was within the normal range. There was trace regurgitation.    PERICARDIUM: There was no pericardial effusion.    AORTA: The root exhibited dilatation at 44 mm. There was dilatation of the ascending aorta at 44 mm.    SYSTEMIC VEINS: IVC: The inferior vena cava was normal in size. The respirophasic change in diameter was less than 50%.    PULMONARY VEINS: S/D reversal consistent with elevated LAP/diastolic dysfunction.    SYSTEM MEASUREMENT TABLES    2D  %FS: 29.67 %  Ao Diam: 3.65 cm  EDV(Teich): 127.77 ml  EF(Cube): 65.21 %  EF(Teich): 56.37 %  ESV(Cube): 48.06 ml  ESV(Teich): 55.74 ml  IVSd: 1.44 cm  LA Area: 17.79 cm2  LA Diam: 5.34 cm  LVEDV MOD A4C: 116.48 ml  LVEF MOD A4C: 49.73 %  LVESV MOD A4C: 58.55 ml  LVIDd: 5.17 cm  LVIDs: 3.64 cm  LVLd A4C: 8.46 cm  LVLs A4C: 6.7 cm  LVPWd: 1.36 cm  RA Area: 21.82 cm2  RV Diam.: 4.22 cm  SI(Cube): 38.51 ml/m2  SI(Teich): 30.78 ml/m2  SV MOD A4C: 57.93 ml  SV(Cube): 90.1 ml  SV(Teich): 72.03 ml    CW  AR Dec Runnels: 4.79 m/s2  AR Dec Time: 1282.69 ms  AR PHT: 371.98 ms  AR Vmax: 6.15 m/s  AR maxP.11 mmHg  TR Vmax: 2.65 m/s  TR maxP.17 mmHg    MM  TAPSE: 2.23 cm    PW  E': 0.08 m/s  E/E': 7.32  MV A Jerardo: 0.51 m/s  MV Dec Runnels: 2.06 m/s2  MV DecT: 266.25 ms  MV E Jerardo: 0.55 m/s  MV E/A Ratio: 1.07    [facility] Accredited Echocardiography Laboratory    Prepared and  electronically signed by    RYAN Saleh MD  Signed 04-Sep-2020 12:13:39    No results found for this or any previous visit.    No results found for this or any previous visit.    No valid procedures specified.  No results found for this or any previous visit.        Meds/Allergies   all current active meds have been reviewed and current meds:   Current Facility-Administered Medications:     acetaminophen (TYLENOL) tablet 650 mg, Q6H PRN    albuterol (PROVENTIL HFA,VENTOLIN HFA) inhaler 2 puff, Q6H PRN    amiodarone tablet 200 mg, Daily    aspirin (ECOTRIN LOW STRENGTH) EC tablet 81 mg, Daily    atorvastatin (LIPITOR) tablet 40 mg, Daily    Cholecalciferol (VITAMIN D3) tablet 2,000 Units, Daily    Fluticasone Furoate-Vilanterol 100-25 mcg/actuation 1 puff, Daily    furosemide (LASIX) injection 50 mg, BID    gabapentin (NEURONTIN) capsule 100 mg, HS    labetalol (NORMODYNE) tablet 50 mg, Daily    multivitamin stress formula tablet 1 tablet, Daily    oxyCODONE (ROXICODONE) immediate release tablet 10 mg, Q6H PRN    polyethylene glycol (MIRALAX) packet 17 g, Daily    potassium chloride (Klor-Con M20) CR tablet 20 mEq, Daily    senna-docusate sodium (SENOKOT S) 8.6-50 mg per tablet 2 tablet, BID    tamsulosin (FLOMAX) capsule 0.4 mg, Daily    warfarin (COUMADIN) tablet 2.5 mg, Once per day on Sunday Monday Tuesday Wednesday Friday Saturday    [START ON 11/14/2024] warfarin (COUMADIN) tablet 3.75 mg, Every Thursday    Medications Prior to Admission:     acetaminophen (TYLENOL) 325 mg tablet    albuterol (ProAir HFA) 90 mcg/act inhaler    amiodarone 200 mg tablet    aspirin (ECOTRIN LOW STRENGTH) 81 mg EC tablet    atorvastatin (LIPITOR) 40 mg tablet    cholecalciferol (VITAMIN D3) 1,000 units tablet    Fluticasone-Salmeterol (Advair) 100-50 mcg/dose inhaler    labetalol (NORMODYNE) 100 mg tablet    multivitamin (THERAGRAN) TABS    oxyCODONE (ROXICODONE) 10 MG TABS    oxygen gas    polyethylene glycol (MIRALAX) 17 g  packet    potassium chloride (Klor-Con M20) 20 mEq tablet    senna (SENOKOT) 8.6 MG tablet    tamsulosin (FLOMAX) 0.4 mg    torsemide (DEMADEX) 20 mg tablet    warfarin (Coumadin) 2.5 mg tablet    gabapentin (NEURONTIN) 300 mg capsule    mupirocin (BACTROBAN) 2 % ointment    naloxone (NARCAN) 4 mg/0.1 mL nasal spray         Counseling / Coordination of Care  Total floor / unit time spent today 20 minutes.  Greater than 50% of total time was spent with the patient and / or family counseling and / or coordination of care.      ** Please Note: Dragon 360 Dictation voice to text software may have been used in the creation of this document. **

## 2024-11-14 LAB
ANION GAP SERPL CALCULATED.3IONS-SCNC: 5 MMOL/L (ref 4–13)
BUN SERPL-MCNC: 17 MG/DL (ref 5–25)
CALCIUM SERPL-MCNC: 8.7 MG/DL (ref 8.4–10.2)
CHLORIDE SERPL-SCNC: 102 MMOL/L (ref 96–108)
CO2 SERPL-SCNC: 34 MMOL/L (ref 21–32)
CREAT SERPL-MCNC: 1.35 MG/DL (ref 0.6–1.3)
ERYTHROCYTE [DISTWIDTH] IN BLOOD BY AUTOMATED COUNT: 24 % (ref 11.6–15.1)
GFR SERPL CREATININE-BSD FRML MDRD: 48 ML/MIN/1.73SQ M
GLUCOSE SERPL-MCNC: 93 MG/DL (ref 65–140)
HCT VFR BLD AUTO: 29.1 % (ref 36.5–49.3)
HGB BLD-MCNC: 8.7 G/DL (ref 12–17)
INR PPP: 3.16 (ref 0.85–1.19)
MAGNESIUM SERPL-MCNC: 2 MG/DL (ref 1.9–2.7)
MCH RBC QN AUTO: 29.8 PG (ref 26.8–34.3)
MCHC RBC AUTO-ENTMCNC: 29.9 G/DL (ref 31.4–37.4)
MCV RBC AUTO: 100 FL (ref 82–98)
PLATELET # BLD AUTO: 96 THOUSANDS/UL (ref 149–390)
PMV BLD AUTO: 11.4 FL (ref 8.9–12.7)
POTASSIUM SERPL-SCNC: 3.7 MMOL/L (ref 3.5–5.3)
PROTHROMBIN TIME: 33 SECONDS (ref 12.3–15)
RBC # BLD AUTO: 2.92 MILLION/UL (ref 3.88–5.62)
SODIUM SERPL-SCNC: 141 MMOL/L (ref 135–147)
WBC # BLD AUTO: 4.31 THOUSAND/UL (ref 4.31–10.16)

## 2024-11-14 PROCEDURE — 85610 PROTHROMBIN TIME: CPT

## 2024-11-14 PROCEDURE — 85027 COMPLETE CBC AUTOMATED: CPT

## 2024-11-14 PROCEDURE — 83735 ASSAY OF MAGNESIUM: CPT

## 2024-11-14 PROCEDURE — 80048 BASIC METABOLIC PNL TOTAL CA: CPT

## 2024-11-14 PROCEDURE — 99232 SBSQ HOSP IP/OBS MODERATE 35: CPT | Performed by: INTERNAL MEDICINE

## 2024-11-14 RX ORDER — POTASSIUM CHLORIDE 1500 MG/1
40 TABLET, EXTENDED RELEASE ORAL ONCE
Status: COMPLETED | OUTPATIENT
Start: 2024-11-14 | End: 2024-11-14

## 2024-11-14 RX ADMIN — B-COMPLEX W/ C & FOLIC ACID TAB 1 TABLET: TAB at 08:16

## 2024-11-14 RX ADMIN — ATORVASTATIN CALCIUM 40 MG: 40 TABLET, FILM COATED ORAL at 08:16

## 2024-11-14 RX ADMIN — SENNOSIDES AND DOCUSATE SODIUM 2 TABLET: 8.6; 5 TABLET ORAL at 08:16

## 2024-11-14 RX ADMIN — Medication 2000 UNITS: at 08:16

## 2024-11-14 RX ADMIN — FUROSEMIDE 80 MG: 10 INJECTION, SOLUTION INTRAMUSCULAR; INTRAVENOUS at 17:23

## 2024-11-14 RX ADMIN — FUROSEMIDE 80 MG: 10 INJECTION, SOLUTION INTRAMUSCULAR; INTRAVENOUS at 08:16

## 2024-11-14 RX ADMIN — Medication 6 MG: at 22:10

## 2024-11-14 RX ADMIN — IRON SUCROSE 200 MG: 20 INJECTION, SOLUTION INTRAVENOUS at 11:48

## 2024-11-14 RX ADMIN — AMIODARONE HYDROCHLORIDE 200 MG: 200 TABLET ORAL at 08:16

## 2024-11-14 RX ADMIN — POTASSIUM CHLORIDE 20 MEQ: 1500 TABLET, EXTENDED RELEASE ORAL at 08:19

## 2024-11-14 RX ADMIN — POTASSIUM CHLORIDE 40 MEQ: 1500 TABLET, EXTENDED RELEASE ORAL at 08:19

## 2024-11-14 RX ADMIN — FLUTICASONE FUROATE AND VILANTEROL TRIFENATATE 1 PUFF: 100; 25 POWDER RESPIRATORY (INHALATION) at 08:17

## 2024-11-14 RX ADMIN — ASPIRIN 81 MG: 81 TABLET, COATED ORAL at 08:16

## 2024-11-14 RX ADMIN — TAMSULOSIN HYDROCHLORIDE 0.4 MG: 0.4 CAPSULE ORAL at 08:16

## 2024-11-14 RX ADMIN — LABETALOL HYDROCHLORIDE 50 MG: 100 TABLET, FILM COATED ORAL at 08:16

## 2024-11-14 RX ADMIN — SENNOSIDES AND DOCUSATE SODIUM 2 TABLET: 8.6; 5 TABLET ORAL at 17:23

## 2024-11-14 RX ADMIN — GABAPENTIN 100 MG: 100 CAPSULE ORAL at 22:10

## 2024-11-14 RX ADMIN — WARFARIN SODIUM 3.75 MG: 7.5 TABLET ORAL at 17:23

## 2024-11-14 NOTE — ASSESSMENT & PLAN NOTE
Severe YEVGENIY, noncompliant with CPAP unable to tolerate it  Patient reports he does not use CPAP at home

## 2024-11-14 NOTE — CASE MANAGEMENT
Case Management Assessment & Discharge Planning Note    Patient name Oscar Espinosa  Location S /S -01 MRN [id number]  : 1940 Date 2024       Current Admission Date: 2024  Current Admission Diagnosis:Acute on chronic diastolic heart failure (HCC)   Patient Active Problem List    Diagnosis Date Noted Date Diagnosed    LITO (acute kidney injury) ([facility]) 2024     Acute on chronic diastolic heart failure (HCC) 2024     Continuous opioid dependence ([facility]) 10/31/2024     CKD (chronic kidney disease) 10/24/2024     Iron deficiency anemia, unspecified 10/17/2024     Chronic respiratory failure with hypoxia ([facility]) 2024     Chronic obstructive pulmonary disease, unspecified COPD type ([facility]) 2024     Hypotension 2024     Prediabetes 2024     Chronic combined systolic and diastolic congestive heart failure ([facility]) 2024     Acute hypoxic respiratory failure ([facility]) 2024     Pleural effusion with shortness of breath 2024     Anemia 2024     Bilateral carotid artery stenosis 2024     Venous insufficiency of both lower extremities 2023     Hypertensive heart and chronic kidney disease with heart failure and stage 1 through stage 4 chronic kidney disease, or chronic kidney disease (HCC) 10/27/2022     Thrombocytopenia ([facility]) 2022     Myofascial pain syndrome 2022     Paroxysmal atrial fibrillation ([facility]) 2022     Hypertensive heart disease with heart failure ([facility]) 2022     Ascending aortic aneurysm ([facility]) 2021     Nocturnal hypoxemia      Moderate to severe pulmonary hypertension (HCC) 2021     Chronic obstructive pulmonary disease (HCC) 2019     Multiple pulmonary nodules 09/10/2019     Chronic pain syndrome      Abnormal thyroid function test 10/22/2014     Abdominal aortic aneurysm without rupture (HCC) 2014     Aortic valve disorder 2014     Lumbar radiculopathy 2013      Benign essential hypertension 04/10/2013     Impaired fasting glucose 04/10/2013     Severe obesity (BMI 35.0-35.9 with comorbidity) (HCC) 04/10/2013     Hyperlipidemia 09/07/2012     Microscopic hematuria 09/07/2012     Severe obstructive sleep apnea 09/07/2012     CAD (coronary artery disease) 09/07/2012       LOS (days): 3  Geometric Mean LOS (GMLOS) (days): 4.4  Days to GMLOS:1.5     OBJECTIVE:  PATIENT READMITTED TO HOSPITAL  Risk of Unplanned Readmission Score: 40.41         Current admission status: Inpatient       Preferred Pharmacy:   [facility] - [place], [place] - [address]  [address]  [address]  [facility] [place] [address]  Phone: [phone] Fax: [phone]    [id number] - [place], [place] - [address]  [address]  [place] [address]  Phone: [phone] Fax: [phone]    [facility] - [place], [place] - [address]  [address]  [facility] [address]  [facility] [address]  Phone: [phone] Fax: [phone]    Primary Care Provider: Lea Reyes, MD    Primary Insurance: MEDICARE  Secondary Insurance: [facility]    ASSESSMENT:  Active Health Care Proxies       Dorcas Espinosa Health Care Agent - Spouse   Primary Phone: [phone] (Home)  Mobile Phone: [phone]                                Patient Information  Admitted from:: Home  Mental Status: Alert  During Assessment patient was accompanied by: Not accompanied during assessment  Assessment information provided by:: Patient  Primary Caregiver: Self  Support Systems: Spouse/significant other  County of Residence: [place]  What city do you live in?: [place]  Home entry access options. Select all that apply.: Stairs  Number of steps to enter home.: 3  Type of Current Residence: [facility]  Living Arrangements: Lives w/ Spouse/significant other    Activities of Daily Living Prior to Admission  Functional Status: Independent  Completes ADLs independently?:  Yes  Ambulates independently?: Yes  Does patient use assisted devices?: Yes  Assisted Devices (DME) used: Home Oxygen concentrator, Portable Oxygen concentrator  DME Company Name (respiratory supplies): [employer]  O2 Rate(s): 2L  Does patient currently own DME?: Yes  What DME does the patient currently own?: Bedside Commode, Straight Cane, Walker, Other (Comment), Home Oxygen concentrator, Portable Oxygen concentrator (walk-in shower, built in shower seat)  Does patient have a history of Outpatient Therapy (PT/OT)?: Yes (current w/ OP Pulm rehab twice per week. OP cardiac hx.)  Does the patient have a history of Short-Term Rehab?: No  Does patient have a history of HHC?: Yes ([facility])  Does patient currently have HHC?: No         Patient Information Continued  Does patient have prescription coverage?: Yes  Does patient receive dialysis treatments?: No         Means of Transportation  Means of Transport to [facility]:: Family transport      Social Determinants of Health (SDOH)      Flowsheet Row Most Recent Value   Housing Stability    In the last 12 months, was there a time when you were not able to pay the mortgage or rent on time? N   At any time in the past 12 months, were you homeless or living in a shelter (including now)? N   Transportation Needs    In the past 12 months, has lack of transportation kept you from medical appointments or from getting medications? no   In the past 12 months, has lack of transportation kept you from meetings, work, or from getting things needed for daily living? No   Food Insecurity    Within the past 12 months, you worried that your food would run out before you got the money to buy more. Never true   Within the past 12 months, the food you bought just didn't last and you didn't have money to get more. Never true   Utilities    In the past 12 months has the electric, gas, oil, or water company threatened to shut off services in your home? No            DISCHARGE DETAILS:    Discharge  planning discussed with:: patient  Freedom of Choice: Yes  Comments - Freedom of Choice: CM met with patient at bedside re: assessment. Patient relayed he lives w/ spouse in ranch home, 3 [address]. Patient relayed he is independent w/ ADLS, does not use any dme to ambulate, and utilizes 2L O2 chronic ([employer]). Pt confirmed PCP Reyes and preferred pharmacy [facility]. Pt reported he is currently attending OP Pulm rehab x2 per week PTA and plan is to resume OP Pulm rehab upon d/c to home. CM notified [facility] via secure chat of same. CM discussed CHF weigh management program with pt. Patient declined CHF program reporting he already manages his weight daily and had noticed an increase in his weight within a few days which led to current hospital admission. Pt relayed wife to provide pt transport home when ready for d/c. No further CM d/c needs identified at this time, CM remains available.                                                                                                            Acute on chronic kidney injury  - possibly in the setting of UTI, prerenal from hypotension  - keep normotensive  - improving Acute on chronic kidney injury  - possibly in the setting of UTI, prerenal from hypotension  - keep normotensive  - improving

## 2024-11-14 NOTE — ASSESSMENT & PLAN NOTE
Rate controlled with labetalol 50 mg daily  On Coumadin 2.5 mg daily except Thursdays where he takes 3.75 mg  Received Coumadin 3.75 mg x1 yesterday however, INR continued to be subtherapeutic  Received additional Coumadin 5 mg x 1 11/12    Plan:  Continue home Coumadin dosing at 2.5 mg daily except on Thursdays where he can continue taking 3.75 mg  INR today 3.16, will continue current dosing and will follow-up tomorrow  Monitor INR

## 2024-11-14 NOTE — ASSESSMENT & PLAN NOTE
Wt Readings from Last 3 Encounters:   11/14/24 97.4 kg (214 lb 11.7 oz)   11/07/24 105 kg (231 lb)   11/05/24 105 kg (231 lb)   Follows with Dr. Alan outpatient, last seen September 13.  Dry weight at the time 215 pounds  GDMT includes labetalol 50 mg daily and diuretic with torsemide 40 mg daily on Monday, Wednesday, Friday, and 20 mg daily the rest of the week.  He reports compliance with this.  Patient's weight has slowly been increasing, possibly secondary to his dietary noncompliance.  Endorses eating salty foods and snacking with pretzels.  Wife at the bedside also contributes to this history.  Presenting today with worsening SOB, increased weight, concern for volume overload when he was at pulmonary rehabilitation  Bnp 674, chest x-ray consistent with CHF and pulmonary congestion bilaterally  Recent echo on 11/7 showing EF 60%, normal left ventricular systolic function, moderate pulmonary hypertension with right ventricular systolic pressure 55 mmHg, and severe tricuspid regurgitation   On exam, bilateral JVD, bilateral +3 pitting edema up to the knees, significantly worse in the right lower extremity  Received 50 mg IV Lasix in the ED, urine output over 600 ml afterwards  Lasix was increased yesterday to IV Lasix 80 mg BID, net -1200 L over the last 24 hours  Daily weight today at 214 lbs, which is around reported baseline of 215 lbs. However, patient continues to have elevated JVD on exam, suspect still hypervolemic despite dry weight    Plan:  Cardiology on board, appreciate recs  Continue IV Lasix 80 mg BID  Monitor ins and outs  Daily standing weights  Cardiac diet, fluid restriction 1500 mL daily, salt restriction 2 g daily  Monitor BMPs  Replete mag and K as needed to maintain Mg>2 and K>4

## 2024-11-14 NOTE — PROGRESS NOTES
"Progress Note - Cardiology   Name: Oscar Espinosa 84 y.o. male I MRN: [id number]  Unit/Bed#: S -01 I Date of Admission: 11/11/2024   Date of Service: 11/14/2024 I Hospital Day: 3     Assessment & Plan  Acute on chronic diastolic heart failure (HCC)   on admission   Chest xray showed CHF with small bibasilar effusions   Recent echo- normal LVEF. RV volume overload. Severe TR.   On furosemide 50 mg IV BID, increased furosemide 80 mg IV BID yesterday  He takes Torsemide 20 mg PO daily T/R/S/S and torsemide 40 mg PO daily M/W/F  24-hour net -1200  Daily weights 214 lbs today from 219 lbs yesterday? His dry weight is 215 lbs   Exam- elevated JVP  On 2 gram Na diet and 1500 ml fluid restriction   Will continue IV diuretic today  Benign essential hypertension  BP stable: IV diuretics  On labetalol 50 mg PO daily   Hyperlipidemia  LDL 48  On atorvastatin 40 mg PO daily   CAD (coronary artery disease)  History of CABG x 4 in 2003 (LIMA to LAD, VG to OM1, OM2 and VG to PDA)   On aspirin, atorvastatin and labetalol   Paroxysmal atrial fibrillation (HCC)  On amiodarone 200 mg PO daily and labetalol 50 mg PO daily  for rhythm control   On coumadin for OAC   INR 3.16  Chronic respiratory failure with hypoxia (HCC)  On 2 L NC - recently initiated.   LITO (acute kidney injury) (HCC)  Creatinine improving with diuretics: 1.3 today (baseline)  from 1.61        Subjective/Objective   Chief Complaint/subjective  No cp. Sob improved and stable. Increased diuresis        Vitals: /70   Pulse 59   Temp 97.7 °F (36.5 °C)   Resp 18   Ht 5' 11\" (1.803 m)   Wt 97.4 kg (214 lb 11.7 oz)   SpO2 96%   BMI 29.95 kg/m²     Vitals:    11/13/24 0500 11/14/24 0526   Weight: 99.4 kg (219 lb 2.2 oz) 97.4 kg (214 lb 11.7 oz)     Orthostatic Blood Pressures      Flowsheet Row Most Recent Value   Blood Pressure 143/70 filed at 11/14/2024 0738   Patient Position - Orthostatic VS Lying filed at 11/13/2024 2134      " "        Intake/Output Summary (Last 24 hours) at 11/14/2024 0957  Last data filed at 11/14/2024 0927  Gross per 24 hour   Intake 1170 ml   Output 2370 ml   Net -1200 ml       Invasive Devices       Peripheral Intravenous Line  Duration             Peripheral IV 11/11/24 Right;Ventral (anterior) Forearm 2 days                      Current Facility-Administered Medications:     acetaminophen (TYLENOL) tablet 650 mg, Q6H PRN    albuterol (PROVENTIL HFA,VENTOLIN HFA) inhaler 2 puff, Q6H PRN    amiodarone tablet 200 mg, Daily    aspirin (ECOTRIN LOW STRENGTH) EC tablet 81 mg, Daily    atorvastatin (LIPITOR) tablet 40 mg, Daily    Cholecalciferol (VITAMIN D3) tablet 2,000 Units, Daily    Fluticasone Furoate-Vilanterol 100-25 mcg/actuation 1 puff, Daily    furosemide (LASIX) injection 80 mg, BID    gabapentin (NEURONTIN) capsule 100 mg, HS    labetalol (NORMODYNE) tablet 50 mg, Daily    melatonin tablet 6 mg, HS    multivitamin stress formula tablet 1 tablet, Daily    oxyCODONE (ROXICODONE) immediate release tablet 10 mg, Q6H PRN    polyethylene glycol (MIRALAX) packet 17 g, Daily    potassium chloride (Klor-Con M20) CR tablet 20 mEq, Daily    senna-docusate sodium (SENOKOT S) 8.6-50 mg per tablet 2 tablet, BID    tamsulosin (FLOMAX) capsule 0.4 mg, Daily    warfarin (COUMADIN) tablet 2.5 mg, Once per day on Sunday Monday Tuesday Wednesday Friday Saturday    warfarin (COUMADIN) tablet 3.75 mg, Every Thursday      Physical Exam: /70   Pulse 59   Temp 97.7 °F (36.5 °C)   Resp 18   Ht 5' 11\" (1.803 m)   Wt 97.4 kg (214 lb 11.7 oz)   SpO2 96%   BMI 29.95 kg/m²     General Appearance:    Alert, cooperative, no distress, appears stated age   Head:    Normocephalic, no scleral icterus   Eyes:    PERRL   Nose:   Nares normal, septum midline, no drainage    Throat:   Lips, mucosa, and tongue normal   Neck:   Supple, symmetrical, trachea midline,       Elevated JVP       Lungs:     Clear to auscultation bilaterally, " respirations unlabored   Chest Wall:    No tenderness or deformity    Heart:    Regular rate and rhythm, S1 and S2 normal, no murmur, rub   or gallop   Abdomen:     Soft, non-tender, bowel sounds active all four quadrants,     no masses, no organomegaly   Extremities:   Extremities mild edema       Skin:   Skin warm   Neurologic:   Alert and oriented to person place and time, no focal deficits                 Lab Results:   Recent Results (from the past 72 hours)   CBC and differential    Collection Time: 11/11/24 11:26 AM   Result Value Ref Range    WBC 4.13 (L) 4.31 - 10.16 Thousand/uL    RBC 3.00 (L) 3.88 - 5.62 Million/uL    Hemoglobin 9.0 (L) 12.0 - 17.0 g/dL    Hematocrit 29.4 (L) 36.5 - 49.3 %    MCV 98 82 - 98 fL    MCH 30.0 26.8 - 34.3 pg    MCHC 30.6 (L) 31.4 - 37.4 g/dL    RDW 23.3 (H) 11.6 - 15.1 %    MPV 11.7 8.9 - 12.7 fL    Platelets 128 (L) 149 - 390 Thousands/uL    nRBC 0 /100 WBCs    Segmented % 72 43 - 75 %    Immature Grans % 0 0 - 2 %    Lymphocytes % 12 (L) 14 - 44 %    Monocytes % 13 (H) 4 - 12 %    Eosinophils Relative 2 0 - 6 %    Basophils Relative 1 0 - 1 %    Absolute Neutrophils 3.01 1.85 - 7.62 Thousands/µL    Absolute Immature Grans 0.01 0.00 - 0.20 Thousand/uL    Absolute Lymphocytes 0.49 (L) 0.60 - 4.47 Thousands/µL    Absolute Monocytes 0.52 0.17 - 1.22 Thousand/µL    Eosinophils Absolute 0.06 0.00 - 0.61 Thousand/µL    Basophils Absolute 0.04 0.00 - 0.10 Thousands/µL   Comprehensive metabolic panel    Collection Time: 11/11/24 11:26 AM   Result Value Ref Range    Sodium 136 135 - 147 mmol/L    Potassium 4.5 3.5 - 5.3 mmol/L    Chloride 97 96 - 108 mmol/L    CO2 30 21 - 32 mmol/L    ANION GAP 9 4 - 13 mmol/L    BUN 29 (H) 5 - 25 mg/dL    Creatinine 1.86 (H) 0.60 - 1.30 mg/dL    Glucose 82 65 - 140 mg/dL    Calcium 9.3 8.4 - 10.2 mg/dL    AST 35 13 - 39 U/L    ALT 24 7 - 52 U/L    Alkaline Phosphatase 93 34 - 104 U/L    Total Protein 7.3 6.4 - 8.4 g/dL    Albumin 3.8 3.5 - 5.0 g/dL  "   Total Bilirubin 1.48 (H) 0.20 - 1.00 mg/dL    eGFR 32 ml/min/1.73sq m   HS Troponin 0hr (reflex protocol)    Collection Time: 11/11/24 11:26 AM   Result Value Ref Range    hs TnI 0hr 12 \"Refer to ACS Flowchart\"- see link ng/L   B-Type Natriuretic Peptide(BNP)    Collection Time: 11/11/24 11:26 AM   Result Value Ref Range     (H) 0 - 100 pg/mL   Protime-INR    Collection Time: 11/11/24 11:26 AM   Result Value Ref Range    Protime 22.2 (H) 12.3 - 15.0 seconds    INR 1.86 (H) 0.85 - 1.19   ECG 12 lead    Collection Time: 11/11/24 11:31 AM   Result Value Ref Range    Ventricular Rate 58 BPM    Atrial Rate 55 BPM    IA Interval  ms    QRSD Interval 106 ms    QT Interval 490 ms    QTC Interval 481 ms    P Axis  degrees    QRS Axis 97 degrees    T Wave [place] 26 degrees   HS Troponin I 2hr    Collection Time: 11/11/24  1:52 PM   Result Value Ref Range    hs TnI 2hr 13 \"Refer to ACS Flowchart\"- see link ng/L    Delta 2hr hsTnI 1 <20 ng/L   Basic metabolic panel    Collection Time: 11/12/24  5:36 AM   Result Value Ref Range    Sodium 138 135 - 147 mmol/L    Potassium 3.9 3.5 - 5.3 mmol/L    Chloride 99 96 - 108 mmol/L    CO2 31 21 - 32 mmol/L    ANION GAP 8 4 - 13 mmol/L    BUN 26 (H) 5 - 25 mg/dL    Creatinine 1.61 (H) 0.60 - 1.30 mg/dL    Glucose 77 65 - 140 mg/dL    Calcium 9.0 8.4 - 10.2 mg/dL    eGFR 38 ml/min/1.73sq m   CBC (With Platelets)    Collection Time: 11/12/24  5:36 AM   Result Value Ref Range    WBC 3.92 (L) 4.31 - 10.16 Thousand/uL    RBC 3.03 (L) 3.88 - 5.62 Million/uL    Hemoglobin 9.1 (L) 12.0 - 17.0 g/dL    Hematocrit 29.4 (L) 36.5 - 49.3 %    MCV 97 82 - 98 fL    MCH 30.0 26.8 - 34.3 pg    MCHC 31.0 (L) 31.4 - 37.4 g/dL    RDW 23.5 (H) 11.6 - 15.1 %    Platelets 112 (L) 149 - 390 Thousands/uL    MPV 10.5 8.9 - 12.7 fL   Magnesium    Collection Time: 11/12/24  5:36 AM   Result Value Ref Range    Magnesium 2.2 1.9 - 2.7 mg/dL   Phosphorus    Collection Time: 11/12/24  5:36 AM   Result Value Ref " Range    Phosphorus 3.7 2.3 - 4.1 mg/dL   Protime-INR    Collection Time: 11/12/24  5:36 AM   Result Value Ref Range    Protime 21.1 (H) 12.3 - 15.0 seconds    INR 1.74 (H) 0.85 - 1.19   Protime-INR    Collection Time: 11/13/24  5:01 AM   Result Value Ref Range    Protime 25.5 (H) 12.3 - 15.0 seconds    INR 2.24 (H) 0.85 - 1.19   TSH, 3rd generation with Free T4 reflex    Collection Time: 11/13/24  5:01 AM   Result Value Ref Range    TSH 3RD GENERATON 5.889 (H) 0.450 - 4.500 uIU/mL   Basic metabolic panel    Collection Time: 11/13/24  5:01 AM   Result Value Ref Range    Sodium 139 135 - 147 mmol/L    Potassium 3.8 3.5 - 5.3 mmol/L    Chloride 101 96 - 108 mmol/L    CO2 32 21 - 32 mmol/L    ANION GAP 6 4 - 13 mmol/L    BUN 21 5 - 25 mg/dL    Creatinine 1.58 (H) 0.60 - 1.30 mg/dL    Glucose 94 65 - 140 mg/dL    Calcium 8.8 8.4 - 10.2 mg/dL    eGFR 39 ml/min/1.73sq m   Magnesium    Collection Time: 11/13/24  5:01 AM   Result Value Ref Range    Magnesium 2.2 1.9 - 2.7 mg/dL   CBC and differential    Collection Time: 11/13/24  5:01 AM   Result Value Ref Range    WBC 4.14 (L) 4.31 - 10.16 Thousand/uL    RBC 3.03 (L) 3.88 - 5.62 Million/uL    Hemoglobin 9.0 (L) 12.0 - 17.0 g/dL    Hematocrit 29.9 (L) 36.5 - 49.3 %    MCV 99 (H) 82 - 98 fL    MCH 29.7 26.8 - 34.3 pg    MCHC 30.1 (L) 31.4 - 37.4 g/dL    RDW 23.5 (H) 11.6 - 15.1 %    MPV 10.1 8.9 - 12.7 fL    Platelets 107 (L) 149 - 390 Thousands/uL    nRBC 0 /100 WBCs    Segmented % 69 43 - 75 %    Immature Grans % 1 0 - 2 %    Lymphocytes % 12 (L) 14 - 44 %    Monocytes % 15 (H) 4 - 12 %    Eosinophils Relative 2 0 - 6 %    Basophils Relative 1 0 - 1 %    Absolute Neutrophils 2.91 1.85 - 7.62 Thousands/µL    Absolute Immature Grans 0.03 0.00 - 0.20 Thousand/uL    Absolute Lymphocytes 0.49 (L) 0.60 - 4.47 Thousands/µL    Absolute Monocytes 0.60 0.17 - 1.22 Thousand/µL    Eosinophils Absolute 0.08 0.00 - 0.61 Thousand/µL    Basophils Absolute 0.03 0.00 - 0.10 Thousands/µL    T4, free    Collection Time: 11/13/24  5:01 AM   Result Value Ref Range    Free T4 1.14 (H) 0.61 - 1.12 ng/dL   TIBC Panel (incl. Iron, TIBC, % Iron Saturation)    Collection Time: 11/13/24  9:42 AM   Result Value Ref Range    Iron Saturation 13 (L) 15 - 50 %    TIBC 312 250 - 450 ug/dL    Iron 42 (L) 50 - 212 ug/dL    UIBC 270 155 - 355 ug/dL   Ferritin    Collection Time: 11/13/24  9:42 AM   Result Value Ref Range    Ferritin 213 24 - 336 ng/mL   Protime-INR    Collection Time: 11/14/24  5:26 AM   Result Value Ref Range    Protime 33.0 (H) 12.3 - 15.0 seconds    INR 3.16 (H) 0.85 - 1.19   Basic metabolic panel    Collection Time: 11/14/24  5:26 AM   Result Value Ref Range    Sodium 141 135 - 147 mmol/L    Potassium 3.7 3.5 - 5.3 mmol/L    Chloride 102 96 - 108 mmol/L    CO2 34 (H) 21 - 32 mmol/L    ANION GAP 5 4 - 13 mmol/L    BUN 17 5 - 25 mg/dL    Creatinine 1.35 (H) 0.60 - 1.30 mg/dL    Glucose 93 65 - 140 mg/dL    Calcium 8.7 8.4 - 10.2 mg/dL    eGFR 48 ml/min/1.73sq m   CBC    Collection Time: 11/14/24  5:26 AM   Result Value Ref Range    WBC 4.31 4.31 - 10.16 Thousand/uL    RBC 2.92 (L) 3.88 - 5.62 Million/uL    Hemoglobin 8.7 (L) 12.0 - 17.0 g/dL    Hematocrit 29.1 (L) 36.5 - 49.3 %     (H) 82 - 98 fL    MCH 29.8 26.8 - 34.3 pg    MCHC 29.9 (L) 31.4 - 37.4 g/dL    RDW 24.0 (H) 11.6 - 15.1 %    Platelets 96 (L) 149 - 390 Thousands/uL    MPV 11.4 8.9 - 12.7 fL   Magnesium    Collection Time: 11/14/24  5:26 AM   Result Value Ref Range    Magnesium 2.0 1.9 - 2.7 mg/dL     Imaging: I have personally reviewed pertinent reports.        Counseling / Coordination of Care  Total time spent today 20 minutes. Greater than 50% of total time was spent with the patient and / or family counseling and / or coordination of care.

## 2024-11-14 NOTE — ASSESSMENT & PLAN NOTE
on admission   Chest xray showed CHF with small bibasilar effusions   Recent echo- normal LVEF. RV volume overload. Severe TR.   On furosemide 50 mg IV BID, increased furosemide 80 mg IV BID yesterday  He takes Torsemide 20 mg PO daily T/R/S/S and torsemide 40 mg PO daily M/W/F  24-hour net -1200  Daily weights 214 lbs today from 219 lbs yesterday? His dry weight is 215 lbs   Exam- elevated JVP  On 2 gram Na diet and 1500 ml fluid restriction   Will continue IV diuretic today

## 2024-11-14 NOTE — PROGRESS NOTES
Progress Note - Hospitalist   Name: Oscar Espinosa 84 y.o. male I MRN: [id number]  Unit/Bed#: S -01 I Date of Admission: 11/11/2024   Date of Service: 11/14/2024 I Hospital Day: 3    Assessment & Plan  Acute on chronic diastolic heart failure (HCC)  Wt Readings from Last 3 Encounters:   11/14/24 97.4 kg (214 lb 11.7 oz)   11/07/24 105 kg (231 lb)   11/05/24 105 kg (231 lb)   Follows with Dr. Alan outpatient, last seen September 13.  Dry weight at the time 215 pounds  GDMT includes labetalol 50 mg daily and diuretic with torsemide 40 mg daily on Monday, Wednesday, Friday, and 20 mg daily the rest of the week.  He reports compliance with this.  Patient's weight has slowly been increasing, possibly secondary to his dietary noncompliance.  Endorses eating salty foods and snacking with pretzels.  Wife at the bedside also contributes to this history.  Presenting today with worsening SOB, increased weight, concern for volume overload when he was at pulmonary rehabilitation  Bnp 674, chest x-ray consistent with CHF and pulmonary congestion bilaterally  Recent echo on 11/7 showing EF 60%, normal left ventricular systolic function, moderate pulmonary hypertension with right ventricular systolic pressure 55 mmHg, and severe tricuspid regurgitation   On exam, bilateral JVD, bilateral +3 pitting edema up to the knees, significantly worse in the right lower extremity  Received 50 mg IV Lasix in the ED, urine output over 600 ml afterwards  Lasix was increased yesterday to IV Lasix 80 mg BID, net -1200 L over the last 24 hours  Daily weight today at 214 lbs, which is around reported baseline of 215 lbs. However, patient continues to have elevated JVD on exam, suspect still hypervolemic despite dry weight    Plan:  Cardiology on board, appreciate recs  Continue IV Lasix 80 mg BID  Monitor ins and outs  Daily standing weights  Cardiac diet, fluid restriction 1500 mL daily, salt restriction 2 g daily  Monitor BMPs  Replete  mag and K as needed to maintain Mg>2 and K>4  Paroxysmal atrial fibrillation (HCC)  Rate controlled with labetalol 50 mg daily  On Coumadin 2.5 mg daily except Thursdays where he takes 3.75 mg  Received Coumadin 3.75 mg x1 yesterday however, INR continued to be subtherapeutic  Received additional Coumadin 5 mg x 1 11/12    Plan:  Continue home Coumadin dosing at 2.5 mg daily except on Thursdays where he can continue taking 3.75 mg  INR today 3.16, will continue current dosing and will follow-up tomorrow  Monitor INR  Benign essential hypertension  Continue labetalol 50 mg daily  We are currently diuresing the patient with IV Lasix  Hyperlipidemia  Continue Lipitor 40 mg daily  Severe obstructive sleep apnea  Severe YEVGENIY, noncompliant with CPAP unable to tolerate it  Patient reports he does not use CPAP at home  CAD (coronary artery disease)  Status post CABG in 2003  Continue Lipitor and aspirin  Abdominal aortic aneurysm without rupture (HCC)  Status post repair  Chronic obstructive pulmonary disease (HCC)  Mild COPD, last PFTs done in 2021  Follows with pulmonary Dr. Beebe  Will continue Advair and as needed albuterol  Moderate to severe pulmonary hypertension (HCC)  Echo done November 7 EF 60%, normal left ventricular systolic function, moderate pulmonary hypertension with right ventricular systolic pressure 55 mmHg, and severe tricuspid regurgitation  Mild pulmonary hypertension likely secondary to group 2 PH from uncontrolled YEVGENIY and COPD  Ascending aortic aneurysm (HCC)  Last measured in July 2024, which was 48 mm unchanged from previous imaging, stable  Venous insufficiency of both lower extremities  Bilateral lower extremity venous insufficiency, chronic issue for him  He states that over the past few weeks he has noticed his right leg being significantly more swollen than the left leg  This is in the setting of CHF exacerbation  As well as having subtherapeutic INR  VAS ultrasound showed no evidence of  DVT    Plan:  Diuresis for CHF exacerbation as above  Chronic respiratory failure with hypoxia (HCC)  Chronically on 2 L O2 at home  Continuous opioid dependence (HCC)  Chronically on oxycodone 10 mg every 6 hours as needed for moderate pain from his lower back  LITO (acute kidney injury) (HCC)  LITO on stage 3B CKD  Baseline Cr appear to be 1.0-1.1 from June/July of this year  Cr on adm: 1.86  Likely in the setting of acute CHF. Expect serum creatinine levels to improve with continued diuresis    Plan:  Continue IV diuresis  Monitor BMP  Anemia  Known, chronic iron deficiency anemia  Follows hematology outpatient  Receives weekly iron transfusion  Iron panel checked during this admission showed Iron levels 42, Iron sat 13%    Plan  Will give IV venofer 200 mg x1    VTE Pharmacologic Prophylaxis: VTE Score: 5 High Risk (Score >/= 5) - Pharmacological DVT Prophylaxis Ordered: warfarin (Coumadin). Sequential Compression Devices Ordered.    Mobility:   Basic Mobility Inpatient Raw Score: 18  JH-HLM Goal: 6: Walk 10 steps or more  JH-HLM Achieved: 3: Sit at edge of bed  JH-HLM Goal NOT achieved. Continue with multidisciplinary rounding and encourage appropriate mobility to improve upon JH-HLM goals.    Patient Centered Rounds: I performed bedside rounds with nursing staff today.   Discussions with Specialists or Other Care Team Provider: Cardiology    Education and Discussions with Family / Patient: Updated  (wife) via phone.    Current Length of Stay: 3 day(s)  Current Patient Status: Inpatient   Certification Statement: The patient will continue to require additional inpatient hospital stay due to CHF exacerbation  Discharge Plan: Anticipate discharge in 24-48 hrs to home.    Code Status: Level 3 - DNAR and DNI    Subjective   No acute events overnight.  Patient was seen and examined at bedside.  He does not appear to be in acute distress.  He is alert and oriented x 3 and is on 2 L nasal cannula  saturating above 90%.  Does not report any acute concerns.  No lightheadedness, chest pain, abdominal discomfort, increased leg swelling.  Reports shortness of breath has improved.  He is urinating appropriately    Objective :  Temp:  [97.6 °F (36.4 °C)-98.4 °F (36.9 °C)] 97.6 °F (36.4 °C)  HR:  [49-61] 60  BP: (108-127)/(63-68) 127/68  Resp:  [17-18] 17  SpO2:  [90 %-95 %] 95 %  O2 Device: None (Room air)  Nasal Cannula O2 Flow Rate (L/min):  [2 L/min] 2 L/min    Body mass index is 29.95 kg/m².     Input and Output Summary (last 24 hours):     Intake/Output Summary (Last 24 hours) at 11/14/2024 0647  Last data filed at 11/14/2024 0601  Gross per 24 hour   Intake 1050 ml   Output 2470 ml   Net -1420 ml       Physical Exam  Vitals reviewed.   Constitutional:       General: He is not in acute distress.     Appearance: He is well-developed.   HENT:      Head: Normocephalic and atraumatic.      Mouth/Throat:      Mouth: Mucous membranes are moist.   Eyes:      Conjunctiva/sclera: Conjunctivae normal.   Neck:      Vascular: JVD present.   Cardiovascular:      Rate and Rhythm: Normal rate and regular rhythm.      Heart sounds: No murmur heard.  Pulmonary:      Effort: Pulmonary effort is normal. No respiratory distress.      Breath sounds: Normal breath sounds.   Abdominal:      Palpations: Abdomen is soft.      Tenderness: There is no abdominal tenderness.   Musculoskeletal:         General: Swelling (Bilateral LE) present. No tenderness.      Cervical back: Neck supple.   Skin:     General: Skin is warm and dry.      Capillary Refill: Capillary refill takes less than 2 seconds.      Comments: Bilateral chronic venous stasis changes noted     Neurological:      General: No focal deficit present.      Mental Status: He is alert and oriented to person, place, and time.   Psychiatric:         Mood and Affect: Mood normal.         Lines/Drains:              Lab Results: I have reviewed the following results:   Results from  last 7 days   Lab Units 11/13/24  0501   WBC Thousand/uL 4.14*   HEMOGLOBIN g/dL 9.0*   HEMATOCRIT % 29.9*   PLATELETS Thousands/uL 107*   SEGS PCT % 69   LYMPHO PCT % 12*   MONO PCT % 15*   EOS PCT % 2     Results from last 7 days   Lab Units 11/14/24  0526 11/12/24  0536 11/11/24  1126   SODIUM mmol/L 141   < > 136   POTASSIUM mmol/L 3.7   < > 4.5   CHLORIDE mmol/L 102   < > 97   CO2 mmol/L 34*   < > 30   BUN mg/dL 17   < > 29*   CREATININE mg/dL 1.35*   < > 1.86*   ANION GAP mmol/L 5   < > 9   CALCIUM mg/dL 8.7   < > 9.3   ALBUMIN g/dL  --   --  3.8   TOTAL BILIRUBIN mg/dL  --   --  1.48*   ALK PHOS U/L  --   --  93   ALT U/L  --   --  24   AST U/L  --   --  35   GLUCOSE RANDOM mg/dL 93   < > 82    < > = values in this interval not displayed.     Results from last 7 days   Lab Units 11/14/24  0526   INR  3.16*                   Recent Cultures (last 7 days):               Last 24 Hours Medication List:     Current Facility-Administered Medications:     acetaminophen (TYLENOL) tablet 650 mg, Q6H PRN    albuterol (PROVENTIL HFA,VENTOLIN HFA) inhaler 2 puff, Q6H PRN    amiodarone tablet 200 mg, Daily    aspirin (ECOTRIN LOW STRENGTH) EC tablet 81 mg, Daily    atorvastatin (LIPITOR) tablet 40 mg, Daily    Cholecalciferol (VITAMIN D3) tablet 2,000 Units, Daily    Fluticasone Furoate-Vilanterol 100-25 mcg/actuation 1 puff, Daily    furosemide (LASIX) injection 80 mg, BID    gabapentin (NEURONTIN) capsule 100 mg, HS    labetalol (NORMODYNE) tablet 50 mg, Daily    melatonin tablet 6 mg, HS    multivitamin stress formula tablet 1 tablet, Daily    oxyCODONE (ROXICODONE) immediate release tablet 10 mg, Q6H PRN    polyethylene glycol (MIRALAX) packet 17 g, Daily    potassium chloride (Klor-Con M20) CR tablet 20 mEq, Daily    senna-docusate sodium (SENOKOT S) 8.6-50 mg per tablet 2 tablet, BID    tamsulosin (FLOMAX) capsule 0.4 mg, Daily    warfarin (COUMADIN) tablet 2.5 mg, Once per day on Sunday Monday Tuesday Wednesday  Friday Saturday    warfarin (COUMADIN) tablet 3.75 mg, Every Thursday    Administrative Statements   Today, Patient Was Seen By: Gely Thacker MD      **Please Note: This note may have been constructed using a voice recognition system.**

## 2024-11-14 NOTE — ASSESSMENT & PLAN NOTE
Known, chronic iron deficiency anemia  Follows hematology outpatient  Receives weekly iron transfusion  Iron panel checked during this admission showed Iron levels 42, Iron sat 13%    Plan  Will give IV venofer 200 mg x1

## 2024-11-14 NOTE — ASSESSMENT & PLAN NOTE
On amiodarone 200 mg PO daily and labetalol 50 mg PO daily  for rhythm control   On coumadin for OAC   INR 3.16

## 2024-11-14 NOTE — PLAN OF CARE
Problem: Potential for Falls  Goal: Patient will remain free of falls  Description: INTERVENTIONS:  - Educate patient/family on patient safety including physical limitations  - Instruct patient to call for assistance with activity   - Consult OT/PT to assist with strengthening/mobility   - Keep Call bell within reach  - Keep bed low and locked with side rails adjusted as appropriate  - Keep care items and personal belongings within reach  - Initiate and maintain comfort rounds  - Make Fall Risk Sign visible to staff  - Apply yellow socks and bracelet for high fall risk patients  - Consider moving patient to room near nurses station  Outcome: Progressing     Problem: Nutrition/Hydration-ADULT  Goal: Nutrient/Hydration intake appropriate for improving, restoring or maintaining nutritional needs  Description: Monitor and assess patient's nutrition/hydration status for malnutrition. Collaborate with interdisciplinary team and initiate plan and interventions as ordered.  Monitor patient's weight and dietary intake as ordered or per policy. Utilize nutrition screening tool and intervene as necessary. Determine patient's food preferences and provide high-protein, high-caloric foods as appropriate.     INTERVENTIONS:  - Monitor oral intake, urinary output, labs, and treatment plans  - Assess nutrition and hydration status and recommend course of action  - Evaluate amount of meals eaten  - Assist patient with eating if necessary   - Allow adequate time for meals  - Recommend/ encourage appropriate diets, oral nutritional supplements, and vitamin/mineral supplements  - Order, calculate, and assess calorie counts as needed  - Recommend, monitor, and adjust tube feedings and TPN/PPN based on assessed needs  - Assess need for intravenous fluids  - Provide specific nutrition/hydration education as appropriate  - Include patient/family/caregiver in decisions related to nutrition  Outcome: Progressing     Problem: PAIN -  ADULT  Goal: Verbalizes/displays adequate comfort level or baseline comfort level  Description: Interventions:  - Encourage patient to monitor pain and request assistance  - Assess pain using appropriate pain scale  - Administer analgesics based on type and severity of pain and evaluate response  - Implement non-pharmacological measures as appropriate and evaluate response  - Consider cultural and social influences on pain and pain management  - Notify physician/advanced practitioner if interventions unsuccessful or patient reports new pain  Outcome: Progressing     Problem: INFECTION - ADULT  Goal: Absence or prevention of progression during hospitalization  Description: INTERVENTIONS:  - Assess and monitor for signs and symptoms of infection  - Monitor lab/diagnostic results  - Monitor all insertion sites, i.e. indwelling lines, tubes, and drains  - Monitor endotracheal if appropriate and nasal secretions for changes in amount and color  - [place] appropriate cooling/warming therapies per order  - Administer medications as ordered  - Instruct and encourage patient and family to use good hand hygiene technique  - Identify and instruct in appropriate isolation precautions for identified infection/condition  Outcome: Progressing  Goal: Absence of fever/infection during neutropenic period  Description: INTERVENTIONS:  - Monitor WBC    Outcome: Progressing     Problem: SAFETY ADULT  Goal: Patient will remain free of falls  Description: INTERVENTIONS:  - Educate patient/family on patient safety including physical limitations  - Instruct patient to call for assistance with activity   - Consult OT/PT to assist with strengthening/mobility   - Keep Call bell within reach  - Keep bed low and locked with side rails adjusted as appropriate  - Keep care items and personal belongings within reach  - Initiate and maintain comfort rounds  - Make Fall Risk Sign visible to staff  - Apply yellow socks and bracelet for high fall  risk patients  - Consider moving patient to room near nurses station  Outcome: Progressing  Goal: Maintain or return to baseline ADL function  Description: INTERVENTIONS:  -  Assess patient's ability to carry out ADLs; assess patient's baseline for ADL function and identify physical deficits which impact ability to perform ADLs (bathing, care of mouth/teeth, toileting, grooming, dressing, etc.)  - Assess/evaluate cause of self-care deficits   - Assess range of motion  - Assess patient's mobility; develop plan if impaired  - Assess patient's need for assistive devices and provide as appropriate  - Encourage maximum independence but intervene and supervise when necessary  - Involve family in performance of ADLs  - Assess for home care needs following discharge   - Consider OT consult to assist with ADL evaluation and planning for discharge  - Provide patient education as appropriate  Outcome: Progressing  Goal: Maintains/Returns to pre admission functional level  Description: INTERVENTIONS:  - Perform AM-PAC 6 Click Basic Mobility/ Daily Activity assessment daily.  - Set and communicate daily mobility goal to care team and patient/family/caregiver.   - Collaborate with rehabilitation services on mobility goals if consulted  - Perform Range of Motion   - Out of bed for toileting  - Record patient progress and toleration of activity level   Outcome: Progressing     Problem: DISCHARGE PLANNING  Goal: Discharge to home or other facility with appropriate resources  Description: INTERVENTIONS:  - Identify barriers to discharge w/patient and caregiver  - Arrange for needed discharge resources and transportation as appropriate  - Identify discharge learning needs (meds, wound care, etc.)  - Arrange for interpretive services to assist at discharge as needed  - Refer to Case Management Department for coordinating discharge planning if the patient needs post-hospital services based on physician/advanced practitioner order or  complex needs related to functional status, cognitive ability, or social support system  Outcome: Progressing     Problem: Knowledge Deficit  Goal: Patient/family/caregiver demonstrates understanding of disease process, treatment plan, medications, and discharge instructions  Description: Complete learning assessment and assess knowledge base.  Interventions:  - Provide teaching at level of understanding  - Provide teaching via preferred learning methods  Outcome: Progressing     Problem: CARDIOVASCULAR - ADULT  Goal: Maintains optimal cardiac output and hemodynamic stability  Description: INTERVENTIONS:  - Monitor I/O, vital signs and rhythm  - Monitor for S/S and trends of decreased cardiac output  - Administer and titrate ordered vasoactive medications to optimize hemodynamic stability  - Assess quality of pulses, skin color and temperature  - Assess for signs of decreased coronary artery perfusion  - Instruct patient to report change in severity of symptoms  Outcome: Progressing  Goal: Absence of cardiac dysrhythmias or at baseline rhythm  Description: INTERVENTIONS:  - Continuous cardiac monitoring, vital signs, obtain 12 lead EKG if ordered  - Administer antiarrhythmic and heart rate control medications as ordered  - Monitor electrolytes and administer replacement therapy as ordered  Outcome: Progressing

## 2024-11-15 LAB
ANION GAP SERPL CALCULATED.3IONS-SCNC: 5 MMOL/L (ref 4–13)
BUN SERPL-MCNC: 16 MG/DL (ref 5–25)
CALCIUM SERPL-MCNC: 8.6 MG/DL (ref 8.4–10.2)
CHLORIDE SERPL-SCNC: 102 MMOL/L (ref 96–108)
CO2 SERPL-SCNC: 33 MMOL/L (ref 21–32)
CREAT SERPL-MCNC: 1.17 MG/DL (ref 0.6–1.3)
ERYTHROCYTE [DISTWIDTH] IN BLOOD BY AUTOMATED COUNT: 23.9 % (ref 11.6–15.1)
GFR SERPL CREATININE-BSD FRML MDRD: 57 ML/MIN/1.73SQ M
GLUCOSE SERPL-MCNC: 89 MG/DL (ref 65–140)
HCT VFR BLD AUTO: 28.8 % (ref 36.5–49.3)
HGB BLD-MCNC: 8.7 G/DL (ref 12–17)
INR PPP: 3.81 (ref 0.85–1.19)
MAGNESIUM SERPL-MCNC: 1.9 MG/DL (ref 1.9–2.7)
MCH RBC QN AUTO: 30 PG (ref 26.8–34.3)
MCHC RBC AUTO-ENTMCNC: 30.2 G/DL (ref 31.4–37.4)
MCV RBC AUTO: 99 FL (ref 82–98)
PLATELET # BLD AUTO: 95 THOUSANDS/UL (ref 149–390)
PMV BLD AUTO: 10.9 FL (ref 8.9–12.7)
POTASSIUM SERPL-SCNC: 3.8 MMOL/L (ref 3.5–5.3)
PROTHROMBIN TIME: 38 SECONDS (ref 12.3–15)
RBC # BLD AUTO: 2.9 MILLION/UL (ref 3.88–5.62)
SODIUM SERPL-SCNC: 140 MMOL/L (ref 135–147)
WBC # BLD AUTO: 4.37 THOUSAND/UL (ref 4.31–10.16)

## 2024-11-15 PROCEDURE — 80048 BASIC METABOLIC PNL TOTAL CA: CPT

## 2024-11-15 PROCEDURE — 83735 ASSAY OF MAGNESIUM: CPT

## 2024-11-15 PROCEDURE — 85610 PROTHROMBIN TIME: CPT

## 2024-11-15 PROCEDURE — 99232 SBSQ HOSP IP/OBS MODERATE 35: CPT | Performed by: INTERNAL MEDICINE

## 2024-11-15 PROCEDURE — 85027 COMPLETE CBC AUTOMATED: CPT

## 2024-11-15 RX ORDER — POTASSIUM CHLORIDE 1500 MG/1
20 TABLET, EXTENDED RELEASE ORAL ONCE
Status: COMPLETED | OUTPATIENT
Start: 2024-11-15 | End: 2024-11-15

## 2024-11-15 RX ORDER — TORSEMIDE 20 MG/1
40 TABLET ORAL DAILY
Status: DISCONTINUED | OUTPATIENT
Start: 2024-11-16 | End: 2024-11-16 | Stop reason: HOSPADM

## 2024-11-15 RX ORDER — MAGNESIUM SULFATE HEPTAHYDRATE 40 MG/ML
2 INJECTION, SOLUTION INTRAVENOUS ONCE
Status: COMPLETED | OUTPATIENT
Start: 2024-11-15 | End: 2024-11-15

## 2024-11-15 RX ADMIN — B-COMPLEX W/ C & FOLIC ACID TAB 1 TABLET: TAB at 08:08

## 2024-11-15 RX ADMIN — TAMSULOSIN HYDROCHLORIDE 0.4 MG: 0.4 CAPSULE ORAL at 08:08

## 2024-11-15 RX ADMIN — IRON SUCROSE 200 MG: 20 INJECTION, SOLUTION INTRAVENOUS at 12:33

## 2024-11-15 RX ADMIN — GABAPENTIN 100 MG: 100 CAPSULE ORAL at 21:48

## 2024-11-15 RX ADMIN — FLUTICASONE FUROATE AND VILANTEROL TRIFENATATE 1 PUFF: 100; 25 POWDER RESPIRATORY (INHALATION) at 08:09

## 2024-11-15 RX ADMIN — LABETALOL HYDROCHLORIDE 50 MG: 100 TABLET, FILM COATED ORAL at 08:08

## 2024-11-15 RX ADMIN — AMIODARONE HYDROCHLORIDE 200 MG: 200 TABLET ORAL at 08:09

## 2024-11-15 RX ADMIN — ATORVASTATIN CALCIUM 40 MG: 40 TABLET, FILM COATED ORAL at 08:08

## 2024-11-15 RX ADMIN — MAGNESIUM SULFATE HEPTAHYDRATE 2 G: 40 INJECTION, SOLUTION INTRAVENOUS at 07:57

## 2024-11-15 RX ADMIN — FUROSEMIDE 80 MG: 10 INJECTION, SOLUTION INTRAMUSCULAR; INTRAVENOUS at 08:01

## 2024-11-15 RX ADMIN — Medication 6 MG: at 21:48

## 2024-11-15 RX ADMIN — Medication 2000 UNITS: at 08:09

## 2024-11-15 RX ADMIN — POTASSIUM CHLORIDE 20 MEQ: 1500 TABLET, EXTENDED RELEASE ORAL at 12:40

## 2024-11-15 RX ADMIN — POTASSIUM CHLORIDE 20 MEQ: 1500 TABLET, EXTENDED RELEASE ORAL at 08:07

## 2024-11-15 RX ADMIN — ASPIRIN 81 MG: 81 TABLET, COATED ORAL at 08:08

## 2024-11-15 RX ADMIN — SENNOSIDES AND DOCUSATE SODIUM 2 TABLET: 8.6; 5 TABLET ORAL at 17:23

## 2024-11-15 RX ADMIN — SENNOSIDES AND DOCUSATE SODIUM 2 TABLET: 8.6; 5 TABLET ORAL at 08:08

## 2024-11-15 NOTE — PROGRESS NOTES
"Cardiology Progress Note - Oscar Espinosa 84 y.o. male MRN: [id number]    Unit/Bed#: S -01 Encounter: [id number]      Assessment:  Acute HFpEF  Net negative fluid balance, weight at dry weight  Renal function improved s/p diuretics   Received IV Lasix this AM  On home baseline O2 requirements   Transition to oral diuretic regimen in a.m.  Torsemide 40 mg daily (on 20 alternating with 40 at home)   BMP to be done next week   CAD s/p CABG  No anginal complaints  Continue regimen of aspirin, statin and beta-blocker  Paroxysmal atrial fibrillation   Maintaining NSR  Continue beta-blocker  AC with warfarin for goal INR of 2 to 3  Benign essential hypertension   Blood pressure controlled  Continue current regimen   Dyslipidemia   Continue statin therapy   LDL at goal     Close outpatient follow-up arranged on Monday 11/18 with heart failure advanced practitioner.    Subjective:   Patient seen and examined.  No significant events overnight.      Objective:     Vitals: Blood pressure 132/63, pulse 62, temperature 97.8 °F (36.6 °C), resp. rate 18, height 5' 11\" (1.803 m), weight 96.5 kg (212 lb 11.9 oz), SpO2 93%., Body mass index is 29.67 kg/m².,   Orthostatic Blood Pressures      Flowsheet Row Most Recent Value   Blood Pressure 132/63 filed at 11/15/2024 0731   Patient Position - Orthostatic VS Lying filed at 11/13/2024 2134              Intake/Output Summary (Last 24 hours) at 11/15/2024 0840  Last data filed at 11/15/2024 0834  Gross per 24 hour   Intake 1440 ml   Output 2350 ml   Net -910 ml       Physical Exam:    GEN: Oscar Espinosa appears well, alert and oriented x 3, pleasant and cooperative   HEENT: pupils equal, round, and reactive to light; extraocular muscles intact  NECK: supple, no carotid bruits   HEART: regular rhythm, normal S1 and S2, no murmur  LUNGS: clear to auscultation bilaterally; no wheezes, rales, or rhonchi   ABDOMEN: normal bowel sounds, soft, no tenderness, no distention  EXTREMITIES: " Positive edema, right greater than left with chronic venous stasis changes bilaterally  NEURO: no focal findings   SKIN: normal without suspicious lesions on exposed skin    Medications:      Current Facility-Administered Medications:     acetaminophen (TYLENOL) tablet 650 mg, 650 mg, Oral, Q6H PRN, Arvind Lantigua MD, 650 mg at 11/11/24 2219    albuterol (PROVENTIL HFA,VENTOLIN HFA) inhaler 2 puff, 2 puff, Inhalation, Q6H PRN, Arvind Lantigua MD    amiodarone tablet 200 mg, 200 mg, Oral, Daily, Arvind Lantigua MD, 200 mg at 11/15/24 0809    aspirin (ECOTRIN LOW STRENGTH) EC tablet 81 mg, 81 mg, Oral, Daily, Arvind Lantigua MD, 81 mg at 11/15/24 0808    atorvastatin (LIPITOR) tablet 40 mg, 40 mg, Oral, Daily, Arvind Lantigua MD, 40 mg at 11/15/24 0808    Cholecalciferol (VITAMIN D3) tablet 2,000 Units, 2,000 Units, Oral, Daily, Arvind Lantigua MD, 2,000 Units at 11/15/24 0809    Fluticasone Furoate-Vilanterol 100-25 mcg/actuation 1 puff, 1 puff, Inhalation, Daily, Arvind Lantigua MD, 1 puff at 11/15/24 0809    furosemide (LASIX) injection 80 mg, 80 mg, Intravenous, BID, [place], 80 mg at 11/15/24 0801    gabapentin (NEURONTIN) capsule 100 mg, 100 mg, Oral, HS, Arvind Lantigua MD, 100 mg at 11/14/24 2210    labetalol (NORMODYNE) tablet 50 mg, 50 mg, Oral, Daily, Arvind Lantigua MD, 50 mg at 11/15/24 0808    magnesium sulfate 2 g/50 mL IVPB (premix) 2 g, 2 g, Intravenous, Once, Gely Thacker MD, Last Rate: 25 mL/hr at 11/15/24 0757, 2 g at 11/15/24 0757    melatonin tablet 6 mg, 6 mg, Oral, HS, Zahira Oseguera MD, 6 mg at 11/14/24 2210    multivitamin stress formula tablet 1 tablet, 1 tablet, Oral, Daily, Arvind Lantigua MD, 1 tablet at 11/15/24 0808    oxyCODONE (ROXICODONE) immediate release tablet 10 mg, 10 mg, Oral, Q6H PRN, Arvind Lantigua MD    polyethylene glycol (MIRALAX) packet 17 g, 17 g,  Oral, Daily, Arvind Lantigua MD, 17 g at 11/13/24 0829    potassium chloride (Klor-Con M20) CR tablet 20 mEq, 20 mEq, Oral, Daily, Arvind Lantigua MD, 20 mEq at 11/14/24 0819    senna-docusate sodium (SENOKOT S) 8.6-50 mg per tablet 2 tablet, 2 tablet, Oral, BID, Arvind Lantigua MD, 2 tablet at 11/15/24 0808    tamsulosin (FLOMAX) capsule 0.4 mg, 0.4 mg, Oral, Daily, Arvind Lantigua MD, 0.4 mg at 11/15/24 0808    warfarin (COUMADIN) tablet 2.5 mg, 2.5 mg, Oral, Once per day on Sunday Monday Tuesday Wednesday Friday Saturday, Arvind Lantigua MD, 2.5 mg at 11/13/24 1713    warfarin (COUMADIN) tablet 3.75 mg, 3.75 mg, Oral, Every Thursday, Arvind Lantigua MD, 3.75 mg at 11/14/24 1723     Labs & Results:        Results from last 7 days   Lab Units 11/15/24  0514 11/14/24  0526 11/13/24  0501   WBC Thousand/uL 4.37 4.31 4.14*   HEMOGLOBIN g/dL 8.7* 8.7* 9.0*   HEMATOCRIT % 28.8* 29.1* 29.9*   PLATELETS Thousands/uL 95* 96* 107*         Results from last 7 days   Lab Units 11/15/24  0514 11/14/24  0526 11/13/24  0501 11/12/24  0536 11/11/24  1126   POTASSIUM mmol/L 3.8 3.7 3.8   < > 4.5   CHLORIDE mmol/L 102 102 101   < > 97   CO2 mmol/L 33* 34* 32   < > 30   BUN mg/dL 16 17 21   < > 29*   CREATININE mg/dL 1.17 1.35* 1.58*   < > 1.86*   CALCIUM mg/dL 8.6 8.7 8.8   < > 9.3   ALK PHOS U/L  --   --   --   --  93   ALT U/L  --   --   --   --  24   AST U/L  --   --   --   --  35    < > = values in this interval not displayed.     Results from last 7 days   Lab Units 11/15/24  0514 11/14/24  0526 11/13/24  0501   INR  3.81* 3.16* 2.24*     Results from last 7 days   Lab Units 11/15/24  0514 11/14/24 0526 11/13/24  0501   MAGNESIUM mg/dL 1.9 2.0 2.2         Counseling / Coordination of Care  Total floor / unit time spent today 25 minutes.  Greater than 50% of total time was spent with the patient and / or family counseling and / or coordination of care.

## 2024-11-15 NOTE — ASSESSMENT & PLAN NOTE
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 Expect serum creatinine levels to improve with continued diuresis  Creatinine improving, at baseline now    Plan:  Continue diuresis as planned  Monitor BMP

## 2024-11-15 NOTE — PROGRESS NOTES
Progress Note - Hospitalist   Name: Oscar Espinosa 84 y.o. male I MRN: [id number]  Unit/Bed#: S -01 I Date of Admission: 11/11/2024   Date of Service: 11/15/2024 I Hospital Day: 4    Assessment & Plan  Acute on chronic diastolic heart failure (HCC)  Wt Readings from Last 3 Encounters:   11/15/24 96.5 kg (212 lb 11.9 oz)   11/07/24 105 kg (231 lb)   11/05/24 105 kg (231 lb)   Follows with Dr. Alan outpatient, last seen September 13.  Dry weight at the time 215 pounds  GDMT includes labetalol 50 mg daily and diuretic with torsemide 40 mg daily on Monday, Wednesday, Friday, and 20 mg daily the rest of the week.  He reports compliance with this.  Patient's weight has slowly been increasing, possibly secondary to his dietary noncompliance.  Endorses eating salty foods and snacking with pretzels.  Wife at the bedside also contributes to this history.  Presenting today with worsening SOB, increased weight, concern for volume overload when he was at pulmonary rehabilitation  Bnp 674, chest x-ray consistent with CHF and pulmonary congestion bilaterally  Recent echo on 11/7 showing EF 60%, normal left ventricular systolic function, moderate pulmonary hypertension with right ventricular systolic pressure 55 mmHg, and severe tricuspid regurgitation   On exam, bilateral JVD, bilateral +3 pitting edema up to the knees, significantly worse in the right lower extremity  Received 50 mg IV Lasix in the ED, urine output over 600 ml afterwards  Lasix was increased yesterday to IV Lasix 80 mg BID, net -900 L over the last 24 hours, -6.9L since admission  Daily weight today at 212 lbs    Plan:  Cardiology on board, appreciate recs  Continue IV Lasix 80 mg today  Plan to switch to Torsemide 40 mg daily tomorrow  Monitor ins and outs  Daily standing weights  Cardiac diet, fluid restriction 1500 mL daily, salt restriction 2 g daily  Monitor BMPs  Replete mag and K as needed to maintain Mg>2 and K>4  Outpatient follow-up with  Cardiology scheduled for 11/18  Paroxysmal atrial fibrillation (HCC)  Rate controlled with labetalol 50 mg daily  On Coumadin 2.5 mg daily except Thursdays where he takes 3.75 mg  Received Coumadin 3.75 mg x1 yesterday however, INR continued to be subtherapeutic  Received additional Coumadin 5 mg x 1 11/12    Plan:  Continue home Coumadin dosing at 2.5 mg daily except on Thursdays where he can continue taking 3.75 mg  INR today 3.81, will continue current dosing and will follow-up tomorrow  Monitor INR  Benign essential hypertension  Continue labetalol 50 mg daily  We are currently diuresing the patient with IV Lasix  Hyperlipidemia  Continue Lipitor 40 mg daily  Severe obstructive sleep apnea  Severe YEVGENIY, noncompliant with CPAP unable to tolerate it  Patient reports he does not use CPAP at home  CAD (coronary artery disease)  Status post CABG in 2003  Continue Lipitor and aspirin  Abdominal aortic aneurysm without rupture (HCC)  Status post repair  Chronic obstructive pulmonary disease (HCC)  Mild COPD, last PFTs done in 2021  Follows with pulmonary Dr. Beebe  Will continue Advair and as needed albuterol  Moderate to severe pulmonary hypertension (HCC)  Echo done November 7 EF 60%, normal left ventricular systolic function, moderate pulmonary hypertension with right ventricular systolic pressure 55 mmHg, and severe tricuspid regurgitation  Mild pulmonary hypertension likely secondary to group 2 PH from uncontrolled YEVGENIY and COPD  Ascending aortic aneurysm (HCC)  Last measured in July 2024, which was 48 mm unchanged from previous imaging, stable  Venous insufficiency of both lower extremities  Bilateral lower extremity venous insufficiency, chronic issue for him  He states that over the past few weeks he has noticed his right leg being significantly more swollen than the left leg  This is in the setting of CHF exacerbation  As well as having subtherapeutic INR  VAS ultrasound showed no evidence of  DVT    Plan:  Diuresis for CHF exacerbation as above  Chronic respiratory failure with hypoxia (HCC)  Chronically on 2 L O2 at home  Continuous opioid dependence (HCC)  Chronically on oxycodone 10 mg every 6 hours as needed for moderate pain from his lower back  LITO (acute kidney injury) (HCC)  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 Expect serum creatinine levels to improve with continued diuresis  Creatinine improving, at baseline now    Plan:  Continue diuresis as planned  Monitor BMP  Anemia  Known, chronic iron deficiency anemia  Follows hematology outpatient  Receives weekly iron transfusion  Iron panel checked during this admission showed Iron levels 42, Iron sat 13%    Plan  Will give IV venofer 200 mg x3 doses, 2nd dose today    VTE Pharmacologic Prophylaxis: VTE Score: 5 High Risk (Score >/= 5) - Pharmacological DVT Prophylaxis Ordered: warfarin (Coumadin). Sequential Compression Devices Ordered.    Mobility:   Basic Mobility Inpatient Raw Score: 18  JH-HLM Goal: 6: Walk 10 steps or more  JH-HLM Achieved: 7: Walk 25 feet or more  JH-HLM Goal achieved. Continue to encourage appropriate mobility.    Patient Centered Rounds: I performed bedside rounds with nursing staff today.   Discussions with Specialists or Other Care Team Provider: Cardiology    Education and Discussions with Family / Patient:  Will attempt to call patient's wife to provide an update..     Current Length of Stay: 4 day(s)  Current Patient Status: Inpatient   Certification Statement: The patient will continue to require additional inpatient hospital stay due to CHF exacerbation  Discharge Plan: Anticipate discharge in 24-48 hrs to home.    Code Status: Level 3 - DNAR and DNI    Subjective   No acute events overnight. Patient was seen and examined at bedside. He was sitting at the edge of his bed, saturating >90% on 2L NC. He has no acute  concerns. No lightheadedness, chest pain, SOB, abdominal discomfort, increased bruising or bleeding events.     Objective :  Temp:  [97.6 °F (36.4 °C)-98.2 °F (36.8 °C)] 97.8 °F (36.6 °C)  HR:  [55-62] 62  BP: (110-132)/(59-63) 132/63  SpO2:  [89 %-94 %] 93 %  O2 Device: Nasal cannula  Nasal Cannula O2 Flow Rate (L/min):  [2 L/min] 2 L/min    Body mass index is 29.67 kg/m².     Input and Output Summary (last 24 hours):     Intake/Output Summary (Last 24 hours) at 11/15/2024 1216  Last data filed at 11/15/2024 0834  Gross per 24 hour   Intake 1320 ml   Output 1400 ml   Net -80 ml       Physical Exam  Vitals reviewed.   Constitutional:       General: He is not in acute distress.     Appearance: He is well-developed.   HENT:      Head: Normocephalic and atraumatic.      Mouth/Throat:      Mouth: Mucous membranes are moist.   Eyes:      Conjunctiva/sclera: Conjunctivae normal.   Cardiovascular:      Rate and Rhythm: Normal rate and regular rhythm.      Heart sounds: No murmur heard.  Pulmonary:      Effort: Pulmonary effort is normal. No respiratory distress.      Breath sounds: Normal breath sounds.   Abdominal:      Palpations: Abdomen is soft.      Tenderness: There is no abdominal tenderness.   Musculoskeletal:         General: No swelling or tenderness.      Cervical back: Neck supple.   Skin:     General: Skin is warm and dry.      Capillary Refill: Capillary refill takes less than 2 seconds.   Neurological:      General: No focal deficit present.      Mental Status: He is alert and oriented to person, place, and time.   Psychiatric:         Mood and Affect: Mood normal.         Lines/Drains:          Lab Results: I have reviewed the following results:   Results from last 7 days   Lab Units 11/15/24  0514 11/14/24  0526 11/13/24  0501   WBC Thousand/uL 4.37   < > 4.14*   HEMOGLOBIN g/dL 8.7*   < > 9.0*   HEMATOCRIT % 28.8*   < > 29.9*   PLATELETS Thousands/uL 95*   < > 107*   SEGS PCT %  --   --  69   LYMPHO PCT %   --   --  12*   MONO PCT %  --   --  15*   EOS PCT %  --   --  2    < > = values in this interval not displayed.     Results from last 7 days   Lab Units 11/15/24  0514 11/12/24  0536 11/11/24  1126   SODIUM mmol/L 140   < > 136   POTASSIUM mmol/L 3.8   < > 4.5   CHLORIDE mmol/L 102   < > 97   CO2 mmol/L 33*   < > 30   BUN mg/dL 16   < > 29*   CREATININE mg/dL 1.17   < > 1.86*   ANION GAP mmol/L 5   < > 9   CALCIUM mg/dL 8.6   < > 9.3   ALBUMIN g/dL  --   --  3.8   TOTAL BILIRUBIN mg/dL  --   --  1.48*   ALK PHOS U/L  --   --  93   ALT U/L  --   --  24   AST U/L  --   --  35   GLUCOSE RANDOM mg/dL 89   < > 82    < > = values in this interval not displayed.     Results from last 7 days   Lab Units 11/15/24  0514   INR  3.81*                   Recent Cultures (last 7 days):               Last 24 Hours Medication List:     Current Facility-Administered Medications:     acetaminophen (TYLENOL) tablet 650 mg, Q6H PRN    albuterol (PROVENTIL HFA,VENTOLIN HFA) inhaler 2 puff, Q6H PRN    amiodarone tablet 200 mg, Daily    aspirin (ECOTRIN LOW STRENGTH) EC tablet 81 mg, Daily    atorvastatin (LIPITOR) tablet 40 mg, Daily    Cholecalciferol (VITAMIN D3) tablet 2,000 Units, Daily    Fluticasone Furoate-Vilanterol 100-25 mcg/actuation 1 puff, Daily    gabapentin (NEURONTIN) capsule 100 mg, HS    iron sucrose (VENOFER) 200 mg in sodium chloride 0.9 % 100 mL IVPB, Daily    labetalol (NORMODYNE) tablet 50 mg, Daily    melatonin tablet 6 mg, HS    multivitamin stress formula tablet 1 tablet, Daily    oxyCODONE (ROXICODONE) immediate release tablet 10 mg, Q6H PRN    polyethylene glycol (MIRALAX) packet 17 g, Daily    potassium chloride (Klor-Con M20) CR tablet 20 mEq, Daily    senna-docusate sodium (SENOKOT S) 8.6-50 mg per tablet 2 tablet, BID    tamsulosin (FLOMAX) capsule 0.4 mg, Daily    [START ON 11/16/2024] torsemide (DEMADEX) tablet 40 mg, Daily    warfarin (COUMADIN) tablet 2.5 mg, Once per day on Sunday Monday Tuesday  Wednesday Friday Saturday    warfarin (COUMADIN) tablet 3.75 mg, Every Thursday    Administrative Statements   Today, Patient Was Seen By: Gely Thacker MD      **Please Note: This note may have been constructed using a voice recognition system.**

## 2024-11-15 NOTE — ASSESSMENT & PLAN NOTE
Known, chronic iron deficiency anemia  Follows hematology outpatient  Receives weekly iron transfusion  Iron panel checked during this admission showed Iron levels 42, Iron sat 13%    Plan  Will give IV venofer 200 mg x3 doses, 2nd dose today

## 2024-11-15 NOTE — CASE MANAGEMENT
Case Management Discharge Planning Note    Patient name Oscar Espinosa  Location S /S -01 MRN [id number]  : 1940 Date 11/15/2024       Current Admission Date: 2024  Current Admission Diagnosis:Acute on chronic diastolic heart failure (HCC)   Patient Active Problem List    Diagnosis Date Noted Date Diagnosed    LITO (acute kidney injury) ([facility]) 2024     Acute on chronic diastolic heart failure (HCC) 2024     Continuous opioid dependence ([facility]) 10/31/2024     CKD (chronic kidney disease) 10/24/2024     Iron deficiency anemia, unspecified 10/17/2024     Chronic respiratory failure with hypoxia ([facility]) 2024     Chronic obstructive pulmonary disease, unspecified COPD type ([facility]) 2024     Hypotension 2024     Prediabetes 2024     Chronic combined systolic and diastolic congestive heart failure ([facility]) 2024     Acute hypoxic respiratory failure ([facility]) 2024     Pleural effusion with shortness of breath 2024     Anemia 2024     Bilateral carotid artery stenosis 2024     Venous insufficiency of both lower extremities 2023     Hypertensive heart and chronic kidney disease with heart failure and stage 1 through stage 4 chronic kidney disease, or chronic kidney disease (HCC) 10/27/2022     Thrombocytopenia ([facility]) 2022     Myofascial pain syndrome 2022     Paroxysmal atrial fibrillation ([facility]) 2022     Hypertensive heart disease with heart failure ([facility]) 2022     Ascending aortic aneurysm ([facility]) 2021     Nocturnal hypoxemia      Moderate to severe pulmonary hypertension (HCC) 2021     Chronic obstructive pulmonary disease (HCC) 2019     Multiple pulmonary nodules 09/10/2019     Chronic pain syndrome      Abnormal thyroid function test 10/22/2014     Abdominal aortic aneurysm without rupture (HCC) 2014     Aortic valve disorder 2014     Lumbar radiculopathy 2013     Benign essential  hypertension 04/10/2013     Impaired fasting glucose 04/10/2013     Severe obesity (BMI 35.0-35.9 with comorbidity) (HCC) 04/10/2013     Hyperlipidemia 09/07/2012     Microscopic hematuria 09/07/2012     Severe obstructive sleep apnea 09/07/2012     CAD (coronary artery disease) 09/07/2012       LOS (days): 4  Geometric Mean LOS (GMLOS) (days): 4.4  Days to GMLOS:0.5     OBJECTIVE:  Risk of Unplanned Readmission Score: 36.71         Current admission status: Inpatient   Preferred Pharmacy:   [facility] - [place], [place] - [address]  [address] [facility] [address]  [address]  [facility] [address]  Phone: [phone] Fax: [phone]    [id number] - [place], [place] - [address] [facility] [address]  Phone: [phone] Fax: [phone]    [facility] - [place] [address]  [address] [address]  Phone: [phone] Fax: [phone]    Primary Care Provider: Lea Reyes, MD    Primary Insurance: MEDICARE  Secondary Insurance: [facility]    DISCHARGE DETAILS:                                                                                     IMM reviewed with patient, patient agrees with discharge determination.  IMM Given (Date):: 11/15/24  IMM Given to:: Patient

## 2024-11-15 NOTE — PROGRESS NOTES
Pulmonary Rehabilitation   Assessment and Individualized Treatment Plan  90 DAY and MEDICAL HOLD      Today's date: November 15, 2024  # of Exercise Sessions Completed: 19  Patient name: Oscar Espinosa     : 1940       MRN: [id number]  Referring Physician: Racquel Wilson DO   Pulmonologist: SOSA Gonzales (Kandice Faustin DO)  Provider: Mickey  Clinician: Willian Box MS, CEP     Dx:    Encounter Diagnosis   Name Primary?    Chronic obstructive pulmonary disease, unspecified COPD type (HCC) Yes     Date of onset: 2019      Treatment is tailored to this patient's individual needs.  The ITP was reviewed with the patient and all questions were answered to their satisfaction.  Additional ITP documentation can be found electronically including daily and monthly exercise summaries, daily session notes with ECG summaries, education notes, daily medication reconciliation, and daily physician supervision.      COMMENTS:  Pt attended 1st initial evaluation to begin Pulmonary Rehab 2x/week. He is using continuous supplemental O2 2L/min. He is looking to go away for giving and wants to increase his strength and overall endurance.     30 Day 24: Increased strength in lower and upper body reported at 30 days. Reporting more energy following exercise sessions. He is noticing it while gardening and able to walk longer in the grocery store holding onto the cart. No changes to diet. Compliant with supplemental O2. Reports ongoing back pain with walking interval.     60 day 10/23/24: Pt currently hospitalized due to CHF exacerbation. Unable to assess progression. Pt will need clearance to return back to rehab following discharge.     90 day 11/15/24: Pt currently hospitalized again for acute CHF exacerbation. He will be placed on medical hold at this time due to this as well as the holiday. He plans on returning  to rehab and will be adding in extra sessions for medical necessity. No  progress made in the past 30 days.     ASSESSMENT      Medical History:   Past Medical History:   Diagnosis Date    Abdominal aortic aneurysm ([facility])     s/p repair    Abnormal ECG july 2022    Acute on chronic congestive heart failure ([facility]) 09/11/2019    Acute respiratory failure with hypoxia ([facility]) 05/06/2024    Aneurysm ([facility]) 2007    Aneurysm of abdominal aorta ([facility])     49mm, trileaflet AV    Atrial fibrillation ([facility])     Eliquis    BPH (benign prostatic hyperplasia)     CAD (coronary artery disease)     s/p CABGx3    CHF (congestive heart failure) ([facility])     Chronic pain     Gabapentin    Chronic pain disorder     lumbar    COPD (chronic obstructive pulmonary disease) ([facility])     Coronary artery disease     Elevated serum creatinine 06/18/2024    Elevated serum creatinine 06/18/2024    Elevated transaminase level 04/27/2024    Former tobacco use     Hyperlipidemia     Hypertension     Hyponatremia 04/27/2024    Hyponatremia 04/27/2024    Hypothyroidism     Lumbar radiculopathy     Lumbar stenosis     s/p injections    Neuropathy     Obesity (BMI 30-39.9)     YEVGENIY (obstructive sleep apnea)     unable to tolerate CPAP    Platelets decreased ([facility]) 07/27/2022    Pulmonary hypertension ([facility])     moderate to severe    Spondylolisthesis of lumbar region     Visual impairment 2022    Vitamin D deficiency        Family History:  Family History   Problem Relation Age of Onset    Heart disease Mother     Stroke Father         stroke syndrome    Aneurysm Brother         abdominal    Aortic aneurysm Brother         ascending    Coronary artery disease Family     Hypertension Family     Other Son         gioblastoma multiforme       Allergies:   Meloxicam    Current Medications:   No current facility-administered medications for this visit.     No current outpatient medications on file.     Facility-Administered Medications Ordered in Other Visits   Medication Dose Route Frequency Provider Last Rate Last Admin    acetaminophen  (TYLENOL) tablet 650 mg  650 mg Oral Q6H PRN Arvind Lantigua MD   650 mg at 11/11/24 2219    albuterol (PROVENTIL HFA,VENTOLIN HFA) inhaler 2 puff  2 puff Inhalation Q6H PRN Arvind Lnatigua MD        amiodarone tablet 200 mg  200 mg Oral Daily Arvind Lantigua MD   200 mg at 11/15/24 0809    aspirin (ECOTRIN LOW STRENGTH) EC tablet 81 mg  81 mg Oral Daily Arvind Lantigua MD   81 mg at 11/15/24 0808    atorvastatin (LIPITOR) tablet 40 mg  40 mg Oral Daily Arvind Lantigua MD   40 mg at 11/15/24 0808    Cholecalciferol (VITAMIN D3) tablet 2,000 Units  2,000 Units Oral Daily Arvind Lantigua MD   2,000 Units at 11/15/24 0809    Fluticasone Furoate-Vilanterol 100-25 mcg/actuation 1 puff  1 puff Inhalation Daily Arvind Lantigua MD   1 puff at 11/15/24 0809    gabapentin (NEURONTIN) capsule 100 mg  100 mg Oral HS Arvind Lantigua MD   100 mg at 11/14/24 2210    iron sucrose (VENOFER) 200 mg in sodium chloride 0.9 % 100 mL IVPB  200 mg Intravenous Daily Gilbert Long  mL/hr at 11/15/24 1233 200 mg at 11/15/24 1233    labetalol (NORMODYNE) tablet 50 mg  50 mg Oral Daily Arvind Lantigua MD   50 mg at 11/15/24 0808    melatonin tablet 6 mg  6 mg Oral HS Zahira Oseguera MD   6 mg at 11/14/24 2210    multivitamin stress formula tablet 1 tablet  1 tablet Oral Daily Arvind Lantigua MD   1 tablet at 11/15/24 0808    oxyCODONE (ROXICODONE) immediate release tablet 10 mg  10 mg Oral Q6H PRN Arvind Lantigua MD        polyethylene glycol (MIRALAX) packet 17 g  17 g Oral Daily Arvind Lantigua MD   17 g at 11/13/24 0829    potassium chloride (Klor-Con M20) CR tablet 20 mEq  20 mEq Oral Daily Arvind Lantigua MD   20 mEq at 11/15/24 1240    senna-docusate sodium (SENOKOT S) 8.6-50 mg per tablet 2 tablet  2 tablet Oral BID Arvind Lantigua MD   2 tablet at 11/15/24 0808    tamsulosin (FLOMAX) capsule 0.4 mg  0.4 mg  Oral Daily Arvind Lantigua MD   0.4 mg at 11/15/24 0808    [START ON 11/16/2024] torsemide (DEMADEX) tablet 40 mg  40 mg Oral Daily Anna Dalton DO        warfarin (COUMADIN) tablet 2.5 mg  2.5 mg Oral Once per day on Sunday Monday Tuesday Wednesday Friday Saturday Arvind Lantigua MD   2.5 mg at 11/13/24 1713    warfarin (COUMADIN) tablet 3.75 mg  3.75 mg Oral Every Thursday Arvind Lantigua MD   3.75 mg at 11/14/24 1723       Physical Limitations: lumbar pain after walking a distance     Fall Risk: Moderate   Comments: Ambulates with a steady gait with no assist device and Denies a fall in the past 6 months    Cultural needs: none    PFT:  Yes 2/24/2021    FEV1/FVC ratio of 71%   FEV1 of 74% predicted  mildobstruction.    Medication compliance: Yes  Comments: Pt reports to be compliant with medications      Pulmonary Disease Risk Factors:  smoking    Sleep Disorders:   obstructive sleep apnea:  CPCP/BiPAP:  no - does not tolerate, uses supplemental O2       EXERCISE ASSESSMENT:      Initial Fitness Assessment: 6MWT:  Resting:    Resting:  BP: 116/76  HR: 62  SpO2: 95%  Dyspnea: 0/10  O2 Use: 2 l/min, Exercise:  BP: 126/70  HR:   SpO2: 86-91%  Dyspnea: 6/10  O2 use: 2 l/min, METs:  1.7, ECG Summary: NSR with freq. PVCs, couplet, Symptoms: low back pain and PANDA, and Comments: 1 rest break       Current Functional Status:  Occupation: retired  Recreation/Physical activity: Traveling, patio, rec room   ADL’s:Capable of performing light to moderate ADLs limited by Dyspnea  [place]: limited by Dyspnea able to perform self-care  Exercise:  none  Home exercise equipment: None  Other Comments: N/A    SMART Exercise Goals:   reduced score on CAT, improved 6MWT distance, reduced dyspnea during exercise, improved exercise tolerance based on peak METs tolerated in pulmonary rehab exercise session, and SpO2 >88% during exercise    Patient Specific EXERCISE GOALS:     reduced dependence on  supplemental O2, reduced number of COPD exacerbations, attend pulmonary rehab regularly, decrease sitting time at home, start a walking program, begin a consistent exercise regimen , reduced pulmonary related hospital readmissions , decreased rest needed with physical activity/exercise, increased muscular strength, increased energy, increased stamina with ADLs, and more independence at home    PSYCHOSOCIAL ASSESSMENT:    Date of last assessment: 8/30/2024  Depression screening:  PHQ-9 = 4    Interpretation:  1-4 = Minimal Depression  Anxiety screening:  ERON-7 = 2    Interpretation: 0-4  = Not anxious    Pt self-report of depression and anxiety  Patient reports slight feelings of depression   Patient reports slight feelings of anxiety  Reports great emotional support     Self-reported stress level:  5  Stress Management: practice Relaxation Techniques, exercise, keep a positive mindset, spend time outside, enjoy a hobby, spend time with family, TV, puzzles, keep busy around the house, gardening, and yard work    Quality of Life Screen:  (Higher score indicates disease impact on QOL)  [facility] COOP score: 29/40      CAT: 20/40      Shortness of breath questionnaire: 28/120    Social Support:   spouse, children, and friends  Community/Social Activities: N/A    SMART Goals:    Reduce perceived stress to 1-3/10, improved [facility] QOL < 27, Feelings in [facility] Score < 3, Physical Fitness in [facility] Score < 3, Daily Activity in [facility] Score < 3, Social Activities in [facility] Score < 3, Pain in [facility] Score < 3, Overall Health in [facility] Score < 3, Quality of Life in [facility] Score < 3 ,  reduced time feeling down, depressed or hopeless, improved sleeping habits, feel less tired with more energy, reduced time feeling like a failure and having negative self thoughts, reduced feelings of restlessness, and reduced irritability    Patient Specific PSYCHOCOSOCIAL GOALS:    spend time with family and reduce slight  "feelings of depression/anxiety, and increase energy levels      Psychosocial Assessment as it relates to rehabilitation: Patient denies issues with his/her family or home life that may affect their rehabilitation efforts.       NUTRITION ASSESSMENT:    Initial Weight:  221.6 lbs   Current Weight: 230.2 lbs     Height:   Ht Readings from Last 1 Encounters:   11/12/24 5' 11\" (1.803 m)       Rate Your Plate Score: 53/81    Self-reported Current Dietary Habits:  Loves to eat salads  Fruits and veggies in diet  Drinks green tea, reviewed increasing water intake   Does not eat out  Does not use sodium  Not a big meat eater     ETOH:   Social History     Substance and Sexual Activity   Alcohol Use Yes    Alcohol/week: 1.0 standard drink of alcohol    Types: 1 Cans of beer per week       SMART Goals:   reduced BMI to < 25, decreased body fat% <25%   (M), reduced waist circumference <40 inches (M), fasting BG , improved A1c  < 7.0%, Improved Rate Your Plate score  >64, 2.5-5%  wt loss, eat 6 or more servings of grain products per day, eat whole grain breads, brown rice and whole grain cereals, eat 5 or more servings of fruits and vegetables a day, eat 2 or more servings of low fat milk or yogurt a day, eat no more than 6oz of meat per day, eat red meat once a week or less, eat poultry without the skin, choose 1% or skim milk, rarely eat cheese or choose reduced fat or skim, rarely eat frozen desserts or choose fruit or fat-free sweets, Do not cook with oil, butter or margarine, Do not eat fried foods, choose light or fat-free salad dressings or damico, choose healthy snacks such as fruit, pretzels, and low fat crackers, choose healthy desserts and sweets such as orquidea food cake or  fruit, choose low sodium canned, frozen/packaged foods or rarely/never eat, rarely/never eat salty snacks, choose low sugar desserts and sweets, and drink less than 8oz of soda and sweetened drinks per day    Patient Specific NUTRITION GOALS:  "   increase water intake to reduce/thin mucus production reduced SOB while eating eat smaller, more frequent meals decrease caloric intake improve hydration weight loss increased muscle mass      OTHER CORE COMPONENT ASSESSMENT:    Hospitalizations in the past year: 3    Oxygen needs:   Rest:  supplemental O2 via nasal cannula @ 2L/min   Exercise/physical activity:  supplemental O2 via nasal cannula @ 2L/min   Sleep:   supplemental O2 via nasal cannula @ 2L/min     Does Pt monitor home SpO2? yes   Average SpO2 at rest:  95-99% with oxygen, 90% on RA    Average SpO2 with ADLs/physical activity:  low 80s % on RA       Use of Rescue Inhaler:  Yes PRN    Use of Maintenance Inhaler: Yes:  2 times per day    Use of Nebulizer Treatments:  No    Patient practices breathing techniques at home:  Reviewed with patient on:  2024    CAD Risk Factors:  Cholesterol: Yes  HTN: Yes  DM: Pre-diabetes  Obesity: Yes     Smoking: Former user  Quit   1ppd for 55.6 years    SMART Core Component Goals:   consistent, controlled resting BP < 130/80, medication compliance, abstain from smoking, and demonstrate pursed lipped breathing    Patient Specific CORE COMPONENT GOALS:    reduced dietary sodium <2000mg, assess daily wt to report an increase greater than 3lbs in a day or 5lbs in a week, medication compliance, Abstain from smoking, compliance with supplemental oxygen, and monitor home pulse oximetry      Blood Pressure:    Restin//66  Exercise: 90//52  Recovery: 80//64      INDIVIDUALIZED TREATMENT PLAN    The patient is agreeable to attend 24 pulmonary rehab exercise sessions.      EXERCISE GOALS AND PLAN    Current Aerobic Exercise Prescription       Frequency: 2 days/week   Supplement with home exercise 2+ days/wk as tolerated        Minutes:25-35         METS: 1.8-2.0              SpO2: 90-97% on supplemental O2 2L/min via NC               RPD: 3-5                      HR:     RPE:  4-5         Modalities: UBE, NuStep, and Room walking      Aerobic Exercise Prescription Plan for Progression:    Frequency: 2 days/week    Supplement with home exercise 2+ days/wk as tolerated       Minutes: 30-45        METS: 2.1-2.4              SpO2: >88% on supplemental O2 2L/min via NC              RPD: 4-6                      HR:RHR +30-40bpm   RPE: 4-5        Modalities: UBE, NuStep, Recumbent bike, and Room walking        Supplemental Oxygen Needs with Exercise:  supplemental O2 2L/min via nasal canula and will titrate O2 to maintain SpO2 >88%.    Strength training:  Will be added following 2-3 weeks of monitored exercise sessions   Modalities: Chest Press, Lateral Raise, Arm Curl, Sit to Stands, Upright Rows, and Front Raises    Education: pursed lipped breathing, relaxation breathing, home exercise guidelines, benefits of exercise for pulmonary disease, RPE scale, RPD scale, O2 saturation monitoring, appropriate O2 response to exercise, and education class: Exercise For The Pulmonary Patient    Progress toward Exercise Goals:    Pt has not made progress toward the following goals: no formal home exercise with rehab and pt currently hospitalized again for CHF exacerbation. . , Will continue to educate and progress as tolerated.    Exercise Plan:  Titrate supplemental oxygen as needed to maintain SpO2>88% with exercise, learn to conserve energy with ADLs , practice diaphragmatic breathing, reduce time sitting at home, increased strength of respiratory muscles, utilize PLB with physical activity, and begin a home exercise program     Readiness to change: Preparation:  (Getting ready to change)       NUTRITION GOALS AND PLAN    Progress toward Nutritional goals:    Pt has not made progress toward the following goals: no changes to diet, increased salt intake- likes to splurge on the weekends and pt currently hospitalized again for CHF exacerbation . , Will continue to educate and progress as  tolerated.    Nutrition Plan: increase intake of whole grains, replace refined flours with whole grains, increase daily intake of fruits and vegetables, increase daily intake of low fat dairy, limit meat intake to less than 6oz per day, eat red meat once a week or less, eat poultry without the skin, drink/use 1%  low fat or skim  milk, eat reduced-fat or part-skim cheese or rarely eat, rarely eat frozen desserts, cook without fats or oils, never/rarely eat fried foods, use light or fat-free salad dressings and mayonnaise, eat healthy snacks like fruit, pretzels, and low fat crackers, choose desserts such as fruit, orquidea food cake, low-fat or fat-free sweets, choose low sodium canned, frozen, packaged foods or rarely eat these foods, rarely/never eat salty snacks, choose low sugar desserts and sweets, and drink less than 8oz of non-diet soda, punch etc. per day    SMART goals are based Rate Your Plate Dietary Self-Assessment. These are the areas in which the patient could score higher on the assessment.  Goals include recommendations for a heart healthy diet based on American Heart Association.    Nutrition Education: heart healthy eating principles  weight loss and management strategies  low sodium diet  maintaining hydration  portion control  group class: Heart Healthy Eating  group class:  Label Reading\    Readiness to change: Contemplation:  (Acknowledging that there is a problem but not yet ready or sure of wanting to make a change)      PSYCHOSOCIAL GOALS AND PLAN    Information to begin using [employer] was provided as well as contact information for counseling through  [facility].    Progress toward Psychosocial goals:    Pt has not made progress toward the following goals: pt currently hospitalized again for CHF exacerbation. , Will continue to educate and progress as tolerated.    Psychosocial: Enroll in [employer], Practice relaxation techniques, Exercise, Spend time outdoors, Read a Book,  Keep a positive mindset, Join a support group , Reduce dependence  on family, Meet new people, puzzles, Enjoy family, and Avoid triggers    Psychosocial Education: depression and pulmonary disease, tools to manage fear/anxiety related to becoming SOB, and class:  Stress and Pulmonary Disease    Readiness to change: Preparation:  (Getting ready to change)       OTHER CORE COMPONENTS GOALS AND PLAN    Tobacco Use Intervention:     Pt quit 2012   and has abstained    Oxygen Goal: Maintain SpO2>88% during exercise or as advised by pulmonolgist    Progress toward Core Component goals:    Pt has not made progress toward the following goals: pt currently hospitalized again for CHF exacerbation. , Will continue to educate and progress as tolerated.    Core Component Plan: medication compliance, avoid places with second hand smoke, avoid processed foods, engage in regular exercise, eliminate salt shaker at the table, use salt substitutes, check labels for sodium content, and monitor home BP    Core Component Education:  preventing infections, bronchodilators, bronchial hygiene, education: Pulmonary Anatomy and Physiology, education:  Pulmonary Medications, education:  Clearing Secretions, education: Oxygen, and education: Control Breathing    Readiness to change: Preparation:  (Getting ready to change)

## 2024-11-15 NOTE — ASSESSMENT & PLAN NOTE
Rate controlled with labetalol 50 mg daily  On Coumadin 2.5 mg daily except Thursdays where he takes 3.75 mg  Received Coumadin 3.75 mg x1 yesterday however, INR continued to be subtherapeutic  Received additional Coumadin 5 mg x 1 11/12    Plan:  Continue home Coumadin dosing at 2.5 mg daily except on Thursdays where he can continue taking 3.75 mg  INR today 3.81, will continue current dosing and will follow-up tomorrow  Monitor INR

## 2024-11-15 NOTE — PLAN OF CARE
Problem: Potential for Falls  Goal: Patient will remain free of falls  Description: INTERVENTIONS:  - Educate patient/family on patient safety including physical limitations  - Instruct patient to call for assistance with activity   - Consult OT/PT to assist with strengthening/mobility   - Keep Call bell within reach  - Keep bed low and locked with side rails adjusted as appropriate  - Keep care items and personal belongings within reach  - Initiate and maintain comfort rounds  - Make Fall Risk Sign visible to staff  - Offer Toileting every 2 Hours, in advance of need  - Apply yellow socks and bracelet for high fall risk patients  - Consider moving patient to room near nurses station  Outcome: Progressing     Problem: Nutrition/Hydration-ADULT  Goal: Nutrient/Hydration intake appropriate for improving, restoring or maintaining nutritional needs  Description: Monitor and assess patient's nutrition/hydration status for malnutrition. Collaborate with interdisciplinary team and initiate plan and interventions as ordered.  Monitor patient's weight and dietary intake as ordered or per policy. Utilize nutrition screening tool and intervene as necessary. Determine patient's food preferences and provide high-protein, high-caloric foods as appropriate.     INTERVENTIONS:  - Monitor oral intake, urinary output, labs, and treatment plans  - Assess nutrition and hydration status and recommend course of action  - Evaluate amount of meals eaten  - Assist patient with eating if necessary   - Allow adequate time for meals  - Recommend/ encourage appropriate diets, oral nutritional supplements, and vitamin/mineral supplements  - Order, calculate, and assess calorie counts as needed  - Recommend, monitor, and adjust tube feedings and TPN/PPN based on assessed needs  - Assess need for intravenous fluids  - Provide specific nutrition/hydration education as appropriate  - Include patient/family/caregiver in decisions related to  nutrition  Outcome: Progressing     Problem: PAIN - ADULT  Goal: Verbalizes/displays adequate comfort level or baseline comfort level  Description: Interventions:  - Encourage patient to monitor pain and request assistance  - Assess pain using appropriate pain scale  - Administer analgesics based on type and severity of pain and evaluate response  - Implement non-pharmacological measures as appropriate and evaluate response  - Consider cultural and social influences on pain and pain management  - Notify physician/advanced practitioner if interventions unsuccessful or patient reports new pain  Outcome: Progressing     Problem: INFECTION - ADULT  Goal: Absence or prevention of progression during hospitalization  Description: INTERVENTIONS:  - Assess and monitor for signs and symptoms of infection  - Monitor lab/diagnostic results  - Monitor all insertion sites, i.e. indwelling lines, tubes, and drains  - Monitor endotracheal if appropriate and nasal secretions for changes in amount and color  - [place] appropriate cooling/warming therapies per order  - Administer medications as ordered  - Instruct and encourage patient and family to use good hand hygiene technique  - Identify and instruct in appropriate isolation precautions for identified infection/condition  Outcome: Progressing  Goal: Absence of fever/infection during neutropenic period  Description: INTERVENTIONS:  - Monitor WBC    Outcome: Progressing     Problem: SAFETY ADULT  Goal: Patient will remain free of falls  Description: INTERVENTIONS:  - Educate patient/family on patient safety including physical limitations  - Instruct patient to call for assistance with activity   - Consult OT/PT to assist with strengthening/mobility   - Keep Call bell within reach  - Keep bed low and locked with side rails adjusted as appropriate  - Keep care items and personal belongings within reach  - Initiate and maintain comfort rounds  - Make Fall Risk Sign visible to  staff  - Offer Toileting every 2 Hours, in advance of need  - Apply yellow socks and bracelet for high fall risk patients  - Consider moving patient to room near nurses station  Outcome: Progressing  Goal: Maintain or return to baseline ADL function  Description: INTERVENTIONS:  -  Assess patient's ability to carry out ADLs; assess patient's baseline for ADL function and identify physical deficits which impact ability to perform ADLs (bathing, care of mouth/teeth, toileting, grooming, dressing, etc.)  - Assess/evaluate cause of self-care deficits   - Assess range of motion  - Assess patient's mobility; develop plan if impaired  - Assess patient's need for assistive devices and provide as appropriate  - Encourage maximum independence but intervene and supervise when necessary  - Involve family in performance of ADLs  - Assess for home care needs following discharge   - Consider OT consult to assist with ADL evaluation and planning for discharge  - Provide patient education as appropriate  Outcome: Progressing  Goal: Maintains/Returns to pre admission functional level  Description: INTERVENTIONS:  - Perform AM-PAC 6 Click Basic Mobility/ Daily Activity assessment daily.  - Set and communicate daily mobility goal to care team and patient/family/caregiver.   - Collaborate with rehabilitation services on mobility goals if consulted  - Perform Range of Motion 3 times a day.  - Reposition patient every 2 hours.  - Dangle patient 3 times a day  - Stand patient 3 times a day  - Ambulate patient 3 times a day  - Out of bed to chair 3 times a day   - Out of bed for meals 3 times a day  - Out of bed for toileting  - Record patient progress and toleration of activity level   Outcome: Progressing     Problem: DISCHARGE PLANNING  Goal: Discharge to home or other facility with appropriate resources  Description: INTERVENTIONS:  - Identify barriers to discharge w/patient and caregiver  - Arrange for needed discharge resources and  transportation as appropriate  - Identify discharge learning needs (meds, wound care, etc.)  - Arrange for interpretive services to assist at discharge as needed  - Refer to Case Management Department for coordinating discharge planning if the patient needs post-hospital services based on physician/advanced practitioner order or complex needs related to functional status, cognitive ability, or social support system  Outcome: Progressing     Problem: Knowledge Deficit  Goal: Patient/family/caregiver demonstrates understanding of disease process, treatment plan, medications, and discharge instructions  Description: Complete learning assessment and assess knowledge base.  Interventions:  - Provide teaching at level of understanding  - Provide teaching via preferred learning methods  Outcome: Progressing     Problem: CARDIOVASCULAR - ADULT  Goal: Maintains optimal cardiac output and hemodynamic stability  Description: INTERVENTIONS:  - Monitor I/O, vital signs and rhythm  - Monitor for S/S and trends of decreased cardiac output  - Administer and titrate ordered vasoactive medications to optimize hemodynamic stability  - Assess quality of pulses, skin color and temperature  - Assess for signs of decreased coronary artery perfusion  - Instruct patient to report change in severity of symptoms  Outcome: Progressing  Goal: Absence of cardiac dysrhythmias or at baseline rhythm  Description: INTERVENTIONS:  - Continuous cardiac monitoring, vital signs, obtain 12 lead EKG if ordered  - Administer antiarrhythmic and heart rate control medications as ordered  - Monitor electrolytes and administer replacement therapy as ordered  Outcome: Progressing

## 2024-11-15 NOTE — ASSESSMENT & PLAN NOTE
Wt Readings from Last 3 Encounters:   11/15/24 96.5 kg (212 lb 11.9 oz)   11/07/24 105 kg (231 lb)   11/05/24 105 kg (231 lb)   Follows with Dr. Alan outpatient, last seen September 13.  Dry weight at the time 215 pounds  GDMT includes labetalol 50 mg daily and diuretic with torsemide 40 mg daily on Monday, Wednesday, Friday, and 20 mg daily the rest of the week.  He reports compliance with this.  Patient's weight has slowly been increasing, possibly secondary to his dietary noncompliance.  Endorses eating salty foods and snacking with pretzels.  Wife at the bedside also contributes to this history.  Presenting today with worsening SOB, increased weight, concern for volume overload when he was at pulmonary rehabilitation  Bnp 674, chest x-ray consistent with CHF and pulmonary congestion bilaterally  Recent echo on 11/7 showing EF 60%, normal left ventricular systolic function, moderate pulmonary hypertension with right ventricular systolic pressure 55 mmHg, and severe tricuspid regurgitation   On exam, bilateral JVD, bilateral +3 pitting edema up to the knees, significantly worse in the right lower extremity  Received 50 mg IV Lasix in the ED, urine output over 600 ml afterwards  Lasix was increased yesterday to IV Lasix 80 mg BID, net -900 L over the last 24 hours, -6.9L since admission  Daily weight today at 212 lbs    Plan:  Cardiology on board, appreciate recs  Continue IV Lasix 80 mg today  Plan to switch to Torsemide 40 mg daily tomorrow  Monitor ins and outs  Daily standing weights  Cardiac diet, fluid restriction 1500 mL daily, salt restriction 2 g daily  Monitor BMPs  Replete mag and K as needed to maintain Mg>2 and K>4  Outpatient follow-up with Cardiology scheduled for 11/18

## 2024-11-16 VITALS
HEART RATE: 66 BPM | OXYGEN SATURATION: 92 % | SYSTOLIC BLOOD PRESSURE: 102 MMHG | TEMPERATURE: 97.8 F | DIASTOLIC BLOOD PRESSURE: 48 MMHG | RESPIRATION RATE: 20 BRPM | WEIGHT: 213.19 LBS | HEIGHT: 71 IN | BODY MASS INDEX: 29.85 KG/M2

## 2024-11-16 LAB
ANION GAP SERPL CALCULATED.3IONS-SCNC: 5 MMOL/L (ref 4–13)
BUN SERPL-MCNC: 17 MG/DL (ref 5–25)
CALCIUM SERPL-MCNC: 8.9 MG/DL (ref 8.4–10.2)
CHLORIDE SERPL-SCNC: 104 MMOL/L (ref 96–108)
CO2 SERPL-SCNC: 31 MMOL/L (ref 21–32)
CREAT SERPL-MCNC: 1.04 MG/DL (ref 0.6–1.3)
ERYTHROCYTE [DISTWIDTH] IN BLOOD BY AUTOMATED COUNT: 24 % (ref 11.6–15.1)
GFR SERPL CREATININE-BSD FRML MDRD: 66 ML/MIN/1.73SQ M
GLUCOSE SERPL-MCNC: 91 MG/DL (ref 65–140)
HCT VFR BLD AUTO: 29.5 % (ref 36.5–49.3)
HGB BLD-MCNC: 8.9 G/DL (ref 12–17)
INR PPP: 3.82 (ref 0.85–1.19)
MAGNESIUM SERPL-MCNC: 2.2 MG/DL (ref 1.9–2.7)
MCH RBC QN AUTO: 30 PG (ref 26.8–34.3)
MCHC RBC AUTO-ENTMCNC: 30.2 G/DL (ref 31.4–37.4)
MCV RBC AUTO: 99 FL (ref 82–98)
PLATELET # BLD AUTO: 87 THOUSANDS/UL (ref 149–390)
PMV BLD AUTO: 10.9 FL (ref 8.9–12.7)
POTASSIUM SERPL-SCNC: 3.8 MMOL/L (ref 3.5–5.3)
PROTHROMBIN TIME: 38.1 SECONDS (ref 12.3–15)
RBC # BLD AUTO: 2.97 MILLION/UL (ref 3.88–5.62)
SODIUM SERPL-SCNC: 140 MMOL/L (ref 135–147)
WBC # BLD AUTO: 5.03 THOUSAND/UL (ref 4.31–10.16)

## 2024-11-16 PROCEDURE — 83735 ASSAY OF MAGNESIUM: CPT

## 2024-11-16 PROCEDURE — 80048 BASIC METABOLIC PNL TOTAL CA: CPT

## 2024-11-16 PROCEDURE — 99239 HOSP IP/OBS DSCHRG MGMT >30: CPT | Performed by: INTERNAL MEDICINE

## 2024-11-16 PROCEDURE — 85027 COMPLETE CBC AUTOMATED: CPT

## 2024-11-16 PROCEDURE — 85610 PROTHROMBIN TIME: CPT

## 2024-11-16 RX ORDER — TORSEMIDE 20 MG/1
40 TABLET ORAL DAILY
Start: 2024-11-17

## 2024-11-16 RX ORDER — POTASSIUM CHLORIDE 1500 MG/1
20 TABLET, EXTENDED RELEASE ORAL ONCE
Status: COMPLETED | OUTPATIENT
Start: 2024-11-16 | End: 2024-11-16

## 2024-11-16 RX ADMIN — TORSEMIDE 40 MG: 20 TABLET ORAL at 08:26

## 2024-11-16 RX ADMIN — ATORVASTATIN CALCIUM 40 MG: 40 TABLET, FILM COATED ORAL at 08:26

## 2024-11-16 RX ADMIN — LABETALOL HYDROCHLORIDE 50 MG: 100 TABLET, FILM COATED ORAL at 08:26

## 2024-11-16 RX ADMIN — POTASSIUM CHLORIDE 20 MEQ: 1500 TABLET, EXTENDED RELEASE ORAL at 10:29

## 2024-11-16 RX ADMIN — SENNOSIDES AND DOCUSATE SODIUM 2 TABLET: 8.6; 5 TABLET ORAL at 08:26

## 2024-11-16 RX ADMIN — ASPIRIN 81 MG: 81 TABLET, COATED ORAL at 08:26

## 2024-11-16 RX ADMIN — IRON SUCROSE 200 MG: 20 INJECTION, SOLUTION INTRAVENOUS at 10:25

## 2024-11-16 RX ADMIN — TAMSULOSIN HYDROCHLORIDE 0.4 MG: 0.4 CAPSULE ORAL at 08:26

## 2024-11-16 RX ADMIN — Medication 2000 UNITS: at 08:26

## 2024-11-16 RX ADMIN — B-COMPLEX W/ C & FOLIC ACID TAB 1 TABLET: TAB at 08:26

## 2024-11-16 RX ADMIN — POTASSIUM CHLORIDE 20 MEQ: 1500 TABLET, EXTENDED RELEASE ORAL at 08:26

## 2024-11-16 RX ADMIN — FLUTICASONE FUROATE AND VILANTEROL TRIFENATATE 1 PUFF: 100; 25 POWDER RESPIRATORY (INHALATION) at 08:27

## 2024-11-16 RX ADMIN — AMIODARONE HYDROCHLORIDE 200 MG: 200 TABLET ORAL at 08:27

## 2024-11-16 NOTE — ASSESSMENT & PLAN NOTE
Bilateral lower extremity venous insufficiency, chronic issue for him  He states that over the past few weeks he has noticed his right leg being significantly more swollen than the left leg  This is in the setting of CHF exacerbation  As well as having subtherapeutic INR  VAS ultrasound showed no evidence of DVT    Plan:  Continue Furosemide 40 mg daily

## 2024-11-16 NOTE — ASSESSMENT & PLAN NOTE
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 Expect serum creatinine levels to improve with continued diuresis  Creatinine improving, at baseline on discharge    Plan:  Continue Furosemide 40 mg daily

## 2024-11-16 NOTE — ASSESSMENT & PLAN NOTE
Wt Readings from Last 3 Encounters:   11/16/24 96.7 kg (213 lb 3 oz)   11/07/24 105 kg (231 lb)   11/05/24 105 kg (231 lb)   Follows with Dr. Alan outpatient, last seen September 13.  Dry weight at the time 215 pounds  GDMT includes labetalol 50 mg daily and diuretic with torsemide 40 mg daily on Monday, Wednesday, Friday, and 20 mg daily the rest of the week.  He reports compliance with this.  Patient's weight has slowly been increasing, possibly secondary to his dietary noncompliance.  Endorses eating salty foods and snacking with pretzels.  Wife at the bedside also contributes to this history.  Presenting today with worsening SOB, increased weight, concern for volume overload when he was at pulmonary rehabilitation  Bnp 674, chest x-ray consistent with CHF and pulmonary congestion bilaterally  Recent echo on 11/7 showing EF 60%, normal left ventricular systolic function, moderate pulmonary hypertension with right ventricular systolic pressure 55 mmHg, and severe tricuspid regurgitation   On initial exam, elevated JVD, bilateral +3 pitting edema up to the knees, significantly worse in the right lower extremity  Received 50 mg IV Lasix in the ED, urine output over 600 ml afterwards  Net -7.2 since admission  Standing weight stable at 213 lbs    Plan:  Continue Torsemide 40 mg daily  Continue taking Potassium Chloride 20 mEq daily  Recommend monitoring weight daily at home  Restrict fluid intake to 1500 mL daily  Restrict salt intake to 2g daily  Outpatient follow-up with Cardiology scheduled for 11/18

## 2024-11-16 NOTE — ASSESSMENT & PLAN NOTE
Rate controlled with labetalol 50 mg daily  On Coumadin 2.5 mg daily except Thursdays where he takes 3.75 mg  INR supratherapeutic at 3.8 on day of discharge  Follows  Coumadin Clinic    Plan:  Recommend to hold Coumadin dose tonight, 11/16  Continue Coumadin dosing as scheduled on 11/17  Repeat PT/INR within one week  Follow-up with  Coumadin Clinic for further dosing instructions

## 2024-11-16 NOTE — ASSESSMENT & PLAN NOTE
Known, chronic iron deficiency anemia  Follows hematology outpatient  Receives weekly iron transfusion  Iron panel checked during this admission showed Iron levels 42, Iron sat 13%    Plan  Received IV venofer 200 mg x3, last dose 11/16

## 2024-11-16 NOTE — ASSESSMENT & PLAN NOTE
Mild COPD, last PFTs done in 2021  Follows with pulmonary Dr. Beebe  Will continue Advair and as needed albuterol  Continue outpatient pulmonary rehab

## 2024-11-16 NOTE — DISCHARGE INSTR - AVS FIRST PAGE
Dear Oscar Espinosa,     It was our pleasure to care for you here at [facility].  It is our hope that we were always able to exceed the expected standards for your care during your stay.  You were hospitalized due to acute on chronic heart failure.  You were cared for on the third floor by Gely Thacker MD under the service of Ileana Granados, * with the [facility] who covers for your primary care physician (PCP), Lea Reyes, MD, while you were hospitalized.  If you have any questions or concerns related to this hospitalization, you may contact us at .  For follow up as well as any medication refills, we recommend that you follow up with your primary care physician.  A registered nurse will reach out to you by phone within a few days after your discharge to answer any additional questions that you may have after going home.  However, at this time we provide for you here, the most important instructions / recommendations at discharge:     Notable Medication Adjustments -   Take Torsemide 40 mg by mouth daily  DO NOT take your Warfarin dose tonight, 11/16/2024. You may resume taking your Warfarin as prescribed on 11/17/2024  Continue the rest of your medications as prescribed  Testing Required after Discharge -   Repeat PT/INR levels on Monday, 11/18/2024  ** Please contact your PCP to request testing orders for any of the testing recommended here **  Important follow up information -   Please follow-up with Cardiology outpatient. You have an appointment scheduled on 11/18/2024 at 11:30 AM.  Please follow-up with your PCP within a week after discharge  Other Instructions -   Please check your weights daily  Please monitor your fluid and salt intake. Do not take more than 1500 mL of fluid and 2g of salt.  Please review this entire after visit summary as additional general instructions including medication list, appointments,  activity, diet, any pertinent wound care, and other additional recommendations from your care team that may be provided for you.      Sincerely,     Gely Thacker MD

## 2024-11-16 NOTE — QUICK NOTE
Agree with documentation as outlined    Patient is comfortably sitting up in bed.  Reports feeling better.    Vitals:    11/15/24 1534 11/15/24 2048 11/16/24 0600 11/16/24 0837   BP: 121/64 124/60  (!) 102/48   BP Location: Left arm Left arm  Right arm   Pulse: 58 60  66   Resp: 18 18  20   Temp: 97.5 °F (36.4 °C) 97.9 °F (36.6 °C)  97.8 °F (36.6 °C)   TempSrc: Oral Oral  Oral   SpO2: 97% 95%  92%   Weight:   96.7 kg (213 lb 3 oz)    Height:            Comfortably sitting up in bed.  Neck supple.  Lungs emphysematous.  Basilar crackles noted.  Heart sounds S1-S2 noted.  Systolic murmur noted.  Abdomen soft nontender.  Awake follows commands.  Lower extremity chronic venous stasis changes noted.  No rash.    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 torsemide 40 mg daily, continue labetalol.  Low-salt diet.  Outpatient cardiology follow-up.  History of paroxysmal atrial fibrillation INR 3.8 hold Coumadin tonight, outpatient follow-up with cardiology for Coumadin dosing and INR management.  History of chronic hypoxic respiratory failure at baseline on 2 L supplemental oxygen  Obstructive sleep apnea CPAP noncompliance due to intolerance  Anemia iron studies suggest iron deficient state iron supplementation monitor hemoglobin  COPD continue respiratory treatments    Roberta

## 2024-11-16 NOTE — ED PROVIDER NOTES
Time reflects when diagnosis was documented in both MDM as applicable and the Disposition within this note       Time User Action Codes Description Comment    11/11/2024  1:33 PM Raza Ho Add [I50.9] Acute exacerbation of CHF (congestive heart failure) (HCC)     11/11/2024  1:35 PM Rudi Lenz Add [I50.30] (HFpEF) heart failure with preserved ejection fraction ([facility])     11/11/2024  1:35 PM Rudi Lenz Add [N17.9] LITO (acute kidney injury) ([facility])           ED Disposition       ED Disposition   Admit    Condition   Stable    Date/Time   Mon Nov 11, 2024  1:36 PM    Comment   Case was discussed with Dr Oscar Jack and the patient's admission status was agreed to be Admission Status: inpatient status to the service of Dr. Noyola .               Assessment & Plan       Medical Decision Making  Patient presents for swelling and shortness of breath and found to be volume overloaded on exam likely secondary to heart failure based on history, exam, and work up. Patient was given lasix  per protocol.  Patient also found to have LITO.  Discussed case with [facility] patient admitted for volume overload.  Discussed management plan with patient and wife at bedside who are in agreement.      Amount and/or Complexity of Data Reviewed  Labs: ordered. Decision-making details documented in ED Course.  Radiology: ordered and independent interpretation performed.    Risk  Prescription drug management.  Decision regarding hospitalization.        ED Course as of 11/15/24 2242   Mon Nov 11, 2024   1213 Creatinine(!): 1.86       Medications   acetaminophen (TYLENOL) tablet 650 mg (has no administration in time range)   albuterol (PROVENTIL HFA,VENTOLIN HFA) inhaler 2 puff (has no administration in time range)   amiodarone tablet 200 mg (200 mg Oral Not Given 11/11/24 1516)   aspirin (ECOTRIN LOW STRENGTH) EC tablet 81 mg (81 mg Oral Given 11/11/24 1759)   atorvastatin (LIPITOR) tablet 40 mg (40 mg Oral Given 11/11/24  1750)   Cholecalciferol (VITAMIN D3) tablet 2,000 Units (2,000 Units Oral Not Given 11/11/24 1517)   Fluticasone Furoate-Vilanterol 100-25 mcg/actuation 1 puff (has no administration in time range)   labetalol (NORMODYNE) tablet 50 mg (50 mg Oral Not Given 11/11/24 1518)   multivitamin stress formula tablet 1 tablet (has no administration in time range)   oxyCODONE (ROXICODONE) immediate release tablet 10 mg (has no administration in time range)   potassium chloride (Klor-Con M20) CR tablet 20 mEq (20 mEq Oral Given 11/11/24 1750)   tamsulosin (FLOMAX) capsule 0.4 mg (0.4 mg Oral Given 11/11/24 1750)   senna-docusate sodium (SENOKOT S) 8.6-50 mg per tablet 2 tablet (2 tablets Oral Not Given 11/11/24 1750)   polyethylene glycol (MIRALAX) packet 17 g (17 g Oral Not Given 11/11/24 1519)   gabapentin (NEURONTIN) capsule 100 mg (has no administration in time range)   warfarin (COUMADIN) tablet 3.75 mg (has no administration in time range)   warfarin (COUMADIN) tablet 2.5 mg (has no administration in time range)   furosemide (LASIX) injection 50 mg (50 mg Intravenous Given 11/11/24 1246)   warfarin (COUMADIN) tablet 3.75 mg (3.75 mg Oral Given 11/11/24 1756)       ED Risk Strat Scores                           SBIRT 22yo+      Flowsheet Row Most Recent Value   Initial Alcohol Screen: US AUDIT-C     1. How often do you have a drink containing alcohol? 0 Filed at: 11/11/2024 1201   2. How many drinks containing alcohol do you have on a typical day you are drinking?  0 Filed at: 11/11/2024 1201   3a. Male UNDER 65: How often do you have five or more drinks on one occasion? 0 Filed at: 11/11/2024 1201   3b. FEMALE Any Age, or MALE 65+: How often do you have 4 or more drinks on one occassion? 0 Filed at: 11/11/2024 1201   Audit-C Score 0 Filed at: 11/11/2024 1201   JOHN: How many times in the past year have you...    Used an illegal drug or used a prescription medication for non-medical reasons? Never Filed at: 11/11/2024 1201                             History of Present Illness       Chief Complaint   Patient presents with    Leg Swelling     Pt was here for pulmonary rehab and sent to ED for increased leg swelling/not losing weight. Pt reports he has increased SOB; pt hospitalized in summer for PNA, uses chronic 2L O2. Denies CP. Pt endorses hx of HF.     Shortness of Breath       Past Medical History:   Diagnosis Date    Abdominal aortic aneurysm (HCC)     s/p repair    Abnormal ECG july 2022    Acute on chronic congestive heart failure (HCC) 09/11/2019    Acute respiratory failure with hypoxia ([facility]) 05/06/2024    Aneurysm ([facility]) 2007    Aneurysm of abdominal aorta ([facility])     49mm, trileaflet AV    Atrial fibrillation ([facility])     Eliquis    BPH (benign prostatic hyperplasia)     CAD (coronary artery disease)     s/p CABGx3    CHF (congestive heart failure) ([facility])     Chronic pain     Gabapentin    Chronic pain disorder     lumbar    COPD (chronic obstructive pulmonary disease) ([facility])     Coronary artery disease     Elevated serum creatinine 06/18/2024    Elevated serum creatinine 06/18/2024    Elevated transaminase level 04/27/2024    Former tobacco use     Hyperlipidemia     Hypertension     Hyponatremia 04/27/2024    Hyponatremia 04/27/2024    Hypothyroidism     Lumbar radiculopathy     Lumbar stenosis     s/p injections    Neuropathy     Obesity (BMI 30-39.9)     YEVGENIY (obstructive sleep apnea)     unable to tolerate CPAP    Platelets decreased ([facility]) 07/27/2022    Pulmonary hypertension ([facility])     moderate to severe    Spondylolisthesis of lumbar region     Visual impairment 2022    Vitamin D deficiency       Past Surgical History:   Procedure Laterality Date    ABDOMINAL AORTIC ANEURYSM REPAIR  08/09/2007    2 dock limbs with visc extens prosth    CARDIAC CATHETERIZATION      COLONOSCOPY      CORONARY ARTERY BYPASS GRAFT  2003    LIMA to LAD, sequential SVG to OM1 & OM2, SVG to RDA    EYE SURGERY      IR CHEST TUBE PLACEMENT  06/19/2024     LUMBAR EPIDURAL INJECTION        Family History   Problem Relation Age of Onset    Heart disease Mother     Stroke Father         stroke syndrome    Aneurysm Brother         abdominal    Aortic aneurysm Brother         ascending    Coronary artery disease Family     Hypertension Family     Other Son         gioblastoma multiforme      Social History     Tobacco Use    Smoking status: Former     Current packs/day: 0.00     Average packs/day: 1 pack/day for 55.6 years (55.6 ttl pk-yrs)     Types: Cigarettes     Start date: 1957     Quit date: 2012     Years since quittin.2     Passive exposure: Past    Smokeless tobacco: Never   Vaping Use    Vaping status: Never Used   Substance Use Topics    Alcohol use: Yes     Alcohol/week: 1.0 standard drink of alcohol     Types: 1 Cans of beer per week    Drug use: No      E-Cigarette/Vaping    E-Cigarette Use Never User       E-Cigarette/Vaping Substances    Nicotine No     THC No     CBD No     Flavoring No     Other No     Unknown No       I have reviewed and agree with the history as documented.     Pt is a 84 y.o. male who presents to the ED on 2024. Patient presents with left lower leg swelling.  Patient was previously seen earlier today at pulmonary rehab and sent to ED for increased leg swelling and weight gain over the past month.  Patient reports that he has had increased shortness of breath, dyspnea with exertion, was previously seen in the summer for pneumonia.  Has baseline chronic hypoxemia requiring 2 L O2 at home.  Patient also states that he has orthopnea in addition to dyspnea.  Patient denies any chest pain, abdominal pain, nausea, vomiting, EVAR, chills, diaphoresis, difficulty with urination.  Patient states that he has prior history of heart failure and is on torsemide at home.  Reports compliance with home medications.  Per my independent chart review, patient has history of A-fib and is on chronic warfarin.  Patient's wife at  bedside provided collateral history, also states that patient is not confused and is at his cognitive baseline.      Shortness of Breath  Associated symptoms: no abdominal pain, no chest pain, no cough, no fever, no headaches, no vomiting and no wheezing        Review of Systems   Constitutional:  Negative for fatigue and fever.   Respiratory:  Positive for shortness of breath. Negative for cough and wheezing.    Cardiovascular:  Positive for leg swelling. Negative for chest pain and palpitations.   Gastrointestinal:  Negative for abdominal distention, abdominal pain, diarrhea, nausea and vomiting.   Genitourinary:  Negative for decreased urine volume, difficulty urinating, dysuria and urgency.   Neurological:  Negative for dizziness, weakness, light-headedness and headaches.   Psychiatric/Behavioral:  Negative for confusion.    All other systems reviewed and are negative.          Objective       ED Triage Vitals   Temperature Pulse Blood Pressure Respirations SpO2 Patient Position - Orthostatic VS   11/11/24 1114 11/11/24 1114 11/11/24 1114 11/11/24 1114 11/11/24 1114 11/11/24 1114   97.8 °F (36.6 °C) 63 148/65 18 97 % Sitting      Temp Source Heart Rate Source BP Location FiO2 (%) Pain Score    11/11/24 1114 11/11/24 1114 11/11/24 1114 -- 11/11/24 1951    Oral Monitor Right arm  No Pain      Vitals      Date and Time Temp Pulse SpO2 Resp BP Pain Score FACES Pain Rating User   11/15/24 2048 -- -- -- 18 -- -- -- SC   11/15/24 2048 97.9 °F (36.6 °C) 60 95 % -- 124/60 -- -- DII   11/15/24 1930 -- -- -- -- -- No Pain -- MA   11/15/24 1534 -- -- -- 18 -- -- -- SC   11/15/24 1534 97.5 °F (36.4 °C) 58 97 % -- 121/64 -- -- DII   11/15/24 0800 -- -- 95 % -- -- No Pain -- AD   11/15/24 0731 97.8 °F (36.6 °C) 62 93 % -- 132/63 -- -- DII   11/14/24 2200 -- -- 92 % -- -- No Pain -- BW   11/14/24 2134 97.6 °F (36.4 °C) 62 89 % -- 113/63 -- -- DII   11/14/24 1544 98.2 °F (36.8 °C) 55 94 % -- 110/59 -- -- DII   11/14/24 0738 97.7  °F (36.5 °C) 59 96 % 18 143/70 -- -- DII   11/13/24 2200 -- -- -- -- -- No Pain -- MY   11/13/24 2134 -- -- -- 17 -- -- -- NR   11/13/24 2134 97.6 °F (36.4 °C) 60 95 % -- 127/68 -- -- DII   11/13/24 1526 98.4 °F (36.9 °C) 56 92 % 18 122/67 -- -- DII   11/13/24 1055 -- 49 90 % -- 108/63 -- -- DII   11/13/24 0900 -- -- -- -- -- No Pain -- HH   11/13/24 0700 97.9 °F (36.6 °C) -- -- 18 -- -- -- MQ   11/13/24 0659 -- 61 95 % -- 118/65 -- -- DII   11/12/24 2230 -- 53 96 % 17 -- -- -- MD   11/12/24 2156 97.4 °F (36.3 °C) 60 96 % 18 116/58 -- -- DII   11/12/24 1810 -- -- -- -- -- No Pain -- MM   11/12/24 1810 98.7 °F (37.1 °C) 62 96 % -- 129/61 -- -- DII   11/12/24 1500 98 °F (36.7 °C) 60 95 % 18 118/59 -- -- AS   11/12/24 0810 -- -- -- -- -- No Pain -- EM   11/12/24 0601 97.8 °F (36.6 °C) 60 94 % 17 148/66 -- -- AD   11/11/24 2259 97.5 °F (36.4 °C) 60 98 % 18 126/58 -- -- LNC   11/11/24 2219 -- -- -- -- -- 3 -- CB   11/11/24 1957 -- -- -- -- -- No Pain -- CB   11/11/24 1951 -- -- 94 % -- -- No Pain -- CB   11/11/24 1949 97.7 °F (36.5 °C) 68 94 % 18 144/61 -- -- CB   11/11/24 1830 -- 66 92 % 22 108/52 -- -- CB   11/11/24 1815 -- 64 90 % 22 139/65 -- -- CB   11/11/24 1730 -- 58 94 % 18 135/63 -- -- CB   11/11/24 1630 -- 59 100 % 16 160/68 -- -- CB   11/11/24 1500 -- 54 97 % 16 161/69 -- -- CB   11/11/24 1330 -- 58 97 % 18 153/67 -- -- CB   11/11/24 1300 -- 69 91 % 18 140/67 -- -- CB   11/11/24 1230 -- 57 93 % 18 149/66 -- -- CB   11/11/24 1145 -- 62 95 % 18 140/61 -- -- CB   11/11/24 1114 97.8 °F (36.6 °C) 63 97 % 18 148/65 -- -- KD            Physical Exam  Vitals and nursing note reviewed.   Constitutional:       General: He is not in acute distress.     Appearance: He is well-developed.   HENT:      Head: Normocephalic and atraumatic.   Eyes:      Conjunctiva/sclera: Conjunctivae normal.   Cardiovascular:      Rate and Rhythm: Normal rate and regular rhythm.      Heart sounds: No murmur heard.     No friction rub. No  gallop.   Pulmonary:      Effort: Pulmonary effort is normal. No respiratory distress.      Breath sounds: Examination of the right-lower field reveals rales. Examination of the left-lower field reveals rales. Rales present. No wheezing or rhonchi.   Abdominal:      General: There is no distension.      Palpations: Abdomen is soft.      Tenderness: There is no abdominal tenderness. There is no guarding or rebound.   Musculoskeletal:         General: No swelling.      Cervical back: Neck supple.      Right lower leg: Normal. No deformity, lacerations or tenderness.      Left lower leg: No swelling, deformity, lacerations or tenderness. No edema.   Skin:     General: Skin is warm and dry.      Capillary Refill: Capillary refill takes less than 2 seconds.   Neurological:      Mental Status: He is alert.   Psychiatric:         Mood and Affect: Mood normal.         Results Reviewed       Procedure Component Value Units Date/Time    Protime-INR [[id number]]  (Abnormal) Collected: 11/14/24 0526    Lab Status: Final result Specimen: Blood from Arm, Right Updated: 11/14/24 0639     Protime 33.0 seconds      INR 3.16    Narrative:      INR Therapeutic Range    Indication                                             INR Range      Atrial Fibrillation                                               2.0-3.0  Hypercoagulable State                                    2.0.2.3  Left Ventricular Asist Device                            2.0-3.0  Mechanical Heart Valve                                  -    Aortic(with afib, MI, embolism, HF, LA enlargement,    and/or coagulopathy)                                     2.0-3.0 (2.5-3.5)     Mitral                                                             2.5-3.5  Prosthetic/Bioprosthetic Heart Valve               2.0-3.0  Venous thromboembolism (VTE: VT, PE        2.0-3.0    Protime-INR [[id number]]  (Abnormal) Collected: 11/13/24 0501    Lab Status: Final result Specimen: Blood from Arm,  Right Updated: 11/13/24 0606     Protime 25.5 seconds      INR 2.24    Narrative:      INR Therapeutic Range    Indication                                             INR Range      Atrial Fibrillation                                               2.0-3.0  Hypercoagulable State                                    2.0.2.3  Left Ventricular Asist Device                            2.0-3.0  Mechanical Heart Valve                                  -    Aortic(with afib, MI, embolism, HF, LA enlargement,    and/or coagulopathy)                                     2.0-3.0 (2.5-3.5)     Mitral                                                             2.5-3.5  Prosthetic/Bioprosthetic Heart Valve               2.0-3.0  Venous thromboembolism (VTE: VT, PE        2.0-3.0    CBC (With Platelets) [[id number]]  (Abnormal) Collected: 11/12/24 0536    Lab Status: Final result Specimen: Blood from Arm, Right Updated: 11/12/24 0732     WBC 3.92 Thousand/uL      RBC 3.03 Million/uL      Hemoglobin 9.1 g/dL      Hematocrit 29.4 %      MCV 97 fL      MCH 30.0 pg      MCHC 31.0 g/dL      RDW 23.5 %      Platelets 112 Thousands/uL      MPV 10.5 fL     Basic metabolic panel [[id number]]  (Abnormal) Collected: 11/12/24 0536    Lab Status: Final result Specimen: Blood from Arm, Right Updated: 11/12/24 0657     Sodium 138 mmol/L      Potassium 3.9 mmol/L      Chloride 99 mmol/L      CO2 31 mmol/L      ANION GAP 8 mmol/L      BUN 26 mg/dL      Creatinine 1.61 mg/dL      Glucose 77 mg/dL      Calcium 9.0 mg/dL      eGFR 38 ml/min/1.73sq m     Narrative:      National Kidney Disease Foundation guidelines for Chronic Kidney Disease (CKD):     Stage 1 with normal or high GFR (GFR > 90 mL/min/1.73 square meters)    Stage 2 Mild CKD (GFR = 60-89 mL/min/1.73 square meters)    Stage 3A Moderate CKD (GFR = 45-59 mL/min/1.73 square meters)    Stage 3B Moderate CKD (GFR = 30-44 mL/min/1.73 square meters)    Stage 4 Severe CKD (GFR = 15-29 mL/min/1.73  square meters)    Stage 5 End Stage CKD (GFR <15 mL/min/1.73 square meters)  Note: GFR calculation is accurate only with a steady state creatinine    Magnesium [[id number]]  (Normal) Collected: 11/12/24 0536    Lab Status: Final result Specimen: Blood from Arm, Right Updated: 11/12/24 0657     Magnesium 2.2 mg/dL     Phosphorus [[id number]]  (Normal) Collected: 11/12/24 0536    Lab Status: Final result Specimen: Blood from Arm, Right Updated: 11/12/24 0657     Phosphorus 3.7 mg/dL     Protime-INR [[id number]]  (Abnormal) Collected: 11/12/24 0536    Lab Status: Final result Specimen: Blood from Arm, Right Updated: 11/12/24 0640     Protime 21.1 seconds      INR 1.74    Narrative:      INR Therapeutic Range    Indication                                             INR Range      Atrial Fibrillation                                               2.0-3.0  Hypercoagulable State                                    2.0.2.3  Left Ventricular Asist Device                            2.0-3.0  Mechanical Heart Valve                                  -    Aortic(with afib, MI, embolism, HF, LA enlargement,    and/or coagulopathy)                                     2.0-3.0 (2.5-3.5)     Mitral                                                             2.5-3.5  Prosthetic/Bioprosthetic Heart Valve               2.0-3.0  Venous thromboembolism (VTE: VT, PE        2.0-3.0    HS Troponin I 2hr [[id number]]  (Normal) Collected: 11/11/24 1352    Lab Status: Final result Specimen: Blood from Arm, Right Updated: 11/11/24 1428     hs TnI 2hr 13 ng/L      Delta 2hr hsTnI 1 ng/L     Protime-INR [[id number]]  (Abnormal) Collected: 11/11/24 1126    Lab Status: Final result Specimen: Blood from Arm, Right Updated: 11/11/24 1306     Protime 22.2 seconds      INR 1.86    Narrative:      INR Therapeutic Range    Indication                                             INR Range      Atrial Fibrillation                                                2.0-3.0  Hypercoagulable State                                    2.0.2.3  Left Ventricular Asist Device                            2.0-3.0  Mechanical Heart Valve                                  -    Aortic(with afib, MI, embolism, HF, LA enlargement,    and/or coagulopathy)                                     2.0-3.0 (2.5-3.5)     Mitral                                                             2.5-3.5  Prosthetic/Bioprosthetic Heart Valve               2.0-3.0  Venous thromboembolism (VTE: VT, PE        2.0-3.0    B-Type Natriuretic Peptide(BNP) [[id number]]  (Abnormal) Collected: 11/11/24 1126    Lab Status: Final result Specimen: Blood from Arm, Right Updated: 11/11/24 1249      pg/mL     HS Troponin 0hr (reflex protocol) [[id number]]  (Normal) Collected: 11/11/24 1126    Lab Status: Final result Specimen: Blood from Arm, Right Updated: 11/11/24 1207     hs TnI 0hr 12 ng/L     Comprehensive metabolic panel [[id number]]  (Abnormal) Collected: 11/11/24 1126    Lab Status: Final result Specimen: Blood from Arm, Right Updated: 11/11/24 1205     Sodium 136 mmol/L      Potassium 4.5 mmol/L      Chloride 97 mmol/L      CO2 30 mmol/L      ANION GAP 9 mmol/L      BUN 29 mg/dL      Creatinine 1.86 mg/dL      Glucose 82 mg/dL      Calcium 9.3 mg/dL      AST 35 U/L      ALT 24 U/L      Alkaline Phosphatase 93 U/L      Total Protein 7.3 g/dL      Albumin 3.8 g/dL      Total Bilirubin 1.48 mg/dL      eGFR 32 ml/min/1.73sq m     Narrative:      National Kidney Disease Foundation guidelines for Chronic Kidney Disease (CKD):     Stage 1 with normal or high GFR (GFR > 90 mL/min/1.73 square meters)    Stage 2 Mild CKD (GFR = 60-89 mL/min/1.73 square meters)    Stage 3A Moderate CKD (GFR = 45-59 mL/min/1.73 square meters)    Stage 3B Moderate CKD (GFR = 30-44 mL/min/1.73 square meters)    Stage 4 Severe CKD (GFR = 15-29 mL/min/1.73 square meters)    Stage 5 End Stage CKD (GFR <15 mL/min/1.73 square meters)  Note: GFR  calculation is accurate only with a steady state creatinine    CBC and differential [[id number]]  (Abnormal) Collected: 11/11/24 1126    Lab Status: Final result Specimen: Blood from Arm, Right Updated: 11/11/24 1159     WBC 4.13 Thousand/uL      RBC 3.00 Million/uL      Hemoglobin 9.0 g/dL      Hematocrit 29.4 %      MCV 98 fL      MCH 30.0 pg      MCHC 30.6 g/dL      RDW 23.3 %      MPV 11.7 fL      Platelets 128 Thousands/uL      nRBC 0 /100 WBCs      Segmented % 72 %      Immature Grans % 0 %      Lymphocytes % 12 %      Monocytes % 13 %      Eosinophils Relative 2 %      Basophils Relative 1 %      Absolute Neutrophils 3.01 Thousands/µL      Absolute Immature Grans 0.01 Thousand/uL      Absolute Lymphocytes 0.49 Thousands/µL      Absolute Monocytes 0.52 Thousand/µL      Eosinophils Absolute 0.06 Thousand/µL      Basophils Absolute 0.04 Thousands/µL              VAS VENOUS DUPLEX - LOWER LIMB BILATERAL   Final Interpretation by Demetrice De La Rosa DO (11/11 1838)      XR chest 1 view portable   ED Interpretation by Raza Ho MD (11/11 1158)   Per my independent interpretation: Bilateral pleural effusions, pulmonary vascular congestion      Final Interpretation by Maximo So MD (11/11 1308)   CHF with small bibasilar pleural effusions.      Workstation performed: [id number]             Procedures    ED Medication and Procedure Management   Prior to Admission Medications   Prescriptions Last Dose Informant Patient Reported? Taking?   Fluticasone-Salmeterol (Advair) 100-50 mcg/dose inhaler 11/11/2024 Self, Spouse/Significant Other No Yes   Sig: INHALE 1 PUFF BY MOUTH TWICE DAILY. RINSE MOUTH AFTER USE   acetaminophen (TYLENOL) 325 mg tablet 11/10/2024 Self, Spouse/Significant Other Yes Yes   Sig: Take 2 tablets by mouth every 6 (six) hours as needed for fever, headaches, mild pain, moderate pain or severe pain. Indications: Fever, Pain   albuterol (ProAir HFA) 90 mcg/act inhaler 11/11/2024 Self,  Spouse/Significant Other No Yes   Sig: Inhale 2 puffs every 6 (six) hours as needed for wheezing   amiodarone 200 mg tablet 11/11/2024  No Yes   Sig: Take 1 tablet (200 mg total) by mouth daily   aspirin (ECOTRIN LOW STRENGTH) 81 mg EC tablet 11/11/2024 Self, Spouse/Significant Other Yes Yes   Sig: Take 1 tablet by mouth daily   atorvastatin (LIPITOR) 40 mg tablet 11/10/2024  No Yes   Sig: TAKE 1 TABLET BY MOUTH DAILY   cholecalciferol (VITAMIN D3) 1,000 units tablet 11/11/2024 Self, Spouse/Significant Other Yes Yes   Sig: Take 2,000 Units by mouth daily   gabapentin (NEURONTIN) 300 mg capsule Not Taking  Yes No   Sig: Take 300 mg by mouth daily at bedtime   Patient not taking: Reported on 11/11/2024   labetalol (NORMODYNE) 100 mg tablet 11/11/2024 Self, Spouse/Significant Other No Yes   Sig: Take 0.5 tablets (50 mg total) by mouth in the morning   multivitamin (THERAGRAN) TABS 11/11/2024 Self, Spouse/Significant Other Yes Yes   Sig: Take 1 tablet by mouth daily   mupirocin (BACTROBAN) 2 % ointment Unknown  Yes No   Sig: APPLY TOPICALLY TO SORES TWICE DAILY UNTIL HEALED   naloxone (NARCAN) 4 mg/0.1 mL nasal spray Unknown  No No   Sig: Administer 1 spray into a nostril. If no response after 2-3 minutes, give another dose in the other nostril using a new spray.   oxyCODONE (ROXICODONE) 10 MG TABS Past Month  No Yes   Sig: Take 1 tablet (10 mg total) by mouth every 6 (six) hours as needed for moderate pain Max Daily Amount: 40 mg   oxygen gas 11/11/2024 Self, Spouse/Significant Other Yes Yes   Sig: Inhale 2 L continuous. 2L of oxygen via nasal cannula as needed and at night  Indications: short of breath   polyethylene glycol (MIRALAX) 17 g packet 11/11/2024 Self, Spouse/Significant Other Yes Yes   Sig: Take 17 g by mouth daily as needed (constipation). Oscar Espinosa  dissolve in 8 oz liquid of choice   Indications: .   potassium chloride (Klor-Con M20) 20 mEq tablet 11/11/2024  No Yes   Sig: TAKE 1 TABLET BY MOUTH  DAILY 7  DAYS WEEKLY   senna (SENOKOT) 8.6 MG tablet Past Month Self, Spouse/Significant Other Yes Yes   Sig: Take 1 tablet by mouth as needed for constipation   tamsulosin (FLOMAX) 0.4 mg 11/10/2024 Self, Spouse/Significant Other Yes Yes   Sig: Take 1 capsule by mouth daily   torsemide (DEMADEX) 20 mg tablet 11/11/2024  No Yes   Sig: Take 1 (20mg) tablet on Tuesday, Thursday, Saturday, Sunday, and 2 tablets (40 mg total) on Monday, Wednesday, and Friday   warfarin (Coumadin) 2.5 mg tablet 11/10/2024 Self, Spouse/Significant Other No Yes   Sig: Take 1 tab by moth daily or as directed by physician      Facility-Administered Medications: None     Current Discharge Medication List        CONTINUE these medications which have NOT CHANGED    Details   acetaminophen (TYLENOL) 325 mg tablet Take 2 tablets by mouth every 6 (six) hours as needed for fever, headaches, mild pain, moderate pain or severe pain. Indications: Fever, Pain      albuterol (ProAir HFA) 90 mcg/act inhaler Inhale 2 puffs every 6 (six) hours as needed for wheezing  Qty: 24 g, Refills: 0    Comments: Substitution to a formulary equivalent within the same pharmaceutical class is authorized.  Associated Diagnoses: Chronic obstructive pulmonary disease, unspecified COPD type (HCC)      amiodarone 200 mg tablet Take 1 tablet (200 mg total) by mouth daily  Qty: 90 tablet, Refills: 3    Comments: Please send a replace/new response with 90-Day Supply if appropriate to maximize member benefit. Requesting 1 year supply.  Associated Diagnoses: Atrial fibrillation, unspecified type (HCC)      aspirin (ECOTRIN LOW STRENGTH) 81 mg EC tablet Take 1 tablet by mouth daily      atorvastatin (LIPITOR) 40 mg tablet TAKE 1 TABLET BY MOUTH DAILY  Qty: 90 tablet, Refills: 1    Comments: Please send a replace/new response with 90-Day Supply if appropriate to maximize member benefit. Requesting 1 year supply.  Associated Diagnoses: Mixed hyperlipidemia      cholecalciferol  (VITAMIN D3) 1,000 units tablet Take 2,000 Units by mouth daily      Fluticasone-Salmeterol (Advair) 100-50 mcg/dose inhaler INHALE 1 PUFF BY MOUTH TWICE DAILY. RINSE MOUTH AFTER USE  Qty: 180 blister, Refills: 1    Comments: **Patient requests 90 days supply** - Substitution to a formulary equivalent within the same pharmaceutical class is authorized.  Associated Diagnoses: Chronic obstructive pulmonary disease, unspecified COPD type (HCC)      labetalol (NORMODYNE) 100 mg tablet Take 0.5 tablets (50 mg total) by mouth in the morning  Qty: 30 tablet, Refills: 1    Associated Diagnoses: Hypertensive heart and chronic kidney disease with heart failure and stage 1 through stage 4 chronic kidney disease, or chronic kidney disease (HCC)      multivitamin (THERAGRAN) TABS Take 1 tablet by mouth daily      oxyCODONE (ROXICODONE) 10 MG TABS Take 1 tablet (10 mg total) by mouth every 6 (six) hours as needed for moderate pain Max Daily Amount: 40 mg  Qty: 120 tablet, Refills: 0    Associated Diagnoses: Lumbar radiculopathy      oxygen gas Inhale 2 L continuous. 2L of oxygen via nasal cannula as needed and at night  Indications: short of breath      polyethylene glycol (MIRALAX) 17 g packet Take 17 g by mouth daily as needed (constipation). Oscar Espinosa  dissolve in 8 oz liquid of choice   Indications: .      potassium chloride (Klor-Con M20) 20 mEq tablet TAKE 1 TABLET BY MOUTH DAILY 7  DAYS WEEKLY  Qty: 90 tablet, Refills: 1    Comments: Please send a replace/new response with 90-Day Supply if appropriate to maximize member benefit. Requesting 1 year supply.  Associated Diagnoses: Chronic diastolic congestive heart failure (HCC)      senna (SENOKOT) 8.6 MG tablet Take 1 tablet by mouth as needed for constipation      tamsulosin (FLOMAX) 0.4 mg Take 1 capsule by mouth daily      torsemide (DEMADEX) 20 mg tablet Take 1 (20mg) tablet on Tuesday, Thursday, Saturday, Sunday, and 2 tablets (40 mg total) on Monday, Wednesday,  and Friday  Qty: 180 tablet, Refills: 3    Associated Diagnoses: Chronic diastolic congestive heart failure (HCC)      warfarin (Coumadin) 2.5 mg tablet Take 1 tab by moth daily or as directed by physician  Qty: 90 tablet, Refills: 3    Associated Diagnoses: Permanent atrial fibrillation (HCC)      gabapentin (NEURONTIN) 300 mg capsule Take 300 mg by mouth daily at bedtime      mupirocin (BACTROBAN) 2 % ointment APPLY TOPICALLY TO SORES TWICE DAILY UNTIL HEALED      naloxone (NARCAN) 4 mg/0.1 mL nasal spray Administer 1 spray into a nostril. If no response after 2-3 minutes, give another dose in the other nostril using a new spray.  Qty: 1 each, Refills: 1    Associated Diagnoses: Continuous opioid dependence (HCC)           No discharge procedures on file.  ED SEPSIS DOCUMENTATION   Time reflects when diagnosis was documented in both MDM as applicable and the Disposition within this note       Time User Action Codes Description Comment    11/11/2024  1:33 PM Raza Ho [I50.9] Acute exacerbation of CHF (congestive heart failure) ([facility])     11/11/2024  1:35 PM Rudi Lenz [I50.30] (HFpEF) heart failure with preserved ejection fraction ([facility])     11/11/2024  1:35 PM Rudi Lenz [N17.9] LITO (acute kidney injury) ([facility])                  Rudi Lenz MD  11/15/24 6286

## 2024-11-16 NOTE — DISCHARGE SUMMARY
Discharge Summary - Hospitalist   Name: Oscar Espinosa 84 y.o. male I MRN: [id number]  Unit/Bed#: S -01 I Date of Admission: 11/11/2024   Date of Service: 11/16/2024 I Hospital Day: 5     Assessment & Plan  Acute on chronic diastolic heart failure (HCC)  Wt Readings from Last 3 Encounters:   11/16/24 96.7 kg (213 lb 3 oz)   11/07/24 105 kg (231 lb)   11/05/24 105 kg (231 lb)   Follows with Dr. Alan outpatient, last seen September 13.  Dry weight at the time 215 pounds  GDMT includes labetalol 50 mg daily and diuretic with torsemide 40 mg daily on Monday, Wednesday, Friday, and 20 mg daily the rest of the week.  He reports compliance with this.  Patient's weight has slowly been increasing, possibly secondary to his dietary noncompliance.  Endorses eating salty foods and snacking with pretzels.  Wife at the bedside also contributes to this history.  Presenting today with worsening SOB, increased weight, concern for volume overload when he was at pulmonary rehabilitation  Bnp 674, chest x-ray consistent with CHF and pulmonary congestion bilaterally  Recent echo on 11/7 showing EF 60%, normal left ventricular systolic function, moderate pulmonary hypertension with right ventricular systolic pressure 55 mmHg, and severe tricuspid regurgitation   On initial exam, elevated JVD, bilateral +3 pitting edema up to the knees, significantly worse in the right lower extremity  Received 50 mg IV Lasix in the ED, urine output over 600 ml afterwards  Net -7.2 since admission  Standing weight stable at 213 lbs    Plan:  Continue Torsemide 40 mg daily  Continue taking Potassium Chloride 20 mEq daily  Recommend monitoring weight daily at home  Restrict fluid intake to 1500 mL daily  Restrict salt intake to 2g daily  Outpatient follow-up with Cardiology scheduled for 11/18  Paroxysmal atrial fibrillation (HCC)  Rate controlled with labetalol 50 mg daily  On Coumadin 2.5 mg daily except Thursdays where he takes 3.75 mg  INR  supratherapeutic at 3.8 on day of discharge  Follows  [facility]    Plan:  Recommend to hold Coumadin dose tonight, 11/16  Continue Coumadin dosing as scheduled on 11/17  Repeat PT/INR within one week  Follow-up with  [facility] for further dosing instructions  Benign essential hypertension  Continue Labetalol 50 mg daily  Continue Torsemide 40 mg daily  Hyperlipidemia  Continue Lipitor 40 mg daily  Severe obstructive sleep apnea  Severe YEVGENIY, noncompliant with CPAP unable to tolerate it  Patient reports he does not use CPAP at home  CAD (coronary artery disease)  Status post CABG in 2003  Continue Lipitor and aspirin  Abdominal aortic aneurysm without rupture (HCC)  Status post repair  Chronic obstructive pulmonary disease (HCC)  Mild COPD, last PFTs done in 2021  Follows with pulmonary Dr. Beebe  Will continue Advair and as needed albuterol  Continue outpatient pulmonary rehab  Moderate to severe pulmonary hypertension (HCC)  Echo done November 7 EF 60%, normal left ventricular systolic function, moderate pulmonary hypertension with right ventricular systolic pressure 55 mmHg, and severe tricuspid regurgitation  Mild pulmonary hypertension likely secondary to group 2 PH from uncontrolled YEVGENIY and COPD  Ascending aortic aneurysm (HCC)  Last measured in July 2024, which was 48 mm unchanged from previous imaging, stable  Venous insufficiency of both lower extremities  Bilateral lower extremity venous insufficiency, chronic issue for him  He states that over the past few weeks he has noticed his right leg being significantly more swollen than the left leg  This is in the setting of CHF exacerbation  As well as having subtherapeutic INR  VAS ultrasound showed no evidence of DVT    Plan:  Continue Furosemide 40 mg daily  Chronic respiratory failure with hypoxia (HCC)  Chronically on 2 L O2 at home  Continuous opioid dependence (HCC)  Chronically on oxycodone 10 mg every 6 hours as needed for moderate  pain from his lower back  LITO (acute kidney injury) (HCC)  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 Expect serum creatinine levels to improve with continued diuresis  Creatinine improving, at baseline on discharge    Plan:  Continue Furosemide 40 mg daily  Anemia  Known, chronic iron deficiency anemia  Follows hematology outpatient  Receives weekly iron transfusion  Iron panel checked during this admission showed Iron levels 42, Iron sat 13%    Plan  Received IV venofer 200 mg x3, last dose 11/16     Medical Problems       Resolved Problems  Date Reviewed: 10/31/2024   None       Discharging Physician / Practitioner: Gely Thacker MD  PCP: Lea Reyes, MD  Admission Date:   Admission Orders (From admission, onward)       Ordered        11/11/24 1337  INPATIENT ADMISSION  Once                          Discharge Date: 11/16/24    Consultations During Hospital Stay:  Cardiology    Procedures Performed:   None    Significant Findings / Test Results:   Chest Xray showed CHF with small bibasilar pleural effusions  VAS Venous Duplex LE showed no evidence of acute or chronic DVT    Incidental Findings:   None     Test Results Pending at Discharge (will require follow up):   None     Outpatient Tests Requested:  PT/INR    Complications:  None    Reason for Admission: Shortness of breath    Hospital Course:   Oscar Espinosa is a 84 y.o. male patient who originally presented to the hospital on 11/11/2024 due to worsening dyspnea on exertion, orthopnea, increased weight gain, and worsening LE edema. In the ED, he was found to be hypervolemic on exam and on imaging findings. He was also noted to be >15 lbs over his dry weight. He was admitted to [facility] for CHF exacerbation management. Cardiology was consulted. He was started on IV Lasix. He had appropriate urinary output. Bilateral LE edema improved with diuresis. He  "continues to have elevated JVD however, may also be secondary to severe tricuspid regurgitation. He was then transitioned to PO diuretics. He is net -7.2L since admission, weight is back to his baseline dry weight at 213 lb. He was cleared for discharge from Cardiology standpoint. He has a close follow-up with Cardiology scheduled on Monday, 11/18.     Please see above list of diagnoses and related plan for additional information.     Condition at Discharge: fair    Discharge Day Visit / Exam:   Subjective:  No acute events overnight. Patient was seen and examined at bedside. He is lying in bed, on 2L NC. No acute distress. He has no new concerns. Denies lightheadedness, palpitations, chest pain, worsening SOB, abdominal discomfort.  Vitals: Blood Pressure: (!) 102/48 (11/16/24 0837)  Pulse: 66 (11/16/24 0837)  Temperature: 97.8 °F (36.6 °C) (11/16/24 0837)  Temp Source: Oral (11/16/24 0837)  Respirations: 20 (11/16/24 0837)  Height: 5' 11\" (180.3 cm) (11/12/24 1811)  Weight - Scale: 96.7 kg (213 lb 3 oz) (11/16/24 0600)  SpO2: 92 % (11/16/24 0837)  Physical Exam  Vitals reviewed.   Constitutional:       General: He is not in acute distress.     Appearance: He is well-developed.   HENT:      Head: Normocephalic and atraumatic.      Mouth/Throat:      Mouth: Mucous membranes are moist.   Eyes:      Conjunctiva/sclera: Conjunctivae normal.   Neck:      Comments: Elevated JVD    Cardiovascular:      Rate and Rhythm: Normal rate and regular rhythm.      Pulses: Normal pulses.   Pulmonary:      Effort: Pulmonary effort is normal. No respiratory distress.      Comments: Bibasilar dry crackles noted    Abdominal:      General: There is no distension.      Palpations: Abdomen is soft.      Tenderness: There is no abdominal tenderness.   Musculoskeletal:         General: Swelling present. No tenderness.      Cervical back: Neck supple.   Skin:     General: Skin is warm and dry.      Capillary Refill: Capillary refill takes " less than 2 seconds.      Comments: Chronic venous stasis changes noted on bilateral LE   Neurological:      General: No focal deficit present.      Mental Status: He is alert and oriented to person, place, and time.   Psychiatric:         Mood and Affect: Mood normal.         Discussion with Family: Patient declined call to .     Discharge instructions/Information to patient and family:   See after visit summary for information provided to patient and family.      Provisions for Follow-Up Care:  See after visit summary for information related to follow-up care and any pertinent home health orders.      Mobility at time of Discharge:   Basic Mobility Inpatient Raw Score: 18  JH-HLM Goal: 6: Walk 10 steps or more  JH-HLM Achieved: 7: Walk 25 feet or more  HLM Goal achieved. Continue to encourage appropriate mobility.     Disposition:   Home    Planned Readmission: No    Discharge Medications:  See after visit summary for reconciled discharge medications provided to patient and/or family.      Administrative Statements   Discharge Statement:  I have spent a total time of >30 minutes in caring for this patient on the day of the visit/encounter. .    **Please Note: This note may have been constructed using a voice recognition system**

## 2024-11-18 ENCOUNTER — ANTICOAG VISIT (OUTPATIENT)
Dept: CARDIOLOGY CLINIC | Facility: CLINIC | Age: 84
End: 2024-11-18

## 2024-11-18 ENCOUNTER — APPOINTMENT (OUTPATIENT)
Dept: LAB | Facility: CLINIC | Age: 84
End: 2024-11-18
Payer: MEDICARE

## 2024-11-18 ENCOUNTER — OFFICE VISIT (OUTPATIENT)
Dept: CARDIOLOGY CLINIC | Facility: CLINIC | Age: 84
End: 2024-11-18
Payer: MEDICARE

## 2024-11-18 ENCOUNTER — TRANSITIONAL CARE MANAGEMENT (OUTPATIENT)
Dept: INTERNAL MEDICINE CLINIC | Facility: CLINIC | Age: 84
End: 2024-11-18

## 2024-11-18 ENCOUNTER — TRANSCRIBE ORDERS (OUTPATIENT)
Dept: LAB | Facility: CLINIC | Age: 84
End: 2024-11-18

## 2024-11-18 ENCOUNTER — APPOINTMENT (OUTPATIENT)
Dept: PULMONOLOGY | Facility: CLINIC | Age: 84
End: 2024-11-18
Payer: MEDICARE

## 2024-11-18 VITALS
SYSTOLIC BLOOD PRESSURE: 102 MMHG | DIASTOLIC BLOOD PRESSURE: 58 MMHG | BODY MASS INDEX: 29.43 KG/M2 | HEIGHT: 71 IN | HEART RATE: 62 BPM | OXYGEN SATURATION: 90 % | WEIGHT: 210.2 LBS

## 2024-11-18 DIAGNOSIS — D64.9 ANEMIA, UNSPECIFIED TYPE: ICD-10-CM

## 2024-11-18 DIAGNOSIS — I48.0 PAROXYSMAL ATRIAL FIBRILLATION (HCC): Primary | ICD-10-CM

## 2024-11-18 DIAGNOSIS — I42.9 CARDIOMYOPATHY, UNSPECIFIED TYPE (HCC): Primary | ICD-10-CM

## 2024-11-18 DIAGNOSIS — D64.9 ANEMIA, UNSPECIFIED TYPE: Primary | ICD-10-CM

## 2024-11-18 DIAGNOSIS — I48.0 PAROXYSMAL ATRIAL FIBRILLATION (HCC): ICD-10-CM

## 2024-11-18 LAB
ALBUMIN SERPL BCG-MCNC: 3.5 G/DL (ref 3.5–5)
ALP SERPL-CCNC: 90 U/L (ref 34–104)
ALT SERPL W P-5'-P-CCNC: 24 U/L (ref 7–52)
ANION GAP SERPL CALCULATED.3IONS-SCNC: 10 MMOL/L (ref 4–13)
AST SERPL W P-5'-P-CCNC: 30 U/L (ref 13–39)
BASOPHILS # BLD AUTO: 0.05 THOUSANDS/ÂΜL (ref 0–0.1)
BASOPHILS NFR BLD AUTO: 1 % (ref 0–1)
BILIRUB SERPL-MCNC: 1.52 MG/DL (ref 0.2–1)
BUN SERPL-MCNC: 19 MG/DL (ref 5–25)
CALCIUM SERPL-MCNC: 8.8 MG/DL (ref 8.4–10.2)
CHLORIDE SERPL-SCNC: 99 MMOL/L (ref 96–108)
CO2 SERPL-SCNC: 33 MMOL/L (ref 21–32)
CREAT SERPL-MCNC: 1.34 MG/DL (ref 0.6–1.3)
EOSINOPHIL # BLD AUTO: 0.1 THOUSAND/ÂΜL (ref 0–0.61)
EOSINOPHIL NFR BLD AUTO: 3 % (ref 0–6)
ERYTHROCYTE [DISTWIDTH] IN BLOOD BY AUTOMATED COUNT: 24.3 % (ref 11.6–15.1)
FERRITIN SERPL-MCNC: 390 NG/ML (ref 24–336)
GFR SERPL CREATININE-BSD FRML MDRD: 48 ML/MIN/1.73SQ M
GLUCOSE P FAST SERPL-MCNC: 83 MG/DL (ref 65–99)
HCT VFR BLD AUTO: 30.6 % (ref 36.5–49.3)
HGB BLD-MCNC: 9.4 G/DL (ref 12–17)
IGA SERPL-MCNC: 317 MG/DL (ref 66–433)
IGG SERPL-MCNC: 1629 MG/DL (ref 635–1741)
IGM SERPL-MCNC: 44 MG/DL (ref 45–281)
IMM GRANULOCYTES # BLD AUTO: 0.02 THOUSAND/UL (ref 0–0.2)
IMM GRANULOCYTES NFR BLD AUTO: 1 % (ref 0–2)
INR PPP: 2.28 (ref 0.85–1.19)
IRON SATN MFR SERPL: 29 % (ref 15–50)
IRON SERPL-MCNC: 80 UG/DL (ref 50–212)
LYMPHOCYTES # BLD AUTO: 0.75 THOUSANDS/ÂΜL (ref 0.6–4.47)
LYMPHOCYTES NFR BLD AUTO: 19 % (ref 14–44)
MCH RBC QN AUTO: 31.3 PG (ref 26.8–34.3)
MCHC RBC AUTO-ENTMCNC: 30.7 G/DL (ref 31.4–37.4)
MCV RBC AUTO: 102 FL (ref 82–98)
MONOCYTES # BLD AUTO: 0.41 THOUSAND/ÂΜL (ref 0.17–1.22)
MONOCYTES NFR BLD AUTO: 11 % (ref 4–12)
NEUTROPHILS # BLD AUTO: 2.58 THOUSANDS/ÂΜL (ref 1.85–7.62)
NEUTS SEG NFR BLD AUTO: 65 % (ref 43–75)
NRBC BLD AUTO-RTO: 0 /100 WBCS
PLATELET # BLD AUTO: 125 THOUSANDS/UL (ref 149–390)
PMV BLD AUTO: 12.4 FL (ref 8.9–12.7)
POTASSIUM SERPL-SCNC: 4 MMOL/L (ref 3.5–5.3)
PROT SERPL-MCNC: 6.6 G/DL (ref 6.4–8.4)
PROTHROMBIN TIME: 25.1 SECONDS (ref 12.3–15)
RBC # BLD AUTO: 3 MILLION/UL (ref 3.88–5.62)
SODIUM SERPL-SCNC: 142 MMOL/L (ref 135–147)
T4 FREE SERPL-MCNC: 1.51 NG/DL (ref 0.61–1.12)
TIBC SERPL-MCNC: 280 UG/DL (ref 250–450)
TSH SERPL DL<=0.05 MIU/L-ACNC: 6.96 UIU/ML (ref 0.45–4.5)
UIBC SERPL-MCNC: 200 UG/DL (ref 155–355)
WBC # BLD AUTO: 3.91 THOUSAND/UL (ref 4.31–10.16)

## 2024-11-18 PROCEDURE — 83521 IG LIGHT CHAINS FREE EACH: CPT

## 2024-11-18 PROCEDURE — 83540 ASSAY OF IRON: CPT

## 2024-11-18 PROCEDURE — 85025 COMPLETE CBC W/AUTO DIFF WBC: CPT

## 2024-11-18 PROCEDURE — 82668 ASSAY OF ERYTHROPOIETIN: CPT

## 2024-11-18 PROCEDURE — 85610 PROTHROMBIN TIME: CPT

## 2024-11-18 PROCEDURE — 99214 OFFICE O/P EST MOD 30 MIN: CPT | Performed by: NURSE PRACTITIONER

## 2024-11-18 PROCEDURE — 88185 FLOWCYTOMETRY/TC ADD-ON: CPT | Performed by: PHYSICIAN ASSISTANT

## 2024-11-18 PROCEDURE — 80053 COMPREHEN METABOLIC PANEL: CPT

## 2024-11-18 PROCEDURE — 82784 ASSAY IGA/IGD/IGG/IGM EACH: CPT

## 2024-11-18 PROCEDURE — 82728 ASSAY OF FERRITIN: CPT

## 2024-11-18 PROCEDURE — 36415 COLL VENOUS BLD VENIPUNCTURE: CPT

## 2024-11-18 PROCEDURE — 88184 FLOWCYTOMETRY/ TC 1 MARKER: CPT

## 2024-11-18 PROCEDURE — 83550 IRON BINDING TEST: CPT

## 2024-11-18 NOTE — PATIENT INSTRUCTIONS
Weigh yourself daily  If you gain 3 lbs in one day or 5 lbs in one week, please call the office at [phone] and ask for a nurse or the heart failure nurse  Keep your sodium intake to <2 grams, (2000 mg) per day, and fluids <2 Liters (2000 ml) per day. This is around 6-7, 8 oz glasses of fluid per day    Continue current medications  We will price check Jardiance 10 mg daily .

## 2024-11-19 ENCOUNTER — TELEPHONE (OUTPATIENT)
Dept: CARDIOLOGY CLINIC | Facility: CLINIC | Age: 84
End: 2024-11-19

## 2024-11-19 ENCOUNTER — RESULTS FOLLOW-UP (OUTPATIENT)
Dept: INTERNAL MEDICINE CLINIC | Facility: CLINIC | Age: 84
End: 2024-11-19

## 2024-11-19 LAB
EPO SERPL-ACNC: 30.9 MIU/ML (ref 2.6–18.5)
KAPPA LC FREE SER-MCNC: 106.7 MG/L (ref 3.3–19.4)
KAPPA LC FREE/LAMBDA FREE SER: 2.69 {RATIO} (ref 0.26–1.65)
LAMBDA LC FREE SERPL-MCNC: 39.7 MG/L (ref 5.7–26.3)

## 2024-11-20 ENCOUNTER — APPOINTMENT (OUTPATIENT)
Dept: LAB | Facility: CLINIC | Age: 84
End: 2024-11-20
Payer: MEDICARE

## 2024-11-20 ENCOUNTER — APPOINTMENT (OUTPATIENT)
Dept: PULMONOLOGY | Facility: CLINIC | Age: 84
End: 2024-11-20
Payer: MEDICARE

## 2024-11-20 ENCOUNTER — ANTICOAG VISIT (OUTPATIENT)
Dept: CARDIOLOGY CLINIC | Facility: CLINIC | Age: 84
End: 2024-11-20

## 2024-11-20 DIAGNOSIS — I48.0 PAROXYSMAL ATRIAL FIBRILLATION (HCC): Primary | ICD-10-CM

## 2024-11-20 DIAGNOSIS — I48.0 PAROXYSMAL ATRIAL FIBRILLATION (HCC): ICD-10-CM

## 2024-11-20 LAB
INR PPP: 2.1 (ref 0.85–1.19)
PROTHROMBIN TIME: 23.6 SECONDS (ref 12.3–15)

## 2024-11-20 PROCEDURE — 36415 COLL VENOUS BLD VENIPUNCTURE: CPT

## 2024-11-20 PROCEDURE — 85610 PROTHROMBIN TIME: CPT

## 2024-11-21 ENCOUNTER — TELEPHONE (OUTPATIENT)
Dept: CARDIOLOGY CLINIC | Facility: CLINIC | Age: 84
End: 2024-11-21

## 2024-11-21 LAB — SCAN RESULT: NORMAL

## 2024-11-25 ENCOUNTER — APPOINTMENT (OUTPATIENT)
Dept: PULMONOLOGY | Facility: CLINIC | Age: 84
End: 2024-11-25
Payer: MEDICARE

## 2024-11-27 ENCOUNTER — APPOINTMENT (OUTPATIENT)
Dept: PULMONOLOGY | Facility: CLINIC | Age: 84
End: 2024-11-27
Payer: MEDICARE

## 2024-12-02 ENCOUNTER — APPOINTMENT (OUTPATIENT)
Dept: PULMONOLOGY | Facility: CLINIC | Age: 84
End: 2024-12-02
Payer: MEDICARE

## 2024-12-03 NOTE — PROGRESS NOTES
Advanced Heart Failure / Pulmonary Hypertension Outpatient Visit    Oscar Espinosa 84 y.o. male   MRN: [id number]  Encounter: [id number]    Assessment:  Patient Active Problem List    Diagnosis Date Noted    LITO (acute kidney injury) ([facility]) 11/12/2024    Acute on chronic diastolic heart failure ([facility]) 11/11/2024    Continuous opioid dependence ([facility]) 10/31/2024    CKD (chronic kidney disease) 10/24/2024    Iron deficiency anemia, unspecified 10/17/2024    Chronic respiratory failure with hypoxia ([facility]) 08/12/2024    Chronic obstructive pulmonary disease, unspecified COPD type ([facility]) 07/31/2024    Hypotension 07/06/2024    Prediabetes 06/18/2024    Chronic combined systolic and diastolic congestive heart failure ([facility]) 05/11/2024    Acute hypoxic respiratory failure ([facility]) 05/06/2024    Pleural effusion with shortness of breath 04/27/2024    Anemia 04/27/2024    Bilateral carotid artery stenosis 01/23/2024    Venous insufficiency of both lower extremities 08/31/2023    Hypertensive heart and chronic kidney disease with heart failure and stage 1 through stage 4 chronic kidney disease, or chronic kidney disease ([facility]) 10/27/2022    Thrombocytopenia ([facility]) 07/27/2022    Myofascial pain syndrome 07/27/2022    Paroxysmal atrial fibrillation ([facility]) 07/27/2022    Heart failure with improved ejection fraction (HFimpEF) ([facility]) 01/14/2022    Ascending aortic aneurysm ([facility]) 05/21/2021    Nocturnal hypoxemia     Moderate to severe pulmonary hypertension ([facility]) 01/06/2021    Chronic obstructive pulmonary disease ([facility]) 09/11/2019    Multiple pulmonary nodules 09/10/2019    Chronic pain syndrome     Abnormal thyroid function test 10/22/2014    Abdominal aortic aneurysm without rupture ([facility]) 04/14/2014    Aortic valve disorder 04/14/2014    Lumbar radiculopathy 09/03/2013    Benign essential hypertension 04/10/2013    Impaired fasting glucose 04/10/2013    Severe obesity (BMI 35.0-35.9 with comorbidity) ([facility]) 04/10/2013    Hyperlipidemia  09/07/2012    Microscopic hematuria 09/07/2012    Severe obstructive sleep apnea 09/07/2012    CAD (coronary artery disease) 09/07/2012       Today's Plan:  Torsemide 40 daily w/extra dose for rapid wgt gain (has not taken extra since discharge)--Euvolemic on exam, substantial weight loss of 17 lbs, but does not appear too dry.  Change diuretic to 20 mg daily after starting Jardiance next week  Initiate Jardiance 10 mg daily  Encouraged to continue heart healthy, low sodium diet.  FR 2 liters/day  Report rapid weight gain, SOB, LE edema once Torsemide is decreased to lower dose.    BMP prior to next appt  RTO in March 03/2025, RTO in w/ HF AP 01/06/2025    Plan:  Chronic HFimpEF, LVEF 60%  -Etiology: history of CAD s/p CABG, afib   -Overall appears compensated, substantial weight loss in 2 weeks paired with low sodium, low calories meals.      -Discussed calling HF office with worsening symptoms, especially after Torsemide is decreased to lower dose while on Jardiance.       Home diuretic: torsemide 40 mg QD, Will start jardiance once delivered     Volume status: JVD stable-- above clavicle, no LE edema, weights down 17 lbs in 2 weeks  Weighed 219 lbs 5/2/24, 223, 212 at discharge, Last OV 11/18-->210 lbs, Today  193 lbs  Last BMP 11/18: , K 4.0, creat 1.32  BP today: 98/48, HR 56     Pharmacotherapies / Neurohormonal Blockade:  --Beta Blocker: Currently on labetalol 50 daily  --ARNi / ACEi / ARB:   --Aldosterone Antagonist: Spironolactone 12.5 mg daily--dc'd in June, pt has not been taking  --SGLT2 Inhibitor: Jardiance 10 daily   --Diuretic: Torsemide 40 mg QD-->decrease Torsemide to 20 mg daily once Jardiance started     Sudden Cardiac Death Risk Reduction:  --ICD: LVEF > 35%      TTE 11/2024 showed EF 60%, L and R atrium dilated, mild to moderate MR and severe TR.   TTE 4/27/2024: LVEF 45%.  Normal wall motion.  Severely abnormal diastolic function.  Normal RV.  CHAU.  Mild AI, moderate MR, severe TR.   Moderately dilated ascending aorta at 4.5 cm, aortic root 3.5 cm.      CKD.   -Baseline creat 1.2  -BMP 11/18: creat 1.3     COPD  -Chronic O2      Hypoxic respiratory failure  -home O2 of 2 L-3L     Paroxysmal atrial fibrillation  Rate:  labetalol 50 daily   Rhythm: amiodarone 200 mg daily  AC:  warfarin     HTN-controlled     History of CAD s/p CABG  -CABG x 4 in 2003 (LIMA to LAD, SVG to OM1, OM2, and SVG to PDA)  -Denies any anginal complaints  -Stress test in 2019 reviewed showing small amount of ischemia in the inferolateral region, no interval change in myocardial perfusion compared to prior stress test.  -On ASA, atorvastatin, and labetalol     Left lower lobe pneumonia-completed antibiotics, s/p thoracentesis 4/28 for parapneumonic effusion     YEVGENIY, not on CPAP,wears O2 at night     Chronic back pain.      HPI:  11/18 TH: 84-year-old with past medical history described above who presents for hospital follow-up with his wife.  Recently admitted with decompensated heart failure.  was diuresed and transition to higher dose oral diuretic on discharge.  It was felt the trigger for his decompensation was related to dietary indiscretions.  He is feeling better today.  His chief complaint is more so related to chronic back pain which he believes is his most limiting factor.  Has noticed that he needs a little more oxygen with activities.  Just walking into our clinic today his O2 sats dropped to the mid 80s on 2 L.     12/06: 2 week f/u: Presents to office with wife. Feels good overall. Reports LE swelling has greatly improved.  Wears lymphedema wraps most days, although they are off today. Chronic O2 use--breathing has been stable since discharge and pulse ox at home has been normal.  He is concerned about his weight loss, however, reports eating a strict low sodium and heart healthy diet, along with a diminished appetite.  Discussed muscle mass loss and recommended to drink Ensure for added protein. He is voiding  a good amount during the day and does not feel too dry. He plans on establishing care with palliative team for pain mgt of his chronic back pain.  Denies CP, palpitations, dizziness, or orthopnea.      Past Medical History:   Diagnosis Date    Abdominal aortic aneurysm ([facility])     s/p repair    Abnormal ECG july 2022    Acute on chronic congestive heart failure ([facility]) 09/11/2019    Acute respiratory failure with hypoxia ([facility]) 05/06/2024    Aneurysm ([facility]) 2007    Aneurysm of abdominal aorta ([facility])     49mm, trileaflet AV    Atrial fibrillation ([facility])     Eliquis    BPH (benign prostatic hyperplasia)     CAD (coronary artery disease)     s/p CABGx3    CHF (congestive heart failure) ([facility])     Chronic pain     Gabapentin    Chronic pain disorder     lumbar    COPD (chronic obstructive pulmonary disease) ([facility])     Coronary artery disease     Elevated serum creatinine 06/18/2024    Elevated serum creatinine 06/18/2024    Elevated transaminase level 04/27/2024    Former tobacco use     Hyperlipidemia     Hypertension     Hyponatremia 04/27/2024    Hyponatremia 04/27/2024    Hypothyroidism     Lumbar radiculopathy     Lumbar stenosis     s/p injections    Neuropathy     Obesity (BMI 30-39.9)     YEVGENIY (obstructive sleep apnea)     unable to tolerate CPAP    Platelets decreased ([facility]) 07/27/2022    Pulmonary hypertension ([facility])     moderate to severe    Spondylolisthesis of lumbar region     Visual impairment 2022    Vitamin D deficiency        Review of Systems   Constitutional:  Positive for appetite change and unexpected weight change.   HENT: Negative.     Eyes: Negative.    Respiratory:  Positive for shortness of breath.         Chronic.  Currently at baseline   Cardiovascular:  Negative for chest pain, palpitations and leg swelling.   Gastrointestinal: Negative.    Endocrine: Negative.    Genitourinary: Negative.    Musculoskeletal:  Positive for back pain.        Will establish care w/pain mgt    Neurological:  Negative for  dizziness, syncope and light-headedness.   Hematological: Negative.    Psychiatric/Behavioral: Negative.         Allergies   Allergen Reactions    Meloxicam Edema         Current Outpatient Medications:     acetaminophen (TYLENOL) 325 mg tablet, Take 2 tablets by mouth every 6 (six) hours as needed for fever, headaches, mild pain, moderate pain or severe pain. Indications: Fever, Pain, Disp: , Rfl:     albuterol (ProAir HFA) 90 mcg/act inhaler, Inhale 2 puffs every 6 (six) hours as needed for wheezing, Disp: 24 g, Rfl: 0    amiodarone 200 mg tablet, Take 1 tablet (200 mg total) by mouth daily, Disp: 90 tablet, Rfl: 3    aspirin (ECOTRIN LOW STRENGTH) 81 mg EC tablet, Take 1 tablet by mouth daily, Disp: , Rfl:     atorvastatin (LIPITOR) 40 mg tablet, TAKE 1 TABLET BY MOUTH DAILY, Disp: 90 tablet, Rfl: 1    cholecalciferol (VITAMIN D3) 1,000 units tablet, Take 2,000 Units by mouth daily, Disp: , Rfl:     Fluticasone-Salmeterol (Advair) 100-50 mcg/dose inhaler, INHALE 1 PUFF BY MOUTH TWICE DAILY. RINSE MOUTH AFTER USE, Disp: 180 blister, Rfl: 1    labetalol (NORMODYNE) 100 mg tablet, Take 0.5 tablets (50 mg total) by mouth in the morning, Disp: 30 tablet, Rfl: 1    multivitamin (THERAGRAN) TABS, Take 1 tablet by mouth daily, Disp: , Rfl:     mupirocin (BACTROBAN) 2 % ointment, APPLY TOPICALLY TO SORES TWICE DAILY UNTIL HEALED, Disp: , Rfl:     naloxone (NARCAN) 4 mg/0.1 mL nasal spray, Administer 1 spray into a nostril. If no response after 2-3 minutes, give another dose in the other nostril using a new spray., Disp: 1 each, Rfl: 1    oxyCODONE (ROXICODONE) 10 MG TABS, Take 1 tablet (10 mg total) by mouth every 6 (six) hours as needed for moderate pain Max Daily Amount: 40 mg, Disp: 120 tablet, Rfl: 0    oxygen gas, Inhale 2 L continuous. 2L of oxygen via nasal cannula as needed and at night  Indications: short of breath, Disp: , Rfl:     polyethylene glycol (MIRALAX) 17 g packet, Take 17 g by mouth daily as needed  (constipation). Oscar Espinosa dissolve in 8 oz liquid of choice   Indications: ., Disp: , Rfl:     potassium chloride (Klor-Con M20) 20 mEq tablet, TAKE 1 TABLET BY MOUTH DAILY 7  DAYS WEEKLY, Disp: 90 tablet, Rfl: 1    senna (SENOKOT) 8.6 MG tablet, Take 1 tablet by mouth as needed for constipation, Disp: , Rfl:     tamsulosin (FLOMAX) 0.4 mg, Take 1 capsule by mouth daily, Disp: , Rfl:     torsemide (DEMADEX) 20 mg tablet, Take 1 tablet (20 mg total) by mouth daily, Disp: 30 tablet, Rfl: 3    warfarin (Coumadin) 2.5 mg tablet, Take 1 tab by moth daily or as directed by physician, Disp: 90 tablet, Rfl: 3    gabapentin (NEURONTIN) 300 mg capsule, Take 300 mg by mouth daily at bedtime (Patient not taking: Reported on 2024), Disp: , Rfl:     Social History     Socioeconomic History    Marital status: /Civil Union     Spouse name: Not on file    Number of children: Not on file    Years of education: Not on file    Highest education level: Not on file   Occupational History    Occupation: retired   Tobacco Use    Smoking status: Former     Current packs/day: 0.00     Average packs/day: 1 pack/day for 55.6 years (55.6 ttl pk-yrs)     Types: Cigarettes     Start date: 1957     Quit date: 2012     Years since quittin.3     Passive exposure: Past    Smokeless tobacco: Never   Vaping Use    Vaping status: Never Used   Substance and Sexual Activity    Alcohol use: Yes     Alcohol/week: 1.0 standard drink of alcohol     Types: 1 Cans of beer per week    Drug use: No    Sexual activity: Never   Other Topics Concern    Not on file   Social History Narrative    Not on file     Social Drivers of Health     Financial Resource Strain: Low Risk  (3/7/2024)    Overall Financial Resource Strain (CARDIA)     Difficulty of Paying Living Expenses: Not hard at all   Food Insecurity: No Food Insecurity (2024)    Hunger Vital Sign     Worried About Running Out of Food in the Last Year: Never true     Ran  "Out of Food in the Last Year: Never true   Transportation Needs: No Transportation Needs (11/14/2024)    PRAPARE - Transportation     Lack of Transportation (Medical): No     Lack of Transportation (Non-Medical): No   Physical Activity: Not on file   Stress: Not on file   Social Connections: Not on file   Intimate Partner Violence: Unknown (11/12/2024)    Nursing IPS     Feels Physically and Emotionally Safe: Not on file     Physically Hurt by Someone: Not on file     Humiliated or Emotionally Abused by Someone: Not on file     Physically Hurt by Someone: 2     Hurt or Threatened by Someone: 2   Housing Stability: Low Risk  (11/14/2024)    Housing Stability Vital Sign     Unable to Pay for Housing in the Last Year: No     Number of Times Moved in the Last Year: 0     Homeless in the Last Year: No     Family History   Problem Relation Age of Onset    Heart disease Mother     Stroke Father         stroke syndrome    Aneurysm Brother         abdominal    Aortic aneurysm Brother         ascending    Coronary artery disease Family     Hypertension Family     Other Son         gioblastoma multiforme       Vitals:   Blood pressure (!) 98/48, pulse 56, height 5' 11\" (1.803 m), weight 87.6 kg (193 lb 1.6 oz), SpO2 99%.    Wt Readings from Last 10 Encounters:   12/06/24 87.6 kg (193 lb 1.6 oz)   11/18/24 95.3 kg (210 lb 3.2 oz)   11/16/24 96.7 kg (213 lb 3 oz)   11/07/24 105 kg (231 lb)   11/05/24 105 kg (231 lb)   10/31/24 104 kg (230 lb)   10/25/24 101 kg (222 lb 0.1 oz)   10/17/24 102 kg (225 lb)   10/10/24 102 kg (224 lb)   09/13/24 97.8 kg (215 lb 9.6 oz)     Vitals:    12/06/24 1108   BP: (!) 98/48   BP Location: Left arm   Patient Position: Sitting   Cuff Size: Large   Pulse: 56   SpO2: 99%   Weight: 87.6 kg (193 lb 1.6 oz)   Height: 5' 11\" (1.803 m)       Physical Exam  Constitutional:       Appearance: He is normal weight.   Eyes:      Extraocular Movements: Extraocular movements intact.   Neck:      Vascular: No " JVD.      Comments: JVD stable, above clavicle  Cardiovascular:      Rate and Rhythm: Normal rate. Rhythm irregular. No extrasystoles are present.     Pulses:           Radial pulses are 2+ on the right side and 2+ on the left side.      Heart sounds: Normal heart sounds, S1 normal and S2 normal.      Comments: NP  trace LE edema  Pulmonary:      Effort: No respiratory distress.      Breath sounds: Normal air entry. Decreased breath sounds present.      Comments: O2 NC at 2 L   Abdominal:      General: There is no distension.      Palpations: Abdomen is soft.   Musculoskeletal:      Right lower leg: No edema.      Left lower leg: No edema.   Skin:     General: Skin is warm and dry.   Neurological:      Mental Status: He is alert and oriented to person, place, and time. Mental status is at baseline.   Psychiatric:         Behavior: Behavior is cooperative.         Cognition and Memory: Cognition normal.       Labs & Results:  Lab Results   Component Value Date    WBC 3.91 (L) 11/18/2024    HGB 9.4 (L) 11/18/2024    HCT 30.6 (L) 11/18/2024     (H) 11/18/2024     (L) 11/18/2024     Lab Results   Component Value Date    SODIUM 142 11/18/2024    K 4.0 11/18/2024    CL 99 11/18/2024    CO2 33 (H) 11/18/2024    BUN 19 11/18/2024    CREATININE 1.34 (H) 11/18/2024    GLUC 91 11/16/2024    CALCIUM 8.8 11/18/2024     Lab Results   Component Value Date    INR 2.10 (H) 11/20/2024    INR 2.28 (H) 11/18/2024    INR 3.82 (H) 11/16/2024    PROTIME 23.6 (H) 11/20/2024    PROTIME 25.1 (H) 11/18/2024    PROTIME 38.1 (H) 11/16/2024     Lab Results   Component Value Date     (H) 11/11/2024        SOSA Rowell

## 2024-12-04 ENCOUNTER — APPOINTMENT (OUTPATIENT)
Dept: PULMONOLOGY | Facility: CLINIC | Age: 84
End: 2024-12-04
Payer: MEDICARE

## 2024-12-06 ENCOUNTER — OFFICE VISIT (OUTPATIENT)
Dept: CARDIOLOGY CLINIC | Facility: CLINIC | Age: 84
End: 2024-12-06
Payer: MEDICARE

## 2024-12-06 VITALS
HEART RATE: 56 BPM | DIASTOLIC BLOOD PRESSURE: 48 MMHG | OXYGEN SATURATION: 99 % | HEIGHT: 71 IN | SYSTOLIC BLOOD PRESSURE: 98 MMHG | BODY MASS INDEX: 27.03 KG/M2 | WEIGHT: 193.1 LBS

## 2024-12-06 DIAGNOSIS — I50.32 HEART FAILURE WITH IMPROVED EJECTION FRACTION (HFIMPEF) (HCC): Primary | ICD-10-CM

## 2024-12-06 DIAGNOSIS — I50.30 (HFPEF) HEART FAILURE WITH PRESERVED EJECTION FRACTION (HCC): ICD-10-CM

## 2024-12-06 PROCEDURE — 99214 OFFICE O/P EST MOD 30 MIN: CPT

## 2024-12-06 RX ORDER — TORSEMIDE 20 MG/1
20 TABLET ORAL DAILY
Qty: 30 TABLET | Refills: 3 | Status: SHIPPED | OUTPATIENT
Start: 2024-12-06 | End: 2024-12-09

## 2024-12-06 NOTE — PATIENT INSTRUCTIONS
Start Jardiance after delivery  1 tablet daily    Decrease Torsemide to 20 mg daily once Jardiance started    Fluid restriction to 2 liters /day or 64 oz/day    Please weigh yourself every day (after emptying your bladder) and keep a detailed log of weights.     Blood work prior to next Appt.     Contact the Heart Failure program at [phone] if you gain 3+ lbs overnight or 5+ lbs in 5-7 days.    Limit daily sodium/salt intake to 2000 mg daily to prevent fluid retention.  Avoid canned foods, fast food/Chinese food, and processed meats (hot dogs, lunch meat, and sausage etc.). Caution with condiments.  Limit fluid intake to 2000 mL or 2 liters (about 60-65 ounces) daily.  Avoid electrolyte replacement drinks (such as Gatorade, Pedialyte, Propel, Liquid IV, etc.).  Bring complete list of medications and log of daily weights to your follow-up appointment.

## 2024-12-09 ENCOUNTER — OFFICE VISIT (OUTPATIENT)
Dept: INTERNAL MEDICINE CLINIC | Facility: CLINIC | Age: 84
End: 2024-12-09
Payer: MEDICARE

## 2024-12-09 ENCOUNTER — APPOINTMENT (OUTPATIENT)
Dept: PULMONOLOGY | Facility: CLINIC | Age: 84
End: 2024-12-09
Payer: MEDICARE

## 2024-12-09 VITALS
WEIGHT: 193.8 LBS | HEIGHT: 71 IN | HEART RATE: 51 BPM | TEMPERATURE: 97.7 F | SYSTOLIC BLOOD PRESSURE: 110 MMHG | OXYGEN SATURATION: 98 % | DIASTOLIC BLOOD PRESSURE: 60 MMHG | BODY MASS INDEX: 27.13 KG/M2

## 2024-12-09 DIAGNOSIS — R94.6 ABNORMAL THYROID FUNCTION TEST: ICD-10-CM

## 2024-12-09 DIAGNOSIS — I50.32 HEART FAILURE WITH IMPROVED EJECTION FRACTION (HFIMPEF) (HCC): Primary | ICD-10-CM

## 2024-12-09 DIAGNOSIS — D50.9 IRON DEFICIENCY ANEMIA, UNSPECIFIED IRON DEFICIENCY ANEMIA TYPE: ICD-10-CM

## 2024-12-09 DIAGNOSIS — M54.16 LUMBAR RADICULOPATHY: Primary | ICD-10-CM

## 2024-12-09 DIAGNOSIS — I50.30 (HFPEF) HEART FAILURE WITH PRESERVED EJECTION FRACTION (HCC): ICD-10-CM

## 2024-12-09 PROCEDURE — 99214 OFFICE O/P EST MOD 30 MIN: CPT | Performed by: INTERNAL MEDICINE

## 2024-12-09 PROCEDURE — G2211 COMPLEX E/M VISIT ADD ON: HCPCS | Performed by: INTERNAL MEDICINE

## 2024-12-09 RX ORDER — TORSEMIDE 20 MG/1
40 TABLET ORAL DAILY
Start: 2024-12-09

## 2024-12-09 RX ORDER — GABAPENTIN 300 MG/1
900 CAPSULE ORAL
Start: 2024-12-09

## 2024-12-09 NOTE — ASSESSMENT & PLAN NOTE
Improved hemoglobin to 9.4 with iron infusions  Ferritin at 390 from 139  I will reach out to hematology about further workup including CT especially with bilateral inguinal adenopathy on lower extremity Doppler

## 2024-12-09 NOTE — PROGRESS NOTES
Name: Oscar Espinosa      : 1940      MRN: [id number]  Encounter Provider: Lea Reyes, MD  Encounter Date: 2024   [facility]    Assessment & Plan  Lumbar radiculopathy  Resume gabapentin  Continue oxycodone 10 mg as needed  Orders:    gabapentin (NEURONTIN) 300 mg capsule; Take 3 capsules (900 mg total) by mouth daily at bedtime    (HFpEF) heart failure with preserved ejection fraction (HCC)  Wt Readings from Last 3 Encounters:   24 87.9 kg (193 lb 12.8 oz)   24 87.6 kg (193 lb 1.6 oz)   24 95.3 kg (210 lb 3.2 oz)     Appears euvolemic at this time  He will start Jardiance and reduce torsemide milligrams then  Planning to establish with palliative care        Orders:    torsemide (DEMADEX) 20 mg tablet; Take 2 tablets (40 mg total) by mouth daily    Iron deficiency anemia, unspecified iron deficiency anemia type  Improved hemoglobin to 9.4 with iron infusions  Ferritin at 390 from 139  I will reach out to hematology about further workup including CT especially with bilateral inguinal adenopathy on lower extremity Doppler       Abnormal thyroid function test  Elevated TSH and T4  Will continue to monitor at this time            History of Present Illness     Here for follow-up with his wife  Says he is feeling better from the last visit in October  He was admitted in November for acute CHF  He was able to travel to [place] to see his daughters for Thanksgiving  Mentions that  inguinal adenopathy was seen on his lower extremity venous Doppler  He would like to resume gabapentin for lumbar radiculopathy.  He is taking oxycodone 10 mg as needed but rarely because of constipation  Waiting to receive Jardiance which cardiology discussed starting him on  Currently on torsemide 40 mg which will be reduced to 20 mg once he is on Jardiance      Review of Systems   Constitutional:  Negative for unexpected weight change.   Respiratory:  Positive for  cough (Minimal) and shortness of breath.    Cardiovascular:  Positive for leg swelling. Negative for chest pain and palpitations.   Gastrointestinal:  Negative for abdominal pain, constipation and diarrhea.   Musculoskeletal:  Positive for back pain.     Past Medical History:   Diagnosis Date    Abdominal aortic aneurysm ([facility])     s/p repair    Abnormal ECG july 2022    Acute on chronic congestive heart failure ([facility]) 09/11/2019    Acute respiratory failure with hypoxia ([facility]) 05/06/2024    Aneurysm ([facility]) 2007    Aneurysm of abdominal aorta ([facility])     49mm, trileaflet AV    Atrial fibrillation ([facility])     Eliquis    BPH (benign prostatic hyperplasia)     CAD (coronary artery disease)     s/p CABGx3    CHF (congestive heart failure) ([facility])     Chronic pain     Gabapentin    Chronic pain disorder     lumbar    COPD (chronic obstructive pulmonary disease) ([facility])     Coronary artery disease     Elevated serum creatinine 06/18/2024    Elevated serum creatinine 06/18/2024    Elevated transaminase level 04/27/2024    Former tobacco use     Hyperlipidemia     Hypertension     Hyponatremia 04/27/2024    Hyponatremia 04/27/2024    Hypothyroidism     Lumbar radiculopathy     Lumbar stenosis     s/p injections    Neuropathy     Obesity (BMI 30-39.9)     YEVGENIY (obstructive sleep apnea)     unable to tolerate CPAP    Platelets decreased ([facility]) 07/27/2022    Pulmonary hypertension ([facility])     moderate to severe    Spondylolisthesis of lumbar region     Visual impairment 2022    Vitamin D deficiency      Past Surgical History:   Procedure Laterality Date    ABDOMINAL AORTIC ANEURYSM REPAIR  08/09/2007    2 dock limbs with visc extens prosth    CARDIAC CATHETERIZATION      COLONOSCOPY      CORONARY ARTERY BYPASS GRAFT  2003    LIMA to LAD, sequential SVG to OM1 & OM2, SVG to RDA    EYE SURGERY      IR CHEST TUBE PLACEMENT  06/19/2024    LUMBAR EPIDURAL INJECTION       Family History   Problem Relation Age of Onset    Heart disease Mother      Stroke Father         stroke syndrome    Aneurysm Brother         abdominal    Aortic aneurysm Brother         ascending    Coronary artery disease Family     Hypertension Family     Other Son         gioblastoma multiforme     Social History     Tobacco Use    Smoking status: Former     Current packs/day: 0.00     Average packs/day: 1 pack/day for 55.6 years (55.6 ttl pk-yrs)     Types: Cigarettes     Start date: 1957     Quit date: 2012     Years since quittin.3     Passive exposure: Past    Smokeless tobacco: Never   Vaping Use    Vaping status: Never Used   Substance and Sexual Activity    Alcohol use: Yes     Alcohol/week: 1.0 standard drink of alcohol     Types: 1 Cans of beer per week    Drug use: No    Sexual activity: Never     Current Outpatient Medications on File Prior to Visit   Medication Sig    acetaminophen (TYLENOL) 325 mg tablet Take 2 tablets by mouth every 6 (six) hours as needed for fever, headaches, mild pain, moderate pain or severe pain. Indications: Fever, Pain    albuterol (ProAir HFA) 90 mcg/act inhaler Inhale 2 puffs every 6 (six) hours as needed for wheezing    amiodarone 200 mg tablet Take 1 tablet (200 mg total) by mouth daily    aspirin (ECOTRIN LOW STRENGTH) 81 mg EC tablet Take 1 tablet by mouth daily    atorvastatin (LIPITOR) 40 mg tablet TAKE 1 TABLET BY MOUTH DAILY    cholecalciferol (VITAMIN D3) 1,000 units tablet Take 2,000 Units by mouth daily    Fluticasone-Salmeterol (Advair) 100-50 mcg/dose inhaler INHALE 1 PUFF BY MOUTH TWICE DAILY. RINSE MOUTH AFTER USE    labetalol (NORMODYNE) 100 mg tablet Take 0.5 tablets (50 mg total) by mouth in the morning    multivitamin (THERAGRAN) TABS Take 1 tablet by mouth daily    mupirocin (BACTROBAN) 2 % ointment APPLY TOPICALLY TO SORES TWICE DAILY UNTIL HEALED    naloxone (NARCAN) 4 mg/0.1 mL nasal spray Administer 1 spray into a nostril. If no response after 2-3 minutes, give another dose in the other nostril using a new  spray.    oxyCODONE (ROXICODONE) 10 MG TABS Take 1 tablet (10 mg total) by mouth every 6 (six) hours as needed for moderate pain Max Daily Amount: 40 mg    oxygen gas Inhale 2 L continuous. 2L of oxygen via nasal cannula as needed and at night  Indications: short of breath    polyethylene glycol (MIRALAX) 17 g packet Take 17 g by mouth daily as needed (constipation). Oscar Espinosa  dissolve in 8 oz liquid of choice   Indications: .    potassium chloride (Klor-Con M20) 20 mEq tablet TAKE 1 TABLET BY MOUTH DAILY 7  DAYS WEEKLY    senna (SENOKOT) 8.6 MG tablet Take 1 tablet by mouth as needed for constipation    tamsulosin (FLOMAX) 0.4 mg Take 1 capsule by mouth daily    warfarin (Coumadin) 2.5 mg tablet Take 1 tab by moth daily or as directed by physician    [DISCONTINUED] torsemide (DEMADEX) 20 mg tablet Take 1 tablet (20 mg total) by mouth daily    [DISCONTINUED] gabapentin (NEURONTIN) 300 mg capsule Take 300 mg by mouth daily at bedtime (Patient not taking: Reported on 12/6/2024)     Allergies   Allergen Reactions    Meloxicam Edema     Immunization History   Administered Date(s) Administered    COVID-19 MODERNA VACC 0.5 ML IM 01/26/2021, 03/05/2021, 10/28/2021, 11/28/2021    COVID-19 Moderna Vac BIVALENT 12 Yr+ IM 0.5 ML 10/12/2022    COVID-19 Moderna mRNA Vaccine 12 Yr+ 50 mcg/0.5 mL (Spikevax) 10/03/2023    COVID-19 Moderna vac 6-11y or adult booster 50 mcg/0.5 mL 05/03/2022    COVID-19 Pfizer mRNA vacc PF gustavo-sucrose 12 yr and older (Comirnaty) 10/28/2024    INFLUENZA 10/23/2007, 10/02/2013, 10/08/2016, 10/25/2017, 10/30/2020, 10/13/2021, 10/06/2022, 10/03/2023    Influenza Split High Dose Preservative Free IM 10/02/2013, 10/02/2013, 10/22/2014, 10/27/2015, 10/08/2016, 10/03/2019, 10/10/2024    Influenza, seasonal, injectable 10/20/2012, 10/30/2018    Pneumococcal Conjugate 13-Valent 04/24/2015    Pneumococcal Conjugate Vaccine 20-valent (Pcv20), Polysace 10/10/2024    Pneumococcal Polysaccharide PPV23  "09/07/2012    Respiratory Syncytial Virus Vaccine (Recombinant, Adjuvanted) 12/11/2023    Zoster Vaccine Recombinant 10/03/2019, 12/11/2019     Objective   /60 (BP Location: Left arm, Patient Position: Sitting, Cuff Size: Standard)   Pulse (!) 51   Temp 97.7 °F (36.5 °C) (Tympanic)   Ht 5' 11\" (1.803 m)   Wt 87.9 kg (193 lb 12.8 oz)   SpO2 98%   BMI 27.03 kg/m²     Physical Exam  Constitutional:       Appearance: He is ill-appearing.   Cardiovascular:      Rate and Rhythm: Regular rhythm.      Heart sounds: Normal heart sounds.   Pulmonary:      Breath sounds: Decreased breath sounds present.   Abdominal:      Palpations: Abdomen is soft.      Tenderness: There is no abdominal tenderness.   Musculoskeletal:      Right lower leg: Edema (Trace) present.      Left lower leg: Edema (Trace) present.   Neurological:      Mental Status: He is oriented to person, place, and time.   Psychiatric:         Mood and Affect: Mood normal.         Behavior: Behavior normal.         "

## 2024-12-09 NOTE — ASSESSMENT & PLAN NOTE
Resume gabapentin  Continue oxycodone 10 mg as needed  Orders:    gabapentin (NEURONTIN) 300 mg capsule; Take 3 capsules (900 mg total) by mouth daily at bedtime

## 2024-12-10 ENCOUNTER — APPOINTMENT (OUTPATIENT)
Dept: LAB | Facility: CLINIC | Age: 84
End: 2024-12-10
Payer: MEDICARE

## 2024-12-10 DIAGNOSIS — I48.0 PAROXYSMAL ATRIAL FIBRILLATION (HCC): ICD-10-CM

## 2024-12-10 LAB
INR PPP: 1.75 (ref 0.85–1.19)
PROTHROMBIN TIME: 20.6 SECONDS (ref 12.3–15)

## 2024-12-10 PROCEDURE — 85610 PROTHROMBIN TIME: CPT

## 2024-12-10 PROCEDURE — 36415 COLL VENOUS BLD VENIPUNCTURE: CPT

## 2024-12-11 ENCOUNTER — APPOINTMENT (OUTPATIENT)
Dept: PULMONOLOGY | Facility: CLINIC | Age: 84
End: 2024-12-11
Payer: MEDICARE

## 2024-12-11 ENCOUNTER — ANTICOAG VISIT (OUTPATIENT)
Dept: CARDIOLOGY CLINIC | Facility: CLINIC | Age: 84
End: 2024-12-11

## 2024-12-11 ENCOUNTER — TELEPHONE (OUTPATIENT)
Dept: HEMATOLOGY ONCOLOGY | Facility: CLINIC | Age: 84
End: 2024-12-11

## 2024-12-11 ENCOUNTER — OFFICE VISIT (OUTPATIENT)
Dept: HEMATOLOGY ONCOLOGY | Facility: CLINIC | Age: 84
End: 2024-12-11
Payer: MEDICARE

## 2024-12-11 VITALS
OXYGEN SATURATION: 91 % | DIASTOLIC BLOOD PRESSURE: 58 MMHG | HEART RATE: 78 BPM | WEIGHT: 194 LBS | TEMPERATURE: 98.3 F | RESPIRATION RATE: 18 BRPM | HEIGHT: 71 IN | SYSTOLIC BLOOD PRESSURE: 108 MMHG | BODY MASS INDEX: 27.16 KG/M2

## 2024-12-11 DIAGNOSIS — D50.9 IRON DEFICIENCY ANEMIA, UNSPECIFIED IRON DEFICIENCY ANEMIA TYPE: ICD-10-CM

## 2024-12-11 DIAGNOSIS — N18.30 ANEMIA, CHRONIC RENAL FAILURE, STAGE 3 (MODERATE)  (HCC): ICD-10-CM

## 2024-12-11 DIAGNOSIS — R59.1 LYMPHADENOPATHY: ICD-10-CM

## 2024-12-11 DIAGNOSIS — I48.0 PAROXYSMAL ATRIAL FIBRILLATION (HCC): Primary | ICD-10-CM

## 2024-12-11 DIAGNOSIS — D69.6 THROMBOCYTOPENIA (HCC): Primary | ICD-10-CM

## 2024-12-11 DIAGNOSIS — D63.1 ANEMIA, CHRONIC RENAL FAILURE, STAGE 3 (MODERATE)  (HCC): ICD-10-CM

## 2024-12-11 PROCEDURE — G2211 COMPLEX E/M VISIT ADD ON: HCPCS | Performed by: PHYSICIAN ASSISTANT

## 2024-12-11 PROCEDURE — 99215 OFFICE O/P EST HI 40 MIN: CPT | Performed by: PHYSICIAN ASSISTANT

## 2024-12-11 NOTE — PROGRESS NOTES
Hematology/Oncology Outpatient Follow- up Note  Oscar Espinosa 84 y.o. male MRN: @ Encounter: [id number]        Date:  12/11/2024      Assessment / Plan:      Anemia. Suspect Multifactorial.  Developed since 2/2024   Hemoglobin   13.3  8/2023=>   11.2  2/2024 =>   10.3  4/2024 =>   8       7/6/2024 7/2024 no monoclonal band on SPEP.  Normal B12, folate, reticulocyte, haptoglobin.  No LDH elevation      Component of Iron deficiency.  Iron saturation 8% June 2024, (9% 4/24) Ferritin normal but suspect related to inflammation during his hospitalization.    9/2021 colonoscopy at [facility]-diverticulosis, polyps    10/2024 Venofer 200mg x 5 doses.      11/18/24    Flow cytometric analysis does not show significant numbers of circulating blasts or abnormal lymphoid or myeloid population.  Iron saturation 29%, ferritin 390    No elevation of quantitative immunoglobulins    Hgb 9.4  Epo 31             CKD.  Baseline creatinine 1;  EGFR 50- 60s since 10/2021    Given degree of anemia, appropriate EPO response would be around 1000.  Discussed the possibility of erythropoietin replacement.  Reviewed that potential side effects could include cardiovascular events such as MI or stroke.  Our goal would be to keep hemoglobin around 10 g/dL range as raising this more could increase the risk.  He is agreeable.  Will start with Aranesp 100 mcg subcu every 28 days     4.  Mild thrombocytopenia 130s to 140s in July and October 2024.  (Previously normal)    Patient may have component of MDS.  At this time given his multiple comorbid medical conditions, will trial Aranesp, defer bone marrow biopsy      5.  CHF.  Required hospitalization x 2 10/24 & 11/24 and IV diuresis.  Patient reports he had subcutaneous edema up to his nipple line.  With diuresis, he lost 61 pounds.  On 11/11/24 venous doppler-   bilateral inguinal region echogenic structures measuring approximately 2.60cm on the right and 2.22cm on the left suggestive of  enlarged lymphatic channels.    No clinical evidence of lymphadenopathy.  No B symptoms  This was likely 2nd to his fluid overload.  Will obtain B inguinal u/s for re-evaluation.          HPI:  Oscar Garcia  is a 84 y.o. seen for initial consultation 10/17/2024 at the referral of Reyes, Lea, MD regarding anemia.     Past medical history AAA, history of tobacco use 2 packs/day, quitting around 2014, CHF, prediabetes, atrial fibrillation on Coumadin, CAD, COPD, prediabetes, HTN, hyperlipidemia, pulmonary hypertension, recurrent nonmalignant pleural effusion, subclinical hypothyroidism, lumbar radiculopathy, chronic pain syndrome  2003-  anemia s/p heart surgery; chronic microcytic scopic hematuria.  He is undergoing workup in the past including prostate biopsy     9/2021 colonoscopy at [facility]-diverticulosis, polyps     August 17, 2023 hemoglobin 13.3, , white blood cell count 5, 64% segs, 18% lymphocytes, 13% monocytes, platelet count 150     2/20/24 hemoglobin 11.2     Admitted 4/26 to 5/2/2024 secondary to progressive shortness of breath CTA identified left-sided pleural effusion.  Thoracentesis removed 600 cc of fluid.  No malignancy.  He underwent diuresis.  He was discharged on 2 L of O2  4/26/24 hemoglobin 10.3, iron saturation 9%, ferritin 116     Admitted June 18 with acute hypoxic respiratory failure.  Recurrent pleural effusion.  No malignancy.   chest tube placed  Hemoglobin 8.4 on admission  6/19/24 iron saturation 8%, ferritin 139        Admitted July 2024 due to hypotension and lightheadedness  7/6/24 hemoglobin 8, MCV 86, creatinine 1.32, total protein 6.9, albumin 2.9, normal calcium alk phos 112        7/9/24 B12 618, normal MMA, folate 13.8, haptoglobin 194, , retic 2.28, no monoclonal gammopathy on SPEP     10/1/24 hemoglobin 8.2, white blood cell count 4.49, platelets 133 (no diff)    BUN 26, creatinine 1.5 on BMP.  Normal calcium 8.8    10/2024-  admitted 2nd to CHF  exacerbation.  Experienced hemoptysis.    Discharged on coumadin 2.5 mg po daily.     Venofer 200mg x 5 doses.       Hgb improved from 7.7 -> 8.2 after 400mg.        11/18/24    Flow cytometric analysis does not show significant numbers of circulating blasts or abnormal lymphoid or myeloid population.  Iron saturation 29%, ferritin 390    No elevation of quantitative immunoglobulins    Hgb 9.4  Epo 31      Review of Systems   Constitutional:  Positive for fatigue. Negative for appetite change, chills, diaphoresis, fever and unexpected weight change.   HENT:   Negative for mouth sores, nosebleeds, sore throat, tinnitus and voice change.    Eyes:  Negative for eye problems.   Respiratory:  Positive for shortness of breath. Negative for chest tightness, cough and wheezing.    Cardiovascular:  Negative for chest pain, leg swelling and palpitations.   Gastrointestinal:  Negative for abdominal distention, abdominal pain, blood in stool, constipation, diarrhea, nausea, rectal pain and vomiting.   Endocrine: Negative for hot flashes.   Genitourinary: Negative.     Musculoskeletal:  Positive for gait problem. Negative for myalgias.   Skin:  Negative for itching and rash.   Neurological:  Positive for gait problem. Negative for dizziness, headaches, light-headedness and numbness.   Hematological:  Negative for adenopathy.   Psychiatric/Behavioral:  Negative for confusion and sleep disturbance. The patient is not nervous/anxious.         Test Results:        Labs:   Lab Results   Component Value Date    HGB 9.4 (L) 11/18/2024    HCT 30.6 (L) 11/18/2024     (H) 11/18/2024     (L) 11/18/2024    WBC 3.91 (L) 11/18/2024    NRBC 0 11/18/2024     Lab Results   Component Value Date     (L) 10/15/2015    K 4.0 11/18/2024    CL 99 11/18/2024    CO2 33 (H) 11/18/2024    ANIONGAP 4 10/15/2015    BUN 19 11/18/2024    CREATININE 1.34 (H) 11/18/2024    GLUCOSE 98 10/15/2015    GLUF 83 11/18/2024    CALCIUM 8.8 11/18/2024     CORRECTEDCA 9.3 07/06/2024    AST 30 11/18/2024    ALT 24 11/18/2024    ALKPHOS 90 11/18/2024    PROT 7.3 10/15/2015    BILITOT 0.79 10/15/2015    EGFR 48 11/18/2024           Imaging:  VAS VENOUS DUPLEX - LOWER LIMB BILATERAL  Result Date: 11/11/2024  Narrative:  [facility]: Indications: Patient presents with bilateral lower extremity edema x a few months. Operative History: 2007-08-09 AAA repair (aorto-bi-fem) 2003-01-01 CABG x 3 Risk Factors The patient has history of Obesity, HTN, Hyperlipidemia, CKD, CAD, CHF, A-fib, COPD, AAA, previous smoking (quit >10yrs ago), and is currently taking a blood thinner.  FINDINGS:  Right         Reflux  Valve Closure Time  GSV Inguinal  Reflux                2.58     CONCLUSION:  Impression:  RIGHT LOWER LIMB: No evidence of acute or chronic deep vein thrombosis. No evidence of superficial thrombophlebitis noted. Doppler evaluation shows reflux noted in the saphenofemoral junction. Popliteal, posterior tibial and anterior tibial arterial Doppler waveforms are triphasic.  LEFT LOWER LIMB: No evidence of acute or chronic deep vein thrombosis. No evidence of superficial thrombophlebitis noted. The GSV was not visualized in the thigh secondary to prior harvest. Doppler evaluation shows a normal response to augmentation maneuvers. Popliteal and posterior tibial arterial Doppler waveforms are triphasic. There appears to be a high grade stenosis vs occlusion of the anterior tibial artery.  Tech Note: There is an echogenic structure located in the bilateral inguinal region measuring approximately 2.60cm on the right and 2.22cm on the left suggestive of enlarged lymphatic channels.  Technical findings were given to Arvind Lantigua MD via [employer] secure chat at 17:50.  SIGNATURE: Electronically Signed by: RAMOS XIAO DO, RPVI on 2024-11-11 06:38:20 PM    US bedside procedure  Result Date: 11/11/2024  Narrative: [id number]     "        Allergies:   Allergies   Allergen Reactions    Meloxicam Edema     Current Medications: Reviewed  PMH/FH/SH:  Reviewed      Physical Exam:    2.08 meters squared    Ht Readings from Last 3 Encounters:   12/11/24 5' 11\" (1.803 m)   12/09/24 5' 11\" (1.803 m)   12/06/24 5' 11\" (1.803 m)        Wt Readings from Last 3 Encounters:   12/11/24 88 kg (194 lb)   12/09/24 87.9 kg (193 lb 12.8 oz)   12/06/24 87.6 kg (193 lb 1.6 oz)        Temp Readings from Last 3 Encounters:   12/11/24 98.3 °F (36.8 °C) (Temporal)   12/09/24 97.7 °F (36.5 °C) (Tympanic)   11/16/24 97.8 °F (36.6 °C) (Oral)        BP Readings from Last 3 Encounters:   12/11/24 108/58   12/09/24 110/60   12/06/24 (!) 98/48             Physical Exam  Vitals reviewed.   Constitutional:       General: He is not in acute distress.     Appearance: He is well-developed. He is ill-appearing (chronic). He is not diaphoretic.   HENT:      Head: Normocephalic and atraumatic.   Eyes:      Conjunctiva/sclera: Conjunctivae normal.   Neck:      Trachea: No tracheal deviation.   Cardiovascular:      Rate and Rhythm: Normal rate and regular rhythm.      Heart sounds: No murmur heard.     No friction rub. No gallop.   Pulmonary:      Effort: Pulmonary effort is normal. No respiratory distress.      Breath sounds: Normal breath sounds. No wheezing or rales.   Chest:      Chest wall: No tenderness.   Abdominal:      General: There is no distension.      Palpations: Abdomen is soft.      Tenderness: There is no abdominal tenderness.   Musculoskeletal:      Cervical back: Normal range of motion and neck supple.   Lymphadenopathy:      Cervical: No cervical adenopathy.   Skin:     General: Skin is warm and dry.      Coloration: Skin is not pale.      Findings: No erythema.   Neurological:      Mental Status: He is alert. Mental status is at baseline.   Psychiatric:         Behavior: Behavior normal.         Thought Content: Thought content normal.         ECOG: " 2      Emergency Contacts:    Extended Emergency Contact Information  Primary Emergency Contact: Dorcas Espinosa  Address: [address] [place] [address] [place] [address] [place]  Home Phone: [phone]  Mobile Phone: [phone]  Relation: Spouse

## 2024-12-16 ENCOUNTER — APPOINTMENT (OUTPATIENT)
Dept: PULMONOLOGY | Facility: CLINIC | Age: 84
End: 2024-12-16
Payer: MEDICARE

## 2024-12-17 ENCOUNTER — TELEPHONE (OUTPATIENT)
Age: 84
End: 2024-12-17

## 2024-12-17 ENCOUNTER — OFFICE VISIT (OUTPATIENT)
Dept: PULMONOLOGY | Facility: CLINIC | Age: 84
End: 2024-12-17
Payer: MEDICARE

## 2024-12-17 VITALS
WEIGHT: 200.6 LBS | BODY MASS INDEX: 27.98 KG/M2 | TEMPERATURE: 97.2 F | DIASTOLIC BLOOD PRESSURE: 60 MMHG | OXYGEN SATURATION: 94 % | HEART RATE: 74 BPM | SYSTOLIC BLOOD PRESSURE: 98 MMHG

## 2024-12-17 DIAGNOSIS — J44.9 CHRONIC OBSTRUCTIVE PULMONARY DISEASE, UNSPECIFIED COPD TYPE (HCC): ICD-10-CM

## 2024-12-17 DIAGNOSIS — G47.33 SEVERE OBSTRUCTIVE SLEEP APNEA: Primary | ICD-10-CM

## 2024-12-17 DIAGNOSIS — G47.34 NOCTURNAL HYPOXEMIA: ICD-10-CM

## 2024-12-17 DIAGNOSIS — I27.20 MODERATE TO SEVERE PULMONARY HYPERTENSION (HCC): ICD-10-CM

## 2024-12-17 DIAGNOSIS — J96.01 ACUTE HYPOXIC RESPIRATORY FAILURE (HCC): ICD-10-CM

## 2024-12-17 DIAGNOSIS — J96.11 CHRONIC RESPIRATORY FAILURE WITH HYPOXIA (HCC): ICD-10-CM

## 2024-12-17 DIAGNOSIS — R91.8 MULTIPLE PULMONARY NODULES: ICD-10-CM

## 2024-12-17 PROCEDURE — 99213 OFFICE O/P EST LOW 20 MIN: CPT | Performed by: NURSE PRACTITIONER

## 2024-12-17 PROCEDURE — G2211 COMPLEX E/M VISIT ADD ON: HCPCS | Performed by: NURSE PRACTITIONER

## 2024-12-17 NOTE — PATIENT INSTRUCTIONS
Continue Advair twice daily  Continue oxygen 2 L  Continue diuretic per cardiology  Follow up in 3 months

## 2024-12-17 NOTE — TELEPHONE ENCOUNTER
Pt wife would like the lab orders placed Kiko to be mailed to their address on file. She would like a call back to let her know this was completed. Thank you.

## 2024-12-17 NOTE — TELEPHONE ENCOUNTER
Pt wife would like to discuss when should pt complete the lab orders from Erica Hoover PA-C. Dorcas would like a call back at [phone]. Thank you.

## 2024-12-17 NOTE — PROGRESS NOTES
Pulmonary Follow-Up Note   Oscar Espinosa 84 y.o. male MRN: [id number]  12/19/2024      Assessment/Plan:    Problem List Items Addressed This Visit       Severe obstructive sleep apnea - Primary    Severe YEVGENIY noncompliant with CPAP unable to tolerate  Uses 2 L supplemental O2 at home         Multiple pulmonary nodules    CT chest 10/22/2024 shows  tiny pleural effusion on the left.  Small pleural effusion on the right.  Right-sided pleural effusion appears mildly intervally increased when compared to prior.  There is some associated passive atelectasis noted dependently in bilateral lower lungs.  Scarring seen in periphery and a prominence of interstitial markings especially in the lower lung fields consider fluid overload versus interstitial pneumonitis such as NSIP             Chronic obstructive pulmonary disease (HCC)    COPD Gold stage IIa mMrc 1    PFTs completed 02/20/2021 shows no large airway obstruction decreased forced vital capacity moderate diffusion impairment.  FEV1 74%, FVC 74%, FEV1/FVC ratio 71%  Does not appear in exacerbation.  Denies any recent exacerbations of COPD.  Well-controlled on Advair 100-50 mcg/ACT and albuterol 90 mcg/ACT 2 puffs every 6 hours as needed  Had recent hospitalization for CHF exacerbation  Currently enrolled in pulmonary rehab.  Tolerating well             Moderate to severe pulmonary hypertension (HCC)    Most recent echo 11/7/2024 shows EF 60% with normal LV systolic function, moderate pulmonary hypertension with RVSP 55 mmHg and severe tricuspid regurgitation  PHT likely secondary to WHO group 2  Follows with cardiology         Nocturnal hypoxemia    Home sleep study completed 2021 shows lowest oxygen saturation 79% and 86% of sleep time with oxygen saturation below 90%  Patient does not tolerate PAP therapy  Continue 2 L supplemental oxygen nocturnally         Acute hypoxic respiratory failure (HCC)    Most recent oxygen titration study shows patient requires 2 L  supplemental oxygen continuously           Chronic respiratory failure with hypoxia (HCC)    2 L supplemental oxygen continuously            No follow-ups on file.    All of Oscar's questions were answered prior to leaving the office today. He will follow-up in 3 months or sooner should the need arise. He is aware to call our office with any further questions or concerns.    History of Present Illness   Reason for Visit: Follow up  Chief Complaint: feels well  HPI: Oscar Espinosa is a 84 y.o. male who presents to the office today for hospital follow-up.  Patient had recent hospitalization 11/11/2024 to 11/16/2024 for exacerbation of CHF.  Diuresed for -7.2 L over the course of his admission.  Discharged 2 pounds below dry weight at 213.    Presents to the office today overall feeling well.  On 2 L supplemental oxygen continuously.  Denies any signs or symptoms of exacerbation.  COPD overall well-controlled on Advair.  He was last seen in the office with Dr. Beebe with complaints of hemoptysis likely secondary to Coumadin therapy.  He denies any recent episodes of hemoptysis.    He had recent travel to [place] with supplemental oxygen.  Denies any issues traveling with oxygen therapy.  Managing his fluid status is overall improved his breathing.    He does have severe YEVGENIY does not tolerate PAP therapy.  Continues with nocturnal oxygen    I recommend patient follow-up with Dr. Calixto in the office in 3 months.    Review of Systems   Constitutional:  Negative for chills and fever.   Respiratory:  Negative for cough, shortness of breath and wheezing.      Historical Information   Past Medical History:   Diagnosis Date    Abdominal aortic aneurysm (HCC)     s/p repair    Abnormal ECG july 2022    Acute on chronic congestive heart failure (HCC) 09/11/2019    Acute respiratory failure with hypoxia ([facility]) 05/06/2024    Aneurysm ([facility]) 2007    Aneurysm of abdominal aorta ([facility])     49mm, trileaflet AV    Atrial  fibrillation (HCC)     Eliquis    BPH (benign prostatic hyperplasia)     CAD (coronary artery disease)     s/p CABGx3    CHF (congestive heart failure) (HCC)     Chronic pain     Gabapentin    Chronic pain disorder     lumbar    COPD (chronic obstructive pulmonary disease) ([facility])     Coronary artery disease     Elevated serum creatinine 06/18/2024    Elevated serum creatinine 06/18/2024    Elevated transaminase level 04/27/2024    Former tobacco use     Hyperlipidemia     Hypertension     Hyponatremia 04/27/2024    Hyponatremia 04/27/2024    Hypothyroidism     Lumbar radiculopathy     Lumbar stenosis     s/p injections    Neuropathy     Obesity (BMI 30-39.9)     YEVGENIY (obstructive sleep apnea)     unable to tolerate CPAP    Platelets decreased ([facility]) 07/27/2022    Pulmonary hypertension ([facility])     moderate to severe    Spondylolisthesis of lumbar region     Visual impairment 2022    Vitamin D deficiency      Past Surgical History:   Procedure Laterality Date    ABDOMINAL AORTIC ANEURYSM REPAIR  08/09/2007    2 dock limbs with visc extens prosth    CARDIAC CATHETERIZATION      COLONOSCOPY      CORONARY ARTERY BYPASS GRAFT  2003    LIMA to LAD, sequential SVG to OM1 & OM2, SVG to RDA    EYE SURGERY      IR CHEST TUBE PLACEMENT  06/19/2024    LUMBAR EPIDURAL INJECTION       Family History   Problem Relation Age of Onset    Heart disease Mother     Stroke Father         stroke syndrome    Aneurysm Brother         abdominal    Aortic aneurysm Brother         ascending    Coronary artery disease Family     Hypertension Family     Other Son         gioblastoma multiforme     Social History   Social History     Substance and Sexual Activity   Alcohol Use Yes    Alcohol/week: 1.0 standard drink of alcohol    Types: 1 Cans of beer per week     Social History     Substance and Sexual Activity   Drug Use No     Social History     Tobacco Use   Smoking Status Former    Current packs/day: 0.00    Average packs/day: 1 pack/day for 55.6  years (55.6 ttl pk-yrs)    Types: Cigarettes    Start date: 1957    Quit date: 2012    Years since quittin.3    Passive exposure: Past   Smokeless Tobacco Never     E-Cigarette/Vaping    E-Cigarette Use Never User      E-Cigarette/Vaping Substances    Nicotine No     THC No     CBD No     Flavoring No     Other No     Unknown No        Meds/Allergies     Current Outpatient Medications:     acetaminophen (TYLENOL) 325 mg tablet, Take 2 tablets by mouth every 6 (six) hours as needed for fever, headaches, mild pain, moderate pain or severe pain. Indications: Fever, Pain, Disp: , Rfl:     albuterol (ProAir HFA) 90 mcg/act inhaler, Inhale 2 puffs every 6 (six) hours as needed for wheezing, Disp: 24 g, Rfl: 0    amiodarone 200 mg tablet, Take 1 tablet (200 mg total) by mouth daily, Disp: 90 tablet, Rfl: 3    aspirin (ECOTRIN LOW STRENGTH) 81 mg EC tablet, Take 1 tablet by mouth daily, Disp: , Rfl:     atorvastatin (LIPITOR) 40 mg tablet, TAKE 1 TABLET BY MOUTH DAILY, Disp: 90 tablet, Rfl: 1    cholecalciferol (VITAMIN D3) 1,000 units tablet, Take 2,000 Units by mouth daily, Disp: , Rfl:     Empagliflozin (JARDIANCE) 10 MG TABS tablet, Take 1 tablet (10 mg total) by mouth every morning, Disp: 30 tablet, Rfl: 1    Fluticasone-Salmeterol (Advair) 100-50 mcg/dose inhaler, INHALE 1 PUFF BY MOUTH TWICE DAILY. RINSE MOUTH AFTER USE, Disp: 180 blister, Rfl: 1    gabapentin (NEURONTIN) 300 mg capsule, Take 3 capsules (900 mg total) by mouth daily at bedtime, Disp: , Rfl:     labetalol (NORMODYNE) 100 mg tablet, Take 0.5 tablets (50 mg total) by mouth in the morning, Disp: 30 tablet, Rfl: 1    multivitamin (THERAGRAN) TABS, Take 1 tablet by mouth daily, Disp: , Rfl:     mupirocin (BACTROBAN) 2 % ointment, APPLY TOPICALLY TO SORES TWICE DAILY UNTIL HEALED, Disp: , Rfl:     naloxone (NARCAN) 4 mg/0.1 mL nasal spray, Administer 1 spray into a nostril. If no response after 2-3 minutes, give another dose in the other  nostril using a new spray., Disp: 1 each, Rfl: 1    oxyCODONE (ROXICODONE) 10 MG TABS, Take 1 tablet (10 mg total) by mouth every 6 (six) hours as needed for moderate pain Max Daily Amount: 40 mg, Disp: 120 tablet, Rfl: 0    oxygen gas, Inhale 2 L continuous. 2L of oxygen via nasal cannula as needed and at night  Indications: short of breath, Disp: , Rfl:     polyethylene glycol (MIRALAX) 17 g packet, Take 17 g by mouth daily as needed (constipation). Oscar Espinosa dissolve in 8 oz liquid of choice   Indications: ., Disp: , Rfl:     potassium chloride (Klor-Con M20) 20 mEq tablet, TAKE 1 TABLET BY MOUTH DAILY 7  DAYS WEEKLY, Disp: 90 tablet, Rfl: 1    senna (SENOKOT) 8.6 MG tablet, Take 1 tablet by mouth as needed for constipation, Disp: , Rfl:     tamsulosin (FLOMAX) 0.4 mg, Take 1 capsule by mouth daily, Disp: , Rfl:     warfarin (Coumadin) 2.5 mg tablet, Take 1 tab by moth daily or as directed by physician, Disp: 90 tablet, Rfl: 3    torsemide (DEMADEX) 20 mg tablet, Take 2 tablets (40 mg total) by mouth daily (Patient taking differently: Take 40 mg by mouth daily Dose will drop to 20 mg once patient starts jaudiance.), Disp: , Rfl:   No current facility-administered medications for this visit.  Allergies   Allergen Reactions    Meloxicam Edema       Vitals: Blood pressure 98/60, pulse 74, temperature (!) 97.2 °F (36.2 °C), temperature source Tympanic, weight 91 kg (200 lb 9.6 oz), SpO2 94%. Body mass index is 27.98 kg/m². Oxygen Therapy  SpO2: 94 %2 L    Physical Exam:  Physical Exam  Vitals reviewed.   Constitutional:       General: He is not in acute distress.     Appearance: He is not ill-appearing.   HENT:      Head: Normocephalic and atraumatic.      Right Ear: External ear normal.      Left Ear: External ear normal.      Nose: Nose normal.      Mouth/Throat:      Mouth: Mucous membranes are moist.      Pharynx: Oropharynx is clear.   Eyes:      Extraocular Movements: Extraocular movements intact.       "Pupils: Pupils are equal, round, and reactive to light.   Cardiovascular:      Rate and Rhythm: Normal rate and regular rhythm.      Pulses: Normal pulses.      Heart sounds: Normal heart sounds.   Pulmonary:      Effort: Pulmonary effort is normal.      Breath sounds: Normal breath sounds.      Comments: Clear but diminished at the bases  Abdominal:      General: Bowel sounds are normal.   Musculoskeletal:         General: Normal range of motion.      Cervical back: Normal range of motion and neck supple.      Right lower leg: Edema present.      Left lower leg: Edema present.      Comments: Trace edema in bilateral lower ext   Skin:     General: Skin is warm and dry.   Neurological:      Mental Status: He is alert and oriented to person, place, and time.   Psychiatric:         Mood and Affect: Mood normal.         Behavior: Behavior normal.         Thought Content: Thought content normal.         Judgment: Judgment normal.       Imaging and other studies: Results Review Statement: I reviewed radiology reports from this admission including: CT chest.   CT chest 10/22/2024 shows cardiomegaly with coronary artery arthrosclerosis s/p CABG.  Pleural effusions with prominence of interstitial markings, especially in the lower lung fields some scarring noted in periphery    Pulmonary Results (PFTs, PSG): Results Review Statement: I reviewed radiology reports from this admission including: PFT.   PFTs completed 02/20/2021 shows no large airway obstruction decreased forced vital capacity moderate diffusion impairment.  FEV1 74%, FVC 74%, FEV1/FVC ratio 71%    SOSA Lane  [facility]    Portions of the record may have been created with voice recognition software.  Occasional wrong word or \"sound a like\" substitutions may have occurred due to the inherent limitations of voice recognition software.  Read the chart carefully and recognize, using context, where substitutions have " occurred or contact the dictating provider.

## 2024-12-17 NOTE — TELEPHONE ENCOUNTER
Called and spoke with Dorcas. She had questions re: when the blood testing needed to be done. Explained that the CBC needed to be done prior to the infusion appointments for the aranesp injections to determine if he met the parameters. Reviewed the appointment schedule with her and when the blood testing should be done. She was appreciative of the information and had no other questions at this time. Encouraged her to call prn.

## 2024-12-18 ENCOUNTER — APPOINTMENT (OUTPATIENT)
Dept: PULMONOLOGY | Facility: CLINIC | Age: 84
End: 2024-12-18
Payer: MEDICARE

## 2024-12-18 ENCOUNTER — APPOINTMENT (OUTPATIENT)
Dept: LAB | Facility: CLINIC | Age: 84
End: 2024-12-18
Payer: MEDICARE

## 2024-12-18 DIAGNOSIS — E78.2 MIXED HYPERLIPIDEMIA: ICD-10-CM

## 2024-12-18 DIAGNOSIS — D63.1 ANEMIA, CHRONIC RENAL FAILURE, STAGE 3 (MODERATE)  (HCC): ICD-10-CM

## 2024-12-18 DIAGNOSIS — N18.30 ANEMIA, CHRONIC RENAL FAILURE, STAGE 3 (MODERATE)  (HCC): ICD-10-CM

## 2024-12-18 LAB
BASOPHILS # BLD AUTO: 0.03 THOUSANDS/ÂΜL (ref 0–0.1)
BASOPHILS NFR BLD AUTO: 1 % (ref 0–1)
EOSINOPHIL # BLD AUTO: 0.16 THOUSAND/ÂΜL (ref 0–0.61)
EOSINOPHIL NFR BLD AUTO: 4 % (ref 0–6)
ERYTHROCYTE [DISTWIDTH] IN BLOOD BY AUTOMATED COUNT: 21.4 % (ref 11.6–15.1)
HCT VFR BLD AUTO: 32.6 % (ref 36.5–49.3)
HGB BLD-MCNC: 10.3 G/DL (ref 12–17)
IMM GRANULOCYTES # BLD AUTO: 0.01 THOUSAND/UL (ref 0–0.2)
IMM GRANULOCYTES NFR BLD AUTO: 0 % (ref 0–2)
LYMPHOCYTES # BLD AUTO: 0.85 THOUSANDS/ÂΜL (ref 0.6–4.47)
LYMPHOCYTES NFR BLD AUTO: 22 % (ref 14–44)
MCH RBC QN AUTO: 32.5 PG (ref 26.8–34.3)
MCHC RBC AUTO-ENTMCNC: 31.6 G/DL (ref 31.4–37.4)
MCV RBC AUTO: 103 FL (ref 82–98)
MONOCYTES # BLD AUTO: 0.47 THOUSAND/ÂΜL (ref 0.17–1.22)
MONOCYTES NFR BLD AUTO: 12 % (ref 4–12)
NEUTROPHILS # BLD AUTO: 2.33 THOUSANDS/ÂΜL (ref 1.85–7.62)
NEUTS SEG NFR BLD AUTO: 61 % (ref 43–75)
NRBC BLD AUTO-RTO: 0 /100 WBCS
PLATELET # BLD AUTO: 103 THOUSANDS/UL (ref 149–390)
PMV BLD AUTO: 11.6 FL (ref 8.9–12.7)
RBC # BLD AUTO: 3.17 MILLION/UL (ref 3.88–5.62)
WBC # BLD AUTO: 3.85 THOUSAND/UL (ref 4.31–10.16)

## 2024-12-18 PROCEDURE — 85025 COMPLETE CBC W/AUTO DIFF WBC: CPT

## 2024-12-18 PROCEDURE — 36415 COLL VENOUS BLD VENIPUNCTURE: CPT

## 2024-12-18 NOTE — ASSESSMENT & PLAN NOTE
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 Expect serum creatinine levels to improve with continued diuresis    Plan:  Continue IV diuresis  Monitor BMP   Ultrasound-guided intra-articular right hip CSI performed on 11/20/2024  Xray of R hip 12/9 showed: Significant interval worsening of the appearance of the right hip which may be related to neuropathic/inflammatory arthritis, septic arthritis, or less likely rapidly progressive osteoarthritis.  Dr. Deutsch reviewed this new hip XR and notes that her hip has rapidly deteriorated; would recommend ruling out septic arthritis by obtaining an ESR and CRP; aspiration if elevated  Regardless of the results of the labs and/or aspiration, would not recommend another hip injection as it would likely not provide much relief and would delay future hip replacement.  Concern for septic joint; consulted Ortho. ESR 11, CRP ~138.  IR aspirate of Large fluid collection superior to the joint space on 12/11; cultures NG so far and Ortho has less suspicion for infection.  NWB to RLE until f/u with her usual Orthopedist with plan to discharge to SNF.   [place] [facility] [place] and [place] [facility]

## 2024-12-18 NOTE — TELEPHONE ENCOUNTER
Pharmacy told patient there were no more refills on file.    Reason for call:   [x] Refill   [] Prior Auth  [] Other:     Office:   [] PCP/Provider -   [x] Specialty/Provider - Cardio    Medication: atorvastatin (LIPITOR) 40 mg tablet     Dose/Frequency: TAKE 1 TABLET BY MOUTH DAILY     Quantity: 90    Pharmacy: [facility] - [place], [place] - [address]     Does the patient have enough for 3 days?   [x] Yes   [] No - Send as HP to POD

## 2024-12-19 ENCOUNTER — HOSPITAL ENCOUNTER (OUTPATIENT)
Dept: ULTRASOUND IMAGING | Facility: HOSPITAL | Age: 84
Discharge: HOME | End: 2024-12-19
Payer: MEDICARE

## 2024-12-19 ENCOUNTER — HOSPITAL ENCOUNTER (OUTPATIENT)
Dept: INFUSION CENTER | Facility: CLINIC | Age: 84
Discharge: HOME | End: 2024-12-19
Payer: MEDICARE

## 2024-12-19 VITALS
SYSTOLIC BLOOD PRESSURE: 110 MMHG | RESPIRATION RATE: 18 BRPM | DIASTOLIC BLOOD PRESSURE: 53 MMHG | OXYGEN SATURATION: 96 % | HEART RATE: 53 BPM

## 2024-12-19 DIAGNOSIS — R59.1 LYMPHADENOPATHY: ICD-10-CM

## 2024-12-19 DIAGNOSIS — D63.1 ANEMIA, CHRONIC RENAL FAILURE, STAGE 3 (MODERATE)  (HCC): Primary | ICD-10-CM

## 2024-12-19 DIAGNOSIS — N18.30 ANEMIA, CHRONIC RENAL FAILURE, STAGE 3 (MODERATE)  (HCC): Primary | ICD-10-CM

## 2024-12-19 PROCEDURE — 76705 ECHO EXAM OF ABDOMEN: CPT

## 2024-12-19 PROCEDURE — 96372 THER/PROPH/DIAG INJ SC/IM: CPT

## 2024-12-19 RX ORDER — ATORVASTATIN CALCIUM 40 MG/1
40 TABLET, FILM COATED ORAL DAILY
Qty: 90 TABLET | Refills: 1 | Status: SHIPPED | OUTPATIENT
Start: 2024-12-19

## 2024-12-19 RX ADMIN — DARBEPOETIN ALFA 100 MCG: 100 INJECTION, SOLUTION INTRAVENOUS; SUBCUTANEOUS at 09:05

## 2024-12-19 NOTE — PROGRESS NOTES
Patient to Infusion Center for Aranesp: Offers no complaints at present time: Lab work ( 12/18/24 ) reviewed: Hgb - 10.3: Within parameters to treat: Injection given in Right UA without incident: No adverse reactions noted: Verified follow up appt with patient ( 01/16/25 ): AVS given per request

## 2024-12-19 NOTE — ASSESSMENT & PLAN NOTE
COPD Gold stage IIa mMrc 1    PFTs completed 02/20/2021 shows no large airway obstruction decreased forced vital capacity moderate diffusion impairment.  FEV1 74%, FVC 74%, FEV1/FVC ratio 71%  Does not appear in exacerbation.  Denies any recent exacerbations of COPD.  Well-controlled on Advair 100-50 mcg/ACT and albuterol 90 mcg/ACT 2 puffs every 6 hours as needed  Had recent hospitalization for CHF exacerbation  Currently enrolled in pulmonary rehab.  Tolerating well

## 2024-12-19 NOTE — ASSESSMENT & PLAN NOTE
CT chest 10/22/2024 shows  tiny pleural effusion on the left.  Small pleural effusion on the right.  Right-sided pleural effusion appears mildly intervally increased when compared to prior.  There is some associated passive atelectasis noted dependently in bilateral lower lungs.  Scarring seen in periphery and a prominence of interstitial markings especially in the lower lung fields consider fluid overload versus interstitial pneumonitis such as NSIP

## 2024-12-19 NOTE — ASSESSMENT & PLAN NOTE
Home sleep study completed 2021 shows lowest oxygen saturation 79% and 86% of sleep time with oxygen saturation below 90%  Patient does not tolerate PAP therapy  Continue 2 L supplemental oxygen nocturnally

## 2024-12-19 NOTE — ASSESSMENT & PLAN NOTE
Most recent echo 11/7/2024 shows EF 60% with normal LV systolic function, moderate pulmonary hypertension with RVSP 55 mmHg and severe tricuspid regurgitation  PHT likely secondary to WHO group 2  Follows with cardiology

## 2024-12-23 ENCOUNTER — APPOINTMENT (OUTPATIENT)
Dept: LAB | Facility: CLINIC | Age: 84
End: 2024-12-23
Payer: MEDICARE

## 2024-12-23 ENCOUNTER — ANTICOAG VISIT (OUTPATIENT)
Dept: CARDIOLOGY CLINIC | Facility: CLINIC | Age: 84
End: 2024-12-23

## 2024-12-23 DIAGNOSIS — R73.01 IMPAIRED FASTING GLUCOSE: ICD-10-CM

## 2024-12-23 DIAGNOSIS — E78.2 MIXED HYPERLIPIDEMIA: ICD-10-CM

## 2024-12-23 DIAGNOSIS — I48.0 PAROXYSMAL ATRIAL FIBRILLATION (HCC): Primary | ICD-10-CM

## 2024-12-23 DIAGNOSIS — I10 BENIGN ESSENTIAL HYPERTENSION: ICD-10-CM

## 2024-12-23 DIAGNOSIS — I48.0 PAROXYSMAL ATRIAL FIBRILLATION (HCC): ICD-10-CM

## 2024-12-23 DIAGNOSIS — R73.03 PREDIABETES: ICD-10-CM

## 2024-12-23 DIAGNOSIS — I50.30 (HFPEF) HEART FAILURE WITH PRESERVED EJECTION FRACTION (HCC): ICD-10-CM

## 2024-12-23 LAB
INR PPP: 1.64 (ref 0.85–1.19)
PROTHROMBIN TIME: 19.6 SECONDS (ref 12.3–15)

## 2024-12-23 PROCEDURE — 36415 COLL VENOUS BLD VENIPUNCTURE: CPT

## 2024-12-23 PROCEDURE — 85610 PROTHROMBIN TIME: CPT

## 2025-01-02 ENCOUNTER — APPOINTMENT (OUTPATIENT)
Dept: LAB | Facility: CLINIC | Age: 85
End: 2025-01-02
Payer: MEDICARE

## 2025-01-02 ENCOUNTER — RESULTS FOLLOW-UP (OUTPATIENT)
Dept: INTERNAL MEDICINE CLINIC | Facility: CLINIC | Age: 85
End: 2025-01-02

## 2025-01-02 DIAGNOSIS — I48.0 PAROXYSMAL ATRIAL FIBRILLATION (HCC): ICD-10-CM

## 2025-01-02 LAB
ALBUMIN SERPL BCG-MCNC: 3.7 G/DL (ref 3.5–5)
ALP SERPL-CCNC: 83 U/L (ref 34–104)
ALT SERPL W P-5'-P-CCNC: 20 U/L (ref 7–52)
ANION GAP SERPL CALCULATED.3IONS-SCNC: 5 MMOL/L (ref 4–13)
AST SERPL W P-5'-P-CCNC: 24 U/L (ref 13–39)
BILIRUB SERPL-MCNC: 0.69 MG/DL (ref 0.2–1)
BUN SERPL-MCNC: 24 MG/DL (ref 5–25)
CALCIUM SERPL-MCNC: 9 MG/DL (ref 8.4–10.2)
CHLORIDE SERPL-SCNC: 101 MMOL/L (ref 96–108)
CO2 SERPL-SCNC: 34 MMOL/L (ref 21–32)
CREAT SERPL-MCNC: 1.47 MG/DL (ref 0.6–1.3)
GFR SERPL CREATININE-BSD FRML MDRD: 43 ML/MIN/1.73SQ M
GLUCOSE P FAST SERPL-MCNC: 99 MG/DL (ref 65–99)
POTASSIUM SERPL-SCNC: 5.1 MMOL/L (ref 3.5–5.3)
PROT SERPL-MCNC: 6.6 G/DL (ref 6.4–8.4)
SODIUM SERPL-SCNC: 140 MMOL/L (ref 135–147)

## 2025-01-02 PROCEDURE — 36415 COLL VENOUS BLD VENIPUNCTURE: CPT

## 2025-01-02 PROCEDURE — 80053 COMPREHEN METABOLIC PANEL: CPT

## 2025-01-03 NOTE — PROGRESS NOTES
Advanced Heart Failure / Pulmonary Hypertension Outpatient Visit    Oscar Espinosa 84 y.o. male   MRN: [id number]  Encounter: [id number]    Assessment:  Patient Active Problem List    Diagnosis Date Noted    Stage 3b chronic kidney disease ([facility]) 01/08/2025    Anemia, chronic renal failure, stage 3 (moderate)  ([facility]) 12/11/2024    LITO (acute kidney injury) ([facility]) 11/12/2024    Acute on chronic diastolic heart failure ([facility]) 11/11/2024    Continuous opioid dependence ([facility]) 10/31/2024    CKD (chronic kidney disease) 10/24/2024    Iron deficiency anemia, unspecified 10/17/2024    Chronic respiratory failure with hypoxia ([facility]) 08/12/2024    Chronic obstructive pulmonary disease, unspecified COPD type ([facility]) 07/31/2024    Hypotension 07/06/2024    Prediabetes 06/18/2024    Chronic combined systolic and diastolic congestive heart failure ([facility]) 05/11/2024    Acute hypoxic respiratory failure ([facility]) 05/06/2024    Pleural effusion with shortness of breath 04/27/2024    Anemia 04/27/2024    Bilateral carotid artery stenosis 01/23/2024    Venous insufficiency of both lower extremities 08/31/2023    Hypertensive heart and chronic kidney disease with heart failure and stage 1 through stage 4 chronic kidney disease, or chronic kidney disease ([facility]) 10/27/2022    Thrombocytopenia ([facility]) 07/27/2022    Myofascial pain syndrome 07/27/2022    Paroxysmal atrial fibrillation ([facility]) 07/27/2022    Heart failure with improved ejection fraction (HFimpEF) ([facility]) 01/14/2022    Ascending aortic aneurysm ([facility]) 05/21/2021    Nocturnal hypoxemia     Moderate to severe pulmonary hypertension ([facility]) 01/06/2021    Chronic obstructive pulmonary disease ([facility]) 09/11/2019    Multiple pulmonary nodules 09/10/2019    Chronic pain syndrome     Abnormal thyroid function test 10/22/2014    Abdominal aortic aneurysm without rupture ([facility]) 04/14/2014    Aortic valve disorder 04/14/2014    Lumbar radiculopathy 09/03/2013    Benign essential hypertension 04/10/2013     Impaired fasting glucose 04/10/2013    Severe obesity (BMI 35.0-35.9 with comorbidity) ([facility]) 04/10/2013    Hyperlipidemia 09/07/2012    Microscopic hematuria 09/07/2012    Severe obstructive sleep apnea 09/07/2012    CAD (coronary artery disease) 09/07/2012       Today's Plan:  Continue Torsemide 20 mg Daily.  Volume status stable.  Could consider decreasing to 10 mg if repeat blood work shows up trending creat., pt continues with poor fluid intake   Increase fluid intake to 64 oz/day--pt only drinking about 40 oz/day.  Creatinine up on last BMP to 1.4  Continue Jardiance 10 mg daily, Labetalol 50 mg QD.  BP's stable.   Attending pulmonary rehab--improving ADL's, and energy  Call HF Clinic with worsening SOB, rapid weight gain, Leg edema.    Repeat BMP prior to next appt.  Will call with abnormal results  RTO with Dr. Vergara March 13, 2025    Plan:  Chronic HFimpEF, LVEF 60%  -Etiology: history of CAD s/p CABG, afib        Home diuretic: Torsemide 20 mg QD, Jardiance 10 mg daily    Volume status: Euvolemic on exam, Trace NP LE edema, JVD not elevated.  Weights stable at home    Weighed 212 at discharge, 11/18-->210 lbs, last SS=648 lbs--> iruex=512 lbs (weights stable at home 193-196 lbs)    Last BMP  01/02: na 140, k 5.1, creat up 1.47 (1.3)    BP today: 110/58, HR 58     Pharmacotherapies / Neurohormonal Blockade:  --Beta Blocker: Currently on labetalol 50 daily  --ARNi / ACEi / ARB:   --Aldosterone Antagonist: Spironolactone 12.5 mg daily--dc'd in June, pt has not been taking  --SGLT2 Inhibitor: Jardiance 10 daily   --Diuretic: Torsemide 40 mg QD-->decrease Torsemide to 20 mg daily once Jardiance started     Sudden Cardiac Death Risk Reduction:  --ICD: LVEF > 35%        TTE 11/2024 showed EF 60%, L and R atrium dilated, mild to moderate MR and severe TR.   TTE 4/27/2024: LVEF 45%.  Normal wall motion.  Severely abnormal diastolic function.  Normal RV.  CHAU.  Mild AI, moderate MR, severe TR.  Moderately dilated  ascending aorta at 4.5 cm, aortic root 3.5 cm.      CKD.   -Baseline creat 1.2  -BMP 01/02:  creat up to 1.4 (1.3)     COPD  -Chronic O2   -follows with Pulm     Hypoxic respiratory failure  -home O2 of 2 L-3L     Paroxysmal atrial fibrillation  Rate:  labetalol 50 daily   Rhythm: amiodarone 200 mg daily  AC:  warfarin     HTN-controlled     History of CAD s/p CABG  -CABG x 4 in 2003 (LIMA to LAD, SVG to OM1, OM2, and SVG to PDA)  -Denies any anginal complaints  -Stress test in 2019 reviewed showing small amount of ischemia in the inferolateral region, no interval change in myocardial perfusion compared to prior stress test.  -On ASA, atorvastatin, and labetalol     Left lower lobe pneumonia-completed antibiotics, s/p thoracentesis 4/28 for parapneumonic effusion     YEVGENIY, not on CPAP,wears O2 at night     Chronic back pain.      HPI:  11/18 TH: 84-year-old with past medical history described above who presents for hospital follow-up with his wife.  Recently admitted with decompensated heart failure.  was diuresed and transition to higher dose oral diuretic on discharge.  It was felt the trigger for his decompensation was related to dietary indiscretions.  He is feeling better today.  His chief complaint is more so related to chronic back pain which he believes is his most limiting factor.  Has noticed that he needs a little more oxygen with activities.  Just walking into our clinic today his O2 sats dropped to the mid 80s on 2 L.      12/06: 2 week f/u: Presents to office with wife. Feels good overall. Reports LE swelling has greatly improved.  Wears lymphedema wraps most days, although they are off today. Chronic O2 use--breathing has been stable since discharge and pulse ox at home has been normal.  He is concerned about his weight loss, however, reports eating a strict low sodium and heart healthy diet, along with a diminished appetite.  Discussed muscle mass loss and recommended to drink Ensure for added  protein. He is voiding a good amount during the day and does not feel too dry. He plans on establishing care with palliative team for pain mgt of his chronic back pain.  Denies CP, palpitations, dizziness, or orthopnea.      01/08/25: Pt is feeling well overall.  Has no cardiac complaints.  Weights at home stable 193-196 lbs.  No SOB, CP, dizziness, orthopnea (sleeps in recliner).  He admits to drinking less than 2 liters/day with less urine output than normal.  Encouraged to increase fluid intake to avoid dehydration.  Will consider decreasing Torsemide if repeat creatinine does not improve. He is attending Pulmonary rehab and feeling improvement in breathing and energy level.  He is able to walk about 1/2 block before stopping to rest. Has portable O2 NC at 2 liters.      Past Medical History:   Diagnosis Date    Abdominal aortic aneurysm ([facility])     s/p repair    Abnormal ECG july 2022    Acute on chronic congestive heart failure ([facility]) 09/11/2019    Acute respiratory failure with hypoxia ([facility]) 05/06/2024    Aneurysm ([facility]) 2007    Aneurysm of abdominal aorta ([facility])     49mm, trileaflet AV    Atrial fibrillation ([facility])     Eliquis    BPH (benign prostatic hyperplasia)     CAD (coronary artery disease)     s/p CABGx3    CHF (congestive heart failure) ([facility])     Chronic pain     Gabapentin    Chronic pain disorder     lumbar    COPD (chronic obstructive pulmonary disease) ([facility])     Coronary artery disease     Elevated serum creatinine 06/18/2024    Elevated serum creatinine 06/18/2024    Elevated transaminase level 04/27/2024    Former tobacco use     Hyperlipidemia     Hypertension     Hyponatremia 04/27/2024    Hyponatremia 04/27/2024    Hypothyroidism     Lumbar radiculopathy     Lumbar stenosis     s/p injections    Neuropathy     Obesity (BMI 30-39.9)     YEVGEINY (obstructive sleep apnea)     unable to tolerate CPAP    Platelets decreased ([facility]) 07/27/2022    Pulmonary hypertension ([facility])     moderate to severe     Spondylolisthesis of lumbar region     Visual impairment 2022    Vitamin D deficiency        Review of Systems   Constitutional:  Positive for activity change. Negative for fatigue and unexpected weight change.   Respiratory:  Negative for shortness of breath.         Chronic PANDA--at BL 2 L NC, portable O2   Cardiovascular:  Negative for chest pain, palpitations and leg swelling.   Gastrointestinal: Negative.    Endocrine: Negative.    Genitourinary: Negative.    Musculoskeletal: Negative.    Skin: Negative.    Allergic/Immunologic: Negative.    Neurological:  Negative for dizziness.   Hematological: Negative.    Psychiatric/Behavioral: Negative.         Allergies   Allergen Reactions    Meloxicam Edema         Current Outpatient Medications:     acetaminophen (TYLENOL) 325 mg tablet, Take 2 tablets by mouth every 6 (six) hours as needed for fever, headaches, mild pain, moderate pain or severe pain. Indications: Fever, Pain, Disp: , Rfl:     albuterol (ProAir HFA) 90 mcg/act inhaler, Inhale 2 puffs every 6 (six) hours as needed for wheezing, Disp: 24 g, Rfl: 0    amiodarone 200 mg tablet, Take 1 tablet (200 mg total) by mouth daily, Disp: 90 tablet, Rfl: 3    aspirin (ECOTRIN LOW STRENGTH) 81 mg EC tablet, Take 1 tablet by mouth daily, Disp: , Rfl:     atorvastatin (LIPITOR) 40 mg tablet, Take 1 tablet (40 mg total) by mouth daily, Disp: 90 tablet, Rfl: 1    cholecalciferol (VITAMIN D3) 1,000 units tablet, Take 2,000 Units by mouth daily, Disp: , Rfl:     Empagliflozin (JARDIANCE) 10 MG TABS tablet, Take 1 tablet (10 mg total) by mouth every morning, Disp: 30 tablet, Rfl: 1    Fluticasone-Salmeterol (Advair) 100-50 mcg/dose inhaler, INHALE 1 PUFF BY MOUTH TWICE DAILY. RINSE MOUTH AFTER USE, Disp: 180 blister, Rfl: 1    gabapentin (NEURONTIN) 300 mg capsule, Take 3 capsules (900 mg total) by mouth daily at bedtime, Disp: , Rfl:     labetalol (NORMODYNE) 100 mg tablet, Take 0.5 tablets (50 mg total) by mouth in the  morning, Disp: 30 tablet, Rfl: 1    multivitamin (THERAGRAN) TABS, Take 1 tablet by mouth daily, Disp: , Rfl:     mupirocin (BACTROBAN) 2 % ointment, APPLY TOPICALLY TO SORES TWICE DAILY UNTIL HEALED, Disp: , Rfl:     naloxone (NARCAN) 4 mg/0.1 mL nasal spray, Administer 1 spray into a nostril. If no response after 2-3 minutes, give another dose in the other nostril using a new spray., Disp: 1 each, Rfl: 1    oxyCODONE (ROXICODONE) 10 MG TABS, Take 1 tablet (10 mg total) by mouth every 6 (six) hours as needed for moderate pain Max Daily Amount: 40 mg, Disp: 120 tablet, Rfl: 0    oxygen gas, Inhale 2 L continuous. 2L of oxygen via nasal cannula as needed and at night  Indications: short of breath, Disp: , Rfl:     polyethylene glycol (MIRALAX) 17 g packet, Take 17 g by mouth daily as needed (constipation). Oscar Espinosa dissolve in 8 oz liquid of choice   Indications: ., Disp: , Rfl:     potassium chloride (Klor-Con M20) 20 mEq tablet, TAKE 1 TABLET BY MOUTH DAILY 7  DAYS WEEKLY, Disp: 90 tablet, Rfl: 1    senna (SENOKOT) 8.6 MG tablet, Take 1 tablet by mouth as needed for constipation, Disp: , Rfl:     tamsulosin (FLOMAX) 0.4 mg, Take 1 capsule by mouth daily, Disp: , Rfl:     torsemide (DEMADEX) 20 mg tablet, Take 1 tablet (20 mg total) by mouth daily Drop to 20 once pt begins taking jardiance, Disp: 90 tablet, Rfl: 1    warfarin (Coumadin) 2.5 mg tablet, Take 1 tab by moth daily or as directed by physician, Disp: 90 tablet, Rfl: 3    Social History     Socioeconomic History    Marital status: /Civil Union     Spouse name: Not on file    Number of children: Not on file    Years of education: Not on file    Highest education level: Not on file   Occupational History    Occupation: retired   Tobacco Use    Smoking status: Former     Current packs/day: 0.00     Average packs/day: 1 pack/day for 55.6 years (55.6 ttl pk-yrs)     Types: Cigarettes     Start date: 1/1/1957     Quit date: 8/1/2012     Years  "since quittin.4     Passive exposure: Past    Smokeless tobacco: Never   Vaping Use    Vaping status: Never Used   Substance and Sexual Activity    Alcohol use: Yes     Alcohol/week: 1.0 standard drink of alcohol     Types: 1 Cans of beer per week    Drug use: No    Sexual activity: Never   Other Topics Concern    Not on file   Social History Narrative    Not on file     Social Drivers of Health     Financial Resource Strain: Low Risk  (3/7/2024)    Overall Financial Resource Strain (CARDIA)     Difficulty of Paying Living Expenses: Not hard at all   Food Insecurity: No Food Insecurity (2024)    Hunger Vital Sign     Worried About Running Out of Food in the Last Year: Never true     Ran Out of Food in the Last Year: Never true   Transportation Needs: No Transportation Needs (2024)    PRAPARE - Transportation     Lack of Transportation (Medical): No     Lack of Transportation (Non-Medical): No   Physical Activity: Not on file   Stress: Not on file   Social Connections: Not on file   Intimate Partner Violence: Unknown (2024)    Nursing IPS     Feels Physically and Emotionally Safe: Not on file     Physically Hurt by Someone: Not on file     Humiliated or Emotionally Abused by Someone: Not on file     Physically Hurt by Someone: No     Hurt or Threatened by Someone: No   Housing Stability: Low Risk  (2024)    Housing Stability Vital Sign     Unable to Pay for Housing in the Last Year: No     Number of Times Moved in the Last Year: 0     Homeless in the Last Year: No     Family History   Problem Relation Age of Onset    Heart disease Mother     Stroke Father         stroke syndrome    Aneurysm Brother         abdominal    Aortic aneurysm Brother         ascending    Coronary artery disease Family     Hypertension Family     Other Son         gioblastoma multiforme       Vitals:   Blood pressure 110/50, pulse 58, height 5' 11\" (1.803 m), weight 89.4 kg (197 lb), SpO2 95%.    Wt Readings from " "Last 10 Encounters:   01/08/25 89.4 kg (197 lb)   12/17/24 91 kg (200 lb 9.6 oz)   12/11/24 88 kg (194 lb)   12/09/24 87.9 kg (193 lb 12.8 oz)   12/06/24 87.6 kg (193 lb 1.6 oz)   11/18/24 95.3 kg (210 lb 3.2 oz)   11/16/24 96.7 kg (213 lb 3 oz)   11/07/24 105 kg (231 lb)   11/05/24 105 kg (231 lb)   10/31/24 104 kg (230 lb)     Vitals:    01/08/25 1303   BP: 110/50   BP Location: Right arm   Patient Position: Sitting   Cuff Size: Standard   Pulse: 58   SpO2: 95%   Weight: 89.4 kg (197 lb)   Height: 5' 11\" (1.803 m)       Physical Exam  Constitutional:       Appearance: Normal appearance.   Neck:      Vascular: No JVD.   Cardiovascular:      Rate and Rhythm: Normal rate and regular rhythm.      Pulses:           Radial pulses are 2+ on the right side and 2+ on the left side.      Heart sounds: Normal heart sounds, S1 normal and S2 normal.      Comments: Trace NP LE edema  Pulmonary:      Effort: No respiratory distress.      Breath sounds: Normal air entry. Decreased breath sounds present.   Musculoskeletal:      Right lower leg: Edema present.      Left lower leg: Edema present.   Skin:     General: Skin is warm and dry.   Neurological:      Mental Status: He is alert and oriented to person, place, and time. Mental status is at baseline.   Psychiatric:         Behavior: Behavior is cooperative.         Cognition and Memory: Cognition normal.       Labs & Results:  Lab Results   Component Value Date    WBC 3.85 (L) 12/18/2024    HGB 10.3 (L) 12/18/2024    HCT 32.6 (L) 12/18/2024     (H) 12/18/2024     (L) 12/18/2024     Lab Results   Component Value Date    SODIUM 140 01/02/2025    K 5.1 01/02/2025     01/02/2025    CO2 34 (H) 01/02/2025    BUN 24 01/02/2025    CREATININE 1.47 (H) 01/02/2025    GLUC 91 11/16/2024    CALCIUM 9.0 01/02/2025     Lab Results   Component Value Date    INR 2.57 (H) 01/07/2025    INR 1.64 (H) 12/23/2024    INR 1.75 (H) 12/10/2024    PROTIME 27.4 (H) 01/07/2025    " PROTIME 19.6 (H) 12/23/2024    PROTIME 20.6 (H) 12/10/2024     Lab Results   Component Value Date     (H) 11/11/2024        SOSA Rowell

## 2025-01-06 ENCOUNTER — CLINICAL SUPPORT (OUTPATIENT)
Dept: PULMONOLOGY | Facility: CLINIC | Age: 85
End: 2025-01-06
Payer: MEDICARE

## 2025-01-06 DIAGNOSIS — J44.9 CHRONIC OBSTRUCTIVE PULMONARY DISEASE, UNSPECIFIED COPD TYPE (HCC): Primary | ICD-10-CM

## 2025-01-06 PROCEDURE — G0239 OTH RESP PROC, GROUP: HCPCS

## 2025-01-07 ENCOUNTER — APPOINTMENT (OUTPATIENT)
Dept: LAB | Facility: CLINIC | Age: 85
End: 2025-01-07
Payer: MEDICARE

## 2025-01-07 ENCOUNTER — ANTICOAG VISIT (OUTPATIENT)
Dept: CARDIOLOGY CLINIC | Facility: CLINIC | Age: 85
End: 2025-01-07

## 2025-01-07 DIAGNOSIS — I48.0 PAROXYSMAL ATRIAL FIBRILLATION (HCC): Primary | ICD-10-CM

## 2025-01-07 LAB
INR PPP: 2.57 (ref 0.85–1.19)
PROTHROMBIN TIME: 27.4 SECONDS (ref 12.3–15)

## 2025-01-07 PROCEDURE — 36415 COLL VENOUS BLD VENIPUNCTURE: CPT

## 2025-01-07 PROCEDURE — 85610 PROTHROMBIN TIME: CPT

## 2025-01-08 ENCOUNTER — CLINICAL SUPPORT (OUTPATIENT)
Dept: PULMONOLOGY | Facility: CLINIC | Age: 85
End: 2025-01-08
Payer: MEDICARE

## 2025-01-08 ENCOUNTER — OFFICE VISIT (OUTPATIENT)
Dept: CARDIOLOGY CLINIC | Facility: CLINIC | Age: 85
End: 2025-01-08
Payer: MEDICARE

## 2025-01-08 VITALS
HEART RATE: 58 BPM | BODY MASS INDEX: 27.58 KG/M2 | OXYGEN SATURATION: 95 % | DIASTOLIC BLOOD PRESSURE: 50 MMHG | HEIGHT: 71 IN | WEIGHT: 197 LBS | SYSTOLIC BLOOD PRESSURE: 110 MMHG

## 2025-01-08 DIAGNOSIS — I50.33 ACUTE ON CHRONIC DIASTOLIC (CONGESTIVE) HEART FAILURE (HCC): ICD-10-CM

## 2025-01-08 DIAGNOSIS — I50.32 CHRONIC HEART FAILURE WITH PRESERVED EJECTION FRACTION (HCC): Primary | ICD-10-CM

## 2025-01-08 DIAGNOSIS — J44.9 CHRONIC OBSTRUCTIVE PULMONARY DISEASE, UNSPECIFIED COPD TYPE (HCC): Primary | ICD-10-CM

## 2025-01-08 DIAGNOSIS — I50.30 (HFPEF) HEART FAILURE WITH PRESERVED EJECTION FRACTION (HCC): ICD-10-CM

## 2025-01-08 DIAGNOSIS — N18.32 STAGE 3B CHRONIC KIDNEY DISEASE (HCC): ICD-10-CM

## 2025-01-08 DIAGNOSIS — I48.0 PAROXYSMAL ATRIAL FIBRILLATION (HCC): ICD-10-CM

## 2025-01-08 PROCEDURE — G0239 OTH RESP PROC, GROUP: HCPCS

## 2025-01-08 PROCEDURE — 99214 OFFICE O/P EST MOD 30 MIN: CPT

## 2025-01-08 RX ORDER — TORSEMIDE 20 MG/1
20 TABLET ORAL DAILY
Qty: 90 TABLET | Refills: 1 | Status: SHIPPED | OUTPATIENT
Start: 2025-01-08

## 2025-01-08 NOTE — PATIENT INSTRUCTIONS
Please weigh yourself every day (after emptying your bladder) and keep a detailed log of weights.     Contact the Heart Failure program at [phone] if you gain 3+ lbs overnight or 5+ lbs in 5-7 days.  Limit daily sodium/salt intake to 2000 mg daily to prevent fluid retention.  Avoid canned foods, fast food/Chinese food, and processed meats (hot dogs, lunch meat, and sausage etc.). Caution with condiments.  Limit fluid intake to 2000 mL or 2 liters (about 60-65 ounces) daily.  Avoid electrolyte replacement drinks (such as Gatorade, Pedialyte, Propel, Liquid IV, etc.).  Bring complete list of medications and log of daily weights to your follow-up appointment.     Increase fluid intake to 2 liters/day

## 2025-01-13 ENCOUNTER — APPOINTMENT (OUTPATIENT)
Dept: LAB | Facility: CLINIC | Age: 85
End: 2025-01-13
Payer: MEDICARE

## 2025-01-13 ENCOUNTER — CLINICAL SUPPORT (OUTPATIENT)
Dept: PULMONOLOGY | Facility: CLINIC | Age: 85
End: 2025-01-13
Payer: MEDICARE

## 2025-01-13 DIAGNOSIS — D63.1 ANEMIA, CHRONIC RENAL FAILURE, STAGE 3 (MODERATE)  (HCC): ICD-10-CM

## 2025-01-13 DIAGNOSIS — N18.30 ANEMIA, CHRONIC RENAL FAILURE, STAGE 3 (MODERATE)  (HCC): ICD-10-CM

## 2025-01-13 DIAGNOSIS — J44.9 CHRONIC OBSTRUCTIVE PULMONARY DISEASE, UNSPECIFIED COPD TYPE (HCC): Primary | ICD-10-CM

## 2025-01-13 DIAGNOSIS — I50.32 CHRONIC HEART FAILURE WITH PRESERVED EJECTION FRACTION (HCC): ICD-10-CM

## 2025-01-13 LAB
BASOPHILS # BLD AUTO: 0.05 THOUSANDS/ΜL (ref 0–0.1)
BASOPHILS NFR BLD AUTO: 1 % (ref 0–1)
EOSINOPHIL # BLD AUTO: 0.12 THOUSAND/ΜL (ref 0–0.61)
EOSINOPHIL NFR BLD AUTO: 3 % (ref 0–6)
ERYTHROCYTE [DISTWIDTH] IN BLOOD BY AUTOMATED COUNT: 19 % (ref 11.6–15.1)
HCT VFR BLD AUTO: 36.6 % (ref 36.5–49.3)
HGB BLD-MCNC: 11.5 G/DL (ref 12–17)
IMM GRANULOCYTES # BLD AUTO: 0.02 THOUSAND/UL (ref 0–0.2)
IMM GRANULOCYTES NFR BLD AUTO: 1 % (ref 0–2)
LYMPHOCYTES # BLD AUTO: 1.08 THOUSANDS/ΜL (ref 0.6–4.47)
LYMPHOCYTES NFR BLD AUTO: 25 % (ref 14–44)
MCH RBC QN AUTO: 33.1 PG (ref 26.8–34.3)
MCHC RBC AUTO-ENTMCNC: 31.4 G/DL (ref 31.4–37.4)
MCV RBC AUTO: 106 FL (ref 82–98)
MONOCYTES # BLD AUTO: 0.63 THOUSAND/ΜL (ref 0.17–1.22)
MONOCYTES NFR BLD AUTO: 14 % (ref 4–12)
NEUTROPHILS # BLD AUTO: 2.51 THOUSANDS/ΜL (ref 1.85–7.62)
NEUTS SEG NFR BLD AUTO: 56 % (ref 43–75)
NRBC BLD AUTO-RTO: 0 /100 WBCS
PLATELET # BLD AUTO: 101 THOUSANDS/UL (ref 149–390)
PMV BLD AUTO: 10.4 FL (ref 8.9–12.7)
RBC # BLD AUTO: 3.47 MILLION/UL (ref 3.88–5.62)
WBC # BLD AUTO: 4.41 THOUSAND/UL (ref 4.31–10.16)

## 2025-01-13 PROCEDURE — G0239 OTH RESP PROC, GROUP: HCPCS

## 2025-01-13 PROCEDURE — 85025 COMPLETE CBC W/AUTO DIFF WBC: CPT

## 2025-01-13 PROCEDURE — 36415 COLL VENOUS BLD VENIPUNCTURE: CPT

## 2025-01-15 ENCOUNTER — CLINICAL SUPPORT (OUTPATIENT)
Dept: PULMONOLOGY | Facility: CLINIC | Age: 85
End: 2025-01-15
Payer: MEDICARE

## 2025-01-15 DIAGNOSIS — J44.9 CHRONIC OBSTRUCTIVE PULMONARY DISEASE, UNSPECIFIED COPD TYPE (HCC): Primary | ICD-10-CM

## 2025-01-15 PROCEDURE — G0239 OTH RESP PROC, GROUP: HCPCS

## 2025-01-16 ENCOUNTER — HOSPITAL ENCOUNTER (OUTPATIENT)
Dept: INFUSION CENTER | Facility: CLINIC | Age: 85
End: 2025-01-16

## 2025-01-20 ENCOUNTER — APPOINTMENT (OUTPATIENT)
Dept: PULMONOLOGY | Facility: CLINIC | Age: 85
End: 2025-01-20
Payer: MEDICARE

## 2025-01-20 DIAGNOSIS — I50.32 HEART FAILURE WITH IMPROVED EJECTION FRACTION (HFIMPEF) (HCC): ICD-10-CM

## 2025-01-20 NOTE — TELEPHONE ENCOUNTER
Mail order 90 day supply       Reason for call:   [x] Refill   [] Prior Auth  [] Other:     Office:   [] PCP/Provider -   [x] Specialty/Provider - Jeanie Boss    Medication: Jardiance     Dose/Frequency: 10 mg AM    Quantity: 90    Pharmacy: [facility] mail service [place], [place] don montalvo [facility]    Does the patient have enough for 3 days?   [x] Yes   [] No - Send as HP to POD

## 2025-01-21 ENCOUNTER — APPOINTMENT (OUTPATIENT)
Dept: LAB | Facility: CLINIC | Age: 85
End: 2025-01-21
Payer: MEDICARE

## 2025-01-21 DIAGNOSIS — I48.0 PAROXYSMAL ATRIAL FIBRILLATION (HCC): ICD-10-CM

## 2025-01-21 LAB
INR PPP: 2.61 (ref 0.85–1.19)
PROTHROMBIN TIME: 27.8 SECONDS (ref 12.3–15)

## 2025-01-21 PROCEDURE — 36415 COLL VENOUS BLD VENIPUNCTURE: CPT

## 2025-01-21 PROCEDURE — 85610 PROTHROMBIN TIME: CPT

## 2025-01-22 ENCOUNTER — CLINICAL SUPPORT (OUTPATIENT)
Dept: PULMONOLOGY | Facility: CLINIC | Age: 85
End: 2025-01-22
Payer: MEDICARE

## 2025-01-22 ENCOUNTER — ANTICOAG VISIT (OUTPATIENT)
Dept: CARDIOLOGY CLINIC | Facility: CLINIC | Age: 85
End: 2025-01-22

## 2025-01-22 DIAGNOSIS — J44.9 CHRONIC OBSTRUCTIVE PULMONARY DISEASE, UNSPECIFIED COPD TYPE (HCC): Primary | ICD-10-CM

## 2025-01-22 DIAGNOSIS — I48.0 PAROXYSMAL ATRIAL FIBRILLATION (HCC): Primary | ICD-10-CM

## 2025-01-22 PROCEDURE — G0239 OTH RESP PROC, GROUP: HCPCS

## 2025-01-23 DIAGNOSIS — J44.9 CHRONIC OBSTRUCTIVE PULMONARY DISEASE, UNSPECIFIED COPD TYPE (HCC): ICD-10-CM

## 2025-01-23 RX ORDER — FLUTICASONE PROPIONATE AND SALMETEROL 100; 50 UG/1; UG/1
1 POWDER RESPIRATORY (INHALATION) 2 TIMES DAILY
Qty: 180 BLISTER | Refills: 0 | Status: SHIPPED | OUTPATIENT
Start: 2025-01-23

## 2025-01-27 ENCOUNTER — CLINICAL SUPPORT (OUTPATIENT)
Dept: PULMONOLOGY | Facility: CLINIC | Age: 85
End: 2025-01-27
Payer: MEDICARE

## 2025-01-27 DIAGNOSIS — J44.9 CHRONIC OBSTRUCTIVE PULMONARY DISEASE, UNSPECIFIED COPD TYPE (HCC): Primary | ICD-10-CM

## 2025-01-27 PROCEDURE — G0239 OTH RESP PROC, GROUP: HCPCS

## 2025-01-29 ENCOUNTER — APPOINTMENT (OUTPATIENT)
Dept: PULMONOLOGY | Facility: CLINIC | Age: 85
End: 2025-01-29
Payer: MEDICARE

## 2025-01-30 DIAGNOSIS — M54.16 LUMBAR RADICULOPATHY: ICD-10-CM

## 2025-01-30 RX ORDER — GABAPENTIN 300 MG/1
900 CAPSULE ORAL
Qty: 270 CAPSULE | Refills: 1 | Status: SHIPPED | OUTPATIENT
Start: 2025-01-30

## 2025-01-31 ENCOUNTER — TELEPHONE (OUTPATIENT)
Dept: HEMATOLOGY ONCOLOGY | Facility: CLINIC | Age: 85
End: 2025-01-31

## 2025-02-03 ENCOUNTER — CLINICAL SUPPORT (OUTPATIENT)
Dept: PULMONOLOGY | Facility: CLINIC | Age: 85
End: 2025-02-03
Payer: MEDICARE

## 2025-02-03 DIAGNOSIS — J44.9 CHRONIC OBSTRUCTIVE PULMONARY DISEASE, UNSPECIFIED COPD TYPE (HCC): Primary | ICD-10-CM

## 2025-02-03 PROCEDURE — G0239 OTH RESP PROC, GROUP: HCPCS

## 2025-02-03 NOTE — PROGRESS NOTES
Pulmonary Rehabilitation   Assessment and Individualized Treatment Plan  120 DAY      Today's date: February 3, 2025  # of Exercise Sessions Completed: 26  Patient name: Oscar Espniosa     : 1940       MRN: [id number]  Referring Physician: Racquel Wilson DO   Pulmonologist: SOSA Gonzales (Kandice Faustin DO)  Provider: Mickey  Clinician: Willian Box MS, CEP     Dx:    Encounter Diagnosis   Name Primary?    Chronic obstructive pulmonary disease, unspecified COPD type (HCC) Yes     Date of onset: 2019      Treatment is tailored to this patient's individual needs.  The ITP was reviewed with the patient and all questions were answered to their satisfaction.  Additional ITP documentation can be found electronically including daily and monthly exercise summaries, daily session notes with ECG summaries, education notes, daily medication reconciliation, and daily physician supervision.      COMMENTS:  Pt attended 1st initial evaluation to begin Pulmonary Rehab 2x/week. He is using continuous supplemental O2 2L/min. He is looking to go away for giving and wants to increase his strength and overall endurance.     30 Day 24: Increased strength in lower and upper body reported at 30 days. Reporting more energy following exercise sessions. He is noticing it while gardening and able to walk longer in the grocery store holding onto the cart. No changes to diet. Compliant with supplemental O2. Reports ongoing back pain with walking interval.     60 day 10/23/24: Pt currently hospitalized due to CHF exacerbation. Unable to assess progression. Pt will need clearance to return back to rehab following discharge.     90 day 11/15/24: Pt currently hospitalized again for acute CHF exacerbation. He will be placed on medical hold at this time due to this as well as the holiday. He plans on returning  to rehab and will be adding in extra sessions for medical necessity. No progress made in the  past 30 days.     120 day 02/03/2025: Pt has returned back to rehab following his hospitalization and the holidays. He is doing a lot better. He is down weight and keeping it off. He will continue to extend his sessions for medical necessity.     ASSESSMENT      Medical History:   Past Medical History:   Diagnosis Date    Abdominal aortic aneurysm ([facility])     s/p repair    Abnormal ECG july 2022    Acute on chronic congestive heart failure ([facility]) 09/11/2019    Acute respiratory failure with hypoxia ([facility]) 05/06/2024    Aneurysm ([facility]) 2007    Aneurysm of abdominal aorta ([facility])     49mm, trileaflet AV    Atrial fibrillation ([facility])     Eliquis    BPH (benign prostatic hyperplasia)     CAD (coronary artery disease)     s/p CABGx3    CHF (congestive heart failure) ([facility])     Chronic pain     Gabapentin    Chronic pain disorder     lumbar    COPD (chronic obstructive pulmonary disease) ([facility])     Coronary artery disease     Elevated serum creatinine 06/18/2024    Elevated serum creatinine 06/18/2024    Elevated transaminase level 04/27/2024    Former tobacco use     Hyperlipidemia     Hypertension     Hyponatremia 04/27/2024    Hyponatremia 04/27/2024    Hypothyroidism     Lumbar radiculopathy     Lumbar stenosis     s/p injections    Neuropathy     Obesity (BMI 30-39.9)     YEVGENIY (obstructive sleep apnea)     unable to tolerate CPAP    Platelets decreased ([facility]) 07/27/2022    Pulmonary hypertension ([facility])     moderate to severe    Spondylolisthesis of lumbar region     Visual impairment 2022    Vitamin D deficiency        Family History:  Family History   Problem Relation Age of Onset    Heart disease Mother     Stroke Father         stroke syndrome    Aneurysm Brother         abdominal    Aortic aneurysm Brother         ascending    Coronary artery disease Family     Hypertension Family     Other Son         gioblastoma multiforme       Allergies:   Meloxicam    Current Medications:   Current Outpatient Medications   Medication Sig  Dispense Refill    acetaminophen (TYLENOL) 325 mg tablet Take 2 tablets by mouth every 6 (six) hours as needed for fever, headaches, mild pain, moderate pain or severe pain. Indications: Fever, Pain      albuterol (ProAir HFA) 90 mcg/act inhaler Inhale 2 puffs every 6 (six) hours as needed for wheezing 24 g 0    amiodarone 200 mg tablet Take 1 tablet (200 mg total) by mouth daily 90 tablet 3    aspirin (ECOTRIN LOW STRENGTH) 81 mg EC tablet Take 1 tablet by mouth daily      atorvastatin (LIPITOR) 40 mg tablet Take 1 tablet (40 mg total) by mouth daily 90 tablet 1    cholecalciferol (VITAMIN D3) 1,000 units tablet Take 2,000 Units by mouth daily      Empagliflozin (JARDIANCE) 10 MG TABS tablet Take 1 tablet (10 mg total) by mouth every morning 90 tablet 1    Fluticasone-Salmeterol (Wixela Inhub) 100-50 mcg/dose inhaler INHALE 1 PUFF BY MOUTH TWICE DAILY. RINSE MOUTH AFTER  blister 0    gabapentin (NEURONTIN) 300 mg capsule Take 3 capsules (900 mg total) by mouth daily at bedtime 270 capsule 1    labetalol (NORMODYNE) 100 mg tablet Take 0.5 tablets (50 mg total) by mouth in the morning 30 tablet 1    multivitamin (THERAGRAN) TABS Take 1 tablet by mouth daily      mupirocin (BACTROBAN) 2 % ointment APPLY TOPICALLY TO SORES TWICE DAILY UNTIL HEALED      naloxone (NARCAN) 4 mg/0.1 mL nasal spray Administer 1 spray into a nostril. If no response after 2-3 minutes, give another dose in the other nostril using a new spray. 1 each 1    oxyCODONE (ROXICODONE) 10 MG TABS Take 1 tablet (10 mg total) by mouth every 6 (six) hours as needed for moderate pain Max Daily Amount: 40 mg 120 tablet 0    oxygen gas Inhale 2 L continuous. 2L of oxygen via nasal cannula as needed and at night  Indications: short of breath      polyethylene glycol (MIRALAX) 17 g packet Take 17 g by mouth daily as needed (constipation). Oscar Espinosa  dissolve in 8 oz liquid of choice   Indications: .      potassium chloride (Klor-Con M20) 20 mEq  tablet TAKE 1 TABLET BY MOUTH DAILY 7  DAYS WEEKLY 90 tablet 1    senna (SENOKOT) 8.6 MG tablet Take 1 tablet by mouth as needed for constipation      tamsulosin (FLOMAX) 0.4 mg Take 1 capsule by mouth daily      torsemide (DEMADEX) 20 mg tablet Take 1 tablet (20 mg total) by mouth daily Drop to 20 once pt begins taking jardiance 90 tablet 1    warfarin (Coumadin) 2.5 mg tablet Take 1 tab by moth daily or as directed by physician 90 tablet 3     No current facility-administered medications for this visit.       Physical Limitations: lumbar pain after walking a distance     Fall Risk: Moderate   Comments: Ambulates with a steady gait with no assist device and Denies a fall in the past 6 months    Cultural needs: none    PFT:  Yes 2/24/2021    FEV1/FVC ratio of 71%   FEV1 of 74% predicted  mildobstruction.    Medication compliance: Yes  Comments: Pt reports to be compliant with medications      Pulmonary Disease Risk Factors:  smoking    Sleep Disorders:   obstructive sleep apnea:  CPCP/BiPAP:  no - does not tolerate, uses supplemental O2       EXERCISE ASSESSMENT:      Initial Fitness Assessment: 6MWT:  Resting:    Resting:  BP: 116/76  HR: 62  SpO2: 95%  Dyspnea: 0/10  O2 Use: 2 l/min, Exercise:  BP: 126/70  HR:   SpO2: 86-91%  Dyspnea: 6/10  O2 use: 2 l/min, METs:  1.7, ECG Summary: NSR with freq. PVCs, couplet, Symptoms: low back pain and PANDA, and Comments: 1 rest break       Current Functional Status:  Occupation: retired  Recreation/Physical activity: Traveling, patio, rec room   ADL’s:Capable of performing light to moderate ADLs limited by Dyspnea  [place]: limited by Dyspnea able to perform self-care  Exercise:  none  Home exercise equipment: None  Other Comments: N/A    SMART Exercise Goals:   reduced score on CAT, improved 6MWT distance, reduced dyspnea during exercise, improved exercise tolerance based on peak METs tolerated in pulmonary rehab exercise session, and SpO2 >88% during  exercise    Patient Specific EXERCISE GOALS:     reduced dependence on supplemental O2, reduced number of COPD exacerbations, attend pulmonary rehab regularly, decrease sitting time at home, start a walking program, begin a consistent exercise regimen , reduced pulmonary related hospital readmissions , decreased rest needed with physical activity/exercise, increased muscular strength, increased energy, increased stamina with ADLs, and more independence at home    PSYCHOSOCIAL ASSESSMENT:    Date of last assessment: 8/30/2024  Depression screening:  PHQ-9 = 4    Interpretation:  1-4 = Minimal Depression  Anxiety screening:  ERON-7 = 2    Interpretation: 0-4  = Not anxious    Pt self-report of depression and anxiety  Patient reports slight feelings of depression   Patient reports slight feelings of anxiety  Reports great emotional support     Self-reported stress level:  5  Stress Management: practice Relaxation Techniques, exercise, keep a positive mindset, spend time outside, enjoy a hobby, spend time with family, TV, puzzles, keep busy around the house, gardening, and yard work    Quality of Life Screen:  (Higher score indicates disease impact on QOL)  [facility] COOP score: 29/40      CAT: 20/40      Shortness of breath questionnaire: 28/120    Social Support:   spouse, children, and friends  Community/Social Activities: N/A    SMART Goals:    Reduce perceived stress to 1-3/10, improved [facility] QOL < 27, Feelings in [facility] Score < 3, Physical Fitness in [facility] Score < 3, Daily Activity in [facility] Score < 3, Social Activities in [facility] Score < 3, Pain in [facility] Score < 3, Overall Health in [facility] Score < 3, Quality of Life in [facility] Score < 3 ,  reduced time feeling down, depressed or hopeless, improved sleeping habits, feel less tired with more energy, reduced time feeling like a failure and having negative self thoughts, reduced feelings of restlessness, and reduced irritability    Patient  "Specific PSYCHOCOSOCIAL GOALS:    spend time with family and reduce slight feelings of depression/anxiety, and increase energy levels      Psychosocial Assessment as it relates to rehabilitation: Patient denies issues with his/her family or home life that may affect their rehabilitation efforts.       NUTRITION ASSESSMENT:    Initial Weight:  221.6 lbs   Current Weight: 197.2 lbs     Height:   Ht Readings from Last 1 Encounters:   01/08/25 5' 11\" (1.803 m)       Rate Your Plate Score: 53/81    Self-reported Current Dietary Habits:  Loves to eat salads  Fruits and veggies in diet  Drinks green tea, reviewed increasing water intake   Does not eat out  Does not use sodium  Not a big meat eater     ETOH:   Social History     Substance and Sexual Activity   Alcohol Use Yes    Alcohol/week: 1.0 standard drink of alcohol    Types: 1 Cans of beer per week       SMART Goals:   reduced BMI to < 25, decreased body fat% <25%   (M), reduced waist circumference <40 inches (M), fasting BG , improved A1c  < 7.0%, Improved Rate Your Plate score  >64, 2.5-5%  wt loss, eat 6 or more servings of grain products per day, eat whole grain breads, brown rice and whole grain cereals, eat 5 or more servings of fruits and vegetables a day, eat 2 or more servings of low fat milk or yogurt a day, eat no more than 6oz of meat per day, eat red meat once a week or less, eat poultry without the skin, choose 1% or skim milk, rarely eat cheese or choose reduced fat or skim, rarely eat frozen desserts or choose fruit or fat-free sweets, Do not cook with oil, butter or margarine, Do not eat fried foods, choose light or fat-free salad dressings or damico, choose healthy snacks such as fruit, pretzels, and low fat crackers, choose healthy desserts and sweets such as orquidea food cake or  fruit, choose low sodium canned, frozen/packaged foods or rarely/never eat, rarely/never eat salty snacks, choose low sugar desserts and sweets, and drink less than 8oz " of soda and sweetened drinks per day    Patient Specific NUTRITION GOALS:    increase water intake to reduce/thin mucus production reduced SOB while eating eat smaller, more frequent meals decrease caloric intake improve hydration weight loss increased muscle mass      OTHER CORE COMPONENT ASSESSMENT:    Hospitalizations in the past year: 3    Oxygen needs:   Rest:  supplemental O2 via nasal cannula @ 2L/min   Exercise/physical activity:  supplemental O2 via nasal cannula @ 2L/min   Sleep:   supplemental O2 via nasal cannula @ 2L/min     Does Pt monitor home SpO2? yes   Average SpO2 at rest:  95-99% with oxygen, 90% on RA    Average SpO2 with ADLs/physical activity:  low 80s % on RA       Use of Rescue Inhaler:  Yes PRN    Use of Maintenance Inhaler: Yes:  2 times per day    Use of Nebulizer Treatments:  No    Patient practices breathing techniques at home:  Reviewed with patient on:  2024    CAD Risk Factors:  Cholesterol: Yes  HTN: Yes  DM: Pre-diabetes  Obesity: Yes     Smoking: Former user  Quit   1ppd for 55.6 years    SMART Core Component Goals:   consistent, controlled resting BP < 130/80, medication compliance, abstain from smoking, and demonstrate pursed lipped breathing    Patient Specific CORE COMPONENT GOALS:    reduced dietary sodium <2000mg, assess daily wt to report an increase greater than 3lbs in a day or 5lbs in a week, medication compliance, Abstain from smoking, compliance with supplemental oxygen, and monitor home pulse oximetry      Blood Pressure:    Restin//58  Exercise: 90//50  Recovery: 92//60      INDIVIDUALIZED TREATMENT PLAN    The patient is agreeable to attend 24 pulmonary rehab exercise sessions.      EXERCISE GOALS AND PLAN    Current Aerobic Exercise Prescription       Frequency: 2 days/week   Supplement with home exercise 2+ days/wk as tolerated        Minutes:35-40        METS: 2.1-2.3              SpO2: 94-97% on supplemental O2 2L/min via NC                RPD: 3-5                      HR:  60-78   RPE: 4-5         Modalities: UBE, NuStep, and Room walking      Aerobic Exercise Prescription Plan for Progression:    Frequency: 2 days/week    Supplement with home exercise 2+ days/wk as tolerated       Minutes: 45-50        METS: 2.4-2.8              SpO2: >88% on supplemental O2 2L/min via NC              RPD: 4-6                      HR:RHR +30-40bpm   RPE: 4-5        Modalities: UBE, NuStep, Recumbent bike, and Room walking        Supplemental Oxygen Needs with Exercise:  supplemental O2 2L/min via nasal canula and will titrate O2 to maintain SpO2 >88%.    Strength trainin-3 days / week  12-15 repetitions  1-2 sets per modality    Modalities: Lateral Raise, Arm Curl, Sit to Stands, Upright Rows, and Front Raises    Education: pursed lipped breathing, relaxation breathing, home exercise guidelines, benefits of exercise for pulmonary disease, RPE scale, RPD scale, O2 saturation monitoring, appropriate O2 response to exercise, and education class: Exercise For The Pulmonary Patient    Progress toward Exercise Goals:    Pt is progressing and showing improvement  toward the following goals:  back to attending rehab regularly 2x/week, increased functional METs in the past 30 days, strength training program added, and extending sessions for medical necessity.  , Pt has not made progress toward the following goals: no formal home exercise with rehab  . , Will continue to educate and progress as tolerated.    Exercise Plan:  Titrate supplemental oxygen as needed to maintain SpO2>88% with exercise, learn to conserve energy with ADLs , practice diaphragmatic breathing, reduce time sitting at home, increased strength of respiratory muscles, utilize PLB with physical activity, and begin a home exercise program     Readiness to change: Preparation:  (Getting ready to change)       NUTRITION GOALS AND PLAN    Progress toward Nutritional goals:    Pt is progressing and  showing improvement  toward the following goals:  weight loss of 20+ noted in the past 30 days - more aware of diet choices and keeping the weight controlled.  , Will continue to educate and progress as tolerated.    Nutrition Plan: increase intake of whole grains, replace refined flours with whole grains, increase daily intake of fruits and vegetables, increase daily intake of low fat dairy, limit meat intake to less than 6oz per day, eat red meat once a week or less, eat poultry without the skin, drink/use 1%  low fat or skim  milk, eat reduced-fat or part-skim cheese or rarely eat, rarely eat frozen desserts, cook without fats or oils, never/rarely eat fried foods, use light or fat-free salad dressings and mayonnaise, eat healthy snacks like fruit, pretzels, and low fat crackers, choose desserts such as fruit, orquidea food cake, low-fat or fat-free sweets, choose low sodium canned, frozen, packaged foods or rarely eat these foods, rarely/never eat salty snacks, choose low sugar desserts and sweets, and drink less than 8oz of non-diet soda, punch etc. per day    SMART goals are based Rate Your Plate Dietary Self-Assessment. These are the areas in which the patient could score higher on the assessment.  Goals include recommendations for a heart healthy diet based on American Heart Association.    Nutrition Education: heart healthy eating principles  weight loss and management strategies  low sodium diet  maintaining hydration  portion control  group class: Heart Healthy Eating  group class:  Label Reading\    Readiness to change: Preparation:  (Getting ready to change)       PSYCHOSOCIAL GOALS AND PLAN    Information to begin using [employer] was provided as well as contact information for counseling through  [facility].    Progress toward Psychosocial goals:    Pt is progressing and showing improvement  toward the following goals:  maintaining emotional health .  , Will continue to educate and progress as  tolerated.    Psychosocial: Enroll in [employer], Practice relaxation techniques, Exercise, Spend time outdoors, Read a Book, Keep a positive mindset, Join a support group , Reduce dependence  on family, Meet new people, puzzles, Enjoy family, and Avoid triggers    Psychosocial Education: depression and pulmonary disease, tools to manage fear/anxiety related to becoming SOB, and class:  Stress and Pulmonary Disease    Readiness to change: Preparation:  (Getting ready to change)       OTHER CORE COMPONENTS GOALS AND PLAN    Tobacco Use Intervention:     Pt quit 2012   and has abstained    Oxygen Goal: Maintain SpO2>88% during exercise or as advised by pulmonolgist    Progress toward Core Component goals:    Pt is progressing and showing improvement  toward the following goals:  compliance with supplemental O2, medication compliance, blood pressures stable and increasing water intake for better BP management, and watching sodium/significant weight loss noted with recent hospitalization.  , Will continue to educate and progress as tolerated.    Core Component Plan: medication compliance, avoid places with second hand smoke, avoid processed foods, engage in regular exercise, eliminate salt shaker at the table, use salt substitutes, check labels for sodium content, and monitor home BP    Core Component Education:  preventing infections, bronchodilators, bronchial hygiene, education: Pulmonary Anatomy and Physiology, education:  Pulmonary Medications, education:  Clearing Secretions, education: Oxygen, and education: Control Breathing    Readiness to change: Action:  (Changing behavior)

## 2025-02-05 ENCOUNTER — CLINICAL SUPPORT (OUTPATIENT)
Dept: PULMONOLOGY | Facility: CLINIC | Age: 85
End: 2025-02-05
Payer: MEDICARE

## 2025-02-05 DIAGNOSIS — J44.9 CHRONIC OBSTRUCTIVE PULMONARY DISEASE, UNSPECIFIED COPD TYPE (HCC): Primary | ICD-10-CM

## 2025-02-05 PROCEDURE — G0239 OTH RESP PROC, GROUP: HCPCS

## 2025-02-07 ENCOUNTER — CONSULT (OUTPATIENT)
Dept: PALLIATIVE MEDICINE | Facility: CLINIC | Age: 85
End: 2025-02-07
Payer: MEDICARE

## 2025-02-07 ENCOUNTER — RA CDI HCC (OUTPATIENT)
Dept: OTHER | Facility: HOSPITAL | Age: 85
End: 2025-02-07

## 2025-02-07 ENCOUNTER — SOCIAL WORK (OUTPATIENT)
Dept: PALLIATIVE MEDICINE | Facility: CLINIC | Age: 85
End: 2025-02-07
Payer: MEDICARE

## 2025-02-07 VITALS
HEIGHT: 71 IN | DIASTOLIC BLOOD PRESSURE: 70 MMHG | SYSTOLIC BLOOD PRESSURE: 118 MMHG | BODY MASS INDEX: 27.58 KG/M2 | TEMPERATURE: 97.6 F | OXYGEN SATURATION: 98 % | HEART RATE: 60 BPM | WEIGHT: 197 LBS

## 2025-02-07 DIAGNOSIS — J96.11 CHRONIC RESPIRATORY FAILURE WITH HYPOXIA (HCC): ICD-10-CM

## 2025-02-07 DIAGNOSIS — Z51.5 PALLIATIVE CARE BY SPECIALIST: ICD-10-CM

## 2025-02-07 DIAGNOSIS — J44.9 CHRONIC OBSTRUCTIVE PULMONARY DISEASE, UNSPECIFIED COPD TYPE (HCC): ICD-10-CM

## 2025-02-07 DIAGNOSIS — M54.16 LUMBAR RADICULOPATHY: ICD-10-CM

## 2025-02-07 DIAGNOSIS — I50.42 CHRONIC COMBINED SYSTOLIC AND DIASTOLIC CONGESTIVE HEART FAILURE (HCC): ICD-10-CM

## 2025-02-07 DIAGNOSIS — Z71.89 COUNSELING AND COORDINATION OF CARE: Primary | ICD-10-CM

## 2025-02-07 DIAGNOSIS — Z71.89 GOALS OF CARE, COUNSELING/DISCUSSION: ICD-10-CM

## 2025-02-07 DIAGNOSIS — G89.4 CHRONIC PAIN SYNDROME: Primary | ICD-10-CM

## 2025-02-07 PROCEDURE — 99204 OFFICE O/P NEW MOD 45 MIN: CPT | Performed by: STUDENT IN AN ORGANIZED HEALTH CARE EDUCATION/TRAINING PROGRAM

## 2025-02-07 PROCEDURE — NC001 PR NO CHARGE

## 2025-02-07 NOTE — ASSESSMENT & PLAN NOTE
Chronic pain to his back along with neuropathic like pain.  -chronic back issues as reviewed per his prior imaging.  -discussed Palliative Care services and its role in cancer related pain.    However, given patient's chronic pain issues, trouble with sleeping, along with Palliative Diagnoses of CHF and COPD, discussed option of MMJ.  -MMJ certification process reviewed in detail and patient is open to this option.  -patient will reach out to our office once he has his Patient ID number so our team can coordinate the next steps in the MMJ Certification process.    Patient follows Spine and Pain management for ongoing management.  Previously had injections / interventions to his back including a spine stimulator which was helpful at one point, but not much on his most recent treatments.    Patient's PCP has provided Oxycodone in efforts to help with patient's pain, however, does not appear to be providing too much relief.    Offered referral to a Chronic pain specialist for Opioid management only, patient will consider.    Will defer opioid management to patient's other providers.as this is not cancer-related pain.    Orders:    Ambulatory Referral to Palliative Care

## 2025-02-07 NOTE — PROGRESS NOTES
Name: Oscar Espinosa      : 1940      MRN: [id number]  Encounter Provider: Boaz Garcia DO  Encounter Date: 2025   [facility]  :  Assessment & Plan  Lumbar radiculopathy  Chronic pain to his back along with neuropathic like pain.  -chronic back issues as reviewed per his prior imaging.  -discussed Palliative Care services and its role in cancer related pain.    However, given patient's chronic pain issues, trouble with sleeping, along with Palliative Diagnoses of CHF and COPD, discussed option of MMJ.  -MMJ certification process reviewed in detail and patient is open to this option.  -patient will reach out to our office once he has his Patient ID number so our team can coordinate the next steps in the MMJ Certification process.    Patient follows Spine and Pain management for ongoing management.  Previously had injections / interventions to his back including a spine stimulator which was helpful at one point, but not much on his most recent treatments.    Patient's PCP has provided Oxycodone in efforts to help with patient's pain, however, does not appear to be providing too much relief.    Offered referral to a Chronic pain specialist for Opioid management only, patient will consider.    Will defer opioid management to patient's other providers.as this is not cancer-related pain.    Orders:    Ambulatory Referral to Palliative Care    Chronic combined systolic and diastolic congestive heart failure (HCC)  Wt Readings from Last 3 Encounters:   25 89.4 kg (197 lb)   25 89.4 kg (197 lb)   24 91 kg (200 lb 9.6 oz)   Patient to continue following Cardiology.      Orders:    Ambulatory Referral to Palliative Care    Chronic obstructive pulmonary disease, unspecified COPD type (HCC)  To continue following Pulmonology     Orders:    Ambulatory Referral to Palliative Care    Chronic respiratory failure with hypoxia (HCC)  Patient engaged in  Pulmonary rehab.  Patient to continue following Pulmonary specialists    Orders:    Ambulatory Referral to Palliative Care    Chronic pain syndrome  Per PCP / Spine and Pain management         Goals of care, counseling/discussion  Goals - focused on continuing disease directed cares. Patient to continue following his PCP and Cardiac/Pulmonary specialists.  Not ready for hospice.       Palliative care by specialist  Psychosocial   Supportive listening provided  Normalized experience of patient/family  Provided anxiety containment     Referrals Placed / Medical Equipment Ordered  -None today    Follow-Up Recommendations  -Follow-up with PCP and current medical specialists  -Follow-up with palliative care: as needed as requested.  -will have patient follow-up with our office should he wish to pursue MMJ certification             Decisional apparatus: Patient is competent on my exam today. If competence is lost, patient's substitute decision maker would default to spouse by [place].   Advance Directive / Living Will / POLST: Not on file     PDMP Review: I have reviewed the patient's controlled substance dispensing history in the Prescription Drug Monitoring Program in compliance with the [facility] regulations before prescribing any controlled substances.    History of Present Illness   Oscar Espinosa is a 84 y.o. male who presents for consultation.    Palliative Diagnosis - CHF and COPD    Referred for chronic pain.  Discussed Palliative Care services in detail today. Reviewed our role in managing cancer related pain only.  Due to insomnia, CHF, COPD, chronic pain, can consider MMJ. MMJ was reviewed in detail in which patient will consider.    Patient has followed his Spine and Pain management specialists for injections in the past along with a spine stimulator. States these have not worked as well as they used to when he first had these procedures and stimulator.  Patient requires a cane and has a walker as  "needed.    He describes his pain to the low back and radiates to his hip bilaterally.  States leaning on a shopping cart helps with relieving the discomfort in his back (discussed \"shopping cart\" sign).  Patient's last spinal MRI also reviewed from 11/2020 with multilevel spondylotic changes, disc herniation (T12-L1 left paracentral), facet hypertrophy bilaterally and disc bulge (ie L3-4).    Reviewed patient's prior images without acute fractures or destructive osseous lesions (CT chest from 10/22/2024).      MRI Spine - 11/2020  T12-L1: There is a left paracentral disc herniation, protrusion type.  Mild central canal narrowing.  Neural foramina patent bilaterally.     LUMBAR DISC SPACES:     L1-L2:  There is a left neural foraminal disc protrusion.  Mild left neural foraminal narrowing.  Central canal and right neural foramen patent.     L2-L3:  There is uncovering the intervertebral disc space.  There is bilateral facet hypertrophy.  There is a right neural foraminal disc protrusion.  Moderate right neural foraminal narrowing.  Moderate central canal narrowing.  Moderate left neural   foraminal narrowing. This level is similar to the prior study.     L3-L4:  There is a diffuse disc bulge.  There is bilateral facet hypertrophy.  Moderate to severe right neural foraminal narrowing.  Mild to moderate central canal and left neural foraminal narrowing. This level is similar to the prior study.     L4-L5:  There is advanced facet hypertrophy.  There is uncovering of the intervertebral discs.  There is infolding ligamentum flavum.  Severe tricompartmental narrowing. This level is similar to the prior study.     L5-S1:  There is a disc osteophyte complex with a superimposed left neural foraminal disc protrusion.  Moderate central canal narrowing.  Severe left neural foraminal narrowing.  Moderate right neural foraminal narrowing. This level is similar to the   prior study.     IMPRESSION:      1.  Scattered multilevel " spondylotic changes are similar to the previous study.    Medical History Reviewed by provider this encounter:  Tobacco  Allergies  Meds  Problems  Med Hx  Surg Hx  Fam Hx     .  Past Medical History   Past Medical History:   Diagnosis Date    Abdominal aortic aneurysm ([facility])     s/p repair    Abnormal ECG july 2022    Acute on chronic congestive heart failure ([facility]) 09/11/2019    Acute respiratory failure with hypoxia ([facility]) 05/06/2024    Aneurysm ([facility]) 2007    Aneurysm of abdominal aorta ([facility])     49mm, trileaflet AV    Atrial fibrillation ([facility])     Eliquis    BPH (benign prostatic hyperplasia)     CAD (coronary artery disease)     s/p CABGx3    CHF (congestive heart failure) ([facility])     Chronic pain     Gabapentin    Chronic pain disorder     lumbar    COPD (chronic obstructive pulmonary disease) ([facility])     Coronary artery disease     Elevated serum creatinine 06/18/2024    Elevated serum creatinine 06/18/2024    Elevated transaminase level 04/27/2024    Former tobacco use     Hyperlipidemia     Hypertension     Hyponatremia 04/27/2024    Hyponatremia 04/27/2024    Hypothyroidism     Lumbar radiculopathy     Lumbar stenosis     s/p injections    Neuropathy     Obesity (BMI 30-39.9)     YEVGENIY (obstructive sleep apnea)     unable to tolerate CPAP    Platelets decreased ([facility]) 07/27/2022    Pulmonary hypertension ([facility])     moderate to severe    Spondylolisthesis of lumbar region     Visual impairment 2022    Vitamin D deficiency      Past Surgical History:   Procedure Laterality Date    ABDOMINAL AORTIC ANEURYSM REPAIR  08/09/2007    2 dock limbs with visc extens prosth    CARDIAC CATHETERIZATION      COLONOSCOPY      CORONARY ARTERY BYPASS GRAFT  2003    LIMA to LAD, sequential SVG to OM1 & OM2, SVG to RDA    EYE SURGERY      IR CHEST TUBE PLACEMENT  06/19/2024    LUMBAR EPIDURAL INJECTION       Family History   Problem Relation Age of Onset    Heart disease Mother     Stroke Father         stroke syndrome     Aneurysm Brother         abdominal    Aortic aneurysm Brother         ascending    Coronary artery disease Family     Hypertension Family     Other Son         ginidia hermosillo      reports that he quit smoking about 12 years ago. His smoking use included cigarettes. He started smoking about 68 years ago. He has a 55.6 pack-year smoking history. He has been exposed to tobacco smoke. He has never used smokeless tobacco. He reports current alcohol use of about 1.0 standard drink of alcohol per week. He reports that he does not use drugs.  Current Outpatient Medications on File Prior to Visit   Medication Sig Dispense Refill    acetaminophen (TYLENOL) 325 mg tablet Take 2 tablets by mouth every 6 (six) hours as needed for fever, headaches, mild pain, moderate pain or severe pain. Indications: Fever, Pain      albuterol (ProAir HFA) 90 mcg/act inhaler Inhale 2 puffs every 6 (six) hours as needed for wheezing 24 g 0    amiodarone 200 mg tablet Take 1 tablet (200 mg total) by mouth daily 90 tablet 3    aspirin (ECOTRIN LOW STRENGTH) 81 mg EC tablet Take 1 tablet by mouth daily      atorvastatin (LIPITOR) 40 mg tablet Take 1 tablet (40 mg total) by mouth daily 90 tablet 1    cholecalciferol (VITAMIN D3) 1,000 units tablet Take 2,000 Units by mouth daily      Empagliflozin (JARDIANCE) 10 MG TABS tablet Take 1 tablet (10 mg total) by mouth every morning 90 tablet 1    Fluticasone-Salmeterol (Wixela Inhub) 100-50 mcg/dose inhaler INHALE 1 PUFF BY MOUTH TWICE DAILY. RINSE MOUTH AFTER  blister 0    gabapentin (NEURONTIN) 300 mg capsule Take 3 capsules (900 mg total) by mouth daily at bedtime 270 capsule 1    labetalol (NORMODYNE) 100 mg tablet Take 0.5 tablets (50 mg total) by mouth in the morning 30 tablet 1    multivitamin (THERAGRAN) TABS Take 1 tablet by mouth daily      naloxone (NARCAN) 4 mg/0.1 mL nasal spray Administer 1 spray into a nostril. If no response after 2-3 minutes, give another dose in the other  nostril using a new spray. 1 each 1    oxyCODONE (ROXICODONE) 10 MG TABS Take 1 tablet (10 mg total) by mouth every 6 (six) hours as needed for moderate pain Max Daily Amount: 40 mg 120 tablet 0    oxygen gas Inhale 2 L continuous. 2L of oxygen via nasal cannula as needed and at night  Indications: short of breath      polyethylene glycol (MIRALAX) 17 g packet Take 17 g by mouth daily as needed (constipation). Oscar Espinosa  dissolve in 8 oz liquid of choice   Indications: .      potassium chloride (Klor-Con M20) 20 mEq tablet TAKE 1 TABLET BY MOUTH DAILY 7  DAYS WEEKLY 90 tablet 1    senna (SENOKOT) 8.6 MG tablet Take 1 tablet by mouth as needed for constipation      tamsulosin (FLOMAX) 0.4 mg Take 1 capsule by mouth daily      torsemide (DEMADEX) 20 mg tablet Take 1 tablet (20 mg total) by mouth daily Drop to 20 once pt begins taking jardiance 90 tablet 1    warfarin (Coumadin) 2.5 mg tablet Take 1 tab by moth daily or as directed by physician 90 tablet 3    mupirocin (BACTROBAN) 2 % ointment APPLY TOPICALLY TO SORES TWICE DAILY UNTIL HEALED (Patient not taking: Reported on 2/7/2025)       No current facility-administered medications on file prior to visit.     Allergies   Allergen Reactions    Meloxicam Edema      Current Outpatient Medications on File Prior to Visit   Medication Sig Dispense Refill    acetaminophen (TYLENOL) 325 mg tablet Take 2 tablets by mouth every 6 (six) hours as needed for fever, headaches, mild pain, moderate pain or severe pain. Indications: Fever, Pain      albuterol (ProAir HFA) 90 mcg/act inhaler Inhale 2 puffs every 6 (six) hours as needed for wheezing 24 g 0    amiodarone 200 mg tablet Take 1 tablet (200 mg total) by mouth daily 90 tablet 3    aspirin (ECOTRIN LOW STRENGTH) 81 mg EC tablet Take 1 tablet by mouth daily      atorvastatin (LIPITOR) 40 mg tablet Take 1 tablet (40 mg total) by mouth daily 90 tablet 1    cholecalciferol (VITAMIN D3) 1,000 units tablet Take 2,000 Units  by mouth daily      Empagliflozin (JARDIANCE) 10 MG TABS tablet Take 1 tablet (10 mg total) by mouth every morning 90 tablet 1    Fluticasone-Salmeterol (Wixela Inhub) 100-50 mcg/dose inhaler INHALE 1 PUFF BY MOUTH TWICE DAILY. RINSE MOUTH AFTER  blister 0    gabapentin (NEURONTIN) 300 mg capsule Take 3 capsules (900 mg total) by mouth daily at bedtime 270 capsule 1    labetalol (NORMODYNE) 100 mg tablet Take 0.5 tablets (50 mg total) by mouth in the morning 30 tablet 1    multivitamin (THERAGRAN) TABS Take 1 tablet by mouth daily      naloxone (NARCAN) 4 mg/0.1 mL nasal spray Administer 1 spray into a nostril. If no response after 2-3 minutes, give another dose in the other nostril using a new spray. 1 each 1    oxyCODONE (ROXICODONE) 10 MG TABS Take 1 tablet (10 mg total) by mouth every 6 (six) hours as needed for moderate pain Max Daily Amount: 40 mg 120 tablet 0    oxygen gas Inhale 2 L continuous. 2L of oxygen via nasal cannula as needed and at night  Indications: short of breath      polyethylene glycol (MIRALAX) 17 g packet Take 17 g by mouth daily as needed (constipation). Oscar Espinosa  dissolve in 8 oz liquid of choice   Indications: .      potassium chloride (Klor-Con M20) 20 mEq tablet TAKE 1 TABLET BY MOUTH DAILY 7  DAYS WEEKLY 90 tablet 1    senna (SENOKOT) 8.6 MG tablet Take 1 tablet by mouth as needed for constipation      tamsulosin (FLOMAX) 0.4 mg Take 1 capsule by mouth daily      torsemide (DEMADEX) 20 mg tablet Take 1 tablet (20 mg total) by mouth daily Drop to 20 once pt begins taking jardiance 90 tablet 1    warfarin (Coumadin) 2.5 mg tablet Take 1 tab by moth daily or as directed by physician 90 tablet 3    mupirocin (BACTROBAN) 2 % ointment APPLY TOPICALLY TO SORES TWICE DAILY UNTIL HEALED (Patient not taking: Reported on 2/7/2025)       No current facility-administered medications on file prior to visit.      Social History     Tobacco Use    Smoking status: Former     Current  "packs/day: 0.00     Average packs/day: 1 pack/day for 55.6 years (55.6 ttl pk-yrs)     Types: Cigarettes     Start date: 1957     Quit date: 2012     Years since quittin.5     Passive exposure: Past    Smokeless tobacco: Never   Vaping Use    Vaping status: Never Used   Substance and Sexual Activity    Alcohol use: Yes     Alcohol/week: 1.0 standard drink of alcohol     Types: 1 Cans of beer per week    Drug use: No    Sexual activity: Never        Objective   /70 (BP Location: Left arm, Patient Position: Sitting, Cuff Size: Standard)   Pulse (!) 51   Temp 97.6 °F (36.4 °C) (Temporal)   Ht 5' 11\" (1.803 m)   Wt 89.4 kg (197 lb)   SpO2 98%   BMI 27.48 kg/m²     Physical Exam  Vitals reviewed.   Constitutional:       General: He is not in acute distress.     Appearance: He is not ill-appearing, toxic-appearing or diaphoretic.   HENT:      Head: Normocephalic and atraumatic.      Nose: Nose normal.      Mouth/Throat:      Mouth: Mucous membranes are moist.   Eyes:      General:         Right eye: No discharge.         Left eye: No discharge.   Cardiovascular:      Rate and Rhythm: Normal rate.   Pulmonary:      Effort: Pulmonary effort is normal. No respiratory distress.   Abdominal:      General: Abdomen is flat. There is no distension.   Musculoskeletal:         General: No swelling.      Comments: Kyphosis noted.   Skin:     General: Skin is warm and dry.      Coloration: Skin is not jaundiced or pale.   Neurological:      General: No focal deficit present.      Mental Status: He is alert and oriented to person, place, and time. Mental status is at baseline.   Psychiatric:         Mood and Affect: Mood normal.         Behavior: Behavior normal.         Thought Content: Thought content normal.         Judgment: Judgment normal.         Recent labs:  Lab Results   Component Value Date/Time    SODIUM 140 2025 10:44 AM    K 5.1 2025 10:44 AM    K 4.7 10/15/2015 10:42 AM    BUN 24 " 01/02/2025 10:44 AM    BUN 15 10/15/2015 10:42 AM    CREATININE 1.47 (H) 01/02/2025 10:44 AM    CREATININE 1.03 10/15/2015 10:42 AM    GLUC 91 11/16/2024 05:19 AM    CALCIUM 9.0 01/02/2025 10:44 AM    CALCIUM 9.1 10/15/2015 10:42 AM    AST 24 01/02/2025 10:44 AM    AST 14 10/15/2015 10:42 AM    ALT 20 01/02/2025 10:44 AM    ALT 23 10/15/2015 10:42 AM    ALB 3.7 01/02/2025 10:44 AM    ALB 3.8 10/15/2015 10:42 AM    TP 6.6 01/02/2025 10:44 AM    EGFR 43 01/02/2025 10:44 AM     Lab Results   Component Value Date/Time    HGB 11.5 (L) 01/13/2025 08:03 AM    WBC 4.41 01/13/2025 08:03 AM     (L) 01/13/2025 08:03 AM    INR 2.61 (H) 01/21/2025 08:06 AM    PTT 37 07/06/2024 04:00 PM     Lab Results   Component Value Date/Time    GXC2YTMGPFPF 6.962 (H) 11/18/2024 09:09 AM    EPJ1XHJNVXGH 3.906 04/01/2015 10:00 AM       Recent Imaging:  Procedure: US groin/inguinal area  Result Date: 12/27/2024  Narrative: INGUINAL ULTRASOUND INDICATION: R59.1: Generalized enlarged lymph nodes. COMPARISON: CT 7/27/2022 TECHNIQUE: Real-time ultrasound of the bilateral inguinal regions was performed with a transducer with both volumetric sweeps and still imaging techniques. FINDINGS: No abnormality identified throughout the imaged region of concern. There are normal-appearing lymph nodes bilaterally with echogenic ceasar. No fluid collections or hernia demonstrated.     Impression: Unremarkable exam Workstation performed: [id number]     Procedure:  VAS VENOUS DUPLEX - LOWER LIMB BILATERAL  Result Date: 11/11/2024  Narrative:  [facility] REPORT CLINICAL: Indications: Patient presents with bilateral lower extremity edema x a few months. Operative History: 2007-08-09 AAA repair (aorto-bi-fem) 2003-01-01 CABG x 3 Risk Factors The patient has history of Obesity, HTN, Hyperlipidemia, CKD, CAD, CHF, A-fib, COPD, AAA, previous smoking (quit >10yrs ago), and is currently taking a blood thinner.  FINDINGS:  Right         Reflux  Valve  Closure Time  GSV Inguinal  Reflux                2.58     CONCLUSION:  Impression:  RIGHT LOWER LIMB: No evidence of acute or chronic deep vein thrombosis. No evidence of superficial thrombophlebitis noted. Doppler evaluation shows reflux noted in the saphenofemoral junction. Popliteal, posterior tibial and anterior tibial arterial Doppler waveforms are triphasic.  LEFT LOWER LIMB: No evidence of acute or chronic deep vein thrombosis. No evidence of superficial thrombophlebitis noted. The GSV was not visualized in the thigh secondary to prior harvest. Doppler evaluation shows a normal response to augmentation maneuvers. Popliteal and posterior tibial arterial Doppler waveforms are triphasic. There appears to be a high grade stenosis vs occlusion of the anterior tibial artery.  Tech Note: There is an echogenic structure located in the bilateral inguinal region measuring approximately 2.60cm on the right and 2.22cm on the left suggestive of enlarged lymphatic channels.  Technical findings were given to Arvind Lantigua MD via [employer] secure chat at 17:50.  SIGNATURE: Electronically Signed by: RAMOS XIAO DO, RPVI on 2024-11-11 06:38:20 PM    Procedure: US bedside procedure  Result Date: 11/11/2024  Narrative: [id number]    Procedure: XR chest 1 view portable  Result Date: 11/11/2024  Narrative: XR CHEST PORTABLE INDICATION: SOB. COMPARISON: 10/22/2024 FINDINGS: Mild central congestion and small bibasilar pleural effusions. No consolidation or pneumothorax. Cardiomegaly as before. Bones are unremarkable for age. Normal upper abdomen.     Impression: CHF with small bibasilar pleural effusions. Workstation performed: [id number]     Procedure: Echo follow up/limited w/ contrast if indicated  Result Date: 11/7/2024  Narrative:   Left Ventricle: The left ventricular ejection fraction is 60%. Systolic function is normal. Wall motion is normal.   IVS: There is both systolic and diastolic  flattening of the interventricular septum consistent with right ventricle pressure and volume overload.   Right Ventricle: Right ventricular cavity size is dilated. Systolic function is normal.   Left Atrium: The atrium is dilated.   Right Atrium: The atrium is dilated.   Mitral Valve: There is mild annular calcification. There is mild to moderate regurgitation.   Tricuspid Valve: There is severe regurgitation. The right ventricular systolic pressure is moderately elevated. The estimated right ventricular systolic pressure is 55.00 mmHg.  This may be underestimated due to severity of TR jet.   Aorta: The ascending aorta is mildly dilated. The ascending aorta is 4.4 cm.     Procedure: XR chest 1 view portable  Result Date: 10/23/2024  Narrative: XR CHEST PORTABLE INDICATION: Shortness of breath. COMPARISON: July 6, 2024 FINDINGS: Sternotomy Mild pulmonary vascular congestion. Small bibasilar effusions. No pneumothorax. Enlarged cardiac silhouette, unchanged. Bones are unremarkable for age. Normal upper abdomen.     Impression: Cardiomegaly, mild pulmonary vascular congestive failure, and small bilateral pleural effusions. Workstation performed: [id number]     Procedure: CT chest without contrast  Result Date: 10/22/2024  Narrative: CT CHEST WITHOUT IV CONTRAST INDICATION: concern for pneumonia. COMPARISON: CT chest dated 7/29/2024 TECHNIQUE: CT examination of the chest was performed without intravenous contrast. Multiplanar 2D reformatted images were created from the source data. This examination, like all CT scans performed in the [facility], was performed utilizing techniques to minimize radiation dose exposure, including the use of iterative reconstruction and automated exposure control. Radiation dose length product (DLP) for this visit: 506 mGy-cm FINDINGS: LUNGS/PLEURA: Tiny pleural effusion on the left. Small pleural effusion on the right. Right-sided pleural effusion appears mildly  intervally increased when compared to the prior. There is some associated passive atelectasis noted dependently in the bilateral lower lung fields. Some scarring is seen in the periphery. There is prominence of the interstitial markings, especially in the lower lung fields, consider a component of fluid overload/CHF. An interstitial pneumonitis such as NSIP is also a differential consideration. Some patchy opacities are seen in the lower lung fields which could represent edema, atelectasis, and/or infection. HEART/GREAT VESSELS: Heart is enlarged. Mild coronary atherosclerosis. Status post CABG. Severe coronary atherosclerosis. Uncoiled aorta. No thoracic aortic aneurysm. Low-density of the blood pool noted, suspicious for anemia. MEDIASTINUM AND MYLES: Unremarkable. CHEST WALL AND LOWER NECK: Median sternotomy changes. Body wall edema VISUALIZED STRUCTURES IN THE UPPER ABDOMEN: Mild pancreatic atrophy. Small hiatal hernia suspected. Multiple bilateral renal cysts. Small left hepatic cyst OSSEOUS STRUCTURES: Multilevel degenerative changes of the spine. No acute fracture or destructive osseous lesion.     Impression: Cardiomegaly. Coronary atherosclerosis, status post CABG. Pleural effusions with prominence of the interstitial markings, especially in the lower lung fields, as described; consider a component of fluid overload/CHF. Some scarring is noted in the periphery. An interstitial pneumonitis such as NSIP is also a differential consideration. Patchy opacities in the lower lung fields could represent edema, atelectasis, and/or infection. Correlation with the patient symptoms and laboratory values recommended. Low-density of the blood pool noted, suspicious for anemia. Small hiatal hernia suspected, renal and hepatic cysts, and other findings as above. Workstation performed: [id number]       Administrative Statements   I have spent a total time of 48 minutes in caring for this patient on the day of the  visit/encounter including Risks and benefits of tx options, Instructions for management, Patient and family education, Importance of tx compliance, Risk factor reductions, Impressions, Counseling / Coordination of care, Documenting in the medical record, Reviewing / ordering tests, medicine, procedures  , and Obtaining or reviewing history  . Topics discussed with the patient / family include symptom assessment and management, medication review, psychosocial support, goals of care, medical marijuana, supportive listening, and anticipatory guidance.

## 2025-02-07 NOTE — ASSESSMENT & PLAN NOTE
Patient engaged in Pulmonary rehab.  Patient to continue following Pulmonary specialists    Orders:    Ambulatory Referral to Palliative Care

## 2025-02-07 NOTE — ASSESSMENT & PLAN NOTE
Wt Readings from Last 3 Encounters:   02/07/25 89.4 kg (197 lb)   01/08/25 89.4 kg (197 lb)   12/17/24 91 kg (200 lb 9.6 oz)   Patient to continue following Cardiology.      Orders:    Ambulatory Referral to Palliative Care

## 2025-02-07 NOTE — PATIENT INSTRUCTIONS
It was a pleasure speaking with you today.    As discussed:  -Continue your medications as prescribed.  -Continue with a bowel regimen to avoid constipation.    Follow-up in: as needed  As discussed, please reach out to our office with the contact information provided if you wish to proceed with the Medical Marijuana Certification Process    Please do not hesitate to call me sooner should you have any questions/concerns or needs.    Medication safety issues - Do not drive under the influence of narcotics (including opioids), watch for adverse effects including confusion / altered mental status / respiratory depression (slowed breathing), keep medications stored in a safe/locked environment, do not use alcohol while opioids or other narcotics are in your system. Do not travel with more than the minimum number of tablets or capsules required for the trip.    ER Precautions  Watch for red flag symptoms including, but not limited to fevers, chills, chest pain, shortness of breath, intractable nausea/vomiting/diarrhea, or acute intractable pain (especially if pain is new or has changed).

## 2025-02-07 NOTE — PROGRESS NOTES
"Palliative Outpatient Assessment of Need    LSW completed an assessment of need which was completed with (patient, family, or both) in the office or via phone/video conference    Relationship status:    Duration of relationship: 60 years  Name of significant other: Dorcas  Children and Ages: 2 dtrs; 1 son has since passed away (Glioblastoma)  Pets: None  Other important family information: Pt's 2 dtrs reside in [place]. Pt/wife travel to [place] frequently to visit.  Living situation (where and whom): Resides with spouse    Patient's primary caregiver: Self    Any limitations of caregiver:  Environmental concerns or barriers:   history: [employer]  Employment history/source of income: [employer]  Disability:    Concerns regarding literacy: None  Spirituality/ Yarsani: \"Somewhat\"   Patient's strengths, social supports, and resources: Supportive family  Cultural information:   Mental Health current or previous: Pt reports having some mild depression at times due to change in medical status. Denies any SI.  Substance use or history: Former smoker  Sleep: Interrupted due to using the bathroom; Sleep apnea  Exercise: Limited due to back pain  Diet/nutrition: No concerns  Durable Medical Equipment needs: Cane, RW; O2  Transportation: Wife transports  Financial concerns: None  Advanced Directive: None  Other medical or social work providers involved: Cardiology; Pulmonology  Patient/caregiver current level of coping: Pt reports feeling down at times due to current medical status and how it has affected his lifestyle, but overall is coping well emotionally at this time. Reviewed role of [facility] SW for on-going support.   Understanding: Pt appears understanding of current medical status.  Patient/family concerns and areas of need: Pain; MMJ  Patient's interests: Gardening; Traveling; Model Airplanes     I have spent 60 minutes with Patient and family today in which greater than 50% of this time was spent in " counseling/coordination of care.    *All questions may not be answered due to constraints.  Follow-up discussions may need to occur

## 2025-02-08 PROBLEM — Z71.89 GOALS OF CARE, COUNSELING/DISCUSSION: Status: ACTIVE | Noted: 2025-02-08

## 2025-02-09 PROBLEM — Z51.5 PALLIATIVE CARE BY SPECIALIST: Status: ACTIVE | Noted: 2025-02-09

## 2025-02-09 NOTE — ASSESSMENT & PLAN NOTE
Psychosocial   Supportive listening provided  Normalized experience of patient/family  Provided anxiety containment     Referrals Placed / Medical Equipment Ordered  -None today    Follow-Up Recommendations  -Follow-up with PCP and current medical specialists  -Follow-up with palliative care: as needed as requested.  -will have patient follow-up with our office should he wish to pursue MMJ certification

## 2025-02-09 NOTE — ASSESSMENT & PLAN NOTE
Goals - focused on continuing disease directed cares. Patient to continue following his PCP and Cardiac/Pulmonary specialists.  Not ready for hospice.

## 2025-02-10 ENCOUNTER — APPOINTMENT (OUTPATIENT)
Dept: LAB | Facility: CLINIC | Age: 85
End: 2025-02-10
Payer: MEDICARE

## 2025-02-10 ENCOUNTER — CLINICAL SUPPORT (OUTPATIENT)
Dept: PULMONOLOGY | Facility: CLINIC | Age: 85
End: 2025-02-10
Payer: MEDICARE

## 2025-02-10 DIAGNOSIS — J44.9 CHRONIC OBSTRUCTIVE PULMONARY DISEASE, UNSPECIFIED COPD TYPE (HCC): Primary | ICD-10-CM

## 2025-02-10 DIAGNOSIS — N18.30 ANEMIA, CHRONIC RENAL FAILURE, STAGE 3 (MODERATE)  (HCC): ICD-10-CM

## 2025-02-10 DIAGNOSIS — D63.1 ANEMIA, CHRONIC RENAL FAILURE, STAGE 3 (MODERATE)  (HCC): ICD-10-CM

## 2025-02-10 LAB
BASOPHILS # BLD AUTO: 0.04 THOUSANDS/ΜL (ref 0–0.1)
BASOPHILS NFR BLD AUTO: 1 % (ref 0–1)
EOSINOPHIL # BLD AUTO: 0.23 THOUSAND/ΜL (ref 0–0.61)
EOSINOPHIL NFR BLD AUTO: 5 % (ref 0–6)
ERYTHROCYTE [DISTWIDTH] IN BLOOD BY AUTOMATED COUNT: 16.9 % (ref 11.6–15.1)
EST. AVERAGE GLUCOSE BLD GHB EST-MCNC: 108 MG/DL
HBA1C MFR BLD: 5.4 %
HCT VFR BLD AUTO: 37.6 % (ref 36.5–49.3)
HGB BLD-MCNC: 11.7 G/DL (ref 12–17)
IMM GRANULOCYTES # BLD AUTO: 0.02 THOUSAND/UL (ref 0–0.2)
IMM GRANULOCYTES NFR BLD AUTO: 1 % (ref 0–2)
LYMPHOCYTES # BLD AUTO: 0.92 THOUSANDS/ΜL (ref 0.6–4.47)
LYMPHOCYTES NFR BLD AUTO: 21 % (ref 14–44)
MCH RBC QN AUTO: 34.1 PG (ref 26.8–34.3)
MCHC RBC AUTO-ENTMCNC: 31.1 G/DL (ref 31.4–37.4)
MCV RBC AUTO: 110 FL (ref 82–98)
MONOCYTES # BLD AUTO: 0.52 THOUSAND/ΜL (ref 0.17–1.22)
MONOCYTES NFR BLD AUTO: 12 % (ref 4–12)
NEUTROPHILS # BLD AUTO: 2.58 THOUSANDS/ΜL (ref 1.85–7.62)
NEUTS SEG NFR BLD AUTO: 60 % (ref 43–75)
NRBC BLD AUTO-RTO: 0 /100 WBCS
PLATELET # BLD AUTO: 106 THOUSANDS/UL (ref 149–390)
PMV BLD AUTO: 11.6 FL (ref 8.9–12.7)
RBC # BLD AUTO: 3.43 MILLION/UL (ref 3.88–5.62)
WBC # BLD AUTO: 4.31 THOUSAND/UL (ref 4.31–10.16)

## 2025-02-10 PROCEDURE — 80048 BASIC METABOLIC PNL TOTAL CA: CPT

## 2025-02-10 PROCEDURE — 83036 HEMOGLOBIN GLYCOSYLATED A1C: CPT

## 2025-02-10 PROCEDURE — 80061 LIPID PANEL: CPT

## 2025-02-10 PROCEDURE — 85025 COMPLETE CBC W/AUTO DIFF WBC: CPT

## 2025-02-10 PROCEDURE — G0239 OTH RESP PROC, GROUP: HCPCS

## 2025-02-11 ENCOUNTER — TELEPHONE (OUTPATIENT)
Dept: CARDIOLOGY CLINIC | Facility: CLINIC | Age: 85
End: 2025-02-11

## 2025-02-11 ENCOUNTER — RESULTS FOLLOW-UP (OUTPATIENT)
Dept: CARDIOLOGY CLINIC | Facility: CLINIC | Age: 85
End: 2025-02-11

## 2025-02-11 DIAGNOSIS — D63.1 ANEMIA, CHRONIC RENAL FAILURE, STAGE 3 (MODERATE)  (HCC): Primary | ICD-10-CM

## 2025-02-11 DIAGNOSIS — N18.30 ANEMIA, CHRONIC RENAL FAILURE, STAGE 3 (MODERATE)  (HCC): Primary | ICD-10-CM

## 2025-02-11 LAB
ANION GAP SERPL CALCULATED.3IONS-SCNC: 9 MMOL/L (ref 4–13)
BUN SERPL-MCNC: 18 MG/DL (ref 5–25)
CALCIUM SERPL-MCNC: 9.4 MG/DL (ref 8.4–10.2)
CHLORIDE SERPL-SCNC: 102 MMOL/L (ref 96–108)
CHOLEST SERPL-MCNC: 125 MG/DL (ref ?–200)
CO2 SERPL-SCNC: 30 MMOL/L (ref 21–32)
CREAT SERPL-MCNC: 1.51 MG/DL (ref 0.6–1.3)
GFR SERPL CREATININE-BSD FRML MDRD: 42 ML/MIN/1.73SQ M
GLUCOSE P FAST SERPL-MCNC: 84 MG/DL (ref 65–99)
HDLC SERPL-MCNC: 58 MG/DL
LDLC SERPL CALC-MCNC: 57 MG/DL (ref 0–100)
NONHDLC SERPL-MCNC: 67 MG/DL
POTASSIUM SERPL-SCNC: 4.5 MMOL/L (ref 3.5–5.3)
SODIUM SERPL-SCNC: 141 MMOL/L (ref 135–147)
TRIGL SERPL-MCNC: 48 MG/DL (ref ?–150)

## 2025-02-11 NOTE — TELEPHONE ENCOUNTER
Spoke with pt. He feels good. Wt is 193-194 lbs.  Denies any CHF symptoms.  UO is good. And he just got wraps for the lymphedema and legs are good.    He does not feel dehydrated.

## 2025-02-11 NOTE — TELEPHONE ENCOUNTER
----- Message from SOSA Godfrey sent at 2/11/2025  1:12 PM EST -----  Regarding: check HF symptoms  Can you please check on patient's HF symptoms, weight, adequate urine output?  His creatinine is elevated to 1.51 (baseline is 1.2).  Thank you

## 2025-02-12 ENCOUNTER — CLINICAL SUPPORT (OUTPATIENT)
Dept: PULMONOLOGY | Facility: CLINIC | Age: 85
End: 2025-02-12
Payer: MEDICARE

## 2025-02-12 DIAGNOSIS — J44.9 CHRONIC OBSTRUCTIVE PULMONARY DISEASE, UNSPECIFIED COPD TYPE (HCC): Primary | ICD-10-CM

## 2025-02-12 PROCEDURE — G0239 OTH RESP PROC, GROUP: HCPCS

## 2025-02-13 ENCOUNTER — HOSPITAL ENCOUNTER (OUTPATIENT)
Dept: INFUSION CENTER | Facility: CLINIC | Age: 85
Discharge: HOME | End: 2025-02-13

## 2025-02-14 ENCOUNTER — OFFICE VISIT (OUTPATIENT)
Dept: INTERNAL MEDICINE CLINIC | Facility: CLINIC | Age: 85
End: 2025-02-14
Payer: MEDICARE

## 2025-02-14 VITALS
DIASTOLIC BLOOD PRESSURE: 60 MMHG | HEART RATE: 56 BPM | OXYGEN SATURATION: 96 % | BODY MASS INDEX: 27.06 KG/M2 | WEIGHT: 194 LBS | TEMPERATURE: 97 F | SYSTOLIC BLOOD PRESSURE: 118 MMHG

## 2025-02-14 DIAGNOSIS — M54.16 LUMBAR RADICULOPATHY: ICD-10-CM

## 2025-02-14 DIAGNOSIS — D50.9 IRON DEFICIENCY ANEMIA, UNSPECIFIED IRON DEFICIENCY ANEMIA TYPE: ICD-10-CM

## 2025-02-14 DIAGNOSIS — E66.01 SEVERE OBESITY (BMI 35.0-35.9 WITH COMORBIDITY) (HCC): ICD-10-CM

## 2025-02-14 DIAGNOSIS — E78.2 MIXED HYPERLIPIDEMIA: ICD-10-CM

## 2025-02-14 DIAGNOSIS — R73.03 PREDIABETES: ICD-10-CM

## 2025-02-14 DIAGNOSIS — I50.42 CHRONIC COMBINED SYSTOLIC AND DIASTOLIC CONGESTIVE HEART FAILURE (HCC): Primary | ICD-10-CM

## 2025-02-14 PROBLEM — F11.20 CONTINUOUS OPIOID DEPENDENCE (HCC): Status: RESOLVED | Noted: 2024-10-31 | Resolved: 2025-02-14

## 2025-02-14 PROCEDURE — 99214 OFFICE O/P EST MOD 30 MIN: CPT | Performed by: INTERNAL MEDICINE

## 2025-02-14 PROCEDURE — G2211 COMPLEX E/M VISIT ADD ON: HCPCS | Performed by: INTERNAL MEDICINE

## 2025-02-14 NOTE — PROGRESS NOTES
Name: Oscar Espinosa      : 1940      MRN: [id number]  Encounter Provider: Lea Reyes, MD  Encounter Date: 2025   Encounter department: [facility]    Assessment & Plan  Chronic combined systolic and diastolic congestive heart failure (HCC)  Wt Readings from Last 3 Encounters:   25 88 kg (194 lb)   25 89.4 kg (197 lb)   25 89.4 kg (197 lb)     Stable and appears euvolemic on current medications             Lumbar radiculopathy  On gabapentin  No relief from oxycodone  Continuing medical marijuana       Prediabetes    Orders:  •  Comprehensive metabolic panel; Future  •  Hemoglobin A1C; Future    Mixed hyperlipidemia    Orders:  •  Comprehensive metabolic panel; Future  •  Lipid panel; Future    Severe obesity (BMI 35.0-35.9 with comorbidity) ([facility])         Iron deficiency anemia, unspecified iron deficiency anemia type              History of Present Illness     Here with his wife for a follow up  COPD, CHF, pulmonary hypertension, obstructive sleep apnea on oxygen  Stable weight, shortness of breath, lower extremity edema  Recent labs reviewed stable  Lumbar radiculopathy on gabapentin without relief from oxycodone.  Saw palliative care and medical marijuana use discussed      Review of Systems   Constitutional:  Negative for unexpected weight change.   Respiratory:  Positive for shortness of breath.    Cardiovascular:  Negative for chest pain, palpitations and leg swelling.   Gastrointestinal:  Negative for abdominal pain.   Musculoskeletal:  Positive for back pain.   Neurological:  Negative for dizziness and headaches.     Past Medical History:   Diagnosis Date   • Abdominal aortic aneurysm ([facility])     s/p repair   • Abnormal ECG 2022   • Acute on chronic congestive heart failure (HCC) 2019   • Acute respiratory failure with hypoxia ([facility]) 2024   • Aneurysm ([facility])    • Aneurysm of abdominal aorta ([facility])     49mm, trileaflet AV   • Atrial  fibrillation (HCC)     Eliquis   • BPH (benign prostatic hyperplasia)    • CAD (coronary artery disease)     s/p CABGx3   • CHF (congestive heart failure) ([facility])    • Chronic pain     Gabapentin   • Chronic pain disorder     lumbar   • COPD (chronic obstructive pulmonary disease) ([facility])    • Coronary artery disease    • Elevated serum creatinine 2024   • Elevated serum creatinine 2024   • Elevated transaminase level 2024   • Former tobacco use    • Hyperlipidemia    • Hypertension    • Hyponatremia 2024   • Hyponatremia 2024   • Hypothyroidism    • Lumbar radiculopathy    • Lumbar stenosis     s/p injections   • Neuropathy    • Obesity (BMI 30-39.9)    • YEVGENIY (obstructive sleep apnea)     unable to tolerate CPAP   • Platelets decreased ([facility]) 2022   • Pulmonary hypertension ([facility])     moderate to severe   • Spondylolisthesis of lumbar region    • Visual impairment    • Vitamin D deficiency      Past Surgical History:   Procedure Laterality Date   • ABDOMINAL AORTIC ANEURYSM REPAIR  2007    2 dock limbs with visc extens prosth   • CARDIAC CATHETERIZATION     • COLONOSCOPY     • CORONARY ARTERY BYPASS GRAFT      LIMA to LAD, sequential SVG to OM1 & OM2, SVG to RDA   • EYE SURGERY     • IR CHEST TUBE PLACEMENT  2024   • LUMBAR EPIDURAL INJECTION       Family History   Problem Relation Age of Onset   • Heart disease Mother    • Stroke Father         stroke syndrome   • Aneurysm Brother         abdominal   • Aortic aneurysm Brother         ascending   • Coronary artery disease Family    • Hypertension Family    • Other Son         gioblastoma multiforme     Social History     Tobacco Use   • Smoking status: Former     Current packs/day: 0.00     Average packs/day: 1 pack/day for 55.6 years (55.6 ttl pk-yrs)     Types: Cigarettes     Start date: 1957     Quit date: 2012     Years since quittin.5     Passive exposure: Past   • Smokeless tobacco: Never   Vaping  Use   • Vaping status: Never Used   Substance and Sexual Activity   • Alcohol use: Yes     Alcohol/week: 1.0 standard drink of alcohol     Types: 1 Cans of beer per week   • Drug use: No   • Sexual activity: Never     Current Outpatient Medications on File Prior to Visit   Medication Sig   • acetaminophen (TYLENOL) 325 mg tablet Take 2 tablets by mouth every 6 (six) hours as needed for fever, headaches, mild pain, moderate pain or severe pain. Indications: Fever, Pain   • albuterol (ProAir HFA) 90 mcg/act inhaler Inhale 2 puffs every 6 (six) hours as needed for wheezing   • amiodarone 200 mg tablet Take 1 tablet (200 mg total) by mouth daily   • aspirin (ECOTRIN LOW STRENGTH) 81 mg EC tablet Take 1 tablet by mouth daily   • atorvastatin (LIPITOR) 40 mg tablet Take 1 tablet (40 mg total) by mouth daily   • cholecalciferol (VITAMIN D3) 1,000 units tablet Take 2,000 Units by mouth daily   • Empagliflozin (JARDIANCE) 10 MG TABS tablet Take 1 tablet (10 mg total) by mouth every morning   • Fluticasone-Salmeterol (Wixela Inhub) 100-50 mcg/dose inhaler INHALE 1 PUFF BY MOUTH TWICE DAILY. RINSE MOUTH AFTER USE   • labetalol (NORMODYNE) 100 mg tablet Take 0.5 tablets (50 mg total) by mouth in the morning   • multivitamin (THERAGRAN) TABS Take 1 tablet by mouth daily   • naloxone (NARCAN) 4 mg/0.1 mL nasal spray Administer 1 spray into a nostril. If no response after 2-3 minutes, give another dose in the other nostril using a new spray.   • oxyCODONE (ROXICODONE) 10 MG TABS Take 1 tablet (10 mg total) by mouth every 6 (six) hours as needed for moderate pain Max Daily Amount: 40 mg   • oxygen gas Inhale 2 L continuous. 2L of oxygen via nasal cannula as needed and at night  Indications: short of breath   • polyethylene glycol (MIRALAX) 17 g packet Take 17 g by mouth daily as needed (constipation). Oscar Espinosa  dissolve in 8 oz liquid of choice   Indications: .   • potassium chloride (Klor-Con M20) 20 mEq tablet TAKE 1  TABLET BY MOUTH DAILY 7  DAYS WEEKLY   • senna (SENOKOT) 8.6 MG tablet Take 1 tablet by mouth as needed for constipation   • tamsulosin (FLOMAX) 0.4 mg Take 1 capsule by mouth daily   • gabapentin (NEURONTIN) 300 mg capsule Take 3 capsules (900 mg total) by mouth daily at bedtime   • mupirocin (BACTROBAN) 2 % ointment APPLY TOPICALLY TO SORES TWICE DAILY UNTIL HEALED (Patient not taking: Reported on 2/7/2025)   • warfarin (Coumadin) 2.5 mg tablet Take 1 tab by moth daily or as directed by physician (Patient not taking: Reported on 2/14/2025)     Allergies   Allergen Reactions   • Meloxicam Edema     Immunization History   Administered Date(s) Administered   • COVID-19 MODERNA VACC 0.5 ML IM 01/26/2021, 03/05/2021, 10/28/2021, 11/28/2021, 05/03/2022   • COVID-19 Moderna Vac BIVALENT 12 Yr+ IM 0.5 ML 10/12/2022   • COVID-19 Moderna mRNA Vaccine 12 Yr+ 50 mcg/0.5 mL (Spikevax) 10/03/2023   • COVID-19 Moderna vac 6-11y or adult booster 50 mcg/0.5 mL 05/03/2022   • COVID-19 Pfizer mRNA vacc PF gustavo-sucrose 12 yr and older (Comirnaty) 10/28/2024   • INFLUENZA 10/23/2007, 10/02/2013, 10/08/2016, 10/25/2017, 10/03/2019, 10/30/2020, 10/13/2021, 10/06/2022, 10/03/2023   • Influenza Split High Dose Preservative Free IM 10/02/2013, 10/02/2013, 10/22/2014, 10/27/2015, 10/08/2016, 10/03/2019, 10/10/2024   • Influenza, seasonal, injectable 10/20/2012, 10/30/2018   • Pneumococcal Conjugate 13-Valent 04/24/2015   • Pneumococcal Conjugate Vaccine 20-valent (Pcv20), Polysace 10/10/2024   • Pneumococcal Polysaccharide PPV23 09/07/2012   • Respiratory Syncytial Virus Vaccine (Recombinant, Adjuvanted) 12/11/2023   • Zoster Vaccine Recombinant 10/03/2019, 12/11/2019     Objective   /60 (BP Location: Left arm, Patient Position: Sitting, Cuff Size: Standard)   Pulse 56   Temp (!) 97 °F (36.1 °C) (Tympanic)   Wt 88 kg (194 lb)   SpO2 96%   BMI 27.06 kg/m²     Physical Exam  Vitals and nursing note reviewed.   Constitutional:        General: He is not in acute distress.     Appearance: He is well-developed. He is ill-appearing.   Eyes:      Conjunctiva/sclera: Conjunctivae normal.   Cardiovascular:      Rate and Rhythm: Normal rate and regular rhythm.      Heart sounds: No murmur heard.  Pulmonary:      Effort: Pulmonary effort is normal. No respiratory distress.      Breath sounds: Normal breath sounds.   Abdominal:      Palpations: Abdomen is soft.      Tenderness: There is no abdominal tenderness.   Musculoskeletal:      Cervical back: Neck supple.      Right lower leg: No edema.      Left lower leg: No edema.   Neurological:      Mental Status: He is alert.   Psychiatric:         Mood and Affect: Mood normal.         Behavior: Behavior normal.

## 2025-02-17 ENCOUNTER — CLINICAL SUPPORT (OUTPATIENT)
Dept: PULMONOLOGY | Facility: CLINIC | Age: 85
End: 2025-02-17
Payer: MEDICARE

## 2025-02-17 DIAGNOSIS — J44.9 CHRONIC OBSTRUCTIVE PULMONARY DISEASE, UNSPECIFIED COPD TYPE (HCC): Primary | ICD-10-CM

## 2025-02-17 PROCEDURE — G0239 OTH RESP PROC, GROUP: HCPCS

## 2025-02-17 NOTE — PROGRESS NOTES
Pulmonary Rehabilitation   Assessment and Individualized Treatment Plan  150 DAY and DISCHARGE        Today's date: 2025  # of Exercise Sessions Completed: 30  Patient name: Oscar Espinosa     : 1940       MRN: [id number]  Referring Physician: Racquel Wilson DO   Pulmonologist: SOSA Gonzales (Kandice Faustin DO)  Provider: Mickey  Clinician: Willian Box MS, CEP     Dx:    Encounter Diagnosis   Name Primary?    Chronic obstructive pulmonary disease, unspecified COPD type (HCC) Yes     Date of onset: 2019      Treatment is tailored to this patient's individual needs.  The ITP was reviewed with the patient and all questions were answered to their satisfaction.  Additional ITP documentation can be found electronically including daily and monthly exercise summaries, daily session notes with ECG summaries, education notes, daily medication reconciliation, and daily physician supervision.      COMMENTS:  Pt attended 1st initial evaluation to begin Pulmonary Rehab 2x/week. He is using continuous supplemental O2 2L/min. He is looking to go away for giving and wants to increase his strength and overall endurance.     30 Day 24: Increased strength in lower and upper body reported at 30 days. Reporting more energy following exercise sessions. He is noticing it while gardening and able to walk longer in the grocery store holding onto the cart. No changes to diet. Compliant with supplemental O2. Reports ongoing back pain with walking interval.     60 day 10/23/24: Pt currently hospitalized due to CHF exacerbation. Unable to assess progression. Pt will need clearance to return back to rehab following discharge.     90 day 11/15/24: Pt currently hospitalized again for acute CHF exacerbation. He will be placed on medical hold at this time due to this as well as the holiday. He plans on returning  to rehab and will be adding in extra sessions for medical necessity. No  progress made in the past 30 days.     120 day 02/03/2025: Pt has returned back to rehab following his hospitalization and the holidays. He is doing a lot better. He is down weight and keeping it off. He will continue to extend his sessions for medical necessity.     DISCHARGE SUMMARY  February 17, 2025    Completed 30/24 exercise sessions - increased sessions for medical necessity  6MWT Pre: Distance:  480/ 1.7 METs, Post:  Distance: 900/ 2.31 METs  Max METs tolerated in cardiac rehab:  Pre: 1.5,  Post: 2.4  SOBQ:  Pre: 28,  Post: 34  CAT:  Pre: 20/40,  Post: 15/40  [facility] QOL:  Pre: 29, Post: 24  PHQ-9:  Pre: 4, Post: 9  Weight:  Pre: 221.6,  Post: 194.0  Exercise session details:  30-45 minutes,  1.8-2.4 METs  Resting BP  90/62 - 130/80,  HR 47 - 66  Exercise BP 90/46- 128/72.  HR 62 - 110  LO2: Rest: 2, Exercise: 2  SpO2:  Rest: 92-98%, Exercise: 84-98%  PANDA: Rest:  0/10,  Exercise: 0-6/10  Discharge Plans: Will be looking into joining [facility] for continuation of exercise. Will continue to monitor his weight daily for significant weight gain. Compliance with supplemental O2.   Patient's subjective report of progress: Pt reports he is happy with his progress and happy to know he is down significant weight/clothes are going down sizes. He reported he is glad he completed the program and had the extension of sessions. See outcomes report attached.           ASSESSMENT      Medical History:   Past Medical History:   Diagnosis Date    Abdominal aortic aneurysm (HCC)     s/p repair    Abnormal ECG july 2022    Acute on chronic congestive heart failure (HCC) 09/11/2019    Acute respiratory failure with hypoxia ([facility]) 05/06/2024    Aneurysm ([facility]) 2007    Aneurysm of abdominal aorta ([facility])     49mm, trileaflet AV    Atrial fibrillation ([facility])     Eliquis    BPH (benign prostatic hyperplasia)     CAD (coronary artery disease)     s/p CABGx3    CHF (congestive heart failure) ([facility])     Chronic pain      Gabapentin    Chronic pain disorder     lumbar    COPD (chronic obstructive pulmonary disease) (HCC)     Coronary artery disease     Elevated serum creatinine 06/18/2024    Elevated serum creatinine 06/18/2024    Elevated transaminase level 04/27/2024    Former tobacco use     Hyperlipidemia     Hypertension     Hyponatremia 04/27/2024    Hyponatremia 04/27/2024    Hypothyroidism     Lumbar radiculopathy     Lumbar stenosis     s/p injections    Neuropathy     Obesity (BMI 30-39.9)     YEVGENIY (obstructive sleep apnea)     unable to tolerate CPAP    Platelets decreased ([facility]) 07/27/2022    Pulmonary hypertension ([facility])     moderate to severe    Spondylolisthesis of lumbar region     Visual impairment 2022    Vitamin D deficiency        Family History:  Family History   Problem Relation Age of Onset    Heart disease Mother     Stroke Father         stroke syndrome    Aneurysm Brother         abdominal    Aortic aneurysm Brother         ascending    Coronary artery disease Family     Hypertension Family     Other Son         gioblastoma multiforme       Allergies:   Meloxicam    Current Medications:   Current Outpatient Medications   Medication Sig Dispense Refill    acetaminophen (TYLENOL) 325 mg tablet Take 2 tablets by mouth every 6 (six) hours as needed for fever, headaches, mild pain, moderate pain or severe pain. Indications: Fever, Pain      albuterol (ProAir HFA) 90 mcg/act inhaler Inhale 2 puffs every 6 (six) hours as needed for wheezing 24 g 0    amiodarone 200 mg tablet Take 1 tablet (200 mg total) by mouth daily 90 tablet 3    aspirin (ECOTRIN LOW STRENGTH) 81 mg EC tablet Take 1 tablet by mouth daily      atorvastatin (LIPITOR) 40 mg tablet Take 1 tablet (40 mg total) by mouth daily 90 tablet 1    cholecalciferol (VITAMIN D3) 1,000 units tablet Take 2,000 Units by mouth daily      Empagliflozin (JARDIANCE) 10 MG TABS tablet Take 1 tablet (10 mg total) by mouth every morning 90 tablet 1    Fluticasone-Salmeterol  (Wixela Inhub) 100-50 mcg/dose inhaler INHALE 1 PUFF BY MOUTH TWICE DAILY. RINSE MOUTH AFTER  blister 0    gabapentin (NEURONTIN) 300 mg capsule Take 3 capsules (900 mg total) by mouth daily at bedtime 270 capsule 1    labetalol (NORMODYNE) 100 mg tablet Take 0.5 tablets (50 mg total) by mouth in the morning 30 tablet 1    multivitamin (THERAGRAN) TABS Take 1 tablet by mouth daily      mupirocin (BACTROBAN) 2 % ointment APPLY TOPICALLY TO SORES TWICE DAILY UNTIL HEALED (Patient not taking: Reported on 2/7/2025)      naloxone (NARCAN) 4 mg/0.1 mL nasal spray Administer 1 spray into a nostril. If no response after 2-3 minutes, give another dose in the other nostril using a new spray. 1 each 1    oxyCODONE (ROXICODONE) 10 MG TABS Take 1 tablet (10 mg total) by mouth every 6 (six) hours as needed for moderate pain Max Daily Amount: 40 mg 120 tablet 0    oxygen gas Inhale 2 L continuous. 2L of oxygen via nasal cannula as needed and at night  Indications: short of breath      polyethylene glycol (MIRALAX) 17 g packet Take 17 g by mouth daily as needed (constipation). Oscar Espinosa  dissolve in 8 oz liquid of choice   Indications: .      potassium chloride (Klor-Con M20) 20 mEq tablet TAKE 1 TABLET BY MOUTH DAILY 7  DAYS WEEKLY 90 tablet 1    senna (SENOKOT) 8.6 MG tablet Take 1 tablet by mouth as needed for constipation      tamsulosin (FLOMAX) 0.4 mg Take 1 capsule by mouth daily      torsemide (DEMADEX) 20 mg tablet Take 1 tablet (20 mg total) by mouth daily Drop to 20 once pt begins taking jardiance 90 tablet 1    warfarin (Coumadin) 2.5 mg tablet Take 1 tab by moth daily or as directed by physician (Patient not taking: Reported on 2/14/2025) 90 tablet 3     No current facility-administered medications for this visit.       Physical Limitations: lumbar pain after walking a distance     Fall Risk: Moderate   Comments: Ambulates with a steady gait with no assist device and Denies a fall in the past 6  months    Cultural needs: none    PFT:  Yes 2/24/2021    FEV1/FVC ratio of 71%   FEV1 of 74% predicted  mildobstruction.    Medication compliance: Yes  Comments: Pt reports to be compliant with medications      Pulmonary Disease Risk Factors:  smoking    Sleep Disorders:   obstructive sleep apnea:  CPCP/BiPAP:  no - does not tolerate, uses supplemental O2       EXERCISE ASSESSMENT:      Post Fitness Assessment: 6MWT:  Resting:    Resting:  BP: 94/42  HR: 59  SpO2: 92%  Dyspnea: 0/10  O2 Use: 2 l/min, Exercise:  BP: 80/40  HR: 98  SpO2: 85%  Dyspnea: 7/10  O2 use: 2 l/min, METs:  2.31, Symptoms: low back pain and PANDA, and Comments: Walked 900 feet with no rest breaks. Improved!      Current Functional Status:  Occupation: retired  Recreation/Physical activity: Traveling, patio, rec room   ADL’s:Capable of performing light to moderate ADLs limited by Dyspnea  [place]: limited by Dyspnea able to perform self-care  Exercise:   Joining [facility]  Home exercise equipment: None  Other Comments: N/A    SMART Exercise Goals:   reduced score on CAT, improved 6MWT distance, reduced dyspnea during exercise, improved exercise tolerance based on peak METs tolerated in pulmonary rehab exercise session, and SpO2 >88% during exercise    Patient Specific EXERCISE GOALS:     reduced dependence on supplemental O2, reduced number of COPD exacerbations, attend pulmonary rehab regularly, decrease sitting time at home, start a walking program, begin a consistent exercise regimen , reduced pulmonary related hospital readmissions , decreased rest needed with physical activity/exercise, increased muscular strength, increased energy, increased stamina with ADLs, and more independence at home    PSYCHOSOCIAL ASSESSMENT:    Date of last assessment: 2/17/2025  Depression screening:  PHQ-9 = 9  Interpretation:  5-9 = Mild Depression  Anxiety screening:  ERON-7 = 9  Interpretation: 5-9 = Mild anxiety    Pt self-report of  "depression and anxiety  Patient reports slight feelings of depression   Patient reports slight feelings of anxiety  Reports great emotional support     Self-reported stress level:  5  Stress Management: practice Relaxation Techniques, exercise, keep a positive mindset, spend time outside, enjoy a hobby, spend time with family, TV, puzzles, keep busy around the house, gardening, and yard work    Quality of Life Screen:  (Higher score indicates disease impact on QOL)  [facility] COOP score: 24/40      CAT: 15/40      Shortness of breath questionnaire: 34/120    Social Support:   spouse, children, and friends  Community/Social Activities: N/A    SMART Goals:    Reduce perceived stress to 1-3/10, improved [facility] QOL < 27, Feelings in [facility] Score < 3, Physical Fitness in [facility] Score < 3, Daily Activity in [facility] Score < 3, Social Activities in [facility] Score < 3, Pain in [facility] Score < 3, Overall Health in [facility] Score < 3, Quality of Life in [facility] Score < 3 ,  reduced time feeling down, depressed or hopeless, improved sleeping habits, feel less tired with more energy, reduced time feeling like a failure and having negative self thoughts, reduced feelings of restlessness, and reduced irritability    Patient Specific PSYCHOCOSOCIAL GOALS:    spend time with family and reduce slight feelings of depression/anxiety, and increase energy levels      Psychosocial Assessment as it relates to rehabilitation: Patient denies issues with his/her family or home life that may affect their rehabilitation efforts.       NUTRITION ASSESSMENT:    Initial Weight:  221.6 lbs   Current Weight: 194.0 lbs      Height:   Ht Readings from Last 1 Encounters:   02/07/25 5' 11\" (1.803 m)       Rate Your Plate Score: 55/81    Self-reported Current Dietary Habits:  Loves to eat salads  Fruits and veggies in diet  Drinks green tea, reviewed increasing water intake   Does not eat out  Does not use sodium  Not a big meat eater "     ETOH:   Social History     Substance and Sexual Activity   Alcohol Use Yes    Alcohol/week: 1.0 standard drink of alcohol    Types: 1 Cans of beer per week       SMART Goals:   reduced BMI to < 25, decreased body fat% <25%   (M), reduced waist circumference <40 inches (M), fasting BG , improved A1c  < 7.0%, Improved Rate Your Plate score  >64, 2.5-5%  wt loss, eat 6 or more servings of grain products per day, eat whole grain breads, brown rice and whole grain cereals, eat 5 or more servings of fruits and vegetables a day, eat 2 or more servings of low fat milk or yogurt a day, eat no more than 6oz of meat per day, eat red meat once a week or less, eat poultry without the skin, choose 1% or skim milk, rarely eat cheese or choose reduced fat or skim, rarely eat frozen desserts or choose fruit or fat-free sweets, Do not cook with oil, butter or margarine, Do not eat fried foods, choose light or fat-free salad dressings or damico, choose healthy snacks such as fruit, pretzels, and low fat crackers, choose healthy desserts and sweets such as orquidea food cake or  fruit, choose low sodium canned, frozen/packaged foods or rarely/never eat, rarely/never eat salty snacks, choose low sugar desserts and sweets, and drink less than 8oz of soda and sweetened drinks per day    Patient Specific NUTRITION GOALS:    increase water intake to reduce/thin mucus production reduced SOB while eating eat smaller, more frequent meals decrease caloric intake improve hydration weight loss increased muscle mass      OTHER CORE COMPONENT ASSESSMENT:    Hospitalizations in the past year: 5    Oxygen needs:   Rest:  supplemental O2 via nasal cannula @ 2L/min   Exercise/physical activity:  supplemental O2 via nasal cannula @ 2L/min   Sleep:   supplemental O2 via nasal cannula @ 2L/min     Does Pt monitor home SpO2? yes   Average SpO2 at rest:  95-99% with oxygen, 90% on RA    Average SpO2 with ADLs/physical activity:  low 80s % on RA        Use of Rescue Inhaler:  Yes PRN    Use of Maintenance Inhaler: Yes:  2 times per day    Use of Nebulizer Treatments:  No    Patient practices breathing techniques at home:  Reviewed with patient on:  2024    CAD Risk Factors:  Cholesterol: Yes  HTN: Yes  DM: Pre-diabetes  Obesity: Yes   Smoking: Former user  Quit   1ppd for 55.6 years    SMART Core Component Goals:   consistent, controlled resting BP < 130/80, medication compliance, abstain from smoking, and demonstrate pursed lipped breathing    Patient Specific CORE COMPONENT GOALS:    reduced dietary sodium <2000mg, assess daily wt to report an increase greater than 3lbs in a day or 5lbs in a week, medication compliance, Abstain from smoking, compliance with supplemental oxygen, and monitor home pulse oximetry      Blood Pressure:    Restin/62 - 130/80  Exercise: 90/46- 128/72  Recovery: 80//64      INDIVIDUALIZED TREATMENT PLAN      EXERCISE GOALS AND PLAN    Current Aerobic Exercise Prescription       Frequency: 2 days/week   Supplement with home exercise 2+ days/wk as tolerated        Minutes:35-40        METS: 2.1-2.3              SpO2: 94-97% on supplemental O2 2L/min via NC               RPD: 3-5                      HR:  60-78   RPE: 4-5         Modalities: UBE, NuStep, and Room walking      Exercise Progression after Discharge:    Frequency: 3-5 days of gym or home exercise   Minutes: 30-50  >150 mins/wk of moderate intensity exercise   METS: 1.5 - 2.4+   HR: 60 - 110    RPE: 4-6   Modalities: UBE, NuStep, and Room walking        Supplemental Oxygen Needs with Exercise:  supplemental O2 2L/min via nasal canula and will titrate O2 to maintain SpO2 >88%.    Strength trainin-3 days / week  12-15 repetitions  1-2 sets per modality    Modalities: Lateral Raise, Arm Curl, Sit to Stands, Upright Rows, and Front Raises    Education: pursed lipped breathing, relaxation breathing, home exercise guidelines, benefits of exercise for  pulmonary disease, RPE scale, RPD scale, O2 saturation monitoring, appropriate O2 response to exercise, and education class: Exercise For The Pulmonary Patient    Progress toward Exercise Goals:    Goals not met: no formal home exercise with rehab.  , Goals met: attended rehab for 30 sessions for medical necessity, increased functional METs 60%, increased 6MWT 87.5%, compliance with supplemental O2 with exercise, SpO2 >88% on oxygen at rest and exercise, and reduced score on CAT pre to post. Has plan for continuation of exercise at [facility]., Patient will be encouraged to focus on lifestyle modifications following discharge.    Exercise Plan:  After discharge patient will continue to follow a regular exercise regimen with the goal of a minimum of 150 minutes per week of moderate intensity exercise.      Readiness to change: Preparation:  (Getting ready to change)       NUTRITION GOALS AND PLAN    Progress toward Nutritional goals:    Goals met: Significant weight loss since start of the program, encouraged increase hydration throughout program, and encouraged patient to continue to watch sodium intake/significant weight gain., Patient will be encouraged to focus on lifestyle modifications following discharge.    Nutrition Plan: After discharge patient will continue to maintain healthy lifestyle habits and focus on risk factor modification.    SMART goals are based Rate Your Plate Dietary Self-Assessment. These are the areas in which the patient could score higher on the assessment.  Goals include recommendations for a heart healthy diet based on American Heart Association.    Nutrition Education: heart healthy eating principles  weight loss and management strategies  low sodium diet  maintaining hydration  portion control  group class: Heart Healthy Eating  group class:  Label Reading    Readiness to change: Action:  (Changing behavior)      PSYCHOSOCIAL GOALS AND PLAN    Information to begin using  [employer] was provided as well as contact information for counseling through  [facility].    Progress toward Psychosocial goals:    Goals not met: PHQ-9 and ERON-7 score increased from pre to post.  , Goals met: has great support system and minimal stress. Enjoyed coming to rehab for the social aspect. Overall dartmout score decreased from pre to post ., Patient will be encouraged to focus on lifestyle modifications following discharge.    Psychosocial: After discharge patient will continue to maintain healthy lifestyle habits and focus on risk factor modification.    Psychosocial Education: depression and pulmonary disease, tools to manage fear/anxiety related to becoming SOB, and class:  Stress and Pulmonary Disease    Readiness to change: Preparation:  (Getting ready to change)       OTHER CORE COMPONENTS GOALS AND PLAN    Tobacco Use Intervention:     Pt quit 2012   and has abstained    Oxygen Goal: Maintain SpO2>88% during exercise or as advised by pulmonolgist    Progress toward Core Component goals:    Goals not met: SOBQ increased.  , Goals met: medication compliance, blood pressures mostly stable at rest and exercise - encouraged increased hydration for hypotension, abstaining from smoking, and significant weight loss noted and aware more of sodium intake., Patient will be encouraged to focus on lifestyle modifications following discharge.    Core Component Plan: After discharge patient will continue to maintain healthy lifestyle habits and focus on risk factor modification.    Core Component Education:  preventing infections, bronchodilators, bronchial hygiene, education: Pulmonary Anatomy and Physiology, education:  Pulmonary Medications, education:  Clearing Secretions, education: Oxygen, and education: Control Breathing    Readiness to change: Action:  (Changing behavior)

## 2025-02-18 ENCOUNTER — ANTICOAG VISIT (OUTPATIENT)
Dept: CARDIOLOGY CLINIC | Facility: CLINIC | Age: 85
End: 2025-02-18

## 2025-02-18 ENCOUNTER — APPOINTMENT (OUTPATIENT)
Dept: LAB | Facility: CLINIC | Age: 85
End: 2025-02-18
Payer: MEDICARE

## 2025-02-18 DIAGNOSIS — I48.0 PAROXYSMAL ATRIAL FIBRILLATION (HCC): Primary | ICD-10-CM

## 2025-02-18 DIAGNOSIS — I48.0 PAROXYSMAL ATRIAL FIBRILLATION (HCC): ICD-10-CM

## 2025-02-18 LAB
INR PPP: 2.72 (ref 0.85–1.19)
PROTHROMBIN TIME: 28.6 SECONDS (ref 12.3–15)

## 2025-02-18 PROCEDURE — 36415 COLL VENOUS BLD VENIPUNCTURE: CPT

## 2025-02-18 PROCEDURE — 85610 PROTHROMBIN TIME: CPT

## 2025-02-20 RX ORDER — TORSEMIDE 20 MG/1
20 TABLET ORAL DAILY
COMMUNITY

## 2025-02-24 NOTE — ASSESSMENT & PLAN NOTE
Wt Readings from Last 3 Encounters:   02/14/25 88 kg (194 lb)   02/07/25 89.4 kg (197 lb)   01/08/25 89.4 kg (197 lb)     Stable and appears euvolemic on current medications

## 2025-03-04 DIAGNOSIS — I50.32 CHRONIC DIASTOLIC CONGESTIVE HEART FAILURE (HCC): ICD-10-CM

## 2025-03-05 RX ORDER — POTASSIUM CHLORIDE 1500 MG/1
TABLET, EXTENDED RELEASE ORAL
Qty: 90 TABLET | Refills: 1 | Status: SHIPPED | OUTPATIENT
Start: 2025-03-05

## 2025-03-10 ENCOUNTER — OFFICE VISIT (OUTPATIENT)
Dept: HEMATOLOGY ONCOLOGY | Facility: CLINIC | Age: 85
End: 2025-03-10
Payer: MEDICARE

## 2025-03-10 ENCOUNTER — TELEPHONE (OUTPATIENT)
Dept: HEMATOLOGY ONCOLOGY | Facility: CLINIC | Age: 85
End: 2025-03-10

## 2025-03-10 ENCOUNTER — APPOINTMENT (OUTPATIENT)
Dept: LAB | Age: 85
End: 2025-03-10
Payer: MEDICARE

## 2025-03-10 VITALS
BODY MASS INDEX: 26.88 KG/M2 | HEIGHT: 71 IN | SYSTOLIC BLOOD PRESSURE: 112 MMHG | OXYGEN SATURATION: 92 % | TEMPERATURE: 98.8 F | WEIGHT: 192 LBS | DIASTOLIC BLOOD PRESSURE: 70 MMHG | RESPIRATION RATE: 16 BRPM | HEART RATE: 65 BPM

## 2025-03-10 DIAGNOSIS — D63.1 ANEMIA, CHRONIC RENAL FAILURE, STAGE 3 (MODERATE)  (HCC): Primary | ICD-10-CM

## 2025-03-10 DIAGNOSIS — N18.30 ANEMIA, CHRONIC RENAL FAILURE, STAGE 3 (MODERATE)  (HCC): Primary | ICD-10-CM

## 2025-03-10 DIAGNOSIS — D63.1 ANEMIA, CHRONIC RENAL FAILURE, STAGE 3 (MODERATE)  (HCC): ICD-10-CM

## 2025-03-10 DIAGNOSIS — N18.30 ANEMIA, CHRONIC RENAL FAILURE, STAGE 3 (MODERATE)  (HCC): ICD-10-CM

## 2025-03-10 LAB
BASOPHILS # BLD AUTO: 0.05 THOUSANDS/ÂΜL (ref 0–0.1)
BASOPHILS NFR BLD AUTO: 1 % (ref 0–1)
EOSINOPHIL # BLD AUTO: 0.15 THOUSAND/ÂΜL (ref 0–0.61)
EOSINOPHIL NFR BLD AUTO: 3 % (ref 0–6)
ERYTHROCYTE [DISTWIDTH] IN BLOOD BY AUTOMATED COUNT: 15.1 % (ref 11.6–15.1)
HCT VFR BLD AUTO: 32.7 % (ref 36.5–49.3)
HGB BLD-MCNC: 10.5 G/DL (ref 12–17)
IMM GRANULOCYTES # BLD AUTO: 0.02 THOUSAND/UL (ref 0–0.2)
IMM GRANULOCYTES NFR BLD AUTO: 0 % (ref 0–2)
LYMPHOCYTES # BLD AUTO: 0.87 THOUSANDS/ÂΜL (ref 0.6–4.47)
LYMPHOCYTES NFR BLD AUTO: 18 % (ref 14–44)
MCH RBC QN AUTO: 34.3 PG (ref 26.8–34.3)
MCHC RBC AUTO-ENTMCNC: 32.1 G/DL (ref 31.4–37.4)
MCV RBC AUTO: 107 FL (ref 82–98)
MONOCYTES # BLD AUTO: 0.73 THOUSAND/ÂΜL (ref 0.17–1.22)
MONOCYTES NFR BLD AUTO: 16 % (ref 4–12)
NEUTROPHILS # BLD AUTO: 2.9 THOUSANDS/ÂΜL (ref 1.85–7.62)
NEUTS SEG NFR BLD AUTO: 62 % (ref 43–75)
NRBC BLD AUTO-RTO: 0 /100 WBCS
PLATELET # BLD AUTO: 124 THOUSANDS/UL (ref 149–390)
PMV BLD AUTO: 10.5 FL (ref 8.9–12.7)
RBC # BLD AUTO: 3.06 MILLION/UL (ref 3.88–5.62)
WBC # BLD AUTO: 4.72 THOUSAND/UL (ref 4.31–10.16)

## 2025-03-10 PROCEDURE — 99214 OFFICE O/P EST MOD 30 MIN: CPT | Performed by: INTERNAL MEDICINE

## 2025-03-10 PROCEDURE — 36415 COLL VENOUS BLD VENIPUNCTURE: CPT

## 2025-03-10 PROCEDURE — 85025 COMPLETE CBC W/AUTO DIFF WBC: CPT

## 2025-03-10 NOTE — TELEPHONE ENCOUNTER
AN INFUSION: Pt to proceed with injection on 3/12 then every two months. Pt has trip to [place] in mid May, if he can be scheduled prior to that.

## 2025-03-10 NOTE — PROGRESS NOTES
Name: Oscar Espinosa      : 1940      MRN: [id number]  Encounter Provider: Petar Harley MD  Encounter Date: 3/10/2025   Encounter department: [facility]  :  Assessment & Plan  Anemia, chronic renal failure, stage 3 (moderate)  (HCC)  Lab Results   Component Value Date    EGFR 42 02/10/2025    EGFR 43 2025    EGFR 48 2024    CREATININE 1.51 (H) 02/10/2025    CREATININE 1.47 (H) 2025    CREATININE 1.34 (H) 2024   Multifactorial secondary to iron deficiency anemia secondary to on being on blood thinner warfarin and anemia of chronic kidney disease, he received IV iron in 2024, he had multiple hospitalization colonoscopy on 2021 showed diverticulosis    He received IV iron on 10/2024 and then he received Aranesp 100 mcg with significant improvement of his anemia to 11.7.  Continue Aranesp 100 mcg to keep hemoglobin between 9.9-11 g will do CBC, iron studies every 3 months    The significant improvement of hemoglobin to prevent additional admission to the hospital with exacerbation of CHF    Kappa light chain 106, lambda light chain 39 with a ratio of 2.6             No follow-ups on file.    History of Present Illness   No chief complaint on file.  Oscar Espinosa ' Aly'  is a 84 y.o. seen for initial consultation 10/17/2024 at the referral of Reyes, Lea, MD regarding anemia.     Past medical history AAA, history of tobacco use 2 packs/day, quitting around , CHF, prediabetes, atrial fibrillation on Coumadin, CAD, COPD, prediabetes, HTN, hyperlipidemia, pulmonary hypertension, recurrent nonmalignant pleural effusion, subclinical hypothyroidism, lumbar radiculopathy, chronic pain syndrome  -  anemia s/p heart surgery; chronic microcytic scopic hematuria.  He is undergoing workup in the past including prostate biopsy     2021 colonoscopy at [facility]-diverticulosis, polyps     2023 hemoglobin 13.3, , white blood cell  count 5, 64% segs, 18% lymphocytes, 13% monocytes, platelet count 150     2/20/24 hemoglobin 11.2     Admitted 4/26 to 5/2/2024 secondary to progressive shortness of breath CTA identified left-sided pleural effusion.  Thoracentesis removed 600 cc of fluid.  No malignancy.  He underwent diuresis.  He was discharged on 2 L of O2  4/26/24 hemoglobin 10.3, iron saturation 9%, ferritin 116     Admitted June 18 with acute hypoxic respiratory failure.  Recurrent pleural effusion.  No malignancy.   chest tube placed  Hemoglobin 8.4 on admission  6/19/24 iron saturation 8%, ferritin 139        Admitted July 2024 due to hypotension and lightheadedness  7/6/24 hemoglobin 8, MCV 86, creatinine 1.32, total protein 6.9, albumin 2.9, normal calcium alk phos 112        7/9/24 B12 618, normal MMA, folate 13.8, haptoglobin 194, , retic 2.28, no monoclonal gammopathy on SPEP     10/1/24 hemoglobin 8.2, white blood cell count 4.49, platelets 133 (no diff)    BUN 26, creatinine 1.5 on BMP.  Normal calcium 8.8     10/2024-  admitted 2nd to CHF exacerbation.  Experienced hemoptysis.    Discharged on coumadin 2.5 mg po daily.     Venofer 200mg x 5 doses.    Then he received Aranesp 100 mcg with significant elevation of his hemoglobin, the goal to keep hemoglobin between 9.9-11 g     Review of Systems   Constitutional:  Negative for chills and fever.   HENT:  Negative for ear pain and sore throat.    Eyes:  Negative for pain and visual disturbance.   Respiratory:  Negative for cough and shortness of breath.    Cardiovascular:  Negative for chest pain and palpitations.   Gastrointestinal:  Negative for abdominal pain and vomiting.   Genitourinary:  Negative for dysuria and hematuria.   Musculoskeletal:  Negative for arthralgias and back pain.   Skin:  Negative for color change and rash.   Neurological:  Positive for weakness and numbness. Negative for seizures and syncope.   All other systems reviewed and are negative.         "  Objective   /70 (BP Location: Left arm, Patient Position: Sitting, Cuff Size: Adult)   Pulse 65   Temp 98.8 °F (37.1 °C) (Temporal)   Resp 16   Ht 5' 11\" (1.803 m)   Wt 87.1 kg (192 lb)   SpO2 92%   BMI 26.78 kg/m²     Physical Exam  Vitals reviewed.   Constitutional:       General: He is not in acute distress.     Appearance: He is well-developed. He is not diaphoretic.   HENT:      Head: Normocephalic and atraumatic.   Eyes:      Conjunctiva/sclera: Conjunctivae normal.   Neck:      Trachea: No tracheal deviation.   Cardiovascular:      Rate and Rhythm: Normal rate and regular rhythm.      Heart sounds: Murmur heard.      No friction rub. No gallop.   Pulmonary:      Effort: Pulmonary effort is normal. No respiratory distress.      Breath sounds: Normal breath sounds. No wheezing or rales.   Chest:      Chest wall: No tenderness.   Abdominal:      General: There is no distension.      Palpations: Abdomen is soft.      Tenderness: There is no abdominal tenderness.   Musculoskeletal:      Cervical back: Normal range of motion and neck supple.      Right lower leg: Edema present.      Left lower leg: Edema present.   Lymphadenopathy:      Cervical: No cervical adenopathy.   Skin:     General: Skin is warm and dry.      Coloration: Skin is not pale.      Findings: No erythema.   Neurological:      Mental Status: He is alert.   Psychiatric:         Behavior: Behavior normal.         Thought Content: Thought content normal.         Labs: I have reviewed the following labs:  Results for orders placed or performed in visit on 03/10/25   CBC and differential   Result Value Ref Range    WBC 4.72 4.31 - 10.16 Thousand/uL    RBC 3.06 (L) 3.88 - 5.62 Million/uL    Hemoglobin 10.5 (L) 12.0 - 17.0 g/dL    Hematocrit 32.7 (L) 36.5 - 49.3 %     (H) 82 - 98 fL    MCH 34.3 26.8 - 34.3 pg    MCHC 32.1 31.4 - 37.4 g/dL    RDW 15.1 11.6 - 15.1 %    MPV 10.5 8.9 - 12.7 fL    Platelets 124 (L) 149 - 390 Thousands/uL "    nRBC 0 /100 WBCs    Segmented % 62 43 - 75 %    Immature Grans % 0 0 - 2 %    Lymphocytes % 18 14 - 44 %    Monocytes % 16 (H) 4 - 12 %    Eosinophils Relative 3 0 - 6 %    Basophils Relative 1 0 - 1 %    Absolute Neutrophils 2.90 1.85 - 7.62 Thousands/µL    Absolute Immature Grans 0.02 0.00 - 0.20 Thousand/uL    Absolute Lymphocytes 0.87 0.60 - 4.47 Thousands/µL    Absolute Monocytes 0.73 0.17 - 1.22 Thousand/µL    Eosinophils Absolute 0.15 0.00 - 0.61 Thousand/µL    Basophils Absolute 0.05 0.00 - 0.10 Thousands/µL     *Note: Due to a large number of results and/or encounters for the requested time period, some results have not been displayed. A complete set of results can be found in Results Review.

## 2025-03-10 NOTE — ASSESSMENT & PLAN NOTE
Lab Results   Component Value Date    EGFR 42 02/10/2025    EGFR 43 01/02/2025    EGFR 48 11/18/2024    CREATININE 1.51 (H) 02/10/2025    CREATININE 1.47 (H) 01/02/2025    CREATININE 1.34 (H) 11/18/2024   Multifactorial secondary to iron deficiency anemia secondary to on being on blood thinner warfarin and anemia of chronic kidney disease, he received IV iron in June 2024, he had multiple hospitalization colonoscopy on 9/2021 showed diverticulosis    He received IV iron on 10/2024 and then he received Aranesp 100 mcg with significant improvement of his anemia to 11.7.  Continue Aranesp 100 mcg to keep hemoglobin between 9.9-11 g will do CBC, iron studies every 3 months    The significant improvement of hemoglobin to prevent additional admission to the hospital with exacerbation of CHF    Kappa light chain 106, lambda light chain 39 with a ratio of 2.6

## 2025-03-12 ENCOUNTER — HOSPITAL ENCOUNTER (OUTPATIENT)
Dept: INFUSION CENTER | Facility: CLINIC | Age: 85
Discharge: HOME | End: 2025-03-12
Payer: MEDICARE

## 2025-03-12 VITALS — SYSTOLIC BLOOD PRESSURE: 138 MMHG | HEART RATE: 52 BPM | OXYGEN SATURATION: 98 % | DIASTOLIC BLOOD PRESSURE: 61 MMHG

## 2025-03-12 DIAGNOSIS — N18.30 ANEMIA, CHRONIC RENAL FAILURE, STAGE 3 (MODERATE)  (HCC): Primary | ICD-10-CM

## 2025-03-12 DIAGNOSIS — D63.1 ANEMIA, CHRONIC RENAL FAILURE, STAGE 3 (MODERATE)  (HCC): Primary | ICD-10-CM

## 2025-03-12 PROCEDURE — 96372 THER/PROPH/DIAG INJ SC/IM: CPT

## 2025-03-12 RX ADMIN — DARBEPOETIN ALFA 100 MCG: 100 INJECTION, SOLUTION INTRAVENOUS; SUBCUTANEOUS at 10:35

## 2025-03-12 NOTE — PROGRESS NOTES
Patient is here for aranesp and offers no complaints. Labs from 3/10/25 reviewed, hgb 10.5 which meets parameters. Aranesp given in right arm and he tolerated it well. Next appointment confirmed 5/7/25 at 1100 at [place]. Patient declined avs

## 2025-03-13 ENCOUNTER — OFFICE VISIT (OUTPATIENT)
Dept: CARDIOLOGY CLINIC | Facility: CLINIC | Age: 85
End: 2025-03-13
Payer: MEDICARE

## 2025-03-13 VITALS
BODY MASS INDEX: 26.74 KG/M2 | WEIGHT: 191 LBS | HEIGHT: 71 IN | HEART RATE: 48 BPM | DIASTOLIC BLOOD PRESSURE: 56 MMHG | SYSTOLIC BLOOD PRESSURE: 108 MMHG

## 2025-03-13 DIAGNOSIS — I50.42 CHRONIC COMBINED SYSTOLIC AND DIASTOLIC CONGESTIVE HEART FAILURE (HCC): ICD-10-CM

## 2025-03-13 DIAGNOSIS — D69.6 THROMBOCYTOPENIA (HCC): ICD-10-CM

## 2025-03-13 DIAGNOSIS — I50.33 ACUTE ON CHRONIC DIASTOLIC HEART FAILURE (HCC): Primary | ICD-10-CM

## 2025-03-13 DIAGNOSIS — I13.0 HYPERTENSIVE HEART AND CHRONIC KIDNEY DISEASE WITH HEART FAILURE AND STAGE 1 THROUGH STAGE 4 CHRONIC KIDNEY DISEASE, OR CHRONIC KIDNEY DISEASE (HCC): ICD-10-CM

## 2025-03-13 DIAGNOSIS — I10 BENIGN ESSENTIAL HYPERTENSION: ICD-10-CM

## 2025-03-13 DIAGNOSIS — E78.2 MIXED HYPERLIPIDEMIA: ICD-10-CM

## 2025-03-13 DIAGNOSIS — G47.33 SEVERE OBSTRUCTIVE SLEEP APNEA: ICD-10-CM

## 2025-03-13 DIAGNOSIS — R06.09 DOE (DYSPNEA ON EXERTION): ICD-10-CM

## 2025-03-13 DIAGNOSIS — I71.21 ANEURYSM OF ASCENDING AORTA WITHOUT RUPTURE (HCC): ICD-10-CM

## 2025-03-13 DIAGNOSIS — I25.10 CORONARY ARTERY DISEASE INVOLVING NATIVE HEART WITHOUT ANGINA PECTORIS, UNSPECIFIED VESSEL OR LESION TYPE: ICD-10-CM

## 2025-03-13 PROCEDURE — 99214 OFFICE O/P EST MOD 30 MIN: CPT | Performed by: INTERNAL MEDICINE

## 2025-03-13 RX ORDER — ATORVASTATIN CALCIUM 40 MG/1
40 TABLET, FILM COATED ORAL DAILY
Qty: 90 TABLET | Refills: 3 | Status: SHIPPED | OUTPATIENT
Start: 2025-03-13

## 2025-03-13 RX ORDER — LABETALOL 100 MG/1
50 TABLET, FILM COATED ORAL DAILY
Qty: 45 TABLET | Refills: 3 | Status: SHIPPED | OUTPATIENT
Start: 2025-03-13

## 2025-03-14 ENCOUNTER — TELEPHONE (OUTPATIENT)
Age: 85
End: 2025-03-14

## 2025-03-14 NOTE — PROGRESS NOTES
Cardiology Follow Up    Oscar THURSTON Francisca  1940  [id number]  [facility] [place]  [address]  [address]  [place] [id number] [phone]    1. Acute on chronic diastolic heart failure (HCC)        2. Hypertensive heart and chronic kidney disease with heart failure and stage 1 through stage 4 chronic kidney disease, or chronic kidney disease (HCC)  labetalol (NORMODYNE) 100 mg tablet    Basic metabolic panel    apixaban (Eliquis) 2.5 mg      3. Mixed hyperlipidemia  atorvastatin (LIPITOR) 40 mg tablet    Basic metabolic panel    apixaban (Eliquis) 2.5 mg      4. Aneurysm of ascending aorta without rupture (HCC)        5. Benign essential hypertension        6. Coronary artery disease involving native heart without angina pectoris, unspecified vessel or lesion type        7. Chronic combined systolic and diastolic congestive heart failure (HCC)  Complete PFT with post bronchodilator      8. Severe obstructive sleep apnea        9. Thrombocytopenia (HCC)        10. PANDA (dyspnea on exertion)  Complete PFT with post bronchodilator          Discussion/Summary: Overall he is doing much better since her last visit.  Weight is down significantly.  He is currently maintaining euvolemic state.  No signs of decompensated congestive heart failure.  Coronary disease stable no active angina.  Blood pressure has been controlled.  Lipids have been doing well.  He is maintaining sinus rhythm on amiodarone continues with yearly eye exam, TSH, is in need of PFT this year.      Interval History:  83 year-old gentleman with multivessel coronary disease, history of CABG, hypertension, hyperlipidemia, ascending aortic aneurysm, abdominal aorta aneurysm status post repair, sleep apnea presents to establish care with me in the office.  He has been seeing 1 of my partners for several years.  Functionally he has been doing great.  Denies any exertional chest  pain, shortness of breath, palpitations, lightheadedness, dizziness, or syncope.  He has put on some weight during the pandemic.  He has been trying to make some dietary changes start some low-level exercise.      He was hospitalized in the fall had significant volume taken off.  He has now been doing much better.  Functional capacity has been improving he continued to do pulmonary rehab.  Denies any anginal sounding chest pain or discomfort.  There is been no palpitations, lightheadedness, dizziness, or syncope.  There is been a lower extremity edema, PND, orthopnea.    Problem List       Positive colorectal cancer screening using Cologuard test    Benign essential hypertension    Hyperlipidemia    Impaired fasting glucose    Microscopic hematuria    Overview Signed 5/14/2018  9:17 AM by Lea Reyes, MD     Annotation - [id number]: Dr Dee         Obesity    Obstructive sleep apnea    Overview Signed 5/14/2018  9:17 AM by Lea Reyes, MD     Annotation - [id number]: Dr Ellington.         Subclinical hypothyroidism    Overview Signed 5/14/2018  9:17 AM by Lea Reyes, MD     Transitioned From: Hypothyroidism         3-vessel CAD    Aneurysm of abdominal aorta (HCC)    Aneurysm of ascending aorta (HCC)    Aortic valve disorder    Arteriosclerotic cardiovascular disease    Disc degeneration, lumbar    Herniated lumbar intervertebral disc    Lumbar radiculopathy    Intervertebral disc disorder with radiculopathy of lumbar region    Spondylolisthesis at L4-L5 level    Chronic pain syndrome    SOB (shortness of breath)    Multiple pulmonary nodules    Heart failure, systolic (HCC)    Wt Readings from Last 3 Encounters:   03/13/25 86.6 kg (191 lb)   03/10/25 87.1 kg (192 lb)   02/14/25 88 kg (194 lb)                 COPD, mild (HCC)    Overview Deleted 9/11/2019  3:22 PM by Gerson Terrell MD                  Past Medical History:   Diagnosis Date    Abdominal aortic aneurysm (HCC)     s/p repair    Abnormal ECG july 2022    Acute on  chronic congestive heart failure ([facility]) 2019    Acute respiratory failure with hypoxia ([facility]) 2024    Aneurysm ([facility])     Aneurysm of abdominal aorta ([facility])     49mm, trileaflet AV    Atrial fibrillation ([facility])     Eliquis    BPH (benign prostatic hyperplasia)     CAD (coronary artery disease)     s/p CABGx3    CHF (congestive heart failure) ([facility])     Chronic pain     Gabapentin    Chronic pain disorder     lumbar    COPD (chronic obstructive pulmonary disease) ([facility])     Coronary artery disease     Elevated serum creatinine 2024    Elevated serum creatinine 2024    Elevated transaminase level 2024    Former tobacco use     Hyperlipidemia     Hypertension     Hyponatremia 2024    Hyponatremia 2024    Hypothyroidism     Lumbar radiculopathy     Lumbar stenosis     s/p injections    Neuropathy     Obesity (BMI 30-39.9)     YEVGENIY (obstructive sleep apnea)     unable to tolerate CPAP    Platelets decreased ([facility]) 2022    Pulmonary hypertension ([facility])     moderate to severe    Spondylolisthesis of lumbar region     Visual impairment     Vitamin D deficiency      Social History     Socioeconomic History    Marital status: /Civil Union     Spouse name: Not on file    Number of children: Not on file    Years of education: Not on file    Highest education level: Not on file   Occupational History    Occupation: retired   Tobacco Use    Smoking status: Former     Current packs/day: 0.00     Average packs/day: 1 pack/day for 55.6 years (55.6 ttl pk-yrs)     Types: Cigarettes     Start date: 1957     Quit date: 2012     Years since quittin.6     Passive exposure: Past    Smokeless tobacco: Never   Vaping Use    Vaping status: Never Used   Substance and Sexual Activity    Alcohol use: Yes     Alcohol/week: 1.0 standard drink of alcohol     Types: 1 Cans of beer per week    Drug use: No    Sexual activity: Never   Other Topics Concern    Not on file   Social  History Narrative    Not on file     Social Drivers of Health     Financial Resource Strain: Low Risk  (3/7/2024)    Overall Financial Resource Strain (CARDIA)     Difficulty of Paying Living Expenses: Not hard at all   Food Insecurity: No Food Insecurity (11/14/2024)    Hunger Vital Sign     Worried About Running Out of Food in the Last Year: Never true     Ran Out of Food in the Last Year: Never true   Transportation Needs: No Transportation Needs (11/14/2024)    PRAPARE - Transportation     Lack of Transportation (Medical): No     Lack of Transportation (Non-Medical): No   Physical Activity: Not on file   Stress: Not on file   Social Connections: Not on file   Intimate Partner Violence: Unknown (11/12/2024)    Nursing IPS     Feels Physically and Emotionally Safe: Not on file     Physically Hurt by Someone: Not on file     Humiliated or Emotionally Abused by Someone: Not on file     Physically Hurt by Someone: No     Hurt or Threatened by Someone: No   Housing Stability: Low Risk  (11/14/2024)    Housing Stability Vital Sign     Unable to Pay for Housing in the Last Year: No     Number of Times Moved in the Last Year: 0     Homeless in the Last Year: No      Family History   Problem Relation Age of Onset    Heart disease Mother     Stroke Father         stroke syndrome    Aneurysm Brother         abdominal    Aortic aneurysm Brother         ascending    Coronary artery disease Family     Hypertension Family     Other Son         gioblastoma multiforme     Past Surgical History:   Procedure Laterality Date    ABDOMINAL AORTIC ANEURYSM REPAIR  08/09/2007    2 dock limbs with visc extens prosth    CARDIAC CATHETERIZATION      COLONOSCOPY      CORONARY ARTERY BYPASS GRAFT  2003    LIMA to LAD, sequential SVG to OM1 & OM2, SVG to RDA    EYE SURGERY      IR CHEST TUBE PLACEMENT  06/19/2024    LUMBAR EPIDURAL INJECTION         Current Outpatient Medications:     amiodarone 200 mg tablet, Take 1 tablet (200 mg total) by  mouth daily, Disp: 90 tablet, Rfl: 3    apixaban (Eliquis) 2.5 mg, Take 1 tablet (2.5 mg total) by mouth 2 (two) times a day, Disp: 180 tablet, Rfl: 3    aspirin (ECOTRIN LOW STRENGTH) 81 mg EC tablet, Take 1 tablet by mouth daily, Disp: , Rfl:     atorvastatin (LIPITOR) 40 mg tablet, Take 1 tablet (40 mg total) by mouth daily, Disp: 90 tablet, Rfl: 3    cholecalciferol (VITAMIN D3) 1,000 units tablet, Take 2,000 Units by mouth daily, Disp: , Rfl:     Empagliflozin (JARDIANCE) 10 MG TABS tablet, Take 1 tablet (10 mg total) by mouth every morning, Disp: 90 tablet, Rfl: 1    Fluticasone-Salmeterol (Wixela Inhub) 100-50 mcg/dose inhaler, INHALE 1 PUFF BY MOUTH TWICE DAILY. RINSE MOUTH AFTER USE, Disp: 180 blister, Rfl: 0    gabapentin (NEURONTIN) 300 mg capsule, Take 3 capsules (900 mg total) by mouth daily at bedtime, Disp: 270 capsule, Rfl: 1    labetalol (NORMODYNE) 100 mg tablet, Take 0.5 tablets (50 mg total) by mouth in the morning, Disp: 45 tablet, Rfl: 3    multivitamin (THERAGRAN) TABS, Take 1 tablet by mouth daily, Disp: , Rfl:     oxyCODONE (ROXICODONE) 10 MG TABS, Take 1 tablet (10 mg total) by mouth every 6 (six) hours as needed for moderate pain Max Daily Amount: 40 mg, Disp: 120 tablet, Rfl: 0    oxygen gas, Inhale 2 L continuous. 2L of oxygen via nasal cannula as needed and at night  Indications: short of breath, Disp: , Rfl:     polyethylene glycol (MIRALAX) 17 g packet, Take 17 g by mouth daily as needed (constipation). Oscar Espinosa dissolve in 8 oz liquid of choice   Indications: ., Disp: , Rfl:     potassium chloride (Klor-Con M20) 20 mEq tablet, TAKE 1 TABLET BY MOUTH DAILY 7  DAYS WEEKLY, Disp: 90 tablet, Rfl: 1    senna (SENOKOT) 8.6 MG tablet, Take 1 tablet by mouth as needed for constipation, Disp: , Rfl:     tamsulosin (FLOMAX) 0.4 mg, Take 1 capsule by mouth daily, Disp: , Rfl:     torsemide (DEMADEX) 20 mg tablet, Take 20 mg by mouth daily, Disp: , Rfl:     acetaminophen (TYLENOL) 325  "mg tablet, Take 2 tablets by mouth every 6 (six) hours as needed for fever, headaches, mild pain, moderate pain or severe pain. Indications: Fever, Pain, Disp: , Rfl:     albuterol (ProAir HFA) 90 mcg/act inhaler, Inhale 2 puffs every 6 (six) hours as needed for wheezing, Disp: 24 g, Rfl: 0    naloxone (NARCAN) 4 mg/0.1 mL nasal spray, Administer 1 spray into a nostril. If no response after 2-3 minutes, give another dose in the other nostril using a new spray., Disp: 1 each, Rfl: 1  Allergies   Allergen Reactions    Meloxicam Edema       Labs:     Chemistry        Component Value Date/Time     (L) 10/15/2015 1042    K 4.5 02/10/2025 0801    K 4.7 10/15/2015 1042     02/10/2025 0801     10/15/2015 1042    CO2 30 02/10/2025 0801    CO2 30 10/15/2015 1042    BUN 18 02/10/2025 0801    BUN 15 10/15/2015 1042    CREATININE 1.51 (H) 02/10/2025 0801    CREATININE 1.03 10/15/2015 1042        Component Value Date/Time    CALCIUM 9.4 02/10/2025 0801    CALCIUM 9.1 10/15/2015 1042    ALKPHOS 83 01/02/2025 1044    ALKPHOS 71 10/15/2015 1042    AST 24 01/02/2025 1044    AST 14 10/15/2015 1042    ALT 20 01/02/2025 1044    ALT 23 10/15/2015 1042    BILITOT 0.79 10/15/2015 1042            Lab Results   Component Value Date    CHOL 124 10/15/2015    CHOL 159 04/01/2015    CHOL 155 09/30/2013     Lab Results   Component Value Date    HDL 58 02/10/2025    HDL 50 02/20/2024    HDL 68 08/17/2023     Lab Results   Component Value Date    LDLCALC 57 02/10/2025    LDLCALC 48 02/20/2024    LDLCALC 53 08/17/2023     Lab Results   Component Value Date    TRIG 48 02/10/2025    TRIG 37 02/20/2024    TRIG 54 08/17/2023     No results found for: \"CHOLHDL\"    Imaging: No results found.    ECG:  AFib      Review of Systems   Constitutional: Negative.   HENT: Negative.     Eyes: Negative.    Cardiovascular: Negative.    Respiratory: Negative.     Endocrine: Negative.    Hematologic/Lymphatic: Negative.    Skin: Negative.  " "  Musculoskeletal: Negative.    Gastrointestinal: Negative.    Genitourinary: Negative.    Neurological: Negative.    Psychiatric/Behavioral: Negative.     All other systems reviewed and are negative.      Vitals:    03/13/25 1333   BP: 108/56   Pulse: (!) 48     Vitals:    03/13/25 1333   Weight: 86.6 kg (191 lb)     Height: 5' 11\" (180.3 cm)   Body mass index is 26.64 kg/m².    Physical Exam:  Vital signs reviewed  General:  Alert and cooperative, appears stated age, no acute distress  HEENT:  PERRLA, EOMI, no scleral icterus, no conjunctival pallor  Neck:  No lymphadenopathy, no thyromegaly, no carotid bruits, no elevated JVP  Heart:  Regular rate and rhythm, normal S1/S2, no S3/S4, no murmur, rubs or gallops.  PMI nondisplaced  Lungs:  Clear to auscultation bilaterally, no wheezes rales or rhonchi  Abdomen:  Soft, non-tender, positive bowel sounds, no rebound or guarding,   no organomegaly   Extremities:  Normal range of motion.  No clubbing, cyanosis or edema   Vascular:  2+ pedal pulses  Skin:  No rashes or lesions on exposed skin  Neurologic:  Cranial nerves II-XII grossly intact without focal deficits  Psych:  Normal mood and affect            "

## 2025-03-14 NOTE — TELEPHONE ENCOUNTER
Caller: Dorcas, spouse    Doctor: Dr. Alan    Reason for call: Spouse rec'd notice that Eliquis shipped already and she thought that the Dr wanted to check the Pt's kidney function first?    She also needs clarification on the med instructions.    Call back#: [phone]

## 2025-03-14 NOTE — TELEPHONE ENCOUNTER
SC Dr. Alan, per Dr. Alan script was sent for price check. Pt needs to complete bmp in order to start eliquis.    Called pt's spouse, advised her of this information. Pt will get blood work done on Tuesday.     Will keep an eye for bmp results.

## 2025-03-18 ENCOUNTER — APPOINTMENT (OUTPATIENT)
Dept: LAB | Facility: CLINIC | Age: 85
End: 2025-03-18
Payer: MEDICARE

## 2025-03-18 ENCOUNTER — OFFICE VISIT (OUTPATIENT)
Dept: PULMONOLOGY | Facility: CLINIC | Age: 85
End: 2025-03-18
Payer: MEDICARE

## 2025-03-18 ENCOUNTER — ANTICOAG VISIT (OUTPATIENT)
Dept: CARDIOLOGY CLINIC | Facility: CLINIC | Age: 85
End: 2025-03-18

## 2025-03-18 VITALS
OXYGEN SATURATION: 98 % | SYSTOLIC BLOOD PRESSURE: 102 MMHG | HEART RATE: 73 BPM | TEMPERATURE: 49.5 F | BODY MASS INDEX: 26.64 KG/M2 | DIASTOLIC BLOOD PRESSURE: 70 MMHG | WEIGHT: 191 LBS

## 2025-03-18 DIAGNOSIS — E78.2 MIXED HYPERLIPIDEMIA: ICD-10-CM

## 2025-03-18 DIAGNOSIS — J44.9 CHRONIC OBSTRUCTIVE PULMONARY DISEASE, UNSPECIFIED COPD TYPE (HCC): Primary | ICD-10-CM

## 2025-03-18 DIAGNOSIS — I48.0 PAROXYSMAL ATRIAL FIBRILLATION (HCC): ICD-10-CM

## 2025-03-18 DIAGNOSIS — N18.30 ANEMIA, CHRONIC RENAL FAILURE, STAGE 3 (MODERATE)  (HCC): ICD-10-CM

## 2025-03-18 DIAGNOSIS — J96.11 CHRONIC RESPIRATORY FAILURE WITH HYPOXIA (HCC): ICD-10-CM

## 2025-03-18 DIAGNOSIS — D63.1 ANEMIA, CHRONIC RENAL FAILURE, STAGE 3 (MODERATE)  (HCC): ICD-10-CM

## 2025-03-18 DIAGNOSIS — I13.0 HYPERTENSIVE HEART AND CHRONIC KIDNEY DISEASE WITH HEART FAILURE AND STAGE 1 THROUGH STAGE 4 CHRONIC KIDNEY DISEASE, OR CHRONIC KIDNEY DISEASE (HCC): ICD-10-CM

## 2025-03-18 DIAGNOSIS — I48.0 PAROXYSMAL ATRIAL FIBRILLATION (HCC): Primary | ICD-10-CM

## 2025-03-18 DIAGNOSIS — G47.33 SEVERE OBSTRUCTIVE SLEEP APNEA: ICD-10-CM

## 2025-03-18 LAB
ANION GAP SERPL CALCULATED.3IONS-SCNC: 8 MMOL/L (ref 4–13)
BUN SERPL-MCNC: 27 MG/DL (ref 5–25)
CALCIUM SERPL-MCNC: 9.1 MG/DL (ref 8.4–10.2)
CHLORIDE SERPL-SCNC: 103 MMOL/L (ref 96–108)
CO2 SERPL-SCNC: 31 MMOL/L (ref 21–32)
CREAT SERPL-MCNC: 1.69 MG/DL (ref 0.6–1.3)
GFR SERPL CREATININE-BSD FRML MDRD: 36 ML/MIN/1.73SQ M
GLUCOSE P FAST SERPL-MCNC: 98 MG/DL (ref 65–99)
INR PPP: 3.06 (ref 0.85–1.19)
POTASSIUM SERPL-SCNC: 4.7 MMOL/L (ref 3.5–5.3)
PROTHROMBIN TIME: 31.3 SECONDS (ref 12.3–15)
SODIUM SERPL-SCNC: 142 MMOL/L (ref 135–147)

## 2025-03-18 PROCEDURE — 36415 COLL VENOUS BLD VENIPUNCTURE: CPT

## 2025-03-18 PROCEDURE — 85610 PROTHROMBIN TIME: CPT

## 2025-03-18 PROCEDURE — 80048 BASIC METABOLIC PNL TOTAL CA: CPT

## 2025-03-18 PROCEDURE — 99214 OFFICE O/P EST MOD 30 MIN: CPT | Performed by: STUDENT IN AN ORGANIZED HEALTH CARE EDUCATION/TRAINING PROGRAM

## 2025-03-18 NOTE — PROGRESS NOTES
Pulmonary Outpatient Progress Note   Oscar Espinosa 84 y.o. male MRN: [id number]  3/18/2025      Assessment:    Chronic hypoxemic respiratory failure on 2 L full-time, has Inogen device  COPD group B mMRC 2-3, PFT with no obstruction in 2021, has a updated PFT pending.  Stable on Advair and rescue inhaler.  CT reviewed there were some pulmonary edema with groundglass changes.  May need to consider repeating a CT chest at the next visit as there was some concern about NSIP.  Functionally he is doing well and I would hold off on CT urgently  A-fib possible transition over to Eliquis per cardiology  HFpEF  Severe YEVGENIY intolerant of CPAP  CAD status post CABG  History of left empyema status post chest tube, tPA DNase in June 2024    Plan:    Consider a CT after the next appointment to evaluate parenchymal changes as previous one had ground glass but concern for NSIP  Continue 2 L oxygen full-time  PFTs are currently ordered by cardiology  Continue current regimen of inhalers  Follow up in 4-5 months with AP  I have spent a total time of 41 minutes in caring for this patient on the day of the visit/encounter including Diagnostic results, Prognosis, Risks and benefits of tx options, Instructions for management, Patient and family education, Importance of tx compliance, Risk factor reductions, Impressions, Counseling / Coordination of care, Documenting in the medical record, Reviewing/placing orders in the medical record (including tests, medications, and/or procedures), and Obtaining or reviewing history  .      History of Present Illness   HPI:    84 [place] here for follow-up, recently saw cardiology for evaluation who felt that he was doing well.  Patient continues to do well using oxygen full-time using his rescue inhaler very rarely.    Regarding his hypoxia he takes off his oxygen at times when he is on the computer and notices that he is able to go quite a significant bit of time without requiring it.  With exertion  is when he feels like he becomes hypoxic and notices saturations down in the 80s.    Continues to do well has a PFT pending, tolerating diuretics.  No recent hospitalizations, last 1 was November 2024 for HFpEF flare    Review of Systems   Constitutional: Negative.    HENT: Negative.     Eyes: Negative.    Respiratory:  Positive for shortness of breath. Negative for cough, chest tightness and wheezing.    Cardiovascular: Negative.    Gastrointestinal: Negative.    Endocrine: Negative.    Genitourinary: Negative.    Musculoskeletal:  Positive for back pain and gait problem.   Skin: Negative.    Allergic/Immunologic: Positive for immunocompromised state.   Hematological: Negative.    Psychiatric/Behavioral: Negative.          Historical Information   Past Medical History:   Diagnosis Date   • Abdominal aortic aneurysm ([facility])     s/p repair   • Abnormal ECG july 2022   • Acute on chronic congestive heart failure ([facility]) 09/11/2019   • Acute respiratory failure with hypoxia ([facility]) 05/06/2024   • Aneurysm ([facility]) 2007   • Aneurysm of abdominal aorta ([facility])     49mm, trileaflet AV   • Atrial fibrillation ([facility])     Eliquis   • BPH (benign prostatic hyperplasia)    • CAD (coronary artery disease)     s/p CABGx3   • CHF (congestive heart failure) ([facility])    • Chronic pain     Gabapentin   • Chronic pain disorder     lumbar   • COPD (chronic obstructive pulmonary disease) ([facility])    • Coronary artery disease    • Elevated serum creatinine 06/18/2024   • Elevated serum creatinine 06/18/2024   • Elevated transaminase level 04/27/2024   • Former tobacco use    • Hyperlipidemia    • Hypertension    • Hyponatremia 04/27/2024   • Hyponatremia 04/27/2024   • Hypothyroidism    • Lumbar radiculopathy    • Lumbar stenosis     s/p injections   • Neuropathy    • Obesity (BMI 30-39.9)    • YEVGENIY (obstructive sleep apnea)     unable to tolerate CPAP   • Platelets decreased ([facility]) 07/27/2022   • Pulmonary hypertension ([facility])     moderate to severe   •  Spondylolisthesis of lumbar region    • Visual impairment    • Vitamin D deficiency      Past Surgical History:   Procedure Laterality Date   • ABDOMINAL AORTIC ANEURYSM REPAIR  2007    2 dock limbs with visc extens prosth   • CARDIAC CATHETERIZATION     • COLONOSCOPY     • CORONARY ARTERY BYPASS GRAFT      LIMA to LAD, sequential SVG to OM1 & OM2, SVG to RDA   • EYE SURGERY     • IR CHEST TUBE PLACEMENT  2024   • LUMBAR EPIDURAL INJECTION       Family History   Problem Relation Age of Onset   • Heart disease Mother    • Stroke Father         stroke syndrome   • Aneurysm Brother         abdominal   • Aortic aneurysm Brother         ascending   • Coronary artery disease Family    • Hypertension Family    • Other Son         gioblastoma multiforme     Social History     Tobacco Use   Smoking Status Former   • Current packs/day: 0.00   • Average packs/day: 1 pack/day for 55.6 years (55.6 ttl pk-yrs)   • Types: Cigarettes   • Start date: 1957   • Quit date: 2012   • Years since quittin.6   • Passive exposure: Past   Smokeless Tobacco Never       Occupational History: NA    Meds/Allergies     Current Outpatient Medications:   •  acetaminophen (TYLENOL) 325 mg tablet, Take 2 tablets by mouth every 6 (six) hours as needed for fever, headaches, mild pain, moderate pain or severe pain. Indications: Fever, Pain, Disp: , Rfl:   •  albuterol (ProAir HFA) 90 mcg/act inhaler, Inhale 2 puffs every 6 (six) hours as needed for wheezing, Disp: 24 g, Rfl: 0  •  amiodarone 200 mg tablet, Take 1 tablet (200 mg total) by mouth daily, Disp: 90 tablet, Rfl: 3  •  aspirin (ECOTRIN LOW STRENGTH) 81 mg EC tablet, Take 1 tablet by mouth daily, Disp: , Rfl:   •  atorvastatin (LIPITOR) 40 mg tablet, Take 1 tablet (40 mg total) by mouth daily, Disp: 90 tablet, Rfl: 3  •  cholecalciferol (VITAMIN D3) 1,000 units tablet, Take 2,000 Units by mouth daily, Disp: , Rfl:   •  Empagliflozin (JARDIANCE) 10 MG TABS tablet,  Take 1 tablet (10 mg total) by mouth every morning, Disp: 90 tablet, Rfl: 1  •  Fluticasone-Salmeterol (Wixela Inhub) 100-50 mcg/dose inhaler, INHALE 1 PUFF BY MOUTH TWICE DAILY. RINSE MOUTH AFTER USE, Disp: 180 blister, Rfl: 0  •  gabapentin (NEURONTIN) 300 mg capsule, Take 3 capsules (900 mg total) by mouth daily at bedtime, Disp: 270 capsule, Rfl: 1  •  labetalol (NORMODYNE) 100 mg tablet, Take 0.5 tablets (50 mg total) by mouth in the morning, Disp: 45 tablet, Rfl: 3  •  multivitamin (THERAGRAN) TABS, Take 1 tablet by mouth daily, Disp: , Rfl:   •  oxyCODONE (ROXICODONE) 10 MG TABS, Take 1 tablet (10 mg total) by mouth every 6 (six) hours as needed for moderate pain Max Daily Amount: 40 mg, Disp: 120 tablet, Rfl: 0  •  oxygen gas, Inhale 2 L continuous. 2L of oxygen via nasal cannula as needed and at night  Indications: short of breath, Disp: , Rfl:   •  polyethylene glycol (MIRALAX) 17 g packet, Take 17 g by mouth daily as needed (constipation). Oscar Espinosa dissolve in 8 oz liquid of choice   Indications: ., Disp: , Rfl:   •  potassium chloride (Klor-Con M20) 20 mEq tablet, TAKE 1 TABLET BY MOUTH DAILY 7  DAYS WEEKLY, Disp: 90 tablet, Rfl: 1  •  senna (SENOKOT) 8.6 MG tablet, Take 1 tablet by mouth as needed for constipation, Disp: , Rfl:   •  tamsulosin (FLOMAX) 0.4 mg, Take 1 capsule by mouth daily, Disp: , Rfl:   •  torsemide (DEMADEX) 20 mg tablet, Take 20 mg by mouth daily, Disp: , Rfl:   •  apixaban (Eliquis) 2.5 mg, Take 1 tablet (2.5 mg total) by mouth 2 (two) times a day, Disp: 180 tablet, Rfl: 3  •  naloxone (NARCAN) 4 mg/0.1 mL nasal spray, Administer 1 spray into a nostril. If no response after 2-3 minutes, give another dose in the other nostril using a new spray. (Patient not taking: Reported on 3/18/2025), Disp: 1 each, Rfl: 1  Allergies   Allergen Reactions   • Meloxicam Edema       Vitals: Blood pressure 102/70, pulse 73, temperature (!) 49.5 °F (9.7 °C), temperature source Temporal,  "weight 86.6 kg (191 lb), SpO2 98%., Body mass index is 26.64 kg/m². Oxygen Therapy  SpO2: 98 %    Physical Exam:    GEN: alert and oriented x 3, pleasant and cooperative   HEENT:  Normocephalic, atraumatic  NECK: No JVD   HEART: Rate, normal S1 and S2  LUNGS: Clear to auscultation bilaterally; no wheezes, rales, or rhonchi; respiration nonlabored   ABDOMEN:  Normoactive bowel sounds, soft, no tenderness, no distention  EXTREMITIES: no edema  NEURO: no gross focal findings  SKIN:  Dry, intact, warm to touch    Labs: I have personally reviewed pertinent lab results.  No results found for: \"IGE\"    Imaging and other studies: Results Review Statement: I personally reviewed the following image studies in PACS and associated radiology reports: CT chest. My interpretation of the radiology images/reports is: CT with GGO concern for pulm edema vs NSIP.    Pulmonary function testing:  Personally interpreted by me      Other Studies: Results Review Statement: No pertinent imaging studies reviewed.    Deyvi Calixto MD  Pulmonary and Critical Care Medicine     "

## 2025-03-19 ENCOUNTER — ANTICOAG VISIT (OUTPATIENT)
Dept: CARDIOLOGY CLINIC | Facility: CLINIC | Age: 85
End: 2025-03-19

## 2025-03-19 DIAGNOSIS — I13.0 HYPERTENSIVE HEART AND CHRONIC KIDNEY DISEASE WITH HEART FAILURE AND STAGE 1 THROUGH STAGE 4 CHRONIC KIDNEY DISEASE, OR CHRONIC KIDNEY DISEASE (HCC): Primary | ICD-10-CM

## 2025-03-19 DIAGNOSIS — I48.0 PAROXYSMAL ATRIAL FIBRILLATION (HCC): Primary | ICD-10-CM

## 2025-03-19 NOTE — TELEPHONE ENCOUNTER
SC Dr. Alan. Per Dr. Alan Cr was a little higher.  Not much though.  Would recheck in a week. Have him drink a little more.  Take eliquis 2.5 mg bid    He can stop coumadin one day and start eliquis the next.       Called pt's spouse advised of Dr. Alan's  message. She verbally understood.       Bmp order placed.( Recheck in one week)

## 2025-03-20 ENCOUNTER — TELEPHONE (OUTPATIENT)
Age: 85
End: 2025-03-20

## 2025-03-20 NOTE — TELEPHONE ENCOUNTER
Pt's wife Dorcas calling stating that pt is going to have a tooth capped and needs to know if he needs to hold any of his cardiac medications prior. His appointment is 4/3    Please advise

## 2025-03-24 ENCOUNTER — TELEPHONE (OUTPATIENT)
Age: 85
End: 2025-03-24

## 2025-03-24 ENCOUNTER — OFFICE VISIT (OUTPATIENT)
Dept: PHYSICAL THERAPY | Facility: REHABILITATION | Age: 85
End: 2025-03-24
Payer: MEDICARE

## 2025-03-24 DIAGNOSIS — I89.0 LYMPHEDEMA: Primary | ICD-10-CM

## 2025-03-24 PROCEDURE — 97161 PT EVAL LOW COMPLEX 20 MIN: CPT | Performed by: PHYSICAL THERAPIST

## 2025-03-24 NOTE — PROGRESS NOTES
Direct Access Lymphedema Evaluation    Today's Date: 3/24/2025  Patient name: Oscar Espinosa  : 1940  MRN: [id number]  Referring provider: Reyes, Lea, MD  Dx:  Encounter Diagnosis     ICD-10-CM    1. Lymphedema  I89.0             Assessment    Assessment details: Oscar Espinosa is a 83 y.o. year old male with complaints of chronic lower leg edema returning for 6 month follow up.  Patient's presentation is consistent with stage 2 acquired lymphedema secondary to chronic venous stasis, chronic kidney disease, and chronic CHF.  Over the last year, he has improved from stage 3 lymphedema to late stage 2 lymphedema, and is now early stage 2 lymphedema as a result of excellent compliance with compression therapy- inelastic velcro wraps worn for 12 hours daily. Volumetrics show notable reduction, due to resolution of knee/thigh edema (which was cardiac in etiology), and due to 60 lb weight loss with mild sacropenia.   Ongoing compression therapy is indicated to maintain improvements in edema, slow decline in lympho-venous function, and maintain skin integrity to prevent infection/wounds. New compression garments were ordered from [facility] today and patient to continue daytime (and nighttime PRN) compression until follow up in 6 months, or return to office sooner if lymphedema worsens.  Understanding of Dx/Px/POC: good     Prognosis: good    Plan    Plan of Care beginning date: 3/24/2025  Plan of Care expiration date: 3/24/2025  Treatment plan discussed with: referring physician, patient and family      Subjective/History:  Chief Complaint: chronic BLE edema.  HPI: insidious onset many years ago, R>L; Last seen by lymphedema therapy 10/2024, then was hospitalized for pneumonia x2 courses of hospitalization, then returned to ED for hypotension. Just finished 15 weeks of pulmonary rehab and reports improved endurance and strength in arms and legs. On 2L oxygen via NC continuously.  Prior Edema  Treatments: Ready Wrap calf and foot to BLE- wears them 5 days per week.  pneumatic pump from [employer] received in May 2025 but not used.   Imaging:   R leg LEV 1/2/24:  No DVT or SVT.  R GSV reflux throughout the thigh and calf (mid thigh 11.3 mm, reflux 3.04; knee 11.7, 2.09; prox calf 8.9, reflux 2.79). Triphasic waveforms.  CV duplex 10/19/23: Mild 1-49% bilateral carotid stenosis (R 63/18, L 73/9);  R vertebral antegrade; early systolic deceleration, suggestive of more proximal disease.  TTE completed 04/27/2024: EF 45% systolic function mildly reduced diastolic function severely abnormal consistent with ungraded restrictive relaxation RVSP 55.00 mmHg   Relevant PMHx: CHF with chronic A-fib, on Warfarin (previously Eliqious but d/c due to finances), aspirin, and atorvastatin; bilateral carotid artery stenosis; CABG x4 2003; AAA repaired 2007; HTN; moderate pulm HTN; HLD; stage 3a kidney disease; severe YEVGENIY- cannot tolerate CPAP; chronic LBP L3-4; Nocturnal hypoxemia-- Patient has not been using any PAP therapy or supplemental oxygen at night   Personal history of Cancer? No    Lymphedema special questions  Feeling of heaviness, fullness, pressure? yes  Resolves with elevation: No  Sleeping location? Recliner with legs up or down- due to YEVGENIY - since 2005  Change in fit of shoes/clothes/jewelry?yes  History of Cellulitis?  no  History of S/DVT? no  History of Leg wounds? Yes- diffuse lymphorrhea BLE R>L  Sees a podiatrist regularly for foot care? no  Presence of trunk/abdominal/genital edema? no  Latex Allergy? no    Pain: none  Function: limited walking due to back pain; lives with wife  Working Status: retired  Exercise status: pulm rehab   Patient goals: reduce discomfort and weeping of legs.    Objective  Lymphedema Exam: Lower Extremities  Abdomen/Genitals: abdominal pannus; no edema  Lower extremity left  Toes: trace spongy, pitting, brawny  Dorsum of foot: mild spongy, pitting, brawny  Ankle: mild  firm, pitting, fmod fibrosis, hemosideran staining   Calf:  mild spongy, pitting, dark hemosideran staining, mild fibrotic tissue changes  Knee: no edema  Thigh: no edema  Buttock: WNL  Lower extremity right  Toes: trace spongy, pitting, brawny  Dorsum of foot: mild spongy, pitting, brawny  Ankle: mild firm, pitting, fmod fibrosis, hemosideran staining   Calf:  mild spongy, pitting, dark hemosideran staining, mild fibrotic tissue changes  Knee: no edema  Thigh: no edema  Buttock: WNL  Buttock: WNL  Lymphedema classification (0 latent, 1 reverse, 2 non-rev, 3 elephantiasis): 2  >> REFER TO FLOW SHEET FOR GIRTH MEASUREMENTS <<    Skin Assessment:  Capillary refill: 2 sec  Stemmer's sign: yes  Fibrosis (0 normal - 4 >severe): 1  Erythema scale (1 very faint, 2 faint, 3 bright, 4 very bright): 0  Hemosiderin staining present: yes  Blister present: no  Wound present: no  Scar present: no  Skin quality: well hydrated, brawny, atrophic    Functional Capacity:  Gait assessment: slow gait speed  Transfer status: indep  Ability to don/doff clothing/garments: indep with wraps and shoes and pants  Lower quarter Sensation: Normal  Lower quarter Range of Motion: Abnormal, decreased ankle DF (25% limited) and knee flexion (10% limited) due to edema bilaterally  Lower Quarter Strength: Not tested- functionally WNL       Precautions: multiple comorbidities including cardiac    POC expires Auth Status? (BOMN, approved, pending) Unit limit (Daily) Auth Start date Expiration date PT/OT + Visit Limit?    BOMN         Date of Service 10/9/24 3/24/25       Visits Used 1 1       Visits Remaining 99 99         Compression Rx 10/9/24 3/24/25       Compression garment Ready Wrap calf ([employer]) and foot SL (large) for BLE- re-order placed through [facility] Re-order through [facility] Sent       Compression pumps [employer]- emailed rep for technical support to house        Manual Ther         volumetrics completed completed       MLD                  Ther  Ex         Remedial exercise  Reviewed how to transition to gym setting for unsupervised aerobic and strengthening                         Ther Activity & Self Care         Skin care indep indep       Garment Fit/Train indep indep                Pt Education         Edema differential dx 10' reviewed CHF signs/symptoms 5' cardiac precautions       Lymphatic A&P         Compression options

## 2025-03-24 NOTE — TELEPHONE ENCOUNTER
Clinic calling to request new order for lymphedema therapy.  Caller hung up before more information could be gathered.

## 2025-03-25 ENCOUNTER — HOSPITAL ENCOUNTER (OUTPATIENT)
Dept: PULMONOLOGY | Facility: HOSPITAL | Age: 85
Discharge: HOME | End: 2025-03-25
Attending: INTERNAL MEDICINE
Payer: MEDICARE

## 2025-03-25 DIAGNOSIS — R06.09 DOE (DYSPNEA ON EXERTION): ICD-10-CM

## 2025-03-25 DIAGNOSIS — I50.42 CHRONIC COMBINED SYSTOLIC AND DIASTOLIC CONGESTIVE HEART FAILURE (HCC): ICD-10-CM

## 2025-03-25 PROCEDURE — 94726 PLETHYSMOGRAPHY LUNG VOLUMES: CPT | Performed by: INTERNAL MEDICINE

## 2025-03-25 PROCEDURE — 94726 PLETHYSMOGRAPHY LUNG VOLUMES: CPT

## 2025-03-25 PROCEDURE — 94729 DIFFUSING CAPACITY: CPT | Performed by: INTERNAL MEDICINE

## 2025-03-25 PROCEDURE — 94760 N-INVAS EAR/PLS OXIMETRY 1: CPT

## 2025-03-25 PROCEDURE — 94729 DIFFUSING CAPACITY: CPT

## 2025-03-25 PROCEDURE — 94060 EVALUATION OF WHEEZING: CPT | Performed by: INTERNAL MEDICINE

## 2025-03-25 PROCEDURE — 94060 EVALUATION OF WHEEZING: CPT

## 2025-03-25 RX ORDER — ALBUTEROL SULFATE 0.83 MG/ML
2.5 SOLUTION RESPIRATORY (INHALATION) ONCE
Status: COMPLETED | OUTPATIENT
Start: 2025-03-25 | End: 2025-03-25

## 2025-03-25 RX ADMIN — ALBUTEROL SULFATE 2.5 MG: 2.5 SOLUTION RESPIRATORY (INHALATION) at 12:53

## 2025-03-26 ENCOUNTER — LAB (OUTPATIENT)
Dept: LAB | Facility: CLINIC | Age: 85
End: 2025-03-26
Payer: MEDICARE

## 2025-03-26 DIAGNOSIS — I13.0 HYPERTENSIVE HEART AND CHRONIC KIDNEY DISEASE WITH HEART FAILURE AND STAGE 1 THROUGH STAGE 4 CHRONIC KIDNEY DISEASE, OR CHRONIC KIDNEY DISEASE (HCC): ICD-10-CM

## 2025-03-26 LAB
ANION GAP SERPL CALCULATED.3IONS-SCNC: 6 MMOL/L (ref 4–13)
BUN SERPL-MCNC: 25 MG/DL (ref 5–25)
CALCIUM SERPL-MCNC: 8.9 MG/DL (ref 8.4–10.2)
CHLORIDE SERPL-SCNC: 103 MMOL/L (ref 96–108)
CO2 SERPL-SCNC: 32 MMOL/L (ref 21–32)
CREAT SERPL-MCNC: 1.75 MG/DL (ref 0.6–1.3)
GFR SERPL CREATININE-BSD FRML MDRD: 35 ML/MIN/1.73SQ M
GLUCOSE P FAST SERPL-MCNC: 102 MG/DL (ref 65–99)
POTASSIUM SERPL-SCNC: 4.7 MMOL/L (ref 3.5–5.3)
SODIUM SERPL-SCNC: 141 MMOL/L (ref 135–147)

## 2025-03-26 PROCEDURE — 80048 BASIC METABOLIC PNL TOTAL CA: CPT

## 2025-03-26 PROCEDURE — 36415 COLL VENOUS BLD VENIPUNCTURE: CPT

## 2025-03-27 ENCOUNTER — RESULTS FOLLOW-UP (OUTPATIENT)
Dept: VASCULAR SURGERY | Facility: CLINIC | Age: 85
End: 2025-03-27

## 2025-03-27 ENCOUNTER — HOSPITAL ENCOUNTER (OUTPATIENT)
Dept: NON INVASIVE DIAGNOSTICS | Facility: CLINIC | Age: 85
Discharge: HOME | End: 2025-03-27
Payer: MEDICARE

## 2025-03-27 DIAGNOSIS — I65.23 BILATERAL CAROTID ARTERY STENOSIS: ICD-10-CM

## 2025-03-27 DIAGNOSIS — I71.40 ABDOMINAL AORTIC ANEURYSM (AAA) WITHOUT RUPTURE, UNSPECIFIED PART (HCC): ICD-10-CM

## 2025-03-27 PROCEDURE — 93922 UPR/L XTREMITY ART 2 LEVELS: CPT | Performed by: SURGERY

## 2025-03-27 PROCEDURE — 93978 VASCULAR STUDY: CPT

## 2025-03-27 PROCEDURE — 93880 EXTRACRANIAL BILAT STUDY: CPT

## 2025-03-27 PROCEDURE — 93978 VASCULAR STUDY: CPT | Performed by: SURGERY

## 2025-03-27 PROCEDURE — 93880 EXTRACRANIAL BILAT STUDY: CPT | Performed by: SURGERY

## 2025-03-27 PROCEDURE — 93923 UPR/LXTR ART STDY 3+ LVLS: CPT

## 2025-03-28 ENCOUNTER — RESULTS FOLLOW-UP (OUTPATIENT)
Dept: CARDIOLOGY CLINIC | Facility: CLINIC | Age: 85
End: 2025-03-28

## 2025-03-28 DIAGNOSIS — I13.0 HYPERTENSIVE HEART AND CHRONIC KIDNEY DISEASE WITH HEART FAILURE AND STAGE 1 THROUGH STAGE 4 CHRONIC KIDNEY DISEASE, OR CHRONIC KIDNEY DISEASE (HCC): Primary | ICD-10-CM

## 2025-04-02 ENCOUNTER — APPOINTMENT (OUTPATIENT)
Dept: LAB | Facility: CLINIC | Age: 85
End: 2025-04-02
Payer: MEDICARE

## 2025-04-02 DIAGNOSIS — I13.0 HYPERTENSIVE HEART AND CHRONIC KIDNEY DISEASE WITH HEART FAILURE AND STAGE 1 THROUGH STAGE 4 CHRONIC KIDNEY DISEASE, OR CHRONIC KIDNEY DISEASE (HCC): ICD-10-CM

## 2025-04-02 LAB
ANION GAP SERPL CALCULATED.3IONS-SCNC: 6 MMOL/L (ref 4–13)
BUN SERPL-MCNC: 20 MG/DL (ref 5–25)
CALCIUM SERPL-MCNC: 9.1 MG/DL (ref 8.4–10.2)
CHLORIDE SERPL-SCNC: 104 MMOL/L (ref 96–108)
CO2 SERPL-SCNC: 29 MMOL/L (ref 21–32)
CREAT SERPL-MCNC: 1.64 MG/DL (ref 0.6–1.3)
GFR SERPL CREATININE-BSD FRML MDRD: 38 ML/MIN/1.73SQ M
GLUCOSE P FAST SERPL-MCNC: 95 MG/DL (ref 65–99)
POTASSIUM SERPL-SCNC: 4.9 MMOL/L (ref 3.5–5.3)
SODIUM SERPL-SCNC: 139 MMOL/L (ref 135–147)

## 2025-04-02 PROCEDURE — 80048 BASIC METABOLIC PNL TOTAL CA: CPT

## 2025-04-02 PROCEDURE — 36415 COLL VENOUS BLD VENIPUNCTURE: CPT

## 2025-04-22 ENCOUNTER — TELEPHONE (OUTPATIENT)
Dept: PHYSICAL THERAPY | Facility: REHABILITATION | Age: 85
End: 2025-04-22

## 2025-04-26 ENCOUNTER — DOCUMENTATION (OUTPATIENT)
Dept: INTERNAL MEDICINE CLINIC | Facility: CLINIC | Age: 85
End: 2025-04-26

## 2025-04-26 DIAGNOSIS — I87.2 VENOUS INSUFFICIENCY OF BOTH LOWER EXTREMITIES: Primary | ICD-10-CM

## 2025-04-28 ENCOUNTER — TELEPHONE (OUTPATIENT)
Age: 85
End: 2025-04-28

## 2025-04-28 DIAGNOSIS — I89.0 LYMPHEDEMA: Primary | ICD-10-CM

## 2025-04-28 DIAGNOSIS — I87.2 VENOUS INSUFFICIENCY OF BOTH LOWER EXTREMITIES: ICD-10-CM

## 2025-04-28 NOTE — TELEPHONE ENCOUNTER
Pts wife called stating pt needs  a script for lymphedema and compression garments for comfort and care .  Needs to be faxed to   Physical Therapy at [address], [place] [address]  Fax: [phone]

## 2025-04-29 DIAGNOSIS — J44.9 CHRONIC OBSTRUCTIVE PULMONARY DISEASE, UNSPECIFIED COPD TYPE (HCC): ICD-10-CM

## 2025-04-30 RX ORDER — FLUTICASONE PROPIONATE AND SALMETEROL 100; 50 UG/1; UG/1
1 POWDER RESPIRATORY (INHALATION) 2 TIMES DAILY
Qty: 180 BLISTER | Refills: 1 | Status: SHIPPED | OUTPATIENT
Start: 2025-04-30

## 2025-05-05 ENCOUNTER — OFFICE VISIT (OUTPATIENT)
Dept: INTERNAL MEDICINE CLINIC | Facility: CLINIC | Age: 85
End: 2025-05-05
Payer: MEDICARE

## 2025-05-05 ENCOUNTER — APPOINTMENT (OUTPATIENT)
Dept: LAB | Facility: CLINIC | Age: 85
End: 2025-05-05
Payer: MEDICARE

## 2025-05-05 ENCOUNTER — TRANSCRIBE ORDERS (OUTPATIENT)
Dept: LAB | Facility: CLINIC | Age: 85
End: 2025-05-05

## 2025-05-05 VITALS
DIASTOLIC BLOOD PRESSURE: 56 MMHG | SYSTOLIC BLOOD PRESSURE: 100 MMHG | BODY MASS INDEX: 26.5 KG/M2 | WEIGHT: 190 LBS | HEART RATE: 54 BPM | OXYGEN SATURATION: 97 %

## 2025-05-05 DIAGNOSIS — R97.20 ELEVATED PSA: ICD-10-CM

## 2025-05-05 DIAGNOSIS — N18.30 ANEMIA, CHRONIC RENAL FAILURE, STAGE 3 (MODERATE)  (HCC): ICD-10-CM

## 2025-05-05 DIAGNOSIS — D63.1 ANEMIA, CHRONIC RENAL FAILURE, STAGE 3 (MODERATE)  (HCC): ICD-10-CM

## 2025-05-05 DIAGNOSIS — J02.9 SORE THROAT: Primary | ICD-10-CM

## 2025-05-05 DIAGNOSIS — R97.20 ELEVATED PSA: Primary | ICD-10-CM

## 2025-05-05 LAB
BASOPHILS # BLD AUTO: 0.05 THOUSANDS/ÂΜL (ref 0–0.1)
BASOPHILS NFR BLD AUTO: 1 % (ref 0–1)
EOSINOPHIL # BLD AUTO: 0.18 THOUSAND/ÂΜL (ref 0–0.61)
EOSINOPHIL NFR BLD AUTO: 3 % (ref 0–6)
ERYTHROCYTE [DISTWIDTH] IN BLOOD BY AUTOMATED COUNT: 14.6 % (ref 11.6–15.1)
FERRITIN SERPL-MCNC: 34 NG/ML (ref 30–336)
HCT VFR BLD AUTO: 36 % (ref 36.5–49.3)
HGB BLD-MCNC: 10.9 G/DL (ref 12–17)
IMM GRANULOCYTES # BLD AUTO: 0.07 THOUSAND/UL (ref 0–0.2)
IMM GRANULOCYTES NFR BLD AUTO: 1 % (ref 0–2)
IRON SATN MFR SERPL: 11 % (ref 15–50)
IRON SERPL-MCNC: 44 UG/DL (ref 50–212)
LYMPHOCYTES # BLD AUTO: 0.79 THOUSANDS/ÂΜL (ref 0.6–4.47)
LYMPHOCYTES NFR BLD AUTO: 12 % (ref 14–44)
MCH RBC QN AUTO: 32.6 PG (ref 26.8–34.3)
MCHC RBC AUTO-ENTMCNC: 30.3 G/DL (ref 31.4–37.4)
MCV RBC AUTO: 108 FL (ref 82–98)
MONOCYTES # BLD AUTO: 0.64 THOUSAND/ÂΜL (ref 0.17–1.22)
MONOCYTES NFR BLD AUTO: 10 % (ref 4–12)
NEUTROPHILS # BLD AUTO: 4.93 THOUSANDS/ÂΜL (ref 1.85–7.62)
NEUTS SEG NFR BLD AUTO: 73 % (ref 43–75)
NRBC BLD AUTO-RTO: 0 /100 WBCS
PLATELET # BLD AUTO: 106 THOUSANDS/UL (ref 149–390)
PMV BLD AUTO: 10.6 FL (ref 8.9–12.7)
PSA SERPL-MCNC: 3.44 NG/ML (ref 0–4)
RBC # BLD AUTO: 3.34 MILLION/UL (ref 3.88–5.62)
TIBC SERPL-MCNC: 397.6 UG/DL (ref 250–450)
TRANSFERRIN SERPL-MCNC: 284 MG/DL (ref 203–362)
UIBC SERPL-MCNC: 354 UG/DL (ref 155–355)
WBC # BLD AUTO: 6.66 THOUSAND/UL (ref 4.31–10.16)

## 2025-05-05 PROCEDURE — 83540 ASSAY OF IRON: CPT

## 2025-05-05 PROCEDURE — G2211 COMPLEX E/M VISIT ADD ON: HCPCS | Performed by: GENERAL ACUTE CARE HOSPITAL

## 2025-05-05 PROCEDURE — 87106 FUNGI IDENTIFICATION YEAST: CPT | Performed by: GENERAL ACUTE CARE HOSPITAL

## 2025-05-05 PROCEDURE — 83550 IRON BINDING TEST: CPT

## 2025-05-05 PROCEDURE — 99213 OFFICE O/P EST LOW 20 MIN: CPT | Performed by: GENERAL ACUTE CARE HOSPITAL

## 2025-05-05 PROCEDURE — 85025 COMPLETE CBC W/AUTO DIFF WBC: CPT

## 2025-05-05 PROCEDURE — 87102 FUNGUS ISOLATION CULTURE: CPT | Performed by: GENERAL ACUTE CARE HOSPITAL

## 2025-05-05 PROCEDURE — 82728 ASSAY OF FERRITIN: CPT

## 2025-05-05 PROCEDURE — 36415 COLL VENOUS BLD VENIPUNCTURE: CPT

## 2025-05-05 PROCEDURE — G0103 PSA SCREENING: HCPCS

## 2025-05-05 RX ORDER — NYSTATIN 100000 [USP'U]/ML
500000 SUSPENSION ORAL 4 TIMES DAILY
Qty: 473 ML | Refills: 1 | Status: SHIPPED | OUTPATIENT
Start: 2025-05-05 | End: 2025-05-06 | Stop reason: SDUPTHER

## 2025-05-05 NOTE — PROGRESS NOTES
Name: Oscar Espinosa      : 1940      MRN: [id number]  Encounter Provider: Alicia Castaneda MD  Encounter Date: 2025   Encounter department: [facility]  :  Assessment & Plan  Sore throat  There are several white spots noticed over oropharynx and at the lateral mucosal membrane in the left.  Picture taken under media. He uses inhaled corticosteroid and rinses his mouth after each use.  Oral thrush is likely.  Will send a fungal culture and start empiric treatment with nystatin swish and swallow for 2 weeks or until symptoms resolved.  He will let us know if symptoms continue.      Orders:    nystatin (MYCOSTATIN) 500,000 units/5 mL suspension; Apply 5 mL (500,000 Units total) to the mouth or throat 4 (four) times a day    Fungal culture; Future           History of Present Illness   HPI  3 days ago, started to feel sore throat no other cold-like symptoms.  Then noticed some white spots on her throat and in the inner side of his left cheek.  Denies odynophagia.    Review of Systems    Objective   /56 (BP Location: Left arm, Patient Position: Sitting, Cuff Size: Standard)   Pulse (!) 54   Wt 86.2 kg (190 lb)   SpO2 97%   BMI 26.50 kg/m²      Physical Exam

## 2025-05-06 ENCOUNTER — TELEPHONE (OUTPATIENT)
Age: 85
End: 2025-05-06

## 2025-05-06 DIAGNOSIS — J02.9 SORE THROAT: ICD-10-CM

## 2025-05-06 DIAGNOSIS — B37.0 ORAL THRUSH: Primary | ICD-10-CM

## 2025-05-06 RX ORDER — NYSTATIN 100000 [USP'U]/ML
500000 SUSPENSION ORAL 4 TIMES DAILY
Qty: 473 ML | Refills: 1 | Status: SHIPPED | OUTPATIENT
Start: 2025-05-06

## 2025-05-06 RX ORDER — CLOTRIMAZOLE 10 MG/1
LOZENGE ORAL
Qty: 35 TABLET | Refills: 0 | Status: SHIPPED | OUTPATIENT
Start: 2025-05-06

## 2025-05-06 NOTE — TELEPHONE ENCOUNTER
Advised patient. Oscar asked if after 3 days of use can he call in and get the pill version of the medication. They are going away Saturday for a trip.    Please advise thank you

## 2025-05-06 NOTE — TELEPHONE ENCOUNTER
Patient spouse called in stated that the medication nystatin (MYCOSTATIN) 500,000 units/5 mL suspension was sent to the wrong pharmacy and would need to be sent to [id number] - [place], [place] - [address]. Patient would like a call once sent to pharmacy.Thank you

## 2025-05-06 NOTE — TELEPHONE ENCOUNTER
Please call her that I sent a prescription for the clotrimazole atrocious that dissolves in the mouth.  He can use it 5 times a day for 7 days.

## 2025-05-06 NOTE — ASSESSMENT & PLAN NOTE
There are several white spots noticed over oropharynx and at the lateral mucosal membrane in the left.  Picture taken under media. He uses inhaled corticosteroid and rinses his mouth after each use.  Oral thrush is likely.  Will send a fungal culture and start empiric treatment with nystatin swish and swallow for 2 weeks or until symptoms resolved.  He will let us know if symptoms continue.      Orders:    nystatin (MYCOSTATIN) 500,000 units/5 mL suspension; Apply 5 mL (500,000 Units total) to the mouth or throat 4 (four) times a day    Fungal culture; Future

## 2025-05-07 ENCOUNTER — HOSPITAL ENCOUNTER (OUTPATIENT)
Dept: INFUSION CENTER | Facility: CLINIC | Age: 85
Discharge: HOME | End: 2025-05-07
Payer: MEDICARE

## 2025-05-07 VITALS — SYSTOLIC BLOOD PRESSURE: 111 MMHG | DIASTOLIC BLOOD PRESSURE: 60 MMHG

## 2025-05-07 DIAGNOSIS — D63.1 ANEMIA, CHRONIC RENAL FAILURE, STAGE 3 (MODERATE)  (HCC): Primary | ICD-10-CM

## 2025-05-07 DIAGNOSIS — N18.30 ANEMIA, CHRONIC RENAL FAILURE, STAGE 3 (MODERATE)  (HCC): Primary | ICD-10-CM

## 2025-05-07 PROCEDURE — 96372 THER/PROPH/DIAG INJ SC/IM: CPT

## 2025-05-07 RX ADMIN — DARBEPOETIN ALFA 100 MCG: 100 INJECTION, SOLUTION INTRAVENOUS; SUBCUTANEOUS at 10:49

## 2025-05-07 NOTE — PROGRESS NOTES
Patient to infusion center for aranesp injection. Patient offers no complaints. VSS. Labs from 5/5/2025 reviewed and within parameters to treat. Aranesp injection administered into left arm, tolerated without incident. Next appointment confirmed for 7/2/2025 at 1030. AVS offered and declined.

## 2025-05-09 LAB — FUNGUS SPEC CULT: ABNORMAL

## 2025-05-12 LAB — FUNGUS SPEC CULT: ABNORMAL

## 2025-05-20 ENCOUNTER — RESULTS FOLLOW-UP (OUTPATIENT)
Dept: INTERNAL MEDICINE CLINIC | Facility: CLINIC | Age: 85
End: 2025-05-20

## 2025-05-29 NOTE — PROGRESS NOTES
Name: Oscar Espinosa      : 1940      MRN: [id number]  Encounter Provider: Candace Louise PA-C  Encounter Date: 2025   Encounter department: [facility] [place]  :  Assessment & Plan  Abdominal aortic aneurysm (AAA) without rupture, unspecified part (HCC)  S/P oAAA repair  ([place])    -No abdominal, back or flank pain  -AOIL 3/27/25: Widely patent ABF bypass graft with elevated velocities in bilateral prox limbs (R limb 219, L limb 258);  R NAA 1.13, L NAA 1.17    Plan:  -Asymptomatic AAA with patent ABF bypass graft with preserved NAA's  -AOIL shows patent bypass graft with increased velocities in the proximal graft with preserved NAA's which we can monitor  -Exam: 2+ femoral and 2+ DP pulses  -Continue with medical therapy on aspirin/apixaban 2.5 BID/ atorvastatin 40 and clinical monitoring  -Given increased velocities, repeat AOIL in 1 year with office visit, but discussed reasons to be seen sooner.  Orders:    VAS abdominal aorta/iliac duplex; Future    S/P aorto-bifemoral bypass surgery  -Patent bypass  -Mgt as above under AAA  Orders:    VAS abdominal aorta/iliac duplex; Future    Bilateral carotid artery stenosis  -CV du 3/27/25: mild 1-49% carotid artery stenosis (R 90/26, L 99/25)    -Mild, asymptomatic bilateral carotid artery stenosis  -Reviewed carotid duplex study which demonstrates mild 1-49% stenosis with very low velocities  -Discussed the pathophysiology and treatment of carotid artery stenosis,  including indications for surgical intervention.  -Discussed rationale for medical therapy  -Continue with aspirin/apixaban 2.5 BID/ atorvastatin 40  -RF modification and maintain good blood pressure,  cholesterol and glucose control per primary care  -Reviewed symptoms of stroke for which 911 should be called  -Follow up CV duplex in 2 years       Benign essential hypertension  -RF modification and maintain good blood pressure,  cholesterol and glucose control per primary care        Mixed hyperlipidemia  -RF modification and maintain good blood pressure,  cholesterol and glucose control per primary care       Prediabetes  -RF modification and maintain good blood pressure,  cholesterol and glucose control per primary care       Lymphedema  Chronic B LE edema x > 10 years with Hx very severe legs edema with drainage  Sent last year to lymphedema clinic and has compression pumps and compression wraps    -Doing better; edema overall better controlled with compressive measures and wt loss  -Needs update Rx for compression wraps which was sent to PT/OT lymphedema clinic    -R leg LEV 1/2/24:    No DVT or SVT.     R GSV reflux throughout the thigh and calf (mid thigh 11.3 mm, reflux 3.04; knee 11.7, 2.09; prox calf 8.9, reflux 2.79). Triphasic waveforms.    -Exam: 1+ B LE edema; at least moderate chronic stasis changes; skin overall intact, no open wounds or drainage    Plan:  -Chronic leg swelling with underlying deep and superficial venous insufficiency and secondary lymphedema with improved LE edema after lymphedema therapy  -Continue with daily compression wraps;  updated prescription for wraps sent to PT/OT lymphedema  -Working on compression pumps (lymph clinic) - rec at least 60 min once daily and titrate  -Continue with medical compression to be worn daily and removed at night  -Compression, periodic elevation, low sodium, regular exercise and wt loss  -May return to lymphedema therapy as needed for decompensation, re-evaluation and garments  -Patient education regarding lymphedema provided  -Follow up 1 year  Orders:    Compression bandages    Venous insufficiency of both lower extremities  -As above under lymphedema  Orders:    Compression bandages    Chronic obstructive pulmonary disease, unspecified COPD type ([facility])  -Supplemental O2       Moderate to severe pulmonary hypertension ([facility])         Anemia, chronic renal failure, stage 3 (moderate)  ([facility])  Lab Results   Component Value Date     EGFR 38 04/02/2025    EGFR 35 03/26/2025    EGFR 36 03/18/2025    CREATININE 1.64 (H) 04/02/2025    CREATININE 1.75 (H) 03/26/2025    CREATININE 1.69 (H) 03/18/2025                History of Present Illness   CC: Patient presents today to review AOIL and carotid duplex done 3/27/25. H/o AAA repair in 2007. Denies any acute abdominal pain, back pain or leg pain. Denies any s/s of CVA.     HPI  Oscar Espinosa is a 84 y.o. male former smoker, obesity, severe YEVGENIY- intolerant CPAP, COPD, DDD with lumbar radiculopathy, pulmo Htn, Htn, HLD, CKD IIIa, AF, CAD, chronic dCHF, ascending Aortic aneurysm, AAA s/p open repair ([place], ?'07), venous insufficiency with LE edema who is referred to vascular surgery for leg swelling.  Of note, he has had no AAA follow up for years.  A duplex was attempted in 2021, but could essentially not be completed due to poor quality/obese.     Transferred care 1/2024 to [facility] for AAA surveillance and lymphedema.     Family history of aneurysms. Reports that he has asked his daughters have be screened for aneurysms.      1/24/24:  Patient is new to vascular with extensive cardiovascular history and chronic B LE edema on diuretics which have been managed by cardiology.  The patient has a longstanding history of leg edema.  In the distant past he tried wearing compression but they were too difficult to get on.  He had difficulty bending over the abdomen to don the stockings and they were too tight.  He does have OTC compression sleeves for the calves but I explained that this is not optimal treatment.  He complains that recently the legs have been worsening with more swelling and now weeping.  He has been having more problems despite reporting no sodium added diet and adjustments in diuretics.  He underwent a right leg venous duplex which was negative for DVT, but did show significant reflux in the GSV and suggest deep venous insufficiency.  He has no fevers or chills.  No history of DVT.       Medical therapy includes: Torsemide 20, amiodarone, warfarin, atorvastatin 40.     2/26/24: Patient returns for follow up of venous insufficiency.  He had baseline PT/ lymphedema assessment last week with recommendation for lymphedema therapy twice weekly x 6 weeks.  They ordered special wraps at the lymphedema clinic for him.  He will be going for his first treatment on 2/28 since he had cancellations due to weather events.  He is also seeing cardiology because he does have history of moderate MR/TR.  He is having a repeat echocardiogram.     He is wearing compression, he continues to have very severe bilateral leg swelling.  He is becoming frustrated.  We discussed the importance of continued daily management of lymphedema.  He will be starting treatment with the lymphedema clinic and with compliance they expect it will be 6 to 12 weeks before he is compensated.  I do not expect that he will be manageable with standard compression only.  Due to the severity of secondary lymphedema due to venous insufficiency and likely other factors such as cardiac and metabolic, he would benefit from lymphedema pump therapy to help with maintain condition of legs and prevent wounds and infections.     Patient also would like us to follow his repaired AAA.  He is asymptomatic from AAA standpoint.  So I will order AOIL since he has not had evaluation in several years.      5/20/24:  Mr. Espinosa returns for vascular follow up and review AOIL.      He has no abdominal or back pain. No leg or foot pain. AAA repair from 2007 remains patent. We will get him back on surveillance protocol.      He also has mild carotid artery stenosis and due for duplex in Oct which is scheduled but velocities are very low so we can repeat next year with AOIL.     His biggest problem remains marked LE edema. He has been working with lymph clinic with some improvement. He should continue with wraps, elevation, skin care and monitoring condition. They  are working on lymph pumps for him at the lymph clinic.     He should continue with aspirin/statin therapy, as well as warfarin (AF, cards).        6/2/25: Patient reports that he is doing well.  He has lost about 70 pounds which he attributes to diuretics and eating better. He is on chronic supplemental oxygen which he warm to the office, but not wearing it during the exam since he feels his breathing is improved stable at rest.    From vascular standpoint, he has no complaints.  No abdominal/back or flank pain.  He has no claudication at his level of activity.    No symptoms of TIA/stroke.  No amaurosis fugax, vision changes, dysarthria unilateral numbness or weakness.    Since he lost weight with diuretics and had lymphedema PT/OT, he feels his lymphedema is overall improved.  He has been trying to get updated compression wraps for couple of months so I send a script to PT/OT lymphedema.  He feels that leg swelling is overall compensated. He has been generally compliant with his compression wraps.  He is not utilizing the lymphedema pumps every day.  I recommend continuing using least once daily.  He has no wounds or drainage and skin is in reasonably good condition.,  Including symptoms concerning for which she should contact hpatient education regarding lymphedema the vascular clinic or for which she may need another course of PT OT lymphedema therapy.    A1c 5.4  LDL 57    Aortoiliac duplex as above.  I also reviewed his carotid duplex which shows mild, less than 50% bilateral carotid artery stenosis which we treat medically. LDL at goal. No medication changes recommended at this time.     Medical therapy includes aspirin 81, apixaban 2.5 twice a day, atorvastatin 40, amiodarone, gabapentin, Demadex, etc.    -Brings in supplemental O2, but no wearing  -Breathing has been improving  -Lost ~ 70# through diuretics and diet  -No abdominal pain, foot pain or wounds  -Chronic LE edema; requests Rx for updated  "compression wraps (provided)    Review of Systems   Constitutional: Negative.    HENT: Negative.     Eyes: Negative.    Respiratory:  Positive for shortness of breath.    Cardiovascular:  Positive for leg swelling.   Gastrointestinal: Negative.    Endocrine: Negative.    Genitourinary: Negative.    Musculoskeletal:  Positive for back pain.   Skin: Negative.    Allergic/Immunologic: Negative.    Neurological: Negative.    Hematological: Negative.    Psychiatric/Behavioral: Negative.            Objective   /70 (BP Location: Right arm, Patient Position: Sitting)   Pulse 66   Ht 5' 11\" (1.803 m)   Wt 87.1 kg (192 lb)   BMI 26.78 kg/m²      Abdomen well-healed midline surgical incision.  Soft.  Nontender nondistended.    2+ femoral and DP pulses. 1-2+ LE edema with moderate chronic stasis changes. No venous ulcerations.     Physical Exam  Vitals and nursing note reviewed.   Constitutional:       Appearance: He is well-developed.   HENT:      Head: Normocephalic and atraumatic.     Eyes:      Pupils: Pupils are equal, round, and reactive to light.     Neck:      Vascular: No JVD.     Cardiovascular:      Rate and Rhythm: Normal rate and regular rhythm.      Pulses:           Radial pulses are 2+ on the right side and 2+ on the left side.        Femoral pulses are 2+ on the right side and 2+ on the left side.       Dorsalis pedis pulses are 2+ on the right side and 2+ on the left side.      Heart sounds: Normal heart sounds, S1 normal and S2 normal. No murmur heard.     No friction rub. No gallop.   Pulmonary:      Effort: Pulmonary effort is normal. No accessory muscle usage or respiratory distress.      Breath sounds: Normal breath sounds. No wheezing or rales.   Abdominal:      General: Bowel sounds are normal. There is no distension.      Palpations: Abdomen is soft.      Tenderness: There is no abdominal tenderness.     Musculoskeletal:         General: No deformity. Normal range of motion.      Right lower " le+ Pitting Edema present.      Left lower le+ Edema present.     Skin:     General: Skin is warm and dry.      Findings: No lesion or rash.      Nails: There is no clubbing.     Neurological:      Mental Status: He is alert and oriented to person, place, and time.      Comments: Grossly normal    Psychiatric:         Behavior: Behavior is cooperative.           AOIL 3/27/25  [facility]PORT  CLINICAL:  Indications:  Yearly surveillance of AAA repair.  Patient denies any claudication symptoms.  Operative History:  2007 AAA repair (aorto-bi-fem)  2003 CABG x 3  Cardiac cath  Risk Factors  The patient has history of Obesity, HTN, Hyperlipidemia, AAA, CKD, CAD, CHF,  COPD, A-fib, previous smoking (quit >10yrs ago), and is currently taking a blood  thinner.  Clinical  Right Pressure:  134/ mm Hg, Left Pressure:  130/ mm Hg.     FINDINGS:     Unilateral                PSV (cm/s)  EDV (cm/s)  AP (cm)  TRV (cm)    Sup-Diamond Ao                        53                  2.3              Px Inf-Renan Ao                     40                  2.1       2.3    Celiac                           188          58                       Prox. SMA                        261          34                       Aortic Inflow anastmosis          48                                   Aortic Inflow Artery              40                                   Graft Stem                        40                                      Segment              Right                         Left                                        PSV (cm/s)  EDV (cm/s)   RAR  PSV (cm/s)  EDV (cm/s)   RAR    Prox Renal                  121          33  2.25         103          24  1.93    Dist Graft Limb             168                           132                      Limb Anastomosis            161                           109                      Limb Outflow Artery         136                           137                      Mid  Graft Limb              196                           127                      Prox Graft Limb             219                           258                               CONCLUSION:     Impression     Widely patent Hejua-Xz-Ytdtyoe bypass graft, however elevated velocities were  noted in the bilateral proximal limbs without evidence of focal stenosis.  The celiac, superior mesenteric, and bilateral renal arteries are patent.     Ankle/Brachial indices are: Rt - 1.13, normal range (Prior: N/A) and Lt - 1.17,  normal range (Prior: N/A).  Great toe pressures are within the healing range.     Compared to previous study on 3/27/2024, there is no significant progression of  disease.          CV duplex 3/27/25  FINDINGS:     Right        Impression  PSV  EDV (cm/s)  Direction of Flow  Ratio    Dist. ICA                 78          22                      0.88    Mid. ICA                  87          24                      0.98    Prox. ICA    1 - 49%      90          26                      1.02    Dist CCA                  55          10                              Mid CCA                   88          19                      1.47    Prox CCA                  60          12                              Ext Carotid               72           5                      0.81    Prox Vert                 39          11  Antegrade                   Subclavian               225           0                                 Left         Impression  PSV  EDV (cm/s)  Direction of Flow  Ratio    Dist. ICA                 73          26                      0.68    Mid. ICA                  88          21                      0.83    Prox. ICA    1 - 49%      99          25                      0.94    Dist CCA                  60          13                              Mid CCA                  106          24                      1.41    Prox CCA                  75          19                              Ext Carotid                "77           9                      0.72    Prox Vert                 54          16  Antegrade                   Subclavian               126           0                                       CONCLUSION:     Impression     RIGHT:  There is <50% stenosis noted in the internal carotid artery. Plaque is  heterogenous and irregular.  Vertebral artery flow is antegrade. There is no significant subclavian artery  disease.     LEFT:  There is <50% stenosis noted in the internal carotid artery. Plaque is  heterogenous and irregular.  Vertebral artery flow is antegrade. There is no significant subclavian artery  disease.     Compared to previous study on 10/19/2023, there is no significant progression  of disease.    I have reviewed and made appropriate changes to the review of systems input by the medical assistant.    Vitals:    25 0817   BP: 122/70   BP Location: Right arm   Patient Position: Sitting   Pulse: 66   Weight: 87.1 kg (192 lb)   Height: 5' 11\" (1.803 m)       Problem List[1]    Past Surgical History[2]    Family History[3]    Social History     Socioeconomic History    Marital status: /Civil Union     Spouse name: Not on file    Number of children: Not on file    Years of education: Not on file    Highest education level: Not on file   Occupational History    Occupation: retired   Tobacco Use    Smoking status: Former     Current packs/day: 0.00     Average packs/day: 1 pack/day for 55.6 years (55.6 ttl pk-yrs)     Types: Cigarettes     Start date: 1957     Quit date: 2012     Years since quittin.8     Passive exposure: Past    Smokeless tobacco: Never   Vaping Use    Vaping status: Never Used   Substance and Sexual Activity    Alcohol use: Yes     Alcohol/week: 1.0 standard drink of alcohol     Types: 1 Cans of beer per week    Drug use: No    Sexual activity: Never   Other Topics Concern    Not on file   Social History Narrative    Not on file     Social Drivers of Health "     Financial Resource Strain: Low Risk  (3/7/2024)    Overall Financial Resource Strain (CARDIA)     Difficulty of Paying Living Expenses: Not hard at all   Food Insecurity: No Food Insecurity (11/14/2024)    Nursing - Inadequate Food Risk Classification     Worried About Running Out of Food in the Last Year: Never true     Ran Out of Food in the Last Year: Never true     Ran Out of Food in the Last Year: Never true   Transportation Needs: No Transportation Needs (11/14/2024)    PRAPARE - Transportation     Lack of Transportation (Medical): No     Lack of Transportation (Non-Medical): No   Physical Activity: Not on file   Stress: Not on file   Social Connections: Not on file   Intimate Partner Violence: Unknown (11/12/2024)    Nursing IPS     Feels Physically and Emotionally Safe: Not on file     Physically Hurt by Someone: Not on file     Humiliated or Emotionally Abused by Someone: Not on file     Physically Hurt by Someone: No     Hurt or Threatened by Someone: No   Housing Stability: Low Risk  (11/14/2024)    Housing Stability Vital Sign     Unable to Pay for Housing in the Last Year: No     Number of Times Moved in the Last Year: 0     Homeless in the Last Year: No       Allergies[4]    Current Medications[5]           [1]   Patient Active Problem List  Diagnosis    Benign essential hypertension    Hyperlipidemia    Impaired fasting glucose    Microscopic hematuria    Severe obesity (BMI 35.0-35.9 with comorbidity) (HCC)    Severe obstructive sleep apnea    Abnormal thyroid function test    CAD (coronary artery disease)    Abdominal aortic aneurysm without rupture (HCC)    Aortic valve disorder    Lumbar radiculopathy    Chronic pain syndrome    Multiple pulmonary nodules    Chronic obstructive pulmonary disease (HCC)    Moderate to severe pulmonary hypertension (HCC)    Nocturnal hypoxemia    Ascending aortic aneurysm (HCC)    Heart failure with improved ejection fraction (HFimpEF) (HCC)    Thrombocytopenia  (HCC)    Myofascial pain syndrome    Paroxysmal atrial fibrillation (HCC)    Venous insufficiency of both lower extremities    Bilateral carotid artery stenosis    Hypertensive heart and chronic kidney disease with heart failure and stage 1 through stage 4 chronic kidney disease, or chronic kidney disease (HCC)    Pleural effusion with shortness of breath    Anemia    Acute hypoxic respiratory failure (HCC)    Chronic combined systolic and diastolic congestive heart failure (HCC)    Prediabetes    Hypotension    Chronic obstructive pulmonary disease, unspecified COPD type (HCC)    Chronic respiratory failure with hypoxia (HCC)    Iron deficiency anemia, unspecified    CKD (chronic kidney disease)    Acute on chronic diastolic heart failure (HCC)    LITO (acute kidney injury) (HCC)    Anemia, chronic renal failure, stage 3 (moderate)  (HCC)    Stage 3b chronic kidney disease (HCC)    Goals of care, counseling/discussion    Palliative care by specialist    PANDA (dyspnea on exertion)    Lymphedema    Sore throat   [2]   Past Surgical History:  Procedure Laterality Date    ABDOMINAL AORTIC ANEURYSM REPAIR  08/09/2007    2 dock limbs with visc extens prosth    CARDIAC CATHETERIZATION      COLONOSCOPY      CORONARY ARTERY BYPASS GRAFT  2003    LIMA to LAD, sequential SVG to OM1 & OM2, SVG to RDA    EYE SURGERY      IR CHEST TUBE PLACEMENT  06/19/2024    LUMBAR EPIDURAL INJECTION     [3]   Family History  Problem Relation Name Age of Onset    Heart disease Mother      Stroke Father jayce         stroke syndrome    Aneurysm Brother          abdominal    Aortic aneurysm Brother          ascending    Coronary artery disease Family      Hypertension Family      Other Son          gioblastoma multiforme   [4]   Allergies  Allergen Reactions    Meloxicam Edema   [5]   Current Outpatient Medications:     acetaminophen (TYLENOL) 325 mg tablet, Take 2 tablets by mouth every 6 (six) hours as needed for fever, headaches, mild pain,  moderate pain or severe pain, Disp: , Rfl:     albuterol (ProAir HFA) 90 mcg/act inhaler, Inhale 2 puffs every 6 (six) hours as needed for wheezing, Disp: 24 g, Rfl: 0    amiodarone 200 mg tablet, Take 1 tablet (200 mg total) by mouth daily, Disp: 90 tablet, Rfl: 3    apixaban (Eliquis) 2.5 mg, Take 1 tablet (2.5 mg total) by mouth 2 (two) times a day, Disp: 180 tablet, Rfl: 3    aspirin (ECOTRIN LOW STRENGTH) 81 mg EC tablet, Take 1 tablet by mouth in the morning., Disp: , Rfl:     atorvastatin (LIPITOR) 40 mg tablet, Take 1 tablet (40 mg total) by mouth daily, Disp: 90 tablet, Rfl: 3    cholecalciferol (VITAMIN D3) 1,000 units tablet, Take 2,000 Units by mouth in the morning., Disp: , Rfl:     Empagliflozin (JARDIANCE) 10 MG TABS tablet, Take 1 tablet (10 mg total) by mouth every morning, Disp: 90 tablet, Rfl: 1    Fluticasone-Salmeterol (Wixela Inhub) 100-50 mcg/dose inhaler, INHALE 1 PUFF BY MOUTH TWICE DAILY. RINSE MOUTH AFTER USE, Disp: 180 blister, Rfl: 1    gabapentin (NEURONTIN) 300 mg capsule, Take 3 capsules (900 mg total) by mouth daily at bedtime, Disp: 270 capsule, Rfl: 1    labetalol (NORMODYNE) 100 mg tablet, Take 0.5 tablets (50 mg total) by mouth in the morning, Disp: 45 tablet, Rfl: 3    multivitamin (THERAGRAN) TABS, Take 1 tablet by mouth in the morning., Disp: , Rfl:     oxyCODONE (ROXICODONE) 10 MG TABS, Take 1 tablet (10 mg total) by mouth every 6 (six) hours as needed for moderate pain Max Daily Amount: 40 mg, Disp: 120 tablet, Rfl: 0    oxygen gas, Inhale 2 L continuous 2L of oxygen via nasal cannula as needed and at night, Disp: , Rfl:     polyethylene glycol (MIRALAX) 17 g packet, Take 17 g by mouth daily as needed (constipation) Oscar Espinosa dissolve in 8 oz liquid of choice, Disp: , Rfl:     potassium chloride (Klor-Con M20) 20 mEq tablet, TAKE 1 TABLET BY MOUTH DAILY 7  DAYS WEEKLY, Disp: 90 tablet, Rfl: 1    senna (SENOKOT) 8.6 MG tablet, Take 1 tablet by mouth as needed for  constipation, Disp: , Rfl:     tamsulosin (FLOMAX) 0.4 mg, Take 1 capsule by mouth in the morning., Disp: , Rfl:     torsemide (DEMADEX) 20 mg tablet, Take 20 mg by mouth in the morning., Disp: , Rfl:     clotrimazole (MYCELEX) 10 mg [place], 1 tablet dissolved in the mouth 5 times a day for 7 days, Disp: 35 tablet, Rfl: 0    naloxone (NARCAN) 4 mg/0.1 mL nasal spray, Administer 1 spray into a nostril. If no response after 2-3 minutes, give another dose in the other nostril using a new spray. (Patient not taking: Reported on 3/18/2025), Disp: 1 each, Rfl: 1    nystatin (MYCOSTATIN) 500,000 units/5 mL suspension, Apply 5 mL (500,000 Units total) to the mouth or throat 4 (four) times a day, Disp: 473 mL, Rfl: 1

## 2025-06-01 NOTE — ASSESSMENT & PLAN NOTE
-CV du 3/27/25: mild 1-49% carotid artery stenosis (R 90/26, L 99/25)    -Mild, asymptomatic bilateral carotid artery stenosis  -Reviewed carotid duplex study which demonstrates mild 1-49% stenosis with very low velocities  -Discussed the pathophysiology and treatment of carotid artery stenosis,  including indications for surgical intervention.  -Discussed rationale for medical therapy  -Continue with aspirin/apixaban 2.5 BID/ atorvastatin 40  -RF modification and maintain good blood pressure,  cholesterol and glucose control per primary care  -Reviewed symptoms of stroke for which 911 should be called  -Follow up CV duplex in 2 years

## 2025-06-01 NOTE — ASSESSMENT & PLAN NOTE
S/P oAAA repair 2007 ([place])    -No abdominal, back or flank pain  -AOIL 3/27/25: Widely patent ABF bypass graft with elevated velocities in bilateral prox limbs (R limb 219, L limb 258);  R NAA 1.13, L NAA 1.17    Plan:  -Asymptomatic AAA with patent ABF bypass graft with preserved NAA's  -AOIL shows patent bypass graft with increased velocities in the proximal graft with preserved NAA's which we can monitor  -Exam: 2+ femoral and 2+ DP pulses  -Continue with medical therapy on aspirin/apixaban 2.5 BID/ atorvastatin 40 and clinical monitoring  -Given increased velocities, repeat AOIL in 1 year with office visit, but discussed reasons to be seen sooner.  Orders:    VAS abdominal aorta/iliac duplex; Future

## 2025-06-02 ENCOUNTER — OFFICE VISIT (OUTPATIENT)
Dept: VASCULAR SURGERY | Facility: CLINIC | Age: 85
End: 2025-06-02
Payer: MEDICARE

## 2025-06-02 VITALS
BODY MASS INDEX: 26.88 KG/M2 | SYSTOLIC BLOOD PRESSURE: 122 MMHG | DIASTOLIC BLOOD PRESSURE: 70 MMHG | WEIGHT: 192 LBS | HEART RATE: 66 BPM | HEIGHT: 71 IN

## 2025-06-02 DIAGNOSIS — I65.23 BILATERAL CAROTID ARTERY STENOSIS: ICD-10-CM

## 2025-06-02 DIAGNOSIS — Z95.828 S/P AORTO-BIFEMORAL BYPASS SURGERY: ICD-10-CM

## 2025-06-02 DIAGNOSIS — I87.2 VENOUS INSUFFICIENCY OF BOTH LOWER EXTREMITIES: ICD-10-CM

## 2025-06-02 DIAGNOSIS — I10 BENIGN ESSENTIAL HYPERTENSION: ICD-10-CM

## 2025-06-02 DIAGNOSIS — R73.03 PREDIABETES: ICD-10-CM

## 2025-06-02 DIAGNOSIS — J44.9 CHRONIC OBSTRUCTIVE PULMONARY DISEASE, UNSPECIFIED COPD TYPE (HCC): ICD-10-CM

## 2025-06-02 DIAGNOSIS — I27.20 MODERATE TO SEVERE PULMONARY HYPERTENSION (HCC): ICD-10-CM

## 2025-06-02 DIAGNOSIS — I71.40 ABDOMINAL AORTIC ANEURYSM (AAA) WITHOUT RUPTURE, UNSPECIFIED PART (HCC): Primary | ICD-10-CM

## 2025-06-02 DIAGNOSIS — G47.33 SEVERE OBSTRUCTIVE SLEEP APNEA: ICD-10-CM

## 2025-06-02 DIAGNOSIS — D63.1 ANEMIA, CHRONIC RENAL FAILURE, STAGE 3 (MODERATE)  (HCC): ICD-10-CM

## 2025-06-02 DIAGNOSIS — I89.0 LYMPHEDEMA: ICD-10-CM

## 2025-06-02 DIAGNOSIS — E78.2 MIXED HYPERLIPIDEMIA: ICD-10-CM

## 2025-06-02 DIAGNOSIS — N18.30 ANEMIA, CHRONIC RENAL FAILURE, STAGE 3 (MODERATE)  (HCC): ICD-10-CM

## 2025-06-02 PROCEDURE — 99214 OFFICE O/P EST MOD 30 MIN: CPT | Performed by: PHYSICIAN ASSISTANT

## 2025-06-02 NOTE — ASSESSMENT & PLAN NOTE
Chronic B LE edema x > 10 years with Hx very severe legs edema with drainage  Sent last year to lymphedema clinic and has compression pumps and compression wraps    -Doing better; edema overall better controlled with compressive measures and wt loss  -Needs update Rx for compression wraps which was sent to PT/OT lymphedema clinic    -R leg LEV 1/2/24:    No DVT or SVT.     R GSV reflux throughout the thigh and calf (mid thigh 11.3 mm, reflux 3.04; knee 11.7, 2.09; prox calf 8.9, reflux 2.79). Triphasic waveforms.    -Exam: 1+ B LE edema; at least moderate chronic stasis changes; skin overall intact, no open wounds or drainage    Plan:  -Chronic leg swelling with underlying deep and superficial venous insufficiency and secondary lymphedema with improved LE edema after lymphedema therapy  -Continue with daily compression wraps;  updated prescription for wraps sent to PT/OT lymphedema  -Working on compression pumps (lymph clinic) - rec at least 60 min once daily and titrate  -Continue with medical compression to be worn daily and removed at night  -Compression, periodic elevation, low sodium, regular exercise and wt loss  -May return to lymphedema therapy as needed for decompensation, re-evaluation and garments  -Patient education regarding lymphedema provided  -Follow up 1 year  Orders:    Compression bandages

## 2025-06-02 NOTE — ASSESSMENT & PLAN NOTE
-RF modification and maintain good blood pressure,  cholesterol and glucose control per primary care

## 2025-06-02 NOTE — ASSESSMENT & PLAN NOTE
Lab Results   Component Value Date    EGFR 38 04/02/2025    EGFR 35 03/26/2025    EGFR 36 03/18/2025    CREATININE 1.64 (H) 04/02/2025    CREATININE 1.75 (H) 03/26/2025    CREATININE 1.69 (H) 03/18/2025

## 2025-06-06 DIAGNOSIS — J44.9 CHRONIC OBSTRUCTIVE PULMONARY DISEASE, UNSPECIFIED COPD TYPE (HCC): ICD-10-CM

## 2025-06-06 NOTE — TELEPHONE ENCOUNTER
Reason for call:   [x] Refill   [] Prior Auth  [] Other:     Office:   [] PCP/Provider -   [x] Specialty/Provider -     Medication:   Fluticasone-Salmeterol (Wixela Inhub) 100-50 mcg       Dose/Frequency:  INHALE 1 PUFF BY MOUTH TWICE DAILY. RINSE MOUTH AFTER USE     Quantity: 180    Pharmacy: [facility] - [address] [facility]   Does the patient have enough for 3 days?   [x] Yes   [] No - Send as HP to POD    [facility] Pharmacy   Does the patient have enough for 10 days?   [] Yes   [] No - Send as HP to POD

## 2025-06-09 RX ORDER — FLUTICASONE PROPIONATE AND SALMETEROL 100; 50 UG/1; UG/1
1 POWDER RESPIRATORY (INHALATION) 2 TIMES DAILY
Qty: 180 BLISTER | Refills: 0 | Status: SHIPPED | OUTPATIENT
Start: 2025-06-09

## 2025-06-16 DIAGNOSIS — M54.16 LUMBAR RADICULOPATHY: ICD-10-CM

## 2025-06-16 DIAGNOSIS — I50.32 HEART FAILURE WITH IMPROVED EJECTION FRACTION (HFIMPEF) (HCC): ICD-10-CM

## 2025-06-16 RX ORDER — GABAPENTIN 300 MG/1
900 CAPSULE ORAL
Qty: 270 CAPSULE | Refills: 1 | Status: SHIPPED | OUTPATIENT
Start: 2025-06-16

## 2025-06-16 RX ORDER — EMPAGLIFLOZIN 10 MG/1
10 TABLET, FILM COATED ORAL EVERY MORNING
Qty: 90 TABLET | Refills: 1 | Status: SHIPPED | OUTPATIENT
Start: 2025-06-16

## 2025-06-30 ENCOUNTER — APPOINTMENT (OUTPATIENT)
Dept: LAB | Facility: CLINIC | Age: 85
End: 2025-06-30
Payer: MEDICARE

## 2025-06-30 DIAGNOSIS — R73.03 PREDIABETES: ICD-10-CM

## 2025-06-30 DIAGNOSIS — N18.30 ANEMIA, CHRONIC RENAL FAILURE, STAGE 3 (MODERATE)  (HCC): ICD-10-CM

## 2025-06-30 DIAGNOSIS — E78.2 MIXED HYPERLIPIDEMIA: ICD-10-CM

## 2025-06-30 DIAGNOSIS — D63.1 ANEMIA, CHRONIC RENAL FAILURE, STAGE 3 (MODERATE)  (HCC): ICD-10-CM

## 2025-06-30 LAB
ALBUMIN SERPL BCG-MCNC: 3.7 G/DL (ref 3.5–5)
ALP SERPL-CCNC: 82 U/L (ref 34–104)
ALT SERPL W P-5'-P-CCNC: 13 U/L (ref 7–52)
ANION GAP SERPL CALCULATED.3IONS-SCNC: 7 MMOL/L (ref 4–13)
AST SERPL W P-5'-P-CCNC: 26 U/L (ref 13–39)
BASOPHILS # BLD AUTO: 0.06 THOUSANDS/ÂΜL (ref 0–0.1)
BASOPHILS NFR BLD AUTO: 1 % (ref 0–1)
BILIRUB SERPL-MCNC: 0.88 MG/DL (ref 0.2–1)
BUN SERPL-MCNC: 19 MG/DL (ref 5–25)
CALCIUM SERPL-MCNC: 8.6 MG/DL (ref 8.4–10.2)
CHLORIDE SERPL-SCNC: 100 MMOL/L (ref 96–108)
CHOLEST SERPL-MCNC: 110 MG/DL (ref ?–200)
CO2 SERPL-SCNC: 29 MMOL/L (ref 21–32)
CREAT SERPL-MCNC: 1.81 MG/DL (ref 0.6–1.3)
EOSINOPHIL # BLD AUTO: 0.16 THOUSAND/ÂΜL (ref 0–0.61)
EOSINOPHIL NFR BLD AUTO: 4 % (ref 0–6)
ERYTHROCYTE [DISTWIDTH] IN BLOOD BY AUTOMATED COUNT: 15.7 % (ref 11.6–15.1)
EST. AVERAGE GLUCOSE BLD GHB EST-MCNC: 114 MG/DL
FERRITIN SERPL-MCNC: 36 NG/ML (ref 30–336)
GFR SERPL CREATININE-BSD FRML MDRD: 33 ML/MIN/1.73SQ M
GLUCOSE P FAST SERPL-MCNC: 100 MG/DL (ref 65–99)
HBA1C MFR BLD: 5.6 %
HCT VFR BLD AUTO: 33.2 % (ref 36.5–49.3)
HDLC SERPL-MCNC: 49 MG/DL
HGB BLD-MCNC: 10.4 G/DL (ref 12–17)
IMM GRANULOCYTES # BLD AUTO: 0.02 THOUSAND/UL (ref 0–0.2)
IMM GRANULOCYTES NFR BLD AUTO: 1 % (ref 0–2)
IRON SATN MFR SERPL: 11 % (ref 15–50)
IRON SERPL-MCNC: 42 UG/DL (ref 50–212)
LDLC SERPL CALC-MCNC: 53 MG/DL (ref 0–100)
LYMPHOCYTES # BLD AUTO: 0.87 THOUSANDS/ÂΜL (ref 0.6–4.47)
LYMPHOCYTES NFR BLD AUTO: 20 % (ref 14–44)
MCH RBC QN AUTO: 32.2 PG (ref 26.8–34.3)
MCHC RBC AUTO-ENTMCNC: 31.3 G/DL (ref 31.4–37.4)
MCV RBC AUTO: 103 FL (ref 82–98)
MONOCYTES # BLD AUTO: 0.61 THOUSAND/ÂΜL (ref 0.17–1.22)
MONOCYTES NFR BLD AUTO: 14 % (ref 4–12)
NEUTROPHILS # BLD AUTO: 2.7 THOUSANDS/ÂΜL (ref 1.85–7.62)
NEUTS SEG NFR BLD AUTO: 60 % (ref 43–75)
NONHDLC SERPL-MCNC: 61 MG/DL
NRBC BLD AUTO-RTO: 0 /100 WBCS
PLATELET # BLD AUTO: 137 THOUSANDS/UL (ref 149–390)
PMV BLD AUTO: 10.4 FL (ref 8.9–12.7)
POTASSIUM SERPL-SCNC: 5.2 MMOL/L (ref 3.5–5.3)
PROT SERPL-MCNC: 6.7 G/DL (ref 6.4–8.4)
RBC # BLD AUTO: 3.23 MILLION/UL (ref 3.88–5.62)
SODIUM SERPL-SCNC: 136 MMOL/L (ref 135–147)
TIBC SERPL-MCNC: 382.2 UG/DL (ref 250–450)
TRANSFERRIN SERPL-MCNC: 273 MG/DL (ref 203–362)
TRIGL SERPL-MCNC: 40 MG/DL (ref ?–150)
UIBC SERPL-MCNC: 340 UG/DL (ref 155–355)
WBC # BLD AUTO: 4.42 THOUSAND/UL (ref 4.31–10.16)

## 2025-06-30 PROCEDURE — 82728 ASSAY OF FERRITIN: CPT

## 2025-06-30 PROCEDURE — 85025 COMPLETE CBC W/AUTO DIFF WBC: CPT

## 2025-06-30 PROCEDURE — 80053 COMPREHEN METABOLIC PANEL: CPT

## 2025-06-30 PROCEDURE — 83036 HEMOGLOBIN GLYCOSYLATED A1C: CPT

## 2025-06-30 PROCEDURE — 83550 IRON BINDING TEST: CPT

## 2025-06-30 PROCEDURE — 36415 COLL VENOUS BLD VENIPUNCTURE: CPT

## 2025-06-30 PROCEDURE — 83540 ASSAY OF IRON: CPT

## 2025-06-30 PROCEDURE — 80061 LIPID PANEL: CPT

## 2025-07-02 ENCOUNTER — HOSPITAL ENCOUNTER (OUTPATIENT)
Dept: INFUSION CENTER | Facility: CLINIC | Age: 85
Discharge: HOME | End: 2025-07-02
Payer: MEDICARE

## 2025-07-02 ENCOUNTER — TELEPHONE (OUTPATIENT)
Dept: HEMATOLOGY ONCOLOGY | Facility: CLINIC | Age: 85
End: 2025-07-02

## 2025-07-02 VITALS — SYSTOLIC BLOOD PRESSURE: 118 MMHG | DIASTOLIC BLOOD PRESSURE: 52 MMHG

## 2025-07-02 DIAGNOSIS — D63.1 ANEMIA, CHRONIC RENAL FAILURE, STAGE 3 (MODERATE)  (HCC): Primary | ICD-10-CM

## 2025-07-02 DIAGNOSIS — D50.9 IRON DEFICIENCY ANEMIA, UNSPECIFIED IRON DEFICIENCY ANEMIA TYPE: Primary | ICD-10-CM

## 2025-07-02 DIAGNOSIS — N18.30 ANEMIA, CHRONIC RENAL FAILURE, STAGE 3 (MODERATE)  (HCC): Primary | ICD-10-CM

## 2025-07-02 PROCEDURE — 96372 THER/PROPH/DIAG INJ SC/IM: CPT

## 2025-07-02 RX ORDER — SODIUM CHLORIDE 9 MG/ML
20 INJECTION, SOLUTION INTRAVENOUS ONCE
OUTPATIENT
Start: 2025-07-15

## 2025-07-02 RX ADMIN — DARBEPOETIN ALFA 100 MCG: 100 INJECTION, SOLUTION INTRAVENOUS; SUBCUTANEOUS at 10:36

## 2025-07-02 NOTE — TELEPHONE ENCOUNTER
Called and spoke to pt's spouse and scheduled pt for 7/15. Pt's spouse verbalized understanding of appt date and time.

## 2025-07-02 NOTE — PROGRESS NOTES
Patient arrives for Aranesp injection. Labs reviewed from 6/30, Hgb 10.4 meets parameters for treatment today. BP stable, patient reports he checked his BP at home and systolic 109 this AM - which he states is lower for him, does have hold parameters for his labetalol. Patient does keep a BP diary. Patient denies dizziness/lightheadedness. Instructed patient to let his Dr know if he has persisting lower Bps, sometimes may need a dose reduction.     Patient tolerated Aranesp to LEFT arm, bandaid in place. Patient and wife aware of next appt at [facility] on 8/27 at 1030, appt card provided.

## 2025-07-02 NOTE — PROGRESS NOTES
5/5/25  ferritin 34; iron saturation 11%    6/30/25 ferritin 36;  iron saturation 11%  Hgb 10.4    Patient received Aranesp 100mcg 7/2/24.      Venofer 200mg x 5 10/2024.    Recommend additional dose of Venofer 200mg now.     Will order Venofer 200mg IV every 60 days if Ferritin <100

## 2025-07-02 NOTE — TELEPHONE ENCOUNTER
"\"----- Message -----  From: Erica Hoover PA-C  Sent: 7/2/2025   2:24 PM EDT  To: Amber Hernández RN    Recommend additional dose of Venofer 200mg now.     Will order Venofer 200mg IV every 60 days if Ferritin <100\"      Spoke with spouse, she is aware of iron recommendation.     INFUSION: can you please schedule one dose of iron at this time. Thanks!  "

## 2025-07-09 ENCOUNTER — OFFICE VISIT (OUTPATIENT)
Dept: INTERNAL MEDICINE CLINIC | Facility: CLINIC | Age: 85
End: 2025-07-09
Payer: MEDICARE

## 2025-07-09 VITALS
HEIGHT: 71 IN | HEART RATE: 56 BPM | OXYGEN SATURATION: 93 % | DIASTOLIC BLOOD PRESSURE: 62 MMHG | RESPIRATION RATE: 16 BRPM | WEIGHT: 191 LBS | BODY MASS INDEX: 26.74 KG/M2 | SYSTOLIC BLOOD PRESSURE: 108 MMHG

## 2025-07-09 DIAGNOSIS — N18.32 STAGE 3B CHRONIC KIDNEY DISEASE (HCC): ICD-10-CM

## 2025-07-09 DIAGNOSIS — Z00.00 MEDICARE ANNUAL WELLNESS VISIT, SUBSEQUENT: Primary | ICD-10-CM

## 2025-07-09 PROBLEM — J02.9 SORE THROAT: Status: RESOLVED | Noted: 2025-05-06 | Resolved: 2025-07-09

## 2025-07-09 PROCEDURE — G0439 PPPS, SUBSEQ VISIT: HCPCS | Performed by: INTERNAL MEDICINE

## 2025-07-09 NOTE — PROGRESS NOTES
Name: Oscar Espinosa      : 1940      MRN: [id number]  Encounter Provider: Lea Reyes, MD  Encounter Date: 2025   Enc[facility]: [facility]  :  Assessment & Plan  Medicare annual wellness visit, subsequent         Stage 3b chronic kidney disease (HCC)  Lab Results   Component Value Date    EGFR 33 2025    EGFR 38 2025    EGFR 35 2025    CREATININE 1.81 (H) 2025    CREATININE 1.64 (H) 2025    CREATININE 1.75 (H) 2025       Orders:  •  Basic metabolic panel; Future       Preventive health issues were discussed with patient, and age appropriate screening tests were ordered as noted in patient's After Visit Summary. Personalized health advice and appropriate referrals for health education or preventive services given if needed, as noted in patient's After Visit Summary.    History of Present Illness     HPI   Patient Care Team:  Lea Reyes, MD as PCP - General  MD Tonny Greenfield MD J Raymond Fitzpatrick, MD Michelle Joann Peartree, PA-C (Hematology and Oncology)  Petar Harley MD as Medical Oncologist (Hematology)    Review of Systems   Constitutional:  Negative for unexpected weight change.   Respiratory:  Positive for shortness of breath.    Cardiovascular:  Negative for chest pain, palpitations and leg swelling.   Gastrointestinal:  Positive for constipation.   Neurological:  Negative for dizziness and headaches.     Medical History Reviewed by provider this encounter:       Annual Wellness Visit Questionnaire   Oscar is here for his Subsequent Wellness visit.     Health Risk Assessment:   Patient rates overall health as fair. Patient feels that their physical health rating is slightly better. Patient is very satisfied with their life. Eyesight was rated as same. Hearing was rated as same. Patient feels that their emotional and mental health rating is same. Patients states they are sometimes angry. Patient states  they are sometimes unusually tired/fatigued. Pain experienced in the last 7 days has been none. Patient states that he has experienced no weight loss or gain in last 6 months.     Depression Screening:   PHQ-2 Score: 0      Fall Risk Screening:   In the past year, patient has experienced: no history of falling in past year      Home Safety:  Patient does not have trouble with stairs inside or outside of their home. Patient has working smoke alarms and has working carbon monoxide detector. Home safety hazards include: none.     Nutrition:   Current diet is Regular.     Medications:   Patient is currently taking over-the-counter supplements. OTC medications include: see medication list. Patient is able to manage medications.     Activities of Daily Living (ADLs)/Instrumental Activities of Daily Living (IADLs):   Walk and transfer into and out of bed and chair?: Yes  Dress and groom yourself?: Yes    Bathe or shower yourself?: Yes    Feed yourself? Yes  Do your laundry/housekeeping?: Yes  Manage your money, pay your bills and track your expenses?: Yes  Make your own meals?: Yes    Do your own shopping?: Yes    Previous Hospitalizations:   Any hospitalizations or ED visits within the last 12 months?: No      Advance Care Planning:   Living will: No    Durable POA for healthcare: No    Advanced directive: No    Advanced directive counseling given: Yes    ACP document given: Yes      Cognitive Screening:   Provider or family/friend/caregiver concerned regarding cognition?: No    Preventive Screenings      Cardiovascular Screening:    General: Screening Not Indicated and History Lipid Disorder      Diabetes Screening:     General: Screening Current      Colorectal Cancer Screening:     General: Screening Not Indicated      Prostate Cancer Screening:    General: Screening Not Indicated      Osteoporosis Screening:    General: Screening Not Indicated      Abdominal Aortic Aneurysm (AAA) Screening:    Risk factors include:  "tobacco use        General: Screening Not Indicated and History AAA      Lung Cancer Screening:     General: Screening Not Indicated      Hepatitis C Screening:    General: Screening Current    Immunizations:  - Immunizations due: COVID  - Risks/benefits immunizations discussed      Screening, Brief Intervention, and Referral to Treatment (SBIRT)     Screening      Single Item Drug Screening:  How often have you used an illegal drug (including marijuana) or a prescription medication for non-medical reasons in the past year? never    Single Item Drug Screen Score: 0  Interpretation: Negative screen for possible drug use disorder    Review of Current Opioid Use    Opioid Risk Tool (ORT) Interpretation: Complete Opioid Risk Tool (ORT)    Social Drivers of Health     Financial Resource Strain: Low Risk  (3/7/2024)    Overall Financial Resource Strain (CARDIA)    • Difficulty of Paying Living Expenses: Not hard at all   Food Insecurity: No Food Insecurity (7/9/2025)    Nursing - Inadequate Food Risk Classification    • Worried About Running Out of Food in the Last Year: Never true    • Ran Out of Food in the Last Year: Never true    • Ran Out of Food in the Last Year: Never true   Transportation Needs: No Transportation Needs (7/9/2025)    PRAPARE - Transportation    • Lack of Transportation (Medical): No    • Lack of Transportation (Non-Medical): No   Housing Stability: Low Risk  (7/9/2025)    Housing Stability Vital Sign    • Unable to Pay for Housing in the Last Year: No    • Number of Times Moved in the Last Year: 0    • Homeless in the Last Year: No   Utilities: Not At Risk (7/9/2025)    [facility] Utilities    • Threatened with loss of utilities: No     No results found.    Objective   /62 (BP Location: Left arm, Patient Position: Sitting, Cuff Size: Standard)   Pulse 56   Resp 16   Ht 5' 11\" (1.803 m)   Wt 86.6 kg (191 lb)   SpO2 93%   BMI 26.64 kg/m²     Physical Exam  Constitutional:       Appearance: He is " well-developed. He is not ill-appearing.     Eyes:      Conjunctiva/sclera: Conjunctivae normal.       Cardiovascular:      Rate and Rhythm: Normal rate and regular rhythm.      Heart sounds: Normal heart sounds. No murmur heard.  Pulmonary:      Effort: Pulmonary effort is normal. No respiratory distress.      Breath sounds: Decreased breath sounds present. No wheezing or rales.   Abdominal:      General: There is no distension.      Palpations: Abdomen is soft. There is no mass.      Tenderness: There is no abdominal tenderness. There is no guarding or rebound.     Musculoskeletal:      Cervical back: Neck supple.      Right lower leg: No edema.      Left lower leg: No edema.     Neurological:      Mental Status: He is alert and oriented to person, place, and time.     Psychiatric:         Mood and Affect: Mood normal.         Behavior: Behavior normal.         Thought Content: Thought content normal.         Judgment: Judgment normal.

## 2025-07-09 NOTE — PATIENT INSTRUCTIONS
Medicare Preventive Visit Patient Instructions  Thank you for completing your Welcome to Medicare Visit or Medicare Annual Wellness Visit today. Your next wellness visit will be due in one year (7/10/2026).  The screening/preventive services that you may require over the next 5-10 years are detailed below. Some tests may not apply to you based off risk factors and/or age. Screening tests ordered at today's visit but not completed yet may show as past due. Also, please note that scanned in results may not display below.  Preventive Screenings:  Service Recommendations Previous Testing/Comments   Colorectal Cancer Screening  Colonoscopy    Fecal Occult Blood Test (FOBT)/Fecal Immunochemical Test (FIT)  Fecal DNA/Cologuard Test  Flexible Sigmoidoscopy Age: 45-75 years old   Colonoscopy: every 10 years (May be performed more frequently if at higher risk)  OR  FOBT/FIT: every 1 year  OR  Cologuard: every 3 years  OR  Sigmoidoscopy: every 5 years  Screening may be recommended earlier than age 45 if at higher risk for colorectal cancer. Also, an individualized decision between you and your healthcare provider will decide whether screening between the ages of 76-85 would be appropriate. Colonoscopy: 09/01/2021  FOBT/FIT: Not on file  Cologuard: Not on file  Sigmoidoscopy: Not on file          Prostate Cancer Screening Individualized decision between patient and health care provider in men between ages of 55-69   Medicare will cover every 12 months beginning on the day after your 50th birthday PSA: 3.444 ng/mL           Hepatitis C Screening Once for adults born between 1945 and 1965  More frequently in patients at high risk for Hepatitis C Hep C Antibody: 04/28/2024        Diabetes Screening 1-2 times per year if you're at risk for diabetes or have pre-diabetes Fasting glucose: 100 mg/dL (6/30/2025)  A1C: 5.6 % (6/30/2025)      Cholesterol Screening Once every 5 years if you don't have a lipid disorder. May order more often  based on risk factors. Lipid panel: 06/30/2025         Other Preventive Screenings Covered by Medicare:  Abdominal Aortic Aneurysm (AAA) Screening: covered once if your at risk. You're considered to be at risk if you have a family history of AAA or a male between the age of 65-75 who smoking at least 100 cigarettes in your lifetime.  Lung Cancer Screening: covers low dose CT scan once per year if you meet all of the following conditions: (1) Age 55-77; (2) No signs or symptoms of lung cancer; (3) Current smoker or have quit smoking within the last 15 years; (4) You have a tobacco smoking history of at least 20 pack years (packs per day x number of years you smoked); (5) You get a written order from a healthcare provider.  Glaucoma Screening: covered annually if you're considered high risk: (1) You have diabetes OR (2) Family history of glaucoma OR (3)  aged 50 and older OR (4)  American aged 65 and older  Osteoporosis Screening: covered every 2 years if you meet one of the following conditions: (1) Have a vertebral abnormality; (2) On glucocorticoid therapy for more than 3 months; (3) Have primary hyperparathyroidism; (4) On osteoporosis medications and need to assess response to drug therapy.  HIV Screening: covered annually if you're between the age of 15-65. Also covered annually if you are younger than 15 and older than 65 with risk factors for HIV infection. For pregnant patients, it is covered up to 3 times per pregnancy.    Immunizations:  Immunization Recommendations   Influenza Vaccine Annual influenza vaccination during flu season is recommended for all persons aged >= 6 months who do not have contraindications   Pneumococcal Vaccine   * Pneumococcal conjugate vaccine = PCV13 (Prevnar 13), PCV15 (Vaxneuvance), PCV20 (Prevnar 20)  * Pneumococcal polysaccharide vaccine = PPSV23 (Pneumovax) Adults 19-63 yo with certain risk factors or if 65+ yo  If never received any pneumonia vaccine:  recommend Prevnar 20 (PCV20)  Give PCV20 if previously received 1 dose of PCV13 or PPSV23   Hepatitis B Vaccine 3 dose series if at intermediate or high risk (ex: diabetes, end stage renal disease, liver disease)   Respiratory syncytial virus (RSV) Vaccine - COVERED BY MEDICARE PART D  * RSVPreF3 (Arexvy) CDC recommends that adults 60 years of age and older may receive a single dose of RSV vaccine using shared clinical decision-making (SCDM)   Tetanus (Td) Vaccine - COST NOT COVERED BY MEDICARE PART B Following completion of primary series, a booster dose should be given every 10 years to maintain immunity against tetanus. Td may also be given as tetanus wound prophylaxis.   Tdap Vaccine - COST NOT COVERED BY MEDICARE PART B Recommended at least once for all adults. For pregnant patients, recommended with each pregnancy.   Shingles Vaccine (Shingrix) - COST NOT COVERED BY MEDICARE PART B  2 shot series recommended in those 19 years and older who have or will have weakened immune systems or those 50 years and older     Health Maintenance Due:      Topic Date Due   • Colorectal Cancer Screening  10/10/2025 (Originally 6/20/2013)   • Hepatitis C Screening  Completed   • Lung Cancer Screening  Discontinued     Immunizations Due:      Topic Date Due   • COVID-19 Vaccine (10 - 2024-25 season) 04/28/2025   • Influenza Vaccine (1) 09/01/2025     Advance Directives   What are advance directives?  Advance directives are legal documents that state your wishes and plans for medical care. These plans are made ahead of time in case you lose your ability to make decisions for yourself. Advance directives can apply to any medical decision, such as the treatments you want, and if you want to donate organs.   What are the types of advance directives?  There are many types of advance directives, and each state has rules about how to use them. You may choose a combination of any of the following:  Living will:  This is a written record  of the treatment you want. You can also choose which treatments you do not want, which to limit, and which to stop at a certain time. This includes surgery, medicine, IV fluid, and tube feedings.   Durable power of  for healthcare (DPAHC):  This is a written record that states who you want to make healthcare choices for you when you are unable to make them for yourself. This person, called a proxy, is usually a family member or a friend. You may choose more than 1 proxy.  Do not resuscitate (DNR) order:  A DNR order is used in case your heart stops beating or you stop breathing. It is a request not to have certain forms of treatment, such as CPR. A DNR order may be included in other types of advance directives.  Medical directive:  This covers the care that you want if you are in a coma, near death, or unable to make decisions for yourself. You can list the treatments you want for each condition. Treatment may include pain medicine, surgery, blood transfusions, dialysis, IV or tube feedings, and a ventilator (breathing machine).  Values history:  This document has questions about your views, beliefs, and how you feel and think about life. This information can help others choose the care that you would choose.  Why are advance directives important?  An advance directive helps you control your care. Although spoken wishes may be used, it is better to have your wishes written down. Spoken wishes can be misunderstood, or not followed. Treatments may be given even if you do not want them. An advance directive may make it easier for your family to make difficult choices about your care.   Weight Management   Why it is important to manage your weight:  Being overweight increases your risk of health conditions such as heart disease, high blood pressure, type 2 diabetes, and certain types of cancer. It can also increase your risk for osteoarthritis, sleep apnea, and other respiratory problems. Aim for a slow, steady  weight loss. Even a small amount of weight loss can lower your risk of health problems.  How to lose weight safely:  A safe and healthy way to lose weight is to eat fewer calories and get regular exercise. You can lose up about 1 pound a week by decreasing the number of calories you eat by 500 calories each day.   Healthy meal plan for weight management:  A healthy meal plan includes a variety of foods, contains fewer calories, and helps you stay healthy. A healthy meal plan includes the following:  Eat whole-grain foods more often.  A healthy meal plan should contain fiber. Fiber is the part of grains, fruits, and vegetables that is not broken down by your body. Whole-grain foods are healthy and provide extra fiber in your diet. Some examples of whole-grain foods are whole-wheat breads and pastas, oatmeal, brown rice, and bulgur.  Eat a variety of vegetables every day.  Include dark, leafy greens such as spinach, kale, leon greens, and mustard greens. Eat yellow and orange vegetables such as carrots, sweet potatoes, and winter squash.   Eat a variety of fruits every day.  Choose fresh or canned fruit (canned in its own juice or light syrup) instead of juice. Fruit juice has very little or no fiber.  Eat low-fat dairy foods.  Drink fat-free (skim) milk or 1% milk. Eat fat-free yogurt and low-fat cottage cheese. Try low-fat cheeses such as mozzarella and other reduced-fat cheeses.  Choose meat and other protein foods that are low in fat.  Choose beans or other legumes such as split peas or lentils. Choose fish, skinless poultry (chicken or turkey), or lean cuts of red meat (beef or pork). Before you cook meat or poultry, cut off any visible fat.   Use less fat and oil.  Try baking foods instead of frying them. Add less fat, such as margarine, sour cream, regular salad dressing and mayonnaise to foods. Eat fewer high-fat foods. Some examples of high-fat foods include french fries, doughnuts, ice cream, and  cakes.  Eat fewer sweets.  Limit foods and drinks that are high in sugar. This includes candy, cookies, regular soda, and sweetened drinks.  Exercise:  Exercise at least 30 minutes per day on most days of the week. Some examples of exercise include walking, biking, dancing, and swimming. You can also fit in more physical activity by taking the stairs instead of the elevator or parking farther away from stores. Ask your healthcare provider about the best exercise plan for you.    © Copyright [employer] 2018 Information is for End User's use only and may not be sold, redistributed or otherwise used for commercial purposes. All illustrations and images included in CareNotes® are the copyrighted property of A.D.A[employer]M., Inc[employer] or [employer]

## 2025-07-09 NOTE — ASSESSMENT & PLAN NOTE
Lab Results   Component Value Date    EGFR 33 06/30/2025    EGFR 38 04/02/2025    EGFR 35 03/26/2025    CREATININE 1.81 (H) 06/30/2025    CREATININE 1.64 (H) 04/02/2025    CREATININE 1.75 (H) 03/26/2025       Orders:  •  Basic metabolic panel; Future

## 2025-07-15 ENCOUNTER — HOSPITAL ENCOUNTER (OUTPATIENT)
Dept: INFUSION CENTER | Facility: CLINIC | Age: 85
Discharge: HOME | End: 2025-07-15
Attending: INTERNAL MEDICINE
Payer: MEDICARE

## 2025-07-15 VITALS
SYSTOLIC BLOOD PRESSURE: 128 MMHG | HEART RATE: 54 BPM | OXYGEN SATURATION: 98 % | TEMPERATURE: 98.3 F | DIASTOLIC BLOOD PRESSURE: 65 MMHG

## 2025-07-15 DIAGNOSIS — D50.9 IRON DEFICIENCY ANEMIA, UNSPECIFIED IRON DEFICIENCY ANEMIA TYPE: Primary | ICD-10-CM

## 2025-07-15 PROCEDURE — 96365 THER/PROPH/DIAG IV INF INIT: CPT

## 2025-07-15 RX ORDER — SODIUM CHLORIDE 9 MG/ML
20 INJECTION, SOLUTION INTRAVENOUS ONCE
Status: COMPLETED | OUTPATIENT
Start: 2025-07-15 | End: 2025-07-15

## 2025-07-15 RX ORDER — SODIUM CHLORIDE 9 MG/ML
20 INJECTION, SOLUTION INTRAVENOUS ONCE
OUTPATIENT
Start: 2025-09-13

## 2025-07-15 RX ADMIN — IRON SUCROSE 300 MG: 20 INJECTION, SOLUTION INTRAVENOUS at 15:28

## 2025-07-15 RX ADMIN — SODIUM CHLORIDE 20 ML/HR: 0.9 INJECTION, SOLUTION INTRAVENOUS at 15:25

## 2025-07-17 ENCOUNTER — TELEPHONE (OUTPATIENT)
Age: 85
End: 2025-07-17

## 2025-07-17 NOTE — TELEPHONE ENCOUNTER
Patient is transferring care from Dr. Arevalo because he retired. There is no specific reason for the visit besides a yearly check up and prostate check. Patient last saw Dr. Arevalo in May 2025. Wife was looking to get the patient scheduled for a new patient visit around the year ayala.     Advised the patient could be scheduled with an AP because the MD's schedules are no open for 2026.     She decided to schedule with an AP and may call back if she decides to reschedule with an MD    Email sent to management to get patient records from Dr. Arevalo    New Patient      Insurance   Current Insurance?yes   Insurance E-verified? yes    History   Reason for appointment/active symptoms?  JAYESH from Retired Dr. Arevalo, establish care, yearly check up and prostate check     Has the patient had any previous Urologist(s)? Yes, Dr. Arevalo until he retired in June     Was the patient seen in the ED? no     Labs/Imaging(Including Out Of Network)? no     Records Requested? yes  Records Visible in EPIC? yes/no     Personal history of cancer? unknown     Appointment   Office location preference:  [facility]  ?   Appointment Details   Date: 5/6/2026  Time:  11:45 am  Location:  [facility]  Provider:  Beka ROWE  Does the appointment need further review?   Noted above

## 2025-07-26 DIAGNOSIS — I50.32 HEART FAILURE WITH IMPROVED EJECTION FRACTION (HFIMPEF) (HCC): Primary | ICD-10-CM

## 2025-07-27 DIAGNOSIS — I50.32 CHRONIC DIASTOLIC CONGESTIVE HEART FAILURE (HCC): ICD-10-CM

## 2025-07-28 RX ORDER — TORSEMIDE 20 MG/1
20 TABLET ORAL DAILY
Qty: 90 TABLET | Refills: 3 | Status: SHIPPED | OUTPATIENT
Start: 2025-07-28

## 2025-07-29 RX ORDER — POTASSIUM CHLORIDE 1500 MG/1
TABLET, EXTENDED RELEASE ORAL
Qty: 90 TABLET | Refills: 3 | Status: SHIPPED | OUTPATIENT
Start: 2025-07-29

## 2025-08-07 ENCOUNTER — OFFICE VISIT (OUTPATIENT)
Dept: PULMONOLOGY | Facility: CLINIC | Age: 85
End: 2025-08-07
Payer: MEDICARE

## 2025-08-07 VITALS
HEART RATE: 49 BPM | BODY MASS INDEX: 26.43 KG/M2 | TEMPERATURE: 96.5 F | WEIGHT: 188.8 LBS | SYSTOLIC BLOOD PRESSURE: 106 MMHG | OXYGEN SATURATION: 99 % | DIASTOLIC BLOOD PRESSURE: 60 MMHG | HEIGHT: 71 IN

## 2025-08-07 DIAGNOSIS — J44.9 CHRONIC OBSTRUCTIVE PULMONARY DISEASE, UNSPECIFIED COPD TYPE (HCC): ICD-10-CM

## 2025-08-07 DIAGNOSIS — G47.34 NOCTURNAL HYPOXEMIA: ICD-10-CM

## 2025-08-07 DIAGNOSIS — G47.33 SEVERE OBSTRUCTIVE SLEEP APNEA: ICD-10-CM

## 2025-08-07 DIAGNOSIS — J96.11 CHRONIC HYPOXEMIC RESPIRATORY FAILURE (HCC): Primary | ICD-10-CM

## 2025-08-07 PROCEDURE — 99214 OFFICE O/P EST MOD 30 MIN: CPT | Performed by: STUDENT IN AN ORGANIZED HEALTH CARE EDUCATION/TRAINING PROGRAM
